# Patient Record
Sex: FEMALE | Race: BLACK OR AFRICAN AMERICAN | Employment: UNEMPLOYED | URBAN - METROPOLITAN AREA
[De-identification: names, ages, dates, MRNs, and addresses within clinical notes are randomized per-mention and may not be internally consistent; named-entity substitution may affect disease eponyms.]

---

## 2017-04-30 ENCOUNTER — APPOINTMENT (OUTPATIENT)
Dept: GENERAL RADIOLOGY | Age: 37
DRG: 637 | End: 2017-04-30
Attending: EMERGENCY MEDICINE
Payer: MEDICARE

## 2017-04-30 ENCOUNTER — HOSPITAL ENCOUNTER (INPATIENT)
Age: 37
LOS: 2 days | Discharge: HOME | DRG: 637 | End: 2017-05-02
Attending: EMERGENCY MEDICINE | Admitting: INTERNAL MEDICINE
Payer: MEDICARE

## 2017-04-30 ENCOUNTER — APPOINTMENT (OUTPATIENT)
Dept: CT IMAGING | Age: 37
DRG: 637 | End: 2017-04-30
Attending: EMERGENCY MEDICINE
Payer: MEDICARE

## 2017-04-30 DIAGNOSIS — E13.10 DIABETIC KETOACIDOSIS ASSOCIATED WITH OTHER SPECIFIED DIABETES MELLITUS: Primary | ICD-10-CM

## 2017-04-30 PROBLEM — E11.65 HYPERGLYCEMIA DUE TO TYPE 2 DIABETES MELLITUS (HCC): Status: ACTIVE | Noted: 2017-04-30

## 2017-04-30 PROBLEM — R73.9 HYPERGLYCEMIA: Status: ACTIVE | Noted: 2017-04-30

## 2017-04-30 LAB
ALBUMIN SERPL BCP-MCNC: 3.5 G/DL (ref 3.5–5)
ALBUMIN/GLOB SERPL: 1 {RATIO} (ref 1.1–2.2)
ALP SERPL-CCNC: 547 U/L (ref 45–117)
ALT SERPL-CCNC: 64 U/L (ref 12–78)
AMPHET UR QL SCN: NEGATIVE
ANION GAP BLD CALC-SCNC: 10 MMOL/L (ref 5–15)
APPEARANCE UR: CLEAR
ARTERIAL PATENCY WRIST A: YES
AST SERPL W P-5'-P-CCNC: 43 U/L (ref 15–37)
BACTERIA URNS QL MICRO: NEGATIVE /HPF
BARBITURATES UR QL SCN: POSITIVE
BASE DEFICIT BLD-SCNC: 9 MMOL/L
BASOPHILS # BLD AUTO: 0 K/UL (ref 0–0.1)
BASOPHILS # BLD: 0 % (ref 0–1)
BDY SITE: ABNORMAL
BENZODIAZ UR QL: NEGATIVE
BILIRUB SERPL-MCNC: 0.5 MG/DL (ref 0.2–1)
BILIRUB UR QL: NEGATIVE
BUN SERPL-MCNC: 48 MG/DL (ref 6–20)
BUN/CREAT SERPL: 18 (ref 12–20)
CALCIUM SERPL-MCNC: 8.2 MG/DL (ref 8.5–10.1)
CANNABINOIDS UR QL SCN: NEGATIVE
CHLORIDE SERPL-SCNC: 104 MMOL/L (ref 97–108)
CO2 SERPL-SCNC: 19 MMOL/L (ref 21–32)
COCAINE UR QL SCN: NEGATIVE
COLOR UR: ABNORMAL
CREAT SERPL-MCNC: 2.71 MG/DL (ref 0.55–1.02)
DIFFERENTIAL METHOD BLD: ABNORMAL
DRUG SCRN COMMENT,DRGCM: ABNORMAL
EOSINOPHIL # BLD: 0 K/UL (ref 0–0.4)
EOSINOPHIL NFR BLD: 0 % (ref 0–7)
EPITH CASTS URNS QL MICRO: ABNORMAL /LPF
ERYTHROCYTE [DISTWIDTH] IN BLOOD BY AUTOMATED COUNT: 14 % (ref 11.5–14.5)
EST. AVERAGE GLUCOSE BLD GHB EST-MCNC: 237 MG/DL
ETHANOL SERPL-MCNC: <10 MG/DL
GAS FLOW.O2 O2 DELIVERY SYS: ABNORMAL L/MIN
GLOBULIN SER CALC-MCNC: 3.4 G/DL (ref 2–4)
GLUCOSE BLD STRIP.AUTO-MCNC: 103 MG/DL (ref 65–100)
GLUCOSE BLD STRIP.AUTO-MCNC: 121 MG/DL (ref 65–100)
GLUCOSE BLD STRIP.AUTO-MCNC: 131 MG/DL (ref 65–100)
GLUCOSE BLD STRIP.AUTO-MCNC: 261 MG/DL (ref 65–100)
GLUCOSE BLD STRIP.AUTO-MCNC: 276 MG/DL (ref 65–100)
GLUCOSE BLD STRIP.AUTO-MCNC: 312 MG/DL (ref 65–100)
GLUCOSE BLD STRIP.AUTO-MCNC: 401 MG/DL (ref 65–100)
GLUCOSE BLD STRIP.AUTO-MCNC: 570 MG/DL (ref 65–100)
GLUCOSE BLD STRIP.AUTO-MCNC: >600 MG/DL (ref 65–100)
GLUCOSE SERPL-MCNC: 673 MG/DL (ref 65–100)
GLUCOSE UR STRIP.AUTO-MCNC: >1000 MG/DL
HBA1C MFR BLD: 9.9 % (ref 4.2–6.3)
HCO3 BLD-SCNC: 17.6 MMOL/L (ref 22–26)
HCT VFR BLD AUTO: 24.1 % (ref 35–47)
HGB BLD-MCNC: 7.7 G/DL (ref 11.5–16)
HGB UR QL STRIP: NEGATIVE
HYALINE CASTS URNS QL MICRO: ABNORMAL /LPF (ref 0–5)
KETONES UR QL STRIP.AUTO: NEGATIVE MG/DL
LEUKOCYTE ESTERASE UR QL STRIP.AUTO: NEGATIVE
LYMPHOCYTES # BLD AUTO: 12 % (ref 12–49)
LYMPHOCYTES # BLD: 0.7 K/UL (ref 0.8–3.5)
MCH RBC QN AUTO: 25.6 PG (ref 26–34)
MCHC RBC AUTO-ENTMCNC: 32 G/DL (ref 30–36.5)
MCV RBC AUTO: 80.1 FL (ref 80–99)
METHADONE UR QL: NEGATIVE
MONOCYTES # BLD: 0.4 K/UL (ref 0–1)
MONOCYTES NFR BLD AUTO: 7 % (ref 5–13)
NEUTS SEG # BLD: 4.8 K/UL (ref 1.8–8)
NEUTS SEG NFR BLD AUTO: 81 % (ref 32–75)
NITRITE UR QL STRIP.AUTO: NEGATIVE
OPIATES UR QL: NEGATIVE
PCO2 BLD: 34.4 MMHG (ref 35–45)
PCP UR QL: NEGATIVE
PH BLD: 7.32 [PH] (ref 7.35–7.45)
PH UR STRIP: 5.5 [PH] (ref 5–8)
PHOSPHATE SERPL-MCNC: 4.2 MG/DL (ref 2.6–4.7)
PLATELET # BLD AUTO: 206 K/UL (ref 150–400)
PO2 BLD: 79 MMHG (ref 80–100)
POTASSIUM SERPL-SCNC: 5.2 MMOL/L (ref 3.5–5.1)
PROT SERPL-MCNC: 6.9 G/DL (ref 6.4–8.2)
PROT UR STRIP-MCNC: 30 MG/DL
RBC # BLD AUTO: 3.01 M/UL (ref 3.8–5.2)
RBC #/AREA URNS HPF: ABNORMAL /HPF (ref 0–5)
RBC MORPH BLD: ABNORMAL
SAO2 % BLD: 95 % (ref 92–97)
SERVICE CMNT-IMP: ABNORMAL
SODIUM SERPL-SCNC: 133 MMOL/L (ref 136–145)
SP GR UR REFRACTOMETRY: 1.02 (ref 1–1.03)
SPECIMEN TYPE: ABNORMAL
UROBILINOGEN UR QL STRIP.AUTO: 0.2 EU/DL (ref 0.2–1)
WBC # BLD AUTO: 5.9 K/UL (ref 3.6–11)
WBC URNS QL MICRO: ABNORMAL /HPF (ref 0–4)

## 2017-04-30 PROCEDURE — 65270000032 HC RM SEMIPRIVATE

## 2017-04-30 PROCEDURE — 80053 COMPREHEN METABOLIC PANEL: CPT | Performed by: EMERGENCY MEDICINE

## 2017-04-30 PROCEDURE — 74011250636 HC RX REV CODE- 250/636: Performed by: EMERGENCY MEDICINE

## 2017-04-30 PROCEDURE — 72072 X-RAY EXAM THORAC SPINE 3VWS: CPT

## 2017-04-30 PROCEDURE — 74011000258 HC RX REV CODE- 258: Performed by: EMERGENCY MEDICINE

## 2017-04-30 PROCEDURE — 84100 ASSAY OF PHOSPHORUS: CPT | Performed by: INTERNAL MEDICINE

## 2017-04-30 PROCEDURE — 74011250637 HC RX REV CODE- 250/637: Performed by: INTERNAL MEDICINE

## 2017-04-30 PROCEDURE — 81001 URINALYSIS AUTO W/SCOPE: CPT | Performed by: EMERGENCY MEDICINE

## 2017-04-30 PROCEDURE — 80307 DRUG TEST PRSMV CHEM ANLYZR: CPT | Performed by: EMERGENCY MEDICINE

## 2017-04-30 PROCEDURE — 36415 COLL VENOUS BLD VENIPUNCTURE: CPT | Performed by: EMERGENCY MEDICINE

## 2017-04-30 PROCEDURE — 96374 THER/PROPH/DIAG INJ IV PUSH: CPT

## 2017-04-30 PROCEDURE — 82803 BLOOD GASES ANY COMBINATION: CPT

## 2017-04-30 PROCEDURE — 85025 COMPLETE CBC W/AUTO DIFF WBC: CPT | Performed by: EMERGENCY MEDICINE

## 2017-04-30 PROCEDURE — 74011636637 HC RX REV CODE- 636/637: Performed by: EMERGENCY MEDICINE

## 2017-04-30 PROCEDURE — 96375 TX/PRO/DX INJ NEW DRUG ADDON: CPT

## 2017-04-30 PROCEDURE — 72100 X-RAY EXAM L-S SPINE 2/3 VWS: CPT

## 2017-04-30 PROCEDURE — 36600 WITHDRAWAL OF ARTERIAL BLOOD: CPT

## 2017-04-30 PROCEDURE — 83036 HEMOGLOBIN GLYCOSYLATED A1C: CPT | Performed by: INTERNAL MEDICINE

## 2017-04-30 PROCEDURE — 71010 XR CHEST PORT: CPT

## 2017-04-30 PROCEDURE — 82962 GLUCOSE BLOOD TEST: CPT

## 2017-04-30 PROCEDURE — 74011250636 HC RX REV CODE- 250/636: Performed by: INTERNAL MEDICINE

## 2017-04-30 PROCEDURE — 99284 EMERGENCY DEPT VISIT MOD MDM: CPT

## 2017-04-30 PROCEDURE — 74011636637 HC RX REV CODE- 636/637: Performed by: INTERNAL MEDICINE

## 2017-04-30 PROCEDURE — 96361 HYDRATE IV INFUSION ADD-ON: CPT

## 2017-04-30 PROCEDURE — 74176 CT ABD & PELVIS W/O CONTRAST: CPT

## 2017-04-30 PROCEDURE — 70450 CT HEAD/BRAIN W/O DYE: CPT

## 2017-04-30 RX ORDER — ONDANSETRON 2 MG/ML
4 INJECTION INTRAMUSCULAR; INTRAVENOUS
Status: DISCONTINUED | OUTPATIENT
Start: 2017-04-30 | End: 2017-05-02 | Stop reason: HOSPADM

## 2017-04-30 RX ORDER — PROMETHAZINE HYDROCHLORIDE 25 MG/1
50 TABLET ORAL
Status: DISCONTINUED | OUTPATIENT
Start: 2017-04-30 | End: 2017-05-02 | Stop reason: HOSPADM

## 2017-04-30 RX ORDER — POLYETHYLENE GLYCOL 3350 17 G/17G
17 POWDER, FOR SOLUTION ORAL
Status: DISCONTINUED | OUTPATIENT
Start: 2017-04-30 | End: 2017-05-02 | Stop reason: HOSPADM

## 2017-04-30 RX ORDER — DULOXETIN HYDROCHLORIDE 60 MG/1
60 CAPSULE, DELAYED RELEASE ORAL 2 TIMES DAILY
Status: DISCONTINUED | OUTPATIENT
Start: 2017-04-30 | End: 2017-05-02 | Stop reason: HOSPADM

## 2017-04-30 RX ORDER — SODIUM CHLORIDE 0.9 % (FLUSH) 0.9 %
5-10 SYRINGE (ML) INJECTION AS NEEDED
Status: DISCONTINUED | OUTPATIENT
Start: 2017-04-30 | End: 2017-05-02 | Stop reason: HOSPADM

## 2017-04-30 RX ORDER — ALBUTEROL SULFATE 90 UG/1
2 AEROSOL, METERED RESPIRATORY (INHALATION)
Status: DISCONTINUED | OUTPATIENT
Start: 2017-04-30 | End: 2017-04-30 | Stop reason: SDUPTHER

## 2017-04-30 RX ORDER — LANOLIN ALCOHOL/MO/W.PET/CERES
1000 CREAM (GRAM) TOPICAL DAILY
Status: DISCONTINUED | OUTPATIENT
Start: 2017-04-30 | End: 2017-05-02 | Stop reason: HOSPADM

## 2017-04-30 RX ORDER — SODIUM CHLORIDE 9 MG/ML
150 INJECTION, SOLUTION INTRAVENOUS CONTINUOUS
Status: DISCONTINUED | OUTPATIENT
Start: 2017-04-30 | End: 2017-04-30

## 2017-04-30 RX ORDER — HYDROMORPHONE HYDROCHLORIDE 1 MG/ML
0.5 INJECTION, SOLUTION INTRAMUSCULAR; INTRAVENOUS; SUBCUTANEOUS
Status: DISCONTINUED | OUTPATIENT
Start: 2017-04-30 | End: 2017-05-01

## 2017-04-30 RX ORDER — HYDRALAZINE HYDROCHLORIDE 20 MG/ML
10 INJECTION INTRAMUSCULAR; INTRAVENOUS
Status: COMPLETED | OUTPATIENT
Start: 2017-04-30 | End: 2017-04-30

## 2017-04-30 RX ORDER — AMOXICILLIN 250 MG
1 CAPSULE ORAL 2 TIMES DAILY
Status: DISCONTINUED | OUTPATIENT
Start: 2017-04-30 | End: 2017-05-02 | Stop reason: HOSPADM

## 2017-04-30 RX ORDER — AMOXICILLIN 250 MG
1 CAPSULE ORAL 2 TIMES DAILY
Status: DISCONTINUED | OUTPATIENT
Start: 2017-04-30 | End: 2017-04-30

## 2017-04-30 RX ORDER — MELATONIN
1000 DAILY
Status: DISCONTINUED | OUTPATIENT
Start: 2017-04-30 | End: 2017-04-30

## 2017-04-30 RX ORDER — QUETIAPINE FUMARATE 100 MG/1
100 TABLET, FILM COATED ORAL 2 TIMES DAILY
Status: DISCONTINUED | OUTPATIENT
Start: 2017-04-30 | End: 2017-05-02 | Stop reason: HOSPADM

## 2017-04-30 RX ORDER — ASPIRIN 325 MG
1 TABLET, DELAYED RELEASE (ENTERIC COATED) ORAL
Status: ON HOLD | COMMUNITY
End: 2018-07-02

## 2017-04-30 RX ORDER — DEXTROSE 50 % IN WATER (D50W) INTRAVENOUS SYRINGE
12.5-25 AS NEEDED
Status: DISCONTINUED | OUTPATIENT
Start: 2017-04-30 | End: 2017-05-02 | Stop reason: SDUPTHER

## 2017-04-30 RX ORDER — BACLOFEN 10 MG/1
20 TABLET ORAL 3 TIMES DAILY
Status: DISCONTINUED | OUTPATIENT
Start: 2017-04-30 | End: 2017-05-02 | Stop reason: HOSPADM

## 2017-04-30 RX ORDER — ERGOCALCIFEROL 1.25 MG/1
50000 CAPSULE ORAL
Status: DISCONTINUED | OUTPATIENT
Start: 2017-04-30 | End: 2017-05-02 | Stop reason: HOSPADM

## 2017-04-30 RX ORDER — ALBUTEROL SULFATE 0.83 MG/ML
2.5 SOLUTION RESPIRATORY (INHALATION)
Status: DISCONTINUED | OUTPATIENT
Start: 2017-04-30 | End: 2017-05-02 | Stop reason: HOSPADM

## 2017-04-30 RX ORDER — INSULIN GLARGINE 100 [IU]/ML
5 INJECTION, SOLUTION SUBCUTANEOUS 2 TIMES DAILY
Status: DISCONTINUED | OUTPATIENT
Start: 2017-04-30 | End: 2017-04-30

## 2017-04-30 RX ORDER — INSULIN LISPRO 100 [IU]/ML
INJECTION, SOLUTION INTRAVENOUS; SUBCUTANEOUS
Status: DISCONTINUED | OUTPATIENT
Start: 2017-04-30 | End: 2017-04-30

## 2017-04-30 RX ORDER — FAMOTIDINE 20 MG/1
20 TABLET, FILM COATED ORAL DAILY
Status: DISCONTINUED | OUTPATIENT
Start: 2017-04-30 | End: 2017-05-02 | Stop reason: HOSPADM

## 2017-04-30 RX ORDER — HEPARIN SODIUM 5000 [USP'U]/ML
5000 INJECTION, SOLUTION INTRAVENOUS; SUBCUTANEOUS EVERY 8 HOURS
Status: DISCONTINUED | OUTPATIENT
Start: 2017-04-30 | End: 2017-05-02 | Stop reason: HOSPADM

## 2017-04-30 RX ORDER — LANOLIN ALCOHOL/MO/W.PET/CERES
325 CREAM (GRAM) TOPICAL 2 TIMES DAILY
Status: DISCONTINUED | OUTPATIENT
Start: 2017-04-30 | End: 2017-05-02 | Stop reason: HOSPADM

## 2017-04-30 RX ORDER — MAGNESIUM SULFATE 100 %
4 CRYSTALS MISCELLANEOUS AS NEEDED
Status: DISCONTINUED | OUTPATIENT
Start: 2017-04-30 | End: 2017-05-02 | Stop reason: SDUPTHER

## 2017-04-30 RX ORDER — POLYETHYLENE GLYCOL 3350 17 G/17G
17 POWDER, FOR SOLUTION ORAL DAILY
Status: DISCONTINUED | OUTPATIENT
Start: 2017-04-30 | End: 2017-05-02 | Stop reason: HOSPADM

## 2017-04-30 RX ORDER — LANOLIN ALCOHOL/MO/W.PET/CERES
400 CREAM (GRAM) TOPICAL DAILY
Status: DISCONTINUED | OUTPATIENT
Start: 2017-04-30 | End: 2017-05-02 | Stop reason: HOSPADM

## 2017-04-30 RX ORDER — HYDRALAZINE HYDROCHLORIDE 20 MG/ML
10 INJECTION INTRAMUSCULAR; INTRAVENOUS
Status: DISCONTINUED | OUTPATIENT
Start: 2017-04-30 | End: 2017-05-01

## 2017-04-30 RX ORDER — INSULIN LISPRO 100 [IU]/ML
INJECTION, SOLUTION INTRAVENOUS; SUBCUTANEOUS
Status: DISCONTINUED | OUTPATIENT
Start: 2017-04-30 | End: 2017-05-02 | Stop reason: HOSPADM

## 2017-04-30 RX ORDER — HYDROCORTISONE ACETATE 1 %
800 CREAM (GRAM) TOPICAL DAILY
Status: DISCONTINUED | OUTPATIENT
Start: 2017-04-30 | End: 2017-04-30 | Stop reason: SDUPTHER

## 2017-04-30 RX ORDER — THERA TABS 400 MCG
1 TAB ORAL DAILY
Status: DISCONTINUED | OUTPATIENT
Start: 2017-04-30 | End: 2017-05-02 | Stop reason: HOSPADM

## 2017-04-30 RX ORDER — SODIUM CHLORIDE 0.9 % (FLUSH) 0.9 %
5-10 SYRINGE (ML) INJECTION EVERY 8 HOURS
Status: DISCONTINUED | OUTPATIENT
Start: 2017-04-30 | End: 2017-05-02 | Stop reason: HOSPADM

## 2017-04-30 RX ORDER — LANOLIN ALCOHOL/MO/W.PET/CERES
800 CREAM (GRAM) TOPICAL DAILY
Status: DISCONTINUED | OUTPATIENT
Start: 2017-04-30 | End: 2017-05-02 | Stop reason: HOSPADM

## 2017-04-30 RX ORDER — CALCIUM CARBONATE 500(1250)
500 TABLET ORAL DAILY
Status: DISCONTINUED | OUTPATIENT
Start: 2017-04-30 | End: 2017-05-02 | Stop reason: HOSPADM

## 2017-04-30 RX ADMIN — HYDRALAZINE HYDROCHLORIDE 10 MG: 20 INJECTION INTRAMUSCULAR; INTRAVENOUS at 22:13

## 2017-04-30 RX ADMIN — FOLIC ACID TAB 400 MCG 0.4 MG: 400 TAB at 09:14

## 2017-04-30 RX ADMIN — DOCUSATE SODIUM AND SENNOSIDES 1 TABLET: 8.6; 5 TABLET, FILM COATED ORAL at 12:27

## 2017-04-30 RX ADMIN — Medication 1000 MCG: at 09:14

## 2017-04-30 RX ADMIN — FERROUS SULFATE TAB 325 MG (65 MG ELEMENTAL FE) 325 MG: 325 (65 FE) TAB at 18:08

## 2017-04-30 RX ADMIN — PROMETHAZINE HYDROCHLORIDE 12.5 MG: 25 INJECTION INTRAMUSCULAR; INTRAVENOUS at 06:24

## 2017-04-30 RX ADMIN — QUETIAPINE FUMARATE 100 MG: 100 TABLET, FILM COATED ORAL at 18:08

## 2017-04-30 RX ADMIN — DULOXETINE HYDROCHLORIDE 60 MG: 60 CAPSULE, DELAYED RELEASE ORAL at 18:08

## 2017-04-30 RX ADMIN — CALCIUM CARBONATE 500 MG: 1250 TABLET ORAL at 09:15

## 2017-04-30 RX ADMIN — PROMETHAZINE HYDROCHLORIDE 50 MG: 25 TABLET ORAL at 18:56

## 2017-04-30 RX ADMIN — FERROUS SULFATE TAB 325 MG (65 MG ELEMENTAL FE) 325 MG: 325 (65 FE) TAB at 09:15

## 2017-04-30 RX ADMIN — HYDRALAZINE HYDROCHLORIDE 10 MG: 20 INJECTION INTRAMUSCULAR; INTRAVENOUS at 08:02

## 2017-04-30 RX ADMIN — QUETIAPINE FUMARATE 100 MG: 100 TABLET, FILM COATED ORAL at 09:14

## 2017-04-30 RX ADMIN — HYDROMORPHONE HYDROCHLORIDE 0.5 MG: 1 INJECTION, SOLUTION INTRAMUSCULAR; INTRAVENOUS; SUBCUTANEOUS at 09:05

## 2017-04-30 RX ADMIN — Medication 10 ML: at 22:14

## 2017-04-30 RX ADMIN — INSULIN LISPRO 3 UNITS: 100 INJECTION, SOLUTION INTRAVENOUS; SUBCUTANEOUS at 22:14

## 2017-04-30 RX ADMIN — HYDROMORPHONE HYDROCHLORIDE 0.5 MG: 1 INJECTION, SOLUTION INTRAMUSCULAR; INTRAVENOUS; SUBCUTANEOUS at 19:41

## 2017-04-30 RX ADMIN — ERGOCALCIFEROL 50000 UNITS: 1.25 CAPSULE ORAL at 18:08

## 2017-04-30 RX ADMIN — CLONIDINE HYDROCHLORIDE 0.3 MG: 0.2 TABLET ORAL at 18:07

## 2017-04-30 RX ADMIN — THERA TABS 1 TABLET: TAB at 09:15

## 2017-04-30 RX ADMIN — BACLOFEN 20 MG: 10 TABLET ORAL at 09:15

## 2017-04-30 RX ADMIN — INSULIN LISPRO 7 UNITS: 100 INJECTION, SOLUTION INTRAVENOUS; SUBCUTANEOUS at 16:43

## 2017-04-30 RX ADMIN — DULOXETINE HYDROCHLORIDE 60 MG: 60 CAPSULE, DELAYED RELEASE ORAL at 09:15

## 2017-04-30 RX ADMIN — SODIUM CHLORIDE 10.2 UNITS/HR: 900 INJECTION, SOLUTION INTRAVENOUS at 06:50

## 2017-04-30 RX ADMIN — VITAMIN D, TAB 1000IU (100/BT) 1000 UNITS: 25 TAB at 09:15

## 2017-04-30 RX ADMIN — VITAMIN E CAP 400 UNIT 800 UNITS: 400 CAP at 10:50

## 2017-04-30 RX ADMIN — FAMOTIDINE 20 MG: 20 TABLET ORAL at 09:14

## 2017-04-30 RX ADMIN — DOCUSATE SODIUM AND SENNOSIDES 1 TABLET: 8.6; 5 TABLET, FILM COATED ORAL at 18:07

## 2017-04-30 RX ADMIN — BACLOFEN 20 MG: 10 TABLET ORAL at 22:14

## 2017-04-30 RX ADMIN — POLYETHYLENE GLYCOL 3350 17 G: 17 POWDER, FOR SOLUTION ORAL at 12:27

## 2017-04-30 RX ADMIN — SODIUM CHLORIDE 150 ML/HR: 900 INJECTION, SOLUTION INTRAVENOUS at 08:03

## 2017-04-30 RX ADMIN — SODIUM CHLORIDE 150 ML/HR: 900 INJECTION, SOLUTION INTRAVENOUS at 08:27

## 2017-04-30 RX ADMIN — HEPARIN SODIUM 5000 UNITS: 5000 INJECTION, SOLUTION INTRAVENOUS; SUBCUTANEOUS at 09:06

## 2017-04-30 RX ADMIN — DOCUSATE SODIUM AND SENNOSIDES 1 TABLET: 8.6; 5 TABLET, FILM COATED ORAL at 09:14

## 2017-04-30 RX ADMIN — CLONIDINE HYDROCHLORIDE 0.3 MG: 0.2 TABLET ORAL at 09:14

## 2017-04-30 RX ADMIN — HEPARIN SODIUM 5000 UNITS: 5000 INJECTION, SOLUTION INTRAVENOUS; SUBCUTANEOUS at 16:44

## 2017-04-30 RX ADMIN — HUMAN INSULIN 5 UNITS: 100 INJECTION, SUSPENSION SUBCUTANEOUS at 10:50

## 2017-04-30 RX ADMIN — HYDROMORPHONE HYDROCHLORIDE 0.5 MG: 1 INJECTION, SOLUTION INTRAMUSCULAR; INTRAVENOUS; SUBCUTANEOUS at 15:25

## 2017-04-30 RX ADMIN — BACLOFEN 20 MG: 10 TABLET ORAL at 16:44

## 2017-04-30 RX ADMIN — SODIUM CHLORIDE 2000 ML: 900 INJECTION, SOLUTION INTRAVENOUS at 05:30

## 2017-04-30 RX ADMIN — HYDRALAZINE HYDROCHLORIDE 10 MG: 20 INJECTION INTRAMUSCULAR; INTRAVENOUS at 14:04

## 2017-04-30 RX ADMIN — Medication 10 ML: at 14:05

## 2017-04-30 RX ADMIN — HYDRALAZINE HYDROCHLORIDE 10 MG: 20 INJECTION INTRAMUSCULAR; INTRAVENOUS at 06:25

## 2017-04-30 NOTE — PROGRESS NOTES
Spoke to Dr. Bebeto Pompa about pt's LE duplex order. Order may be done routine. Also, pt takes vitamin D 07950 units weekly, not 1000 units daily. MD to adjust order.

## 2017-04-30 NOTE — ED PROVIDER NOTES
Patient is a 43 y.o. female presenting with fall and hyperglycemia. Fall   The fall occurred in unknown circumstances. Associated symptoms include abdominal pain, nausea and vomiting. Pertinent negatives include no fever and no headaches. High Blood Sugar    Associated symptoms include nausea, vomiting, frequency and chest pain. Pertinent negatives include no fever, no diarrhea and no headaches. 43year old female with asthma, IDDM, gastroparesis, gerd, htn, h/o narcotic abuse, sickle cell trait, on 2L home oxgyen, presents with vomiting for 2 days and fall down 5 stairs. C/O right chest wall pain and mid back pain. Feels sob some congestion. Out of her pain meds. Extremity pain/injury.  States she did hit her head and lost consciousness \"a little bit\"     Past Medical History:   Diagnosis Date    Asthma     Diabetes ([address]     Gastroparesis 2010    Gastric Pacer- REMOVED 07/2015    GERD (gastroesophageal reflux disease)     Hypertension     Narcotic dependence ([address]     Other ill-defined conditions(799.89)     Polycystic ovarian syndrome     Seizures (HCC)     Sickle cell trait ([address]     Thromboembolus ([address] to her left arm and was told she had one in left leg recently       Past Surgical History:   Procedure Laterality Date    HX CATARACT REMOVAL  3/5/12    right    HX DILATION AND CURETTAGE      ablation    HX GASTRIC BYPASS  2015    HX GI      j tube placement and removal    HX OTHER SURGICAL  2010    Gastric Pacer- REMOVED 07/2015    HX VASCULAR ACCESS      gray cath rt subclavian    HX VASCULAR ACCESS      HD access right thigh         Family History:   Problem Relation Age of Onset    Cancer Mother      lung    Cancer Father      kidney    Cancer Sister      pancreatic    Cancer Maternal Aunt      breast    Cancer Paternal Aunt      breast    Stroke Father      3 strokes: 59-72    Heart Disease Father 72     CABG    [facility]ia Sister      [facility], now ass't living    Hypertension Father     Hypertension Mother     Other Sister       AIDS   [facility] Other Other      nephew of AIDS       Social History     Social History    Marital status:      Spouse name: N/A    Number of children: N/A    Years of education: N/A     Occupational History    Not on file. Social History Main Topics    Smoking status: Never Smoker    Smokeless tobacco: Never Used    Alcohol use No    Drug use: No    Sexual activity: Yes     Partners: Male     Birth control/ protection: None     Other Topics Concern    Not on file     Social History Narrative    Lives with her  and father         ALLERGIES: Erythromycin and Morphine    Review of Systems   Constitutional: Negative for fever. HENT: Positive for congestion. Eyes: Negative for visual disturbance. Respiratory: Positive for shortness of breath. Cardiovascular: Positive for chest pain. Negative for palpitations and leg swelling. Gastrointestinal: Positive for abdominal pain, nausea and vomiting. Negative for diarrhea. Endocrine: Positive for polyuria. Genitourinary: Positive for frequency. Musculoskeletal: Positive for gait problem. Skin: Negative for rash. Neurological: Negative for headaches. Psychiatric/Behavioral: Negative for dysphoric mood. Vitals:    17 0441   BP: (!) 200/102   Pulse: (!) 102   Resp: 16   Temp: 98.6 °F (37 °C)   SpO2: 100%   Weight: 70.3 kg (155 lb)   Height: 5' 2\" (1.575 m)            Physical Exam   Constitutional: She is oriented to person, place, and time. She appears well-developed and well-nourished. No distress. Chronically ill appearing   HENT:   Head: Normocephalic and atraumatic. Mouth/Throat: Oropharynx is clear and moist. No oropharyngeal exudate. Mucous membranes are dry   Eyes: Conjunctivae and EOM are normal. Pupils are equal, round, and reactive to light. Right eye exhibits no discharge. Left eye exhibits no discharge. No scleral icterus. Neck: Normal range of motion. Neck supple. No JVD present. Cardiovascular: Regular rhythm, normal heart sounds and intact distal pulses. Tachycardia present. Exam reveals no gallop and no friction rub. No murmur heard. Pulmonary/Chest: Effort normal and breath sounds normal. No stridor. No respiratory distress. She has no wheezes. She has no rales. She exhibits no tenderness. Port left upper chest wall   Abdominal: Soft. Bowel sounds are normal. She exhibits no distension and no mass. There is tenderness (mild diffuse). There is no rebound and no guarding. Musculoskeletal: Normal range of motion. She exhibits no edema or tenderness. Tender right lateral chest wall  Tender mid thoracic spine and lumbar spine  No cervical tenderness    No ecchymosis or crepitance   Neurological: She is alert and oriented to person, place, and time. She has normal reflexes. No cranial nerve deficit. She exhibits normal muscle tone. Coordination normal.   Skin: Skin is warm and dry. No rash noted. No erythema. Psychiatric: She has a normal mood and affect. Her behavior is normal. Judgment and thought content normal.        MDM  Number of Diagnoses or Management Options  Diabetic ketoacidosis associated with other specified diabetes mellitus [facility]):   Critical Care  Total time providing critical care: 30-74 minutes  45 minutes  ED Course       Procedures    GLucose >600, bicarb 19. AG is 10. Fluids started, glucomander started. Hydralazine for HTN. Spoke with hospitalist re admission.

## 2017-04-30 NOTE — PROGRESS NOTES
Primary Nurse Raymond Syed RN and Ki Garcia RN performed a dual skin assessment on this patient Impairment noted.   Bruise to R hip and R elbow; abrasion to L rib/back  Antonio score is 20

## 2017-04-30 NOTE — IP AVS SNAPSHOT
[address] [facility] [address] [place] [address] 
[phone] Patient: Vanessa Garcia MRN: [id number] QWQ:[birth year] You are allergic to the following Allergen Reactions Erythromycin Itching Morphine Itching Recent Documentation Height Weight Breastfeeding? BMI OB Status Smoking Status 1.575 m 73.9 kg No 29.8 kg/m2 Polycystic Ovarian Syndrome Never Smoker Emergency Contacts  (Rel.) Home Phone Work Phone Mobile Phone Stephen Garcia [phone] About your hospitalization You were admitted on:  April 30, 2017 You last received care in the:  [facility] You were discharged on:  May 2, 2017 Why you were hospitalized Your primary diagnosis was:  Hyperglycemia Due To Type 2 Diabetes Mellitus (Hcc) Your diagnoses also included:  Dehydration, Acute Renal Failure Superimposed On Stage 4 Chronic Kidney Disease (Hcc), Hyperglycemia Providers Seen During Your Hospitalizations Provider Role Specialty Primary office phone Peter Thompson MD Attending Provider Emergency Medicine [phone] Vikas Mast MD Attending Provider Hospitalist [phone] Avery Matamoros MD Attending Provider Internal Medicine [phone] Raf Patel MD Attending Provider Internal Medicine [phone] Your Primary Care Physician (PCP) Primary Care Physician Office Phone Office Fax [address], [address] [facility] [phone] Follow-up Information Follow up With Details Comments Contact Manda Chavez MD On 5/8/2017 Appointment Scheduled for 2:45 pm: Review blood glucose  and renal function and also BP meds [address] [facility] [address] [place] [address] 
[phone] Current Discharge Medication List  
  
START taking these medications Dose & Instructions Dispensing Information Comments Morning Noon Evening Bedtime  
 hydrALAZINE 25 mg tablet Commonly known as:  APRESOLINE Your last dose was: Your next dose is:    
   
   
 Dose:  25 mg Take 1 Tab by mouth three (3) times daily for 30 days. Quantity:  90 Tab Refills:  0  
     
   
   
   
  
 insulin lispro 100 unit/mL injection Commonly known as:  HUMALOG Your last dose was: Your next dose is:    
   
   
 Dose:  5 Units 5 Units by SubCUTAneous route Before breakfast, lunch, and dinner. Quantity:  1 Vial  
Refills:  0 CONTINUE these medications which have CHANGED Dose & Instructions Dispensing Information Comments Morning Noon Evening Bedtime  
 cloNIDine HCl 0.3 mg tablet Commonly known as:  CATAPRES What changed:  when to take this Your last dose was: Your next dose is:    
   
   
 Dose:  0.3 mg Take 1 Tab by mouth three (3) times daily for 30 days. Quantity:  90 Tab Refills:  0  
     
   
   
   
  
 insulin  unit/mL injection Commonly known as:  NovoLIN N What changed:   
- how much to take - when to take this Your last dose was: Your next dose is:    
   
   
 Dose:  15 Units 15 Units by SubCUTAneous route two (2) times a day. Quantity:  1 Vial  
Refills:  1  
     
   
   
   
  
 promethazine 25 mg tablet Commonly known as:  PHENERGAN What changed:  when to take this Your last dose was: Your next dose is:    
   
   
 Dose:  50 mg Take 2 Tabs by mouth every six (6) hours as needed for Nausea. Quantity:  10 Tab Refills:  0 CONTINUE these medications which have NOT CHANGED Dose & Instructions Dispensing Information Comments Morning Noon Evening Bedtime  
 albuterol 2.5 mg /3 mL (0.083 %) nebulizer solution Commonly known as:  PROVENTIL VENTOLIN Your last dose was:     
   
Your next dose is:    
   
   
 Dose:  2.5 mg  
 2.5 mg by Nebulization route every four (4) hours as needed. Refills:  0  
     
   
   
   
  
 baclofen 10 mg tablet Commonly known as:  LIORESAL Your last dose was: Your next dose is:    
   
   
 Dose:  20 mg Take 20 mg by mouth three (3) times daily. (dose = 2 x 10 mg tablet) Refills:  0  
     
   
   
   
  
 calcium carbonate 500 mg calcium (1,250 mg) tablet Commonly known as:  OS-BINA Your last dose was: Your next dose is:    
   
   
 Dose:  1 Tab Take 1 Tab by mouth daily. Refills:  0 Cholecalciferol (Vitamin D3) 50,000 unit Cap Your last dose was: Your next dose is:    
   
   
 Dose:  1 Cap Take 1 Cap by mouth every seven (7) days. Refills:  0  
     
   
   
   
  
 cyanocobalamin 1,000 mcg tablet Your last dose was: Your next dose is:    
   
   
 Dose:  1000 mcg Take 1,000 mcg by mouth daily. Refills:  0  
     
   
   
   
  
 diphenhydrAMINE 25 mg capsule Commonly known as:  BENADRYL Your last dose was: Your next dose is:    
   
   
 Dose:  25-50 mg Take 25-50 mg by mouth every six (6) hours as needed. Refills:  0 DULoxetine 60 mg capsule Commonly known as:  CYMBALTA Your last dose was: Your next dose is:    
   
   
 Dose:  60 mg Take 60 mg by mouth two (2) times a day. Refills:  0  
     
   
   
   
  
 famotidine 20 mg tablet Commonly known as:  PEPCID Your last dose was: Your next dose is:    
   
   
 Dose:  20 mg Take 20 mg by mouth two (2) times a day. Refills:  0  
     
   
   
   
  
 ferrous sulfate 325 mg (65 mg iron) tablet Your last dose was: Your next dose is:    
   
   
 Dose:  325 mg Take 325 mg by mouth two (2) times a day. Refills:  0  
     
   
   
   
  
 folic acid 292 mcg tablet Your last dose was: Your next dose is:    
   
   
 Dose:  400 mcg Take 400 mcg by mouth daily. Refills:  0 HYDROmorphone 2 mg tablet Commonly known as:  DILAUDID Your last dose was: Your next dose is:    
   
   
 Dose:  2 mg Take 2 mg by mouth every four (4) hours as needed for Pain. Refills:  0  
     
   
   
   
  
 oxyCODONE-acetaminophen 5-325 mg per tablet Commonly known as:  PERCOCET Your last dose was: Your next dose is:    
   
   
 Dose:  2 Tab Take 2 Tabs by mouth every four (4) hours as needed for Pain. Refills:  0  
     
   
   
   
  
 polyethylene glycol 17 gram packet Commonly known as:  Aaron Kwok Your last dose was: Your next dose is:    
   
   
 Dose:  17 g Take 17 g by mouth three (3) times daily as needed. Refills:  0 Potassium Gluconate 595 mg (99 mg) tablet Your last dose was: Your next dose is:    
   
   
 Dose:  595 mg Take 595 mg by mouth daily. Refills:  0 QUEtiapine 100 mg tablet Commonly known as:  SEROquel Your last dose was: Your next dose is:    
   
   
 Dose:  100 mg Take 100 mg by mouth two (2) times a day. Refills:  0  
     
   
   
   
  
 senna-docusate 8.6-50 mg per tablet Commonly known as:  Leoncio Shannon Your last dose was: Your next dose is:    
   
   
 Dose:  1 Tab Take 1 Tab by mouth two (2) times a day. Quantity:  60 Tab Refills:  1 therapeutic multivitamin tablet Commonly known as:  [facility] Your last dose was: Your next dose is:    
   
   
 Dose:  1 Tab Take 1 Tab by mouth daily. Refills:  0  
     
   
   
   
  
 vitamin E 400 unit capsule Commonly known as:  Lazarus [address] Your last dose was: Your next dose is:    
   
   
 Dose:  800 Units Take 800 Units by mouth daily. Refills:  0 STOP taking these medications NovoLIN R 100 unit/mL injection Generic drug:  insulin regular Where to Get Your Medications Information on where to get these meds will be given to you by the nurse or doctor. ! Ask your nurse or doctor about these medications  
  cloNIDine HCl 0.3 mg tablet  
 hydrALAZINE 25 mg tablet  
 insulin lispro 100 unit/mL injection  
 insulin  unit/mL injection Discharge Instructions Discharge Instructions PATIENT ID: Stevie Muñiz MRN: [id number] YOB: 1980 DATE OF ADMISSION: 4/30/2017  4:26 AM   
DATE OF DISCHARGE: 5/2/2017 PRIMARY CARE PROVIDER: Mackenzie Weiss MD  
 
ATTENDING PHYSICIAN: Alie Montague MD 
DISCHARGING PROVIDER: Alie Montague MD   
To contact this individual call [phone] and ask the  to page. If unavailable ask to be transferred the Adult Hospitalist Department. DISCHARGE DIAGNOSES HHS 
 
CONSULTATIONS: IP CONSULT TO HOSPITALIST 
 
PROCEDURES/SURGERIES: * No surgery found * PENDING TEST RESULTS:  
At the time of discharge the following test results are still pending: FOLLOW UP APPOINTMENTS:  
Follow-up Information Follow up With Details Comments Contact Info Mackenzie Weiss MD In 1 week review blood glucose  and renal function [address] [facility] [address] [place] [address] 
[phone] ADDITIONAL CARE RECOMMENDATIONS:  Pl follow up with PCP as scheduled DIET: Cardiac Diet and Diabetic Diet ACTIVITY: Activity as tolerated WOUND CARE:  
 
EQUIPMENT needed:  
 
 
DISCHARGE MEDICATIONS: 
 See Medication Reconciliation Form · It is important that you take the medication exactly as they are prescribed. · Keep your medication in the bottles provided by the pharmacist and keep a list of the medication names, dosages, and times to be taken in your wallet. · Do not take other medications without consulting your doctor. NOTIFY YOUR PHYSICIAN FOR ANY OF THE FOLLOWING:  
Fever over 101 degrees for 24 hours. Chest pain, shortness of breath, fever, chills, nausea, vomiting, diarrhea, change in mentation, falling, weakness, bleeding. Severe pain or pain not relieved by medications. Or, any other signs or symptoms that you may have questions about. DISPOSITION: 
 x Home With: 
 OT  PT  [facility]  RN  
  
 SNF/Inpatient Rehab/LTAC Independent/assisted living Hospice Other: CDMP Checked:  
Yes x PROBLEM LIST Updated: 
Yes x Signed:  
Karan Rodriguez MD 
5/2/2017 
10:29 AM 
 
Discharge Orders None LookAcross Announcement We are excited to announce that we are making your provider's discharge notes available to you in LookAcross. You will see these notes when they are completed and signed by the physician that discharged you from your recent hospital stay. If you have any questions or concerns about any information you see in LookAcross, please call the Health Information Department where you were seen or reach out to your Primary Care Provider for more information about your plan of care. Introducing [facility]! Singh Philip introduces LookAcross patient portal. Now you can access parts of your medical record, email your doctor's office, and request medication refills online. 1. In your internet browser, go to [email or web address] 2. Click on the First Time User? Click Here link in the Sign In box. You will see the New Member Sign Up page. 3. Enter your LookAcross Access Code exactly as it appears below. You will not need to use this code after youve completed the sign-up process. If you do not sign up before the expiration date, you must request a new code. · LookAcross Access Code: [email or web address] Expires: 7/31/2017  5:53 AM 
 
4. Enter the last four digits of your Social Security Number (xxxx) and Date of Birth (mm/dd/yyyy) as indicated and click Submit.  You will be taken to the next sign-up page. 5. Create a GoAlbert ID. This will be your GoAlbert login ID and cannot be changed, so think of one that is secure and easy to remember. 6. Create a GoAlbert password. You can change your password at any time. 7. Enter your Password Reset Question and Answer. This can be used at a later time if you forget your password. 8. Enter your e-mail address. You will receive e-mail notification when new information is available in [address] 9. Click Sign Up. You can now view and download portions of your medical record. 10. Click the Download Summary menu link to download a portable copy of your medical information. If you have questions, please visit the Frequently Asked Questions section of the GoAlbert website. Remember, GoAlbert is NOT to be used for urgent needs. For medical emergencies, dial 911. Now available from your iPhone and Android! General Information Please provide this summary of care documentation to your next provider. Patient Signature:  ____________________________________________________________ Date:  ____________________________________________________________  
  
Jaron End Provider Signature:  ____________________________________________________________ Date:  ____________________________________________________________

## 2017-04-30 NOTE — PROGRESS NOTES
Yael Santizo TRANSFER - IN REPORT:    Verbal report received from Janet Samuel RN(name) on Nabor Martel  being received from ICU(unit) for routine progression of care      Report consisted of patients Situation, Background, Assessment and   Recommendations(SBAR). Information from the following report(s) SBAR, Kardex and MAR was reviewed with the receiving nurse. Opportunity for questions and clarification was provided. Assessment completed upon patients arrival to unit and care assumed. Received report and pt remain on ICU at this time.

## 2017-04-30 NOTE — ROUTINE PROCESS
TRANSFER - OUT REPORT:    Verbal report given to Alexandra(name) on Marval Servant  being transferred to ICU(unit) for routine progression of care       Report consisted of patients Situation, Background, Assessment and   Recommendations(SBAR). Information from the following report(s) Kardex, ED Summary, Procedure Summary and MAR was reviewed with the receiving nurse. Lines:       Opportunity for questions and clarification was provided.       Patient transported with:   Registered Nurse

## 2017-04-30 NOTE — PROGRESS NOTES
TRANSFER - OUT REPORT:    Verbal report given to Belen MARTIN(name) on Nabor Martel  being transferred to (unit) for routine progression of care       Report consisted of patients Situation, Background, Assessment and   Recommendations(SBAR). Information from the following report(s) SBAR, Kardex, ED Summary, Intake/Output, MAR and Recent Results was reviewed with the receiving nurse. Lines:       Opportunity for questions and clarification was provided.       Patient transported with:   [employer]

## 2017-04-30 NOTE — PROGRESS NOTES
Lorenso Frankel TRANSFER - IN REPORT:    Verbal report received from Yessy Grace RN(name) on Nabor Martel  being received from ICU(unit) for routine progression of care      Report consisted of patients Situation, Background, Assessment and   Recommendations(SBAR). Information from the following report(s) SBAR, Kardex and Intake/Output was reviewed with the receiving nurse. Opportunity for questions and clarification was provided. Assessment completed upon patients arrival to unit and care assumed.

## 2017-04-30 NOTE — PROGRESS NOTES
0750-TRANSFER - IN REPORT:    Verbal report received from [facility] SERV) on Nabor Martel  being received from ER(unit) for routine progression of care      Report consisted of patients Situation, Background, Assessment and   Recommendations(SBAR). Information from the following report(s) SBAR, Kardex, ED Summary, Intake/Output, MAR and Recent Results was reviewed with the receiving nurse. Opportunity for questions and clarification was provided. Assessment completed upon patients arrival to unit and care assumed.

## 2017-04-30 NOTE — H&P
[address] [facility] [place] [address]   HISTORY AND PHYSICAL       Name:  Mark Seals   MR#:  [id number]   :  1980   Account #:  [de-identified]        Date of Adm:  2017       PRIMARY CARE DOCTOR: Juana Sanchez MD    PRESENTING COMPLAINT   1. Fall x2 days. 2. Vomiting. 3. Pain. HISTORY OF PRESENTING COMPLAINT: The patient is 78-year-old   female with a history of diabetes, insulin-requiring and gastroparesis   who presented to the emergency room with complaints of vomiting and   pain. The patient reported nausea and vomiting that started 2 days. She has vomited multiple times. The patient could not explain how   many times she has vomited. Vomit does consist of recently ingested   food and yellow liquid. There is no blood. The patient stated that she   started having abdominal pain, status post several episodes of   vomiting. There is no report of diarrhea or constipation. The patient   reported generalized weakness but denied focal weakness, numbness,   tingling sensation or abnormal sensation. According to the patient she   fell 2 days ago because of weakness. The patient reports that she fell   down 5 stairs. She went to [facility] and she was told that she   had a chest wall contusion but no fracture. Currently the patient is   complaining of pain in her left mid back. Pain is reported as sharp. There is no radiation of the pain. The pain is rated as 8/10 in severity. The patient stated that she ran out of her pain medications. According   to the patient, the last 2 pain medications that she took, she vomited. The patient is on oxygen at home via nasal cannula. Her oxygen   supplementation is p.r.n. The patient stated that she hit her head when   she fell and \"lost consciousness a little bit. \" She did not say how long   she lost consciousness. She denied any report of seizure.  She also   denied neck pain, neck stiffness, or confusion. There is no diaphoresis   or rhonchi. The patient has no complaints of chest pain, palpitations,   irregular heart beat, anxiety or depression. She has no lower extremity   pain but she reports chronic bilateral lower extremity edema. The patient also stated that she has not been taking her diuretic   (Bumex) because of several episodes of vomiting. The patient stated   that she has not been taking her insulin because she was not eating. She did not want to be hypoglycemic. When the patient presented to   the emergency room, her blood sugar was above 600. The patient has   no report of any skin lesions. She denied dysuria, frequency,   hematuria, urethral discharge or vaginal discharge. She also denied   fever, chills, or rigors. There are no complaints of cough, nasal   congestion, sore throat, epistaxis or rhinorrhea. PAST MEDICAL HISTORY   1. Diabetes mellitus, insulin requiring. 2. Asthma. 3. Hypertension. 4. Sickle cell trait. 5. Polycystic ovarian syndrome. 6. Seizures. 7. Gastroesophageal reflux disease. 8. Gastroparesis . 9. Narcotic dependence. 10. History of thromboembolus. PAST SURGICAL HISTORY   1. History of G-tube placement and removal.    2. Right cataract removed. 3. Port-a-Cath right subclavian. 4. History of dilation and curettage. 5. Gastric bypass. 6. Gastric removed 07/2015.   7. HD access right thigh. MEDICATIONS INCLUDE   1. Albuterol nebulizer 2 puff inhalation every 4 hours as needed for   wheezing. 2. Baclofen 20 mg two times daily. 3. Calcium carbonate 1 tablet daily. 4. Cholecalciferol 1000 units daily. 5. Clonidine 0.3 mg 2x daily. 6. Cyanocobalamin 1000 mcg daily. 7. Cyclobenzaprine 10  mg two times daily. 8. Diphenhydramine 25 to 50 mg every 6 hours as needed. 9. Duloxetine 60 mg 2 times daily. 10. Famotidine 20 mg 2 times daily. 11. Ferrous sulfate 325 mg 2 times daily. 12. Folic acid 799 mcg daily. 13. Insulin glargine 5 units subcu 2 times daily. 14. Omeprazole 20 mg daily. 15. Oxycodone/acetaminophen 5/325 take 2 tablets every 4 hours as   needed for pain. 16. Polyethylene glycol 17 grams 3 times as needed. 17. Potassium gluconate 595 mg daily. 18. Promethazine 25 mg every 6 hours as needed. 19. 100 mg 2 times daily. 20. Brittany/Docusate 1 tablet 2 times daily. 21. Therapeutic multivitamin 1 tablet daily. 22. Vitamin E 800 international units daily. ALLERGIES   1. ERYTHROMYCIN CAUSES ITCHING. 2. MORPHINE CAUSES ITCHING. SOCIAL HISTORY: The patient lives at home with her  and   her father. She denies tobacco or alcohol or recreational drug use. FAMILY HISTORY: Significant for cancer of the lung in the mother. Kidney cancer in the father. Pancreatic cancer in the sister. Breast   cancer in the maternal aunt. Breast cancer paternal aunt. Stroke in the   father. Coronary artery disease status post CABG in the father. Schizophrenia in the sister. Hypertension in both the father and the   mother. REVIEW OF SYSTEMS   More than 12 systems reviewed and the pertinent positives in the   history of present illness. All other negative. PHYSICAL EXAMINATION   GENERAL: The patient is seen lying in bed in no acute respiratory   distress. VITAL SIGNS: Blood pressure on entry into the emergency room was   200/102, pulse 102, respirations 16, temperature 98.6, pulse oximetry   100%. HEAD, EARS, NOSE AND THROAT: Atraumatic, normocephalic,   anicteric, not pale, not jaundiced. Extraocular muscles intact. Pupils   bilaterally reactive, equal.   NECK: Supple. No JVD. No carotid bruit. HEART: Sounds 1 and 2, regular rate and rhythm, no gallop, no friction   rub. The patient was  tachycardic when she came into the emergency   room. When I saw the patient she was not tachycardic. Respirations:   Good air entry bilaterally. No wheezing, no rhonchi, no rales.   Port, left upper chest wall noted. ABDOMEN: Soft. There is mild tenderness noted in the lower   abdomen. There is no guarding or rebound. Bowel sounds   normoactive. NEUROLOGIC: Alert, oriented x3. Cranial nerves 2-12 intact. The   patient was reported to be sleepy when she came into the emergency   room. When I saw the patient she was very alert. SKIN: Warm, dry, normal skin color, normal skin turgor. PSYCHIATRIC: Normal mood, normal affect. BACK: Tenderness noted in the mid thoracic spine and lumbar spine. No cervical sign of tenderness. Tenderness also noted in the right   lateral chest wall. No obvious swelling or erythema noted. EXTREMITIES:  1 to 2+ pedal edema. No cyanosis. Moves bilateral   lower extremities. DIAGNOSTIC TESTS:  CT head is negative. Pregnancy test negative. WBC 5.9. Hemoglobin 7.7, hematocrit 24.1, platelets 249. Urinalysis:    Yellow urine. Specific gravity 1.018. Urine glucose greater than 1000,   ketones negative. Blood negative. Nitrite negative. Leukocyte   esterase negative. WBC 0-4, RBCs 0-5. Sodium 133, potassium 5.2. Chloride 104. Carbon dioxide 19. Glucose 673. Anion gap 10. BUN   48, creatinine 2.71, calcium 8.2. ALT 64. AST 43. Alkaline   phosphatase 547. UDS:  Positive for benzodiazepines. The   patient reported that she is no longer taking Xanax. Alcohol level less   than 10. X-ray of the lumbar spine showed no acute abnormality. Moderate constipation. X-ray of the thoracic spine showed no acute   findings. CHEST X-RAY: Showed no acute findings. ASSESSMENT/PLAN   1. Pain upper back and right upper chest.   2. Hyperglycemia. 3. Recent fall. 4. Asthma. 5. Hypertension, uncontrolled. 6. Polycystic ovarian syndrome. 7. History of seizures. 8. Gastroesophageal reflux disease. 9. Gastroparesis. 10. Narcotic dependence. 11. Diabetes mellitus. 12. Thromboembolism to the left arm.    13. Acute on chronic kidney disease. PLAN: Admit the patient to the Intensive Care Unit on behalf of the   hospitalist service. Insulin drip protocol. IV fluids. Monitor kidney function with hydration. Pain management, antiemetics. CT of the abdomen is ordered but is   still pending. Fall precautions, skin care precaution. Out of bed to   chair with assistance. Accu-Chek as per protocol. Hypoglycemic   protocol. Extensive patient education was done by bedside. Monitor   electrolytes with hydration. Consider nephrology consult if kidney   function does not return back to baseline. Elevate lower extremities. Monitor the patient for pulmonary congestion vs fluid overload. DVT Prophylaxis,   heparin. We will obtain venous Doppler of bilateral lower extremities   though suspicion for DVT of lower extremities is low, considering the   patient reports that she is ambulatory and her lower extremity swelling is chronic   and according to the patient she has not been taking her diuretics. I   discussed advanced directive with the patient. The  is the next   of kin. CODE:  FULL CODE.   Further management will depend on the   patient's clinical progression and further evaluation by attending   physician and results of Migdalia Balbuena MD      HERNANDEZ / LAST   D:  04/30/2017   07:25   T:  04/30/2017   09:32   Job #:  [id number]

## 2017-04-30 NOTE — ED TRIAGE NOTES
Patient reports she \"slipped down three stairs this morning and now I have right side back pain. \"  Per EMS, the patient is a known drug seeker. FSBS for EMS was greater than 500.

## 2017-04-30 NOTE — PROGRESS NOTES
Patient seen/examined and chart reviewed. Patient admitted a few hours prior.     CC    S: c/o nausea    A/P:    HHS with DM2  - resolved with insulin gtt and IVF  - resume home Lantus, start diet  - change FBSs qACHS, SSI  - DTC consulted  - transfer to medical    B/l LE edema - venous duplex ordered    Pseudohyponatremia - corrected for hyperglycemia is 142    HTN, uncontrolled - likely due to acute situation, improved    Anemia   - Hb 7.7 on admission  - check iron studies, ferritin, B12/folate, FOBT  - transfuse for Hb<7    S/p recent fall - XRs unremarkable, pain control, PT/OT, OOB    Severe fecal stasis - bowel regimen    Chronic pain syndrome - limit use of opiates    Acute encephalopathy (POA)  - likely multifactorial including hyperglycemia, opiate use, STONE  - CT head unremarkable    Lopez Castro MD, MHS     STONE on CKD - improving with IVF

## 2017-05-01 ENCOUNTER — APPOINTMENT (OUTPATIENT)
Dept: GENERAL RADIOLOGY | Age: 37
DRG: 637 | End: 2017-05-01
Attending: INTERNAL MEDICINE
Payer: MEDICARE

## 2017-05-01 LAB
ALBUMIN SERPL BCP-MCNC: 3.5 G/DL (ref 3.5–5)
ALBUMIN/GLOB SERPL: 1 {RATIO} (ref 1.1–2.2)
ALP SERPL-CCNC: 501 U/L (ref 45–117)
ALT SERPL-CCNC: 52 U/L (ref 12–78)
ANION GAP BLD CALC-SCNC: 9 MMOL/L (ref 5–15)
AST SERPL W P-5'-P-CCNC: 24 U/L (ref 15–37)
BASOPHILS # BLD AUTO: 0 K/UL (ref 0–0.1)
BASOPHILS # BLD: 0 % (ref 0–1)
BILIRUB SERPL-MCNC: 0.5 MG/DL (ref 0.2–1)
BUN SERPL-MCNC: 45 MG/DL (ref 6–20)
BUN/CREAT SERPL: 18 (ref 12–20)
CALCIUM SERPL-MCNC: 8.6 MG/DL (ref 8.5–10.1)
CHLORIDE SERPL-SCNC: 109 MMOL/L (ref 97–108)
CO2 SERPL-SCNC: 20 MMOL/L (ref 21–32)
CREAT SERPL-MCNC: 2.55 MG/DL (ref 0.55–1.02)
EOSINOPHIL # BLD: 0 K/UL (ref 0–0.4)
EOSINOPHIL NFR BLD: 1 % (ref 0–7)
ERYTHROCYTE [DISTWIDTH] IN BLOOD BY AUTOMATED COUNT: 13.6 % (ref 11.5–14.5)
FERRITIN SERPL-MCNC: 385 NG/ML (ref 8–252)
FOLATE SERPL-MCNC: 58.2 NG/ML (ref 5–21)
GLOBULIN SER CALC-MCNC: 3.4 G/DL (ref 2–4)
GLUCOSE BLD STRIP.AUTO-MCNC: 105 MG/DL (ref 65–100)
GLUCOSE BLD STRIP.AUTO-MCNC: 143 MG/DL (ref 65–100)
GLUCOSE BLD STRIP.AUTO-MCNC: 358 MG/DL (ref 65–100)
GLUCOSE BLD STRIP.AUTO-MCNC: 422 MG/DL (ref 65–100)
GLUCOSE BLD STRIP.AUTO-MCNC: 424 MG/DL (ref 65–100)
GLUCOSE BLD STRIP.AUTO-MCNC: 424 MG/DL (ref 65–100)
GLUCOSE BLD STRIP.AUTO-MCNC: 459 MG/DL (ref 65–100)
GLUCOSE BLD STRIP.AUTO-MCNC: 472 MG/DL (ref 65–100)
GLUCOSE BLD STRIP.AUTO-MCNC: >600 MG/DL (ref 65–100)
GLUCOSE SERPL-MCNC: 294 MG/DL (ref 65–100)
HCT VFR BLD AUTO: 24.5 % (ref 35–47)
HGB BLD-MCNC: 8 G/DL (ref 11.5–16)
IRON SATN MFR SERPL: 13 % (ref 20–50)
IRON SERPL-MCNC: 32 UG/DL (ref 35–150)
LYMPHOCYTES # BLD AUTO: 16 % (ref 12–49)
LYMPHOCYTES # BLD: 0.6 K/UL (ref 0.8–3.5)
MAGNESIUM SERPL-MCNC: 2.1 MG/DL (ref 1.6–2.4)
MCH RBC QN AUTO: 25.2 PG (ref 26–34)
MCHC RBC AUTO-ENTMCNC: 32.7 G/DL (ref 30–36.5)
MCV RBC AUTO: 77.3 FL (ref 80–99)
MONOCYTES # BLD: 0.4 K/UL (ref 0–1)
MONOCYTES NFR BLD AUTO: 11 % (ref 5–13)
NEUTS SEG # BLD: 2.7 K/UL (ref 1.8–8)
NEUTS SEG NFR BLD AUTO: 72 % (ref 32–75)
PHOSPHATE SERPL-MCNC: 4.1 MG/DL (ref 2.6–4.7)
PLATELET # BLD AUTO: 224 K/UL (ref 150–400)
POTASSIUM SERPL-SCNC: 5 MMOL/L (ref 3.5–5.1)
PROT SERPL-MCNC: 6.9 G/DL (ref 6.4–8.2)
RBC # BLD AUTO: 3.17 M/UL (ref 3.8–5.2)
SERVICE CMNT-IMP: ABNORMAL
SODIUM SERPL-SCNC: 138 MMOL/L (ref 136–145)
TIBC SERPL-MCNC: 252 UG/DL (ref 250–450)
VIT B12 SERPL-MCNC: 917 PG/ML (ref 211–911)
WBC # BLD AUTO: 3.7 K/UL (ref 3.6–11)

## 2017-05-01 PROCEDURE — 83540 ASSAY OF IRON: CPT | Performed by: INTERNAL MEDICINE

## 2017-05-01 PROCEDURE — 74011250636 HC RX REV CODE- 250/636: Performed by: INTERNAL MEDICINE

## 2017-05-01 PROCEDURE — 65270000032 HC RM SEMIPRIVATE

## 2017-05-01 PROCEDURE — 36415 COLL VENOUS BLD VENIPUNCTURE: CPT | Performed by: INTERNAL MEDICINE

## 2017-05-01 PROCEDURE — 82728 ASSAY OF FERRITIN: CPT | Performed by: INTERNAL MEDICINE

## 2017-05-01 PROCEDURE — 85025 COMPLETE CBC W/AUTO DIFF WBC: CPT | Performed by: INTERNAL MEDICINE

## 2017-05-01 PROCEDURE — 71110 X-RAY EXAM RIBS BIL 3 VIEWS: CPT

## 2017-05-01 PROCEDURE — 74011636637 HC RX REV CODE- 636/637: Performed by: INTERNAL MEDICINE

## 2017-05-01 PROCEDURE — 83735 ASSAY OF MAGNESIUM: CPT | Performed by: INTERNAL MEDICINE

## 2017-05-01 PROCEDURE — 82746 ASSAY OF FOLIC ACID SERUM: CPT | Performed by: INTERNAL MEDICINE

## 2017-05-01 PROCEDURE — 82607 VITAMIN B-12: CPT | Performed by: INTERNAL MEDICINE

## 2017-05-01 PROCEDURE — 74011250637 HC RX REV CODE- 250/637: Performed by: INTERNAL MEDICINE

## 2017-05-01 PROCEDURE — 97161 PT EVAL LOW COMPLEX 20 MIN: CPT

## 2017-05-01 PROCEDURE — 93970 EXTREMITY STUDY: CPT

## 2017-05-01 PROCEDURE — 84100 ASSAY OF PHOSPHORUS: CPT | Performed by: INTERNAL MEDICINE

## 2017-05-01 PROCEDURE — 80053 COMPREHEN METABOLIC PANEL: CPT | Performed by: INTERNAL MEDICINE

## 2017-05-01 PROCEDURE — 82962 GLUCOSE BLOOD TEST: CPT

## 2017-05-01 PROCEDURE — 97116 GAIT TRAINING THERAPY: CPT

## 2017-05-01 RX ORDER — INSULIN LISPRO 100 [IU]/ML
12 INJECTION, SOLUTION INTRAVENOUS; SUBCUTANEOUS ONCE
Status: COMPLETED | OUTPATIENT
Start: 2017-05-01 | End: 2017-05-01

## 2017-05-01 RX ORDER — HYDROMORPHONE HYDROCHLORIDE 2 MG/1
2 TABLET ORAL
Status: DISCONTINUED | OUTPATIENT
Start: 2017-05-01 | End: 2017-05-02 | Stop reason: HOSPADM

## 2017-05-01 RX ORDER — HYDROMORPHONE HYDROCHLORIDE 1 MG/ML
0.5 INJECTION, SOLUTION INTRAMUSCULAR; INTRAVENOUS; SUBCUTANEOUS
Status: DISCONTINUED | OUTPATIENT
Start: 2017-05-01 | End: 2017-05-02 | Stop reason: HOSPADM

## 2017-05-01 RX ORDER — HYDROMORPHONE HYDROCHLORIDE 1 MG/ML
0.5 INJECTION, SOLUTION INTRAMUSCULAR; INTRAVENOUS; SUBCUTANEOUS
Status: DISCONTINUED | OUTPATIENT
Start: 2017-05-01 | End: 2017-05-01

## 2017-05-01 RX ADMIN — HUMAN INSULIN 10 UNITS: 100 INJECTION, SUSPENSION SUBCUTANEOUS at 17:41

## 2017-05-01 RX ADMIN — BACLOFEN 20 MG: 10 TABLET ORAL at 21:02

## 2017-05-01 RX ADMIN — Medication 10 ML: at 21:02

## 2017-05-01 RX ADMIN — BACLOFEN 20 MG: 10 TABLET ORAL at 15:15

## 2017-05-01 RX ADMIN — Medication 10 ML: at 07:06

## 2017-05-01 RX ADMIN — HEPARIN SODIUM 5000 UNITS: 5000 INJECTION, SOLUTION INTRAVENOUS; SUBCUTANEOUS at 10:49

## 2017-05-01 RX ADMIN — QUETIAPINE FUMARATE 100 MG: 100 TABLET, FILM COATED ORAL at 10:48

## 2017-05-01 RX ADMIN — HYDROMORPHONE HYDROCHLORIDE 0.5 MG: 1 INJECTION, SOLUTION INTRAMUSCULAR; INTRAVENOUS; SUBCUTANEOUS at 11:02

## 2017-05-01 RX ADMIN — Medication 10 ML: at 15:16

## 2017-05-01 RX ADMIN — CALCIUM CARBONATE 500 MG: 1250 TABLET ORAL at 10:34

## 2017-05-01 RX ADMIN — DOCUSATE SODIUM AND SENNOSIDES 1 TABLET: 8.6; 5 TABLET, FILM COATED ORAL at 17:45

## 2017-05-01 RX ADMIN — PROMETHAZINE HYDROCHLORIDE 50 MG: 25 TABLET ORAL at 11:01

## 2017-05-01 RX ADMIN — HUMAN INSULIN 8 UNITS: 100 INJECTION, SUSPENSION SUBCUTANEOUS at 08:26

## 2017-05-01 RX ADMIN — CLONIDINE HYDROCHLORIDE 0.3 MG: 0.2 TABLET ORAL at 15:16

## 2017-05-01 RX ADMIN — VITAMIN E CAP 400 UNIT 800 UNITS: 400 CAP at 11:01

## 2017-05-01 RX ADMIN — DOCUSATE SODIUM AND SENNOSIDES 1 TABLET: 8.6; 5 TABLET, FILM COATED ORAL at 10:48

## 2017-05-01 RX ADMIN — INSULIN LISPRO 8 UNITS: 100 INJECTION, SOLUTION INTRAVENOUS; SUBCUTANEOUS at 07:04

## 2017-05-01 RX ADMIN — HEPARIN SODIUM 5000 UNITS: 5000 INJECTION, SOLUTION INTRAVENOUS; SUBCUTANEOUS at 02:08

## 2017-05-01 RX ADMIN — INSULIN LISPRO 2 UNITS: 100 INJECTION, SOLUTION INTRAVENOUS; SUBCUTANEOUS at 17:42

## 2017-05-01 RX ADMIN — DULOXETINE HYDROCHLORIDE 60 MG: 60 CAPSULE, DELAYED RELEASE ORAL at 10:46

## 2017-05-01 RX ADMIN — FERROUS SULFATE TAB 325 MG (65 MG ELEMENTAL FE) 325 MG: 325 (65 FE) TAB at 10:48

## 2017-05-01 RX ADMIN — INSULIN LISPRO 15 UNITS: 100 INJECTION, SOLUTION INTRAVENOUS; SUBCUTANEOUS at 12:36

## 2017-05-01 RX ADMIN — CLONIDINE HYDROCHLORIDE 0.3 MG: 0.2 TABLET ORAL at 21:02

## 2017-05-01 RX ADMIN — POLYETHYLENE GLYCOL 3350 17 G: 17 POWDER, FOR SOLUTION ORAL at 10:48

## 2017-05-01 RX ADMIN — HEPARIN SODIUM 5000 UNITS: 5000 INJECTION, SOLUTION INTRAVENOUS; SUBCUTANEOUS at 17:45

## 2017-05-01 RX ADMIN — INSULIN LISPRO 12 UNITS: 100 INJECTION, SOLUTION INTRAVENOUS; SUBCUTANEOUS at 11:00

## 2017-05-01 RX ADMIN — THERA TABS 1 TABLET: TAB at 10:48

## 2017-05-01 RX ADMIN — CLONIDINE HYDROCHLORIDE 0.3 MG: 0.2 TABLET ORAL at 09:29

## 2017-05-01 RX ADMIN — HYDROMORPHONE HYDROCHLORIDE 0.5 MG: 1 INJECTION, SOLUTION INTRAMUSCULAR; INTRAVENOUS; SUBCUTANEOUS at 21:02

## 2017-05-01 RX ADMIN — HYDROMORPHONE HYDROCHLORIDE 0.5 MG: 1 INJECTION, SOLUTION INTRAMUSCULAR; INTRAVENOUS; SUBCUTANEOUS at 02:08

## 2017-05-01 RX ADMIN — FOLIC ACID TAB 400 MCG 0.4 MG: 400 TAB at 10:46

## 2017-05-01 RX ADMIN — FAMOTIDINE 20 MG: 20 TABLET ORAL at 10:33

## 2017-05-01 RX ADMIN — HYDROMORPHONE HYDROCHLORIDE 0.5 MG: 1 INJECTION, SOLUTION INTRAMUSCULAR; INTRAVENOUS; SUBCUTANEOUS at 07:05

## 2017-05-01 RX ADMIN — Medication 1000 MCG: at 10:47

## 2017-05-01 RX ADMIN — DULOXETINE HYDROCHLORIDE 60 MG: 60 CAPSULE, DELAYED RELEASE ORAL at 17:44

## 2017-05-01 RX ADMIN — BACLOFEN 20 MG: 10 TABLET ORAL at 10:47

## 2017-05-01 RX ADMIN — FERROUS SULFATE TAB 325 MG (65 MG ELEMENTAL FE) 325 MG: 325 (65 FE) TAB at 17:45

## 2017-05-01 RX ADMIN — PROMETHAZINE HYDROCHLORIDE 50 MG: 25 TABLET ORAL at 02:08

## 2017-05-01 NOTE — PROGRESS NOTES
Bedside shift change report given to [facility] Shyam (oncoming nurse) by Tony Carnes RN (offgoing nurse). Report included the following information SBAR, Kardex, ED Summary and Recent Results. 09:30- Pt /100 and . Scheduled clonidine given before pt sent to radiology. Will recheck VS when pt returns    10:45 Informed MD Sebastian that pt . MD ordered one time dose of 12 units of humalog lispro. Will administer and will continue to monitor. 11:50- Paged MD about pt sugar     12:26 Informed MD that pt . MD ordered 15 units of humalog lispro.

## 2017-05-01 NOTE — PROGRESS NOTES
Hospitalist Progress Note  Office: [phone]      Date of Service:  2017  NAME:  Derrick Sandra  :  1980  MRN:  [id number]      Admission Summary:   44 yo woman with DM on insulin, sickle cell trait, sleep apnea, chronic hypoxic respiratory failure on 2L O2 NC prn, asthma, HTN, PCOS, h/o seizures, GERD, gastroparesis, opiate dependence, chronic pain syndrome, h/o VTE, and CKD Stage 3-4 was BIBEMS from home on 17 with right-sided back pain s/p fall and vomiting.      Interval history / Subjective:   C/o right-sided back pain mildly improved, soft stools, SOB, cough, chills; FBSs in the 400s; transferred out of ICU yesterday     Assessment & Plan:     HHS with DM2 (POA)  - resolved with insulin gtt and IVF  - reports she is on Novolin N 20units BID and Novolog R sliding scale; unclear if this is accurate; per pharmacy Rx query, last filled script in 2017 for NPH 4 units BID  - change FBSs qACHS, SSI  - DTC consulted  - transferred to medical     B/l LE edema   - reported h/o VTE; no longer on [address]  - venous duplex pending     Pseudohyponatremia - corrected for hyperglycemia is 142     HTN, uncontrolled   - likely due to acute situation  - increase clonidine TID     Anemia with h/o sickle cell trait  - Hb 7.7 on admission  - iron studies, ferritin, B12/folate WNL  - FOBT ordered but no sample yet  - transfuse for Hb<7     Right chest wall pain s/p recent fall   - XRs unremarkable  - pain control, PT/OT, OOB  - CT chest  LLL infiltrate, pulmonary nodules up to 9mm; repeat CT chest in 3 months  - check XR b/l ribs    LLL infiltrate on CT chest  - reported as PNA but clinically asymptomatic  - CXR on admission clear  - no fevers, leucocytosis  - would not treat with abx at this time  - OOB, PT, IS     Severe fecal stasis - bowel regimen     Chronic pain syndrome   - limit use of opiates  - wean off IV Dilaudid and resume home meds     Acute encephalopathy (POA)  - likely multifactorial including hyperglycemia, opiate use, STONE  - CT head unremarkable    STONE on CKD Stage 3-4  - likely due to hyperglycemia and dehydration  - improving with IVF    Code status: Full  DVT prophylaxis: heparin SC    Care Plan discussed with: Patient/Family and Nurse  Disposition: TBD. Likely home within next 24-36 hours     Hospital Problems  Date Reviewed: 4/30/2017          Codes Class Noted POA    * (Principal)Hyperglycemia due to type 2 diabetes mellitus ([facility] [place] [facility] ICD-10-CM: E11.65  ICD-9-CM: 250.00  4/30/2017 Unknown        Hyperglycemia ICD-10-CM: R73.9  ICD-9-CM: 790.29  4/30/2017 Unknown        Acute renal failure superimposed on stage 4 chronic kidney disease ([facility] [place] [facility] ICD-10-CM: N17.9, N18.4  ICD-9-CM: 584.9, 585.4  4/4/2016 Yes        Dehydration ICD-10-CM: E86.0  ICD-9-CM: 276.51  3/9/2016 Yes            Review of Systems:   Pertinent items are noted in HPI. Vital Signs:    Last 24hrs VS reviewed since prior progress note.  Most recent are:  Visit Vitals    /87    Pulse 96    Temp 99.7 °F (37.6 °C)    Resp 16    Ht 5' 2\" (1.575 m)    Wt 71.7 kg (158 lb)    SpO2 98%    Breastfeeding No    BMI 28.9 kg/m2       Intake/Output Summary (Last 24 hours) at 05/01/17 0810  Last data filed at 04/30/17 1200   Gross per 24 hour   Intake            664.6 ml   Output             1000 ml   Net           -335.4 ml      Physical Examination:     Constitutional:  awake, no acute distress, cooperative, pleasant, eating breakfast   ENT:  oral mucosa moist, oropharynx benign  Neck supple   Resp:  few left basilar rales, no wheeze   CV:  regular rhythm, normal rate, no m/r/g appreciated, b/l LE edema, +pulses    GI:  +BS, soft, non distended, non tender     Musculoskeletal:  moves all extremities    Neurologic:  AAOx3, NFD     Skin:  warm, dry; left chest wall Portacath  Eyes:  PERRL    Data Review:    Review and/or order of clinical lab test  Review and/or order of tests in the radiology section of CPT  Review and/or order of tests in the medicine section of CPT    Labs:     Recent Labs      05/01/17   0204  04/30/17   0504   WBC  3.7  5.9   HGB  8.0*  7.7*   HCT  24.5*  24.1*   PLT  224  206     Recent Labs      05/01/17   0204  04/30/17   0504   NA  138  133*   K  5.0  5.2*   CL  109*  104   CO2  20*  19*   BUN  45*  48*   CREA  2.55*  2.71*   GLU  294*  673*   CA  8.6  8.2*   MG  2.1   --    PHOS  4.1  4.2     Recent Labs      05/01/17   0204  04/30/17   0504   SGOT  24  43*   ALT  52  64   AP  501*  547*   TBILI  0.5  0.5   TP  6.9  6.9   ALB  3.5  3.5   GLOB  3.4  3.4     No results for input(s): INR, PTP, APTT in the last 72 hours. No lab exists for component: INREXT   Recent Labs      05/01/17   0204   FE  32*   TIBC  252   PSAT  13*   FERR  385*      Lab Results   Component Value Date/Time    Folate 58.2 05/01/2017 02:04 AM      No results for input(s): PH, PCO2, PO2 in the last 72 hours. No results for input(s): CPK, CKNDX, TROIQ in the last 72 hours.     No lab exists for component: CPKMB  Lab Results   Component Value Date/Time    Cholesterol, total 123 04/29/2016 04:06 AM    HDL Cholesterol 39 04/29/2016 04:06 AM    LDL, calculated 64.6 04/29/2016 04:06 AM    Triglyceride 97 04/29/2016 04:06 AM    CHOL/HDL Ratio 3.2 04/29/2016 04:06 AM     Lab Results   Component Value Date/Time    Glucose (POC) 424 05/01/2017 06:43 AM    Glucose (POC) 422 05/01/2017 06:30 AM    Glucose (POC) 276 04/30/2017 09:20 PM    Glucose (POC) 312 04/30/2017 04:22 PM    Glucose (POC) 131 04/30/2017 12:21 PM     Lab Results   Component Value Date/Time    Color YELLOW/STRAW 04/30/2017 05:04 AM    Appearance CLEAR 04/30/2017 05:04 AM    Specific gravity 1.018 04/30/2017 05:04 AM    Specific gravity 1.015 07/26/2010 11:18 AM    pH (UA) 5.5 04/30/2017 05:04 AM    Protein 30 04/30/2017 05:04 AM    Glucose >1000 04/30/2017 05:04 AM    Ketone NEGATIVE  04/30/2017 05:04 AM    Bilirubin NEGATIVE  04/30/2017 05:04 AM    Urobilinogen 0.2 04/30/2017 05:04 AM    Nitrites NEGATIVE  04/30/2017 05:04 AM    Leukocyte Esterase NEGATIVE  04/30/2017 05:04 AM    Epithelial cells MODERATE 04/30/2017 05:04 AM    Bacteria NEGATIVE  04/30/2017 05:04 AM    WBC 0-4 04/30/2017 05:04 AM    RBC 0-5 04/30/2017 05:04 AM         Medications Reviewed:     Current Facility-Administered Medications   Medication Dose Route Frequency    cloNIDine HCl (CATAPRES) tablet 0.3 mg  0.3 mg Oral TID    insulin NPH (NOVOLIN N, HUMULIN N) injection 8 Units  8 Units SubCUTAneous ACB&D    HYDROmorphone (PF) (DILAUDID) injection 0.5 mg  0.5 mg IntraVENous Q6H PRN    glucose chewable tablet 16 g  4 Tab Oral PRN    dextrose (D50W) injection syrg 12.5-25 g  12.5-25 g IntraVENous PRN    glucagon (GLUCAGEN) injection 1 mg  1 mg IntraMUSCular PRN    albuterol (PROVENTIL VENTOLIN) nebulizer solution 2.5 mg  2.5 mg Nebulization Q4H PRN    baclofen (LIORESAL) tablet 20 mg  20 mg Oral TID    calcium carbonate (OS-BINA) tablet 500 mg [elemental]  500 mg Oral DAILY    cyanocobalamin (VITAMIN B12) tablet 1,000 mcg  1,000 mcg Oral DAILY    DULoxetine (CYMBALTA) capsule 60 mg  60 mg Oral BID    famotidine (PEPCID) tablet 20 mg  20 mg Oral DAILY    ferrous sulfate tablet 325 mg  325 mg Oral BID    folic acid tablet 0.4 mg  400 mcg Oral DAILY    polyethylene glycol (MIRALAX) packet 17 g  17 g Oral TID PRN    promethazine (PHENERGAN) tablet 50 mg  50 mg Oral Q6H PRN    QUEtiapine (SEROquel) tablet 100 mg  100 mg Oral BID    therapeutic multivitamin (THERAGRAN) tablet 1 Tab  1 Tab Oral DAILY    sodium chloride (NS) flush 5-10 mL  5-10 mL IntraVENous Q8H    sodium chloride (NS) flush 5-10 mL  5-10 mL IntraVENous PRN    ondansetron (ZOFRAN) injection 4 mg  4 mg IntraVENous Q4H PRN    heparin (porcine) injection 5,000 Units  5,000 Units SubCUTAneous Q8H    vitamin E acetate capsule 800 Units  800 Units Oral DAILY    dextrose (D50W) injection syrg 12.5-25 g  12.5-25 g IntraVENous PRN    insulin lispro (HUMALOG) injection   SubCUTAneous AC&HS    senna-docusate (PERICOLACE) 8.6-50 mg per tablet 1 Tab  1 Tab Oral BID    polyethylene glycol (MIRALAX) packet 17 g  17 g Oral DAILY    ergocalciferol (ERGOCALCIFEROL) capsule 50,000 Units  50,000 Units Oral Q7D     ______________________________________________________________________  EXPECTED LENGTH OF STAY: - - -  ACTUAL LENGTH OF STAY:          1                 Hermelinda Rivera MD

## 2017-05-01 NOTE — DIABETES MGMT
DTC Consult Note    Recommendations/ Comments: If appropriate, please consider increasing NPH dose to 15 units BID and adding Humalog pre-meal insulin 5 units TID ac. Patient was somewhat vague during interaction, but stated she was aware her blood sugars have been high recently and that Dr. Bon Yen made insulin adjustments last week. She stated that her  often gives her the insulin. She said she mostly drinks sugar free drinks. Informed patient about outpatient education, but patient would need individual appointment due to her difficulty processing information. Consult received for:  [x]             Assessment of home management     [x]             Insulin infusion - was on insulin drip yesterday    Chart reviewed and initial evaluation complete on Nabor Martel. Patient is a 43 y.o. female with a history of diabetes on insulin injections: regular: scale, NPH: 8 units BID at home. Assessed and instructed patient on the following:   ·  interpretation of lab results, blood sugar goals, complications of diabetes mellitus, hypoglycemia prevention and treatment, insulin adjustments and nutrition    Provided patient with the following: [x]             Survival skills education materials                 [x]             Outpatient DTC contact number               Discussed with patient and/or family need for follow up appointment for diabetes management after discharge.       A1c:   Lab Results   Component Value Date/Time    Hemoglobin A1c 9.9 04/30/2017 05:04 AM       Recent Glucose Results:   Lab Results   Component Value Date/Time     (H) 05/01/2017 02:04 AM    GLUCPOC 358 (H) 05/01/2017 11:30 AM    GLUCPOC 424 (H) 05/01/2017 10:42 AM    GLUCPOC >600 (HH) 05/01/2017 10:36 AM        Lab Results   Component Value Date/Time    Creatinine 2.55 05/01/2017 02:04 AM       Active Orders   Diet    DIET DIABETIC CONSISTENT CARB Regular; 2 GM NA (House Low NA)        PO intake: No data found.      Current hospital DM medication: Humalog for correction, NPH being started this evening (10 units BID)    Will continue to follow as needed. Thank you.   Stalin Castrejon MS, RN, CDE

## 2017-05-01 NOTE — PROGRESS NOTES
Bedside and Verbal shift change report given to Chel MARTIN (oncoming nurse) by Rukhsana Velazco (offgoing nurse). Report included the following information SBAR, Kardex, Procedure Summary, Intake/Output, MAR, Recent Results and Med Rec Status.

## 2017-05-01 NOTE — CDMP QUERY
There is noted documentation of HHS with DM2 on this record. Pt also has CO2 of 19-20  Could this be further clarified as    =>Diabetic Ketoacidosis POA in the setting of missed medication requiring Insulin drip/lab monitoring   =>Other Explanation of clinical findings  =>Unable to Determine (no explanation of clinical findings)    The medical record reflects the following clinical findings, treatment, and risk factors:    Risk Factors: DM  Clinical Indicators: CO2 19--20, weakness   Treatment:  Insulin drip/lab monitoring         Please clarify and document your clinical opinion in the progress notes and discharge summary including the definitive and/or presumptive diagnosis, (suspected or probable), related to the above clinical findings.  Please include clinical findings supporting your diagnosis    Thank you,         Allan Hoyos [facility]

## 2017-05-01 NOTE — CDMP QUERY
The diagnosis of Acute Kidney Injury has been documented for this patient. Current (2012) RIFLE criteria notes the following: Section 2: STONE Definition: \"STONE is defined as any of the following\":     · Increase in SCr by (>/=) 0.3 mg/dl within 48 hours; or   · Increase in SCr to (>/=)1. 5 times baseline, which is known or presumed to have occurred within the prior 7 days; or   · Urine volume <0.5 ml/kg/h for 6 hours. The record reflects:  Current admission labs:  GFR  24   BUN: 48    Creatinine: 2.71   Baseline                           GFR 36    BUN: 25    Creatinine: 1.92     Patient has history of CKD 3      Based on the noted clinical findings, the diagnosis of Acute Kidney Injury may be challenged by an external reviewer. Please clarify, do you feel the patient was diagnosed with and treated for acute kidney failure or worsening CKD?     Thank you,         Carrie Jay [address] [place] [address]

## 2017-05-01 NOTE — PROGRESS NOTES
Physical Therapy  Attempted to follow up this pm to try cane. Patient sleeping soundly not awakening to loud voice.   Will follow up in the am.  Cedric Vaca PT

## 2017-05-01 NOTE — PROGRESS NOTES
Bedside and Verbal shift change report given to Cirilo Jameson RN (oncoming nurse) by Edilia Preciado RN (offgoing nurse). Report included the following information SBAR and Kardex. 0645: Dr Hutton Pair notified of 424 BS. Received verbal orders for 8 unit humalog insulin now and repeat BS in 1hr. Order read back.

## 2017-05-01 NOTE — PROGRESS NOTES
Problem: Mobility Impaired (Adult and Pediatric)  Goal: *Acute Goals and Plan of Care (Insert Text)  Physical Therapy Goals  Initiated 5/1/2017      4. Patient will ambulate with modified independence for 100 feet with the least restrictive device within 7 day(s). 5. Patient will ascend/descend 5 stairs with 1 handrail(s) with modified independence within 7 day(s). PHYSICAL THERAPY EVALUATION  Patient: Vanessa Garcia (65 y.o. female)  Date: 5/1/2017  Primary Diagnosis: Hyperglycemia        Precautions: fall         ASSESSMENT :  Based on the objective data described below, the patient presents in room up standing with nurse present. She states she needs a cane for home since she has been using her fathers. Assessed patient mobility/gait and definitely presents with balance deficits. She states she feel at home on the steps when it was raining/steps wet. She does have a staggering type gait with frequent stops and ataxic. Will assess with use of cane after she finishes lunch. .     Patient will benefit from skilled intervention to address the above impairments.   Patients rehabilitation potential is considered to be Good  Factors which may influence rehabilitation potential include:   [ ]         None noted  [ ]         Mental ability/status  [X]         Medical condition  [ ]         Home/family situation and support systems  [ ]         Safety awareness  [ ]         Pain tolerance/management  [ ]         Other:        PLAN :  Recommendations and Planned Interventions:  [ ]           Bed Mobility Training             [ ]    Neuromuscular Re-Education  [ ]           Transfer Training                   [ ]    Orthotic/Prosthetic Training  [X]           Gait Training                         [ ]    Modalities  [ ]           Therapeutic Exercises           [ ]    Edema Management/Control  [ ]           Therapeutic Activities            [X]    Patient and Family Training/Education  [ ]           Other (comment):     Frequency/Duration: Patient will be followed by physical therapy  2 times a week to address goals. Discharge Recommendations: Home Health and None  Further Equipment Recommendations for Discharge: straight cane ? SUBJECTIVE:   Patient stated I need a cane because I have been using my dads'.       OBJECTIVE DATA SUMMARY:   HISTORY:    Past Medical History:   Diagnosis Date    Asthma      Diabetes ([facility]      Gastroparesis 2010     Gastric Pacer- REMOVED 07/2015    GERD (gastroesophageal reflux disease)      Hypertension      Narcotic dependence ([facility] [facility]      Other ill-defined conditions      Polycystic ovarian syndrome      Seizures (HCC)      Sickle cell trait ([facility] [facility]      Thromboembolus ([facility] [address] to her left arm and was told she had one in left leg recently     Past Surgical History:   Procedure Laterality Date    HX CATARACT REMOVAL   3/5/12     right    HX DILATION AND CURETTAGE         ablation    HX GASTRIC BYPASS   2015    HX GI         j tube placement and removal    HX OTHER SURGICAL   2010     Gastric Pacer- REMOVED 07/2015    HX VASCULAR ACCESS         gray cath rt subclavian    HX VASCULAR ACCESS         HD access right thigh     Prior Level of Function/Home Situation: modfied independent  Personal factors and/or comorbidities impacting plan of care:      Home Situation  Home Environment: Trailer/mobile home  # Steps to Enter: 5  Wheelchair Ramp: Yes  One/Two Story Residence: One story  Living Alone: No  Support Systems: Family member(s)  Patient Expects to be Discharged to[de-identified] Private residence  Current DME Used/Available at Home: Oxygen, portable, Walker     EXAMINATION/PRESENTATION/DECISION MAKING:   Critical Behavior:  Neurologic State: Alert, Drowsy  Orientation Level: Oriented X4        Hearing:   Auditory  Auditory Impairment: None  Skin:  See nursing notes  Range Of Motion:  AROM: Generally decreased, functional           PROM: Generally decreased, functional Strength:    Strength: Generally decreased, functional                                 Coordination:  Coordination: Generally decreased, functional  Vision:      Functional Mobility:  Bed Mobility:              Transfers:  Sit to Stand: Modified independent  Stand to Sit: Modified independent                       Balance:   Sitting: Intact  Standing: Impaired; Without support  Standing - Static: Fair  Standing - Dynamic : Fair  Ambulation/Gait Training: Wide based, decreased step length  Hand hold assist  ataxic              Functional Measure:  Tinetti test:      Sitting Balance: 1  Arises: 1  Attempts to Rise: 2  Immediate Standing Balance: 0  Standing Balance: 1  Nudged: 0  Eyes Closed: 0  Turn 360 Degrees - Continuous/Discontinuous: 0  Turn 360 Degrees - Steady/Unsteady: 0  Sitting Down: 1  Balance Score: 6  Indication of Gait: 0  R Step Length/Height: 1  L Step Length/Height: 1  R Foot Clearance: 1  L Foot Clearance: 1  Step Symmetry: 1  Step Continuity: 0  Path: 0  Trunk: 0  Walking Time: 0  Gait Score: 5  Total Score: 11         Tinetti Test and G-code impairment scale:  Percentage of Impairment CH     0%    CI     1-19% CJ     20-39% CK     40-59% CL     60-79% CM     80-99% CN      100%   Tinetti  Score 0-28 28 23-27 17-22 12-16 6-11 1-5 0          Tinetti Tool Score Risk of Falls  <19 = High Fall Risk  19-24 = Moderate Fall Risk  25-28 = Low Fall Risk  Tinetti ME. Performance-Oriented Assessment of Mobility Problems in Elderly Patients. Emmanuel 64; [id number].  (Scoring Description: PT Bulletin Feb. 10, 1993)     Older adults: Jacqueline Toth et al, 2009; n = 1000 [facility] elderly evaluated with ABC, LILLY, ADL, and IADL)  · Mean LILLY score for males aged 69-68 years = 26.21(3.40)  · Mean LILLY score for females age 69-68 years = 25.16(4.30)  · Mean LILLY score for males over 80 years = 23.29(6.02)  · Mean LILLY score for females over 80 years = 17.20(8.32) G codes: In compliance with CMSs Claims Based Outcome Reporting, the following G-code set was chosen for this patient based on their primary functional limitation being treated: The outcome measure chosen to determine the severity of the functional limitation was the Tinetti with a score of 11/28 which was correlated with the impairment scale. · Mobility - Walking and Moving Around:               - CURRENT STATUS:    CL - 60%-79% impaired, limited or restricted               - GOAL STATUS:           CK - 40%-59% impaired, limited or restricted               - D/C STATUS:                       ---------------To be determined---------------    Pain:  Pain Scale 1: Numeric (0 - 10)  Pain Intensity 1: 9  Pain Location 1: Back;Rib cage     Pain Description 1: Aching  Pain Intervention(s) 1: Medication (see MAR)  Activity Tolerance:   Elevated BP  Please refer to the flowsheet for vital signs taken during this treatment. After treatment:   [X]         Patient left in no apparent distress sitting up in chair  [ ]         Patient left in no apparent distress in bed  [X]         Call bell left within reach  [X]         Nursing notified  [ ]         Caregiver present  [ ]         Bed alarm activated      COMMUNICATION/EDUCATION:   The patients plan of care was discussed with: Registered Nurse.  [X]         Fall prevention education was provided and the patient/caregiver indicated understanding. [X]         Patient/family have participated as able in goal setting and plan of care. [X]         Patient/family agree to work toward stated goals and plan of care. [ ]         Patient understands intent and goals of therapy, but is neutral about his/her participation. [ ]         Patient is unable to participate in goal setting and plan of care.      Thank you for this referral.  Sid Leonardo, PT

## 2017-05-01 NOTE — PROCEDURES
[facility]  *** FINAL REPORT ***    Name: Naun Vega  MRN: [id number]    Inpatient  : 1980  HIS Order #: [id number]  [id number] [facility] Visit #: [id number]  Date: 01 May 2017    TYPE OF TEST: Peripheral Venous Testing    REASON FOR TEST  Pain in limb, Limb swelling    Right Leg:-  Deep venous thrombosis:           No  Superficial venous thrombosis:    Not examined  Deep venous insufficiency:        Not examined  Superficial venous insufficiency: Not examined    Left Leg:-  Deep venous thrombosis:           No  Superficial venous thrombosis:    No  Deep venous insufficiency:        Not examined  Superficial venous insufficiency: Not examined      INTERPRETATION/FINDINGS  PROCEDURE:  Color duplex ultrasound imaging of lower extremity veins. FINDINGS:       Right: The common femoral, deep femoral, femoral, and popliteal  are patent and without evidence of thrombus;  each is fully  compressible and there is no narrowing of the flow channel on color  Doppler imaging. Phasic flow is observed in the common femoral vein. The great saphenous is not visualized. There appears to be an  occluded upper thigh dialysis access graft. The posterior tibial and  peroneal are patent but are poorly visualized. Left:   The common femoral, deep femoral, femoral, popliteal, and   great saphenous are patent and without evidence of thrombus;  each is   fully compressible and there is no narrowing of the flow channel on  color Doppler imaging. Phasic flow is observed in the common femoral  vein. The posterior tibial and peroneal are patent but are poorly  visualized. IMPRESSION:  1. Limited study showing no evidence of right or left lower extremity   vein thrombosis. Calf veins are poorly visualized. 2.  There appears to be an occluded right upper thigh dialysis access  graft. ADDITIONAL COMMENTS    I have personally reviewed the data relevant to the interpretation of  this  study.     TECHNOLOGIST: Ramírez Metzger RVT  Signed: 05/01/2017 10:13 AM    PHYSICIAN: Britany Persaud MD  Signed: 05/02/2017 09:05 AM

## 2017-05-02 VITALS
HEART RATE: 100 BPM | BODY MASS INDEX: 29.98 KG/M2 | OXYGEN SATURATION: 100 % | RESPIRATION RATE: 18 BRPM | HEIGHT: 62 IN | DIASTOLIC BLOOD PRESSURE: 99 MMHG | TEMPERATURE: 99.4 F | SYSTOLIC BLOOD PRESSURE: 181 MMHG | WEIGHT: 162.92 LBS

## 2017-05-02 PROBLEM — E11.65 HYPERGLYCEMIA DUE TO TYPE 2 DIABETES MELLITUS (HCC): Status: RESOLVED | Noted: 2017-04-30 | Resolved: 2017-05-02

## 2017-05-02 PROBLEM — R73.9 HYPERGLYCEMIA: Status: RESOLVED | Noted: 2017-04-30 | Resolved: 2017-05-02

## 2017-05-02 LAB
ALBUMIN SERPL BCP-MCNC: 3 G/DL (ref 3.5–5)
ALBUMIN/GLOB SERPL: 0.7 {RATIO} (ref 1.1–2.2)
ALP SERPL-CCNC: 446 U/L (ref 45–117)
ALT SERPL-CCNC: 43 U/L (ref 12–78)
ANION GAP BLD CALC-SCNC: 6 MMOL/L (ref 5–15)
AST SERPL W P-5'-P-CCNC: 23 U/L (ref 15–37)
BILIRUB SERPL-MCNC: 0.4 MG/DL (ref 0.2–1)
BUN SERPL-MCNC: 51 MG/DL (ref 6–20)
BUN/CREAT SERPL: 21 (ref 12–20)
CALCIUM SERPL-MCNC: 8.6 MG/DL (ref 8.5–10.1)
CHLORIDE SERPL-SCNC: 110 MMOL/L (ref 97–108)
CO2 SERPL-SCNC: 21 MMOL/L (ref 21–32)
CREAT SERPL-MCNC: 2.46 MG/DL (ref 0.55–1.02)
GLOBULIN SER CALC-MCNC: 4.2 G/DL (ref 2–4)
GLUCOSE BLD STRIP.AUTO-MCNC: 257 MG/DL (ref 65–100)
GLUCOSE BLD STRIP.AUTO-MCNC: 305 MG/DL (ref 65–100)
GLUCOSE SERPL-MCNC: 282 MG/DL (ref 65–100)
POTASSIUM SERPL-SCNC: 5.4 MMOL/L (ref 3.5–5.1)
PROT SERPL-MCNC: 7.2 G/DL (ref 6.4–8.2)
SERVICE CMNT-IMP: ABNORMAL
SERVICE CMNT-IMP: ABNORMAL
SODIUM SERPL-SCNC: 137 MMOL/L (ref 136–145)

## 2017-05-02 PROCEDURE — 97116 GAIT TRAINING THERAPY: CPT

## 2017-05-02 PROCEDURE — 82962 GLUCOSE BLOOD TEST: CPT

## 2017-05-02 PROCEDURE — 36415 COLL VENOUS BLD VENIPUNCTURE: CPT | Performed by: HOSPITALIST

## 2017-05-02 PROCEDURE — 51798 US URINE CAPACITY MEASURE: CPT

## 2017-05-02 PROCEDURE — 74011250636 HC RX REV CODE- 250/636: Performed by: INTERNAL MEDICINE

## 2017-05-02 PROCEDURE — 80053 COMPREHEN METABOLIC PANEL: CPT | Performed by: HOSPITALIST

## 2017-05-02 PROCEDURE — 74011250637 HC RX REV CODE- 250/637: Performed by: HOSPITALIST

## 2017-05-02 PROCEDURE — 74011250637 HC RX REV CODE- 250/637: Performed by: INTERNAL MEDICINE

## 2017-05-02 PROCEDURE — 74011250636 HC RX REV CODE- 250/636: Performed by: HOSPITALIST

## 2017-05-02 PROCEDURE — 74011636637 HC RX REV CODE- 636/637: Performed by: INTERNAL MEDICINE

## 2017-05-02 PROCEDURE — 74011636637 HC RX REV CODE- 636/637: Performed by: HOSPITALIST

## 2017-05-02 RX ORDER — HEPARIN 100 UNIT/ML
500 SYRINGE INTRAVENOUS AS NEEDED
Status: DISCONTINUED | OUTPATIENT
Start: 2017-05-02 | End: 2017-05-02 | Stop reason: HOSPADM

## 2017-05-02 RX ORDER — HYDRALAZINE HYDROCHLORIDE 25 MG/1
25 TABLET, FILM COATED ORAL 3 TIMES DAILY
Qty: 90 TAB | Refills: 0 | Status: SHIPPED | OUTPATIENT
Start: 2017-05-02 | End: 2017-06-01

## 2017-05-02 RX ORDER — CLONIDINE HYDROCHLORIDE 0.3 MG/1
0.3 TABLET ORAL 3 TIMES DAILY
Qty: 90 TAB | Refills: 0 | Status: SHIPPED | OUTPATIENT
Start: 2017-05-02 | End: 2017-06-01

## 2017-05-02 RX ORDER — MAGNESIUM SULFATE 100 %
4 CRYSTALS MISCELLANEOUS AS NEEDED
Status: DISCONTINUED | OUTPATIENT
Start: 2017-05-02 | End: 2017-05-02 | Stop reason: HOSPADM

## 2017-05-02 RX ORDER — INSULIN LISPRO 100 [IU]/ML
5 INJECTION, SOLUTION INTRAVENOUS; SUBCUTANEOUS
Qty: 1 VIAL | Refills: 0 | Status: SHIPPED
Start: 2017-05-02 | End: 2017-05-02

## 2017-05-02 RX ORDER — SODIUM POLYSTYRENE SULFONATE 15 G/60ML
30 SUSPENSION ORAL; RECTAL
Status: COMPLETED | OUTPATIENT
Start: 2017-05-02 | End: 2017-05-02

## 2017-05-02 RX ORDER — INSULIN LISPRO 100 [IU]/ML
5 INJECTION, SOLUTION INTRAVENOUS; SUBCUTANEOUS
Qty: 1 VIAL | Refills: 0 | Status: SHIPPED | OUTPATIENT
Start: 2017-05-02 | End: 2018-03-27

## 2017-05-02 RX ORDER — DEXTROSE 50 % IN WATER (D50W) INTRAVENOUS SYRINGE
12.5-25 AS NEEDED
Status: DISCONTINUED | OUTPATIENT
Start: 2017-05-02 | End: 2017-05-02 | Stop reason: HOSPADM

## 2017-05-02 RX ORDER — HYDRALAZINE HYDROCHLORIDE 25 MG/1
25 TABLET, FILM COATED ORAL 3 TIMES DAILY
Status: DISCONTINUED | OUTPATIENT
Start: 2017-05-02 | End: 2017-05-02 | Stop reason: HOSPADM

## 2017-05-02 RX ORDER — INSULIN LISPRO 100 [IU]/ML
5 INJECTION, SOLUTION INTRAVENOUS; SUBCUTANEOUS
Status: DISCONTINUED | OUTPATIENT
Start: 2017-05-02 | End: 2017-05-02 | Stop reason: HOSPADM

## 2017-05-02 RX ADMIN — DOCUSATE SODIUM AND SENNOSIDES 1 TABLET: 8.6; 5 TABLET, FILM COATED ORAL at 09:44

## 2017-05-02 RX ADMIN — QUETIAPINE FUMARATE 100 MG: 100 TABLET, FILM COATED ORAL at 09:44

## 2017-05-02 RX ADMIN — HYDROMORPHONE HYDROCHLORIDE 2 MG: 2 TABLET ORAL at 07:22

## 2017-05-02 RX ADMIN — HEPARIN SODIUM 5000 UNITS: 5000 INJECTION, SOLUTION INTRAVENOUS; SUBCUTANEOUS at 09:43

## 2017-05-02 RX ADMIN — HUMAN INSULIN 10 UNITS: 100 INJECTION, SUSPENSION SUBCUTANEOUS at 06:49

## 2017-05-02 RX ADMIN — FERROUS SULFATE TAB 325 MG (65 MG ELEMENTAL FE) 325 MG: 325 (65 FE) TAB at 09:44

## 2017-05-02 RX ADMIN — Medication 10 ML: at 06:18

## 2017-05-02 RX ADMIN — Medication 500 UNITS: at 13:26

## 2017-05-02 RX ADMIN — HEPARIN SODIUM 5000 UNITS: 5000 INJECTION, SOLUTION INTRAVENOUS; SUBCUTANEOUS at 00:00

## 2017-05-02 RX ADMIN — CALCIUM CARBONATE 500 MG: 1250 TABLET ORAL at 09:44

## 2017-05-02 RX ADMIN — FOLIC ACID TAB 400 MCG 0.4 MG: 400 TAB at 09:44

## 2017-05-02 RX ADMIN — INSULIN LISPRO 5 UNITS: 100 INJECTION, SOLUTION INTRAVENOUS; SUBCUTANEOUS at 12:14

## 2017-05-02 RX ADMIN — DULOXETINE HYDROCHLORIDE 60 MG: 60 CAPSULE, DELAYED RELEASE ORAL at 09:44

## 2017-05-02 RX ADMIN — POLYETHYLENE GLYCOL 3350 17 G: 17 POWDER, FOR SOLUTION ORAL at 09:43

## 2017-05-02 RX ADMIN — Medication 1000 MCG: at 09:44

## 2017-05-02 RX ADMIN — HYDRALAZINE HYDROCHLORIDE 25 MG: 25 TABLET, FILM COATED ORAL at 10:42

## 2017-05-02 RX ADMIN — FAMOTIDINE 20 MG: 20 TABLET ORAL at 09:44

## 2017-05-02 RX ADMIN — INSULIN LISPRO 7 UNITS: 100 INJECTION, SOLUTION INTRAVENOUS; SUBCUTANEOUS at 12:13

## 2017-05-02 RX ADMIN — SODIUM POLYSTYRENE SULFONATE 30 G: 15 SUSPENSION ORAL; RECTAL at 10:42

## 2017-05-02 RX ADMIN — VITAMIN E CAP 400 UNIT 800 UNITS: 400 CAP at 09:46

## 2017-05-02 RX ADMIN — BACLOFEN 20 MG: 10 TABLET ORAL at 09:44

## 2017-05-02 RX ADMIN — THERA TABS 1 TABLET: TAB at 09:45

## 2017-05-02 RX ADMIN — Medication 10 ML: at 13:26

## 2017-05-02 RX ADMIN — CLONIDINE HYDROCHLORIDE 0.3 MG: 0.2 TABLET ORAL at 09:44

## 2017-05-02 RX ADMIN — INSULIN LISPRO 5 UNITS: 100 INJECTION, SOLUTION INTRAVENOUS; SUBCUTANEOUS at 06:48

## 2017-05-02 NOTE — PROGRESS NOTES
Spiritual Care Partner Volunteer visited patient in [place] [address] on 05/02/17. Documented by:    Satish Beyer M.S. MGiovanniDiv.   [address] [place] [address] ([phone])

## 2017-05-02 NOTE — PROGRESS NOTES
Problem: Discharge Planning  Goal: *Discharge to safe environment  Outcome: Resolved/Met Date Met:  05/02/17  Disposition Needs:  Patient discharging home today with family assistance. Transport has been scheduled with [employer]. Transportation to call to nurse's station once they arrive. Was not able to provide a designated time. Follow-up appointment has been scheduled with PCP.      VIVI Aguilera/NIXON  11:41 AM
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
## 2017-05-02 NOTE — PROGRESS NOTES
Bedside shift change report given to Olya Ng RN (oncoming nurse) by Eli Vidal RN (offgoing nurse). Report included the following information SBAR and MAR.

## 2017-05-02 NOTE — PROGRESS NOTES
1:30PM  M notified that patient transport still not present. CM contacted [facility] at [phone] to verify transport time (reference # [id number]). CM placed on hold to verify transport location. CM was informed that transport would be present by the earliest 2:30 pm but no later than 3:00 pm.  CM notified RN of findings. 3:38 PM  CM notified that patient transport still not present. CM contacted [facility] at [phone] to verify transport time (reference # [id number]). CM placed on hold to verify transport location. CM informed that transport present at hospital and waiting at discharge location. CM notified RN. Patient currently being taken down to transport.     Jose Weinberg MSW/NIXON

## 2017-05-02 NOTE — WOUND CARE
WOCN Note:     New consult placed by RN for Fluid filled sac on Right gluteal fold  Chart Reviewed prior to assessment. Nurse Luis Felipe Yost states that patient is preparing for discharge. Chart shows:  Admitted for hyperglycemia; history of DM on insulin, sickle cell trait, sleep apnea, chronic hypoxic respiratory failure on 2L O2 NC prn, asthma, HTN, PCOS, h/o seizures, GERD, gastroparesis, opiate dependence, chronic pain syndrome, h/o VTE, and CKD Stage 3-4    Assessment:   Patient is A&O x 4, ambulating around room and packing for discharge. Patient reports no pain  All areas are present on admission. 1. RIGHT GLUTEUS, PARTIAL THICKNESS WOUND, RUPTURED BULLAE = 4 x 6 x 0.1 cm  100% pink. Small serous exudate. Periwound without erythema. No odor. Margins are clear. Cleansed with saline, applied xeroform, covered with mepilex border. Recommendations:    1. Right Gluteus:  Cleanse daily, apply Xeroform and cover with dry dressing. Monitor and notify PCO for any increased redness, streaking redness, drainage or pain. Discussed above plan with patient & RNLuis Felipe. Patient states that  can change the dressing daily.     AVE Hoang RN  Office [phone]

## 2017-05-02 NOTE — PROGRESS NOTES
Problem: Falls - Risk of  Goal: *Absence of falls  Outcome: Not Progressing Towards Goal  Pt was told by nurse to call out for help transferring to bedside commode and with ambulation due to her hx of falls and pain medication. Pt was up, out of bed standing when nurse returned. No evidence of learning. Put pt on bed alarm and reinforced teaching.

## 2017-05-02 NOTE — DIABETES MGMT
DTC Progress Note    Recommendations/ Comments: Vhart review for hyperglycemia. BG's > 200 and ranging 200 - 300. However, this is an improvement from the 300 -600's yesterday. Noted NPH increased to 15 units BID  Lispro 5 units AC added    Chart reviewed on Nabor Martel. Patient is a 43 y.o. female with known DM on NPH and Regular insulin at home. DTC consult completed 5-1-2017    A1c:   Lab Results   Component Value Date/Time    Hemoglobin A1c 9.9 04/30/2017 05:04 AM    Hemoglobin A1c 9.2 04/05/2016 03:09 AM       Recent Glucose Results:   Lab Results   Component Value Date/Time     (H) 05/02/2017 08:39 AM    GLUCPOC 305 (H) 05/02/2017 11:37 AM    GLUCPOC 257 (H) 05/02/2017 05:52 AM    GLUCPOC 105 (H) 05/01/2017 08:58 PM        Lab Results   Component Value Date/Time    Creatinine 2.46 05/02/2017 08:39 AM       Active Orders   Diet    DIET DIABETIC CONSISTENT CARB Regular; 2 GM NA (House Low NA)        PO intake: No data found. Current hospital DM medication: NPH 15 units BID, Lispro 5 units AC and lispro normal correction scale    Pt is for discharge today    Will continue to follow as needed.     Thank you  Radha Khan RN, CDE

## 2017-05-02 NOTE — PROGRESS NOTES
Problem: Diabetes Self-Management  Goal: *Monitoring blood glucose, interpreting and using results  Identify recommended blood glucose targets and personal targets. Outcome: Not Progressing Towards Goal  Pt's sugars are finally coming within normal range (see below), but pt is incredibly non-compliant with her diet. She had dipika bar wrappers all over her room and a candy bar in her gown pocket. Nurse discussed with her the importance of cutting down simple sugars in her diet. Needs reinforcement     Results for Calixto Vargas (MRN [id number]) as of 5/1/2017 22:13   Ref.  Range 5/1/2017 10:42 5/1/2017 11:30 5/1/2017 14:29 5/1/2017 16:41 5/1/2017 20:58   GLUCOSE,FAST - POC Latest Ref Range: 65 - 100 mg/dL 424 (H) 358 (H)  143 (H) 105 (H)

## 2017-05-02 NOTE — DISCHARGE INSTRUCTIONS
Discharge Instructions       PATIENT ID: Isamar Egan  MRN: [id number]   YOB: 1980    DATE OF ADMISSION: 4/30/2017  4:26 AM    DATE OF DISCHARGE: 5/2/2017    PRIMARY CARE PROVIDER: Sherita Quinteros MD     ATTENDING PHYSICIAN: Brittany Diaz MD  DISCHARGING PROVIDER: Brittany Diaz MD    To contact this individual call [phone] and ask the  to page. If unavailable ask to be transferred the Adult Hospitalist Department. DISCHARGE DIAGNOSES HHS    CONSULTATIONS: IP CONSULT TO HOSPITALIST    PROCEDURES/SURGERIES: * No surgery found *    PENDING TEST RESULTS:   At the time of discharge the following test results are still pending:     FOLLOW UP APPOINTMENTS:   Follow-up Information     Follow up With Details Comments Contact Info    Sherita Quinteros MD In 1 week review blood glucose  and renal function [address] [place] [address]  [phone]             ADDITIONAL CARE RECOMMENDATIONS:  Pl follow up with PCP as scheduled    DIET: Cardiac Diet and Diabetic Diet    ACTIVITY: Activity as tolerated    WOUND CARE:     EQUIPMENT needed:       DISCHARGE MEDICATIONS:   See Medication Reconciliation Form    · It is important that you take the medication exactly as they are prescribed. · Keep your medication in the bottles provided by the pharmacist and keep a list of the medication names, dosages, and times to be taken in your wallet. · Do not take other medications without consulting your doctor. NOTIFY YOUR PHYSICIAN FOR ANY OF THE FOLLOWING:   Fever over 101 degrees for 24 hours. Chest pain, shortness of breath, fever, chills, nausea, vomiting, diarrhea, change in mentation, falling, weakness, bleeding. Severe pain or pain not relieved by medications. Or, any other signs or symptoms that you may have questions about.       DISPOSITION:   x Home With:   OT  PT  HH  RN       SNF/Inpatient Rehab/LTAC    Independent/assisted living    Hospice    Other: CDMP Checked:   Yes x     PROBLEM LIST Updated:  Yes x       Signed:   Lizbeth Rich MD  5/2/2017  10:29 AM

## 2017-05-02 NOTE — PROGRESS NOTES
Chart Reviewed:  CM met with patient at bedside. CM introduced self and explained transitions of care role. CM verified demographics, insurance coverage, and PCP. Patient is a 43year old female with an admitting diagnosis of diabetic ketoacidosis associated with other specified diabetes mellitus. Patient resides in a trailer with spouse, father, and sister with a ramp to enter. CM notified by PT that patient has walker, cane, rollator, and wheelchair in the home. Patient is currently independent with ADL's and IADL's. Patient receives disability as source of income. Patient reports no financial stressors or concerns,  Patient utilizes [facility] in [place] for prescriptions. CM informed in rounds that patient would need follow-up appointment with PCP office in a week. CM scheduled patient appointment for May 8, 2017 at 2:45 pm with Dr. Dave Granda. Appointment placed on AVS Summary. Patient has orders for discharge. CM arranged for [facility] [phone] to home from hospital (reference # [id number]). CM notified that transport will call to nurse's station when they arrive. CM notified [facility] [place] and RN. CM also scheduled transportation for patient's follow-up appointment with PCP on May 8, 2017 (reference #[id number]). Transport is scheduled to pick patient up at 1:15 pm.  CM informed patient of updates. There are no other CM consults or needs at this time. Care Management Interventions  PCP Verified by CM: Yes  Palliative Care Consult (Criteria: CHF and RRAT>21): No  Reason for No Palliative Care Consult:  Other (see comment) (Does not meet criteria)  Mode of Transport at Discharge: [facility] ([address] Medicaid)  Transition of Care Consult (CM Consult):  (There are no CM consults)  Discharge Durable Medical Equipment: No  Physical Therapy Consult: Yes  Occupational Therapy Consult: No  Speech Therapy Consult: No  Current Support Network: Relative's Home, Lives with Spouse  Confirm Follow Up Transport: Other (see comment) ([facility])  Plan discussed with Pt/Family/Caregiver: Yes  Discharge Location  Discharge Placement: Home with family assistance    VIVI Laguna/NIXON  11:36 AM

## 2017-05-02 NOTE — PROGRESS NOTES
0025 - Pt was assisted to bedside commode. Pt said she hasn't voided fully since yesterday. She urinated 10 mL. A bladder scan was done showing >999 mL. Dr. Fabrice Holly was paged and bladder scan was ordered with PRN bladder checks. Pt, also, is requesting more pian medicine. She got dilauded 05 mg at 2100 and her next dose is at 0500. Will redirect her until then. She has been falling asleep and very drowsy. 4552- Pt was straight cathed, got 1100 mL of fruity smelling, yellow urine. Post straight cath bladder scan showed 151 mL. Will continue to monitor output     0050- Pt has a large, fluid-filled blister on her R gluteal fold. Put in wound care consult. 0444- Pt's blister burst leaving pink, epithelial wound bed. Mepilex boarder applied. Pt also voided 350 mL on her own at the bedside commode. 0014 - Pt has been diligently requesting IV pain medication all morning on the hour. Around 5 am nurse offered PO dilauded 2 mg. She said that she can \"do that at home\" and does not want the oral dilauded. Nurse explained that she does not feel comfortable giving IV dilauded for a few reasons: 1.) She is going home today and we typically ween pt's off IV pain medication. And 2.) Pt has been nodding off, constantly seeking more IV pain medication, saying she's nauseated and would like her phenergan (though she is not nauseated enough to have requested two TV dinners my shift, during one of the dinners she fell asleep with food in her mouth and could have aspirated or choked). Nurse explained that the IV medication seems to oversedate her. She asked to speak to another nurse. Courtney Pelaez, the charge RN, went in the room and pt said, \" My nurse said you were going to give me my IV pain medication because she didn't feel comfortable doing it. \" Pt is very manipulative. 5535- Went back to check on pt and she was asleep.

## 2017-05-02 NOTE — PROGRESS NOTES
Problem: Mobility Impaired (Adult and Pediatric)  Goal: *Acute Goals and Plan of Care (Insert Text)  Physical Therapy Goals  Initiated 5/1/2017      4. Patient will ambulate with modified independence for 100 feet with the least restrictive device within 7 day(s). 5. Patient will ascend/descend 5 stairs with 1 handrail(s) with modified independence within 7 day(s). PHYSICAL THERAPY TREATMENT  Patient: Gopi Mabry (37 y.o. female)  Date: 5/2/2017  Diagnosis: Hyperglycemia Hyperglycemia due to type 2 diabetes mellitus ([facility] [address]       Precautions:        ASSESSMENT:  Patient received sitting EOB and ready to ambulate wanting to use a cane. Patient demonstrating much improved mobility today and balance increasing score on Tinetti from 11 to 22. Gait is smooth and steady today with the cane. Managed 5 steps without difficulty. No loss of balance today. Safe for discharge home with a single point cane and orders have been placed for it. Did find a candy bar in patient's gown pocket. She stated it was her husbands and she meant to put in the bag. Reminded her that she should not be eating candy and she insisted she wasn't. Progression toward goals:  [X]    Improving appropriately and progressing toward goals  [ ]    Improving slowly and progressing toward goals  [ ]    Not making progress toward goals and plan of care will be adjusted       PLAN:  Patient continues to benefit from skilled intervention to address the above impairments. Continue treatment per established plan of care. Discharge Recommendations:  None  Further Equipment Recommendations for Discharge:  straight cane       SUBJECTIVE:   Patient stated I do a lot better with this cane. My walking is so much better.       OBJECTIVE DATA SUMMARY:   Critical Behavior:  Neurologic State: Alert  Orientation Level: Oriented X4        Functional Mobility Training:     Transfers:  Sit to Stand: Modified independent  Stand to Sit: Modified independent            Balance:  Sitting: Intact  Standing: Intact; With support  Standing - Static: Good  Standing - Dynamic : Good  Ambulation/Gait Training:  Distance (ft): 300 Feet (ft)  Assistive Device: Cane, straight;Gait belt  Ambulation - Level of Assistance: Supervision      Speed/[place]: Slow      Stairs:  Number of Stairs Trained: 5  Stairs - Level of Assistance: Supervision              Rail Use: Right    Tinetti test:      Sitting Balance: 1  Arises: 1  Attempts to Rise: 2  Immediate Standing Balance: 2  Standing Balance: 1  Nudged: 1  Eyes Closed: 1  Turn 360 Degrees - Continuous/Discontinuous: 1  Turn 360 Degrees - Steady/Unsteady: 1  Sitting Down: 2  Balance Score: 13  Indication of Gait: 1  R Step Length/Height: 1  L Step Length/Height: 1  R Foot Clearance: 1  L Foot Clearance: 1  Step Symmetry: 1  Step Continuity: 1  Path: 1  Trunk: 0  Walking Time: 1  Gait Score: 9  Total Score: 22         Tinetti Test and G-code impairment scale:  Percentage of Impairment CH     0%    CI     1-19% CJ     20-39% CK     40-59% CL     60-79% CM     80-99% CN      100%   Tinetti  Score 0-28 28 23-27 17-22 12-16 6-11 1-5 0          Tinetti Tool Score Risk of Falls  <19 = High Fall Risk  19-24 = Moderate Fall Risk  25-28 = Low Fall Risk  Tinetti ME. Performance-Oriented Assessment of Mobility Problems in Elderly Patients. Emmanuel 64; [id number].  (Scoring Description: PT Bulletin Feb. 10, 1993)     Older adults: Yue Redd et al, 2009; n = 1000 [facility] elderly evaluated with ABC, LILLY, ADL, and IADL)  · Mean LILLY score for males aged 69-68 years = 26.21(3.40)  · Mean LILLY score for females age 69-68 years = 25.16(4.30)  · Mean LILLY score for males over 80 years = 23.29(6.02)  · Mean LILLY score for females over 80 years = 17.20(8.32)            Pain:  Pain Scale 1: Numeric (0 - 10)  Pain Intensity 1: 7  Pain Location 1: Back;Rib cage  Pain Orientation 1: Right;Posterior  Pain Description 1: Sore  Pain Intervention(s) 1: Medication (see MAR)  After treatment:   [ ]    Patient left in no apparent distress sitting up in chair  [X]    Patient left in no apparent distress in bed  [X]    Call bell left within reach  [X]    Nursing notified  [ ]    Caregiver present  [X]    Bed alarm activated      COMMUNICATION/COLLABORATION:   The patients plan of care was discussed with: Registered Nurse and [address], PT   Time Calculation: 30 mins              322

## 2018-03-02 ENCOUNTER — APPOINTMENT (OUTPATIENT)
Dept: ULTRASOUND IMAGING | Age: 38
DRG: 854 | End: 2018-03-02
Attending: EMERGENCY MEDICINE
Payer: MEDICARE

## 2018-03-02 ENCOUNTER — APPOINTMENT (OUTPATIENT)
Dept: GENERAL RADIOLOGY | Age: 38
DRG: 854 | End: 2018-03-02
Attending: EMERGENCY MEDICINE
Payer: MEDICARE

## 2018-03-02 ENCOUNTER — HOSPITAL ENCOUNTER (INPATIENT)
Age: 38
LOS: 25 days | Discharge: NURSING, REHAB OR LONG-TERM CARE | DRG: 854 | End: 2018-03-27
Attending: EMERGENCY MEDICINE | Admitting: INTERNAL MEDICINE
Payer: MEDICARE

## 2018-03-02 ENCOUNTER — ANESTHESIA EVENT (OUTPATIENT)
Dept: SURGERY | Age: 38
DRG: 854 | End: 2018-03-02
Payer: MEDICARE

## 2018-03-02 ENCOUNTER — APPOINTMENT (OUTPATIENT)
Dept: MRI IMAGING | Age: 38
DRG: 854 | End: 2018-03-02
Attending: INTERNAL MEDICINE
Payer: MEDICARE

## 2018-03-02 DIAGNOSIS — M86.9 OSTEOMYELITIS OF LEFT FOOT, UNSPECIFIED TYPE (HCC): ICD-10-CM

## 2018-03-02 DIAGNOSIS — D64.9 ANEMIA, UNSPECIFIED TYPE: ICD-10-CM

## 2018-03-02 DIAGNOSIS — M86.172 OTHER ACUTE OSTEOMYELITIS OF LEFT FOOT (HCC): ICD-10-CM

## 2018-03-02 DIAGNOSIS — E11.628 DIABETIC FOOT INFECTION (HCC): Primary | ICD-10-CM

## 2018-03-02 DIAGNOSIS — L08.9 DIABETIC FOOT INFECTION (HCC): Primary | ICD-10-CM

## 2018-03-02 DIAGNOSIS — N18.4 CRI (CHRONIC RENAL INSUFFICIENCY), STAGE 4 (SEVERE) (HCC): ICD-10-CM

## 2018-03-02 LAB
ALBUMIN SERPL-MCNC: 1.9 G/DL (ref 3.5–5)
ALBUMIN/GLOB SERPL: 0.3 {RATIO} (ref 1.1–2.2)
ALP SERPL-CCNC: 459 U/L (ref 45–117)
ALT SERPL-CCNC: 31 U/L (ref 12–78)
ANION GAP SERPL CALC-SCNC: 11 MMOL/L (ref 5–15)
AST SERPL-CCNC: 27 U/L (ref 15–37)
BASOPHILS # BLD: 0 K/UL (ref 0–0.1)
BASOPHILS NFR BLD: 0 % (ref 0–1)
BILIRUB SERPL-MCNC: 0.3 MG/DL (ref 0.2–1)
BUN SERPL-MCNC: 44 MG/DL (ref 6–20)
BUN/CREAT SERPL: 13 (ref 12–20)
CALCIUM SERPL-MCNC: 7.9 MG/DL (ref 8.5–10.1)
CHLORIDE SERPL-SCNC: 104 MMOL/L (ref 97–108)
CO2 SERPL-SCNC: 21 MMOL/L (ref 21–32)
CREAT SERPL-MCNC: 3.51 MG/DL (ref 0.55–1.02)
DIFFERENTIAL METHOD BLD: ABNORMAL
EOSINOPHIL # BLD: 0 K/UL (ref 0–0.4)
EOSINOPHIL NFR BLD: 0 % (ref 0–7)
ERYTHROCYTE [DISTWIDTH] IN BLOOD BY AUTOMATED COUNT: 13.5 % (ref 11.5–14.5)
GLOBULIN SER CALC-MCNC: 5.5 G/DL (ref 2–4)
GLUCOSE BLD STRIP.AUTO-MCNC: 140 MG/DL (ref 65–100)
GLUCOSE SERPL-MCNC: 157 MG/DL (ref 65–100)
HCT VFR BLD AUTO: 21.4 % (ref 35–47)
HGB BLD-MCNC: 6.9 G/DL (ref 11.5–16)
IMM GRANULOCYTES # BLD: 0.2 K/UL (ref 0–0.04)
IMM GRANULOCYTES NFR BLD AUTO: 1 % (ref 0–0.5)
LACTATE SERPL-SCNC: 0.8 MMOL/L (ref 0.4–2)
LYMPHOCYTES # BLD: 0.5 K/UL (ref 0.8–3.5)
LYMPHOCYTES NFR BLD: 3 % (ref 12–49)
MCH RBC QN AUTO: 25.1 PG (ref 26–34)
MCHC RBC AUTO-ENTMCNC: 32.2 G/DL (ref 30–36.5)
MCV RBC AUTO: 77.8 FL (ref 80–99)
MONOCYTES # BLD: 0.9 K/UL (ref 0–1)
MONOCYTES NFR BLD: 6 % (ref 5–13)
NEUTS SEG # BLD: 13.7 K/UL (ref 1.8–8)
NEUTS SEG NFR BLD: 90 % (ref 32–75)
NRBC # BLD: 0 K/UL (ref 0–0.01)
NRBC BLD-RTO: 0 PER 100 WBC
PLATELET # BLD AUTO: 392 K/UL (ref 150–400)
PLATELET COMMENTS,PCOM: ABNORMAL
POTASSIUM SERPL-SCNC: 3.9 MMOL/L (ref 3.5–5.1)
PROT SERPL-MCNC: 7.4 G/DL (ref 6.4–8.2)
RBC # BLD AUTO: 2.75 M/UL (ref 3.8–5.2)
RBC MORPH BLD: ABNORMAL
RETICS # AUTO: 0.02 M/UL (ref 0.02–0.08)
RETICS/RBC NFR AUTO: 0.7 % (ref 0.7–2.1)
SERVICE CMNT-IMP: ABNORMAL
SODIUM SERPL-SCNC: 136 MMOL/L (ref 136–145)
WBC # BLD AUTO: 15.3 K/UL (ref 3.6–11)

## 2018-03-02 PROCEDURE — 93971 EXTREMITY STUDY: CPT

## 2018-03-02 PROCEDURE — 87205 SMEAR GRAM STAIN: CPT | Performed by: INTERNAL MEDICINE

## 2018-03-02 PROCEDURE — 65270000029 HC RM PRIVATE

## 2018-03-02 PROCEDURE — 81001 URINALYSIS AUTO W/SCOPE: CPT | Performed by: EMERGENCY MEDICINE

## 2018-03-02 PROCEDURE — 76937 US GUIDE VASCULAR ACCESS: CPT

## 2018-03-02 PROCEDURE — 74011250636 HC RX REV CODE- 250/636: Performed by: EMERGENCY MEDICINE

## 2018-03-02 PROCEDURE — 87086 URINE CULTURE/COLONY COUNT: CPT | Performed by: EMERGENCY MEDICINE

## 2018-03-02 PROCEDURE — 85025 COMPLETE CBC W/AUTO DIFF WBC: CPT | Performed by: EMERGENCY MEDICINE

## 2018-03-02 PROCEDURE — 73630 X-RAY EXAM OF FOOT: CPT

## 2018-03-02 PROCEDURE — 74011250637 HC RX REV CODE- 250/637: Performed by: INTERNAL MEDICINE

## 2018-03-02 PROCEDURE — 71046 X-RAY EXAM CHEST 2 VIEWS: CPT

## 2018-03-02 PROCEDURE — 74011250636 HC RX REV CODE- 250/636: Performed by: INTERNAL MEDICINE

## 2018-03-02 PROCEDURE — 85660 RBC SICKLE CELL TEST: CPT | Performed by: EMERGENCY MEDICINE

## 2018-03-02 PROCEDURE — 80053 COMPREHEN METABOLIC PANEL: CPT | Performed by: EMERGENCY MEDICINE

## 2018-03-02 PROCEDURE — 36415 COLL VENOUS BLD VENIPUNCTURE: CPT | Performed by: EMERGENCY MEDICINE

## 2018-03-02 PROCEDURE — 86920 COMPATIBILITY TEST SPIN: CPT | Performed by: EMERGENCY MEDICINE

## 2018-03-02 PROCEDURE — 85045 AUTOMATED RETICULOCYTE COUNT: CPT | Performed by: EMERGENCY MEDICINE

## 2018-03-02 PROCEDURE — 83605 ASSAY OF LACTIC ACID: CPT | Performed by: EMERGENCY MEDICINE

## 2018-03-02 PROCEDURE — 86921 COMPATIBILITY TEST INCUBATE: CPT | Performed by: EMERGENCY MEDICINE

## 2018-03-02 PROCEDURE — 86900 BLOOD TYPING SEROLOGIC ABO: CPT | Performed by: EMERGENCY MEDICINE

## 2018-03-02 PROCEDURE — 82962 GLUCOSE BLOOD TEST: CPT

## 2018-03-02 PROCEDURE — 99285 EMERGENCY DEPT VISIT HI MDM: CPT

## 2018-03-02 PROCEDURE — 73718 MRI LOWER EXTREMITY W/O DYE: CPT

## 2018-03-02 PROCEDURE — 94761 N-INVAS EAR/PLS OXIMETRY MLT: CPT

## 2018-03-02 PROCEDURE — 71045 X-RAY EXAM CHEST 1 VIEW: CPT

## 2018-03-02 PROCEDURE — 74011636637 HC RX REV CODE- 636/637: Performed by: INTERNAL MEDICINE

## 2018-03-02 PROCEDURE — 86922 COMPATIBILITY TEST ANTIGLOB: CPT | Performed by: EMERGENCY MEDICINE

## 2018-03-02 PROCEDURE — 87040 BLOOD CULTURE FOR BACTERIA: CPT | Performed by: EMERGENCY MEDICINE

## 2018-03-02 PROCEDURE — 93005 ELECTROCARDIOGRAM TRACING: CPT

## 2018-03-02 RX ORDER — ONDANSETRON 2 MG/ML
4 INJECTION INTRAMUSCULAR; INTRAVENOUS
Status: DISCONTINUED | OUTPATIENT
Start: 2018-03-02 | End: 2018-03-19

## 2018-03-02 RX ORDER — ALBUTEROL SULFATE 0.83 MG/ML
2.5 SOLUTION RESPIRATORY (INHALATION)
Status: DISCONTINUED | OUTPATIENT
Start: 2018-03-02 | End: 2018-03-27 | Stop reason: HOSPADM

## 2018-03-02 RX ORDER — VANCOMYCIN/0.9 % SOD CHLORIDE 1 G/100 ML
1000 PLASTIC BAG, INJECTION (ML) INTRAVENOUS EVERY 24 HOURS
Status: DISCONTINUED | OUTPATIENT
Start: 2018-03-03 | End: 2018-03-05

## 2018-03-02 RX ORDER — CALCIUM CARBONATE 500(1250)
500 TABLET ORAL DAILY
Status: DISCONTINUED | OUTPATIENT
Start: 2018-03-03 | End: 2018-03-27 | Stop reason: HOSPADM

## 2018-03-02 RX ORDER — VANCOMYCIN/0.9 % SOD CHLORIDE 1.5G/250ML
1500 PLASTIC BAG, INJECTION (ML) INTRAVENOUS
Status: COMPLETED | OUTPATIENT
Start: 2018-03-02 | End: 2018-03-21

## 2018-03-02 RX ORDER — INSULIN GLARGINE 100 [IU]/ML
12 INJECTION, SOLUTION SUBCUTANEOUS
COMMUNITY
End: 2018-03-27

## 2018-03-02 RX ORDER — DEXTROSE 50 % IN WATER (D50W) INTRAVENOUS SYRINGE
12.5-25 AS NEEDED
Status: DISCONTINUED | OUTPATIENT
Start: 2018-03-02 | End: 2018-03-27 | Stop reason: HOSPADM

## 2018-03-02 RX ORDER — SODIUM CHLORIDE 9 MG/ML
150 INJECTION, SOLUTION INTRAVENOUS CONTINUOUS
Status: DISCONTINUED | OUTPATIENT
Start: 2018-03-02 | End: 2018-03-06

## 2018-03-02 RX ORDER — SODIUM CHLORIDE 9 MG/ML
250 INJECTION, SOLUTION INTRAVENOUS AS NEEDED
Status: DISCONTINUED | OUTPATIENT
Start: 2018-03-02 | End: 2018-03-08

## 2018-03-02 RX ORDER — ACETAMINOPHEN 325 MG/1
650 TABLET ORAL
Status: DISCONTINUED | OUTPATIENT
Start: 2018-03-02 | End: 2018-03-27 | Stop reason: HOSPADM

## 2018-03-02 RX ORDER — DOCUSATE SODIUM 100 MG/1
100 CAPSULE, LIQUID FILLED ORAL 2 TIMES DAILY
Status: DISCONTINUED | OUTPATIENT
Start: 2018-03-02 | End: 2018-03-27 | Stop reason: HOSPADM

## 2018-03-02 RX ORDER — SODIUM CHLORIDE 0.9 % (FLUSH) 0.9 %
5-10 SYRINGE (ML) INJECTION AS NEEDED
Status: DISCONTINUED | OUTPATIENT
Start: 2018-03-02 | End: 2018-03-27 | Stop reason: HOSPADM

## 2018-03-02 RX ORDER — INSULIN LISPRO 100 [IU]/ML
INJECTION, SOLUTION INTRAVENOUS; SUBCUTANEOUS
Status: DISCONTINUED | OUTPATIENT
Start: 2018-03-02 | End: 2018-03-27 | Stop reason: HOSPADM

## 2018-03-02 RX ORDER — INSULIN GLARGINE 100 [IU]/ML
22 INJECTION, SOLUTION SUBCUTANEOUS DAILY
Status: DISCONTINUED | OUTPATIENT
Start: 2018-03-03 | End: 2018-03-03

## 2018-03-02 RX ORDER — THERA TABS 400 MCG
1 TAB ORAL DAILY
Status: DISCONTINUED | OUTPATIENT
Start: 2018-03-03 | End: 2018-03-27 | Stop reason: HOSPADM

## 2018-03-02 RX ORDER — LANOLIN ALCOHOL/MO/W.PET/CERES
1000 CREAM (GRAM) TOPICAL DAILY
Status: DISCONTINUED | OUTPATIENT
Start: 2018-03-03 | End: 2018-03-27 | Stop reason: HOSPADM

## 2018-03-02 RX ORDER — SODIUM CHLORIDE 0.9 % (FLUSH) 0.9 %
5-10 SYRINGE (ML) INJECTION EVERY 8 HOURS
Status: DISCONTINUED | OUTPATIENT
Start: 2018-03-02 | End: 2018-03-27 | Stop reason: HOSPADM

## 2018-03-02 RX ORDER — FAMOTIDINE 20 MG/1
20 TABLET, FILM COATED ORAL 2 TIMES DAILY
Status: DISCONTINUED | OUTPATIENT
Start: 2018-03-02 | End: 2018-03-03

## 2018-03-02 RX ORDER — SODIUM CHLORIDE 0.9 % (FLUSH) 0.9 %
5-10 SYRINGE (ML) INJECTION AS NEEDED
Status: DISCONTINUED | OUTPATIENT
Start: 2018-03-02 | End: 2018-03-20 | Stop reason: SDUPTHER

## 2018-03-02 RX ORDER — OXYCODONE AND ACETAMINOPHEN 5; 325 MG/1; MG/1
1 TABLET ORAL
Status: DISCONTINUED | OUTPATIENT
Start: 2018-03-02 | End: 2018-03-07

## 2018-03-02 RX ORDER — HEPARIN SODIUM 5000 [USP'U]/ML
5000 INJECTION, SOLUTION INTRAVENOUS; SUBCUTANEOUS EVERY 12 HOURS
Status: DISCONTINUED | OUTPATIENT
Start: 2018-03-03 | End: 2018-03-06

## 2018-03-02 RX ORDER — FOLIC ACID 1 MG/1
1 TABLET ORAL DAILY
Status: DISCONTINUED | OUTPATIENT
Start: 2018-03-03 | End: 2018-03-27 | Stop reason: HOSPADM

## 2018-03-02 RX ORDER — SODIUM CHLORIDE 0.9 % (FLUSH) 0.9 %
5-10 SYRINGE (ML) INJECTION EVERY 8 HOURS
Status: DISCONTINUED | OUTPATIENT
Start: 2018-03-02 | End: 2018-03-20 | Stop reason: SDUPTHER

## 2018-03-02 RX ORDER — MAGNESIUM SULFATE 100 %
4 CRYSTALS MISCELLANEOUS AS NEEDED
Status: DISCONTINUED | OUTPATIENT
Start: 2018-03-02 | End: 2018-03-27 | Stop reason: HOSPADM

## 2018-03-02 RX ADMIN — Medication 10 ML: at 22:18

## 2018-03-02 RX ADMIN — Medication 10 ML: at 14:57

## 2018-03-02 RX ADMIN — VANCOMYCIN HYDROCHLORIDE 1500 MG: 10 INJECTION, POWDER, LYOPHILIZED, FOR SOLUTION INTRAVENOUS at 22:19

## 2018-03-02 RX ADMIN — INSULIN LISPRO 2 UNITS: 100 INJECTION, SOLUTION INTRAVENOUS; SUBCUTANEOUS at 22:13

## 2018-03-02 RX ADMIN — DOCUSATE SODIUM 100 MG: 100 CAPSULE, LIQUID FILLED ORAL at 21:31

## 2018-03-02 RX ADMIN — Medication 10 ML: at 22:19

## 2018-03-02 RX ADMIN — SODIUM CHLORIDE 150 ML/HR: 900 INJECTION, SOLUTION INTRAVENOUS at 21:11

## 2018-03-02 RX ADMIN — PIPERACILLIN SODIUM AND TAZOBACTAM SODIUM 3.38 G: .375; 3 INJECTION, POWDER, LYOPHILIZED, FOR SOLUTION INTRAVENOUS at 21:13

## 2018-03-02 RX ADMIN — SODIUM CHLORIDE 1000 ML: 900 INJECTION, SOLUTION INTRAVENOUS at 17:15

## 2018-03-02 RX ADMIN — OXYCODONE HYDROCHLORIDE AND ACETAMINOPHEN 1 TABLET: 5; 325 TABLET ORAL at 21:32

## 2018-03-02 RX ADMIN — FAMOTIDINE 20 MG: 20 TABLET, FILM COATED ORAL at 21:32

## 2018-03-02 NOTE — ED NOTES
Assumed care of patient. Patient placed in position of comfort. Call bell in reach. Skin warm and dry. Respirations even and unlabored. In no apparent distress at this time. Presents to the ED accompanied by [facility] staff d/t Lt foot wound x 3 weeks, Lt leg pain and swelling x 2 weeks. Pmh DVT. BL wrist and ankle cuffs in place.

## 2018-03-02 NOTE — IP AVS SNAPSHOT
[address] [facility] 
[phone] Patient: Ha Kaiser MRN: [id number] BYQ:[id number] About your hospitalization You were admitted on:  March 2, 2018 You last received care in the:  [facility] 2 GENERAL SURGERY You were discharged on:  March 27, 2018 Why you were hospitalized Your primary diagnosis was:  Osteomyelitis (Hcc) Your diagnoses also included:  Sickle Cell Anemia (Hcc) Follow-up Information Follow up With Details Comments Contact Info Tamy Winn DPM In 3 days For wound re-check 2027 [facility] [address] [facility] 
[phone] Dejan Otero DO In 3 weeks hospital follow up 4/12/2018 at [facility] [address] [facility] 
[phone] Santos Hernandez MD In 2 weeks hospital follow-up [facility] [address] [facility] 
[phone] Akhil Kwok MD In 2 days hospital follow-up 7050 [facility] [address] [place] [address] 
[phone] [place] [address] 1200 [facility] [address] [phone] Your Scheduled Appointments Wednesday March 28, 2018  2:15 PM EDT  
WOUND CARE NEW PATIENT with Tamy Winn DPM, [facility] WOUND CARE 2  
[facility] OP WOUND CARE ([address]) [address] [facility] [address] [address]  
[phone] Thursday April 12, 2018  2:00 PM EDT New Patient with Dejan Otero DO [facility] 1500 [facility] 203 5 [facility]  
[phone] Discharge Orders None A check dino indicates which time of day the medication should be taken. My Medications START taking these medications Instructions Each Dose to Equal  
 Morning Noon Evening Bedtime amLODIPine 10 mg tablet Commonly known as:  Teresa Rosado Start taking on:  3/28/2018 Your last dose was: Your next dose is: Take 1 Tab by mouth daily for 30 days. 10 mg  
    
   
   
   
  
 piperacillin-tazobactam in 0.9% NS 3.375 G/100 ML Soln IVPB Commonly known as:  Zachery Avendaño Your last dose was: Your next dose is:    
   
   
 3.375 g by IntraVENous route every eight (8) hours for 33 days. 3.375 g  
    
   
   
   
  
 sodium bicarbonate 650 mg tablet Your last dose was: Your next dose is: Take 1 Tab by mouth two (2) times a day for 30 days. 650 mg  
    
   
   
   
  
 vancomycin 750 mg 750 mg, ADDaptor 1 Device IVPB Your last dose was: Your next dose is:    
   
   
 750 mg by IntraVENous route every twenty-four (24) hours. 750 mg CHANGE how you take these medications Instructions Each Dose to Equal  
 Morning Noon Evening Bedtime  
 famotidine 20 mg tablet Commonly known as:  PEPCID What changed:  when to take this Your last dose was: Your next dose is: Take 1 Tab by mouth daily for 30 days. 20 mg  
    
   
   
   
  
 folic acid 1 mg tablet Commonly known as:  Catarina What changed:   
- medication strength 
- how much to take Your last dose was: Your next dose is: Take 1 Tab by mouth daily for 30 days. 1 mg  
    
   
   
   
  
 venlafaxine 75 mg tablet Commonly known as:  [facility] What changed:   
- how much to take - when to take this Your last dose was: Your next dose is: Take 1 Tab by mouth two (2) times daily (with meals). 75 mg CONTINUE taking these medications Instructions Each Dose to Equal  
 Morning Noon Evening Bedtime  
 albuterol 2.5 mg /3 mL (0.083 %) nebulizer solution Commonly known as:  PROVENTIL VENTOLIN  
   
 Your last dose was: Your next dose is:    
   
   
 2.5 mg by Nebulization route every four (4) hours as needed. 2.5 mg  
    
   
   
   
  
 calcium carbonate 500 mg calcium (1,250 mg) tablet Commonly known as:  OS-BINA Your last dose was: Your next dose is: Take 1 Tab by mouth daily. 1 Tab Cholecalciferol (Vitamin D3) 50,000 unit Cap Your last dose was: Your next dose is: Take 1 Cap by mouth every seven (7) days. 1 Cap  
    
   
   
   
  
 cloNIDine HCl 0.2 mg tablet Commonly known as:  CATAPRES Your last dose was: Your next dose is: Take 1 Tab by mouth three (3) times daily for 30 days. 0.2 mg  
    
   
   
   
  
 cyanocobalamin 1,000 mcg tablet Your last dose was: Your next dose is: Take 1,000 mcg by mouth daily. 1000 mcg  
    
   
   
   
  
 diphenhydrAMINE 25 mg capsule Commonly known as:  BENADRYL Your last dose was: Your next dose is: Take 1-2 Caps by mouth every six (6) hours as needed for up to 10 days. 25-50 mg  
    
   
   
   
  
 oxyCODONE-acetaminophen 5-325 mg per tablet Commonly known as:  PERCOCET Your last dose was: Your next dose is: Take 2 Tabs by mouth every four (4) hours as needed for Pain for up to 10 days. Max Daily Amount: 12 Tabs. 2 Tab QUEtiapine 100 mg tablet Commonly known as:  SEROquel Your last dose was: Your next dose is: Take 1 Tab by mouth two (2) times a day for 30 days. 100 mg  
    
   
   
   
  
 therapeutic multivitamin tablet Commonly known as:  [facility] Your last dose was: Your next dose is: Take 1 Tab by mouth daily for 30 days. 1 Tab STOP taking these medications   
 baclofen 10 mg tablet Commonly known as:  LIORESAL  
   
  
 DULoxetine 60 mg capsule Commonly known as:  CYMBALTA ferrous sulfate 325 mg (65 mg iron) tablet HYDROmorphone 2 mg tablet Commonly known as:  DILAUDID  
   
  
 insulin lispro 100 unit/mL injection Commonly known as:  HUMALOG  
   
  
 insulin  unit/mL injection Commonly known as:  NovoLIN N NPH U-100 Insulin LANTUS U-100 INSULIN 100 unit/mL injection Generic drug:  insulin glargine  
   
  
 polyethylene glycol 17 gram packet Commonly known as:  James Roller Potassium Gluconate 595 mg (99 mg) tablet  
   
  
 promethazine 25 mg tablet Commonly known as:  PHENERGAN  
   
  
 senna-docusate 8.6-50 mg per tablet Commonly known as:  PERICOLACE  
   
  
 vitamin E 400 unit capsule Commonly known as:  [address] Where to Get Your Medications Information on where to get these meds will be given to you by the nurse or doctor. ! Ask your nurse or doctor about these medications  
  amLODIPine 10 mg tablet  
 cloNIDine HCl 0.2 mg tablet  
 diphenhydrAMINE 25 mg capsule  
 famotidine 20 mg tablet  
 folic acid 1 mg tablet  
 oxyCODONE-acetaminophen 5-325 mg per tablet  
 piperacillin-tazobactam in 0.9% NS 3.375 G/100 ML Soln IVPB QUEtiapine 100 mg tablet  
 sodium bicarbonate 650 mg tablet  
 therapeutic multivitamin tablet  
 vancomycin 750 mg 750 mg, ADDaptor 1 Device IVPB  
 venlafaxine 75 mg tablet Opioid Education Prescription Opioids: What You Need to Know: 
 
Prescription opioids can be used to help relieve moderate-to-severe pain and are often prescribed following a surgery or injury, or for certain health conditions. These medications can be an important part of treatment but also come with serious risks. Opioids are strong pain medicines. Examples include hydrocodone, oxycodone, fentanyl, and morphine. Heroin is an example of an illegal opioid.   It is important to work with your health care provider to make sure you are getting the safest, most effective care. WHAT ARE THE RISKS AND SIDE EFFECTS OF OPIOID USE? Prescription opioids carry serious risks of addiction and overdose, especially with prolonged use. An opioid overdose, often marked by slow breathing, can cause sudden death. The use of prescription opioids can have a number of side effects as well, even when taken as directed. · Tolerance-meaning you might need to take more of a medication for the same pain relief · Physical dependence-meaning you have symptoms of withdrawal when the medication is stopped. Withdrawal symptoms can include nausea, sweating, chills, diarrhea, stomach cramps, and muscle aches. Withdrawal can last up to several weeks, depending on which drug you took and how long you took it. · Increased sensitivity to pain · Constipation · Nausea, vomiting, and dry mouth · Sleepiness and dizziness · Confusion · Depression · Low levels of testosterone that can result in lower sex drive, energy, and strength · Itching and sweating RISKS ARE GREATER WITH:      
· History of drug misuse, substance use disorder, or overdose · Mental health conditions (such as depression or anxiety) · Sleep apnea · Older age (72 years or older) · Pregnancy Avoid alcohol while taking prescription opioids. Also, unless specifically advised by your health care provider, medications to avoid include: · Benzodiazepines (such as Xanax or Valium) · Muscle relaxants (such as Soma or Flexeril) · Hypnotics (such as Ambien or Lunesta) · Other prescription opioids KNOW YOUR OPTIONS Talk to your health care provider about ways to manage your pain that don't involve prescription opioids. Some of these options may actually work better and have fewer risks and side effects. Options may include: 
· Pain relievers such as acetaminophen, ibuprofen, and naproxen · Some medications that are also used for depression or seizures · Physical therapy and exercise · Counseling to help patients learn how to cope better with triggers of pain and stress. · Application of heat or cold compress · Massage therapy · Relaxation techniques Be Informed Make sure you know the name of your medication, how much and how often to take it, and its potential risks & side effects. IF YOU ARE PRESCRIBED OPIOIDS FOR PAIN: 
· Never take opioids in greater amounts or more often than prescribed. Remember the goal is not to be pain-free but to manage your pain at a tolerable level. · Follow up with your primary care provider to: · Work together to create a plan on how to manage your pain. · Talk about ways to help manage your pain that don't involve prescription opioids. · Talk about any and all concerns and side effects. · Help prevent misuse and abuse. · Never sell or share prescription opioids · Help prevent misuse and abuse. · Store prescription opioids in a secure place and out of reach of others (this may include visitors, children, friends, and family). · Safely dispose of unused/unwanted prescription opioids: Find your community drug take-back program or your pharmacy mail-back program, or flush them down the toilet, following guidance from the Food and Drug Administration (www.fda.gov/Drugs/ResourcesForYou). · Visit www.cdc.gov/drugoverdose to learn about the risks of opioid abuse and overdose. · If you believe you may be struggling with addiction, tell your health care provider and ask for guidance or call [address] [place] [address] at [phone]. Discharge Instructions Post operative care: surgery- Dr Riley Army Dressing to left foot to be left clean dry and intact. We will plan for weekly changes BY DR RICE OR HIS STAFF ONLY once discharged.  The patient will require DME to be arranged by CM including wheelchair with leg lift, 3 in 1 commode, and shower chair as they are REQUIRED MEDICALLY to be ABSOLUTE NONWEIGHTBEARING left foot. They should not be placed in a dependent position, not from the edge of their bed, not in their wheelchair and not in any other device for more more than 15 minutes at any given time. The patient has had a full thickness adjacent transfer of a flap and fluid overload will greatly increase the chances of flap failure. Airboots should be in place, loosely approximated at all times that the patient is in the bed.  The patient may complete passive ROM exercises to the left thigh and lower leg but NOT THE FOOT and has full range of motion to upper body and full weight-bearing to the right foot.   
   
Elevate and offload B/L LE. Float heels off edge of bed with foam block or air boots. PT to evaluate and treat  
  
Pt should plan to follow up 1 week or the next Wednesday  after discharge with me at the [id number] [place] [address] wound care [place]. Foot and ankle will follow Learning About Certified Diabetes Educators What is a certified diabetes educator? Certified diabetes educators are health professionals who have special training to help you manage your diabetes. They may be: · Registered nurses. · Registered dietitians. · Pharmacists. · Social workers. · Doctors. Your diabetes educator will give you tips and help with daily diabetes care. He or she also may teach classes. These classes give you a chance to learn from and connect with others who have diabetes. Why see a diabetes educator? Your doctor wants you to get the personal support and help that a diabetes educator can give. And most insurance plans will cover part or all of the cost. 
Learning is a key part of living with diabetes. A diabetes educator teaches about the most important parts of your care. You will learn about: 
· Eating healthy meals. · Being active. · Taking medicine. · Checking your blood sugar. · Dealing with your feelings about having diabetes. He or she can help you find ways to live better with diabetes. And your diabetes educator can show you small changes that can make a big difference in your daily routine and your health. If you need to make a big change, he or she will be able to answer your questions and guide you though each step. When should you see one? It can be helpful to see a diabetes educator at certain points in your care. He or she can: · Get you started when you're first diagnosed with diabetes. · Check in once a year for a review of your health and daily routine. · Show you how to handle a new health problem along with your diabetes. · Help you work with a new health care team. 
Follow-up care is a key part of your treatment and safety. Be sure to make and go to all appointments, and call your doctor if you are having problems. It's also a good idea to know your test results and keep a list of the medicines you take. Where can you learn more? Go to http://luciana-kyle.info/. Enter H904 in the search box to learn more about \"Learning About Certified Diabetes Educators. \" Current as of: March 13, 2017 Content Version: 11.4 © 9849-3313 Healthwise, Incorporated. Care instructions adapted under license by [employer] (which disclaims liability or warranty for this information). If you have questions about a medical condition or this instruction, always ask your healthcare professional. [place] [address] any warranty or liability for your use of this information. Learning About Certified Diabetes Educators What is a certified diabetes educator? Certified diabetes educators are health professionals who have special training to help you manage your diabetes. They may be: · Registered nurses. · Registered dietitians. · Pharmacists. · Social workers. · Doctors. Your diabetes educator will give you tips and help with daily diabetes care. He or she also may teach classes. These classes give you a chance to learn from and connect with others who have diabetes. Why see a diabetes educator? Your doctor wants you to get the personal support and help that a diabetes educator can give. And most insurance plans will cover part or all of the cost. 
Learning is a key part of living with diabetes. A diabetes educator teaches about the most important parts of your care. You will learn about: 
· Eating healthy meals. · Being active. · Taking medicine. · Checking your blood sugar. · Dealing with your feelings about having diabetes. He or she can help you find ways to live better with diabetes. And your diabetes educator can show you small changes that can make a big difference in your daily routine and your health. If you need to make a big change, he or she will be able to answer your questions and guide you though each step. When should you see one? It can be helpful to see a diabetes educator at certain points in your care. He or she can: · Get you started when you're first diagnosed with diabetes. · Check in once a year for a review of your health and daily routine. · Show you how to handle a new health problem along with your diabetes. · Help you work with a new health care team. 
Follow-up care is a key part of your treatment and safety. Be sure to make and go to all appointments, and call your doctor if you are having problems. It's also a good idea to know your test results and keep a list of the medicines you take. Where can you learn more? Go to http://luciana-kyle.info/. Enter T717 in the search box to learn more about \"Learning About Certified Diabetes Educators. \" Current as of: March 13, 2017 Content Version: 11.4 © 9518-1627 Healthwise, Incorporated.  Care instructions adapted under license by Duc Steven (which disclaims liability or warranty for this information). If you have questions about a medical condition or this instruction, always ask your healthcare professional. [address]  any warranty or liability for your use of this information. HOSPITALIST DISCHARGE INSTRUCTIONS 
 
NAME: Tyler Orona :  1980 MRN:  [id number] Date/Time:  3/21/2018 9:00 AM 
 
ADMIT DATE: 3/2/2018 DISCHARGE DATE: 3/21/2018 Attending Physician: Will Fitzgerald MD 
 
DISCHARGE DIAGNOSIS: 
Severe sepsis POA due to left foot diabetic foot infection, ? osteomyelitis  
 
  
DM type 1 uncontrolled with nephropathy POA Hypoglycemia 3/6/2018 resolved 
 
  
Acute kidney injury POA peak creatinine 3.80 Stage 4 chronic kidney disease POA baseline creatinine 2.4 to 2.7 
been on dialysis in the past  
Previously has seen [facility]. 
 
  
Hyperkalemia 
  
Acute on chronic anemia due to sickle cell disease POA   
 
THU POA Uses 2 LPM O2 at night 
  
Disposition: Home vs In pt facility for iv Abx due to questionable non compliance 
   
Code Status: Full 
  
DVT Prophylaxis: heparin 
  
Body mass index is 25.97 kg/(m^2). Medications: Per above medication reconciliation. Pain Management: per above medications Recommended diet: Cardiac Diet, Diabetic Diet, Low fat, Low cholesterol, Renal Diet and low potassium diet Recommended activity: See surgical instructions Wound care: See surgical/procedure care instructions Indwelling devices:  central line care Supplemental Oxygen: Chronic Oxygen,  2  LNC at baseline Required Lab work: Weekly BMP and Weekly CBC , ESR, CRP, Vancomycin trough levels ( goal trough level 15-20) 1. Vancomycin + Zosyn IV renally dosed (dosing per pharmacy) 2. Check weekly CBC wt diff, CMP, ESR and CRP , Vancomycin trough levels ( goal trough level 15-20) 3. Plan for 6 weeks till 18 4. For central line removal contact infectious disease doctor Glucose management:  Accucheck ACHS with sliding scale per SNF protocol Code status: Full Outside physician follow up: Follow-up Information Follow up With Details Comments Contact Manda Borja Mt, DPM In 1 week For wound re-check 2027 [facility] [address] [facility] 
[phone] [place] [facility] [address] 
[phone] Homero Amaral DO In 3 weeks hospital follow up 4/12/2018 at [facility] [address] [facility] 
[phone] Catia Braden MD In 2 weeks hospital follow-up [place] [address] [facility] 
[phone] Pallavi Rocha MD In 2 days hospital follow-up 70[address] [facility] [address] [facility] 
[phone] Skilled nursing facility/ SNF MD responsible for above on discharge. Information obtained by : 
I understand that if any problems occur once I am at home I am to contact my physician. I understand and acknowledge receipt of the instructions indicated above. Physician's or R.N.'s Signature                                                                  Date/Time Patient or Repres MyChart Announcement We are excited to announce that we are making your provider's discharge notes available to you in [employer]ENN [employer]. You will see these notes when they are completed and signed by the physician that discharged you from your recent hospital stay.   If you have any questions or concerns about any information you see in [employer]ENN Motor[employer], please call the [employer] Information Department where you were seen or reach out to your Primary Care Provider for more information about your plan of care. Introducing [facility]! [employer] introduces Vocationt patient portal. Now you can access parts of your medical record, email your doctor's office, and request medication refills online. 1. In your internet browser, go to [email or web address] 2. Click on the First Time User? Click Here link in the Sign In box. You will see the New Member Sign Up page. 3. Enter your IEMO Access Code exactly as it appears below. You will not need to use this code after youve completed the sign-up process. If you do not sign up before the expiration date, you must request a new code. · IEMO Access Code: [email or web address] Expires: 6/3/2018  9:34 AM 
 
4. Enter the last four digits of your Social Security Number (xxxx) and Date of Birth (mm/dd/yyyy) as indicated and click Submit. You will be taken to the next sign-up page. 5. Create a IEMO ID. This will be your IEMO login ID and cannot be changed, so think of one that is secure and easy to remember. 6. Create a IEMO password. You can change your password at any time. 7. Enter your Password Reset Question and Answer. This can be used at a later time if you forget your password. 8. Enter your e-mail address. You will receive e-mail notification when new information is available in [address] 9. Click Sign Up. You can now view and download portions of your medical record. 10. Click the Download Summary menu link to download a portable copy of your medical information. If you have questions, please visit the Frequently Asked Questions section of the IEMO website. Remember, IEMO is NOT to be used for urgent needs. For medical emergencies, dial 911. Now available from your iPhone and Android! Introducing Kirk Gomez As a Phyllis Mckeon patient, I wanted to make you aware of our electronic visit tool called Kirk Lewisfin. Phyllis Mckeon 24/7 allows you to connect within minutes with a medical provider 24 hours a day, seven days a week via a mobile device or tablet or logging into a secure website from your computer. You can access Kirk Lewisfin from anywhere in the [place]. A virtual visit might be right for you when you have a simple condition and feel like you just dont want to get out of bed, or cant get away from work for an appointment, when your regular Phyllis Curtising provider is not available (evenings, weekends or holidays), or when youre out of town and need minor care. Electronic visits cost only $49 and if the Phyllis Mckeon 24/7 provider determines a prescription is needed to treat your condition, one can be electronically transmitted to a nearby pharmacy*. Please take a moment to enroll today if you have not already done so. The enrollment process is free and takes just a few minutes. To enroll, please download the [employer] 24/7 jose to your tablet or phone, or visit [email or web address] to enroll on your computer. And, as an [address] [place] [address] patient with a [employer] account, the results of your visits will be scanned into your electronic medical record and your primary care provider will be able to view the scanned results. We urge you to continue to see your regular Phyllis Mckeon provider for your ongoing medical care. And while your primary care provider may not be the one available when you seek a Kirk Gomez virtual visit, the peace of mind you get from getting a real diagnosis real time can be priceless. For more information on Kirk Portilloopalfin, view our Frequently Asked Questions (FAQs) at [email or web address]. Sincerely, 
 
Debra Linda MD 
Chief Medical Officer Aurelia Penaloza *:  certain medications cannot be prescribed via Kirk Gomez Providers Seen During Your Hospitalization Provider Specialty Primary office phone Obed Serrano MD Emergency Medicine [phone] Brynda Fleischer, MD Hospitalist [phone] Jyothi Dumont MD Internal Medicine [phone] Laura Archer MD Internal Medicine [phone] Juwan Abel MD Internal Medicine [phone] Your Primary Care Physician (PCP) Primary Care Physician Office Phone Office Fax [address], [address] [facility] [phone] You are allergic to the following Allergen Reactions Erythromycin Itching Morphine Itching Recent Documentation Height Weight Breastfeeding? BMI OB Status Smoking Status 1.575 m 64.4 kg No 25.97 kg/m2 Polycystic Ovarian Syndrome Never Smoker Emergency Contacts Name Discharge Info Relation Home Work Mobile Stepehn Garcia DISCHARGE CAREGIVER [3] Spouse [3] [phone] Patient Belongings The following personal items are in your possession at time of discharge: 
  Dental Appliances: None  Visual Aid: None      Home Medications: None   Jewelry: None  Clothing: Footwear, Other (comment) (inmate inform)    Other Valuables: Other (comment) (inmate ID)  Personal Items Sent to Safe: none Please provide this summary of care documentation to your next provider. Signatures-by signing, you are acknowledging that this After Visit Summary has been reviewed with you and you have received a copy. Patient Signature:  ____________________________________________________________ Date:  ____________________________________________________________  
  
Jonna Renee Provider Signature:  ____________________________________________________________ Date:  ____________________________________________________________

## 2018-03-02 NOTE — PROGRESS NOTES
Asked to place venous access on this patient. Upon assessment pt has a non-functioning AVF in upper right arm. She states last dialysis was 8 years ago. Dialysis  stopped because her kidneys started functioning again, however lab work from today show creatinine of 3.5 with GFR in the teens. She has had two ports removed due to infection. Pt is not a candidate for PICC or midline should she need long term antibiotics.     Nahun Fraga RN  Vascular Access Team

## 2018-03-02 NOTE — ED NOTES
Bedside and Verbal shift change report given to Ramos Foster RN (oncoming nurse) by this RN (offgoing nurse). Report included the following information SBAR.

## 2018-03-02 NOTE — ED PROVIDER NOTES
EMERGENCY DEPARTMENT HISTORY AND PHYSICAL EXAM      Date: 3/2/2018  Patient Name: Karissa Ortiz    History of Presenting Illness     Chief Complaint   Patient presents with    Leg Swelling     Presents to the ED accompanied by [facility] staff d/t Lt foot wound x 3 weeks, Lt leg pain and swelling x 2 weeks. Pmh DVT. BL wrist and ankle cuffs in place.  Skin Problem       History Provided By: Patient    HPI: Karissa Ortiz, 42 y.o. female with PMHx significant for DVT to LUE and LLE, asthma, DM, HTN, sickle cell anemia, PCOS, narcotic dependence, presents in police custody from halfway to the ED with cc of worsening LLE and L foot swelling with associated pain and dyspnea on exertion x 2 weeks and wound to L lateral foot x 3 weeks. Pt reports that her wound was her first sxs and that it is being treated by the doctor at the halfway by keeping it clean, dry and bandaged. She is not currently on abx. Her LLE and L foot swelling and pain began shortly after the emergence of her wound. She had her diuretics dosage increased and began using compression stockings without relief. Pt is concerned as her sxs are similar to when she last had a DVT one year ago. She is not currently on blood thinners. Of note, pt does report hx of sickle cell anemia and is unsure when her last blood transfusion was. She specifically denies fever or CP. PCP: Collette Alvarez MD    There are no other complaints, changes, or physical findings at this time.     Current Facility-Administered Medications   Medication Dose Route Frequency Provider Last Rate Last Dose    sodium chloride (NS) flush 5-10 mL  5-10 mL IntraVENous Q8H Dalton Chapman MD   10 mL at 03/02/18 1457    sodium chloride (NS) flush 5-10 mL  5-10 mL IntraVENous PRN Martir Mike MD        0.9% sodium chloride infusion 250 mL  250 mL IntraVENous PRN Martir Mike MD        vancomycin (VANCOCIN) 1500 mg in  ml infusion  1,500 mg IntraVENous NOW Oksana Weinberg MD        piperacillin-tazobactam (ZOSYN) 3.375 g in  ml premix/cmpd  3.375 g IntraVENous NOW Oksana Weinberg MD        0.9% sodium chloride infusion  150 mL/hr IntraVENous CONTINUOUS Zenia Milton MD        sodium chloride (NS) flush 5-10 mL  5-10 mL IntraVENous Q8H Zenia Milton MD        sodium chloride (NS) flush 5-10 mL  5-10 mL IntraVENous PRN Zenia Milton MD        acetaminophen (TYLENOL) tablet 650 mg  650 mg Oral Q4H PRN Zenia Milton MD        oxyCODONE-acetaminophen (PERCOCET) 5-325 mg per tablet 1 Tab  1 Tab Oral Q4H PRN Zenia Milton MD        ondansetron [facility]) injection 4 mg  4 mg IntraVENous Q4H PRN Zenia Milton MD        docusate sodium (COLACE) capsule 100 mg  100 mg Oral BID Zenia Milton MD        [START ON 3/3/2018] heparin (porcine) injection 5,000 Units  5,000 Units SubCUTAneous Q12H Zenia Milton MD        insulin lispro (HUMALOG) injection   SubCUTAneous AC&HS Zenia Milton MD        glucose chewable tablet 16 g  4 Tab Oral PRN Zenia Milton MD        dextrose (D50W) injection syrg 12.5-25 g  12.5-25 g IntraVENous PRN Zenia Milton MD        glucagon [facility]) injection 1 mg  1 mg IntraMUSCular PRN Zenia Milton MD        vern Sharp ON 3/3/2018] insulin glargine (LANTUS) injection 22 Units  22 Units SubCUTAneous DAILY Zenia Milton MD         Current Outpatient Prescriptions   Medication Sig Dispense Refill    insulin NPH (NOVOLIN N) 100 unit/mL injection 15 Units by SubCUTAneous route two (2) times a day. 1 Vial 1    insulin lispro (HUMALOG) 100 unit/mL injection 5 Units by SubCUTAneous route Before breakfast, lunch, and dinner. 1 Vial 0    Cholecalciferol, Vitamin D3, 50,000 unit cap Take 1 Cap by mouth every seven (7) days.  promethazine (PHENERGAN) 25 mg tablet Take 2 Tabs by mouth every six (6) hours as needed for Nausea.  (Patient taking differently: Take 50 mg by mouth every eight (8) hours as needed for Nausea.) 10 Tab 0    diphenhydrAMINE (BENADRYL) 25 mg capsule Take 25-50 mg by mouth every six (6) hours as needed.  folic acid 977 mcg tablet Take 400 mcg by mouth daily.  calcium carbonate (OS-BINA) 500 mg calcium (1,250 mg) tablet Take 1 Tab by mouth daily.  vitamin E (AQUA GEMS) 400 unit capsule Take 800 Units by mouth daily.  ferrous sulfate 325 mg (65 mg iron) tablet Take 325 mg by mouth two (2) times a day.  Potassium Gluconate 595 mg (99 mg) tablet Take 595 mg by mouth daily.  cyanocobalamin 1,000 mcg tablet Take 1,000 mcg by mouth daily.  senna-docusate (PERICOLACE) 8.6-50 mg per tablet Take 1 Tab by mouth two (2) times a day. 60 Tab 1    oxyCODONE-acetaminophen (PERCOCET) 5-325 mg per tablet Take 2 Tabs by mouth every four (4) hours as needed for Pain.  polyethylene glycol (MIRALAX) 17 gram packet Take 17 g by mouth three (3) times daily as needed.  therapeutic multivitamin (THERAGRAN) tablet Take 1 Tab by mouth daily.  QUEtiapine (SEROQUEL) 100 mg tablet Take 100 mg by mouth two (2) times a day.  DULoxetine (CYMBALTA) 60 mg capsule Take 60 mg by mouth two (2) times a day.  HYDROmorphone (DILAUDID) 2 mg tablet Take 2 mg by mouth every four (4) hours as needed for Pain.  famotidine (PEPCID) 20 mg tablet Take 20 mg by mouth two (2) times a day.  baclofen (LIORESAL) 10 mg tablet Take 20 mg by mouth three (3) times daily. (dose = 2 x 10 mg tablet)      albuterol (PROVENTIL VENTOLIN) 2.5 mg /3 mL (0.083 %) nebulizer solution 2.5 mg by Nebulization route every four (4) hours as needed.          Past History     Past Medical History:  Past Medical History:   Diagnosis Date    Asthma     Diabetes ([facility]     Gastroparesis 2010    Gastric Pacer- REMOVED 07/2015    GERD (gastroesophageal reflux disease)     Hypertension     Narcotic dependence ([facility]     Other ill-defined conditions(799.89)     Polycystic ovarian syndrome     Seizures (HCC)     S[facility] trait [facility]     Thromboembolus ([facility] to her left arm and was told she had one in left leg recently       Past Surgical History:  Past Surgical History:   Procedure Laterality Date    HX CATARACT REMOVAL  3/5/12    right    HX DILATION AND CURETTAGE      ablation    HX GASTRIC BYPASS      HX GI      j tube placement and removal    HX OTHER SURGICAL      Gastric Pacer- REMOVED 2015    HX VASCULAR ACCESS      gray cath rt subclavian    HX VASCULAR ACCESS      HD access right thigh       Family History:  Family History   Problem Relation Age of Onset    Cancer Mother      lung    Hypertension Mother     Cancer Father      kidney    Stroke Father      3 strokes: 59-72    Heart Disease Father 72     CABG    Hypertension Father     Cancer Sister      pancreatic    Cancer Maternal Aunt      breast    Cancer Paternal Aunt      breast    Schizophrenia Sister      was in [address], now ass't living    Other Sister       AIDS    Other Other      nephew of AIDS       Social History:  Social History   Substance Use Topics    Smoking status: Never Smoker    Smokeless tobacco: Never Used    Alcohol use No       Allergies: Allergies   Allergen Reactions    Erythromycin Itching    Morphine Itching         Review of Systems   Review of Systems   Constitutional: Negative. Negative for chills and fever. HENT: Negative. Negative for congestion and rhinorrhea. Respiratory: Positive for shortness of breath (dyspnea on exertion). Negative for cough, chest tightness and wheezing. Cardiovascular: Positive for leg swelling (LLE and L foot with pain). Negative for chest pain and palpitations. Gastrointestinal: Negative. Negative for abdominal pain, constipation, nausea and vomiting. Endocrine: Negative. Genitourinary: Negative. Negative for decreased urine volume, flank pain, hematuria and pelvic pain. Musculoskeletal: Negative. Negative for back pain and neck pain.    Skin: Positive for wound (L lateral foot). Negative for color change, pallor and rash. Neurological: Negative. Negative for dizziness, seizures, weakness, numbness and headaches. Hematological: Negative. Negative for adenopathy. Psychiatric/Behavioral: Negative. All other systems reviewed and are negative. Physical Exam   Physical Exam   Constitutional: She is oriented to person, place, and time. She appears well-developed and well-nourished. No distress. HENT:   Head: Normocephalic and atraumatic. Mouth/Throat: No oropharyngeal exudate. Eyes: Conjunctivae are normal. Pupils are equal, round, and reactive to light. Right eye exhibits no discharge. Left eye exhibits no discharge. No scleral icterus. Neck: Normal range of motion. Neck supple. No JVD present. Cardiovascular: Regular rhythm, normal heart sounds, intact distal pulses and normal pulses. Tachycardia present. PMI is not displaced. Exam reveals no gallop and no friction rub. No murmur heard. Pulmonary/Chest: Effort normal and breath sounds normal. No stridor. No respiratory distress. She has no wheezes. She has no rales. She exhibits no tenderness. Abdominal: Soft. Bowel sounds are normal. She exhibits no distension and no mass. There is no tenderness. There is no rebound and no guarding. Musculoskeletal: She exhibits edema and tenderness. Left lower leg: She exhibits tenderness and swelling. Legs:       Feet:    Ulcer with surrounding swelling and redness to foot    Pain up medial leg   Neurological: She is alert and oriented to person, place, and time. She displays normal reflexes. No cranial nerve deficit. She exhibits normal muscle tone. Coordination normal.   Skin: Skin is warm. No rash noted. She is not diaphoretic. No pallor. Vitals reviewed.         Diagnostic Study Results     Labs -  Recent Results (from the past 12 hour(s))   CBC WITH AUTOMATED DIFF    Collection Time: 03/02/18  3:48 PM   Result Value Ref Range    WBC 15.3 (H) 3.6 - 11.0 K/uL    RBC 2.75 (L) 3.80 - 5.20 M/uL    HGB 6.9 (L) 11.5 - 16.0 g/dL    HCT 21.4 (L) 35.0 - 47.0 %    MCV 77.8 (L) 80.0 - 99.0 FL    MCH 25.1 (L) 26.0 - 34.0 PG    MCHC 32.2 30.0 - 36.5 g/dL    RDW 13.5 11.5 - 14.5 %    PLATELET 239 300 - 492 K/uL    NRBC 0.0 0  WBC    ABSOLUTE NRBC 0.00 0.00 - 0.01 K/uL    NEUTROPHILS 90 (H) 32 - 75 %    LYMPHOCYTES 3 (L) 12 - 49 %    MONOCYTES 6 5 - 13 %    EOSINOPHILS 0 0 - 7 %    BASOPHILS 0 0 - 1 %    IMMATURE GRANULOCYTES 1 (H) 0.0 - 0.5 %    ABS. NEUTROPHILS 13.7 (H) 1.8 - 8.0 K/UL    ABS. LYMPHOCYTES 0.5 (L) 0.8 - 3.5 K/UL    ABS. MONOCYTES 0.9 0.0 - 1.0 K/UL    ABS. EOSINOPHILS 0.0 0.0 - 0.4 K/UL    ABS. BASOPHILS 0.0 0.0 - 0.1 K/UL    ABS. IMM. GRANS. 0.2 (H) 0.00 - 0.04 K/UL    DF SMEAR SCANNED      PLATELET COMMENTS CLUMPED PLATELETS      RBC COMMENTS ANISOCYTOSIS  1+        RBC COMMENTS MICROCYTOSIS  1+        RBC COMMENTS OVALOCYTES  1+        RBC COMMENTS SCHISTOCYTES  1+       METABOLIC PANEL, COMPREHENSIVE    Collection Time: 03/02/18  3:48 PM   Result Value Ref Range    Sodium 136 136 - 145 mmol/L    Potassium 3.9 3.5 - 5.1 mmol/L    Chloride 104 97 - 108 mmol/L    CO2 21 21 - 32 mmol/L    Anion gap 11 5 - 15 mmol/L    Glucose 157 (H) 65 - 100 mg/dL    BUN 44 (H) 6 - 20 MG/DL    Creatinine 3.51 (H) 0.55 - 1.02 MG/DL    BUN/Creatinine ratio 13 12 - 20      GFR est AA 18 (L) >60 ml/min/1.73m2    GFR est non-AA 14 (L) >60 ml/min/1.73m2    Calcium 7.9 (L) 8.5 - 10.1 MG/DL    Bilirubin, total 0.3 0.2 - 1.0 MG/DL    ALT (SGPT) 31 12 - 78 U/L    AST (SGOT) 27 15 - 37 U/L    Alk.  phosphatase 459 (H) 45 - 117 U/L    Protein, total 7.4 6.4 - 8.2 g/dL    Albumin 1.9 (L) 3.5 - 5.0 g/dL    Globulin 5.5 (H) 2.0 - 4.0 g/dL    A-G Ratio 0.3 (L) 1.1 - 2.2     LACTIC ACID    Collection Time: 03/02/18  3:48 PM   Result Value Ref Range    Lactic acid 0.8 0.4 - 2.0 MMOL/L   RETICULOCYTE COUNT    Collection Time: 03/02/18  3:48 PM   Result Value Ref Range    Reticulocyte count 0.7 0.7 - 2.1 %    Absolute Retic Cnt. 0.0190 0.0164 - 0.0776 M/ul   EKG, 12 LEAD, INITIAL    Collection Time: 03/02/18  3:56 PM   Result Value Ref Range    Ventricular Rate 110 BPM    Atrial Rate 110 BPM    P-R Interval 126 ms    QRS Duration 84 ms    Q-T Interval 354 ms    QTC Calculation (Bezet) 479 ms    Calculated P Axis 42 degrees    Calculated R Axis 33 degrees    Calculated T Axis 59 degrees    Diagnosis       Sinus tachycardia  Possible Left atrial enlargement  Borderline ECG  When compared with ECG of 23-MAY-2016 03:09,  No significant change was found         Radiologic Studies -   EXAM:  DUPLEX LOWER EXT VENOUS LEFT     INDICATION:  Leg swelling, pain, DVT suspected     COMPARISON: None.     FINDINGS: Duplex Doppler sonography of the left lower extremity was performed  from the groin to the calf. The left common femoral, femoral and popliteal veins  are compressible with normal color-flow and wave forms and response to  physiologic maneuvers including Valsalva and augmentation.     IMPRESSION  IMPRESSION: No deep venous thrombosis identified. EXAM:  XR FOOT LT MIN 3 V     INDICATION:   foot ulcer. History of diabetes     COMPARISON:  None.     FINDINGS:  Three views of the left foot demonstrate fragmentation of the distal  medial margin of the navicular with proximal migration of the medial cuneiform. There is dorsal dislocation of the middle cuneiform into the dorsal soft  tissues. There is foreshortening of the second metatarsal. There is lateral  translation of the base of the third fourth and fifth metatarsals with  associated bony destructive change. There is a chronic fracture through the base  of the fifth metatarsal with chronic periostitis and associated cortical bone  loss. This is adjacent to the area of soft tissue swelling and ulceration. There  is gas within the soft tissues dorsally. . .     IMPRESSION  IMPRESSION:    1.  Findings compatible with Charcot changes of the midfoot with marked  fragmentation and disorganization. In addition there is a chronic appearing  fracture base of the fifth metatarsal with adjacent soft tissue ulcer. Loss of  cortical margin is concerning for superimposed osteomyelitis. CXR Results  (Last 48 hours)               03/02/18 1629  XR CHEST PORT Final result    Impression:  IMPRESSION: No evidence of an acute cardiopulmonary process. Narrative:  EXAM:  XR CHEST PORT       INDICATION: SOB       COMPARISON: Chest x-ray 5/1/2017. TECHNIQUE: Single frontal view of the chest.       FINDINGS: Hypoventilatory exam. No lobar consolidation, pleural effusion, or   pneumothorax. Normal cardiomediastinal silhouette. No acute or aggressive   osseous lesion. Medical Decision Making   I am the first provider for this patient. I reviewed the vital signs, available nursing notes, past medical history, past surgical history, family history and social history. Vital Signs-Reviewed the patient's vital signs. Patient Vitals for the past 12 hrs:   Temp Pulse Resp BP SpO2   03/02/18 1504 98.9 °F (37.2 °C) (!) 112 12 116/72 100 %       Pulse Oximetry Analysis - 100% on RA    Cardiac Monitor:   Rate: 112 bpm  Rhythm: Sinus Tachycardia      EKG interpretation: (Preliminary) 1556  Rhythm: sinus tachycardia; and regular . Rate (approx.): 110 bpm; Axis: normal; FL interval: normal; QRS interval: normal; ST/T wave: normal.  Written by SIRIA Paniagua, as dictated by David Zelaya MD.    Records Reviewed: Nursing Notes and Old Medical Records    Provider Notes (Medical Decision Making):   DDx: DVT, PE, DM foot ulcer, osteomyelitis, symptomatic anemia, sickle cell crisis    MDM: Complex hx including sickle cell anemia, DM to the ER from retirement for evaluation of leg swelling. Has had prior DVT but is not currently on anti-coagulation.  Concurrently with swelling in leg, she has developed an ulcer that has been managed with bandaging only at the custodial. Will obtain labs, XR, Doppler to assist with final dispo. ED Course:   Initial assessment performed. The patients presenting problems have been discussed, and they are in agreement with the care plan formulated and outlined with them. I have encouraged them to ask questions as they arise throughout their visit. 3:59 PM   Spoke with PICC team, who will come down and place a PICC line in the pt for access. Written by Lakeshia Goncalves as dictated by Rhonda Avalos MD    CONSULT NOTE:   5:52 PM  Rhonda Avalos MD spoke with Dr. Erasmo Wilcox,   Specialty: Hospitalist  Discussed pt's hx, disposition, and available diagnostic and imaging results. Reviewed care plans. Consultant will evaluate pt for admission. Written by SIRIA Gregory, as dictated by Rhonda Avalos MD.    CONSULT NOTE:   5:55 PM  Rhonda Avalos MD spoke with Dr. Amanda Eagle,   Specialty: Infectious Disease  Discussed pt's hx, disposition, and available diagnostic and imaging results. Reviewed care plans. Consultant would like blood culture, surgical consult and she will see and consult on pt. Written by SIRIA Gregory, as dictated by Rhonda Avalos MD.    CONSULT NOTE:   6:50 PM  Rhonda Avalos MD spoke with Dr. Christy Gilbert,   Specialty: Vascular Surgery  Discussed pt's hx, disposition, and available diagnostic and imaging results. Reviewed care plans. Consultant will see and consult admitted pt. Written by SIRIA Gregory, as dictated by Rhonda Avalos MD.    Disposition:  PLAN: Admit to Hospitalist    5:53 PM  Patient is being admitted to the hospital by Dr. Erasmo Wilcox. The results of their tests and reasons for their admission have been discussed with them and/or available family. They convey agreement and understanding for the need to be admitted and for their admission diagnosis.    Written by SIRIA Rincon, as dictated by Yaneth Davenport MD.    Diagnosis     Clinical Impression:   1. Diabetic foot infection ([address]    2. Osteomyelitis of left foot, unspecified type ([address]    3. Anemia, unspecified type    4. CRI (chronic renal insufficiency), stage 4 (severe) ([facility])        Attestations: This note is prepared by Malick Barclay acting as scribe for MD Yaneth Barahona MD : The scribe's documentation has been prepared under my direction and personally reviewed by me in its entirety. I confirm that the note above accurately reflects all work, treatment, procedures, and medical decision making performed by me.

## 2018-03-02 NOTE — H&P
Hospitalist Admission Note    NAME: Sven Ash   :  1980   MRN:  [id number]     Date/Time:  3/2/2018 6:21 PM    Patient PCP: John Amaral MD  ______________________________________________________________________  Given the patient's current clinical presentation, I have a high level of concern for decompensation if discharged from the emergency department. Complex decision making was performed, which includes reviewing the patient's available past medical records, laboratory results, and x-ray films. My assessment of this patient's clinical condition and my plan of care is as follows. Assessment / Plan:  SIRS due to left foot osteomyelitis in setting of Charcot foot, present on admission:  - L foot xray with findings compatible with Charcot changes of the midfoot with marked fragmentation and disorganization. In addition there is a chronic appearing fracture base of the fifth metatarsal with adjacent soft tissue ulcer. Loss of cortical margin is concerning for superimposed osteomyelitis. - LLE doppler with no deep venous thrombosis identified.  - MRI foot requested  - blood cultures requested, not yet drawn. Wound culture requested. - emperic vancomycin and zosyn for now  - vascular surgery consulted  Acute on chronic anemia due to sickle cell disease:  Pt without sx of crisis at this time  - 1u pRBCs ordered due to likely surgery while here  - serial Hg  - folic acid  Insulin dependent DM1 uncontrolled with nephropathy:  - lantus ordered  - lispro sliding scale  STONE in setting of CKD4:  Pt says that she has been on dialysis in the past but then Cr recovered some.   Previously has seen Duane Hudson.  - IV fluids  - caution with vancomycin, pharmacy assisting with dosing    THU:  Uses 2 LPM O2 at night    Code Status: Full  Surrogate Decision Maker: in [facility], no decision maker allowed at this time  DVT Prophylaxis: heparin              Subjective:   CHIEF COMPLAINT:  Left foot drainage    HISTORY OF PRESENT ILLNESS:     Yassine Huang is a 42 y.o.  female who presents with above. Pt currently a resident at [facility]. She says that she has had worsening swelling and pain in her left foot for the last 3 weeks. She says it started with a wound near her L lateral ankle which has been swelling and worsening over this period of time. She says that her foot feels cold. She denies fever, cough or URI sx. No CP. She has had shortness of breath and dizziness with ambulation. No stool changes or urinary changes. She does have edema in her left leg which is new. We were asked to admit for work up and evaluation of the above problems.      Past Medical History:   Diagnosis Date    Asthma     Diabetes ([address]     Gastroparesis 2010    Gastric Pacer- REMOVED 07/2015    GERD (gastroesophageal reflux disease)     Hypertension     Narcotic dependence ([address]     Other ill-defined conditions(799.89)     Polycystic ovarian syndrome     Seizures (HCC)     Sick[facility]     Thromboembolus (HCC) to her left arm and was told she had one in left leg recently        Past Surgical History:   Procedure Laterality Date    HX CATARACT REMOVAL  3/5/12    right    HX DILATION AND CURETTAGE      ablation    HX GASTRIC BYPASS  2015    HX GI      j tube placement and removal    HX OTHER SURGICAL  2010    Gastric Pacer- REMOVED 07/2015    HX VASCULAR ACCESS      gray cath rt subclavian    HX VASCULAR ACCESS      HD access right thigh       Social History   Substance Use Topics    Smoking status: Never Smoker    Smokeless tobacco: Never Used    Alcohol use No        Family History   Problem Relation Age of Onset    Cancer Mother      lung    Hypertension Mother     Cancer Father      kidney    Stroke Father      3 strokes: 59-72    Heart Disease Father 72     CABG    Hypertension Father     Cancer Sister      pancreatic  Cancer Maternal Aunt      breast    Cancer Paternal Aunt      breast    Schizophrenia Sister      was in [address], now ass't living    Other Sister       AIDS    Other Other      nephew of AIDS     Allergies   Allergen Reactions    Erythromycin Itching    Morphine Itching        Prior to Admission medications    Medication Sig Start Date End Date Taking? Authorizing Provider   insulin NPH (NOVOLIN N) 100 unit/mL injection 15 Units by SubCUTAneous route two (2) times a day. 17   Terrell Lanza MD   insulin lispro (HUMALOG) 100 unit/mL injection 5 Units by SubCUTAneous route Before breakfast, lunch, and dinner. 17   Terrell Lanza MD   Cholecalciferol, Vitamin D3, 50,000 unit cap Take 1 Cap by mouth every seven (7) days. Historical Provider   promethazine (PHENERGAN) 25 mg tablet Take 2 Tabs by mouth every six (6) hours as needed for Nausea. Patient taking differently: Take 50 mg by mouth every eight (8) hours as needed for Nausea. 16   Sree Levin MD   diphenhydrAMINE (BENADRYL) 25 mg capsule Take 25-50 mg by mouth every six (6) hours as needed. Historical Provider   folic acid 740 mcg tablet Take 400 mcg by mouth daily. Historical Provider   calcium carbonate (OS-BINA) 500 mg calcium (1,250 mg) tablet Take 1 Tab by mouth daily. Historical Provider   vitamin E (AQUA GEMS) 400 unit capsule Take 800 Units by mouth daily. Historical Provider   ferrous sulfate 325 mg (65 mg iron) tablet Take 325 mg by mouth two (2) times a day. Historical Provider   Potassium Gluconate 595 mg (99 mg) tablet Take 595 mg by mouth daily. Historical Provider   cyanocobalamin 1,000 mcg tablet Take 1,000 mcg by mouth daily. Historical Provider   senna-docusate (PERICOLACE) 8.6-50 mg per tablet Take 1 Tab by mouth two (2) times a day. 16   Karen Mcconnell NP   oxyCODONE-acetaminophen (PERCOCET) 5-325 mg per tablet Take 2 Tabs by mouth every four (4) hours as needed for Pain. Historical Provider   polyethylene glycol (MIRALAX) 17 gram packet Take 17 g by mouth three (3) times daily as needed. Historical Provider   therapeutic multivitamin (THERAGRAN) tablet Take 1 Tab by mouth daily. Historical Provider   QUEtiapine (SEROQUEL) 100 mg tablet Take 100 mg by mouth two (2) times a day. Historical Provider   DULoxetine (CYMBALTA) 60 mg capsule Take 60 mg by mouth two (2) times a day. Historical Provider   HYDROmorphone (DILAUDID) 2 mg tablet Take 2 mg by mouth every four (4) hours as needed for Pain. Phys Other, MD   famotidine (PEPCID) 20 mg tablet Take 20 mg by mouth two (2) times a day. Historical Provider   baclofen (LIORESAL) 10 mg tablet Take 20 mg by mouth three (3) times daily. (dose = 2 x 10 mg tablet)    Historical Provider   albuterol (PROVENTIL VENTOLIN) 2.5 mg /3 mL (0.083 %) nebulizer solution 2.5 mg by Nebulization route every four (4) hours as needed. Historical Provider       REVIEW OF SYSTEMS:     I am not able to complete the review of systems because:    The patient is intubated and sedated    The patient has altered mental status due to his acute medical problems    The patient has baseline aphasia from prior stroke(s)    The patient has baseline dementia and is not reliable historian    The patient is in acute medical distress and unable to provide information           Total of 12 systems reviewed as follows:       POSITIVE= underlined text  Negative = text not underlined  General:  fever, chills, sweats, generalized weakness, weight loss/gain,      loss of appetite   Eyes:    blurred vision, eye pain, loss of vision, double vision  ENT:    rhinorrhea, pharyngitis   Respiratory:   cough, sputum production, SOB, ADAMS, wheezing, pleuritic pain   Cardiology:   chest pain, palpitations, orthopnea, PND, edema, syncope   Gastrointestinal:  abdominal pain , N/V, diarrhea, dysphagia, constipation, bleeding   Genitourinary:  frequency, urgency, dysuria, hematuria, incontinence   Muskuloskeletal :  arthralgia, myalgia, back pain  Hematology:  easy bruising, nose or gum bleeding, lymphadenopathy   Dermatological: rash, ulceration, pruritis, color change / jaundice  Endocrine:   hot flashes or polydipsia   Neurological:  headache, dizziness, confusion, focal weakness, paresthesia,     Speech difficulties, memory loss, gait difficulty  Psychological: Feelings of anxiety, depression, agitation    Objective:   VITALS:    Visit Vitals    /72 (BP 1 Location: Left arm, BP Patient Position: At rest)    Pulse (!) 112    Temp 98.9 °F (37.2 °C)    Resp 12    Ht 5' 2\" (1.575 m)    Wt 64.4 kg (142 lb)    SpO2 100%    BMI 25.97 kg/m2       PHYSICAL EXAM:    General:    Alert, cooperative, no distress, appears stated age. HEENT: Atraumatic, anicteric sclerae, pink conjunctivae     No oral ulcers, mucosa moist, throat clear, dentition fair  Neck:  Supple, symmetrical,  thyroid: non tender  Lungs:   Clear to auscultation bilaterally. No Wheezing or Rhonchi. No rales. Chest wall:  No tenderness  No Accessory muscle use. Heart:   Regular  rhythm,  No  murmur   +L foot edema with open lateral wound as pictured above draining straw colored fluid   Abdomen:   Soft, non-tender. Not distended. Bowel sounds normal  Extremities: No cyanosis. No clubbing,      Skin turgor normal, Capillary refill normal, Radial dial pulse 2+  Skin:     Not pale. Not Jaundiced  No rashes   Psych:  Some insight. Not depressed. Not anxious or agitated. Neurologic: EOMs intact. No facial asymmetry. No aphasia or slurred speech. Symmetrical strength, Sensation grossly intact.  Alert and oriented X 4.     _______________________________________________________________________  Care Plan discussed with:    Comments   Patient x    Family      RN x    Care Manager                    Consultant:      _______________________________________________________________________  Expected  Disposition: Home with Family    HH/PT/OT/RN    SNF/LTC    OTHER x   ________________________________________________________________________  TOTAL TIME: 48 Minutes    Critical Care Provided     Minutes non procedure based      Comments    x Reviewed previous records   >50% of visit spent in counseling and coordination of care x Discussion with patient and/or family and questions answered       ________________________________________________________________________  Signed: Jonh Berger MD    Procedures: see electronic medical records for all procedures/Xrays and details which were not copied into this note but were reviewed prior to creation of Plan. LAB DATA REVIEWED:    Recent Results (from the past 24 hour(s))   CBC WITH AUTOMATED DIFF    Collection Time: 03/02/18  3:48 PM   Result Value Ref Range    WBC 15.3 (H) 3.6 - 11.0 K/uL    RBC 2.75 (L) 3.80 - 5.20 M/uL    HGB 6.9 (L) 11.5 - 16.0 g/dL    HCT 21.4 (L) 35.0 - 47.0 %    MCV 77.8 (L) 80.0 - 99.0 FL    MCH 25.1 (L) 26.0 - 34.0 PG    MCHC 32.2 30.0 - 36.5 g/dL    RDW 13.5 11.5 - 14.5 %    PLATELET 791 954 - 686 K/uL    NRBC 0.0 0  WBC    ABSOLUTE NRBC 0.00 0.00 - 0.01 K/uL    NEUTROPHILS 90 (H) 32 - 75 %    LYMPHOCYTES 3 (L) 12 - 49 %    MONOCYTES 6 5 - 13 %    EOSINOPHILS 0 0 - 7 %    BASOPHILS 0 0 - 1 %    IMMATURE GRANULOCYTES 1 (H) 0.0 - 0.5 %    ABS. NEUTROPHILS 13.7 (H) 1.8 - 8.0 K/UL    ABS. LYMPHOCYTES 0.5 (L) 0.8 - 3.5 K/UL    ABS. MONOCYTES 0.9 0.0 - 1.0 K/UL    ABS. EOSINOPHILS 0.0 0.0 - 0.4 K/UL    ABS. BASOPHILS 0.0 0.0 - 0.1 K/UL    ABS. IMM.  GRANS. 0.2 (H) 0.00 - 0.04 K/UL    DF SMEAR SCANNED      PLATELET COMMENTS CLUMPED PLATELETS      RBC COMMENTS ANISOCYTOSIS  1+        RBC COMMENTS MICROCYTOSIS  1+        RBC COMMENTS OVALOCYTES  1+        RBC COMMENTS SCHISTOCYTES  1+       METABOLIC PANEL, COMPREHENSIVE    Collection Time: 03/02/18  3:48 PM   Result Value Ref Range    Sodium 136 136 - 145 mmol/L    Potassium 3.9 3.5 - 5.1 mmol/L Chloride 104 97 - 108 mmol/L    CO2 21 21 - 32 mmol/L    Anion gap 11 5 - 15 mmol/L    Glucose 157 (H) 65 - 100 mg/dL    BUN 44 (H) 6 - 20 MG/DL    Creatinine 3.51 (H) 0.55 - 1.02 MG/DL    BUN/Creatinine ratio 13 12 - 20      GFR est AA 18 (L) >60 ml/min/1.73m2    GFR est non-AA 14 (L) >60 ml/min/1.73m2    Calcium 7.9 (L) 8.5 - 10.1 MG/DL    Bilirubin, total 0.3 0.2 - 1.0 MG/DL    ALT (SGPT) 31 12 - 78 U/L    AST (SGOT) 27 15 - 37 U/L    Alk.  phosphatase 459 (H) 45 - 117 U/L    Protein, total 7.4 6.4 - 8.2 g/dL    Albumin 1.9 (L) 3.5 - 5.0 g/dL    Globulin 5.5 (H) 2.0 - 4.0 g/dL    A-G Ratio 0.3 (L) 1.1 - 2.2     LACTIC ACID    Collection Time: 03/02/18  3:48 PM   Result Value Ref Range    Lactic acid 0.8 0.4 - 2.0 MMOL/L   RETICULOCYTE COUNT    Collection Time: 03/02/18  3:48 PM   Result Value Ref Range    Reticulocyte count 0.7 0.7 - 2.1 %    Absolute Retic Cnt. 0.0190 0.0164 - 0.0776 M/ul   EKG, 12 LEAD, INITIAL    Collection Time: 03/02/18  3:56 PM   Result Value Ref Range    Ventricular Rate 110 BPM    Atrial Rate 110 BPM    P-R Interval 126 ms    QRS Duration 84 ms    Q-T Interval 354 ms    QTC Calculation (Bezet) 479 ms    Calculated P Axis 42 degrees    Calculated R Axis 33 degrees    Calculated T Axis 59 degrees    Diagnosis       Sinus tachycardia  Possible Left atrial enlargement  Borderline ECG  When compared with ECG of 23-MAY-2016 03:09,  No significant change was found

## 2018-03-03 ENCOUNTER — ANESTHESIA (OUTPATIENT)
Dept: SURGERY | Age: 38
DRG: 854 | End: 2018-03-03
Payer: MEDICARE

## 2018-03-03 LAB
ANION GAP SERPL CALC-SCNC: 12 MMOL/L (ref 5–15)
APPEARANCE UR: ABNORMAL
ATRIAL RATE: 110 BPM
BACTERIA URNS QL MICRO: ABNORMAL /HPF
BILIRUB UR QL: NEGATIVE
BUN SERPL-MCNC: 45 MG/DL (ref 6–20)
BUN/CREAT SERPL: 14 (ref 12–20)
CALCIUM SERPL-MCNC: 8 MG/DL (ref 8.5–10.1)
CALCULATED P AXIS, ECG09: 42 DEGREES
CALCULATED R AXIS, ECG10: 33 DEGREES
CALCULATED T AXIS, ECG11: 59 DEGREES
CHLORIDE SERPL-SCNC: 104 MMOL/L (ref 97–108)
CO2 SERPL-SCNC: 19 MMOL/L (ref 21–32)
COLOR UR: ABNORMAL
CREAT SERPL-MCNC: 3.28 MG/DL (ref 0.55–1.02)
CRP SERPL-MCNC: 34.7 MG/DL (ref 0–0.6)
DIAGNOSIS, 93000: NORMAL
EPITH CASTS URNS QL MICRO: ABNORMAL /LPF
ERYTHROCYTE [DISTWIDTH] IN BLOOD BY AUTOMATED COUNT: 13.7 % (ref 11.5–14.5)
ERYTHROCYTE [SEDIMENTATION RATE] IN BLOOD: 128 MM/HR (ref 0–20)
EST. AVERAGE GLUCOSE BLD GHB EST-MCNC: 206 MG/DL
GLUCOSE BLD STRIP.AUTO-MCNC: 127 MG/DL (ref 65–100)
GLUCOSE BLD STRIP.AUTO-MCNC: 165 MG/DL (ref 65–100)
GLUCOSE BLD STRIP.AUTO-MCNC: 203 MG/DL (ref 65–100)
GLUCOSE BLD STRIP.AUTO-MCNC: 90 MG/DL (ref 65–100)
GLUCOSE SERPL-MCNC: 100 MG/DL (ref 65–100)
GLUCOSE UR STRIP.AUTO-MCNC: NEGATIVE MG/DL
GRAN CASTS URNS QL MICRO: ABNORMAL /LPF
HBA1C MFR BLD: 8.8 % (ref 4.2–6.3)
HCT VFR BLD AUTO: 26.3 % (ref 35–47)
HGB BLD-MCNC: 8.3 G/DL (ref 11.5–16)
HGB UR QL STRIP: NEGATIVE
KETONES UR QL STRIP.AUTO: NEGATIVE MG/DL
LEUKOCYTE ESTERASE UR QL STRIP.AUTO: NEGATIVE
MCH RBC QN AUTO: 25.1 PG (ref 26–34)
MCHC RBC AUTO-ENTMCNC: 31.6 G/DL (ref 30–36.5)
MCV RBC AUTO: 79.5 FL (ref 80–99)
NITRITE UR QL STRIP.AUTO: NEGATIVE
NRBC # BLD: 0 K/UL (ref 0–0.01)
NRBC BLD-RTO: 0 PER 100 WBC
P-R INTERVAL, ECG05: 126 MS
PH UR STRIP: 5 [PH] (ref 5–8)
PLATELET # BLD AUTO: 383 K/UL (ref 150–400)
PMV BLD AUTO: 11.1 FL (ref 8.9–12.9)
POTASSIUM SERPL-SCNC: 4 MMOL/L (ref 3.5–5.1)
PROT UR STRIP-MCNC: 100 MG/DL
Q-T INTERVAL, ECG07: 354 MS
QRS DURATION, ECG06: 84 MS
QTC CALCULATION (BEZET), ECG08: 479 MS
RBC # BLD AUTO: 3.31 M/UL (ref 3.8–5.2)
RBC #/AREA URNS HPF: ABNORMAL /HPF (ref 0–5)
SERVICE CMNT-IMP: ABNORMAL
SERVICE CMNT-IMP: NORMAL
SODIUM SERPL-SCNC: 135 MMOL/L (ref 136–145)
SP GR UR REFRACTOMETRY: 1.02 (ref 1–1.03)
UA: UC IF INDICATED,UAUC: ABNORMAL
UROBILINOGEN UR QL STRIP.AUTO: 1 EU/DL (ref 0.2–1)
VENTRICULAR RATE, ECG03: 110 BPM
WBC # BLD AUTO: 11 K/UL (ref 3.6–11)
WBC URNS QL MICRO: ABNORMAL /HPF (ref 0–4)

## 2018-03-03 PROCEDURE — 85660 RBC SICKLE CELL TEST: CPT | Performed by: EMERGENCY MEDICINE

## 2018-03-03 PROCEDURE — 0QBM0ZZ EXCISION OF LEFT TARSAL, OPEN APPROACH: ICD-10-PCS | Performed by: SURGERY

## 2018-03-03 PROCEDURE — 74011636637 HC RX REV CODE- 636/637: Performed by: HOSPITALIST

## 2018-03-03 PROCEDURE — 74011250637 HC RX REV CODE- 250/637: Performed by: HOSPITALIST

## 2018-03-03 PROCEDURE — 74011000250 HC RX REV CODE- 250: Performed by: SURGERY

## 2018-03-03 PROCEDURE — P9016 RBC LEUKOCYTES REDUCED: HCPCS | Performed by: EMERGENCY MEDICINE

## 2018-03-03 PROCEDURE — 76060000032 HC ANESTHESIA 0.5 TO 1 HR: Performed by: SURGERY

## 2018-03-03 PROCEDURE — 76010000138 HC OR TIME 0.5 TO 1 HR: Performed by: SURGERY

## 2018-03-03 PROCEDURE — 74011250637 HC RX REV CODE- 250/637: Performed by: INTERNAL MEDICINE

## 2018-03-03 PROCEDURE — 74011250636 HC RX REV CODE- 250/636: Performed by: ANESTHESIOLOGY

## 2018-03-03 PROCEDURE — 85027 COMPLETE CBC AUTOMATED: CPT | Performed by: INTERNAL MEDICINE

## 2018-03-03 PROCEDURE — 74011250636 HC RX REV CODE- 250/636: Performed by: INTERNAL MEDICINE

## 2018-03-03 PROCEDURE — 87205 SMEAR GRAM STAIN: CPT | Performed by: STUDENT IN AN ORGANIZED HEALTH CARE EDUCATION/TRAINING PROGRAM

## 2018-03-03 PROCEDURE — 77030018836 HC SOL IRR NACL ICUM -A: Performed by: SURGERY

## 2018-03-03 PROCEDURE — 36430 TRANSFUSION BLD/BLD COMPNT: CPT

## 2018-03-03 PROCEDURE — 86902 BLOOD TYPE ANTIGEN DONOR EA: CPT | Performed by: EMERGENCY MEDICINE

## 2018-03-03 PROCEDURE — 80048 BASIC METABOLIC PNL TOTAL CA: CPT | Performed by: INTERNAL MEDICINE

## 2018-03-03 PROCEDURE — 74011000272 HC RX REV CODE- 272: Performed by: SURGERY

## 2018-03-03 PROCEDURE — 83036 HEMOGLOBIN GLYCOSYLATED A1C: CPT | Performed by: INTERNAL MEDICINE

## 2018-03-03 PROCEDURE — 77030011640 HC PAD GRND REM COVD -A: Performed by: SURGERY

## 2018-03-03 PROCEDURE — 74011000250 HC RX REV CODE- 250

## 2018-03-03 PROCEDURE — 85652 RBC SED RATE AUTOMATED: CPT | Performed by: SURGERY

## 2018-03-03 PROCEDURE — 74011250637 HC RX REV CODE- 250/637: Performed by: STUDENT IN AN ORGANIZED HEALTH CARE EDUCATION/TRAINING PROGRAM

## 2018-03-03 PROCEDURE — 87075 CULTR BACTERIA EXCEPT BLOOD: CPT | Performed by: STUDENT IN AN ORGANIZED HEALTH CARE EDUCATION/TRAINING PROGRAM

## 2018-03-03 PROCEDURE — 86922 COMPATIBILITY TEST ANTIGLOB: CPT | Performed by: EMERGENCY MEDICINE

## 2018-03-03 PROCEDURE — 76210000016 HC OR PH I REC 1 TO 1.5 HR: Performed by: SURGERY

## 2018-03-03 PROCEDURE — 74011250636 HC RX REV CODE- 250/636

## 2018-03-03 PROCEDURE — 0QBP0ZZ EXCISION OF LEFT METATARSAL, OPEN APPROACH: ICD-10-PCS | Performed by: SURGERY

## 2018-03-03 PROCEDURE — 86140 C-REACTIVE PROTEIN: CPT | Performed by: INTERNAL MEDICINE

## 2018-03-03 PROCEDURE — 65270000029 HC RM PRIVATE

## 2018-03-03 PROCEDURE — 74011250636 HC RX REV CODE- 250/636: Performed by: EMERGENCY MEDICINE

## 2018-03-03 PROCEDURE — 86921 COMPATIBILITY TEST INCUBATE: CPT | Performed by: EMERGENCY MEDICINE

## 2018-03-03 PROCEDURE — 74011636637 HC RX REV CODE- 636/637: Performed by: INTERNAL MEDICINE

## 2018-03-03 PROCEDURE — 87185 SC STD ENZYME DETCJ PER NZM: CPT | Performed by: STUDENT IN AN ORGANIZED HEALTH CARE EDUCATION/TRAINING PROGRAM

## 2018-03-03 PROCEDURE — 82962 GLUCOSE BLOOD TEST: CPT

## 2018-03-03 PROCEDURE — 36415 COLL VENOUS BLD VENIPUNCTURE: CPT | Performed by: INTERNAL MEDICINE

## 2018-03-03 RX ORDER — LIDOCAINE HYDROCHLORIDE 10 MG/ML
0.1 INJECTION, SOLUTION EPIDURAL; INFILTRATION; INTRACAUDAL; PERINEURAL AS NEEDED
Status: DISCONTINUED | OUTPATIENT
Start: 2018-03-03 | End: 2018-03-03 | Stop reason: HOSPADM

## 2018-03-03 RX ORDER — SODIUM CHLORIDE 0.9 % (FLUSH) 0.9 %
5-10 SYRINGE (ML) INJECTION AS NEEDED
Status: DISCONTINUED | OUTPATIENT
Start: 2018-03-03 | End: 2018-03-03 | Stop reason: HOSPADM

## 2018-03-03 RX ORDER — ONDANSETRON 2 MG/ML
INJECTION INTRAMUSCULAR; INTRAVENOUS AS NEEDED
Status: DISCONTINUED | OUTPATIENT
Start: 2018-03-03 | End: 2018-03-03 | Stop reason: HOSPADM

## 2018-03-03 RX ORDER — PHENYLEPHRINE HCL IN 0.9% NACL 0.4MG/10ML
SYRINGE (ML) INTRAVENOUS AS NEEDED
Status: DISCONTINUED | OUTPATIENT
Start: 2018-03-03 | End: 2018-03-03 | Stop reason: HOSPADM

## 2018-03-03 RX ORDER — VENLAFAXINE 75 MG/1
25 TABLET ORAL 2 TIMES DAILY
COMMUNITY
End: 2018-03-27

## 2018-03-03 RX ORDER — FENTANYL CITRATE 50 UG/ML
50 INJECTION, SOLUTION INTRAMUSCULAR; INTRAVENOUS AS NEEDED
Status: DISCONTINUED | OUTPATIENT
Start: 2018-03-03 | End: 2018-03-03 | Stop reason: HOSPADM

## 2018-03-03 RX ORDER — CLONIDINE HYDROCHLORIDE 0.1 MG/1
0.2 TABLET ORAL 3 TIMES DAILY
Status: DISCONTINUED | OUTPATIENT
Start: 2018-03-03 | End: 2018-03-27 | Stop reason: HOSPADM

## 2018-03-03 RX ORDER — VENLAFAXINE 37.5 MG/1
75 TABLET ORAL 2 TIMES DAILY WITH MEALS
Status: DISCONTINUED | OUTPATIENT
Start: 2018-03-03 | End: 2018-03-27 | Stop reason: HOSPADM

## 2018-03-03 RX ORDER — PROPOFOL 10 MG/ML
INJECTION, EMULSION INTRAVENOUS
Status: DISCONTINUED | OUTPATIENT
Start: 2018-03-03 | End: 2018-03-03 | Stop reason: HOSPADM

## 2018-03-03 RX ORDER — FENTANYL CITRATE 50 UG/ML
25 INJECTION, SOLUTION INTRAMUSCULAR; INTRAVENOUS
Status: DISCONTINUED | OUTPATIENT
Start: 2018-03-03 | End: 2018-03-03 | Stop reason: HOSPADM

## 2018-03-03 RX ORDER — LIDOCAINE HYDROCHLORIDE 10 MG/ML
INJECTION INFILTRATION; PERINEURAL AS NEEDED
Status: DISCONTINUED | OUTPATIENT
Start: 2018-03-03 | End: 2018-03-03 | Stop reason: HOSPADM

## 2018-03-03 RX ORDER — LIDOCAINE HYDROCHLORIDE 20 MG/ML
INJECTION, SOLUTION EPIDURAL; INFILTRATION; INTRACAUDAL; PERINEURAL AS NEEDED
Status: DISCONTINUED | OUTPATIENT
Start: 2018-03-03 | End: 2018-03-03 | Stop reason: HOSPADM

## 2018-03-03 RX ORDER — MIDAZOLAM HYDROCHLORIDE 1 MG/ML
INJECTION, SOLUTION INTRAMUSCULAR; INTRAVENOUS AS NEEDED
Status: DISCONTINUED | OUTPATIENT
Start: 2018-03-03 | End: 2018-03-03 | Stop reason: HOSPADM

## 2018-03-03 RX ORDER — FAMOTIDINE 20 MG/1
20 TABLET, FILM COATED ORAL DAILY
Status: DISCONTINUED | OUTPATIENT
Start: 2018-03-04 | End: 2018-03-27 | Stop reason: HOSPADM

## 2018-03-03 RX ORDER — QUETIAPINE FUMARATE 100 MG/1
100 TABLET, FILM COATED ORAL 2 TIMES DAILY
Status: DISCONTINUED | OUTPATIENT
Start: 2018-03-03 | End: 2018-03-27 | Stop reason: HOSPADM

## 2018-03-03 RX ORDER — FENTANYL CITRATE 50 UG/ML
INJECTION, SOLUTION INTRAMUSCULAR; INTRAVENOUS AS NEEDED
Status: DISCONTINUED | OUTPATIENT
Start: 2018-03-03 | End: 2018-03-03 | Stop reason: HOSPADM

## 2018-03-03 RX ORDER — INSULIN GLARGINE 100 [IU]/ML
12 INJECTION, SOLUTION SUBCUTANEOUS
Status: DISCONTINUED | OUTPATIENT
Start: 2018-03-03 | End: 2018-03-06

## 2018-03-03 RX ORDER — DIPHENHYDRAMINE HCL 25 MG
25 CAPSULE ORAL
Status: DISCONTINUED | OUTPATIENT
Start: 2018-03-03 | End: 2018-03-14

## 2018-03-03 RX ORDER — MIDAZOLAM HYDROCHLORIDE 1 MG/ML
0.5 INJECTION, SOLUTION INTRAMUSCULAR; INTRAVENOUS
Status: DISCONTINUED | OUTPATIENT
Start: 2018-03-03 | End: 2018-03-03 | Stop reason: HOSPADM

## 2018-03-03 RX ORDER — CLONIDINE HYDROCHLORIDE 0.2 MG/1
0.2 TABLET ORAL 3 TIMES DAILY
COMMUNITY
End: 2018-03-27

## 2018-03-03 RX ORDER — SODIUM CHLORIDE, SODIUM LACTATE, POTASSIUM CHLORIDE, CALCIUM CHLORIDE 600; 310; 30; 20 MG/100ML; MG/100ML; MG/100ML; MG/100ML
25 INJECTION, SOLUTION INTRAVENOUS CONTINUOUS
Status: DISCONTINUED | OUTPATIENT
Start: 2018-03-03 | End: 2018-03-03 | Stop reason: HOSPADM

## 2018-03-03 RX ORDER — HYDROMORPHONE HYDROCHLORIDE 1 MG/ML
.2-.5 INJECTION, SOLUTION INTRAMUSCULAR; INTRAVENOUS; SUBCUTANEOUS
Status: DISCONTINUED | OUTPATIENT
Start: 2018-03-03 | End: 2018-03-03 | Stop reason: HOSPADM

## 2018-03-03 RX ADMIN — PIPERACILLIN SODIUM AND TAZOBACTAM SODIUM 3.38 G: .375; 3 INJECTION, POWDER, LYOPHILIZED, FOR SOLUTION INTRAVENOUS at 13:21

## 2018-03-03 RX ADMIN — VENLAFAXINE 75 MG: 37.5 TABLET ORAL at 17:07

## 2018-03-03 RX ADMIN — Medication 40 MCG: at 10:38

## 2018-03-03 RX ADMIN — Medication 10 ML: at 13:21

## 2018-03-03 RX ADMIN — PROPOFOL 100 MCG/KG/MIN: 10 INJECTION, EMULSION INTRAVENOUS at 10:08

## 2018-03-03 RX ADMIN — HEPARIN SODIUM 5000 UNITS: 5000 INJECTION, SOLUTION INTRAVENOUS; SUBCUTANEOUS at 08:26

## 2018-03-03 RX ADMIN — VANCOMYCIN HYDROCHLORIDE 1000 MG: 10 INJECTION, POWDER, LYOPHILIZED, FOR SOLUTION INTRAVENOUS at 20:41

## 2018-03-03 RX ADMIN — INSULIN LISPRO 2 UNITS: 100 INJECTION, SOLUTION INTRAVENOUS; SUBCUTANEOUS at 21:39

## 2018-03-03 RX ADMIN — HEPARIN SODIUM 5000 UNITS: 5000 INJECTION, SOLUTION INTRAVENOUS; SUBCUTANEOUS at 20:43

## 2018-03-03 RX ADMIN — CALCIUM 500 MG: 500 TABLET ORAL at 08:26

## 2018-03-03 RX ADMIN — ACETAMINOPHEN 650 MG: 325 TABLET ORAL at 12:30

## 2018-03-03 RX ADMIN — Medication 40 MCG: at 10:42

## 2018-03-03 RX ADMIN — FENTANYL CITRATE 50 MCG: 50 INJECTION, SOLUTION INTRAMUSCULAR; INTRAVENOUS at 10:13

## 2018-03-03 RX ADMIN — FOLIC ACID 1 MG: 1 TABLET ORAL at 08:26

## 2018-03-03 RX ADMIN — INSULIN GLARGINE 12 UNITS: 100 INJECTION, SOLUTION SUBCUTANEOUS at 01:09

## 2018-03-03 RX ADMIN — DIPHENHYDRAMINE HYDROCHLORIDE 25 MG: 25 CAPSULE ORAL at 13:21

## 2018-03-03 RX ADMIN — Medication 10 ML: at 21:42

## 2018-03-03 RX ADMIN — QUETIAPINE FUMARATE 100 MG: 100 TABLET ORAL at 20:47

## 2018-03-03 RX ADMIN — FENTANYL CITRATE 50 MCG: 50 INJECTION, SOLUTION INTRAMUSCULAR; INTRAVENOUS at 10:06

## 2018-03-03 RX ADMIN — CLONIDINE HYDROCHLORIDE 0.2 MG: 0.1 TABLET ORAL at 20:48

## 2018-03-03 RX ADMIN — ONDANSETRON 4 MG: 2 INJECTION INTRAMUSCULAR; INTRAVENOUS at 10:33

## 2018-03-03 RX ADMIN — INSULIN LISPRO 2 UNITS: 100 INJECTION, SOLUTION INTRAVENOUS; SUBCUTANEOUS at 17:04

## 2018-03-03 RX ADMIN — THERA TABS 1 TABLET: TAB at 08:26

## 2018-03-03 RX ADMIN — FAMOTIDINE 20 MG: 20 TABLET, FILM COATED ORAL at 08:26

## 2018-03-03 RX ADMIN — SODIUM CHLORIDE, POTASSIUM CHLORIDE, SODIUM LACTATE AND CALCIUM CHLORIDE: 600; 310; 30; 20 INJECTION, SOLUTION INTRAVENOUS at 09:45

## 2018-03-03 RX ADMIN — DIPHENHYDRAMINE HYDROCHLORIDE 25 MG: 25 CAPSULE ORAL at 02:41

## 2018-03-03 RX ADMIN — CLONIDINE HYDROCHLORIDE 0.2 MG: 0.1 TABLET ORAL at 15:01

## 2018-03-03 RX ADMIN — INSULIN GLARGINE 12 UNITS: 100 INJECTION, SOLUTION SUBCUTANEOUS at 21:41

## 2018-03-03 RX ADMIN — SODIUM CHLORIDE 150 ML/HR: 900 INJECTION, SOLUTION INTRAVENOUS at 12:37

## 2018-03-03 RX ADMIN — DOCUSATE SODIUM 100 MG: 100 CAPSULE, LIQUID FILLED ORAL at 18:30

## 2018-03-03 RX ADMIN — OXYCODONE HYDROCHLORIDE AND ACETAMINOPHEN 1 TABLET: 5; 325 TABLET ORAL at 20:47

## 2018-03-03 RX ADMIN — OXYCODONE HYDROCHLORIDE AND ACETAMINOPHEN 1 TABLET: 5; 325 TABLET ORAL at 08:36

## 2018-03-03 RX ADMIN — Medication 40 MCG: at 10:33

## 2018-03-03 RX ADMIN — PIPERACILLIN SODIUM AND TAZOBACTAM SODIUM 3.38 G: .375; 3 INJECTION, POWDER, LYOPHILIZED, FOR SOLUTION INTRAVENOUS at 01:07

## 2018-03-03 RX ADMIN — DOCUSATE SODIUM 100 MG: 100 CAPSULE, LIQUID FILLED ORAL at 08:25

## 2018-03-03 RX ADMIN — Medication 10 ML: at 20:49

## 2018-03-03 RX ADMIN — Medication 10 ML: at 05:12

## 2018-03-03 RX ADMIN — CYANOCOBALAMIN TAB 500 MCG 1000 MCG: 500 TAB at 08:26

## 2018-03-03 RX ADMIN — LIDOCAINE HYDROCHLORIDE 60 MG: 20 INJECTION, SOLUTION EPIDURAL; INFILTRATION; INTRACAUDAL; PERINEURAL at 10:08

## 2018-03-03 RX ADMIN — DIPHENHYDRAMINE HYDROCHLORIDE 25 MG: 25 CAPSULE ORAL at 20:47

## 2018-03-03 RX ADMIN — OXYCODONE HYDROCHLORIDE AND ACETAMINOPHEN 1 TABLET: 5; 325 TABLET ORAL at 13:21

## 2018-03-03 RX ADMIN — ACETAMINOPHEN 650 MG: 325 TABLET ORAL at 02:41

## 2018-03-03 RX ADMIN — MIDAZOLAM HYDROCHLORIDE 2 MG: 1 INJECTION, SOLUTION INTRAMUSCULAR; INTRAVENOUS at 10:01

## 2018-03-03 NOTE — ED NOTES
Pt currently in MRI. Called MRI to request them to sent pt to 2115 after study is completed. MRI tech confirmed that pt will be transported to 2115.

## 2018-03-03 NOTE — BRIEF OP NOTE
BRIEF OPERATIVE NOTE    Date of Procedure: 3/3/2018   Preoperative Diagnosis: LEFT FOOT WOUND   Postoperative Diagnosis: LEFT FOOT WOUND    Procedure(s):  LEFT FOOT INCISION AND DRAINAGE  Surgeon(s) and Role:     * Niruka Jolley MD - Primary         Assistant Staff: None      Surgical Staff:  Circ-1: Karoline Fernandez RN  Scrub Tech-1: Nneka Serna  Surg Asst-1: Brett Nap  Event Time In   Incision Start 1022   Incision Close 1045     Anesthesia: MAC   Estimated Blood Loss: 25  Specimens:   ID Type Source Tests Collected by Time Destination   1 : LEFT FOOT WOUND Wound Foot, left CULTURE, ANAEROBIC, CULTURE, WOUND W GRAM STAIN Niurka Jolley MD 3/3/2018 1023 Microbiology   2 : DEEP WOUND CULTURE Wound Foot, left CULTURE, ANAEROBIC, CULTURE, WOUND W GRAM STAIN Niurka Jolley MD 3/3/2018 1029 Microbiology      Findings: Advanced infection with purulent drainage going into the midfoot  Complications: Non-apparent  Implants: * No implants in log *

## 2018-03-03 NOTE — ANESTHESIA PREPROCEDURE EVALUATION
Anesthetic History   No history of anesthetic complications            Review of Systems / Medical History  Patient summary reviewed, nursing notes reviewed and pertinent labs reviewed    Pulmonary        Sleep apnea  Shortness of breath  Asthma     Comments: wears 2 LPM oxygen at night   Neuro/Psych     seizures    Psychiatric history     Cardiovascular    Hypertension              Exercise tolerance: <4 METS     GI/Hepatic/Renal     GERD    Renal disease: CRI      Comments: Gastroparesis  Endo/Other    Diabetes    Anemia  Pertinent negatives: No morbid obesity   Other Findings   Comments: Infected left foot  Sickle cell trait  Narcotic dependence          Physical Exam    Airway  Mallampati: III    Neck ROM: normal range of motion   Mouth opening: Normal     Cardiovascular  Regular rate and rhythm,  S1 and S2 normal,  no murmur, click, rub, or gallop             Dental  No notable dental hx       Pulmonary  Breath sounds clear to auscultation               Abdominal  GI exam deferred       Other Findings            Anesthetic Plan    ASA: 3  Anesthesia type: MAC          Induction: Intravenous  Anesthetic plan and risks discussed with: Patient

## 2018-03-03 NOTE — ROUTINE PROCESS
TRANSFER - IN REPORT:    Verbal report received from Erik Dozier RN(name) on Nabor Damonney  being received from Gen Surg 2115(unit) for ordered procedure      Report consisted of patients Situation, Background, Assessment and   Recommendations(SBAR). Information from the following report(s) SBAR, Kardex, ED Summary, OR Summary, Procedure Summary, Intake/Output, MAR, Accordion, Recent Results, Med Rec Status, Cardiac Rhythm St and Alarm Parameters  was reviewed with the receiving nurse. Opportunity for questions and clarification was provided. Assessment completed upon patients arrival to unit and care assumed.

## 2018-03-03 NOTE — ED NOTES
Spoke to Beltran Oliva and informed her that abx have not been started, as pt is in MRI. This RN to tube medications to Gen Surg tube station #25.

## 2018-03-03 NOTE — PROGRESS NOTES
Problem: Falls - Risk of  Goal: *Absence of Falls  Document Blake Fall Risk and appropriate interventions in the flowsheet.    Outcome: Progressing Towards Goal  Fall Risk Interventions:  Mobility Interventions: Patient to call before getting OOB         Medication Interventions: Patient to call before getting OOB, Teach patient to arise slowly

## 2018-03-03 NOTE — PROGRESS NOTES
Pharmacy Automatic Renal Dosing Protocol - Antimicrobials    Indication for Antimicrobials: Osteomyelitis    Current Regimen of Each Antimicrobial:  Vancomycin 1500 mg x 1 then 1000 mg Q24H (Start Date 3; Day # 1)  Piperacillin tazobactam 3.375 Q12H (Start Date 3, Day # 1)    Previous Antimicrobial Therapy:    Vancomycin Goal Level: 15-20 mcg/ml    Measured / Extrapolated Vancomycin Level:     Significant Cultures:       Labs:  Recent Labs      18   1548   CREA  3.51*   BUN  44*   WBC  15.3*     Temp (24hrs), Av.9 °F (37.2 °C), Min:98.9 °F (37.2 °C), Max:98.9 °F (37.2 °C)        Paralysis, amputations, malnutrition:   Creatinine Clearance (mL/min) or Dialysis: 17    Impression/Plan:   Vancomycin 1500 mg X 1 then 1000 mg Q24H with anticipated trough of 18 mcg/ml. Piperacillin tazobactam 3.375 grams Q12H per renal dosing protocol. [facility] daily     Pharmacy will follow daily and adjust medications as appropriate for renal function and/or serum levels. Thank you,  Nolon Sacks, [facility]      Recommended duration of therapy  [email or web address]/Pharmacy/Clinical%20Companion/[email or web address]%[email or web address]. docx    Renal Dosing  [email or web address]/[facility]/Pharmacy/Clinical%20Companion/Renal%[id number]. pdf

## 2018-03-03 NOTE — PROGRESS NOTES
General Surgery End of Shift Nursing Note    Bedside shift change report given to Yuliet Chino Shyam (oncoming nurse) by Sariah Mcallister RN (offgoing nurse). Report included the following information SBAR, Kardex, Intake/Output, MAR, Recent Results, Med Rec Status and Cardiac Rhythm SR. Shift worked:   7p-7a   Summary of shift:    Pt had fever on rounds, medicated with Tylenol and Benedryl when given blood transfusion ,urine sent to lab, +3 bacteria in urine, wound cx sent to lab, wound dressed, 1 unit PRBCs transfused, completed @ 0520. Consents signed, pt NPO for OR today   Issues for physician to address:   none     Number times ambulated in hallway past shift: 0    Number of times OOB to chair past shift: 0    Pain Management:  Current medication: percocet tab  Patient states pain is manageable on current pain medication: YES    GI:    Current diet:  DIET NPO    Tolerating current diet: YES  NPO since MN  Passing flatus: YES  Last Bowel Movement: today   Appearance: soft, formed    Respiratory:    Incentive Spirometer at bedside: NO  Patient instructed on use: NO    Patient Safety:    Falls Score: 3  Bed Alarm On? No  Sitter?  No    Austin Tolbert RN

## 2018-03-03 NOTE — ED NOTES
Attempted to call report after one hour of having a room assignment on patient. Was told that shift change was taking place and will be called back. Requested to speak with charge. Informed the gen surg charge RN that pt has been assigned room for over 1 hour and that report was supposed to be given to day shift RN before shift change. Charge RN passed phone to night shift RN. Night shift RN stated she will call back for report after she receives shift change report. Paco Oleary., ED Charge RN made aware. VERONICA Hernandez contacting nursing supervisor regarding this matter.

## 2018-03-03 NOTE — PROGRESS NOTES
Hospitalist Progress Note    NAME: Hawk Esteban   :  1980   MRN:  [id number]       Assessment / Plan:    SIRS due to left foot osteomyelitis and Abscess, Charcot foot:  -I&D and biopsy done on 3/3  - LLE doppler with no deep venous thrombosis identified.  - MRI foot : osteo  - blood cultures requested, not yet drawn. Wound culture requested. - emperic vancomycin and zosyn for now  - vascular surgery consulted  -, CRP 35    Acute on chronic anemia due to sickle cell disease:  Pt without sx of crisis at this time  - 1u pRBCs ordered due to likely surgery while here  - serial Hg  - folic acid  Insulin dependent DM1 uncontrolled with nephropathy:  - lantus ordered  - lispro sliding scale  STONE in setting of CKD4:  Pt says that she has been on dialysis in the past but then Cr recovered some. Previously has seen Marcelle Youngblood.  - IV fluids  - caution with vancomycin, pharmacy assisting with dosing    THU:  Uses 2 LPM O2 at night     Code Status: Full  Surrogate Decision Maker: in [facility], no decision maker allowed at this time  DVT Prophylaxis: heparin    Body mass index is 25.97 kg/(m^2). Subjective:     Chief Complaint / Reason for Physician Visit  Having lunch, post op    Review of Systems:  Symptom Y/N Comments  Symptom Y/N Comments   Fever/Chills    Chest Pain     Poor Appetite    Edema     Cough    Abdominal Pain     Sputum    Joint Pain     SOB/ADAMS    Pruritis/Rash     Nausea/vomit    Tolerating PT/OT     Diarrhea    Tolerating Diet     Constipation    Other       Could NOT obtain due to:      Objective:     VITALS:   Last 24hrs VS reviewed since prior progress note.  Most recent are:  Patient Vitals for the past 24 hrs:   Temp Pulse Resp BP SpO2   18 1501 100.2 °F (37.9 °C) (!) 113 18 133/65 98 %   18 1324 (!) 101 °F (38.3 °C) (!) 116 18 107/68 98 %   18 1255 (!) 101.3 °F (38.5 °C) (!) 115 18 148/87 98 %   18 1223 100.1 °F (37.8 °C) (!) 114 20 155/87 100 % 03/03/18 1200 - (!) 107 25 140/66 99 %   03/03/18 1150 - (!) 105 24 - 100 %   03/03/18 1145 - (!) 105 24 124/66 100 %   03/03/18 1130 - (!) 102 24 116/60 100 %   03/03/18 1115 - (!) 101 23 (!) 128/93 100 %   03/03/18 1110 - (!) 104 25 115/58 100 %   03/03/18 1105 - (!) 104 23 111/57 100 %   03/03/18 1100 - (!) 104 23 112/61 100 %   03/03/18 1055 - (!) 107 24 120/65 100 %   03/03/18 1054 99.4 °F (37.4 °C) (!) 107 25 120/62 100 %   03/03/18 0933 100 °F (37.8 °C) (!) 118 25 136/70 100 %   03/03/18 0730 (!) 102.5 °F (39.2 °C) (!) 117 18 142/76 99 %   03/03/18 0433 100 °F (37.8 °C) (!) 108 18 140/72 -   03/03/18 0324 (!) 101.1 °F (38.4 °C) (!) 110 18 143/80 97 %   03/03/18 0242 (!) 101 °F (38.3 °C) (!) 119 20 (!) 177/96 -   03/03/18 0234 (!) 101 °F (38.3 °C) (!) 113 20 156/87 -   03/03/18 0219 100.3 °F (37.9 °C) (!) 113 14 147/86 100 %   03/03/18 0102 100.2 °F (37.9 °C) (!) 111 14 125/63 99 %   03/02/18 2126 98.9 °F (37.2 °C) (!) 132 14 152/77 99 %       Intake/Output Summary (Last 24 hours) at 03/03/18 1613  Last data filed at 03/03/18 1501   Gross per 24 hour   Intake           1118.8 ml   Output               50 ml   Net           1068.8 ml        PHYSICAL EXAM:  General: WD, WN. Alert, cooperative, no acute distress    EENT:  EOMI. Anicteric sclerae. MMM  Resp:  CTA bilaterally, no wheezing or rales.   No accessory muscle use  CV:  Regular  rhythm,  No edema  GI:  Soft, Non distended, Non tender.  +Bowel sounds  Neurologic:  Alert and oriented X 3, normal speech,   Psych:   Good insight. Not anxious nor agitated  Skin:  L foot in bandage, multiple tattoos    Reviewed most current lab test results and cultures  YES  Reviewed most current radiology test results   YES  Review and summation of old records today    NO  Reviewed patient's current orders and MAR    YES  PMH/SH reviewed - no change compared to H&P  ________________________________________________________________________  Care Plan discussed with: Comments   Patient     Family      RN     Care Manager     Consultant                        Multidiciplinary team rounds were held today with , nursing, pharmacist and clinical coordinator. Patient's plan of care was discussed; medications were reviewed and discharge planning was addressed. ________________________________________________________________________  Total NON critical care TIME:  30   Minutes    Total CRITICAL CARE TIME Spent:   Minutes non procedure based      Comments   >50% of visit spent in counseling and coordination of care     ________________________________________________________________________  Анна Lim MD     Procedures: see electronic medical records for all procedures/Xrays and details which were not copied into this note but were reviewed prior to creation of Plan. LABS:  I reviewed today's most current labs and imaging studies.   Pertinent labs include:  Recent Labs      03/03/18   0921  03/02/18   1548   WBC  11.0  15.3*   HGB  8.3*  6.9*   HCT  26.3*  21.4*   PLT  383  392     Recent Labs      03/03/18   0921  03/02/18   1548   NA  135*  136   K  4.0  3.9   CL  104  104   CO2  19*  21   GLU  100  157*   BUN  45*  44*   CREA  3.28*  3.51*   CA  8.0*  7.9*   ALB   --   1.9*   TBILI   --   0.3   SGOT   --   27   ALT   --   31       Signed: Анна Lim MD

## 2018-03-03 NOTE — ED NOTES
TRANSFER - OUT REPORT:    Verbal report given to Chelsea Torres RN(name) on Nabor Martel  being transferred to [facility] for routine progression of care       Report consisted of patients Situation, Background, Assessment and   Recommendations(SBAR). Information from the following report(s) SBAR, ED Summary and Recent Results was reviewed with the receiving nurse. Lines:   Venous Access Device Chest Port (Active)       Peripheral IV 03/02/18 Left Antecubital (Active)   Site Assessment Clean;Dry 3/2/2018  4:47 PM   Phlebitis Assessment 0 3/2/2018  4:47 PM   Infiltration Assessment 0 3/2/2018  4:47 PM   Dressing Status Clean, dry, & intact;New;Occlusive 3/2/2018  4:47 PM   Dressing Type Transparent;Tape 3/2/2018  4:47 PM   Hub Color/Line Status Pink;Flushed;Patent 3/2/2018  4:47 PM   Alcohol Cap Used No 3/2/2018  4:47 PM        Opportunity for questions and clarification was provided.       Patient transported with:  Transport

## 2018-03-03 NOTE — CONSULTS
Vascular Surgery Consult    Subjective:      Olivia Bryson is a 42 y.o. female who presents for evaluation of a left Dm foot infection. She has an extensive past medical history for her young age and included sickle cell and DM. She has never had Dm foot infections in the past.  She is currently an inmate and apparently started having swelling in her leg over the past few weeks. Nothing was getting better and she had a blister on the lateral side of her foot. It continued to worsen so she was sent to the ER for further evaluation. Denies fevers currently and her foot is fever painful.     Past Medical History:   Diagnosis Date    Asthma     Diabetes ([address]     Gastroparesis     Gastric Pacer- REMOVED 2015    GERD (gastroesophageal reflux disease)     Hypertension     Narcotic dependence ([address]     THU (obstructive sleep apnea)     wears 2 LPM oxygen at night    Other ill-defined conditions(799.89)     Polycystic ovarian syndrome     Seizures (HCC)     Sickle cell trait ([address]     Thromboembolus ([address] to her left arm and was told she had one in left leg recently     Past Surgical History:   Procedure Laterality Date    HX CATARACT REMOVAL  3/5/12    right    HX DILATION AND CURETTAGE      ablation    HX GASTRIC BYPASS      HX GI      j tube placement and removal    HX OTHER SURGICAL      Gastric Pacer- REMOVED 2015    HX VASCULAR ACCESS      gray cath rt subclavian    HX VASCULAR ACCESS      HD access right thigh      Family History   Problem Relation Age of Onset    Cancer Mother      lung    Hypertension Mother     Cancer Father      kidney    Stroke Father      3 strokes: 59-72    Heart Disease Father 72     CABG    Hypertension Father     Cancer Sister      pancreatic    Cancer Maternal Aunt      breast    Cancer Paternal Aunt      breast    Schizophrenia Sister      was in [place], now ass't living    Other Sister       AIDS    Other Other nephew of AIDS     Social History     Social History    Marital status:      Spouse name: N/A    Number of children: N/A    Years of education: N/A     Social History Main Topics    Smoking status: Never Smoker    Smokeless tobacco: Never Used    Alcohol use No    Drug use: No    Sexual activity: Yes     Partners: Male     Birth control/ protection: None     Other Topics Concern    None     Social History Narrative    Lives with her  and father      Current Facility-Administered Medications   Medication Dose Route Frequency Provider Last Rate Last Dose    sodium chloride (NS) flush 5-10 mL  5-10 mL IntraVENous Q8H Jorge Luis Louise MD   10 mL at 03/02/18 2219    sodium chloride (NS) flush 5-10 mL  5-10 mL IntraVENous PRN Jorge Luis Louise MD        0.9% sodium chloride infusion 250 mL  250 mL IntraVENous PRN Jorge Luis Louise MD        vancomycin (VANCOCIN) 1500 mg in  ml infusion  1,500 mg IntraVENous NOW Jorge Luis Louise  mL/hr at 03/02/18 2219 1,500 mg at 03/02/18 2219    piperacillin-tazobactam (ZOSYN) 3.375 g in  ml premix/cmpd  3.375 g IntraVENous NOW Jorge Luis Louise MD   3.375 g at 03/02/18 2113    0.9% sodium chloride infusion  150 mL/hr IntraVENous CONTINUOUS Viry Rocha  mL/hr at 03/02/18 2111 150 mL/hr at 03/02/18 2111    sodium chloride (NS) flush 5-10 mL  5-10 mL IntraVENous Q8H Viry Rocha MD   10 mL at 03/02/18 2218    sodium chloride (NS) flush 5-10 mL  5-10 mL IntraVENous PRN Viry Rocha MD        acetaminophen (TYLENOL) tablet 650 mg  650 mg Oral Q4H PRN Viry Rocha MD        oxyCODONE-acetaminophen (PERCOCET) 5-325 mg per tablet 1 Tab  1 Tab Oral Q4H PRN Viry Rocha MD   1 Tab at 03/02/18 2132    ondansetron (ZOFRAN) injection 4 mg  4 mg IntraVENous Q4H PRN Viry Rocha MD        docusate sodium (COLACE) capsule 100 mg  100 mg Oral BID Viry Rocha MD   100 mg at 03/02/18 2131    [START ON 3/3/2018] heparin (porcine) injection 5,000 Units  5,000 Units SubCUTAneous Q12H Beth Cook MD        insulin lispro (HUMALOG) injection   SubCUTAneous AC&HS Beth Cook MD   2 Units at 18 2213    glucose chewable tablet 16 g  4 Tab Oral PRN Beth Cook MD        dextrose (D50W) injection syrg 12.5-25 g  12.5-25 g IntraVENous PRN Beth Cook MD        glucagon [facility]) injection 1 mg  1 mg IntraMUSCular PRN Beth Cook MD        [START ON 3/3/2018] insulin glargine (LANTUS) injection 22 Units  22 Units SubCUTAneous DAILY Beth Cook MD        albuterol (PROVENTIL VENTOLIN) nebulizer solution 2.5 mg  2.5 mg Nebulization Q4H PRN Beth Cook MD        famotidine (PEPCID) tablet 20 mg  20 mg Oral BID Beth Cook MD   20 mg at 18    [START ON 3/3/2018] therapeutic multivitamin (THERAGRAN) tablet 1 Tab  1 Tab Oral DAILY Beth Cook MD        [START ON 3/3/2018] cyanocobalamin (VITAMIN B12) tablet 1,000 mcg  1,000 mcg Oral DAILY Beth Cook MD        [START ON 3/3/2018] calcium carbonate (OS-BINA) tablet 500 mg [elemental]  500 mg Oral DAILY Beth Cook MD        [START ON 4567] folic acid (FOLVITE) tablet 1 mg  1 mg Oral DAILY Beth Cook MD        [START ON 3/3/2018] vancomycin (VANCOCIN) 1000 mg in  ml infusion  1,000 mg IntraVENous Q24H MD Mathieu Fonseca [START ON 3/3/2018] piperacillin-tazobactam (ZOSYN) 3.375 g in  ml premix/cmpd  3.375 g IntraVENous Q12H Beth Cook MD            Allergies   Allergen Reactions    Erythromycin Itching    Morphine Itching       Review of Systems:  Constitutional: negative  Eyes: negative  Respiratory: negative  Cardiovascular: negative  Gastrointestinal: negative  Genitourinary:negative  Hematologic/Lymphatic: negative  Musculoskeletal:negative  Neurological: negative  Behavioral/Psychiatric: negative    Objective:      No data found.       Temp (24hrs), Av.9 °F (37.2 °C), Min:98.9 °F (37.2 °C), Max:98.9 °F (37.2 °C)      Physical Exam:  GENERAL: alert, cooperative, no distress, appears stated age, LUNG: clear to auscultation bilaterally, HEART: regular rate and rhythm, S1, S2 normal, no murmur, click, rub or gallop, ABDOMEN: soft, non-tender. Bowel sounds normal. No masses,  no organomegaly, EXTREMITIES: LLE with charcot foot, the lateral aspect has a fluctuant fluid collection with purulent drainage, SKIN: Normal., NEUROLOGIC: negative, PSYCHIATRIC: non focal. Palpable distal pulses. Assessment:     41 y/o AAF with advanced left DM foot infection    Plan:     Ms. Stephanie Barclay has a bad left foot infection with leukocytosis and leg swelling. MRI/xray is suggestive of osteomyelitis of the mid foot which will make limb salvage challenging if that is the case. Will plan on proceeding tomorrow with foot incision and drainage for source control. Continue broad spectrum ABX. Will send operative cultures.      Signed By: Indra Santana MD     March 2, 2018

## 2018-03-03 NOTE — PROGRESS NOTES
Anesthesiology    OK to proceed with scheduled surgical procedure, assuming no acute changes in overnight clinical status or lab derangements  and patient remains NPO.

## 2018-03-03 NOTE — PROGRESS NOTES

## 2018-03-03 NOTE — OP NOTES
[facility]  ACUTE CARE OP NOTE    Santos Estrada  MR#: [id number]  : 1980  ACCOUNT #: [de-identified]   DATE OF SERVICE: 2018    PREOPERATIVE DIAGNOSIS: Left diabetic foot infection. POSTOPERATIVE DIAGNOSIS:  Left diabetic foot infection. PROCEDURES:  1. Left foot excisional debridement of skin, soft tissue, muscle and bone for an area of 8 x 6 x 5 cm. 2.  Resection of distal fourth and fifth metatarsals. 3.  Left ankle block. SURGEON:  Harry Izaguirre MD    ASSISTANT:  None. ANESTHESIA:  Local MAC with left ankle block. ESTIMATED BLOOD LOSS:  5.    SPECIMENS:  Deep wound cultures x2. COMPLICATIONS:  None apparent. IMPLANTS:  None. OPERATIVE INDICATIONS:  The patient is a 33-year-old female with advanced medical disease including sickle cell anemia and diabetes who presented with several weeks of severe left leg swelling and pain. She presented to the [facility] ER with elevated white count and x-ray and MRI findings suggestive of left distal metatarsal osteomyelitis. On exam she had obvious purulent drainage and swelling going up the leg. Therefore, it was recommended to proceed with excisional debridement for source control. Prior to the procedure, the nature of the procedure was explained to the patient in detail and she elected to proceed. OPERATIVE DETAILS:  The patient was identified in the preop holding area. Left leg was marked and consents were signed. The patient was brought back to the operating room where MAC sedation was induced. The left leg was then prepped and draped in usual standard fashion. A timeout was then performed where the above procedure was verified. I first began by performing a left ankle block. I used 1% lidocaine and injected 2-3 mL of lidocaine over the anterior tibial, posterior tibial, and peroneal neurovascular bundles. I then performed a necklace field block around the ankle.   I used approximately 20 mL of lidocaine. I tested my block with a tooth forceps and she did not withdraw after several minutes. I next proceeded with incision on the lateral foot. On the distal fifth metatarsal there was a large wound with purulent drainage. This was excised and I immediately encountered a large amount of purulent drainage. This drainage was sent for culture and swabs. After I excised all the skin, soft tissue, and muscle, there was a necrotic fifth metatarsal and fourth metatarsal bone as well as the cuboid bone. This was all debrided with a rongeur and part of this was sent for a second culture. I then completed my excisional debridement for the area listed above. This was a sharp debridement using a scalpel and Metzenbaum scissors of skin, soft tissue, bone, and muscle. There was good healthy bleeding tissue. There was significant tracking of purulent drainage, all of which I feel I drained adequately. However, going into the mid foot it was challenging to adequately drain the mid foot as it went into the cuneiform bone. I then copiously irrigated the wound with 500 mL of antibiotic-laden solution. I used electrocautery to obtain adequate hemostasis. Once I was satisfied with the hemostasis, the wound was packed and sterile dressings were applied. The patient tolerated the procedure well. At the completion of the procedure all needle, sponge, and instrument counts were correct x2.       MD Jay Carpenter / Cassie Jennings  D: 03/03/2018 10:54     T: 03/03/2018 17:35  JOB #: [id number]

## 2018-03-03 NOTE — ANESTHESIA POSTPROCEDURE EVALUATION
Post-Anesthesia Evaluation and Assessment    Patient: Alec Moran MRN: [id number]  SSN: [id number]    YOB: 1980  Age: 42 y.o. Sex: female       Cardiovascular Function/Vital Signs  Visit Vitals    /66 (BP 1 Location: Right arm, BP Patient Position: At rest)    Pulse (!) 105    Temp 37.4 °C (99.4 °F)    Resp 24    Ht 5' 2\" (1.575 m)    Wt 64.4 kg (142 lb)    SpO2 100%    Breastfeeding No    BMI 25.97 kg/m2       Patient is status post MAC anesthesia for Procedure(s):  LEFT FOOT INCISION AND DRAINAGE. Nausea/Vomiting: None    Postoperative hydration reviewed and adequate. Pain:  Pain Scale 1: Numeric (0 - 10) (03/03/18 1145)  Pain Intensity 1: 2 (03/03/18 1145)   Managed    Neurological Status:   Neuro (WDL): Exceptions to WDL (03/03/18 1054)  Neuro  Neurologic State: Drowsy; Eyes open spontaneously (03/03/18 1054)  Orientation Level: Oriented to person;Oriented to place;Oriented to situation (03/03/18 1054)  Cognition: Follows commands (03/03/18 1054)  Speech: Delayed responses (03/03/18 1054)   At baseline    Mental Status and Level of Consciousness: Arousable    Pulmonary Status:   O2 Device: Nasal cannula (03/03/18 1145)   Adequate oxygenation and airway patent    Complications related to anesthesia: None    Post-anesthesia assessment completed.  No concerns    Signed By: Adilene Pak MD     March 3, 2018

## 2018-03-03 NOTE — ROUTINE PROCESS

## 2018-03-03 NOTE — PERIOP NOTES
36 -  Lab called and notified Supervisor of Hemolyzed blood or insufficient amount for Sed Rate. New order will be entered and Gretchen MARTIN updated. TRANSFER - OUT REPORT:    Verbal report given to Juan Carlos Rizvi RN(name) on Nabor Martel  being transferred to 2115(unit) for ordered procedure       Report consisted of patients Situation, Background, Assessment and   Recommendations(SBAR). Information from the following report(s) SBAR, Kardex, ED Summary, OR Summary, Procedure Summary, Intake/Output, MAR, Accordion, Recent Results, Med Rec Status, Cardiac Rhythm ST and Alarm Parameters  was reviewed with the receiving nurse. Opportunity for questions and clarification was provided.       Patient transported with:   Monitor  Registered Nurse

## 2018-03-04 ENCOUNTER — APPOINTMENT (OUTPATIENT)
Dept: GENERAL RADIOLOGY | Age: 38
DRG: 854 | End: 2018-03-04
Attending: SURGERY
Payer: MEDICARE

## 2018-03-04 LAB
ANION GAP SERPL CALC-SCNC: 12 MMOL/L (ref 5–15)
BACTERIA SPEC CULT: NORMAL
BASOPHILS # BLD: 0 K/UL (ref 0–0.1)
BASOPHILS NFR BLD: 0 % (ref 0–1)
BUN SERPL-MCNC: 51 MG/DL (ref 6–20)
BUN/CREAT SERPL: 13 (ref 12–20)
CALCIUM SERPL-MCNC: 7.7 MG/DL (ref 8.5–10.1)
CC UR VC: NORMAL
CHLORIDE SERPL-SCNC: 107 MMOL/L (ref 97–108)
CO2 SERPL-SCNC: 18 MMOL/L (ref 21–32)
CREAT SERPL-MCNC: 3.8 MG/DL (ref 0.55–1.02)
CRP SERPL-MCNC: 29.6 MG/DL (ref 0–0.6)
DIFFERENTIAL METHOD BLD: ABNORMAL
EOSINOPHIL # BLD: 0 K/UL (ref 0–0.4)
EOSINOPHIL NFR BLD: 0 % (ref 0–7)
ERYTHROCYTE [DISTWIDTH] IN BLOOD BY AUTOMATED COUNT: 13.8 % (ref 11.5–14.5)
GLUCOSE BLD STRIP.AUTO-MCNC: 100 MG/DL (ref 65–100)
GLUCOSE BLD STRIP.AUTO-MCNC: 150 MG/DL (ref 65–100)
GLUCOSE BLD STRIP.AUTO-MCNC: 155 MG/DL (ref 65–100)
GLUCOSE BLD STRIP.AUTO-MCNC: 67 MG/DL (ref 65–100)
GLUCOSE SERPL-MCNC: 57 MG/DL (ref 65–100)
HCT VFR BLD AUTO: 18.5 % (ref 35–47)
HCT VFR BLD AUTO: 20.7 % (ref 35–47)
HGB BLD-MCNC: 6.1 G/DL (ref 11.5–16)
HGB BLD-MCNC: 6.9 G/DL (ref 11.5–16)
IMM GRANULOCYTES # BLD: 0 K/UL (ref 0–0.04)
IMM GRANULOCYTES NFR BLD AUTO: 0 % (ref 0–0.5)
LYMPHOCYTES # BLD: 1.8 K/UL (ref 0.8–3.5)
LYMPHOCYTES NFR BLD: 17 % (ref 12–49)
MCH RBC QN AUTO: 25.2 PG (ref 26–34)
MCHC RBC AUTO-ENTMCNC: 33 G/DL (ref 30–36.5)
MCV RBC AUTO: 76.4 FL (ref 80–99)
MONOCYTES # BLD: 0.4 K/UL (ref 0–1)
MONOCYTES NFR BLD: 4 % (ref 5–13)
NEUTS SEG # BLD: 8.4 K/UL (ref 1.8–8)
NEUTS SEG NFR BLD: 79 % (ref 32–75)
NRBC # BLD: 0 K/UL (ref 0–0.01)
NRBC BLD-RTO: 0 PER 100 WBC
PLATELET # BLD AUTO: 324 K/UL (ref 150–400)
PMV BLD AUTO: 10.8 FL (ref 8.9–12.9)
POTASSIUM SERPL-SCNC: 4 MMOL/L (ref 3.5–5.1)
RBC # BLD AUTO: 2.42 M/UL (ref 3.8–5.2)
RBC MORPH BLD: ABNORMAL
RBC MORPH BLD: ABNORMAL
SERVICE CMNT-IMP: ABNORMAL
SERVICE CMNT-IMP: ABNORMAL
SERVICE CMNT-IMP: NORMAL
SODIUM SERPL-SCNC: 137 MMOL/L (ref 136–145)
WBC # BLD AUTO: 10.6 K/UL (ref 3.6–11)

## 2018-03-04 PROCEDURE — 74011250636 HC RX REV CODE- 250/636: Performed by: EMERGENCY MEDICINE

## 2018-03-04 PROCEDURE — 74011250637 HC RX REV CODE- 250/637: Performed by: STUDENT IN AN ORGANIZED HEALTH CARE EDUCATION/TRAINING PROGRAM

## 2018-03-04 PROCEDURE — 86902 BLOOD TYPE ANTIGEN DONOR EA: CPT | Performed by: EMERGENCY MEDICINE

## 2018-03-04 PROCEDURE — 36430 TRANSFUSION BLD/BLD COMPNT: CPT

## 2018-03-04 PROCEDURE — 77030018836 HC SOL IRR NACL ICUM -A

## 2018-03-04 PROCEDURE — 74011636637 HC RX REV CODE- 636/637: Performed by: INTERNAL MEDICINE

## 2018-03-04 PROCEDURE — 36600 WITHDRAWAL OF ARTERIAL BLOOD: CPT

## 2018-03-04 PROCEDURE — 82962 GLUCOSE BLOOD TEST: CPT

## 2018-03-04 PROCEDURE — 65270000029 HC RM PRIVATE

## 2018-03-04 PROCEDURE — 36415 COLL VENOUS BLD VENIPUNCTURE: CPT | Performed by: STUDENT IN AN ORGANIZED HEALTH CARE EDUCATION/TRAINING PROGRAM

## 2018-03-04 PROCEDURE — 73620 X-RAY EXAM OF FOOT: CPT

## 2018-03-04 PROCEDURE — 80048 BASIC METABOLIC PNL TOTAL CA: CPT | Performed by: STUDENT IN AN ORGANIZED HEALTH CARE EDUCATION/TRAINING PROGRAM

## 2018-03-04 PROCEDURE — 51798 US URINE CAPACITY MEASURE: CPT

## 2018-03-04 PROCEDURE — P9040 RBC LEUKOREDUCED IRRADIATED: HCPCS | Performed by: EMERGENCY MEDICINE

## 2018-03-04 PROCEDURE — 85018 HEMOGLOBIN: CPT | Performed by: INTERNAL MEDICINE

## 2018-03-04 PROCEDURE — 74011250637 HC RX REV CODE- 250/637: Performed by: HOSPITALIST

## 2018-03-04 PROCEDURE — 85025 COMPLETE CBC W/AUTO DIFF WBC: CPT | Performed by: STUDENT IN AN ORGANIZED HEALTH CARE EDUCATION/TRAINING PROGRAM

## 2018-03-04 PROCEDURE — 74011636637 HC RX REV CODE- 636/637: Performed by: HOSPITALIST

## 2018-03-04 PROCEDURE — 86140 C-REACTIVE PROTEIN: CPT | Performed by: STUDENT IN AN ORGANIZED HEALTH CARE EDUCATION/TRAINING PROGRAM

## 2018-03-04 PROCEDURE — 74011250636 HC RX REV CODE- 250/636: Performed by: INTERNAL MEDICINE

## 2018-03-04 PROCEDURE — 74011250637 HC RX REV CODE- 250/637: Performed by: INTERNAL MEDICINE

## 2018-03-04 RX ORDER — SODIUM CHLORIDE 9 MG/ML
250 INJECTION, SOLUTION INTRAVENOUS AS NEEDED
Status: DISCONTINUED | OUTPATIENT
Start: 2018-03-04 | End: 2018-03-08

## 2018-03-04 RX ADMIN — QUETIAPINE FUMARATE 100 MG: 100 TABLET ORAL at 21:47

## 2018-03-04 RX ADMIN — QUETIAPINE FUMARATE 100 MG: 100 TABLET ORAL at 08:17

## 2018-03-04 RX ADMIN — CLONIDINE HYDROCHLORIDE 0.2 MG: 0.1 TABLET ORAL at 08:16

## 2018-03-04 RX ADMIN — Medication 10 ML: at 21:49

## 2018-03-04 RX ADMIN — PIPERACILLIN SODIUM AND TAZOBACTAM SODIUM 3.38 G: .375; 3 INJECTION, POWDER, LYOPHILIZED, FOR SOLUTION INTRAVENOUS at 14:12

## 2018-03-04 RX ADMIN — SODIUM CHLORIDE 150 ML/HR: 900 INJECTION, SOLUTION INTRAVENOUS at 08:29

## 2018-03-04 RX ADMIN — THERA TABS 1 TABLET: TAB at 08:17

## 2018-03-04 RX ADMIN — DIPHENHYDRAMINE HYDROCHLORIDE 25 MG: 25 CAPSULE ORAL at 21:46

## 2018-03-04 RX ADMIN — DOCUSATE SODIUM 100 MG: 100 CAPSULE, LIQUID FILLED ORAL at 17:08

## 2018-03-04 RX ADMIN — OXYCODONE HYDROCHLORIDE AND ACETAMINOPHEN 1 TABLET: 5; 325 TABLET ORAL at 21:46

## 2018-03-04 RX ADMIN — VANCOMYCIN HYDROCHLORIDE 1000 MG: 10 INJECTION, POWDER, LYOPHILIZED, FOR SOLUTION INTRAVENOUS at 21:43

## 2018-03-04 RX ADMIN — OXYCODONE HYDROCHLORIDE AND ACETAMINOPHEN 1 TABLET: 5; 325 TABLET ORAL at 08:29

## 2018-03-04 RX ADMIN — INSULIN GLARGINE 12 UNITS: 100 INJECTION, SOLUTION SUBCUTANEOUS at 22:11

## 2018-03-04 RX ADMIN — Medication 5 ML: at 06:00

## 2018-03-04 RX ADMIN — DOCUSATE SODIUM 100 MG: 100 CAPSULE, LIQUID FILLED ORAL at 08:16

## 2018-03-04 RX ADMIN — VENLAFAXINE 75 MG: 37.5 TABLET ORAL at 17:08

## 2018-03-04 RX ADMIN — ONDANSETRON HYDROCHLORIDE 4 MG: 2 INJECTION, SOLUTION INTRAMUSCULAR; INTRAVENOUS at 12:03

## 2018-03-04 RX ADMIN — HEPARIN SODIUM 5000 UNITS: 5000 INJECTION, SOLUTION INTRAVENOUS; SUBCUTANEOUS at 08:20

## 2018-03-04 RX ADMIN — CALCIUM 500 MG: 500 TABLET ORAL at 08:17

## 2018-03-04 RX ADMIN — INSULIN LISPRO 2 UNITS: 100 INJECTION, SOLUTION INTRAVENOUS; SUBCUTANEOUS at 17:08

## 2018-03-04 RX ADMIN — OXYCODONE HYDROCHLORIDE AND ACETAMINOPHEN 1 TABLET: 5; 325 TABLET ORAL at 12:51

## 2018-03-04 RX ADMIN — CLONIDINE HYDROCHLORIDE 0.2 MG: 0.1 TABLET ORAL at 21:47

## 2018-03-04 RX ADMIN — HEPARIN SODIUM 5000 UNITS: 5000 INJECTION, SOLUTION INTRAVENOUS; SUBCUTANEOUS at 21:45

## 2018-03-04 RX ADMIN — VENLAFAXINE 75 MG: 37.5 TABLET ORAL at 08:16

## 2018-03-04 RX ADMIN — PIPERACILLIN SODIUM AND TAZOBACTAM SODIUM 3.38 G: .375; 3 INJECTION, POWDER, LYOPHILIZED, FOR SOLUTION INTRAVENOUS at 01:20

## 2018-03-04 RX ADMIN — SODIUM CHLORIDE 150 ML/HR: 900 INJECTION, SOLUTION INTRAVENOUS at 00:15

## 2018-03-04 RX ADMIN — FAMOTIDINE 20 MG: 20 TABLET, FILM COATED ORAL at 08:16

## 2018-03-04 RX ADMIN — FOLIC ACID 1 MG: 1 TABLET ORAL at 08:17

## 2018-03-04 RX ADMIN — DIPHENHYDRAMINE HYDROCHLORIDE 25 MG: 25 CAPSULE ORAL at 08:29

## 2018-03-04 RX ADMIN — CYANOCOBALAMIN TAB 500 MCG 1000 MCG: 500 TAB at 08:17

## 2018-03-04 RX ADMIN — CLONIDINE HYDROCHLORIDE 0.2 MG: 0.1 TABLET ORAL at 14:12

## 2018-03-04 NOTE — PROGRESS NOTES
Hospitalist Progress Note    NAME: Malika Estrada   :  1980   MRN:  [id number]       Assessment / Plan:  *Transfusing another unit of blood today, Hb 6.6, and she does not feel as energetic as she normally does after BT    SIRS due to left foot osteomyelitis and Abscess, Charcot foot:  -Excisional debridement and biopsy done on 3/3  - LLE doppler with no deep venous thrombosis identified.  - MRI foot : osteo  - blood cultures requested, not yet drawn. Wound culture requested. - emperic vancomycin and zosyn for now  - vascular surgery consulted  -, CRP 35    Acute on chronic anemia due to sickle cell disease:  Pt without sx of crisis at this time  - 1u pRBCs ordered due to likely surgery while here  - serial Hg  - folic acid  Insulin dependent DM1 uncontrolled with nephropathy:  - lantus ordered  - lispro sliding scale  STONE in setting of CKD4:  Pt says that she has been on dialysis in the past but then Cr recovered some. Previously has seen Idris Evans.  - IV fluids  - caution with vancomycin, pharmacy assisting with dosing    THU:  Uses 2 LPM O2 at night     Code Status: Full  Surrogate Decision Maker: in [facility], no decision maker allowed at this time  DVT Prophylaxis: heparin    Body mass index is 25.97 kg/(m^2). Subjective:     Chief Complaint / Reason for Physician Visit  Nil new complaints    Review of Systems:  Symptom Y/N Comments  Symptom Y/N Comments   Fever/Chills    Chest Pain     Poor Appetite    Edema     Cough    Abdominal Pain     Sputum    Joint Pain     SOB/ADAMS    Pruritis/Rash     Nausea/vomit    Tolerating PT/OT     Diarrhea    Tolerating Diet     Constipation    Other       Could NOT obtain due to:      Objective:     VITALS:   Last 24hrs VS reviewed since prior progress note.  Most recent are:  Patient Vitals for the past 24 hrs:   Temp Pulse Resp BP SpO2   18 0800 99.9 °F (37.7 °C) (!) 102 16 148/84 97 %   18 0333 99 °F (37.2 °C) 93 18 129/74 96 % 03/04/18 0008 97.4 °F (36.3 °C) 88 18 (!) 87/53 97 %   03/03/18 2022 (!) 101.3 °F (38.5 °C) (!) 113 16 123/63 97 %   03/03/18 1501 100.2 °F (37.9 °C) (!) 113 18 133/65 98 %   03/03/18 1324 (!) 101 °F (38.3 °C) (!) 116 18 107/68 98 %   03/03/18 1255 (!) 101.3 °F (38.5 °C) (!) 115 18 148/87 98 %   03/03/18 1223 100.1 °F (37.8 °C) (!) 114 20 155/87 100 %   03/03/18 1200 - (!) 107 25 140/66 99 %   03/03/18 1150 - (!) 105 24 - 100 %   03/03/18 1145 - (!) 105 24 124/66 100 %   03/03/18 1130 - (!) 102 24 116/60 100 %   03/03/18 1115 - (!) 101 23 (!) 128/93 100 %   03/03/18 1110 - (!) 104 25 115/58 100 %   03/03/18 1105 - (!) 104 23 111/57 100 %   03/03/18 1100 - (!) 104 23 112/61 100 %   03/03/18 1055 - (!) 107 24 120/65 100 %   03/03/18 1054 99.4 °F (37.4 °C) (!) 107 25 120/62 100 %   03/03/18 0933 100 °F (37.8 °C) (!) 118 25 136/70 100 %       Intake/Output Summary (Last 24 hours) at 03/04/18 0920  Last data filed at 03/03/18 2248   Gross per 24 hour   Intake           5142.5 ml   Output              250 ml   Net           4892.5 ml        PHYSICAL EXAM:  General: Sitting in chair at side of bed. Alert, cooperative, no acute distress    EENT:  EOMI. Anicteric sclerae. MMM  Resp:  CTA bilaterally, no wheezing or rales.   No accessory muscle use  CV:  Regular  rhythm,  No edema  GI:  Soft, Non distended, Non tender.  +Bowel sounds  Neurologic:  Alert and oriented X 3, normal speech,   Psych:   Good insight. Not anxious nor agitated  Skin:  L foot in bandage, multiple tattoos    Reviewed most current lab test results and cultures  YES  Reviewed most current radiology test results   YES  Review and summation of old records today    NO  Reviewed patient's current orders and MAR    YES  PMH/SH reviewed - no change compared to H&P  ________________________________________________________________________  Care Plan discussed with:    Comments   Patient     Family      RN     Care Manager     Consultant Multidiciplinary team rounds were held today with , nursing, pharmacist and clinical coordinator. Patient's plan of care was discussed; medications were reviewed and discharge planning was addressed. ________________________________________________________________________  Total NON critical care TIME:  20   Minutes    Total CRITICAL CARE TIME Spent:   Minutes non procedure based      Comments   >50% of visit spent in counseling and coordination of care     ________________________________________________________________________  Deborah Arrieta MD     Procedures: see electronic medical records for all procedures/Xrays and details which were not copied into this note but were reviewed prior to creation of Plan. LABS:  I reviewed today's most current labs and imaging studies.   Pertinent labs include:  Recent Labs      03/04/18   0534  03/03/18   0921  03/02/18   1548   WBC  10.6  11.0  15.3*   HGB  6.1*  8.3*  6.9*   HCT  18.5*  26.3*  21.4*   PLT  324  383  392     Recent Labs      03/04/18   0534  03/03/18   0921  03/02/18   1548   NA  137  135*  136   K  4.0  4.0  3.9   CL  107  104  104   CO2  18*  19*  21   GLU  57*  100  157*   BUN  51*  45*  44*   CREA  3.80*  3.28*  3.51*   CA  7.7*  8.0*  7.9*   ALB   --    --   1.9*   TBILI   --    --   0.3   SGOT   --    --   27   ALT   --    --   31       Signed: Deborah Arrieta MD

## 2018-03-04 NOTE — PROGRESS NOTES
Vascular    No fevers since midnight, overall pain somewhat better    Visit Vitals    /84 (BP 1 Location: Right arm, BP Patient Position: At rest)    Pulse (!) 102    Temp 99.9 °F (37.7 °C)    Resp 16    Ht 5' 2\" (1.575 m)    Wt 64.4 kg (142 lb)    SpO2 97%    Breastfeeding No    BMI 25.97 kg/m2     Awake and oriented  Left foot wound clean, no obvious undrained fluid collection  Foot warm    41 y/o AAF with extensive left Dm foot infection  - Overall fever, pain and WBC better. Complex infection involving her midfoot will make limb salvage difficult. Followup cultures and continue broad spectrum ABX. Will plan on performing another foot debridement/washout mid-week. Hope is to clean foot enough to allow a proximal transmetatarsal amputation. Risk of limb loss remains high and this was explained in detail to the patient. BID dressing changes to wound.   - Transfusion, baseline anemia

## 2018-03-04 NOTE — PROGRESS NOTES
Bedside and Verbal shift change report given to Dionicio Ceballos RN (oncoming nurse) by Lucilla Koyanagi, RN (offgoing nurse). Report included the following information SBAR, Kardex, Intake/Output, MAR and Recent Results.

## 2018-03-04 NOTE — PROGRESS NOTES
General Surgery End of Shift Nursing Note    Bedside shift change report given to Yuliet Chino Shyam (oncoming nurse) by Adrianne Woodard RN (offgoing nurse). Report included the following information SBAR, Kardex, ED Summary, OR Summary, Intake/Output, MAR, Recent Results, Med Rec Status and Cardiac Rhythm ST/SR. Shift worked:   7p-7a   Summary of shift:    Pt given HS meds, PO pain med and Benedryl for iching  @ HS, and was observed resting quietly with eyes close on rounds, she is handcuffed to side rail, and has two guards @ her bedside from group home. Issues for physician to address:   none     Number times ambulated in hallway past shift: 0    Number of times OOB to chair past shift: 0    Pain Management:  Current medication:   Patient states pain is manageable on current pain medication: YES    GI:    Current diet:  DIET DIABETIC CONSISTENT CARB Regular    Tolerating current diet: YES  Passing flatus: YES  Last Bowel Movement: yesterday   Appearance: formed    Respiratory:    Incentive Spirometer at bedside: YES  Patient instructed on use: YES    Patient Safety:    Falls Score: 2  Bed Alarm On? No  Sitter?  No    Devang Wray RN

## 2018-03-04 NOTE — PROGRESS NOTES
Bedside and Verbal shift change report given to VERONICA Ta (oncoming nurse) by Minesh Garza RN (offgoing nurse). Report included the following information SBAR, Kardex, Intake/Output, MAR and Recent Results.

## 2018-03-05 PROBLEM — D57.1 SICKLE CELL ANEMIA (HCC): Status: ACTIVE | Noted: 2018-03-05

## 2018-03-05 LAB
ANION GAP SERPL CALC-SCNC: 12 MMOL/L (ref 5–15)
BACTERIA SPEC CULT: ABNORMAL
BACTERIA SPEC CULT: ABNORMAL
BACTERIA SPEC CULT: NORMAL
BACTERIA SPEC CULT: NORMAL
BASOPHILS # BLD: 0 K/UL (ref 0–0.1)
BASOPHILS NFR BLD: 0 % (ref 0–1)
BUN SERPL-MCNC: 55 MG/DL (ref 6–20)
BUN/CREAT SERPL: 15 (ref 12–20)
CALCIUM SERPL-MCNC: 7.4 MG/DL (ref 8.5–10.1)
CHLORIDE SERPL-SCNC: 108 MMOL/L (ref 97–108)
CO2 SERPL-SCNC: 16 MMOL/L (ref 21–32)
CREAT SERPL-MCNC: 3.77 MG/DL (ref 0.55–1.02)
DIFFERENTIAL METHOD BLD: ABNORMAL
EOSINOPHIL # BLD: 0.2 K/UL (ref 0–0.4)
EOSINOPHIL NFR BLD: 2 % (ref 0–7)
ERYTHROCYTE [DISTWIDTH] IN BLOOD BY AUTOMATED COUNT: 14.6 % (ref 11.5–14.5)
GLUCOSE BLD STRIP.AUTO-MCNC: 101 MG/DL (ref 65–100)
GLUCOSE BLD STRIP.AUTO-MCNC: 125 MG/DL (ref 65–100)
GLUCOSE BLD STRIP.AUTO-MCNC: 139 MG/DL (ref 65–100)
GLUCOSE BLD STRIP.AUTO-MCNC: 173 MG/DL (ref 65–100)
GLUCOSE SERPL-MCNC: 108 MG/DL (ref 65–100)
GRAM STN SPEC: ABNORMAL
GRAM STN SPEC: ABNORMAL
GRAM STN SPEC: NORMAL
HCG SERPL QL: NEGATIVE
HCT VFR BLD AUTO: 20.3 % (ref 35–47)
HCT VFR BLD AUTO: 23.3 % (ref 35–47)
HGB BLD-MCNC: 6.6 G/DL (ref 11.5–16)
HGB BLD-MCNC: 7.7 G/DL (ref 11.5–16)
IMM GRANULOCYTES # BLD: 0.1 K/UL (ref 0–0.04)
IMM GRANULOCYTES NFR BLD AUTO: 1 % (ref 0–0.5)
LYMPHOCYTES # BLD: 0.8 K/UL (ref 0.8–3.5)
LYMPHOCYTES NFR BLD: 8 % (ref 12–49)
MCH RBC QN AUTO: 25.4 PG (ref 26–34)
MCHC RBC AUTO-ENTMCNC: 32.5 G/DL (ref 30–36.5)
MCV RBC AUTO: 78.1 FL (ref 80–99)
MONOCYTES # BLD: 0.7 K/UL (ref 0–1)
MONOCYTES NFR BLD: 7 % (ref 5–13)
NEUTS SEG # BLD: 8.5 K/UL (ref 1.8–8)
NEUTS SEG NFR BLD: 82 % (ref 32–75)
NRBC # BLD: 0 K/UL (ref 0–0.01)
NRBC BLD-RTO: 0 PER 100 WBC
PLATELET # BLD AUTO: 316 K/UL (ref 150–400)
PMV BLD AUTO: 10.4 FL (ref 8.9–12.9)
POTASSIUM SERPL-SCNC: 4.1 MMOL/L (ref 3.5–5.1)
RBC # BLD AUTO: 2.6 M/UL (ref 3.8–5.2)
RBC MORPH BLD: ABNORMAL
RBC MORPH BLD: ABNORMAL
SERVICE CMNT-IMP: ABNORMAL
SERVICE CMNT-IMP: NORMAL
SERVICE CMNT-IMP: NORMAL
SODIUM SERPL-SCNC: 136 MMOL/L (ref 136–145)
WBC # BLD AUTO: 10.3 K/UL (ref 3.6–11)

## 2018-03-05 PROCEDURE — 74011636637 HC RX REV CODE- 636/637: Performed by: HOSPITALIST

## 2018-03-05 PROCEDURE — 65270000029 HC RM PRIVATE

## 2018-03-05 PROCEDURE — 74011250637 HC RX REV CODE- 250/637: Performed by: STUDENT IN AN ORGANIZED HEALTH CARE EDUCATION/TRAINING PROGRAM

## 2018-03-05 PROCEDURE — P9040 RBC LEUKOREDUCED IRRADIATED: HCPCS | Performed by: EMERGENCY MEDICINE

## 2018-03-05 PROCEDURE — 74011250636 HC RX REV CODE- 250/636: Performed by: INTERNAL MEDICINE

## 2018-03-05 PROCEDURE — 74011636637 HC RX REV CODE- 636/637: Performed by: INTERNAL MEDICINE

## 2018-03-05 PROCEDURE — 36430 TRANSFUSION BLD/BLD COMPNT: CPT

## 2018-03-05 PROCEDURE — 74011250636 HC RX REV CODE- 250/636: Performed by: STUDENT IN AN ORGANIZED HEALTH CARE EDUCATION/TRAINING PROGRAM

## 2018-03-05 PROCEDURE — 84703 CHORIONIC GONADOTROPIN ASSAY: CPT | Performed by: EMERGENCY MEDICINE

## 2018-03-05 PROCEDURE — 36415 COLL VENOUS BLD VENIPUNCTURE: CPT | Performed by: EMERGENCY MEDICINE

## 2018-03-05 PROCEDURE — 85025 COMPLETE CBC W/AUTO DIFF WBC: CPT | Performed by: INTERNAL MEDICINE

## 2018-03-05 PROCEDURE — 36600 WITHDRAWAL OF ARTERIAL BLOOD: CPT

## 2018-03-05 PROCEDURE — 74011000258 HC RX REV CODE- 258: Performed by: STUDENT IN AN ORGANIZED HEALTH CARE EDUCATION/TRAINING PROGRAM

## 2018-03-05 PROCEDURE — 30233N1 TRANSFUSION OF NONAUTOLOGOUS RED BLOOD CELLS INTO PERIPHERAL VEIN, PERCUTANEOUS APPROACH: ICD-10-PCS | Performed by: SURGERY

## 2018-03-05 PROCEDURE — 74011250637 HC RX REV CODE- 250/637: Performed by: HOSPITALIST

## 2018-03-05 PROCEDURE — 86902 BLOOD TYPE ANTIGEN DONOR EA: CPT | Performed by: EMERGENCY MEDICINE

## 2018-03-05 PROCEDURE — 80048 BASIC METABOLIC PNL TOTAL CA: CPT | Performed by: EMERGENCY MEDICINE

## 2018-03-05 PROCEDURE — 85018 HEMOGLOBIN: CPT | Performed by: STUDENT IN AN ORGANIZED HEALTH CARE EDUCATION/TRAINING PROGRAM

## 2018-03-05 PROCEDURE — 82962 GLUCOSE BLOOD TEST: CPT

## 2018-03-05 PROCEDURE — 74011250637 HC RX REV CODE- 250/637: Performed by: INTERNAL MEDICINE

## 2018-03-05 RX ORDER — SODIUM CHLORIDE 9 MG/ML
250 INJECTION, SOLUTION INTRAVENOUS AS NEEDED
Status: DISCONTINUED | OUTPATIENT
Start: 2018-03-05 | End: 2018-03-08

## 2018-03-05 RX ADMIN — INSULIN LISPRO 2 UNITS: 100 INJECTION, SOLUTION INTRAVENOUS; SUBCUTANEOUS at 12:29

## 2018-03-05 RX ADMIN — CLONIDINE HYDROCHLORIDE 0.2 MG: 0.1 TABLET ORAL at 09:06

## 2018-03-05 RX ADMIN — VENLAFAXINE 75 MG: 37.5 TABLET ORAL at 20:30

## 2018-03-05 RX ADMIN — HEPARIN SODIUM 5000 UNITS: 5000 INJECTION, SOLUTION INTRAVENOUS; SUBCUTANEOUS at 20:23

## 2018-03-05 RX ADMIN — DOCUSATE SODIUM 100 MG: 100 CAPSULE, LIQUID FILLED ORAL at 17:58

## 2018-03-05 RX ADMIN — VENLAFAXINE 75 MG: 37.5 TABLET ORAL at 09:07

## 2018-03-05 RX ADMIN — SODIUM CHLORIDE 150 ML/HR: 900 INJECTION, SOLUTION INTRAVENOUS at 23:37

## 2018-03-05 RX ADMIN — CLONIDINE HYDROCHLORIDE 0.2 MG: 0.1 TABLET ORAL at 15:47

## 2018-03-05 RX ADMIN — THERA TABS 1 TABLET: TAB at 09:06

## 2018-03-05 RX ADMIN — FOLIC ACID 1 MG: 1 TABLET ORAL at 09:07

## 2018-03-05 RX ADMIN — CALCIUM 500 MG: 500 TABLET ORAL at 09:06

## 2018-03-05 RX ADMIN — Medication 10 ML: at 15:46

## 2018-03-05 RX ADMIN — Medication 10 ML: at 20:32

## 2018-03-05 RX ADMIN — Medication 5 ML: at 20:31

## 2018-03-05 RX ADMIN — FAMOTIDINE 20 MG: 20 TABLET, FILM COATED ORAL at 09:06

## 2018-03-05 RX ADMIN — DAPTOMYCIN 400 MG: 500 INJECTION, POWDER, LYOPHILIZED, FOR SOLUTION INTRAVENOUS at 15:47

## 2018-03-05 RX ADMIN — QUETIAPINE FUMARATE 100 MG: 100 TABLET ORAL at 09:07

## 2018-03-05 RX ADMIN — QUETIAPINE FUMARATE 100 MG: 100 TABLET ORAL at 20:30

## 2018-03-05 RX ADMIN — HEPARIN SODIUM 5000 UNITS: 5000 INJECTION, SOLUTION INTRAVENOUS; SUBCUTANEOUS at 09:06

## 2018-03-05 RX ADMIN — PIPERACILLIN SODIUM AND TAZOBACTAM SODIUM 3.38 G: .375; 3 INJECTION, POWDER, LYOPHILIZED, FOR SOLUTION INTRAVENOUS at 02:09

## 2018-03-05 RX ADMIN — DOCUSATE SODIUM 100 MG: 100 CAPSULE, LIQUID FILLED ORAL at 09:06

## 2018-03-05 RX ADMIN — Medication 5 ML: at 06:00

## 2018-03-05 RX ADMIN — PIPERACILLIN SODIUM AND TAZOBACTAM SODIUM 3.38 G: .375; 3 INJECTION, POWDER, LYOPHILIZED, FOR SOLUTION INTRAVENOUS at 15:47

## 2018-03-05 RX ADMIN — OXYCODONE HYDROCHLORIDE AND ACETAMINOPHEN 1 TABLET: 5; 325 TABLET ORAL at 20:29

## 2018-03-05 RX ADMIN — SODIUM CHLORIDE 150 ML/HR: 900 INJECTION, SOLUTION INTRAVENOUS at 04:19

## 2018-03-05 RX ADMIN — CYANOCOBALAMIN TAB 500 MCG 1000 MCG: 500 TAB at 09:06

## 2018-03-05 RX ADMIN — DIPHENHYDRAMINE HYDROCHLORIDE 25 MG: 25 CAPSULE ORAL at 06:10

## 2018-03-05 RX ADMIN — DIPHENHYDRAMINE HYDROCHLORIDE 25 MG: 25 CAPSULE ORAL at 20:29

## 2018-03-05 RX ADMIN — OXYCODONE HYDROCHLORIDE AND ACETAMINOPHEN 1 TABLET: 5; 325 TABLET ORAL at 06:10

## 2018-03-05 RX ADMIN — CLONIDINE HYDROCHLORIDE 0.2 MG: 0.1 TABLET ORAL at 20:30

## 2018-03-05 RX ADMIN — Medication 10 ML: at 15:47

## 2018-03-05 RX ADMIN — INSULIN GLARGINE 12 UNITS: 100 INJECTION, SOLUTION SUBCUTANEOUS at 22:00

## 2018-03-05 NOTE — PERIOP NOTES
Patient arrived to Holding area for PIV access per Dr. Otis Varghese. Patient verbally states why she is here. 8142  #18 PIV inserted in right EJ by Dr. Otis Varghese. Patient tolerated procedure well. 1408  Transferred to room 2115. [place] RN given SBAR report. Also told her that Dr. Otis Varghese stated iv needs fluids connected to it to keep it patient.

## 2018-03-05 NOTE — PROGRESS NOTES
Problem: Falls - Risk of  Goal: *Absence of Falls  Document Blake Fall Risk and appropriate interventions in the flowsheet.    Outcome: Progressing Towards Goal  Fall Risk Interventions:  Mobility Interventions: Patient to call before getting OOB         Medication Interventions: Patient to call before getting OOB, Teach patient to arise slowly    Elimination Interventions: Call light in reach, Toileting schedule/hourly rounds

## 2018-03-05 NOTE — PROGRESS NOTES
Vascular Surgery Progress Note  Jaron Ravi ACNP-BC  3/5/2018       Subjective:     Ms. Vanda Huang is a 42yo AAF with a pmhx significant for recurrent DVT, DM, HTN, and asthma, Charcot foot, CKD IV, sickle cell, seizures, THU/chronic respiratory failure, and opioid dependence. She presented to the hospital with complaint of a left foot wound of 3 weeks duration. She has associated pain and swelling. X-ray on arrival was + for a Charcot foot, chronic 5th metatarsal head fracture and osteomyelitis. On arrival she was tachycardic. She was afebrile w a nl RR. Her WBC was 11.0. She was seen in consultation by Dr. Tyrone Ba and is s/p left foot I&D 03/03/2018. This am she states that her pain is controlled. She is OOB to chair. Nursing Data:     Patient Vitals for the past 24 hrs:   BP Temp Pulse Resp SpO2   03/05/18 0903 147/88 98.1 °F (36.7 °C) 87 18 100 %   03/05/18 0801 139/71 98.2 °F (36.8 °C) 84 18 100 %   03/05/18 0730 (!) 150/91 99 °F (37.2 °C) 93 18 -   03/05/18 0700 (!) 162/100 99 °F (37.2 °C) 87 18 -   03/05/18 0630 133/88 99.1 °F (37.3 °C) 88 18 -   03/05/18 0616 (!) 150/91 99.1 °F (37.3 °C) 93 18 -   03/05/18 0559 131/77 99.1 °F (37.3 °C) 87 19 -   03/05/18 0551 126/76 99.4 °F (37.4 °C) 85 18 -   03/05/18 0008 109/66 98.1 °F (36.7 °C) 79 18 100 %   03/04/18 2124 142/76 100.4 °F (38 °C) 100 18 100 %   03/04/18 1409 117/67 99.1 °F (37.3 °C) 94 18 99 %   03/04/18 1315 142/79 99.1 °F (37.3 °C) 98 18 100 %   03/04/18 1246 142/83 99.1 °F (37.3 °C) 88 18 100 %   03/04/18 1225 142/84 99.1 °F (37.3 °C) 94 18 98 %     ---------------------------------------------------------------------------------------------------------    Intake/Output Summary (Last 24 hours) at 03/05/18 1204  Last data filed at 03/05/18 0903   Gross per 24 hour   Intake           1382.4 ml   Output              601 ml   Net            781.4 ml       Exam:     Physical Exam   Constitutional: She is oriented to person, place, and time. She appears well-developed and well-nourished. HENT:   Head: Normocephalic and atraumatic. Eyes: EOM are normal. Pupils are equal, round, and reactive to light. Neck: Normal range of motion. Neck supple. Cardiovascular: Normal rate and regular rhythm. Pulmonary/Chest: Effort normal and breath sounds normal.   Abdominal: Soft. Bowel sounds are normal.   Musculoskeletal: Normal range of motion. Left foot: There is deformity. Neurological: She is alert and oriented to person, place, and time. Skin:   Bulky dressing to left foot C/D/I. Psychiatric: Judgment and thought content normal.       Lab Review:     .   Recent Results (from the past 24 hour(s))   GLUCOSE, POC    Collection Time: 03/04/18 12:38 PM   Result Value Ref Range    Glucose (POC) 100 65 - 100 mg/dL    Performed by Rubén Arroyo (PCT)    GLUCOSE, POC    Collection Time: 03/04/18  4:51 PM   Result Value Ref Range    Glucose (POC) 150 (H) 65 - 100 mg/dL    Performed by Marilee Oshea    HGB & HCT    Collection Time: 03/04/18  8:14 PM   Result Value Ref Range    HGB 6.9 (L) 11.5 - 16.0 g/dL    HCT 20.7 (L) 35.0 - 47.0 %   GLUCOSE, POC    Collection Time: 03/04/18  9:21 PM   Result Value Ref Range    Glucose (POC) 155 (H) 65 - 100 mg/dL    Performed by Mingo Rolling    METABOLIC PANEL, BASIC    Collection Time: 03/05/18  4:45 AM   Result Value Ref Range    Sodium 136 136 - 145 mmol/L    Potassium 4.1 3.5 - 5.1 mmol/L    Chloride 108 97 - 108 mmol/L    CO2 16 (L) 21 - 32 mmol/L    Anion gap 12 5 - 15 mmol/L    Glucose 108 (H) 65 - 100 mg/dL    BUN 55 (H) 6 - 20 MG/DL    Creatinine 3.77 (H) 0.55 - 1.02 MG/DL    BUN/Creatinine ratio 15 12 - 20      GFR est AA 16 (L) >60 ml/min/1.73m2    GFR est non-AA 13 (L) >60 ml/min/1.73m2    Calcium 7.4 (L) 8.5 - 10.1 MG/DL   CBC WITH AUTOMATED DIFF    Collection Time: 03/05/18  4:45 AM   Result Value Ref Range    WBC 10.3 3.6 - 11.0 K/uL    RBC 2.60 (L) 3.80 - 5.20 M/uL    HGB 6.6 (L) 11.5 - 16.0 g/dL    HCT 20.3 (L) 35.0 - 47.0 %    MCV 78.1 (L) 80.0 - 99.0 FL    MCH 25.4 (L) 26.0 - 34.0 PG    MCHC 32.5 30.0 - 36.5 g/dL    RDW 14.6 (H) 11.5 - 14.5 %    PLATELET 643 088 - 459 K/uL    MPV 10.4 8.9 - 12.9 FL    NRBC 0.0 0  WBC    ABSOLUTE NRBC 0.00 0.00 - 0.01 K/uL    NEUTROPHILS 82 (H) 32 - 75 %    LYMPHOCYTES 8 (L) 12 - 49 %    MONOCYTES 7 5 - 13 %    EOSINOPHILS 2 0 - 7 %    BASOPHILS 0 0 - 1 %    IMMATURE GRANULOCYTES 1 (H) 0.0 - 0.5 %    ABS. NEUTROPHILS 8.5 (H) 1.8 - 8.0 K/UL    ABS. LYMPHOCYTES 0.8 0.8 - 3.5 K/UL    ABS. MONOCYTES 0.7 0.0 - 1.0 K/UL    ABS. EOSINOPHILS 0.2 0.0 - 0.4 K/UL    ABS. BASOPHILS 0.0 0.0 - 0.1 K/UL    ABS. IMM. GRANS. 0.1 (H) 0.00 - 0.04 K/UL    DF AUTOMATED      RBC COMMENTS MICROCYTOSIS  1+        RBC COMMENTS HYPOCHROMIA  1+       HCG QL SERUM    Collection Time: 03/05/18  4:45 AM   Result Value Ref Range    HCG, Ql. NEGATIVE  NEG     GLUCOSE, POC    Collection Time: 03/05/18  8:03 AM   Result Value Ref Range    Glucose (POC) 101 (H) 65 - 100 mg/dL    Performed by Vic Nation (PCT)        XR Results (most recent):    Results from Hospital Encounter encounter on 03/02/18   XR FOOT LT AP/LAT   Narrative EXAM:  XR FOOT LT AP/LAT    INDICATION:   Left midfoot pain and swelling, recent surgical debridement of  infected tissues. COMPARISON:  Left foot views on 3/2/2018    FINDINGS:  AP and lateral portable views of the left foot demonstrate decreased  soft tissue lateral to the base of the fifth metatarsal.  Increased soft tissue  gas appears to be packed with material's. Partial resection of the proximal  fifth metatarsal is new. Widened distance between the second and third  metatarsals is unchanged. Neuropathic midfoot is unchanged. Impression IMPRESSION:      Postsurgical lateral midfoot soft tissues and partial resection of the base of  the fifth metatarsal.  Unchanged neuropathic midfoot and TMT joints.                    Assessment/Plan:      Consult problem:  DM Left Foot Wound   Left Foot Osteomyelitis   Charcot Foot     Active problems  IDDM I  Sickle cell disease   Chronic pain syndrome with opioid dependence  Sickle cell anemia s/p transfusion   Acute on chronic renal failure stage IV  Seizure disorder   HTN   THU/chronic respiratory failure oxygen dependent at night  Asthma   Hx of recurrent DVT     Will plan on performing another foot debridement/washout mid-week. Hope is to clean foot enough to allow a proximal transmetatarsal amputation. Risk of limb loss remains high and this was explained in detail to the patient. BID dressing changes to wound.   Management of comorbid conditions by primary team.    VTE prophylaxis:  [facility]    Disposition:   [facility] [facility]

## 2018-03-05 NOTE — PROGRESS NOTES
General Surgery End of Shift Nursing Note    Bedside shift change report given to [address] [place] [address] (oncoming nurse) by Laury Sung RN (offgoing nurse). Report included the following information SBAR, Kardex, OR Summary, Intake/Output, MAR, Recent Results and Cardiac Rhythm SR. Shift worked:   7p-7a   Summary of shift:    Dressing changed, small amt of blood seen, Hgb 6.9 s/p blood transfusion, another unit ordered, Hcg qualitative sent to lab @ request of [place] blood bank, another sample sent to blood bank because type & cross is d/t  today   Issues for physician to address:   none     Number times ambulated in hallway past shift: 0    Number of times OOB to chair past shift: all night in recliner    Pain Management:  Current medication:   Patient states pain is manageable on current pain medication: YES    GI:    Current diet:  DIET DIABETIC CONSISTENT CARB Regular    Tolerating current diet: YES  Passing flatus: YES  Last Bowel Movement: several days ago   Appearance:     Respiratory:    Incentive Spirometer at bedside: NO  Patient instructed on use: NO    Patient Safety:    Falls Score: 2  Bed Alarm On? No  Sitter?  No  half-way officers @ bedside, pt handcuffed to bed    Juani Kerns, RN

## 2018-03-05 NOTE — PROGRESS NOTES
Pharmacy Automatic Renal Dosing Protocol - Antimicrobials    Indication for Antimicrobials: Osteomyelitis    Current Regimen of Each Antimicrobial:  Piperacillin tazobactam 3.375 Q12H (Start Date 3/2, Day # 4)  Daptomycin 4 mg/kg q24h (3/5 Day 1)    Previous Antimicrobial Therapy:  Vancomycin 1500 mg x 1 then 1000 mg Q24H (Start Date 3/2 - 3/5)    Vancomycin Goal Level: 15-20 mcg/ml    Measured / Extrapolated Vancomycin Level:     Significant Cultures:   3/2 blood: NGTD x 3 d - Preliminary  3/2 wound (foot): Heavy diphtheroids- Preliminary  3/2 urine: NGTD- Final  3/3 wound: scant growth- Preliminary  3/3 anaerobic: Pending  3/3 tissue: Rare GPC  Pending  3//3 anaerobic: Pending      Labs:  Recent Labs      18   0445  18   0534  18   0921   CREA  3.77*  3.80*  3.28*   BUN  55*  51*  45*   WBC  10.3  10.6  11.0     Temp (24hrs), Av.9 °F (37.2 °C), Min:97.6 °F (36.4 °C), Max:100.4 °F (38 °C)        Paralysis, amputations, malnutrition: None documented  Creatinine Clearance (mL/min) or Dialysis: ~16 ml/min    Impression/Plan:    Vanc discontinued today by MD discussed with him to reserve Dapto for Vanc resistant pts but MD does not want to use Vanc due to renal insult.  Recommended Dapto dose to be 6 mg/kg Q48H rounded to 400 mg for Osteo - Accepted.  Piperacillin tazobactam 3.375 grams Q12H per renal dosing protocol.  [facility] daily     Pharmacy will follow daily and adjust medications as appropriate for renal function and/or serum levels. Thank you,  Donna Milton, [facility]      Recommended duration of therapy  [email or web address]%[email or web address]. docx    Renal Dosing  [email or web address]/Pharmacy/[email or web address]/Renal%[email or web address]. pdf

## 2018-03-05 NOTE — PROGRESS NOTES
Patient is a 42 yr old woman admitted with osteomyelitis. Patient currently lives in a correction facility. Patient is independent with ADL/IADL. Law enforcement will transport pt back to the correction facility at discharge. Patient uses a walker and a cane at home. PCP- Dr Cesar Womack Management Interventions  PCP Verified by CM: Yes (Dr Jose Murcia)  Mode of Transport at Discharge:  Other (see comment) (Correction Facility)  Transition of Care Consult (CM Consult): Discharge Planning  Discharge Durable Medical Equipment: No (Patient uses a waker and a cane at home)  Physical Therapy Consult: No  Occupational Therapy Consult: No  Speech Therapy Consult: No  Current Support Network: [facility]  Confirm Follow Up Transport: Other (see comment) ([address])  Plan discussed with Pt/Family/Caregiver: No  Discharge Location  Discharge Placement: Law Enforcement Custody      Kierra So  Ext 2001

## 2018-03-05 NOTE — PROGRESS NOTES
Bedside and Verbal shift change report given to Ashish Calles RN (oncoming nurse) by Samir Devlin RN (offgoing nurse). Report included the following information SBAR, Kardex, Intake/Output, MAR and Recent Results.

## 2018-03-05 NOTE — PROGRESS NOTES
Hospitalist Progress Note    NAME: Sven Ash   :  1980   MRN:  [id number]       Assessment / Plan:  *Hb 6.6 , post BT x 2, she may need more    SIRS due to left foot osteomyelitis and Abscess, Charcot foot:  -Excisional debridement and biopsy done on 3/3, for repeat surgery later this week, likely will be hospitalized for at least 1 week as she may need multiple surgeries  -will ask CM  if she can have Abx at [facility]     - LLE doppler with no deep venous thrombosis identified.  - MRI foot : osteo  - blood cultures requested, not yet drawn. Wound culture requested. - emperic vancomycin and zosyn for now  -, CRP 35  -PIV placed 3/5, pt will need abx for some time    Acute on chronic anemia due to sickle cell disease:  Pt without sx of crisis at this time  - 1u pRBCs ordered due to likely surgery while here  - serial Hg  - folic acid  Insulin dependent DM1 uncontrolled with nephropathy:  - lantus ordered  - lispro sliding scale  STONE in setting of CKD4:  Pt says that she has been on dialysis in the past but then Cr recovered some. Previously has seen Duane Hudson.  - IV fluids  - caution with vancomycin, pharmacy assisting with dosing    THU:  Uses 2 LPM O2 at night     Code Status: Full  Surrogate Decision Maker: in [facility], no decision maker allowed at this time  DVT Prophylaxis: heparin    Body mass index is 25.97 kg/(m^2). Subjective:     Chief Complaint / Reason for Physician Visit  Eating comfortably, nil complaints    Review of Systems:  Symptom Y/N Comments  Symptom Y/N Comments   Fever/Chills    Chest Pain     Poor Appetite    Edema     Cough    Abdominal Pain     Sputum    Joint Pain     SOB/ADAMS    Pruritis/Rash     Nausea/vomit    Tolerating PT/OT     Diarrhea    Tolerating Diet     Constipation    Other       Could NOT obtain due to:      Objective:     VITALS:   Last 24hrs VS reviewed since prior progress note.  Most recent are:  Patient Vitals for the past 24 hrs: Temp Pulse Resp BP SpO2   03/05/18 0903 98.1 °F (36.7 °C) 87 18 147/88 100 %   03/05/18 0801 98.2 °F (36.8 °C) 84 18 139/71 100 %   03/05/18 0730 99 °F (37.2 °C) 93 18 (!) 150/91 -   03/05/18 0700 99 °F (37.2 °C) 87 18 (!) 162/100 -   03/05/18 0630 99.1 °F (37.3 °C) 88 18 133/88 -   03/05/18 0616 99.1 °F (37.3 °C) 93 18 (!) 150/91 -   03/05/18 0559 99.1 °F (37.3 °C) 87 19 131/77 -   03/05/18 0551 99.4 °F (37.4 °C) 85 18 126/76 -   03/05/18 0008 98.1 °F (36.7 °C) 79 18 109/66 100 %   03/04/18 2124 100.4 °F (38 °C) 100 18 142/76 100 %   03/04/18 1409 99.1 °F (37.3 °C) 94 18 117/67 99 %   03/04/18 1315 99.1 °F (37.3 °C) 98 18 142/79 100 %   03/04/18 1246 99.1 °F (37.3 °C) 88 18 142/83 100 %   03/04/18 1225 99.1 °F (37.3 °C) 94 18 142/84 98 %   03/04/18 1151 99.5 °F (37.5 °C) 91 18 129/78 99 %   03/04/18 1141 98.8 °F (37.1 °C) 88 18 134/77 100 %   03/04/18 1132 97.6 °F (36.4 °C) 62 18 155/87 95 %       Intake/Output Summary (Last 24 hours) at 03/05/18 1007  Last data filed at 03/05/18 0903   Gross per 24 hour   Intake           1382.4 ml   Output              601 ml   Net            781.4 ml        PHYSICAL EXAM:  General: Sitting up, eating. Alert, cooperative, no acute distress    EENT:  EOMI. Anicteric sclerae. MMM  Resp:  CTA bilaterally, no wheezing or rales.   No accessory muscle use  CV:  Regular  rhythm,  No edema  GI:  Soft, Non distended, Non tender.  +Bowel sounds  Neurologic:  Alert and oriented X 3, normal speech,   Psych:   Good insight. Not anxious nor agitated  Skin:  L foot in bandage, multiple tattoos    Reviewed most current lab test results and cultures  YES  Reviewed most current radiology test results   YES  Review and summation of old records today    NO  Reviewed patient's current orders and MAR    YES  PMH/SH reviewed - no change compared to H&P  ________________________________________________________________________  Care Plan discussed with:    Comments   Patient     Family      RN     Care Manager     Consultant                        Multidiciplinary team rounds were held today with , nursing, pharmacist and clinical coordinator. Patient's plan of care was discussed; medications were reviewed and discharge planning was addressed. ________________________________________________________________________  Total NON critical care TIME:  20   Minutes    Total CRITICAL CARE TIME Spent:   Minutes non procedure based      Comments   >50% of visit spent in counseling and coordination of care     ________________________________________________________________________  Jennifer Mckeon MD     Procedures: see electronic medical records for all procedures/Xrays and details which were not copied into this note but were reviewed prior to creation of Plan. LABS:  I reviewed today's most current labs and imaging studies.   Pertinent labs include:  Recent Labs      03/05/18 0445 03/04/18 2014 03/04/18 0534 03/03/18   0921   WBC  10.3   --   10.6  11.0   HGB  6.6*  6.9*  6.1*  8.3*   HCT  20.3*  20.7*  18.5*  26.3*   PLT  316   --   324  383     Recent Labs      03/05/18 0445 03/04/18 0534 03/03/18   0921  03/02/18   1548   NA  136  137  135*  136   K  4.1  4.0  4.0  3.9   CL  108  107  104  104   CO2  16*  18*  19*  21   GLU  108*  57*  100  157*   BUN  55*  51*  45*  44*   CREA  3.77*  3.80*  3.28*  3.51*   CA  7.4*  7.7*  8.0*  7.9*   ALB   --    --    --   1.9*   TBILI   --    --    --   0.3   SGOT   --    --    --   27   ALT   --    --    --   31       Signed: Jennifer Mckeon MD

## 2018-03-05 NOTE — PROGRESS NOTES
Interdisciplinary Rounds were completed on this patient. Rounds included nursing, clinical care leader, pharmacy, and case management. Patient was doing well with some problems : requiring blood transfusions, had EJ placed by anesthesia. Patient had the following concerns: pain, weakness, and needs Asher's catheter placed tomorrow    Goals for the day will include: continue RX as is and mobilize. Anticipated discharge date: Or Wednesday ? ?

## 2018-03-06 ENCOUNTER — ANESTHESIA EVENT (OUTPATIENT)
Dept: SURGERY | Age: 38
DRG: 854 | End: 2018-03-06
Payer: MEDICARE

## 2018-03-06 ENCOUNTER — APPOINTMENT (OUTPATIENT)
Dept: INTERVENTIONAL RADIOLOGY/VASCULAR | Age: 38
DRG: 854 | End: 2018-03-06
Attending: STUDENT IN AN ORGANIZED HEALTH CARE EDUCATION/TRAINING PROGRAM
Payer: MEDICARE

## 2018-03-06 LAB
ABO + RH BLD: NORMAL
ALBUMIN SERPL-MCNC: 1.4 G/DL (ref 3.5–5)
ALBUMIN/GLOB SERPL: 0.3 {RATIO} (ref 1.1–2.2)
ALP SERPL-CCNC: 355 U/L (ref 45–117)
ALT SERPL-CCNC: 16 U/L (ref 12–78)
ANION GAP SERPL CALC-SCNC: 10 MMOL/L (ref 5–15)
ANION GAP SERPL CALC-SCNC: 9 MMOL/L (ref 5–15)
ANTIGENS PRESENT RBC DONR: NORMAL
AST SERPL-CCNC: 20 U/L (ref 15–37)
BACTERIA SPEC CULT: ABNORMAL
BACTERIA SPEC CULT: ABNORMAL
BASOPHILS # BLD: 0 K/UL (ref 0–0.1)
BASOPHILS NFR BLD: 0 % (ref 0–1)
BILIRUB SERPL-MCNC: 0.2 MG/DL (ref 0.2–1)
BLD PROD TYP BPU: NORMAL
BLOOD BANK CMNT PATIENT-IMP: NORMAL
BLOOD GROUP ANTIBODIES SERPL: NORMAL
BPU ID: NORMAL
BUN SERPL-MCNC: 34 MG/DL (ref 6–20)
BUN SERPL-MCNC: 48 MG/DL (ref 6–20)
BUN/CREAT SERPL: 15 (ref 12–20)
BUN/CREAT SERPL: 17 (ref 12–20)
CALCIUM SERPL-MCNC: 5.2 MG/DL (ref 8.5–10.1)
CALCIUM SERPL-MCNC: 7.7 MG/DL (ref 8.5–10.1)
CHLORIDE SERPL-SCNC: 111 MMOL/L (ref 97–108)
CHLORIDE SERPL-SCNC: 122 MMOL/L (ref 97–108)
CO2 SERPL-SCNC: 13 MMOL/L (ref 21–32)
CO2 SERPL-SCNC: 18 MMOL/L (ref 21–32)
CREAT SERPL-MCNC: 2.02 MG/DL (ref 0.55–1.02)
CREAT SERPL-MCNC: 3.17 MG/DL (ref 0.55–1.02)
CROSSMATCH RESULT,%XM: NORMAL
DIFFERENTIAL METHOD BLD: ABNORMAL
EOSINOPHIL # BLD: 0.1 K/UL (ref 0–0.4)
EOSINOPHIL NFR BLD: 1 % (ref 0–7)
ERYTHROCYTE [DISTWIDTH] IN BLOOD BY AUTOMATED COUNT: 15.1 % (ref 11.5–14.5)
ERYTHROCYTE [SEDIMENTATION RATE] IN BLOOD: 129 MM/HR (ref 0–20)
GLOBULIN SER CALC-MCNC: 4.8 G/DL (ref 2–4)
GLUCOSE BLD STRIP.AUTO-MCNC: 112 MG/DL (ref 65–100)
GLUCOSE BLD STRIP.AUTO-MCNC: 159 MG/DL (ref 65–100)
GLUCOSE BLD STRIP.AUTO-MCNC: 170 MG/DL (ref 65–100)
GLUCOSE BLD STRIP.AUTO-MCNC: 53 MG/DL (ref 65–100)
GLUCOSE BLD STRIP.AUTO-MCNC: 60 MG/DL (ref 65–100)
GLUCOSE BLD STRIP.AUTO-MCNC: 65 MG/DL (ref 65–100)
GLUCOSE BLD STRIP.AUTO-MCNC: 67 MG/DL (ref 65–100)
GLUCOSE BLD STRIP.AUTO-MCNC: 68 MG/DL (ref 65–100)
GLUCOSE SERPL-MCNC: 44 MG/DL (ref 65–100)
GLUCOSE SERPL-MCNC: 49 MG/DL (ref 65–100)
HCT VFR BLD AUTO: 20.9 % (ref 35–47)
HGB BLD-MCNC: 7 G/DL (ref 11.5–16)
IMM GRANULOCYTES # BLD: 0.1 K/UL (ref 0–0.04)
IMM GRANULOCYTES NFR BLD AUTO: 1 % (ref 0–0.5)
LYMPHOCYTES # BLD: 0.7 K/UL (ref 0.8–3.5)
LYMPHOCYTES NFR BLD: 7 % (ref 12–49)
MCH RBC QN AUTO: 26.2 PG (ref 26–34)
MCHC RBC AUTO-ENTMCNC: 33.5 G/DL (ref 30–36.5)
MCV RBC AUTO: 78.3 FL (ref 80–99)
MONOCYTES # BLD: 0.7 K/UL (ref 0–1)
MONOCYTES NFR BLD: 7 % (ref 5–13)
NEUTS SEG # BLD: 8.7 K/UL (ref 1.8–8)
NEUTS SEG NFR BLD: 84 % (ref 32–75)
NRBC # BLD: 0 K/UL (ref 0–0.01)
NRBC BLD-RTO: 0 PER 100 WBC
PHYSICIAN INSTRUCTIO,%PI: NORMAL
PLATELET # BLD AUTO: 335 K/UL (ref 150–400)
PMV BLD AUTO: 11.4 FL (ref 8.9–12.9)
POTASSIUM SERPL-SCNC: 3.9 MMOL/L (ref 3.5–5.1)
POTASSIUM SERPL-SCNC: 4.1 MMOL/L (ref 3.5–5.1)
PROT SERPL-MCNC: 6.2 G/DL (ref 6.4–8.2)
RBC # BLD AUTO: 2.67 M/UL (ref 3.8–5.2)
RBC MORPH BLD: ABNORMAL
SERVICE CMNT-IMP: ABNORMAL
SERVICE CMNT-IMP: NORMAL
SODIUM SERPL-SCNC: 139 MMOL/L (ref 136–145)
SODIUM SERPL-SCNC: 144 MMOL/L (ref 136–145)
SPECIMEN EXP DATE BLD: NORMAL
STATUS OF UNIT,%ST: NORMAL
UNIT DIVISION, %UDIV: 0
UNIT DIVISION, %UDIV: 0
UNIT DIVISION, %UDIV: NORMAL
UNIT DIVISION, %UDIV: NORMAL
WBC # BLD AUTO: 10.3 K/UL (ref 3.6–11)

## 2018-03-06 PROCEDURE — 74011250637 HC RX REV CODE- 250/637: Performed by: STUDENT IN AN ORGANIZED HEALTH CARE EDUCATION/TRAINING PROGRAM

## 2018-03-06 PROCEDURE — 36415 COLL VENOUS BLD VENIPUNCTURE: CPT | Performed by: STUDENT IN AN ORGANIZED HEALTH CARE EDUCATION/TRAINING PROGRAM

## 2018-03-06 PROCEDURE — 74011000250 HC RX REV CODE- 250: Performed by: RADIOLOGY

## 2018-03-06 PROCEDURE — 82962 GLUCOSE BLOOD TEST: CPT

## 2018-03-06 PROCEDURE — 77030018719 HC DRSG PTCH ANTIMIC J&J -A

## 2018-03-06 PROCEDURE — 77030011229 HC DIL VESL COON COOK -A

## 2018-03-06 PROCEDURE — 74011000250 HC RX REV CODE- 250: Performed by: INTERNAL MEDICINE

## 2018-03-06 PROCEDURE — 74011250637 HC RX REV CODE- 250/637: Performed by: HOSPITALIST

## 2018-03-06 PROCEDURE — 74011250637 HC RX REV CODE- 250/637: Performed by: INTERNAL MEDICINE

## 2018-03-06 PROCEDURE — 85025 COMPLETE CBC W/AUTO DIFF WBC: CPT | Performed by: STUDENT IN AN ORGANIZED HEALTH CARE EDUCATION/TRAINING PROGRAM

## 2018-03-06 PROCEDURE — C1751 CATH, INF, PER/CENT/MIDLINE: HCPCS

## 2018-03-06 PROCEDURE — 74011250636 HC RX REV CODE- 250/636: Performed by: INTERNAL MEDICINE

## 2018-03-06 PROCEDURE — 77030031139 HC SUT VCRL2 J&J -A

## 2018-03-06 PROCEDURE — 85652 RBC SED RATE AUTOMATED: CPT | Performed by: STUDENT IN AN ORGANIZED HEALTH CARE EDUCATION/TRAINING PROGRAM

## 2018-03-06 PROCEDURE — 80048 BASIC METABOLIC PNL TOTAL CA: CPT | Performed by: STUDENT IN AN ORGANIZED HEALTH CARE EDUCATION/TRAINING PROGRAM

## 2018-03-06 PROCEDURE — 02HV33Z INSERTION OF INFUSION DEVICE INTO SUPERIOR VENA CAVA, PERCUTANEOUS APPROACH: ICD-10-PCS | Performed by: RADIOLOGY

## 2018-03-06 PROCEDURE — 74011250636 HC RX REV CODE- 250/636: Performed by: RADIOLOGY

## 2018-03-06 PROCEDURE — 76937 US GUIDE VASCULAR ACCESS: CPT

## 2018-03-06 PROCEDURE — 80053 COMPREHEN METABOLIC PANEL: CPT | Performed by: INTERNAL MEDICINE

## 2018-03-06 PROCEDURE — 65270000029 HC RM PRIVATE

## 2018-03-06 RX ORDER — FENTANYL CITRATE 50 UG/ML
100 INJECTION, SOLUTION INTRAMUSCULAR; INTRAVENOUS
Status: DISCONTINUED | OUTPATIENT
Start: 2018-03-06 | End: 2018-03-06

## 2018-03-06 RX ORDER — INSULIN GLARGINE 100 [IU]/ML
12 INJECTION, SOLUTION SUBCUTANEOUS
Status: DISCONTINUED | OUTPATIENT
Start: 2018-03-07 | End: 2018-03-07

## 2018-03-06 RX ORDER — HEPARIN SODIUM 200 [USP'U]/100ML
400 INJECTION, SOLUTION INTRAVENOUS ONCE
Status: DISPENSED | OUTPATIENT
Start: 2018-03-06 | End: 2018-03-07

## 2018-03-06 RX ORDER — LIDOCAINE HYDROCHLORIDE 20 MG/ML
20 INJECTION, SOLUTION INFILTRATION; PERINEURAL ONCE
Status: COMPLETED | OUTPATIENT
Start: 2018-03-06 | End: 2018-03-06

## 2018-03-06 RX ORDER — SODIUM CHLORIDE 0.9 % (FLUSH) 0.9 %
10-40 SYRINGE (ML) INJECTION EVERY 8 HOURS
Status: DISCONTINUED | OUTPATIENT
Start: 2018-03-06 | End: 2018-03-23

## 2018-03-06 RX ORDER — DEXTROSE MONOHYDRATE 50 MG/ML
50 INJECTION, SOLUTION INTRAVENOUS CONTINUOUS
Status: DISCONTINUED | OUTPATIENT
Start: 2018-03-06 | End: 2018-03-07

## 2018-03-06 RX ORDER — HEPARIN 100 UNIT/ML
300 SYRINGE INTRAVENOUS ONCE
Status: COMPLETED | OUTPATIENT
Start: 2018-03-06 | End: 2018-03-06

## 2018-03-06 RX ADMIN — FOLIC ACID 1 MG: 1 TABLET ORAL at 08:32

## 2018-03-06 RX ADMIN — OXYCODONE HYDROCHLORIDE AND ACETAMINOPHEN 1 TABLET: 5; 325 TABLET ORAL at 21:24

## 2018-03-06 RX ADMIN — THERA TABS 1 TABLET: TAB at 08:32

## 2018-03-06 RX ADMIN — QUETIAPINE FUMARATE 100 MG: 100 TABLET ORAL at 21:36

## 2018-03-06 RX ADMIN — VENLAFAXINE 75 MG: 37.5 TABLET ORAL at 08:32

## 2018-03-06 RX ADMIN — QUETIAPINE FUMARATE 100 MG: 100 TABLET ORAL at 08:32

## 2018-03-06 RX ADMIN — DEXTROSE MONOHYDRATE 25 G: 25 INJECTION, SOLUTION INTRAVENOUS at 12:00

## 2018-03-06 RX ADMIN — DOCUSATE SODIUM 100 MG: 100 CAPSULE, LIQUID FILLED ORAL at 08:32

## 2018-03-06 RX ADMIN — VENLAFAXINE 75 MG: 37.5 TABLET ORAL at 21:36

## 2018-03-06 RX ADMIN — DIPHENHYDRAMINE HYDROCHLORIDE 25 MG: 25 CAPSULE ORAL at 21:23

## 2018-03-06 RX ADMIN — DOCUSATE SODIUM 100 MG: 100 CAPSULE, LIQUID FILLED ORAL at 17:54

## 2018-03-06 RX ADMIN — Medication 10 ML: at 15:38

## 2018-03-06 RX ADMIN — CLONIDINE HYDROCHLORIDE 0.2 MG: 0.1 TABLET ORAL at 15:38

## 2018-03-06 RX ADMIN — FAMOTIDINE 20 MG: 20 TABLET, FILM COATED ORAL at 08:32

## 2018-03-06 RX ADMIN — DEXTROSE MONOHYDRATE 75 ML/HR: 5 INJECTION, SOLUTION INTRAVENOUS at 15:38

## 2018-03-06 RX ADMIN — Medication 20 ML: at 15:39

## 2018-03-06 RX ADMIN — Medication 10 ML: at 15:39

## 2018-03-06 RX ADMIN — Medication 10 ML: at 22:00

## 2018-03-06 RX ADMIN — CALCIUM 500 MG: 500 TABLET ORAL at 08:32

## 2018-03-06 RX ADMIN — OXYCODONE HYDROCHLORIDE AND ACETAMINOPHEN 1 TABLET: 5; 325 TABLET ORAL at 08:55

## 2018-03-06 RX ADMIN — Medication 10 ML: at 06:00

## 2018-03-06 RX ADMIN — CYANOCOBALAMIN TAB 500 MCG 1000 MCG: 500 TAB at 08:32

## 2018-03-06 RX ADMIN — DEXTROSE MONOHYDRATE 25 G: 25 INJECTION, SOLUTION INTRAVENOUS at 08:31

## 2018-03-06 RX ADMIN — SODIUM CHLORIDE, PRESERVATIVE FREE 300 UNITS: 5 INJECTION INTRAVENOUS at 14:05

## 2018-03-06 RX ADMIN — ACETAMINOPHEN 650 MG: 325 TABLET ORAL at 21:23

## 2018-03-06 RX ADMIN — SODIUM CHLORIDE 150 ML/HR: 900 INJECTION, SOLUTION INTRAVENOUS at 09:24

## 2018-03-06 RX ADMIN — FENTANYL CITRATE 50 MCG: 50 INJECTION, SOLUTION INTRAMUSCULAR; INTRAVENOUS at 14:10

## 2018-03-06 RX ADMIN — LIDOCAINE HYDROCHLORIDE 400 MG: 20 INJECTION, SOLUTION INFILTRATION; PERINEURAL at 14:05

## 2018-03-06 RX ADMIN — PIPERACILLIN SODIUM AND TAZOBACTAM SODIUM 3.38 G: .375; 3 INJECTION, POWDER, LYOPHILIZED, FOR SOLUTION INTRAVENOUS at 15:37

## 2018-03-06 RX ADMIN — PIPERACILLIN SODIUM AND TAZOBACTAM SODIUM 3.38 G: .375; 3 INJECTION, POWDER, LYOPHILIZED, FOR SOLUTION INTRAVENOUS at 02:51

## 2018-03-06 RX ADMIN — CLONIDINE HYDROCHLORIDE 0.2 MG: 0.1 TABLET ORAL at 21:35

## 2018-03-06 RX ADMIN — HEPARIN SODIUM 5000 UNITS: 5000 INJECTION, SOLUTION INTRAVENOUS; SUBCUTANEOUS at 08:31

## 2018-03-06 RX ADMIN — CLONIDINE HYDROCHLORIDE 0.2 MG: 0.1 TABLET ORAL at 08:32

## 2018-03-06 RX ADMIN — DIPHENHYDRAMINE HYDROCHLORIDE 25 MG: 25 CAPSULE ORAL at 08:55

## 2018-03-06 NOTE — PROGRESS NOTES
Vascular Surgery Progress Note  Ladonna Diaz ACNP-BC  3/6/2018       Subjective:     Ms. Shine Briceño is a 40yo AAF with a pmhx significant for recurrent DVT, DM, HTN, and asthma, Charcot foot, CKD IV, sickle cell, seizures, THU/chronic respiratory failure, and opioid dependence. She presented to the hospital with complaint of a left foot wound of 3 weeks duration. She had associated pain and swelling. X-ray on arrival was + for a Charcot foot, chronic 5th metatarsal head fracture and osteomyelitis. She was seen in consultation by Dr. Julio Cesar Quintanilla and is s/p left foot I&D 03/03/2018. This am she states that her pain is controlled. She is OOB to chair. H&H fell again overnight despite transfusion. Febrile overnight to 101.2. Leukocytosis improved. Nursing Data:     Patient Vitals for the past 24 hrs:   BP Temp Pulse Resp SpO2   03/06/18 1146 127/63 98.7 °F (37.1 °C) 83 17 100 %   03/06/18 0803 147/69 98.9 °F (37.2 °C) 93 17 100 %   03/06/18 0355 153/83 98.8 °F (37.1 °C) 81 16 98 %   03/05/18 2311 125/65 98.6 °F (37 °C) 86 16 100 %   03/05/18 1932 152/73 (!) 101.2 °F (38.4 °C) 99 16 98 %   03/05/18 1547 157/83 (!) 101 °F (38.3 °C) 99 16 93 %     ---------------------------------------------------------------------------------------------------------    Intake/Output Summary (Last 24 hours) at 03/06/18 1326  Last data filed at 03/05/18 1327   Gross per 24 hour   Intake              300 ml   Output              250 ml   Net               50 ml       Exam:     Physical Exam   Constitutional: She is oriented to person, place, and time. She appears well-developed and well-nourished. HENT:   Head: Normocephalic and atraumatic. Eyes: EOM are normal. Pupils are equal, round, and reactive to light. Neck: Normal range of motion. Neck supple. Cardiovascular: Normal rate and regular rhythm. Pulmonary/Chest: Effort normal and breath sounds normal.   Abdominal: Soft.  Bowel sounds are normal. Musculoskeletal: Normal range of motion. Left foot: There is deformity. Neurological: She is alert and oriented to person, place, and time. Skin:   Bulky dressing to left foot C/D/I. Psychiatric: Judgment and thought content normal.       Lab Review:     . Recent Results (from the past 24 hour(s))   GLUCOSE, POC    Collection Time: 03/05/18  4:22 PM   Result Value Ref Range    Glucose (POC) 125 (H) 65 - 100 mg/dL    Performed by [address] [place] [address], POC    Collection Time: 03/05/18  8:59 PM   Result Value Ref Range    Glucose (POC) 139 (H) 65 - 100 mg/dL    Performed by DALIA MICHELLE    HGB & HCT    Collection Time: 03/05/18  9:04 PM   Result Value Ref Range    HGB 7.7 (L) 11.5 - 16.0 g/dL    HCT 23.3 (L) 35.0 - 47.0 %   GLUCOSE, POC    Collection Time: 03/06/18  8:05 AM   Result Value Ref Range    Glucose (POC) 67 65 - 100 mg/dL    Performed by Cyndee Mirandaape (PCT)    GLUCOSE, POC    Collection Time: 03/06/18  8:12 AM   Result Value Ref Range    Glucose (POC) 65 65 - 100 mg/dL    Performed by Cyndee Drape (PCT)    CBC WITH AUTOMATED DIFF    Collection Time: 03/06/18  9:00 AM   Result Value Ref Range    WBC 10.3 3.6 - 11.0 K/uL    RBC 2.67 (L) 3.80 - 5.20 M/uL    HGB 7.0 (L) 11.5 - 16.0 g/dL    HCT 20.9 (L) 35.0 - 47.0 %    MCV 78.3 (L) 80.0 - 99.0 FL    MCH 26.2 26.0 - 34.0 PG    MCHC 33.5 30.0 - 36.5 g/dL    RDW 15.1 (H) 11.5 - 14.5 %    PLATELET 332 883 - 834 K/uL    MPV 11.4 8.9 - 12.9 FL    NRBC 0.0 0  WBC    ABSOLUTE NRBC 0.00 0.00 - 0.01 K/uL    NEUTROPHILS 84 (H) 32 - 75 %    LYMPHOCYTES 7 (L) 12 - 49 %    MONOCYTES 7 5 - 13 %    EOSINOPHILS 1 0 - 7 %    BASOPHILS 0 0 - 1 %    IMMATURE GRANULOCYTES 1 (H) 0.0 - 0.5 %    ABS. NEUTROPHILS 8.7 (H) 1.8 - 8.0 K/UL    ABS. LYMPHOCYTES 0.7 (L) 0.8 - 3.5 K/UL    ABS. MONOCYTES 0.7 0.0 - 1.0 K/UL    ABS. EOSINOPHILS 0.1 0.0 - 0.4 K/UL    ABS. BASOPHILS 0.0 0.0 - 0.1 K/UL    ABS. IMM.  GRANS. 0.1 (H) 0.00 - 0.04 K/UL    DF SMEAR SCANNED      RBC COMMENTS NORMOCYTIC, NORMOCHROMIC     SED RATE (ESR)    Collection Time: 03/06/18  9:00 AM   Result Value Ref Range    Sed rate, automated 129 (H) 0 - 20 mm/hr   METABOLIC PANEL, BASIC    Collection Time: 03/06/18 10:57 AM   Result Value Ref Range    Sodium 144 136 - 145 mmol/L    Potassium 4.1 3.5 - 5.1 mmol/L    Chloride 122 (H) 97 - 108 mmol/L    CO2 13 (LL) 21 - 32 mmol/L    Anion gap 9 5 - 15 mmol/L    Glucose 44 (LL) 65 - 100 mg/dL    BUN 34 (H) 6 - 20 MG/DL    Creatinine 2.02 (H) 0.55 - 1.02 MG/DL    BUN/Creatinine ratio 17 12 - 20      GFR est AA 33 (L) >60 ml/min/1.73m2    GFR est non-AA 27 (L) >60 ml/min/1.73m2    Calcium 5.2 (LL) 8.5 - 10.1 MG/DL   GLUCOSE, POC    Collection Time: 03/06/18 11:48 AM   Result Value Ref Range    Glucose (POC) 60 (L) 65 - 100 mg/dL    Performed by Gerardo Bermeo (PCT)    GLUCOSE, POC    Collection Time: 03/06/18 12:36 PM   Result Value Ref Range    Glucose (POC) 112 (H) 65 - 100 mg/dL    Performed by Elsi Muniz        XR Results (most recent):    Results from Hospital Encounter encounter on 03/02/18   XR FOOT LT AP/LAT   Narrative EXAM:  XR FOOT LT AP/LAT    INDICATION:   Left midfoot pain and swelling, recent surgical debridement of  infected tissues. COMPARISON:  Left foot views on 3/2/2018    FINDINGS:  AP and lateral portable views of the left foot demonstrate decreased  soft tissue lateral to the base of the fifth metatarsal.  Increased soft tissue  gas appears to be packed with material's. Partial resection of the proximal  fifth metatarsal is new. Widened distance between the second and third  metatarsals is unchanged. Neuropathic midfoot is unchanged. Impression IMPRESSION:      Postsurgical lateral midfoot soft tissues and partial resection of the base of  the fifth metatarsal.  Unchanged neuropathic midfoot and TMT joints.                    Assessment/Plan:      Consult problem:  DM Left Foot Wound   Left Foot Osteomyelitis Charcot Foot     Active problems  IDDM W5G 8.8  Metabolic acidosis  Hypocalcemia   Sickle cell disease   Chronic pain syndrome with opioid dependence  Sickle cell anemia s/p transfusion   Acute on chronic renal failure stage IV  Seizure disorder   HTN   THU/chronic respiratory failure oxygen dependent at night  Asthma   Hx of recurrent DVT     Will plan on performing another foot debridement/washout in the am.  Arlester Emeli is to clean foot enough to allow a proximal transmetatarsal amputation. Risk of limb loss remains high and this was explained in detail to the patient. BID dressing changes to wound.   Management of comorbid conditions by primary team.    VTE prophylaxis:  Held for procedure in the am.     Disposition:   group home

## 2018-03-06 NOTE — PERIOP NOTES
Phoned Emma Crook RN & updated that the patient is scheduled for surgery at 1300 tomorrow. Requested that CHG & consents be completed. NPO after MN.

## 2018-03-06 NOTE — PROGRESS NOTES
Bedside and Verbal shift change report given to [address] Richard Howe RN (oncoming nurse) by Elsi Muniz RN (offgoing nurse). Report included the following information SBAR, Kardex, Intake/Output, MAR and Recent Results.

## 2018-03-06 NOTE — PROGRESS NOTES
Hospitalist Progress Note    NAME: Rosa Teresa   :  1980   MRN:  [id number]       Assessment / Plan:    Severe sepsis POA due to left foot diabetic foot infection, ? osteomyelitis   Still with fevers, but trending down  Excisional debridement and biopsy done on 3/3, for repeat surgery in AM  Wound cultures with anaerobic GNRs(done on antibiotics)  Continue pip-tazo and dapto for now       Consider weaning dapto in 1-2 days  LLE doppler with no deep venous thrombosis identified. MRI left foot multiple fluid collections, significant bone destruction  , CRP 35  Midline placed 3/6/18, 2 weeks IV antibiotics post-op if debridement adequate, 6 weeks if partial debridement    Acute on chronic anemia due to sickle cell disease POA    Pt without sx of crisis at this time  S/p 1u pRBCs  folic acid    Insulin dependent DM1 uncontrolled with nephropathy POA  Hypoglycemia overnight and today  Hold lantus tonight  D5W  IV till BS improved  lispro sliding scale  Last HgBa1c 8.8    STONE in setting of CKD4:   been on dialysis in the past but then Cr recovered some. Previously has seen Farrel Pallas. IV fluids  Creatinine improved to 2.2     THU:  Uses 2 LPM O2 at night     Code Status: Full    Surrogate Decision Maker: in [facility], no decision maker allowed at this time    DVT Prophylaxis: heparin    Body mass index is 25.97 kg/(m^2).      Subjective:     Chief Complaint / Reason for Physician Visit  \"a bit of diarrhea this morning\"  Had midline placed  Abnormal chemistries, suspect due to hemolysis, repeat from midline was back at baseline  OR in AM further foot debridement    Review of Systems:  Symptom Y/N Comments  Symptom Y/N Comments   Fever/Chills n   Chest Pain n    Poor Appetite    Edema     Cough n   Abdominal Pain n    Sputum    Joint Pain     SOB/ADAMS n   Pruritis/Rash     Nausea/vomit n   Tolerating PT/OT     Diarrhea y   Tolerating Diet y    Constipation    Other       Could NOT obtain due to:      Objective:     VITALS:   Last 24hrs VS reviewed since prior progress note. Most recent are:  Patient Vitals for the past 24 hrs:   Temp Pulse Resp BP SpO2   03/06/18 1426 - 86 12 154/77 98 %   03/06/18 1421 - 86 15 152/76 97 %   03/06/18 1417 - 87 14 154/74 98 %   03/06/18 1411 - 88 19 154/77 100 %   03/06/18 1407 - 91 14 168/87 100 %   03/06/18 1332 98.7 °F (37.1 °C) 83 16 150/79 100 %   03/06/18 1146 98.7 °F (37.1 °C) 83 17 127/63 100 %   03/06/18 0803 98.9 °F (37.2 °C) 93 17 147/69 100 %   03/06/18 0355 98.8 °F (37.1 °C) 81 16 153/83 98 %   03/05/18 2311 98.6 °F (37 °C) 86 16 125/65 100 %   03/05/18 1932 (!) 101.2 °F (38.4 °C) 99 16 152/73 98 %       Intake/Output Summary (Last 24 hours) at 03/06/18 1759  Last data filed at 03/06/18 1641   Gross per 24 hour   Intake                0 ml   Output              450 ml   Net             -450 ml        PHYSICAL EXAM:  General: Sitting up in chair,  Alert, cooperative, no acute distress    Resp:  CTA bilaterally, no wheezing or rales. No accessory muscle use  CV:  Regular  rhythm,  No edema  GI:  Soft, Non distended, Non tender.  +Bowel sounds  Neurologic:  Alert and oriented X 3, normal speech,   Psych:   Good insight. Not anxious nor agitated  Skin:  L foot in bandage, multiple tattoos    Reviewed most current lab test results and cultures  YES  Reviewed most current radiology test results   YES  Review and summation of old records today    NO  Reviewed patient's current orders and MAR    YES  PMH/SH reviewed - no change compared to H&P  ________________________________________________________________________  Care Plan discussed with:    Comments   Patient x    Family      RN x    Care Manager     Consultant                        Multidiciplinary team rounds were held today with , nursing, pharmacist and clinical coordinator. Patient's plan of care was discussed; medications were reviewed and discharge planning was addressed. ________________________________________________________________________  Total NON critical care TIME:  25   Minutes    Total CRITICAL CARE TIME Spent:   Minutes non procedure based      Comments   >50% of visit spent in counseling and coordination of care     ________________________________________________________________________  Jose Cheema MD     Procedures: see electronic medical records for all procedures/Xrays and details which were not copied into this note but were reviewed prior to creation of Plan. LABS:  I reviewed today's most current labs and imaging studies. Pertinent labs include:  Recent Labs      03/06/18   0900  03/05/18   2104  03/05/18   0445   03/04/18   0534   WBC  10.3   --   10.3   --   10.6   HGB  7.0*  7.7*  6.6*   < >  6.1*   HCT  20.9*  23.3*  20.3*   < >  18.5*   PLT  335   --   316   --   324    < > = values in this interval not displayed.      Recent Labs      03/06/18   1540  03/06/18   1057  03/05/18   0445   NA  139  144  136   K  3.9  4.1  4.1   CL  111*  122*  108   CO2  18*  13*  16*   GLU  49*  44*  108*   BUN  48*  34*  55*   CREA  3.17*  2.02*  3.77*   CA  7.7*  5.2*  7.4*   ALB  1.4*   --    --    TBILI  0.2   --    --    SGOT  20   --    --    ALT  16   --    --        Signed: Jose Cheema MD

## 2018-03-06 NOTE — DIABETES MGMT
DTC Progress Note    Recommendations/ Comments: If appropriate, please consider decreasing Lantus to 10 units daily 2' hypoglycemia this morning. Chart reviewed on [facility]. Patient is a 42 y.o. female with known diabetes on  Lantus 12 units daily at home. A1c:   Lab Results   Component Value Date/Time    Hemoglobin A1c 8.8 (H) 03/03/2018 09:21 AM    Hemoglobin A1c 9.9 (H) 04/30/2017 05:04 AM       Recent Glucose Results: Lab Results   Component Value Date/Time    GLUCPOC 65 03/06/2018 08:12 AM    GLUCPOC 67 03/06/2018 08:05 AM    GLUCPOC 139 (H) 03/05/2018 08:59 PM        Lab Results   Component Value Date/Time    Creatinine 3.77 (H) 03/05/2018 04:45 AM     Estimated Creatinine Clearance: 17.6 mL/min (based on Cr of 3.77). Active Orders   Diet    DIET NPO    DIET NPO        PO intake: Patient Vitals for the past 72 hrs:   % Diet Eaten   03/05/18 1327 75 %   03/05/18 0903 80 %   03/04/18 1409 85 %   03/03/18 1810 80 %   03/03/18 1501 75 %       Current hospital DM medication: Lispro Correctional insulin with normal sensitivity, Lantus 12 units daily      Will continue to follow as needed. Thank you.   Calixto Zendejas, [address], [address]  Office:  [phone]

## 2018-03-06 NOTE — H&P
Radiology History and Physical    Patient: Aga James 42 y.o. female       Chief Complaint: Leg Swelling (Presents to the ED accompanied by [facility] staff d/t Lt foot wound x 3 weeks, Lt leg pain and swelling x 2 weeks. Pmh DVT. BL wrist and ankle cuffs in place. ) and Skin Problem      History of Present Illness: antibiotics     History:    Past Medical History:   Diagnosis Date    Asthma     Diabetes ([address]     Gastroparesis     Gastric Pacer- REMOVED 2015    GERD (gastroesophageal reflux disease)     Hypertension     Narcotic dependence ([address]     THU (obstructive sleep apnea)     wears 2 LPM oxygen at night    Other ill-defined conditions(799.89)     Polycystic ovarian syndrome     Seizures (HCC)     Sickle cell trait ([address]     Thromboembolus ([address] to her left arm and was told she had one in left leg recently     Family History   Problem Relation Age of Onset    Cancer Mother      lung    Hypertension Mother     Cancer Father      kidney    Stroke Father      3 strokes: 59-72    Heart Disease Father 72     CABG    Hypertension Father     Cancer Sister      pancreatic    Cancer Maternal Aunt      breast    Cancer Paternal Aunt      breast    [facility]ia [facility]      [facility], now ass't living    Other Sister       AIDS    Other Other      nephew of AIDS     Social History     Social History    Marital status:      Spouse name: N/A    Number of children: N/A    Years of education: N/A     Occupational History    Not on file. Social History Main Topics    Smoking status: Never Smoker    Smokeless tobacco: Never Used    Alcohol use No    Drug use: No    Sexual activity: Yes     Partners: Male     Birth control/ protection: None     Other Topics Concern    Not on file     Social History Narrative    Lives with her  and father       Allergies:    Allergies   Allergen Reactions    Erythromycin Itching    Morphine Itching Current Medications:  Current Facility-Administered Medications   Medication Dose Route Frequency    dextrose 5% infusion  75 mL/hr IntraVENous CONTINUOUS    heparinized saline 2 units/mL infusion 800 Units  400 mL Irrigation ONCE    sodium chloride (NS) flush 10-40 mL  10-40 mL IntraVENous Q8H    0.9% sodium chloride infusion 250 mL  250 mL IntraVENous PRN    DAPTOmycin (CUBICIN) 400 mg in 0.9% sodium chloride 50 mL IVPB RF formulation  400 mg IntraVENous Q48H    0.9% sodium chloride infusion 250 mL  250 mL IntraVENous PRN    diphenhydrAMINE (BENADRYL) capsule 25 mg  25 mg Oral Q6H PRN    insulin glargine (LANTUS) injection 12 Units  12 Units SubCUTAneous QHS    cloNIDine HCl (CATAPRES) tablet 0.2 mg  0.2 mg Oral TID    venlafaxine (EFFEXOR) tablet 75 mg  75 mg Oral BID WITH MEALS    QUEtiapine (SEROquel) tablet 100 mg  100 mg Oral BID    famotidine (PEPCID) tablet 20 mg  20 mg Oral DAILY    sodium chloride (NS) flush 5-10 mL  5-10 mL IntraVENous Q8H    sodium chloride (NS) flush 5-10 mL  5-10 mL IntraVENous PRN    0.9% sodium chloride infusion 250 mL  250 mL IntraVENous PRN    0.9% sodium chloride infusion  150 mL/hr IntraVENous CONTINUOUS    sodium chloride (NS) flush 5-10 mL  5-10 mL IntraVENous Q8H    sodium chloride (NS) flush 5-10 mL  5-10 mL IntraVENous PRN    acetaminophen (TYLENOL) tablet 650 mg  650 mg Oral Q4H PRN    oxyCODONE-acetaminophen (PERCOCET) 5-325 mg per tablet 1 Tab  1 Tab Oral Q4H PRN    ondansetron (ZOFRAN) injection 4 mg  4 mg IntraVENous Q4H PRN    docusate sodium (COLACE) capsule 100 mg  100 mg Oral BID    insulin lispro (HUMALOG) injection   SubCUTAneous AC&HS    glucose chewable tablet 16 g  4 Tab Oral PRN    dextrose (D50W) injection syrg 12.5-25 g  12.5-25 g IntraVENous PRN    glucagon (GLUCAGEN) injection 1 mg  1 mg IntraMUSCular PRN    albuterol (PROVENTIL VENTOLIN) nebulizer solution 2.5 mg  2.5 mg Nebulization Q4H PRN    therapeutic multivitamin [facility]) tablet 1 Tab  1 Tab Oral DAILY    cyanocobalamin (VITAMIN B12) tablet 1,000 mcg  1,000 mcg Oral DAILY    calcium carbonate (OS-BINA) tablet 500 mg [elemental]  500 mg Oral DAILY    folic acid (FOLVITE) tablet 1 mg  1 mg Oral DAILY    piperacillin-tazobactam (ZOSYN) 3.375 g in  ml premix/cmpd  3.375 g IntraVENous Q12H        Physical Exam:  Blood pressure 154/77, pulse 86, temperature 98.7 °F (37.1 °C), resp. rate 12, height 5' 2\" (1.575 m), weight 64.4 kg (142 lb), SpO2 98 %, not currently breastfeeding. LUNG: clear to auscultation bilaterally, HEART: regular rate and rhythm, S1, S2 normal, no murmur, click, rub or gallop      Alerts:    Hospital Problems  Date Reviewed: 3/3/2018          Codes Class Noted POA    Sickle cell anemia ([facility] ICD-10-CM: D57.1  ICD-9-CM: 282.60  3/5/2018 Unknown        * (Principal)Osteomyelitis ([facility] ICD-10-CM: M86.9  ICD-9-CM: 730.20  3/2/2018 Unknown              Laboratory:      Recent Labs      03/06/18   1057  03/06/18   0900   HGB   --   7.0*   HCT   --   20.9*   WBC   --   10.3   PLT   --   335   BUN  34*   --    CREA  2.02*   --    K  4.1   --          Plan of Care/Planned Procedure:  Risks, benefits, and alternatives reviewed with patient and she agrees to proceed with the procedure.      Plan is for tunneled central venous catheter placement       Chantelle Izaguirre MD

## 2018-03-06 NOTE — PROGRESS NOTES
Interdisciplinary Rounds were completed on this patient. Rounds included nursing, clinical care leader, pharmacy, and case management. Patient was doing well without problems. Patient had the following concerns: blood sugar concern (has been low). Goals for the day will include: continue RX as is and mobilize. Nursing to follow up with physician regarding blood sugars.

## 2018-03-06 NOTE — PROGRESS NOTES
Chart reviewed for IR procedure - Abnormal lab values reviewed - primary RN consulted concerning H/H and glucose. RN to update radiology when resolved.

## 2018-03-07 ENCOUNTER — ANESTHESIA (OUTPATIENT)
Dept: SURGERY | Age: 38
DRG: 854 | End: 2018-03-07
Payer: MEDICARE

## 2018-03-07 LAB
ANION GAP SERPL CALC-SCNC: 8 MMOL/L (ref 5–15)
BACTERIA SPEC CULT: NORMAL
BASOPHILS # BLD: 0 K/UL (ref 0–0.1)
BASOPHILS NFR BLD: 0 % (ref 0–1)
BUN SERPL-MCNC: 46 MG/DL (ref 6–20)
BUN/CREAT SERPL: 15 (ref 12–20)
CALCIUM SERPL-MCNC: 8 MG/DL (ref 8.5–10.1)
CHLORIDE SERPL-SCNC: 109 MMOL/L (ref 97–108)
CO2 SERPL-SCNC: 19 MMOL/L (ref 21–32)
CREAT SERPL-MCNC: 3.05 MG/DL (ref 0.55–1.02)
DIFFERENTIAL METHOD BLD: ABNORMAL
EOSINOPHIL # BLD: 0.2 K/UL (ref 0–0.4)
EOSINOPHIL NFR BLD: 2 % (ref 0–7)
ERYTHROCYTE [DISTWIDTH] IN BLOOD BY AUTOMATED COUNT: 15.6 % (ref 11.5–14.5)
GLUCOSE BLD STRIP.AUTO-MCNC: 115 MG/DL (ref 65–100)
GLUCOSE BLD STRIP.AUTO-MCNC: 118 MG/DL (ref 65–100)
GLUCOSE BLD STRIP.AUTO-MCNC: 122 MG/DL (ref 65–100)
GLUCOSE BLD STRIP.AUTO-MCNC: 238 MG/DL (ref 65–100)
GLUCOSE BLD STRIP.AUTO-MCNC: 86 MG/DL (ref 65–100)
GLUCOSE SERPL-MCNC: 181 MG/DL (ref 65–100)
HCT VFR BLD AUTO: 25.6 % (ref 35–47)
HGB BLD-MCNC: 8.3 G/DL (ref 11.5–16)
IMM GRANULOCYTES # BLD: 0.1 K/UL (ref 0–0.04)
IMM GRANULOCYTES NFR BLD AUTO: 1 % (ref 0–0.5)
LYMPHOCYTES # BLD: 1.2 K/UL (ref 0.8–3.5)
LYMPHOCYTES NFR BLD: 13 % (ref 12–49)
MAGNESIUM SERPL-MCNC: 1.9 MG/DL (ref 1.6–2.4)
MCH RBC QN AUTO: 25.7 PG (ref 26–34)
MCHC RBC AUTO-ENTMCNC: 32.4 G/DL (ref 30–36.5)
MCV RBC AUTO: 79.3 FL (ref 80–99)
MONOCYTES # BLD: 0.7 K/UL (ref 0–1)
MONOCYTES NFR BLD: 8 % (ref 5–13)
NEUTS SEG # BLD: 6.9 K/UL (ref 1.8–8)
NEUTS SEG NFR BLD: 76 % (ref 32–75)
NRBC # BLD: 0 K/UL (ref 0–0.01)
NRBC BLD-RTO: 0 PER 100 WBC
PLATELET # BLD AUTO: 404 K/UL (ref 150–400)
PMV BLD AUTO: 10.7 FL (ref 8.9–12.9)
POTASSIUM SERPL-SCNC: 4.2 MMOL/L (ref 3.5–5.1)
RBC # BLD AUTO: 3.23 M/UL (ref 3.8–5.2)
SERVICE CMNT-IMP: ABNORMAL
SERVICE CMNT-IMP: NORMAL
SERVICE CMNT-IMP: NORMAL
SODIUM SERPL-SCNC: 136 MMOL/L (ref 136–145)
WBC # BLD AUTO: 9 K/UL (ref 3.6–11)

## 2018-03-07 PROCEDURE — 74011000258 HC RX REV CODE- 258: Performed by: STUDENT IN AN ORGANIZED HEALTH CARE EDUCATION/TRAINING PROGRAM

## 2018-03-07 PROCEDURE — 74011250636 HC RX REV CODE- 250/636: Performed by: INTERNAL MEDICINE

## 2018-03-07 PROCEDURE — 76010000138 HC OR TIME 0.5 TO 1 HR: Performed by: SURGERY

## 2018-03-07 PROCEDURE — 77030018836 HC SOL IRR NACL ICUM -A: Performed by: SURGERY

## 2018-03-07 PROCEDURE — 74011250637 HC RX REV CODE- 250/637: Performed by: INTERNAL MEDICINE

## 2018-03-07 PROCEDURE — 76060000032 HC ANESTHESIA 0.5 TO 1 HR: Performed by: SURGERY

## 2018-03-07 PROCEDURE — 65270000029 HC RM PRIVATE

## 2018-03-07 PROCEDURE — 74011250637 HC RX REV CODE- 250/637: Performed by: STUDENT IN AN ORGANIZED HEALTH CARE EDUCATION/TRAINING PROGRAM

## 2018-03-07 PROCEDURE — 82962 GLUCOSE BLOOD TEST: CPT

## 2018-03-07 PROCEDURE — 74011250637 HC RX REV CODE- 250/637: Performed by: HOSPITALIST

## 2018-03-07 PROCEDURE — 74011250636 HC RX REV CODE- 250/636

## 2018-03-07 PROCEDURE — 74011250636 HC RX REV CODE- 250/636: Performed by: SURGERY

## 2018-03-07 PROCEDURE — 74011636637 HC RX REV CODE- 636/637: Performed by: INTERNAL MEDICINE

## 2018-03-07 PROCEDURE — 74011250636 HC RX REV CODE- 250/636: Performed by: STUDENT IN AN ORGANIZED HEALTH CARE EDUCATION/TRAINING PROGRAM

## 2018-03-07 PROCEDURE — 80048 BASIC METABOLIC PNL TOTAL CA: CPT | Performed by: INTERNAL MEDICINE

## 2018-03-07 PROCEDURE — 77030020782 HC GWN BAIR PAWS FLX 3M -B

## 2018-03-07 PROCEDURE — 36415 COLL VENOUS BLD VENIPUNCTURE: CPT | Performed by: INTERNAL MEDICINE

## 2018-03-07 PROCEDURE — 74011000272 HC RX REV CODE- 272: Performed by: SURGERY

## 2018-03-07 PROCEDURE — 77030011640 HC PAD GRND REM COVD -A: Performed by: SURGERY

## 2018-03-07 PROCEDURE — 85025 COMPLETE CBC W/AUTO DIFF WBC: CPT | Performed by: INTERNAL MEDICINE

## 2018-03-07 PROCEDURE — 76210000063 HC OR PH I REC FIRST 0.5 HR: Performed by: SURGERY

## 2018-03-07 PROCEDURE — 0QBP0ZZ EXCISION OF LEFT METATARSAL, OPEN APPROACH: ICD-10-PCS | Performed by: SURGERY

## 2018-03-07 PROCEDURE — 74011000250 HC RX REV CODE- 250: Performed by: SURGERY

## 2018-03-07 PROCEDURE — 0QBM0ZZ EXCISION OF LEFT TARSAL, OPEN APPROACH: ICD-10-PCS | Performed by: SURGERY

## 2018-03-07 PROCEDURE — 83735 ASSAY OF MAGNESIUM: CPT | Performed by: INTERNAL MEDICINE

## 2018-03-07 RX ORDER — ONDANSETRON 2 MG/ML
4 INJECTION INTRAMUSCULAR; INTRAVENOUS AS NEEDED
Status: DISCONTINUED | OUTPATIENT
Start: 2018-03-07 | End: 2018-03-07 | Stop reason: HOSPADM

## 2018-03-07 RX ORDER — SODIUM CHLORIDE 9 MG/ML
25 INJECTION, SOLUTION INTRAVENOUS CONTINUOUS
Status: DISCONTINUED | OUTPATIENT
Start: 2018-03-07 | End: 2018-03-07 | Stop reason: HOSPADM

## 2018-03-07 RX ORDER — MIDAZOLAM HYDROCHLORIDE 1 MG/ML
1 INJECTION, SOLUTION INTRAMUSCULAR; INTRAVENOUS AS NEEDED
Status: DISCONTINUED | OUTPATIENT
Start: 2018-03-07 | End: 2018-03-07 | Stop reason: HOSPADM

## 2018-03-07 RX ORDER — MORPHINE SULFATE 10 MG/ML
2 INJECTION, SOLUTION INTRAMUSCULAR; INTRAVENOUS
Status: DISCONTINUED | OUTPATIENT
Start: 2018-03-07 | End: 2018-03-07 | Stop reason: HOSPADM

## 2018-03-07 RX ORDER — SODIUM CHLORIDE 0.9 % (FLUSH) 0.9 %
5-10 SYRINGE (ML) INJECTION AS NEEDED
Status: DISCONTINUED | OUTPATIENT
Start: 2018-03-07 | End: 2018-03-07 | Stop reason: HOSPADM

## 2018-03-07 RX ORDER — PROPOFOL 10 MG/ML
INJECTION, EMULSION INTRAVENOUS AS NEEDED
Status: DISCONTINUED | OUTPATIENT
Start: 2018-03-07 | End: 2018-03-07 | Stop reason: HOSPADM

## 2018-03-07 RX ORDER — FENTANYL CITRATE 50 UG/ML
25 INJECTION, SOLUTION INTRAMUSCULAR; INTRAVENOUS
Status: DISCONTINUED | OUTPATIENT
Start: 2018-03-07 | End: 2018-03-07 | Stop reason: HOSPADM

## 2018-03-07 RX ORDER — MIDAZOLAM HYDROCHLORIDE 1 MG/ML
INJECTION, SOLUTION INTRAMUSCULAR; INTRAVENOUS AS NEEDED
Status: DISCONTINUED | OUTPATIENT
Start: 2018-03-07 | End: 2018-03-07 | Stop reason: HOSPADM

## 2018-03-07 RX ORDER — SODIUM CHLORIDE, SODIUM LACTATE, POTASSIUM CHLORIDE, CALCIUM CHLORIDE 600; 310; 30; 20 MG/100ML; MG/100ML; MG/100ML; MG/100ML
125 INJECTION, SOLUTION INTRAVENOUS CONTINUOUS
Status: DISCONTINUED | OUTPATIENT
Start: 2018-03-07 | End: 2018-03-07 | Stop reason: HOSPADM

## 2018-03-07 RX ORDER — LIDOCAINE HYDROCHLORIDE 10 MG/ML
0.1 INJECTION, SOLUTION EPIDURAL; INFILTRATION; INTRACAUDAL; PERINEURAL AS NEEDED
Status: DISCONTINUED | OUTPATIENT
Start: 2018-03-07 | End: 2018-03-07 | Stop reason: HOSPADM

## 2018-03-07 RX ORDER — OXYCODONE AND ACETAMINOPHEN 5; 325 MG/1; MG/1
1 TABLET ORAL AS NEEDED
Status: DISCONTINUED | OUTPATIENT
Start: 2018-03-07 | End: 2018-03-07 | Stop reason: HOSPADM

## 2018-03-07 RX ORDER — FENTANYL CITRATE 50 UG/ML
50 INJECTION, SOLUTION INTRAMUSCULAR; INTRAVENOUS AS NEEDED
Status: DISCONTINUED | OUTPATIENT
Start: 2018-03-07 | End: 2018-03-07 | Stop reason: HOSPADM

## 2018-03-07 RX ORDER — FENTANYL CITRATE 50 UG/ML
INJECTION, SOLUTION INTRAMUSCULAR; INTRAVENOUS AS NEEDED
Status: DISCONTINUED | OUTPATIENT
Start: 2018-03-07 | End: 2018-03-07 | Stop reason: HOSPADM

## 2018-03-07 RX ORDER — SODIUM CHLORIDE 0.9 % (FLUSH) 0.9 %
5-10 SYRINGE (ML) INJECTION EVERY 8 HOURS
Status: DISCONTINUED | OUTPATIENT
Start: 2018-03-07 | End: 2018-03-07 | Stop reason: HOSPADM

## 2018-03-07 RX ORDER — INSULIN GLARGINE 100 [IU]/ML
8 INJECTION, SOLUTION SUBCUTANEOUS
Status: DISCONTINUED | OUTPATIENT
Start: 2018-03-07 | End: 2018-03-21

## 2018-03-07 RX ORDER — HYDROMORPHONE HYDROCHLORIDE 1 MG/ML
0.2 INJECTION, SOLUTION INTRAMUSCULAR; INTRAVENOUS; SUBCUTANEOUS
Status: DISCONTINUED | OUTPATIENT
Start: 2018-03-07 | End: 2018-03-07 | Stop reason: HOSPADM

## 2018-03-07 RX ORDER — LIDOCAINE HYDROCHLORIDE 10 MG/ML
INJECTION, SOLUTION EPIDURAL; INFILTRATION; INTRACAUDAL; PERINEURAL AS NEEDED
Status: DISCONTINUED | OUTPATIENT
Start: 2018-03-07 | End: 2018-03-07 | Stop reason: HOSPADM

## 2018-03-07 RX ORDER — SODIUM CHLORIDE 9 MG/ML
INJECTION, SOLUTION INTRAVENOUS
Status: DISCONTINUED | OUTPATIENT
Start: 2018-03-07 | End: 2018-03-07 | Stop reason: HOSPADM

## 2018-03-07 RX ORDER — PROPOFOL 10 MG/ML
INJECTION, EMULSION INTRAVENOUS
Status: DISCONTINUED | OUTPATIENT
Start: 2018-03-07 | End: 2018-03-07 | Stop reason: HOSPADM

## 2018-03-07 RX ORDER — ONDANSETRON 2 MG/ML
INJECTION INTRAMUSCULAR; INTRAVENOUS AS NEEDED
Status: DISCONTINUED | OUTPATIENT
Start: 2018-03-07 | End: 2018-03-07 | Stop reason: HOSPADM

## 2018-03-07 RX ORDER — OXYCODONE AND ACETAMINOPHEN 5; 325 MG/1; MG/1
1-2 TABLET ORAL
Status: DISCONTINUED | OUTPATIENT
Start: 2018-03-07 | End: 2018-03-27 | Stop reason: HOSPADM

## 2018-03-07 RX ORDER — MIDAZOLAM HYDROCHLORIDE 1 MG/ML
0.5 INJECTION, SOLUTION INTRAMUSCULAR; INTRAVENOUS
Status: DISCONTINUED | OUTPATIENT
Start: 2018-03-07 | End: 2018-03-07 | Stop reason: HOSPADM

## 2018-03-07 RX ORDER — DIPHENHYDRAMINE HYDROCHLORIDE 50 MG/ML
12.5 INJECTION, SOLUTION INTRAMUSCULAR; INTRAVENOUS AS NEEDED
Status: DISCONTINUED | OUTPATIENT
Start: 2018-03-07 | End: 2018-03-07 | Stop reason: HOSPADM

## 2018-03-07 RX ADMIN — CLONIDINE HYDROCHLORIDE 0.2 MG: 0.1 TABLET ORAL at 21:34

## 2018-03-07 RX ADMIN — DIPHENHYDRAMINE HYDROCHLORIDE 25 MG: 25 CAPSULE ORAL at 04:10

## 2018-03-07 RX ADMIN — OXYCODONE HYDROCHLORIDE AND ACETAMINOPHEN 1 TABLET: 5; 325 TABLET ORAL at 10:50

## 2018-03-07 RX ADMIN — CLONIDINE HYDROCHLORIDE 0.2 MG: 0.1 TABLET ORAL at 15:18

## 2018-03-07 RX ADMIN — MIDAZOLAM HYDROCHLORIDE 1 MG: 1 INJECTION, SOLUTION INTRAMUSCULAR; INTRAVENOUS at 13:14

## 2018-03-07 RX ADMIN — INSULIN GLARGINE 8 UNITS: 100 INJECTION, SOLUTION SUBCUTANEOUS at 22:45

## 2018-03-07 RX ADMIN — MIDAZOLAM HYDROCHLORIDE 2 MG: 1 INJECTION, SOLUTION INTRAMUSCULAR; INTRAVENOUS at 13:10

## 2018-03-07 RX ADMIN — PROPOFOL 75 MCG/KG/MIN: 10 INJECTION, EMULSION INTRAVENOUS at 13:17

## 2018-03-07 RX ADMIN — OXYCODONE HYDROCHLORIDE AND ACETAMINOPHEN 1 TABLET: 5; 325 TABLET ORAL at 04:10

## 2018-03-07 RX ADMIN — PIPERACILLIN SODIUM AND TAZOBACTAM SODIUM 3.38 G: .375; 3 INJECTION, POWDER, LYOPHILIZED, FOR SOLUTION INTRAVENOUS at 03:43

## 2018-03-07 RX ADMIN — Medication 10 ML: at 21:34

## 2018-03-07 RX ADMIN — INSULIN LISPRO 2 UNITS: 100 INJECTION, SOLUTION INTRAVENOUS; SUBCUTANEOUS at 22:45

## 2018-03-07 RX ADMIN — DIPHENHYDRAMINE HYDROCHLORIDE 25 MG: 25 CAPSULE ORAL at 22:45

## 2018-03-07 RX ADMIN — QUETIAPINE FUMARATE 100 MG: 100 TABLET ORAL at 21:34

## 2018-03-07 RX ADMIN — PIPERACILLIN SODIUM AND TAZOBACTAM SODIUM 3.38 G: .375; 3 INJECTION, POWDER, LYOPHILIZED, FOR SOLUTION INTRAVENOUS at 15:19

## 2018-03-07 RX ADMIN — FOLIC ACID 1 MG: 1 TABLET ORAL at 10:49

## 2018-03-07 RX ADMIN — FAMOTIDINE 20 MG: 20 TABLET, FILM COATED ORAL at 10:50

## 2018-03-07 RX ADMIN — ONDANSETRON 4 MG: 2 INJECTION INTRAMUSCULAR; INTRAVENOUS at 13:18

## 2018-03-07 RX ADMIN — Medication 10 ML: at 15:18

## 2018-03-07 RX ADMIN — FENTANYL CITRATE 25 MCG: 50 INJECTION, SOLUTION INTRAMUSCULAR; INTRAVENOUS at 13:18

## 2018-03-07 RX ADMIN — DOCUSATE SODIUM 100 MG: 100 CAPSULE, LIQUID FILLED ORAL at 17:25

## 2018-03-07 RX ADMIN — DOCUSATE SODIUM 100 MG: 100 CAPSULE, LIQUID FILLED ORAL at 10:50

## 2018-03-07 RX ADMIN — VENLAFAXINE 75 MG: 37.5 TABLET ORAL at 21:34

## 2018-03-07 RX ADMIN — DAPTOMYCIN 400 MG: 500 INJECTION, POWDER, LYOPHILIZED, FOR SOLUTION INTRAVENOUS at 12:23

## 2018-03-07 RX ADMIN — CYANOCOBALAMIN TAB 500 MCG 1000 MCG: 500 TAB at 10:49

## 2018-03-07 RX ADMIN — QUETIAPINE FUMARATE 100 MG: 100 TABLET ORAL at 10:50

## 2018-03-07 RX ADMIN — CLONIDINE HYDROCHLORIDE 0.2 MG: 0.1 TABLET ORAL at 10:50

## 2018-03-07 RX ADMIN — DIPHENHYDRAMINE HYDROCHLORIDE 25 MG: 25 CAPSULE ORAL at 17:25

## 2018-03-07 RX ADMIN — THERA TABS 1 TABLET: TAB at 10:50

## 2018-03-07 RX ADMIN — PROPOFOL 30 MG: 10 INJECTION, EMULSION INTRAVENOUS at 13:16

## 2018-03-07 RX ADMIN — PROPOFOL 20 MG: 10 INJECTION, EMULSION INTRAVENOUS at 13:18

## 2018-03-07 RX ADMIN — Medication 10 ML: at 03:44

## 2018-03-07 RX ADMIN — CALCIUM 500 MG: 500 TABLET ORAL at 10:50

## 2018-03-07 RX ADMIN — OXYCODONE HYDROCHLORIDE AND ACETAMINOPHEN 1 TABLET: 5; 325 TABLET ORAL at 17:25

## 2018-03-07 RX ADMIN — SODIUM CHLORIDE: 9 INJECTION, SOLUTION INTRAVENOUS at 13:05

## 2018-03-07 RX ADMIN — OXYCODONE HYDROCHLORIDE AND ACETAMINOPHEN 2 TABLET: 5; 325 TABLET ORAL at 21:42

## 2018-03-07 RX ADMIN — VENLAFAXINE 75 MG: 37.5 TABLET ORAL at 10:50

## 2018-03-07 RX ADMIN — DEXTROSE MONOHYDRATE 50 ML/HR: 5 INJECTION, SOLUTION INTRAVENOUS at 15:04

## 2018-03-07 NOTE — PERIOP NOTES
TRANSFER - OUT REPORT:    Verbal report given to Kori Banerjee RN (name) on Destiny Maldonado  being transferred to 2115(unit) for routine post - op       Report consisted of patients Situation, Background, Assessment and   Recommendations(SBAR). Information from the following report(s) SBAR, Kardex, OR Summary, Intake/Output, MAR and Recent Results was reviewed with the receiving nurse. Opportunity for questions and clarification was provided.       Patient transported with:   Monitor  Registered Nurse  Tech

## 2018-03-07 NOTE — PROGRESS NOTES
Hospitalist Progress Note    NAME: Rosa Teresa   :  1980   MRN:  [id number]       Assessment / Plan:  DM type 1 uncontrolled with nephropathy POA  Hypoglycemia 3/6/2018  BS 53, 68, 170, 159, 122  Resume lantus at lower dose tonight 8 units  D5W  IV while NPO  lispro sliding scale  Last HgBa1c 8.8    Severe sepsis POA due to left foot diabetic foot infection, ? osteomyelitis   Fevers resolved  Excisional debridement and biopsy done on 3/3, for repeat surgery today  Wound cultures with anaerobic GNRs(done on antibiotics)  Continue pip-tazo and dapto for now       Consider weaning dapto in 1-2 days pending the OR findings  LLE doppler with no deep venous thrombosis identified. MRI left foot multiple fluid collections, significant bone destruction  , CRP 35  Midline placed 3/6/18, 2 weeks IV antibiotics post-op if debridement adequate, 6 weeks if partial debridement    Acute kidney injury POA peak creatinine 3.80  Stage 4 chronic kidney disease POA baseline creatinine 2.4 to 2.7  been on dialysis in the past   Previously has seen Farrel Pallas. Creatinine trending down, now 3.05  IV fluids    Acute on chronic anemia due to sickle cell disease POA    Pt without sx of crisis at this time  S/p 1u pRBCs  folic acid  Serial HGbs     THU POA  Uses 2 LPM O2 at night     Code Status: Full    Surrogate Decision Maker: in [facility], no decision maker allowed at this time    DVT Prophylaxis: heparin    Body mass index is 25.97 kg/(m^2).      Subjective:     Chief Complaint / Reason for Physician Visit  \"left foot feels okay'  No more diarrhea overnight  NPO for OR today, vascular to reassess if further debridement needed  Fevers resolved  BS low yesterday, was NPO for midline catheter    Review of Systems:  Symptom Y/N Comments  Symptom Y/N Comments   Fever/Chills n   Chest Pain n    Poor Appetite    Edema     Cough n   Abdominal Pain n    Sputum    Joint Pain     SOB/ADAMS n   Pruritis/Rash Nausea/vomit n   Tolerating PT/OT     Diarrhea n   Tolerating Diet y    Constipation    Other       Could NOT obtain due to:      Objective:     VITALS:   Last 24hrs VS reviewed since prior progress note. Most recent are:  Patient Vitals for the past 24 hrs:   Temp Pulse Resp BP SpO2   03/07/18 0747 98.5 °F (36.9 °C) 83 16 156/77 100 %   03/07/18 0021 98 °F (36.7 °C) 69 18 136/65 100 %   03/06/18 2043 98.8 °F (37.1 °C) 89 14 154/73 100 %   03/06/18 1426 - 86 12 154/77 98 %   03/06/18 1421 - 86 15 152/76 97 %   03/06/18 1417 - 87 14 154/74 98 %   03/06/18 1411 - 88 19 154/77 100 %   03/06/18 1407 - 91 14 168/87 100 %   03/06/18 1332 98.7 °F (37.1 °C) 83 16 150/79 100 %   03/06/18 1146 98.7 °F (37.1 °C) 83 17 127/63 100 %       Intake/Output Summary (Last 24 hours) at 03/07/18 1025  Last data filed at 03/06/18 1641   Gross per 24 hour   Intake                0 ml   Output              450 ml   Net             -450 ml        PHYSICAL EXAM:  General: Sitting up in chair,  Alert, cooperative, no acute distress    Resp:  CTA bilaterally, no wheezing or rales. No accessory muscle use  CV:  Regular  rhythm,  No edema  GI:  Soft, Non distended, Non tender.  +Bowel sounds  Neurologic:  Alert and oriented X 3, normal speech,   Psych:   Good insight. Not anxious nor agitated  Skin:  L foot in bandage, multiple tattoos    Reviewed most current lab test results and cultures  YES  Reviewed most current radiology test results   YES  Review and summation of old records today    NO  Reviewed patient's current orders and MAR    YES  PMH/SH reviewed - no change compared to H&P  ________________________________________________________________________  Care Plan discussed with:    Comments   Patient x    Family      RN x    Care Manager     Consultant                        Multidiciplinary team rounds were held today with , nursing, pharmacist and clinical coordinator.   Patient's plan of care was discussed; medications were reviewed and discharge planning was addressed. ________________________________________________________________________  Total NON critical care TIME:  25   Minutes    Total CRITICAL CARE TIME Spent:   Minutes non procedure based      Comments   >50% of visit spent in counseling and coordination of care     ________________________________________________________________________  Merrill Zavala MD     Procedures: see electronic medical records for all procedures/Xrays and details which were not copied into this note but were reviewed prior to creation of Plan. LABS:  I reviewed today's most current labs and imaging studies.   Pertinent labs include:  Recent Labs      03/07/18   0341  03/06/18   0900  03/05/18   2104  03/05/18   0445   WBC  9.0  10.3   --   10.3   HGB  8.3*  7.0*  7.7*  6.6*   HCT  25.6*  20.9*  23.3*  20.3*   PLT  404*  335   --   316     Recent Labs      03/07/18   0341  03/06/18   1540  03/06/18   1057   NA  136  139  144   K  4.2  3.9  4.1   CL  109*  111*  122*   CO2  19*  18*  13*   GLU  181*  49*  44*   BUN  46*  48*  34*   CREA  3.05*  3.17*  2.02*   CA  8.0*  7.7*  5.2*   MG  1.9   --    --    ALB   --   1.4*   --    TBILI   --   0.2   --    SGOT   --   20   --    ALT   --   16   --        Signed: Merrill Zavala MD

## 2018-03-07 NOTE — PROGRESS NOTES
Problem: Falls - Risk of  Goal: *Absence of Falls  Document Blake Fall Risk and appropriate interventions in the flowsheet.    Outcome: Not Progressing Towards Goal  Fall Risk Interventions:  Mobility Interventions: Patient to call before getting OOB         Medication Interventions: Teach patient to arise slowly, Patient to call before getting OOB    Elimination Interventions: Call light in reach

## 2018-03-07 NOTE — BRIEF OP NOTE
BRIEF OPERATIVE NOTE    Date of Procedure: 3/7/2018   Preoperative Diagnosis: OSTEOMYELITIS  Postoperative Diagnosis: * No post-op diagnosis entered *    Procedure(s):  LEFT FOOT DEBRIDEMENT (MAC/LOCAL)  Surgeon(s) and Role:     * Dougie Dumas MD - Primary         Assistant Staff: None  Surgical Staff:  Circ-1: Chris Mckinley  Scrub Tech-1: Artis Smith  Scrub RN - Intern: Destiny Maldonado RN  Surg Asst-1: Khalida Solares RN  Event Time In   Incision Start 1324   Incision Close 1336     Anesthesia: MAC   Estimated Blood Loss: 5cc  Specimens: * No specimens in log *   Findings: Non-viable bone removed, no evidence of undrained infection   Complications: None apparent  Implants: * No implants in log *

## 2018-03-07 NOTE — OP NOTES
[facility] OP NOTE    Sal Burrows  MR#: [id number]  : 1980  ACCOUNT #: [de-identified]   DATE OF SERVICE: 2018    SURGEON:  Frida Schreiber M.D.    ASSISTANT:  None. PREOPERATIVE DIAGNOSIS: Left diabetic foot infection with osteomyelitis. POSTOPERATIVE DIAGNOSIS: Left diabetic foot infection with osteomyelitis. PROCEDURE:  Left foot excisional debridement of skin, soft tissue and bone for an area of 8 cm x 4 cm x 6 cm. ANESTHESIA:  MAC.    ESTIMATED BLOOD LOSS:  5 mL. SPECIMENS:  None. COMPLICATIONS:  None apparent. IMPLANTS: None. OPERATIVE INDICATIONS: The patient is a 66-year-old female with multiple medical conditions who presented with an extensive left diabetic foot infection that involved her mid foot. She previously underwent a drainage and resection of the bases of her fourth and fifth metatarsal.  Given the extensive foot infection, it was recommended to proceed with another debridement and washout in the hopes of limb salvage and completion amputation in the next few days. Prior to the procedure,  the nature of the procedure as well as risks and benefits were explained to the patient in detail. She elects to proceed. OPERATIVE DETAILS:  The patient was identified in the preop holding area. The left leg was marked. Consents were signed. The patient was brought back to the operating room where MAC sedation was induced. The left leg was then prepped and draped in usual standard fashion. Timeout was then performed where the above procedure was verified. I began the procedure by inspecting the wound we could see. With manipulation, I could not express any further purulent drainage. There was what appeared to be nonviable 4th and 5th metatarsals as well as possible mid foot bones. Therefore, I used a rongeur to debride back these until there was healthy bone, which it did so on the 4th and 5th metatarsal rays.   I also believe I resected the base of the third metatarsal as well through my incision. Part of the cuboid bone appeared to be so necrotic, so this was partly debrided, but was overall healthy. The skin, soft tissue and bone were debrided. This was an excisional debridement with a scalpel in the area listed above. This was a deep wound going all the way into the mid foot. After I was satisfied with my debridement, I then used a pulse lavage and irrigation of the wound with 1 liter of bacitracin solution. The wound was then packed dry and sterile dressings were applied. The patient tolerated the procedure well. All needle, sponge and instrument counts were correct x2.       Vincenzo Rubinstein, MD AEL / JOSEFA  D: 03/07/2018 13:44     T: 03/07/2018 14:16  JOB #: [id number]

## 2018-03-07 NOTE — ANESTHESIA POSTPROCEDURE EVALUATION
Post-Anesthesia Evaluation and Assessment    Patient: Elvia Nunes MRN: [id number]  SSN: [id number]    YOB: 1980  Age: 42 y.o. Sex: female       Cardiovascular Function/Vital Signs  Visit Vitals    /73    Pulse 84    Temp 37 °C (98.6 °F)    Resp 19    Ht 5' 2\" (1.575 m)    Wt 64.4 kg (142 lb)    SpO2 100%    Breastfeeding No    BMI 25.97 kg/m2       Patient is status post MAC anesthesia for Procedure(s):  LEFT FOOT DEBRIDEMENT (MAC/LOCAL). Nausea/Vomiting: None    Postoperative hydration reviewed and adequate. Pain:  Pain Scale 1: Numeric (0 - 10) (03/07/18 1400)  Pain Intensity 1: 0 (03/07/18 1400)   Managed    Neurological Status:   Neuro (WDL): Within Defined Limits (03/07/18 1346)  Neuro  Neurologic State: Alert (03/07/18 1346)  Orientation Level: Oriented X4 (03/07/18 1346)  Cognition: Follows commands (03/07/18 1346)  Speech: Clear (03/07/18 1346)   At baseline    Mental Status and Level of Consciousness: Arousable    Pulmonary Status:   O2 Device: Room air (03/07/18 1400)   Adequate oxygenation and airway patent    Complications related to anesthesia: None    Post-anesthesia assessment completed.  No concerns    Signed By: Debora Larson MD     March 7, 2018

## 2018-03-07 NOTE — PROGRESS NOTES
Paged MD to ask if patient could have two percocet tablets since pain levels have increased. MD stated to order 1-2 percocet q4h prn. Also asked MD if he still wanted the Dextrose 5% to still run since pt is now eating.  MD stated to check pt's blood sugar at bedtime and if it is greater than 150, than stop the dextrose 5%

## 2018-03-07 NOTE — DIABETES MGMT
DTC Progress Note    Recommendations/ Comments: If appropriate, please consider decreasing Lantus 9 units. Current hospital DM medication: Lantus 12 units, D5% @ 50 ml/hour    Chart reviewed on Elvia Friend due to hypoglycemia persistently q am - <80 mg/dl suggesting basal insulin is too high. Patient is a 42 y.o. female with known Type 1 DM complicated by gastroparesis and nephropathy currently on Lantus 12 units at home with no mealtime coverage. A1c:   Lab Results   Component Value Date/Time    Hemoglobin A1c 8.8 (H) 03/03/2018 09:21 AM    Hemoglobin A1c 9.9 (H) 04/30/2017 05:04 AM       Recent Glucose Results: Lab Results   Component Value Date/Time     (H) 03/07/2018 03:41 AM    GLU 49 (LL) 03/06/2018 03:40 PM    GLU 44 (LL) 03/06/2018 10:57 AM    GLUCPOC 122 (H) 03/07/2018 07:52 AM    GLUCPOC 159 (H) 03/06/2018 09:23 PM    GLUCPOC 170 (H) 03/06/2018 05:03 PM        Lab Results   Component Value Date/Time    Creatinine 3.05 (H) 03/07/2018 03:41 AM     Estimated Creatinine Clearance: 21.8 mL/min (based on Cr of 3.05). Active Orders   Diet    DIET NPO        PO intake: Patient Vitals for the past 72 hrs:   % Diet Eaten   03/05/18 1327 75 %   03/05/18 0903 80 %   03/04/18 1409 85 %       Will continue to follow as needed.     Thank you  Marti Serna RD, CDE

## 2018-03-07 NOTE — PROGRESS NOTES
General Surgery End of Shift Nursing Note    Bedside shift change report given to Daniel Leyva (oncoming nurse) by Ash Damian (offgoing nurse). Report included the following information SBAR, Kardex, OR Summary, Intake/Output, MAR, Recent Results and Cardiac Rhythm NSR. Shift worked:   7 am to 7 pm   Summary of shift:    Surgery today. Pt voiding appropriately. Tolerating diet. Pain meds increased to 1-2 tabs per MD.    Issues for physician to address:   none     Number times ambulated in hallway past shift: 0; ambulate in room to bathroom    Number of times OOB to chair past shift: 0    Pain Management:  Current medication: percocet  Patient states pain is manageable on current pain medication: YES    GI:    Current diet:  DIET DIABETIC CONSISTENT CARB Regular    Tolerating current diet: YES  Passing flatus: YES  Last Bowel Movement: yesterday   Appearance:     Respiratory:    Incentive Spirometer at bedside: YES  Patient instructed on use: YES    Patient Safety:    Falls Score: 2  Bed Alarm On? No  Sitter?  Cadence Antoine

## 2018-03-07 NOTE — PROGRESS NOTES
Documented on March 7, 2018  Spiritual Care Partner Volunteer visited patient in [facility] on March 6, 2018.   Documented by:  VANIA Lundberg, [facility], aimee CEDILLO AZUL [facility] Paging Service  [phone])

## 2018-03-07 NOTE — PERIOP NOTES
Handoff Report from Operating Room to PACU    Report received from [address] [place] [address]  and Richar Villanueva CRNA  regarding Rosa Teresa. Surgeon(s):  Antonieta Glez MD  And Procedure(s) (LRB):  LEFT FOOT DEBRIDEMENT (MAC/LOCAL) (Left)  confirmed   with allergies and dressings discussed. Anesthesia type, drugs, patient history, complications, estimated blood loss, vital signs, intake and output, and last pain medication were reviewed.

## 2018-03-08 ENCOUNTER — APPOINTMENT (OUTPATIENT)
Dept: NUCLEAR MEDICINE | Age: 38
DRG: 854 | End: 2018-03-08
Attending: SURGERY
Payer: MEDICARE

## 2018-03-08 LAB
ANION GAP SERPL CALC-SCNC: 10 MMOL/L (ref 5–15)
BUN SERPL-MCNC: 41 MG/DL (ref 6–20)
BUN/CREAT SERPL: 16 (ref 12–20)
CALCIUM SERPL-MCNC: 7.1 MG/DL (ref 8.5–10.1)
CHLORIDE SERPL-SCNC: 113 MMOL/L (ref 97–108)
CO2 SERPL-SCNC: 18 MMOL/L (ref 21–32)
CREAT SERPL-MCNC: 2.56 MG/DL (ref 0.55–1.02)
GLUCOSE BLD STRIP.AUTO-MCNC: 114 MG/DL (ref 65–100)
GLUCOSE BLD STRIP.AUTO-MCNC: 179 MG/DL (ref 65–100)
GLUCOSE BLD STRIP.AUTO-MCNC: 202 MG/DL (ref 65–100)
GLUCOSE BLD STRIP.AUTO-MCNC: 91 MG/DL (ref 65–100)
GLUCOSE SERPL-MCNC: 132 MG/DL (ref 65–100)
POTASSIUM SERPL-SCNC: 3.9 MMOL/L (ref 3.5–5.1)
SERVICE CMNT-IMP: ABNORMAL
SERVICE CMNT-IMP: NORMAL
SODIUM SERPL-SCNC: 141 MMOL/L (ref 136–145)

## 2018-03-08 PROCEDURE — 36415 COLL VENOUS BLD VENIPUNCTURE: CPT | Performed by: EMERGENCY MEDICINE

## 2018-03-08 PROCEDURE — 74011250637 HC RX REV CODE- 250/637: Performed by: HOSPITALIST

## 2018-03-08 PROCEDURE — 82962 GLUCOSE BLOOD TEST: CPT

## 2018-03-08 PROCEDURE — 74011250637 HC RX REV CODE- 250/637: Performed by: INTERNAL MEDICINE

## 2018-03-08 PROCEDURE — 74011250636 HC RX REV CODE- 250/636: Performed by: INTERNAL MEDICINE

## 2018-03-08 PROCEDURE — 78805 NM INFLAM PROC LTD: CPT

## 2018-03-08 PROCEDURE — 74011636637 HC RX REV CODE- 636/637: Performed by: INTERNAL MEDICINE

## 2018-03-08 PROCEDURE — 80048 BASIC METABOLIC PNL TOTAL CA: CPT | Performed by: EMERGENCY MEDICINE

## 2018-03-08 PROCEDURE — 65270000029 HC RM PRIVATE

## 2018-03-08 PROCEDURE — 74011250636 HC RX REV CODE- 250/636

## 2018-03-08 PROCEDURE — 74011250637 HC RX REV CODE- 250/637: Performed by: STUDENT IN AN ORGANIZED HEALTH CARE EDUCATION/TRAINING PROGRAM

## 2018-03-08 RX ORDER — HEPARIN SODIUM 1000 [USP'U]/ML
INJECTION, SOLUTION INTRAVENOUS; SUBCUTANEOUS
Status: COMPLETED
Start: 2018-03-08 | End: 2018-03-08

## 2018-03-08 RX ORDER — HEPARIN SODIUM 1000 [USP'U]/ML
2000 INJECTION, SOLUTION INTRAVENOUS; SUBCUTANEOUS ONCE
Status: COMPLETED | OUTPATIENT
Start: 2018-03-08 | End: 2018-03-08

## 2018-03-08 RX ADMIN — INSULIN GLARGINE 8 UNITS: 100 INJECTION, SOLUTION SUBCUTANEOUS at 22:18

## 2018-03-08 RX ADMIN — Medication 10 ML: at 15:39

## 2018-03-08 RX ADMIN — CLONIDINE HYDROCHLORIDE 0.2 MG: 0.1 TABLET ORAL at 22:17

## 2018-03-08 RX ADMIN — OXYCODONE HYDROCHLORIDE AND ACETAMINOPHEN 2 TABLET: 5; 325 TABLET ORAL at 22:17

## 2018-03-08 RX ADMIN — QUETIAPINE FUMARATE 100 MG: 100 TABLET ORAL at 22:17

## 2018-03-08 RX ADMIN — OXYCODONE HYDROCHLORIDE AND ACETAMINOPHEN 2 TABLET: 5; 325 TABLET ORAL at 09:57

## 2018-03-08 RX ADMIN — OXYCODONE HYDROCHLORIDE AND ACETAMINOPHEN 2 TABLET: 5; 325 TABLET ORAL at 02:26

## 2018-03-08 RX ADMIN — PIPERACILLIN SODIUM AND TAZOBACTAM SODIUM 3.38 G: .375; 3 INJECTION, POWDER, LYOPHILIZED, FOR SOLUTION INTRAVENOUS at 02:27

## 2018-03-08 RX ADMIN — THERA TABS 1 TABLET: TAB at 09:54

## 2018-03-08 RX ADMIN — VENLAFAXINE 75 MG: 37.5 TABLET ORAL at 22:17

## 2018-03-08 RX ADMIN — CLONIDINE HYDROCHLORIDE 0.2 MG: 0.1 TABLET ORAL at 15:37

## 2018-03-08 RX ADMIN — FAMOTIDINE 20 MG: 20 TABLET, FILM COATED ORAL at 09:57

## 2018-03-08 RX ADMIN — VENLAFAXINE 75 MG: 37.5 TABLET ORAL at 09:57

## 2018-03-08 RX ADMIN — DIPHENHYDRAMINE HYDROCHLORIDE 25 MG: 25 CAPSULE ORAL at 10:08

## 2018-03-08 RX ADMIN — Medication 10 ML: at 15:38

## 2018-03-08 RX ADMIN — CYANOCOBALAMIN TAB 500 MCG 1000 MCG: 500 TAB at 09:57

## 2018-03-08 RX ADMIN — Medication 10 ML: at 22:21

## 2018-03-08 RX ADMIN — FOLIC ACID 1 MG: 1 TABLET ORAL at 09:57

## 2018-03-08 RX ADMIN — QUETIAPINE FUMARATE 100 MG: 100 TABLET ORAL at 09:57

## 2018-03-08 RX ADMIN — CALCIUM 500 MG: 500 TABLET ORAL at 09:57

## 2018-03-08 RX ADMIN — CLONIDINE HYDROCHLORIDE 0.2 MG: 0.1 TABLET ORAL at 09:54

## 2018-03-08 RX ADMIN — HEPARIN SODIUM 2000 UNITS: 1000 INJECTION, SOLUTION INTRAVENOUS; SUBCUTANEOUS at 08:27

## 2018-03-08 RX ADMIN — Medication 40 ML: at 02:26

## 2018-03-08 RX ADMIN — PIPERACILLIN SODIUM AND TAZOBACTAM SODIUM 3.38 G: .375; 3 INJECTION, POWDER, LYOPHILIZED, FOR SOLUTION INTRAVENOUS at 15:33

## 2018-03-08 RX ADMIN — INSULIN LISPRO 3 UNITS: 100 INJECTION, SOLUTION INTRAVENOUS; SUBCUTANEOUS at 17:33

## 2018-03-08 RX ADMIN — DOCUSATE SODIUM 100 MG: 100 CAPSULE, LIQUID FILLED ORAL at 09:54

## 2018-03-08 NOTE — PROGRESS NOTES
This CM spoke with Joshua Chan with [facility] regarding patients discharge needs involving 2 weeks of IV antibiotics. CM was told that the patient could not go back to correction with IV antibiotics.     Shima Richards  Ext [phone]

## 2018-03-08 NOTE — PROGRESS NOTES
Vascular Surgery Progress Note  Juan Fkerri No ACNP-BC  3/8/2018       Subjective:     Ms. Louise Hughes is a 42yo AAF with a pmhx significant for recurrent DVT, DM, HTN, and asthma, Charcot foot, CKD IV, sickle cell, seizures, THU/chronic respiratory failure, and opioid dependence. She presented to the hospital with complaint of a left foot wound of 3 weeks duration. She had associated pain and swelling. X-ray on arrival was + for a Charcot foot, chronic 5th metatarsal head fracture and osteomyelitis. She was seen in consultation by Dr. Tyrell Campoverde and is s/p left foot I&D 03/03/2018 and repeat I&D 03/08/2018. This am she states that her pain is controlled. H&H now improved. Fever curve trending down. Leukocytosis resolved. Bleeding to dressing overnight. Changed this am.  Now C/D/I.       Nursing Data:     Patient Vitals for the past 24 hrs:   BP Temp Pulse Resp SpO2   03/08/18 0951 146/77 - 84 - -   03/08/18 0812 177/86 97.4 °F (36.3 °C) 86 16 100 %   03/08/18 0335 142/73 97.6 °F (36.4 °C) 80 16 97 %   03/08/18 0041 142/75 97.3 °F (36.3 °C) 82 14 99 %   03/07/18 2030 167/83 99.8 °F (37.7 °C) 94 16 100 %   03/07/18 1632 135/79 99.1 °F (37.3 °C) 77 16 99 %   03/07/18 1601 155/89 - 78 - 98 %   03/07/18 1531 (!) 183/100 98.8 °F (37.1 °C) 86 20 100 %   03/07/18 1505 (!) 177/99 97.9 °F (36.6 °C) 83 20 99 %   03/07/18 1415 141/79 98.6 °F (37 °C) 75 21 100 %   03/07/18 1410 134/70 - 75 17 100 %   03/07/18 1405 136/73 - 84 19 100 %   03/07/18 1400 137/75 98.6 °F (37 °C) 78 15 99 %   03/07/18 1355 141/76 - 82 21 100 %   03/07/18 1350 148/80 98.7 °F (37.1 °C) 83 20 100 %   03/07/18 1346 117/63 - - - -   03/07/18 1245 135/74 97.9 °F (36.6 °C) 85 18 97 %   03/07/18 1210 133/76 99.1 °F (37.3 °C) 79 18 98 %   03/07/18 1048 (!) 188/100 - 87 - -     ---------------------------------------------------------------------------------------------------------    Intake/Output Summary (Last 24 hours) at 03/08/18 1025  Last data filed at 03/07/18 1859   Gross per 24 hour   Intake             1150 ml   Output                0 ml   Net             1150 ml       Exam:     Physical Exam   Constitutional: She is oriented to person, place, and time. She appears well-developed and well-nourished. HENT:   Head: Normocephalic and atraumatic. Eyes: EOM are normal. Pupils are equal, round, and reactive to light. Neck: Normal range of motion. Neck supple. Cardiovascular: Normal rate and regular rhythm. Pulmonary/Chest: Effort normal and breath sounds normal.   Abdominal: Soft. Bowel sounds are normal.   Musculoskeletal: Normal range of motion. Left foot: There is deformity. Neurological: She is alert and oriented to person, place, and time. Skin:   Bulky dressing to left foot C/D/I. Psychiatric: Judgment and thought content normal.       Lab Review:     .   Recent Results (from the past 24 hour(s))   GLUCOSE, POC    Collection Time: 03/07/18 11:18 AM   Result Value Ref Range    Glucose (POC) 118 (H) 65 - 100 mg/dL    Performed by Monse Zhang (PCT)    GLUCOSE, POC    Collection Time: 03/07/18  1:50 PM   Result Value Ref Range    Glucose (POC) 86 65 - 100 mg/dL    Performed by Courtney Bolton    GLUCOSE, POC    Collection Time: 03/07/18  4:16 PM   Result Value Ref Range    Glucose (POC) 115 (H) 65 - 100 mg/dL    Performed by Latesha Khoury    GLUCOSE, POC    Collection Time: 03/07/18  8:29 PM   Result Value Ref Range    Glucose (POC) 238 (H) 65 - 100 mg/dL    Performed by Latesha [facility]    METABOLIC PANEL, BASIC    Collection Time: 03/08/18  2:36 AM   Result Value Ref Range    Sodium 141 136 - 145 mmol/L    Potassium 3.9 3.5 - 5.1 mmol/L    Chloride 113 (H) 97 - 108 mmol/L    CO2 18 (L) 21 - 32 mmol/L    Anion gap 10 5 - 15 mmol/L    Glucose 132 (H) 65 - 100 mg/dL    BUN 41 (H) 6 - 20 MG/DL    Creatinine 2.56 (H) 0.55 - 1.02 MG/DL    BUN/Creatinine ratio 16 12 - 20      GFR est AA 25 (L) >60 ml/min/1.73m2    GFR est non-AA 21 (L) >60 ml/min/1.73m2    Calcium 7.1 (L) 8.5 - 10.1 MG/DL   GLUCOSE, POC    Collection Time: 03/08/18  8:18 AM   Result Value Ref Range    Glucose (POC) 91 65 - 100 mg/dL    Performed by DIXON KHALIL(CON)        XR Results (most recent):    Results from Hospital Encounter encounter on 03/02/18   XR FOOT LT AP/LAT   Narrative EXAM:  XR FOOT LT AP/LAT    INDICATION:   Left midfoot pain and swelling, recent surgical debridement of  infected tissues. COMPARISON:  Left foot views on 3/2/2018    FINDINGS:  AP and lateral portable views of the left foot demonstrate decreased  soft tissue lateral to the base of the fifth metatarsal.  Increased soft tissue  gas appears to be packed with material's. Partial resection of the proximal  fifth metatarsal is new. Widened distance between the second and third  metatarsals is unchanged. Neuropathic midfoot is unchanged. Impression IMPRESSION:      Postsurgical lateral midfoot soft tissues and partial resection of the base of  the fifth metatarsal.  Unchanged neuropathic midfoot and TMT joints. Assessment/Plan:      Consult problem:  DM Left Foot Wound   Left Foot Osteomyelitis   Charcot Foot   Wound cx 3/3/18 light mixed anaerobic gram neg rods (beta lactamase +) and anaerobic gram + cocci in pairs  Blood cx NGTD     Will continue to follow cx results and treat with IV antibx. Podiatrist Dr. Barbara Henley to evaluate on Friday to determine plan of care for closure vs wound closure and TMA. Continue wound care. Active problems  IDDM S6Y 8.8  Metabolic acidosis improved   Hypocalcemia improved   Sickle cell disease pain controlled   Chronic pain syndrome with opioid dependence  Sickle cell anemia s/p transfusion   Acute on chronic renal failure stage IV baseline creatinine 2.46 STONE resolving. Near baseline.    Seizure disorder   HTN stable   THU/chronic respiratory failure oxygen dependent at night  Asthma   Hx of recurrent DVT   Management of comorbid conditions by primary team.    VTE prophylaxis:  Per primary team.  Next procedure likely Saturday.       Disposition:   prison

## 2018-03-08 NOTE — PROGRESS NOTES
MICHAEL received call from Sebastien Hurd with [facility] called in regards pt and getting Medicaid application completed for pt. Michael informed her [facility] will be notified of the request and [facility] will screen if appropriate for Medicaid application. MICHAEL emailed [facility] worker with request for screening. Carl Ortega also provided pt assigned CM contact info for discharge planning.     Marguerite Gongora, MSW  Ext [phone]

## 2018-03-08 NOTE — PROGRESS NOTES
Hospitalist Progress Note    NAME: Jeanna Batista   :  1980   MRN:  [id number]       Assessment / Plan:  DM type 1 uncontrolled with nephropathy POA  Hypoglycemia 3/6/2018  , 118, 86, 115, 238, 91, 114, 202  Resumed lantus at lower dose 8 units  Stop D5W  lispro sliding scale  Last HgBa1c 8.8    Severe sepsis POA due to left foot diabetic foot infection, ? osteomyelitis   Fevers resolved  Excisional debridement and biopsy done on 3/3, for repeat surgery 3/7/2018  Wound cultures with anaerobic GNRs(done on antibiotics)  Continue pip-tazo and dapto for now       Consider weaning dapto in 1-2 days pending the OR findings  LLE doppler with no deep venous thrombosis identified. MRI left foot multiple fluid collections, significant bone destruction  , CRP 35  Midline placed 3/6/18  Await final surgery decision re TMA before final duration of antibiotics    Acute kidney injury POA peak creatinine 3.80  Stage 4 chronic kidney disease POA baseline creatinine 2.4 to 2.7  been on dialysis in the past   Previously has seen Caridad Prieto. Creatinine trending down, now 2.5  IV fluids    Acute on chronic anemia due to sickle cell disease POA    Pt without sx of crisis at this time  S/p 1u pRBCs  folic acid  Serial HGbs     THU POA  Uses 2 LPM O2 at night     Code Status: Full    Surrogate Decision Maker: in [facility], no decision maker allowed at this time    DVT Prophylaxis: heparin    Body mass index is 25.97 kg/(m^2). Subjective:     Chief Complaint / Reason for Physician Visit  \"I had some on and off diarrhea today\"'  Further debridement of foot yesterday  WBC scan with Left midfoot WBC accumulation, ?  Significance with Charcot foot and recent OR  Fevers resolved  Pain is tolerable    Review of Systems:  Symptom Y/N Comments  Symptom Y/N Comments   Fever/Chills n   Chest Pain n    Poor Appetite    Edema     Cough n   Abdominal Pain n    Sputum    Joint Pain     SOB/ADAMS n   Pruritis/Rash Nausea/vomit n   Tolerating PT/OT     Diarrhea n   Tolerating Diet y    Constipation    Other       Could NOT obtain due to:      Objective:     VITALS:   Last 24hrs VS reviewed since prior progress note. Most recent are:  Patient Vitals for the past 24 hrs:   Temp Pulse Resp BP SpO2   03/08/18 1533 98.3 °F (36.8 °C) 89 17 (!) 164/93 100 %   03/08/18 1137 98.2 °F (36.8 °C) 75 16 129/66 100 %   03/08/18 0951 - 84 - 146/77 -   03/08/18 0812 97.4 °F (36.3 °C) 86 16 177/86 100 %   03/08/18 0335 97.6 °F (36.4 °C) 80 16 142/73 97 %   03/08/18 0041 97.3 °F (36.3 °C) 82 14 142/75 99 %   03/07/18 2030 99.8 °F (37.7 °C) 94 16 167/83 100 %       Intake/Output Summary (Last 24 hours) at 03/08/18 1651  Last data filed at 03/07/18 1859   Gross per 24 hour   Intake              750 ml   Output                0 ml   Net              750 ml        PHYSICAL EXAM:  General: Sitting up in chair,  Alert, cooperative, no acute distress    Resp:  CTA bilaterally, no wheezing or rales. No accessory muscle use  CV:  Regular  rhythm,  No edema  GI:  Soft, Non distended, Non tender.  +Bowel sounds  Neurologic:  Alert and oriented X 3, normal speech,   Psych:   Good insight. Not anxious nor agitated  Skin:  L foot in bandage, multiple tattoos    Reviewed most current lab test results and cultures  YES  Reviewed most current radiology test results   YES  Review and summation of old records today    NO  Reviewed patient's current orders and MAR    YES  PMH/ reviewed - no change compared to H&P  ________________________________________________________________________  Care Plan discussed with:    Comments   Patient x    Family      RN x    Care Manager     Consultant                        Multidiciplinary team rounds were held today with , nursing, pharmacist and clinical coordinator. Patient's plan of care was discussed; medications were reviewed and discharge planning was addressed. ________________________________________________________________________  Total NON critical care TIME:  25   Minutes    Total CRITICAL CARE TIME Spent:   Minutes non procedure based      Comments   >50% of visit spent in counseling and coordination of care     ________________________________________________________________________  Tamela Pruitt MD     Procedures: see electronic medical records for all procedures/Xrays and details which were not copied into this note but were reviewed prior to creation of Plan. LABS:  I reviewed today's most current labs and imaging studies.   Pertinent labs include:  Recent Labs      03/07/18   0341 03/06/18   0900  03/05/18   2104   WBC  9.0  10.3   --    HGB  8.3*  7.0*  7.7*   HCT  25.6*  20.9*  23.3*   PLT  404*  335   --      Recent Labs      03/08/18   0236  03/07/18   0341  03/06/18   1540   NA  141  136  139   K  3.9  4.2  3.9   CL  113*  109*  111*   CO2  18*  19*  18*   GLU  132*  181*  49*   BUN  41*  46*  48*   CREA  2.56*  3.05*  3.17*   CA  7.1*  8.0*  7.7*   MG   --   1.9   --    ALB   --    --   1.4*   TBILI   --    --   0.2   SGOT   --    --   20   ALT   --    --   16       Signed: Tamela Pruitt MD

## 2018-03-09 LAB
ANION GAP SERPL CALC-SCNC: 8 MMOL/L (ref 5–15)
BASOPHILS # BLD: 0 K/UL (ref 0–0.1)
BASOPHILS NFR BLD: 0 % (ref 0–1)
BUN SERPL-MCNC: 45 MG/DL (ref 6–20)
BUN/CREAT SERPL: 15 (ref 12–20)
CALCIUM SERPL-MCNC: 7.6 MG/DL (ref 8.5–10.1)
CHLORIDE SERPL-SCNC: 110 MMOL/L (ref 97–108)
CK MB CFR SERPL CALC: ABNORMAL % (ref 0–2.5)
CK MB SERPL-MCNC: <1 NG/ML (ref 5–25)
CK SERPL-CCNC: 11 U/L (ref 26–192)
CO2 SERPL-SCNC: 20 MMOL/L (ref 21–32)
CREAT SERPL-MCNC: 3.05 MG/DL (ref 0.55–1.02)
DIFFERENTIAL METHOD BLD: ABNORMAL
EOSINOPHIL # BLD: 0.2 K/UL (ref 0–0.4)
EOSINOPHIL NFR BLD: 3 % (ref 0–7)
ERYTHROCYTE [DISTWIDTH] IN BLOOD BY AUTOMATED COUNT: 16.1 % (ref 11.5–14.5)
GLUCOSE BLD STRIP.AUTO-MCNC: 152 MG/DL (ref 65–100)
GLUCOSE BLD STRIP.AUTO-MCNC: 180 MG/DL (ref 65–100)
GLUCOSE BLD STRIP.AUTO-MCNC: 203 MG/DL (ref 65–100)
GLUCOSE BLD STRIP.AUTO-MCNC: 87 MG/DL (ref 65–100)
GLUCOSE SERPL-MCNC: 283 MG/DL (ref 65–100)
HCT VFR BLD AUTO: 22.4 % (ref 35–47)
HGB BLD-MCNC: 7.1 G/DL (ref 11.5–16)
IMM GRANULOCYTES # BLD: 0.1 K/UL (ref 0–0.04)
IMM GRANULOCYTES NFR BLD AUTO: 1 % (ref 0–0.5)
LYMPHOCYTES # BLD: 1 K/UL (ref 0.8–3.5)
LYMPHOCYTES NFR BLD: 14 % (ref 12–49)
MCH RBC QN AUTO: 25.5 PG (ref 26–34)
MCHC RBC AUTO-ENTMCNC: 31.7 G/DL (ref 30–36.5)
MCV RBC AUTO: 80.6 FL (ref 80–99)
MONOCYTES # BLD: 0.7 K/UL (ref 0–1)
MONOCYTES NFR BLD: 9 % (ref 5–13)
NEUTS SEG # BLD: 5.3 K/UL (ref 1.8–8)
NEUTS SEG NFR BLD: 72 % (ref 32–75)
NRBC # BLD: 0 K/UL (ref 0–0.01)
NRBC BLD-RTO: 0 PER 100 WBC
PLATELET # BLD AUTO: 406 K/UL (ref 150–400)
PMV BLD AUTO: 11.1 FL (ref 8.9–12.9)
POTASSIUM SERPL-SCNC: 4.8 MMOL/L (ref 3.5–5.1)
RBC # BLD AUTO: 2.78 M/UL (ref 3.8–5.2)
SERVICE CMNT-IMP: ABNORMAL
SERVICE CMNT-IMP: NORMAL
SODIUM SERPL-SCNC: 138 MMOL/L (ref 136–145)
WBC # BLD AUTO: 7.3 K/UL (ref 3.6–11)

## 2018-03-09 PROCEDURE — 74011636637 HC RX REV CODE- 636/637: Performed by: INTERNAL MEDICINE

## 2018-03-09 PROCEDURE — 74011000258 HC RX REV CODE- 258: Performed by: STUDENT IN AN ORGANIZED HEALTH CARE EDUCATION/TRAINING PROGRAM

## 2018-03-09 PROCEDURE — 74011250637 HC RX REV CODE- 250/637: Performed by: STUDENT IN AN ORGANIZED HEALTH CARE EDUCATION/TRAINING PROGRAM

## 2018-03-09 PROCEDURE — 65270000029 HC RM PRIVATE

## 2018-03-09 PROCEDURE — 74011250636 HC RX REV CODE- 250/636: Performed by: INTERNAL MEDICINE

## 2018-03-09 PROCEDURE — 85025 COMPLETE CBC W/AUTO DIFF WBC: CPT | Performed by: INTERNAL MEDICINE

## 2018-03-09 PROCEDURE — 77030018836 HC SOL IRR NACL ICUM -A

## 2018-03-09 PROCEDURE — 74011250636 HC RX REV CODE- 250/636: Performed by: STUDENT IN AN ORGANIZED HEALTH CARE EDUCATION/TRAINING PROGRAM

## 2018-03-09 PROCEDURE — 80048 BASIC METABOLIC PNL TOTAL CA: CPT | Performed by: INTERNAL MEDICINE

## 2018-03-09 PROCEDURE — 82550 ASSAY OF CK (CPK): CPT | Performed by: INTERNAL MEDICINE

## 2018-03-09 PROCEDURE — 74011250637 HC RX REV CODE- 250/637: Performed by: INTERNAL MEDICINE

## 2018-03-09 PROCEDURE — 82962 GLUCOSE BLOOD TEST: CPT

## 2018-03-09 PROCEDURE — 74011250637 HC RX REV CODE- 250/637: Performed by: HOSPITALIST

## 2018-03-09 PROCEDURE — 36415 COLL VENOUS BLD VENIPUNCTURE: CPT | Performed by: INTERNAL MEDICINE

## 2018-03-09 RX ADMIN — PIPERACILLIN SODIUM AND TAZOBACTAM SODIUM 3.38 G: .375; 3 INJECTION, POWDER, LYOPHILIZED, FOR SOLUTION INTRAVENOUS at 03:03

## 2018-03-09 RX ADMIN — OXYCODONE HYDROCHLORIDE AND ACETAMINOPHEN 2 TABLET: 5; 325 TABLET ORAL at 21:27

## 2018-03-09 RX ADMIN — FOLIC ACID 1 MG: 1 TABLET ORAL at 08:53

## 2018-03-09 RX ADMIN — CLONIDINE HYDROCHLORIDE 0.2 MG: 0.1 TABLET ORAL at 21:27

## 2018-03-09 RX ADMIN — Medication 10 ML: at 21:28

## 2018-03-09 RX ADMIN — CYANOCOBALAMIN TAB 500 MCG 1000 MCG: 500 TAB at 08:45

## 2018-03-09 RX ADMIN — INSULIN LISPRO 2 UNITS: 100 INJECTION, SOLUTION INTRAVENOUS; SUBCUTANEOUS at 08:45

## 2018-03-09 RX ADMIN — CALCIUM 500 MG: 500 TABLET ORAL at 08:45

## 2018-03-09 RX ADMIN — VENLAFAXINE 75 MG: 37.5 TABLET ORAL at 21:27

## 2018-03-09 RX ADMIN — CLONIDINE HYDROCHLORIDE 0.2 MG: 0.1 TABLET ORAL at 08:45

## 2018-03-09 RX ADMIN — CLONIDINE HYDROCHLORIDE 0.2 MG: 0.1 TABLET ORAL at 13:30

## 2018-03-09 RX ADMIN — INSULIN LISPRO 2 UNITS: 100 INJECTION, SOLUTION INTRAVENOUS; SUBCUTANEOUS at 17:38

## 2018-03-09 RX ADMIN — QUETIAPINE FUMARATE 100 MG: 100 TABLET ORAL at 08:45

## 2018-03-09 RX ADMIN — THERA TABS 1 TABLET: TAB at 08:45

## 2018-03-09 RX ADMIN — OXYCODONE HYDROCHLORIDE AND ACETAMINOPHEN 2 TABLET: 5; 325 TABLET ORAL at 08:53

## 2018-03-09 RX ADMIN — Medication 5 ML: at 14:00

## 2018-03-09 RX ADMIN — DAPTOMYCIN 400 MG: 500 INJECTION, POWDER, LYOPHILIZED, FOR SOLUTION INTRAVENOUS at 12:32

## 2018-03-09 RX ADMIN — INSULIN GLARGINE 8 UNITS: 100 INJECTION, SOLUTION SUBCUTANEOUS at 22:13

## 2018-03-09 RX ADMIN — VENLAFAXINE 75 MG: 37.5 TABLET ORAL at 08:45

## 2018-03-09 RX ADMIN — DIPHENHYDRAMINE HYDROCHLORIDE 25 MG: 25 CAPSULE ORAL at 08:53

## 2018-03-09 RX ADMIN — INSULIN LISPRO 3 UNITS: 100 INJECTION, SOLUTION INTRAVENOUS; SUBCUTANEOUS at 12:32

## 2018-03-09 RX ADMIN — FAMOTIDINE 20 MG: 20 TABLET, FILM COATED ORAL at 08:45

## 2018-03-09 RX ADMIN — QUETIAPINE FUMARATE 100 MG: 100 TABLET ORAL at 21:27

## 2018-03-09 RX ADMIN — PIPERACILLIN SODIUM AND TAZOBACTAM SODIUM 3.38 G: .375; 3 INJECTION, POWDER, LYOPHILIZED, FOR SOLUTION INTRAVENOUS at 13:30

## 2018-03-09 NOTE — PROGRESS NOTES
Nutrition Assessment:    RECOMMENDATIONS:   Continue Diabetic diet    DIETITIANS INTERVENTIONS/PLAN:   Continue diet as tolerated  Monitor appetite/PO intake    ASSESSMENT:   Pt admitted with osteomyelitis of diabetic food. PMH: DM, HTN, CKD stage 4, gastric bypass. Chart reviewed for LOS. Pt's appetite has been good per RN flowsheet's. K+ 4.8, will monitor, it has been WNL up until this point. BM noted yesterday. Current intake is likely adequate to meet est needs. SUBJECTIVE/OBJECTIVE:     Diet Order: Consistent carb  % Eaten:  Patient Vitals for the past 72 hrs:   % Diet Eaten   03/09/18 0754 100 %   03/08/18 1740 80 %     Pertinent Medications:oscal, Vitamin B12, cubicin, colace, pepcid, folvite, lantus, humalog, zosyn, MVI. Chemistries:  Lab Results   Component Value Date/Time    Sodium 138 03/09/2018 02:51 AM    Potassium 4.8 03/09/2018 02:51 AM    Chloride 110 (H) 03/09/2018 02:51 AM    CO2 20 (L) 03/09/2018 02:51 AM    Anion gap 8 03/09/2018 02:51 AM    Glucose 283 (H) 03/09/2018 02:51 AM    BUN 45 (H) 03/09/2018 02:51 AM    Creatinine 3.05 (H) 03/09/2018 02:51 AM    BUN/Creatinine ratio 15 03/09/2018 02:51 AM    GFR est AA 21 (L) 03/09/2018 02:51 AM    GFR est non-AA 17 (L) 03/09/2018 02:51 AM    Calcium 7.6 (L) 03/09/2018 02:51 AM    AST (SGOT) 20 03/06/2018 03:40 PM    Alk. phosphatase 355 (H) 03/06/2018 03:40 PM    Protein, total 6.2 (L) 03/06/2018 03:40 PM    Albumin 1.4 (L) 03/06/2018 03:40 PM    Globulin 4.8 (H) 03/06/2018 03:40 PM    A-G Ratio 0.3 (L) 03/06/2018 03:40 PM    ALT (SGPT) 16 03/06/2018 03:40 PM      Anthropometrics: Height: 5' 2\" (157.5 cm) Weight: 64.4 kg (142 lb)  []bed scale    []stated   []unknown    IBW (%IBW):   ( ) UBW (%UBW):   (  %)    BMI: Body mass index is 25.97 kg/(m^2).     This BMI is indicative of:  []Underweight   [x]Normal   []Overweight   [] Obesity   [] Extreme Obesity (BMI>40)  Estimated Nutrition Needs (Based on): 1654 Kcals/day (MSJ 1272 x 1.3) , 77 g (1.2gPro/kg) Protein  Carbohydrate: At Least 130 g/day  Fluids: 1700 mL/day    Last BM: 3/8   []Active     []Hyperactive  []Hypoactive       [] Absent   BS  Skin:    [] Intact   [x] Incision  [] Breakdown   [] DTI   [] Tears/Excoriation/Abrasion  [x]Edema(+2 nonpitting-LLE) [] Other: Wt Readings from Last 30 Encounters:   03/02/18 64.4 kg (142 lb)   05/02/17 73.9 kg (162 lb 14.7 oz)   08/30/16 74.8 kg (165 lb)   05/30/16 74.8 kg (165 lb)   05/23/16 80.3 kg (177 lb 2 oz)   05/21/16 80.3 kg (177 lb)   05/18/16 79.4 kg (175 lb)   05/13/16 74.8 kg (165 lb)   05/02/16 82.9 kg (182 lb 12.8 oz)   04/29/16 76.7 kg (169 lb 1.5 oz)   04/22/16 78.2 kg (172 lb 6.4 oz)   04/05/16 78.3 kg (172 lb 9.9 oz)   03/11/16 74.8 kg (165 lb)   03/09/16 83.6 kg (184 lb 4.9 oz)   02/16/16 83.5 kg (184 lb)   02/08/16 83.5 kg (184 lb)   12/02/15 94.3 kg (208 lb)   10/17/15 99.3 kg (219 lb)   09/30/15 106.5 kg (234 lb 12.6 oz)   09/29/15 104.3 kg (230 lb)   09/25/15 104.3 kg (230 lb)   09/19/15 104.3 kg (230 lb)   08/19/14 105 kg (231 lb 6.4 oz)   06/24/13 99.8 kg (220 lb)   06/17/13 102.1 kg (225 lb)   06/10/13 102.1 kg (225 lb)   06/04/13 98.8 kg (217 lb 13 oz)   05/29/13 105.7 kg (233 lb)   05/29/13 105.8 kg (233 lb 4 oz)   05/13/13 100.7 kg (222 lb)      NUTRITION DIAGNOSES:   Problem:  No nutritional diagnosis at this time        NUTRITION INTERVENTIONS:  Meals/Snacks: General/healthful diet                  GOAL:   Pt will consume >70% of meals in 4-6 days.      Cultural, Cheondoism, or Ethnic Dietary Needs: None     LEARNING NEEDS (Diet, Food/Nutrient-Drug Interaction):    [x] None Identified   [] Identified and Education Provided/Documented   [] Identified and Pt declined/was not appropriate      [x] Interdisciplinary Care Plan Reviewed/Documented    [x] Participated in Discharge Planning: Diabetic diet    [] Interdisciplinary Rounds     NUTRITION RISK:    [] High              [] Moderate           [x]  Low  [x] Minimal/Uncompromised    PT SEEN FOR:    []  MD Consult: []Calorie Count      []Diabetic Diet Education        []Diet Education     []Electrolyte Management     []General Nutrition Management and Supplements     []Management of Tube Feeding     []TPN Recommendations    []  RN Referral:  []MST score >=2     []Enteral/Parenteral Nutrition PTA     []Pregnant: Gestational DM or Multigestation   []  Low BMI  []  Re-Screen   [x]  LOS   []  NPO/clears x 5 days   []  New TF/TPN    Stephen Pardo RD, [address]   Pager [phone]  Weekend Pager [phone]

## 2018-03-09 NOTE — PROGRESS NOTES
Spiritual Care Assessment/Progress Note  [facility]      NAME: Ha Kaiser      MRN: [id number]  AGE: 42 y.o. SEX: female  Buddhist Affiliation: Taoist   Language: English     3/9/2018     Total Time (in minutes): 9     Spiritual Assessment begun in [facility] through conversation with:         []Patient        [] Family    [] Friend(s)        Reason for Consult: Initial/Spiritual assessment, patient floor     Spiritual beliefs: (Please include comment if needed)     [x] Involved in a maureen tradition/spiritual practice:     [] Supported by a maureen community:      [] Claims no spiritual orientation:      [] Seeking spiritual identity:           [] Adheres to an individual form of spirituality:      [] Not able to assess:                     Identified resources for coping:      [] Prayer                  [x] Devotional reading               [] Music                  [] Guided Imagery     [] Family/friends                 [] Pet visits     [] Other:        Interventions offered during this visit: (See comments for more details)    Patient Interventions: Coping skills reviewed/reinforced, Affirmation of emotions/emotional suffering, Buddhist beliefs/image of God discussed, Initial/Spiritual assessment, patient floor           Plan of Care:     [] Discuss Spiritual/Cultural needs    [] Support AMD and/or advance care planning process      [] Support grieving process   [] Coordinate Rites/Rituals    [] Coordination with community clergy   [x] No spiritual needs identified at this time   [] Detailed Plan of Care below (See Comments)  [] Make referral to Music Therapy  [] Make referral to Pet Therapy     [] Make referral to Addiction services  [] Make referral to [facility]  [] Make referral to Spiritual Care Partner  [] No future visits requested           Initial Spiritual Assessment in 2115 with LOS pt.  Pt appeared to be resting in chair but engaged with  upon calling out to pt. Pt shared details leading up to hospitalization and associated feelings. Pt self-reported to be coping well despite probability of having toes amputated tomorrow. Pt shared family history impacted by diabetes. Pt identified as Orthodox and had asked for and received a copy of [employer] from [facility]. Provided empathic listening and advised of availability of chaplains should pt desire it. Pronounced blessing. Will follow up as needed/able. PHI Soriano. Div

## 2018-03-09 NOTE — PROGRESS NOTES
Vascular Surgery Progress Note  Ashley Verdugo [facility]  3/9/2018       Subjective:     Ms. Xin Maloney is a 42yo AAF with a pmhx significant for recurrent DVT, DM, HTN, and asthma, Charcot foot, CKD IV, sickle cell, seizures, THU/chronic respiratory failure, and opioid dependence. She presented to the hospital with complaint of a left foot wound of 3 weeks duration. She had associated pain and swelling. X-ray on arrival was + for a Charcot foot, chronic 5th metatarsal head fracture and osteomyelitis. She was seen in consultation by Dr. Alen Cardona and is s/p left foot I&D 03/03/2018 and repeat I&D 03/08/2018. Wound cx + for gram + cocci in pair and anaerobic gram negative rods (beta lactase +). Her pain remains controlled. Large amount of drainage to bulky dressing. Patient was walking on wound this am.    H&H remains labile. Fever resolved. Leukocytosis resolved. Nursing Data:     Patient Vitals for the past 24 hrs:   BP Temp Pulse Resp SpO2   03/09/18 0754 133/73 97.9 °F (36.6 °C) 91 18 100 %   03/09/18 0253 142/82 98.1 °F (36.7 °C) 78 16 98 %   03/09/18 0000 130/67 98.2 °F (36.8 °C) 79 18 99 %   03/08/18 2023 148/80 98.1 °F (36.7 °C) 81 17 98 %   03/08/18 1533 (!) 164/93 98.3 °F (36.8 °C) 89 17 100 %   03/08/18 1137 129/66 98.2 °F (36.8 °C) 75 16 100 %   03/08/18 0951 146/77 - 84 - -     ---------------------------------------------------------------------------------------------------------    Intake/Output Summary (Last 24 hours) at 03/09/18 0933  Last data filed at 03/08/18 1859   Gross per 24 hour   Intake              400 ml   Output                0 ml   Net              400 ml       Exam:     Physical Exam   Constitutional: She is oriented to person, place, and time. She appears well-developed and well-nourished. HENT:   Head: Normocephalic and atraumatic. Eyes: EOM are normal. Pupils are equal, round, and reactive to light. Neck: Normal range of motion. Neck supple.        Cardiovascular: Normal rate and regular rhythm. Pulmonary/Chest: Effort normal and breath sounds normal.   Abdominal: Soft. Bowel sounds are normal.   Musculoskeletal: Normal range of motion. Left foot: There is deformity. Neurological: She is alert and oriented to person, place, and time. Skin:   Bulky dressing to left foot with large amount of drainage. Psychiatric: Judgment and thought content normal.       Lab Review:     . Recent Results (from the past 24 hour(s))   GLUCOSE, POC    Collection Time: 03/08/18 11:35 AM   Result Value Ref Range    Glucose (POC) 114 (H) 65 - 100 mg/dL    Performed by DIXON KHALIL(CON)    GLUCOSE, POC    Collection Time: 03/08/18  4:14 PM   Result Value Ref Range    Glucose (POC) 202 (H) 65 - 100 mg/dL    Performed by Cesar Bergeron (traveler)    GLUCOSE, POC    Collection Time: 03/08/18  8:39 PM   Result Value Ref Range    Glucose (POC) 179 (H) 65 - 100 mg/dL    Performed by [email or web address]    CBC WITH AUTOMATED DIFF    Collection Time: 03/09/18  2:51 AM   Result Value Ref Range    WBC 7.3 3.6 - 11.0 K/uL    RBC 2.78 (L) 3.80 - 5.20 M/uL    HGB 7.1 (L) 11.5 - 16.0 g/dL    HCT 22.4 (L) 35.0 - 47.0 %    MCV 80.6 80.0 - 99.0 FL    MCH 25.5 (L) 26.0 - 34.0 PG    MCHC 31.7 30.0 - 36.5 g/dL    RDW 16.1 (H) 11.5 - 14.5 %    PLATELET 704 (H) 022 - 400 K/uL    MPV 11.1 8.9 - 12.9 FL    NRBC 0.0 0  WBC    ABSOLUTE NRBC 0.00 0.00 - 0.01 K/uL    NEUTROPHILS 72 32 - 75 %    LYMPHOCYTES 14 12 - 49 %    MONOCYTES 9 5 - 13 %    EOSINOPHILS 3 0 - 7 %    BASOPHILS 0 0 - 1 %    IMMATURE GRANULOCYTES 1 (H) 0.0 - 0.5 %    ABS. NEUTROPHILS 5.3 1.8 - 8.0 K/UL    ABS. LYMPHOCYTES 1.0 0.8 - 3.5 K/UL    ABS. MONOCYTES 0.7 0.0 - 1.0 K/UL    ABS. EOSINOPHILS 0.2 0.0 - 0.4 K/UL    ABS. BASOPHILS 0.0 0.0 - 0.1 K/UL    ABS. IMM.  GRANS. 0.1 (H) 0.00 - 0.04 K/UL    DF AUTOMATED     METABOLIC PANEL, BASIC    Collection Time: 03/09/18  2:51 AM   Result Value Ref Range    Sodium 138 136 - 145 mmol/L Potassium 4.8 3.5 - 5.1 mmol/L    Chloride 110 (H) 97 - 108 mmol/L    CO2 20 (L) 21 - 32 mmol/L    Anion gap 8 5 - 15 mmol/L    Glucose 283 (H) 65 - 100 mg/dL    BUN 45 (H) 6 - 20 MG/DL    Creatinine 3.05 (H) 0.55 - 1.02 MG/DL    BUN/Creatinine ratio 15 12 - 20      GFR est AA 21 (L) >60 ml/min/1.73m2    GFR est non-AA 17 (L) >60 ml/min/1.73m2    Calcium 7.6 (L) 8.5 - 10.1 MG/DL   CK W/ CKMB & INDEX    Collection Time: 03/09/18  2:51 AM   Result Value Ref Range    CK 11 (L) 26 - 192 U/L    CK - MB <1.0 <3.6 NG/ML    CK-MB Index Cannot be calculated 0 - 2.5     GLUCOSE, POC    Collection Time: 03/09/18  7:57 AM   Result Value Ref Range    Glucose (POC) 180 (H) 65 - 100 mg/dL    Performed by Kg Gates        XR Results (most recent):    Results from Hospital Encounter encounter on 03/02/18   XR FOOT LT AP/LAT   Narrative EXAM:  XR FOOT LT AP/LAT    INDICATION:   Left midfoot pain and swelling, recent surgical debridement of  infected tissues. COMPARISON:  Left foot views on 3/2/2018    FINDINGS:  AP and lateral portable views of the left foot demonstrate decreased  soft tissue lateral to the base of the fifth metatarsal.  Increased soft tissue  gas appears to be packed with material's. Partial resection of the proximal  fifth metatarsal is new. Widened distance between the second and third  metatarsals is unchanged. Neuropathic midfoot is unchanged. Impression IMPRESSION:      Postsurgical lateral midfoot soft tissues and partial resection of the base of  the fifth metatarsal.  Unchanged neuropathic midfoot and TMT joints. Assessment/Plan:      Consult problem:  DM Left Foot Wound   Septic arthritis   Charcot Foot    R/O left foot osteomyelitis   Wound cx 3/3/18 light mixed anaerobic gram neg rods (beta lactamase +) and anaerobic gram + cocci in pairs  Blood cx NGTD   LLE duplex negative for DVT. NM WBC scan difficult to interpret due to Charcot. Possible osteomyelitis.      Will continue to follow cx results and treat with IV antibx. Podiatrist Dr. Francesco Lopez to evaluate today to determine plan of care for closure vs wound closure and TMA. Continue wound care. NPO after midnight for possible procedure in the am.  In the interim continue wet to moist dressing changes BID. Active problems  IDDM type I J8R 8.8  Metabolic acidosis improved   Hypocalcemia improved   Sickle cell disease pain controlled   Chronic pain syndrome with opioid dependence  Sickle cell anemia labile s/p transfusion   Acute on chronic renal failure stage IV baseline creatinine 2.46. Creatinine has worsened overnight. Seizure disorder   HTN stable   THU/chronic respiratory failure oxygen dependent at night  Asthma   Hx of recurrent DVT   Management of comorbid conditions by primary team.    VTE prophylaxis:  Per primary team.  Next procedure likely Saturday. Disposition:   [facility].     Vascular surgery signing off as Dr. Francesco Lopez will be taking over care later today. We appreciate the opportunity to participate in the care of Ms Isac Waddell. Patient should f/u as instructed by Dr. Francesco Lopez. Please reconsult as needed.

## 2018-03-09 NOTE — PROGRESS NOTES
General Surgery End of Shift Nursing Note    Bedside shift change report given to Josafat Goldsmith (oncoming nurse) by Chapin Norwood (offgoing nurse). Report included the following information SBAR, Kardex, OR Summary, Intake/Output, MAR, Recent Results and Cardiac Rhythm NSR. Shift worked:   7 am to 7 pm   Summary of shift:   Pt voiding appropriately. Tolerating diet. Pt sat in recliner entire shift. Nuclear med studies completed. Issues for physician to address:   none     Number times ambulated in hallway past shift: 0; ambulate in room to bathroom    Number of times OOB to chair past shift: entire shift    Pain Management:  Current medication: percocet  Patient states pain is manageable on current pain medication: YES    GI:    Current diet:  DIET DIABETIC CONSISTENT CARB Regular    Tolerating current diet: YES  Passing flatus: YES  Last Bowel Movement: today   Appearance:     Respiratory:    Incentive Spirometer at bedside: YES  Patient instructed on use: YES    Patient Safety:    Falls Score: 2  Bed Alarm On? No  Sitter?  No    Ramya Feliciano

## 2018-03-10 LAB
ANION GAP SERPL CALC-SCNC: 4 MMOL/L (ref 5–15)
BASOPHILS # BLD: 0 K/UL (ref 0–0.1)
BASOPHILS NFR BLD: 0 % (ref 0–1)
BUN SERPL-MCNC: 43 MG/DL (ref 6–20)
BUN/CREAT SERPL: 15 (ref 12–20)
CALCIUM SERPL-MCNC: 7.8 MG/DL (ref 8.5–10.1)
CHLORIDE SERPL-SCNC: 111 MMOL/L (ref 97–108)
CO2 SERPL-SCNC: 24 MMOL/L (ref 21–32)
CREAT SERPL-MCNC: 2.83 MG/DL (ref 0.55–1.02)
DIFFERENTIAL METHOD BLD: ABNORMAL
EOSINOPHIL # BLD: 0.2 K/UL (ref 0–0.4)
EOSINOPHIL NFR BLD: 3 % (ref 0–7)
ERYTHROCYTE [DISTWIDTH] IN BLOOD BY AUTOMATED COUNT: 16.2 % (ref 11.5–14.5)
GLUCOSE BLD STRIP.AUTO-MCNC: 111 MG/DL (ref 65–100)
GLUCOSE BLD STRIP.AUTO-MCNC: 154 MG/DL (ref 65–100)
GLUCOSE BLD STRIP.AUTO-MCNC: 226 MG/DL (ref 65–100)
GLUCOSE BLD STRIP.AUTO-MCNC: 72 MG/DL (ref 65–100)
GLUCOSE BLD STRIP.AUTO-MCNC: 89 MG/DL (ref 65–100)
GLUCOSE SERPL-MCNC: 89 MG/DL (ref 65–100)
HCT VFR BLD AUTO: 24.8 % (ref 35–47)
HGB BLD-MCNC: 7.9 G/DL (ref 11.5–16)
IMM GRANULOCYTES # BLD: 0.1 K/UL (ref 0–0.04)
IMM GRANULOCYTES NFR BLD AUTO: 1 % (ref 0–0.5)
LYMPHOCYTES # BLD: 1.2 K/UL (ref 0.8–3.5)
LYMPHOCYTES NFR BLD: 20 % (ref 12–49)
MCH RBC QN AUTO: 25.7 PG (ref 26–34)
MCHC RBC AUTO-ENTMCNC: 31.9 G/DL (ref 30–36.5)
MCV RBC AUTO: 80.8 FL (ref 80–99)
MONOCYTES # BLD: 0.5 K/UL (ref 0–1)
MONOCYTES NFR BLD: 8 % (ref 5–13)
NEUTS SEG # BLD: 4.3 K/UL (ref 1.8–8)
NEUTS SEG NFR BLD: 68 % (ref 32–75)
NRBC # BLD: 0 K/UL (ref 0–0.01)
NRBC BLD-RTO: 0 PER 100 WBC
PLATELET # BLD AUTO: 466 K/UL (ref 150–400)
PMV BLD AUTO: 10.8 FL (ref 8.9–12.9)
POTASSIUM SERPL-SCNC: 5 MMOL/L (ref 3.5–5.1)
RBC # BLD AUTO: 3.07 M/UL (ref 3.8–5.2)
SERVICE CMNT-IMP: ABNORMAL
SERVICE CMNT-IMP: NORMAL
SERVICE CMNT-IMP: NORMAL
SODIUM SERPL-SCNC: 139 MMOL/L (ref 136–145)
WBC # BLD AUTO: 6.3 K/UL (ref 3.6–11)

## 2018-03-10 PROCEDURE — 65270000029 HC RM PRIVATE

## 2018-03-10 PROCEDURE — 74011250636 HC RX REV CODE- 250/636: Performed by: INTERNAL MEDICINE

## 2018-03-10 PROCEDURE — 85025 COMPLETE CBC W/AUTO DIFF WBC: CPT | Performed by: INTERNAL MEDICINE

## 2018-03-10 PROCEDURE — 74011250637 HC RX REV CODE- 250/637: Performed by: INTERNAL MEDICINE

## 2018-03-10 PROCEDURE — 86850 RBC ANTIBODY SCREEN: CPT | Performed by: INTERNAL MEDICINE

## 2018-03-10 PROCEDURE — 86921 COMPATIBILITY TEST INCUBATE: CPT | Performed by: INTERNAL MEDICINE

## 2018-03-10 PROCEDURE — 74011250637 HC RX REV CODE- 250/637: Performed by: HOSPITALIST

## 2018-03-10 PROCEDURE — 86920 COMPATIBILITY TEST SPIN: CPT | Performed by: INTERNAL MEDICINE

## 2018-03-10 PROCEDURE — 86902 BLOOD TYPE ANTIGEN DONOR EA: CPT | Performed by: INTERNAL MEDICINE

## 2018-03-10 PROCEDURE — 80048 BASIC METABOLIC PNL TOTAL CA: CPT | Performed by: EMERGENCY MEDICINE

## 2018-03-10 PROCEDURE — 36415 COLL VENOUS BLD VENIPUNCTURE: CPT | Performed by: EMERGENCY MEDICINE

## 2018-03-10 PROCEDURE — 85660 RBC SICKLE CELL TEST: CPT | Performed by: INTERNAL MEDICINE

## 2018-03-10 PROCEDURE — 86922 COMPATIBILITY TEST ANTIGLOB: CPT | Performed by: INTERNAL MEDICINE

## 2018-03-10 PROCEDURE — 82962 GLUCOSE BLOOD TEST: CPT

## 2018-03-10 PROCEDURE — 74011250637 HC RX REV CODE- 250/637: Performed by: STUDENT IN AN ORGANIZED HEALTH CARE EDUCATION/TRAINING PROGRAM

## 2018-03-10 RX ORDER — SODIUM CHLORIDE 9 MG/ML
250 INJECTION, SOLUTION INTRAVENOUS AS NEEDED
Status: DISCONTINUED | OUTPATIENT
Start: 2018-03-10 | End: 2018-03-18 | Stop reason: ALTCHOICE

## 2018-03-10 RX ORDER — DEXTROSE MONOHYDRATE 50 MG/ML
25 INJECTION, SOLUTION INTRAVENOUS CONTINUOUS
Status: DISCONTINUED | OUTPATIENT
Start: 2018-03-10 | End: 2018-03-16

## 2018-03-10 RX ADMIN — PIPERACILLIN SODIUM AND TAZOBACTAM SODIUM 3.38 G: .375; 3 INJECTION, POWDER, LYOPHILIZED, FOR SOLUTION INTRAVENOUS at 13:39

## 2018-03-10 RX ADMIN — DIPHENHYDRAMINE HYDROCHLORIDE 25 MG: 25 CAPSULE ORAL at 09:07

## 2018-03-10 RX ADMIN — Medication 10 ML: at 13:39

## 2018-03-10 RX ADMIN — OXYCODONE HYDROCHLORIDE AND ACETAMINOPHEN 2 TABLET: 5; 325 TABLET ORAL at 09:07

## 2018-03-10 RX ADMIN — PIPERACILLIN SODIUM AND TAZOBACTAM SODIUM 3.38 G: .375; 3 INJECTION, POWDER, LYOPHILIZED, FOR SOLUTION INTRAVENOUS at 02:20

## 2018-03-10 RX ADMIN — CLONIDINE HYDROCHLORIDE 0.2 MG: 0.1 TABLET ORAL at 20:33

## 2018-03-10 RX ADMIN — QUETIAPINE FUMARATE 100 MG: 100 TABLET ORAL at 09:08

## 2018-03-10 RX ADMIN — FOLIC ACID 1 MG: 1 TABLET ORAL at 09:08

## 2018-03-10 RX ADMIN — DEXTROSE MONOHYDRATE 50 ML/HR: 5 INJECTION, SOLUTION INTRAVENOUS at 09:14

## 2018-03-10 RX ADMIN — DIPHENHYDRAMINE HYDROCHLORIDE 25 MG: 25 CAPSULE ORAL at 20:37

## 2018-03-10 RX ADMIN — CALCIUM 500 MG: 500 TABLET ORAL at 09:08

## 2018-03-10 RX ADMIN — FAMOTIDINE 20 MG: 20 TABLET, FILM COATED ORAL at 09:08

## 2018-03-10 RX ADMIN — VENLAFAXINE 75 MG: 37.5 TABLET ORAL at 20:33

## 2018-03-10 RX ADMIN — OXYCODONE HYDROCHLORIDE AND ACETAMINOPHEN 2 TABLET: 5; 325 TABLET ORAL at 20:33

## 2018-03-10 RX ADMIN — CLONIDINE HYDROCHLORIDE 0.2 MG: 0.1 TABLET ORAL at 13:39

## 2018-03-10 RX ADMIN — VENLAFAXINE 75 MG: 37.5 TABLET ORAL at 09:07

## 2018-03-10 RX ADMIN — QUETIAPINE FUMARATE 100 MG: 100 TABLET ORAL at 20:34

## 2018-03-10 RX ADMIN — CYANOCOBALAMIN TAB 500 MCG 1000 MCG: 500 TAB at 09:08

## 2018-03-10 RX ADMIN — THERA TABS 1 TABLET: TAB at 09:08

## 2018-03-10 RX ADMIN — Medication 40 ML: at 20:35

## 2018-03-10 RX ADMIN — CLONIDINE HYDROCHLORIDE 0.2 MG: 0.1 TABLET ORAL at 09:07

## 2018-03-10 NOTE — PROGRESS NOTES
Hospitalist Progress Note    NAME: Uche Hartman   :  1980   MRN:  [id number]       Assessment / Plan:  DM type 1 uncontrolled with nephropathy POA  Hypoglycemia 3/6/2018  BS 91, 114, 202, 179, 180, 203, 152  Resumed lantus at lower dose 8 units  Stop D5W  lispro sliding scale  Last HgBa1c 8.8    Severe sepsis POA due to left foot diabetic foot infection, ? osteomyelitis   Fevers resolved  Excisional debridement and biopsy done on 3/3, for repeat surgery 3/7/2018  Wound cultures with anaerobic GNRs(done on antibiotics)  Continue pip-tazo and dapto for now       Consider weaning dapto in 1-2 days pending the OR findings  LLE doppler with no deep venous thrombosis identified. MRI left foot multiple fluid collections, significant bone destruction  , CRP 35  Midline placed 3/6/18  Await final surgery decision re TMA vs wound closure before final duration of antibiotics  Spoke with vascular surgery today, await podiatry input,\    Acute kidney injury POA peak creatinine 3.80  Stage 4 chronic kidney disease POA baseline creatinine 2.4 to 2.7  been on dialysis in the past   Previously has seen [facility]. Creatinine trending down  IV fluids    Acute on chronic anemia due to sickle cell disease POA    Pt without sx of crisis at this time  S/p 1u pRBCs  folic acid  Serial HGbs     THU POA  Uses 2 LPM O2 at night     Code Status: Full    Surrogate Decision Maker: in [facility], no decision maker allowed at this time    DVT Prophylaxis: heparin    Body mass index is 25.97 kg/(m^2).      Subjective:     Chief Complaint / Reason for Physician Visit  \"still with intermittent diarrhea\"'  No new complaints, awaiting decision re further surgery  Pain is tolerable    Review of Systems:  Symptom Y/N Comments  Symptom Y/N Comments   Fever/Chills n   Chest Pain n    Poor Appetite    Edema     Cough n   Abdominal Pain n    Sputum    Joint Pain     SOB/ADAMS n   Pruritis/Rash     Nausea/vomit n   Tolerating PT/OT Diarrhea n   Tolerating Diet y    Constipation    Other       Could NOT obtain due to:      Objective:     VITALS:   Last 24hrs VS reviewed since prior progress note. Most recent are:  Patient Vitals for the past 24 hrs:   Temp Pulse Resp BP SpO2   03/09/18 1639 98.7 °F (37.1 °C) 74 16 155/85 99 %   03/09/18 1127 97.5 °F (36.4 °C) 73 16 145/76 99 %   03/09/18 0754 97.9 °F (36.6 °C) 91 18 133/73 100 %   03/09/18 0253 98.1 °F (36.7 °C) 78 16 142/82 98 %   03/09/18 0000 98.2 °F (36.8 °C) 79 18 130/67 99 %       Intake/Output Summary (Last 24 hours) at 03/09/18 2049  Last data filed at 03/09/18 0754   Gross per 24 hour   Intake              360 ml   Output                0 ml   Net              360 ml        PHYSICAL EXAM:  General: Sitting up in chair,  Alert, cooperative, no acute distress    Resp:  CTA bilaterally, no wheezing or rales. No accessory muscle use  CV:  Regular  rhythm,  No edema  GI:  Soft, Non distended, Non tender.  +Bowel sounds  Neurologic:  Alert and oriented X 3, normal speech,   Psych:   Good insight. Not anxious nor agitated  Skin:  L foot in bandage, multiple tattoos    Reviewed most current lab test results and cultures  YES  Reviewed most current radiology test results   YES  Review and summation of old records today    NO  Reviewed patient's current orders and MAR    YES  PMH/ reviewed - no change compared to H&P  ________________________________________________________________________  Care Plan discussed with:    Comments   Patient x    Family      RN x    Care Manager     Consultant  x Vascular surgery                     Multidiciplinary team rounds were held today with , nursing, pharmacist and clinical coordinator. Patient's plan of care was discussed; medications were reviewed and discharge planning was addressed.      ________________________________________________________________________  Total NON critical care TIME:  25   Minutes    Total CRITICAL CARE TIME Spent: Minutes non procedure based      Comments   >50% of visit spent in counseling and coordination of care     ________________________________________________________________________  Leo Valderrama MD     Procedures: see electronic medical records for all procedures/Xrays and details which were not copied into this note but were reviewed prior to creation of Plan. LABS:  I reviewed today's most current labs and imaging studies.   Pertinent labs include:  Recent Labs      03/09/18   0251 03/07/18   0341   WBC  7.3  9.0   HGB  7.1*  8.3*   HCT  22.4*  25.6*   PLT  406*  404*     Recent Labs      03/09/18   0251 03/08/18   0236  03/07/18   0341   NA  138  141  136   K  4.8  3.9  4.2   CL  110*  113*  109*   CO2  20*  18*  19*   GLU  283*  132*  181*   BUN  45*  41*  46*   CREA  3.05*  2.56*  3.05*   CA  7.6*  7.1*  8.0*   MG   --    --   1.9       Signed: Leo Valderrama MD

## 2018-03-10 NOTE — PROGRESS NOTES
Hospitalist Progress Note    NAME: Jason Holloway   :  1980   MRN:  [id number]       Assessment / Plan:  DM type 1 uncontrolled with nephropathy POA  Hypoglycemia 3/6/2018  , 180, 203, 152, 87  Lantus 8 units with recent hypoglycemia  D5W while NPO  Increase lantus to 10 units in Am  lispro sliding scale  Last HgBa1c 8.8    Severe sepsis POA due to left foot diabetic foot infection, ? osteomyelitis   Fevers resolved  Excisional debridement and biopsy done on 3/3, for repeat surgery 3/7/2018  Wound cultures with anaerobic GNRs(done on antibiotics)  Continue pip-tazo and dapto day 9      Stop the daptomycin after tomorrows dose, no MRSA on cultures  LLE doppler with no deep venous thrombosis identified. MRI left foot multiple fluid collections, significant bone destruction  , CRP 35  Midline placed 3/6/18  Await final surgery decision re TMA vs wound closure before final duration of antibiotics  WBC scan increased uptake at left foot, significance is unclear with recent infection and OR    Acute kidney injury POA peak creatinine 3.80  Stage 4 chronic kidney disease POA baseline creatinine 2.4 to 2.7  been on dialysis in the past   Previously has seen [facility]. Creatinine trending down to baseline    Acute on chronic anemia due to sickle cell disease POA    Pt without sx of crisis at this time  S/p 1u pRBCs  folic acid  Serial HGbs     THU POA  Uses 2 LPM O2 at night     Code Status: Full    Surrogate Decision Maker: in [facility], no decision maker allowed at this time    DVT Prophylaxis: heparin    Body mass index is 25.97 kg/(m^2).      Subjective:     Chief Complaint / Reason for Physician Visit f/u diabetic foot infection  \"my left foot is aching today\"  No further diarrhea overnight  No CP or SOB  Pain is tolerable with current pain meds  NPO till seen by Podiatry    Review of Systems:  Symptom Y/N Comments  Symptom Y/N Comments   Fever/Chills n   Chest Pain n    Poor Appetite Edema     Cough n   Abdominal Pain n    Sputum    Joint Pain     SOB/ADAMS n   Pruritis/Rash     Nausea/vomit n   Tolerating PT/OT     Diarrhea n   Tolerating Diet y    Constipation    Other       Could NOT obtain due to:      Objective:     VITALS:   Last 24hrs VS reviewed since prior progress note. Most recent are:  Patient Vitals for the past 24 hrs:   Temp Pulse Resp BP SpO2   03/10/18 0532 98.2 °F (36.8 °C) 76 17 159/83 98 %   03/10/18 0108 97.9 °F (36.6 °C) 70 16 (!) 184/101 100 %   03/09/18 1950 99.1 °F (37.3 °C) 79 17 130/71 94 %   03/09/18 1639 98.7 °F (37.1 °C) 74 16 155/85 99 %   03/09/18 1127 97.5 °F (36.4 °C) 73 16 145/76 99 %     No intake or output data in the 24 hours ending 03/10/18 0809     PHYSICAL EXAM:  General: Sitting up in chair,  Alert, cooperative, no acute distress    Resp:  CTA bilaterally, no wheezing or rales. No accessory muscle use  CV:  Regular  rhythm,  No edema  GI:  Soft, Non distended, Non tender.  +Bowel sounds  Neurologic:  Alert and oriented X 3, normal speech,   Psych:   Good insight. Not anxious nor agitated  Skin:  L foot bandaged, some bloody drainage, multiple tattoos    Reviewed most current lab test results and cultures  YES  Reviewed most current radiology test results   YES  Review and summation of old records today    NO  Reviewed patient's current orders and MAR    YES  PMH/ reviewed - no change compared to H&P  ________________________________________________________________________  Care Plan discussed with:    Comments   Patient x    Family      RN x    Care Manager     Consultant                        Multidiciplinary team rounds were held today with , nursing, pharmacist and clinical coordinator. Patient's plan of care was discussed; medications were reviewed and discharge planning was addressed.      ________________________________________________________________________  Total NON critical care TIME:  25   Minutes    Total CRITICAL CARE TIME Spent: Minutes non procedure based      Comments   >50% of visit spent in counseling and coordination of care     ________________________________________________________________________  Fernando Weeks MD     Procedures: see electronic medical records for all procedures/Xrays and details which were not copied into this note but were reviewed prior to creation of Plan. LABS:  I reviewed today's most current labs and imaging studies.   Pertinent labs include:  Recent Labs      03/09/18   0251   WBC  7.3   HGB  7.1*   HCT  22.4*   PLT  406*     Recent Labs      03/10/18   0211  03/09/18   0251  03/08/18   0236   NA  139  138  141   K  5.0  4.8  3.9   CL  111*  110*  113*   CO2  24  20*  18*   GLU  89  283*  132*   BUN  43*  45*  41*   CREA  2.83*  3.05*  2.56*   CA  7.8*  7.6*  7.1*       Signed: Fernando Weeks MD

## 2018-03-10 NOTE — CONSULTS
Foot [facility] Ankle specialists of [facility], [address] [place] [address],[address]  [address]  P: [phone]  F: [phone]    Assessment/Plan:  Charcot deformity left foot  Nonhealing wound left foot  Osteomyelitis left foot  Septic arthritis left foot  DM with neuropathy     Foot and ankle surgery team planning Surgical intervention to the left foot due to significant soft tissue void laterally from severe infection with osteomyelitis and possible chronic septic arthritis to the left foot planned date of surgery 03-. The patient has had mutiple debridements with resection of bone in the past by the vascular team and has had multiple discussions of possible below knee amputation. The peroneus brevis tendon has been resected and is no longer functioning properly. The planned surgical procedure will include incision and drainage with removal of nonviable soft tissue and bone, proximal foot amputation, deep tendon transfers tibialis anterior and possibly the peroneus brevis tendon, open bone biopsies and adjacent transfer of an arteriomyofaciocutaneus plantar medial pedicle flap for closure of the nonhealing wound laterally. NPO status complete midnight tonight  Medical clearance complete-thank you  Consent signed and in chart      We discussed the surgery including all risks/benefits possible complications and alternative treatment options. We discussed the postoperative requirements, rehabilitation including the need for AFO brace for life and types of anesthesia.  I demonstrated the surgery to the patient with the use of: X-RAYS;  PICTURE DIAGRAMS        The potential risks and complications of the proposed surgery were also discussed, including but not limited to: nonunion, infection, return of the problem, need for further surgery, chronic pain, chronic swelling, loss of range of motion / stiffness, weakness, numbness or burning nerve pain, possibility of injury to surrounding structures (arteries, veins, nerves, tendons) during the procedure. failure of: skin, soft tissues and / or bone to heal, and the chance of amputation of toes or parts of the foot, amputation of the whole foot, amputation of the leg or loss of their life. We reviewed the post-operative course and recovery with the patient. It was made clear that I cannot guarantee outcomes, or relief of their current symptoms. Had an in depth conversation with the patient discussing the pathophysiology of charcot deformity in the setting of infection, osteomyelitis with significant soft tissue defect and uncontrolled DM with neuropathy. I discussed the different stages of charcot, the theories behind the pathophysiology and times when to attempt surgical intervention. I explained that the current xrays do not appear to show acute stage charcot and appears to be more chronic at this time. We further discussed the multiple treatment options available including living with the current condition, long term conservative therapy and IV VS oral abx, as well as, multiple surgical options including the possibility of below knee amputation which has been recommended by other doctors in the past. I discussed a choparts amputation VS a more distal amputation which would require tendon transfers for soft tissue balancing of the foot. I explained the decreased functionality of both proposed options and the risks associated. Labs/imaging were reviewed. I discussed the results of the MRI and WBC labeled bone scan with the patient and the radiologist/Nuc med doctor and we have determined that the midfoot is significantly involved however the rearfoot including the cubid and navicular are inconclusive and will require clinical evaluation in the OR. We explained to the patient that intraoperative findings may change the course of surgery including the possible open amputation, more proximal amputation and possible need for staged procedure.  After the discussion,   all questions were answered to patient satisfaction and patient has elected for the noted procedures above and wants to proceed with surgery at this time. Labs/imaging reviewed  abx per hospital team- thank you, will recommend infectious disease consultation if open bone biopsies are completed to determine if long term IV abx treatment is needed to augment the surgery    Dressing to left foot taken down and photo-documented below, New packed betadine WTD dressing applied to left foot to be left clean dry and intact. Elevate and offload B/L LE. Float heels off edge of bed with foam block or air boots. Pt to be nonweightbearing left foot   PT to evaluate and treat     Foot and ankle will follow    Thank you for this consultation. Do not hesitate to contact us with any questions. HISTORY OF THE PRESENT ILLNESS (HPI)  Ms Joanna Obrien is a 42year old incarcerated patient who was admitted to [address] [place] [address] with severe infection to their left foot. They have been an uncontrolled DM with neuropathy and have chronic charcot deformity to the foot. The patient has undergone several surgical debridements by the vascular team to stabilize the infection and has had a discussion of possible below knee amputation. Foot and ankle surgery team was cnsulted for evaluation of the nonhealing wound and significant deformity of the foot with tissue loss for possible limb preservation to the left foot. Hey are neuropathic and currently have no pain. They do not know how long they had the wound or how long they have had charcot deformity. They currently deny any F/N/C/V/SOB/CP. REVIEW OF SYSTEMS  14 point review of systems completed all those being negative except the ones entered in the HPI    History:   Allergies   Allergen Reactions    Erythromycin Itching    Morphine Itching     Family History   Problem Relation Age of Onset    Cancer Mother      lung    Hypertension Mother     Cancer Father      kidney    Stroke Father      3 strokes: 59-72    Heart Disease Father 72     CABG    Hypertension Father     Cancer Sister      pancreatic    Cancer Maternal Aunt      breast    Cancer Paternal Aunt      breast    Schizophrenia Sister      was in [address], now ass't living    Other Sister       AIDS    Other Other      nephew of AIDS      Past Surgical History:   Procedure Laterality Date    HX CATARACT REMOVAL  3/5/12    right    HX DILATION AND CURETTAGE      ablation    HX GASTRIC BYPASS      HX GI      j tube placement and removal    HX OTHER SURGICAL      Gastric Pacer- REMOVED 2015    HX VASCULAR ACCESS      gray cath rt subclavian    HX VASCULAR ACCESS      HD access right thigh     Social History   Substance Use Topics    Smoking status: Never Smoker    Smokeless tobacco: Never Used    Alcohol use No       History   Alcohol Use No     History   Drug Use No     History   Smoking Status    Never Smoker   Smokeless Tobacco    Never Used     Current Facility-Administered Medications   Medication Dose Route Frequency    dextrose 5% infusion  50 mL/hr IntraVENous CONTINUOUS    [START ON 3/11/2018] DAPTOmycin (CUBICIN) 400 mg in 0.9% sodium chloride 50 mL IVPB RF formulation  400 mg IntraVENous Q48H    insulin glargine (LANTUS) injection 8 Units  8 Units SubCUTAneous QHS    oxyCODONE-acetaminophen (PERCOCET) 5-325 mg per tablet 1-2 Tab  1-2 Tab Oral Q4H PRN    sodium chloride (NS) flush 10-40 mL  10-40 mL IntraVENous Q8H    diphenhydrAMINE (BENADRYL) capsule 25 mg  25 mg Oral Q6H PRN    cloNIDine HCl (CATAPRES) tablet 0.2 mg  0.2 mg Oral TID    venlafaxine (EFFEXOR) tablet 75 mg  75 mg Oral BID WITH MEALS    QUEtiapine (SEROquel) tablet 100 mg  100 mg Oral BID    famotidine (PEPCID) tablet 20 mg  20 mg Oral DAILY    sodium chloride (NS) flush 5-10 mL  5-10 mL IntraVENous Q8H    sodium chloride (NS) flush 5-10 mL  5-10 mL IntraVENous PRN    sodium chloride (NS) flush 5-10 mL 5-10 mL IntraVENous Q8H    sodium chloride (NS) flush 5-10 mL  5-10 mL IntraVENous PRN    acetaminophen (TYLENOL) tablet 650 mg  650 mg Oral Q4H PRN    ondansetron (ZOFRAN) injection 4 mg  4 mg IntraVENous Q4H PRN    docusate sodium (COLACE) capsule 100 mg  100 mg Oral BID    insulin lispro (HUMALOG) injection   SubCUTAneous AC&HS    glucose chewable tablet 16 g  4 Tab Oral PRN    dextrose (D50W) injection syrg 12.5-25 g  12.5-25 g IntraVENous PRN    glucagon (GLUCAGEN) injection 1 mg  1 mg IntraMUSCular PRN    albuterol (PROVENTIL VENTOLIN) nebulizer solution 2.5 mg  2.5 mg Nebulization Q4H PRN    therapeutic multivitamin (THERAGRAN) tablet 1 Tab  1 Tab Oral DAILY    cyanocobalamin (VITAMIN B12) tablet 1,000 mcg  1,000 mcg Oral DAILY    calcium carbonate (OS-BINA) tablet 500 mg [elemental]  500 mg Oral DAILY    folic acid (FOLVITE) tablet 1 mg  1 mg Oral DAILY    piperacillin-tazobactam (ZOSYN) 3.375 g in  ml premix/cmpd  3.375 g IntraVENous Q12H        Objective:  Vitals: Patient Vitals for the past 12 hrs:   BP Temp Pulse Resp SpO2   03/10/18 1139 (!) 165/94 98.4 °F (36.9 °C) 72 16 98 %   03/10/18 0817 176/89 98.6 °F (37 °C) 81 16 98 %   03/10/18 0532 159/83 98.2 °F (36.8 °C) 76 17 98 %   03/10/18 0108 (!) 184/101 97.9 °F (36.6 °C) 70 16 100 %       Dermatological:  Regarding patient left foot, there is noted edema with a nonhealing wound to the lateral aspect which probes to bone with significant soft tissue loss measuring approx. 6cmx3.5cm full thickness. There is minimal blanchable erythema and minimal current serrosanguinous drainage. No current foul odor at this time. Vascular:   DP/PT +2/4 B/l lower extremity. Cap fill time less than 5 seconds B/L LE    Neurological:   Epicritic and protective threshold diminished.  Patient is unable to detect  5.07 [place] Jenny monofilament on 5/5 random tested spots B/L LE.  patient is AAOx3, mood is normal    MSK  AROM to B/L ankles and toes noted, Muscle strength 5/5 to all muscle groups tested. Gastrocnemius equinus noted to B/L LE. Charcot deformity noted to left foot    Constitutional: Pt is a well developed, average, middle aged, female    Imaging:   XRAY 3 VIEWS LEFT FOOT  IMPRESSION:       Postsurgical lateral midfoot soft tissues and partial resection of the base of  the fifth metatarsal.  Unchanged neuropathic midfoot and TMT joints. MRI LEFT FOOT/ANKLE  IMPRESSION:   1. Large fluid collection in the subcutaneous of the lateral midfoot extending  to the joint spaces of the midfoot. 2. Extensive EFEFEin the midfoot with abnormal signal. Given the presence  of the fluid collection tracking to this, septic arthritis is likely present. There may be an element of Charcot arthropathy also present. 3. Small fluid collection underneath the proximal second metatarsal.  4. Small fluid collection in between the first and second metatarsal necks. 5. Extensive edema surrounding the foot and ankle. WBC NUC MED BONE SCAN  FINDINGS: There is accumulation of labeled WBCs throughout the left midfoot,  medial greater than lateral. This may represent osteomyelitis but is nonspecific  in given the patient's advanced Charcot arthropathy. Feet and ankles are  otherwise unremarkable.     Labs:  Recent Labs      03/10/18   0211  03/09/18   0251   HGB   --   7.1*   HCT   --   22.4*   NA  139  138   K  5.0  4.8   CL  111*  110*   CO2  24  20*   BUN  43*  45*   CREA  2.83*  3.05*   GLU  89  [address]  3/10/2018  [facility] of [address] [place] [address]. Karen [address]  [address] [place] [address], [address] [place] [address]  P: [phone]  F: [phone]

## 2018-03-11 ENCOUNTER — ANESTHESIA EVENT (OUTPATIENT)
Dept: SURGERY | Age: 38
DRG: 854 | End: 2018-03-11
Payer: MEDICARE

## 2018-03-11 ENCOUNTER — ANESTHESIA (OUTPATIENT)
Dept: SURGERY | Age: 38
DRG: 854 | End: 2018-03-11
Payer: MEDICARE

## 2018-03-11 ENCOUNTER — APPOINTMENT (OUTPATIENT)
Dept: GENERAL RADIOLOGY | Age: 38
DRG: 854 | End: 2018-03-11
Attending: PODIATRIST
Payer: MEDICARE

## 2018-03-11 LAB
ANION GAP SERPL CALC-SCNC: 7 MMOL/L (ref 5–15)
BASOPHILS # BLD: 0 K/UL (ref 0–0.1)
BASOPHILS NFR BLD: 0 % (ref 0–1)
BUN SERPL-MCNC: 45 MG/DL (ref 6–20)
BUN/CREAT SERPL: 16 (ref 12–20)
CALCIUM SERPL-MCNC: 8.1 MG/DL (ref 8.5–10.1)
CHLORIDE SERPL-SCNC: 111 MMOL/L (ref 97–108)
CO2 SERPL-SCNC: 22 MMOL/L (ref 21–32)
CREAT SERPL-MCNC: 2.88 MG/DL (ref 0.55–1.02)
DIFFERENTIAL METHOD BLD: ABNORMAL
EOSINOPHIL # BLD: 0.2 K/UL (ref 0–0.4)
EOSINOPHIL NFR BLD: 3 % (ref 0–7)
ERYTHROCYTE [DISTWIDTH] IN BLOOD BY AUTOMATED COUNT: 16.4 % (ref 11.5–14.5)
GLUCOSE BLD STRIP.AUTO-MCNC: 151 MG/DL (ref 65–100)
GLUCOSE BLD STRIP.AUTO-MCNC: 153 MG/DL (ref 65–100)
GLUCOSE BLD STRIP.AUTO-MCNC: 185 MG/DL (ref 65–100)
GLUCOSE BLD STRIP.AUTO-MCNC: 195 MG/DL (ref 65–100)
GLUCOSE BLD STRIP.AUTO-MCNC: 296 MG/DL (ref 65–100)
GLUCOSE SERPL-MCNC: 215 MG/DL (ref 65–100)
HCT VFR BLD AUTO: 16.7 % (ref 35–47)
HCT VFR BLD AUTO: 24.2 % (ref 35–47)
HGB BLD-MCNC: 5.2 G/DL (ref 11.5–16)
HGB BLD-MCNC: 7.7 G/DL (ref 11.5–16)
IMM GRANULOCYTES # BLD: 0.1 K/UL (ref 0–0.04)
IMM GRANULOCYTES NFR BLD AUTO: 1 % (ref 0–0.5)
LYMPHOCYTES # BLD: 1.2 K/UL (ref 0.8–3.5)
LYMPHOCYTES NFR BLD: 20 % (ref 12–49)
MCH RBC QN AUTO: 25.8 PG (ref 26–34)
MCHC RBC AUTO-ENTMCNC: 31.8 G/DL (ref 30–36.5)
MCV RBC AUTO: 80.9 FL (ref 80–99)
MONOCYTES # BLD: 0.5 K/UL (ref 0–1)
MONOCYTES NFR BLD: 8 % (ref 5–13)
NEUTS SEG # BLD: 4.2 K/UL (ref 1.8–8)
NEUTS SEG NFR BLD: 68 % (ref 32–75)
NRBC # BLD: 0 K/UL (ref 0–0.01)
NRBC BLD-RTO: 0 PER 100 WBC
PLATELET # BLD AUTO: 476 K/UL (ref 150–400)
PMV BLD AUTO: 10.8 FL (ref 8.9–12.9)
POTASSIUM SERPL-SCNC: 5.3 MMOL/L (ref 3.5–5.1)
RBC # BLD AUTO: 2.99 M/UL (ref 3.8–5.2)
SERVICE CMNT-IMP: ABNORMAL
SODIUM SERPL-SCNC: 140 MMOL/L (ref 136–145)
WBC # BLD AUTO: 6.1 K/UL (ref 3.6–11)

## 2018-03-11 PROCEDURE — 88305 TISSUE EXAM BY PATHOLOGIST: CPT | Performed by: PODIATRIST

## 2018-03-11 PROCEDURE — 74011000250 HC RX REV CODE- 250: Performed by: PODIATRIST

## 2018-03-11 PROCEDURE — C1713 ANCHOR/SCREW BN/BN,TIS/BN: HCPCS | Performed by: PODIATRIST

## 2018-03-11 PROCEDURE — 77030032490 HC SLV COMPR SCD KNE COVD -B

## 2018-03-11 PROCEDURE — 74011250637 HC RX REV CODE- 250/637: Performed by: HOSPITALIST

## 2018-03-11 PROCEDURE — 87075 CULTR BACTERIA EXCEPT BLOOD: CPT | Performed by: INTERNAL MEDICINE

## 2018-03-11 PROCEDURE — 77030008684 HC TU ET CUF COVD -B: Performed by: ANESTHESIOLOGY

## 2018-03-11 PROCEDURE — 77030002916 HC SUT ETHLN J&J -A: Performed by: PODIATRIST

## 2018-03-11 PROCEDURE — 77030013079 HC BLNKT BAIR HGGR 3M -A: Performed by: ANESTHESIOLOGY

## 2018-03-11 PROCEDURE — 36430 TRANSFUSION BLD/BLD COMPNT: CPT

## 2018-03-11 PROCEDURE — 77030020186 HC BOOT HL PROTCT SAGE -B

## 2018-03-11 PROCEDURE — 87186 SC STD MICRODIL/AGAR DIL: CPT | Performed by: INTERNAL MEDICINE

## 2018-03-11 PROCEDURE — 74011636637 HC RX REV CODE- 636/637: Performed by: INTERNAL MEDICINE

## 2018-03-11 PROCEDURE — 74011000258 HC RX REV CODE- 258: Performed by: INTERNAL MEDICINE

## 2018-03-11 PROCEDURE — 76060000039 HC ANESTHESIA 4 TO 4.5 HR: Performed by: PODIATRIST

## 2018-03-11 PROCEDURE — 0LXP0ZZ TRANSFER LEFT LOWER LEG TENDON, OPEN APPROACH: ICD-10-PCS | Performed by: PODIATRIST

## 2018-03-11 PROCEDURE — 74011250637 HC RX REV CODE- 250/637: Performed by: INTERNAL MEDICINE

## 2018-03-11 PROCEDURE — 77030031139 HC SUT VCRL2 J&J -A: Performed by: PODIATRIST

## 2018-03-11 PROCEDURE — 74011250636 HC RX REV CODE- 250/636: Performed by: ANESTHESIOLOGY

## 2018-03-11 PROCEDURE — 87205 SMEAR GRAM STAIN: CPT | Performed by: INTERNAL MEDICINE

## 2018-03-11 PROCEDURE — 77030021678 HC GLIDESCP STAT DISP VERT -B: Performed by: ANESTHESIOLOGY

## 2018-03-11 PROCEDURE — 77030037878 HC DRSG MEPILEX >48IN BORD MOLN -B

## 2018-03-11 PROCEDURE — 85025 COMPLETE CBC W/AUTO DIFF WBC: CPT | Performed by: INTERNAL MEDICINE

## 2018-03-11 PROCEDURE — 74011250636 HC RX REV CODE- 250/636: Performed by: INTERNAL MEDICINE

## 2018-03-11 PROCEDURE — 74011250637 HC RX REV CODE- 250/637: Performed by: STUDENT IN AN ORGANIZED HEALTH CARE EDUCATION/TRAINING PROGRAM

## 2018-03-11 PROCEDURE — P9016 RBC LEUKOCYTES REDUCED: HCPCS | Performed by: INTERNAL MEDICINE

## 2018-03-11 PROCEDURE — 88311 DECALCIFY TISSUE: CPT | Performed by: PODIATRIST

## 2018-03-11 PROCEDURE — 82962 GLUCOSE BLOOD TEST: CPT

## 2018-03-11 PROCEDURE — 87077 CULTURE AEROBIC IDENTIFY: CPT | Performed by: INTERNAL MEDICINE

## 2018-03-11 PROCEDURE — 76010000135 HC OR TIME 4 TO 4.5 HR: Performed by: PODIATRIST

## 2018-03-11 PROCEDURE — 74011250636 HC RX REV CODE- 250/636

## 2018-03-11 PROCEDURE — 77030018836 HC SOL IRR NACL ICUM -A: Performed by: PODIATRIST

## 2018-03-11 PROCEDURE — 77030020268 HC MISC GENERAL SUPPLY: Performed by: PODIATRIST

## 2018-03-11 PROCEDURE — 77030026438 HC STYL ET INTUB CARD -A: Performed by: ANESTHESIOLOGY

## 2018-03-11 PROCEDURE — 0KXW0Z1 TRANSFER LEFT FOOT MUSCLE WITH SUBCUTANEOUS TISSUE, OPEN APPROACH: ICD-10-PCS | Performed by: PODIATRIST

## 2018-03-11 PROCEDURE — 77030006788 HC BLD SAW OSC STRY -B: Performed by: PODIATRIST

## 2018-03-11 PROCEDURE — 77030011640 HC PAD GRND REM COVD -A: Performed by: PODIATRIST

## 2018-03-11 PROCEDURE — 0QBM0ZX EXCISION OF LEFT TARSAL, OPEN APPROACH, DIAGNOSTIC: ICD-10-PCS | Performed by: PODIATRIST

## 2018-03-11 PROCEDURE — 80048 BASIC METABOLIC PNL TOTAL CA: CPT | Performed by: INTERNAL MEDICINE

## 2018-03-11 PROCEDURE — 88307 TISSUE EXAM BY PATHOLOGIST: CPT | Performed by: PODIATRIST

## 2018-03-11 PROCEDURE — 0Y6N0Z0 DETACHMENT AT LEFT FOOT, COMPLETE, OPEN APPROACH: ICD-10-PCS | Performed by: PODIATRIST

## 2018-03-11 PROCEDURE — 85018 HEMOGLOBIN: CPT | Performed by: INTERNAL MEDICINE

## 2018-03-11 PROCEDURE — 76210000006 HC OR PH I REC 0.5 TO 1 HR: Performed by: PODIATRIST

## 2018-03-11 PROCEDURE — 73630 X-RAY EXAM OF FOOT: CPT

## 2018-03-11 PROCEDURE — 74011000250 HC RX REV CODE- 250

## 2018-03-11 PROCEDURE — 77030019908 HC STETH ESOPH SIMS -A: Performed by: ANESTHESIOLOGY

## 2018-03-11 PROCEDURE — 65270000029 HC RM PRIVATE

## 2018-03-11 PROCEDURE — 36415 COLL VENOUS BLD VENIPUNCTURE: CPT | Performed by: INTERNAL MEDICINE

## 2018-03-11 DEVICE — ANCHOR SUT W2.4XL8.5MM W/ 2-0 FIBERWIRE 2 TAPERPOINT NDL: Type: IMPLANTABLE DEVICE | Site: FOOT | Status: FUNCTIONAL

## 2018-03-11 RX ORDER — ONDANSETRON 2 MG/ML
INJECTION INTRAMUSCULAR; INTRAVENOUS AS NEEDED
Status: DISCONTINUED | OUTPATIENT
Start: 2018-03-11 | End: 2018-03-11 | Stop reason: HOSPADM

## 2018-03-11 RX ORDER — DIPHENHYDRAMINE HYDROCHLORIDE 50 MG/ML
12.5 INJECTION, SOLUTION INTRAMUSCULAR; INTRAVENOUS AS NEEDED
Status: DISCONTINUED | OUTPATIENT
Start: 2018-03-11 | End: 2018-03-11 | Stop reason: HOSPADM

## 2018-03-11 RX ORDER — SODIUM CHLORIDE 0.9 % (FLUSH) 0.9 %
5-10 SYRINGE (ML) INJECTION EVERY 8 HOURS
Status: DISCONTINUED | OUTPATIENT
Start: 2018-03-11 | End: 2018-03-11 | Stop reason: HOSPADM

## 2018-03-11 RX ORDER — SUCCINYLCHOLINE CHLORIDE 20 MG/ML
INJECTION INTRAMUSCULAR; INTRAVENOUS AS NEEDED
Status: DISCONTINUED | OUTPATIENT
Start: 2018-03-11 | End: 2018-03-11 | Stop reason: HOSPADM

## 2018-03-11 RX ORDER — SODIUM CHLORIDE 9 MG/ML
INJECTION, SOLUTION INTRAVENOUS
Status: DISCONTINUED | OUTPATIENT
Start: 2018-03-11 | End: 2018-03-11 | Stop reason: HOSPADM

## 2018-03-11 RX ORDER — HYDROMORPHONE HYDROCHLORIDE 2 MG/1
0.5 TABLET ORAL
Status: DISCONTINUED | OUTPATIENT
Start: 2018-03-11 | End: 2018-03-11

## 2018-03-11 RX ORDER — MORPHINE SULFATE 10 MG/ML
2 INJECTION, SOLUTION INTRAMUSCULAR; INTRAVENOUS
Status: DISCONTINUED | OUTPATIENT
Start: 2018-03-11 | End: 2018-03-11 | Stop reason: HOSPADM

## 2018-03-11 RX ORDER — HYDROMORPHONE HYDROCHLORIDE 2 MG/ML
.5-1 INJECTION, SOLUTION INTRAMUSCULAR; INTRAVENOUS; SUBCUTANEOUS
Status: DISCONTINUED | OUTPATIENT
Start: 2018-03-11 | End: 2018-03-16

## 2018-03-11 RX ORDER — PROPOFOL 10 MG/ML
INJECTION, EMULSION INTRAVENOUS AS NEEDED
Status: DISCONTINUED | OUTPATIENT
Start: 2018-03-11 | End: 2018-03-11 | Stop reason: HOSPADM

## 2018-03-11 RX ORDER — MIDAZOLAM HYDROCHLORIDE 1 MG/ML
INJECTION, SOLUTION INTRAMUSCULAR; INTRAVENOUS AS NEEDED
Status: DISCONTINUED | OUTPATIENT
Start: 2018-03-11 | End: 2018-03-11 | Stop reason: HOSPADM

## 2018-03-11 RX ORDER — SODIUM CHLORIDE 0.9 % (FLUSH) 0.9 %
5-10 SYRINGE (ML) INJECTION AS NEEDED
Status: DISCONTINUED | OUTPATIENT
Start: 2018-03-11 | End: 2018-03-11 | Stop reason: HOSPADM

## 2018-03-11 RX ORDER — ONDANSETRON 2 MG/ML
4 INJECTION INTRAMUSCULAR; INTRAVENOUS AS NEEDED
Status: DISCONTINUED | OUTPATIENT
Start: 2018-03-11 | End: 2018-03-11 | Stop reason: HOSPADM

## 2018-03-11 RX ORDER — SODIUM CHLORIDE, SODIUM LACTATE, POTASSIUM CHLORIDE, CALCIUM CHLORIDE 600; 310; 30; 20 MG/100ML; MG/100ML; MG/100ML; MG/100ML
INJECTION, SOLUTION INTRAVENOUS
Status: DISCONTINUED | OUTPATIENT
Start: 2018-03-11 | End: 2018-03-11 | Stop reason: HOSPADM

## 2018-03-11 RX ORDER — SODIUM CHLORIDE 9 MG/ML
250 INJECTION, SOLUTION INTRAVENOUS AS NEEDED
Status: DISCONTINUED | OUTPATIENT
Start: 2018-03-11 | End: 2018-03-18 | Stop reason: ALTCHOICE

## 2018-03-11 RX ORDER — HYDROMORPHONE HYDROCHLORIDE 1 MG/ML
.5-1 INJECTION, SOLUTION INTRAMUSCULAR; INTRAVENOUS; SUBCUTANEOUS
Status: DISCONTINUED | OUTPATIENT
Start: 2018-03-11 | End: 2018-03-11 | Stop reason: SDUPTHER

## 2018-03-11 RX ORDER — ROCURONIUM BROMIDE 10 MG/ML
INJECTION, SOLUTION INTRAVENOUS AS NEEDED
Status: DISCONTINUED | OUTPATIENT
Start: 2018-03-11 | End: 2018-03-11 | Stop reason: HOSPADM

## 2018-03-11 RX ORDER — LIDOCAINE HYDROCHLORIDE 20 MG/ML
INJECTION, SOLUTION EPIDURAL; INFILTRATION; INTRACAUDAL; PERINEURAL AS NEEDED
Status: DISCONTINUED | OUTPATIENT
Start: 2018-03-11 | End: 2018-03-11 | Stop reason: HOSPADM

## 2018-03-11 RX ORDER — SODIUM CHLORIDE, SODIUM LACTATE, POTASSIUM CHLORIDE, CALCIUM CHLORIDE 600; 310; 30; 20 MG/100ML; MG/100ML; MG/100ML; MG/100ML
25 INJECTION, SOLUTION INTRAVENOUS CONTINUOUS
Status: DISCONTINUED | OUTPATIENT
Start: 2018-03-11 | End: 2018-03-11 | Stop reason: HOSPADM

## 2018-03-11 RX ORDER — BUPIVACAINE HYDROCHLORIDE 5 MG/ML
INJECTION, SOLUTION EPIDURAL; INTRACAUDAL AS NEEDED
Status: DISCONTINUED | OUTPATIENT
Start: 2018-03-11 | End: 2018-03-11 | Stop reason: HOSPADM

## 2018-03-11 RX ORDER — HYDROMORPHONE HYDROCHLORIDE 1 MG/ML
.2-.5 INJECTION, SOLUTION INTRAMUSCULAR; INTRAVENOUS; SUBCUTANEOUS
Status: DISCONTINUED | OUTPATIENT
Start: 2018-03-11 | End: 2018-03-11 | Stop reason: HOSPADM

## 2018-03-11 RX ORDER — FENTANYL CITRATE 50 UG/ML
INJECTION, SOLUTION INTRAMUSCULAR; INTRAVENOUS AS NEEDED
Status: DISCONTINUED | OUTPATIENT
Start: 2018-03-11 | End: 2018-03-11 | Stop reason: HOSPADM

## 2018-03-11 RX ORDER — FENTANYL CITRATE 50 UG/ML
25 INJECTION, SOLUTION INTRAMUSCULAR; INTRAVENOUS
Status: DISCONTINUED | OUTPATIENT
Start: 2018-03-11 | End: 2018-03-11 | Stop reason: HOSPADM

## 2018-03-11 RX ADMIN — DIPHENHYDRAMINE HYDROCHLORIDE 25 MG: 25 CAPSULE ORAL at 20:12

## 2018-03-11 RX ADMIN — FENTANYL CITRATE 100 MCG: 50 INJECTION, SOLUTION INTRAMUSCULAR; INTRAVENOUS at 11:10

## 2018-03-11 RX ADMIN — DEXTROSE MONOHYDRATE 50 ML/HR: 5 INJECTION, SOLUTION INTRAVENOUS at 03:28

## 2018-03-11 RX ADMIN — QUETIAPINE FUMARATE 100 MG: 100 TABLET ORAL at 20:12

## 2018-03-11 RX ADMIN — INSULIN GLARGINE 8 UNITS: 100 INJECTION, SOLUTION SUBCUTANEOUS at 21:32

## 2018-03-11 RX ADMIN — Medication 40 ML: at 03:29

## 2018-03-11 RX ADMIN — HYDROMORPHONE HYDROCHLORIDE 1 MG: 2 INJECTION, SOLUTION INTRAMUSCULAR; INTRAVENOUS; SUBCUTANEOUS at 18:13

## 2018-03-11 RX ADMIN — PIPERACILLIN SODIUM AND TAZOBACTAM SODIUM 3.38 G: .375; 3 INJECTION, POWDER, LYOPHILIZED, FOR SOLUTION INTRAVENOUS at 17:19

## 2018-03-11 RX ADMIN — VENLAFAXINE 75 MG: 37.5 TABLET ORAL at 20:12

## 2018-03-11 RX ADMIN — FAMOTIDINE 20 MG: 20 TABLET, FILM COATED ORAL at 09:48

## 2018-03-11 RX ADMIN — LIDOCAINE HYDROCHLORIDE 60 MG: 20 INJECTION, SOLUTION EPIDURAL; INFILTRATION; INTRACAUDAL; PERINEURAL at 11:18

## 2018-03-11 RX ADMIN — MIDAZOLAM HYDROCHLORIDE 1 MG: 1 INJECTION, SOLUTION INTRAMUSCULAR; INTRAVENOUS at 11:13

## 2018-03-11 RX ADMIN — ONDANSETRON HYDROCHLORIDE 4 MG: 2 INJECTION, SOLUTION INTRAMUSCULAR; INTRAVENOUS at 20:12

## 2018-03-11 RX ADMIN — HYDROMORPHONE HYDROCHLORIDE 1 MG: 2 INJECTION, SOLUTION INTRAMUSCULAR; INTRAVENOUS; SUBCUTANEOUS at 21:15

## 2018-03-11 RX ADMIN — CLONIDINE HYDROCHLORIDE 0.2 MG: 0.1 TABLET ORAL at 09:48

## 2018-03-11 RX ADMIN — OXYCODONE HYDROCHLORIDE AND ACETAMINOPHEN 2 TABLET: 5; 325 TABLET ORAL at 03:28

## 2018-03-11 RX ADMIN — SUCCINYLCHOLINE CHLORIDE 100 MG: 20 INJECTION INTRAMUSCULAR; INTRAVENOUS at 11:11

## 2018-03-11 RX ADMIN — CLONIDINE HYDROCHLORIDE 0.2 MG: 0.1 TABLET ORAL at 20:12

## 2018-03-11 RX ADMIN — PROPOFOL 150 MG: 10 INJECTION, EMULSION INTRAVENOUS at 11:11

## 2018-03-11 RX ADMIN — FENTANYL CITRATE 50 MCG: 50 INJECTION, SOLUTION INTRAMUSCULAR; INTRAVENOUS at 15:14

## 2018-03-11 RX ADMIN — FENTANYL CITRATE 50 MCG: 50 INJECTION, SOLUTION INTRAMUSCULAR; INTRAVENOUS at 15:06

## 2018-03-11 RX ADMIN — Medication 10 ML: at 22:00

## 2018-03-11 RX ADMIN — LIDOCAINE HYDROCHLORIDE 20 MG: 20 INJECTION, SOLUTION EPIDURAL; INFILTRATION; INTRACAUDAL; PERINEURAL at 11:10

## 2018-03-11 RX ADMIN — SODIUM CHLORIDE: 9 INJECTION, SOLUTION INTRAVENOUS at 14:14

## 2018-03-11 RX ADMIN — DAPTOMYCIN 400 MG: 500 INJECTION, POWDER, LYOPHILIZED, FOR SOLUTION INTRAVENOUS at 11:50

## 2018-03-11 RX ADMIN — ONDANSETRON 4 MG: 2 INJECTION INTRAMUSCULAR; INTRAVENOUS at 11:24

## 2018-03-11 RX ADMIN — OXYCODONE HYDROCHLORIDE AND ACETAMINOPHEN 1 TABLET: 5; 325 TABLET ORAL at 20:12

## 2018-03-11 RX ADMIN — ROCURONIUM BROMIDE 5 MG: 10 INJECTION, SOLUTION INTRAVENOUS at 11:11

## 2018-03-11 RX ADMIN — FENTANYL CITRATE 50 MCG: 50 INJECTION, SOLUTION INTRAMUSCULAR; INTRAVENOUS at 14:14

## 2018-03-11 RX ADMIN — SODIUM CHLORIDE, SODIUM LACTATE, POTASSIUM CHLORIDE, CALCIUM CHLORIDE: 600; 310; 30; 20 INJECTION, SOLUTION INTRAVENOUS at 11:07

## 2018-03-11 RX ADMIN — MIDAZOLAM HYDROCHLORIDE 1 MG: 1 INJECTION, SOLUTION INTRAMUSCULAR; INTRAVENOUS at 11:03

## 2018-03-11 RX ADMIN — FENTANYL CITRATE 25 MCG: 50 INJECTION, SOLUTION INTRAMUSCULAR; INTRAVENOUS at 15:40

## 2018-03-11 RX ADMIN — FENTANYL CITRATE 25 MCG: 50 INJECTION, SOLUTION INTRAMUSCULAR; INTRAVENOUS at 15:55

## 2018-03-11 RX ADMIN — INSULIN LISPRO 3 UNITS: 100 INJECTION, SOLUTION INTRAVENOUS; SUBCUTANEOUS at 21:32

## 2018-03-11 RX ADMIN — SODIUM CHLORIDE: 9 INJECTION, SOLUTION INTRAVENOUS at 11:31

## 2018-03-11 RX ADMIN — PIPERACILLIN SODIUM AND TAZOBACTAM SODIUM 3.38 G: .375; 3 INJECTION, POWDER, LYOPHILIZED, FOR SOLUTION INTRAVENOUS at 03:28

## 2018-03-11 RX ADMIN — MIDAZOLAM HYDROCHLORIDE 1 MG: 1 INJECTION, SOLUTION INTRAMUSCULAR; INTRAVENOUS at 11:10

## 2018-03-11 NOTE — PERIOP NOTES
TRANSFER - IN REPORT:    Verbal report received from [address] [place] RN(name) on Nabor Martel  being received from 2115(unit) for ordered procedure      Report consisted of patients Situation, Background, Assessment and   Recommendations(SBAR). Information from the following report(s) SBAR, Kardex, ED Summary, OR Summary, Procedure Summary, Intake/Output, MAR, Accordion, Recent Results, Med Rec Status, Cardiac Rhythm NSR/ST and Alarm Parameters  was reviewed with the receiving nurse. Opportunity for questions and clarification was provided. Assessment completed upon patients arrival to unit and care assumed. Patient will remain in [address] [place] [address] room 2115 until call to OR. Pre-op questions and assessment completed at this time. 0900 Discussed and reviewed scheduled medications with Serina Lawrence RN. I will call and clarify with Dr. Xena Mars which medications should be provided prior to [facility] Dr. Xena Mars called, brief SBAR provided, reviewed scheduled medications, discussed 's. TORB to give only the Pepcid and Clonidine at this time. 3259 Discussed conversation and TORB for Clonidine and Pepcid at this time. \"Hold all medications prior to OR until later time TBD by Attending. \"     21

## 2018-03-11 NOTE — BRIEF OP NOTE
BRIEF OPERATIVE NOTE    Date of Procedure: 3/11/2018   Preoperative Diagnosis: INFECTED LEFT FOOT  Postoperative Diagnosis: Ostiomyelitis    Procedure(s):  INCISION AND DRAINAGE LEFT FOOT, REMOVAL NONVIABLE TISSUE AND BONE LEFT FOOT  PROXIMAL FOOT AMPUTATION LEFT FOOT  DEEP TENDON TRANSFER TIBIALIS ANTERIOR LEFT FOOT  OPEN BONE BIOPSIES LEFT FOOT  ADJACENT TRANSFER ARTERIOMYOFASCIOCUTANEOUS PLANTAR MEDIAL PEDICLE FLAP FOR DELAYED PRIMARY CLOSURE. Surgeon(s) and Role:     * Shayla Blackwell DPM - Primary         Assistant Staff: None      Surgical Staff:  Circ-1: Amita Kerr RN  Circ-Relief: Kemi Jeter RN  Scrub Tech-1: Sofia Shine  Scrub Tech-Relief: Gibson Mena  Float Staff: Gibson Mena  Event Time In   Incision Start [id number]   Incision Close 1510     Anesthesia: General   Estimated Blood Loss: 200CC  Specimens:   ID Type Source Tests Collected by Time Destination   1 : LEFT FOREFOOT Preservative Foot, left  Shayla Blackwell [facility] 3/11/2018 1212 Pathology   2 : PROXIMAL MARGIN CUBOID LEFT FOOT Preservative Foot, left  Shayla Blackwell [facility] 3/11/2018 1244 Pathology   1 : LEFT FOOT DEEP WOUND Wound Foot, left CULTURE, ANAEROBIC, CULTURE, WOUND W Christy Kumar [place], [place] 3/11/2018 1143 Microbiology   2 : PROXIMAL MARGIN CUBOID LEFT FOOT Bone Foot, left ANAEROBIC/AEROBIC/GRAM STAIN Shayla Blackwell [facility] 3/11/2018 1247 Microbiology      Findings: AS PER PRE OP  Complications: NONE  Implants:   Implant Name Type Inv. Item Serial No.  Lot No. LRB No. Used Action   SUT BIOCOMP MINI 2. 4MMX8. 5MM --  - SN/A   SUT BIOCOMP MINI 2. 4MMX8. 5MM --  N/A James Mandel [id number] Left 1 Implanted

## 2018-03-11 NOTE — PROGRESS NOTES
Dr Dari Shanks notified of K and hgb results this AM, no additional orders received, patient awaiting preop nurse

## 2018-03-11 NOTE — PERIOP NOTES
Spoke with Dr. Liz Graves in reference to blood pressure trends currently compared to pre-op. Patient is asymptomatic, no new orders received. TRANSFER - OUT REPORT:    Verbal report given to VERONICA Duggan (name) on Destiny Maldonado  being transferred to [place] [id number] (unit) for routine post - op       Report consisted of patients Situation, Background, Assessment and   Recommendations(SBAR). Information from the following report(s) SBAR, Kardex, OR Summary, Procedure Summary, Intake/Output and MAR was reviewed with the receiving nurse. Lines:   Venous Access Device Chest Port (Active)       Venous Access Device Powerline  03/06/18 Upper chest (subclavicular area, right (Active)   Central Line Being Utilized Yes 3/11/2018  3:17 PM   Criteria for Appropriate Use Long term IV/antibiotic administration 3/11/2018  3:17 PM   Site Assessment Clean, dry, & intact 3/11/2018  3:17 PM   Date of Last Dressing Change 03/06/18 3/11/2018  3:17 PM   Dressing Status Clean, dry, & intact 3/11/2018  3:17 PM   Dressing Type Disk with Chlorhexadine gluconate (CHG); Transparent 3/11/2018  3:17 PM   Action Taken Blood drawn 3/10/2018  7:30 PM   Date Accessed (Medial Site) 03/06/18 3/6/2018  3:52 PM   Access Time (Medial Site) 1545 3/6/2018  3:52 PM   Positive Blood Return (Medial Site) Yes 3/10/2018  7:30 PM   Action Taken (Medial Site) Other (comment) 3/11/2018  8:37 AM   Date Accessed (Lateral Site) 03/06/18 3/6/2018  3:52 PM   Access Time (Lateral Site) 1545 3/6/2018  3:52 PM   Positive Blood Return (Lateral Site) Yes 3/10/2018  1:08 AM   Action Taken (Lateral Site) Infusing 3/11/2018  8:37 AM   Alcohol Cap Used Yes 3/10/2018  1:08 AM        Opportunity for questions and clarification was provided.       Patient transported with:   O2 @ 2 liters  Registered Nurse

## 2018-03-11 NOTE — ANESTHESIA POSTPROCEDURE EVALUATION
Post-Anesthesia Evaluation and Assessment    Patient: Gudelia Hickey MRN: [id number]  SSN: [id number]    YOB: 1980  Age: 42 y.o. Sex: female       Cardiovascular Function/Vital Signs  Visit Vitals    /62 (BP 1 Location: Right arm, BP Patient Position: At rest)    Pulse 86    Temp 36.6 °C (97.9 °F)    Resp 11    Ht 5' 2\" (1.575 m)    Wt 64.4 kg (142 lb)    SpO2 100%    Breastfeeding No    BMI 25.97 kg/m2       Patient is status post general anesthesia for Procedure(s):  INCISION AND DRAINAGE LEFT FOOT, REMOVAL NONVIABLE TISSUE AND BONE  TRANSMETATARSAL  AMPUTATION WITH FLAP. Boo Kumar Nausea/Vomiting: None    Postoperative hydration reviewed and adequate. Pain:  Pain Scale 1: FLACC (03/11/18 1600)  Pain Intensity 1: 6 (03/11/18 1555)   Managed    Neurological Status:   Neuro (WDL): Exceptions to WDL (03/11/18 1517)  Neuro  Neurologic State: Alert;Drowsy; Eyes open to voice (03/11/18 1517)  Orientation Level: Oriented to person;Oriented to place;Oriented to situation (03/11/18 1517)  Cognition: Follows commands (03/11/18 1517)  Speech: Clear (03/11/18 1517)  LUE Motor Response: Spontaneous  (03/11/18 1517)  LLE Motor Response: Spontaneous ;Numbness (03/11/18 1517)  RUE Motor Response: Spontaneous  (03/11/18 1517)  RLE Motor Response: Spontaneous  (03/11/18 1517)   At baseline    Mental Status and Level of Consciousness: Arousable    Pulmonary Status:   O2 Device: Nasal cannula (03/11/18 1600)   Adequate oxygenation and airway patent    Complications related to anesthesia: None    Post-anesthesia assessment completed.  No concerns    Signed By: Anabel Albarran MD     March 11, 2018

## 2018-03-11 NOTE — ANESTHESIA PREPROCEDURE EVALUATION
Anesthetic History   No history of anesthetic complications            Review of Systems / Medical History  Patient summary reviewed, nursing notes reviewed and pertinent labs reviewed    Pulmonary        Sleep apnea    Asthma   Pertinent negatives: No shortness of breath  Comments: wears 2 LPM oxygen at night   Neuro/Psych     seizures    Psychiatric history     Cardiovascular    Hypertension              Exercise tolerance: <4 METS     GI/Hepatic/Renal     GERD    Renal disease: CRI      Comments: Gastroparesis  Endo/Other    Diabetes    Anemia  Pertinent negatives: No morbid obesity   Other Findings   Comments: Infected left foot  Sickle cell trait  Narcotic dependence          Physical Exam    Airway  Mallampati: II    Neck ROM: normal range of motion   Mouth opening: Normal     Cardiovascular  Regular rate and rhythm,  S1 and S2 normal,  no murmur, click, rub, or gallop             Dental  No notable dental hx       Pulmonary  Breath sounds clear to auscultation               Abdominal  GI exam deferred       Other Findings            Anesthetic Plan    ASA: 3  Anesthesia type: general          Induction: Intravenous  Anesthetic plan and risks discussed with: Patient

## 2018-03-11 NOTE — PROGRESS NOTES
Hospitalist Progress Note    NAME: Sven Ash   :  1980   MRN:  [id number]       Assessment / Plan:  DM type 1 uncontrolled with nephropathy POA  Hypoglycemia 3/6/2018  , 195, 185, 153, 151  Lantus 8 units with recent hypoglycemia  Stop D5W once stable on diet  Increase lantus to 10 units in Am if eating well  lispro sliding scale  Last HgBa1c 8.8    Severe sepsis POA due to left foot diabetic foot infection, ? osteomyelitis   Fevers resolved  Excisional debridement and biopsy done on 3/3, for repeat surgery 3/7/2018  Wound cultures with anaerobic GNRs(done on antibiotics)  Continue pip-tazo and dapto day 9      Stop the daptomycin after tomorrows dose, no MRSA on cultures  LLE doppler with no deep venous thrombosis identified. MRI left foot multiple fluid collections, significant bone destruction  , CRP 35  Midline placed 3/6/18  Partial foot amputation 3/11/2018  Will D/w Dr Lucero Natarajan re residual concern for infection, may widfe up needing prolonged antibiotics if all involved bone not removed  Severe pain post op, added IV dilaudid    Acute kidney injury POA peak creatinine 3.80  Stage 4 chronic kidney disease POA baseline creatinine 2.4 to 2.7  been on dialysis in the past   Previously has seen [facility]. Creatinine trending down to baseline    Acute on chronic anemia due to sickle cell disease POA    Pt without sx of crisis at this time  S/p 1u pRBCs  folic acid  Serial HGbs, check HgB post op     THU POA  Uses 2 LPM O2 at night     Code Status: Full    Surrogate Decision Maker: in [facility] IV, [facility], no decision maker allowed at this time    DVT Prophylaxis: heparin    Body mass index is 25.97 kg/(m^2).      Subjective:     Chief Complaint / Reason for Physician Visit f/u diabetic foot infection  \"my left foot really hurts since the surgery\"  Crying in pain from the left foot, about to get IV pain meds  No further diarrhea overnight  No CP or SOB    Review of Systems:  Symptom Y/N Comments  Symptom Y/N Comments   Fever/Chills n   Chest Pain n    Poor Appetite    Edema     Cough n   Abdominal Pain n    Sputum    Joint Pain     SOB/ADAMS n   Pruritis/Rash     Nausea/vomit n   Tolerating PT/OT     Diarrhea n   Tolerating Diet y    Constipation    Other       Could NOT obtain due to:      Objective:     VITALS:   Last 24hrs VS reviewed since prior progress note. Most recent are:  Patient Vitals for the past 24 hrs:   Temp Pulse Resp BP SpO2   03/11/18 1653 - - - 120/79 -   03/11/18 1628 97.8 °F (36.6 °C) 91 12 132/77 100 %   03/11/18 1615 98.4 °F (36.9 °C) 87 10 114/70 100 %   03/11/18 1600 - 86 11 99/54 100 %   03/11/18 1545 - 90 21 102/62 100 %   03/11/18 1535 - 85 15 107/57 100 %   03/11/18 1530 - 87 18 101/46 100 %   03/11/18 1525 - 88 11 (!) 89/54 100 %   03/11/18 1520 - 84 (!) 6 94/44 100 %   03/11/18 1519 97.9 °F (36.6 °C) 85 14 107/55 99 %   03/11/18 1015 98.7 °F (37.1 °C) 76 12 180/88 99 %   03/11/18 0837 98.1 °F (36.7 °C) 76 18 175/88 99 %   03/11/18 0344 98.2 °F (36.8 °C) 77 16 159/80 97 %   03/11/18 0112 98.3 °F (36.8 °C) 74 16 174/84 98 %   03/10/18 1930 97.9 °F (36.6 °C) 69 16 159/59 98 %   03/10/18 1718 97.7 °F (36.5 °C) 70 16 160/76 98 %       Intake/Output Summary (Last 24 hours) at 03/11/18 1654  Last data filed at 03/11/18 1425   Gross per 24 hour   Intake          1314.17 ml   Output              375 ml   Net           939.17 ml        PHYSICAL EXAM:  General: Sitting up in chair,  Alert, cooperative, no acute distress    Resp:  CTA bilaterally, no wheezing or rales.   No accessory muscle use  CV:  Regular  rhythm,  No edema  GI:  Soft, Non distended, Non tender.  +Bowel sounds  Neurologic:  Alert and oriented X 3, normal speech,   Psych:   Good insight. Not anxious nor agitated  Skin:  L foot bandaged, some bloody drainage, multiple tattoos    Reviewed most current lab test results and cultures  YES  Reviewed most current radiology test results   YES  Review and summation of old records today    NO  Reviewed patient's current orders and MAR    YES  PMH/SH reviewed - no change compared to H&P  ________________________________________________________________________  Care Plan discussed with:    Comments   Patient x    Family      RN x    Care Manager     Consultant                        Multidiciplinary team rounds were held today with , nursing, pharmacist and clinical coordinator. Patient's plan of care was discussed; medications were reviewed and discharge planning was addressed. ________________________________________________________________________  Total NON critical care TIME:  25   Minutes    Total CRITICAL CARE TIME Spent:   Minutes non procedure based      Comments   >50% of visit spent in counseling and coordination of care     ________________________________________________________________________  Fernando Weeks MD     Procedures: see electronic medical records for all procedures/Xrays and details which were not copied into this note but were reviewed prior to creation of Plan. LABS:  I reviewed today's most current labs and imaging studies.   Pertinent labs include:  Recent Labs      03/11/18   0340  03/10/18   1346  03/09/18   0251   WBC  6.1  6.3  7.3   HGB  7.7*  7.9*  7.1*   HCT  24.2*  24.8*  22.4*   PLT  476*  466*  406*     Recent Labs      03/11/18   0340  03/10/18   0211  03/09/18   0251   NA  140  139  138   K  5.3*  5.0  4.8   CL  111*  111*  110*   CO2  22  24  20*   GLU  215*  89  283*   BUN  45*  43*  45*   CREA  2.88*  2.83*  3.05*   CA  8.1*  7.8*  7.6*       Signed: Fernando Weeks MD

## 2018-03-11 NOTE — PERIOP NOTES
Handoff Report from Operating Room to PACU    Report received from Norma Chen CRNA and Vidal Kimball RN regarding Edilberto Jauregui. Surgeon(s):  Tamela Shearer DPM  And Procedure(s) (LRB):  INCISION AND DRAINAGE LEFT FOOT, REMOVAL NONVIABLE TISSUE AND BONE (Left)  TRANSMETATARSAL  AMPUTATION WITH FLAP. (Left)  confirmed   with allergies and dressings discussed. Anesthesia type, drugs, patient history, complications, estimated blood loss, vital signs, intake and output, and last pain medication, lines and temperature were reviewed.

## 2018-03-12 LAB
ANION GAP SERPL CALC-SCNC: 9 MMOL/L (ref 5–15)
BASOPHILS # BLD: 0 K/UL (ref 0–0.1)
BASOPHILS NFR BLD: 0 % (ref 0–1)
BUN SERPL-MCNC: 44 MG/DL (ref 6–20)
BUN/CREAT SERPL: 15 (ref 12–20)
CALCIUM SERPL-MCNC: 7 MG/DL (ref 8.5–10.1)
CHLORIDE SERPL-SCNC: 112 MMOL/L (ref 97–108)
CO2 SERPL-SCNC: 17 MMOL/L (ref 21–32)
CREAT SERPL-MCNC: 2.98 MG/DL (ref 0.55–1.02)
DIFFERENTIAL METHOD BLD: ABNORMAL
EOSINOPHIL # BLD: 0.2 K/UL (ref 0–0.4)
EOSINOPHIL NFR BLD: 2 % (ref 0–7)
ERYTHROCYTE [DISTWIDTH] IN BLOOD BY AUTOMATED COUNT: 24.1 % (ref 11.5–14.5)
GLUCOSE BLD STRIP.AUTO-MCNC: 133 MG/DL (ref 65–100)
GLUCOSE BLD STRIP.AUTO-MCNC: 133 MG/DL (ref 65–100)
GLUCOSE BLD STRIP.AUTO-MCNC: 210 MG/DL (ref 65–100)
GLUCOSE BLD STRIP.AUTO-MCNC: 270 MG/DL (ref 65–100)
GLUCOSE SERPL-MCNC: 176 MG/DL (ref 65–100)
HCT VFR BLD AUTO: 19.6 % (ref 35–47)
HGB BLD-MCNC: 6.3 G/DL (ref 11.5–16)
IMM GRANULOCYTES # BLD: 0.1 K/UL (ref 0–0.04)
IMM GRANULOCYTES NFR BLD AUTO: 1 % (ref 0–0.5)
LYMPHOCYTES # BLD: 0.9 K/UL (ref 0.8–3.5)
LYMPHOCYTES NFR BLD: 9 % (ref 12–49)
MCH RBC QN AUTO: 24.2 PG (ref 26–34)
MCHC RBC AUTO-ENTMCNC: 32.1 G/DL (ref 30–36.5)
MCV RBC AUTO: 75.4 FL (ref 80–99)
MONOCYTES # BLD: 0.8 K/UL (ref 0–1)
MONOCYTES NFR BLD: 8 % (ref 5–13)
NEUTS SEG # BLD: 8.1 K/UL (ref 1.8–8)
NEUTS SEG NFR BLD: 80 % (ref 32–75)
NRBC # BLD: 0 K/UL (ref 0–0.01)
NRBC BLD-RTO: 0 PER 100 WBC
PLATELET # BLD AUTO: 299 K/UL (ref 150–400)
PMV BLD AUTO: 10.1 FL (ref 8.9–12.9)
POTASSIUM SERPL-SCNC: 5.5 MMOL/L (ref 3.5–5.1)
RBC # BLD AUTO: 2.6 M/UL (ref 3.8–5.2)
RBC MORPH BLD: ABNORMAL
SERVICE CMNT-IMP: ABNORMAL
SODIUM SERPL-SCNC: 138 MMOL/L (ref 136–145)
WBC # BLD AUTO: 10.1 K/UL (ref 3.6–11)

## 2018-03-12 PROCEDURE — 74011250637 HC RX REV CODE- 250/637: Performed by: INTERNAL MEDICINE

## 2018-03-12 PROCEDURE — 82962 GLUCOSE BLOOD TEST: CPT

## 2018-03-12 PROCEDURE — 74011636637 HC RX REV CODE- 636/637: Performed by: INTERNAL MEDICINE

## 2018-03-12 PROCEDURE — 65270000029 HC RM PRIVATE

## 2018-03-12 PROCEDURE — 74011250637 HC RX REV CODE- 250/637: Performed by: STUDENT IN AN ORGANIZED HEALTH CARE EDUCATION/TRAINING PROGRAM

## 2018-03-12 PROCEDURE — 77010033678 HC OXYGEN DAILY

## 2018-03-12 PROCEDURE — P9016 RBC LEUKOCYTES REDUCED: HCPCS | Performed by: INTERNAL MEDICINE

## 2018-03-12 PROCEDURE — 74011250637 HC RX REV CODE- 250/637: Performed by: HOSPITALIST

## 2018-03-12 PROCEDURE — 85025 COMPLETE CBC W/AUTO DIFF WBC: CPT | Performed by: INTERNAL MEDICINE

## 2018-03-12 PROCEDURE — 74011250636 HC RX REV CODE- 250/636: Performed by: INTERNAL MEDICINE

## 2018-03-12 PROCEDURE — 36415 COLL VENOUS BLD VENIPUNCTURE: CPT | Performed by: EMERGENCY MEDICINE

## 2018-03-12 PROCEDURE — 80048 BASIC METABOLIC PNL TOTAL CA: CPT | Performed by: EMERGENCY MEDICINE

## 2018-03-12 PROCEDURE — 36430 TRANSFUSION BLD/BLD COMPNT: CPT

## 2018-03-12 RX ORDER — SODIUM CHLORIDE 9 MG/ML
250 INJECTION, SOLUTION INTRAVENOUS AS NEEDED
Status: DISCONTINUED | OUTPATIENT
Start: 2018-03-12 | End: 2018-03-18 | Stop reason: ALTCHOICE

## 2018-03-12 RX ADMIN — QUETIAPINE FUMARATE 100 MG: 100 TABLET ORAL at 21:05

## 2018-03-12 RX ADMIN — INSULIN GLARGINE 8 UNITS: 100 INJECTION, SOLUTION SUBCUTANEOUS at 21:55

## 2018-03-12 RX ADMIN — CALCIUM 500 MG: 500 TABLET ORAL at 08:21

## 2018-03-12 RX ADMIN — VENLAFAXINE 75 MG: 37.5 TABLET ORAL at 08:21

## 2018-03-12 RX ADMIN — DOCUSATE SODIUM 100 MG: 100 CAPSULE, LIQUID FILLED ORAL at 17:43

## 2018-03-12 RX ADMIN — PIPERACILLIN SODIUM AND TAZOBACTAM SODIUM 3.38 G: .375; 3 INJECTION, POWDER, LYOPHILIZED, FOR SOLUTION INTRAVENOUS at 15:07

## 2018-03-12 RX ADMIN — CLONIDINE HYDROCHLORIDE 0.2 MG: 0.1 TABLET ORAL at 15:07

## 2018-03-12 RX ADMIN — DIPHENHYDRAMINE HYDROCHLORIDE 25 MG: 25 CAPSULE ORAL at 18:25

## 2018-03-12 RX ADMIN — DOCUSATE SODIUM 100 MG: 100 CAPSULE, LIQUID FILLED ORAL at 08:21

## 2018-03-12 RX ADMIN — FOLIC ACID 1 MG: 1 TABLET ORAL at 08:21

## 2018-03-12 RX ADMIN — THERA TABS 1 TABLET: TAB at 08:21

## 2018-03-12 RX ADMIN — VENLAFAXINE 75 MG: 37.5 TABLET ORAL at 21:05

## 2018-03-12 RX ADMIN — HYDROMORPHONE HYDROCHLORIDE 1 MG: 2 INJECTION, SOLUTION INTRAMUSCULAR; INTRAVENOUS; SUBCUTANEOUS at 12:28

## 2018-03-12 RX ADMIN — FAMOTIDINE 20 MG: 20 TABLET, FILM COATED ORAL at 08:21

## 2018-03-12 RX ADMIN — PIPERACILLIN SODIUM AND TAZOBACTAM SODIUM 3.38 G: .375; 3 INJECTION, POWDER, LYOPHILIZED, FOR SOLUTION INTRAVENOUS at 01:59

## 2018-03-12 RX ADMIN — Medication 10 ML: at 15:07

## 2018-03-12 RX ADMIN — Medication 10 ML: at 06:48

## 2018-03-12 RX ADMIN — QUETIAPINE FUMARATE 100 MG: 100 TABLET ORAL at 08:21

## 2018-03-12 RX ADMIN — Medication 10 ML: at 03:24

## 2018-03-12 RX ADMIN — Medication 10 ML: at 21:46

## 2018-03-12 RX ADMIN — HYDROMORPHONE HYDROCHLORIDE 1 MG: 2 INJECTION, SOLUTION INTRAMUSCULAR; INTRAVENOUS; SUBCUTANEOUS at 06:48

## 2018-03-12 RX ADMIN — CLONIDINE HYDROCHLORIDE 0.2 MG: 0.1 TABLET ORAL at 08:21

## 2018-03-12 RX ADMIN — HYDROMORPHONE HYDROCHLORIDE 1 MG: 2 INJECTION, SOLUTION INTRAMUSCULAR; INTRAVENOUS; SUBCUTANEOUS at 21:45

## 2018-03-12 RX ADMIN — ONDANSETRON HYDROCHLORIDE 4 MG: 2 INJECTION, SOLUTION INTRAMUSCULAR; INTRAVENOUS at 12:28

## 2018-03-12 RX ADMIN — CLONIDINE HYDROCHLORIDE 0.2 MG: 0.1 TABLET ORAL at 21:05

## 2018-03-12 RX ADMIN — INSULIN LISPRO 5 UNITS: 100 INJECTION, SOLUTION INTRAVENOUS; SUBCUTANEOUS at 07:30

## 2018-03-12 RX ADMIN — CYANOCOBALAMIN TAB 500 MCG 1000 MCG: 500 TAB at 08:21

## 2018-03-12 RX ADMIN — HYDROMORPHONE HYDROCHLORIDE 1 MG: 2 INJECTION, SOLUTION INTRAMUSCULAR; INTRAVENOUS; SUBCUTANEOUS at 03:24

## 2018-03-12 RX ADMIN — HYDROMORPHONE HYDROCHLORIDE 1 MG: 2 INJECTION, SOLUTION INTRAMUSCULAR; INTRAVENOUS; SUBCUTANEOUS at 17:44

## 2018-03-12 NOTE — PROGRESS NOTES
Physical therapy note:   Orders received, chart reviewed. Noted pt with hgb 6.3 with orders for blood transfusion. Pt also with active bedrest orders, spoke with RN who reports pt still remains on bedrest at this time. Will continue to follow for PT evaluation when cleared for OOB activity. Ella Nayak, PT, DPT

## 2018-03-12 NOTE — PROGRESS NOTES
Problem: Falls - Risk of  Goal: *Absence of Falls  Document Blake Fall Risk and appropriate interventions in the flowsheet.    Outcome: Progressing Towards Goal  Fall Risk Interventions:  Mobility Interventions: Strengthening exercises (ROM-active/passive), Communicate number of staff needed for ambulation/transfer         Medication Interventions: Assess postural VS orthostatic hypotension, Evaluate medications/consider consulting pharmacy, Patient to call before getting OOB, Teach patient to arise slowly    Elimination Interventions: Call light in reach, Toileting schedule/hourly rounds, Toilet paper/wipes in reach    History of Falls Interventions: Door open when patient unattended, Evaluate medications/consider consulting pharmacy, Room close to nurse's station

## 2018-03-12 NOTE — PROGRESS NOTES
Doctor Chastity Mancini called to check on the pt. He gave very important instruction. The right leg must be elevated at all times!!!! No more than 15 minutes for leg to be dependant. NO nurse is to change dressing. If the leg continues to swell and pain uncontrolled. We would consider 2d echo.

## 2018-03-12 NOTE — PROGRESS NOTES
[facility] [employer], [address] [place] [address] [address]  [address]  P: [phone]  F: [phone]    Foot and Ankle Surgery - Progress Note                                                   Assessment/Plan:  Charcot deformity left foot  Nonhealing wound left foot - resolved  Osteomyelitis left foot - pending  Septic arthritis left foot- resolved  DM with neuropathy      Pt is status post Incision and drainage with removal of all nonviable soft tissue left foot  Proximal foot amputation left foot   Open bone biopsies left foot  Deep tendon transfer tibialis anterior tendon left foot  Adjacent transfer arteriomyofasciocutaneous plantar medial pedicle flap for delayed primary closure left foot DOS: 03-    No further plan for surgery at this time per foot ad ankle as pt is primarily closed. We will await the results of the surgical bone pathology and determine if Infectious disease team is needed for IV abx therapy. The patient remains at very high risk for limb loss but we will attempt long term IV abx if needed prior to completing a choparts amputation or BKA    We discussed surgical findings with the patient explaining that they had poor bone quality and it was difficult to differentiate charcot bone from infected bone without purulent drainage noted. The patient remains at high risk for limb loss and they understand that this is a limb preservation procedure. We discussed continued possible complications and alternative treatment options. We discussed the postoperative requirements, rehabilitation including the need for AFO brace for life and need for absolute nonweightbearing to the left lower extremity. All questions regarding surgical findings were answered to patient satisfaction      Labs/imaging reviewed, H and H noted, transfusing another unit.  Should be noted that no tourniquet was used in surgery and hemostasis to the surgical site was completely controlled prior to closure with the flap. Post op xrays reviewed    Surgical pathology pending  abx per hospital team- thank you, will recommend infectious disease consultation if open bone biopsies are determined to be positive.      Post operative care  Dressing to left foot kept clean dry and intact with no strike-through noted. We will plan for a dressing change to the left foot based on the length of time the patient will be in the hospital with weekly changes BY DR RICE OR HIS STAFF ONLY once discharged. The patient will require DME to be arranged by CM including wheelchair with leg lift, 3 in 1 commode, and shower chair as they are REQUIRED MEDICALLY to be ABSOLUTE NONWEIGHTBEARING left foot. They should not be placed in a dependent position, not from the edge of their bed, not in their wheelchair and not in any other device for more more than 15 minutes at any given time. The patient has had a full thickness adjacent transfer of a flap and fluid overload will greatly increase the chances of flap failure. Airboots should be in place, loosely approximated at all times that the patient is in the bed. The patient may complete passive ROM exercises to the left thigh and lower leg but NOT THE FOOT and has full range of motion to upper body and full weight-bearing to the right foot. Elevate and offload B/L LE. Float heels off edge of bed with foam block or air boots. PT to evaluate and treat      Foot and ankle will follow    HPI:  Pt seen at bed side, in NAD. She is complaining of oralia pain to the anterior ankle and foot over area of tendon transfer and this is to be expected post operatively. Patient will likely  Require an extended stay in the hospital for IV abx therapy as they are incarcerated and can not do outpt therapy.  Rounded with RN, discussed with hospitalist and vascular team      Objective:  Vitals: Patient Vitals for the past 12 hrs:   BP Temp Pulse Resp SpO2   03/12/18 1505 128/65 99.5 °F (37.5 °C) 89 16 100 %   03/12/18 1208 140/69 99.3 °F (37.4 °C) 93 16 100 %   03/12/18 0752 130/64 98.9 °F (37.2 °C) 93 16 100 %   03/12/18 0445 126/56 98 °F (36.7 °C) 85 16 100 %       Dermatological:  Dressing to left foot clean dry and intact     Vascular:  Deferred due to dressing in place    Neurological:   epicritic and protective threshold absent 2ndary to Charcot deformity    MSK:  Deferred due to post operative period  . Imaging:  Xray 3 views left foot Post operative  Addendum:      Additional history of recent surgery to resect osteomyelitis in a Charcot  neuropathic midfoot. Given the additional history, the bony findings and the  navicular and cuboid most likely represent residual neuropathic joint. Residual  osteomyelitis is less likely but cannot be differentiated with imaging. See  recent operative report. Follow up with physical exam to determine need for  repeat imaging.     Labs:  Recent Labs      03/12/18   0646   WBC  10.1   CREA  2.98*   BUN  44*   HGB  6.3*   HCT  19.6*   NA  138   K  5.5*   CL  112*   CO2  17*   GLU  176*         Mayra Marr DPM  3/12/2018  [facility] [address] [place] [address]. Karen [address] [place] [address]  P: [phone]  F: [phone]

## 2018-03-12 NOTE — ROUTINE PROCESS
Bedside and Verbal shift change report given to Andreina Wiseman RN (oncoming nurse) by Franck Alfaro RN (offgoing nurse). Report included the following information SBAR, Kardex, Intake/Output, MAR, Accordion and Recent Results.

## 2018-03-12 NOTE — PROGRESS NOTES
Spoke to Doctor Mckenna Hansen about pt critical hemoglobin. He ordered 2 units to be transfused. I called the blood bank for rbc multiple times. The blood was never allocated for hours. I went in person and I was able to  the blood. With multiple attempts, unable to get the pt to urinate. Pain goes all the way up the left leg. She is so tense. Bladder scanned all night 170, 250, 300. Called for order for escobedo. Pt urinated 450. Escobedo was difficult to place pt very tense. 0245. Doctor Jimmy Gray order escobedo to placed, nurse driven protocol.

## 2018-03-12 NOTE — OP NOTES
[facility]  ACUTE CARE OP NOTE    Donta Mayer  MR#: [id number]  : 1980  ACCOUNT #: [de-identified]   DATE OF SERVICE: 2018    SURGEON:  Gaviota Seaman DPM    ASSISTANT: NONE. PREOPERATIVE DIAGNOSES:  1.  Osteomyelitis, left foot. 2.  Nonhealing wound, left foot [id number] x to bone  3. Charcot arthropathy, left foot. 4.  Cellulitis with abscess, left foot. 5.  Diabetes, uncontrolled with neuropathy. POSTOPERATIVE DIAGNOSES:  1.  Osteomyelitis, left foot. 2.  Nonhealing wound, left foot [id number] x to bone  3. Charcot arthropathy, left foot. 4.  Cellulitis with abscess, left foot. 5.  Diabetes, uncontrolled with neuropathy. PATHOLOGY/SPECIMEN:  1. Deep wound culture, left foot, aerobe and anaerobe. 2.  Left forefoot permanent specimen. 3.  Proximal margin cuboid bone, left foot permanent specimen. 4.  Proximal margin cuboid bone culture aerobe and anaerobe. PROCEDURES:  1. Incision and drainage with removal of nonviable soft tissue, left foot. 2.  Proximal left foot amputation distal to the Chopart's joint. 3.  Open bone biopsy, multiple, left foot. 4.  Deep tendon transfer anterior tibialis tendon, left foot. 5.  Adjacent transfer arterial myofascial cutaneous plantar medial pedicle flap for delayed primary closure > 15cm left lower extremity. ANESTHESIA:  General with local.    ESTIMATED BLOOD LOSS:  200 mL      HEMOSTASIS:  Correct anatomic dissection. INJECTABLES:  20 mL of 0.5% Marcaine plain. MATERIALS USED:  Included the Arthrex Mini BioComposite suture anchor SutureTak size 2.4 x 8.5 mm with 2.0 FiberWire x1. COMPLICATIONS:  Poor bone quality. INDICATIONS FOR THE PROCEDURE:  The patient was admitted to [facility] for a severe infection and nonhealing wound to her left lateral foot.   She subsequently underwent multiple debridements by the vascular specialist removing a significant portion of the base of the fifth metatarsal causing a loss of function of the peroneus brevis tendon in the process. She also had advanced imaging including x-rays, MRI, and a WBC labeled bone scan all suggesting potential osteomyelitis through the mid foot. Furthermore, the MRI showed potential fluid collection along with bony erosion through the mid foot concerning for potential septic arthritis in conjunction with osteomyelitis versus chronic Charcot arthropathy with bone changes. I discussed the imaging with the radiology specialist, and it was determined that likely bone infection was present; however, Charcot arthropathy could be masking some of the bone pathology and also could be presenting in a similar fashion; therefore, it was determined by the radiology specialist, myself and the vascular team that a clinical evaluation intraoperatively would determine the procedure choices moving forward. The patient had significant discussions regarding possible below knee amputation due to the severe loss of tissue due to multiple debridements with infection as well as the concern for osteomyelitis and a nonfunctional foot. I discussed with the patient possible options regarding limb preservation versus the below knee amputation, and after significant discussion the patient has elected for limb preservation procedures. I explained to the patient that they will potentially need a staged procedure if further infection proximally is identified and that we would make this determination intraoperatively. Further I explained that we will attempt to balance the lower extremity with tendon transfer procedures in conjunction with a proximal foot amputation. We will attempt to leave the cuboid and navicular along with the talus and calcaneus which is a more distal amputation than a Chopart's; however, I explained that showed bone infection to be noted proximally we would abort the tendon transfer procedure.   We would pack the amputation open and determine if limb preservation was salvageable in a staged type procedure or if below knee amputation was a more functional outcome. The patient was made aware of all risks and complications to these procedures, and they have acknowledged this by signing the consent form, which was placed in the patient's chart. N.p.o. status was confirmed, and the patient was medically cleared for the procedure. It should also be noted that we were aware of the patient's sickle cell anemia, and we discussed this with both the vascular team and the hospitalist, and although we did note that the hemoglobin and hematocrit were low, the hospitalist was comfortable with allowing the patient to undergo surgery with the caveat that a type and cross was completed prior to going to the operating room. PROCEDURE IN DETAIL:  The patient was brought back to the operating room and placed in the supine position. They were placed under general anesthesia and then scrubbed, prepped and draped using normal aseptic technique. Following this, attention was directed to the patient's left lateral foot where a noted significant soft tissue void was identified due to previous debridements and nonhealing wound. Utilizing a marking pen, a fish mouth type incision over the dorsal and plantar aspect of the foot attempting to keep as much of the soft tissue that was viable intact was completed. Once this was marked accounting for soft tissue void to the left lateral foot, a 10 blade was utilized to complete the incision down to the level of bone. At this time all bleeders were bovied and cauterized as necessary. Following this, the 10 blade was then utilized to elevate the dorsal pedicle; and during this process going from distal to proximal, a pocket of purulent drainage was encountered between the first and the second metatarsals within the first interspace. This was noted within the soft tissues but was not identified within the bone.   The nonviable soft tissue was debulked, debrided and removed. The purulent drainage was carefully milked from the soft tissues compressing the foot from proximal to distal.  Once the most significant of the purulent drainage was released, a deep wound culture to the area was then taken and placed on the back table and labeled accordingly aerobe and anaerobic deep wound culture, left foot. Further dissection was carried more proximal after revision of the dorsal flap was completed removing the nonviable tissue, which encompassed the purulent pocket. A hemostat was placed through the surrounding soft tissues in multiple directions and multiple planes looking for any further purulent pockets and none were noted. Moving proximally, the base of the remaining metatarsals as well as the cuneiforms showed extremely eroded bone quality. The bone was soft, and there was significant destruction of the articular cartilage throughout. Careful dissection was completed to the level of the bases of the remaining metatarsals being sure to protect the first metatarsal medial cuneiform space where we would be able to identify our anterior tibialis tendon, which we wanted to keep intact should we decide to transfer the tendon to the dorsum of the foot. Careful dissection was completed disarticulating the remaining metatarsals at their bases, debulking and debriding and removing the forefoot. This was placed on the back table for further pathologic examination. At this time, the intermediate and lateral cuneiforms were also identified and resected and removed from the surgical field as again this bone showed significant bony erosion with broken cortices and nonviable bleeding. It should, however, be noted that at the more proximal portion of the bone, there was no purulent drainage identified.   The only area of purulence that was noted was within the first interspace distally from the bases of the metatarsals, and this was only noted within the soft tissues. At this time, attention was then directed to the medial cuneiform. Careful dissection around the medial aspect of the medial cuneiform allowed identification and isolation of the tibialis anterior tendon. This was carefully retracted and secured with a suture so that it would be easily identifiable during the tendon transfer procedure. The medial cuneiform was then disarticulated from the navicular and removed from the surgical field. At this time, the area was copiously irrigated with normal saline utilizing a pulse lavage with 3 liters of normal saline in standard technique. Attention was then redirected to the remaining navicular and cuboid bone. The plantar and dorsal flaps were carefully rearranged and reapproximated to determine if they would be fashioned and fit accordingly for closure. It was noted that resection of the distal aspect of the cuboid was necessary to allow for rotation of the plantar medial pedicle. Utilizing a sagittal saw, the distal aspect of the cuboid, which did show articular cartilage and had more normal appearing anatomy with no noted articular destruction, was carefully resected. This bone did appear to have Charcot type changes; however, again no further purulent drainage was noted proximally, and the surrounding soft tissue was clean and had normal appearing anatomy. The resected distal aspect of the cuboid was placed on the back table and utilizing a clean rongeur the proximal margin of the resected bone was utilized to complete open bone biopsies including a permanent specimen as well as bone cultures, aerobe and anaerobe which were labeled accordingly and placed on the back table for further pathologic examination. Once the open bone biopsies were complete, evaluation of the remaining soft tissue and bone was completed, and although again it did show likely Charcot type changes to the bone, again the soft tissue did appear clean. There was no purulent drainage noted, and the bone appeared stronger and cortical margins intact on the remaining cuboid and lateral aspect of the navicular. For this reason, it was determined that we would attempt the tendon transfer procedure in an effort to help balance the foot with the completion of the proximal foot amputation. Attention was directed to the tibialis anterior tendon, which had been previously identified. Utilizing forceps and a 15 blade, the tendon sheath was penetrated allowing complete visualization of the distal aspect of the tendon. Utilizing uterine packing forceps, the forceps were used to enter the tendon sheath and create a tunnel proximally beneath the extensor retinaculum. Following this, a marking pen was utilized to dino the central portion of the tibialis anterior tendon proximal to the extensor retinaculum and just lateral to the tibial crest.  This was noted at about 5-6 cm proximal to the ankle joint. Once this was marked, a 15 blade was utilized to make the incision through skin down to the level of subcutaneous tissue. All bleeders were bovied and cauterized as necessary. Following this, blunt dissection was utilized being sure to protect all neurovascular structures in the surrounding area down to the level of the deep fascia and the tendon sheath over the tibialis anterior tendon. Again, the 15 blade was utilized to penetrate the tendon sheath and allow identification and visualization of the tendon. This was then isolated by placing a hemostat between the sheath and the tendon making the tibialis anterior tendon prominent superficially. Once completed, the uterine packing forceps was again passed through the tendon sheath distally and was identified proximally within the stab incision confirming the correct tendon as well as placement of the incision.   Once confirmed, the hemostat was then utilized to pull the tendon proximally through the tendon sheaths out the incision. Its distal free end was then whipstitched in standard fashion utilizing a 2-0 Vicryl suture, and the tendon was then wrapped in a wet gauze. Attention was then directed laterally over the remaining cuboid bone. A tunnel subcutaneously below the skin was made over the remaining aspect of the cuboid and was followed in a proximal fashion until it met the proximal incision where the tendon had been harvested. This was completed utilizing the uterine packing forceps in a standard fashion. A small incision through the deep tissues again being sure to protect all neurovascular structures and the surrounding area was completed over the remaining cuboid to allow access for the tendon to be secured to the cuboid once the tendon had been passed. At this time, the whipstitch was then placed within the uterine packing forceps, which had been passed from distal to proximal, and the free end of the Coumadin was then passed subcutaneously; however, superior to the extensor retinaculum to the lateral aspect of the foot over the remaining cuboid bone. Once this was completed, the foot was then placed in a dorsiflexed position to confirm that we had enough length to complete securing the tendon to the cuboid. Once this was confirmed, intraoperative fluoroscopy was then utilized to confirm anatomic location of the remaining cuboid bone and to determine placement of the suture anchors that could be utilized to secure the tendon to the remaining cuboid. Once this was confirmed under live fluoroscopy, then utilizing the Arthrex BioComposite SutureTak size 2.4 x 8.5 mm, the drill guide was utilized in standard fashion and drilled through the remaining cuboid bone. After placement of the drill guide was complete, the drill was removed, and the BioComposite SutureTak was introduced through the drill hole.   The suture tag had a 2.0 FiberWire attached and once this was secured and in position, the FiberWire was pulled to confirm secure handling of the SutureTak. Following this, the remaining 2.0 FiberWire was then passed from inferior to superior through the distal aspect of the tendon with the foot in a dorsiflexed position, creating good tension, and this was secured in the surgeon's hand knot in standard fashion. The remaining 2.0 FiberWire was then utilized to secure soft tissue attachments surrounding the cuboid to help augment the transfer. The soft tissue was further sutured using 2-0 Vicryl surrounding the tendon transfer for further augmentation and stability. Once the tendon was secured in position, evaluation of equinus deformity was completed, good dorsiflexory range of motion at the ankle joint was noted greater than 10 degrees with the left knee flexed and extended. For this reason, we decided not to complete an achilles tendon lengthening. Attention was then directed to the plantar medial foot where we were able to identify, isolate and raise a plantar medial arterial myofascial cutaneous pedicle flap, which will be utilized and rotated laterally to close the soft tissue defect, which was noted preoperatively. Careful dissection, leaving as much soft tissue intact as possible, was completed raising the plantar medial pedicle. Once raised, the pedicle was then rearranged and reapproximated laterally and fashioned to fit the soft tissue void. The plantar medial and dorsal flaps were carefully reapproximated and secured in position utilizing a combination of 2-0 and 3-0 nylon. Once the tissue void was closed, the remaining distal aspect of the flap was fashioned to fit in accordance with the dorsal flap. These were sutured in position utilizing 2-0 and 3-0 nylon in a simple interrupted suture technique. Approximately 20 mL of 0.5% Marcaine plain were then injected into the affected area giving the patient an ankle block.   Evaluation of the flap showed good viability, warm to touch, and good capillary fill time. The patient tolerated the procedure well and was taken to the postanesthesia care unit where they will remain until they are stable enough to return to their hospital floor. Again, it should be noted that we will follow the results of the pathology closely and determine if infectious disease will be required for long-term IV antibiotic therapy. We also explained to the patient that this is a limb preservation procedure and that there may be a requirement for a return to the OR if further infection is noted including the possibility of a Chopart amputation versus a below knee amputation.    We will follow the patient here in the hospital, and they will follow with me on an outpatient basis once discharged for further evaluation and management of their postsurgical condition pending results of pathology or need for further staged procedures during this hospital visit      NATASHA Collins  D: 03/11/2018 19:05     T: 03/12/2018 05:32  JOB #: [id number]

## 2018-03-12 NOTE — PROGRESS NOTES
Hospitalist Progress Note    NAME: Alec Moran   :  1980   MRN:  [id number]       Assessment / Plan:  DM type 1 uncontrolled with nephropathy POA  Hypoglycemia 3/6/2018  , 296, 270, 133, 133  Lantus 8 units with recent hypoglycemia  Wean d5w  lispro sliding scale  Last HgBa1c 8.8    Acute blood loss anemia post op 3/11/18 HgB dropped from 7.7 to 5.2  Chronic anemia POA due to sickle cell disease POA    S/p 2 u pRBCs overnight, still 6.3, additional unit given  folic acid  Serial HGbs    Severe sepsis POA due to left foot diabetic foot infection, ? osteomyelitis   Fevers resolved  MRI left foot multiple fluid collections, significant bone destruction  Excisional debridement and biopsy done on 3/3, for repeat surgery 3/7/2018  OR for partial foot amputation and wound closure 3/11/18        Hopefully the infected bone/tissue has been resected        Residual abnormal bone left in foot ? Charcot foot vs residual osteomyelitis        Await pathology, if osteo will need 6 weeks post op, otherwise 2 weeks IV  Wound cultures with anaerobic GNRs(done on antibiotics)       Cultures form 3/11/18 OR are pending  Continue pip-tazo and dapto day 10       Now off dapto, resume only if resistant GPC on OR cultures  LLE doppler with no deep venous thrombosis identified. , CRP 35  Midline placed 3/6/18  Pain control  D/W Dr Suleman Brown     1. weekly wound dressing changes, he prefers to do in person if can be transported     2. Absolutely non weight bearling on left foot     3.  Left leg should be elevated ASAP, keeping dependency to a minimum  Spoke with CM today, nursing home cannot do IV antibiotics  Medically ready for D/C if we can find a situation where she can get IV antbiotics and proper wound care/non weight-bearing, going to be hard    Acute kidney injury POA peak creatinine 3.80  Stage 4 chronic kidney disease POA baseline creatinine 2.4 to 2.7  been on dialysis in the past   Previously has seen Savannah Unger. Creatinine trending down close to baseline    THU POA  Uses 2 LPM O2 at night     Code Status: Full    Surrogate Decision Maker: in [facility], no decision maker allowed at this time    DVT Prophylaxis: heparin    Body mass index is 25.97 kg/(m^2). Subjective:     Chief Complaint / Reason for Physician Visit f/u diabetic foot infection  \"my left foot is still sore\"  Left foot pain improved on the IV dilaudid, still moderate before the pain meds  No further diarrhea     Review of Systems:  Symptom Y/N Comments  Symptom Y/N Comments   Fever/Chills n   Chest Pain n    Poor Appetite    Edema     Cough n   Abdominal Pain n    Sputum    Joint Pain     SOB/ADAMS n   Pruritis/Rash     Nausea/vomit n   Tolerating PT/OT     Diarrhea n   Tolerating Diet y    Constipation    Other       Could NOT obtain due to:      Objective:     VITALS:   Last 24hrs VS reviewed since prior progress note.  Most recent are:  Patient Vitals for the past 24 hrs:   Temp Pulse Resp BP SpO2   03/12/18 1826 98.3 °F (36.8 °C) 95 16 126/56 100 %   03/12/18 1752 99 °F (37.2 °C) 93 16 144/64 100 %   03/12/18 1737 99 °F (37.2 °C) 94 16 155/73 100 %   03/12/18 1721 99.7 °F (37.6 °C) 93 16 150/70 100 %   03/12/18 1505 99.5 °F (37.5 °C) 89 16 128/65 100 %   03/12/18 1208 99.3 °F (37.4 °C) 93 16 140/69 100 %   03/12/18 0752 98.9 °F (37.2 °C) 93 16 130/64 100 %   03/12/18 0445 98 °F (36.7 °C) 85 16 126/56 100 %   03/12/18 0403 97.9 °F (36.6 °C) 89 16 120/59 100 %   03/12/18 0303 98 °F (36.7 °C) 94 18 132/72 99 %   03/12/18 0235 97.9 °F (36.6 °C) 87 16 122/59 100 %   03/12/18 0220 97.6 °F (36.4 °C) 86 16 124/60 99 %   03/12/18 0158 97.9 °F (36.6 °C) 87 16 122/59 99 %   03/12/18 0100 98 °F (36.7 °C) 86 16 116/59 99 %   03/12/18 0007 97.9 °F (36.6 °C) 81 16 116/59 100 %   03/11/18 2337 98 °F (36.7 °C) 89 16 104/66 100 %   03/11/18 2320 97.6 °F (36.4 °C) 90 15 94/50 100 %   03/11/18 2300 - 96 16 92/44 99 %   03/11/18 2221 - 91 16 90/44 100 % 03/11/18 2209 97.9 °F (36.6 °C) 98 14 (!) 83/46 100 %       Intake/Output Summary (Last 24 hours) at 03/12/18 1846  Last data filed at 03/12/18 1048   Gross per 24 hour   Intake           1055.4 ml   Output              300 ml   Net            755.4 ml        PHYSICAL EXAM:  General: Sitting up in chair,  Alert, cooperative, no acute distress    Resp:  CTA bilaterally, no wheezing or rales. No accessory muscle use  CV:  Regular  rhythm,  No edema  GI:  Soft, Non distended, Non tender.  +Bowel sounds  Neurologic:  Alert and oriented X 3, normal speech,   Psych:   Good insight. Not anxious nor agitated  Skin:  L foot bandaged, some bloody drainage, multiple tattoos    Reviewed most current lab test results and cultures  YES  Reviewed most current radiology test results   YES  Review and summation of old records today    NO  Reviewed patient's current orders and MAR    YES  PMH/SH reviewed - no change compared to H&P  ________________________________________________________________________  Care Plan discussed with:    Comments   Patient x    Family      RN x    Care Manager     Consultant  X  Dr Jamshid Bekcer team rounds were held today with , nursing, pharmacist and clinical coordinator. Patient's plan of care was discussed; medications were reviewed and discharge planning was addressed. ________________________________________________________________________  Total NON critical care TIME:  25   Minutes    Total CRITICAL CARE TIME Spent:   Minutes non procedure based      Comments   >50% of visit spent in counseling and coordination of care     ________________________________________________________________________  Barbara Arroyo MD     Procedures: see electronic medical records for all procedures/Xrays and details which were not copied into this note but were reviewed prior to creation of Plan.       LABS:  I reviewed today's most current labs and imaging studies.   Pertinent labs include:  Recent Labs      03/12/18   0646  03/11/18   1821  03/11/18   0340  03/10/18   1346   WBC  10.1   --   6.1  6.3   HGB  6.3*  5.2*  7.7*  7.9*   HCT  19.6*  16.7*  24.2*  24.8*   PLT  299   --   476*  466*     Recent Labs      03/12/18   0646  03/11/18   0340  03/10/18   0211   NA  138  140  139   K  5.5*  5.3*  5.0   CL  112*  111*  111*   CO2  17*  22  24   GLU  176*  215*  89   BUN  44*  45*  43*   CREA  2.98*  2.88*  2.83*   CA  7.0*  8.1*  7.8*       Signed: Iva Heller MD

## 2018-03-13 LAB
ANION GAP SERPL CALC-SCNC: 7 MMOL/L (ref 5–15)
BACTERIA SPEC CULT: NORMAL
BUN SERPL-MCNC: 41 MG/DL (ref 6–20)
BUN/CREAT SERPL: 14 (ref 12–20)
CALCIUM SERPL-MCNC: 7.3 MG/DL (ref 8.5–10.1)
CHLORIDE SERPL-SCNC: 111 MMOL/L (ref 97–108)
CO2 SERPL-SCNC: 20 MMOL/L (ref 21–32)
CREAT SERPL-MCNC: 2.95 MG/DL (ref 0.55–1.02)
GLUCOSE BLD STRIP.AUTO-MCNC: 101 MG/DL (ref 65–100)
GLUCOSE BLD STRIP.AUTO-MCNC: 124 MG/DL (ref 65–100)
GLUCOSE BLD STRIP.AUTO-MCNC: 140 MG/DL (ref 65–100)
GLUCOSE BLD STRIP.AUTO-MCNC: 174 MG/DL (ref 65–100)
GLUCOSE BLD STRIP.AUTO-MCNC: 70 MG/DL (ref 65–100)
GLUCOSE BLD STRIP.AUTO-MCNC: 75 MG/DL (ref 65–100)
GLUCOSE SERPL-MCNC: 204 MG/DL (ref 65–100)
POTASSIUM SERPL-SCNC: 5.2 MMOL/L (ref 3.5–5.1)
SERVICE CMNT-IMP: ABNORMAL
SERVICE CMNT-IMP: NORMAL
SODIUM SERPL-SCNC: 138 MMOL/L (ref 136–145)

## 2018-03-13 PROCEDURE — 97161 PT EVAL LOW COMPLEX 20 MIN: CPT

## 2018-03-13 PROCEDURE — 80048 BASIC METABOLIC PNL TOTAL CA: CPT | Performed by: EMERGENCY MEDICINE

## 2018-03-13 PROCEDURE — 74011636637 HC RX REV CODE- 636/637: Performed by: INTERNAL MEDICINE

## 2018-03-13 PROCEDURE — 74011250637 HC RX REV CODE- 250/637: Performed by: INTERNAL MEDICINE

## 2018-03-13 PROCEDURE — 74011250637 HC RX REV CODE- 250/637: Performed by: STUDENT IN AN ORGANIZED HEALTH CARE EDUCATION/TRAINING PROGRAM

## 2018-03-13 PROCEDURE — 74011250636 HC RX REV CODE- 250/636: Performed by: INTERNAL MEDICINE

## 2018-03-13 PROCEDURE — 82962 GLUCOSE BLOOD TEST: CPT

## 2018-03-13 PROCEDURE — 74011250637 HC RX REV CODE- 250/637: Performed by: HOSPITALIST

## 2018-03-13 PROCEDURE — 97116 GAIT TRAINING THERAPY: CPT

## 2018-03-13 PROCEDURE — 65270000029 HC RM PRIVATE

## 2018-03-13 PROCEDURE — 36415 COLL VENOUS BLD VENIPUNCTURE: CPT | Performed by: EMERGENCY MEDICINE

## 2018-03-13 RX ADMIN — PIPERACILLIN SODIUM AND TAZOBACTAM SODIUM 3.38 G: .375; 3 INJECTION, POWDER, LYOPHILIZED, FOR SOLUTION INTRAVENOUS at 02:31

## 2018-03-13 RX ADMIN — VENLAFAXINE 75 MG: 37.5 TABLET ORAL at 10:12

## 2018-03-13 RX ADMIN — PIPERACILLIN SODIUM AND TAZOBACTAM SODIUM 3.38 G: .375; 3 INJECTION, POWDER, LYOPHILIZED, FOR SOLUTION INTRAVENOUS at 13:43

## 2018-03-13 RX ADMIN — CLONIDINE HYDROCHLORIDE 0.2 MG: 0.1 TABLET ORAL at 10:13

## 2018-03-13 RX ADMIN — OXYCODONE HYDROCHLORIDE AND ACETAMINOPHEN 1 TABLET: 5; 325 TABLET ORAL at 21:49

## 2018-03-13 RX ADMIN — DIPHENHYDRAMINE HYDROCHLORIDE 25 MG: 25 CAPSULE ORAL at 03:43

## 2018-03-13 RX ADMIN — CLONIDINE HYDROCHLORIDE 0.2 MG: 0.1 TABLET ORAL at 13:42

## 2018-03-13 RX ADMIN — Medication 10 ML: at 21:53

## 2018-03-13 RX ADMIN — DIPHENHYDRAMINE HYDROCHLORIDE 25 MG: 25 CAPSULE ORAL at 19:06

## 2018-03-13 RX ADMIN — CALCIUM 500 MG: 500 TABLET ORAL at 10:13

## 2018-03-13 RX ADMIN — QUETIAPINE FUMARATE 100 MG: 100 TABLET ORAL at 21:50

## 2018-03-13 RX ADMIN — HYDROMORPHONE HYDROCHLORIDE 1 MG: 2 INJECTION, SOLUTION INTRAMUSCULAR; INTRAVENOUS; SUBCUTANEOUS at 12:27

## 2018-03-13 RX ADMIN — Medication 10 ML: at 10:15

## 2018-03-13 RX ADMIN — INSULIN GLARGINE 8 UNITS: 100 INJECTION, SOLUTION SUBCUTANEOUS at 21:51

## 2018-03-13 RX ADMIN — INSULIN LISPRO 2 UNITS: 100 INJECTION, SOLUTION INTRAVENOUS; SUBCUTANEOUS at 10:11

## 2018-03-13 RX ADMIN — VENLAFAXINE 75 MG: 37.5 TABLET ORAL at 21:50

## 2018-03-13 RX ADMIN — HYDROMORPHONE HYDROCHLORIDE 1 MG: 2 INJECTION, SOLUTION INTRAMUSCULAR; INTRAVENOUS; SUBCUTANEOUS at 03:43

## 2018-03-13 RX ADMIN — HYDROMORPHONE HYDROCHLORIDE 1 MG: 2 INJECTION, SOLUTION INTRAMUSCULAR; INTRAVENOUS; SUBCUTANEOUS at 15:58

## 2018-03-13 RX ADMIN — Medication 10 ML: at 10:16

## 2018-03-13 RX ADMIN — FOLIC ACID 1 MG: 1 TABLET ORAL at 10:13

## 2018-03-13 RX ADMIN — CYANOCOBALAMIN TAB 500 MCG 1000 MCG: 500 TAB at 10:13

## 2018-03-13 RX ADMIN — QUETIAPINE FUMARATE 100 MG: 100 TABLET ORAL at 10:13

## 2018-03-13 RX ADMIN — HYDROMORPHONE HYDROCHLORIDE 1 MG: 2 INJECTION, SOLUTION INTRAMUSCULAR; INTRAVENOUS; SUBCUTANEOUS at 08:58

## 2018-03-13 RX ADMIN — HYDROMORPHONE HYDROCHLORIDE 1 MG: 2 INJECTION, SOLUTION INTRAMUSCULAR; INTRAVENOUS; SUBCUTANEOUS at 21:49

## 2018-03-13 RX ADMIN — CLONIDINE HYDROCHLORIDE 0.2 MG: 0.1 TABLET ORAL at 21:50

## 2018-03-13 RX ADMIN — DIPHENHYDRAMINE HYDROCHLORIDE 25 MG: 25 CAPSULE ORAL at 12:27

## 2018-03-13 RX ADMIN — HYDROMORPHONE HYDROCHLORIDE 1 MG: 2 INJECTION, SOLUTION INTRAMUSCULAR; INTRAVENOUS; SUBCUTANEOUS at 19:06

## 2018-03-13 RX ADMIN — THERA TABS 1 TABLET: TAB at 10:14

## 2018-03-13 RX ADMIN — FAMOTIDINE 20 MG: 20 TABLET, FILM COATED ORAL at 10:14

## 2018-03-13 NOTE — PROGRESS NOTES
Hospitalist Progress Note    NAME: Pastor Cabrera   :  1980   MRN:  [id number]       Assessment / Plan:  DM type 1 uncontrolled with nephropathy POA  Hypoglycemia 3/6/2018  , 195, 185, 153, 151  Lantus 8 units with recent hypoglycemia  Stop D5W once stable on diet  Increase lantus to 10 units in Am if eating well  lispro sliding scale  Last HgBa1c 8.8    Severe sepsis POA due to left foot diabetic foot infection, ? osteomyelitis   *3/11 few GPCC seen in culture, unsure of significance  Excisional debridement and biopsy done on 3/3, for repeat surgery 3/7/2018  Wound cultures with anaerobic GNRs(done on antibiotics)  Continue pip-tazo and dapto day 9      Stop the daptomycin after tomorrows dose, no MRSA on cultures  LLE doppler with no deep venous thrombosis identified. MRI left foot multiple fluid collections, significant bone destruction  , CRP 35  Midline placed 3/6/18  Partial foot amputation 3/11/2018  Will D/w Dr Rivas Bone re residual concern for infection, may widfe up needing prolonged antibiotics if all involved bone not removed  Severe pain post op, added IV dilaudid    Acute kidney injury POA peak creatinine 3.80  Stage 4 chronic kidney disease POA baseline creatinine 2.4 to 2.7  been on dialysis in the past   Previously has [place] [facility] [place]. Creatinine trending down to baseline    Acute on chronic anemia due to sickle cell disease POA    Pt without sx of crisis at this time  S/p 1u pRBCs  folic acid  Serial HGbs, check HgB post op     THU POA  Uses 2 LPM O2 at night     Code Status: Full    Surrogate Decision Maker: in [facility], no decision maker allowed at this time    DVT Prophylaxis: heparin    Body mass index is 25.97 kg/(m^2).      Subjective:     Chief Complaint / Reason for Physician Visit f/u diabetic foot infection  C/o pain in the foot  Review of Systems:  Symptom Y/N Comments  Symptom Y/N Comments   Fever/Chills n   Chest Pain n    Poor Appetite    Edema Cough n   Abdominal Pain n    Sputum    Joint Pain     SOB/ADAMS n   Pruritis/Rash     Nausea/vomit n   Tolerating PT/OT     Diarrhea n   Tolerating Diet y    Constipation    Other       Could NOT obtain due to:      Objective:     VITALS:   Last 24hrs VS reviewed since prior progress note. Most recent are:  Patient Vitals for the past 24 hrs:   Temp Pulse Resp BP SpO2   03/13/18 1013 - 94 - 153/71 -   03/13/18 1009 - 94 - 153/71 100 %   03/13/18 0802 99.6 °F (37.6 °C) 93 18 178/87 98 %   03/13/18 0410 99.7 °F (37.6 °C) 88 17 163/69 99 %   03/12/18 2345 98.3 °F (36.8 °C) 77 18 150/85 98 %   03/12/18 2255 98.6 °F (37 °C) 77 16 125/62 100 %   03/12/18 2136 97.2 °F (36.2 °C) 88 16 - 100 %   03/12/18 2025 100.2 °F (37.9 °C) 84 16 135/65 100 %   03/12/18 2012 98.5 °F (36.9 °C) 93 16 153/72 100 %   03/12/18 1859 98.9 °F (37.2 °C) 94 16 132/65 100 %   03/12/18 1826 98.3 °F (36.8 °C) 95 16 126/56 100 %   03/12/18 1752 99 °F (37.2 °C) 93 16 144/64 100 %   03/12/18 1737 99 °F (37.2 °C) 94 16 155/73 100 %   03/12/18 1721 99.7 °F (37.6 °C) 93 16 150/70 100 %   03/12/18 1505 99.5 °F (37.5 °C) 89 16 128/65 100 %   03/12/18 1208 99.3 °F (37.4 °C) 93 16 140/69 100 %       Intake/Output Summary (Last 24 hours) at 03/13/18 1113  Last data filed at 03/13/18 0410   Gross per 24 hour   Intake              300 ml   Output                0 ml   Net              300 ml        PHYSICAL EXAM:  General: Seen up on crutches, being mobilized by PT  Resp:  CTA bilaterally, no wheezing or rales.   No accessory muscle use  CV:  Regular  rhythm,  No edema  GI:  Soft, Non distended, Non tender.  +Bowel sounds  Neurologic:  Alert and oriented X 3, normal speech,   Psych:   Good insight. Not anxious nor agitated  Skin:  L foot bandaged, some bloody drainage, multiple tattoos    Reviewed most current lab test results and cultures  YES  Reviewed most current radiology test results   YES  Review and summation of old records today    NO  Reviewed patient's current orders and MAR    YES  PMH/SH reviewed - no change compared to H&P  ________________________________________________________________________  Care Plan discussed with:    Comments   Patient x    Family      RN x    Care Manager     Consultant                        Multidiciplinary team rounds were held today with , nursing, pharmacist and clinical coordinator. Patient's plan of care was discussed; medications were reviewed and discharge planning was addressed. ________________________________________________________________________  Total NON critical care TIME:  25   Minutes    Total CRITICAL CARE TIME Spent:   Minutes non procedure based      Comments   >50% of visit spent in counseling and coordination of care     ________________________________________________________________________  Gala Heart MD     Procedures: see electronic medical records for all procedures/Xrays and details which were not copied into this note but were reviewed prior to creation of Plan. LABS:  I reviewed today's most current labs and imaging studies.   Pertinent labs include:  Recent Labs      03/12/18   0646  03/11/18   1821  03/11/18   0340  03/10/18   1346   WBC  10.1   --   6.1  6.3   HGB  6.3*  5.2*  7.7*  7.9*   HCT  19.6*  16.7*  24.2*  24.8*   PLT  299   --   476*  466*     Recent Labs      03/13/18   0400  03/12/18   0646  03/11/18   0340   NA  138  138  140   K  5.2*  5.5*  5.3*   CL  111*  112*  111*   CO2  20*  17*  22   GLU  204*  176*  215*   BUN  41*  44*  45*   CREA  2.95*  2.98*  2.88*   CA  7.3*  7.0*  8.1*       Signed: Gala Heart MD

## 2018-03-13 NOTE — PROGRESS NOTES
Problem: Pain  Goal: *Control of Pain  Outcome: Progressing Towards Goal  Assess pain Q4h & prn. Medicating with 1mg IV Dilaudid Q4h. Rating pain \"8\" out of \"10\" on scale of \"0-10\".

## 2018-03-13 NOTE — PROGRESS NOTES
Bedside verbal report given to [address] [place] [address] with SBAR, Kardex, MAR, I&O, and recent results reviewed.

## 2018-03-13 NOTE — PROGRESS NOTES
General Surgery End of Shift Nursing Note    Bedside shift change report given to Alvin Collado (oncoming nurse) by Cindy Apgar (offgoing nurse). Report included the following information SBAR, Kardex, Intake/Output, MAR and Recent Results. Shift worked:   D   Summary of shift:    0   Issues for physician to address:   0     Number times ambulated in hallway past shift: 0    Number of times OOB to chair past shift: 0    Pain Management:  Current medication: 0  Patient states pain is manageable on current pain medication: YES    GI:    Current diet:  DIET DIABETIC CONSISTENT CARB Regular    Tolerating current diet: YES  Passing flatus: YES  Last Bowel Movement: yesterday   Appearance: 0    Respiratory:    Incentive Spirometer at bedside: YES  Patient instructed on use: YES    Patient Safety:    Falls Score: 1  Bed Alarm On? No  Sitter?  No    Carli Lopez RN

## 2018-03-13 NOTE — PROGRESS NOTES
Problem: Falls - Risk of  Goal: *Absence of Falls  Document Blake Fall Risk and appropriate interventions in the flowsheet.    Outcome: Progressing Towards Goal  Fall Risk Interventions:  Mobility Interventions: OT consult for ADLs, Patient to call before getting OOB, PT Consult for mobility concerns, PT Consult for assist device competence         Medication Interventions: Patient to call before getting OOB    Elimination Interventions: Patient to call for help with toileting needs, Toilet paper/wipes in reach, Toileting schedule/hourly rounds    History of Falls Interventions: Door open when patient unattended, Room close to nurse's station

## 2018-03-13 NOTE — PROGRESS NOTES
ELY spoke with the NP at the correction center regarding discharge plan. NP informed ELY that she would need to know what IV antibiotic patient will be d/c on, 24 hours prior to discharge, b/c it will need to be ordered. Still waiting on cultures to determine what IV antibiotic pt will be d/c on.     Raul Dawkins  Ext [phone]

## 2018-03-13 NOTE — PROGRESS NOTES
Problem: Mobility Impaired (Adult and Pediatric)  Goal: *Acute Goals and Plan of Care (Insert Text)  Physical Therapy Goals  Initiated 3/13/2018  1. Patient will move from supine to sit and sit to supine  in bed with independence within 7 day(s). 2.  Patient will transfer from bed to chair and chair to bed with modified independence using the least restrictive device within 7 day(s). 3.  Patient will perform sit to stand with modified independence within 7 day(s). 4.  Patient will ambulate with modified independence for 100 feet while maintaining left NWB with the least restrictive device within 7 day(s). physical Therapy EVALUATION  Patient: Lokesh Henning (38 y.o. female)  Date: 3/13/2018  Primary Diagnosis: Osteomyelitis ([facility] [address]  diabetic foot debridement  OSTEOMYELITIS  infected left foot  Procedure(s) (LRB):  INCISION AND DRAINAGE LEFT FOOT, REMOVAL NONVIABLE TISSUE AND BONE LEFT FOOT, DEEP TENDON TRANSFER TIBIALIS ANTERIOR LEFT FOOT, OPEN BIOPSIES LEFT FOOT, ADJACENT TRANSFER ARTERIOMYOFASCIOCUTANEOUS PLANTAR MEDIAL PEDICLE FLAP FOR DELAYED PRIMARY CLOSURE (Left)  PROXIMAL FOOT AMPUTATION LEFT FOOT (Left) 2 Days Post-Op   Precautions:   NWB (left foot)    ASSESSMENT :  Based on the objective data described below, the patient presents with impaired balance, endurance, and safety with mobility following admission for left foot osteomyelitis. Completed a trial with crutches initially but patient had difficulty maintaining left NWB and advancing right foot to \"hop. \" Completed a 2nd trial with RW and improved results, though the patient continued to have difficulty maintaining left NWB and required frequent cues to keep the foot off the floor. Moderate fatigue reported after 25' and returned to supine in NAD. BP stable with activity today and systolic readings 919'G supine and sitting.  Will follow to progress mobility in preparation for discharge and anticipate discharge with a RW unless her performance with crutches improves. Patient will benefit from skilled intervention to address the above impairments. Patients rehabilitation potential is considered to be Good  Factors which may influence rehabilitation potential include:   [x]         None noted  []         Mental ability/status  []         Medical condition  []         Home/family situation and support systems  []         Safety awareness  []         Pain tolerance/management  []         Other:      PLAN :  Recommendations and Planned Interventions:  [x]           Bed Mobility Training             []    Neuromuscular Re-Education  [x]           Transfer Training                   []    Orthotic/Prosthetic Training  [x]           Gait Training                         []    Modalities  [x]           Therapeutic Exercises           []    Edema Management/Control  []           Therapeutic Activities            []    Patient and Family Training/Education  []           Other (comment):    Frequency/Duration: Patient will be followed by physical therapy  5 times a week to address goals. Discharge Recommendations: To Be Determined  Further Equipment Recommendations for Discharge: rolling walker     SUBJECTIVE:   Patient stated It is aching again but I am due for medicine soon.     OBJECTIVE DATA SUMMARY:   HISTORY:    Past Medical History:   Diagnosis Date    Asthma     Diabetes ([address]     Gastroparesis 2010    Gastric Pacer- REMOVED 07/2015    GERD (gastroesophageal reflux disease)     Hypertension     Narcotic dependence ([address]     THU (obstructive sleep apnea)     wears 2 LPM oxygen at night    Other ill-defined conditions(799.89)     Polycystic ovarian syndrome     Seizures (HCC)     Sickle cell trait ([address]     Thromboembolus ([address] to her left arm and was told she had one in left leg recently     Past Surgical History:   Procedure Laterality Date    HX CATARACT REMOVAL  3/5/12    right    HX DILATION AND CURETTAGE      ablation  HX GASTRIC BYPASS  2015    HX GI      j tube placement and removal    HX OTHER SURGICAL  2010    Gastric Pacer- REMOVED 07/2015    HX VASCULAR ACCESS      gray cath rt subclavian    HX VASCULAR ACCESS      HD access right thigh     Prior Level of Function/Home Situation: patient in correctional facility. Independent for mobility prior  Personal factors and/or comorbidities impacting plan of care:     Home Situation  Home Environment: Law enforcement (inmate)  One/Two Story Residence: One story  Living Alone: No  Support Systems: Spouse/Significant Other/Partner  Patient Expects to be Discharged to[de-identified] Patient room  Current DME Used/Available at Home: None    EXAMINATION/PRESENTATION/DECISION MAKING:   Critical Behavior:  Neurologic State: Alert, Appropriate for age  Orientation Level: Appropriate for age, Oriented X4  Cognition: Appropriate decision making, Appropriate for age attention/concentration, Follows commands     Hearing: Auditory  Auditory Impairment: None  Skin:  Left foot dressing and ace wrap intact  Edema:   Range Of Motion:  AROM: Generally decreased, functional                       Strength:    Strength: Generally decreased, functional                    Tone & Sensation:   Tone: Normal                              Coordination:     Vision:      Functional Mobility:  Bed Mobility:  Rolling: Contact guard assistance  Supine to Sit: Contact guard assistance        Transfers:  Sit to Stand: Contact guard assistance  Stand to Sit: Contact guard assistance                       Balance:   Sitting: Intact  Standing: Impaired  Standing - Static: Fair  Standing - Dynamic : Poor  Ambulation/Gait Training:  Distance (ft): 25 Feet (ft)  Assistive Device: Gait belt;Crutches;Walker, rolling  Ambulation - Level of Assistance: Moderate assistance;Minimal assistance     Gait Description (WDL): Exceptions to WDL  Gait Abnormalities: Decreased step clearance; Antalgic; Path deviations     Left Side Weight Bearing: Non-weight bearing  Base of Support: Widened     Speed/[place]: Slow;Shuffled      Physical Therapy Evaluation Charge Determination   History Examination Presentation Decision-Making   MEDIUM  Complexity : 1-2 comorbidities / personal factors will impact the outcome/ POC  LOW Complexity : 1-2 Standardized tests and measures addressing body structure, function, activity limitation and / or participation in recreation  LOW Complexity : Stable, uncomplicated  LOW Complexity : FOTO score of       Based on the above components, the patient evaluation is determined to be of the following complexity level: LOW     Pain:  Pain Scale 1: Numeric (0 - 10)  Pain Intensity 1: 7  Pain Location 1: Foot  Pain Orientation 1: Left  Pain Description 1: Aching  Pain Intervention(s) 1:  (watching TV)    After treatment:   []         Patient left in no apparent distress sitting up in chair  [x]         Patient left in no apparent distress in bed  [x]         Call bell left within reach  [x]         Nursing notified  []         Caregiver present  []         Bed alarm activated    COMMUNICATION/EDUCATION:   The patients plan of care was discussed with: Registered Nurse. [x]         Fall prevention education was provided and the patient/caregiver indicated understanding. [x]         Patient/family have participated as able in goal setting and plan of care. [x]         Patient/family agree to work toward stated goals and plan of care. []         Patient understands intent and goals of therapy, but is neutral about his/her participation. []         Patient is unable to participate in goal setting and plan of care.     Thank you for this referral.  Stacy Bennett, PT, DPT   Time Calculation: 21 mins

## 2018-03-14 LAB
ABO + RH BLD: NORMAL
ANION GAP SERPL CALC-SCNC: 6 MMOL/L (ref 5–15)
ANTIGENS PRESENT RBC DONR: NORMAL
BASOPHILS # BLD: 0 K/UL (ref 0–0.1)
BASOPHILS NFR BLD: 0 % (ref 0–1)
BLD PROD TYP BPU: NORMAL
BLOOD BANK CMNT PATIENT-IMP: NORMAL
BLOOD GROUP ANTIBODIES SERPL: NORMAL
BPU ID: NORMAL
BUN SERPL-MCNC: 37 MG/DL (ref 6–20)
BUN/CREAT SERPL: 13 (ref 12–20)
CALCIUM SERPL-MCNC: 7.6 MG/DL (ref 8.5–10.1)
CHLORIDE SERPL-SCNC: 108 MMOL/L (ref 97–108)
CO2 SERPL-SCNC: 21 MMOL/L (ref 21–32)
CREAT SERPL-MCNC: 2.84 MG/DL (ref 0.55–1.02)
CROSSMATCH RESULT,%XM: NORMAL
CRP SERPL-MCNC: 14.8 MG/DL (ref 0–0.6)
DIFFERENTIAL METHOD BLD: ABNORMAL
EOSINOPHIL # BLD: 0.3 K/UL (ref 0–0.4)
EOSINOPHIL NFR BLD: 3 % (ref 0–7)
ERYTHROCYTE [DISTWIDTH] IN BLOOD BY AUTOMATED COUNT: 22.7 % (ref 11.5–14.5)
ERYTHROCYTE [SEDIMENTATION RATE] IN BLOOD: 101 MM/HR (ref 0–20)
GLUCOSE BLD STRIP.AUTO-MCNC: 109 MG/DL (ref 65–100)
GLUCOSE BLD STRIP.AUTO-MCNC: 131 MG/DL (ref 65–100)
GLUCOSE BLD STRIP.AUTO-MCNC: 216 MG/DL (ref 65–100)
GLUCOSE BLD STRIP.AUTO-MCNC: 253 MG/DL (ref 65–100)
GLUCOSE SERPL-MCNC: 132 MG/DL (ref 65–100)
HCT VFR BLD AUTO: 21.3 % (ref 35–47)
HGB BLD-MCNC: 6.8 G/DL (ref 11.5–16)
IMM GRANULOCYTES # BLD: 0.1 K/UL (ref 0–0.04)
IMM GRANULOCYTES NFR BLD AUTO: 1 % (ref 0–0.5)
LYMPHOCYTES # BLD: 1 K/UL (ref 0.8–3.5)
LYMPHOCYTES NFR BLD: 11 % (ref 12–49)
MCH RBC QN AUTO: 24.6 PG (ref 26–34)
MCHC RBC AUTO-ENTMCNC: 31.9 G/DL (ref 30–36.5)
MCV RBC AUTO: 77.2 FL (ref 80–99)
MONOCYTES # BLD: 0.6 K/UL (ref 0–1)
MONOCYTES NFR BLD: 7 % (ref 5–13)
NEUTS SEG # BLD: 7.2 K/UL (ref 1.8–8)
NEUTS SEG NFR BLD: 78 % (ref 32–75)
NRBC # BLD: 0 K/UL (ref 0–0.01)
NRBC BLD-RTO: 0 PER 100 WBC
PLATELET # BLD AUTO: 279 K/UL (ref 150–400)
PMV BLD AUTO: 10.3 FL (ref 8.9–12.9)
POTASSIUM SERPL-SCNC: 4.9 MMOL/L (ref 3.5–5.1)
RBC # BLD AUTO: 2.76 M/UL (ref 3.8–5.2)
RBC MORPH BLD: ABNORMAL
RBC MORPH BLD: ABNORMAL
SERVICE CMNT-IMP: ABNORMAL
SODIUM SERPL-SCNC: 135 MMOL/L (ref 136–145)
SPECIMEN EXP DATE BLD: NORMAL
STATUS OF UNIT,%ST: NORMAL
UNIT DIVISION, %UDIV: 0
WBC # BLD AUTO: 9.2 K/UL (ref 3.6–11)

## 2018-03-14 PROCEDURE — 74011250637 HC RX REV CODE- 250/637: Performed by: INTERNAL MEDICINE

## 2018-03-14 PROCEDURE — 74011636637 HC RX REV CODE- 636/637: Performed by: INTERNAL MEDICINE

## 2018-03-14 PROCEDURE — 85652 RBC SED RATE AUTOMATED: CPT | Performed by: STUDENT IN AN ORGANIZED HEALTH CARE EDUCATION/TRAINING PROGRAM

## 2018-03-14 PROCEDURE — 82962 GLUCOSE BLOOD TEST: CPT

## 2018-03-14 PROCEDURE — 80048 BASIC METABOLIC PNL TOTAL CA: CPT | Performed by: STUDENT IN AN ORGANIZED HEALTH CARE EDUCATION/TRAINING PROGRAM

## 2018-03-14 PROCEDURE — 65270000029 HC RM PRIVATE

## 2018-03-14 PROCEDURE — 74011250636 HC RX REV CODE- 250/636: Performed by: INTERNAL MEDICINE

## 2018-03-14 PROCEDURE — 36415 COLL VENOUS BLD VENIPUNCTURE: CPT | Performed by: STUDENT IN AN ORGANIZED HEALTH CARE EDUCATION/TRAINING PROGRAM

## 2018-03-14 PROCEDURE — 85025 COMPLETE CBC W/AUTO DIFF WBC: CPT | Performed by: STUDENT IN AN ORGANIZED HEALTH CARE EDUCATION/TRAINING PROGRAM

## 2018-03-14 PROCEDURE — 86140 C-REACTIVE PROTEIN: CPT | Performed by: STUDENT IN AN ORGANIZED HEALTH CARE EDUCATION/TRAINING PROGRAM

## 2018-03-14 PROCEDURE — 74011250637 HC RX REV CODE- 250/637: Performed by: HOSPITALIST

## 2018-03-14 PROCEDURE — 74011250637 HC RX REV CODE- 250/637: Performed by: STUDENT IN AN ORGANIZED HEALTH CARE EDUCATION/TRAINING PROGRAM

## 2018-03-14 RX ORDER — DIPHENHYDRAMINE HCL 25 MG
50 CAPSULE ORAL
Status: DISCONTINUED | OUTPATIENT
Start: 2018-03-14 | End: 2018-03-27 | Stop reason: HOSPADM

## 2018-03-14 RX ADMIN — QUETIAPINE FUMARATE 100 MG: 100 TABLET ORAL at 22:57

## 2018-03-14 RX ADMIN — INSULIN GLARGINE 8 UNITS: 100 INJECTION, SOLUTION SUBCUTANEOUS at 22:56

## 2018-03-14 RX ADMIN — DEXTROSE MONOHYDRATE 25 ML/HR: 5 INJECTION, SOLUTION INTRAVENOUS at 10:25

## 2018-03-14 RX ADMIN — CLONIDINE HYDROCHLORIDE 0.2 MG: 0.1 TABLET ORAL at 13:41

## 2018-03-14 RX ADMIN — OXYCODONE HYDROCHLORIDE AND ACETAMINOPHEN 1 TABLET: 5; 325 TABLET ORAL at 03:09

## 2018-03-14 RX ADMIN — HYDROMORPHONE HYDROCHLORIDE 1 MG: 2 INJECTION, SOLUTION INTRAMUSCULAR; INTRAVENOUS; SUBCUTANEOUS at 17:12

## 2018-03-14 RX ADMIN — PIPERACILLIN SODIUM AND TAZOBACTAM SODIUM 3.38 G: .375; 3 INJECTION, POWDER, LYOPHILIZED, FOR SOLUTION INTRAVENOUS at 03:09

## 2018-03-14 RX ADMIN — Medication 10 ML: at 22:57

## 2018-03-14 RX ADMIN — VENLAFAXINE 75 MG: 37.5 TABLET ORAL at 22:58

## 2018-03-14 RX ADMIN — DIPHENHYDRAMINE HYDROCHLORIDE 25 MG: 25 CAPSULE ORAL at 03:09

## 2018-03-14 RX ADMIN — INSULIN LISPRO 2 UNITS: 100 INJECTION, SOLUTION INTRAVENOUS; SUBCUTANEOUS at 22:57

## 2018-03-14 RX ADMIN — DIPHENHYDRAMINE HYDROCHLORIDE 25 MG: 25 CAPSULE ORAL at 08:24

## 2018-03-14 RX ADMIN — HYDROMORPHONE HYDROCHLORIDE 1 MG: 2 INJECTION, SOLUTION INTRAMUSCULAR; INTRAVENOUS; SUBCUTANEOUS at 23:32

## 2018-03-14 RX ADMIN — Medication 10 ML: at 22:58

## 2018-03-14 RX ADMIN — Medication 10 ML: at 07:11

## 2018-03-14 RX ADMIN — INSULIN LISPRO 3 UNITS: 100 INJECTION, SOLUTION INTRAVENOUS; SUBCUTANEOUS at 17:12

## 2018-03-14 RX ADMIN — CLONIDINE HYDROCHLORIDE 0.2 MG: 0.1 TABLET ORAL at 08:24

## 2018-03-14 RX ADMIN — FOLIC ACID 1 MG: 1 TABLET ORAL at 08:25

## 2018-03-14 RX ADMIN — VENLAFAXINE 75 MG: 37.5 TABLET ORAL at 08:24

## 2018-03-14 RX ADMIN — OXYCODONE HYDROCHLORIDE AND ACETAMINOPHEN 2 TABLET: 5; 325 TABLET ORAL at 16:31

## 2018-03-14 RX ADMIN — THERA TABS 1 TABLET: TAB at 08:24

## 2018-03-14 RX ADMIN — CLONIDINE HYDROCHLORIDE 0.2 MG: 0.1 TABLET ORAL at 22:56

## 2018-03-14 RX ADMIN — CYANOCOBALAMIN TAB 500 MCG 1000 MCG: 500 TAB at 08:24

## 2018-03-14 RX ADMIN — PIPERACILLIN SODIUM AND TAZOBACTAM SODIUM 3.38 G: .375; 3 INJECTION, POWDER, LYOPHILIZED, FOR SOLUTION INTRAVENOUS at 13:41

## 2018-03-14 RX ADMIN — OXYCODONE HYDROCHLORIDE AND ACETAMINOPHEN 2 TABLET: 5; 325 TABLET ORAL at 22:58

## 2018-03-14 RX ADMIN — QUETIAPINE FUMARATE 100 MG: 100 TABLET ORAL at 08:25

## 2018-03-14 RX ADMIN — HYDROMORPHONE HYDROCHLORIDE 1 MG: 2 INJECTION, SOLUTION INTRAMUSCULAR; INTRAVENOUS; SUBCUTANEOUS at 13:42

## 2018-03-14 RX ADMIN — HYDROMORPHONE HYDROCHLORIDE 1 MG: 2 INJECTION, SOLUTION INTRAMUSCULAR; INTRAVENOUS; SUBCUTANEOUS at 08:19

## 2018-03-14 RX ADMIN — CALCIUM 500 MG: 500 TABLET ORAL at 08:25

## 2018-03-14 RX ADMIN — DIPHENHYDRAMINE HYDROCHLORIDE 25 MG: 25 CAPSULE ORAL at 13:42

## 2018-03-14 RX ADMIN — FAMOTIDINE 20 MG: 20 TABLET, FILM COATED ORAL at 08:25

## 2018-03-14 NOTE — PROGRESS NOTES
Bedside shift change report given to [address] [place] [address] (oncoming nurse) by [address] [place] [address] (offgoing nurse). Report included the following information SBAR, Kardex, ED Summary, OR Summary, Procedure Summary, Intake/Output, MAR and Recent Results.

## 2018-03-14 NOTE — DISCHARGE INSTRUCTIONS
Post operative care: surgery- Dr Sonya Perkins  Dressing to left foot to be left clean dry and intact. We will plan for weekly changes BY DR RICE OR HIS STAFF ONLY once discharged. The patient will require DME to be arranged by CM including wheelchair with leg lift, 3 in 1 commode, and shower chair as they are REQUIRED MEDICALLY to be ABSOLUTE NONWEIGHTBEARING left foot. They should not be placed in a dependent position, not from the edge of their bed, not in their wheelchair and not in any other device for more more than 15 minutes at any given time. The patient has had a full thickness adjacent transfer of a flap and fluid overload will greatly increase the chances of flap failure. Airboots should be in place, loosely approximated at all times that the patient is in the bed.  The patient may complete passive ROM exercises to the left thigh and lower leg but NOT THE FOOT and has full range of motion to upper body and full weight-bearing to the right foot.        Elevate and offload B/L LE. Float heels off edge of bed with foam block or air boots. PT to evaluate and treat      Pt should plan to follow up 1 week or the next Wednesday  after discharge with me at the [address] [facility] wound care center. Foot and ankle will follow     Learning About Certified Diabetes Educators  What is a certified diabetes educator? Certified diabetes educators are health professionals who have special training to help you manage your diabetes. They may be:  · Registered nurses. · Registered dietitians. · Pharmacists. · Social workers. · Doctors. Your diabetes educator will give you tips and help with daily diabetes care. He or she also may teach classes. These classes give you a chance to learn from and connect with others who have diabetes. Why see a diabetes educator? Your doctor wants you to get the personal support and help that a diabetes educator can give.  And most insurance plans will cover part or all of the cost.  Learning is a key part of living with diabetes. A diabetes educator teaches about the most important parts of your care. You will learn about:  · Eating healthy meals. · Being active. · Taking medicine. · Checking your blood sugar. · Dealing with your feelings about having diabetes. He or she can help you find ways to live better with diabetes. And your diabetes educator can show you small changes that can make a big difference in your daily routine and your health. If you need to make a big change, he or she will be able to answer your questions and guide you though each step. When should you see one? It can be helpful to see a diabetes educator at certain points in your care. He or she can:  · Get you started when you're first diagnosed with diabetes. · Check in once a year for a review of your health and daily routine. · Show you how to handle a new health problem along with your diabetes. · Help you work with a new health care team.  Follow-up care is a key part of your treatment and safety. Be sure to make and go to all appointments, and call your doctor if you are having problems. It's also a good idea to know your test results and keep a list of the medicines you take. Where can you learn more? Go to http://luciana-kyle.info/. Enter T826 in the search box to learn more about \"Learning About Certified Diabetes Educators. \"  Current as of: March 13, 2017  Content Version: 11.4  © 2238-1189 Healthwise, Incorporated. Care instructions adapted under license by [employer] (which disclaims liability or warranty for this information). If you have questions about a medical condition or this instruction, always ask your healthcare professional. [address] any warranty or liability for your use of this information. Learning About Certified Diabetes Educators  What is a certified diabetes educator?   Certified diabetes educators are health professionals who have special training to help you manage your diabetes. They may be:  · Registered nurses. · Registered dietitians. · Pharmacists. · Social workers. · Doctors. Your diabetes educator will give you tips and help with daily diabetes care. He or she also may teach classes. These classes give you a chance to learn from and connect with others who have diabetes. Why see a diabetes educator? Your doctor wants you to get the personal support and help that a diabetes educator can give. And most insurance plans will cover part or all of the cost.  Learning is a key part of living with diabetes. A diabetes educator teaches about the most important parts of your care. You will learn about:  · Eating healthy meals. · Being active. · Taking medicine. · Checking your blood sugar. · Dealing with your feelings about having diabetes. He or she can help you find ways to live better with diabetes. And your diabetes educator can show you small changes that can make a big difference in your daily routine and your health. If you need to make a big change, he or she will be able to answer your questions and guide you though each step. When should you see one? It can be helpful to see a diabetes educator at certain points in your care. He or she can:  · Get you started when you're first diagnosed with diabetes. · Check in once a year for a review of your health and daily routine. · Show you how to handle a new health problem along with your diabetes. · Help you work with a new health care team.  Follow-up care is a key part of your treatment and safety. Be sure to make and go to all appointments, and call your doctor if you are having problems. It's also a good idea to know your test results and keep a list of the medicines you take. Where can you learn more? Go to http://luciana-kyle.info/. Enter F256 in the search box to learn more about \"Learning About Certified Diabetes Educators. \"  Current as of: 2017  Content Version: 11.4   [employer]. Care instructions adapted under license by [employer] (which disclaims liability or warranty for this information). If you have questions about a medical condition or this instruction, always ask your healthcare professional. [address] any warranty or liability for your use of this information. HOSPITALIST DISCHARGE INSTRUCTIONS    NAME: Rosa Teresa   :  1980   MRN:  [id number]     Date/Time:  3/21/2018 9:00 AM    ADMIT DATE: 3/2/2018   DISCHARGE DATE: 3/21/2018     Attending Physician: Sherry Pollack MD    DISCHARGE DIAGNOSIS:  Severe sepsis POA due to left foot diabetic foot infection, ? osteomyelitis        DM type 1 uncontrolled with nephropathy POA  Hypoglycemia 3/6/2018 resolved       Acute kidney injury POA peak creatinine 3.80  Stage 4 chronic kidney disease POA baseline creatinine 2.4 to 2.7  been on dialysis in the past   Previously has seen [facility].       Hyperkalemia     Acute on chronic anemia due to sickle cell disease POA      THU POA  Uses 2 LPM O2 at night     Disposition: Home vs In pt facility for iv Abx due to questionable non compliance      Code Status: Full     DVT Prophylaxis: heparin     Body mass index is 25.97 kg/(m^2). Medications: Per above medication reconciliation. Pain Management: per above medications    Recommended diet: Cardiac Diet, Diabetic Diet, Low fat, Low cholesterol, Renal Diet and low potassium diet    Recommended activity: See surgical instructions    Wound care: See surgical/procedure care instructions    Indwelling devices:  central line care    Supplemental Oxygen: Chronic Oxygen,  2  LNC at baseline    Required Lab work: Weekly BMP and Weekly CBC , ESR, CRP, Vancomycin trough levels ( goal trough level 15-20)    1. Vancomycin + Zosyn IV renally dosed (dosing per pharmacy)   2.  Check weekly CBC wt diff, CMP, ESR and CRP , Vancomycin trough levels ( goal trough level 15-20)  3. Plan for 6 weeks till 4/22/18   4. For central line removal contact infectious disease doctor      Glucose management:  Accucheck ACHS with sliding scale per SNF protocol    Code status: Full        Outside physician follow up: Follow-up Information     Follow up With Details Comments Contact Info    Mark Carney DPM In 1 week For wound re-check [phone]  [address] [phone]  [address] [facility] 57 [address] 107 [facility]    Kade Dykes DO In 3 weeks hospital follow up 4/12/2018 at [facility]  [address] [facility]  [address] Carmona MD In 2 weeks hospital follow-up [address] [facility] [address] Dillard MD In 2 days hospital follow-up [address] [facility]  [phone]                 Skilled nursing facility/ SNF MD responsible for above on discharge. Information obtained by :  I understand that if any problems occur once I am at home I am to contact my physician. I understand and acknowledge receipt of the instructions indicated above.                                                                                                                                            Physician's or R.N.'s Signature                                                                  Date/Time                                                                                                                                              Patient or Repres

## 2018-03-14 NOTE — PROGRESS NOTES
Bedside shift change report given to [address] [place] [address] 365 (oncoming nurse) by Tanya Diaz (offgoing nurse). Report included the following information SBAR, Kardex, ED Summary, Procedure Summary, Intake/Output, MAR and Med Rec Status.

## 2018-03-14 NOTE — PROGRESS NOTES
Please ask Dr Velma Hartman what \"bed rest left side\" means. Please modify or d/c or call [phone] and leave a message with any therapist with how to proceed, they will get it to me. Thank you.

## 2018-03-14 NOTE — PROGRESS NOTES
[facility] of [facility], [address] [place] [address],[address]  [address]  P: [phone]  F: [phone]    Foot and Ankle Surgery - Progress Note                                                   Assessment/Plan:  Charcot deformity left foot  Nonhealing wound left foot - resolved  Osteomyelitis left foot - pending/resolved  Septic arthritis left foot- resolved  DM with neuropathy   Sickle cell      Pt is status post Incision and drainage with removal of all nonviable soft tissue left foot  Proximal foot amputation left foot   Open bone biopsies left foot  Deep tendon transfer tibialis anterior tendon left foot  Adjacent transfer arteriomyofasciocutaneous plantar medial pedicle flap for delayed primary closure left foot DOS: 03-     No further plan for surgery at this time per foot ad ankle as pt is primarily closed. We reviewed the results of the surgical bone pathology which is inconclusive at this time, clinically the cuboid bone which remained was showing more normal anatomy. We will discuss the findings with pathology tomorrow but given her chronic charcot deformity I am cautiously confident that the surgery combined with a course of IV abx therapy will be successful in treating the osteomyelitis to the left foot. After I discuss with pathology and the hospitalist we will determine if Infectious disease team is needed for long term IV abx therapy. The patient remains at very high risk for limb loss but we will attempt long term IV abx if needed prior to completing a choparts amputation or BKA.      The patient remains at high risk for limb loss and they understand that this is a limb preservation procedure however clinically today the flap looks very good, viable with warm to touch and good capillary fill time. This was photo-documented below in chart. We discussed continued possible complications and alternative treatment options.  We discussed the postoperative requirements, rehabilitation including the need for AFO brace for life and need for absolute nonweightbearing to the left lower extremity. All questions regarding surgical findings were answered to patient satisfaction       Labs/imaging reviewed, H and H noted. Should be noted that no tourniquet was used in surgery and hemostasis to the surgical site was completely controlled prior to closure with the flap. Post op xrays reviewed    Surgical pathology noted  abx per hospital team- thank you, will recommend infectious disease consultation if recommended after discussion with pathologist, but I feel that ID will likely be needed to manage long term IV treatment as an outpt.      Post operative care  Dressing to left foot to be left clean dry and intact. We will plan for weekly changes BY DR RICE OR HIS STAFF ONLY once discharged. The patient will require DME to be arranged by CM including wheelchair with leg lift, 3 in 1 commode, and shower chair as they are REQUIRED MEDICALLY to be ABSOLUTE NONWEIGHTBEARING left foot. They should not be placed in a dependent position, not from the edge of their bed, not in their wheelchair and not in any other device for more more than 15 minutes at any given time. The patient has had a full thickness adjacent transfer of a flap and fluid overload will greatly increase the chances of flap failure. Airboots should be in place, loosely approximated at all times that the patient is in the bed. The patient may complete passive ROM exercises to the left thigh and lower leg but NOT THE FOOT and has full range of motion to upper body and full weight-bearing to the right foot.       Elevate and offload B/L LE. Float heels off edge of bed with foam block or air boots. PT to evaluate and treat     Pt should plan to follow up 1 week or the next Wednesday  after discharge with me at the [facility] wound care center.       Foot and ankle will follow    HPI:  Pt seen at bedside in NAD. No new pedal complaints at this time. Doing well. Rounded with RN, Planning discharge     Objective:  Vitals: Patient Vitals for the past 12 hrs:   BP Temp Pulse Resp SpO2   03/14/18 1342 185/87 - - - -   03/14/18 1115 (!) 175/94 98.6 °F (37 °C) 78 16 96 %       Dermatological:  Flap to the left foot looks good, as well as incision proximally for tendon transfer. Skin edges well coapted with sutures intact. Good cap fill time noted. No drainage, no foul odor no sign of dehisense        Vascular:  DP/PT +2/4 B/l lower extremity. Cap fill time less than 5 seconds Right lower extremity    Neurological:   Epicritic and protective threshold decreased B/L lower extremity    MSK:  Deferred due to post op period  .     Imaging:  None new    Labs:  Recent Labs      03/14/18   0300   WBC  9.2   CRP  14.80*   CREA  2.84*   BUN  37*   HGB  6.8*   HCT  21.3*   NA  135*   K  4.9   CL  108   CO2  21   GLU  132*         [place], [place]  3/14/2018  [facility] of [address] [place] [address], . Karen [address] [place] [address]  P: [phone]  F: [phone]

## 2018-03-14 NOTE — PROGRESS NOTES
Pharmacy Note:       Request made to suggest dose of Unasyn for patient. Based on patient's renal function, appropriate dose would be 3 grams IV q 12 hr.  Request also made to check with micro lab regarding further info on positive tissue cx 3/11. I spoke with [facility] lab and person stated that the next read on this cx was at 11 am today. Will check chart and will also call [facility] if necessary.                Dorothea Santillan, KAITYD

## 2018-03-14 NOTE — PROGRESS NOTES
Bedside and Verbal shift change report given to Jakob Massey rn (oncoming nurse) by Bonnye Goldmann (offgoing nurse). Report included the following information SBAR, Procedure Summary, Intake/Output, MAR and Recent Results.

## 2018-03-14 NOTE — PROGRESS NOTES
Hospitalist Progress Note    NAME: Elvia Friend   :  1980   MRN:  [id number]       Assessment / Plan:  * wound cultures from 3/11 growing GPCC, awaiting sens, this may   [facility] can do iv abx via PICC, unsure about narcs po ([phone])    Severe sepsis POA due to left foot diabetic foot infection, ? osteomyelitis   Excisional debridement and biopsy done on 3/3, for repeat surgery 3/7/2018  Wound cultures with anaerobic GNRs(done on antibiotics)  Continue pip-tazo and dapto day 9      Stop the daptomycin after tomorrows dose, no MRSA on cultures  LLE doppler with no deep venous thrombosis identified. MRI left foot multiple fluid collections, significant bone destruction  ESR -> 130->100  CRP 34-> 14  Midline placed 3/6/18  Partial foot amputation 3/11/2018  Will D/w Dr Citlali Almendarez re residual concern for infection, may widfe up needing prolonged antibiotics if all involved bone not removed  Severe pain post op, added IV dilaudid    DM type 1 uncontrolled with nephropathy POA  Hypoglycemia 3/6/2018  , 195, 185, 153, 151  Lantus 8 units with recent hypoglycemia  Stop D5W once stable on diet  Increase lantus to 10 units in Am if eating well  lispro sliding scale  Last HgBa1c 8.8    Acute kidney injury POA peak creatinine 3.80  Stage 4 chronic kidney disease POA baseline creatinine 2.4 to 2.7  been on dialysis in the past   Previously has seen [facility]. Creatinine trending down to baseline    Acute on chronic anemia due to sickle cell disease POA    Pt without sx of crisis at this time  S/p 1u pRBCs  folic acid  Serial HGbs, check HgB post op     THU POA  Uses 2 LPM O2 at night     Code Status: Full    Surrogate Decision Maker: in [facility], no decision maker allowed at this time    DVT Prophylaxis: heparin    Body mass index is 25.97 kg/(m^2).      Subjective:     Chief Complaint / Reason for Physician Visit f/u diabetic foot infection  C/o pain in the foot, concerned about ability to get care at [address] of [facility]:  Symptom Y/N Comments  Symptom Y/N Comments   Fever/Chills n   Chest Pain n    Poor Appetite    Edema     Cough n   Abdominal Pain n    Sputum    Joint Pain     SOB/ADAMS n   Pruritis/Rash     Nausea/vomit n   Tolerating PT/OT     Diarrhea n   Tolerating Diet y    Constipation    Other       Could NOT obtain due to:      Objective:     VITALS:   Last 24hrs VS reviewed since prior progress note. Most recent are:  Patient Vitals for the past 24 hrs:   Temp Pulse Resp BP SpO2   03/14/18 1342 - - - 185/87 -   03/14/18 1115 98.6 °F (37 °C) 78 16 (!) 175/94 96 %   03/14/18 0313 98.3 °F (36.8 °C) 76 14 (!) 165/97 99 %   03/13/18 2150 98.8 °F (37.1 °C) 80 16 169/89 100 %       Intake/Output Summary (Last 24 hours) at 03/14/18 1737  Last data filed at 03/14/18 0159   Gross per 24 hour   Intake              150 ml   Output             1750 ml   Net            -1600 ml        PHYSICAL EXAM:  General: Lying in bed, concerned about her future care  Resp:  CTA bilaterally, no wheezing or rales. No accessory muscle use  CV:  Regular  rhythm,  No edema  GI:  Soft, Non distended, Non tender.  +Bowel sounds  Neurologic:  Alert and oriented X 3, normal speech,   Psych:   Good insight. Not anxious nor agitated  Skin:  L foot bandaged, some bloody drainage, multiple tattoos    Reviewed most current lab test results and cultures  YES  Reviewed most current radiology test results   YES  Review and summation of old records today    NO  Reviewed patient's current orders and MAR    YES  PMH/ reviewed - no change compared to H&P  ________________________________________________________________________  Care Plan discussed with:    Comments   Patient x    Family      RN x    Care Manager     Consultant                        Multidiciplinary team rounds were held today with , nursing, pharmacist and clinical coordinator.   Patient's plan of care was discussed; medications were reviewed and discharge planning was addressed. ________________________________________________________________________  Total NON critical care TIME:  20   Minutes    Total CRITICAL CARE TIME Spent:   Minutes non procedure based      Comments   >50% of visit spent in counseling and coordination of care     ________________________________________________________________________  Mary Anne Guidry MD     Procedures: see electronic medical records for all procedures/Xrays and details which were not copied into this note but were reviewed prior to creation of Plan. LABS:  I reviewed today's most current labs and imaging studies.   Pertinent labs include:  Recent Labs      03/14/18   0300  03/12/18   0646  03/11/18   1821   WBC  9.2  10.1   --    HGB  6.8*  6.3*  5.2*   HCT  21.3*  19.6*  16.7*   PLT  279  299   --      Recent Labs      03/14/18   0300  03/13/18   0400  03/12/18   0646   NA  135*  138  138   K  4.9  5.2*  5.5*   CL  108  111*  112*   CO2  21  20*  17*   GLU  132*  204*  176*   BUN  37*  41*  44*   CREA  2.84*  2.95*  2.98*   CA  7.6*  7.3*  7.0*       Signed: Mary Anne Guidry MD

## 2018-03-14 NOTE — DIABETES MGMT
DTC Progress Note    Recommendations/ Comments: If appropriate, please consider changing correctional insulin to Lispro Correctional insulin with high sensitivity      Current hospital DM medication: Lantus 8 units     Chart reviewed on Toya Lyle due to hypoglycemia persistently q am - <80 mg/dl suggesting basal insulin is too high. Patient is a 42 y.o. female with known Type 1 DM complicated by gastroparesis and nephropathy currently on Lantus 12 units at home with no mealtime coverage. A1c:   Lab Results   Component Value Date/Time    Hemoglobin A1c 8.8 (H) 03/03/2018 09:21 AM    Hemoglobin A1c 9.9 (H) 04/30/2017 05:04 AM       Recent Glucose Results:   Lab Results   Component Value Date/Time     (H) 03/14/2018 03:00 AM    GLUCPOC 131 (H) 03/14/2018 10:55 AM    GLUCPOC 109 (H) 03/14/2018 08:07 AM    GLUCPOC 101 (H) 03/13/2018 09:34 PM        Lab Results   Component Value Date/Time    Creatinine 2.84 (H) 03/14/2018 03:00 AM     Estimated Creatinine Clearance: 23.4 mL/min (based on Cr of 2.84). Active Orders   Diet    DIET DIABETIC CONSISTENT CARB Regular        PO intake:   Patient Vitals for the past 72 hrs:   % Diet Eaten   03/12/18 0900 100 %       Will continue to follow as needed.     Thank you  Raejean Schirmer, [address], [address]  Office:  [phone]

## 2018-03-15 LAB
ANION GAP SERPL CALC-SCNC: 7 MMOL/L (ref 5–15)
BUN SERPL-MCNC: 34 MG/DL (ref 6–20)
BUN/CREAT SERPL: 13 (ref 12–20)
CALCIUM SERPL-MCNC: 7.9 MG/DL (ref 8.5–10.1)
CHLORIDE SERPL-SCNC: 109 MMOL/L (ref 97–108)
CO2 SERPL-SCNC: 21 MMOL/L (ref 21–32)
CREAT SERPL-MCNC: 2.72 MG/DL (ref 0.55–1.02)
GLUCOSE BLD STRIP.AUTO-MCNC: 126 MG/DL (ref 65–100)
GLUCOSE BLD STRIP.AUTO-MCNC: 141 MG/DL (ref 65–100)
GLUCOSE BLD STRIP.AUTO-MCNC: 277 MG/DL (ref 65–100)
GLUCOSE BLD STRIP.AUTO-MCNC: 90 MG/DL (ref 65–100)
GLUCOSE SERPL-MCNC: 100 MG/DL (ref 65–100)
POTASSIUM SERPL-SCNC: 5.1 MMOL/L (ref 3.5–5.1)
SERVICE CMNT-IMP: ABNORMAL
SERVICE CMNT-IMP: NORMAL
SODIUM SERPL-SCNC: 137 MMOL/L (ref 136–145)

## 2018-03-15 PROCEDURE — 74011250637 HC RX REV CODE- 250/637: Performed by: INTERNAL MEDICINE

## 2018-03-15 PROCEDURE — 74011250636 HC RX REV CODE- 250/636: Performed by: STUDENT IN AN ORGANIZED HEALTH CARE EDUCATION/TRAINING PROGRAM

## 2018-03-15 PROCEDURE — 36592 COLLECT BLOOD FROM PICC: CPT

## 2018-03-15 PROCEDURE — 97116 GAIT TRAINING THERAPY: CPT

## 2018-03-15 PROCEDURE — 74011636637 HC RX REV CODE- 636/637: Performed by: INTERNAL MEDICINE

## 2018-03-15 PROCEDURE — 74011250637 HC RX REV CODE- 250/637: Performed by: STUDENT IN AN ORGANIZED HEALTH CARE EDUCATION/TRAINING PROGRAM

## 2018-03-15 PROCEDURE — 65270000029 HC RM PRIVATE

## 2018-03-15 PROCEDURE — 36415 COLL VENOUS BLD VENIPUNCTURE: CPT | Performed by: EMERGENCY MEDICINE

## 2018-03-15 PROCEDURE — 74011250637 HC RX REV CODE- 250/637: Performed by: PODIATRIST

## 2018-03-15 PROCEDURE — 74011250636 HC RX REV CODE- 250/636: Performed by: INTERNAL MEDICINE

## 2018-03-15 PROCEDURE — 82962 GLUCOSE BLOOD TEST: CPT

## 2018-03-15 PROCEDURE — 80048 BASIC METABOLIC PNL TOTAL CA: CPT | Performed by: EMERGENCY MEDICINE

## 2018-03-15 RX ADMIN — DIPHENHYDRAMINE HYDROCHLORIDE 50 MG: 25 CAPSULE ORAL at 14:17

## 2018-03-15 RX ADMIN — FOLIC ACID 1 MG: 1 TABLET ORAL at 09:04

## 2018-03-15 RX ADMIN — PIPERACILLIN SODIUM AND TAZOBACTAM SODIUM 3.38 G: .375; 3 INJECTION, POWDER, LYOPHILIZED, FOR SOLUTION INTRAVENOUS at 14:17

## 2018-03-15 RX ADMIN — HYDROMORPHONE HYDROCHLORIDE 1 MG: 2 INJECTION, SOLUTION INTRAMUSCULAR; INTRAVENOUS; SUBCUTANEOUS at 07:02

## 2018-03-15 RX ADMIN — FAMOTIDINE 20 MG: 20 TABLET, FILM COATED ORAL at 09:04

## 2018-03-15 RX ADMIN — CLONIDINE HYDROCHLORIDE 0.2 MG: 0.1 TABLET ORAL at 14:17

## 2018-03-15 RX ADMIN — Medication 10 ML: at 11:35

## 2018-03-15 RX ADMIN — OXYCODONE HYDROCHLORIDE AND ACETAMINOPHEN 2 TABLET: 5; 325 TABLET ORAL at 21:45

## 2018-03-15 RX ADMIN — DOCUSATE SODIUM 100 MG: 100 CAPSULE, LIQUID FILLED ORAL at 09:04

## 2018-03-15 RX ADMIN — DIPHENHYDRAMINE HYDROCHLORIDE 50 MG: 25 CAPSULE ORAL at 07:02

## 2018-03-15 RX ADMIN — VENLAFAXINE 75 MG: 37.5 TABLET ORAL at 09:04

## 2018-03-15 RX ADMIN — PIPERACILLIN SODIUM AND TAZOBACTAM SODIUM 3.38 G: .375; 3 INJECTION, POWDER, LYOPHILIZED, FOR SOLUTION INTRAVENOUS at 21:46

## 2018-03-15 RX ADMIN — DOCUSATE SODIUM 100 MG: 100 CAPSULE, LIQUID FILLED ORAL at 18:00

## 2018-03-15 RX ADMIN — PIPERACILLIN SODIUM AND TAZOBACTAM SODIUM 3.38 G: .375; 3 INJECTION, POWDER, LYOPHILIZED, FOR SOLUTION INTRAVENOUS at 02:52

## 2018-03-15 RX ADMIN — QUETIAPINE FUMARATE 100 MG: 100 TABLET ORAL at 09:04

## 2018-03-15 RX ADMIN — HYDROMORPHONE HYDROCHLORIDE 0.5 MG: 2 INJECTION, SOLUTION INTRAMUSCULAR; INTRAVENOUS; SUBCUTANEOUS at 21:51

## 2018-03-15 RX ADMIN — HYDROMORPHONE HYDROCHLORIDE 1 MG: 2 INJECTION, SOLUTION INTRAMUSCULAR; INTRAVENOUS; SUBCUTANEOUS at 11:32

## 2018-03-15 RX ADMIN — INSULIN LISPRO 3 UNITS: 100 INJECTION, SOLUTION INTRAVENOUS; SUBCUTANEOUS at 22:08

## 2018-03-15 RX ADMIN — QUETIAPINE FUMARATE 100 MG: 100 TABLET ORAL at 21:44

## 2018-03-15 RX ADMIN — CLONIDINE HYDROCHLORIDE 0.2 MG: 0.1 TABLET ORAL at 21:45

## 2018-03-15 RX ADMIN — CYANOCOBALAMIN TAB 500 MCG 1000 MCG: 500 TAB at 09:04

## 2018-03-15 RX ADMIN — OXYCODONE HYDROCHLORIDE AND ACETAMINOPHEN 2 TABLET: 5; 325 TABLET ORAL at 17:54

## 2018-03-15 RX ADMIN — Medication 10 ML: at 06:23

## 2018-03-15 RX ADMIN — INSULIN LISPRO 2 UNITS: 100 INJECTION, SOLUTION INTRAVENOUS; SUBCUTANEOUS at 11:31

## 2018-03-15 RX ADMIN — CALCIUM 500 MG: 500 TABLET ORAL at 09:04

## 2018-03-15 RX ADMIN — VENLAFAXINE 75 MG: 37.5 TABLET ORAL at 21:41

## 2018-03-15 RX ADMIN — HYDROMORPHONE HYDROCHLORIDE 1 MG: 2 INJECTION, SOLUTION INTRAMUSCULAR; INTRAVENOUS; SUBCUTANEOUS at 17:54

## 2018-03-15 RX ADMIN — Medication 10 ML: at 22:09

## 2018-03-15 RX ADMIN — CLONIDINE HYDROCHLORIDE 0.2 MG: 0.1 TABLET ORAL at 09:04

## 2018-03-15 RX ADMIN — INSULIN GLARGINE 8 UNITS: 100 INJECTION, SOLUTION SUBCUTANEOUS at 22:07

## 2018-03-15 RX ADMIN — THERA TABS 1 TABLET: TAB at 09:04

## 2018-03-15 RX ADMIN — DIPHENHYDRAMINE HYDROCHLORIDE 50 MG: 25 CAPSULE ORAL at 21:45

## 2018-03-15 NOTE — PROGRESS NOTES
Hospitalist Progress Note    NAME: Mary Hernandez   :  1980   MRN:  [id number]       Assessment / Plan:  * wound cultures from 3/11 growing GPCC, awaiting sens, this may   [facility] can do iv abx via PICC, but not narcs po ([phone])  We will need to wean off po narcs pior to returning to [facility]    Severe sepsis POA due to left foot diabetic foot infection, ? osteomyelitis   Excisional debridement and biopsy done on 3/3, for repeat surgery 3/7/2018  Wound cultures with anaerobic GNRs(done on antibiotics)  Continue pip-tazo and dapto day 9      Stop the daptomycin after tomorrows dose, no MRSA on cultures  LLE doppler with no deep venous thrombosis identified. MRI left foot multiple fluid collections, significant bone destruction  ESR -> 130->100  CRP 34-> 14  Midline placed 3/6/18  Partial foot amputation 3/11/2018  Will D/w Dr Maddy Alcocer re residual concern for infection, may widfe up needing prolonged antibiotics if all involved bone not removed  Severe pain post op, added IV dilaudid    DM type 1 uncontrolled with nephropathy POA  Hypoglycemia 3/6/2018  , 195, 185, 153, 151  Lantus 8 units with recent hypoglycemia  Stop D5W once stable on diet  Increase lantus to 10 units in Am if eating well  lispro sliding scale  Last HgBa1c 8.8    Acute kidney injury POA peak creatinine 3.80  Stage 4 chronic kidney disease POA baseline creatinine 2.4 to 2.7  been on dialysis in the past   Previously has seen [facility]. Creatinine trending down to baseline    Acute on chronic anemia due to sickle cell disease POA    Pt without sx of crisis at this time  S/p 1u pRBCs  folic acid  Serial HGbs, check HgB post op     THU POA  Uses 2 LPM O2 at night     Code Status: Full    Surrogate Decision Maker: in [facility], no decision maker allowed at this time    DVT Prophylaxis: heparin    Body mass index is 25.97 kg/(m^2).      Subjective:     Pt again expressing her concern about ability to receive care at [facility] of Systems:  Symptom Y/N Comments  Symptom Y/N Comments   Fever/Chills n   Chest Pain n    Poor Appetite    Edema     Cough n   Abdominal Pain n    Sputum    Joint Pain     SOB/ADAMS n   Pruritis/Rash     Nausea/vomit n   Tolerating PT/OT     Diarrhea n   Tolerating Diet y    Constipation    Other       Could NOT obtain due to:      Objective:     VITALS:   Last 24hrs VS reviewed since prior progress note. Most recent are:  Patient Vitals for the past 24 hrs:   Temp Pulse Resp BP SpO2   03/15/18 0829 98.4 °F (36.9 °C) 80 14 164/90 99 %   03/15/18 0301 98.4 °F (36.9 °C) 80 14 154/84 99 %   03/15/18 0000 98.6 °F (37 °C) 78 16 125/69 99 %   03/14/18 2016 98.2 °F (36.8 °C) 87 17 146/71 100 %   03/14/18 1342 - - - 185/87 -   03/14/18 1115 98.6 °F (37 °C) 78 16 (!) 175/94 96 %       Intake/Output Summary (Last 24 hours) at 03/15/18 1013  Last data filed at 03/15/18 0252   Gross per 24 hour   Intake                0 ml   Output             1000 ml   Net            -1000 ml        PHYSICAL EXAM:  General: Lying in bed, sober disposition, in no distress  Resp:  CTA bilaterally, no wheezing or rales.   No accessory muscle use  CV:  Regular  rhythm,  No edema  GI:  Soft, Non distended, Non tender.  +Bowel sounds  Neurologic:  Alert and oriented X 3, normal speech,   Psych:   Good insight. Not anxious nor agitated  Skin:  L foot bandaged, multiple tattoos    Reviewed most current lab test results and cultures  YES  Reviewed most current radiology test results   YES  Review and summation of old records today    NO  Reviewed patient's current orders and MAR    YES  PMH/SH reviewed - no change compared to H&P  ________________________________________________________________________  Care Plan discussed with:    Comments   Patient x    Family      RN x    Care Manager     Consultant                        Multidiciplinary team rounds were held today with , nursing, pharmacist and clinical coordinator. Patient's plan of care was discussed; medications were reviewed and discharge planning was addressed. ________________________________________________________________________  Total NON critical care TIME:  20   Minutes    Total CRITICAL CARE TIME Spent:   Minutes non procedure based      Comments   >50% of visit spent in counseling and coordination of care     ________________________________________________________________________  Hilton Rollins MD     Procedures: see electronic medical records for all procedures/Xrays and details which were not copied into this note but were reviewed prior to creation of Plan. LABS:  I reviewed today's most current labs and imaging studies.   Pertinent labs include:  Recent Labs      03/14/18   0300   WBC  9.2   HGB  6.8*   HCT  21.3*   PLT  279     Recent Labs      03/15/18   0255  03/14/18   0300  03/13/18   0400   NA  137  135*  138   K  5.1  4.9  5.2*   CL  109*  108  111*   CO2  21  21  20*   GLU  100  132*  204*   BUN  34*  37*  41*   CREA  2.72*  2.84*  2.95*   CA  7.9*  7.6*  7.3*       Signed: Hilton Rollins MD

## 2018-03-15 NOTE — PROGRESS NOTES
Pharmacy Automatic Renal Dosing Protocol - Antimicrobials    Indication for Antimicrobials: per MD note \"X-ray on arrival was + for a Charcot foot, chronic 5th metatarsal head fracture and osteomyelitis\"; s/p I/D Left foot 3/11    PMH: DM; ESRD HD in the past; Sickle Cell; Incarcerated    Current Regimen of Each Antimicrobial:  Piperacillin tazobactam 3.375 Q12H (Start Date 3/2, Day #14)      Previous Antimicrobial Therapy:  Vancomycin 1500 mg x 1 then 1000 mg Q24H (Start Date 3/2 - 3/5)   Daptomycin 4 mg/kg q24h (3/5 Day #5)    Significant Cultures:   3/11: Surgical Wound: (pending)-gram positive cocci  coag negative seen on thio broth  3/11: Surgical Wound-Anaerobic: gram negative cocci coag negative seen on thio broth  3/11: Surgical Wound Tissue: (pending)-few staph coag neg-staph hominus sensitive to cephazolin, unasyn, 2nd coag negative staph isolated from thio broth only-preliminary  3/11: Surgical Wound Tissue-Anaerobic: NG final    3/2 blood: NGTD x 5d - FINAL  3/2 wound (foot): Heavy diphtheroids- FINAL  3/2 urine: NGTD- Final  3/3 wound: mixed skin silvia= FINAL  3/3 anaerobic GNR and GPC= final  3/3 tissue: mixed skin silvia= FINAL  3//3 anaerobic GNR and GPC= final    Labs:  Recent Labs      03/15/18   0255  18   0300  18   0400   CREA  2.72*  2.84*  2.95*   BUN  34*  37*  41*   WBC   --   9.2   --      Temp (24hrs), Av.4 °F (36.9 °C), Min:98.2 °F (36.8 °C), Max:98.6 °F (37 °C)    Paralysis, amputations, malnutrition: None documented  Creatinine Clearance (mL/min) or Dialysis: 22 ml/min (has been on HD in past but not currently)    Impression/Plan:    De-escalated to Zosyn only on 3/10   Change to Zosyn 3.375 grams IV q 8 hr. Creatinine clearance only slightly over 20 ml/min (22 ml/min) but has increased WBC (even thought within normal limits) and has increased neutrophils   BMP daily   See above diagnosis regarding duration, 4-6 weeks per guidelines,    Spoke with [employer]. They will give po narcotics in certain circumstances. They are to call me back on choice of IV antibiotics   1st coag negative staph sensitive to Unasyn and cefazolin; 2nd coag neg staph isolate without results yet   Dr. Francisco Hough wants to stay on Zosyn for now and get final sensitivity on coag neg staph. He asked me to leave note in chart regarding Unasyn dosing. Suggested Ancef per Poonam's suggestion since she believes coag neg staph may be contaminant but Dr. Francisco Hough doesn't want to do this   Have let Dr. Francisco Hough know of narcotics situation       Pharmacy will follow daily and adjust medications as appropriate for renal function and/or serum levels.

## 2018-03-15 NOTE — PROGRESS NOTES
Nutrition Assessment:    RECOMMENDATIONS:   Continue diet  Consider adding K+ restriction if hyperkalemia continues     ASSESSMENT:   Chart reviewed. Pt continues with a good appetite, she is consuming the majority of her meals. K+ 5.1 today, it has been high at time since last F/U, likely 2' hx of CKD. BM noted on 3/12. BG . Current intake is likely adequate to meet est needs. Dietitians Intervention(s)/Plan(s): Continue diet, monitor K+   SUBJECTIVE/OBJECTIVE:     Diet Order: Consistent carb  % Eaten:  No data found. Pertinent Medications:oscal, Vitamin B12, colace, pepcid, folvite, lantus, humalog, zosyn, MVI, vancomycin; Rayray@[employer]). Chemistries:  Lab Results   Component Value Date/Time    Sodium 137 03/15/2018 02:55 AM    Potassium 5.1 03/15/2018 02:55 AM    Chloride 109 (H) 03/15/2018 02:55 AM    CO2 21 03/15/2018 02:55 AM    Anion gap 7 03/15/2018 02:55 AM    Glucose 100 03/15/2018 02:55 AM    BUN 34 (H) 03/15/2018 02:55 AM    Creatinine 2.72 (H) 03/15/2018 02:55 AM    BUN/Creatinine ratio 13 03/15/2018 02:55 AM    GFR est AA 24 (L) 03/15/2018 02:55 AM    GFR est non-AA 19 (L) 03/15/2018 02:55 AM    Calcium 7.9 (L) 03/15/2018 02:55 AM    Albumin 1.4 (L) 03/06/2018 03:40 PM      Anthropometrics: Height: 5' 2\" (157.5 cm) Weight: 64.4 kg (142 lb)   []bed scale    []stated   []unknown     IBW (%IBW):   ( ) UBW (%UBW):   (  %)    BMI: Body mass index is 25.97 kg/(m^2). This BMI is indicative of:  []Underweight   [x]Normal   []Overweight   [] Obesity   [] Extreme Obesity (BMI>40)  Estimated Nutrition Needs (Based on): 1654 Kcals/day (MSJ 1272 x 1.3) , 77 g (1.2gPro/kg) Protein  Carbohydrate: At Least 130 g/day  Fluids: 1700 mL/day    Last BM: 3/12   []Active     []Hyperactive  []Hypoactive       [] Absent   BS  Skin:    [] Intact   [] Incision  [x] Breakdown (full thickness-L foot)  [] DTI   [] Tears/Excoriation/Abrasion  [x]Edema(nonpitting-LLE) [] Other:    Wt Readings from Last 30 Encounters:   03/02/18 64.4 kg (142 lb)   05/02/17 73.9 kg (162 lb 14.7 oz)   08/30/16 74.8 kg (165 lb)   05/30/16 74.8 kg (165 lb)   05/23/16 80.3 kg (177 lb 2 oz)   05/21/16 80.3 kg (177 lb)   05/18/16 79.4 kg (175 lb)   05/13/16 74.8 kg (165 lb)   05/02/16 82.9 kg (182 lb 12.8 oz)   04/29/16 76.7 kg (169 lb 1.5 oz)   04/22/16 78.2 kg (172 lb 6.4 oz)   04/05/16 78.3 kg (172 lb 9.9 oz)   03/11/16 74.8 kg (165 lb)   03/09/16 83.6 kg (184 lb 4.9 oz)   02/16/16 83.5 kg (184 lb)   02/08/16 83.5 kg (184 lb)   12/02/15 94.3 kg (208 lb)   10/17/15 99.3 kg (219 lb)   09/30/15 106.5 kg (234 lb 12.6 oz)   09/29/15 104.3 kg (230 lb)   09/25/15 104.3 kg (230 lb)   09/19/15 104.3 kg (230 lb)   08/19/14 105 kg (231 lb 6.4 oz)   06/24/13 99.8 kg (220 lb)   06/17/13 102.1 kg (225 lb)   06/10/13 102.1 kg (225 lb)   06/04/13 98.8 kg (217 lb 13 oz)   05/29/13 105.7 kg (233 lb)   05/29/13 105.8 kg (233 lb 4 oz)   05/13/13 100.7 kg (222 lb)      NUTRITION DIAGNOSES:   Problem:  No nutritional diagnosis at this time        NUTRITION INTERVENTIONS:  Meals/Snacks: General/healthful diet                  GOAL:   Pt will consume >70% of meals with K+ WNL in 4-6 days.      NUTRITION MONITORING AND EVALUATION   Previous Goal: Pt will consume >70% of meals in 4-6 days   Previous Goal Met: Yes   Previous Recommendations Implemented: Yes   Cultural, Confucianist, or Ethnic Dietary Needs: None   LEARNING NEEDS (Diet, Food/Nutrient-Drug Interaction):    [x] None Identified   [] Identified and Education Provided/Documented   [] Identified and Pt declined/was not appropriate      [x] Interdisciplinary Care Plan Reviewed/Documented    [x] Participated in Discharge Planning: Diabetic, possibly Low K+    [] Interdisciplinary Rounds     NUTRITION RISK:    [] High              [] Moderate           []  Low  [x]  Minimal/Uncompromised      Alie Conway, [address], [address] [place] [address] Dr  Pager [phone]  Weekend Pager [phone]

## 2018-03-15 NOTE — PROGRESS NOTES
Problem: Mobility Impaired (Adult and Pediatric)  Goal: *Acute Goals and Plan of Care (Insert Text)  Physical Therapy Goals  Initiated 3/13/2018  1. Patient will move from supine to sit and sit to supine  in bed with independence within 7 day(s). 2.  Patient will transfer from bed to chair and chair to bed with modified independence using the least restrictive device within 7 day(s). 3.  Patient will perform sit to stand with modified independence within 7 day(s). 4.  Patient will ambulate with modified independence for 100 feet while maintaining left NWB with the least restrictive device within 7 day(s). physical Therapy TREATMENT  Patient: Nabor Martel (1 y.o. female)  Date: 3/15/2018  Diagnosis: Osteomyelitis ([address]  diabetic foot debridement  OSTEOMYELITIS  infected left foot Osteomyelitis (HCC)  Procedure(s) (LRB):  INCISION AND DRAINAGE LEFT FOOT, REMOVAL NONVIABLE TISSUE AND BONE LEFT FOOT, DEEP TENDON TRANSFER TIBIALIS ANTERIOR LEFT FOOT, OPEN BIOPSIES LEFT FOOT, ADJACENT TRANSFER ARTERIOMYOFASCIOCUTANEOUS PLANTAR MEDIAL PEDICLE FLAP FOR DELAYED PRIMARY CLOSURE (Left)  PROXIMAL FOOT AMPUTATION LEFT FOOT (Left) 4 Days Post-Op  Precautions: NWB (left foot)  Chart, physical therapy assessment, plan of care and goals were reviewed. ASSESSMENT:  Patient is improving in regards to functional mobility needs, now S-SBA at most, but still struggles to follow NWB of LLE despite multi-modal cueing and voiced understanding. She was able to maintain WB status over approximately 50% of gait. Patient with expected BUE fatigue with RW use requiring multiple standing rest periods in attempt to relieve fatigue and thus ensure further following of NWB. Following gait with patient in chair and LLE elevated, patient educated on performance of chair push-up exercise to perform every commercial break (15') x10 reps in attempt to improve elbow extensor endurance.  Pain absent throughout session, with patient noted to have continued use of IV dilaudid; encouraged to begin to titrate IV medication ahead as tolerance improves. She also declined any s/s of low hgb (which may be chronic given sickle cell? appears so per lab result history) without dizziness throughout. Progression toward goals:  [x]    Improving appropriately and progressing toward goals  []    Improving slowly and progressing toward goals  []    Not making progress toward goals and plan of care will be adjusted     PLAN:  Patient continues to benefit from skilled intervention to address the above impairments. Continue treatment per established plan of care. Discharge Recommendations:  Patient to return to law enforcement facility   Further Equipment Recommendations for Discharge:  Chart notes she has a RW      SUBJECTIVE:   Patient stated yeah I like the walker better.  referring to use of RW vs crutches    OBJECTIVE DATA SUMMARY:   Critical Behavior:  Neurologic State: Alert  Orientation Level: Appropriate for age  Cognition: Appropriate decision making  Safety/Judgement: Awareness of environment  Functional Mobility Training:  Bed Mobility:  Rolling: Supervision  Supine to Sit: Supervision     Scooting: Supervision        Transfers:  Sit to Stand: Stand-by assistance  Stand to Sit: Stand-by assistance                             Balance:  Sitting: Intact; Without support  Standing: Intact; With support (RW)  Standing - Static: Good  Standing - Dynamic : Good  Ambulation/Gait Training:  Distance (ft): 35 Feet (ft)  Assistive Device: Gait belt;Walker, rolling  Ambulation - Level of Assistance: Stand-by assistance        Gait Abnormalities: Decreased step clearance; Other (swing-to )              Speed/Ana Luisa: Slow           Interventions: Verbal cues; Safety awareness training visual instruction on NWB also provided           Began to increasingly WB with antalgic step-to pattern on LLE with fatigue upon return to room     Therapeutic Exercises: Educated verbally/visually on performance of chair push-up exercise to perform every commercial break (15') x10 reps in attempt to improve B elbow extensor endurance. Activity Tolerance: Tachycardia up to 144 at end of gait indicating high effort and optimal training intensity     Please refer to the flowsheet for vital signs taken during this treatment.   After treatment:   [x]    Patient left in no apparent distress sitting up in chair  []    Patient left in no apparent distress in bed  [x]    Call bell left within reach  [x]    Nursing notified  [x]     x2 present  []    Bed alarm activated    COMMUNICATION/COLLABORATION:   The patients plan of care was discussed with: Registered Nurse    Mariela Lozada, PT, DPT, CEEAA      Time Calculation: 21 mins

## 2018-03-15 NOTE — PROGRESS NOTES
Bedside shift change report given to [address] [place] [address] [id number] (oncoming nurse) by [place] (offgoing nurse). Report included the following information SBAR, Kardex, MAR and Recent Results.

## 2018-03-16 LAB
ANION GAP SERPL CALC-SCNC: 8 MMOL/L (ref 5–15)
BACTERIA SPEC CULT: ABNORMAL
BUN SERPL-MCNC: 34 MG/DL (ref 6–20)
BUN/CREAT SERPL: 12 (ref 12–20)
CALCIUM SERPL-MCNC: 8 MG/DL (ref 8.5–10.1)
CHLORIDE SERPL-SCNC: 109 MMOL/L (ref 97–108)
CK SERPL-CCNC: 15 U/L (ref 26–192)
CO2 SERPL-SCNC: 21 MMOL/L (ref 21–32)
CREAT SERPL-MCNC: 2.87 MG/DL (ref 0.55–1.02)
GLUCOSE BLD STRIP.AUTO-MCNC: 139 MG/DL (ref 65–100)
GLUCOSE BLD STRIP.AUTO-MCNC: 159 MG/DL (ref 65–100)
GLUCOSE BLD STRIP.AUTO-MCNC: 177 MG/DL (ref 65–100)
GLUCOSE BLD STRIP.AUTO-MCNC: 199 MG/DL (ref 65–100)
GLUCOSE SERPL-MCNC: 191 MG/DL (ref 65–100)
GRAM STN SPEC: ABNORMAL
GRAM STN SPEC: ABNORMAL
POTASSIUM SERPL-SCNC: 5.1 MMOL/L (ref 3.5–5.1)
SERVICE CMNT-IMP: ABNORMAL
SODIUM SERPL-SCNC: 138 MMOL/L (ref 136–145)

## 2018-03-16 PROCEDURE — 82962 GLUCOSE BLOOD TEST: CPT

## 2018-03-16 PROCEDURE — 74011250636 HC RX REV CODE- 250/636: Performed by: STUDENT IN AN ORGANIZED HEALTH CARE EDUCATION/TRAINING PROGRAM

## 2018-03-16 PROCEDURE — 82550 ASSAY OF CK (CPK): CPT | Performed by: EMERGENCY MEDICINE

## 2018-03-16 PROCEDURE — 74011000258 HC RX REV CODE- 258: Performed by: STUDENT IN AN ORGANIZED HEALTH CARE EDUCATION/TRAINING PROGRAM

## 2018-03-16 PROCEDURE — 80048 BASIC METABOLIC PNL TOTAL CA: CPT | Performed by: EMERGENCY MEDICINE

## 2018-03-16 PROCEDURE — 74011250637 HC RX REV CODE- 250/637: Performed by: INTERNAL MEDICINE

## 2018-03-16 PROCEDURE — 36415 COLL VENOUS BLD VENIPUNCTURE: CPT | Performed by: EMERGENCY MEDICINE

## 2018-03-16 PROCEDURE — 97116 GAIT TRAINING THERAPY: CPT

## 2018-03-16 PROCEDURE — 65270000029 HC RM PRIVATE

## 2018-03-16 PROCEDURE — 74011250636 HC RX REV CODE- 250/636: Performed by: INTERNAL MEDICINE

## 2018-03-16 PROCEDURE — 74011250637 HC RX REV CODE- 250/637: Performed by: STUDENT IN AN ORGANIZED HEALTH CARE EDUCATION/TRAINING PROGRAM

## 2018-03-16 PROCEDURE — 74011636637 HC RX REV CODE- 636/637: Performed by: INTERNAL MEDICINE

## 2018-03-16 PROCEDURE — 74011250637 HC RX REV CODE- 250/637: Performed by: PODIATRIST

## 2018-03-16 RX ORDER — LEVOFLOXACIN 5 MG/ML
500 INJECTION, SOLUTION INTRAVENOUS EVERY 24 HOURS
Status: DISCONTINUED | OUTPATIENT
Start: 2018-03-16 | End: 2018-03-16

## 2018-03-16 RX ADMIN — Medication 10 ML: at 06:25

## 2018-03-16 RX ADMIN — DIPHENHYDRAMINE HYDROCHLORIDE 50 MG: 25 CAPSULE ORAL at 03:12

## 2018-03-16 RX ADMIN — Medication 10 ML: at 23:56

## 2018-03-16 RX ADMIN — VENLAFAXINE 75 MG: 37.5 TABLET ORAL at 20:01

## 2018-03-16 RX ADMIN — Medication 10 ML: at 15:32

## 2018-03-16 RX ADMIN — QUETIAPINE FUMARATE 100 MG: 100 TABLET ORAL at 20:01

## 2018-03-16 RX ADMIN — FAMOTIDINE 20 MG: 20 TABLET, FILM COATED ORAL at 09:16

## 2018-03-16 RX ADMIN — THERA TABS 1 TABLET: TAB at 09:16

## 2018-03-16 RX ADMIN — PIPERACILLIN SODIUM AND TAZOBACTAM SODIUM 3.38 G: .375; 3 INJECTION, POWDER, LYOPHILIZED, FOR SOLUTION INTRAVENOUS at 06:25

## 2018-03-16 RX ADMIN — INSULIN LISPRO 2 UNITS: 100 INJECTION, SOLUTION INTRAVENOUS; SUBCUTANEOUS at 11:30

## 2018-03-16 RX ADMIN — HYDROMORPHONE HYDROCHLORIDE 1 MG: 2 INJECTION, SOLUTION INTRAMUSCULAR; INTRAVENOUS; SUBCUTANEOUS at 03:13

## 2018-03-16 RX ADMIN — QUETIAPINE FUMARATE 100 MG: 100 TABLET ORAL at 09:17

## 2018-03-16 RX ADMIN — DIPHENHYDRAMINE HYDROCHLORIDE 50 MG: 25 CAPSULE ORAL at 15:32

## 2018-03-16 RX ADMIN — VENLAFAXINE 75 MG: 37.5 TABLET ORAL at 09:16

## 2018-03-16 RX ADMIN — HYDROMORPHONE HYDROCHLORIDE 1 MG: 2 INJECTION, SOLUTION INTRAMUSCULAR; INTRAVENOUS; SUBCUTANEOUS at 09:17

## 2018-03-16 RX ADMIN — PIPERACILLIN SODIUM AND TAZOBACTAM SODIUM 3.38 G: .375; 3 INJECTION, POWDER, LYOPHILIZED, FOR SOLUTION INTRAVENOUS at 15:31

## 2018-03-16 RX ADMIN — CLONIDINE HYDROCHLORIDE 0.2 MG: 0.1 TABLET ORAL at 20:01

## 2018-03-16 RX ADMIN — FOLIC ACID 1 MG: 1 TABLET ORAL at 09:16

## 2018-03-16 RX ADMIN — INSULIN GLARGINE 8 UNITS: 100 INJECTION, SOLUTION SUBCUTANEOUS at 23:54

## 2018-03-16 RX ADMIN — DOCUSATE SODIUM 100 MG: 100 CAPSULE, LIQUID FILLED ORAL at 09:16

## 2018-03-16 RX ADMIN — DIPHENHYDRAMINE HYDROCHLORIDE 50 MG: 25 CAPSULE ORAL at 09:16

## 2018-03-16 RX ADMIN — CALCIUM 500 MG: 500 TABLET ORAL at 09:17

## 2018-03-16 RX ADMIN — DAPTOMYCIN 400 MG: 500 INJECTION, POWDER, LYOPHILIZED, FOR SOLUTION INTRAVENOUS at 21:52

## 2018-03-16 RX ADMIN — CLONIDINE HYDROCHLORIDE 0.2 MG: 0.1 TABLET ORAL at 09:16

## 2018-03-16 RX ADMIN — CYANOCOBALAMIN TAB 500 MCG 1000 MCG: 500 TAB at 09:16

## 2018-03-16 RX ADMIN — CLONIDINE HYDROCHLORIDE 0.2 MG: 0.1 TABLET ORAL at 15:32

## 2018-03-16 RX ADMIN — OXYCODONE HYDROCHLORIDE AND ACETAMINOPHEN 2 TABLET: 5; 325 TABLET ORAL at 15:32

## 2018-03-16 RX ADMIN — HYDROMORPHONE HYDROCHLORIDE 1 MG: 2 INJECTION, SOLUTION INTRAMUSCULAR; INTRAVENOUS; SUBCUTANEOUS at 12:47

## 2018-03-16 RX ADMIN — INSULIN LISPRO 2 UNITS: 100 INJECTION, SOLUTION INTRAVENOUS; SUBCUTANEOUS at 17:55

## 2018-03-16 NOTE — PROGRESS NOTES
Bedside verbal report given to [address] [place] [address] Pkwy ( oncoming nurse) with SBAR, Kardex, MAR, and recent results reviewed.

## 2018-03-16 NOTE — PROGRESS NOTES
Hospitalist Progress Note    NAME: Christian Leyva   :  1980   MRN:  [id number]       Assessment / Plan:  *wound cultures from 3/11 growing MRSE x 2 species and and S Hominis  Co Sens only to Dapto or Linezolid  Starting Dapto b/c she is on psychotropics (bad for linezolid)  Will need 6 weeks Dapto 3/16-      [facility] can do iv abx via PICC, but not narcs po ([phone])  We will need to wean off po narcs pior to returning to [facility]    Severe sepsis POA due to left foot diabetic foot infection, ? osteomyelitis   Excisional debridement and biopsy done on 3/3, for repeat surgery 3/7/2018  Wound cultures with anaerobic GNRs(done on antibiotics)  Continue pip-tazo and dapto day 9      Stop the daptomycin after tomorrows dose, no MRSA on cultures  LLE doppler with no deep venous thrombosis identified. MRI left foot multiple fluid collections, significant bone destruction  ESR -> 130->100  CRP 34-> 14  Midline placed 3/6/18  Partial foot amputation 3/11/2018  Will D/w Dr Collette Jesus re residual concern for infection, may widfe up needing prolonged antibiotics if all involved bone not removed  Severe pain post op, added IV dilaudid    Current Regimen of Each Antimicrobial:  Piperacillin tazobactam 3.375 Q12H (Start Date 3/2, Day #15)  Stopped Zosyn and started Dapto 3/16          Previous Antimicrobial Therapy:  Vancomycin 1500 mg x 1 then 1000 mg Q24H (Start Date 3/2 - 3/5)   Daptomycin 4 mg/kg q24h (3/5 Day #6)    DM type 1 uncontrolled with nephropathy POA  Hypoglycemia 3/6/2018  , 195, 185, 153, 151  Lantus 8 units with recent hypoglycemia  Stop D5W once stable on diet  Increase lantus to 10 units in Am if eating well  lispro sliding scale  Last HgBa1c 8.8    Acute kidney injury POA peak creatinine 3.80  Stage 4 chronic kidney disease POA baseline creatinine 2.4 to 2.7  been on dialysis in the past   Previously has seen [facility].   Creatinine trending down to baseline    Acute on chronic anemia due to sickle cell disease POA    Pt without sx of crisis at this time  S/p 1u pRBCs  folic acid  Serial HGbs, check HgB post op     THU POA  Uses 2 LPM O2 at night     Code Status: Full    Surrogate Decision Maker: in [facility], no decision maker allowed at this time    DVT Prophylaxis: heparin    Body mass index is 25.97 kg/(m^2). Subjective:     Pt quiet in bed  Nursing did express concern that she may be exposing her foot to some weight    Review of Systems:  Symptom Y/N Comments  Symptom Y/N Comments   Fever/Chills n   Chest Pain n    Poor Appetite    Edema     Cough n   Abdominal Pain n    Sputum    Joint Pain     SOB/ADAMS n   Pruritis/Rash     Nausea/vomit n   Tolerating PT/OT     Diarrhea n   Tolerating Diet y    Constipation    Other       Could NOT obtain due to:      Objective:     VITALS:   Last 24hrs VS reviewed since prior progress note. Most recent are:  Patient Vitals for the past 24 hrs:   Temp Pulse Resp BP SpO2   03/16/18 1146 98 °F (36.7 °C) 78 18 135/80 100 %   03/16/18 0752 98.7 °F (37.1 °C) 77 18 137/72 100 %   03/16/18 0235 97.7 °F (36.5 °C) 73 17 (!) 166/100 99 %   03/15/18 1856 97.9 °F (36.6 °C) 86 18 125/61 99 %   03/15/18 1420 97.5 °F (36.4 °C) 70 18 142/88 99 %   03/15/18 1228 98.3 °F (36.8 °C) 79 16 129/85 99 %       Intake/Output Summary (Last 24 hours) at 03/16/18 1152  Last data filed at 03/16/18 0235   Gross per 24 hour   Intake              240 ml   Output             2000 ml   Net            -1760 ml        PHYSICAL EXAM:  General: Sitting in chair, quiet  Resp:  CTA bilaterally, no wheezing or rales.   No accessory muscle use  CV:  Regular  rhythm,  No edema  GI:  Soft, Non distended, Non tender.  +Bowel sounds  Neurologic:  Alert and oriented X 3, normal speech,   Psych:   Good insight. Not anxious nor agitated  Skin:  L foot bandaged, multiple tattoos    Reviewed most current lab test results and cultures  YES  Reviewed most current radiology test results YES  Review and summation of old records today    NO  Reviewed patient's current orders and MAR    YES  PMH/SH reviewed - no change compared to H&P  ________________________________________________________________________  Care Plan discussed with:    Comments   Patient x    Family      RN x    Care Manager     Consultant                        Multidiciplinary team rounds were held today with , nursing, pharmacist and clinical coordinator. Patient's plan of care was discussed; medications were reviewed and discharge planning was addressed. ________________________________________________________________________  Total NON critical care TIME:  20   Minutes    Total CRITICAL CARE TIME Spent:   Minutes non procedure based      Comments   >50% of visit spent in counseling and coordination of care     ________________________________________________________________________  Hilton Rollins MD     Procedures: see electronic medical records for all procedures/Xrays and details which were not copied into this note but were reviewed prior to creation of Plan. LABS:  I reviewed today's most current labs and imaging studies.   Pertinent labs include:  Recent Labs      03/14/18   0300   WBC  9.2   HGB  6.8*   HCT  21.3*   PLT  279     Recent Labs      03/16/18   0300  03/15/18   0255  03/14/18   0300   NA  138  137  135*   K  5.1  5.1  4.9   CL  109*  109*  108   CO2  21  21  21   GLU  191*  100  132*   BUN  34*  34*  37*   CREA  2.87*  2.72*  2.84*   CA  8.0*  7.9*  7.6*       Signed: Hilton Rollins MD

## 2018-03-16 NOTE — PROGRESS NOTES
Problem: Mobility Impaired (Adult and Pediatric)  Goal: *Acute Goals and Plan of Care (Insert Text)  Physical Therapy Goals  Initiated 3/13/2018  1. Patient will move from supine to sit and sit to supine  in bed with independence within 7 day(s). 2.  Patient will transfer from bed to chair and chair to bed with modified independence using the least restrictive device within 7 day(s). 3.  Patient will perform sit to stand with modified independence within 7 day(s). 4.  Patient will ambulate with modified independence for 100 feet while maintaining left NWB with the least restrictive device within 7 day(s). physical Therapy TREATMENT  Patient: Juana Heard (61 y.o. female)  Date: 3/16/2018  Diagnosis: Osteomyelitis ([address]  diabetic foot debridement  OSTEOMYELITIS  infected left foot Osteomyelitis (HCC)  Procedure(s) (LRB):  INCISION AND DRAINAGE LEFT FOOT, REMOVAL NONVIABLE TISSUE AND BONE LEFT FOOT, DEEP TENDON TRANSFER TIBIALIS ANTERIOR LEFT FOOT, OPEN BIOPSIES LEFT FOOT, ADJACENT TRANSFER ARTERIOMYOFASCIOCUTANEOUS PLANTAR MEDIAL PEDICLE FLAP FOR DELAYED PRIMARY CLOSURE (Left)  PROXIMAL FOOT AMPUTATION LEFT FOOT (Left) 5 Days Post-Op  Precautions: NWB (left foot)  Chart, physical therapy assessment, plan of care and goals were reviewed. ASSESSMENT:  Pt able to remain NWB % of gait today with RW adjusted slightly lower to allow for full effective BUE compensation. Pt continues with LLE swelling/edema, small amount of drainage through bandage noted, nurse aware. LLE elevated on cushion and multiple pillows at end of tx session. Continue to follow. Progression toward goals:  [x]    Improving appropriately and progressing toward goals  []    Improving slowly and progressing toward goals  []    Not making progress toward goals and plan of care will be adjusted     PLAN:  Patient continues to benefit from skilled intervention to address the above impairments.   Continue treatment per established plan of care. Discharge Recommendations:  Pt will return to custodial  Further Equipment Recommendations for Discharge:  rolling walker     SUBJECTIVE:   Patient stated I just got the pain meds. So I can do it.     OBJECTIVE DATA SUMMARY:   Critical Behavior:  Neurologic State: Alert, Eyes open spontaneously  Orientation Level: Oriented X4  Cognition: Appropriate for age attention/concentration, Appropriate safety awareness, Follows commands  Safety/Judgement: Awareness of environment  Functional Mobility Training:  Bed Mobility:          Pt sitting up in chair upon arrival and at end of tx session           Transfers:  Sit to Stand: Stand-by assistance  Stand to Sit: Stand-by assistance     Stand Pivot Transfers: Stand-by assistance                       Balance:  Sitting: Intact  Standing: Intact; With support  Standing - Static: Good (RW)  Standing - Dynamic : Good (RW)     Ambulation/Gait Training:  Distance (ft): 30 Feet (ft)  Assistive Device: Gait belt;Walker, rolling  Ambulation - Level of Assistance: Stand-by assistance        Gait Abnormalities: Decreased step clearance     Left Side Weight Bearing: Non-weight bearing (LLE)        Speed/[place]: Pace decreased (<100 feet/min); Slow  Step Length: Right shortened        Interventions: Verbal cues; Safety awareness training           Pain:  Pain Scale 1: Numeric (0 - 10)  Pain Intensity 1: 0     Activity Tolerance:   Fair:  Please refer to the flowsheet for vital signs taken during this treatment.   After treatment:   [x]    Patient left in no apparent distress sitting up in chair  []    Patient left in no apparent distress in bed  [x]    Call bell left within reach  [x]    Nursing notified  [x]    Guards present  []    Bed alarm activated    COMMUNICATION/COLLABORATION:   The patients plan of care was discussed with: Registered Nurse and Natasha Weller PT, DPT   Time Calculation: 14 mins

## 2018-03-17 LAB
ANION GAP SERPL CALC-SCNC: 7 MMOL/L (ref 5–15)
BACTERIA SPEC CULT: ABNORMAL
BACTERIA SPEC CULT: ABNORMAL
BASOPHILS # BLD: 0 K/UL (ref 0–0.1)
BASOPHILS NFR BLD: 0 % (ref 0–1)
BUN SERPL-MCNC: 39 MG/DL (ref 6–20)
BUN/CREAT SERPL: 13 (ref 12–20)
CALCIUM SERPL-MCNC: 8 MG/DL (ref 8.5–10.1)
CHLORIDE SERPL-SCNC: 110 MMOL/L (ref 97–108)
CK SERPL-CCNC: 14 U/L (ref 26–192)
CO2 SERPL-SCNC: 21 MMOL/L (ref 21–32)
CREAT SERPL-MCNC: 2.95 MG/DL (ref 0.55–1.02)
DIFFERENTIAL METHOD BLD: ABNORMAL
EOSINOPHIL # BLD: 0.6 K/UL (ref 0–0.4)
EOSINOPHIL NFR BLD: 8 % (ref 0–7)
ERYTHROCYTE [DISTWIDTH] IN BLOOD BY AUTOMATED COUNT: 21.9 % (ref 11.5–14.5)
GLUCOSE BLD STRIP.AUTO-MCNC: 133 MG/DL (ref 65–100)
GLUCOSE BLD STRIP.AUTO-MCNC: 143 MG/DL (ref 65–100)
GLUCOSE BLD STRIP.AUTO-MCNC: 202 MG/DL (ref 65–100)
GLUCOSE BLD STRIP.AUTO-MCNC: 279 MG/DL (ref 65–100)
GLUCOSE SERPL-MCNC: 134 MG/DL (ref 65–100)
GRAM STN SPEC: ABNORMAL
GRAM STN SPEC: ABNORMAL
HCT VFR BLD AUTO: 23.2 % (ref 35–47)
HGB BLD-MCNC: 7.3 G/DL (ref 11.5–16)
IMM GRANULOCYTES # BLD: 0 K/UL (ref 0–0.04)
IMM GRANULOCYTES NFR BLD AUTO: 0 % (ref 0–0.5)
LYMPHOCYTES # BLD: 1.1 K/UL (ref 0.8–3.5)
LYMPHOCYTES NFR BLD: 15 % (ref 12–49)
MCH RBC QN AUTO: 24.7 PG (ref 26–34)
MCHC RBC AUTO-ENTMCNC: 31.5 G/DL (ref 30–36.5)
MCV RBC AUTO: 78.6 FL (ref 80–99)
MONOCYTES # BLD: 0.5 K/UL (ref 0–1)
MONOCYTES NFR BLD: 7 % (ref 5–13)
NEUTS SEG # BLD: 4.8 K/UL (ref 1.8–8)
NEUTS SEG NFR BLD: 70 % (ref 32–75)
NRBC # BLD: 0 K/UL (ref 0–0.01)
NRBC BLD-RTO: 0 PER 100 WBC
PLATELET # BLD AUTO: 346 K/UL (ref 150–400)
PMV BLD AUTO: 10.5 FL (ref 8.9–12.9)
POTASSIUM SERPL-SCNC: 5.5 MMOL/L (ref 3.5–5.1)
RBC # BLD AUTO: 2.95 M/UL (ref 3.8–5.2)
RBC MORPH BLD: ABNORMAL
SERVICE CMNT-IMP: ABNORMAL
SODIUM SERPL-SCNC: 138 MMOL/L (ref 136–145)
WBC # BLD AUTO: 7 K/UL (ref 3.6–11)

## 2018-03-17 PROCEDURE — 80048 BASIC METABOLIC PNL TOTAL CA: CPT | Performed by: STUDENT IN AN ORGANIZED HEALTH CARE EDUCATION/TRAINING PROGRAM

## 2018-03-17 PROCEDURE — 74011250637 HC RX REV CODE- 250/637: Performed by: INTERNAL MEDICINE

## 2018-03-17 PROCEDURE — 74011250637 HC RX REV CODE- 250/637: Performed by: STUDENT IN AN ORGANIZED HEALTH CARE EDUCATION/TRAINING PROGRAM

## 2018-03-17 PROCEDURE — 74011250637 HC RX REV CODE- 250/637: Performed by: PODIATRIST

## 2018-03-17 PROCEDURE — 85025 COMPLETE CBC W/AUTO DIFF WBC: CPT | Performed by: STUDENT IN AN ORGANIZED HEALTH CARE EDUCATION/TRAINING PROGRAM

## 2018-03-17 PROCEDURE — 74011636637 HC RX REV CODE- 636/637: Performed by: INTERNAL MEDICINE

## 2018-03-17 PROCEDURE — 82550 ASSAY OF CK (CPK): CPT | Performed by: STUDENT IN AN ORGANIZED HEALTH CARE EDUCATION/TRAINING PROGRAM

## 2018-03-17 PROCEDURE — 82962 GLUCOSE BLOOD TEST: CPT

## 2018-03-17 PROCEDURE — 36415 COLL VENOUS BLD VENIPUNCTURE: CPT | Performed by: STUDENT IN AN ORGANIZED HEALTH CARE EDUCATION/TRAINING PROGRAM

## 2018-03-17 PROCEDURE — 65270000029 HC RM PRIVATE

## 2018-03-17 RX ORDER — SODIUM POLYSTYRENE SULFONATE 15 G/60ML
15 SUSPENSION ORAL; RECTAL
Status: COMPLETED | OUTPATIENT
Start: 2018-03-17 | End: 2018-03-17

## 2018-03-17 RX ADMIN — Medication 10 ML: at 21:00

## 2018-03-17 RX ADMIN — Medication 10 ML: at 15:33

## 2018-03-17 RX ADMIN — INSULIN GLARGINE 8 UNITS: 100 INJECTION, SOLUTION SUBCUTANEOUS at 22:41

## 2018-03-17 RX ADMIN — VENLAFAXINE 75 MG: 37.5 TABLET ORAL at 20:49

## 2018-03-17 RX ADMIN — CLONIDINE HYDROCHLORIDE 0.2 MG: 0.1 TABLET ORAL at 08:45

## 2018-03-17 RX ADMIN — INSULIN LISPRO 2 UNITS: 100 INJECTION, SOLUTION INTRAVENOUS; SUBCUTANEOUS at 12:45

## 2018-03-17 RX ADMIN — OXYCODONE HYDROCHLORIDE AND ACETAMINOPHEN 2 TABLET: 5; 325 TABLET ORAL at 18:11

## 2018-03-17 RX ADMIN — SODIUM POLYSTYRENE SULFONATE 15 G: 15 SUSPENSION ORAL; RECTAL at 12:45

## 2018-03-17 RX ADMIN — THERA TABS 1 TABLET: TAB at 08:45

## 2018-03-17 RX ADMIN — OXYCODONE HYDROCHLORIDE AND ACETAMINOPHEN 2 TABLET: 5; 325 TABLET ORAL at 12:45

## 2018-03-17 RX ADMIN — FOLIC ACID 1 MG: 1 TABLET ORAL at 08:45

## 2018-03-17 RX ADMIN — OXYCODONE HYDROCHLORIDE AND ACETAMINOPHEN 2 TABLET: 5; 325 TABLET ORAL at 03:53

## 2018-03-17 RX ADMIN — QUETIAPINE FUMARATE 100 MG: 100 TABLET ORAL at 20:55

## 2018-03-17 RX ADMIN — VENLAFAXINE 75 MG: 37.5 TABLET ORAL at 08:45

## 2018-03-17 RX ADMIN — CALCIUM 500 MG: 500 TABLET ORAL at 08:45

## 2018-03-17 RX ADMIN — CLONIDINE HYDROCHLORIDE 0.2 MG: 0.1 TABLET ORAL at 15:32

## 2018-03-17 RX ADMIN — INSULIN LISPRO 3 UNITS: 100 INJECTION, SOLUTION INTRAVENOUS; SUBCUTANEOUS at 22:41

## 2018-03-17 RX ADMIN — DIPHENHYDRAMINE HYDROCHLORIDE 50 MG: 25 CAPSULE ORAL at 18:11

## 2018-03-17 RX ADMIN — CYANOCOBALAMIN TAB 500 MCG 1000 MCG: 500 TAB at 08:45

## 2018-03-17 RX ADMIN — QUETIAPINE FUMARATE 100 MG: 100 TABLET ORAL at 08:45

## 2018-03-17 RX ADMIN — OXYCODONE HYDROCHLORIDE AND ACETAMINOPHEN 2 TABLET: 5; 325 TABLET ORAL at 08:45

## 2018-03-17 RX ADMIN — DIPHENHYDRAMINE HYDROCHLORIDE 50 MG: 25 CAPSULE ORAL at 03:54

## 2018-03-17 RX ADMIN — INSULIN LISPRO 3 UNITS: 100 INJECTION, SOLUTION INTRAVENOUS; SUBCUTANEOUS at 08:45

## 2018-03-17 RX ADMIN — DIPHENHYDRAMINE HYDROCHLORIDE 50 MG: 25 CAPSULE ORAL at 12:45

## 2018-03-17 RX ADMIN — FAMOTIDINE 20 MG: 20 TABLET, FILM COATED ORAL at 08:45

## 2018-03-17 RX ADMIN — CLONIDINE HYDROCHLORIDE 0.2 MG: 0.1 TABLET ORAL at 20:49

## 2018-03-17 NOTE — PROGRESS NOTES
Hospitalist Progress Note    NAME: Christian Leyva   :  1980   MRN:  [id number]       Assessment / Plan:  Severe sepsis POA due to left foot diabetic foot infection, ? osteomyelitis   Excisional debridement and biopsy done on 3/3, for repeat surgery 3/7/2018  LLE doppler with no deep venous thrombosis identified. MRI left foot multiple fluid collections, significant bone destruction  ESR -> 130->100  CRP 34-> 14  Midline placed 3/6/18  Partial foot amputation 3/11/2018  Will D/w Dr Collette Jesus re residual concern for infection, may widfe up needing prolonged antibiotics if all involved bone not removed  wound cultures from 3/11 growing MRSE x 2 species and and [place]  Co Sens only to Dapto or Linezolid  Starting Dapto b/c she is on psychotropics (bad for linezolid)  Will need 6 weeks Dapto 3/16-  Per Podiatry will need ID consult so will consult ID on Monday for recommendations. Cont Daptomycin for now  Pt does not need to go to longterm after discharge and can go home according to her so will need iv abx at home  Will consult case management    DM type 1 uncontrolled with nephropathy POA  Hypoglycemia 3/6/2018  , 202, 199, 159  Lantus 8 units with recent hypoglycemia  Off D 5 now  lispro sliding scale  Last HgBa1c 8.8    Acute kidney injury POA peak creatinine 3.80  Stage 4 chronic kidney disease POA baseline creatinine 2.4 to 2.7  been on dialysis in the past   Previously has seen [facility]. Creatinine is around 2.7    Mild hyperkalemia  Will give one dose of Kayexalate and check bmp in am    Acute on chronic anemia due to sickle cell disease POA    Pt without sx of crisis at this time  S/p 1u pRBCs  folic acid  Serial HGbs     THU POA  Uses 2 LPM O2 at night     Code Status: Full    Surrogate Decision Maker: in [facility], no decision maker allowed at this time    DVT Prophylaxis: heparin    Body mass index is 25.97 kg/(m^2). Subjective:   Does not report any pain.   No fever or diarrhea. Review of Systems:  Symptom Y/N Comments  Symptom Y/N Comments   Fever/Chills    Chest Pain n    Poor Appetite    Edema     Cough    Abdominal Pain n    Sputum    Joint Pain     SOB/ADAMS n   Pruritis/Rash     Nausea/vomit    Tolerating PT/OT     Diarrhea n   Tolerating Diet y    Constipation    Other       Could NOT obtain due to:      Objective:     VITALS:   Last 24hrs VS reviewed since prior progress note. Most recent are:  Patient Vitals for the past 24 hrs:   Temp Pulse Resp BP SpO2   03/17/18 1111 97.6 °F (36.4 °C) 86 16 114/65 100 %   03/17/18 0936 98 °F (36.7 °C) 86 16 128/70 100 %   03/17/18 0827 98 °F (36.7 °C) 87 16 (!) 205/105 -   03/17/18 0330 98.1 °F (36.7 °C) 85 17 184/88 100 %   03/16/18 2234 97.7 °F (36.5 °C) 90 16 (!) 188/93 100 %   03/16/18 2036 98.2 °F (36.8 °C) 82 16 140/75 100 %   03/16/18 1531 98.5 °F (36.9 °C) 84 16 129/73 100 %       Intake/Output Summary (Last 24 hours) at 03/17/18 1506  Last data filed at 03/17/18 1003   Gross per 24 hour   Intake                0 ml   Output             4000 ml   Net            -4000 ml        PHYSICAL EXAM:  General: Alert and awake  Resp:  CTA bilaterally, no wheezing or rales.   No accessory muscle use  CV:  Regular  rhythm,  No edema  GI:  Soft, Non distended, Non tender.  +Bowel sounds  Neurologic:  Alert and oriented X 3, normal speech,   Psych:   Good insight. Not anxious nor agitated  Skin:  L foot bandaged    Reviewed most current lab test results and cultures  YES  Reviewed most current radiology test results   YES  Review and summation of old records today    NO  Reviewed patient's current orders and MAR    YES  PMH/SH reviewed - no change compared to H&P  ________________________________________________________________________  Care Plan discussed with:    Comments   Patient x    Family      RN x    Care Manager     Consultant                        Multidiciplinary team rounds were held today with , nursing, pharmacist and clinical coordinator. Patient's plan of care was discussed; medications were reviewed and discharge planning was addressed. ________________________________________________________________________  Total NON critical care TIME:  25   Minutes    Total CRITICAL CARE TIME Spent:   Minutes non procedure based      Comments   >50% of visit spent in counseling and coordination of care     ________________________________________________________________________  Awilda Macias MD     Procedures: see electronic medical records for all procedures/Xrays and details which were not copied into this note but were reviewed prior to creation of Plan. LABS:  I reviewed today's most current labs and imaging studies.   Pertinent labs include:  Recent Labs      03/17/18   0340   WBC  7.0   HGB  7.3*   HCT  23.2*   PLT  346     Recent Labs      03/17/18   0340  03/16/18   0300  03/15/18   0255   NA  138  138  137   K  5.5*  5.1  5.1   CL  110*  109*  109*   CO2  21  21  21   GLU  134*  191*  100   BUN  39*  34*  34*   CREA  2.95*  2.87*  2.72*   CA  8.0*  8.0*  7.9*       Signed: Awilda Macias MD

## 2018-03-17 NOTE — PROGRESS NOTES
03/17/18 0827   Vitals   Temp 98 °F (36.7 °C)   Temp Source Oral   Pulse (Heart Rate) 87   Resp Rate 16   Level of Consciousness Alert   BP (!) 205/105   MAP (Calculated) 138   BP 1 Location Left arm   BP 1 Method Automatic   BP Patient Position At rest   MEWS Score 3     Pt states her BP is elevated when she's in pain. Will administer Percocet and scheduled BP meds and reasses.      0935   BP now 128/70, MEWS 1

## 2018-03-17 NOTE — PROGRESS NOTES
Pharmacy Automatic Renal Dosing Protocol - Antimicrobials    Indication for Antimicrobials: Severe sepsis POA due to left foot diabetic foot infection, ? Osteomyelitis;  Excisional debridement and biopsy done on 3/3, for repeat surgery 3/7/2018    Current Regimen of Each Antimicrobial:  Daptomycin 4mg/kg IV q24h - started 3/16 day 1    Previous Antimicrobial Therapy:  Piperacillin tazobactam 3.375 Q12H (Start Date 3/2, Day #14)  Vancomycin 1500 mg x 1 then 1000 mg Q24H (Start Date 3/2 - 3/5)  Daptomycin 4 mg/kg q24h (3/5 Day #5)     Significant Cultures:   CULTURE, ANAEROBIC [[id number]] (Abnormal)  Collected: 03/11/18 1143      Order Status: Completed Specimen: Foot Updated: 03/16/18 1048      Special Requests: NO SPECIAL REQUESTS         Culture result:          NO GROWTH ON SOLID MEDIA AT 4 DAYS                STAPHYLOCOCCUS EPIDERMIDIS (OXACILLIN RESISTANT) ISOLATED FROM THIO BROTH ONLY (A)     CULTURE, Kylie Ohm STAIN [[id number]] (Abnormal) Collected: 03/11/18 1143     Order Status: Completed Specimen: Foot Updated: 03/15/18 0948      Special Requests: NO SPECIAL REQUESTS         GRAM STAIN NO WBC'S SEEN          NO ORGANISMS SEEN         Culture result:          NO GROWTH ON SOLID MEDIA AT 4 DAYS                STAPHYLOCOCCUS SPECIES, COAGULASE NEGATIVE ISOLATED FROM THIO BROTH ONLY (A)     CULTURE, ANAEROBIC [[id number]] Collected: 03/11/18 1247     Order Status: Completed Specimen: Bone Updated: 03/13/18 1138      Special Requests: NO SPECIAL REQUESTS         Culture result: NO ANAEROBES ISOLATED        CULTURE, BLOOD, PAIRED [[id number]] Collected: 03/02/18 1815     Order Status: Completed Specimen: Blood Updated: 03/07/18 0816      Special Requests: NO SPECIAL REQUESTS         Culture result: NO GROWTH 5 DAYS        CULTURE, ANAEROBIC [[id number]] (Abnormal) Collected: 03/03/18 1023     Order Status: Completed Specimen: Foot Updated: 03/06/18 1103      Special Requests: NO SPECIAL REQUESTS         Culture result:        LIGHT   MIXED   ANAEROBIC GRAM NEGATIVE RODS   (   BETA LACTAMASE POSITIVE   ) AND   ANAEROBIC GRAM POSITIVE COCCI    (A)     CULTURE, ANAEROBIC [[id number]] (Abnormal) Collected: 03/03/18 1029     Order Status: Completed Specimen: Foot Updated: 03/06/18 1101      Special Requests: NO SPECIAL REQUESTS         Culture result:        LIGHT   MIXED   ANAEROBIC GRAM NEGATIVE RODS   (   BETA LACTAMASE POSITIVE   )   AND   ANAEROBIC GRAM POSITIVE COCCI    (A)     CULTURE, TISSUE Sonya Jungling STAIN [[id number]] Collected: 03/03/18 1029     Order Status: Completed Specimen: Foot Updated: 03/05/18 1412      Special Requests: NO SPECIAL REQUESTS         GRAM STAIN 1+ WBCS SEEN                   RARE GRAM POSITIVE COCCI IN PAIRS      Culture result:          FEW MIXED SKIN CHRIS ISOLATED     CULTURE, Hedwig Teo STAIN [[id number]] Collected: 03/03/18 1023     Order Status: Completed Specimen: Foot Updated: 03/05/18 1324      Special Requests: NO SPECIAL REQUESTS         GRAM STAIN NO WBC'S SEEN                   1+ GRAM POSITIVE COCCI IN PAIRS      Culture result:          SCANT MIXED SKIN CHRIS ISOLATED     CULTURE, Hedwig Teo STAIN [[id number]] (Abnormal) Collected: 03/02/18 2127     Order Status: Completed Specimen: Foot Updated: 03/05/18 1130      Special Requests: NO SPECIAL REQUESTS         GRAM STAIN OCCASIONAL WBCS SEEN                   1+ GRAM POSITIVE RODS (CORYNEFORM)      Culture result: HEAVY DIPHTHEROIDS (A)                   LIGHT STAPHYLOCOCCUS SPECIES, COAGULASE NEGATIVE (A)     CULTURE, URINE [[id number]] Collected: 03/02/18 2250     Order Status: Completed Specimen: Urine Updated: 03/04/18 1017      Special Requests: --         NO SPECIAL REQUESTS   Reflexed from [id number]         [place] Count <1,000 CFU/ML         Culture result: NO GROWTH 1 DAY        CULTURE, Hedwig Teo STAIN [[id number]] Collected: 03/03/18 1029     Order Status: Canceled Updated: 03/03/18 1059       Microbiology Results (Current encounter)       Estimated Creatinine Clearance: 23.2 mL/min (based on Cr of 2.87). Estimated Creatinine Clearance (using IBW):20.8 mL/min    Recent Labs      18   0300  03/15/18   0255  18   0300   CREA  2.87*  2.72*  2.84*   BUN  34*  34*  37*   WBC   --    --   9.2   NA  138  137  135*   K  5.1  5.1  4.9   CA  8.0*  7.9*  7.6*   HGB   --    --   6.8*   HCT   --    --   21.3*   PLT   --    --   279     Temp (24hrs), Av.2 °F (36.8 °C), Min:97.7 °F (36.5 °C), Max:98.7 °F (37.1 °C)    Impression/Plan:   · Adjusted Daptomycin to 6mg/kg (rounded to 400mg) IV q48h for indication as noted above (more severe than SSTI), and renal function (q48h interval)  ·   · Antimicrobial stop date:  6 weeks from 3/16 -   ·      Pharmacy will follow daily and adjust medications as appropriate for renal function and/or serum levels.     Thank you,  Stacia Mullins, [facility]

## 2018-03-17 NOTE — PROGRESS NOTES
General Surgery End of Shift Nursing Note    Bedside shift change report given to Fredi Schneider (oncoming nurse) by Sachi Jacobo (offgoing nurse). Report included the following information SBAR, Kardex, Intake/Output, MAR and Recent Results. Shift worked:   7a-7p   Summary of shift:    Uneventful.  Pt able to ambulated to bathroom with walker and NWB to LLE   Issues for physician to address:   none       Donnie Alvarado

## 2018-03-18 LAB
ANION GAP SERPL CALC-SCNC: 7 MMOL/L (ref 5–15)
BUN SERPL-MCNC: 41 MG/DL (ref 6–20)
BUN/CREAT SERPL: 13 (ref 12–20)
CALCIUM SERPL-MCNC: 7.8 MG/DL (ref 8.5–10.1)
CHLORIDE SERPL-SCNC: 111 MMOL/L (ref 97–108)
CO2 SERPL-SCNC: 23 MMOL/L (ref 21–32)
CREAT SERPL-MCNC: 3.05 MG/DL (ref 0.55–1.02)
GLUCOSE BLD STRIP.AUTO-MCNC: 107 MG/DL (ref 65–100)
GLUCOSE BLD STRIP.AUTO-MCNC: 143 MG/DL (ref 65–100)
GLUCOSE BLD STRIP.AUTO-MCNC: 160 MG/DL (ref 65–100)
GLUCOSE BLD STRIP.AUTO-MCNC: 235 MG/DL (ref 65–100)
GLUCOSE BLD STRIP.AUTO-MCNC: 71 MG/DL (ref 65–100)
GLUCOSE SERPL-MCNC: 130 MG/DL (ref 65–100)
POTASSIUM SERPL-SCNC: 5.4 MMOL/L (ref 3.5–5.1)
SERVICE CMNT-IMP: ABNORMAL
SERVICE CMNT-IMP: NORMAL
SODIUM SERPL-SCNC: 141 MMOL/L (ref 136–145)

## 2018-03-18 PROCEDURE — 74011250637 HC RX REV CODE- 250/637: Performed by: INTERNAL MEDICINE

## 2018-03-18 PROCEDURE — 82962 GLUCOSE BLOOD TEST: CPT

## 2018-03-18 PROCEDURE — 65270000029 HC RM PRIVATE

## 2018-03-18 PROCEDURE — 80048 BASIC METABOLIC PNL TOTAL CA: CPT | Performed by: EMERGENCY MEDICINE

## 2018-03-18 PROCEDURE — 36415 COLL VENOUS BLD VENIPUNCTURE: CPT | Performed by: EMERGENCY MEDICINE

## 2018-03-18 PROCEDURE — 74011636637 HC RX REV CODE- 636/637: Performed by: INTERNAL MEDICINE

## 2018-03-18 PROCEDURE — 74011250637 HC RX REV CODE- 250/637: Performed by: STUDENT IN AN ORGANIZED HEALTH CARE EDUCATION/TRAINING PROGRAM

## 2018-03-18 PROCEDURE — 74011000258 HC RX REV CODE- 258: Performed by: STUDENT IN AN ORGANIZED HEALTH CARE EDUCATION/TRAINING PROGRAM

## 2018-03-18 PROCEDURE — 74011250637 HC RX REV CODE- 250/637: Performed by: PODIATRIST

## 2018-03-18 PROCEDURE — 74011250636 HC RX REV CODE- 250/636: Performed by: STUDENT IN AN ORGANIZED HEALTH CARE EDUCATION/TRAINING PROGRAM

## 2018-03-18 PROCEDURE — 74011250636 HC RX REV CODE- 250/636: Performed by: INTERNAL MEDICINE

## 2018-03-18 RX ORDER — SODIUM CHLORIDE 9 MG/ML
50 INJECTION, SOLUTION INTRAVENOUS CONTINUOUS
Status: DISCONTINUED | OUTPATIENT
Start: 2018-03-18 | End: 2018-03-19

## 2018-03-18 RX ADMIN — QUETIAPINE FUMARATE 100 MG: 100 TABLET ORAL at 07:46

## 2018-03-18 RX ADMIN — DAPTOMYCIN 400 MG: 500 INJECTION, POWDER, LYOPHILIZED, FOR SOLUTION INTRAVENOUS at 20:16

## 2018-03-18 RX ADMIN — FOLIC ACID 1 MG: 1 TABLET ORAL at 07:46

## 2018-03-18 RX ADMIN — VENLAFAXINE 75 MG: 37.5 TABLET ORAL at 20:16

## 2018-03-18 RX ADMIN — INSULIN LISPRO 2 UNITS: 100 INJECTION, SOLUTION INTRAVENOUS; SUBCUTANEOUS at 13:13

## 2018-03-18 RX ADMIN — QUETIAPINE FUMARATE 100 MG: 100 TABLET ORAL at 20:16

## 2018-03-18 RX ADMIN — Medication 10 ML: at 05:08

## 2018-03-18 RX ADMIN — DIPHENHYDRAMINE HYDROCHLORIDE 50 MG: 25 CAPSULE ORAL at 00:24

## 2018-03-18 RX ADMIN — FAMOTIDINE 20 MG: 20 TABLET, FILM COATED ORAL at 07:46

## 2018-03-18 RX ADMIN — CLONIDINE HYDROCHLORIDE 0.2 MG: 0.1 TABLET ORAL at 13:13

## 2018-03-18 RX ADMIN — Medication 10 ML: at 22:19

## 2018-03-18 RX ADMIN — Medication 10 ML: at 22:20

## 2018-03-18 RX ADMIN — CLONIDINE HYDROCHLORIDE 0.2 MG: 0.1 TABLET ORAL at 07:46

## 2018-03-18 RX ADMIN — INSULIN GLARGINE 8 UNITS: 100 INJECTION, SOLUTION SUBCUTANEOUS at 22:19

## 2018-03-18 RX ADMIN — DIPHENHYDRAMINE HYDROCHLORIDE 50 MG: 25 CAPSULE ORAL at 07:36

## 2018-03-18 RX ADMIN — THERA TABS 1 TABLET: TAB at 07:46

## 2018-03-18 RX ADMIN — DIPHENHYDRAMINE HYDROCHLORIDE 50 MG: 25 CAPSULE ORAL at 19:41

## 2018-03-18 RX ADMIN — VENLAFAXINE 75 MG: 37.5 TABLET ORAL at 07:46

## 2018-03-18 RX ADMIN — OXYCODONE HYDROCHLORIDE AND ACETAMINOPHEN 2 TABLET: 5; 325 TABLET ORAL at 00:24

## 2018-03-18 RX ADMIN — OXYCODONE HYDROCHLORIDE AND ACETAMINOPHEN 2 TABLET: 5; 325 TABLET ORAL at 19:41

## 2018-03-18 RX ADMIN — CLONIDINE HYDROCHLORIDE 0.2 MG: 0.1 TABLET ORAL at 20:16

## 2018-03-18 RX ADMIN — CALCIUM 500 MG: 500 TABLET ORAL at 07:45

## 2018-03-18 RX ADMIN — DIPHENHYDRAMINE HYDROCHLORIDE 50 MG: 25 CAPSULE ORAL at 13:13

## 2018-03-18 RX ADMIN — OXYCODONE HYDROCHLORIDE AND ACETAMINOPHEN 2 TABLET: 5; 325 TABLET ORAL at 07:36

## 2018-03-18 RX ADMIN — Medication 10 ML: at 10:57

## 2018-03-18 RX ADMIN — OXYCODONE HYDROCHLORIDE AND ACETAMINOPHEN 2 TABLET: 5; 325 TABLET ORAL at 13:13

## 2018-03-18 RX ADMIN — SODIUM CHLORIDE 50 ML/HR: 900 INJECTION, SOLUTION INTRAVENOUS at 10:54

## 2018-03-18 RX ADMIN — INSULIN LISPRO 3 UNITS: 100 INJECTION, SOLUTION INTRAVENOUS; SUBCUTANEOUS at 17:31

## 2018-03-18 RX ADMIN — CYANOCOBALAMIN TAB 500 MCG 1000 MCG: 500 TAB at 07:45

## 2018-03-18 NOTE — PROGRESS NOTES
Pharmacy Automatic Renal Dosing Protocol - Antimicrobials     Indication for Antimicrobials: Severe sepsis POA due to left foot diabetic foot infection, ? Osteomyelitis; Excisional debridement and biopsy done on 3/3, for repeat surgery 3/7/2018     Current Regimen of Each Antimicrobial:  Daptomycin 4mg/kg IV q24h - started 3/16 day 2     Previous Antimicrobial Therapy:  Piperacillin tazobactam 3.375 Q12H (Start Date 3/2, Day #14)  Vancomycin 1500 mg x 1 then 1000 mg Q24H (Start Date 3/2 - 3/5)  Daptomycin 4 mg/kg q24h (3/5 Day #5)     Significant Cultures:    All Micro Results        Procedure Component Value Units Date/Time     CULTURE, Belton Fees STAIN [[id number]] (Abnormal)  Collected: 03/11/18 1143     Order Status: Completed Specimen: Foot Updated: 03/17/18 1035      Special Requests: NO SPECIAL REQUESTS         GRAM STAIN NO WBC'S SEEN          NO ORGANISMS SEEN         Culture result:          NO GROWTH ON SOLID MEDIA AT 4 DAYS                STAPHYLOCOCCUS EPIDERMIDIS ISOLATED FROM THIO BROTH ONLY (A)     CULTURE, TISSUE Oziel November STAIN [[id number]] (Abnormal)  Collected: 03/11/18 1247     Order Status: Completed Specimen: Bone Updated: 03/16/18 1052      Special Requests: NO SPECIAL REQUESTS         GRAM STAIN RARE WBCS SEEN          NO ORGANISMS SEEN         Culture result:          FEW STAPHYLOCOCCUS HOMINIS SUBSPECIES HOMINIS (A)                STAPHYLOCOCCUS EPIDERMIDIS (OXACILLIN RESISTANT) ISOLATED FROM THIO BROTH ONLY (A)     CULTURE, ANAEROBIC [[id number]] (Abnormal)  Collected: 03/11/18 1143     Order Status: Completed Specimen: Foot Updated: 03/16/18 1048      Special Requests: NO SPECIAL REQUESTS         Culture result:          NO GROWTH ON SOLID MEDIA AT 4 DAYS                STAPHYLOCOCCUS EPIDERMIDIS (OXACILLIN RESISTANT) ISOLATED FROM THIO BROTH ONLY (A)     CULTURE, ANAEROBIC [[id number]] Collected: 03/11/18 1247     Order Status: Completed Specimen: Bone Updated: 03/13/18 1138      Special Requests: NO SPECIAL REQUESTS         Culture result: NO ANAEROBES ISOLATED        CULTURE, BLOOD, PAIRED [[id number]] Collected: 03/02/18 1815     Order Status: Completed Specimen: Blood Updated: 03/07/18 0816      Special Requests: NO SPECIAL REQUESTS         Culture result: NO GROWTH 5 DAYS        CULTURE, ANAEROBIC [[id number]] (Abnormal) Collected: 03/03/18 1023     Order Status: Completed Specimen: Foot Updated: 03/06/18 1103      Special Requests: NO SPECIAL REQUESTS         Culture result:        LIGHT   MIXED   ANAEROBIC GRAM NEGATIVE RODS   (   BETA LACTAMASE POSITIVE   ) AND   ANAEROBIC GRAM POSITIVE COCCI    (A)     CULTURE, ANAEROBIC [[id number]] (Abnormal) Collected: 03/03/18 1029     Order Status: Completed Specimen: Foot Updated: 03/06/18 1101      Special Requests: NO SPECIAL REQUESTS         Culture result:        LIGHT   MIXED   ANAEROBIC GRAM NEGATIVE RODS   (   BETA LACTAMASE POSITIVE   )   AND   ANAEROBIC GRAM POSITIVE COCCI    (A)     CULTURE, TISSUE [address] [[id number]] Collected: 03/03/18 1029     Order Status: Completed Specimen: Foot Updated: 03/05/18 1412      Special Requests: NO SPECIAL REQUESTS         GRAM STAIN 1+ WBCS SEEN                   RARE GRAM POSITIVE COCCI IN PAIRS      Culture result:          FEW MIXED SKIN CHRIS ISOLATED     CULTURE, [place] STAIN [[id number]] Collected: 03/03/18 1023     Order Status: Completed Specimen: Foot Updated: 03/05/18 1324      Special Requests: NO SPECIAL REQUESTS         GRAM STAIN NO WBC'S SEEN                   1+ GRAM POSITIVE COCCI IN PAIRS      Culture result:          SCANT MIXED SKIN CHRIS ISOLATED     CULTURE, [place] STAIN [[id number]] (Abnormal) Collected: 03/02/18 2127     Order Status: Completed Specimen: Foot Updated: 03/05/18 1130      Special Requests: NO SPECIAL REQUESTS         GRAM STAIN OCCASIONAL WBCS SEEN                   1+ GRAM POSITIVE RODS (CORYNEFORM)      Culture result: HEAVY DIPHTHEROIDS (A) LIGHT STAPHYLOCOCCUS SPECIES, COAGULASE NEGATIVE (A)     CULTURE, URINE [[id number]] Collected: 18 2250     Order Status: Completed Specimen: Urine Updated: 18 1017      Special Requests: --         NO SPECIAL REQUESTS   Reflexed from [id number]         [place] Count <1,000 CFU/ML         Culture result: NO GROWTH 1 DAY        CULTURE, Jean Pierre Gonzalez STAIN [[id number]] Collected: 18 1029     Order Status: Canceled Updated: 18 1059       Microbiology Results (Current encounter)   Date/Time Order Name Specimen Source Lab Status   3/11/18 1247 CULTURE, ANAEROBIC Bone Final result   3/11/18 1247 CULTURE, TISSUE W GRAM STAIN Bone Final result   3/11/18 1143 CULTURE, WOUND W GRAM STAIN Foot Final result   3/11/18 1143 CULTURE, ANAEROBIC Foot Final result   3/3/18 1029 CULTURE, TISSUE W GRAM STAIN Foot Final result   3/3/18 1029 CULTURE, ANAEROBIC Foot Final result   3/3/18 1023 CULTURE, WOUND W GRAM STAIN Foot Final result   3/3/18 1023 CULTURE, ANAEROBIC Foot Final result   3/2/18 2250 CULTURE, URINE Urine Final result   3/2/18 2127 CULTURE, WOUND W GRAM STAIN Foot Final result   3/2/18 1815 CULTURE, BLOOD, PAIRED Blood Final result         Estimated Creatinine Clearance: 21.8 mL/min (based on Cr of 3.05).   Estimated Creatinine Clearance (using IBW):19.6 mL/min    Recent Labs      18   0246  18   0340  18   0300   CREA  3.05*  2.95*  2.87*   BUN  41*  39*  34*   WBC   --   7.0   --    NA  141  138  138   K  5.4*  5.5*  5.1   CA  7.8*  8.0*  8.0*   HGB   --   7.3*   --    HCT   --   23.2*   --    PLT   --   346   --      Temp (24hrs), Av °F (36.7 °C), Min:97.6 °F (36.4 °C), Max:98.7 °F (37.1 °C)    Impression/Plan:   -SCr stable  +++  Note:  using renal website dosing table for 64.4kg dose would round down to 350, but I am rounding UP to 400mg since interval is q48h and pt on 400mg previously  -Antimicrobial duration:  6 weeks from 3/16 -   -Continue Daptomycin to 6mg/kg (rounded to 400mg) IV q48h for indication as noted above (more severe than SSTI), and renal function (q48h interval)  -Baseline CK 15 3/16 and 14 on 3/17; Recommend WEEKLY CK monitor; order entered; will need monitoring if pt discharged on Daptomycin      Pharmacy will follow daily and adjust medications as appropriate for renal function and/or serum levels.     Thank you,  Sudha hCild, [facility]

## 2018-03-18 NOTE — PROGRESS NOTES
[facility] of [facility], [address] [place] [address] [address]  [address]  P: [phone]  F: [phone]    Foot and Ankle Surgery - Progress Note                                                   Assessment/Plan:  Charcot deformity left foot  Nonhealing wound left foot - resolved  Osteomyelitis left foot - pending/resolved  Septic arthritis left foot- resolved  DM with neuropathy   Sickle cell      Pt is status post Incision and drainage with removal of all nonviable soft tissue left foot  Proximal foot amputation left foot   Open bone biopsies left foot  Deep tendon transfer tibialis anterior tendon left foot  Adjacent transfer arteriomyofasciocutaneous plantar medial pedicle flap for delayed primary closure left foot DOS: 03-      No further plan for surgery at this time per foot ad ankle as pt is primarily closed. We reviewed the results of the surgical bone pathology which is inconclusive at this time, bone cultures are growing bacteria. Infectious disease has been consulted ans is planning to see in the AM.  clinically the cuboid bone which remained showed more normal anatomy. We have been unable to reach pathology but will discuss the findings and any further concerns, However,  given her chronic charcot deformity I am cautiously confident that the surgery combined with a course of IV abx therapy will be successful in treating the osteomyelitis to the left foot. There was an area of strikethrough on the bandage likely 2ndary to pressure from the patient stepping on it by accident during PT.   The patient remains at very high risk for limb loss but we will attempt long term IV abx if needed prior to completing a choparts amputation or BKA.       The patient remains at high risk for limb loss and they understand that this is a limb preservation procedure however clinically today the flap continues to look very good, viable with warm to touch and good capillary fill time. Stitches remain intact. The lateral edge has a small area of maceration so we will switch to a betadine adaptic dressing today. This was photo-documented below in chart. We discussed continued possible complications and alternative treatment options. We discussed the postoperative requirements, rehabilitation including the need for AFO brace for life and need for absolute nonweightbearing to the left lower extremity. All questions regarding surgical findings were answered to patient satisfaction       Labs/imaging reviewed, H and H noted. Should be noted that no tourniquet was used in surgery and hemostasis to the surgical site was completely controlled prior to closure with the flap. Post op xrays reviewed    Surgical pathology noted  abx per hospital team- thank you, continue to recommend infectious disease consultation if recommended after discussion with pathologist, but I feel that ID will likely be needed to manage long term IV treatment as an outpt.       Post operative care  Dressing to left foot to be left clean dry and intact. We will plan for weekly changes BY DR RICE OR HIS STAFF ONLY once discharged. The patient will require DME to be arranged by CM including wheelchair with leg lift, 3 in 1 commode, and shower chair as they are REQUIRED MEDICALLY to be ABSOLUTE NONWEIGHTBEARING left foot. They should not be placed in a dependent position, not from the edge of their bed, not in their wheelchair and not in any other device for more more than 15 minutes at any given time. The patient has had a full thickness adjacent transfer of a flap and fluid overload will greatly increase the chances of flap failure.  Airboots should be in place, loosely approximated at all times that the patient is in the bed.  The patient may complete passive ROM exercises to the left thigh and lower leg but NOT THE FOOT and has full range of motion to upper body and full weight-bearing to the right foot.        Elevate and offload B/L LE. Float heels off edge of bed with foam block or air boots. PT to evaluate and treat      Pt should plan to follow up 1 week or the next Wednesday  after discharge with me at the [facility] wound care center. Foot and ankle will follow     HPI:  Pt seen at bedside in NAD. No new pedal complaints at this time. Doing well. Pt will not need to return to the half-way after discharge, they will be able to go home. Rounded with RN, Planning discharge after II makes recommendations and CM arranges DME     Objective:  Vitals: Patient Vitals for the past 12 hrs:   BP Temp Pulse Resp SpO2   03/18/18 0749 (!) 162/95 98.4 °F (36.9 °C) 86 16 -   03/18/18 0252 (!) 159/93 97.7 °F (36.5 °C) 77 16 100 %   03/18/18 0008 181/90 97.6 °F (36.4 °C) 82 16 100 %       Dermatological:  Flap to the left foot looks good, as well as incision proximally for tendon transfer. Skin edges well coapted with sutures intact. Good cap fill time noted. No drainage, no foul odor no sign of dehisense, small area of lateral maceration. Will switch to betadine adaptic wet to dry dressing           Vascular:  DP/PT +2/4 B/l lower extremity. Cap fill time less than 5 seconds Right lower extremity     Neurological:   Epicritic and protective threshold decreased B/L lower extremity     MSK:  Deferred due to post op period  .     Imaging:  None new.     Labs:  Recent Labs      03/18/18   0246  03/17/18   0340   WBC   --   7.0   CREA  3.05*  2.95*   BUN  41*  39*   HGB   --   7.3*   HCT   --   23.2*   NA  141  138   K  5.4*  5.5*   CL  111*  110*   CO2  23  21   GLU  130*  134*         Yarely Antonio DPM  3/18/2018  [facility] of [address] [place] [address]. Karen [address] [place] [address]  P: [phone]  F: [phone]

## 2018-03-18 NOTE — PROGRESS NOTES
Hospitalist Progress Note    NAME: Airam Castellanos   :  1980   MRN:  [id number]       Assessment / Plan:  Severe sepsis POA due to left foot diabetic foot infection, ? osteomyelitis   Excisional debridement and biopsy done on 3/3, for repeat surgery 3/7/2018  LLE doppler with no deep venous thrombosis identified. MRI left foot multiple fluid collections, significant bone destruction  ESR -> 130->100  CRP 34-> 14  Midline placed 3/6/18  Partial foot amputation 3/11/2018  Will D/w Dr Ruthann Rodriges re residual concern for infection, may widfe up needing prolonged antibiotics if all involved bone not removed  wound cultures from 3/11 growing MRSE x 2 species and and S Hominis  Co Sens only to Dapto or Linezolid  Starting Dapto b/c she is on psychotropics (bad for linezolid)  Will need 6 weeks Dapto 3/16-  Per Podiatry will need ID consult so will consult ID on Monday for recommendations. Cont Daptomycin day 2  for now  Pt does not need to go to assisted after discharge and can go home according to her so will need iv abx at home  Will consult case management    DM type 1 uncontrolled with nephropathy POA  Hypoglycemia 3/6/2018  , 101, 73   Lantus 8 units with recent hypoglycemia  Off D 5 now  lispro sliding scale  Last HgBa1c 8.8    Acute kidney injury POA peak creatinine 3.80  Stage 4 chronic kidney disease POA baseline creatinine 2.4 to 2.7  been on dialysis in the past   Previously has seen [facility]. Creatinine is slowly rising to 3.05    Mild hyperkalemia  K is slightly down to 5.4 and will recheck in am    Acute on chronic anemia due to sickle cell disease POA    Pt without sx of crisis at this time  S/p 1u pRBCs  folic acid  Serial HGbs     THU POA  Uses 2 LPM O2 at night     Code Status: Full    Surrogate Decision Maker: in [facility], no decision maker allowed at this time    DVT Prophylaxis: heparin    Body mass index is 25.97 kg/(m^2). Subjective:   No issues over nite.   Does not report any pain. Review of Systems:  Symptom Y/N Comments  Symptom Y/N Comments   Fever/Chills n   Chest Pain n    Poor Appetite    Edema     Cough    Abdominal Pain n    Sputum    Joint Pain     SOB/ADAMS n   Pruritis/Rash     Nausea/vomit    Tolerating PT/OT     Diarrhea n   Tolerating Diet y    Constipation    Other       Could NOT obtain due to:      Objective:     VITALS:   Last 24hrs VS reviewed since prior progress note. Most recent are:  Patient Vitals for the past 24 hrs:   Temp Pulse Resp BP SpO2   03/18/18 1108 98.2 °F (36.8 °C) 76 16 139/87 100 %   03/18/18 0749 98.4 °F (36.9 °C) 86 16 (!) 162/95 -   03/18/18 0252 97.7 °F (36.5 °C) 77 16 (!) 159/93 100 %   03/18/18 0008 97.6 °F (36.4 °C) 82 16 181/90 100 %   03/17/18 2044 98 °F (36.7 °C) 84 16 (!) 178/99 100 %   03/17/18 1530 98.7 °F (37.1 °C) 81 16 120/75 -       Intake/Output Summary (Last 24 hours) at 03/18/18 1449  Last data filed at 03/18/18 7408   Gross per 24 hour   Intake                0 ml   Output             2175 ml   Net            -2175 ml        PHYSICAL EXAM:  General: Alert and awake  Resp:  CTA bilaterally, no wheezing or rales. No accessory muscle use  CV:  Regular  rhythm,  No edema  GI:  Soft, Non distended, Non tender.  +Bowel sounds  Neurologic:  Alert and oriented X 3, normal speech,   Psych:   Not anxious nor agitated  Skin:  L foot bandaged    Reviewed most current lab test results and cultures  YES  Reviewed most current radiology test results   YES  Review and summation of old records today    NO  Reviewed patient's current orders and MAR    YES  PMH/SH reviewed - no change compared to H&P  ________________________________________________________________________  Care Plan discussed with:    Comments   Patient x    Family      RN x    Care Manager     Consultant                        Multidiciplinary team rounds were held today with , nursing, pharmacist and clinical coordinator.   Patient's plan of care was discussed; medications were reviewed and discharge planning was addressed. ________________________________________________________________________  Total NON critical care TIME:  25   Minutes    Total CRITICAL CARE TIME Spent:   Minutes non procedure based      Comments   >50% of visit spent in counseling and coordination of care     ________________________________________________________________________  Darryle Jubilee, MD     Procedures: see electronic medical records for all procedures/Xrays and details which were not copied into this note but were reviewed prior to creation of Plan. LABS:  I reviewed today's most current labs and imaging studies.   Pertinent labs include:  Recent Labs      03/17/18   0340   WBC  7.0   HGB  7.3*   HCT  23.2*   PLT  346     Recent Labs      03/18/18   0246  03/17/18   0340  03/16/18   0300   NA  141  138  138   K  5.4*  5.5*  5.1   CL  111*  110*  109*   CO2  23  21  21   GLU  130*  134*  191*   BUN  41*  39*  34*   CREA  3.05*  2.95*  2.87*   CA  7.8*  8.0*  8.0*       Signed: Darryle Jubilee, MD

## 2018-03-19 LAB
ANION GAP SERPL CALC-SCNC: 6 MMOL/L (ref 5–15)
BUN SERPL-MCNC: 38 MG/DL (ref 6–20)
BUN/CREAT SERPL: 14 (ref 12–20)
CALCIUM SERPL-MCNC: 8 MG/DL (ref 8.5–10.1)
CHLORIDE SERPL-SCNC: 111 MMOL/L (ref 97–108)
CO2 SERPL-SCNC: 23 MMOL/L (ref 21–32)
CREAT SERPL-MCNC: 2.72 MG/DL (ref 0.55–1.02)
ERYTHROCYTE [DISTWIDTH] IN BLOOD BY AUTOMATED COUNT: 21.8 % (ref 11.5–14.5)
GLUCOSE BLD STRIP.AUTO-MCNC: 133 MG/DL (ref 65–100)
GLUCOSE BLD STRIP.AUTO-MCNC: 149 MG/DL (ref 65–100)
GLUCOSE BLD STRIP.AUTO-MCNC: 154 MG/DL (ref 65–100)
GLUCOSE BLD STRIP.AUTO-MCNC: 91 MG/DL (ref 65–100)
GLUCOSE SERPL-MCNC: 116 MG/DL (ref 65–100)
HCT VFR BLD AUTO: 22.5 % (ref 35–47)
HGB BLD-MCNC: 7.1 G/DL (ref 11.5–16)
MCH RBC QN AUTO: 24.5 PG (ref 26–34)
MCHC RBC AUTO-ENTMCNC: 31.6 G/DL (ref 30–36.5)
MCV RBC AUTO: 77.6 FL (ref 80–99)
NRBC # BLD: 0 K/UL (ref 0–0.01)
NRBC BLD-RTO: 0 PER 100 WBC
PLATELET # BLD AUTO: 321 K/UL (ref 150–400)
PMV BLD AUTO: 9.9 FL (ref 8.9–12.9)
POTASSIUM SERPL-SCNC: 5.2 MMOL/L (ref 3.5–5.1)
RBC # BLD AUTO: 2.9 M/UL (ref 3.8–5.2)
SERVICE CMNT-IMP: ABNORMAL
SERVICE CMNT-IMP: NORMAL
SODIUM SERPL-SCNC: 140 MMOL/L (ref 136–145)
WBC # BLD AUTO: 5.8 K/UL (ref 3.6–11)

## 2018-03-19 PROCEDURE — 74011250637 HC RX REV CODE- 250/637: Performed by: STUDENT IN AN ORGANIZED HEALTH CARE EDUCATION/TRAINING PROGRAM

## 2018-03-19 PROCEDURE — 36415 COLL VENOUS BLD VENIPUNCTURE: CPT | Performed by: EMERGENCY MEDICINE

## 2018-03-19 PROCEDURE — 74011250637 HC RX REV CODE- 250/637: Performed by: PODIATRIST

## 2018-03-19 PROCEDURE — 80048 BASIC METABOLIC PNL TOTAL CA: CPT | Performed by: EMERGENCY MEDICINE

## 2018-03-19 PROCEDURE — 74011636637 HC RX REV CODE- 636/637: Performed by: INTERNAL MEDICINE

## 2018-03-19 PROCEDURE — 74011250637 HC RX REV CODE- 250/637: Performed by: INTERNAL MEDICINE

## 2018-03-19 PROCEDURE — 82962 GLUCOSE BLOOD TEST: CPT

## 2018-03-19 PROCEDURE — 86803 HEPATITIS C AB TEST: CPT | Performed by: INTERNAL MEDICINE

## 2018-03-19 PROCEDURE — 97116 GAIT TRAINING THERAPY: CPT

## 2018-03-19 PROCEDURE — 65270000029 HC RM PRIVATE

## 2018-03-19 PROCEDURE — 85027 COMPLETE CBC AUTOMATED: CPT | Performed by: INTERNAL MEDICINE

## 2018-03-19 PROCEDURE — 74011250636 HC RX REV CODE- 250/636: Performed by: INTERNAL MEDICINE

## 2018-03-19 RX ADMIN — DIPHENHYDRAMINE HYDROCHLORIDE 50 MG: 25 CAPSULE ORAL at 13:23

## 2018-03-19 RX ADMIN — OXYCODONE HYDROCHLORIDE AND ACETAMINOPHEN 2 TABLET: 5; 325 TABLET ORAL at 06:21

## 2018-03-19 RX ADMIN — CLONIDINE HYDROCHLORIDE 0.2 MG: 0.1 TABLET ORAL at 09:05

## 2018-03-19 RX ADMIN — PIPERACILLIN SODIUM AND TAZOBACTAM SODIUM 3.38 G: .375; 3 INJECTION, POWDER, LYOPHILIZED, FOR SOLUTION INTRAVENOUS at 21:09

## 2018-03-19 RX ADMIN — PIPERACILLIN SODIUM AND TAZOBACTAM SODIUM 3.38 G: .375; 3 INJECTION, POWDER, LYOPHILIZED, FOR SOLUTION INTRAVENOUS at 15:10

## 2018-03-19 RX ADMIN — Medication 10 ML: at 13:23

## 2018-03-19 RX ADMIN — OXYCODONE HYDROCHLORIDE AND ACETAMINOPHEN 2 TABLET: 5; 325 TABLET ORAL at 13:23

## 2018-03-19 RX ADMIN — VENLAFAXINE 75 MG: 37.5 TABLET ORAL at 09:05

## 2018-03-19 RX ADMIN — Medication 10 ML: at 21:09

## 2018-03-19 RX ADMIN — CALCIUM 500 MG: 500 TABLET ORAL at 09:05

## 2018-03-19 RX ADMIN — Medication 10 ML: at 05:21

## 2018-03-19 RX ADMIN — QUETIAPINE FUMARATE 100 MG: 100 TABLET ORAL at 09:05

## 2018-03-19 RX ADMIN — DIPHENHYDRAMINE HYDROCHLORIDE 50 MG: 25 CAPSULE ORAL at 06:21

## 2018-03-19 RX ADMIN — FAMOTIDINE 20 MG: 20 TABLET, FILM COATED ORAL at 09:06

## 2018-03-19 RX ADMIN — INSULIN LISPRO 2 UNITS: 100 INJECTION, SOLUTION INTRAVENOUS; SUBCUTANEOUS at 17:36

## 2018-03-19 RX ADMIN — FOLIC ACID 1 MG: 1 TABLET ORAL at 09:06

## 2018-03-19 RX ADMIN — DOCUSATE SODIUM 100 MG: 100 CAPSULE, LIQUID FILLED ORAL at 09:05

## 2018-03-19 RX ADMIN — CLONIDINE HYDROCHLORIDE 0.2 MG: 0.1 TABLET ORAL at 13:23

## 2018-03-19 RX ADMIN — OXYCODONE HYDROCHLORIDE AND ACETAMINOPHEN 2 TABLET: 5; 325 TABLET ORAL at 21:08

## 2018-03-19 RX ADMIN — CLONIDINE HYDROCHLORIDE 0.2 MG: 0.1 TABLET ORAL at 21:08

## 2018-03-19 RX ADMIN — CYANOCOBALAMIN TAB 500 MCG 1000 MCG: 500 TAB at 09:05

## 2018-03-19 RX ADMIN — DIPHENHYDRAMINE HYDROCHLORIDE 50 MG: 25 CAPSULE ORAL at 00:23

## 2018-03-19 RX ADMIN — THERA TABS 1 TABLET: TAB at 09:05

## 2018-03-19 RX ADMIN — VENLAFAXINE 75 MG: 37.5 TABLET ORAL at 21:08

## 2018-03-19 RX ADMIN — OXYCODONE HYDROCHLORIDE AND ACETAMINOPHEN 2 TABLET: 5; 325 TABLET ORAL at 00:23

## 2018-03-19 RX ADMIN — INSULIN LISPRO 2 UNITS: 100 INJECTION, SOLUTION INTRAVENOUS; SUBCUTANEOUS at 11:50

## 2018-03-19 RX ADMIN — INSULIN GLARGINE 8 UNITS: 100 INJECTION, SOLUTION SUBCUTANEOUS at 23:27

## 2018-03-19 RX ADMIN — QUETIAPINE FUMARATE 100 MG: 100 TABLET ORAL at 21:08

## 2018-03-19 RX ADMIN — DIPHENHYDRAMINE HYDROCHLORIDE 50 MG: 25 CAPSULE ORAL at 21:08

## 2018-03-19 NOTE — PROGRESS NOTES
Hospitalist Progress Note    NAME: Olivia Bryson   :  1980   MRN:  [id number]       Assessment / Plan:  Severe sepsis POA due to left foot diabetic foot infection, ? osteomyelitis   Excisional debridement and biopsy done on 3/3, for repeat surgery 3/7/2018  LLE doppler with no deep venous thrombosis identified. MRI left foot multiple fluid collections, significant bone destruction  ESR -> 130->100  CRP 34-> 14  Midline placed 3/6/18  Partial foot amputation 3/11/201  Dr Richardo Riedel to follow as well  wound cultures from 3/11 growing MRSE x 2 species and and S Hominis  Appreciate help from ID  Cont Daptomycin for now and zosyn was added by ID today  Based on ID recommendations will consider vancomycin and zosyn at the time of discharge for 6 weeks until 18 since that might be the best option for her considering everything  Will consult case management for options  Her compliance is also questionable since she cam from retirement and sending her home with a PICC line for  home iv abx may not be safe    DM type 1 uncontrolled with nephropathy POA  Hypoglycemia 3/6/2018 resolved  , 91, 143    Lantus 8 units with recent hypoglycemia  lispro sliding scale  Last HgBa1c 8.8    Acute kidney injury POA peak creatinine 3.80  Stage 4 chronic kidney disease POA baseline creatinine 2.4 to 2.7  been on dialysis in the past   Previously has seen Beckie Jacob. Creatinine is now back to base line of 2.7  Will stop iv fluids    Mild hyperkalemia  K is down to 5.2 today and will recheck in am    Acute on chronic anemia due to sickle cell disease POA    Pt without sx of crisis at this time  S/p 1u pRBCs  folic acid  Follow Hb     THU POA  Uses 2 LPM O2 at night    Disposition: Home vs In pt facility for iv Abx due to questionable non compliance     Code Status: Full    DVT Prophylaxis: heparin    Body mass index is 25.97 kg/(m^2). Subjective:   She does not report any pain. No fever or diarrhea.     Review of Systems:  Symptom Y/N Comments  Symptom Y/N Comments   Fever/Chills    Chest Pain     Poor Appetite    Edema     Cough    Abdominal Pain n    Sputum    Joint Pain     SOB/ADAMS n   Pruritis/Rash     Nausea/vomit    Tolerating PT/OT     Diarrhea n   Tolerating Diet y    Constipation    Other       Could NOT obtain due to:      Objective:     VITALS:   Last 24hrs VS reviewed since prior progress note. Most recent are:  Patient Vitals for the past 24 hrs:   Temp Pulse Resp BP SpO2   03/19/18 1126 98.2 °F (36.8 °C) 76 16 127/62 100 %   03/19/18 0828 - - - (!) 213/109 -   03/19/18 0824 98 °F (36.7 °C) 88 16 (!) 207/96 100 %   03/19/18 0509 98.8 °F (37.1 °C) 87 16 175/88 100 %   03/19/18 0033 98 °F (36.7 °C) 84 16 (!) 192/93 99 %   03/18/18 2014 98 °F (36.7 °C) 82 16 (!) 183/106 100 %       Intake/Output Summary (Last 24 hours) at 03/19/18 1625  Last data filed at 03/19/18 1013   Gross per 24 hour   Intake           1512.5 ml   Output             4250 ml   Net          -2737.5 ml        PHYSICAL EXAM:  General: Alert and awake  Resp:  CTA bilaterally, no wheezing or rales. No accessory muscle use  CV:  Regular  rhythm,  No edema  GI:  Soft, Non distended, Non tender.  +Bowel sounds  Neurologic:  Alert and oriented X 3, normal speech,   Psych:   Not anxious nor agitated  Skin:  L foot bandaged    Reviewed most current lab test results and cultures  YES  Reviewed most current radiology test results   YES  Review and summation of old records today    NO  Reviewed patient's current orders and MAR    YES  PMH/SH reviewed - no change compared to H&P  ________________________________________________________________________  Care Plan discussed with:    Comments   Patient x    Family      RN x    Care Manager     Consultant                        Multidiciplinary team rounds were held today with , nursing, pharmacist and clinical coordinator.   Patient's plan of care was discussed; medications were reviewed and discharge planning was addressed. ________________________________________________________________________  Total NON critical care TIME:  25   Minutes    Total CRITICAL CARE TIME Spent:   Minutes non procedure based      Comments   >50% of visit spent in counseling and coordination of care     ________________________________________________________________________  Hood Merchant MD     Procedures: see electronic medical records for all procedures/Xrays and details which were not copied into this note but were reviewed prior to creation of Plan. LABS:  I reviewed today's most current labs and imaging studies.   Pertinent labs include:  Recent Labs      03/19/18 0520 03/17/18   0340   WBC  5.8  7.0   HGB  7.1*  7.3*   HCT  22.5*  23.2*   PLT  321  346     Recent Labs      03/19/18 0520 03/18/18   0246  03/17/18   0340   NA  140  141  138   K  5.2*  5.4*  5.5*   CL  111*  111*  110*   CO2  23  23  21   GLU  116*  130*  134*   BUN  38*  41*  39*   CREA  2.72*  3.05*  2.95*   CA  8.0*  7.8*  8.0*       Signed: Hood Merchant MD

## 2018-03-19 NOTE — PROGRESS NOTES
Problem: Mobility Impaired (Adult and Pediatric)  Goal: *Acute Goals and Plan of Care (Insert Text)  Physical Therapy Goals  Initiated 3/13/2018  1. Patient will move from supine to sit and sit to supine  in bed with independence within 7 day(s). 2.  Patient will transfer from bed to chair and chair to bed with modified independence using the least restrictive device within 7 day(s). 3.  Patient will perform sit to stand with modified independence within 7 day(s). 4.  Patient will ambulate with modified independence for 100 feet while maintaining left NWB with the least restrictive device within 7 day(s). physical Therapy TREATMENT  Patient: Jonh Reynolds (1 y.o. female)  Date: 3/19/2018  Diagnosis: Osteomyelitis ([address]  diabetic foot debridement  OSTEOMYELITIS  infected left foot Osteomyelitis (HCC)  Procedure(s) (LRB):  INCISION AND DRAINAGE LEFT FOOT, REMOVAL NONVIABLE TISSUE AND BONE LEFT FOOT, DEEP TENDON TRANSFER TIBIALIS ANTERIOR LEFT FOOT, OPEN BIOPSIES LEFT FOOT, ADJACENT TRANSFER ARTERIOMYOFASCIOCUTANEOUS PLANTAR MEDIAL PEDICLE FLAP FOR DELAYED PRIMARY CLOSURE (Left)  PROXIMAL FOOT AMPUTATION LEFT FOOT (Left) 8 Days Post-Op  Precautions: NWB (left foot)  Chart, physical therapy assessment, plan of care and goals were reviewed. ASSESSMENT:  Patient making significant progress, lengthening gait tolerance with 100% following of LLE NWB status. She asked appropriate questions throughout session, including use of shoe on R foot to elevate off LLE (does not have available) and option of post-op shoe for L (will not fit current dressing) as well as use of rest periods to relieve UE fatigue. She also noted knowledge of need to elevate LLE at or above horizontal and self-managed this using BS to elevate within room. Patient questioning use of crutches; suggest additional trial of axillary crutches next session given improvements.         Per patient, she is to DC to 'rehabilitation' rather than previously expected half-way facility (officers no longer present). If this is so, continue to recommend trial of crutches. She may also benefit from [facility] level training to return to community level distances given likelihood of NWB status ahead. Progression toward goals:  [x]    Improving appropriately and progressing toward goals  []    Improving slowly and progressing toward goals  []    Not making progress toward goals and plan of care will be adjusted     PLAN:  Patient continues to benefit from skilled intervention to address the above impairments. Continue treatment per established plan of care. Discharge Recommendations:  Rehab? Patient reports this   Further Equipment Recommendations for Discharge:  defer     SUBJECTIVE:   Patient stated [employer] are gone don't you see? So I'm not going back there.  referring to law enforcement officers. OBJECTIVE DATA SUMMARY:   Critical Behavior:  Neurologic State: Alert  Orientation Level: Oriented X4  Cognition: Appropriate decision making, Appropriate for age attention/concentration, Appropriate safety awareness, Follows commands  Safety/Judgement: Awareness of environment  Functional Mobility Training:  Transfers:  Sit to Stand: Supervision  Stand to Sit: Supervision                             Balance:  Sitting: Intact; Without support  Standing: Intact; With support (RW)  Ambulation/Gait Training:  Distance (ft): 85 Feet (ft)  Assistive Device: Gait belt;Walker, rolling  Ambulation - Level of Assistance: Supervision        Gait Abnormalities: Decreased step clearance     Left Side Weight Bearing: Non-weight bearing (100% following)        Speed/Ana Luisa: Pace decreased (<100 feet/min); Fluctuations (multiple standing rest breaks - self selected )  Step Length: Right shortened        Interventions: Safety awareness training;Verbal cues         Pain:  Pain Scale 1: Numeric (0 - 10)  Pain Intensity 1: 0              Activity Tolerance:   Improving    Please refer to the flowsheet for vital signs taken during this treatment.   After treatment:   [x]    Patient left in no apparent distress sitting up in chair  []    Patient left in no apparent distress in bed  [x]    Call bell left within reach  [x]    Nursing notified  []    Caregiver present  []    Bed alarm activated    COMMUNICATION/COLLABORATION:   The patients plan of care was discussed with: Registered Nurse    Carina Weldon, PT, DPT, CEEAA      Time Calculation: 21 mins

## 2018-03-19 NOTE — ADT AUTH CERT NOTES
Utilization Review           LOC:Acute Adult-Extended Stay (3/18/2018) by Brody Slater        Review Status Review Entered       In Primary 3/19/2018       Details         REVIEW SUMMARY     Patient: Carey Parks  Review Number: [id number]  Review Status: In Primary     Condition Specific: Yes     Condition Level Of Care Code: ACUTE  Condition Level Of Care Description: Acute        OUTCOMES  Outcome Type: Primary           REVIEW DETAILS     Service Date: 03/18/2018  Admit Date: 03/02/2018  Product: Maricarmen Mensah Adult  Subset: Extended Stay      (Symptom or finding within 24h)         (Excludes PO medications unless noted)          Select Level of Care, One:              [ ] ACUTE, >= One:                  [ ] Skin, One:                      [ ] Partial responder, not clinically stable for discharge and requires continued stay, >= One:                          [ ] Impaired skin integrity and treatment <= 3d, >= Two:                              [X] Anti-infective                              ~--Admin, IQ Admin Admin on 03- 12:39 PM--~                              Cubicin 400mg q48hrs IV                 Version: Locish 2017.2  Tray Danielle  © 2017 [employer] and/or one of its [place]. All Rights Reserved. CPT only © 2016 American Medical Association. All Rights Reserved.              Additional Notes       Clinical Date Reviewed: 3/18/2018                 LOS; Status; Review Type: Surgical/Inpatient/CS               Vital Signs: 162/95, 98.4, 86, 16, 100% RA               Abn. Findings LABS/RADIOLOGY:       Rbc 2.90, hgb 7.1, hct 22.5, mcv 77.6, mch 24.5, rdw 21.8, potassium 5.2, chloride 111, gluc 116, bun 38, creat 2.72, calcium 8.0, gfr joellen 19, gfr aa 24              Meds:       NS 50ml/hr IV       Os-pamela 500mg qd po       Catapres 0.2mg tid po       Vitamin b12 1000mcg qd po       Dextrose 5% 25ml/hr IV       Benadryl 25mg q6hrs prn po x2        Pepcid 92AQ qd po       Folic acid 1mg qd po       Lantus 8units qhs SC       Humalog qid&hs ss SC x2       Percocet 5-325mg 1-2 q4hrs prn po x4       Seroquel 100mg bid po       Theragan 1 qd po       Effexor 75mg bid po       Cubicin 400mg q48hrs IV              Podiatry Progress Notes Date of Service: 03/18/18 0930              Assessment/Plan:       Charcot deformity left foot       Nonhealing wound left foot - resolved       Osteomyelitis left foot - pending/resolved       Septic arthritis left foot- resolved       DM with neuropathy        Sickle cell                Pt is status post Incision and drainage with removal of all nonviable soft tissue left foot       Proximal foot amputation left foot        Open bone biopsies left foot       Deep tendon transfer tibialis anterior tendon left foot       Adjacent transfer arteriomyofasciocutaneous plantar medial pedicle flap for delayed primary closure left foot DOS: 03-                No further plan for surgery at this time per foot ad ankle as pt is primarily closed. We reviewed the results of the surgical bone pathology which is inconclusive at this time, bone cultures are growing bacteria. Infectious disease has been consulted and is planning to see in the AM.  clinically the cuboid bone which remained showed more normal anatomy. We have been unable to reach pathology but will discuss the findings and any further concerns, However,  given her chronic charcot deformity I am cautiously confident that the surgery combined with a course of IV abx therapy will be successful in treating the osteomyelitis to the left foot.  There was an area of strikethrough on the bandage likely 2ndary to pressure from the patient stepping on it by accident during PT.  The patient remains at very high risk for limb loss but we will attempt long term IV abx if needed prior to completing a choparts amputation or BKA.                 The patient remains at high risk for limb loss and they understand that this is a limb preservation procedure however clinically today the flap continues to look very good, viable with warm to touch and good capillary fill time. Stitches remain intact. The lateral edge has a small area of maceration so we will switch to a betadine adaptic dressing today. This was photo-documented below in chart.  We discussed continued possible complications and alternative treatment options. We discussed the postoperative requirements, rehabilitation including the need for AFO brace for life and need for absolute nonweightbearing to the left lower extremity. All questions regarding surgical findings were answered to patient satisfaction               Post operative care       Dressing to left foot to be left clean dry and intact. We will plan for weekly changes BY DR RICE OR HIS STAFF ONLY once discharged. The patient will require DME to be arranged by CM including wheelchair with leg lift, 3 in 1 commode, and shower chair as they are REQUIRED MEDICALLY to be ABSOLUTE NONWEIGHTBEARING left foot. They should not be placed in a dependent position, not from the edge of their bed, not in their wheelchair and not in any other device for more more than 15 minutes at any given time. The patient has had a full thickness adjacent transfer of a flap and fluid overload will greatly increase the chances of flap failure. Airboots should be in place, loosely approximated at all times that the patient is in the bed.  The patient may complete passive ROM exercises to the left thigh and lower leg but NOT THE FOOT and has full range of motion to upper body and full weight-bearing to the right foot.                  Elevate and offload B/L LE.  Float heels off edge of bed with foam block or air boots.       PT to evaluate and treat           LOC:Acute Adult-Extended Stay (3/17/2018) by Taras Blum RN        Review Status Review Entered       In Primary 3/18/2018       Details         REVIEW SUMMARY     Patient: Kailash Lewis  Review Number: [id number]  Review Status: In Primary     Condition Specific: Yes     Condition Level Of Care Code: ACUTE  Condition Level Of Care Description: Acute        OUTCOMES  Outcome Type: Primary           REVIEW DETAILS     Service Date: 03/17/2018  Admit Date: 03/02/2018  Product: Danay Thompson Adult  Subset: Extended Stay      (Symptom or finding within 24h)         (Excludes PO medications unless noted)          Select Level of Care, One:              [ ] ACUTE, >= One:                  [ ] Skin, One:                      [ ] Partial responder, not clinically stable for discharge and requires continued stay, >= One:                      ~--Admin, IQ Admin Admin on 03- 10:13 AM--~                      Severe sepsis POA due to left foot diabetic foot infection, ? osteomyelitis                       Excisional debridement and biopsy done on 3/3, for repeat surgery 3/7/2018                      LLE doppler with no deep venous thrombosis identified. MRI left foot multiple fluid collections, significant bone destruction                      ESR -> 130->100                      CRP 34-> 14                      Midline placed 3/6/18                      Partial foot amputation 3/11/2018                      Will D/w Dr Rhina Hu re residual concern for infection, may widfe up needing prolonged antibiotics if all involved bone not removed                      wound cultures from 3/11 growing MRSE x 2 species and and S Hominis                      Co Sens only to Dapto or Linezolid                      Starting Dapto b/c she is on psychotropics (bad for linezolid)                      Will need 6 weeks Dapto 3/16-4/27                      Per Podiatry will need ID consult so will consult ID on Monday for recommendations.  Cont Daptomycin for now                      Pt does not need to go to residential after discharge and can go home according to her so will need iv abx at home                      Will consult case management                          DM type 1 uncontrolled with nephropathy POA                      Hypoglycemia 3/6/2018                      , 202, 199, 159                      Lantus 8 units with recent hypoglycemia                      Off D 5 now                      lispro sliding scale                      Last HgBa1c 8.8                          Acute kidney injury POA peak creatinine 3.80                      Stage 4 chronic kidney disease POA baseline creatinine 2.4 to 2.7                      been on dialysis in the past                       Previously has seen [facility]. Creatinine is around 2.7                          Mild hyperkalemia                      Will give one dose of Kayexalate and check bmp in am                          Acute on chronic anemia due to sickle cell disease POA                        Pt without sx of crisis at this time                      S/p 1u pRBCs                      folic acid                      Serial HGbs                          THU POA                      Uses 2 LPM O2 at night                          Code Status: Full                          Surrogate Decision Maker: in [place], no decision maker allowed at this time                          DVT Prophylaxis: heparin                          /99, TEMP 98, HR 84, RR 16, O2 100 ON RA                          RBC 2.95, HGB 7.3, HCT 23.2, K 5.5, BUN 39, CREAT 2.95, CK 14                          PT, WOUND CARE DSG CHG, TAVARES, DIAB DIET                          CUBICIN 400 MG Q48H IV                                          [ ] Impaired skin integrity and treatment <= 3d, >= Two:                              [X] Anti-infective     Version: InterQual® 2017.2  [employer]®  © 2017 [employer] and/or one of its subsidiaries. All Rights Reserved.   CPT only © 2016 American Medical Association. All Rights Reserved.                  LOC:Acute Adult-Extended Stay (3/16/2018) by Leah Newberry RN        Review Status Review Entered       In Primary 3/17/2018       Details         REVIEW SUMMARY     Patient: Armand Dunn  Review Number: [id number]  Review Status:  In Primary     Condition Specific: Yes     Condition Level Of Care Code: ACUTE  Condition Level Of Care Description: Acute        OUTCOMES  Outcome Type: Primary           REVIEW DETAILS     Service Date: 03/16/2018  Admit Date: 03/02/2018  Product: Terrilyn May Adult  Subset: Extended Stay      (Symptom or finding within 24h)         (Excludes PO medications unless noted)          Select Level of Care, One:              [ ] ACUTE, >= One:                  [ ] Infection, One:                      [ ] Partial responder, not clinically stable for discharge and requires continued stay, >= One:                          [ ] Infection, actual or suspected, Both:                          ~--Admin, IQ Admin Admin on 03- 09:01 AM--~                          Pt quiet in bed                          Nursing did express concern that she may be exposing her foot to some weight                          wound cultures from 3/11 growing MRSE x 2 species and and S Hominis                          Co Sens only to Dapto or Linezolid                          Starting Dapto b/c she is on psychotropics (bad for linezolid)                          Will need 6 weeks Dapto 3/16-4/27                                  [facility] can do iv abx via PICC, but not narcs po ([phone])                          We will need to wean off po narcs pior to [facility] [address]                          Severe sepsis POA due to left foot diabetic foot infection, ? osteomyelitis                           Excisional debridement and biopsy done on 3/3, for repeat surgery 3/7/2018                          Wound cultures with anaerobic GNRs(done on antibiotics)                          Continue pip-tazo and dapto day 9                              Stop the daptomycin after tomorrows dose, no MRSA on cultures                          LLE doppler with no deep venous thrombosis identified. MRI left foot multiple fluid collections, significant bone destruction                          ESR -> 130->100                          CRP 34-> 14                          Midline placed 3/6/18                          Partial foot amputation 3/11/2018                          Will D/w Dr Jade Gutiérrez re residual concern for infection, may widfe up needing prolonged antibiotics if all involved bone not removed                          Severe pain post op, added IV dilaudid                              Current Regimen of Each Antimicrobial:                          Piperacillin tazobactam 3.375 Q12H (Start Date 3/2, Day #15)                          Stopped Zosyn and started Dapto 3/16                                      Previous Antimicrobial Therapy:                          Vancomycin 1500 mg x 1 then 1000 mg Q24H (Start Date 3/2 - 3/5)                           Daptomycin 4 mg/kg q24h (3/5 Day #6)                              DM type 1 uncontrolled with nephropathy POA                          Hypoglycemia 3/6/2018                          , 195, 185, 153, 151                          Lantus 8 units with recent hypoglycemia                          Stop D5W once stable on diet                          Increase lantus to 10 units in Am if eating well                          lispro sliding scale                          Last HgBa1c 8.8                              Acute kidney injury POA peak creatinine 3.80                          Stage 4 chronic kidney disease POA baseline creatinine 2.4 to 2.7                          been on dialysis in the past                           Previously has seen [facility].                           Creatinine trending down to baseline                              Acute on chronic anemia due to sickle cell disease POA                            Pt without sx of crisis at this time                          S/p 1u pRBCs                          folic acid                          Serial HGbs, check HgB post op                              THU POA                          Uses 2 LPM O2 at night                              /93, TEMP 97.7, HR 90, RR 16, O2 100 ON RA                              , GLUC 191, BUN 34, CREAT 2.87                              PT, WOUND CARE                              CUBICIN 400 MG Q48H IV, DILAUDID 1 MG IV  X 3, ZOSYN 3.375G Q8H IV                     Version: InterQual® 2017.2  [facility] and [employer]®  © 2017 [employer] and/or one of its subsidiaries. All Rights Reserved. CPT only © 2016 American Medical Association.   All Rights Reserved.

## 2018-03-19 NOTE — PROGRESS NOTES
Per patients request, this CM [facility] [place] for [facility] review.     Sharmin Dougherty  Ext [phone]

## 2018-03-19 NOTE — PROGRESS NOTES
General Surgery End of Shift Nursing Note    Bedside shift change report given to Gerardo Bceerra RN (oncoming nurse) by Олег Park RN (offgoing nurse). Report included the following information SBAR, Kardex, Intake/Output, MAR and Recent Results. Shift worked:   7a-7p   Summary of shift:       Issues for physician to address:        Number times ambulated in hallway past shift: 1    Number of times OOB to chair past shift: 2    Pain Management:  Current medication: Percocet  Patient states pain is manageable on current pain medication: YES    GI:    Current diet:  DIET DIABETIC CONSISTENT CARB Regular    Tolerating current diet: YES  Passing flatus: YES  Last Bowel Movement: several days ago   Appearance:     Respiratory:    Incentive Spirometer at bedside: YES  Patient instructed on use: YES    Patient Safety:    Falls Score: 3  Bed Alarm On? No  Sitter?  No    Gerardo Carson

## 2018-03-19 NOTE — PROGRESS NOTES
Pharmacy Automatic Renal Dosing Protocol - Antimicrobials     Indication for Antimicrobials: Severe sepsis POA due to left foot diabetic foot infection, ? Osteomyelitis; Excisional debridement and biopsy done on 3/3, for repeat surgery 3/7/2018     Current Regimen of Each Antimicrobial:  Piperacillin tazobactam 3.375 Q6H (started 3/19, day 1)  Daptomycin 6 mg/kg IV q24h - started 3/16 day 4     Previous Antimicrobial Therapy:  Piperacillin tazobactam 3.375 Q12H (Start Date 3/2, Day #14)  Vancomycin 1500 mg x 1 then 1000 mg Q24H (Start Date 3/2 - 3/5)  Daptomycin 4 mg/kg q24h (3/5 Day #5)     Significant Cultures:    All Micro Results        Procedure Component Value Units Date/Time     CULTURE, Carlos Sinks STAIN [[id number]] (Abnormal)  Collected: 03/11/18 1143     Order Status: Completed Specimen: Foot Updated: 03/17/18 1035      Special Requests: NO SPECIAL REQUESTS         GRAM STAIN NO WBC'S SEEN          NO ORGANISMS SEEN         Culture result:          NO GROWTH ON SOLID MEDIA AT 4 DAYS                STAPHYLOCOCCUS EPIDERMIDIS ISOLATED FROM THIO BROTH ONLY (A)     CULTURE, TISSUE Laverta Meuse STAIN [[id number]] (Abnormal)  Collected: 03/11/18 1247     Order Status: Completed Specimen: Bone Updated: 03/16/18 1052      Special Requests: NO SPECIAL REQUESTS         GRAM STAIN RARE WBCS SEEN          NO ORGANISMS SEEN         Culture result:          FEW STAPHYLOCOCCUS HOMINIS SUBSPECIES HOMINIS (A)                STAPHYLOCOCCUS EPIDERMIDIS (OXACILLIN RESISTANT) ISOLATED FROM THIO BROTH ONLY (A)     CULTURE, ANAEROBIC [[id number]] (Abnormal)  Collected: 03/11/18 1143     Order Status: Completed Specimen: Foot Updated: 03/16/18 1048      Special Requests: NO SPECIAL REQUESTS         Culture result:          NO GROWTH ON SOLID MEDIA AT 4 DAYS                STAPHYLOCOCCUS EPIDERMIDIS (OXACILLIN RESISTANT) ISOLATED FROM THIO BROTH ONLY (A)     CULTURE, ANAEROBIC [[id number]] Collected: 03/11/18 1247     Order Status: Completed Specimen: Bone Updated: 03/13/18 1138      Special Requests: NO SPECIAL REQUESTS         Culture result: NO ANAEROBES ISOLATED        CULTURE, BLOOD, PAIRED [[id number]] Collected: 03/02/18 1815     Order Status: Completed Specimen: Blood Updated: 03/07/18 0816      Special Requests: NO SPECIAL REQUESTS         Culture result: NO GROWTH 5 DAYS        CULTURE, ANAEROBIC [[id number]] (Abnormal) Collected: 03/03/18 1023     Order Status: Completed Specimen: Foot Updated: 03/06/18 1103      Special Requests: NO SPECIAL REQUESTS         Culture result:        LIGHT   MIXED   ANAEROBIC GRAM NEGATIVE RODS   (   BETA LACTAMASE POSITIVE   ) AND   ANAEROBIC GRAM POSITIVE COCCI    (A)     CULTURE, ANAEROBIC [[id number]] (Abnormal) Collected: 03/03/18 1029     Order Status: Completed Specimen: Foot Updated: 03/06/18 1101      Special Requests: NO SPECIAL REQUESTS         Culture result:        LIGHT   MIXED   ANAEROBIC GRAM NEGATIVE RODS   (   BETA LACTAMASE POSITIVE   )   AND   ANAEROBIC GRAM POSITIVE COCCI    (A)     CULTURE, TISSUE W Liaaiden Colon [[id number]] Collected: 03/03/18 1029     Order Status: Completed Specimen: Foot Updated: 03/05/18 1412      Special Requests: NO SPECIAL REQUESTS         GRAM STAIN 1+ WBCS SEEN                   RARE GRAM POSITIVE COCCI IN PAIRS      Culture result:          FEW MIXED SKIN CHRIS ISOLATED     CULTURE, Roxy Dash STAIN [[id number]] Collected: 03/03/18 1023     Order Status: Completed Specimen: Foot Updated: 03/05/18 1324      Special Requests: NO SPECIAL REQUESTS         GRAM STAIN NO WBC'S SEEN                   1+ GRAM POSITIVE COCCI IN PAIRS      Culture result:          SCANT MIXED SKIN CHRIS ISOLATED     CULTURE, Roxy Dash STAIN [[id number]] (Abnormal) Collected: 03/02/18 2127     Order Status: Completed Specimen: Foot Updated: 03/05/18 1130      Special Requests: NO SPECIAL REQUESTS         GRAM STAIN OCCASIONAL WBCS SEEN                   1+ GRAM POSITIVE RODS (CORYNEFORM)      Culture result: HEAVY DIPHTHEROIDS (A)                   LIGHT STAPHYLOCOCCUS SPECIES, COAGULASE NEGATIVE (A)     CULTURE, URINE [[id number]] Collected: 18 2250     Order Status: Completed Specimen: Urine Updated: 18 1017      Special Requests: --         NO SPECIAL REQUESTS   Reflexed from [id number]         Doyline Count <1,000 CFU/ML         Culture result: NO GROWTH 1 DAY        CULTURE, Jerline Hotter STAIN [[id number]] Collected: 18 1029     Order Status: Canceled Updated: 18 1059       Microbiology Results (Current encounter)   Date/Time Order Name Specimen Source Lab Status   3/11/18 1247 CULTURE, ANAEROBIC Bone Final result   3/11/18 1247 CULTURE, TISSUE W GRAM STAIN Bone Final result   3/11/18 1143 CULTURE, WOUND W GRAM STAIN Foot Final result   3/11/18 1143 CULTURE, ANAEROBIC Foot Final result   3/3/18 1029 CULTURE, TISSUE W GRAM STAIN Foot Final result   3/3/18 1029 CULTURE, ANAEROBIC Foot Final result   3/3/18 1023 CULTURE, WOUND W GRAM STAIN Foot Final result   3/3/18 1023 CULTURE, ANAEROBIC Foot Final result   3/2/18 2250 CULTURE, URINE Urine Final result   3/2/18 2127 CULTURE, WOUND W GRAM STAIN Foot Final result   3/2/18 1815 CULTURE, BLOOD, PAIRED Blood Final result         Estimated Creatinine Clearance: 24.5 mL/min (based on Cr of 2.72). Estimated Creatinine Clearance (using IBW):22.0 mL/min    Recent Labs      18   0520  18   0246  18   0340   CREA  2.72*  3.05*  2.95*   BUN  38*  41*  39*   WBC  5.8   --   7.0   NA  140  141  138   K  5.2*  5.4*  5.5*   CA  8.0*  7.8*  8.0*   HGB  7.1*   --   7.3*   HCT  22.5*   --   23.2*   PLT  321   --   346     Temp (24hrs), Av.3 °F (36.8 °C), Min:98 °F (36.7 °C), Max:98.8 °F (37.1 °C)    Impression/Plan:   Piperacillin tazobactam adjusted to 3.375 Q8H per renal dosing protocol.   -SCr stable  +++  Note:  using renal website dosing table for 64.4kg dose would round down to 350, but I am rounding UP to 400mg since interval is q48h and pt on 400mg previously  -Antimicrobial duration:  6 weeks from 3/16 - 4/27  -Continue Daptomycin to 6mg/kg (rounded to 400mg) IV q48h for indication as noted above (more severe than SSTI), and renal function (q48h interval)  -Baseline CK 15 3/16 and 14 on 3/17; Recommend WEEKLY CK monitor; order entered; will need monitoring if pt discharged on Daptomycin      Pharmacy will follow daily and adjust medications as appropriate for renal function and/or serum levels.

## 2018-03-19 NOTE — PROGRESS NOTES
General Surgery End of Shift Nursing Note    Bedside shift change report given to Austin Rios (oncoming nurse) by Krystina Watson (offgoing nurse). Report included the following information SBAR, Kardex, Intake/Output, MAR and Recent Results. Shift worked:   7a-7p   Summary of shift:    Uneventful.  Pt able to ambulated to bathroom with walker and NWB to LLE   Issues for physician to address:   none       Delia Infante

## 2018-03-19 NOTE — CONSULTS
Infectious Disease Consult Note    Reason for Consult: L foot osteomyelitis possibly, charcot disease   Date of Consultation: March 19, 2018  Date of Admission: 3/2/2018  Referring Physician: Bea Simon       HPI:  Ms Dony Campa is a 42 yr old lady with hx of Sickle Cell ( she says disease documented as trait), DM, gastroparesis S/P hx of pacer insertion and removal, Gastric bypass, seizures , HTN who noticed an ulcer in lateral foot about a month or two ago. She reports also having had a DVT in that LE about 1-2 years go and was on Lovenox + coumadin for treatment at that time. She has been in a [facility] for about a month per her. Soon after noticing ulcer, she reports that she wound had discharge, denied foul odor. Says her leg started swelling as well and was warm. Denied erythema. Denied any trauma to legs. Denied sensory issues to LE and says she can feel trauma to her leg if it happened. Denied any animal or insect bites. Denied antibiotic use PO prior to admission. She was admitted to [facility] 3/2/18 with L foot ulcer/drainage. X ray done showed \"Findings compatible with Charcot changes of the midfoot with marked  fragmentation and disorganization. In addition there is a chronic appearing fracture base of the fifth metatarsal with adjacent soft tissue ulcer. Loss of cortical margin is concerning for superimposed osteomyelitis\". She was started on Vancomycin and Zosyn per H and P. Vascular surgery consulted on 3/2 and their initial impression indicates \" MRI/xray is suggestive of osteomyelitis of the mid foot which will make limb salvage challenging if that is the case\". Taken to the OR 3/3 for I and D and findings indicate \" Advanced infection with purulent drainage going into the midfoot\" on brief operative report. Detailed operative report indicates, \" On the distal fifth metatarsal there was a large wound with purulent drainage.   This was excised and I immediately encountered a large amount of purulent drainage. This drainage was sent for culture and swabs. After I excised all the skin, soft tissue, and muscle, there was a necrotic fifth metatarsal and fourth metatarsal bone as well as the cuboid bone\". On 3/7/18, noted another debridement done and operative notes indicate \"With manipulation, I could not express any further purulent drainage. There was what appeared to be nonviable 4th and 5th metatarsals as well as possible mid foot bones. Therefore, I used a rongeur to debride back these until there was healthy bone, which it did so on the 4th and 5th metatarsal rays. I also believe I resected the base of the third metatarsal as well through my incision. Part of the cuboid bone appeared to be so necrotic, so this was partly debrided, but was overall healthy\". On 3/11/18, had I and D to L foot , removal of non viable tissue and bone L foot, proximal foot amputation, deep tendon transfer tibialis anterior left foot, adjacent transfer arteriotomyofasciocutaneous plantar medial pedicle flap for delayed primary closure. Cultures taken from deep tissue noted to be + for S hominus and also Coag Negative Staph (MRSE) in thio broth from 3/11/18. Operative report indicates \"going from distal to proximal, a pocket of purulent drainage was encountered between the first and the second metatarsals within the first interspace. This was noted within the soft tissues but was not identified within the bone. The nonviable soft tissue was debulked, debrided and removed. The purulent drainage was carefully milked from the soft tissues compressing the foot from proximal to distal.  Once the most significant of the purulent drainage was released, a deep wound culture to the area was then taken\". \" Moving proximally, the base of the remaining metatarsals as well as the cuneiforms showed extremely eroded bone quality. The bone was soft, and there was significant destruction of the articular cartilage throughout\".   \" At this time, the intermediate and lateral cuneiforms were also identified and resected and removed from the surgical field as again this bone showed significant bony erosion with broken cortices and nonviable bleeding. It should, however, be noted that at the more proximal portion of the bone, there was no purulent drainage identified. The only area of purulence that was noted was within the first interspace distally from the bases of the metatarsals, and this was only noted within the soft tissues. \"It was noted that resection of the distal aspect of the cuboid was necessary to allow for rotation of the plantar medial pedicle. Utilizing a sagittal saw, the distal aspect of the cuboid, which did show articular cartilage and had more normal appearing anatomy with no noted articular destruction, was carefully resected. This bone did appear to have Charcot type changes; however, again no further purulent drainage was noted proximally, and the surrounding soft tissue was clean and had normal appearing anatomy. \"      In regards to other history, Ms Alejandra Mesa reports that she has \"Sickel Cell Disease\" and last \"crisis\" was 2 months ago. She denied admission at that time but was taken to the ER And had supportive care. Admits to multiple transfusions in past.  She say that she has been released from [facility]. Admits to have been screened both for HIV and TB at correctional facility and both negative per her. Has a tattoo that was done with clean needles per her but unclear what her Hep C status is per discussion. I am consulted today regarding further antibiotic recommendations.      .        Past Medical History:  Past Medical History:   Diagnosis Date    Asthma     Diabetes ([address]     Gastroparesis 2010    Gastric Pacer- REMOVED 07/2015    GERD (gastroesophageal reflux disease)     Hypertension     Narcotic dependence ([address]     THU (obstructive sleep apnea)     wears 2 LPM oxygen at night    Other ill-defined conditions(799.89)     Polycystic ovarian syndrome     Seizures (HCC)     Sick[facility] cell trait [facility]     Thromboembolus (HCC) to her left arm and was told she had one in left leg recently         Surgical History:  Past Surgical History:   Procedure Laterality Date    HX CATARACT REMOVAL  3/5/12    right    HX DILATION AND CURETTAGE      ablation    HX GASTRIC BYPASS      HX GI      j tube placement and removal    HX OTHER SURGICAL      Gastric Pacer- REMOVED 2015    HX VASCULAR ACCESS      gray cath rt subclavian    HX VASCULAR ACCESS      HD access right thigh         Family History:   Family History   Problem Relation Age of Onset    Cancer Mother      lung    Hypertension Mother     Cancer Father      kidney    Stroke Father      3 strokes: 59-72    Heart Disease Father 72     CABG    Hypertension Father     Cancer Sister      pancreatic    Cancer Maternal Aunt      breast    Cancer Paternal Aunt      breast    Schizophrenia Sister      [facility] [place], now ass't living    Other Sister       AIDS    Other Other      nephew of AIDS         Social History:     · Living Situation: recently has been in nursing home  · Tobacco:denied   · Alcohol:denied   · Illicit Drugs:denied including snorting, inhalational, pills, IVDU  · Sexual History: ,    · Travel History: denied recently   · Exposures:  Denied below exposures to her  Knowledge   · Outdoor/Hiking: denied  · Animal/Pet: Dogs/ Cats   · Construction: denied  · Hot Tub: denied   · Brackish/stagnant exposure: denied  · TB exposure: denied  · Sick Contacts: denied     Allergies:   Allergies   Allergen Reactions    Erythromycin Itching    Morphine Itching         Review of Systems:     Gen: + weight loss, +  loss of appetite    HEENT: Negative for headache, vision changes, ear ache or discharge, tingling,  nasal discharge, swelling, lumps in neck, sores on tongue   CV:  Negative for chest pain, dyspnea on exertion, leg edema   Lungs: Negative for shortness of breath, cough, wheezing   Abdomen: Negative for abdominal pain, nausea, vomiting, diarrhea, constipation   Genitourinary: Negative for genital pain or genital discharge     Neuro: Negative for headache, numbness, tingling, extremity weakness,  syncope, seizures    Skin: Negative for rash, sores/open wounds   Musculoskeletal: + ulcer L foot, + edema, + increased warmth, + pain, + discharge    Endocrine: Negative for high or low blood sugars, heat or cold intolerance    Psych: Negative for manic behavior     Medications:  No current facility-administered medications on file prior to encounter. Current Outpatient Prescriptions on File Prior to Encounter   Medication Sig Dispense Refill    Cholecalciferol, Vitamin D3, 50,000 unit cap Take 1 Cap by mouth every seven (7) days.  promethazine (PHENERGAN) 25 mg tablet Take 2 Tabs by mouth every six (6) hours as needed for Nausea. (Patient taking differently: Take 50 mg by mouth every eight (8) hours as needed for Nausea.) 10 Tab 0    diphenhydrAMINE (BENADRYL) 25 mg capsule Take 25-50 mg by mouth every six (6) hours as needed.  folic acid 405 mcg tablet Take 400 mcg by mouth daily.  calcium carbonate (OS-BINA) 500 mg calcium (1,250 mg) tablet Take 1 Tab by mouth daily.  vitamin E (AQUA GEMS) 400 unit capsule Take 800 Units by mouth daily.  ferrous sulfate 325 mg (65 mg iron) tablet Take 325 mg by mouth two (2) times a day.  Potassium Gluconate 595 mg (99 mg) tablet Take 595 mg by mouth daily.  senna-docusate (PERICOLACE) 8.6-50 mg per tablet Take 1 Tab by mouth two (2) times a day. 60 Tab 1    oxyCODONE-acetaminophen (PERCOCET) 5-325 mg per tablet Take 2 Tabs by mouth every four (4) hours as needed for Pain.  polyethylene glycol (MIRALAX) 17 gram packet Take 17 g by mouth three (3) times daily as needed.       therapeutic multivitamin (THERAGRAN) tablet Take 1 Tab by mouth daily.      QUEtiapine (SEROQUEL) 100 mg tablet Take 100 mg by mouth two (2) times a day.  famotidine (PEPCID) 20 mg tablet Take 20 mg by mouth two (2) times a day.  baclofen (LIORESAL) 10 mg tablet Take 20 mg by mouth three (3) times daily. (dose = 2 x 10 mg tablet)      albuterol (PROVENTIL VENTOLIN) 2.5 mg /3 mL (0.083 %) nebulizer solution 2.5 mg by Nebulization route every four (4) hours as needed.  insulin NPH (NOVOLIN N) 100 unit/mL injection 15 Units by SubCUTAneous route two (2) times a day. 1 Vial 1    insulin lispro (HUMALOG) 100 unit/mL injection 5 Units by SubCUTAneous route Before breakfast, lunch, and dinner. 1 Vial 0    cyanocobalamin 1,000 mcg tablet Take 1,000 mcg by mouth daily.  DULoxetine (CYMBALTA) 60 mg capsule Take 60 mg by mouth two (2) times a day.  HYDROmorphone (DILAUDID) 2 mg tablet Take 2 mg by mouth every four (4) hours as needed for Pain.              Physical Exam:    Vitals: Patient Vitals for the past 24 hrs:   Temp Pulse Resp BP SpO2   03/19/18 1126 98.2 °F (36.8 °C) 76 16 127/62 100 %   03/19/18 0828 - - - (!) 213/109 -   03/19/18 0824 98 °F (36.7 °C) 88 16 (!) 207/96 100 %   03/19/18 0509 98.8 °F (37.1 °C) 87 16 175/88 100 %   03/19/18 0033 98 °F (36.7 °C) 84 16 (!) 192/93 99 %   03/18/18 2014 98 °F (36.7 °C) 82 16 (!) 183/106 100 %   03/18/18 1610 98.5 °F (36.9 °C) 80 16 (!) 165/95 98 %   ·   · GEN: NAD, sitting in chair   · HEENT:  PERRL, no scleral icterus,   no thrush, missing one upper tooth, dentition fair   · CV: S1, S2 heard regularly  · Lungs: Clear to auscultation bilaterally  · Abdomen: soft, non distended, non tender, No CVA tenderness   · Genitourinary: + escobedo  · Extremities: + edema L foot, warm, + dressing over foot, RLE no edema   · Neuro: Alert, oriented to self, place,  time,  and situation, moves all extremities to commands, verbal   · Skin: no rash  · Psych: good affect, good eye contact, non tearful   · Lines: tunneled SC line Labs:   Recent Results (from the past 24 hour(s))   GLUCOSE, POC    Collection Time: 03/18/18  4:08 PM   Result Value Ref Range    Glucose (POC) 235 (H) 65 - 100 mg/dL    Performed by Luc Almonte    GLUCOSE, POC    Collection Time: 03/18/18  9:06 PM   Result Value Ref Range    Glucose (POC) 143 (H) 65 - 100 mg/dL    Performed by Yasir Amin (PCT)    METABOLIC PANEL, BASIC    Collection Time: 03/19/18  5:20 AM   Result Value Ref Range    Sodium 140 136 - 145 mmol/L    Potassium 5.2 (H) 3.5 - 5.1 mmol/L    Chloride 111 (H) 97 - 108 mmol/L    CO2 23 21 - 32 mmol/L    Anion gap 6 5 - 15 mmol/L    Glucose 116 (H) 65 - 100 mg/dL    BUN 38 (H) 6 - 20 MG/DL    Creatinine 2.72 (H) 0.55 - 1.02 MG/DL    BUN/Creatinine ratio 14 12 - 20      GFR est AA 24 (L) >60 ml/min/1.73m2    GFR est non-AA 19 (L) >60 ml/min/1.73m2    Calcium 8.0 (L) 8.5 - 10.1 MG/DL   CBC W/O DIFF    Collection Time: 03/19/18  5:20 AM   Result Value Ref Range    WBC 5.8 3.6 - 11.0 K/uL    RBC 2.90 (L) 3.80 - 5.20 M/uL    HGB 7.1 (L) 11.5 - 16.0 g/dL    HCT 22.5 (L) 35.0 - 47.0 %    MCV 77.6 (L) 80.0 - 99.0 FL    MCH 24.5 (L) 26.0 - 34.0 PG    MCHC 31.6 30.0 - 36.5 g/dL    RDW 21.8 (H) 11.5 - 14.5 %    PLATELET 991 340 - 538 K/uL    MPV 9.9 8.9 - 12.9 FL    NRBC 0.0 0  WBC    ABSOLUTE NRBC 0.00 0.00 - 0.01 K/uL   GLUCOSE, POC    Collection Time: 03/19/18  8:29 AM   Result Value Ref Range    Glucose (POC) 91 65 - 100 mg/dL    Performed by [address], POC    Collection Time: 03/19/18 11:30 AM   Result Value Ref Range    Glucose (POC) 154 (H) 65 - 100 mg/dL    Performed by Kaitlyn Dick        Microbiology Data:      Wound   3/2/18  Component Value Ref Range & Units Status      Special Requests: NO SPECIAL REQUESTS   Final     GRAM STAIN OCCASIONAL WBCS SEEN   Final     GRAM STAIN    Final     1+ GRAM POSITIVE RODS (CORYNEFORM)     Culture result: HEAVY DIPHTHEROIDS (A)   Final     Culture result:    Final     LIGHT STAPHYLOCOCCUS SPECIES, COAGULASE NEGATIVE (A)       Result History          3/3/18  Component Value Ref Range & Units Status      Special Requests: NO SPECIAL REQUESTS   Final     GRAM STAIN NO WBC'S SEEN   Final     GRAM STAIN    Final     1+ GRAM POSITIVE COCCI IN PAIRS     Culture result:    Final     SCANT MIXED SKIN CHRIS ISOLATED       Result History        Component Results      Component Value Ref Range & Units Status     Special Requests: NO SPECIAL REQUESTS   Final     Culture result: LIGHT   MIXED   ANAEROBIC GRAM NEGATIVE RODS   (   BETA LACTAMASE POSITIVE   ) AND   ANAEROBIC GRAM POSITIVE COCCI    (A)   Final       Result History      Component Results      Component Value Ref Range & Units Status     Special Requests: NO SPECIAL REQUESTS   Final     GRAM STAIN 1+ WBCS SEEN   Final     GRAM STAIN    Final     RARE GRAM POSITIVE COCCI IN PAIRS     Culture result: FEW MIXED SKIN CHRIS ISOLATED   Final       Result History      3/11/18   Labeled : Left foot deep wound   Special Requests: NO SPECIAL REQUESTS   Final      GRAM STAIN NO WBC'S SEEN   Final     GRAM STAIN NO ORGANISMS SEEN   Final     Culture result:    Final     NO GROWTH ON SOLID MEDIA AT 4 DAYS     Culture result:    Final     STAPHYLOCOCCUS EPIDERMIDIS ISOLATED FROM THIO BROTH ONLY (A)       Culture & Susceptibility        Antibiotic   Organism Organism Organism       Staphylococcus epidermidis       AMPICILLIN ($)   <=2   ug/mL   R Final         AMPICILLIN/SULBACTAM ($)   <=8/4   ug/mL   S Final         CEFAZOLIN ($)   <=4   ug/mL   S Final         CIPROFLOXACIN ($)   2   ug/mL   I Final         CLINDAMYCIN ($)   <=0.5   ug/mL   S Final         ERYTHROMYCIN ($$$$)   >4   ug/mL   R Final         GENTAMICIN ($)   <=4   ug/mL   S Final         LEVOFLOXACIN ($)   2   ug/mL   S Final         LINEZOLID ($$$$$)   <=1   ug/mL   S Final         OXACILLIN   <=0.25   ug/mL   S Final         PENICILLIN G ($$)   8   ug/mL   R Final         RIFAMPIN ($$$$)   <=1   ug/mL   S Final         TETRACYCLINE   <=4   ug/mL   S Final         VANCOMYCIN ($)   2   ug/mL   S Final                                Special Requests: NO SPECIAL REQUESTS   Final      Culture result:    Final     NO GROWTH ON SOLID MEDIA AT 4 DAYS     Culture result:    Final     STAPHYLOCOCCUS EPIDERMIDIS (OXACILLIN RESISTANT) ISOLATED FROM THIO BROTH ONLY (A)       Culture & Susceptibility        Antibiotic   Organism Organism Organism       Staphylococcus epidermidis       AMPICILLIN ($)   8   ug/mL   R Final         AMPICILLIN/SULBACTAM ($)   <=8/4   ug/mL   R Final         CEFAZOLIN ($)   <=4   ug/mL   R Final         CIPROFLOXACIN ($)   >2   ug/mL   R Final         CLINDAMYCIN ($)   >4   ug/mL   R Final         DAPTOMYCIN ($$$$$)   <=0.5   ug/mL   S Final         ERYTHROMYCIN ($$$$)   >4   ug/mL   R Final         GENTAMICIN ($)   <=4   ug/mL   S Final         LEVOFLOXACIN ($)   >4   ug/mL   R Final         LINEZOLID ($$$$$)   <=1   ug/mL   S Final         OXACILLIN   >2   ug/mL   R Final         PENICILLIN G ($$)   >8   ug/mL   R Final         RIFAMPIN ($$$$)   <=1   ug/mL   S Final         TETRACYCLINE   <=4   ug/mL   S Final         TRIMETH/SULFA   >2/38   ug/mL   R Final         VANCOMYCIN ($)   2   ug/mL   S Final                                  Blood  3/2/18         Component Results      Component Value Ref Range & Units Status     Special Requests: NO SPECIAL REQUESTS   Final     Culture result: NO GROWTH 5 DAYS   Final       Result History           Urine  3/2/18  Component Value Ref Range & Units Status      Special Requests: NO SPECIAL REQUESTS   Reflexed from [id number]      Final     Rule Count <1,000 CFU/ML   Final     Culture result: NO GROWTH 1 DAY   Final       Result History              Imaging:   MRI L foot 3/2/18  FINDINGS: The evaluation for infection is partially limited secondary to lack of  IV contrast.     Bone marrow:  There is extensive destruction in the midfoot with an osseous  fragment displaced superiorly measuring 1.3 cm. There is diffuse edema and  decreased T1 signal throughout the metatarsal bases, cuneiforms, cuboid, and  navicular.     Joint fluid:  None.     Tendons: Intact.     Muscles: Within normal limits.     Neurovascular bundles: Within normal limits.     Articular cartilage: There is destruction in the midfoot.     Soft tissues: There is a fluid collection in the lateral aspect of the midfoot  measuring 2.8 x 2.0 x 4.4 cm. This extends to the skin surface. A marker was  placed at this site. The fluid collection extends into the midfoot. An  additional fluid collection measuring 1.1 cm is seen below the base of the  second metatarsal. A 2.3 cm fluid collection is seen in between the first and  second metatarsals.     There is extensive subcutaneous edema surrounding the foot and ankle.     IMPRESSION  IMPRESSION:   1. Large fluid collection in the subcutaneous of the lateral midfoot extending  to the joint spaces of the midfoot. 2. Extensive destruction in the midfoot with abnormal signal. Given the presence  of the fluid collection tracking to this, septic arthritis is likely present. There may be an element of Charcot arthropathy also present. 3. Small fluid collection underneath the proximal second metatarsal.  4. Small fluid collection in between the first and second metatarsal necks. 5. Extensive edema surrounding the foot and ankle. L foot x ray 3/11/18  FINDINGS:  Three views of the left foot demonstrate interval amputation at the  level of the posterior midfoot. No other postoperative studies are available for  comparison. On current images, the margins of the navicular and cuboid are  poorly defined, suspicious for osteomyelitis.     IMPRESSION  IMPRESSION:  Prior amputations. Concern for osteomyelitis of the navicular and  Cuboid. .    L foot X ray 3/4/18  FINDINGS:  AP and lateral portable views of the left foot demonstrate decreased  soft tissue lateral to the base of the fifth metatarsal.  Increased soft tissue  gas appears to be packed with material's. Partial resection of the proximal  fifth metatarsal is new. Widened distance between the second and third  metatarsals is unchanged. Neuropathic midfoot is unchanged.     IMPRESSION  IMPRESSION:       Postsurgical lateral midfoot soft tissues and partial resection of the base of  the fifth metatarsal.  Unchanged neuropathic midfoot and TMT joints. L foot X ray 3/2/18  FINDINGS:  Three views of the left foot demonstrate fragmentation of the distal  medial margin of the navicular with proximal migration of the medial cuneiform. There is dorsal dislocation of the middle cuneiform into the dorsal soft  tissues. There is foreshortening of the second metatarsal. There is lateral  translation of the base of the third fourth and fifth metatarsals with  associated bony destructive change. There is a chronic fracture through the base  of the fifth metatarsal with chronic periostitis and associated cortical bone  loss. This is adjacent to the area of soft tissue swelling and ulceration. There  is gas within the soft tissues dorsally. . .     IMPRESSION  IMPRESSION:    1. Findings compatible with Charcot changes of the midfoot with marked  fragmentation and disorganization. In addition there is a chronic appearing  fracture base of the fifth metatarsal with adjacent soft tissue ulcer. Loss of  cortical margin is concerning for superimposed osteomyelitis. CXR 3/2/18  FINDINGS: Hypoventilatory exam. No lobar consolidation, pleural effusion, or  pneumothorax. Normal cardiomediastinal silhouette. No acute or aggressive  osseous lesion.     IMPRESSION  IMPRESSION: No evidence of an acute cardiopulmonary process. Pathology Results:  3/11/18     FINAL PATHOLOGIC DIAGNOSIS   1. Left forefoot, forefoot amputation:   Acute osteomyelitis of metatarsal bone.    Deformed foot, clinically Charcot deformity. 2. Proximal margin cuboid bone left foot, biopsy:   Exudate suggestive of wound base with fibrosis and bony remodeling. Few acute inflammatory cells present at presumed margin, however, insufficient for a diagnosis of acute osteomyelitis margin. Procedures:   3/3/18 LEFT FOOT INCISION AND DRAINAGE    3/7/18 LEFT FOOT DEBRIDEMENT    3/11/18   INCISION AND DRAINAGE LEFT FOOT, REMOVAL NONVIABLE TISSUE AND BONE LEFT FOOT  PROXIMAL FOOT AMPUTATION LEFT FOOT  DEEP TENDON TRANSFER TIBIALIS ANTERIOR LEFT FOOT  OPEN BONE BIOPSIES LEFT FOOT  ADJACENT TRANSFER ARTERIOMYOFASCIOCUTANEOUS PLANTAR MEDIAL PEDICLE FLAP FOR DELAYED PRIMARY CLOSURE.     3/6/18 Successful placement of right subclavian tunneled long-term central venous  Catheter. Assessment / Plan:      Ms Carole Mistry is a 42 yr old lady with hx of Sickle Cell ( she says disease documented as trait), DM, gastroparesis S/P hx of pacer insertion and removal, Gastric bypass, seizures , HTN who noticed an ulcer in lateral foot about a month or two ago. She reports also having had a DVT in that LE about 1-2 years go and was on Lovenox + coumadin for treatment at that time. She has been in a retirement for about a month per her. Soon after noticing ulcer, she reports that she wound had discharge, denied foul odor. Says her leg started swelling as well and was warm. Denied erythema. Denied any trauma to legs. Denied sensory issues to LE and says she can feel trauma to her leg if it happened. Denied any animal or insect bites. Denied antibiotic use PO prior to admission. She was admitted to [address] [place] [address] 3/2/18 with L foot ulcer/drainage. X ray done showed \"Findings compatible with Charcot changes of the midfoot with marked  fragmentation and disorganization. In addition there is a chronic appearing fracture base of the fifth metatarsal with adjacent soft tissue ulcer. Loss of cortical margin is concerning for superimposed osteomyelitis\".   She was started on Vancomycin and Zosyn per H and P. Vascular surgery consulted on 3/2 and their initial impression indicates \" MRI/xray is suggestive of osteomyelitis of the mid foot which will make limb salvage challenging if that is the case\". Taken to the OR 3/3 for I and D and findings indicate \" Advanced infection with purulent drainage going into the midfoot\" on brief operative report. Detailed operative report indicates, \" On the distal fifth metatarsal there was a large wound with purulent drainage. This was excised and I immediately encountered a large amount of purulent drainage. This drainage was sent for culture and swabs. After I excised all the skin, soft tissue, and muscle, there was a necrotic fifth metatarsal and fourth metatarsal bone as well as the cuboid bone\". On 3/7/18, noted another debridement done and operative notes indicate \"With manipulation, I could not express any further purulent drainage. There was what appeared to be nonviable 4th and 5th metatarsals as well as possible mid foot bones. Therefore, I used a rongeur to debride back these until there was healthy bone, which it did so on the 4th and 5th metatarsal rays. I also believe I resected the base of the third metatarsal as well through my incision. Part of the cuboid bone appeared to be so necrotic, so this was partly debrided, but was overall healthy\". On 3/11/18, had I and D to L foot , removal of non viable tissue and bone L foot, proximal foot amputation, deep tendon transfer tibialis anterior left foot, adjacent transfer arteriotomyofasciocutaneous plantar medial pedicle flap for delayed primary closure. Cultures taken from deep tissue noted to be + for S hominus and also Coag Negative Staph (MRSE) in thio broth from 3/11/18. Operative report indicates \"going from distal to proximal, a pocket of purulent drainage was encountered between the first and the second metatarsals within the first interspace.   This was noted within the soft tissues but was not identified within the bone. The nonviable soft tissue was debulked, debrided and removed. The purulent drainage was carefully milked from the soft tissues compressing the foot from proximal to distal.  Once the most significant of the purulent drainage was released, a deep wound culture to the area was then taken\". \" Moving proximally, the base of the remaining metatarsals as well as the cuneiforms showed extremely eroded bone quality. The bone was soft, and there was significant destruction of the articular cartilage throughout\". \" At this time, the intermediate and lateral cuneiforms were also identified and resected and removed from the surgical field as again this bone showed significant bony erosion with broken cortices and nonviable bleeding. It should, however, be noted that at the more proximal portion of the bone, there was no purulent drainage identified. The only area of purulence that was noted was within the first interspace distally from the bases of the metatarsals, and this was only noted within the soft tissues. \"It was noted that resection of the distal aspect of the cuboid was necessary to allow for rotation of the plantar medial pedicle. Utilizing a sagittal saw, the distal aspect of the cuboid, which did show articular cartilage and had more normal appearing anatomy with no noted articular destruction, was carefully resected. This bone did appear to have Charcot type changes; however, again no further purulent drainage was noted proximally, and the surrounding soft tissue was clean and had normal appearing anatomy. \"    It is noted that Daptomycin IV started on 3/16/18. I am consulted today regarding further antibiotic recommendations. 1) L foot diabetic foot wound with charcot changes  Concern for osteomyelitis     S/P I and D  3/3 and 3/7/18 , operative reports reviewed as above ( highlights noted ) .  Cultures from 3/3 with mixed anaerobic gram negative rods, Beta lactamse + and anaerobic G+C  S/P and D to L foot , removal of non viable tissue and bone L foot, proximal foot amputation, deep tendon transfer tibialis anterior left foot, adjacent transfer arteriotomyofasciocutaneous plantar medial pedicle flap for delayed primary closure on 3/11/189. Cultures wt MRSE in thio, and S hominus. Pathology margins \"Few acute inflammatory cells present at presumed margin, however, insufficient for a diagnosis of acute osteomyelitis margin\"    Review of antibiotics so far:  Daptomycin since 3/5/18  Levofloxacin ?  3/16  Zosyn 3/2-3/15/18  Vancomycin 3/2-3/5     Recommendations:  Complicated scenario given multiple I and D's and surgeries as mentioned above with findings   Additional complication wt Charcot disease as it mimics many findings that could be present in  Infection as well   Cultures may not be all that reliable from 3/11 given prolonged antibiotics prior including vanc 3/2-3/5 and Zosyn 3/2-3/15  Would recommend treatment with IV antibiotics ( 6 total weeks from last surgery on 3/11/18 ) as it not entirely sure that there is no residual infection   At present on Daptomycin IV ( would dose at 8 mg/KG) if able to tolerate   Vancomycin IV is a reasonable choice as well with close renal monitoring instead of Daptomycin if cost is an issues on discharge planning  Given concerns of polymicrobial infection wt mixed GNR and anaerobes in 3/3/18 cultures would also recommend covering for this given complicated scenario    Option 1: Vancomycin + Zosyn IV renally dosed (dosing per pharmacy)   With this option check weekly CBC wt diff, CMP, ESR and CRP , Vancomycin trough levels ( goal trough level 15-20)  Plan for 6 weeks till 4/22/18   Side effects include GI, renal and risk for CDI, seizures given history, worsening anemia wt hx of Sickle cell as well     Option 2: Daptomycin IV 8mg/KG, + Zosyn renally dosed   With this option, check weekly CBC wt diff, CMP, CK, ESR and CRP  Plan for 6 weeks till 4/22/18  Side effects include GI, renal and risk for CDI, seizures given history, worsening anemia wt hx of Sickle cell as well   Additionally side effect includes rhabdomyolysis and myalgias   Avoid statin with Daptomycin   Would be difficult to distinguish if she has myalgias from Daptomycin or Sickle cell crisis if on Daptomycin IV   Cost is also more on Daptomycin IV which may be a limiting factor for discharge planning     Weight bearing status, would care per surgical teams    I am concerned about her ability to safely administer IV antibiotics after discussion with her. Will need case management and all teams collaborative efforts to assess safety and reliability of IV access on discharge. 2) Sickle cell disease :   Per primary team     3) Hx of gasteroparesis: S/P pacer insertion and removal   S/P bypass per patient     4) Hx of DVT LLE per patient     5) DM   Optimization helps in wound healing and infection control     6) Screening:   Patient reports HIV and TB screen negative ( done in long-term per her recently)  Offered Hep C screening and she agrees     7) DVT px per primary team     Discussed with Dr Suleman Brown and Dr Joselyn Peterson today          Thank for the opportunity to participate in the care of this patient. Please contact with questions or concerns.      Kavya Villafuerte, DO  1:44 PM

## 2018-03-19 NOTE — PROGRESS NOTES
Problem: Pain  Goal: *Control of Pain  Outcome: Progressing Towards Goal  Patient reports pain is under good control with the use of pharmaceutical methods of pain relief.

## 2018-03-20 LAB
ANION GAP SERPL CALC-SCNC: 3 MMOL/L (ref 5–15)
ANION GAP SERPL CALC-SCNC: 5 MMOL/L (ref 5–15)
ANION GAP SERPL CALC-SCNC: 7 MMOL/L (ref 5–15)
BUN SERPL-MCNC: 38 MG/DL (ref 6–20)
BUN SERPL-MCNC: 40 MG/DL (ref 6–20)
BUN SERPL-MCNC: 43 MG/DL (ref 6–20)
BUN/CREAT SERPL: 13 (ref 12–20)
BUN/CREAT SERPL: 13 (ref 12–20)
BUN/CREAT SERPL: 14 (ref 12–20)
CALCIUM SERPL-MCNC: 7.7 MG/DL (ref 8.5–10.1)
CALCIUM SERPL-MCNC: 8.2 MG/DL (ref 8.5–10.1)
CALCIUM SERPL-MCNC: 8.3 MG/DL (ref 8.5–10.1)
CHLORIDE SERPL-SCNC: 110 MMOL/L (ref 97–108)
CHLORIDE SERPL-SCNC: 110 MMOL/L (ref 97–108)
CHLORIDE SERPL-SCNC: 111 MMOL/L (ref 97–108)
CO2 SERPL-SCNC: 22 MMOL/L (ref 21–32)
CO2 SERPL-SCNC: 23 MMOL/L (ref 21–32)
CO2 SERPL-SCNC: 25 MMOL/L (ref 21–32)
CREAT SERPL-MCNC: 2.84 MG/DL (ref 0.55–1.02)
CREAT SERPL-MCNC: 2.97 MG/DL (ref 0.55–1.02)
CREAT SERPL-MCNC: 3.03 MG/DL (ref 0.55–1.02)
GLUCOSE BLD STRIP.AUTO-MCNC: 115 MG/DL (ref 65–100)
GLUCOSE BLD STRIP.AUTO-MCNC: 127 MG/DL (ref 65–100)
GLUCOSE BLD STRIP.AUTO-MCNC: 156 MG/DL (ref 65–100)
GLUCOSE BLD STRIP.AUTO-MCNC: 274 MG/DL (ref 65–100)
GLUCOSE SERPL-MCNC: 143 MG/DL (ref 65–100)
GLUCOSE SERPL-MCNC: 150 MG/DL (ref 65–100)
GLUCOSE SERPL-MCNC: 274 MG/DL (ref 65–100)
POTASSIUM SERPL-SCNC: 5.4 MMOL/L (ref 3.5–5.1)
POTASSIUM SERPL-SCNC: 5.7 MMOL/L (ref 3.5–5.1)
POTASSIUM SERPL-SCNC: 5.9 MMOL/L (ref 3.5–5.1)
SERVICE CMNT-IMP: ABNORMAL
SODIUM SERPL-SCNC: 138 MMOL/L (ref 136–145)
SODIUM SERPL-SCNC: 139 MMOL/L (ref 136–145)
SODIUM SERPL-SCNC: 139 MMOL/L (ref 136–145)

## 2018-03-20 PROCEDURE — 74011250636 HC RX REV CODE- 250/636: Performed by: STUDENT IN AN ORGANIZED HEALTH CARE EDUCATION/TRAINING PROGRAM

## 2018-03-20 PROCEDURE — 74011636637 HC RX REV CODE- 636/637: Performed by: INTERNAL MEDICINE

## 2018-03-20 PROCEDURE — 36415 COLL VENOUS BLD VENIPUNCTURE: CPT | Performed by: EMERGENCY MEDICINE

## 2018-03-20 PROCEDURE — 80048 BASIC METABOLIC PNL TOTAL CA: CPT | Performed by: EMERGENCY MEDICINE

## 2018-03-20 PROCEDURE — 74011250637 HC RX REV CODE- 250/637: Performed by: INTERNAL MEDICINE

## 2018-03-20 PROCEDURE — 74011250636 HC RX REV CODE- 250/636: Performed by: INTERNAL MEDICINE

## 2018-03-20 PROCEDURE — 74011250637 HC RX REV CODE- 250/637: Performed by: PODIATRIST

## 2018-03-20 PROCEDURE — 65270000029 HC RM PRIVATE

## 2018-03-20 PROCEDURE — 80048 BASIC METABOLIC PNL TOTAL CA: CPT | Performed by: INTERNAL MEDICINE

## 2018-03-20 PROCEDURE — 74011000258 HC RX REV CODE- 258: Performed by: STUDENT IN AN ORGANIZED HEALTH CARE EDUCATION/TRAINING PROGRAM

## 2018-03-20 PROCEDURE — 74011250637 HC RX REV CODE- 250/637: Performed by: STUDENT IN AN ORGANIZED HEALTH CARE EDUCATION/TRAINING PROGRAM

## 2018-03-20 PROCEDURE — 97116 GAIT TRAINING THERAPY: CPT | Performed by: PHYSICAL THERAPIST

## 2018-03-20 PROCEDURE — 82962 GLUCOSE BLOOD TEST: CPT

## 2018-03-20 RX ORDER — FUROSEMIDE 10 MG/ML
40 INJECTION INTRAMUSCULAR; INTRAVENOUS ONCE
Status: COMPLETED | OUTPATIENT
Start: 2018-03-20 | End: 2018-03-20

## 2018-03-20 RX ORDER — SODIUM POLYSTYRENE SULFONATE 15 G/60ML
15 SUSPENSION ORAL; RECTAL
Status: COMPLETED | OUTPATIENT
Start: 2018-03-20 | End: 2018-03-20

## 2018-03-20 RX ORDER — AMLODIPINE BESYLATE 5 MG/1
5 TABLET ORAL DAILY
Status: DISCONTINUED | OUTPATIENT
Start: 2018-03-20 | End: 2018-03-25

## 2018-03-20 RX ORDER — SODIUM POLYSTYRENE SULFONATE 15 G/60ML
30 SUSPENSION ORAL; RECTAL
Status: COMPLETED | OUTPATIENT
Start: 2018-03-20 | End: 2018-03-20

## 2018-03-20 RX ADMIN — INSULIN GLARGINE 8 UNITS: 100 INJECTION, SOLUTION SUBCUTANEOUS at 22:47

## 2018-03-20 RX ADMIN — Medication 10 ML: at 13:26

## 2018-03-20 RX ADMIN — PROMETHAZINE HYDROCHLORIDE 12.5 MG: 25 INJECTION INTRAMUSCULAR; INTRAVENOUS at 21:30

## 2018-03-20 RX ADMIN — QUETIAPINE FUMARATE 100 MG: 100 TABLET ORAL at 20:43

## 2018-03-20 RX ADMIN — VENLAFAXINE 75 MG: 37.5 TABLET ORAL at 08:33

## 2018-03-20 RX ADMIN — Medication 10 ML: at 05:11

## 2018-03-20 RX ADMIN — Medication 10 ML: at 05:10

## 2018-03-20 RX ADMIN — OXYCODONE HYDROCHLORIDE AND ACETAMINOPHEN 2 TABLET: 5; 325 TABLET ORAL at 20:42

## 2018-03-20 RX ADMIN — SODIUM POLYSTYRENE SULFONATE 15 G: 15 SUSPENSION ORAL; RECTAL at 12:06

## 2018-03-20 RX ADMIN — OXYCODONE HYDROCHLORIDE AND ACETAMINOPHEN 2 TABLET: 5; 325 TABLET ORAL at 14:55

## 2018-03-20 RX ADMIN — INSULIN LISPRO 2 UNITS: 100 INJECTION, SOLUTION INTRAVENOUS; SUBCUTANEOUS at 13:25

## 2018-03-20 RX ADMIN — Medication 20 ML: at 13:26

## 2018-03-20 RX ADMIN — FAMOTIDINE 20 MG: 20 TABLET, FILM COATED ORAL at 08:33

## 2018-03-20 RX ADMIN — FOLIC ACID 1 MG: 1 TABLET ORAL at 08:33

## 2018-03-20 RX ADMIN — QUETIAPINE FUMARATE 100 MG: 100 TABLET ORAL at 08:33

## 2018-03-20 RX ADMIN — DIPHENHYDRAMINE HYDROCHLORIDE 50 MG: 25 CAPSULE ORAL at 04:27

## 2018-03-20 RX ADMIN — CLONIDINE HYDROCHLORIDE 0.2 MG: 0.1 TABLET ORAL at 13:25

## 2018-03-20 RX ADMIN — OXYCODONE HYDROCHLORIDE AND ACETAMINOPHEN 2 TABLET: 5; 325 TABLET ORAL at 10:46

## 2018-03-20 RX ADMIN — PIPERACILLIN SODIUM AND TAZOBACTAM SODIUM 3.38 G: .375; 3 INJECTION, POWDER, LYOPHILIZED, FOR SOLUTION INTRAVENOUS at 13:25

## 2018-03-20 RX ADMIN — DIPHENHYDRAMINE HYDROCHLORIDE 50 MG: 25 CAPSULE ORAL at 10:46

## 2018-03-20 RX ADMIN — CYANOCOBALAMIN TAB 500 MCG 1000 MCG: 500 TAB at 08:33

## 2018-03-20 RX ADMIN — FUROSEMIDE 40 MG: 10 INJECTION, SOLUTION INTRAMUSCULAR; INTRAVENOUS at 17:16

## 2018-03-20 RX ADMIN — AMLODIPINE BESYLATE 5 MG: 5 TABLET ORAL at 17:16

## 2018-03-20 RX ADMIN — CLONIDINE HYDROCHLORIDE 0.2 MG: 0.1 TABLET ORAL at 20:43

## 2018-03-20 RX ADMIN — DIPHENHYDRAMINE HYDROCHLORIDE 50 MG: 25 CAPSULE ORAL at 14:55

## 2018-03-20 RX ADMIN — DAPTOMYCIN 400 MG: 500 INJECTION, POWDER, LYOPHILIZED, FOR SOLUTION INTRAVENOUS at 21:40

## 2018-03-20 RX ADMIN — VENLAFAXINE 75 MG: 37.5 TABLET ORAL at 20:42

## 2018-03-20 RX ADMIN — PROMETHAZINE HYDROCHLORIDE 12.5 MG: 25 INJECTION INTRAMUSCULAR; INTRAVENOUS at 14:55

## 2018-03-20 RX ADMIN — Medication 10 ML: at 20:44

## 2018-03-20 RX ADMIN — DIPHENHYDRAMINE HYDROCHLORIDE 50 MG: 25 CAPSULE ORAL at 20:43

## 2018-03-20 RX ADMIN — DOCUSATE SODIUM 100 MG: 100 CAPSULE, LIQUID FILLED ORAL at 08:33

## 2018-03-20 RX ADMIN — SODIUM POLYSTYRENE SULFONATE 30 G: 15 SUSPENSION ORAL; RECTAL at 10:45

## 2018-03-20 RX ADMIN — CLONIDINE HYDROCHLORIDE 0.2 MG: 0.1 TABLET ORAL at 08:33

## 2018-03-20 RX ADMIN — PROMETHAZINE HYDROCHLORIDE 12.5 MG: 25 INJECTION INTRAMUSCULAR; INTRAVENOUS at 04:38

## 2018-03-20 RX ADMIN — THERA TABS 1 TABLET: TAB at 08:33

## 2018-03-20 RX ADMIN — CALCIUM 500 MG: 500 TABLET ORAL at 08:33

## 2018-03-20 RX ADMIN — OXYCODONE HYDROCHLORIDE AND ACETAMINOPHEN 2 TABLET: 5; 325 TABLET ORAL at 04:24

## 2018-03-20 RX ADMIN — INSULIN LISPRO 3 UNITS: 100 INJECTION, SOLUTION INTRAVENOUS; SUBCUTANEOUS at 22:47

## 2018-03-20 RX ADMIN — PIPERACILLIN SODIUM AND TAZOBACTAM SODIUM 3.38 G: .375; 3 INJECTION, POWDER, LYOPHILIZED, FOR SOLUTION INTRAVENOUS at 05:10

## 2018-03-20 RX ADMIN — PIPERACILLIN SODIUM AND TAZOBACTAM SODIUM 3.38 G: .375; 3 INJECTION, POWDER, LYOPHILIZED, FOR SOLUTION INTRAVENOUS at 22:48

## 2018-03-20 NOTE — PROGRESS NOTES
Problem: Mobility Impaired (Adult and Pediatric)  Goal: *Acute Goals and Plan of Care (Insert Text)  Physical Therapy Goals  Revised 3/20/2018  1. Patient will move from supine to sit and sit to supine  in bed with independence within 7 day(s). 2.  Patient will transfer from bed to chair and chair to bed with independence using the least restrictive device within 7 day(s). 3.  Patient will perform sit to stand with independence within 7 day(s). 4.  Patient will ambulate with supervision/set-up for 125 feet with the least restrictive device within 7 day(s). Physical Therapy Goals  Initiated 3/13/2018  1. Patient will move from supine to sit and sit to supine  in bed with independence within 7 day(s). 2.  Patient will transfer from bed to chair and chair to bed with modified independence using the least restrictive device within 7 day(s). 3.  Patient will perform sit to stand with modified independence within 7 day(s). 4.  Patient will ambulate with modified independence for 100 feet while maintaining left NWB with the least restrictive device within 7 day(s). physical Therapy TREATMENT: WEEKLY REASSESSMENT  Patient: Pastor Cabrera (7 y.o. female)  Date: 3/20/2018  Diagnosis: Osteomyelitis ([address]  diabetic foot debridement  OSTEOMYELITIS  infected left foot Osteomyelitis (HCC)  Procedure(s) (LRB):  INCISION AND DRAINAGE LEFT FOOT, REMOVAL NONVIABLE TISSUE AND BONE LEFT FOOT, DEEP TENDON TRANSFER TIBIALIS ANTERIOR LEFT FOOT, OPEN BIOPSIES LEFT FOOT, ADJACENT TRANSFER ARTERIOMYOFASCIOCUTANEOUS PLANTAR MEDIAL PEDICLE FLAP FOR DELAYED PRIMARY CLOSURE (Left)  PROXIMAL FOOT AMPUTATION LEFT FOOT (Left) 9 Days Post-Op  Precautions: NWB (left foot)  Chart, physical therapy assessment, plan of care and goals were reviewed. ASSESSMENT:  Patient continues to do well with mobility. Up in chair upon arrival.  Sit to stand with crutches and CGA.   Patient attempted to ambulate with crutches but feels very unsteady so walker brought for patient. Patient ambulated 61' with rolling walker and CGA while maintaining NWB left LE. Patient much more steady with walker but fatigues quickly. Three standing rest breaks taken while ambulating. Patient up in chair with LE's elevated at end of session. Recommend short term SNF at discharge. Patient's progression toward goals since last assessment: Working towards goals; new goals set. PLAN:  Goals have been updated based on progression since last assessment. Patient continues to benefit from skilled intervention to address the above impairments. Continue to follow the patient 3 times a week to address goals. Planned Interventions:  []              Bed Mobility Training             []       Neuromuscular Re-Education  []              Transfer Training                   []       Orthotic/Prosthetic Training  []              Gait Training                         []       Modalities  []              Therapeutic Exercises           []       Edema Management/Control  []              Therapeutic Activities            []       Patient and Family Training/Education  []              Other (comment):  Discharge Recommendations: [facility] Collier  Further Equipment Recommendations for Discharge: rolling walker (for home use)     SUBJECTIVE:   Patient stated I think I need the walker.     OBJECTIVE DATA SUMMARY:   Critical Behavior:  Neurologic State: Alert  Orientation Level: Oriented X4  Cognition: Appropriate decision making, Appropriate for age attention/concentration, Appropriate safety awareness, Follows commands  Safety/Judgement: Awareness of environment    Strength:                          Functional Mobility Training:  Bed Mobility:                    Transfers:  Sit to Stand: Contact guard assistance  Stand to Sit: Supervision                             Balance:  Sitting: Intact  Standing: Intact; With support  Standing - Static: Good;Constant support  Standing - Dynamic : Good (with rolling walker)  Ambulation/Gait Training:  Distance (ft): 60 Feet (ft)  Assistive Device: Gait belt;Walker, rolling  Ambulation - Level of Assistance: Stand-by assistance        Gait Abnormalities: Decreased step clearance     Left Side Weight Bearing: Non-weight bearing  Base of Support: Shift to right     Speed/[place]: Pace decreased (<100 feet/min)  Step Length: Right shortened                    Stairs:             Pain:  Pain Scale 1: Numeric (0 - 10)  Pain Intensity 1: 8  Pain Location 1: Foot  Pain Orientation 1: Left     Pain Intervention(s) 1: Medication (see MAR)  Activity Tolerance:   fair  Please refer to the flowsheet for vital signs taken during this treatment.   After treatment:   [x]  Patient left in no apparent distress sitting up in chair  []  Patient left in no apparent distress in bed  [x]  Call bell left within reach  [x]  Nursing notified  []  Caregiver present  []  Bed alarm activated    COMMUNICATION/COLLABORATION:   The patients plan of care was discussed with: Registered Nurse    Maryuri Vazquez PT   Time Calculation: 24 mins

## 2018-03-20 NOTE — PROGRESS NOTES
Infectious Disease Progress Note       Subjective:   Ms Melo Race seen today. Says she has had one episode diarrhea. Otherwise tolerating IV antibiotics. ROS: 10 point ROS obtained and pertinent positives include above. All others negative.          Objective:    Vitals:   Patient Vitals for the past 24 hrs:   Temp Pulse Resp BP SpO2   03/20/18 1236 98.4 °F (36.9 °C) 81 16 (!) 158/95 100 %   03/20/18 0800 98.8 °F (37.1 °C) 97 16 (!) 144/100 100 %   03/20/18 0512 98.1 °F (36.7 °C) 86 16 182/90 100 %   03/20/18 0412 98.2 °F (36.8 °C) 86 18 - 100 %   03/19/18 2332 98.2 °F (36.8 °C) 85 18 152/89 100 %   03/19/18 2013 97.7 °F (36.5 °C) 84 20 156/86 100 %   03/19/18 1717 98.5 °F (36.9 °C) 80 19 165/85 100 %       Physical Exam:    ¨ GEN: NAD, sitting in chair   ¨ HEENT:  PERRL, no scleral icterus,   no thrush, missing one upper tooth, dentition fair   ¨ CV: S1, S2 heard regularly  ¨ Lungs: Clear to auscultation bilaterally  ¨ Abdomen: soft, non distended, non tender, No CVA tenderness   ¨ Genitourinary: + escobedo  ¨ Extremities: + edema L foot, warm, + dressing over L foot, RLE no edema   ¨         above pictures obtained from Dr Evelyn Knott note dated 3/18/18      ¨ Neuro: Alert, oriented to self, place,  time,  and situation, moves all extremities to commands, verbal   ¨ Skin: no rash  ¨ Psych: good affect, good eye contact, non tearful   ¨ Lines: tunneled SC line   Medications:    Current Facility-Administered Medications:     piperacillin-tazobactam (ZOSYN) 3.375 g in  ml premix/cmpd, 3.375 g, IntraVENous, Q8H, Urmila Prieto DO, 3.375 g at 03/20/18 0510    promethazine (PHENERGAN) 12.5 mg in NS 50 mL IVPB, 12.5 mg, IntraVENous, Q4H PRN, Renetta Monge MD, Last Rate: 200 mL/hr at 03/20/18 0438, 12.5 mg at 03/20/18 0438    DAPTOmycin (CUBICIN) 400 mg in 0.9% sodium chloride 50 mL IVPB RF formulation, 400 mg, IntraVENous, Q48H, May Dahl MD, 400 mg at 03/18/18 2016    diphenhydrAMINE (BENADRYL) capsule 50 mg, 50 mg, Oral, Q6H PRN, Kameron [place], DPM, 50 mg at 03/20/18 1046    insulin glargine (LANTUS) injection 8 Units, 8 Units, SubCUTAneous, QHS, Devan Harrington MD, 8 Units at 03/19/18 2327    oxyCODONE-acetaminophen (PERCOCET) 5-325 mg per tablet 1-2 Tab, 1-2 Tab, Oral, Q4H PRN, Devan Harrington MD, 2 Tab at 03/20/18 1046    sodium chloride (NS) flush 10-40 mL, 10-40 mL, IntraVENous, Q8H, Elva Ponce MD, 10 mL at 03/20/18 0510    cloNIDine HCl (CATAPRES) tablet 0.2 mg, 0.2 mg, Oral, TID, Leilani Grande MD, 0.2 mg at 03/20/18 0833    venlafaxine (EFFEXOR) tablet 75 mg, 75 mg, Oral, BID WITH MEALS, Leilani Grande MD, 75 mg at 03/20/18 8195    QUEtiapine (SEROquel) tablet 100 mg, 100 mg, Oral, BID, Leilani Grande MD, 100 mg at 03/20/18 0833    famotidine (PEPCID) tablet 20 mg, 20 mg, Oral, DAILY, Leilani Grande MD, 20 mg at 03/20/18 5143    sodium chloride (NS) flush 5-10 mL, 5-10 mL, IntraVENous, Q8H, Fortunato Corral MD, 10 mL at 03/20/18 0511    sodium chloride (NS) flush 5-10 mL, 5-10 mL, IntraVENous, PRN, Fortunato Corral MD, 10 mL at 03/15/18 1135    acetaminophen (TYLENOL) tablet 650 mg, 650 mg, Oral, Q4H PRN, Rocio Méndez MD, 650 mg at 03/06/18 2123    docusate sodium (COLACE) capsule 100 mg, 100 mg, Oral, BID, Rocio Méndez MD, 100 mg at 03/20/18 8447    insulin lispro (HUMALOG) injection, , SubCUTAneous, AC&HS, Rocio Méndez MD, Stopped at 03/19/18 2200    glucose chewable tablet 16 g, 4 Tab, Oral, PRN, Rocio Méndez MD    dextrose (D50W) injection syrg 12.5-25 g, 12.5-25 g, IntraVENous, PRN, Rocio Méndez MD, 25 g at 03/06/18 1200    glucagon (GLUCAGEN) injection 1 mg, 1 mg, IntraMUSCular, PRN, Rocio Méndez MD    albuterol (PROVENTIL VENTOLIN) nebulizer solution 2.5 mg, 2.5 mg, Nebulization, Q4H PRN, Rocio Méndez MD    therapeutic multivitamin [facility]) tablet 1 Tab, 1 Tab, Oral, DAILY, Rocio Méndez MD, 1 Tab at 03/20/18 0509    cyanocobalamin (VITAMIN B12) tablet 1,000 mcg, 1,000 mcg, Oral, DAILY, Irvin Lara MD, 1,000 mcg at 03/20/18 7671    calcium carbonate (OS-BINA) tablet 500 mg [elemental], 500 mg, Oral, DAILY, Irvin Lara MD, 500 mg at 53/30/35 4690    folic acid (FOLVITE) tablet 1 mg, 1 mg, Oral, DAILY, Irvin Lara MD, 1 mg at 03/20/18 4151      Labs:  Recent Results (from the past 24 hour(s))   GLUCOSE, POC    Collection Time: 03/19/18  5:14 PM   Result Value Ref Range    Glucose (POC) 149 (H) 65 - 100 mg/dL    Performed by Vladimir HERNANDEZ(CON)    GLUCOSE, POC    Collection Time: 03/19/18 10:15 PM   Result Value Ref Range    Glucose (POC) 133 (H) 65 - 100 mg/dL    Performed by Miguel Simon (PCT)    METABOLIC PANEL, BASIC    Collection Time: 03/20/18  4:39 AM   Result Value Ref Range    Sodium 139 136 - 145 mmol/L    Potassium 5.7 (H) 3.5 - 5.1 mmol/L    Chloride 111 (H) 97 - 108 mmol/L    CO2 23 21 - 32 mmol/L    Anion gap 5 5 - 15 mmol/L    Glucose 143 (H) 65 - 100 mg/dL    BUN 38 (H) 6 - 20 MG/DL    Creatinine 2.84 (H) 0.55 - 1.02 MG/DL    BUN/Creatinine ratio 13 12 - 20      GFR est AA 22 (L) >60 ml/min/1.73m2    GFR est non-AA 19 (L) >60 ml/min/1.73m2    Calcium 8.3 (L) 8.5 - 10.1 MG/DL   GLUCOSE, POC    Collection Time: 03/20/18  8:20 AM   Result Value Ref Range    Glucose (POC) 115 (H) 65 - 100 mg/dL    Performed by [facility] SANDRA(CON)    METABOLIC PANEL, BASIC    Collection Time: 03/20/18 12:13 PM   Result Value Ref Range    Sodium 138 136 - 145 mmol/L    Potassium 5.9 (H) 3.5 - 5.1 mmol/L    Chloride 110 (H) 97 - 108 mmol/L    CO2 25 21 - 32 mmol/L    Anion gap 3 (L) 5 - 15 mmol/L    Glucose 150 (H) 65 - 100 mg/dL    BUN 40 (H) 6 - 20 MG/DL    Creatinine 2.97 (H) 0.55 - 1.02 MG/DL    BUN/Creatinine ratio 13 12 - 20      GFR est AA 21 (L) >60 ml/min/1.73m2    GFR est non-AA 18 (L) >60 ml/min/1.73m2    Calcium 8.2 (L) 8.5 - 10.1 MG/DL   GLUCOSE, POC    Collection Time: 03/20/18 12:20 PM   Result Value Ref Range    Glucose (POC) 156 (H) 65 - 100 mg/dL    Performed by Rosaline FLORES(CON)            Micro:         Wound   3/2/18  Component Value Ref Range & Units Status        Special Requests: NO SPECIAL REQUESTS    Final      GRAM STAIN OCCASIONAL WBCS SEEN    Final      GRAM STAIN      Final      1+ GRAM POSITIVE RODS (CORYNEFORM)      Culture result: HEAVY DIPHTHEROIDS (A)    Final      Culture result:      Final      LIGHT STAPHYLOCOCCUS SPECIES, COAGULASE NEGATIVE (A)        Result History             3/3/18  Component Value Ref Range & Units Status        Special Requests: NO SPECIAL REQUESTS    Final      GRAM STAIN NO WBC'S SEEN    Final      GRAM STAIN      Final      1+ GRAM POSITIVE COCCI IN PAIRS      Culture result:      Final      SCANT MIXED SKIN CHRIS ISOLATED        Result History                 Component Results       Component Value Ref Range & Units Status      Special Requests: NO SPECIAL REQUESTS    Final      Culture result: LIGHT   MIXED   ANAEROBIC GRAM NEGATIVE RODS   (   BETA LACTAMASE POSITIVE   ) AND   ANAEROBIC GRAM POSITIVE COCCI    (A)    Final        Result History              Component Results       Component Value Ref Range & Units Status      Special Requests: NO SPECIAL REQUESTS    Final      GRAM STAIN 1+ WBCS SEEN    Final      GRAM STAIN      Final      RARE GRAM POSITIVE COCCI IN PAIRS      Culture result: FEW MIXED SKIN CHRIS ISOLATED    Final        Result History       3/11/18   Labeled : Left foot deep wound           Special Requests: NO SPECIAL REQUESTS    Final        GRAM STAIN NO WBC'S SEEN    Final      GRAM STAIN NO ORGANISMS SEEN    Final      Culture result:      Final      NO GROWTH ON SOLID MEDIA AT 4 DAYS      Culture result:      Final      STAPHYLOCOCCUS EPIDERMIDIS ISOLATED FROM THIO BROTH ONLY (A)        Culture & Susceptibility                     Antibiotic    Organism Organism Organism          Staphylococcus epidermidis          AMPICILLIN ($)    <=2   ug/mL   R Final              AMPICILLIN/SULBACTAM ($)    <=8/4   ug/mL   S Final              CEFAZOLIN ($)    <=4   ug/mL   S Final              CIPROFLOXACIN ($)    2   ug/mL   I Final              CLINDAMYCIN ($)    <=0.5   ug/mL   S Final              ERYTHROMYCIN ($$$$)    >4   ug/mL   R Final              GENTAMICIN ($)    <=4   ug/mL   S Final              LEVOFLOXACIN ($)    2   ug/mL   S Final              LINEZOLID ($$$$$)    <=1   ug/mL   S Final              OXACILLIN    <=0.25   ug/mL   S Final              PENICILLIN G ($$)    8   ug/mL   R Final              RIFAMPIN ($$$$)    <=1   ug/mL   S Final              TETRACYCLINE    <=4   ug/mL   S Final              VANCOMYCIN ($)    2   ug/mL   S Final                                                         Special Requests: NO SPECIAL REQUESTS    Final        Culture result:      Final      NO GROWTH ON SOLID MEDIA AT 4 DAYS      Culture result:      Final      STAPHYLOCOCCUS EPIDERMIDIS (OXACILLIN RESISTANT) ISOLATED FROM THIO BROTH ONLY (A)        Culture & Susceptibility                     Antibiotic    Organism Organism Organism          Staphylococcus epidermidis          AMPICILLIN ($)    8   ug/mL   R Final              AMPICILLIN/SULBACTAM ($)    <=8/4   ug/mL   R Final              CEFAZOLIN ($)    <=4   ug/mL   R Final              CIPROFLOXACIN ($)    >2   ug/mL   R Final              CLINDAMYCIN ($)    >4   ug/mL   R Final              DAPTOMYCIN ($$$$$)    <=0.5   ug/mL   S Final              ERYTHROMYCIN ($$$$)    >4   ug/mL   R Final              GENTAMICIN ($)    <=4   ug/mL   S Final              LEVOFLOXACIN ($)    >4   ug/mL   R Final              LINEZOLID ($$$$$)    <=1   ug/mL   S Final              OXACILLIN    >2   ug/mL   R Final              PENICILLIN G ($$)    >8   ug/mL   R Final              RIFAMPIN ($$$$)    <=1   ug/mL   S Final              TETRACYCLINE    <=4   ug/mL   S Final              TRIMETH/SULFA    >2/38   ug/mL   R Final              VANCOMYCIN ($)    2   ug/mL   S Final                                                     Blood  3/2/18                 Component Results       Component Value Ref Range & Units Status      Special Requests: NO SPECIAL REQUESTS    Final      Culture result: NO GROWTH 5 DAYS    Final        Result History              Urine  3/2/18  Component Value Ref Range & Units Status        Special Requests: NO SPECIAL REQUESTS   Reflexed from [id number]      Final      [place] Count <1,000 CFU/ML    Final      Culture result: NO GROWTH 1 DAY    Final        Result History                   Imaging:   MRI L foot 3/2/18  FINDINGS: The evaluation for infection is partially limited secondary to lack of  IV contrast.      Bone marrow: There is extensive destruction in the midfoot with an osseous  fragment displaced superiorly measuring 1.3 cm. There is diffuse edema and  decreased T1 signal throughout the metatarsal bases, cuneiforms, cuboid, and  navicular.      Joint fluid:  None.      Tendons: Intact.      Muscles: Within normal limits.      Neurovascular bundles: Within normal limits.      Articular cartilage: There is destruction in the midfoot.      Soft tissues: There is a fluid collection in the lateral aspect of the midfoot  measuring 2.8 x 2.0 x 4.4 cm. This extends to the skin surface. A marker was  placed at this site. The fluid collection extends into the midfoot. An  additional fluid collection measuring 1.1 cm is seen below the base of the  second metatarsal. A 2.3 cm fluid collection is seen in between the first and  second metatarsals.      There is extensive subcutaneous edema surrounding the foot and ankle.      IMPRESSION  IMPRESSION:   1. Large fluid collection in the subcutaneous of the lateral midfoot extending  to the joint spaces of the midfoot. 2. Extensive destruction in the midfoot with abnormal signal. Given the presence  of the fluid collection tracking to this, septic arthritis is likely present.   There may be an element of Charcot arthropathy also present. 3. Small fluid collection underneath the proximal second metatarsal.  4. Small fluid collection in between the first and second metatarsal necks. 5. Extensive edema surrounding the foot and ankle.         L foot x ray 3/11/18  FINDINGS:  Three views of the left foot demonstrate interval amputation at the  level of the posterior midfoot. No other postoperative studies are available for  comparison. On current images, the margins of the navicular and cuboid are  poorly defined, suspicious for osteomyelitis.      IMPRESSION  IMPRESSION:  Prior amputations. Concern for osteomyelitis of the navicular and  Cuboid. .     L foot X ray 3/4/18  FINDINGS:  AP and lateral portable views of the left foot demonstrate decreased  soft tissue lateral to the base of the fifth metatarsal.  Increased soft tissue  gas appears to be packed with material's. Partial resection of the proximal  fifth metatarsal is new.  Widened distance between the second and third  metatarsals is unchanged. Neuropathic midfoot is unchanged.      IMPRESSION  IMPRESSION:        Postsurgical lateral midfoot soft tissues and partial resection of the base of  the fifth metatarsal.  Unchanged neuropathic midfoot and TMT joints.     L foot X ray 3/2/18  FINDINGS:  Three views of the left foot demonstrate fragmentation of the distal  medial margin of the navicular with proximal migration of the medial cuneiform. There is dorsal dislocation of the middle cuneiform into the dorsal soft  tissues. There is foreshortening of the second metatarsal. There is lateral  translation of the base of the third fourth and fifth metatarsals with  associated bony destructive change. There is a chronic fracture through the base  of the fifth metatarsal with chronic periostitis and associated cortical bone  loss. This is adjacent to the area of soft tissue swelling and ulceration. There  is gas within the soft tissues dorsally. Melvi Lewis Gibraltarian Pillar     IMPRESSION  IMPRESSION:    1. Findings compatible with Charcot changes of the midfoot with marked  fragmentation and disorganization. In addition there is a chronic appearing  fracture base of the fifth metatarsal with adjacent soft tissue ulcer. Loss of  cortical margin is concerning for superimposed osteomyelitis.     CXR 3/2/18  FINDINGS: Hypoventilatory exam. No lobar consolidation, pleural effusion, or  pneumothorax.  Normal cardiomediastinal silhouette.  No acute or aggressive  osseous lesion.      IMPRESSION  IMPRESSION: No evidence of an acute cardiopulmonary process.        Pathology Results:  3/11/18      FINAL PATHOLOGIC DIAGNOSIS   1. Left forefoot, forefoot amputation:   Acute osteomyelitis of metatarsal bone. Deformed foot, clinically Charcot deformity. 2. Proximal margin cuboid bone left foot, biopsy:   Exudate suggestive of wound base with fibrosis and bony remodeling. Few acute inflammatory cells present at presumed margin, however, insufficient for a diagnosis of acute osteomyelitis margin.         Procedures:   3/3/18 LEFT FOOT INCISION AND DRAINAGE     3/7/18 LEFT FOOT DEBRIDEMENT     3/11/18   INCISION AND DRAINAGE LEFT FOOT, REMOVAL NONVIABLE TISSUE AND BONE LEFT FOOT  PROXIMAL FOOT AMPUTATION LEFT FOOT  DEEP TENDON TRANSFER TIBIALIS ANTERIOR LEFT FOOT  OPEN BONE BIOPSIES LEFT FOOT  ADJACENT TRANSFER ARTERIOMYOFASCIOCUTANEOUS PLANTAR MEDIAL PEDICLE FLAP FOR DELAYED PRIMARY CLOSURE.      3/6/18 Successful placement of right subclavian tunneled long-term central venous  Catheter.      Assessment / Plan:       Ms Gennaro Serra is a 42 yr old lady with hx of Sickle Cell ( she says disease documented as trait), DM, gastroparesis S/P hx of pacer insertion and removal, Gastric bypass, seizures , HTN who noticed an ulcer in lateral foot about a month or two ago. She reports also having had a DVT in that LE about 1-2 years go and was on Lovenox + coumadin for treatment at that time.   She has been in a [facility] for about a month per her. Soon after noticing ulcer, she reports that she wound had discharge, denied foul odor. Says her leg started swelling as well and was warm. Denied erythema. Denied any trauma to legs. Denied sensory issues to LE and says she can feel trauma to her leg if it happened. Denied any animal or insect bites. Denied antibiotic use PO prior to admission.       She was [facility] to [facility] 3[facility]/18 with L foot ulcer/drainage. X ray done showed \"Findings compatible with Charcot changes of the midfoot with marked  fragmentation and disorganization. In addition there is a chronic appearing fracture base of the fifth metatarsal with adjacent soft tissue ulcer. Loss of cortical margin is concerning for superimposed osteomyelitis\". She was started on Vancomycin and Zosyn per H and P. Vascular surgery consulted on 3/2 and their initial impression indicates \" MRI/xray is suggestive of osteomyelitis of the mid foot which will make limb salvage challenging if that is the case\". Taken to the OR 3/3 for I and D and findings indicate \" Advanced infection with purulent drainage going into the midfoot\" on brief operative report.   Detailed operative report indicates, \" On the distal fifth metatarsal there was a large wound with purulent drainage.  This was excised and I immediately encountered a large amount of purulent drainage.  This drainage was sent for culture and swabs.  After I excised all the skin, soft tissue, and muscle, there was a necrotic fifth metatarsal and fourth metatarsal bone as well as the cuboid bone\".     On 3/7/18, noted another debridement done and operative notes indicate \"With manipulation, I could not express any further purulent drainage.  There was what appeared to be nonviable 4th and 5th metatarsals as well as possible mid foot bones.  Therefore, I used a rongeur to debride back these until there was healthy bone, which it did so on the 4th and 5th metatarsal rays.  I also believe I resected the base of the third metatarsal as well through my incision.  Part of the cuboid bone appeared to be so necrotic, so this was partly debrided, but was overall healthy\".       On 3/11/18, had I and D to L foot , removal of non viable tissue and bone L foot, proximal foot amputation, deep tendon transfer tibialis anterior left foot, adjacent transfer arteriotomyofasciocutaneous plantar medial pedicle flap for delayed primary closure. Cultures taken from deep tissue noted to be + for S hominus and also Coag Negative Staph (MRSE) in thio broth from 3/11/18. Operative report indicates \"going from distal to proximal, a pocket of purulent drainage was encountered between the first and the second metatarsals within the first interspace.  This was noted within the soft tissues but was not identified within the bone.  The nonviable soft tissue was debulked, debrided and removed.  The purulent drainage was carefully milked from the soft tissues compressing the foot from proximal to distal.  Once the most significant of the purulent drainage was released, a deep wound culture to the area was then taken\". \" Moving proximally, the base of the remaining metatarsals as well as the cuneiforms showed extremely eroded bone quality.  The bone was soft, and there was significant destruction of the articular cartilage throughout\". \" At this time, the intermediate and lateral cuneiforms were also identified and resected and removed from the surgical field as again this bone showed significant bony erosion with broken cortices and nonviable bleeding.  It should, however, be noted that at the more proximal portion of the bone, there was no purulent drainage identified.  The only area of purulence that was noted was within the first interspace distally from the bases of the metatarsals, and this was only noted within the soft tissues.  \"It was noted that resection of the distal aspect of the cuboid was necessary to allow for rotation of the plantar medial pedicle.  Utilizing a sagittal saw, the distal aspect of the cuboid, which did show articular cartilage and had more normal appearing anatomy with no noted articular destruction, was carefully resected.  This bone did appear to have Charcot type changes; however, again no further purulent drainage was noted proximally, and the surrounding soft tissue was clean and had normal appearing anatomy. \"     It is noted that Daptomycin IV started on 3/16/18. I am consulted today regarding further antibiotic recommendations.          1) L foot diabetic foot wound with charcot changes  Concern for osteomyelitis     S/P I and D  3/3 and 3/7/18 , operative reports reviewed as above ( highlights noted ) . Cultures from 3/3 with mixed anaerobic gram negative rods, Beta lactamse + and anaerobic G+C  S/P and D to L foot , removal of non viable tissue and bone L foot, proximal foot amputation, deep tendon transfer tibialis anterior left foot, adjacent transfer arteriotomyofasciocutaneous plantar medial pedicle flap for delayed primary closure on 3/11/189. Cultures wt MRSE in thio, and S hominus. Pathology margins \"Few acute inflammatory cells present at presumed margin, however, insufficient for a diagnosis of acute osteomyelitis margin\"     Review of antibiotics so far:  Daptomycin since 3/5/18  Levofloxacin ?  3/16  Zosyn 3/2-3/15/18  Vancomycin 3/2-3/5      Recommendations:    Complicated scenario given multiple I and D's and surgeries as mentioned above with findings   Additional complication wt Charcot disease as it mimics many findings that could be present in  Infection as well   Cultures may not be all that reliable from 3/11 given prolonged antibiotics prior including vanc 3/2-3/5 and Zosyn 3/2-3/15  Would recommend treatment with IV antibiotics ( 6 total weeks from last surgery on 3/11/18 ) as it not entirely sure that there is no residual infection   At present on Daptomycin IV ( would dose at 8 mg/KG) if able to tolerate   Vancomycin IV is a reasonable choice as well with close renal monitoring instead of Daptomycin if cost is an issues on discharge planning  Given concerns of polymicrobial infection wt mixed GNR and anaerobes in 3/3/18 cultures would also recommend covering for this given complicated scenario     Option 1: Vancomycin + Zosyn IV renally dosed (dosing per pharmacy)   With this option check weekly CBC wt diff, CMP, ESR and CRP , Vancomycin trough levels ( goal trough level 15-20)  Plan for 6 weeks till 4/22/18   Side effects include GI, renal and risk for CDI, seizures given history, worsening anemia wt hx of Sickle cell as well      Option 2: Daptomycin IV 8mg/KG, + Zosyn renally dosed   With this option, check weekly CBC wt diff, CMP, CK, ESR and CRP  Plan for 6 weeks till 4/22/18  Side effects include GI, renal and risk for CDI, seizures given history, worsening anemia wt hx of Sickle cell as well   Additionally side effect includes rhabdomyolysis and myalgias   Avoid statin with Daptomycin   Would be difficult to distinguish if she has myalgias from Daptomycin or Sickle cell crisis if on Daptomycin IV   Cost is also more on Daptomycin IV which may be a limiting factor for discharge planning      Weight bearing status, would care per surgical teams     I am concerned about her ability to safely administer IV antibiotics after discussion with her. Will need case management and all teams collaborative efforts to assess safety and reliability of IV access on discharge. If greater than 3 loose stools in 24 hours, will need to consider CDI evaluation. Please avoid stool softners/laxatives.   Add daily yogurt to diet and or probiotic daily      2) Sickle cell disease :   Per primary team      3) Hx of gasteroparesis: S/P pacer insertion and removal   S/P bypass per patient      4) Hx of DVT LLE per patient      5) DM   Optimization helps in wound healing and infection control      6) Screening:   Patient reports HIV and TB screen negative ( done in custodial per her recently)  Hep C Ab screening with antibody pending results      7) DVT px per primary team       Thank you for the opportunity to participate in the care of this patient. Please contact with questions or concerns.       Urmila Prieto DO   1:05 PM

## 2018-03-20 NOTE — PROGRESS NOTES
Bill Gloria     NAME:Simona Andradevidal  VDN:[id number]   :1980   -                  PT SEEN AND EXAMINED    HYPERKALEMIA  CKD    PLAN  PO KAYEXALATE 39 GRAM TOTAL  BMP THIS EVENING      Michele Lacy, [address] [facility]  [facility]  [place] [address], [place]  [place], [address] [facility]  Phone - [phone]         Fax - [phone] [facility] Office  [address] [place] [address]  Phone - [phone]        Fax - [phone]     www. [facility]

## 2018-03-20 NOTE — PROGRESS NOTES
Hospitalist Progress Note    NAME: Jose Caraballo   :  1980   MRN:  [id number]       Assessment / Plan:  Severe sepsis POA due to left foot diabetic foot infection, ? osteomyelitis   Excisional debridement and biopsy done on 3/3, for repeat surgery 3/7/2018  LLE doppler with no deep venous thrombosis identified. MRI left foot multiple fluid collections, significant bone destruction  ESR -> 130->100  CRP 34-> 14  Midline placed 3/6/18  Partial foot amputation 3/11/201  Dr Memo Hester to follow as well  wound cultures from 3/11 growing MRSE x 2 species and and [place]  Appreciate ID consult  Cont Daptomycin for now and zosyn was added by ID 3/19  Based on ID recommendations will consider vancomycin and zosyn at the time of discharge for 6 weeks until 18 since that might be the best option for her considering everything  Consult case management for options  Her compliance is also questionable since she cam from halfway and sending her home with a PICC line for home iv abx may not be safe    DM type 1 uncontrolled with nephropathy POA  Hypoglycemia 3/6/2018 resolved  , 133, 149  Lantus 8 units dt hypoglycemia 3/18  lispro sliding scale  Last HgBa1c 8.8    Acute kidney injury POA peak creatinine 3.80  Stage 4 chronic kidney disease POA baseline creatinine 2.4 to 2.7  been on dialysis in the past   Previously has seen Ruben. Creatinine 2.8 today      Hyperkalemia  K upto 5.7 today, give kayexalate, renal consult    Acute on chronic anemia due to sickle cell disease POA    Pt without sx of crisis at this time  S/p 1u pRBCs  folic acid  Follow Hb     THU POA  Uses 2 LPM O2 at night    Disposition: Home vs In pt facility for iv Abx due to questionable non compliance     Code Status: Full    DVT Prophylaxis: heparin    Body mass index is 25.97 kg/(m^2). Subjective:   She does not report pain. No fever or diarrhea.     Review of Systems:  Symptom Y/N Comments  Symptom Y/N Comments Fever/Chills n   Chest Pain n    Poor Appetite    Edema n    Cough n   Abdominal Pain n    Sputum    Joint Pain     SOB/ADAMS n   Pruritis/Rash     Nausea/vomit n   Tolerating PT/OT     Diarrhea n   Tolerating Diet y    Constipation    Other       Could NOT obtain due to:      Objective:     VITALS:   Last 24hrs VS reviewed since prior progress note. Most recent are:  Patient Vitals for the past 24 hrs:   Temp Pulse Resp BP SpO2   03/20/18 0512 98.1 °F (36.7 °C) 86 16 182/90 100 %   03/20/18 0412 98.2 °F (36.8 °C) 86 18 - 100 %   03/19/18 2332 98.2 °F (36.8 °C) 85 18 152/89 100 %   03/19/18 2013 97.7 °F (36.5 °C) 84 20 156/86 100 %   03/19/18 1717 98.5 °F (36.9 °C) 80 19 165/85 100 %   03/19/18 1126 98.2 °F (36.8 °C) 76 16 127/62 100 %       Intake/Output Summary (Last 24 hours) at 03/20/18 0928  Last data filed at 03/20/18 3378   Gross per 24 hour   Intake              350 ml   Output             4050 ml   Net            -3700 ml        PHYSICAL EXAM:  General: Alert and awake  Resp:  CTA bilaterally, no wheezing or rales. No accessory muscle use  CV:  Regular  rhythm,  No edema  GI:  Soft, Non distended, Non tender.  +Bowel sounds  Neurologic:  Alert and oriented X 3, normal speech,   Psych:   Not anxious nor agitated  Skin:  L foot bandaged    Reviewed most current lab test results and cultures  YES  Reviewed most current radiology test results   YES  Review and summation of old records today    NO  Reviewed patient's current orders and MAR    YES  PMH/SH reviewed - no change compared to H&P  ________________________________________________________________________  Care Plan discussed with:    Comments   Patient x    Family      RN x    Care Manager     Consultant                        Multidiciplinary team rounds were held today with , nursing, pharmacist and clinical coordinator. Patient's plan of care was discussed; medications were reviewed and discharge planning was addressed. ________________________________________________________________________  Total NON critical care TIME:  25   Minutes    Total CRITICAL CARE TIME Spent:   Minutes non procedure based      Comments   >50% of visit spent in counseling and coordination of care     ________________________________________________________________________  Elodia Quintanilla MD     Procedures: see electronic medical records for all procedures/Xrays and details which were not copied into this note but were reviewed prior to creation of Plan. LABS:  I reviewed today's most current labs and imaging studies.   Pertinent labs include:  Recent Labs      03/19/18   0520   WBC  5.8   HGB  7.1*   HCT  22.5*   PLT  321     Recent Labs      03/20/18   0439  03/19/18   0520  03/18/18   0246   NA  139  140  141   K  5.7*  5.2*  5.4*   CL  111*  111*  111*   CO2  23  23  23   GLU  143*  116*  130*   BUN  38*  38*  41*   CREA  2.84*  2.72*  3.05*   CA  8.3*  8.0*  7.8*       Signed: Elodia Quintanilla MD

## 2018-03-20 NOTE — PROGRESS NOTES
General Surgery End of Shift Nursing Note    Bedside shift change report given to [facility], RN (oncoming nurse) by Sloan RN (offgoing nurse). Report included the following information SBAR, Kardex, Intake/Output, MAR and Recent Results. Shift worked:   7a-7p   Summary of shift:       Issues for physician to address:        Number times ambulated in hallway past shift: 1    Number of times OOB to chair past shift: all day    Pain Management:  Current medication: Percocet  Patient states pain is manageable on current pain medication: YES    GI:    Current diet:  DIET DIABETIC CONSISTENT CARB Regular    Tolerating current diet: YES  Passing flatus: YES  Last Bowel Movement: today   Appearance:     Respiratory:    Incentive Spirometer at bedside: YES  Patient instructed on use: YES    Patient Safety:    Falls Score: 1  Bed Alarm On? No  Sitter?  No    Mary Carson

## 2018-03-20 NOTE — PROGRESS NOTES
Problem: Falls - Risk of  Goal: *Absence of Falls  Document Blake Fall Risk and appropriate interventions in the flowsheet.    Outcome: Progressing Towards Goal  Fall Risk Interventions:  Mobility Interventions: Patient to call before getting OOB         Medication Interventions: Patient to call before getting OOB, Teach patient to arise slowly    Elimination Interventions: Patient to call for help with toileting needs    History of Falls Interventions: Bed/chair exit alarm

## 2018-03-20 NOTE — CONSULTS
Nephrology Consult Note     Bill Gloria     www. [facility]              Phone - [phone]   Patient: Keyanna Doll   YOB: 1980    Date- 3/20/2018  MRN: [id number]   CONSULTING PHYSICIAN:          REASON FOR CONSULTATION: hyperkalemia  ADMIT DATE:3/2/2018 PATIENT Kendell Syed MD     ASSESSMENT:   · Hyperkalemia due to non compliance to diet and ckd  · ARF- CR. OF 3.0 IS NEW BASELINE. Due to progression of ckd  · ckd 4 due to dm,sickle cell - CR 1.7-2.5 IN 2017  · H/o urinary retention  · H/o sickle cell disease  · H/o unconrolled hypertension  · H/o cocaine abuse in past  · Dm 2  · Anemia  · Left diabetic foot   Principal Problem:    Osteomyelitis ([facility] (3/2/2018)    Active Problems:    Sickle cell anemia (HCC) (3/5/2018)      PLAN:    kayexalate 45 gram po now  Avoid high k food  Repeat labs this afternoon  Give lasix 40 mg iv now. Avoid NSAIDS   continue clonidine  If bp high add norvasc. [x] High complexity decision making was performed  [x] Patient is at high-risk of decompensation with multiple organ involvement    Subjective:   HPI: Keyanna Doll is a 42 y.o.  female. She is admitted with left foot infection. She is in the hospital since 3-2-18  She is found to have hyperkalemia with k 5.7  She has been drinking orange juice  She is not on low k diet  She is not on acei or arb  Her cr. Is close to 3.0 this admission  She has cr. 1.7-2.5 in past   She required dialysis in 2011 for arf. Review of Systems:  A 11 point review of system was performed today. Pertinent positives and negatives are mentioned in the HPI. The reminder of the ROS is negative and noncontributory.   Past Medical Hx:   Past Medical History:   Diagnosis Date    ARF (acute renal failure) ([address] requiring dialysis 2011    Asthma     CKD (chronic kidney disease)     Diabetes ([address]     Gastroparesis 2010    Gastric Pacer- REMOVED 2015    GERD (gastroesophageal reflux disease)     Hypertension     Narcotic dependence ([facility]     THU (obstructive sleep apnea)     wears 2 LPM oxygen at night    Other ill-defined conditions(799.89)     Polycystic ovarian syndrome     Seizures (HCC)     Sickle cell trait ([facility]     Thromboembolus ([facility] to her left arm and was told she had one in left leg recently        Past Surgical Hx:     Past Surgical History:   Procedure Laterality Date    HX CATARACT REMOVAL  3/5/12    right    HX DILATION AND CURETTAGE      ablation    HX GASTRIC BYPASS      HX GI      j tube placement and removal    HX OTHER SURGICAL  2010    Gastric Pacer- REMOVED 2015    HX VASCULAR ACCESS      gray cath rt subclavian    HX VASCULAR ACCESS      HD access right thigh      Social Hx:  reports that she has never smoked. She has never used smokeless tobacco. She reports that she does not drink alcohol or use illicit drugs. Family History   Problem Relation Age of Onset    Cancer Mother      lung    Hypertension Mother     Cancer Father      kidney    Stroke Father      3 strokes: 59-72    Heart Disease Father 72     CABG    Hypertension Father     Cancer Sister      pancreatic    Cancer Maternal Aunt      breast    Cancer Paternal Aunt      breast    Schizophrenia Sister      was in [address], now ass't living    Other Sister       AIDS    Other Other      nephew of AIDS     Medications:  Prior to Admission medications    Medication Sig Start Date End Date Taking? Authorizing Provider   venlafaxine [facility]) 75 mg tablet Take 25 mg by mouth two (2) times a day. Indications: major depressive disorder   Yes Historical Provider   cloNIDine HCl (CATAPRES) 0.2 mg tablet Take 0.2 mg by mouth three (3) times daily. Indications: hypertension   Yes Historical Provider   insulin glargine (LANTUS U-100 INSULIN) 100 unit/mL injection 12 Units by SubCUTAneous route nightly.    Yes Historical Provider Cholecalciferol, Vitamin D3, 50,000 unit cap Take 1 Cap by mouth every seven (7) days. Yes Historical Provider   promethazine (PHENERGAN) 25 mg tablet Take 2 Tabs by mouth every six (6) hours as needed for Nausea. Patient taking differently: Take 50 mg by mouth every eight (8) hours as needed for Nausea. 5/23/16  Yes Long Paz MD   diphenhydrAMINE (BENADRYL) 25 mg capsule Take 25-50 mg by mouth every six (6) hours as needed. Yes Historical Provider   folic acid 631 mcg tablet Take 400 mcg by mouth daily. Yes Historical Provider   calcium carbonate (OS-BINA) 500 mg calcium (1,250 mg) tablet Take 1 Tab by mouth daily. Yes Historical Provider   vitamin E (AQUA GEMS) 400 unit capsule Take 800 Units by mouth daily. Yes Historical Provider   ferrous sulfate 325 mg (65 mg iron) tablet Take 325 mg by mouth two (2) times a day. Yes Historical Provider   Potassium Gluconate 595 mg (99 mg) tablet Take 595 mg by mouth daily. Yes Historical Provider   senna-docusate (PERICOLACE) 8.6-50 mg per tablet Take 1 Tab by mouth two (2) times a day. 5/14/16  Yes Mark Hutchison NP   oxyCODONE-acetaminophen (PERCOCET) 5-325 mg per tablet Take 2 Tabs by mouth every four (4) hours as needed for Pain. Yes Historical Provider   polyethylene glycol (MIRALAX) 17 gram packet Take 17 g by mouth three (3) times daily as needed. Yes Historical Provider   therapeutic multivitamin (THERAGRAN) tablet Take 1 Tab by mouth daily. Yes Historical Provider   QUEtiapine (SEROQUEL) 100 mg tablet Take 100 mg by mouth two (2) times a day. Yes Historical Provider   famotidine (PEPCID) 20 mg tablet Take 20 mg by mouth two (2) times a day. Yes Historical Provider   baclofen (LIORESAL) 10 mg tablet Take 20 mg by mouth three (3) times daily. (dose = 2 x 10 mg tablet)   Yes Historical Provider   albuterol (PROVENTIL VENTOLIN) 2.5 mg /3 mL (0.083 %) nebulizer solution 2.5 mg by Nebulization route every four (4) hours as needed.    Yes Historical Provider   insulin NPH (NOVOLIN N) 100 unit/mL injection 15 Units by SubCUTAneous route two (2) times a day. 5/2/17   Kavya Parikh MD   insulin lispro (HUMALOG) 100 unit/mL injection 5 Units by SubCUTAneous route Before breakfast, lunch, and dinner. 5/2/17   Kavya Parikh MD   cyanocobalamin 1,000 mcg tablet Take 1,000 mcg by mouth daily. Historical Provider   DULoxetine (CYMBALTA) 60 mg capsule Take 60 mg by mouth two (2) times a day. Historical Provider   HYDROmorphone (DILAUDID) 2 mg tablet Take 2 mg by mouth every four (4) hours as needed for Pain.     Ryan Lozano MD     Allergies   Allergen Reactions    Erythromycin Itching    Morphine Itching      Objective:    Vitals:    Vitals:    03/20/18 0512 03/20/18 0800 03/20/18 1236 03/20/18 1609   BP: 182/90 (!) 144/100 (!) 158/95 167/84   Pulse: 86 97 81 78   Resp: 16 16 16 16   Temp: 98.1 °F (36.7 °C) 98.8 °F (37.1 °C) 98.4 °F (36.9 °C) 97.9 °F (36.6 °C)   SpO2: 100% 100% 100% 100%   Weight:       Height:         I&O's:  03/19 0701 - 03/20 0700  In: 350 [I.V.:350]  Out: 4050 [Urine:4050]  Physical Exam:  General:Alert, No distress,   Eyes:No scleral icterus, No conjunctival pallor  Neck:Supple,no mass palpable,no thyromegaly  Lungs:Clears to auscultation Bilaterally, normal respiratory effort  CVS:RRR, S1 S2 normal,  No rub,  Abdomen:Soft, Non tender, No hepatosplenomegaly  Extremities: + LE edema, left foot dressing +  Skin:No rash or lesions, Warm and DRY   Psych: appropriate affect    :  [place]   Musculoskeletal : no redness, no joint tenderness  NEURO: Normal Speech, Non focal    CODE STATUS:  full  Care Plan discussed with:  patient     Chart reviewed.      TOTAL TIME: 79 Minutes   y Reviewed previous records   y Discussion with patient and/or family and questions answered   y >50% of visit spent in counseling and coordination of care        ECG[de-identified] Rev:yes  Xray/CT/US/MRI REV:yes  Lab Data Personally Reviewed: (see below)  Recent Labs 03/20/18   1213  03/20/18   0439  03/19/18   0520   NA  138  139  140   K  5.9*  5.7*  5.2*   CL  110*  111*  111*   CO2  25  23  23   GLU  150*  143*  116*   BUN  40*  38*  38*   CREA  2.97*  2.84*  2.72*   CA  8.2*  8.3*  8.0*     Recent Labs      03/19/18   0520   WBC  5.8   HGB  7.1*   HCT  22.5*   PLT  321     Lab Results   Component Value Date/Time    Color YELLOW/STRAW 03/02/2018 10:50 PM    Appearance CLOUDY (A) 03/02/2018 10:50 PM    Specific gravity 1.017 03/02/2018 10:50 PM    Specific gravity 1.015 07/26/2010 11:18 AM    pH (UA) 5.0 03/02/2018 10:50 PM    Protein 100 (A) 03/02/2018 10:50 PM    Glucose NEGATIVE  03/02/2018 10:50 PM    Ketone NEGATIVE  03/02/2018 10:50 PM    Bilirubin NEGATIVE  03/02/2018 10:50 PM    Urobilinogen 1.0 03/02/2018 10:50 PM    Nitrites NEGATIVE  03/02/2018 10:50 PM    Leukocyte Esterase NEGATIVE  03/02/2018 10:50 PM    Epithelial cells MANY (A) 03/02/2018 10:50 PM    Bacteria 3+ (A) 03/02/2018 10:50 PM    WBC 5-10 03/02/2018 10:50 PM    RBC 0-5 03/02/2018 10:50 PM     Lab Results   Component Value Date/Time    Culture result: FEW STAPHYLOCOCCUS HOMINIS SUBSPECIES HOMINIS (A) 03/11/2018 12:47 PM    Culture result: (A) 03/11/2018 12:47 PM     STAPHYLOCOCCUS EPIDERMIDIS (OXACILLIN RESISTANT) ISOLATED FROM THIO BROTH ONLY    Culture result: NO ANAEROBES ISOLATED 03/11/2018 12:47 PM     Prior to Admission Medications   Prescriptions Last Dose Informant Patient Reported? Taking? Cholecalciferol, Vitamin D3, 50,000 unit cap 2/2/2018 at Unknown time  Yes Yes   Sig: Take 1 Cap by mouth every seven (7) days. DULoxetine (CYMBALTA) 60 mg capsule Not Taking at Unknown time  Yes No   Sig: Take 60 mg by mouth two (2) times a day. HYDROmorphone (DILAUDID) 2 mg tablet 11/2/2017  Yes No   Sig: Take 2 mg by mouth every four (4) hours as needed for Pain. Potassium Gluconate 595 mg (99 mg) tablet 2/2/2018 at Unknown time  Yes Yes   Sig: Take 595 mg by mouth daily.    QUEtiapine (SEROQUEL) 100 mg tablet 3/1/2018 at Unknown time  Yes Yes   Sig: Take 100 mg by mouth two (2) times a day. albuterol (PROVENTIL VENTOLIN) 2.5 mg /3 mL (0.083 %) nebulizer solution 2018 at Unknown time  Yes Yes   Si.5 mg by Nebulization route every four (4) hours as needed. baclofen (LIORESAL) 10 mg tablet 2018 at Unknown time  Yes Yes   Sig: Take 20 mg by mouth three (3) times daily. (dose = 2 x 10 mg tablet)   calcium carbonate (OS-BINA) 500 mg calcium (1,250 mg) tablet 2018 at Unknown time  Yes Yes   Sig: Take 1 Tab by mouth daily. cloNIDine HCl (CATAPRES) 0.2 mg tablet 2018 at Unknown time  Yes Yes   Sig: Take 0.2 mg by mouth three (3) times daily. Indications: hypertension   cyanocobalamin 1,000 mcg tablet 2017 at Unknown time  Yes No   Sig: Take 1,000 mcg by mouth daily. diphenhydrAMINE (BENADRYL) 25 mg capsule 2018 at Unknown time  Yes Yes   Sig: Take 25-50 mg by mouth every six (6) hours as needed. famotidine (PEPCID) 20 mg tablet 3/2/2018 at Unknown time  Yes Yes   Sig: Take 20 mg by mouth two (2) times a day. ferrous sulfate 325 mg (65 mg iron) tablet 2018 at Unknown time  Yes Yes   Sig: Take 325 mg by mouth two (2) times a day. folic acid 348 mcg tablet 2018 at Unknown time  Yes Yes   Sig: Take 400 mcg by mouth daily. insulin NPH (NOVOLIN N) 100 unit/mL injection Not Taking at Unknown time  No No   Sig: 15 Units by SubCUTAneous route two (2) times a day. insulin glargine (LANTUS U-100 INSULIN) 100 unit/mL injection 3/1/2018 at Unknown time  Yes Yes   Si Units by SubCUTAneous route nightly. insulin lispro (HUMALOG) 100 unit/mL injection Not Taking at Unknown time  No No   Si Units by SubCUTAneous route Before breakfast, lunch, and dinner.    oxyCODONE-acetaminophen (PERCOCET) 5-325 mg per tablet 3/2/2018 at Unknown time  Yes Yes   Sig: Take 2 Tabs by mouth every four (4) hours as needed for Pain.   polyethylene glycol (MIRALAX) 17 gram packet 2018 at Unknown time  Yes Yes   Sig: Take 17 g by mouth three (3) times daily as needed. promethazine (PHENERGAN) 25 mg tablet 3/2/2018 at 0800  No Yes   Sig: Take 2 Tabs by mouth every six (6) hours as needed for Nausea. Patient taking differently: Take 50 mg by mouth every eight (8) hours as needed for Nausea. senna-docusate (PERICOLACE) 8.6-50 mg per tablet 3/2/2018 at Unknown time  No Yes   Sig: Take 1 Tab by mouth two (2) times a day. therapeutic multivitamin (THERAGRAN) tablet 3/1/2018 at Unknown time  Yes Yes   Sig: Take 1 Tab by mouth daily. venlafaxine (EFFEXOR) 75 mg tablet 2/24/2018 at Unknown time  Yes Yes   Sig: Take 25 mg by mouth two (2) times a day. Indications: major depressive disorder   vitamin E (AQUA GEMS) 400 unit capsule 2/2/2018 at Unknown time  Yes Yes   Sig: Take 800 Units by mouth daily. Facility-Administered Medications: None       Medications list Personally Reviewed   [x]      Yes     []               No    Thank you for allowing us to participate in the care this patient. We will follow patient with you. Signed By: Clare Arroyo MD  [facility]  [facility] [address]  [place], [address]  Phone - [phone]         Fax - [phone] [facility] Office  [address] [place] [address]  Phone - [phone]        Fax - [phone]     [email or web address]. [email or web address]

## 2018-03-21 LAB
ANION GAP SERPL CALC-SCNC: 6 MMOL/L (ref 5–15)
BUN SERPL-MCNC: 40 MG/DL (ref 6–20)
BUN/CREAT SERPL: 14 (ref 12–20)
CALCIUM SERPL-MCNC: 8 MG/DL (ref 8.5–10.1)
CHLORIDE SERPL-SCNC: 111 MMOL/L (ref 97–108)
CO2 SERPL-SCNC: 24 MMOL/L (ref 21–32)
CREAT SERPL-MCNC: 2.89 MG/DL (ref 0.55–1.02)
ERYTHROCYTE [DISTWIDTH] IN BLOOD BY AUTOMATED COUNT: 21.5 % (ref 11.5–14.5)
GLUCOSE BLD STRIP.AUTO-MCNC: 122 MG/DL (ref 65–100)
GLUCOSE BLD STRIP.AUTO-MCNC: 296 MG/DL (ref 65–100)
GLUCOSE BLD STRIP.AUTO-MCNC: 78 MG/DL (ref 65–100)
GLUCOSE BLD STRIP.AUTO-MCNC: 83 MG/DL (ref 65–100)
GLUCOSE SERPL-MCNC: 145 MG/DL (ref 65–100)
HCT VFR BLD AUTO: 26.2 % (ref 35–47)
HCV AB SERPL QL IA: NONREACTIVE
HCV COMMENT,HCGAC: NORMAL
HGB BLD-MCNC: 8.2 G/DL (ref 11.5–16)
MCH RBC QN AUTO: 24.5 PG (ref 26–34)
MCHC RBC AUTO-ENTMCNC: 31.3 G/DL (ref 30–36.5)
MCV RBC AUTO: 78.2 FL (ref 80–99)
NRBC # BLD: 0 K/UL (ref 0–0.01)
NRBC BLD-RTO: 0 PER 100 WBC
PLATELET # BLD AUTO: 286 K/UL (ref 150–400)
PMV BLD AUTO: 10.2 FL (ref 8.9–12.9)
POTASSIUM SERPL-SCNC: 5.2 MMOL/L (ref 3.5–5.1)
RBC # BLD AUTO: 3.35 M/UL (ref 3.8–5.2)
SERVICE CMNT-IMP: ABNORMAL
SERVICE CMNT-IMP: ABNORMAL
SERVICE CMNT-IMP: NORMAL
SERVICE CMNT-IMP: NORMAL
SODIUM SERPL-SCNC: 141 MMOL/L (ref 136–145)
WBC # BLD AUTO: 5.1 K/UL (ref 3.6–11)

## 2018-03-21 PROCEDURE — 36415 COLL VENOUS BLD VENIPUNCTURE: CPT | Performed by: INTERNAL MEDICINE

## 2018-03-21 PROCEDURE — 65270000029 HC RM PRIVATE

## 2018-03-21 PROCEDURE — 74011250636 HC RX REV CODE- 250/636: Performed by: INTERNAL MEDICINE

## 2018-03-21 PROCEDURE — 74011000258 HC RX REV CODE- 258: Performed by: INTERNAL MEDICINE

## 2018-03-21 PROCEDURE — 74011250637 HC RX REV CODE- 250/637: Performed by: STUDENT IN AN ORGANIZED HEALTH CARE EDUCATION/TRAINING PROGRAM

## 2018-03-21 PROCEDURE — 74011250637 HC RX REV CODE- 250/637: Performed by: INTERNAL MEDICINE

## 2018-03-21 PROCEDURE — 82962 GLUCOSE BLOOD TEST: CPT

## 2018-03-21 PROCEDURE — 74011636637 HC RX REV CODE- 636/637: Performed by: INTERNAL MEDICINE

## 2018-03-21 PROCEDURE — 80048 BASIC METABOLIC PNL TOTAL CA: CPT | Performed by: EMERGENCY MEDICINE

## 2018-03-21 PROCEDURE — 85027 COMPLETE CBC AUTOMATED: CPT | Performed by: INTERNAL MEDICINE

## 2018-03-21 PROCEDURE — 74011250637 HC RX REV CODE- 250/637: Performed by: PODIATRIST

## 2018-03-21 RX ORDER — AMLODIPINE BESYLATE 5 MG/1
5 TABLET ORAL DAILY
Qty: 30 TAB | Refills: 0 | Status: SHIPPED | OUTPATIENT
Start: 2018-03-22 | End: 2018-03-27

## 2018-03-21 RX ORDER — FOLIC ACID 1 MG/1
1 TABLET ORAL DAILY
Qty: 30 TAB | Refills: 0 | Status: SHIPPED | OUTPATIENT
Start: 2018-03-22 | End: 2018-04-21

## 2018-03-21 RX ORDER — OXYCODONE AND ACETAMINOPHEN 5; 325 MG/1; MG/1
2 TABLET ORAL
Qty: 10 TAB | Refills: 0 | Status: SHIPPED | OUTPATIENT
Start: 2018-03-21 | End: 2018-03-31

## 2018-03-21 RX ORDER — QUETIAPINE FUMARATE 100 MG/1
100 TABLET, FILM COATED ORAL 2 TIMES DAILY
Qty: 60 TAB | Refills: 0 | Status: SHIPPED | OUTPATIENT
Start: 2018-03-21 | End: 2018-04-20

## 2018-03-21 RX ORDER — VANCOMYCIN/0.9 % SOD CHLORIDE 1.5G/250ML
1500 PLASTIC BAG, INJECTION (ML) INTRAVENOUS ONCE
Status: COMPLETED | OUTPATIENT
Start: 2018-03-21 | End: 2018-03-22

## 2018-03-21 RX ORDER — FAMOTIDINE 20 MG/1
20 TABLET, FILM COATED ORAL DAILY
Qty: 30 TAB | Refills: 0 | Status: SHIPPED | OUTPATIENT
Start: 2018-03-22 | End: 2018-04-21

## 2018-03-21 RX ORDER — THERA TABS 400 MCG
1 TAB ORAL DAILY
Qty: 30 TAB | Refills: 0 | Status: SHIPPED | OUTPATIENT
Start: 2018-03-22 | End: 2018-04-21

## 2018-03-21 RX ORDER — CLONIDINE HYDROCHLORIDE 0.2 MG/1
0.2 TABLET ORAL 3 TIMES DAILY
Qty: 90 TAB | Refills: 0 | Status: SHIPPED | OUTPATIENT
Start: 2018-03-21 | End: 2018-04-20

## 2018-03-21 RX ORDER — SODIUM BICARBONATE 650 MG/1
650 TABLET ORAL 2 TIMES DAILY
Status: DISCONTINUED | OUTPATIENT
Start: 2018-03-21 | End: 2018-03-27 | Stop reason: HOSPADM

## 2018-03-21 RX ORDER — SODIUM BICARBONATE 650 MG/1
650 TABLET ORAL 2 TIMES DAILY
Qty: 60 TAB | Refills: 0 | Status: SHIPPED | OUTPATIENT
Start: 2018-03-21 | End: 2018-04-20

## 2018-03-21 RX ORDER — VENLAFAXINE 75 MG/1
75 TABLET ORAL 2 TIMES DAILY WITH MEALS
Qty: 60 TAB | Refills: 0 | Status: ON HOLD | OUTPATIENT
Start: 2018-03-21 | End: 2018-07-02

## 2018-03-21 RX ADMIN — VENLAFAXINE 75 MG: 37.5 TABLET ORAL at 08:34

## 2018-03-21 RX ADMIN — OXYCODONE HYDROCHLORIDE AND ACETAMINOPHEN 2 TABLET: 5; 325 TABLET ORAL at 03:48

## 2018-03-21 RX ADMIN — QUETIAPINE FUMARATE 100 MG: 100 TABLET ORAL at 08:34

## 2018-03-21 RX ADMIN — PROMETHAZINE HYDROCHLORIDE 12.5 MG: 25 INJECTION INTRAMUSCULAR; INTRAVENOUS at 10:31

## 2018-03-21 RX ADMIN — PROMETHAZINE HYDROCHLORIDE 12.5 MG: 25 INJECTION INTRAMUSCULAR; INTRAVENOUS at 16:57

## 2018-03-21 RX ADMIN — INSULIN LISPRO 5 UNITS: 100 INJECTION, SOLUTION INTRAVENOUS; SUBCUTANEOUS at 16:57

## 2018-03-21 RX ADMIN — CALCIUM 500 MG: 500 TABLET ORAL at 08:34

## 2018-03-21 RX ADMIN — SODIUM BICARBONATE 650 MG TABLET 650 MG: at 10:29

## 2018-03-21 RX ADMIN — OXYCODONE HYDROCHLORIDE AND ACETAMINOPHEN 2 TABLET: 5; 325 TABLET ORAL at 16:57

## 2018-03-21 RX ADMIN — OXYCODONE HYDROCHLORIDE AND ACETAMINOPHEN 2 TABLET: 5; 325 TABLET ORAL at 10:39

## 2018-03-21 RX ADMIN — VANCOMYCIN HYDROCHLORIDE 1500 MG: 10 INJECTION, POWDER, LYOPHILIZED, FOR SOLUTION INTRAVENOUS at 15:44

## 2018-03-21 RX ADMIN — PROMETHAZINE HYDROCHLORIDE 12.5 MG: 25 INJECTION INTRAMUSCULAR; INTRAVENOUS at 03:48

## 2018-03-21 RX ADMIN — DOCUSATE SODIUM 100 MG: 100 CAPSULE, LIQUID FILLED ORAL at 08:34

## 2018-03-21 RX ADMIN — DIPHENHYDRAMINE HYDROCHLORIDE 50 MG: 25 CAPSULE ORAL at 23:08

## 2018-03-21 RX ADMIN — THERA TABS 1 TABLET: TAB at 08:34

## 2018-03-21 RX ADMIN — PROMETHAZINE HYDROCHLORIDE 12.5 MG: 25 INJECTION INTRAMUSCULAR; INTRAVENOUS at 22:28

## 2018-03-21 RX ADMIN — CLONIDINE HYDROCHLORIDE 0.2 MG: 0.1 TABLET ORAL at 20:53

## 2018-03-21 RX ADMIN — DIPHENHYDRAMINE HYDROCHLORIDE 50 MG: 25 CAPSULE ORAL at 16:57

## 2018-03-21 RX ADMIN — Medication 10 ML: at 05:55

## 2018-03-21 RX ADMIN — CLONIDINE HYDROCHLORIDE 0.2 MG: 0.1 TABLET ORAL at 08:34

## 2018-03-21 RX ADMIN — PIPERACILLIN SODIUM AND TAZOBACTAM SODIUM 3.38 G: .375; 3 INJECTION, POWDER, LYOPHILIZED, FOR SOLUTION INTRAVENOUS at 06:35

## 2018-03-21 RX ADMIN — PIPERACILLIN SODIUM,TAZOBACTAM SODIUM 3.38 G: 3; .375 INJECTION, POWDER, FOR SOLUTION INTRAVENOUS at 23:06

## 2018-03-21 RX ADMIN — OXYCODONE HYDROCHLORIDE AND ACETAMINOPHEN 2 TABLET: 5; 325 TABLET ORAL at 23:08

## 2018-03-21 RX ADMIN — PIPERACILLIN SODIUM,TAZOBACTAM SODIUM 3.38 G: 3; .375 INJECTION, POWDER, FOR SOLUTION INTRAVENOUS at 15:41

## 2018-03-21 RX ADMIN — FOLIC ACID 1 MG: 1 TABLET ORAL at 08:34

## 2018-03-21 RX ADMIN — AMLODIPINE BESYLATE 5 MG: 5 TABLET ORAL at 08:34

## 2018-03-21 RX ADMIN — Medication 10 ML: at 23:08

## 2018-03-21 RX ADMIN — CYANOCOBALAMIN TAB 500 MCG 1000 MCG: 500 TAB at 08:34

## 2018-03-21 RX ADMIN — DIPHENHYDRAMINE HYDROCHLORIDE 50 MG: 25 CAPSULE ORAL at 10:39

## 2018-03-21 RX ADMIN — Medication 10 ML: at 05:54

## 2018-03-21 RX ADMIN — QUETIAPINE FUMARATE 100 MG: 100 TABLET ORAL at 20:53

## 2018-03-21 RX ADMIN — SODIUM BICARBONATE 650 MG TABLET 650 MG: at 16:57

## 2018-03-21 RX ADMIN — CLONIDINE HYDROCHLORIDE 0.2 MG: 0.1 TABLET ORAL at 15:41

## 2018-03-21 RX ADMIN — Medication 10 ML: at 23:07

## 2018-03-21 RX ADMIN — DIPHENHYDRAMINE HYDROCHLORIDE 50 MG: 25 CAPSULE ORAL at 03:48

## 2018-03-21 RX ADMIN — FAMOTIDINE 20 MG: 20 TABLET, FILM COATED ORAL at 08:34

## 2018-03-21 RX ADMIN — VENLAFAXINE 75 MG: 37.5 TABLET ORAL at 20:53

## 2018-03-21 RX ADMIN — ERYTHROPOIETIN 40000 UNITS: 40000 INJECTION, SOLUTION INTRAVENOUS; SUBCUTANEOUS at 23:35

## 2018-03-21 NOTE — PROGRESS NOTES
Pharmacy Automatic Renal Dosing Protocol - Antimicrobials     Indication for Antimicrobials: Severe sepsis POA due to left foot diabetic foot infection, ? Osteomyelitis; Excisional debridement and biopsy done on 3/3, for repeat surgery 3/7/2018     Current Regimen of Each Antimicrobial:  Piperacillin tazobactam 3.375g IV Q8H (started 3/19, day 3)  Vancomycin 1500mg x 1 then 750mg IV q24h (start 3/21 Day 1)     Previous Antimicrobial Therapy:  Piperacillin tazobactam 3.375 Q12H (Start Date 3/2, Day #14)  Vancomycin 1500 mg x 1 then 1000 mg Q24H (Start Date 3/2 - 3/5)  Daptomycin 4 mg/kg q24h (3/5 Day #5)  Daptomycin 6 mg/kg (400mg) IV q24h - started 3/16 day 6     Significant Cultures:    All Micro Results        Procedure Component Value Units Date/Time     CULTURE, Rafael Pipe STAIN [[id number]] (Abnormal)  Collected: 03/11/18 1143     Order Status: Completed Specimen: Foot Updated: 03/17/18 1035      Special Requests: NO SPECIAL REQUESTS         GRAM STAIN NO WBC'S SEEN          NO ORGANISMS SEEN         Culture result:          NO GROWTH ON SOLID MEDIA AT 4 DAYS                STAPHYLOCOCCUS EPIDERMIDIS ISOLATED FROM THIO BROTH ONLY (A)     CULTURE, TISSUE Lesleigh Ramses STAIN [[id number]] (Abnormal)  Collected: 03/11/18 1247     Order Status: Completed Specimen: Bone Updated: 03/16/18 1052      Special Requests: NO SPECIAL REQUESTS         GRAM STAIN RARE WBCS SEEN          NO ORGANISMS SEEN         Culture result:          FEW STAPHYLOCOCCUS HOMINIS SUBSPECIES HOMINIS (A)                STAPHYLOCOCCUS EPIDERMIDIS (OXACILLIN RESISTANT) ISOLATED FROM THIO BROTH ONLY (A)     CULTURE, ANAEROBIC [[id number]] (Abnormal)  Collected: 03/11/18 1143     Order Status: Completed Specimen: Foot Updated: 03/16/18 1048      Special Requests: NO SPECIAL REQUESTS         Culture result:          NO GROWTH ON SOLID MEDIA AT 4 DAYS                STAPHYLOCOCCUS EPIDERMIDIS (OXACILLIN RESISTANT) ISOLATED FROM THIO BROTH ONLY (A)     CULTURE, ANAEROBIC [[id number]] Collected: 03/11/18 1247     Order Status: Completed Specimen: Bone Updated: 03/13/18 1138      Special Requests: NO SPECIAL REQUESTS         Culture result: NO ANAEROBES ISOLATED        CULTURE, BLOOD, PAIRED [[id number]] Collected: 03/02/18 1815     Order Status: Completed Specimen: Blood Updated: 03/07/18 0816      Special Requests: NO SPECIAL REQUESTS         Culture result: NO GROWTH 5 DAYS        CULTURE, ANAEROBIC [[id number]] (Abnormal) Collected: 03/03/18 1023     Order Status: Completed Specimen: Foot Updated: 03/06/18 1103      Special Requests: NO SPECIAL REQUESTS         Culture result:        LIGHT   MIXED   ANAEROBIC GRAM NEGATIVE RODS   (   BETA LACTAMASE POSITIVE   ) AND   ANAEROBIC GRAM POSITIVE COCCI    (A)     CULTURE, ANAEROBIC [[id number]] (Abnormal) Collected: 03/03/18 1029     Order Status: Completed Specimen: Foot Updated: 03/06/18 1101      Special Requests: NO SPECIAL REQUESTS         Culture result:        LIGHT   MIXED   ANAEROBIC GRAM NEGATIVE RODS   (   BETA LACTAMASE POSITIVE   )   AND   ANAEROBIC GRAM POSITIVE COCCI    (A)     CULTURE, TISSUE W Seth Garcia [[id number]] Collected: 03/03/18 1029     Order Status: Completed Specimen: Foot Updated: 03/05/18 1412      Special Requests: NO SPECIAL REQUESTS         GRAM STAIN 1+ WBCS SEEN                   RARE GRAM POSITIVE COCCI IN PAIRS      Culture result:          FEW MIXED SKIN CHRIS ISOLATED     CULTURE, Anabel Yu STAIN [[id number]] Collected: 03/03/18 1023     Order Status: Completed Specimen: Foot Updated: 03/05/18 1324      Special Requests: NO SPECIAL REQUESTS         GRAM STAIN NO WBC'S SEEN                   1+ GRAM POSITIVE COCCI IN PAIRS      Culture result:          SCANT MIXED SKIN CHRIS ISOLATED     CULTURE, Anabel Yu STAIN [[id number]] (Abnormal) Collected: 03/02/18 2127     Order Status: Completed Specimen: Foot Updated: 03/05/18 1130      Special Requests: NO SPECIAL REQUESTS         GRAM STAIN OCCASIONAL WBCS SEEN                   1+ GRAM POSITIVE RODS (CORYNEFORM)      Culture result: HEAVY DIPHTHEROIDS (A)                   LIGHT STAPHYLOCOCCUS SPECIES, COAGULASE NEGATIVE (A)     CULTURE, URINE [[id number]] Collected: 18 2250     Order Status: Completed Specimen: Urine Updated: 18 1017      Special Requests: --         NO SPECIAL REQUESTS   Reflexed from [id number]         Trevett Count <1,000 CFU/ML         Culture result: NO GROWTH 1 DAY        CULTURE, Rodney Hdz STAIN [[id number]] Collected: 18 1029     Order Status: Canceled Updated: 18 1059       Microbiology Results (Current encounter)   Date/Time Order Name Specimen Source Lab Status   3/11/18 1247 CULTURE, ANAEROBIC Bone Final result   3/11/18 1247 CULTURE, TISSUE W GRAM STAIN Bone Final result   3/11/18 1143 CULTURE, WOUND W GRAM STAIN Foot Final result   3/11/18 1143 CULTURE, ANAEROBIC Foot Final result   3/3/18 1029 CULTURE, TISSUE W GRAM STAIN Foot Final result   3/3/18 1029 CULTURE, ANAEROBIC Foot Final result   3/3/18 1023 CULTURE, WOUND W GRAM STAIN Foot Final result   3/3/18 1023 CULTURE, ANAEROBIC Foot Final result   3/2/18 2250 CULTURE, URINE Urine Final result   3/2/18 2127 CULTURE, WOUND W GRAM STAIN Foot Final result   3/2/18 1815 CULTURE, BLOOD, PAIRED Blood Final result         Estimated Creatinine Clearance: 23 mL/min (based on Cr of 2.89).   Estimated Creatinine Clearance (using IBW):20.7 mL/min    Recent Labs      18   0401  18   2039  18   1213  18   0439  18   0520   CREA  2.89*  3.03*  2.97*  2.84*  2.72*   BUN  40*  43*  40*  38*  38*   WBC  5.1   --    --    --   5.8   NA  141  139  138  139  140   K  5.2*  5.4*  5.9*  5.7*  5.2*   CA  8.0*  7.7*  8.2*  8.3*  8.0*   HGB  8.2*   --    --    --   7.1*   HCT  26.2*   --    --    --   22.5*   PLT  286   --    --    --   321     Temp (24hrs), Av.8 °F (36.6 °C), Min:97 °F (36.1 °C), Max:98.3 °F (36.8 °C)    Impression/Plan: - Per ID recs, Daptomycin changed to Vancomycin. Vancomycin dosed at 1500mg x 1 then 750mg IV q24h. Watch renal fxn as patient on Vanc and Zosyn. - Piperacillin tazobactam is dosed appropriately for renal fxn. - BORDERLINE, watch, but cont current dose for now. -Plan is for 6 weeks of Vanc + Zosyn until 4/22/18        Pharmacy will follow daily and adjust medications as appropriate for renal function and/or serum levels.      Thank you,  Anthony Tsang, PHARMD

## 2018-03-21 NOTE — PROGRESS NOTES
Infectious Disease Progress Note       Subjective:   Ms Kelly Lin seen today. Reports some soft-loose stools     ROS: 10 point ROS obtained and pertinent positives include above. All others negative.          Objective:    Vitals:   Patient Vitals for the past 24 hrs:   Temp Pulse Resp BP SpO2   03/21/18 1130 97 °F (36.1 °C) 84 16 116/72 100 %   03/21/18 0817 98 °F (36.7 °C) 97 16 150/86 99 %   03/21/18 0019 97.8 °F (36.6 °C) 71 14 150/81 100 %   03/20/18 1939 98.3 °F (36.8 °C) 84 16 144/84 99 %   03/20/18 1609 97.9 °F (36.6 °C) 78 16 167/84 100 %       Physical Exam:    ¨ GEN: NAD, sitting in chair   ¨ HEENT:  PERRL, no scleral icterus,   no thrush  ¨ CV: S1, S2 heard regularly  ¨ Lungs: Clear to auscultation bilaterally  ¨ Abdomen: soft, non distended, non tender, No CVA tenderness   ¨ Extremities: + edema L foot, warm, + dressing over L foot, RLE no edema           above pictures obtained from Dr Chinmay Alejandro note dated 3/18/18      ¨ Neuro: Alert, oriented to self, place,  time,  and situation, moves all extremities to commands, verbal   ¨ Skin: no rash  ¨ Psych: good affect, good eye contact, non tearful   ¨ Lines: tunneled SC line, no erythema around site    Medications:    Current Facility-Administered Medications:     epoetin bernard (EPOGEN;PROCRIT) injection 40,000 Units, 40,000 Units, SubCUTAneous, ONCE, Luis Eduardo Robertson MD    sodium bicarbonate tablet 650 mg, 650 mg, Oral, BID, Luis Eduardo Robertson MD, 650 mg at 03/21/18 1029    piperacillin-tazobactam (ZOSYN) 3.375 g in 0.9% sodium chloride (MBP/ADV) 100 mL, 3.375 g, IntraVENous, Q8H, Urmila VERENA Gomadam, DO    [START ON 3/22/2018] DAPTOmycin (CUBICIN) 500 mg in 0.9% sodium chloride 50 mL IVPB RF formulation, 500 mg, IntraVENous, Q48H, Urmila R Gomadam, DO    amLODIPine (NORVASC) tablet 5 mg, 5 mg, Oral, DAILY, Luis Eduardo Robertson MD, 5 mg at 03/21/18 0834    promethazine (PHENERGAN) 12.5 mg in NS 50 mL IVPB, 12.5 mg, IntraVENous, Q4H PRN, Awilda Macias, MD, Last Rate: 200 mL/hr at 03/21/18 1031, 12.5 mg at 03/21/18 1031    diphenhydrAMINE (BENADRYL) capsule 50 mg, 50 mg, Oral, Q6H PRN, Lorraine Kumar, DPM, 50 mg at 03/21/18 1039    oxyCODONE-acetaminophen (PERCOCET) 5-325 mg per tablet 1-2 Tab, 1-2 Tab, Oral, Q4H PRN, Lesly Contreras MD, 2 Tab at 03/21/18 1039    sodium chloride (NS) flush 10-40 mL, 10-40 mL, IntraVENous, Q8H, Yandy Henderson MD, 10 mL at 03/21/18 0554    cloNIDine HCl (CATAPRES) tablet 0.2 mg, 0.2 mg, Oral, TID, Romain Stephens MD, 0.2 mg at 03/21/18 0834    venlafaxine (EFFEXOR) tablet 75 mg, 75 mg, Oral, BID WITH MEALS, Romain Stephens MD, 75 mg at 03/21/18 0834    QUEtiapine (SEROquel) tablet 100 mg, 100 mg, Oral, BID, Romain Stephens MD, 100 mg at 03/21/18 0834    famotidine (PEPCID) tablet 20 mg, 20 mg, Oral, DAILY, Romain Stephens MD, 20 mg at 03/21/18 0834    sodium chloride (NS) flush 5-10 mL, 5-10 mL, IntraVENous, Q8H, Adilene Botello MD, 10 mL at 03/21/18 0555    sodium chloride (NS) flush 5-10 mL, 5-10 mL, IntraVENous, PRN, Adilene Botello MD, 10 mL at 03/20/18 2044    acetaminophen (TYLENOL) tablet 650 mg, 650 mg, Oral, Q4H PRN, Paulina Gomez MD, 650 mg at 03/06/18 2123    docusate sodium (COLACE) capsule 100 mg, 100 mg, Oral, BID, Paulina Gomez MD, 100 mg at 03/21/18 5013    insulin lispro (HUMALOG) injection, , SubCUTAneous, AC&HS, Paulina Gomez MD, Stopped at 03/21/18 0730    glucose chewable tablet 16 g, 4 Tab, Oral, PRN, Paulina Gomez MD    dextrose (D50W) injection syrg 12.5-25 g, 12.5-25 g, IntraVENous, PRN, Paulina Gomez MD, 25 g at 03/06/18 1200    glucagon (GLUCAGEN) injection 1 mg, 1 mg, IntraMUSCular, PRN, Paulina Gomez MD    albuterol (PROVENTIL VENTOLIN) nebulizer solution 2.5 mg, 2.5 mg, Nebulization, Q4H PRN, Paulina Gomez MD    therapeutic multivitamin [facility]) tablet 1 Tab, 1 Tab, Oral, DAILY, Paulina Gomez MD, 1 Tab at 03/21/18 0914    cyanocobalamin (VITAMIN B12) tablet 1,000 mcg, 1,000 mcg, Oral, DAILY, John Adams MD, 1,000 mcg at 03/21/18 9590    calcium carbonate (OS-BINA) tablet 500 mg [elemental], 500 mg, Oral, DAILY, John Adams MD, 500 mg at 39/92/12 7490    folic acid (FOLVITE) tablet 1 mg, 1 mg, Oral, DAILY, John Adams MD, 1 mg at 03/21/18 0626      Labs:  Recent Results (from the past 24 hour(s))   GLUCOSE, POC    Collection Time: 03/20/18  4:12 PM   Result Value Ref Range    Glucose (POC) 127 (H) 65 - 100 mg/dL    Performed by Gerardo Bermeo ([facility])    METABOLIC PANEL, BASIC    Collection Time: 03/20/18  8:39 PM   Result Value Ref Range    Sodium 139 136 - 145 mmol/L    Potassium 5.4 (H) 3.5 - 5.1 mmol/L    Chloride 110 (H) 97 - 108 mmol/L    CO2 22 21 - 32 mmol/L    Anion gap 7 5 - 15 mmol/L    Glucose 274 (H) 65 - 100 mg/dL    BUN 43 (H) 6 - 20 MG/DL    Creatinine 3.03 (H) 0.55 - 1.02 MG/DL    BUN/Creatinine ratio 14 12 - 20      GFR est AA 21 (L) >60 ml/min/1.73m2    GFR est non-AA 17 (L) >60 ml/min/1.73m2    Calcium 7.7 (L) 8.5 - 10.1 MG/DL   GLUCOSE, POC    Collection Time: 03/20/18  8:59 PM   Result Value Ref Range    Glucose (POC) 274 (H) 65 - 100 mg/dL    Performed by Cheryl Cruz ([facility])    METABOLIC PANEL, BASIC    Collection Time: 03/21/18  4:01 AM   Result Value Ref Range    Sodium 141 136 - 145 mmol/L    Potassium 5.2 (H) 3.5 - 5.1 mmol/L    Chloride 111 (H) 97 - 108 mmol/L    CO2 24 21 - 32 mmol/L    Anion gap 6 5 - 15 mmol/L    Glucose 145 (H) 65 - 100 mg/dL    BUN 40 (H) 6 - 20 MG/DL    Creatinine 2.89 (H) 0.55 - 1.02 MG/DL    BUN/Creatinine ratio 14 12 - 20      GFR est AA 22 (L) >60 ml/min/1.73m2    GFR est non-AA 18 (L) >60 ml/min/1.73m2    Calcium 8.0 (L) 8.5 - 10.1 MG/DL   CBC W/O DIFF    Collection Time: 03/21/18  4:01 AM   Result Value Ref Range    WBC 5.1 3.6 - 11.0 K/uL    RBC 3.35 (L) 3.80 - 5.20 M/uL    HGB 8.2 (L) 11.5 - 16.0 g/dL    HCT 26.2 (L) 35.0 - 47.0 %    MCV 78.2 (L) 80.0 - 99.0 FL    MCH 24.5 (L) 26.0 - 34.0 PG    MCHC 31.3 30.0 - 36.5 g/dL    RDW 21.5 (H) 11.5 - 14.5 %    PLATELET 551 947 - 063 K/uL    MPV 10.2 8.9 - 12.9 FL    NRBC 0.0 0  WBC    ABSOLUTE NRBC 0.00 0.00 - 0.01 K/uL   GLUCOSE, POC    Collection Time: 03/21/18  8:14 AM   Result Value Ref Range    Glucose (POC) 78 65 - 100 mg/dL    Performed by Ana Lilia Weller    GLUCOSE, POC    Collection Time: 03/21/18 11:28 AM   Result Value Ref Range    Glucose (POC) 122 (H) 65 - 100 mg/dL    Performed by Ana Lilia Weller            Micro:         Wound   3/2/18  Component Value Ref Range & Units Status        Special Requests: NO SPECIAL REQUESTS    Final      GRAM STAIN OCCASIONAL WBCS SEEN    Final      GRAM STAIN      Final      1+ GRAM POSITIVE RODS (CORYNEFORM)      Culture result: HEAVY DIPHTHEROIDS (A)    Final      Culture result:      Final      LIGHT STAPHYLOCOCCUS SPECIES, COAGULASE NEGATIVE (A)        Result History             3/3/18  Component Value Ref Range & Units Status        Special Requests: NO SPECIAL REQUESTS    Final      GRAM STAIN NO WBC'S SEEN    Final      GRAM STAIN      Final      1+ GRAM POSITIVE COCCI IN PAIRS      Culture result:      Final      SCANT MIXED SKIN CHRIS ISOLATED        Result History                 Component Results       Component Value Ref Range & Units Status      Special Requests: NO SPECIAL REQUESTS    Final      Culture result: LIGHT   MIXED   ANAEROBIC GRAM NEGATIVE RODS   (   BETA LACTAMASE POSITIVE   ) AND   ANAEROBIC GRAM POSITIVE COCCI    (A)    Final        Result History              Component Results       Component Value Ref Range & Units Status      Special Requests: NO SPECIAL REQUESTS    Final      GRAM STAIN 1+ WBCS SEEN    Final      GRAM STAIN      Final      RARE GRAM POSITIVE COCCI IN PAIRS      Culture result: FEW MIXED SKIN CHRIS ISOLATED    Final        Result History       3/11/18   Labeled : Left foot deep wound           Special Requests: NO SPECIAL REQUESTS    Final        GRAM STAIN NO WBC'S SEEN    Final      GRAM STAIN NO ORGANISMS SEEN    Final      Culture result:      Final      NO GROWTH ON SOLID MEDIA AT 4 DAYS      Culture result:      Final      STAPHYLOCOCCUS EPIDERMIDIS ISOLATED FROM THIO BROTH ONLY (A)        Culture & Susceptibility                     Antibiotic    Organism Organism Organism          Staphylococcus epidermidis          AMPICILLIN ($)    <=2   ug/mL   R Final              AMPICILLIN/SULBACTAM ($)    <=8/4   ug/mL   S Final              CEFAZOLIN ($)    <=4   ug/mL   S Final              CIPROFLOXACIN ($)    2   ug/mL   I Final              CLINDAMYCIN ($)    <=0.5   ug/mL   S Final              ERYTHROMYCIN ($$$$)    >4   ug/mL   R Final              GENTAMICIN ($)    <=4   ug/mL   S Final              LEVOFLOXACIN ($)    2   ug/mL   S Final              LINEZOLID ($$$$$)    <=1   ug/mL   S Final              OXACILLIN    <=0.25   ug/mL   S Final              PENICILLIN G ($$)    8   ug/mL   R Final              RIFAMPIN ($$$$)    <=1   ug/mL   S Final              TETRACYCLINE    <=4   ug/mL   S Final              VANCOMYCIN ($)    2   ug/mL   S Final                                                         Special Requests: NO SPECIAL REQUESTS    Final        Culture result:      Final      NO GROWTH ON SOLID MEDIA AT 4 DAYS      Culture result:      Final      STAPHYLOCOCCUS EPIDERMIDIS (OXACILLIN RESISTANT) ISOLATED FROM THIO BROTH ONLY (A)        Culture & Susceptibility                     Antibiotic    Organism Organism Organism          Staphylococcus epidermidis          AMPICILLIN ($)    8   ug/mL   R Final              AMPICILLIN/SULBACTAM ($)    <=8/4   ug/mL   R Final              CEFAZOLIN ($)    <=4   ug/mL   R Final              CIPROFLOXACIN ($)    >2   ug/mL   R Final              CLINDAMYCIN ($)    >4   ug/mL   R Final              DAPTOMYCIN ($$$$$)    <=0.5   ug/mL   S Final              ERYTHROMYCIN ($$$$)    >4   ug/mL   R Final              GENTAMICIN ($)    <=4   ug/mL   S Final              LEVOFLOXACIN ($)    >4   ug/mL   R Final              LINEZOLID ($$$$$)    <=1   ug/mL   S Final              OXACILLIN    >2   ug/mL   R Final              PENICILLIN G ($$)    >8   ug/mL   R Final              RIFAMPIN ($$$$)    <=1   ug/mL   S Final              TETRACYCLINE    <=4   ug/mL   S Final              TRIMETH/SULFA    >2/38   ug/mL   R Final              VANCOMYCIN ($)    2   ug/mL   S Final                                                     Blood  3/2/18                 Component Results       Component Value Ref Range & Units Status      Special Requests: NO SPECIAL REQUESTS    Final      Culture result: NO GROWTH 5 DAYS    Final        Result History              Urine  3/2/18  Component Value Ref Range & Units Status        Special Requests: NO SPECIAL REQUESTS   Reflexed from [id number]      Final      [place] Count <1,000 CFU/ML    Final      Culture result: NO GROWTH 1 DAY    Final        Result History                   Imaging:   MRI L foot 3/2/18  FINDINGS: The evaluation for infection is partially limited secondary to lack of  IV contrast.      Bone marrow: There is extensive destruction in the midfoot with an osseous  fragment displaced superiorly measuring 1.3 cm. There is diffuse edema and  decreased T1 signal throughout the metatarsal bases, cuneiforms, cuboid, and  navicular.      Joint fluid:  None.      Tendons: Intact.      Muscles: Within normal limits.      Neurovascular bundles: Within normal limits.      Articular cartilage: There is destruction in the midfoot.      Soft tissues: There is a fluid collection in the lateral aspect of the midfoot  measuring 2.8 x 2.0 x 4.4 cm. This extends to the skin surface. A marker was  placed at this site. The fluid collection extends into the midfoot.  An  additional fluid collection measuring 1.1 cm is seen below the base of the  second metatarsal. A 2.3 cm fluid collection is seen in between the first and  second metatarsals.      There is extensive subcutaneous edema surrounding the foot and ankle.      IMPRESSION  IMPRESSION:   1. Large fluid collection in the subcutaneous of the lateral midfoot extending  to the joint spaces of the midfoot. 2. Extensive destruction in the midfoot with abnormal signal. Given the presence  of the fluid collection tracking to this, septic arthritis is likely present. There may be an element of Charcot arthropathy also present. 3. Small fluid collection underneath the proximal second metatarsal.  4. Small fluid collection in between the first and second metatarsal necks. 5. Extensive edema surrounding the foot and ankle.         L foot x ray 3/11/18  FINDINGS:  Three views of the left foot demonstrate interval amputation at the  level of the posterior midfoot. No other postoperative studies are available for  comparison. On current images, the margins of the navicular and cuboid are  poorly defined, suspicious for osteomyelitis.      IMPRESSION  IMPRESSION:  Prior amputations. Concern for osteomyelitis of the navicular and  Cuboid. .     L foot X ray 3/4/18  FINDINGS:  AP and lateral portable views of the left foot demonstrate decreased  soft tissue lateral to the base of the fifth metatarsal.  Increased soft tissue  gas appears to be packed with material's. Partial resection of the proximal  fifth metatarsal is new.  Widened distance between the second and third  metatarsals is unchanged. Neuropathic midfoot is unchanged.      IMPRESSION  IMPRESSION:        Postsurgical lateral midfoot soft tissues and partial resection of the base of  the fifth metatarsal.  Unchanged neuropathic midfoot and TMT joints.     L foot X ray 3/2/18  FINDINGS:  Three views of the left foot demonstrate fragmentation of the distal  medial margin of the navicular with proximal migration of the medial cuneiform. There is dorsal dislocation of the middle cuneiform into the dorsal soft  tissues.  There is foreshortening of the second metatarsal. There is lateral  translation of the base of the third fourth and fifth metatarsals with  associated bony destructive change. There is a chronic fracture through the base  of the fifth metatarsal with chronic periostitis and associated cortical bone  loss. This is adjacent to the area of soft tissue swelling and ulceration. There  is gas within the soft tissues dorsally. . Karin Marquez  IMPRESSION  IMPRESSION:    1. Findings compatible with Charcot changes of the midfoot with marked  fragmentation and disorganization. In addition there is a chronic appearing  fracture base of the fifth metatarsal with adjacent soft tissue ulcer. Loss of  cortical margin is concerning for superimposed osteomyelitis.     CXR 3/2/18  FINDINGS: Hypoventilatory exam. No lobar consolidation, pleural effusion, or  pneumothorax.  Normal cardiomediastinal silhouette.  No acute or aggressive  osseous lesion.      IMPRESSION  IMPRESSION: No evidence of an acute cardiopulmonary process.        Pathology Results:  3/11/18      FINAL PATHOLOGIC DIAGNOSIS   1. Left forefoot, forefoot amputation:   Acute osteomyelitis of metatarsal bone. Deformed foot, clinically Charcot deformity. 2. Proximal margin cuboid bone left foot, biopsy:   Exudate suggestive of wound base with fibrosis and bony remodeling.    Few acute inflammatory cells present at presumed margin, however, insufficient for a diagnosis of acute osteomyelitis margin.         Procedures:   3/3/18 LEFT FOOT INCISION AND DRAINAGE     3/7/18 LEFT FOOT DEBRIDEMENT     3/11/18   INCISION AND DRAINAGE LEFT FOOT, REMOVAL NONVIABLE TISSUE AND BONE LEFT FOOT  PROXIMAL FOOT AMPUTATION LEFT FOOT  DEEP TENDON TRANSFER TIBIALIS ANTERIOR LEFT FOOT  OPEN BONE BIOPSIES LEFT FOOT  ADJACENT TRANSFER ARTERIOMYOFASCIOCUTANEOUS PLANTAR MEDIAL PEDICLE FLAP FOR DELAYED PRIMARY CLOSURE.      3/6/18 Successful placement of right subclavian tunneled long-term central venous  Catheter.      Assessment / Plan:       Ms Anahi Rosa is a 42 yr old lady with hx of Sickle Cell ( she says disease documented as trait), DM, gastroparesis S/P hx of pacer insertion and removal, Gastric bypass, seizures , HTN who noticed an ulcer in lateral foot about a month or two ago. She reports also having had a DVT in that LE about 1-2 years go and was on Lovenox + coumadin for treatment at that time. She has been in a care home for about a month per her. Soon after noticing ulcer, she reports that she wound had discharge, denied foul odor. Says her leg started swelling as well and was warm. Denied erythema. Denied any trauma to legs. Denied sensory issues to LE and says she can feel trauma to her leg if it happened. Denied any animal or insect bites. Denied antibiotic use PO prior to admission.       She was [facility] to [facility] 3/2/18 with L foot ulcer/drainage. X ray done showed \"Findings compatible with Charcot changes of the midfoot with marked  fragmentation and disorganization. In addition there is a chronic appearing fracture base of the fifth metatarsal with adjacent soft tissue ulcer. Loss of cortical margin is concerning for superimposed osteomyelitis\". She was started on Vancomycin and Zosyn per H and P. Vascular surgery consulted on 3/2 and their initial impression indicates \" MRI/xray is suggestive of osteomyelitis of the mid foot which will make limb salvage challenging if that is the case\". Taken to the OR 3/3 for I and D and findings indicate \" Advanced infection with purulent drainage going into the midfoot\" on brief operative report.   Detailed operative report indicates, \" On the distal fifth metatarsal there was a large wound with purulent drainage.  This was excised and I immediately encountered a large amount of purulent drainage.  This drainage was sent for culture and swabs.  After I excised all the skin, soft tissue, and muscle, there was a necrotic fifth metatarsal and fourth metatarsal bone as well as the cuboid bone\".     On 3/7/18, noted another debridement done and operative notes indicate \"With manipulation, I could not express any further purulent drainage.  There was what appeared to be nonviable 4th and 5th metatarsals as well as possible mid foot bones.  Therefore, I used a rongeur to debride back these until there was healthy bone, which it did so on the 4th and 5th metatarsal rays.  I also believe I resected the base of the third metatarsal as well through my incision.  Part of the cuboid bone appeared to be so necrotic, so this was partly debrided, but was overall healthy\".       On 3/11/18, had I and D to L foot , removal of non viable tissue and bone L foot, proximal foot amputation, deep tendon transfer tibialis anterior left foot, adjacent transfer arteriotomyofasciocutaneous plantar medial pedicle flap for delayed primary closure. Cultures taken from deep tissue noted to be + for S hominus and also Coag Negative Staph (MRSE) in thio broth from 3/11/18. Operative report indicates \"going from distal to proximal, a pocket of purulent drainage was encountered between the first and the second metatarsals within the first interspace.  This was noted within the soft tissues but was not identified within the bone.  The nonviable soft tissue was debulked, debrided and removed.  The purulent drainage was carefully milked from the soft tissues compressing the foot from proximal to distal.  Once the most significant of the purulent drainage was released, a deep wound culture to the area was then taken\". \" Moving proximally, the base of the remaining metatarsals as well as the cuneiforms showed extremely eroded bone quality.  The bone was soft, and there was significant destruction of the articular cartilage throughout\".   \" At this time, the intermediate and lateral cuneiforms were also identified and resected and removed from the surgical field as again this bone showed significant bony erosion with broken cortices and nonviable bleeding.  It should, however, be noted that at the more proximal portion of the bone, there was no purulent drainage identified.  The only area of purulence that was noted was within the first interspace distally from the bases of the metatarsals, and this was only noted within the soft tissues. \"It was noted that resection of the distal aspect of the cuboid was necessary to allow for rotation of the plantar medial pedicle.  Utilizing a sagittal saw, the distal aspect of the cuboid, which did show articular cartilage and had more normal appearing anatomy with no noted articular destruction, was carefully resected.  This bone did appear to have Charcot type changes; however, again no further purulent drainage was noted proximally, and the surrounding soft tissue was clean and had normal appearing anatomy. \"     It is noted that Daptomycin IV started on 3/16/18. I am consulted today regarding further antibiotic recommendations.          1) L foot diabetic foot wound with charcot changes  Concern for osteomyelitis     S/P I and D  3/3 and 3/7/18 , operative reports reviewed as above ( highlights noted ) . Cultures from 3/3 with mixed anaerobic gram negative rods, Beta lactamse + and anaerobic G+C  S/P and D to L foot , removal of non viable tissue and bone L foot, proximal foot amputation, deep tendon transfer tibialis anterior left foot, adjacent transfer arteriotomyofasciocutaneous plantar medial pedicle flap for delayed primary closure on 3/11/189. Cultures wt MRSE in thio, and S hominus. Pathology margins \"Few acute inflammatory cells present at presumed margin, however, insufficient for a diagnosis of acute osteomyelitis margin\"     Review of antibiotics so far:  Daptomycin since 3/5/18  Levofloxacin ?  3/16  Zosyn 3/2-3/15/18  Vancomycin 3/2-3/5      Recommendations:    Complicated scenario given multiple I and D's and surgeries as mentioned above with findings   Additional complication wt Charcot disease as it mimics many findings that could be present in  Infection as well   Cultures may not be all that reliable from 3/11 given prolonged antibiotics prior including vanc 3/2-3/5 and Zosyn 3/2-3/15  Would recommend treatment with IV antibiotics ( 6 total weeks from last surgery on 3/11/18 ) as it not entirely sure that there is no residual infection   At present on Daptomycin IV ( would dose at 8 mg/KG) if able to tolerate   Vancomycin IV is a reasonable choice as well with close renal monitoring instead of Daptomycin if cost is an issues on discharge planning  Given concerns of polymicrobial infection wt mixed GNR and anaerobes in 3/3/18 cultures would also recommend covering for this given complicated scenario       D/W Case management and primary teams today   Plan for Vancomycin IV and Zosyn IV renally dosed per pharmacy for 6 weeks till 4/22/18   Vancomycin + Zosyn IV renally dosed (dosing per pharmacy)   With this option check weekly CBC wt diff, CMP, ESR and CRP , Vancomycin trough levels ( goal trough level 15-20)  Please send the above requested weekly labs to me ( fax [phone])  F/U with me on 4/12/18 at 2.00 pm  Needs central line removed once antibiotics completed   Side effects include GI, renal and risk for CDI, seizures given history, worsening anemia wt hx of Sickle cell as well   Weight bearing status, would care per surgical teams   If continues to have greater than 3 loose stools in 24 hours, check for C diff. Please avoid stool softners/laxatives.   Add daily yogurt to diet and or probiotic daily      2) Sickle cell disease :   Per primary team      3) Hx of gasteroparesis: S/P pacer insertion and removal   S/P bypass per patient      4) Hx of DVT LLE per patient      5) DM   Optimization helps in wound healing and infection control      6) Screening:   Patient reports HIV and TB screen negative ( [facility] her [facility])  Hep C Ab screening with antibody pending results      7) DVT px per primary team     ID will sign off. D/W Dr Jennifer Melendez today     Thank you for the opportunity to participate in the care of this patient. Please contact with questions or concerns.       Alejandra Mccormack,    1:57 PM

## 2018-03-21 NOTE — DIABETES MGMT
DTC Progress Note    Recommendations/ Comments: Please restart Lantus insulin - pt has Type 1 DM and requires basal coverage to prevent onset DKA. Consider restarting Lantus at 6 units. Change pt's correctional insulin to high sensitivity scale with her CKD 4 due to her prolonged active insulin which can contribute to her hypoglycemia. Current hospital DM medication:  Lispro correctional insulin - normal sensitivity. Lantus insulin dose was discontinued - received last dose 2200hour 3/20/18. Chart reviewed on [facility] due to BG < 80 mg/dl this am.     Patient is a 42 y.o. female with known Type 1 DM complicated by CKD and gastroparesis on Lantus 12 units at home with no mealtime coverage. A1c:   Lab Results   Component Value Date/Time    Hemoglobin A1c 8.8 (H) 03/03/2018 09:21 AM    Hemoglobin A1c 9.9 (H) 04/30/2017 05:04 AM       Recent Glucose Results:   Lab Results   Component Value Date/Time     (H) 03/21/2018 04:01 AM     (H) 03/20/2018 08:39 PM     (H) 03/20/2018 12:13 PM    GLUCPOC 78 03/21/2018 08:14 AM    GLUCPOC 274 (H) 03/20/2018 08:59 PM    GLUCPOC 127 (H) 03/20/2018 04:12 PM      Lab Results   Component Value Date/Time    Creatinine 2.89 (H) 03/21/2018 04:01 AM     Estimated Creatinine Clearance: 23 mL/min (based on Cr of 2.89). Active Orders   Diet    DIET DIABETIC CONSISTENT CARB Regular; 60-60-60      PO intake: No data found. Will continue to follow as needed.     Thank you  Mian Guzmán RD CDE

## 2018-03-21 NOTE — PROGRESS NOTES
Hospitalist Progress Note    NAME: Keyanna Doll   :  1980   MRN:  [id number]       Assessment / Plan:  Severe sepsis POA due to left foot diabetic foot infection, ? osteomyelitis   Excisional debridement and biopsy done on 3/3, for repeat surgery 3/7/2018  LLE doppler with no deep venous thrombosis identified. MRI left foot multiple fluid collections, significant bone destruction  ESR -> 130->100  CRP 34-> 14  Midline placed 3/6/18  Partial foot amputation 3/11/201  Dr Kevon Mejía to follow as well  wound cultures from 3/11 growing MRSE x 2 species and and [place]  Appreciate ID consult  Cont Daptomycin for now and zosyn was added by ID 3/19  Based on ID recommendations will consider vancomycin and zosyn at the time of discharge for 6 weeks until 18 since that might be the best option for her considering everything, CM to contact facility for abx availability before discharge  Consult case management for options  Her compliance is also questionable since she cam from assisted and sending her home with a PICC line for home iv abx may not be safe, hence SNF is safe option for discharge    DM type 1 uncontrolled with nephropathy POA  Hypoglycemia 3/6/2018 resolved  BSL 78  Stop lantus  lispro sliding scale  Last HgBa1c 8.8    Acute kidney injury POA peak creatinine 3.80  Stage 4 chronic kidney disease POA baseline creatinine 2.4 to 2.7  Hyperkalemia secondary to diet, CKD   been on dialysis in the past   Previously has seen Samia Lacey.   Creatinine 2.8 today  K+ today 5.2, s/p lasix and kayexalate  Appreciated nephrology consult, ok to discharge on low potassium diet  OP follow-up renal    Acute on chronic anemia due to sickle cell disease POA    Pt without sx of crisis at this time  S/p 1u pRBCs  folic acid  Follow Hb     THU POA  Uses 2 LPM O2 at night    Disposition: Home vs In pt facility for iv Abx due to questionable non compliance     Code Status: Full    DVT Prophylaxis: heparin    Body mass index is 25.97 kg/(m^2). Subjective:   No c/o    Review of Systems:  Symptom Y/N Comments  Symptom Y/N Comments   Fever/Chills n   Chest Pain n    Poor Appetite    Edema n    Cough n   Abdominal Pain n    Sputum    Joint Pain     SOB/ADAMS n   Pruritis/Rash     Nausea/vomit n   Tolerating PT/OT     Diarrhea n   Tolerating Diet y    Constipation    Other       Could NOT obtain due to:      Objective:     VITALS:   Last 24hrs VS reviewed since prior progress note. Most recent are:  Patient Vitals for the past 24 hrs:   Temp Pulse Resp BP SpO2   03/21/18 0817 98 °F (36.7 °C) 97 16 150/86 99 %   03/21/18 0019 97.8 °F (36.6 °C) 71 14 150/81 100 %   03/20/18 1939 98.3 °F (36.8 °C) 84 16 144/84 99 %   03/20/18 1609 97.9 °F (36.6 °C) 78 16 167/84 100 %   03/20/18 1236 98.4 °F (36.9 °C) 81 16 (!) 158/95 100 %       Intake/Output Summary (Last 24 hours) at 03/21/18 0906  Last data filed at 03/21/18 2460   Gross per 24 hour   Intake              980 ml   Output             3225 ml   Net            -2245 ml        PHYSICAL EXAM:  General: Alert and awake  Resp:  CTA bilaterally, no wheezing or rales. No accessory muscle use  CV:  Regular  rhythm,  No edema  GI:  Soft, Non distended, Non tender.  +Bowel sounds  Neurologic:  Alert and oriented X 3, normal speech,   Psych:   Not anxious nor agitated  Skin:  L foot bandaged    Reviewed most current lab test results and cultures  YES  Reviewed most current radiology test results   YES  Review and summation of old records today    NO  Reviewed patient's current orders and MAR    YES  PMH/SH reviewed - no change compared to H&P  ________________________________________________________________________  Care Plan discussed with:    Comments   Patient x    Family      RN x    Care Manager x    Consultant  x Dr Sari Jimenez team rounds were held today with , nursing, pharmacist and clinical coordinator.   Patient's plan of care was discussed; medications were reviewed and discharge planning was addressed. ________________________________________________________________________  Total NON critical care TIME:  25   Minutes    Total CRITICAL CARE TIME Spent:   Minutes non procedure based      Comments   >50% of visit spent in counseling and coordination of care     ________________________________________________________________________  Mumtaz Sales MD     Procedures: see electronic medical records for all procedures/Xrays and details which were not copied into this note but were reviewed prior to creation of Plan. LABS:  I reviewed today's most current labs and imaging studies.   Pertinent labs include:  Recent Labs      03/21/18   0401  03/19/18   0520   WBC  5.1  5.8   HGB  8.2*  7.1*   HCT  26.2*  22.5*   PLT  286  321     Recent Labs      03/21/18   0401  03/20/18   2039  03/20/18   1213   NA  141  139  138   K  5.2*  5.4*  5.9*   CL  111*  110*  110*   CO2  24  22  25   GLU  145*  274*  150*   BUN  40*  43*  40*   CREA  2.89*  3.03*  2.97*   CA  8.0*  7.7*  8.2*       Signed: Mumtaz Sales MD

## 2018-03-21 NOTE — PROGRESS NOTES
Nutrition Assessment:    RECOMMENDATIONS:   Continue Diabetic, Renal diet    ASSESSMENT:   Chart reviewed, pt sitting up in the chair awake and alert. She reports a good appetite. K+ spiked a couple days ago, pt was changed to a Renal diet yesterday and started on kayexelate. K+ down to 5.2 today. Provided written and verbal education on Low K+ foods and what high K+ foods to avoid. Pt verbalized understanding. She reports a good appetite. BG fluctuating (). Provided my contact info for questions should the pt discharge before next F/U. Dietitians Intervention(s)/Plan(s): Continue diet, provided education, monitor K+ level  SUBJECTIVE/OBJECTIVE:   \"The diet is new to me\"   Diet Order: Consistent carb, Renal  % Eaten:  No data found. Pertinent Medications:oscal, Vitamin B12, colace, pepcid, folvite, humalog, zosyn, MVI. Chemistries:  Lab Results   Component Value Date/Time    Sodium 141 03/21/2018 04:01 AM    Potassium 5.2 (H) 03/21/2018 04:01 AM    Chloride 111 (H) 03/21/2018 04:01 AM    CO2 24 03/21/2018 04:01 AM    Anion gap 6 03/21/2018 04:01 AM    Glucose 145 (H) 03/21/2018 04:01 AM    BUN 40 (H) 03/21/2018 04:01 AM    Creatinine 2.89 (H) 03/21/2018 04:01 AM    BUN/Creatinine ratio 14 03/21/2018 04:01 AM    GFR est AA 22 (L) 03/21/2018 04:01 AM    GFR est non-AA 18 (L) 03/21/2018 04:01 AM    Calcium 8.0 (L) 03/21/2018 04:01 AM    Albumin 1.4 (L) 03/06/2018 03:40 PM      Anthropometrics: Height: 5' 2\" (157.5 cm) Weight: 64.4 kg (142 lb)   []bed scale    []stated   []unknown     IBW (%IBW):   ( ) UBW (%UBW):   (  %)    BMI: Body mass index is 25.97 kg/(m^2). This BMI is indicative of:  []Underweight   [x]Normal   []Overweight   [] Obesity   [] Extreme Obesity (BMI>40)  Estimated Nutrition Needs (Based on): 1654 Kcals/day (MSJ 1272 x 1.3) , 77 g (1.2gPro/kg) Protein  Carbohydrate:  At Least 130 g/day  Fluids: 1700 mL/day    Last BM: 3/20   []Active     []Hyperactive  []Hypoactive       [] Absent   BS  Skin:    [] Intact   [] Incision  [x] Breakdown(full thickness wound-L foot)   [] DTI   [] Tears/Excoriation/Abrasion  [x]Edema(+2-3-LLE) [] Other: Wt Readings from Last 30 Encounters:   03/02/18 64.4 kg (142 lb)   05/02/17 73.9 kg (162 lb 14.7 oz)   08/30/16 74.8 kg (165 lb)   05/30/16 74.8 kg (165 lb)   05/23/16 80.3 kg (177 lb 2 oz)   05/21/16 80.3 kg (177 lb)   05/18/16 79.4 kg (175 lb)   05/13/16 74.8 kg (165 lb)   05/02/16 82.9 kg (182 lb 12.8 oz)   04/29/16 76.7 kg (169 lb 1.5 oz)   04/22/16 78.2 kg (172 lb 6.4 oz)   04/05/16 78.3 kg (172 lb 9.9 oz)   03/11/16 74.8 kg (165 lb)   03/09/16 83.6 kg (184 lb 4.9 oz)   02/16/16 83.5 kg (184 lb)   02/08/16 83.5 kg (184 lb)   12/02/15 94.3 kg (208 lb)   10/17/15 99.3 kg (219 lb)   09/30/15 106.5 kg (234 lb 12.6 oz)   09/29/15 104.3 kg (230 lb)   09/25/15 104.3 kg (230 lb)   09/19/15 104.3 kg (230 lb)   08/19/14 105 kg (231 lb 6.4 oz)   06/24/13 99.8 kg (220 lb)   06/17/13 102.1 kg (225 lb)   06/10/13 102.1 kg (225 lb)   06/04/13 98.8 kg (217 lb 13 oz)   05/29/13 105.7 kg (233 lb)   05/29/13 105.8 kg (233 lb 4 oz)   05/13/13 100.7 kg (222 lb)      NUTRITION DIAGNOSES:   Problem:   Altered nutrition-related lab values    Etiology: related to   hx of CKD    Signs/Symptoms: as evidenced by   K+ 5.2. NUTRITION INTERVENTIONS:  Meals/Snacks: General/healthful diet         Initial/Brief Nutrition Education: Survival information        GOAL:   Pt will consume >70% of meals with K+ WNL in 4-6 days.      NUTRITION MONITORING AND EVALUATION   Previous Goal: Pt will consume >70% of meals with K+ WNL in 4-6 days   Previous Goal Met: Progressing (appetite good, K+ still borderline high)   Previous Recommendations Implemented: Yes   Cultural, Restoration, or Ethnic Dietary Needs: None   LEARNING NEEDS (Diet, Food/Nutrient-Drug Interaction):    [] None Identified   [x] Identified and Education Provided/Documented   [] Identified and Pt declined/was not appropriate [x] Interdisciplinary Care Plan Reviewed/Documented    [x] Participated in Discharge Planning: Diabetic, Low K+ diet    [] Interdisciplinary Rounds     NUTRITION RISK:    [] High              [] Moderate           [x]  Low  [x]  Minimal/Uncompromised      Mary Giron RD, [address]  Pager [phone]  Weekend Pager [phone]

## 2018-03-21 NOTE — PROGRESS NOTES
ELY called the [facility] but had to leave a message for a call back. 1;35pm  CM received a message through [employer] from the [address] [place] [address] Road, stating that they were unable to accept, due to cost of IV med's. ELY spoke with Dr Vineet Taylor and she is going go change IV med's in an effort to get pt accepted to the [address] [place] [address]. ELY informed  Deidre at the [address] [place] [address] of the pending med change and is waiting for the order to re-evaluate.      Arron Mullen  Ext [phone] 01-Dec-2017 10:20

## 2018-03-21 NOTE — PROGRESS NOTES
[facility] [employer], [address] [place] [address] [address]  [address]  P: [phone]  F: [phone]    Foot and Ankle Surgery - Progress Note                                                   Assessment/Plan:  Charcot deformity left foot  Nonhealing wound left foot - resolved  Osteomyelitis left foot - pending/resolved  Septic arthritis left foot- resolved  DM with neuropathy   Sickle cell      Pt is status post Incision and drainage with removal of all nonviable soft tissue left foot  Proximal foot amputation left foot   Open bone biopsies left foot  Deep tendon transfer tibialis anterior tendon left foot  Adjacent transfer arteriomyofasciocutaneous plantar medial pedicle flap for delayed primary closure left foot DOS: 03-      No further plan for surgery at this time per foot ad ankle as pt is primarily closed. OK to DC per foot and ankle once cleared by all other specialties. The patient remains at high risk for limb loss and they understand that this is a limb preservation procedure.  We discussed continued possible complications and alternative treatment options. We discussed the postoperative requirements, rehabilitation including the need for AFO brace for life and need for absolute nonweightbearing to the left lower extremity. All questions regarding surgical findings were answered to patient satisfaction       Labs/imaging reviewed, H and H noted. Should be noted that no tourniquet was used in surgery and hemostasis to the surgical site was completely controlled prior to closure with the flap. Post op xrays reviewed    Surgical pathology noted  abx per ID team- thank you,       Post operative care  Dressing to left foot to be left clean dry and intact. We will plan for weekly changes BY DR RICE OR HIS STAFF ONLY once discharged.  The patient will require DME to be arranged by CM including wheelchair with leg lift, 3 in 1 commode, and shower chair as they are REQUIRED MEDICALLY to be ABSOLUTE NONWEIGHTBEARING left foot. They should not be placed in a dependent position, not from the edge of their bed, not in their wheelchair and not in any other device for more more than 15 minutes at any given time. The patient has had a full thickness adjacent transfer of a flap and fluid overload will greatly increase the chances of flap failure. Airboots should be in place, loosely approximated at all times that the patient is in the bed.  The patient may complete passive ROM exercises to the left thigh and lower leg but NOT THE FOOT and has full range of motion to upper body and full weight-bearing to the right foot.        Elevate and offload B/L LE. Float heels off edge of bed with foam block or air boots. PT to evaluate and treat       Pt should plan to follow up 1 week or the next Wednesday  after discharge with me at the [facility] wound care center.      Foot and ankle will follow      HPI:  Pt seen at bedside in NAD. No new pedal complaints at this time. Doing well. Rounded with RN, Planning discharge to skilled nursing facility.        Objective:  Vitals: Patient Vitals for the past 12 hrs:   BP Temp Pulse Resp SpO2   03/21/18 1130 116/72 97 °F (36.1 °C) 84 16 100 %   03/21/18 0817 150/86 98 °F (36.7 °C) 97 16 99 %       Dermatological:  Dressing to left foot clean dry and intact    Vascular:  deferred    Neurological:   deferred    MSK:  deferred    Imaging:  None new    Labs:  Recent Labs      03/21/18   0401   WBC  5.1   CREA  2.89*   BUN  40*   HGB  8.2*   HCT  26.2*   NA  141   K  5.2*   CL  111*   CO2  24   GLU  145*         Teddy Zabala DPM  3/21/2018  [facility] of [address] [place] [address], . [address]  [place] [address] [facility]  P: [phone]  F: [phone]

## 2018-03-21 NOTE — PROGRESS NOTES
7:30 PM Bedside report received from [address].    9:45 PM Dr. Isi Rivera notified of BMP results. No new orders received. Will continue to monitor patient closely.

## 2018-03-21 NOTE — PROGRESS NOTES
Nephrology Progress Note     Bill Gloria     [email or web address]                  Phone - [phone]   Patient: Jeanna Batista   YOB: 1980    Date- 3/21/2018     CC: Follow up for hyperkalemia         Subjective: Interval History:   -  k improved  bp better  Hb low but stable    ROS-No c/o sob, fever. No c/o nausea or vomiting  No c/o chest pain     Assessment:   · hyperkalemia due to non compliance to diet and ckd  · ARF- CR. OF 3.0 IS NEW BASELINE. Due to progression of ckd  · Metabolic acidosis  · ckd 4 due to dm,sickle cell - CR 1.7-2.5 IN 2017  · H/o urinary retention  · H/o sickle cell disease  · H/o unconrolled hypertension  · H/o cocaine abuse in past  · Dm 2  · Anemia  · Left diabetic foot      Plan:   Give epogen 79592 units sc x 1  Start po bicarb  Avoid acei or arb  Continue norvasc and clonidine  Follow low k diet      Care Plan discussed with: patient and DR. MATUTE  Review of Systems: Pertinent items are noted in HPI. Objective:   Vitals:    03/20/18 1609 03/20/18 1939 03/21/18 0019 03/21/18 0817   BP: 167/84 144/84 150/81 150/86   Pulse: 78 84 71 97   Resp: 16 16 14 16   Temp: 97.9 °F (36.6 °C) 98.3 °F (36.8 °C) 97.8 °F (36.6 °C) 98 °F (36.7 °C)   SpO2: 100% 99% 100% 99%   Weight:       Height:         Last 3 Recorded Weights in this Encounter    03/02/18 1504   Weight: 64.4 kg (142 lb)     Patient Vitals for the past 8 hrs:   BP Temp Pulse Resp SpO2   03/21/18 0817 150/86 98 °F (36.7 °C) 97 16 99 %        03/19 1901 - 03/21 0700  In: 1330 [P.O.:580; I.V.:750]  Out: 5825 [Urine:5825]  Physical Exam:   GEN: NAD  NECK- Supple, no thyromegaly  RESP: Clear b/l, no wheezing,   CVS: RRR,S1,S2    EXT: trace Edema ,left foot dressing +  NEURO: non focal, normal speech  SKIN: No Rash, No Jaundice  ABDO: soft , non tender, No hepatosplenomegaly    Chart reviewed. Pertinent Notes reviewed.      Medications list  reviewed       Data Review :  Recent Labs      03/21/18   0401  03/20/18 2039  03/20/18   1213   NA  141  139  138   K  5.2*  5.4*  5.9*   CL  111*  110*  110*   CO2  24  22  25   GLU  145*  274*  150*   BUN  40*  43*  40*   CREA  2.89*  3.03*  2.97*   CA  8.0*  7.7*  8.2*     Recent Labs      03/21/18   0401  03/19/18   0520   WBC  5.1  5.8   HGB  8.2*  7.1*   HCT  26.2*  22.5*   PLT  286  321     Lab Results   Component Value Date/Time    Specimen Description: URINE 06/24/2013 08:00 PM    Specimen Description: URINE 06/17/2013 07:55 PM    Specimen Description: URINE 05/26/2013 10:10 AM     Current Facility-Administered Medications   Medication    epoetin bernard (EPOGEN;PROCRIT) injection 40,000 Units    amLODIPine (NORVASC) tablet 5 mg    piperacillin-tazobactam (ZOSYN) 3.375 g in  ml premix/cmpd    promethazine (PHENERGAN) 12.5 mg in NS 50 mL IVPB    DAPTOmycin (CUBICIN) 400 mg in 0.9% sodium chloride 50 mL IVPB RF formulation    diphenhydrAMINE (BENADRYL) capsule 50 mg    oxyCODONE-acetaminophen (PERCOCET) 5-325 mg per tablet 1-2 Tab    sodium chloride (NS) flush 10-40 mL    cloNIDine HCl (CATAPRES) tablet 0.2 mg    venlafaxine (EFFEXOR) tablet 75 mg    QUEtiapine (SEROquel) tablet 100 mg    famotidine (PEPCID) tablet 20 mg    sodium chloride (NS) flush 5-10 mL    sodium chloride (NS) flush 5-10 mL    acetaminophen (TYLENOL) tablet 650 mg    docusate sodium (COLACE) capsule 100 mg    insulin lispro (HUMALOG) injection    glucose chewable tablet 16 g    dextrose (D50W) injection syrg 12.5-25 g    glucagon (GLUCAGEN) injection 1 mg    albuterol (PROVENTIL VENTOLIN) nebulizer solution 2.5 mg    therapeutic multivitamin (THERAGRAN) tablet 1 Tab    cyanocobalamin (VITAMIN B12) tablet 1,000 mcg    calcium carbonate (OS-BINA) tablet 500 mg [elemental]    folic acid (FOLVITE) tablet 1 mg       Severino Ramirez MD  [facility]  [email or web address]  [address] [address]  Phone - [phone]         Fax - [phone]  [facility] Office  [address] [facility] [address]  Phone - [phone]        Fax - [phone]

## 2018-03-22 LAB
ANION GAP SERPL CALC-SCNC: 5 MMOL/L (ref 5–15)
BUN SERPL-MCNC: 39 MG/DL (ref 6–20)
BUN/CREAT SERPL: 13 (ref 12–20)
CALCIUM SERPL-MCNC: 7.9 MG/DL (ref 8.5–10.1)
CHLORIDE SERPL-SCNC: 111 MMOL/L (ref 97–108)
CO2 SERPL-SCNC: 24 MMOL/L (ref 21–32)
CREAT SERPL-MCNC: 3.1 MG/DL (ref 0.55–1.02)
GLUCOSE BLD STRIP.AUTO-MCNC: 171 MG/DL (ref 65–100)
GLUCOSE BLD STRIP.AUTO-MCNC: 192 MG/DL (ref 65–100)
GLUCOSE BLD STRIP.AUTO-MCNC: 236 MG/DL (ref 65–100)
GLUCOSE BLD STRIP.AUTO-MCNC: 83 MG/DL (ref 65–100)
GLUCOSE SERPL-MCNC: 223 MG/DL (ref 65–100)
POTASSIUM SERPL-SCNC: 5.5 MMOL/L (ref 3.5–5.1)
SERVICE CMNT-IMP: ABNORMAL
SERVICE CMNT-IMP: NORMAL
SODIUM SERPL-SCNC: 140 MMOL/L (ref 136–145)

## 2018-03-22 PROCEDURE — 74011636637 HC RX REV CODE- 636/637: Performed by: INTERNAL MEDICINE

## 2018-03-22 PROCEDURE — 82962 GLUCOSE BLOOD TEST: CPT

## 2018-03-22 PROCEDURE — 65270000029 HC RM PRIVATE

## 2018-03-22 PROCEDURE — 74011250637 HC RX REV CODE- 250/637: Performed by: PODIATRIST

## 2018-03-22 PROCEDURE — 74011250637 HC RX REV CODE- 250/637: Performed by: STUDENT IN AN ORGANIZED HEALTH CARE EDUCATION/TRAINING PROGRAM

## 2018-03-22 PROCEDURE — 74011250636 HC RX REV CODE- 250/636: Performed by: INTERNAL MEDICINE

## 2018-03-22 PROCEDURE — 74011250637 HC RX REV CODE- 250/637: Performed by: INTERNAL MEDICINE

## 2018-03-22 PROCEDURE — 74011000258 HC RX REV CODE- 258: Performed by: INTERNAL MEDICINE

## 2018-03-22 PROCEDURE — 80048 BASIC METABOLIC PNL TOTAL CA: CPT | Performed by: EMERGENCY MEDICINE

## 2018-03-22 PROCEDURE — 36415 COLL VENOUS BLD VENIPUNCTURE: CPT | Performed by: EMERGENCY MEDICINE

## 2018-03-22 RX ORDER — PROMETHAZINE HYDROCHLORIDE 25 MG/1
25 TABLET ORAL
Status: DISCONTINUED | OUTPATIENT
Start: 2018-03-22 | End: 2018-03-27 | Stop reason: HOSPADM

## 2018-03-22 RX ORDER — SODIUM POLYSTYRENE SULFONATE 15 G/60ML
45 SUSPENSION ORAL; RECTAL
Status: COMPLETED | OUTPATIENT
Start: 2018-03-22 | End: 2018-03-22

## 2018-03-22 RX ORDER — SODIUM POLYSTYRENE SULFONATE 15 G/60ML
30 SUSPENSION ORAL; RECTAL
Status: DISCONTINUED | OUTPATIENT
Start: 2018-03-22 | End: 2018-03-22 | Stop reason: ALTCHOICE

## 2018-03-22 RX ORDER — HEPARIN SODIUM 5000 [USP'U]/ML
5000 INJECTION, SOLUTION INTRAVENOUS; SUBCUTANEOUS EVERY 8 HOURS
Status: DISCONTINUED | OUTPATIENT
Start: 2018-03-22 | End: 2018-03-27 | Stop reason: HOSPADM

## 2018-03-22 RX ADMIN — CYANOCOBALAMIN TAB 500 MCG 1000 MCG: 500 TAB at 08:54

## 2018-03-22 RX ADMIN — CLONIDINE HYDROCHLORIDE 0.2 MG: 0.1 TABLET ORAL at 08:54

## 2018-03-22 RX ADMIN — OXYCODONE HYDROCHLORIDE AND ACETAMINOPHEN 2 TABLET: 5; 325 TABLET ORAL at 09:02

## 2018-03-22 RX ADMIN — INSULIN LISPRO 2 UNITS: 100 INJECTION, SOLUTION INTRAVENOUS; SUBCUTANEOUS at 17:05

## 2018-03-22 RX ADMIN — PROMETHAZINE HYDROCHLORIDE 12.5 MG: 25 INJECTION INTRAMUSCULAR; INTRAVENOUS at 03:54

## 2018-03-22 RX ADMIN — THERA TABS 1 TABLET: TAB at 08:54

## 2018-03-22 RX ADMIN — HEPARIN SODIUM 5000 UNITS: 5000 INJECTION, SOLUTION INTRAVENOUS; SUBCUTANEOUS at 20:42

## 2018-03-22 RX ADMIN — OXYCODONE HYDROCHLORIDE AND ACETAMINOPHEN 2 TABLET: 5; 325 TABLET ORAL at 04:01

## 2018-03-22 RX ADMIN — SODIUM BICARBONATE 650 MG TABLET 650 MG: at 17:07

## 2018-03-22 RX ADMIN — CALCIUM 500 MG: 500 TABLET ORAL at 08:54

## 2018-03-22 RX ADMIN — Medication 10 ML: at 13:13

## 2018-03-22 RX ADMIN — Medication 10 ML: at 05:41

## 2018-03-22 RX ADMIN — SODIUM POLYSTYRENE SULFONATE 45 G: 15 SUSPENSION ORAL; RECTAL at 09:07

## 2018-03-22 RX ADMIN — Medication 10 ML: at 05:40

## 2018-03-22 RX ADMIN — CLONIDINE HYDROCHLORIDE 0.2 MG: 0.1 TABLET ORAL at 13:13

## 2018-03-22 RX ADMIN — QUETIAPINE FUMARATE 100 MG: 100 TABLET ORAL at 08:54

## 2018-03-22 RX ADMIN — Medication 10 ML: at 21:57

## 2018-03-22 RX ADMIN — PIPERACILLIN SODIUM,TAZOBACTAM SODIUM 3.38 G: 3; .375 INJECTION, POWDER, FOR SOLUTION INTRAVENOUS at 13:13

## 2018-03-22 RX ADMIN — DIPHENHYDRAMINE HYDROCHLORIDE 50 MG: 25 CAPSULE ORAL at 15:33

## 2018-03-22 RX ADMIN — QUETIAPINE FUMARATE 100 MG: 100 TABLET ORAL at 20:43

## 2018-03-22 RX ADMIN — FAMOTIDINE 20 MG: 20 TABLET, FILM COATED ORAL at 08:54

## 2018-03-22 RX ADMIN — DIPHENHYDRAMINE HYDROCHLORIDE 50 MG: 25 CAPSULE ORAL at 21:47

## 2018-03-22 RX ADMIN — VENLAFAXINE 75 MG: 37.5 TABLET ORAL at 20:42

## 2018-03-22 RX ADMIN — FOLIC ACID 1 MG: 1 TABLET ORAL at 08:54

## 2018-03-22 RX ADMIN — INSULIN LISPRO 3 UNITS: 100 INJECTION, SOLUTION INTRAVENOUS; SUBCUTANEOUS at 09:07

## 2018-03-22 RX ADMIN — DOCUSATE SODIUM 100 MG: 100 CAPSULE, LIQUID FILLED ORAL at 17:07

## 2018-03-22 RX ADMIN — SODIUM BICARBONATE 650 MG TABLET 650 MG: at 08:54

## 2018-03-22 RX ADMIN — PROMETHAZINE HYDROCHLORIDE 25 MG: 25 TABLET ORAL at 11:45

## 2018-03-22 RX ADMIN — DIPHENHYDRAMINE HYDROCHLORIDE 50 MG: 25 CAPSULE ORAL at 05:10

## 2018-03-22 RX ADMIN — OXYCODONE HYDROCHLORIDE AND ACETAMINOPHEN 2 TABLET: 5; 325 TABLET ORAL at 15:33

## 2018-03-22 RX ADMIN — PIPERACILLIN SODIUM,TAZOBACTAM SODIUM 3.38 G: 3; .375 INJECTION, POWDER, FOR SOLUTION INTRAVENOUS at 21:48

## 2018-03-22 RX ADMIN — CLONIDINE HYDROCHLORIDE 0.2 MG: 0.1 TABLET ORAL at 20:42

## 2018-03-22 RX ADMIN — VANCOMYCIN HYDROCHLORIDE 750 MG: 750 INJECTION, POWDER, LYOPHILIZED, FOR SOLUTION INTRAVENOUS at 16:11

## 2018-03-22 RX ADMIN — VENLAFAXINE 75 MG: 37.5 TABLET ORAL at 08:54

## 2018-03-22 RX ADMIN — AMLODIPINE BESYLATE 5 MG: 5 TABLET ORAL at 08:54

## 2018-03-22 RX ADMIN — PIPERACILLIN SODIUM,TAZOBACTAM SODIUM 3.38 G: 3; .375 INJECTION, POWDER, FOR SOLUTION INTRAVENOUS at 05:41

## 2018-03-22 RX ADMIN — OXYCODONE HYDROCHLORIDE AND ACETAMINOPHEN 2 TABLET: 5; 325 TABLET ORAL at 20:48

## 2018-03-22 NOTE — PROGRESS NOTES
General Surgery End of Shift Nursing Note    Bedside shift change report given to Sadie Black RN (oncoming nurse) by Alexsander Vale. Nicolás Anton RN (offgoing nurse). Report included the following information SBAR, Kardex, Intake/Output, MAR and Recent Results. Shift worked:   7a to 3p   Summary of shift:    Up to chair, tolerating the day   Issues for physician to address:   none     Number times ambulated in hallway past shift: in room    Number of times OOB to chair past shift: 2    Pain Management:  Current medication: Percocet  Patient states pain is manageable on current pain medication: YES    GI:    Current diet:  DIET DIABETIC CONSISTENT CARB Regular; 60-60-60    Tolerating current diet: YES  Passing flatus: YES  Last Bowel Movement: yesterday   Appearance: loose    Respiratory:    Incentive Spirometer at bedside: YES  Patient instructed on use: YES    Patient Safety:    Falls Score: 2  Bed Alarm On? No  Sitter?  No    Ashly Alaniz RN

## 2018-03-22 NOTE — PROGRESS NOTES
Nephrology Progress Note     Bill Gloria     www. [facility]"[employer]                  Phone - [phone]   Patient: Alec Moran   YOB: 1980    Date- 3/22/2018     CC: Follow up for [facility]       Subjective: Interval History:   -  K HIGH  SHE HAS PEACHES ON THE TABLE  CR. Slightly high  bp high  No c/o sob, fever. No c/o nausea or vomiting  No c/o chest pain       Assessment:   · hyperkalemia due to non compliance to diet and ckd  · arf- CR. OF 3.0 IS NEW BASELINE. Due to progression of ckd  · Metabolic acidosis  · ckd 4 due to dm,sickle cell - CR 1.7-2.5 IN 2017  · H/o urinary retention  · H/o sickle cell disease  · H/o unconrolled hypertension  · H/o cocaine abuse in past  · Dm 2  · Anemia  · Left diabetic foot      Plan:   Kayexalate 45 gram po   Consult diaetian. She needs to avoid all high k food  Avoid acei or arb  Continue norvasc and clonidine  Follow strict low k diet  Continue po bicarb  Get labs next Monday if she is discharged      Care Plan discussed with: patient and DR. MATUTE  Review of Systems: Pertinent items are noted in HPI. Objective:   Vitals:    03/21/18 1931 03/21/18 2249 03/22/18 0223 03/22/18 0902   BP: 125/72 124/66 156/81 (!) 163/104   Pulse: 83 84 78 86   Resp: 16 16 16 18   Temp: 97.6 °F (36.4 °C) 98.2 °F (36.8 °C) 97.9 °F (36.6 °C) 98 °F (36.7 °C)   SpO2: 100% 100% 100% 100%   Weight:       Height:         Last 3 Recorded Weights in this Encounter    03/02/18 1504   Weight: 64.4 kg (142 lb)     Patient Vitals for the past 8 hrs:   BP Temp Pulse Resp SpO2   03/22/18 0902 (!) 163/104 98 °F (36.7 °C) 86 18 100 %     03/22 0701 - 03/22 1900  In: 120 [P.O.:120]  Out: -   03/20 1901 - 03/22 0700  In: 2100 [P.O.:1300; I.V.:800]  Out: 3025 [Urine:3025]  Physical Exam:   General: NAD  Resp:  Clear lungs, no wheezing or rales. CV:  Regular  rhythm,  no murmur or rub  GI:  Soft, Non distended, Non tender. Skin:  no rashes or ulcers. No jaundice  Neurologic:  Alert and oriented X 3. Non Focal  Left foot dressing +    Chart reviewed. Pertinent Notes reviewed.      Medications list  reviewed       Data Review :  Recent Labs      03/22/18 0229 03/21/18   0401 03/20/18 2039   NA  140  141  139   K  5.5*  5.2*  5.4*   CL  111*  111*  110*   CO2  24  24  22   GLU  223*  145*  274*   BUN  39*  40*  43*   CREA  3.10*  2.89*  3.03*   CA  7.9*  8.0*  7.7*     Recent Labs      03/21/18   0401   WBC  5.1   HGB  8.2*   HCT  26.2*   PLT  286     Lab Results   Component Value Date/Time    Specimen Description: URINE 06/24/2013 08:00 PM    Specimen Description: URINE 06/17/2013 07:55 PM    Specimen Description: URINE 05/26/2013 10:10 AM     Current Facility-Administered Medications   Medication    sodium bicarbonate tablet 650 mg    piperacillin-tazobactam (ZOSYN) 3.375 g in 0.9% sodium chloride (MBP/ADV) 100 mL    vancomycin (VANCOCIN) 750 mg in 0.9% sodium chloride (MBP/ADV) 250 mL    amLODIPine (NORVASC) tablet 5 mg    promethazine (PHENERGAN) 12.5 mg in NS 50 mL IVPB    diphenhydrAMINE (BENADRYL) capsule 50 mg    oxyCODONE-acetaminophen (PERCOCET) 5-325 mg per tablet 1-2 Tab    sodium chloride (NS) flush 10-40 mL    cloNIDine HCl (CATAPRES) tablet 0.2 mg    venlafaxine (EFFEXOR) tablet 75 mg    QUEtiapine (SEROquel) tablet 100 mg    famotidine (PEPCID) tablet 20 mg    sodium chloride (NS) flush 5-10 mL    sodium chloride (NS) flush 5-10 mL    acetaminophen (TYLENOL) tablet 650 mg    docusate sodium (COLACE) capsule 100 mg    insulin lispro (HUMALOG) injection    glucose chewable tablet 16 g    dextrose (D50W) injection syrg 12.5-25 g    glucagon (GLUCAGEN) injection 1 mg    albuterol (PROVENTIL VENTOLIN) nebulizer solution 2.5 mg    therapeutic multivitamin (THERAGRAN) tablet 1 Tab    cyanocobalamin (VITAMIN B12) tablet 1,000 mcg    calcium carbonate (OS-BINA) tablet 500 mg [elemental]    folic acid (FOLVITE) tablet 1 mg Luis Eduardo Robertson MD  [facility]  [email or web address]  [address] [facility] [place], [address]  Phone - [phone]         Fax - [phone]  [facility] Office  [address] [address]  Phone - [phone]        Fax - [phone]

## 2018-03-22 NOTE — PROGRESS NOTES
Problem: Falls - Risk of  Goal: *Absence of Falls  Document Blake Fall Risk and appropriate interventions in the flowsheet.    Outcome: Progressing Towards Goal  Fall Risk Interventions:  Mobility Interventions: OT consult for ADLs, Patient to call before getting OOB, Utilize walker, cane, or other assitive device         Medication Interventions: Evaluate medications/consider consulting pharmacy, Patient to call before getting OOB    Elimination Interventions: Call light in reach, Patient to call for help with toileting needs    History of Falls Interventions: Bed/chair exit alarm

## 2018-03-22 NOTE — PROGRESS NOTES
Hospitalist Progress Note    NAME: Christian Leyva   :  1980   MRN:  [id number]       Assessment / Plan:  Severe sepsis POA due to left foot diabetic foot infection, ? osteomyelitis   Excisional debridement and biopsy done on 3/3, for repeat surgery 3/7/2018  LLE doppler with no deep venous thrombosis identified. MRI left foot multiple fluid collections, significant bone destruction  ESR -> 130->100  CRP 34-> 14  Midline placed 3/6/18  Partial foot amputation 3/11/201  Dr Collette Jesus to follow as well  wound cultures from 3/11 growing MRSE x 2 species and and S Hominis  Appreciate ID consult   Daptomycin and zosyn until  3/21  Switched to vancomycin 3/21, cont zosyn,  daptomycin was stopped. Cont Vancomycin and zosyn at the time of discharge for 6 weeks until 18 since that might be the best option for her considering everything, CM to contact facility for abx availability before discharge    Her compliance is also questionable since she cam from [facility] and sending her home with a PICC line for home iv abx may not be safe, hence SNF is safe option for discharge    DM type 1 uncontrolled with nephropathy POA  Hypoglycemia 3/6/2018 resolved  BSL 83  Stop lantus  lispro sliding scale  Last HgBa1c 8.8  Nutrition consult    Acute kidney injury POA peak creatinine 3.80  Stage 4 chronic kidney disease POA baseline creatinine 2.4 to 2.7  Hyperkalemia secondary to diet, CKD   been on dialysis in the past   Previously has seen Ruben.   Creatinine 3.1 today  K+ today 5.5, d/w renal give kaexalate, ok for discharge from renal standpoint, will need OP follow-up  Appreciated nephrology consult, ok to discharge on low potassium diet  OP follow-up renal    Acute on chronic anemia due to sickle cell disease POA    Pt without sx of crisis at this time  S/p 1u pRBCs  folic acid  Follow Hb     THU POA  Uses 2 LPM O2 at night    Disposition: Home vs In pt facility for iv Abx due to questionable non compliance     Code Status: Full    DVT Prophylaxis: heparin    Body mass index is 25.97 kg/(m^2). Subjective:   No c/o    Review of Systems:  Symptom Y/N Comments  Symptom Y/N Comments   Fever/Chills n   Chest Pain n    Poor Appetite    Edema n    Cough n   Abdominal Pain n    Sputum    Joint Pain     SOB/ADAMS n   Pruritis/Rash     Nausea/vomit n   Tolerating PT/OT     Diarrhea n   Tolerating Diet y    Constipation    Other       Could NOT obtain due to:      Objective:     VITALS:   Last 24hrs VS reviewed since prior progress note. Most recent are:  Patient Vitals for the past 24 hrs:   Temp Pulse Resp BP SpO2   03/22/18 0902 98 °F (36.7 °C) 86 18 (!) 163/104 100 %   03/22/18 0223 97.9 °F (36.6 °C) 78 16 156/81 100 %   03/21/18 2249 98.2 °F (36.8 °C) 84 16 124/66 100 %   03/21/18 1931 97.6 °F (36.4 °C) 83 16 125/72 100 %   03/21/18 1130 97 °F (36.1 °C) 84 16 116/72 100 %       Intake/Output Summary (Last 24 hours) at 03/22/18 0928  Last data filed at 03/22/18 0814   Gross per 24 hour   Intake             1240 ml   Output             1400 ml   Net             -160 ml        PHYSICAL EXAM:  General: Alert and awake  Resp:  CTA bilaterally, no wheezing or rales.   No accessory muscle use  CV:  Regular  rhythm,  No edema  GI:  Soft, Non distended, Non tender.  +Bowel sounds  Neurologic:  Alert and oriented X 3, normal speech, grossly intact  Psych:   Not anxious nor agitated  Skin:  L foot bandaged    Reviewed most current lab test results and cultures  YES  Reviewed most current radiology test results   YES  Review and summation of old records today    NO  Reviewed patient's current orders and MAR    YES  PMH/SH reviewed - no change compared to H&P  ________________________________________________________________________  Care Plan discussed with:    Comments   Patient x    Family      RN x    Care Manager x    Consultant  x Dr Parminder Alvarez team rounds were held today with case manager, nursing, pharmacist and clinical coordinator. Patient's plan of care was discussed; medications were reviewed and discharge planning was addressed. ________________________________________________________________________  Total NON critical care TIME:  25   Minutes    Total CRITICAL CARE TIME Spent:   Minutes non procedure based      Comments   >50% of visit spent in counseling and coordination of care     ________________________________________________________________________  Reese Wolfe MD     Procedures: see electronic medical records for all procedures/Xrays and details which were not copied into this note but were reviewed prior to creation of Plan. LABS:  I reviewed today's most current labs and imaging studies.   Pertinent labs include:  Recent Labs      03/21/18   0401   WBC  5.1   HGB  8.2*   HCT  26.2*   PLT  286     Recent Labs      03/22/18   0229  03/21/18   0401  03/20/18   2039   NA  140  141  139   K  5.5*  5.2*  5.4*   CL  111*  111*  110*   CO2  24  24  22   GLU  223*  145*  274*   BUN  39*  40*  43*   CREA  3.10*  2.89*  3.03*   CA  7.9*  8.0*  7.7*       Signed: Reese Wolfe MD

## 2018-03-23 LAB
ANION GAP SERPL CALC-SCNC: 5 MMOL/L (ref 5–15)
BUN SERPL-MCNC: 37 MG/DL (ref 6–20)
BUN/CREAT SERPL: 12 (ref 12–20)
CALCIUM SERPL-MCNC: 7.9 MG/DL (ref 8.5–10.1)
CHLORIDE SERPL-SCNC: 109 MMOL/L (ref 97–108)
CO2 SERPL-SCNC: 24 MMOL/L (ref 21–32)
CREAT SERPL-MCNC: 3.01 MG/DL (ref 0.55–1.02)
DATE LAST DOSE: NO GROWTH
ERYTHROCYTE [DISTWIDTH] IN BLOOD BY AUTOMATED COUNT: 21.4 % (ref 11.5–14.5)
GLUCOSE BLD STRIP.AUTO-MCNC: 106 MG/DL (ref 65–100)
GLUCOSE BLD STRIP.AUTO-MCNC: 265 MG/DL (ref 65–100)
GLUCOSE BLD STRIP.AUTO-MCNC: 290 MG/DL (ref 65–100)
GLUCOSE BLD STRIP.AUTO-MCNC: 303 MG/DL (ref 65–100)
GLUCOSE BLD STRIP.AUTO-MCNC: 65 MG/DL (ref 65–100)
GLUCOSE BLD STRIP.AUTO-MCNC: 66 MG/DL (ref 65–100)
GLUCOSE SERPL-MCNC: 265 MG/DL (ref 65–100)
HCT VFR BLD AUTO: 24.3 % (ref 35–47)
HGB BLD-MCNC: 7.6 G/DL (ref 11.5–16)
MCH RBC QN AUTO: 24.4 PG (ref 26–34)
MCHC RBC AUTO-ENTMCNC: 31.3 G/DL (ref 30–36.5)
MCV RBC AUTO: 78.1 FL (ref 80–99)
NRBC # BLD: 0 K/UL (ref 0–0.01)
NRBC BLD-RTO: 0 PER 100 WBC
PLATELET # BLD AUTO: 280 K/UL (ref 150–400)
PMV BLD AUTO: 10.2 FL (ref 8.9–12.9)
POTASSIUM SERPL-SCNC: 5.5 MMOL/L (ref 3.5–5.1)
RBC # BLD AUTO: 3.11 M/UL (ref 3.8–5.2)
REPORTED DOSE,DOSE: NO GROWTH UNITS
REPORTED DOSE/TIME,TMG: NO GROWTH
SERVICE CMNT-IMP: ABNORMAL
SERVICE CMNT-IMP: NORMAL
SERVICE CMNT-IMP: NORMAL
SODIUM SERPL-SCNC: 138 MMOL/L (ref 136–145)
VANCOMYCIN TROUGH SERPL-MCNC: 22.4 UG/ML (ref 5–10)
WBC # BLD AUTO: 4.3 K/UL (ref 3.6–11)

## 2018-03-23 PROCEDURE — 74011250637 HC RX REV CODE- 250/637: Performed by: STUDENT IN AN ORGANIZED HEALTH CARE EDUCATION/TRAINING PROGRAM

## 2018-03-23 PROCEDURE — 74011250637 HC RX REV CODE- 250/637: Performed by: INTERNAL MEDICINE

## 2018-03-23 PROCEDURE — 65270000029 HC RM PRIVATE

## 2018-03-23 PROCEDURE — 74011250636 HC RX REV CODE- 250/636: Performed by: EMERGENCY MEDICINE

## 2018-03-23 PROCEDURE — 74011250636 HC RX REV CODE- 250/636: Performed by: INTERNAL MEDICINE

## 2018-03-23 PROCEDURE — 74011250637 HC RX REV CODE- 250/637: Performed by: PODIATRIST

## 2018-03-23 PROCEDURE — 80202 ASSAY OF VANCOMYCIN: CPT | Performed by: INTERNAL MEDICINE

## 2018-03-23 PROCEDURE — 85027 COMPLETE CBC AUTOMATED: CPT | Performed by: INTERNAL MEDICINE

## 2018-03-23 PROCEDURE — 36415 COLL VENOUS BLD VENIPUNCTURE: CPT | Performed by: INTERNAL MEDICINE

## 2018-03-23 PROCEDURE — 80048 BASIC METABOLIC PNL TOTAL CA: CPT | Performed by: INTERNAL MEDICINE

## 2018-03-23 PROCEDURE — 74011000258 HC RX REV CODE- 258: Performed by: INTERNAL MEDICINE

## 2018-03-23 PROCEDURE — 82962 GLUCOSE BLOOD TEST: CPT

## 2018-03-23 PROCEDURE — 74011636637 HC RX REV CODE- 636/637: Performed by: INTERNAL MEDICINE

## 2018-03-23 RX ORDER — HYDRALAZINE HYDROCHLORIDE 20 MG/ML
20 INJECTION INTRAMUSCULAR; INTRAVENOUS
Status: DISCONTINUED | OUTPATIENT
Start: 2018-03-23 | End: 2018-03-27 | Stop reason: HOSPADM

## 2018-03-23 RX ORDER — SODIUM POLYSTYRENE SULFONATE 15 G/60ML
45 SUSPENSION ORAL; RECTAL
Status: ACTIVE | OUTPATIENT
Start: 2018-03-23 | End: 2018-03-23

## 2018-03-23 RX ORDER — FUROSEMIDE 10 MG/ML
40 INJECTION INTRAMUSCULAR; INTRAVENOUS ONCE
Status: COMPLETED | OUTPATIENT
Start: 2018-03-23 | End: 2018-03-23

## 2018-03-23 RX ORDER — SODIUM CHLORIDE 9 MG/ML
100 INJECTION, SOLUTION INTRAVENOUS CONTINUOUS
Status: DISPENSED | OUTPATIENT
Start: 2018-03-23 | End: 2018-03-23

## 2018-03-23 RX ADMIN — AMLODIPINE BESYLATE 5 MG: 5 TABLET ORAL at 09:05

## 2018-03-23 RX ADMIN — PIPERACILLIN SODIUM,TAZOBACTAM SODIUM 3.38 G: 3; .375 INJECTION, POWDER, FOR SOLUTION INTRAVENOUS at 07:04

## 2018-03-23 RX ADMIN — FOLIC ACID 1 MG: 1 TABLET ORAL at 09:05

## 2018-03-23 RX ADMIN — HEPARIN SODIUM 5000 UNITS: 5000 INJECTION, SOLUTION INTRAVENOUS; SUBCUTANEOUS at 13:37

## 2018-03-23 RX ADMIN — QUETIAPINE FUMARATE 100 MG: 100 TABLET ORAL at 19:45

## 2018-03-23 RX ADMIN — CYANOCOBALAMIN TAB 500 MCG 1000 MCG: 500 TAB at 09:05

## 2018-03-23 RX ADMIN — DIPHENHYDRAMINE HYDROCHLORIDE 50 MG: 25 CAPSULE ORAL at 19:45

## 2018-03-23 RX ADMIN — PIPERACILLIN SODIUM,TAZOBACTAM SODIUM 3.38 G: 3; .375 INJECTION, POWDER, FOR SOLUTION INTRAVENOUS at 13:36

## 2018-03-23 RX ADMIN — OXYCODONE HYDROCHLORIDE AND ACETAMINOPHEN 2 TABLET: 5; 325 TABLET ORAL at 19:44

## 2018-03-23 RX ADMIN — CLONIDINE HYDROCHLORIDE 0.2 MG: 0.1 TABLET ORAL at 19:45

## 2018-03-23 RX ADMIN — HYDRALAZINE HYDROCHLORIDE 20 MG: 20 INJECTION INTRAMUSCULAR; INTRAVENOUS at 04:55

## 2018-03-23 RX ADMIN — HEPARIN SODIUM 5000 UNITS: 5000 INJECTION, SOLUTION INTRAVENOUS; SUBCUTANEOUS at 22:15

## 2018-03-23 RX ADMIN — Medication 10 ML: at 14:00

## 2018-03-23 RX ADMIN — QUETIAPINE FUMARATE 100 MG: 100 TABLET ORAL at 09:05

## 2018-03-23 RX ADMIN — OXYCODONE HYDROCHLORIDE AND ACETAMINOPHEN 2 TABLET: 5; 325 TABLET ORAL at 07:05

## 2018-03-23 RX ADMIN — THERA TABS 1 TABLET: TAB at 09:05

## 2018-03-23 RX ADMIN — DIPHENHYDRAMINE HYDROCHLORIDE 50 MG: 25 CAPSULE ORAL at 13:36

## 2018-03-23 RX ADMIN — VANCOMYCIN HYDROCHLORIDE 750 MG: 750 INJECTION, POWDER, LYOPHILIZED, FOR SOLUTION INTRAVENOUS at 15:31

## 2018-03-23 RX ADMIN — FUROSEMIDE 40 MG: 10 INJECTION, SOLUTION INTRAMUSCULAR; INTRAVENOUS at 09:04

## 2018-03-23 RX ADMIN — CLONIDINE HYDROCHLORIDE 0.2 MG: 0.1 TABLET ORAL at 09:05

## 2018-03-23 RX ADMIN — OXYCODONE HYDROCHLORIDE AND ACETAMINOPHEN 2 TABLET: 5; 325 TABLET ORAL at 13:36

## 2018-03-23 RX ADMIN — SODIUM BICARBONATE 650 MG TABLET 650 MG: at 17:07

## 2018-03-23 RX ADMIN — OXYCODONE HYDROCHLORIDE AND ACETAMINOPHEN 2 TABLET: 5; 325 TABLET ORAL at 01:59

## 2018-03-23 RX ADMIN — SODIUM BICARBONATE 650 MG TABLET 650 MG: at 09:05

## 2018-03-23 RX ADMIN — CALCIUM 500 MG: 500 TABLET ORAL at 09:05

## 2018-03-23 RX ADMIN — DIPHENHYDRAMINE HYDROCHLORIDE 50 MG: 25 CAPSULE ORAL at 07:05

## 2018-03-23 RX ADMIN — SODIUM CHLORIDE 100 ML/HR: 900 INJECTION, SOLUTION INTRAVENOUS at 09:00

## 2018-03-23 RX ADMIN — VENLAFAXINE 75 MG: 37.5 TABLET ORAL at 19:45

## 2018-03-23 RX ADMIN — INSULIN LISPRO 5 UNITS: 100 INJECTION, SOLUTION INTRAVENOUS; SUBCUTANEOUS at 11:54

## 2018-03-23 RX ADMIN — FAMOTIDINE 20 MG: 20 TABLET, FILM COATED ORAL at 09:05

## 2018-03-23 RX ADMIN — HEPARIN SODIUM 5000 UNITS: 5000 INJECTION, SOLUTION INTRAVENOUS; SUBCUTANEOUS at 07:04

## 2018-03-23 RX ADMIN — VENLAFAXINE 75 MG: 37.5 TABLET ORAL at 09:05

## 2018-03-23 RX ADMIN — CLONIDINE HYDROCHLORIDE 0.2 MG: 0.1 TABLET ORAL at 13:36

## 2018-03-23 RX ADMIN — Medication 10 ML: at 05:03

## 2018-03-23 RX ADMIN — PROMETHAZINE HYDROCHLORIDE 25 MG: 25 TABLET ORAL at 04:55

## 2018-03-23 RX ADMIN — PIPERACILLIN SODIUM,TAZOBACTAM SODIUM 3.38 G: 3; .375 INJECTION, POWDER, FOR SOLUTION INTRAVENOUS at 22:15

## 2018-03-23 RX ADMIN — INSULIN LISPRO 5 UNITS: 100 INJECTION, SOLUTION INTRAVENOUS; SUBCUTANEOUS at 09:05

## 2018-03-23 NOTE — PROGRESS NOTES
Per patients request, CM sent a referrals to [facility], [address] and rehab, [address] [facility] for [facility]. 11:26am  CM left a  for [facility] to return a call. CM spoke to someone with [facility] and they are going to check [employer] for the referral and call CM back. ELY spoke with Chelsea with [facility] and she stated that the pt needed an isolation room and they were unable to provide that. I explained that the pt was not on any precautions in the hospital.  Samina Araya requested a number to contact infection control. CM provided the number and is waiting for a call back.     Neville Medrano  Ext [phone]

## 2018-03-23 NOTE — PROGRESS NOTES
Hospitalist Progress Note    NAME: Mary Hernandez   :  1980   MRN:  [id number]       Assessment / Plan:  Severe sepsis POA due to left foot diabetic foot infection, ? osteomyelitis   Excisional debridement and biopsy done on 3/3, for repeat surgery 3/7/2018  LLE doppler with no deep venous thrombosis identified. MRI left foot multiple fluid collections, significant bone destruction  ESR -> 130->100  CRP 34-> 14  Midline placed 3/6/18  Partial foot amputation 3/11/201  Dr Maddy Alcocer to follow as well  wound cultures from 3/11 growing MRSE x 2 species and and S Hominis  Appreciate ID consult   Daptomycin and zosyn until  3/21  Switched to vancomycin 3/21, cont zosyn,  daptomycin was stopped. Cont Vancomycin and zosyn at the time of discharge for 6 weeks until 18 since that might be the best option for her considering everything, CM to contact facility for abx availability before discharge    Her compliance is also questionable since she cam from [facility] and sending her home with a PICC line for home iv abx may not be safe, hence SNF is safe option for discharge    DM type 1 uncontrolled with nephropathy POA  Hypoglycemia 3/6/2018 resolved    Stopped lantus 3/22 dt hypoglycemia, will resume if persistent hyperglycemia  lispro sliding scale  Last HgBa1c 8.8  Nutrition consult    Acute kidney injury POA peak creatinine 3.80  Stage 4 chronic kidney disease POA baseline creatinine 2.4 to 2.7  Hyperkalemia secondary to diet, CKD   been on dialysis in the past   Previously has seen Ruben. Creatinine 3.0 today  K+ still at  5.5, pt did not take kayexalate. Pt encouraged to take kayexalate, educated on importance of correcting K+ level.   Per renal ok for discharge from renal standpoint, will need OP follow-up  Appreciated nephrology consult, ok to discharge on low potassium diet  OP follow-up renal    Tachycardia   After exertion, monitor    Acute on chronic anemia due to sickle cell disease POA Pt without sx of crisis at this time  S/p 1u pRBCs  folic acid  Follow Hb     THU POA  Uses 2 LPM O2 at night    Disposition: Home vs In pt facility for iv Abx due to questionable non compliance     Code Status: Full    DVT Prophylaxis: heparin    Body mass index is 25.97 kg/(m^2). Subjective:   No c/o    Review of Systems:  Symptom Y/N Comments  Symptom Y/N Comments   Fever/Chills n   Chest Pain n    Poor Appetite    Edema n    Cough n   Abdominal Pain n    Sputum    Joint Pain     SOB/ADAMS n   Pruritis/Rash     Nausea/vomit n   Tolerating PT/OT     Diarrhea n   Tolerating Diet y    Constipation    Other       Could NOT obtain due to:      Objective:     VITALS:   Last 24hrs VS reviewed since prior progress note. Most recent are:  Patient Vitals for the past 24 hrs:   Temp Pulse Resp BP SpO2   03/23/18 0807 98.8 °F (37.1 °C) (!) 112 18 128/80 100 %   03/23/18 0315 97.7 °F (36.5 °C) 87 18 (!) 170/91 100 %   03/22/18 1932 98.2 °F (36.8 °C) 86 17 (!) 182/91 100 %   03/22/18 1507 97.9 °F (36.6 °C) 78 16 (!) 164/95 100 %   03/22/18 1112 97.5 °F (36.4 °C) 83 16 119/71 99 %       Intake/Output Summary (Last 24 hours) at 03/23/18 0933  Last data filed at 03/23/18 7364   Gross per 24 hour   Intake              220 ml   Output             2600 ml   Net            -2380 ml        PHYSICAL EXAM:  General: Alert and awake  Resp:  CTA bilaterally, no wheezing or rales.   No accessory muscle use  CV:  Regular  rhythm,  No edema  GI:  Soft, Non distended, Non tender.  +Bowel sounds  Neurologic:  Alert and oriented X 3, normal speech, grossly intact  Psych:   Not anxious nor agitated  Skin:  L foot bandaged    Reviewed most current lab test results and cultures  YES  Reviewed most current radiology test results   YES  Review and summation of old records today    NO  Reviewed patient's current orders and MAR    YES  PMH/SH reviewed - no change compared to H&P  ________________________________________________________________________  Care Plan discussed with:    Comments   Patient x    Family      RN x    Care Manager x    Consultant  x Dr Jf Arguelles team rounds were held today with , nursing, pharmacist and clinical coordinator. Patient's plan of care was discussed; medications were reviewed and discharge planning was addressed. ________________________________________________________________________  Total NON critical care TIME:  25   Minutes    Total CRITICAL CARE TIME Spent:   Minutes non procedure based      Comments   >50% of visit spent in counseling and coordination of care     ________________________________________________________________________  Gerardo Lombardi MD     Procedures: see electronic medical records for all procedures/Xrays and details which were not copied into this note but were reviewed prior to creation of Plan. LABS:  I reviewed today's most current labs and imaging studies.   Pertinent labs include:  Recent Labs      03/23/18   0414  03/21/18   0401   WBC  4.3  5.1   HGB  7.6*  8.2*   HCT  24.3*  26.2*   PLT  280  286     Recent Labs      03/23/18   0414  03/22/18   0229  03/21/18   0401   NA  138  140  141   K  5.5*  5.5*  5.2*   CL  109*  111*  111*   CO2  24  24  24   GLU  265*  223*  145*   BUN  37*  39*  40*   CREA  3.01*  3.10*  2.89*   CA  7.9*  7.9*  8.0*       Signed: Gerardo Lombardi MD Calm/Appropriate

## 2018-03-23 NOTE — PROGRESS NOTES
Bedside shift change report given to Jose E Perez RN (oncoming nurse) by Francheska Bruner RN (offgoing nurse). Report included the following information SBAR.

## 2018-03-23 NOTE — PROGRESS NOTES
Nephrology Progress Note     Bill Gloria     [facility]                  Phone - [phone]   Patient: Alec Moran   YOB: 1980    Date- 3/23/2018     CC: Follow up for [facility]       Subjective: Interval History:   - K IS HIGH  She didn't drink her kayexalate as ordered  bp improved    No c/o sob, fever. No c/o nausea or vomiting  No c/o chest pain       Assessment:   · hyperkalemia due to non compliance to diet and ckd  · arf CR. OF 3.0 IS NEW BASELINE. Due to progression of ckd  · Metabolic acidosis  · ckd 4 due to dm,sickle cell - CR 1.7-2.5 IN 2017  · H/o urinary retention  · H/o sickle cell disease  · H/o unconrolled hypertension  · H/o cocaine abuse in past  · Dm 2  · Anemia  · Left diabetic foot      Plan:   Kayexalate 45 gram po   Give ivf and iv lasix. - which will help us to remove some k via kidney  Avoid acei or arb  Continue norvasc and clonidine  Follow strict low k diet  Continue po bicarb  Call us on weekend if needed. Care Plan discussed with: patient and DR. MATUTE  Review of Systems: Pertinent items are noted in HPI. Objective:   Vitals:    03/22/18 1932 03/23/18 0315 03/23/18 0807 03/23/18 1114   BP: (!) 182/91 (!) 170/91 128/80 105/67   Pulse: 86 87 (!) 112 93   Resp: 17 18 18 18   Temp: 98.2 °F (36.8 °C) 97.7 °F (36.5 °C) 98.8 °F (37.1 °C) 98.3 °F (36.8 °C)   SpO2: 100% 100% 100% 100%   Weight:       Height:         Last 3 Recorded Weights in this Encounter    03/02/18 1504   Weight: 64.4 kg (142 lb)     Patient Vitals for the past 8 hrs:   BP Temp Pulse Resp SpO2   03/23/18 1114 105/67 98.3 °F (36.8 °C) 93 18 100 %   03/23/18 0807 128/80 98.8 °F (37.1 °C) (!) 112 18 100 %     03/23 0701 - 03/23 1900  In: 453.3 [P.O.:360; I.V.:93.3]  Out: 1800 [Urine:1800]  03/21 1901 - 03/23 0700  In: 5836 [P.O.:960; I.V.:500]  Out: 3100 [Urine:3100]  Physical Exam:   General: NAD  Resp:  Clear lungs, no wheezing or rales. CV:  Regular  rhythm,  no murmur or rub  GI:  Soft, Non distended, Non tender. Skin:  no rashes or ulcers. No jaundice  Neurologic:  Alert and oriented X 3. Non Focal  Left foot dressing +    Chart reviewed. Pertinent Notes reviewed.      Medications list  reviewed       Data Review :  Recent Labs      03/23/18   0414  03/22/18   0229  03/21/18   0401   NA  138  140  141   K  5.5*  5.5*  5.2*   CL  109*  111*  111*   CO2  24  24  24   GLU  265*  223*  145*   BUN  37*  39*  40*   CREA  3.01*  3.10*  2.89*   CA  7.9*  7.9*  8.0*     Recent Labs      03/23/18   0414  03/21/18   0401   WBC  4.3  5.1   HGB  7.6*  8.2*   HCT  24.3*  26.2*   PLT  280  286     Lab Results   Component Value Date/Time    Specimen Description: URINE 06/24/2013 08:00 PM    Specimen Description: URINE 06/17/2013 07:55 PM    Specimen Description: URINE 05/26/2013 10:10 AM     Current Facility-Administered Medications   Medication    hydrALAZINE (APRESOLINE) 20 mg/mL injection 20 mg    0.9% sodium chloride infusion    sodium polystyrene (KAYEXALATE) 15 gram/60 mL oral suspension 45 g    promethazine (PHENERGAN) tablet 25 mg    Vancomycin Trough Reminder 3/23 prior to 1500 dose    heparin (porcine) injection 5,000 Units    sodium bicarbonate tablet 650 mg    piperacillin-tazobactam (ZOSYN) 3.375 g in 0.9% sodium chloride (MBP/ADV) 100 mL    vancomycin (VANCOCIN) 750 mg in 0.9% sodium chloride (MBP/ADV) 250 mL    amLODIPine (NORVASC) tablet 5 mg    diphenhydrAMINE (BENADRYL) capsule 50 mg    oxyCODONE-acetaminophen (PERCOCET) 5-325 mg per tablet 1-2 Tab    cloNIDine HCl (CATAPRES) tablet 0.2 mg    venlafaxine (EFFEXOR) tablet 75 mg    QUEtiapine (SEROquel) tablet 100 mg    famotidine (PEPCID) tablet 20 mg    sodium chloride (NS) flush 5-10 mL    sodium chloride (NS) flush 5-10 mL    acetaminophen (TYLENOL) tablet 650 mg    docusate sodium (COLACE) capsule 100 mg    insulin lispro (HUMALOG) injection    glucose chewable tablet 16 g    dextrose (D50W) injection syrg 12.5-25 g    glucagon (GLUCAGEN) injection 1 mg    albuterol (PROVENTIL VENTOLIN) nebulizer solution 2.5 mg    therapeutic multivitamin (THERAGRAN) tablet 1 Tab    cyanocobalamin (VITAMIN B12) tablet 1,000 mcg    calcium carbonate (OS-BINA) tablet 500 mg [elemental]    folic acid (FOLVITE) tablet 1 mg       Karri Stevens MD  [facility]  [email or web address]  [address] [place] [address], [address] [facility]  Phone - [phone]         Fax - [phone]  [facility] Office  [address] [place] [address]  Phone - [phone]        Fax - [phone]

## 2018-03-23 NOTE — PROGRESS NOTES
Pharmacy Automatic Renal Dosing Protocol - Antimicrobials     Indication for Antimicrobials: Severe sepsis POA due to left foot diabetic foot infection, ? Osteomyelitis; Excisional debridement and biopsy done on 3/3, for repeat surgery 3/7/2018  S/p Partial foot amputation 3/11     Current Regimen of Each Antimicrobial:  Piperacillin tazobactam 3.375g IV Q8H (started 3/19, day 5)  Vancomycin 750mg IV q24h (start 3/21 Day 3)    Previous Antimicrobial Therapy:  Piperacillin tazobactam 3.375 Q12H (Start Date 3/2, Day #14)  Vancomycin 1500 mg x 1 then 1000 mg Q24H (Start Date 3/2 - 3/5)  Daptomycin 4 mg/kg q24h (3/5 Day #5)  Daptomycin 6 mg/kg (400mg) IV q24h - started 3/16 day 6    Goal Level: 15-20    Measured / Extrapolated Vancomycin Level:      3/23 750 mg Q24H 22.4/22.9     Significant Cultures:    All Micro Results        Procedure Component Value Units Date/Time     CULTURE, Randye Cee STAIN [[id number]] (Abnormal)  Collected: 03/11/18 1143     Order Status: Completed Specimen: Foot Updated: 03/17/18 1035      Special Requests: NO SPECIAL REQUESTS         GRAM STAIN NO WBC'S SEEN          NO ORGANISMS SEEN         Culture result:          NO GROWTH ON SOLID MEDIA AT 4 DAYS                STAPHYLOCOCCUS EPIDERMIDIS ISOLATED FROM THIO BROTH ONLY (A)     CULTURE, TISSUE Pita Parisn STAIN [[id number]] (Abnormal)  Collected: 03/11/18 1247     Order Status: Completed Specimen: Bone Updated: 03/16/18 1052      Special Requests: NO SPECIAL REQUESTS         GRAM STAIN RARE WBCS SEEN          NO ORGANISMS SEEN         Culture result:          FEW STAPHYLOCOCCUS HOMINIS SUBSPECIES HOMINIS (A)                STAPHYLOCOCCUS EPIDERMIDIS (OXACILLIN RESISTANT) ISOLATED FROM THIO BROTH ONLY (A)     CULTURE, ANAEROBIC [[id number]] (Abnormal)  Collected: 03/11/18 1143     Order Status: Completed Specimen: Foot Updated: 03/16/18 1048      Special Requests: NO SPECIAL REQUESTS         Culture result:          NO GROWTH ON SOLID MEDIA AT 4 DAYS                STAPHYLOCOCCUS EPIDERMIDIS (OXACILLIN RESISTANT) ISOLATED FROM THIO BROTH ONLY (A)     CULTURE, ANAEROBIC [[id number]] Collected: 03/11/18 1247     Order Status: Completed Specimen: Bone Updated: 03/13/18 1138      Special Requests: NO SPECIAL REQUESTS         Culture result: NO ANAEROBES ISOLATED        CULTURE, BLOOD, PAIRED [[id number]] Collected: 03/02/18 1815     Order Status: Completed Specimen: Blood Updated: 03/07/18 0816      Special Requests: NO SPECIAL REQUESTS         Culture result: NO GROWTH 5 DAYS        CULTURE, ANAEROBIC [[id number]] (Abnormal) Collected: 03/03/18 1023     Order Status: Completed Specimen: Foot Updated: 03/06/18 1103      Special Requests: NO SPECIAL REQUESTS         Culture result:        LIGHT   MIXED   ANAEROBIC GRAM NEGATIVE RODS   (   BETA LACTAMASE POSITIVE   ) AND   ANAEROBIC GRAM POSITIVE COCCI    (A)     CULTURE, ANAEROBIC [[id number]] (Abnormal) Collected: 03/03/18 1029     Order Status: Completed Specimen: Foot Updated: 03/06/18 1101      Special Requests: NO SPECIAL REQUESTS         Culture result:        LIGHT   MIXED   ANAEROBIC GRAM NEGATIVE RODS   (   BETA LACTAMASE POSITIVE   )   AND   ANAEROBIC GRAM POSITIVE COCCI    (A)     CULTURE, TISSUE W Bethanyniya NaranjoFallston [[id number]] Collected: 03/03/18 1029     Order Status: Completed Specimen: Foot Updated: 03/05/18 1412      Special Requests: NO SPECIAL REQUESTS         GRAM STAIN 1+ WBCS SEEN                   RARE GRAM POSITIVE COCCI IN PAIRS      Culture result:          FEW MIXED SKIN CHRIS ISOLATED     CULTURE, Fonnie Phalen STAIN [[id number]] Collected: 03/03/18 1023     Order Status: Completed Specimen: Foot Updated: 03/05/18 1324      Special Requests: NO SPECIAL REQUESTS         GRAM STAIN NO WBC'S SEEN                   1+ GRAM POSITIVE COCCI IN PAIRS      Culture result:          SCANT MIXED SKIN CHRIS ISOLATED     CULTURE, Fonnie Phalen STAIN [[id number]] (Abnormal) Collected: 03/02/18 2127     Order Status: Completed Specimen: Foot Updated: 18 1130      Special Requests: NO SPECIAL REQUESTS         GRAM STAIN OCCASIONAL WBCS SEEN                   1+ GRAM POSITIVE RODS (CORYNEFORM)      Culture result: HEAVY DIPHTHEROIDS (A)                   LIGHT STAPHYLOCOCCUS SPECIES, COAGULASE NEGATIVE (A)     CULTURE, URINE [[id number]] Collected: 18 2250     Order Status: Completed Specimen: Urine Updated: 18 1017      Special Requests: --         NO SPECIAL REQUESTS   Reflexed from [id number]         [place] Count <1,000 CFU/ML         Culture result: NO GROWTH 1 DAY        CULTURE, Yovanygurpreet Angelesine STAIN [[id number]] Collected: 18 1029     Order Status: Canceled Updated: 18 1059       Microbiology Results (Current encounter)   Date/Time Order Name Specimen Source Lab Status   3/11/18 1247 CULTURE, ANAEROBIC Bone Final result   3/11/18 1247 CULTURE, TISSUE W GRAM STAIN Bone Final result   3/11/18 1143 CULTURE, WOUND W GRAM STAIN Foot Final result   3/11/18 1143 CULTURE, ANAEROBIC Foot Final result   3/3/18 1029 CULTURE, TISSUE W GRAM STAIN Foot Final result   3/3/18 1029 CULTURE, ANAEROBIC Foot Final result   3/3/18 1023 CULTURE, WOUND W GRAM STAIN Foot Final result   3/3/18 1023 CULTURE, ANAEROBIC Foot Final result   3/2/18 2250 CULTURE, URINE Urine Final result   3/2/18 2127 CULTURE, WOUND W GRAM STAIN Foot Final result   3/2/18 1815 CULTURE, BLOOD, PAIRED Blood Final result         Estimated Creatinine Clearance: 22.1 mL/min (based on Cr of 3.01).   Estimated Creatinine Clearance (using IBW):19.8 mL/min    Recent Labs      18   0414  18   0229  18   0401  18   CREA  3.01*  3.10*  2.89*  3.03*   BUN  37*  39*  40*  43*   WBC  4.3   --   5.1   --    NA  138  140  141  139   K  5.5*  5.5*  5.2*  5.4*   CA  7.9*  7.9*  8.0*  7.7*   HGB  7.6*   --   8.2*   --    HCT  24.3*   --   26.2*   --    PLT  280   --   286   --      Temp (24hrs), Av.3 °F (36.8 °C), Min:97.7 °F (36.5 °C), Max:98.8 °F (37.1 °C)    Impression/Plan:   - Vancomycin trough on 750 mg Q24H extrapolated to 22.9. Dose adjusted to 1000 mg Q36H with anticipated trough of 17 mcg/ml. - Zosyn is dosed appropriately for renal fxn. - BORDERLINE, est crcl ~19.8 watch, but cont current dose for now. If continues to decline, can adjust to q12h. -Per ID: Plan is for 6 weeks of Vanc + Zosyn until 4/22/18        Pharmacy will follow daily and adjust medications as appropriate for renal function and/or serum levels.

## 2018-03-24 ENCOUNTER — APPOINTMENT (OUTPATIENT)
Dept: GENERAL RADIOLOGY | Age: 38
DRG: 854 | End: 2018-03-24
Attending: INTERNAL MEDICINE
Payer: MEDICARE

## 2018-03-24 LAB
ANION GAP SERPL CALC-SCNC: 7 MMOL/L (ref 5–15)
BUN SERPL-MCNC: 37 MG/DL (ref 6–20)
BUN/CREAT SERPL: 13 (ref 12–20)
CALCIUM SERPL-MCNC: 7.8 MG/DL (ref 8.5–10.1)
CHLORIDE SERPL-SCNC: 108 MMOL/L (ref 97–108)
CO2 SERPL-SCNC: 24 MMOL/L (ref 21–32)
CREAT SERPL-MCNC: 2.86 MG/DL (ref 0.55–1.02)
GLUCOSE BLD STRIP.AUTO-MCNC: 103 MG/DL (ref 65–100)
GLUCOSE BLD STRIP.AUTO-MCNC: 158 MG/DL (ref 65–100)
GLUCOSE BLD STRIP.AUTO-MCNC: 286 MG/DL (ref 65–100)
GLUCOSE BLD STRIP.AUTO-MCNC: 341 MG/DL (ref 65–100)
GLUCOSE BLD STRIP.AUTO-MCNC: 73 MG/DL (ref 65–100)
GLUCOSE SERPL-MCNC: 99 MG/DL (ref 65–100)
POTASSIUM SERPL-SCNC: 4.9 MMOL/L (ref 3.5–5.1)
SERVICE CMNT-IMP: ABNORMAL
SERVICE CMNT-IMP: NORMAL
SODIUM SERPL-SCNC: 139 MMOL/L (ref 136–145)

## 2018-03-24 PROCEDURE — 74011250637 HC RX REV CODE- 250/637: Performed by: INTERNAL MEDICINE

## 2018-03-24 PROCEDURE — 74011250636 HC RX REV CODE- 250/636: Performed by: INTERNAL MEDICINE

## 2018-03-24 PROCEDURE — 71045 X-RAY EXAM CHEST 1 VIEW: CPT

## 2018-03-24 PROCEDURE — 74011250637 HC RX REV CODE- 250/637: Performed by: PODIATRIST

## 2018-03-24 PROCEDURE — 74011250637 HC RX REV CODE- 250/637: Performed by: STUDENT IN AN ORGANIZED HEALTH CARE EDUCATION/TRAINING PROGRAM

## 2018-03-24 PROCEDURE — 80048 BASIC METABOLIC PNL TOTAL CA: CPT | Performed by: EMERGENCY MEDICINE

## 2018-03-24 PROCEDURE — 74011000258 HC RX REV CODE- 258: Performed by: INTERNAL MEDICINE

## 2018-03-24 PROCEDURE — 36415 COLL VENOUS BLD VENIPUNCTURE: CPT | Performed by: EMERGENCY MEDICINE

## 2018-03-24 PROCEDURE — 74011636637 HC RX REV CODE- 636/637: Performed by: INTERNAL MEDICINE

## 2018-03-24 PROCEDURE — 65270000029 HC RM PRIVATE

## 2018-03-24 PROCEDURE — 82962 GLUCOSE BLOOD TEST: CPT

## 2018-03-24 RX ORDER — TRIAMCINOLONE ACETONIDE 1 MG/G
CREAM TOPICAL 3 TIMES DAILY
Status: DISCONTINUED | OUTPATIENT
Start: 2018-03-24 | End: 2018-03-27 | Stop reason: HOSPADM

## 2018-03-24 RX ADMIN — DIPHENHYDRAMINE HYDROCHLORIDE 50 MG: 25 CAPSULE ORAL at 14:26

## 2018-03-24 RX ADMIN — PROMETHAZINE HYDROCHLORIDE 25 MG: 25 TABLET ORAL at 21:13

## 2018-03-24 RX ADMIN — OXYCODONE HYDROCHLORIDE AND ACETAMINOPHEN 2 TABLET: 5; 325 TABLET ORAL at 02:21

## 2018-03-24 RX ADMIN — TRIAMCINOLONE ACETONIDE: 1 CREAM TOPICAL at 18:00

## 2018-03-24 RX ADMIN — PROMETHAZINE HYDROCHLORIDE 25 MG: 25 TABLET ORAL at 14:26

## 2018-03-24 RX ADMIN — DIPHENHYDRAMINE HYDROCHLORIDE 50 MG: 25 CAPSULE ORAL at 08:18

## 2018-03-24 RX ADMIN — Medication 10 ML: at 14:42

## 2018-03-24 RX ADMIN — PIPERACILLIN SODIUM,TAZOBACTAM SODIUM 3.38 G: 3; .375 INJECTION, POWDER, FOR SOLUTION INTRAVENOUS at 21:20

## 2018-03-24 RX ADMIN — FAMOTIDINE 20 MG: 20 TABLET, FILM COATED ORAL at 08:18

## 2018-03-24 RX ADMIN — INSULIN LISPRO 7 UNITS: 100 INJECTION, SOLUTION INTRAVENOUS; SUBCUTANEOUS at 12:54

## 2018-03-24 RX ADMIN — Medication 10 ML: at 00:06

## 2018-03-24 RX ADMIN — HEPARIN SODIUM 5000 UNITS: 5000 INJECTION, SOLUTION INTRAVENOUS; SUBCUTANEOUS at 06:11

## 2018-03-24 RX ADMIN — DIPHENHYDRAMINE HYDROCHLORIDE 50 MG: 25 CAPSULE ORAL at 02:21

## 2018-03-24 RX ADMIN — OXYCODONE HYDROCHLORIDE AND ACETAMINOPHEN 2 TABLET: 5; 325 TABLET ORAL at 20:55

## 2018-03-24 RX ADMIN — TRIAMCINOLONE ACETONIDE: 1 CREAM TOPICAL at 21:16

## 2018-03-24 RX ADMIN — SODIUM BICARBONATE 650 MG TABLET 650 MG: at 17:23

## 2018-03-24 RX ADMIN — OXYCODONE HYDROCHLORIDE AND ACETAMINOPHEN 2 TABLET: 5; 325 TABLET ORAL at 08:18

## 2018-03-24 RX ADMIN — SODIUM BICARBONATE 650 MG TABLET 650 MG: at 08:18

## 2018-03-24 RX ADMIN — VENLAFAXINE 75 MG: 37.5 TABLET ORAL at 08:18

## 2018-03-24 RX ADMIN — PROMETHAZINE HYDROCHLORIDE 25 MG: 25 TABLET ORAL at 08:46

## 2018-03-24 RX ADMIN — QUETIAPINE FUMARATE 100 MG: 100 TABLET ORAL at 20:01

## 2018-03-24 RX ADMIN — PIPERACILLIN SODIUM,TAZOBACTAM SODIUM 3.38 G: 3; .375 INJECTION, POWDER, FOR SOLUTION INTRAVENOUS at 06:11

## 2018-03-24 RX ADMIN — HEPARIN SODIUM 5000 UNITS: 5000 INJECTION, SOLUTION INTRAVENOUS; SUBCUTANEOUS at 14:26

## 2018-03-24 RX ADMIN — THERA TABS 1 TABLET: TAB at 08:18

## 2018-03-24 RX ADMIN — HEPARIN SODIUM 5000 UNITS: 5000 INJECTION, SOLUTION INTRAVENOUS; SUBCUTANEOUS at 21:17

## 2018-03-24 RX ADMIN — AMLODIPINE BESYLATE 5 MG: 5 TABLET ORAL at 08:18

## 2018-03-24 RX ADMIN — Medication 10 ML: at 06:16

## 2018-03-24 RX ADMIN — VENLAFAXINE 75 MG: 37.5 TABLET ORAL at 20:01

## 2018-03-24 RX ADMIN — CLONIDINE HYDROCHLORIDE 0.2 MG: 0.1 TABLET ORAL at 14:26

## 2018-03-24 RX ADMIN — DIPHENHYDRAMINE HYDROCHLORIDE 50 MG: 25 CAPSULE ORAL at 20:55

## 2018-03-24 RX ADMIN — CLONIDINE HYDROCHLORIDE 0.2 MG: 0.1 TABLET ORAL at 08:18

## 2018-03-24 RX ADMIN — INSULIN LISPRO 7 UNITS: 100 INJECTION, SOLUTION INTRAVENOUS; SUBCUTANEOUS at 00:05

## 2018-03-24 RX ADMIN — CALCIUM 500 MG: 500 TABLET ORAL at 08:18

## 2018-03-24 RX ADMIN — INSULIN LISPRO 5 UNITS: 100 INJECTION, SOLUTION INTRAVENOUS; SUBCUTANEOUS at 17:20

## 2018-03-24 RX ADMIN — Medication 10 ML: at 21:21

## 2018-03-24 RX ADMIN — PIPERACILLIN SODIUM,TAZOBACTAM SODIUM 3.38 G: 3; .375 INJECTION, POWDER, FOR SOLUTION INTRAVENOUS at 14:27

## 2018-03-24 RX ADMIN — PROMETHAZINE HYDROCHLORIDE 25 MG: 25 TABLET ORAL at 02:21

## 2018-03-24 RX ADMIN — OXYCODONE HYDROCHLORIDE AND ACETAMINOPHEN 2 TABLET: 5; 325 TABLET ORAL at 14:36

## 2018-03-24 RX ADMIN — QUETIAPINE FUMARATE 100 MG: 100 TABLET ORAL at 08:18

## 2018-03-24 RX ADMIN — CYANOCOBALAMIN TAB 500 MCG 1000 MCG: 500 TAB at 08:18

## 2018-03-24 RX ADMIN — FOLIC ACID 1 MG: 1 TABLET ORAL at 08:18

## 2018-03-24 RX ADMIN — CLONIDINE HYDROCHLORIDE 0.2 MG: 0.1 TABLET ORAL at 20:01

## 2018-03-24 NOTE — PROGRESS NOTES
Resent the referrals in [facility] to [place] [address], [facility], and [facility]. Also resent in [place] to [facility] requesting them to reassess patient with the new antibiotics of Vanco and Zosyn until 4/22. [address] of Care Management

## 2018-03-24 NOTE — PROGRESS NOTES
Hospitalist Progress Note    NAME: Destiny Maldonado   :  1980   MRN:  [id number]       Assessment / Plan:  Severe sepsis POA due to left foot diabetic foot infection, ? osteomyelitis   Excisional debridement and biopsy done on 3/3, for repeat surgery 3/7/2018  LLE doppler with no deep venous thrombosis identified. MRI left foot multiple fluid collections, significant bone destruction  ESR -> 130->100  CRP 34-> 14  Midline placed 3/6/18  Partial foot amputation 3/11/201  Dr Chinmay cheung, dressing change by Dr. Chinmay Alejandro  wound cultures from 3/11 growing MRSE x 2 species and and S Hominis  Appreciate ID consult   Daptomycin and zosyn until  3/21  Switched to vancomycin 3/21, cont zosyn,  daptomycin was stopped. Cont Vancomycin and zosyn at the time of discharge for 6 weeks until 18 since that might be the best option for her considering everything, CM to contact facility for abx availability before discharge    Her compliance is also questionable since she cam from detention and sending her home with a PICC line for home iv abx may not be safe, hence SNF is safe option for discharge    DM type 1 uncontrolled with nephropathy POA  Hypoglycemia 3/6/2018 resolved    Stopped lantus 3/22 dt hypoglycemia, will resume if persistent hyperglycemia  lispro sliding scale  Last HgBa1c 8.8  Nutrition consult    Acute kidney injury POA peak creatinine 3.80  Stage 4 chronic kidney disease POA baseline creatinine 2.4 to 2.7  Hyperkalemia secondary to diet, CKD improved  been on dialysis in the past   Previously has seen Ruben.   Creatinine 2.86 today  S/P kayexalate  Per renal ok for discharge from renal standpoint, will need OP follow-up  Appreciated nephrology consult, ok to discharge on low potassium diet  OP follow-up renal    Tachycardia   HTN  After exertion, monitor  Resting HR 80-90  Clonidine, amlodipine, BP is high, repeat and monitor closely    Acute on chronic anemia due to sickle cell disease POA    Pt without sx of crisis at this time  S/p 1u pRBCs  folic acid  Follow Hb     THU POA  Uses 2 LPM O2 at night    Disposition: pt facility for iv Abx due to questionable non compliance     Code Status: Full    DVT Prophylaxis: heparin    Body mass index is 25.97 kg/(m^2). Subjective:   No c/o    Review of Systems:  Symptom Y/N Comments  Symptom Y/N Comments   Fever/Chills n   Chest Pain n    Poor Appetite    Edema n    Cough n   Abdominal Pain n    Sputum    Joint Pain     SOB/ADAMS n   Pruritis/Rash     Nausea/vomit n   Tolerating PT/OT     Diarrhea n   Tolerating Diet y    Constipation    Other       Could NOT obtain due to:      Objective:     VITALS:   Last 24hrs VS reviewed since prior progress note. Most recent are:  Patient Vitals for the past 24 hrs:   Temp Pulse Resp BP SpO2   03/24/18 0831 98.3 °F (36.8 °C) 94 18 (!) 194/93 100 %   03/24/18 0302 98 °F (36.7 °C) 84 16 (!) 159/99 100 %   03/23/18 2232 98.3 °F (36.8 °C) 93 18 (!) 180/99 99 %   03/23/18 1930 98.1 °F (36.7 °C) 81 16 136/85 100 %   03/23/18 1114 98.3 °F (36.8 °C) 93 18 105/67 100 %       Intake/Output Summary (Last 24 hours) at 03/24/18 0935  Last data filed at 03/24/18 3516   Gross per 24 hour   Intake           393.33 ml   Output             3250 ml   Net         -2856.67 ml        PHYSICAL EXAM:  General: Alert and awake  Resp:  CTA bilaterally, no wheezing or rales.   No accessory muscle use  CV:  Regular  rhythm,  No edema  GI:  Soft, Non distended, Non tender.  +Bowel sounds  Neurologic:  Alert and oriented X 3, normal speech, grossly intact  Psych:   Not anxious nor agitated  Skin:  L foot bandaged    Reviewed most current lab test results and cultures  YES  Reviewed most current radiology test results   YES  Review and summation of old records today    NO  Reviewed patient's current orders and MAR    YES  PMH/SH reviewed - no change compared to H&P  ________________________________________________________________________  Care Plan discussed with:    Comments   Patient x    Family      RN x    Care Manager x    Consultant  x Dr Kay Person team rounds were held today with , nursing, pharmacist and clinical coordinator. Patient's plan of care was discussed; medications were reviewed and discharge planning was addressed. ________________________________________________________________________  Total NON critical care TIME:  25   Minutes    Total CRITICAL CARE TIME Spent:   Minutes non procedure based      Comments   >50% of visit spent in counseling and coordination of care     ________________________________________________________________________  Sean Wood MD     Procedures: see electronic medical records for all procedures/Xrays and details which were not copied into this note but were reviewed prior to creation of Plan. LABS:  I reviewed today's most current labs and imaging studies.   Pertinent labs include:  Recent Labs      03/23/18   0414   WBC  4.3   HGB  7.6*   HCT  24.3*   PLT  280     Recent Labs      03/24/18   0436  03/23/18   0414  03/22/18   0229   NA  139  138  140   K  4.9  5.5*  5.5*   CL  108  109*  111*   CO2  24  24  24   GLU  99  265*  223*   BUN  37*  37*  39*   CREA  2.86*  3.01*  3.10*   CA  7.8*  7.9*  7.9*       Signed: Sean Wood MD

## 2018-03-24 NOTE — PROGRESS NOTES
Didi cath pulled by patient and tugged out a bit, PICC team came to assess and cath cleaned and covered, chest xray ordered per Dr. Fady Toro.

## 2018-03-25 LAB
ANION GAP SERPL CALC-SCNC: 7 MMOL/L (ref 5–15)
BUN SERPL-MCNC: 41 MG/DL (ref 6–20)
BUN/CREAT SERPL: 13 (ref 12–20)
CALCIUM SERPL-MCNC: 7.9 MG/DL (ref 8.5–10.1)
CHLORIDE SERPL-SCNC: 106 MMOL/L (ref 97–108)
CO2 SERPL-SCNC: 23 MMOL/L (ref 21–32)
CREAT SERPL-MCNC: 3.04 MG/DL (ref 0.55–1.02)
GLUCOSE BLD STRIP.AUTO-MCNC: 139 MG/DL (ref 65–100)
GLUCOSE BLD STRIP.AUTO-MCNC: 222 MG/DL (ref 65–100)
GLUCOSE BLD STRIP.AUTO-MCNC: 239 MG/DL (ref 65–100)
GLUCOSE BLD STRIP.AUTO-MCNC: 272 MG/DL (ref 65–100)
GLUCOSE BLD STRIP.AUTO-MCNC: 68 MG/DL (ref 65–100)
GLUCOSE SERPL-MCNC: 303 MG/DL (ref 65–100)
POTASSIUM SERPL-SCNC: 5.5 MMOL/L (ref 3.5–5.1)
SERVICE CMNT-IMP: ABNORMAL
SERVICE CMNT-IMP: NORMAL
SODIUM SERPL-SCNC: 136 MMOL/L (ref 136–145)

## 2018-03-25 PROCEDURE — 74011000258 HC RX REV CODE- 258: Performed by: INTERNAL MEDICINE

## 2018-03-25 PROCEDURE — 74011250637 HC RX REV CODE- 250/637: Performed by: INTERNAL MEDICINE

## 2018-03-25 PROCEDURE — 74011250636 HC RX REV CODE- 250/636: Performed by: INTERNAL MEDICINE

## 2018-03-25 PROCEDURE — 82962 GLUCOSE BLOOD TEST: CPT

## 2018-03-25 PROCEDURE — 36415 COLL VENOUS BLD VENIPUNCTURE: CPT | Performed by: EMERGENCY MEDICINE

## 2018-03-25 PROCEDURE — 74011636637 HC RX REV CODE- 636/637: Performed by: INTERNAL MEDICINE

## 2018-03-25 PROCEDURE — 65270000029 HC RM PRIVATE

## 2018-03-25 PROCEDURE — 80048 BASIC METABOLIC PNL TOTAL CA: CPT | Performed by: EMERGENCY MEDICINE

## 2018-03-25 PROCEDURE — 74011250637 HC RX REV CODE- 250/637: Performed by: PODIATRIST

## 2018-03-25 PROCEDURE — 74011250637 HC RX REV CODE- 250/637: Performed by: STUDENT IN AN ORGANIZED HEALTH CARE EDUCATION/TRAINING PROGRAM

## 2018-03-25 RX ORDER — SODIUM POLYSTYRENE SULFONATE 15 G/60ML
30 SUSPENSION ORAL; RECTAL
Status: COMPLETED | OUTPATIENT
Start: 2018-03-25 | End: 2018-03-25

## 2018-03-25 RX ORDER — FUROSEMIDE 10 MG/ML
40 INJECTION INTRAMUSCULAR; INTRAVENOUS ONCE
Status: COMPLETED | OUTPATIENT
Start: 2018-03-25 | End: 2018-03-25

## 2018-03-25 RX ORDER — AMLODIPINE BESYLATE 5 MG/1
10 TABLET ORAL DAILY
Status: DISCONTINUED | OUTPATIENT
Start: 2018-03-26 | End: 2018-03-27 | Stop reason: HOSPADM

## 2018-03-25 RX ADMIN — DIPHENHYDRAMINE HYDROCHLORIDE 50 MG: 25 CAPSULE ORAL at 21:50

## 2018-03-25 RX ADMIN — SODIUM POLYSTYRENE SULFONATE 30 G: 15 SUSPENSION ORAL; RECTAL at 10:21

## 2018-03-25 RX ADMIN — FAMOTIDINE 20 MG: 20 TABLET, FILM COATED ORAL at 09:29

## 2018-03-25 RX ADMIN — SODIUM BICARBONATE 650 MG TABLET 650 MG: at 18:00

## 2018-03-25 RX ADMIN — INSULIN LISPRO 5 UNITS: 100 INJECTION, SOLUTION INTRAVENOUS; SUBCUTANEOUS at 09:30

## 2018-03-25 RX ADMIN — PIPERACILLIN SODIUM,TAZOBACTAM SODIUM 3.38 G: 3; .375 INJECTION, POWDER, FOR SOLUTION INTRAVENOUS at 21:51

## 2018-03-25 RX ADMIN — CLONIDINE HYDROCHLORIDE 0.2 MG: 0.1 TABLET ORAL at 14:57

## 2018-03-25 RX ADMIN — PIPERACILLIN SODIUM,TAZOBACTAM SODIUM 3.38 G: 3; .375 INJECTION, POWDER, FOR SOLUTION INTRAVENOUS at 14:57

## 2018-03-25 RX ADMIN — FOLIC ACID 1 MG: 1 TABLET ORAL at 09:30

## 2018-03-25 RX ADMIN — TRIAMCINOLONE ACETONIDE: 1 CREAM TOPICAL at 09:00

## 2018-03-25 RX ADMIN — VENLAFAXINE 75 MG: 37.5 TABLET ORAL at 21:50

## 2018-03-25 RX ADMIN — QUETIAPINE FUMARATE 100 MG: 100 TABLET ORAL at 09:29

## 2018-03-25 RX ADMIN — PROMETHAZINE HYDROCHLORIDE 25 MG: 25 TABLET ORAL at 03:40

## 2018-03-25 RX ADMIN — INSULIN LISPRO 2 UNITS: 100 INJECTION, SOLUTION INTRAVENOUS; SUBCUTANEOUS at 21:51

## 2018-03-25 RX ADMIN — QUETIAPINE FUMARATE 100 MG: 100 TABLET ORAL at 21:50

## 2018-03-25 RX ADMIN — OXYCODONE HYDROCHLORIDE AND ACETAMINOPHEN 2 TABLET: 5; 325 TABLET ORAL at 01:41

## 2018-03-25 RX ADMIN — PIPERACILLIN SODIUM,TAZOBACTAM SODIUM 3.38 G: 3; .375 INJECTION, POWDER, FOR SOLUTION INTRAVENOUS at 06:36

## 2018-03-25 RX ADMIN — PROMETHAZINE HYDROCHLORIDE 25 MG: 25 TABLET ORAL at 09:29

## 2018-03-25 RX ADMIN — CLONIDINE HYDROCHLORIDE 0.2 MG: 0.1 TABLET ORAL at 09:30

## 2018-03-25 RX ADMIN — HEPARIN SODIUM 5000 UNITS: 5000 INJECTION, SOLUTION INTRAVENOUS; SUBCUTANEOUS at 06:36

## 2018-03-25 RX ADMIN — VENLAFAXINE 75 MG: 37.5 TABLET ORAL at 09:29

## 2018-03-25 RX ADMIN — OXYCODONE HYDROCHLORIDE AND ACETAMINOPHEN 2 TABLET: 5; 325 TABLET ORAL at 21:50

## 2018-03-25 RX ADMIN — PROMETHAZINE HYDROCHLORIDE 25 MG: 25 TABLET ORAL at 15:39

## 2018-03-25 RX ADMIN — DIPHENHYDRAMINE HYDROCHLORIDE 50 MG: 25 CAPSULE ORAL at 03:15

## 2018-03-25 RX ADMIN — DIPHENHYDRAMINE HYDROCHLORIDE 50 MG: 25 CAPSULE ORAL at 09:29

## 2018-03-25 RX ADMIN — Medication 10 ML: at 22:00

## 2018-03-25 RX ADMIN — CLONIDINE HYDROCHLORIDE 0.2 MG: 0.1 TABLET ORAL at 21:51

## 2018-03-25 RX ADMIN — HEPARIN SODIUM 5000 UNITS: 5000 INJECTION, SOLUTION INTRAVENOUS; SUBCUTANEOUS at 14:57

## 2018-03-25 RX ADMIN — THERA TABS 1 TABLET: TAB at 09:30

## 2018-03-25 RX ADMIN — INSULIN LISPRO 3 UNITS: 100 INJECTION, SOLUTION INTRAVENOUS; SUBCUTANEOUS at 12:20

## 2018-03-25 RX ADMIN — SODIUM BICARBONATE 650 MG TABLET 650 MG: at 09:29

## 2018-03-25 RX ADMIN — Medication 10 ML: at 06:37

## 2018-03-25 RX ADMIN — DIPHENHYDRAMINE HYDROCHLORIDE 50 MG: 25 CAPSULE ORAL at 15:39

## 2018-03-25 RX ADMIN — OXYCODONE HYDROCHLORIDE AND ACETAMINOPHEN 2 TABLET: 5; 325 TABLET ORAL at 04:01

## 2018-03-25 RX ADMIN — CALCIUM 500 MG: 500 TABLET ORAL at 09:29

## 2018-03-25 RX ADMIN — OXYCODONE HYDROCHLORIDE AND ACETAMINOPHEN 2 TABLET: 5; 325 TABLET ORAL at 15:39

## 2018-03-25 RX ADMIN — HEPARIN SODIUM 5000 UNITS: 5000 INJECTION, SOLUTION INTRAVENOUS; SUBCUTANEOUS at 22:00

## 2018-03-25 RX ADMIN — CYANOCOBALAMIN TAB 500 MCG 1000 MCG: 500 TAB at 09:30

## 2018-03-25 RX ADMIN — FUROSEMIDE 40 MG: 10 INJECTION, SOLUTION INTRAMUSCULAR; INTRAVENOUS at 10:21

## 2018-03-25 RX ADMIN — VANCOMYCIN HYDROCHLORIDE 1000 MG: 1 INJECTION, POWDER, LYOPHILIZED, FOR SOLUTION INTRAVENOUS at 01:45

## 2018-03-25 RX ADMIN — AMLODIPINE BESYLATE 5 MG: 5 TABLET ORAL at 09:30

## 2018-03-25 RX ADMIN — TRIAMCINOLONE ACETONIDE: 1 CREAM TOPICAL at 22:00

## 2018-03-25 RX ADMIN — OXYCODONE HYDROCHLORIDE AND ACETAMINOPHEN 2 TABLET: 5; 325 TABLET ORAL at 09:29

## 2018-03-25 NOTE — PROGRESS NOTES
Hospitalist Progress Note    NAME: Malika Estrada   :  1980   MRN:  [id number]       Assessment / Plan:  Severe sepsis POA due to left foot diabetic foot infection, ? osteomyelitis   Excisional debridement and biopsy done on 3/3, for repeat surgery 3/7/2018  LLE doppler with no deep venous thrombosis identified. MRI left foot multiple fluid collections, significant bone destruction  ESR -> 130->100  CRP 34-> 14  Midline placed 3/6/18  Partial foot amputation 3/11/201  Dr Zulema cheung, dressing change by Dr. Zulema Johnson  wound cultures from 3/11 growing MRSE x 2 species and and S Hominis  Appreciate ID consult   Daptomycin and zosyn until  3/21  Switched to vancomycin 3/21, cont zosyn,  daptomycin was stopped. Cont Vancomycin and zosyn at the time of discharge for 6 weeks until 18 since that might be the best option for her considering everything, CM to contact facility for abx availability before discharge    Her compliance is also questionable since she cam from jail and sending her home with a PICC line for home iv abx may not be safe, hence SNF is safe option for discharge    DM type 1 uncontrolled with nephropathy POA  Hypoglycemia 3/6/2018 resolved  , BSL fluctuating likely dietary non-compliance  Stopped lantus 3/22 dt hypoglycemia, will resume if persistent hyperglycemia  lispro sliding scale  Last HgBa1c 8.8  Nutrition consult    Acute kidney injury POA peak creatinine 3.80  Stage 4 chronic kidney disease POA baseline creatinine 2.4 to 2.7  Hyperkalemia secondary to diet, CKD improved  been on dialysis in the past   Previously has seen Ruben.   Creatinine 3.0 today  K + 5.5, give kayexalate, lasix 40 mg iv  Per renal ok for discharge from renal standpoint, will need OP follow-up  Appreciated nephrology consult, ok to discharge on low potassium diet  OP follow-up renal    Tachycardia   HTN  After exertion, monitor  Resting HR 80-90  Clonidine, amlodipine, BP is high, lasix iv, increase amlodipine 10 mg daily    Acute on chronic anemia due to sickle cell disease POA    Pt without sx of crisis at this time  S/p 1u pRBCs  folic acid  Follow Hb     THU POA  Uses 2 LPM O2 at night    Disposition: pt facility for iv Abx due to questionable non compliance     Code Status: Full    DVT Prophylaxis: heparin    Body mass index is 25.97 kg/(m^2). Subjective:   No c/o, leg swelling is better    Review of Systems:  Symptom Y/N Comments  Symptom Y/N Comments   Fever/Chills n   Chest Pain n    Poor Appetite    Edema n    Cough n   Abdominal Pain n    Sputum    Joint Pain     SOB/ADAMS n   Pruritis/Rash     Nausea/vomit n   Tolerating PT/OT     Diarrhea n   Tolerating Diet y    Constipation    Other       Could NOT obtain due to:      Objective:     VITALS:   Last 24hrs VS reviewed since prior progress note. Most recent are:  Patient Vitals for the past 24 hrs:   Temp Pulse Resp BP SpO2   03/25/18 0804 98.5 °F (36.9 °C) 85 22 (!) 185/102 -   03/25/18 0342 98.3 °F (36.8 °C) 87 20 177/85 100 %   03/25/18 0004 97.7 °F (36.5 °C) 88 19 151/86 100 %   03/24/18 2000 98.5 °F (36.9 °C) 86 18 157/82 100 %   03/24/18 1610 97.3 °F (36.3 °C) 87 18 114/65 100 %   03/24/18 1428 - - - 146/78 -   03/24/18 1214 - 94 - (!) 199/103 -   03/24/18 1212 98.2 °F (36.8 °C) 95 18 (!) 200/102 96 %   03/24/18 0952 - 98 - 151/76 -       Intake/Output Summary (Last 24 hours) at 03/25/18 0929  Last data filed at 03/25/18 0804   Gross per 24 hour   Intake                0 ml   Output             2300 ml   Net            -2300 ml        PHYSICAL EXAM:  General: Alert and awake  Resp:  CTA bilaterally, no wheezing or rales.   No accessory muscle use  CV:  Regular  rhythm,  No edema  GI:  Soft, Non distended, Non tender.  +Bowel sounds  Neurologic:  Alert and oriented X 3, normal speech, grossly intact  Psych:   Not anxious nor agitated  Skin:  L foot bandaged    Reviewed most current lab test results and cultures  YES  Reviewed most current radiology test results   YES  Review and summation of old records today    NO  Reviewed patient's current orders and MAR    YES  PMH/SH reviewed - no change compared to H&P  ________________________________________________________________________  Care Plan discussed with:    Comments   Patient x    Family      RN x    Care Manager x    Consultant                        Multidiciplinary team rounds were held today with , nursing, pharmacist and clinical coordinator. Patient's plan of care was discussed; medications were reviewed and discharge planning was addressed. ________________________________________________________________________  Total NON critical care TIME:  25   Minutes    Total CRITICAL CARE TIME Spent:   Minutes non procedure based      Comments   >50% of visit spent in counseling and coordination of care     ________________________________________________________________________  Sean Wood MD     Procedures: see electronic medical records for all procedures/Xrays and details which were not copied into this note but were reviewed prior to creation of Plan. LABS:  I reviewed today's most current labs and imaging studies.   Pertinent labs include:  Recent Labs      03/23/18   0414   WBC  4.3   HGB  7.6*   HCT  24.3*   PLT  280     Recent Labs      03/25/18   0404  03/24/18   0436  03/23/18   0414   NA  136  139  138   K  5.5*  4.9  5.5*   CL  106  108  109*   CO2  23  24  24   GLU  303*  99  265*   BUN  41*  37*  37*   CREA  3.04*  2.86*  3.01*   CA  7.9*  7.8*  7.9*       Signed: Sean Wood MD

## 2018-03-25 NOTE — PROGRESS NOTES
Bedside and Verbal shift change report given to Shanell Dunn RN(oncoming nurse) by Dora Villafana RN (offgoing nurse). Report included the following information SBAR, Kardex, MAR and Recent Results.

## 2018-03-25 NOTE — PROGRESS NOTES
Bedside and Verbal shift change report given to Radha (oncoming nurse) by Nickie Alaniz (offgoing nurse). Report included the following information SBAR, Kardex, MAR and Recent Results.

## 2018-03-26 LAB
ANION GAP SERPL CALC-SCNC: 7 MMOL/L (ref 5–15)
BUN SERPL-MCNC: 41 MG/DL (ref 6–20)
BUN/CREAT SERPL: 14 (ref 12–20)
CALCIUM SERPL-MCNC: 7.8 MG/DL (ref 8.5–10.1)
CHLORIDE SERPL-SCNC: 108 MMOL/L (ref 97–108)
CO2 SERPL-SCNC: 23 MMOL/L (ref 21–32)
CREAT SERPL-MCNC: 2.99 MG/DL (ref 0.55–1.02)
DATE LAST DOSE: ABNORMAL
ERYTHROCYTE [DISTWIDTH] IN BLOOD BY AUTOMATED COUNT: 21.7 % (ref 11.5–14.5)
GLUCOSE BLD STRIP.AUTO-MCNC: 182 MG/DL (ref 65–100)
GLUCOSE BLD STRIP.AUTO-MCNC: 249 MG/DL (ref 65–100)
GLUCOSE BLD STRIP.AUTO-MCNC: 269 MG/DL (ref 65–100)
GLUCOSE BLD STRIP.AUTO-MCNC: 63 MG/DL (ref 65–100)
GLUCOSE BLD STRIP.AUTO-MCNC: 82 MG/DL (ref 65–100)
GLUCOSE SERPL-MCNC: 184 MG/DL (ref 65–100)
HCT VFR BLD AUTO: 23.2 % (ref 35–47)
HGB BLD-MCNC: 7.2 G/DL (ref 11.5–16)
MCH RBC QN AUTO: 24.7 PG (ref 26–34)
MCHC RBC AUTO-ENTMCNC: 31 G/DL (ref 30–36.5)
MCV RBC AUTO: 79.7 FL (ref 80–99)
NRBC # BLD: 0 K/UL (ref 0–0.01)
NRBC BLD-RTO: 0 PER 100 WBC
PLATELET # BLD AUTO: 286 K/UL (ref 150–400)
PMV BLD AUTO: 9.9 FL (ref 8.9–12.9)
POTASSIUM SERPL-SCNC: 4.8 MMOL/L (ref 3.5–5.1)
RBC # BLD AUTO: 2.91 M/UL (ref 3.8–5.2)
REPORTED DOSE,DOSE: ABNORMAL UNITS
REPORTED DOSE/TIME,TMG: 145
SERVICE CMNT-IMP: ABNORMAL
SERVICE CMNT-IMP: NORMAL
SODIUM SERPL-SCNC: 138 MMOL/L (ref 136–145)
VANCOMYCIN TROUGH SERPL-MCNC: 21.3 UG/ML (ref 5–10)
WBC # BLD AUTO: 5.7 K/UL (ref 3.6–11)

## 2018-03-26 PROCEDURE — 74011250636 HC RX REV CODE- 250/636: Performed by: INTERNAL MEDICINE

## 2018-03-26 PROCEDURE — 36415 COLL VENOUS BLD VENIPUNCTURE: CPT | Performed by: INTERNAL MEDICINE

## 2018-03-26 PROCEDURE — 74011000258 HC RX REV CODE- 258: Performed by: INTERNAL MEDICINE

## 2018-03-26 PROCEDURE — 74011250637 HC RX REV CODE- 250/637: Performed by: INTERNAL MEDICINE

## 2018-03-26 PROCEDURE — 80202 ASSAY OF VANCOMYCIN: CPT | Performed by: INTERNAL MEDICINE

## 2018-03-26 PROCEDURE — 85027 COMPLETE CBC AUTOMATED: CPT | Performed by: INTERNAL MEDICINE

## 2018-03-26 PROCEDURE — 74011636637 HC RX REV CODE- 636/637: Performed by: INTERNAL MEDICINE

## 2018-03-26 PROCEDURE — 74011250637 HC RX REV CODE- 250/637: Performed by: PODIATRIST

## 2018-03-26 PROCEDURE — 80048 BASIC METABOLIC PNL TOTAL CA: CPT | Performed by: INTERNAL MEDICINE

## 2018-03-26 PROCEDURE — 65270000029 HC RM PRIVATE

## 2018-03-26 PROCEDURE — 74011250637 HC RX REV CODE- 250/637: Performed by: STUDENT IN AN ORGANIZED HEALTH CARE EDUCATION/TRAINING PROGRAM

## 2018-03-26 PROCEDURE — 82962 GLUCOSE BLOOD TEST: CPT

## 2018-03-26 RX ADMIN — SODIUM BICARBONATE 650 MG TABLET 650 MG: at 08:44

## 2018-03-26 RX ADMIN — DIPHENHYDRAMINE HYDROCHLORIDE 50 MG: 25 CAPSULE ORAL at 20:42

## 2018-03-26 RX ADMIN — HEPARIN SODIUM 5000 UNITS: 5000 INJECTION, SOLUTION INTRAVENOUS; SUBCUTANEOUS at 20:44

## 2018-03-26 RX ADMIN — PROMETHAZINE HYDROCHLORIDE 25 MG: 25 TABLET ORAL at 16:07

## 2018-03-26 RX ADMIN — DIPHENHYDRAMINE HYDROCHLORIDE 50 MG: 25 CAPSULE ORAL at 04:28

## 2018-03-26 RX ADMIN — INSULIN LISPRO 2 UNITS: 100 INJECTION, SOLUTION INTRAVENOUS; SUBCUTANEOUS at 14:22

## 2018-03-26 RX ADMIN — Medication 10 ML: at 06:13

## 2018-03-26 RX ADMIN — OXYCODONE HYDROCHLORIDE AND ACETAMINOPHEN 2 TABLET: 5; 325 TABLET ORAL at 04:27

## 2018-03-26 RX ADMIN — INSULIN LISPRO 3 UNITS: 100 INJECTION, SOLUTION INTRAVENOUS; SUBCUTANEOUS at 17:44

## 2018-03-26 RX ADMIN — INSULIN LISPRO 5 UNITS: 100 INJECTION, SOLUTION INTRAVENOUS; SUBCUTANEOUS at 08:50

## 2018-03-26 RX ADMIN — OXYCODONE HYDROCHLORIDE AND ACETAMINOPHEN 2 TABLET: 5; 325 TABLET ORAL at 14:41

## 2018-03-26 RX ADMIN — FOLIC ACID 1 MG: 1 TABLET ORAL at 08:44

## 2018-03-26 RX ADMIN — SODIUM BICARBONATE 650 MG TABLET 650 MG: at 17:44

## 2018-03-26 RX ADMIN — OXYCODONE HYDROCHLORIDE AND ACETAMINOPHEN 2 TABLET: 5; 325 TABLET ORAL at 20:42

## 2018-03-26 RX ADMIN — CYANOCOBALAMIN TAB 500 MCG 1000 MCG: 500 TAB at 08:44

## 2018-03-26 RX ADMIN — HEPARIN SODIUM 5000 UNITS: 5000 INJECTION, SOLUTION INTRAVENOUS; SUBCUTANEOUS at 06:14

## 2018-03-26 RX ADMIN — QUETIAPINE FUMARATE 100 MG: 100 TABLET ORAL at 20:42

## 2018-03-26 RX ADMIN — DOCUSATE SODIUM 100 MG: 100 CAPSULE, LIQUID FILLED ORAL at 08:44

## 2018-03-26 RX ADMIN — TRIAMCINOLONE ACETONIDE: 1 CREAM TOPICAL at 08:49

## 2018-03-26 RX ADMIN — OXYCODONE HYDROCHLORIDE AND ACETAMINOPHEN 2 TABLET: 5; 325 TABLET ORAL at 08:44

## 2018-03-26 RX ADMIN — CALCIUM 500 MG: 500 TABLET ORAL at 08:44

## 2018-03-26 RX ADMIN — VANCOMYCIN HYDROCHLORIDE 1000 MG: 1 INJECTION, POWDER, LYOPHILIZED, FOR SOLUTION INTRAVENOUS at 14:42

## 2018-03-26 RX ADMIN — PROMETHAZINE HYDROCHLORIDE 25 MG: 25 TABLET ORAL at 09:07

## 2018-03-26 RX ADMIN — Medication 10 ML: at 20:42

## 2018-03-26 RX ADMIN — PIPERACILLIN SODIUM,TAZOBACTAM SODIUM 3.38 G: 3; .375 INJECTION, POWDER, FOR SOLUTION INTRAVENOUS at 14:21

## 2018-03-26 RX ADMIN — THERA TABS 1 TABLET: TAB at 08:44

## 2018-03-26 RX ADMIN — CLONIDINE HYDROCHLORIDE 0.2 MG: 0.1 TABLET ORAL at 20:42

## 2018-03-26 RX ADMIN — VENLAFAXINE 75 MG: 37.5 TABLET ORAL at 08:44

## 2018-03-26 RX ADMIN — CLONIDINE HYDROCHLORIDE 0.2 MG: 0.1 TABLET ORAL at 14:21

## 2018-03-26 RX ADMIN — QUETIAPINE FUMARATE 100 MG: 100 TABLET ORAL at 08:44

## 2018-03-26 RX ADMIN — Medication 10 ML: at 14:24

## 2018-03-26 RX ADMIN — FAMOTIDINE 20 MG: 20 TABLET, FILM COATED ORAL at 08:44

## 2018-03-26 RX ADMIN — CLONIDINE HYDROCHLORIDE 0.2 MG: 0.1 TABLET ORAL at 08:44

## 2018-03-26 RX ADMIN — AMLODIPINE BESYLATE 10 MG: 5 TABLET ORAL at 08:44

## 2018-03-26 RX ADMIN — PIPERACILLIN SODIUM,TAZOBACTAM SODIUM 3.38 G: 3; .375 INJECTION, POWDER, FOR SOLUTION INTRAVENOUS at 06:13

## 2018-03-26 RX ADMIN — PIPERACILLIN SODIUM,TAZOBACTAM SODIUM 3.38 G: 3; .375 INJECTION, POWDER, FOR SOLUTION INTRAVENOUS at 20:49

## 2018-03-26 RX ADMIN — DOCUSATE SODIUM 100 MG: 100 CAPSULE, LIQUID FILLED ORAL at 17:44

## 2018-03-26 RX ADMIN — DIPHENHYDRAMINE HYDROCHLORIDE 50 MG: 25 CAPSULE ORAL at 14:22

## 2018-03-26 RX ADMIN — TRIAMCINOLONE ACETONIDE: 1 CREAM TOPICAL at 20:44

## 2018-03-26 RX ADMIN — VENLAFAXINE 75 MG: 37.5 TABLET ORAL at 20:42

## 2018-03-26 NOTE — PROGRESS NOTES
Hospitalist Progress Note    NAME: Lokesh Henning   :  1980   MRN:  [id number]       Assessment / Plan:  Severe sepsis POA due to left foot diabetic foot infection, ? osteomyelitis   Excisional debridement and biopsy done on 3/3, for repeat surgery 3/7/2018  LLE doppler with no deep venous thrombosis identified. MRI left foot multiple fluid collections, significant bone destruction  ESR -> 130->100  CRP 34-> 14  Midline placed 3/6/18  Partial foot amputation 3/11/201  Dr Jacklyn cheung, dressing change by Dr. Jacklyn Milton  wound cultures from 3/11 growing MRSE x 2 species and and S Hominis  Appreciate ID consult   Daptomycin and zosyn until  3/21 switched to vancomycin 3/21, cont zosyn   Cont Vancomycin and zosyn at the time of discharge for total  6 weeks until 18 since that might be the best option for her considering everything, CM to contact facility for abx availability before discharge    Her compliance is also questionable since she cam from long term and sending her home with a PICC line for home iv abx may not be safe, hence SNF is safe option for discharge    DM type 1 uncontrolled with nephropathy POA  Hypoglycemia 3/6/2018 resolved  , BSL fluctuating likely dietary non-compliance  Stopped lantus 3/22 dt hypoglycemia, will resume if persistent hyperglycemia  lispro sliding scale  Last HgBa1c 8.8  Nutrition consult    Acute kidney injury POA peak creatinine 3.80  Stage 4 chronic kidney disease POA baseline creatinine 2.4 to 2.7  Hyperkalemia secondary to diet, CKD improved  been on dialysis in the past   Previously has seen Ruben.   Creatinine 2.9 today  K+ 4.8    Per renal ok for discharge from renal standpoint, will need OP follow-up  Appreciated nephrology consult, ok to discharge on low potassium diet  OP follow-up renal    Tachycardia   HTN  After exertion, monitor  Resting HR 80-90  Clonidine, amlodipine, BP is high, lasix iv, increased amlodipine 10 mg daily 3/25    Acute on chronic anemia due to sickle cell disease POA    Pt without sx of crisis at this time  S/p 1u pRBCs  folic acid  Follow Hb     THU POA  Uses 2 LPM O2 at night    Disposition:  facility for iv Abx due to questionable non compliance     Code Status: Full    DVT Prophylaxis: heparin    Body mass index is 25.97 kg/(m^2). Subjective:   No c/o, feeling better    Review of Systems:  Symptom Y/N Comments  Symptom Y/N Comments   Fever/Chills n   Chest Pain n    Poor Appetite    Edema n    Cough n   Abdominal Pain n    Sputum    Joint Pain     SOB/ADAMS n   Pruritis/Rash     Nausea/vomit n   Tolerating PT/OT     Diarrhea n   Tolerating Diet y    Constipation    Other       Could NOT obtain due to:      Objective:     VITALS:   Last 24hrs VS reviewed since prior progress note. Most recent are:  Patient Vitals for the past 24 hrs:   Temp Pulse Resp BP SpO2   03/26/18 0824 97.5 °F (36.4 °C) 89 18 169/84 100 %   03/26/18 0410 97.9 °F (36.6 °C) 85 18 152/78 99 %   03/25/18 2250 97.5 °F (36.4 °C) 75 18 120/75 99 %   03/25/18 1930 97.9 °F (36.6 °C) 79 18 135/80 99 %   03/25/18 1607 97.7 °F (36.5 °C) 80 19 128/81 -   03/25/18 1134 98.2 °F (36.8 °C) 85 21 108/71 -       Intake/Output Summary (Last 24 hours) at 03/26/18 0838  Last data filed at 03/25/18 1600   Gross per 24 hour   Intake              820 ml   Output                0 ml   Net              820 ml        PHYSICAL EXAM:  General: Alert and awake  Resp:  CTA bilaterally, no wheezing or rales.   No accessory muscle use  CV:  Regular  rhythm,  No edema  GI:  Soft, Non distended, Non tender.  +Bowel sounds  Neurologic:  Alert and oriented X 3, normal speech, grossly intact  Psych:   Not anxious nor agitated  Skin:  L foot bandaged, leg swelling is better    Reviewed most current lab test results and cultures  YES  Reviewed most current radiology test results   YES  Review and summation of old records today    NO  Reviewed patient's current orders and MAR    YES  PMH/SH reviewed - no change compared to H&P  ________________________________________________________________________  Care Plan discussed with:    Comments   Patient x    Family      RN x    Care Manager x    Consultant                        Multidiciplinary team rounds were held today with , nursing, pharmacist and clinical coordinator. Patient's plan of care was discussed; medications were reviewed and discharge planning was addressed. ________________________________________________________________________  Total NON critical care TIME:  25   Minutes    Total CRITICAL CARE TIME Spent:   Minutes non procedure based      Comments   >50% of visit spent in counseling and coordination of care     ________________________________________________________________________  Sherry Pollack MD     Procedures: see electronic medical records for all procedures/Xrays and details which were not copied into this note but were reviewed prior to creation of Plan. LABS:  I reviewed today's most current labs and imaging studies.   Pertinent labs include:  Recent Labs      03/26/18   0415   WBC  5.7   HGB  7.2*   HCT  23.2*   PLT  286     Recent Labs      03/26/18   0415  03/25/18   0404  03/24/18   0436   NA  138  136  139   K  4.8  5.5*  4.9   CL  108  106  108   CO2  23  23  24   GLU  184*  303*  99   BUN  41*  41*  37*   CREA  2.99*  3.04*  2.86*   CA  7.8*  7.9*  7.8*       Signed: Sherry Pollack MD

## 2018-03-26 NOTE — DIABETES MGMT
DTC Progress Note    Recommendations/ Comments: Chart reviewed for hyperglycemia. Please consider,    - restarting Lantus as pt has Type 1 DM and requires basal coverage to prevent onset DKA. - Consider restarting Lantus at 6 units.    - Change pt's correctional insulin to high sensitivity scale with her CKD 4 due to her prolonged active insulin which can contribute to her hypoglycemia. Current hospital DM medication:  Lispro correctional insulin - normal sensitivity. Chart reviewed on [facility] due to BG >180 mg/dL. Patient is a 42 y.o. female with known Type 1 DM complicated by CKD and gastroparesis on Lantus 12 units at home with no mealtime coverage. A1c:   Lab Results   Component Value Date/Time    Hemoglobin A1c 8.8 (H) 03/03/2018 09:21 AM    Hemoglobin A1c 9.9 (H) 04/30/2017 05:04 AM       Recent Glucose Results:   Lab Results   Component Value Date/Time     (H) 03/26/2018 04:15 AM    GLUCPOC 249 (H) 03/26/2018 04:16 PM    GLUCPOC 182 (H) 03/26/2018 11:43 AM    GLUCPOC 269 (H) 03/26/2018 08:38 AM      Lab Results   Component Value Date/Time    Creatinine 2.99 (H) 03/26/2018 04:15 AM     Estimated Creatinine Clearance: 22.3 mL/min (based on Cr of 2.99). Active Orders   Diet    DIET DIABETIC CONSISTENT CARB Regular; 1.5 GM NA      PO intake:   Patient Vitals for the past 72 hrs:   % Diet Eaten   03/25/18 0804 100 %       Will continue to follow as needed.     Thank you  Joel Martins, [address]

## 2018-03-26 NOTE — ADT AUTH CERT NOTES
[address] Adult-Extended Stay (3/25/2018) by Kwaku Avelar        Review Status Review Entered       In Primary 3/26/2018       Details         REVIEW SUMMARY     Patient: Poli Soto  Review Number: [id number]  Review Status: In Primary     Condition Specific: Yes     Condition Level Of Care Code: ACUTE  Condition Level Of Care Description: Acute        OUTCOMES  Outcome Type: Primary           REVIEW DETAILS     Service Date: 03/25/2018  Admit Date: 03/02/2018  Product: [address] [facility] Adult  Subset: Extended Stay      (Symptom or finding within 24h)         (Excludes PO medications unless noted)          Select Level of Care, One:              [ ] ACUTE, >= One:                  [ ] General, One:                      [ ] Partial responder, not clinically stable for discharge and requires continued stay, >= One:                      ~--Admin, IQ Admin Admin on 03- 11:12 AM--~                      Daptomycin and zosyn until  3/21                      Switched to vancomycin 3/21, cont zosyn,  daptomycin was stopped. Cont Vancomycin and zosyn at the time of discharge for 6 weeks until 4/22/18 since that might be the best option for her considering everything, CM to contact facility for abx availability before discharge                          Her compliance is also questionable since she cam from intermediate and sending her home with a PICC line for home iv abx may not be safe, hence SNF is safe option for discharge                      Disposition: pt facility for iv Abx due to questionable non compliance                             Version: Eloisa Dickens 2017.2  Theresa Hernandez  © 2017 [employer] and/or one of its [place]. All Rights Reserved. CPT only © 2016 American Medical Association. All Rights Reserved.              Additional Notes       Clinical Date Reviewed: 3/25/2018                 LOS; Status;  Review Type: Surgical/Inpatient/CS              Vital Signs: 120/75, 97.5, 75, 18, 99% RA               Abn. Findings LABS/RADIOLOGY:       Potassium 5.5, gluc 303, bun 41, creat 3.04, calcium 7.9, gfr joellen 17, gfr aa 21              Meds:       Norvasc 5mg qd po       Os-pamela 500mg qd po       Catapres 0.2mg tid po       Vitamin b12 1000mcg qd po       Dextrose 5% 25ml/hr IV       Benadryl 25mg q6hrs prn po x4       Pepcid 16ZH qd po       Folic acid 1mg qd po       Lantus 8units qhs SC       Humalog qid&hs ss SC x3       Percocet 5-325mg 1-2 q4hrs prn po x5 (x9 in 24hrs)       Zosyn 3.375g q8hrs IV       Phenergan 25mg q6hrs prn IV x3       Seroquel 100mg bid po       Sodium bicarbonate 650mg bid po       Theragan 1 qd po       Effexor 75mg bid po       Vancomycin 1g q36hrs IV       Lasix 40mg once IV x1       Kayexalate 30g now po x1       Cubicin 400mg q48hrs IV                     Internal Medicine Progress Notes Date of Service: 03/25/18 0975              Assessment / Plan:       Severe sepsis POA due to left foot diabetic foot infection, ? osteomyelitis        Excisional debridement and biopsy done on 3/3, for repeat surgery 3/7/2018       LLE doppler with no deep venous thrombosis identified.       MRI left foot multiple fluid collections, significant bone destruction       ESR -> 130->100       CRP 34-> 14       Midline placed 3/6/18       Partial foot amputation 3/11/201       Dr Rivas Bone dollowing, dressing change by Dr. Rivas Bone       wound cultures from 3/11 growing MRSE x 2 species and and S Hominis       Appreciate ID consult        Daptomycin and zosyn until  3/21       Switched to vancomycin 3/21, cont zosyn,  daptomycin was stopped.        Cont Vancomycin and zosyn at the time of discharge for 6 weeks until 4/22/18 since that might be the best option for her considering everything, CM to contact facility for abx availability before discharge               Her compliance is also questionable since [facility] and sending her home with a PICC line for home iv abx may not be safe, hence SNF is safe option for discharge               DM type 1 uncontrolled with nephropathy POA       Hypoglycemia 3/6/2018 resolved       , BSL fluctuating likely dietary non-compliance       Stopped lantus 3/22 dt hypoglycemia, will resume if persistent hyperglycemia       lispro sliding scale       Last HgBa1c 8.8       Nutrition consult               Acute kidney injury POA peak creatinine 3.80       Stage 4 chronic kidney disease POA baseline creatinine 2.4 to 2.7       Hyperkalemia secondary to diet, CKD improved       been on dialysis in the past        Previously has seen Ruben.       Creatinine 3.0 today       K + 5.5, give kayexalate, lasix 40 mg iv       Per renal ok for discharge from renal standpoint, will need OP follow-up       Appreciated nephrology consult, ok to discharge on low potassium diet       OP follow-up renal               Tachycardia        HTN       After exertion, monitor       Resting HR 80-90       Clonidine, amlodipine, BP is high, lasix iv, increase amlodipine 10 mg daily               Acute on chronic anemia due to sickle cell disease POA         Pt without sx of crisis at this time       S/p 1u pRBCs       folic acid       Follow Hb               THU POA       Uses 2 LPM O2 at night               Disposition: pt facility for iv Abx due to questionable non compliance                Code Status: Full               DVT Prophylaxis: heparin           LOC:Acute Adult-Extended Stay by Estefany Barillas RN        Review Status Review Entered       In Primary 3/25/2018       Details         REVIEW SUMMARY     Patient: Jude Blanchard  Review Number: [id number]  Review Status:  In Primary     Condition Specific: Yes        OUTCOMES  Outcome Type: Primary           REVIEW DETAILS     Product: [facility] Adult  Subset: Extended Stay      (Symptom or finding within 24h)         (Excludes PO medications unless noted)          Select Level of Care, One:              [ ] ACUTE, >= One:                  [ ] General, One:                      [X] Non-responder, not clinically stable for discharge and does not meet partial responder criteria (refer for secondary review)     Version: InterQual® 2017.2  Eloisa Dickens and [id number]  © 2017 [employer] and/or one of its [place]. All Rights Reserved. CPT only © 2016 American Medical Association.   All Rights Reserved.              Additional Notes       3/25/18       Left leg swelling is better       Left ft bandaged               Vitals - 98.5, 185/102, 85, 22, 100%        Labs- k 5.5 , glucose 303, bun 41, Cr 3.04, Ca 7.9, gfr 17              Meds-       Oscacl po qd       Catapres 0.2mg po tid       Vit b12 1mcg po qd       Pepcid po qd       Heparin 5000 sc q8hr       Insulin ss sc qid       Percocet po q 4 hrs prn x3       Zosyn 3.375g q8hrs       Lasix 40mg iv x1       Kayexalate 15g po x1                     Plans-       Severe sepsis POA due to left foot diabetic foot infection, ? osteomyelitis        Excisional debridement and biopsy done on 3/3, for repeat surgery 3/7/2018       LLE doppler with no deep venous thrombosis identified.       MRI left foot multiple fluid collections, significant bone destruction       ESR -> 130->100       CRP 34-> 14       Midline placed 3/6/18       Partial foot amputation 3/11/201       Dr Suleman cheung, dressing change by Dr. Suleman Brown       wound cultures from 3/11 growing MRSE x 2 species and and S Hominis       Appreciate ID consult        Daptomycin and zosyn until  3/21       Switched to vancomycin 3/21, cont zosyn,  daptomycin was stopped.        Cont Vancomycin and zosyn at the time of discharge for 6 weeks until 4/22/18 since that might be the best option for her considering everything, CM to contact facility for abx availability before discharge       Her compliance is also questionable since she cam from long-term and sending her home with a PICC line for home iv abx may not be safe, hence SNF is safe option for discharge       DM type 1 uncontrolled with nephropathy POA       Hypoglycemia 3/6/2018 resolved       , BSL fluctuating likely dietary non-compliance       Stopped lantus 3/22 dt hypoglycemia, will resume if persistent hyperglycemia       lispro sliding scale       Last HgBa1c 8.8       Nutrition consult       Acute kidney injury POA peak creatinine 3.80       Stage 4 chronic kidney disease POA baseline creatinine 2.4 to 2.7       Hyperkalemia secondary to diet, CKD improved       been on dialysis in the past        Previously has seen Ruben.       Creatinine 3.0 today       K + 5.5, give kayexalate, lasix 40 mg iv           LOC:Acute Adult-Extended Stay by Adenike Lord RN        Review Status Review Entered       In Primary 3/25/2018       Details         REVIEW SUMMARY     Patient: Aggie Mccracken  Review Number: [id number]  Review Status: In Primary     Condition Specific: Yes        OUTCOMES  Outcome Type: Primary           REVIEW DETAILS     Product: [facility] Adult  Subset: Extended Stay      (Symptom or finding within 24h)         (Excludes PO medications unless noted)          Select Level of Care, One:              [ ] ACUTE, >= One:                  [ ] Infection, One:                      [X] Non-responder, not clinically stable for discharge and does not meet partial responder criteria (refer for secondary review)     Version: Jianshual® 2017.2  Agustin Chavez and [address]  © 2017 [employer] and/or one of its [place]. All Rights Reserved. CPT only © 2016 American Medical Association. All Rights Reserved.                  [facility] Adult-Extended Stay by Adenike Lord RN        Review Status Review Entered       In Primary 3/24/2018       Details         REVIEW SUMMARY     Patient: Aggie Mccracken  Review Number: [id number]  Review Status:  In Primary     Condition Specific: Yes        OUTCOMES  Outcome Type: Primary           REVIEW DETAILS     Product: [facility] Adult  Subset: Extended Stay      (Symptom or finding within 24h)         (Excludes PO medications unless noted)          Select Level of Care, One:              [ ] ACUTE, >= One:                  [ ] Infection, One:                      [X] Non-responder, not clinically stable for discharge and does not meet partial responder criteria (refer for secondary review)     Version: joblocal® 2017.2  Erin San and [employer] [employer]  © 2017 [employer] and/or one of its [place]. All Rights Reserved. CPT only © 2016 American Medical Association. All Rights Reserved.              Additional Notes       3/24/18       Stopped lantus 3/22 dt hypoglycemia, will resume if persistent hyperglycemia       Creatinine 2.86 today       Pt w/o c/o        L ft bandaged              Vitals- 98.3, 194/93, 94, 18, 100%        Labs- bun 37, Cr 2.86, Ca 7.8, GFR 18              Meds-       Po meds       Hydralazine 20mg iv prn        Insulin ss qid       Percocet po prn x2       Zosyn 3.375g iv q8hrs       Phenergan po prn x2       Heparin sc q8hrs       Vanc 1g iv q36hr              Plans- cont iv abx, Cont Vancomycin and zosyn at the time of discharge for 6 weeks until 4/22/18, Kayexalate 45g po, give ivf and iv Lasix, avoid ace or arb, continue Norvasc & clonidine, cont po bicarb, strict        Disposition: pt facility for iv Abx due to questionable non compliance           LOC:Acute Adult-Extended Stay by Letitia Lee RN        Review Status Review Entered       In Primary 3/24/2018       Details         REVIEW SUMMARY     Patient: Augusta Vega  Review Number: [id number]  Review Status:  In Primary     Condition Specific: Yes        OUTCOMES  Outcome Type: Primary           REVIEW DETAILS     Product: [facility] Adult  Subset: Extended Stay      (Symptom or finding within 24h)         (Excludes PO medications unless noted)          Select Level of Care, One:              [ ] ACUTE, >= One:                  [ ] Electrolyte or mineral imbalance, One:                      [X] Non-responder, not clinically stable for discharge and does not meet partial responder criteria (refer for secondary review)                      ~--Admin, IQ Admin Admin on 03- 09:41 AM--~                      k is high - 5.5- pt didn't drink her Kayexalate                  Version: IXcellerate 2017.2  Mike Kaiser  © 2017 [employer] and/or one of its [place]. All Rights Reserved. CPT only © 2016 American Medical Association.   All Rights Reserved.              Additional Notes       3/23/18       Pt w/o c/o       L ft bandaged        b/p improved       K is high -pt didn't drink her kayexalate,               Labs- RBC: 3.11 HGB: 7.6 HCT: 24.3 Potassium: 5.5 Chloride: 109 Glucose: 265 BUN: 37 Creatinine: 3.01 Calcium: 7.9               Vitals- 98.3, 180/99, 93, 18, 99% on RA              Meds-       Po meds       Hydralazine 20mg iv prn x1       Insulin ss qid       Percocet po        Zosyn 3.375g iv q8hrs       Heparin sc q8hrs       Vanc 1g iv q36hr       Lasix 40mg iv x1       Ivf @ 100cc/hr              Plans- cont iv abx, Cont Vancomycin and zosyn at the time of discharge for 6 weeks until 4/22/18, Kayexalate 45g po, give ivf and iv Lasix, avoid ace or arb, continue Norvasc & clonidine, cont po bicarb, strict

## 2018-03-26 NOTE — PROGRESS NOTES
Nephrology Progress Note     Bill Gloria     [facility]. [email or web address]                  Phone - [phone]   Patient: Arcadio Guerra   YOB: 1980    Date- 3/26/2018     CC: Follow up for: hyperkalemia and renal failure    Subjective: Interval History:     The patient is feeling well and denies any complaints except some pain in her foot. ROS: no fever/chills. No HEENT complaints. No SOB. No chest pain. No abd pain. No rashes/lesions. No joint pain except for the foot. Assessment:     Hyperkalemia due to non compliance to diet and CRI. K down to 4.8 on 3/262       ARF/CRI. The creat of 3 is likely the patient's new baseline. She has now stage IV CRI. H/o ARF requiring dialysis X 6 mo in  [id number]  · Metabolic acidosis  · H/o severe non-compliance with diet, meds, and F/U.  · H/o urinary retention  · Sickle cell disease  · Severe hypertension  · H/o cocaine abuse in past  · Dm 2  · Anemia  · THU  · Left diabetic foot infection. Plan:       Avoid acei or arb  Continue norvasc and clonidine  Follow strict low k diet  Continue po bicarb  Recheck labs in AM        Care Plan discussed with: patient and her RN  Review of Systems: Pertinent items are noted in HPI. Objective:   Vitals:    03/26/18 0410 03/26/18 0824 03/26/18 1139 03/26/18 1825   BP: 152/78 169/84 116/63 154/78   Pulse: 85 89 78 76   Resp: 18 18 18 18   Temp: 97.9 °F (36.6 °C) 97.5 °F (36.4 °C) 98.4 °F (36.9 °C) 98 °F (36.7 °C)   SpO2: 99% 100% 100% 100%   Weight:       Height:         Last 3 Recorded Weights in this Encounter    03/02/18 1504   Weight: 64.4 kg (142 lb)     Patient Vitals for the past 8 hrs:   BP Temp Pulse Resp SpO2   03/26/18 1825 154/78 98 °F (36.7 °C) 76 18 100 %   03/26/18 1139 116/63 98.4 °F (36.9 °C) 78 18 100 %        03/25 0701 - 03/26 1900  In: 1180 [P.O.:1080; I.V.:100]  Out: 1500 [Urine:1500]  Physical Exam:   General: Middle-aged woman in no distress.  Up in a chair  Eyes:               EOMI; sclera anicteric  Resp:  Lungs clear; normal resp effort  CV:  RRR; no gallop or rub  Extrem:            No edema; large bandage on left foot  GI:  Soft and non-tender; no masses   Skin:  no rashes or ulcers. No jaundice  Neurologic:  Alert and oriented X 3. Non Focal; normal speech  Left foot dressing +    Chart reviewed. Pertinent Notes reviewed.      Medications list  reviewed       Data Review :  Recent Labs      03/26/18   0415  03/25/18   0404  03/24/18   0436   NA  138  136  139   K  4.8  5.5*  4.9   CL  108  106  108   CO2  23  23  24   GLU  184*  303*  99   BUN  41*  41*  37*   CREA  2.99*  3.04*  2.86*   CA  7.8*  7.9*  7.8*     Recent Labs      03/26/18 0415   WBC  5.7   HGB  7.2*   HCT  23.2*   PLT  286     Lab Results   Component Value Date/Time    Specimen Description: URINE 06/24/2013 08:00 PM    Specimen Description: URINE 06/17/2013 07:55 PM    Specimen Description: URINE 05/26/2013 10:10 AM     Current Facility-Administered Medications   Medication    [START ON 3/28/2018] vancomycin (VANCOCIN) 750 mg in 0.9% sodium chloride (MBP/ADV) 250 mL    amLODIPine (NORVASC) tablet 10 mg    white petrolatum-mineral oil (EUCERIN) cream    triamcinolone acetonide (KENALOG) 0.1 % cream    hydrALAZINE (APRESOLINE) 20 mg/mL injection 20 mg    promethazine (PHENERGAN) tablet 25 mg    heparin (porcine) injection 5,000 Units    sodium bicarbonate tablet 650 mg    piperacillin-tazobactam (ZOSYN) 3.375 g in 0.9% sodium chloride (MBP/ADV) 100 mL    diphenhydrAMINE (BENADRYL) capsule 50 mg    oxyCODONE-acetaminophen (PERCOCET) 5-325 mg per tablet 1-2 Tab    cloNIDine HCl (CATAPRES) tablet 0.2 mg    venlafaxine (EFFEXOR) tablet 75 mg    QUEtiapine (SEROquel) tablet 100 mg    famotidine (PEPCID) tablet 20 mg    sodium chloride (NS) flush 5-10 mL    sodium chloride (NS) flush 5-10 mL    acetaminophen (TYLENOL) tablet 650 mg    docusate sodium (COLACE) capsule 100 mg    insulin lispro (HUMALOG) injection    glucose chewable tablet 16 g    dextrose (D50W) injection syrg 12.5-25 g    glucagon (GLUCAGEN) injection 1 mg    albuterol (PROVENTIL VENTOLIN) nebulizer solution 2.5 mg    therapeutic multivitamin (THERAGRAN) tablet 1 Tab    cyanocobalamin (VITAMIN B12) tablet 1,000 mcg    calcium carbonate (OS-BINA) tablet 500 mg [elemental]    folic acid (FOLVITE) tablet 1 mg       Carlos Gonzalez MD  [facility]  [email or web address]  [address], [address], [address]  Phone - [phone]         Fax - [phone]  [facility] Office  [address] [place] [address]  Phone - [phone]        Fax - [phone]

## 2018-03-26 NOTE — PROGRESS NOTES
Pharmacy Automatic Renal Dosing Protocol - Antimicrobials     Indication for Antimicrobials: Severe sepsis POA due to left foot diabetic foot infection, ? Osteomyelitis; Excisional debridement and biopsy done on 3/3, repeat surgery 3/7/2018  S/p Partial foot amputation 3/11     Current Regimen of Each Antimicrobial:  Piperacillin tazobactam 3.375g IV Q8H (started 3/19, day 6)  Vancomycin 750mg IV q24h (start 3/21 Day 4)    Previous Antimicrobial Therapy:  Piperacillin tazobactam 3.375 Q12H (Start Date 3/2, Day #14)  Vancomycin 1500 mg x 1 then 1000 mg Q24H (Start Date 3/2 - 3/5)  Daptomycin 4 mg/kg q24h (3/5 Day #5)  Daptomycin 6 mg/kg (400mg) IV q24h - started 3/16 day 6    Vancomycin Trough Goal Level: 15-20    Measured / Extrapolated Vancomycin Level:      3/23  14:19 = 22.4/22.9     Significant Cultures:    All Micro Results        Procedure Component Value Units Date/Time     CULTURE, Adelene Gutting STAIN [[id number]] (Abnormal)  Collected: 03/11/18 1143     Order Status: Completed Specimen: Foot Updated: 03/17/18 1035      Special Requests: NO SPECIAL REQUESTS         GRAM STAIN NO WBC'S SEEN          NO ORGANISMS SEEN         Culture result:          NO GROWTH ON SOLID MEDIA AT 4 DAYS                STAPHYLOCOCCUS EPIDERMIDIS ISOLATED FROM THIO BROTH ONLY (A)     CULTURE, TISSUE Dylan Duos STAIN [[id number]] (Abnormal)  Collected: 03/11/18 1247     Order Status: Completed Specimen: Bone Updated: 03/16/18 1052      Special Requests: NO SPECIAL REQUESTS         GRAM STAIN RARE WBCS SEEN          NO ORGANISMS SEEN         Culture result:          FEW STAPHYLOCOCCUS HOMINIS SUBSPECIES HOMINIS (A)                STAPHYLOCOCCUS EPIDERMIDIS (OXACILLIN RESISTANT) ISOLATED FROM THIO BROTH ONLY (A)     CULTURE, ANAEROBIC [[id number]] (Abnormal)  Collected: 03/11/18 1143     Order Status: Completed Specimen: Foot Updated: 03/16/18 1048      Special Requests: NO SPECIAL REQUESTS         Culture result:          NO GROWTH ON SOLID MEDIA AT 4 DAYS                STAPHYLOCOCCUS EPIDERMIDIS (OXACILLIN RESISTANT) ISOLATED FROM THIO BROTH ONLY (A)     CULTURE, ANAEROBIC [[id number]] Collected: 03/11/18 1247     Order Status: Completed Specimen: Bone Updated: 03/13/18 1138      Special Requests: NO SPECIAL REQUESTS         Culture result: NO ANAEROBES ISOLATED        CULTURE, BLOOD, PAIRED [[id number]] Collected: 03/02/18 1815     Order Status: Completed Specimen: Blood Updated: 03/07/18 0816      Special Requests: NO SPECIAL REQUESTS         Culture result: NO GROWTH 5 DAYS        CULTURE, ANAEROBIC [[id number]] (Abnormal) Collected: 03/03/18 1023     Order Status: Completed Specimen: Foot Updated: 03/06/18 1103      Special Requests: NO SPECIAL REQUESTS         Culture result:        LIGHT   MIXED   ANAEROBIC GRAM NEGATIVE RODS   (   BETA LACTAMASE POSITIVE   ) AND   ANAEROBIC GRAM POSITIVE COCCI    (A)     CULTURE, ANAEROBIC [[id number]] (Abnormal) Collected: 03/03/18 1029     Order Status: Completed Specimen: Foot Updated: 03/06/18 1101      Special Requests: NO SPECIAL REQUESTS         Culture result:        LIGHT   MIXED   ANAEROBIC GRAM NEGATIVE RODS   (   BETA LACTAMASE POSITIVE   )   AND   ANAEROBIC GRAM POSITIVE COCCI    (A)     CULTURE, TISSUE W Som Hidalgo [[id number]] Collected: 03/03/18 1029     Order Status: Completed Specimen: Foot Updated: 03/05/18 1412      Special Requests: NO SPECIAL REQUESTS         GRAM STAIN 1+ WBCS SEEN                   RARE GRAM POSITIVE COCCI IN PAIRS      Culture result:          FEW MIXED SKIN CHRIS ISOLATED     CULTURE, Monroe Ducking STAIN [[id number]] Collected: 03/03/18 1023     Order Status: Completed Specimen: Foot Updated: 03/05/18 1324      Special Requests: NO SPECIAL REQUESTS         GRAM STAIN NO WBC'S SEEN                   1+ GRAM POSITIVE COCCI IN PAIRS      Culture result:          SCANT MIXED SKIN CHRIS ISOLATED     CULTURE, Monroe Ducking STAIN [[id number]] (Abnormal) Collected: 03/02/18 2127 Order Status: Completed Specimen: Foot Updated: 18 1130      Special Requests: NO SPECIAL REQUESTS         GRAM STAIN OCCASIONAL WBCS SEEN                   1+ GRAM POSITIVE RODS (CORYNEFORM)      Culture result: HEAVY DIPHTHEROIDS (A)                   LIGHT STAPHYLOCOCCUS SPECIES, COAGULASE NEGATIVE (A)     CULTURE, URINE [[id number]] Collected: 18 2250     Order Status: Completed Specimen: Urine Updated: 18 1017      Special Requests: --         NO SPECIAL REQUESTS   Reflexed from [id number]         [place] Count <1,000 CFU/ML         Culture result: NO GROWTH 1 DAY        CULTURE, Dorla Alameda STAIN [[id number]] Collected: 18 1029     Order Status: Canceled Updated: 18 1059       Microbiology Results (Current encounter)   Date/Time Order Name Specimen Source Lab Status   3/11/18 1247 CULTURE, ANAEROBIC Bone Final result   3/11/18 1247 CULTURE, TISSUE W GRAM STAIN Bone Final result   3/11/18 1143 CULTURE, WOUND W GRAM STAIN Foot Final result   3/11/18 1143 CULTURE, ANAEROBIC Foot Final result   3/3/18 1029 CULTURE, TISSUE W GRAM STAIN Foot Final result   3/3/18 1029 CULTURE, ANAEROBIC Foot Final result   3/3/18 1023 CULTURE, WOUND W GRAM STAIN Foot Final result   3/3/18 1023 CULTURE, ANAEROBIC Foot Final result   3/2/18 2250 CULTURE, URINE Urine Final result   3/2/18 2127 CULTURE, WOUND W GRAM STAIN Foot Final result   3/2/18 1815 CULTURE, BLOOD, PAIRED Blood Final result       Estimated Creatinine Clearance: 22.3 mL/min (based on Cr of 2.99).   Estimated Creatinine Clearance (using IBW):20.0 mL/min    Recent Labs      18   0415  18   0404  18   0436   CREA  2.99*  3.04*  2.86*   BUN  41*  41*  37*   WBC  5.7   --    --    NA  138  136  139   K  4.8  5.5*  4.9   CA  7.8*  7.9*  7.8*   HGB  7.2*   --    --    HCT  23.2*   --    --    PLT  286   --    --      Temp (24hrs), Av.9 °F (36.6 °C), Min:97.5 °F (36.4 °C), Max:98.4 °F (36.9 °C)    Impression/Plan:   - Vancomycin trough 21.3 on 1000 mg q36H extrapolated to 22.9 mg/ml. Dose adjusted to 750 mg Q36H with anticipated trough of 17 mcg/ml. Afebrile, SCr continues to improve  - Cx final     - Zosyn BORDERLINE for reduced dosing - continue same  - Continue Vancomycin dosing regimen    -Per ID: Plan is for 6 weeks of Vanc + Zosyn until 4/22/18; Would recommend weekly Vancomycin troughs since pt appears to be at SCr baseline unles SCr begins to fluctuate (March 2017 SCr 2.46)  -Rechecking Vanco level before 3/26 noon dose      Pharmacy will follow daily and adjust medications as appropriate for renal function and/or serum levels.

## 2018-03-26 NOTE — PROGRESS NOTES
CM spoke with Chelsea with [facility] and they were unable to accept pt. CM left a vm with [address] requesting a return call.    1:58pm  CM left a vm for Kisha with [facility] requesting a call back. CM sent referrals to [facility] and [facility] for their review.    4:00pm  CM spoke to the  [place] [facility] who stated that they ran patients insurance and that was good. They needed to look at clinicals and she will get back to me. Transfer tomorrow, pending acceptance.     Vernard Dandy  Ext [phone]

## 2018-03-27 VITALS
DIASTOLIC BLOOD PRESSURE: 85 MMHG | BODY MASS INDEX: 26.13 KG/M2 | RESPIRATION RATE: 16 BRPM | HEART RATE: 80 BPM | OXYGEN SATURATION: 100 % | SYSTOLIC BLOOD PRESSURE: 136 MMHG | WEIGHT: 142 LBS | HEIGHT: 62 IN | TEMPERATURE: 98.6 F

## 2018-03-27 LAB
ANION GAP SERPL CALC-SCNC: 7 MMOL/L (ref 5–15)
BUN SERPL-MCNC: 40 MG/DL (ref 6–20)
BUN/CREAT SERPL: 13 (ref 12–20)
CALCIUM SERPL-MCNC: 7.8 MG/DL (ref 8.5–10.1)
CHLORIDE SERPL-SCNC: 107 MMOL/L (ref 97–108)
CO2 SERPL-SCNC: 23 MMOL/L (ref 21–32)
CREAT SERPL-MCNC: 3.06 MG/DL (ref 0.55–1.02)
GLUCOSE BLD STRIP.AUTO-MCNC: 204 MG/DL (ref 65–100)
GLUCOSE BLD STRIP.AUTO-MCNC: 225 MG/DL (ref 65–100)
GLUCOSE SERPL-MCNC: 203 MG/DL (ref 65–100)
POTASSIUM SERPL-SCNC: 4.9 MMOL/L (ref 3.5–5.1)
SERVICE CMNT-IMP: ABNORMAL
SERVICE CMNT-IMP: ABNORMAL
SODIUM SERPL-SCNC: 137 MMOL/L (ref 136–145)

## 2018-03-27 PROCEDURE — 74011250637 HC RX REV CODE- 250/637: Performed by: INTERNAL MEDICINE

## 2018-03-27 PROCEDURE — 74011636637 HC RX REV CODE- 636/637: Performed by: INTERNAL MEDICINE

## 2018-03-27 PROCEDURE — 82962 GLUCOSE BLOOD TEST: CPT

## 2018-03-27 PROCEDURE — 74011250637 HC RX REV CODE- 250/637: Performed by: STUDENT IN AN ORGANIZED HEALTH CARE EDUCATION/TRAINING PROGRAM

## 2018-03-27 PROCEDURE — 74011250636 HC RX REV CODE- 250/636: Performed by: INTERNAL MEDICINE

## 2018-03-27 PROCEDURE — 36415 COLL VENOUS BLD VENIPUNCTURE: CPT | Performed by: EMERGENCY MEDICINE

## 2018-03-27 PROCEDURE — 74011250637 HC RX REV CODE- 250/637: Performed by: PODIATRIST

## 2018-03-27 PROCEDURE — 80048 BASIC METABOLIC PNL TOTAL CA: CPT | Performed by: EMERGENCY MEDICINE

## 2018-03-27 PROCEDURE — 74011000258 HC RX REV CODE- 258: Performed by: INTERNAL MEDICINE

## 2018-03-27 RX ORDER — DIPHENHYDRAMINE HCL 25 MG
25-50 CAPSULE ORAL
Qty: 30 CAP | Refills: 0 | Status: SHIPPED | OUTPATIENT
Start: 2018-03-27 | End: 2018-04-06

## 2018-03-27 RX ORDER — AMLODIPINE BESYLATE 10 MG/1
10 TABLET ORAL DAILY
Qty: 30 TAB | Refills: 0 | Status: SHIPPED | OUTPATIENT
Start: 2018-03-28 | End: 2018-05-14

## 2018-03-27 RX ADMIN — INSULIN LISPRO 3 UNITS: 100 INJECTION, SOLUTION INTRAVENOUS; SUBCUTANEOUS at 11:42

## 2018-03-27 RX ADMIN — CYANOCOBALAMIN TAB 500 MCG 1000 MCG: 500 TAB at 08:52

## 2018-03-27 RX ADMIN — CLONIDINE HYDROCHLORIDE 0.2 MG: 0.1 TABLET ORAL at 08:52

## 2018-03-27 RX ADMIN — OXYCODONE HYDROCHLORIDE AND ACETAMINOPHEN 2 TABLET: 5; 325 TABLET ORAL at 03:19

## 2018-03-27 RX ADMIN — SODIUM BICARBONATE 650 MG TABLET 650 MG: at 08:52

## 2018-03-27 RX ADMIN — FAMOTIDINE 20 MG: 20 TABLET, FILM COATED ORAL at 08:52

## 2018-03-27 RX ADMIN — PIPERACILLIN SODIUM,TAZOBACTAM SODIUM 3.38 G: 3; .375 INJECTION, POWDER, FOR SOLUTION INTRAVENOUS at 05:26

## 2018-03-27 RX ADMIN — Medication 10 ML: at 05:27

## 2018-03-27 RX ADMIN — PROMETHAZINE HYDROCHLORIDE 25 MG: 25 TABLET ORAL at 03:20

## 2018-03-27 RX ADMIN — QUETIAPINE FUMARATE 100 MG: 100 TABLET ORAL at 08:52

## 2018-03-27 RX ADMIN — VENLAFAXINE 75 MG: 37.5 TABLET ORAL at 08:52

## 2018-03-27 RX ADMIN — INSULIN LISPRO 3 UNITS: 100 INJECTION, SOLUTION INTRAVENOUS; SUBCUTANEOUS at 08:57

## 2018-03-27 RX ADMIN — THERA TABS 1 TABLET: TAB at 08:52

## 2018-03-27 RX ADMIN — AMLODIPINE BESYLATE 10 MG: 5 TABLET ORAL at 08:52

## 2018-03-27 RX ADMIN — DIPHENHYDRAMINE HYDROCHLORIDE 50 MG: 25 CAPSULE ORAL at 03:19

## 2018-03-27 RX ADMIN — CALCIUM 500 MG: 500 TABLET ORAL at 08:52

## 2018-03-27 RX ADMIN — FOLIC ACID 1 MG: 1 TABLET ORAL at 08:52

## 2018-03-27 RX ADMIN — OXYCODONE HYDROCHLORIDE AND ACETAMINOPHEN 2 TABLET: 5; 325 TABLET ORAL at 09:49

## 2018-03-27 RX ADMIN — PROMETHAZINE HYDROCHLORIDE 25 MG: 25 TABLET ORAL at 11:17

## 2018-03-27 RX ADMIN — TRIAMCINOLONE ACETONIDE: 1 CREAM TOPICAL at 08:53

## 2018-03-27 RX ADMIN — HEPARIN SODIUM 5000 UNITS: 5000 INJECTION, SOLUTION INTRAVENOUS; SUBCUTANEOUS at 05:26

## 2018-03-27 RX ADMIN — DIPHENHYDRAMINE HYDROCHLORIDE 50 MG: 25 CAPSULE ORAL at 09:49

## 2018-03-27 NOTE — PROGRESS NOTES
Pt going to Rehab at 1400  by ambulance. Right [facility] and access is kept in place. Report called to facility. 2 scripts given. Signed copy on bedside chart and paperwork reviewed. Pt has follow up appt made.

## 2018-03-27 NOTE — DISCHARGE SUMMARY
Hospitalist Discharge Summary     Patient ID:  Arcadio Guerra  [id number]  69 y.o.  1980    PCP on record: Delvin Zheng MD    Admit date: 3/2/2018  Discharge date and time: 3/27/2018      DISCHARGE DIAGNOSIS:  Severe sepsis POA due to left foot diabetic foot infection, ? osteomyelitis   Excisional debridement and biopsy done on 3/3, for repeat surgery 3/7/2018  LLE doppler with no deep venous thrombosis identified. MRI left foot multiple fluid collections, significant bone destruction  ESR -> 130->100  CRP 34-> 14  Midline placed 3/6/18  Partial foot amputation 3/11/201  Dr Rosemary Gudino dollowing, dressing change by Dr. Rosemary Gudino  wound cultures from 3/11 growing MRSE x 2 species and and S [place]  Appreciate ID consult   Daptomycin and zosyn until  3/21 switched to vancomycin 3/21, cont zosyn   Cont Vancomycin and zosyn at the time of discharge for total  6 weeks until 4/22/18 since that might be the best option for her considering everything, CM to contact facility for abx availability before discharge     Her compliance is also questionable since she cam from senior care and sending her home with a PICC line for home iv abx may not be safe, hence SNF is safe option for discharge     DM type 1 uncontrolled with nephropathy POA  Hypoglycemia 3/6/2018 resolved  , BSL fluctuating likely dietary non-compliance  Stopped lantus 3/22 dt hypoglycemia, resume if persistent hyperglycemia  lispro sliding scale  Last HgBa1c 8.8  Nutrition consult     Acute kidney injury POA peak creatinine 3.80  Stage 4 chronic kidney disease POA baseline creatinine 2.4 to 2.7  Hyperkalemia secondary to diet, CKD improved  been on dialysis in the past   Previously has seen [facility].   Cr stable, K+ wnl     Per renal ok for discharge from renal standpoint, will need OP follow-up  Appreciated nephrology consult, ok to discharge on low potassium diet  OP follow-up renal     Tachycardia   HTN  After exertion, monitor  Resting HR 80-90  Clonidine, amlodipine, BP is high,  increased amlodipine 10 mg daily 3/25  Follow-up OP with pcp/renal     Acute on chronic anemia due to sickle cell disease POA    Pt without sx of crisis at this time  S/p 1u pRBCs  folic acid  Weekly labs     THU POA  Uses 2 LPM O2 at night     Disposition:  facility for iv Abx due to questionable non compliance      Code Status: Full     DVT Prophylaxis: heparin     Body mass index is 25.97 kg/(m^2). CONSULTATIONS:  IP CONSULT TO VASCULAR SURGERY  IP CONSULT TO INTERVENTIONAL RADIOLOGY  IP CONSULT TO ANESTHESIOLOGY  IP CONSULT TO INFECTIOUS DISEASES  IP CONSULT TO NEPHROLOGY    Excerpted HPI from H&P of Cally Benjamin MD:  CHIEF COMPLAINT:  Left foot drainage     HISTORY OF PRESENT ILLNESS:     Christian Leyva is a 42 y.o.  female who presents with above. Pt currently a resident at [facility]. She says that she has had worsening swelling and pain in her left foot for the last 3 weeks. She says it started with a wound near her L lateral ankle which has been swelling and worsening over this period of time. She says that her foot feels cold. She denies fever, cough or URI sx. No CP. She has had shortness of breath and dizziness with ambulation. No stool changes or urinary changes. She does have edema in her left leg which is new.     We were asked to admit for work up and evaluation of the above problems.        ______________________________________________________________________  DISCHARGE SUMMARY/HOSPITAL COURSE:  for full details see H&P, daily progress notes, labs, consult notes. _______________________________________________________________________  Patient seen and examined by me on discharge day. Pertinent Findings:  Gen:    Not in distress  Chest: Clear lungs  CVS:   Regular rhythm.   No edema  Abd:  Soft, not distended, not tender  Neuro:  Alert, awake, oriented x 3, grossly intact  Lt foot dressing intact, edema lt leg improving  _______________________________________________________________________  DISCHARGE MEDICATIONS:   Current Discharge Medication List      START taking these medications    Details   amLODIPine (NORVASC) 10 mg tablet Take 1 Tab by mouth daily for 30 days. Qty: 30 Tab, Refills: 0      vancomycin 750 mg 750 mg, ADDaptor 1 Device IVPB 750 mg by IntraVENous route every twenty-four (24) hours. Qty: 33 Dose, Refills: 0      piperacillin-tazobactam in 0.9% NS (ZOSYN) 3.375 G/100 ML soln IVPB 3.375 g by IntraVENous route every eight (8) hours for 33 days. Qty: 9000 mL, Refills: 1      sodium bicarbonate 650 mg tablet Take 1 Tab by mouth two (2) times a day for 30 days. Qty: 60 Tab, Refills: 0         CONTINUE these medications which have CHANGED    Details   diphenhydrAMINE (BENADRYL) 25 mg capsule Take 1-2 Caps by mouth every six (6) hours as needed for up to 10 days. Qty: 30 Cap, Refills: 0      venlafaxine (EFFEXOR) 75 mg tablet Take 1 Tab by mouth two (2) times daily (with meals). Qty: 60 Tab, Refills: 0      QUEtiapine (SEROQUEL) 100 mg tablet Take 1 Tab by mouth two (2) times a day for 30 days. Qty: 60 Tab, Refills: 0      famotidine (PEPCID) 20 mg tablet Take 1 Tab by mouth daily for 30 days. Qty: 30 Tab, Refills: 0      folic acid (FOLVITE) 1 mg tablet Take 1 Tab by mouth daily for 30 days. Qty: 30 Tab, Refills: 0      cloNIDine HCl (CATAPRES) 0.2 mg tablet Take 1 Tab by mouth three (3) times daily for 30 days. Qty: 90 Tab, Refills: 0      therapeutic multivitamin (THERAGRAN) tablet Take 1 Tab by mouth daily for 30 days. Qty: 30 Tab, Refills: 0      oxyCODONE-acetaminophen (PERCOCET) 5-325 mg per tablet Take 2 Tabs by mouth every four (4) hours as needed for Pain for up to 10 days. Max Daily Amount: 12 Tabs.   Qty: 10 Tab, Refills: 0    Associated Diagnoses: Other acute osteomyelitis of left foot ([facility] [address]         CONTINUE these medications which have NOT CHANGED Details   Cholecalciferol, Vitamin D3, 50,000 unit cap Take 1 Cap by mouth every seven (7) days. calcium carbonate (OS-BINA) 500 mg calcium (1,250 mg) tablet Take 1 Tab by mouth daily. albuterol (PROVENTIL VENTOLIN) 2.5 mg /3 mL (0.083 %) nebulizer solution 2.5 mg by Nebulization route every four (4) hours as needed. cyanocobalamin 1,000 mcg tablet Take 1,000 mcg by mouth daily. STOP taking these medications       insulin glargine (LANTUS U-100 INSULIN) 100 unit/mL injection Comments:   Reason for Stopping:         promethazine (PHENERGAN) 25 mg tablet Comments:   Reason for Stopping:         vitamin E (AQUA GEMS) 400 unit capsule Comments:   Reason for Stopping:         ferrous sulfate 325 mg (65 mg iron) tablet Comments:   Reason for Stopping:         Potassium Gluconate 595 mg (99 mg) tablet Comments:   Reason for Stopping:         senna-docusate (PERICOLACE) 8.6-50 mg per tablet Comments:   Reason for Stopping:         polyethylene glycol (MIRALAX) 17 gram packet Comments:   Reason for Stopping:         baclofen (LIORESAL) 10 mg tablet Comments:   Reason for Stopping:         insulin NPH (NOVOLIN N) 100 unit/mL injection Comments:   Reason for Stopping:         insulin lispro (HUMALOG) 100 unit/mL injection Comments:   Reason for Stopping:         DULoxetine (CYMBALTA) 60 mg capsule Comments:   Reason for Stopping:         HYDROmorphone (DILAUDID) 2 mg tablet Comments:   Reason for Stopping:               My Recommended Diet, Activity, Wound Care, and follow-up labs are listed in the patient's Discharge Insturctions which I have personally completed and reviewed.     ______________________________________________________________________    Risk of deterioration: Low    Condition at Discharge:  Stable  ______________________________________________________________________    Disposition  SNF/LTC  ______________________________________________________________________    Care Plan discussed with:   Patient, Family, RN, Care Manager, Consultant    ______________________________________________________________________    Code Status: Full Code  ______________________________________________________________________      Follow up with:   PCP : David Wallace MD  Follow-up Information     Follow up With Details Comments Contact Info    Teddy Zabala DPM In 3 days For wound re-check [address]  [phone] [id number] [phone]      Chavo Alvares DO In 3 weeks hospital follow up [facility]/12/2018 [facility]  [phone]      Naz Grigsg MD In 2 weeks hospital follow-up [address] [facility]      David Wallace MD In 2 days hospital follow-up [phone] [phone]      [facility]   [address]  [address] [place] [address]  [phone]              Total time in minutes spent coordinating this discharge (includes going over instructions, follow-up, prescriptions, and preparing report for sign off to her PCP) :  60 minutes    Signed:  Honey Hines MD

## 2018-03-27 NOTE — PROGRESS NOTES
Bill Gloria     NAME:Simona Melo [facility]  IIS:[id number]   :1980   -                    Labs reviewed  Cr. And k stable  Nothing much to add   we will sign off. Jeevan Patel [facility]  [facility]  [place] [facility], [address] [place], [address] [place]  Phone - [phone]         Fax - [phone] [facility] Office  [address] [place] [address]  Phone - [phone]        Fax - [phone]     [email or web address]

## 2018-03-27 NOTE — PROGRESS NOTES
Hospital to [facility] Handoff - Mary Hernandez                                                                        42 y.o.   female    [address] [facility]   Room: 2115/01    [facility] 2 GENERAL SURGERY  Unit Phone# :  [phone]      [address]  [facility] [address]  [address] [address]  Dept: [phone]  Loc: [phone]                    SITUATION     Admitted:  3/2/2018         Attending Provider:  Diana Delgado MD       Consultations:  IP CONSULT TO VASCULAR SURGERY  IP CONSULT TO INTERVENTIONAL RADIOLOGY  IP CONSULT TO ANESTHESIOLOGY  IP CONSULT TO INFECTIOUS DISEASES  IP CONSULT TO NEPHROLOGY    PCP:  Kathline Goltz, MD   [phone]    Treatment Team: Attending Provider: Diana Delgado MD; Consulting Provider: Luanne Blancas MD; Consulting Provider: Dagmar Beverly DO; Utilization Review: Duane Martes; Care Manager: Myla Crain; Consulting Provider: Jesse Milton MD; Consulting Provider: Lorrie Mcgill DPM; Consulting Provider: Deedee Crawford MD    Admitting Dx:  Osteomyelitis [facility])  diabetic foot debridement  OSTEOMYELITIS  infected left foot       Principal Problem: Osteomyelitis ([facility]    16 Days Post-Op of   Procedure(s):  INCISION AND DRAINAGE LEFT FOOT, REMOVAL NONVIABLE TISSUE AND BONE LEFT FOOT, DEEP TENDON TRANSFER TIBIALIS ANTERIOR LEFT FOOT, OPEN BIOPSIES LEFT FOOT, ADJACENT TRANSFER ARTERIOMYOFASCIOCUTANEOUS PLANTAR MEDIAL PEDICLE FLAP FOR DELAYED PRIMARY CLOSURE  PROXIMAL FOOT AMPUTATION LEFT FOOT   BY: Lorrie Mcgill DPM             ON: 3/11/2018                  Code Status: Full Code                Advance Directives:   Advance Care Planning 3/11/2018   Patient's Healthcare Decision Maker is: Legal Next of Susy 67   Primary Decision Maker Name David Xavier   Primary Decision Maker Phone Number [phone]   Primary Decision Maker Relationship to Patient Spouse   Confirm Advance Directive None   Patient Would Like to Complete Advance Directive -    (Send w/patient)   No Doesnt Have       Isolation:  There are currently no Active Isolations       MDRO: No current active infections    Pain Medications given:  YES    [facility]  [address] [facility]  Type of equipment: none         BACKGROUND     Allergies: Allergies   Allergen Reactions    Erythromycin Itching    Morphine Itching       Past Medical History:   Diagnosis Date    ARF (acute renal failure) ([facility] [address] requiring dialysis 2011    Asthma     CKD (chronic kidney disease)     Diabetes ([facility] [address]     Gastroparesis 2010    Gastric Pacer- REMOVED 07/2015    GERD (gastroesophageal reflux disease)     Hypertension     Narcotic dependence ([facility] [address]     THU (obstructive sleep apnea)     wears 2 LPM oxygen at night    Other ill-defined conditions(799.89)     Polycystic ovarian syndrome     Seizures (HCC)     Sickle cell trait ([facility] [address]     [facility] ([facility] to her left arm and was told she had one in left leg recently       Past Surgical History:   Procedure Laterality Date    HX CATARACT REMOVAL  3/5/12    right    HX DILATION AND CURETTAGE      ablation    HX GASTRIC BYPASS  2015    HX GI      j tube placement and removal    HX OTHER SURGICAL  2010    Gastric Pacer- REMOVED 07/2015    HX VASCULAR ACCESS      gray cath rt subclavian    HX VASCULAR ACCESS      HD access right thigh       Prescriptions Prior to Admission   Medication Sig    venlafaxine (EFFEXOR) 75 mg tablet Take 25 mg by mouth two (2) times a day. Indications: major depressive disorder    cloNIDine HCl (CATAPRES) 0.2 mg tablet Take 0.2 mg by mouth three (3) times daily. Indications: hypertension    insulin glargine (LANTUS U-100 INSULIN) 100 unit/mL injection 12 Units by SubCUTAneous route nightly.  Cholecalciferol, Vitamin D3, 50,000 unit cap Take 1 Cap by mouth every seven (7) days.  promethazine (PHENERGAN) 25 mg tablet Take 2 Tabs by mouth every six (6) hours as needed for Nausea. (Patient taking differently: Take 50 mg by mouth every eight (8) hours as needed for Nausea.)    diphenhydrAMINE (BENADRYL) 25 mg capsule Take 25-50 mg by mouth every six (6) hours as needed.  folic acid 549 mcg tablet Take 400 mcg by mouth daily.  calcium carbonate (OS-BINA) 500 mg calcium (1,250 mg) tablet Take 1 Tab by mouth daily.  vitamin E (AQUA GEMS) 400 unit capsule Take 800 Units by mouth daily.  ferrous sulfate 325 mg (65 mg iron) tablet Take 325 mg by mouth two (2) times a day.  Potassium Gluconate 595 mg (99 mg) tablet Take 595 mg by mouth daily.  senna-docusate (PERICOLACE) 8.6-50 mg per tablet Take 1 Tab by mouth two (2) times a day.  polyethylene glycol (MIRALAX) 17 gram packet Take 17 g by mouth three (3) times daily as needed.  therapeutic multivitamin (THERAGRAN) tablet Take 1 Tab by mouth daily.  QUEtiapine (SEROQUEL) 100 mg tablet Take 100 mg by mouth two (2) times a day.  famotidine (PEPCID) 20 mg tablet Take 20 mg by mouth two (2) times a day.  baclofen (LIORESAL) 10 mg tablet Take 20 mg by mouth three (3) times daily. (dose = 2 x 10 mg tablet)    albuterol (PROVENTIL VENTOLIN) 2.5 mg /3 mL (0.083 %) nebulizer solution 2.5 mg by Nebulization route every four (4) hours as needed.  insulin NPH (NOVOLIN N) 100 unit/mL injection 15 Units by SubCUTAneous route two (2) times a day.  insulin lispro (HUMALOG) 100 unit/mL injection 5 Units by SubCUTAneous route Before breakfast, lunch, and dinner.  cyanocobalamin 1,000 mcg tablet Take 1,000 mcg by mouth daily.  DULoxetine (CYMBALTA) 60 mg capsule Take 60 mg by mouth two (2) times a day.  HYDROmorphone (DILAUDID) 2 mg tablet Take 2 mg by mouth every four (4) hours as needed for Pain.        Hard scripts included in transfer packet yes    Vaccinations:    Immunization History   Administered Date(s) Administered    Influenza Vaccine 08/30/2015    Influenza Vaccine Split 10/17/2011    Influenza Vaccine Whole 09/01/2011, 09/01/2012    Pneumococcal Vaccine (Unspecified Type) 02/28/2016    ZZZ-RETIRED (DO NOT USE) Pneumococcal Vaccine (Unspecified Type) 12/09/2008       Readmission Risks:    Known Risks: fall        The Charlson CoMorbitiy Index tool is an evidenced based tool that has more automatic generated information. The tool looks at many different items such as the age of the patient, how many times they were admitted in the last calendar year, current length of stay in the hospital and their diagnosis. All of these items are pulled automatically from information documented in the chart from various places and will generate a score that predicts whether a patient is at low (less than 13), medium (13-20) or high (21 or greater) risk of being readmitted.         ASSESSMENT                Temp: 98.3 °F (36.8 °C) (03/27/18 0750) Pulse (Heart Rate): 84 (03/27/18 0750)     Resp Rate: 18 (03/27/18 0750)           BP: (!) 197/92 (03/27/18 0750)     O2 Sat (%): 100 % (03/27/18 0750)     Weight: 64.4 kg (142 lb)    Height: 5' 2\" (157.5 cm) (03/02/18 1504)       If above not within 1 hour of discharge:    BP:_____  P:____  R:____ T:_____ O2 Sat: ___%  O2: ______    Active Orders   Diet    DIET DIABETIC CONSISTENT CARB Regular; 1.5 GM NA         Orientation: oriented to time, place, person and situation     Active Behaviors: None                                   Active Lines/Drains:  (Peg Tube / [place] / CL or S/L?): no    Urinary Status: Voiding, Bathroom privileges     Last BM: Last Bowel Movement Date: 03/26/18     Skin Integrity: Incision (comment), Intact (dressing to left foot)   Wound Foot Left-DRESSING STATUS: Clean, dry, and intact  Wound Foot Left-DRESSING STATUS: Clean, dry, and intact  [REMOVED] Wound Foot Left-DRESSING STATUS: Clean, dry, and intact  [REMOVED] Wound Foot Left-DRESSING STATUS: Clean, dry, and intact    Wound Foot Left-DRESSING TYPE: Elastic bandage  Wound Foot Left-DRESSING TYPE: 4 x 4, ABD pad  [REMOVED] Wound Foot Left-DRESSING TYPE: Gauze wrap (howie), Other (Comment) (kerlix, abd, ace wrap)  [REMOVED] Wound Foot Left-DRESSING TYPE: 4 x 4, ABD pad (kerlix and ace wrap)    Mobility: Slightly limited   Weight Bearing Status: PWB (Partial Weight Bearing %)      Gait Training  Assistive Device: Gait belt, Walker, rolling  Ambulation - Level of Assistance: Stand-by assistance  Distance (ft): 60 Feet (ft)  Interventions: Safety awareness training, Verbal cues         Lab Results   Component Value Date/Time    Glucose 203 (H) 03/27/2018 03:17 AM    Hemoglobin A1c 8.8 (H) 03/03/2018 09:21 AM    INR 1.0 05/13/2016 04:12 PM    INR 1.1 09/19/2015 08:16 AM    HGB 7.2 (L) 03/26/2018 04:15 AM    HGB 7.6 (L) 03/23/2018 04:14 AM        RECOMMENDATION     See After Visit Summary (AVS) for:  · Discharge instructions  · After [address]   · Special equipment needed (entered pre-discharge by Care Management)  · Medication Reconciliation    · Follow up Appointment(s)         Report given/sent by:  Tobi Smith RN                    Verbal report given to: [address]      FAXED to:  YES         Estimated discharge time:  3/27/2018 at 1400

## 2018-04-12 ENCOUNTER — TELEPHONE (OUTPATIENT)
Dept: FAMILY MEDICINE CLINIC | Age: 38
End: 2018-04-12

## 2018-04-12 NOTE — TELEPHONE ENCOUNTER
Patient no show to clinic today    I had not received any weekly labs for review either    Called and spoke to nursing at [facility] ([address])     We requested labs and Cr is 3.30    I asked them to stop Vancomycin and Zosyn IV given her renal function. Originally plan to stop IV abx 4/22 but with cr > 3 will stop them now    Avoiding any abx that could worsen renal function now even though inflammatory markers till high    Zyvox would be good choice but she is on Effexor 75 mg bid and also has concerns for bone marrow suppression ( WBC low, HCT lower side)    Therefore will finish up with a week of Doxycycline 100 mg PO BID    DC PICC line    F/U rescheduled 4/20    I have discussed above wt Ms Fortunatoamiekerri Salinas at the facility and asked to speak with  their physician Dr Mercedes Bernard who was not available at this to talk to. She has taken my contact information and will have their physician give me a call.     Chuckie Griffin, DO  3:56 PM

## 2018-04-13 ENCOUNTER — HOSPITAL ENCOUNTER (EMERGENCY)
Age: 38
Discharge: HOME | End: 2018-04-14
Attending: EMERGENCY MEDICINE | Admitting: EMERGENCY MEDICINE
Payer: MEDICARE

## 2018-04-13 DIAGNOSIS — Z45.2 PIC LINE (PERIPHERALLY INSERTED CENTRAL CATHETER) REMOVAL: Primary | ICD-10-CM

## 2018-04-13 PROCEDURE — 99284 EMERGENCY DEPT VISIT MOD MDM: CPT

## 2018-04-13 RX ORDER — LIDOCAINE HYDROCHLORIDE AND EPINEPHRINE 10; 10 MG/ML; UG/ML
1.5 INJECTION, SOLUTION INFILTRATION; PERINEURAL
Status: DISCONTINUED | OUTPATIENT
Start: 2018-04-13 | End: 2018-04-14 | Stop reason: HOSPADM

## 2018-04-13 NOTE — ED NOTES
Bedside and Verbal shift change received from Kristina Santana RN. Report included the following information: SBAR, ED Summary, MAR and Recent Results.

## 2018-04-13 NOTE — ED PROVIDER NOTES
EMERGENCY DEPARTMENT HISTORY AND PHYSICAL EXAM      Date: 4/13/2018  Patient Name: Spencer Ormond    History of Presenting Illness     Chief Complaint   Patient presents with    Other     patient arrived from [address] [place] [address] for removal of PICC line to right chest wall. History Provided By: Patient    HPI: Spencer Ormond, 42 y.o. female with PMHx significant for DM and CKD, presents herself to the ED for removal of PICC line. Pt arrived from [facility] for removal of PICC line to her right chest wall that was placed 2-3 weeks ago. She notes that there at the center, they were unable to remove the PICC line thus her referral to the nearest ED. In regards to pt's left foot, pt states her foot has been healing well. Upon her last visit to an unspecified podiatrist on Monday (4/9/18), there were no significant results or concerns. Because there are no symptoms or complaints of pain, there is no reported quality, severity, modifying factors, or associated signs and symptoms reported. PCP: Kash Simon MD    There are no other complaints, changes, or physical findings at this time. Current Facility-Administered Medications   Medication Dose Route Frequency Provider Last Rate Last Dose    lidocaine-EPINEPHrine (XYLOCAINE) 1 %-1:100,000 injection 15 mg  1.5 mL IntraDERMal NOW Daniel Nye DO         Current Outpatient Prescriptions   Medication Sig Dispense Refill    amLODIPine (NORVASC) 10 mg tablet Take 1 Tab by mouth daily for 30 days. 30 Tab 0    vancomycin 750 mg 750 mg, ADDaptor 1 Device IVPB 750 mg by IntraVENous route every twenty-four (24) hours. 33 Dose 0    venlafaxine (EFFEXOR) 75 mg tablet Take 1 Tab by mouth two (2) times daily (with meals). 60 Tab 0    QUEtiapine (SEROQUEL) 100 mg tablet Take 1 Tab by mouth two (2) times a day for 30 days. 60 Tab 0    famotidine (PEPCID) 20 mg tablet Take 1 Tab by mouth daily for 30 days.  30 Tab 0    folic acid (FOLVITE) 1 mg tablet Take 1 Tab by mouth daily for 30 days. 30 Tab 0    cloNIDine HCl (CATAPRES) 0.2 mg tablet Take 1 Tab by mouth three (3) times daily for 30 days. 90 Tab 0    therapeutic multivitamin (THERAGRAN) tablet Take 1 Tab by mouth daily for 30 days. 30 Tab 0    piperacillin-tazobactam in 0.9% NS (ZOSYN) 3.375 G/100 ML soln IVPB 3.375 g by IntraVENous route every eight (8) hours for 33 days. 9000 mL 1    sodium bicarbonate 650 mg tablet Take 1 Tab by mouth two (2) times a day for 30 days. 60 Tab 0    Cholecalciferol, Vitamin D3, 50,000 unit cap Take 1 Cap by mouth every seven (7) days.  calcium carbonate (OS-BINA) 500 mg calcium (1,250 mg) tablet Take 1 Tab by mouth daily.  cyanocobalamin 1,000 mcg tablet Take 1,000 mcg by mouth daily.  albuterol (PROVENTIL VENTOLIN) 2.5 mg /3 mL (0.083 %) nebulizer solution 2.5 mg by Nebulization route every four (4) hours as needed.          Past History     Past Medical History:  Past Medical History:   Diagnosis Date    ARF (acute renal failure) ([address] requiring dialysis 2011    Asthma     CKD (chronic kidney disease)     Diabetes ([address]     Gastroparesis 2010    Gastric Pacer- REMOVED 07/2015    GERD (gastroesophageal reflux disease)     Hypertension     Narcotic dependence ([address]     THU (obstructive sleep apnea)     wears 2 LPM oxygen at night    Other ill-defined conditions(799.89)     Polycystic ovarian syndrome     Seizures (HCC)     Sickle cell trait ([address]     Thromboembolus ([address] to her left arm and was told she had one in left leg recently       Past Surgical History:  Past Surgical History:   Procedure Laterality Date    HX CATARACT REMOVAL  3/5/12    right    HX DILATION AND CURETTAGE      ablation    HX GASTRIC BYPASS  2015    HX GI      j tube placement and removal    HX OTHER SURGICAL  2010    Gastric Pacer- REMOVED 07/2015    HX VASCULAR ACCESS      gray cath rt subclavian    HX VASCULAR ACCESS      HD access right thigh       Family History:  Family History   Problem Relation Age of Onset    Cancer Mother      lung    Hypertension Mother     Cancer Father      kidney    Stroke Father      3 strokes: 59-72    Heart Disease Father 72     CABG    Hypertension Father     Cancer Sister      pancreatic    Cancer Maternal Aunt      breast    Cancer Paternal Aunt      breast    Schizophrenia Sister      was in [address], now ass't living    Other Sister       AIDS    Other Other      nephew of AIDS       Social History:  Social History   Substance Use Topics    Smoking status: Never Smoker    Smokeless tobacco: Never Used    Alcohol use No       Allergies: Allergies   Allergen Reactions    Erythromycin Itching    Morphine Itching       Review of Systems   Review of Systems   Constitutional: Negative for chills and fever. HENT: Negative for congestion and sore throat. Eyes: Negative for visual disturbance. Respiratory: Negative for cough and shortness of breath. Cardiovascular: Negative for chest pain and leg swelling. Gastrointestinal: Negative for abdominal pain, blood in stool, diarrhea and nausea. Endocrine: Negative for polyuria. Genitourinary: Negative for dysuria, flank pain, vaginal bleeding and vaginal discharge. Musculoskeletal: Negative for myalgias. Skin: Negative for rash. Allergic/Immunologic: Negative for immunocompromised state. Neurological: Negative for weakness and headaches. Psychiatric/Behavioral: Negative for confusion. Physical Exam   Physical Exam   Constitutional: She is oriented to person, place, and time. She appears well-developed and well-nourished. HENT:   Head: Normocephalic and atraumatic. Moist mucous membranes   Eyes: Conjunctivae are normal. Pupils are equal, round, and reactive to light. Right eye exhibits no discharge. Left eye exhibits no discharge. Neck: Normal range of motion. Neck supple. No tracheal deviation present. Cardiovascular: Normal rate, regular rhythm and normal heart sounds. No murmur heard. Pulmonary/Chest: Effort normal and breath sounds normal. No respiratory distress. She has no wheezes. She has no rales. picc line right chest wall. Abdominal: Soft. Bowel sounds are normal. There is no tenderness. There is no rebound and no guarding. Musculoskeletal:   Partial amputation of left food. Surgical incisions noted. No purulent discharge or overlying skin change noted. Good capillary refill. No evidence of acute infection seen. Neurological: She is alert and oriented to person, place, and time. Skin: Skin is warm and dry. No rash noted. No erythema. Psychiatric: Her behavior is normal.   Nursing note and vitals reviewed. Medical Decision Making   I am the first provider for this patient. I reviewed the vital signs, available nursing notes, past medical history, past surgical history, family history and social history. Vital Signs-Reviewed the patient's vital signs. Patient Vitals for the past 12 hrs:   Temp Pulse Resp BP SpO2   04/13/18 1815 98.2 °F (36.8 °C) 83 16 (!) 165/104 99 %     Records Reviewed: Nursing Notes and Old Medical Records    Provider Notes (Medical Decision Making):   Pt here for PICC line removal. No other acute process    ED Course:   Initial assessment performed. The patients presenting problems have been discussed, and they are in agreement with the care plan formulated and outlined with them. I have encouraged them to ask questions as they arise throughout their visit. PROGRESS NOTE:   7:00 PM   Upon calling [facility], I was informed that patient was sent to ED for PICC line removal as there was some skin attachment to the line. Procedure Note - PICC Line Removal:   7:36 PM  Performed by: Lilia Infante,   Skin prepped with EtOH. Sterile field established.  Anesthesia achieved with 4 mLs of Lidocaine 1% with epinephrine using a local infiltration. Skin near PICC to right chest wall was incised with # 11 blade, and 0mLs of drainage was expressed. PICC line was not successfully removed. Sterile dressing applied. Estimated blood loss: <5 mL  The procedure took 1-15 minutes, and pt tolerated well. Consult Note:  8:05 PM  Hernandez Stover DO spoke with Interventional Radiologist,  Discussed pt's hx, disposition, and available diagnostic and imaging results. Reviewed care plans. Consultant agrees with plans as outlined. Recommends to do a blunt dissection to pull the PICC line out. Procedure Note - PICC Line Removal:   8:48 PM  Performed by: Hernandez Stover DO  Skin prepped with EtOH. Sterile field established. Anesthesia achieved with 4 mLs of Lidocaine 1% with epinephrine using a local infiltration. Skin near PICC to right chest wall was incised with # 11 blade, and 0mLs of drainage was expressed. PICC line was successfully removed. Sterile dressing applied. Estimated blood loss: <5 mL  The procedure took 1-15 minutes, and pt tolerated well. Discharge Note:  8:55 PM  The pt is ready for discharge. The pt's signs, symptoms, diagnosis, and discharge instructions have been discussed and pt has conveyed their understanding. The pt is to follow up as recommended or return to ER should their symptoms worsen. Plan has been discussed and pt is in agreement. PLAN:  1. Current Discharge Medication List        2. Follow-up Information     Follow up With Details Comments MD Alia  As needed [address] [facility]  [phone]          Return to ED if worse     Diagnosis     Clinical Impression:   1. PIC line (peripherally inserted central catheter) removal        Attestations: This note is prepared by [employer], acting as Scribe for Hernandez Stover DO. Hernandez Stover DO: The scribe's documentation has been prepared under my direction and personally reviewed by me in its entirety.  I confirm that the note above accurately reflects all work, treatment, procedures, and medical decision making performed by me.

## 2018-04-13 NOTE — ED NOTES
Patient arrived from [address] [place] [address] for removal of PICC line to right upper chest. Patient states that she was there to receive IV antibiotics for amputation of toes to left foot. Cast shoe and dressing noted to right foot. Dressing noted to be dry and clean.  Patient denies any other symptoms at this time

## 2018-04-14 VITALS
HEART RATE: 90 BPM | SYSTOLIC BLOOD PRESSURE: 193 MMHG | BODY MASS INDEX: 26.13 KG/M2 | HEIGHT: 62 IN | TEMPERATURE: 98.5 F | OXYGEN SATURATION: 100 % | DIASTOLIC BLOOD PRESSURE: 110 MMHG | WEIGHT: 141.98 LBS | RESPIRATION RATE: 16 BRPM

## 2018-04-14 NOTE — ED NOTES
Bedside and Verbal shift change report given to Rosy Santo RN (oncoming nurse) by Naz Theodore RN (offgoing nurse). Report included the following information SBAR, Kardex, ED Summary, [facility] and Recent Results.

## 2018-04-14 NOTE — ED NOTES
Discharge instructions reviewed with patient. Discharge instructions given to patient per Dr. Julio Cesar Nevarez. Patient able to return/verbalize discharge instructions. Copy of discharge instructions provided. Patient condition stable, respiratory status within normal limits, neuro status intact. Escorted by stretcher out of ER, accompanied by [facility]. Patient states BP is elevated because she has not had blood pressure medications since yesterday morning.

## 2018-04-14 NOTE — ED NOTES
Bedside and Verbal shift change received from [facility]. Report included the following information: SBAR, ED Summary, MAR and Recent Results.

## 2018-04-14 NOTE — ED NOTES
Pt awaiting transport per Dr. Bessie Feliciano. RN spoke with. Pt understands RN to coordinate transport at this time. Vitals updated and pt resting and agrees with plan of care.

## 2018-04-14 NOTE — ED NOTES
[facility] contacted and confirmed for pt transport at this time. Pt aware of ETA of 90 mins -2 hours. Lights off for comfort. Call bell within reach.

## 2018-04-20 ENCOUNTER — OFFICE VISIT (OUTPATIENT)
Dept: FAMILY MEDICINE CLINIC | Age: 38
End: 2018-04-20

## 2018-04-20 VITALS
TEMPERATURE: 97.5 F | HEIGHT: 62 IN | RESPIRATION RATE: 16 BRPM | OXYGEN SATURATION: 92 % | DIASTOLIC BLOOD PRESSURE: 88 MMHG | HEART RATE: 86 BPM | SYSTOLIC BLOOD PRESSURE: 140 MMHG

## 2018-04-20 DIAGNOSIS — M86.8X7 OTHER OSTEOMYELITIS OF LEFT FOOT (HCC): Primary | ICD-10-CM

## 2018-04-22 NOTE — PROGRESS NOTES
Infectious Disease Clinic  Note     Ms Nivia Cardoso is still at rehab facility. Says she is doing well. Foot healed well. Denied any symptoms today except for GI upset with antibiotics, nausea . Had diarrhea before that resolved once off IV antibiotics per her.        Vitals:   Reviewed BP on higher side     Physical Exam:    ¨ GEN: NAD  ¨ HEENT:  PERRL, no scleral icterus,   no thrush  ¨ CV: S1, S2 heard regularly  ¨ Lungs: Clear to auscultation bilaterally  ¨ Abdomen: soft, non distended, non tender, No CVA tenderness   ¨ Extremities: no edema, + L foot previous site of incision healed well , no open wounds or discharge now   ¨ Neuro: Alert, oriented to self, place,  time,  and situation, moves all extremities to commands, verbal   ¨ Skin: no rash  ¨ Psych: good affect, good eye contact, non tearful   ¨ Lines: tunneled line been removed     Current antibiotics   Doxycycline PO bid 100 mg    Labs:none recently noted       Micro:         Wound   3/2/18  Component Value Ref Range & Units Status        Special Requests: NO SPECIAL REQUESTS    Final      GRAM STAIN OCCASIONAL WBCS SEEN    Final      GRAM STAIN      Final      1+ GRAM POSITIVE RODS (CORYNEFORM)      Culture result: HEAVY DIPHTHEROIDS (A)    Final      Culture result:      Final      LIGHT STAPHYLOCOCCUS SPECIES, COAGULASE NEGATIVE (A)        Result History             3/3/18  Component Value Ref Range & Units Status        Special Requests: NO SPECIAL REQUESTS    Final      GRAM STAIN NO WBC'S SEEN    Final      GRAM STAIN      Final      1+ GRAM POSITIVE COCCI IN PAIRS      Culture result:      Final      SCANT MIXED SKIN CHRIS ISOLATED        Result History                 Component Results       Component Value Ref Range & Units Status      Special Requests: NO SPECIAL REQUESTS    Final      Culture result: LIGHT   MIXED   ANAEROBIC GRAM NEGATIVE RODS   (   BETA LACTAMASE POSITIVE   ) AND   ANAEROBIC GRAM POSITIVE COCCI    (A)    Final      Result History              Component Results       Component Value Ref Range & Units Status      Special Requests: NO SPECIAL REQUESTS    Final      GRAM STAIN 1+ WBCS SEEN    Final      GRAM STAIN      Final      RARE GRAM POSITIVE COCCI IN PAIRS      Culture result: FEW MIXED SKIN CHRIS ISOLATED    Final        Result History       3/11/18   Labeled : Left foot deep wound           Special Requests: NO SPECIAL REQUESTS    Final        GRAM STAIN NO WBC'S SEEN    Final      GRAM STAIN NO ORGANISMS SEEN    Final      Culture result:      Final      NO GROWTH ON SOLID MEDIA AT 4 DAYS      Culture result:      Final      STAPHYLOCOCCUS EPIDERMIDIS ISOLATED FROM THIO BROTH ONLY (A)        Culture & Susceptibility                     Antibiotic    Organism Organism Organism          Staphylococcus epidermidis          AMPICILLIN ($)    <=2   ug/mL   R Final              AMPICILLIN/SULBACTAM ($)    <=8/4   ug/mL   S Final              CEFAZOLIN ($)    <=4   ug/mL   S Final              CIPROFLOXACIN ($)    2   ug/mL   I Final              CLINDAMYCIN ($)    <=0.5   ug/mL   S Final              ERYTHROMYCIN ($$$$)    >4   ug/mL   R Final              GENTAMICIN ($)    <=4   ug/mL   S Final              LEVOFLOXACIN ($)    2   ug/mL   S Final              LINEZOLID ($$$$$)    <=1   ug/mL   S Final              OXACILLIN    <=0.25   ug/mL   S Final              PENICILLIN G ($$)    8   ug/mL   R Final              RIFAMPIN ($$$$)    <=1   ug/mL   S Final              TETRACYCLINE    <=4   ug/mL   S Final              VANCOMYCIN ($)    2   ug/mL   S Final                                                         Special Requests: NO SPECIAL REQUESTS    Final        Culture result:      Final      NO GROWTH ON SOLID MEDIA AT 4 DAYS      Culture result:      Final      STAPHYLOCOCCUS EPIDERMIDIS (OXACILLIN RESISTANT) ISOLATED FROM THIO BROTH ONLY (A)        Culture & Susceptibility                     Antibiotic    Organism Organism Organism          Staphylococcus epidermidis          AMPICILLIN ($)    8   ug/mL   R Final              AMPICILLIN/SULBACTAM ($)    <=8/4   ug/mL   R Final              CEFAZOLIN ($)    <=4   ug/mL   R Final              CIPROFLOXACIN ($)    >2   ug/mL   R Final              CLINDAMYCIN ($)    >4   ug/mL   R Final              DAPTOMYCIN ($$$$$)    <=0.5   ug/mL   S Final              ERYTHROMYCIN ($$$$)    >4   ug/mL   R Final              GENTAMICIN ($)    <=4   ug/mL   S Final              LEVOFLOXACIN ($)    >4   ug/mL   R Final              LINEZOLID ($$$$$)    <=1   ug/mL   S Final              OXACILLIN    >2   ug/mL   R Final              PENICILLIN G ($$)    >8   ug/mL   R Final              RIFAMPIN ($$$$)    <=1   ug/mL   S Final              TETRACYCLINE    <=4   ug/mL   S Final              TRIMETH/SULFA    >2/38   ug/mL   R Final              VANCOMYCIN ($)    2   ug/mL   S Final                                                     Blood  3/2/18                 Component Results       Component Value Ref Range & Units Status      Special Requests: NO SPECIAL REQUESTS    Final      Culture result: NO GROWTH 5 DAYS    Final        Result History              Urine  3/2/18  Component Value Ref Range & Units Status        Special Requests: NO SPECIAL REQUESTS   Reflexed from [id number]      Final      [place] Count <1,000 CFU/ML    Final      Culture result: NO GROWTH 1 DAY    Final        Result History                   Imaging:   MRI L foot 3/2/18  FINDINGS: The evaluation for infection is partially limited secondary to lack of  IV contrast.      Bone marrow: There is extensive destruction in the midfoot with an osseous  fragment displaced superiorly measuring 1.3 cm. There is diffuse edema and  decreased T1 signal throughout the metatarsal bases, cuneiforms, cuboid, and  navicular.      Joint fluid:  None.      Tendons: Intact.      Muscles:  Within normal limits.      Neurovascular bundles: Within normal limits.      Articular cartilage: There is destruction in the midfoot.      Soft tissues: There is a fluid collection in the lateral aspect of the midfoot  measuring 2.8 x 2.0 x 4.4 cm. This extends to the skin surface. A marker was  placed at this site. The fluid collection extends into the midfoot. An  additional fluid collection measuring 1.1 cm is seen below the base of the  second metatarsal. A 2.3 cm fluid collection is seen in between the first and  second metatarsals.      There is extensive subcutaneous edema surrounding the foot and ankle.      IMPRESSION  IMPRESSION:   1. Large fluid collection in the subcutaneous of the lateral midfoot extending  to the joint spaces of the midfoot. 2. Extensive destruction in the midfoot with abnormal signal. Given the presence  of the fluid collection tracking to this, septic arthritis is likely present. There may be an element of Charcot arthropathy also present. 3. Small fluid collection underneath the proximal second metatarsal.  4. Small fluid collection in between the first and second metatarsal necks. 5. Extensive edema surrounding the foot and ankle.         L foot x ray 3/11/18  FINDINGS:  Three views of the left foot demonstrate interval amputation at the  level of the posterior midfoot. No other postoperative studies are available for  comparison. On current images, the margins of the navicular and cuboid are  poorly defined, suspicious for osteomyelitis.      IMPRESSION  IMPRESSION:  Prior amputations. Concern for osteomyelitis of the navicular and  Cuboid. .     L foot X ray 3/4/18  FINDINGS:  AP and lateral portable views of the left foot demonstrate decreased  soft tissue lateral to the base of the fifth metatarsal.  Increased soft tissue  gas appears to be packed with material's. Partial resection of the proximal  fifth metatarsal is new.  Widened distance between the second and third  metatarsals is unchanged.  Neuropathic midfoot is unchanged.      IMPRESSION  IMPRESSION:        Postsurgical lateral midfoot soft tissues and partial resection of the base of  the fifth metatarsal.  Unchanged neuropathic midfoot and TMT joints.     L foot X ray 3/2/18  FINDINGS:  Three views of the left foot demonstrate fragmentation of the distal  medial margin of the navicular with proximal migration of the medial cuneiform. There is dorsal dislocation of the middle cuneiform into the dorsal soft  tissues. There is foreshortening of the second metatarsal. There is lateral  translation of the base of the third fourth and fifth metatarsals with  associated bony destructive change. There is a chronic fracture through the base  of the fifth metatarsal with chronic periostitis and associated cortical bone  loss. This is adjacent to the area of soft tissue swelling and ulceration. There  is gas within the soft tissues dorsally. . Jess Mclean  IMPRESSION  IMPRESSION:    1. Findings compatible with Charcot changes of the midfoot with marked  fragmentation and disorganization. In addition there is a chronic appearing  fracture base of the fifth metatarsal with adjacent soft tissue ulcer. Loss of  cortical margin is concerning for superimposed osteomyelitis.     CXR 3/2/18  FINDINGS: Hypoventilatory exam. No lobar consolidation, pleural effusion, or  pneumothorax.  Normal cardiomediastinal silhouette.  No acute or aggressive  osseous lesion.      IMPRESSION  IMPRESSION: No evidence of an acute cardiopulmonary process.        Pathology Results:  3/11/18      FINAL PATHOLOGIC DIAGNOSIS   1. Left forefoot, forefoot amputation:   Acute osteomyelitis of metatarsal bone. Deformed foot, clinically Charcot deformity. 2. Proximal margin cuboid bone left foot, biopsy:   Exudate suggestive of wound base with fibrosis and bony remodeling.    Few acute inflammatory cells present at presumed margin, however, insufficient for a diagnosis of acute osteomyelitis margin.         Procedures: 3/3/18 LEFT FOOT INCISION AND DRAINAGE     3/7/18 LEFT FOOT DEBRIDEMENT     3/11/18   INCISION AND DRAINAGE LEFT FOOT, REMOVAL NONVIABLE TISSUE AND BONE LEFT FOOT  PROXIMAL FOOT AMPUTATION LEFT FOOT  DEEP TENDON TRANSFER TIBIALIS ANTERIOR LEFT FOOT  OPEN BONE BIOPSIES LEFT FOOT  ADJACENT TRANSFER ARTERIOMYOFASCIOCUTANEOUS PLANTAR MEDIAL PEDICLE FLAP FOR DELAYED PRIMARY CLOSURE.      3/6/18 Successful placement of right subclavian tunneled long-term central venous  Catheter.      Assessment / Plan:       Ms Domi Plaza is a 42 yr old lady with hx of Sickle Cell ( she says disease documented as trait), DM, gastroparesis S/P hx of pacer insertion and removal, Gastric bypass, seizures , HTN who noticed an ulcer in lateral foot about a month or two ago. She reports also having had a DVT in that LE about 1-2 years go and was on Lovenox + coumadin for treatment at that time. She has been in a intermediate for about a month per her. Soon after noticing ulcer, she reports that she wound had discharge, denied foul odor. Says her leg started swelling as well and was warm. Denied erythema. Denied any trauma to legs. Denied sensory issues to LE and says she can feel trauma to her leg if it happened. Denied any animal or insect bites. Denied antibiotic use PO prior to admission.       She was [facility] to [facility] 3/2/18 with L foot ulcer/drainage. X ray done showed \"Findings compatible with Charcot changes of the midfoot with marked  fragmentation and disorganization. In addition there is a chronic appearing fracture base of the fifth metatarsal with adjacent soft tissue ulcer. Loss of cortical margin is concerning for superimposed osteomyelitis\". She was started on Vancomycin and Zosyn per [facility] and P. Vascular surgery consulted on 3/2 and their initial impression indicates \" MRI/xray is suggestive of osteomyelitis of the mid foot which will make limb salvage challenging if that is the case\".   Taken to the OR 3/3 for I and D and findings indicate \" Advanced infection with purulent drainage going into the midfoot\" on brief operative report. Detailed operative report indicates, \" On the distal fifth metatarsal there was a large wound with purulent drainage.  This was excised and I immediately encountered a large amount of purulent drainage.  This drainage was sent for culture and swabs.  After I excised all the skin, soft tissue, and muscle, there was a necrotic fifth metatarsal and fourth metatarsal bone as well as the cuboid bone\".     On 3/7/18, noted another debridement done and operative notes indicate \"With manipulation, I could not express any further purulent drainage.  There was what appeared to be nonviable 4th and 5th metatarsals as well as possible mid foot bones.  Therefore, I used a rongeur to debride back these until there was healthy bone, which it did so on the 4th and 5th metatarsal rays.  I also believe I resected the base of the third metatarsal as well through my incision.  Part of the cuboid bone appeared to be so necrotic, so this was partly debrided, but was overall healthy\".       On 3/11/18, had I and D to L foot , removal of non viable tissue and bone L foot, proximal foot amputation, deep tendon transfer tibialis anterior left foot, adjacent transfer arteriotomyofasciocutaneous plantar medial pedicle flap for delayed primary closure. Cultures taken from deep tissue noted to be + for S hominus and also Coag Negative Staph (MRSE) in thio broth from 3/11/18.   Operative report indicates \"going from distal to proximal, a pocket of purulent drainage was encountered between the first and the second metatarsals within the first interspace.  This was noted within the soft tissues but was not identified within the bone.  The nonviable soft tissue was debulked, debrided and removed.  The purulent drainage was carefully milked from the soft tissues compressing the foot from proximal to distal.  Once the most significant of the purulent drainage was released, a deep wound culture to the area was then taken\". \" Moving proximally, the base of the remaining metatarsals as well as the cuneiforms showed extremely eroded bone quality.  The bone was soft, and there was significant destruction of the articular cartilage throughout\". \" At this time, the intermediate and lateral cuneiforms were also identified and resected and removed from the surgical field as again this bone showed significant bony erosion with broken cortices and nonviable bleeding.  It should, however, be noted that at the more proximal portion of the bone, there was no purulent drainage identified.  The only area of purulence that was noted was within the first interspace distally from the bases of the metatarsals, and this was only noted within the soft tissues. \"It was noted that resection of the distal aspect of the cuboid was necessary to allow for rotation of the plantar medial pedicle.  Utilizing a sagittal saw, the distal aspect of the cuboid, which did show articular cartilage and had more normal appearing anatomy with no noted articular destruction, was carefully resected.  This bone did appear to have Charcot type changes; however, again no further purulent drainage was noted proximally, and the surrounding soft tissue was clean and had normal appearing anatomy. \"          1) L foot diabetic foot wound with charcot changes  Concern for osteomyelitis     S/P I and D  3/3 and 3/7/18 , operative reports reviewed as above ( highlights noted ) . Cultures from 3/3 with mixed anaerobic gram negative rods, Beta lactamse + and anaerobic G+C  S/P and D to L foot , removal of non viable tissue and bone L foot, proximal foot amputation, deep tendon transfer tibialis anterior left foot, adjacent transfer arteriotomyofasciocutaneous plantar medial pedicle flap for delayed primary closure on 3/11/189. Cultures wt MRSE in thio, and S hominus.  Pathology margins \"Few acute inflammatory cells present at presumed margin, however, insufficient for a diagnosis of acute osteomyelitis margin\"     Previous antibiotics while hospitalized     Daptomycin since 3/5/18  Levofloxacin ?  3/16  Zosyn 3/2-3/15/18  Vancomycin 3/2-3/5     Discharged on Vancomycin and Zosyn that were stopped 4/12/18 and changed to Doxycycline due to renal function     Plan was till 4/22/18 for antibiotics but she has had some GI upset and can stop all antibiotics 4/20/18     F/U Podiatry and prn with me if needed in the future           2) Sickle cell disease :        3) Hx of gasteroparesis: S/P pacer insertion and removal   S/P bypass per patient      4) Hx of DVT LLE per patient      5) DM   Optimization helps in wound healing and infection control     6) Elevated BP follow wt primary care     7) Renal insuffiency : she says she has follow up with nephrologist coming up soon   Stressed the importance of renal follow up      6) Screening: Hep C ab NR          Her forms with recommendations for antibiotics to be stopped was sent with her today to her rehab facility

## 2018-04-27 ENCOUNTER — HOSPITAL ENCOUNTER (INPATIENT)
Age: 38
LOS: 17 days | Discharge: HOME HEALTH | DRG: 637 | End: 2018-05-14
Attending: EMERGENCY MEDICINE | Admitting: INTERNAL MEDICINE
Payer: MEDICARE

## 2018-04-27 ENCOUNTER — TELEPHONE (OUTPATIENT)
Dept: FAMILY MEDICINE CLINIC | Age: 38
End: 2018-04-27

## 2018-04-27 ENCOUNTER — APPOINTMENT (OUTPATIENT)
Dept: GENERAL RADIOLOGY | Age: 38
DRG: 637 | End: 2018-04-27
Attending: EMERGENCY MEDICINE
Payer: MEDICARE

## 2018-04-27 DIAGNOSIS — F41.8 ANXIETY ABOUT HEALTH: ICD-10-CM

## 2018-04-27 DIAGNOSIS — G89.4 CHRONIC PAIN SYNDROME: ICD-10-CM

## 2018-04-27 DIAGNOSIS — E87.5 ACUTE HYPERKALEMIA: ICD-10-CM

## 2018-04-27 DIAGNOSIS — R10.84 GENERALIZED ABDOMINAL PAIN: ICD-10-CM

## 2018-04-27 DIAGNOSIS — N28.9 ABNORMAL KIDNEY FUNCTION: ICD-10-CM

## 2018-04-27 DIAGNOSIS — M86.9 OSTEOMYELITIS OF LEFT FOOT, UNSPECIFIED TYPE (HCC): Primary | ICD-10-CM

## 2018-04-27 LAB
ALBUMIN SERPL-MCNC: 3.6 G/DL (ref 3.5–5)
ALBUMIN/GLOB SERPL: 0.6 {RATIO} (ref 1.1–2.2)
ALP SERPL-CCNC: 394 U/L (ref 45–117)
ALT SERPL-CCNC: 30 U/L (ref 12–78)
ANION GAP SERPL CALC-SCNC: 7 MMOL/L (ref 5–15)
AST SERPL-CCNC: 34 U/L (ref 15–37)
BASOPHILS # BLD: 0 K/UL (ref 0–0.1)
BASOPHILS NFR BLD: 1 % (ref 0–1)
BILIRUB SERPL-MCNC: 0.2 MG/DL (ref 0.2–1)
BUN SERPL-MCNC: 84 MG/DL (ref 6–20)
BUN/CREAT SERPL: 18 (ref 12–20)
CALCIUM SERPL-MCNC: 8.3 MG/DL (ref 8.5–10.1)
CHLORIDE SERPL-SCNC: 111 MMOL/L (ref 97–108)
CO2 SERPL-SCNC: 20 MMOL/L (ref 21–32)
CREAT SERPL-MCNC: 4.63 MG/DL (ref 0.55–1.02)
DIFFERENTIAL METHOD BLD: ABNORMAL
EOSINOPHIL # BLD: 0.5 K/UL (ref 0–0.4)
EOSINOPHIL NFR BLD: 11 % (ref 0–7)
ERYTHROCYTE [DISTWIDTH] IN BLOOD BY AUTOMATED COUNT: 18 % (ref 11.5–14.5)
GLOBULIN SER CALC-MCNC: 5.6 G/DL (ref 2–4)
GLUCOSE BLD STRIP.AUTO-MCNC: 103 MG/DL (ref 65–100)
GLUCOSE SERPL-MCNC: 153 MG/DL (ref 65–100)
HCT VFR BLD AUTO: 31.5 % (ref 35–47)
HGB BLD-MCNC: 9.9 G/DL (ref 11.5–16)
IMM GRANULOCYTES # BLD: 0 K/UL (ref 0–0.04)
IMM GRANULOCYTES NFR BLD AUTO: 0 % (ref 0–0.5)
LACTATE SERPL-SCNC: 1.1 MMOL/L (ref 0.4–2)
LYMPHOCYTES # BLD: 1.2 K/UL (ref 0.8–3.5)
LYMPHOCYTES NFR BLD: 23 % (ref 12–49)
MCH RBC QN AUTO: 25.1 PG (ref 26–34)
MCHC RBC AUTO-ENTMCNC: 31.4 G/DL (ref 30–36.5)
MCV RBC AUTO: 79.7 FL (ref 80–99)
MONOCYTES # BLD: 0.3 K/UL (ref 0–1)
MONOCYTES NFR BLD: 6 % (ref 5–13)
NEUTS SEG # BLD: 3.1 K/UL (ref 1.8–8)
NEUTS SEG NFR BLD: 60 % (ref 32–75)
NRBC # BLD: 0 K/UL (ref 0–0.01)
NRBC BLD-RTO: 0 PER 100 WBC
PLATELET # BLD AUTO: 192 K/UL (ref 150–400)
POTASSIUM SERPL-SCNC: 6.2 MMOL/L (ref 3.5–5.1)
PROT SERPL-MCNC: 9.2 G/DL (ref 6.4–8.2)
RBC # BLD AUTO: 3.95 M/UL (ref 3.8–5.2)
SERVICE CMNT-IMP: ABNORMAL
SODIUM SERPL-SCNC: 138 MMOL/L (ref 136–145)
WBC # BLD AUTO: 5.1 K/UL (ref 3.6–11)

## 2018-04-27 PROCEDURE — 36415 COLL VENOUS BLD VENIPUNCTURE: CPT | Performed by: EMERGENCY MEDICINE

## 2018-04-27 PROCEDURE — 80047 BASIC METABLC PNL IONIZED CA: CPT

## 2018-04-27 PROCEDURE — 99285 EMERGENCY DEPT VISIT HI MDM: CPT

## 2018-04-27 PROCEDURE — 82962 GLUCOSE BLOOD TEST: CPT

## 2018-04-27 PROCEDURE — 73620 X-RAY EXAM OF FOOT: CPT

## 2018-04-27 PROCEDURE — 96360 HYDRATION IV INFUSION INIT: CPT

## 2018-04-27 PROCEDURE — 80053 COMPREHEN METABOLIC PANEL: CPT | Performed by: EMERGENCY MEDICINE

## 2018-04-27 PROCEDURE — 87040 BLOOD CULTURE FOR BACTERIA: CPT | Performed by: EMERGENCY MEDICINE

## 2018-04-27 PROCEDURE — 74011250637 HC RX REV CODE- 250/637: Performed by: EMERGENCY MEDICINE

## 2018-04-27 PROCEDURE — 74011250636 HC RX REV CODE- 250/636: Performed by: EMERGENCY MEDICINE

## 2018-04-27 PROCEDURE — 83605 ASSAY OF LACTIC ACID: CPT | Performed by: EMERGENCY MEDICINE

## 2018-04-27 PROCEDURE — 85025 COMPLETE CBC W/AUTO DIFF WBC: CPT | Performed by: EMERGENCY MEDICINE

## 2018-04-27 PROCEDURE — 93005 ELECTROCARDIOGRAM TRACING: CPT

## 2018-04-27 PROCEDURE — 65270000029 HC RM PRIVATE

## 2018-04-27 PROCEDURE — 80069 RENAL FUNCTION PANEL: CPT | Performed by: INTERNAL MEDICINE

## 2018-04-27 RX ORDER — OXYCODONE AND ACETAMINOPHEN 5; 325 MG/1; MG/1
1 TABLET ORAL 2 TIMES DAILY
Status: ON HOLD | COMMUNITY
End: 2018-05-14

## 2018-04-27 RX ORDER — OXYCODONE AND ACETAMINOPHEN 5; 325 MG/1; MG/1
1 TABLET ORAL
Status: COMPLETED | OUTPATIENT
Start: 2018-04-27 | End: 2018-04-27

## 2018-04-27 RX ORDER — ONDANSETRON 2 MG/ML
4 INJECTION INTRAMUSCULAR; INTRAVENOUS
Status: DISCONTINUED | OUTPATIENT
Start: 2018-04-27 | End: 2018-04-29

## 2018-04-27 RX ORDER — BACLOFEN 10 MG/1
10 TABLET ORAL 3 TIMES DAILY
COMMUNITY
End: 2018-05-14

## 2018-04-27 RX ORDER — HYDROMORPHONE HYDROCHLORIDE 2 MG/ML
1 INJECTION, SOLUTION INTRAMUSCULAR; INTRAVENOUS; SUBCUTANEOUS
Status: DISCONTINUED | OUTPATIENT
Start: 2018-04-27 | End: 2018-04-29

## 2018-04-27 RX ORDER — BUMETANIDE 1 MG/1
1 TABLET ORAL
COMMUNITY
End: 2018-05-14

## 2018-04-27 RX ORDER — ACETAMINOPHEN 325 MG/1
650 TABLET ORAL
Status: DISCONTINUED | OUTPATIENT
Start: 2018-04-27 | End: 2018-05-15 | Stop reason: HOSPADM

## 2018-04-27 RX ORDER — PROMETHAZINE HYDROCHLORIDE 25 MG/1
25 TABLET ORAL
Status: ON HOLD | COMMUNITY
End: 2018-05-14

## 2018-04-27 RX ORDER — QUETIAPINE FUMARATE 100 MG/1
100 TABLET, FILM COATED ORAL 2 TIMES DAILY
Status: ON HOLD | COMMUNITY
End: 2018-07-02

## 2018-04-27 RX ORDER — HYDRALAZINE HYDROCHLORIDE 25 MG/1
25 TABLET, FILM COATED ORAL 3 TIMES DAILY
COMMUNITY
End: 2018-05-14

## 2018-04-27 RX ORDER — DIPHENHYDRAMINE HYDROCHLORIDE 50 MG/ML
12.5 INJECTION, SOLUTION INTRAMUSCULAR; INTRAVENOUS
Status: DISCONTINUED | OUTPATIENT
Start: 2018-04-27 | End: 2018-04-30

## 2018-04-27 RX ORDER — SODIUM CHLORIDE 9 MG/ML
100 INJECTION, SOLUTION INTRAVENOUS CONTINUOUS
Status: DISCONTINUED | OUTPATIENT
Start: 2018-04-27 | End: 2018-04-28

## 2018-04-27 RX ORDER — OXYCODONE AND ACETAMINOPHEN 5; 325 MG/1; MG/1
1 TABLET ORAL
Status: DISCONTINUED | OUTPATIENT
Start: 2018-04-27 | End: 2018-05-15 | Stop reason: HOSPADM

## 2018-04-27 RX ADMIN — SODIUM CHLORIDE 1000 ML: 900 INJECTION, SOLUTION INTRAVENOUS at 21:18

## 2018-04-27 RX ADMIN — OXYCODONE HYDROCHLORIDE AND ACETAMINOPHEN 1 TABLET: 5; 325 TABLET ORAL at 21:34

## 2018-04-27 NOTE — IP AVS SNAPSHOT
[address] [facility] [address] 
[phone] Patient: Zo Antonio MRN: [id number] HSI:[id number] About your hospitalization You were admitted on:  April 27, 2018 You last received care in the:  [id number] [place] [address] You were discharged on:  May 14, 2018 Why you were hospitalized Your primary diagnosis was:  Osteomyelitis Of Left Foot (Hcc) Follow-up Information Follow up With Details Comments Contact Info Franky Cole DPM Schedule an appointment as soon as possible for a visit in 1 week follow up at [facility] wound care center [address] [facility] [address] [facility] 
[phone] AT [place] [address] 
[phone] [facility] [place]   [address] [facility] [address] [facility] [address] [phone] Adolfo Salgado MD   [address] [facility] [address] 
[phone] Discharge Orders None A check dino indicates which time of day the medication should be taken. My Medications START taking these medications Instructions Each Dose to Equal  
 Morning Noon Evening Bedtime  
 cloNIDine HCl 0.2 mg tablet Commonly known as:  CATAPRES Your last dose was: Your next dose is: Take 1 Tab by mouth three (3) times daily for 30 days. 0.2 mg  
    
   
   
   
  
 L. acidoph & paracasei- S therm- Bifido 8 billion cell Cap cap Commonly known as:  CHRIS-Q/RISAQUAD Start taking on:  5/15/2018 Your last dose was: Your next dose is: Take 1 Cap by mouth daily for 30 days. 1 Cap  
    
   
   
   
  
 labetalol 100 mg tablet Commonly known as:  Pankaj Rocks Your last dose was: Your next dose is: Take 2 Tabs by mouth two (2) times a day for 30 days. 200 mg NIFEdipine ER 90 mg ER tablet Commonly known as:  PROCARDIA XL  
 Start taking on:  5/15/2018 Your last dose was: Your next dose is: Take 1 Tab by mouth daily for 30 days. 90 mg  
    
   
   
   
  
 pantoprazole 40 mg tablet Commonly known as:  PROTONIX Your last dose was: Your next dose is: Take 1 Tab by mouth Before breakfast and dinner for 30 days. 40 mg  
    
   
   
   
  
 senna-docusate 8.6-50 mg per tablet Commonly known as:  Apollo Zbigniew Start taking on:  5/15/2018 Your last dose was: Your next dose is: Take 2 Tabs by mouth daily. 2 Tab CHANGE how you take these medications Instructions Each Dose to Equal  
 Morning Noon Evening Bedtime  
 hydrALAZINE 100 mg tablet Commonly known as:  APRESOLINE What changed:   
- medication strength 
- how much to take Your last dose was: Your next dose is: Take 1 Tab by mouth three (3) times daily for 30 days. 100 mg  
    
   
   
   
  
 oxyCODONE-acetaminophen 5-325 mg per tablet Commonly known as:  PERCOCET What changed:   
- when to take this 
- reasons to take this Your last dose was: Your next dose is: Take 1 Tab by mouth every six (6) hours as needed for Pain. Max Daily Amount: 4 Tabs. Scheduled 1 Tab CONTINUE taking these medications Instructions Each Dose to Equal  
 Morning Noon Evening Bedtime  
 albuterol 2.5 mg /3 mL (0.083 %) nebulizer solution Commonly known as:  PROVENTIL VENTOLIN Your last dose was: Your next dose is:    
   
   
 2.5 mg by Nebulization route every four (4) hours as needed. 2.5 mg  
    
   
   
   
  
 calcium carbonate 500 mg calcium (1,250 mg) tablet Commonly known as:  OS-BINA Your last dose was: Your next dose is: Take 1 Tab by mouth daily. 1 Tab  
    
   
   
   
  
 cholecalciferol 50,000 unit capsule Commonly known as:  VITAMIN D3 Your last dose was: Your next dose is: Take 1 Cap by mouth every seven (7) days. 1 Cap  
    
   
   
   
  
 cyanocobalamin 1,000 mcg tablet Your last dose was: Your next dose is: Take 1,000 mcg by mouth daily. 1000 mcg  
    
   
   
   
  
 promethazine 25 mg tablet Commonly known as:  PHENERGAN Your last dose was: Your next dose is: Take 1 Tab by mouth every eight (8) hours as needed for Nausea. 25 mg SEROquel 100 mg tablet Generic drug:  QUEtiapine Your last dose was: Your next dose is: Take 100 mg by mouth two (2) times a day. 100 mg  
    
   
   
   
  
 venlafaxine 75 mg tablet Commonly known as:  [facility] Your last dose was: Your next dose is: Take 1 Tab by mouth two (2) times daily (with meals). 75 mg  
    
   
   
   
  
  
STOP taking these medications   
 amLODIPine 10 mg tablet Commonly known as:  NORVASC  
   
  
 baclofen 10 mg tablet Commonly known as:  LIORESAL  
   
  
 bumetanide 1 mg tablet Commonly known as:  Addie Perez Where to Get Your Medications Information on where to get these meds will be given to you by the nurse or doctor. ! Ask your nurse or doctor about these medications  
  cloNIDine HCl 0.2 mg tablet  
 hydrALAZINE 100 mg tablet L. acidoph & paracasei- S therm- Bifido 8 billion cell Cap cap  
 labetalol 100 mg tablet NIFEdipine ER 90 mg ER tablet  
 oxyCODONE-acetaminophen 5-325 mg per tablet  
 pantoprazole 40 mg tablet  
 promethazine 25 mg tablet  
 senna-docusate 8.6-50 mg per tablet Opioid Education  Prescription Opioids: What You Need to Know: 
 
Prescription opioids can be used to help relieve moderate-to-severe pain and are often prescribed following a surgery or injury, or for certain health conditions. These medications can be an important part of treatment but also come with serious risks. Opioids are strong pain medicines. Examples include hydrocodone, oxycodone, fentanyl, and morphine. Heroin is an example of an illegal opioid. It is important to work with your health care provider to make sure you are getting the safest, most effective care. WHAT ARE THE RISKS AND SIDE EFFECTS OF OPIOID USE? Prescription opioids carry serious risks of addiction and overdose, especially with prolonged use. An opioid overdose, often marked by slow breathing, can cause sudden death. The use of prescription opioids can have a number of side effects as well, even when taken as directed. · Tolerance-meaning you might need to take more of a medication for the same pain relief · Physical dependence-meaning you have symptoms of withdrawal when the medication is stopped. Withdrawal symptoms can include nausea, sweating, chills, diarrhea, stomach cramps, and muscle aches. Withdrawal can last up to several weeks, depending on which drug you took and how long you took it. · Increased sensitivity to pain · Constipation · Nausea, vomiting, and dry mouth · Sleepiness and dizziness · Confusion · Depression · Low levels of testosterone that can result in lower sex drive, energy, and strength · Itching and sweating RISKS ARE GREATER WITH:      
· History of drug misuse, substance use disorder, or overdose · Mental health conditions (such as depression or anxiety) · Sleep apnea · Older age (72 years or older) · Pregnancy Avoid alcohol while taking prescription opioids. Also, unless specifically advised by your health care provider, medications to avoid include: · Benzodiazepines (such as Xanax or Valium) · Muscle relaxants (such as Soma or Flexeril) · Hypnotics (such as Ambien or Lunesta) · Other prescription opioids KNOW YOUR OPTIONS Talk to your health care provider about ways to manage your pain that don't involve prescription opioids. Some of these options may actually work better and have fewer risks and side effects. Options may include: 
· Pain relievers such as acetaminophen, ibuprofen, and naproxen · Some medications that are also used for depression or seizures · Physical therapy and exercise · Counseling to help patients learn how to cope better with triggers of pain and stress. · Application of heat or cold compress · Massage therapy · Relaxation techniques Be Informed Make sure you know the name of your medication, how much and how often to take it, and its potential risks & side effects. IF YOU ARE PRESCRIBED OPIOIDS FOR PAIN: 
· Never take opioids in greater amounts or more often than prescribed. Remember the goal is not to be pain-free but to manage your pain at a tolerable level. · Follow up with your primary care provider to: · Work together to create a plan on how to manage your pain. · Talk about ways to help manage your pain that don't involve prescription opioids. · Talk about any and all concerns and side effects. · Help prevent misuse and abuse. · Never sell or share prescription opioids · Help prevent misuse and abuse. · Store prescription opioids in a secure place and out of reach of others (this may include visitors, children, friends, and family). · Safely dispose of unused/unwanted prescription opioids: Find your community drug take-back program or your pharmacy mail-back program, or flush them down the toilet, following guidance from the Food and Drug Administration (www.fda.gov/Drugs/ResourcesForYou). · Visit www.cdc.gov/drugoverdose to learn about the risks of opioid abuse and overdose. · If you believe you may be struggling with addiction, tell your health care provider and ask for guidance or call [facility] [employer] [place] at 5-465-764-QCGE. Discharge Instructions Please keep dressing to left foot clean dry and intact, elevate and offload, partial weightbearing heel only left foot Take medications as directed Tunneled or Non Tunneled Catheter Discharge Instructions Home Care Instructions: You can resume your regular diet. Do not drink alcohol, drive, or make any important legal decisions in the next 24 hours. Watch the site carefully for signs of infection, like fever, drainage, redness, and/or swelling. Your catheter should be \"packed\" with saline/heparin after each use or at least once a week if it is not being used. This \"packing\" should only be done by nurses experienced with caring for this type of catheter. You may shower in 48 hours, but you need to cover the catheter with plastic wrap and tape to keep it dry while you are showering. Keep the site clean, dry, and protected. Do not immerse yourself in water like in the case of tub baths or swimming as long as you have the catheter. Call If: 
 You should call your Physician and/or the Radiology Nurse if you have any signs of infection, shortness of breath, or if the dressing should come off or become moist.  You will be instructed on where to go for a new dressing. You should not have to change the dressings yourself, as that will be done by the nurses who access the catheter. Follow-Up Instructions:  Please see your ordering doctor as he/she has requested. Discharge Instructions PATIENT ID: Romain Simeon MRN: [id number] YOB: 1980 DATE OF ADMISSION: 4/27/2018  4:44 PM   
DATE OF DISCHARGE: 5/14/2018 PRIMARY CARE PROVIDER: Demetrice Lantigua MD  
 
ATTENDING PHYSICIAN: Nic Mckee MD 
DISCHARGING PROVIDER: Nic Mckee MD   
To contact this individual call [phone] and ask the  to page. If unavailable ask to be transferred the Adult Hospitalist Department. DISCHARGE DIAGNOSES Osteomyelitis of the left foot:home intravenous antibiotics as recommended by infectious disease doctor. Acute on chronic pancreatitis: Resolved Sickle cell crisis. Resolved Microcytic Anemia with acute drop,received blood in the hospital.Follow with primary doctor to monitor your hemoglobin. Type 2 diabetes type 2 : you said you take NPH 15 units twice daily. You have been noted to have low blood sugar in the hospital.You may start use once daily and check your blood sugar at least four times daily,before each meal and at bedtime. If your blood sugar is persistently above 140,you may go back to your usual dose. Please check with your primacy MD 
 Acute on chronic kidney disease stage V: you need close follow up with a kidney doctor. Do not take NSAIDS. Essential Hypertension:your blood pressure medications are adjusted while in the hospital and further adjustment may be needed by your doctors based to achieve target blood pressure ,less than 130/80 if possible CONSULTATIONS: IP CONSULT TO NEPHROLOGY 
IP CONSULT TO PODIATRY IP CONSULT TO PALLIATIVE CARE - PROVIDER 
IP CONSULT TO PALLIATIVE CARE - PROVIDER 
IP CONSULT TO INFECTIOUS DISEASES PROCEDURES/SURGERIES: Procedure(s) with comments: 
INFUSION CATHETER INSERTION - central line insertion PENDING TEST RESULTS:  
At the time of discharge the following test results are still pending: none FOLLOW UP APPOINTMENTS:  
Follow-up Information Follow up With Details Comments Contact Info Kalina Marcelo DPM Schedule an appointment as soon as possible for a visit in 1 week follow up at [facility] wound care center [address] [facility] [address] [facility] 
[phone] AT [facility] [address] 
[phone] [facility] [place]   [address] [facility] [address] [facility] [address] [phone] Vamsi Murcia MD   [address] [facility] [address] 
[phone] ADDITIONAL CARE RECOMMENDATIONS:  
 
DIET: Cardiac Diet and Diabetic Diet ACTIVITY: Activity as tolerated DISCHARGE MEDICATIONS: 
 See Medication Reconciliation Form · It is important that you take the medication exactly as they are prescribed. · Keep your medication in the bottles provided by the pharmacist and keep a list of the medication names, dosages, and times to be taken in your wallet. · Do not take other medications without consulting your doctor. NOTIFY YOUR PHYSICIAN FOR ANY OF THE FOLLOWING:  
Fever over 101 degrees for 24 hours. Chest pain, shortness of breath, fever, chills, nausea, vomiting, diarrhea, change in mentation, falling, weakness, bleeding. Severe pain or pain not relieved by medications. Or, any other signs or symptoms that you may have questions about. DISPOSITION: 
 x Home With home intravenous antibiotics. OT  PT  HH x RN  
  
 SNF/Inpatient Rehab/LTAC Independent/assisted living Hospice Other:  
 
 
 
Signed: Sujey Pearson MD 
5/14/2018 
6:13 PM 
 
 
 
 
 
  
  
  
Signal Patterns Announcement We are excited to announce that we are making your provider's discharge notes available to you in Signal Patterns. You will see these notes when they are completed and signed by the physician that discharged you from your recent hospital stay. If you have any questions or concerns about any information you see in Signal Patterns, please call the Health Information Department where you were seen or reach out to your Primary Care Provider for more information about your plan of care. Introducing [facility]! Nancy Lopez introduces Signal Patterns patient portal. Now you can access parts of your medical record, email your doctor's office, and request medication refills online. 1. In your internet browser, go to [email or web address] 2. Click on the First Time User? Click Here link in the Sign In box. You will see the New Member Sign Up page. 3. Enter your 58.com Access Code exactly as it appears below. You will not need to use this code after youve completed the sign-up process. If you do not sign up before the expiration date, you must request a new code. · 58.com Access Code: [email or web address] Expires: 6/3/2018  9:34 AM 
 
4. Enter the last four digits of your Social Security Number (xxxx) and Date of Birth (mm/dd/yyyy) as indicated and click Submit. You will be taken to the next sign-up page. 5. Create a 58.com ID. This will be your 58.com login ID and cannot be changed, so think of one that is secure and easy to remember. 6. Create a 58.com password. You can change your password at any time. 7. Enter your Password Reset Question and Answer. This can be used at a later time if you forget your password. 8. Enter your e-mail address. You will receive e-mail notification when new information is available in [address] 9. Click Sign Up. You can now view and download portions of your medical record. 10. Click the Download Summary menu link to download a portable copy of your medical information. If you have questions, please visit the Frequently Asked Questions section of the 58.com website. Remember, 58.com is NOT to be used for urgent needs. For medical emergencies, dial 911. Now available from your iPhone and Android! Introducing Kirk Gomez As a [facility] patient, I wanted to make you aware of our electronic visit tool called Kirk Gomez. [facility] 24/7 allows you to connect within minutes with a medical provider 24 hours a day, seven days a week via a mobile device or tablet or logging into a secure website from your computer. You can access Kirk Gomez from anywhere in the [place].  
 
A virtual visit might be right for you when you have a simple condition and feel like you just dont want to get out of bed, or cant get away from work for an appointment, when your regular [employer] provider is not available (evenings, weekends or holidays), or when youre out of town and need minor care. Electronic visits cost only $49 and if the [employer] 24/7 provider determines a prescription is needed to treat your condition, one can be electronically transmitted to a nearby pharmacy*. Please take a moment to enroll today if you have not already done so. The enrollment process is free and takes just a few minutes. To enroll, please download the [employer] 24/7 jose to your tablet or phone, or visit [email or web address] to enroll on your computer. And, as an [address] [place] [address] patient with a bead Button account, the results of your visits will be scanned into your electronic medical record and your primary care provider will be able to view the scanned results. We urge you to continue to see your regular [employer] provider for your ongoing medical care. And while your primary care provider may not be the one available when you seek a [employer] virtual visit, the peace of mind you get from getting a real diagnosis real time can be priceless. For more information on [employer], view our Frequently Asked Questions (FAQs) at [email or web address]. Sincerely, 
 
Mary Jane Garcia MD 
Chief Medical Officer Aurelia Penaloza *:  certain medications cannot be prescribed via PhaseRx Providers Seen During Your Hospitalization Provider Specialty Primary office phone Dejuan Cortes MD Emergency Medicine [phone] Shanell Jimenez MD Internal Medicine [phone] Chepe Kimball MD Internal Medicine [phone] Abe Pineda MD Internal Medicine [phone] Harry Steinberg MD Hospitalist [phone] Norma Worrell MD Internal Medicine [phone] Your Primary Care Physician (PCP) Primary Care Physician Office Phone Office Fax [address], [address] [facility] [phone] You are allergic to the following Allergen Reactions Erythromycin Itching Morphine Itching Toradol (Ketorolac) Rash Recent Documentation Height Weight BMI OB Status Smoking Status 1.575 m 54.7 kg 22.06 kg/m2 Polycystic Ovarian Syndrome Never Smoker Emergency Contacts Name Discharge Info Relation Home Work Mobile Stephen Garcia DISCHARGE CAREGIVER [3] Spouse [3] [phone] Patient Belongings The following personal items are in your possession at time of discharge: 
  Dental Appliances: None  Visual Aid: None      Home Medications: None   Jewelry: None  Clothing: Footwear, Socks, Pajamas, Pants, Jacket/Coat    Other Valuables: [employer], Eliza Martinez Please provide this summary of care documentation to your next provider. Signatures-by signing, you are acknowledging that this After Visit Summary has been reviewed with you and you have received a copy. Patient Signature:  ____________________________________________________________ Date:  ____________________________________________________________  
  
Aislinn Webster Provider Signature:  ____________________________________________________________ Date:  ____________________________________________________________

## 2018-04-27 NOTE — TELEPHONE ENCOUNTER
I called and spoke with Kinza Pastrana. She states that she went to the patients home on Wednesday for an initial visit. Incision was clean with some openings on Wednesday    Foot is swollen. Callous on side of foot is now wound and warm to touch. No fever. She states that the patient told her that she \"felt sick\" and was wrapped in a blanket. After speaking with Dr Jina Fournier, she was advised to have patient call podiatry and if they are not answering patient needs to go to hospital for blood cultures and imaging. She verbalized understanding. Podiatrist is in surgery and will give another hour and if there is no response, will sent to ED.

## 2018-04-27 NOTE — IP AVS SNAPSHOT
[address] 
[phone] Patient: Judith Adames MRN: [id number] A check dino indicates which time of day the medication should be taken. My Medications START taking these medications Instructions Each Dose to Equal  
 Morning Noon Evening Bedtime  
 cloNIDine HCl 0.2 mg tablet Commonly known as:  CATAPRES Your last dose was: Your next dose is: Take 1 Tab by mouth three (3) times daily for 30 days. 0.2 mg  
    
   
   
   
  
 L. acidoph & paracasei- S therm- Bifido 8 billion cell Cap cap Commonly known as:  CHRIS-Q/RISAQUAD Start taking on:  5/15/2018 Your last dose was: Your next dose is: Take 1 Cap by mouth daily for 30 days. 1 Cap  
    
   
   
   
  
 labetalol 100 mg tablet Commonly known as:  Hernesto Dubonnet Your last dose was: Your next dose is: Take 2 Tabs by mouth two (2) times a day for 30 days. 200 mg NIFEdipine ER 90 mg ER tablet Commonly known as:  PROCARDIA XL Start taking on:  5/15/2018 Your last dose was: Your next dose is: Take 1 Tab by mouth daily for 30 days. 90 mg  
    
   
   
   
  
 pantoprazole 40 mg tablet Commonly known as:  PROTONIX Your last dose was: Your next dose is: Take 1 Tab by mouth Before breakfast and dinner for 30 days. 40 mg  
    
   
   
   
  
 senna-docusate 8.6-50 mg per tablet Commonly known as:  Jyl Orf Start taking on:  5/15/2018 Your last dose was: Your next dose is: Take 2 Tabs by mouth daily. 2 Tab CHANGE how you take these medications Instructions Each Dose to Equal  
 Morning Noon Evening Bedtime  
 hydrALAZINE 100 mg tablet Commonly known as:  APRESOLINE What changed: - medication strength 
- how much to take Your last dose was: Your next dose is: Take 1 Tab by mouth three (3) times daily for 30 days. 100 mg  
    
   
   
   
  
 oxyCODONE-acetaminophen 5-325 mg per tablet Commonly known as:  PERCOCET What changed:   
- when to take this 
- reasons to take this Your last dose was: Your next dose is: Take 1 Tab by mouth every six (6) hours as needed for Pain. Max Daily Amount: 4 Tabs. Scheduled 1 Tab CONTINUE taking these medications Instructions Each Dose to Equal  
 Morning Noon Evening Bedtime  
 albuterol 2.5 mg /3 mL (0.083 %) nebulizer solution Commonly known as:  PROVENTIL VENTOLIN Your last dose was: Your next dose is:    
   
   
 2.5 mg by Nebulization route every four (4) hours as needed. 2.5 mg  
    
   
   
   
  
 calcium carbonate 500 mg calcium (1,250 mg) tablet Commonly known as:  OS-BINA Your last dose was: Your next dose is: Take 1 Tab by mouth daily. 1 Tab  
    
   
   
   
  
 cholecalciferol 50,000 unit capsule Commonly known as:  VITAMIN D3 Your last dose was: Your next dose is: Take 1 Cap by mouth every seven (7) days. 1 Cap  
    
   
   
   
  
 cyanocobalamin 1,000 mcg tablet Your last dose was: Your next dose is: Take 1,000 mcg by mouth daily. 1000 mcg  
    
   
   
   
  
 promethazine 25 mg tablet Commonly known as:  PHENERGAN Your last dose was: Your next dose is: Take 1 Tab by mouth every eight (8) hours as needed for Nausea. 25 mg SEROquel 100 mg tablet Generic drug:  QUEtiapine Your last dose was: Your next dose is: Take 100 mg by mouth two (2) times a day. 100 mg  
    
   
   
   
  
 venlafaxine 75 mg tablet Commonly known as:  [facility]  
   
 Your last dose was: Your next dose is: Take 1 Tab by mouth two (2) times daily (with meals). 75 mg  
    
   
   
   
  
  
STOP taking these medications   
 amLODIPine 10 mg tablet Commonly known as:  NORVASC  
   
  
 baclofen 10 mg tablet Commonly known as:  LIORESAL  
   
  
 bumetanide 1 mg tablet Commonly known as:  Winnie Peres Where to Get Your Medications Information on where to get these meds will be given to you by the nurse or doctor. ! Ask your nurse or doctor about these medications  
  cloNIDine HCl 0.2 mg tablet  
 hydrALAZINE 100 mg tablet L. acidoph & paracasei- S therm- Bifido 8 billion cell Cap cap  
 labetalol 100 mg tablet NIFEdipine ER 90 mg ER tablet  
 oxyCODONE-acetaminophen 5-325 mg per tablet  
 pantoprazole 40 mg tablet  
 promethazine 25 mg tablet  
 senna-docusate 8.6-50 mg per tablet

## 2018-04-27 NOTE — ED TRIAGE NOTES
Triage note : Pt brought in by EMS . Pt had amputation of all toes on left foot in March and now has increased pain and swelling in left lower leg . 7/10 pain .  Pt has upper  abdominal pain ; nausea /loose stools  x3 days

## 2018-04-27 NOTE — ED PROVIDER NOTES
HPI Comments: 42 y.o. female with past medical history significant for thromboembolus, asthma, DM, HTN, PCOS, sickle cell trait, seizures, GERD, gastroparesis with gastric pacer, narcotic dependence, THU, ARF, and CKD who presents via EMS with chief complaint of leg pain. Patient reports left leg pain and swelling with onset ~2-3 days ago. Patient reports abdominal pain, nausea, vomiting, fever, and chills with onset ~2-3 days ago; reports a history of similar abdominal pain with an \"inflamed pancreas\" in the past. Patient reports diarrhea with onset \"early this morning. \" Patient reports a history of DM and a diabetic foot infection. Patient reports a recent history of left toe amputations; reports the procedure was performed by Dr. Dulce Rios DPM. Patient reports having an abscess on her left foot; reports it is cared for by a home health nurse. Patient reports being told by the home health nurse that her left leg felt \"hot\" earlier today. Patient reports the home health nurse spoke with Dr. Dasie Paget, who referred the patient to the ED for further evaluation. Patient reports her last antibiotic use was ~1 week ago. Patient denies diaphoresis. There are no other acute medical concerns at this time. PCP: Brittany Loomis MD    Note written by Ivanna Tovar, as dictated by Abdulaziz Roe MD 5:02 PM     The history is provided by the patient.         Past Medical History:   Diagnosis Date    ARF (acute renal failure) ([address] requiring dialysis 2011    Asthma     CKD (chronic kidney disease)     Diabetes ([address]     Gastroparesis 2010    Gastric Pacer- REMOVED 07/2015    GERD (gastroesophageal reflux disease)     Hypertension     Narcotic dependence ([address]     THU (obstructive sleep apnea)     wears 2 LPM oxygen at night    Other ill-defined conditions(799.89)     Polycystic ovarian syndrome     Seizures (HCC)     Sickle cell trait ([address]     Thromboembolus ([address] to her left arm and was told she had one in left leg recently       Past Surgical History:   Procedure Laterality Date    HX CATARACT REMOVAL  3/5/12    right    HX DILATION AND CURETTAGE      ablation    HX GASTRIC BYPASS      HX GI      j tube placement and removal    HX OTHER SURGICAL      Gastric Pacer- REMOVED 2015    HX VASCULAR ACCESS      gray cath rt subclavian    HX VASCULAR ACCESS      HD access right thigh         Family History:   Problem Relation Age of Onset    Cancer Mother      lung    Hypertension Mother     Cancer Father      kidney    Stroke Father      3 strokes: 59-72    Heart Disease Father 72     CABG    Hypertension Father     Cancer Sister      pancreatic    Cancer Maternal Aunt      breast    Cancer Paternal Aunt      breast    Sch[facility]ia [facility]      [facility], now ass't living    Other Sister       AIDS    Other Other      nephew of AIDS       Social History     Social History    Marital status:      Spouse name: N/A    Number of children: N/A    Years of education: N/A     Occupational History    Not on file. Social History Main Topics    Smoking status: Never Smoker    Smokeless tobacco: Never Used    Alcohol use No    Drug use: No    Sexual activity: Yes     Partners: Male     Birth control/ protection: None     Other Topics Concern    Not on file     Social History Narrative    Lives with her  and father         ALLERGIES: Erythromycin; Morphine; and Toradol [ketorolac]    Review of Systems   Constitutional: Positive for chills and fever. Negative for activity change, appetite change and fatigue. HENT: Negative for ear pain, facial swelling, sore throat and trouble swallowing. Eyes: Negative for pain, discharge and visual disturbance. Respiratory: Negative for chest tightness, shortness of breath and wheezing. Cardiovascular: Positive for leg swelling. Negative for chest pain and palpitations.    Gastrointestinal: Positive for abdominal pain, diarrhea, nausea and vomiting. Negative for blood in stool. Genitourinary: Negative for difficulty urinating, flank pain and hematuria. Musculoskeletal: Positive for myalgias. Negative for arthralgias, joint swelling and neck pain. Skin: Positive for wound. Negative for color change and rash. Neurological: Negative for dizziness, weakness, numbness and headaches. Hematological: Negative for adenopathy. Does not bruise/bleed easily. Psychiatric/Behavioral: Negative for behavioral problems, confusion and sleep disturbance. All other systems reviewed and are negative. Vitals:    04/27/18 1658 04/27/18 1700 04/27/18 2115   BP: 134/88 134/88 132/80   Pulse: 74  75   Resp: 18  16   Temp: 98 °F (36.7 °C)     SpO2: 100% 100% 100%            Physical Exam   Constitutional: She is oriented to person, place, and time. She appears well-developed and well-nourished. No distress. HENT:   Head: Normocephalic and atraumatic. Nose: Nose normal.   Mouth/Throat: Oropharynx is clear and moist.   Eyes: Conjunctivae and EOM are normal. Pupils are equal, round, and reactive to light. No scleral icterus. Neck: Normal range of motion. Neck supple. No JVD present. No tracheal deviation present. No thyromegaly present. No carotid bruits noted. Cardiovascular: Normal rate, regular rhythm, normal heart sounds and intact distal pulses. Exam reveals no gallop and no friction rub. No murmur heard. Pulmonary/Chest: Effort normal and breath sounds normal. No respiratory distress. She has no wheezes. She has no rales. She exhibits no tenderness. Abdominal: Soft. Bowel sounds are normal. She exhibits no distension and no mass. There is no tenderness. There is no rebound and no guarding. Musculoskeletal: Normal range of motion. She exhibits edema. She exhibits no tenderness. Swelling and tenderness of the left leg from the knee down; no redness.    Lymphadenopathy:     She has no cervical adenopathy. Neurological: She is alert and oriented to person, place, and time. She has normal reflexes. No cranial nerve deficit. Coordination normal.   Neurologically intact. Skin: Skin is warm and dry. No rash noted. No erythema. Dressing over the left foot. Psychiatric: She has a normal mood and affect. Her behavior is normal. Judgment and thought content normal.   Nursing note and vitals reviewed. Note written by Ivanna Vinson, as dictated by Mickey Hastings MD 5:20 PM      MDM  Number of Diagnoses or Management Options  Abnormal kidney function: new and requires workup  Acute hyperkalemia: new and requires workup  Osteomyelitis of left foot, unspecified type [facility]): new and requires workup     Amount and/or Complexity of Data Reviewed  Clinical lab tests: ordered and reviewed  Tests in the radiology section of CPT®: ordered and reviewed  Decide to obtain previous medical records or to obtain history from someone other than the patient: yes  Review and summarize past medical records: yes  Discuss the patient with other providers: yes  Independent visualization of images, tracings, or specimens: yes    Risk of Complications, Morbidity, and/or Mortality  Presenting problems: high  Diagnostic procedures: high  Management options: high    Patient Progress  Patient progress: stable        ED Course       Procedures     PROGRESS NOTE:  5:47 PM  Called and left a message for the patient's podiatrist on-call. He is not in the [facility] as the patient stated. CONSULT NOTE:  6:32 PM Mickey Hastings MD spoke with Dr. Jennifer Bolivar DPM, Consult for Podiatry. Discussed available diagnostic tests and clinical findings. Provider is in agreement with care plans as outlined. Dr. Jennifer Bolivar DPM states Dr. Paola Zambrano DPM will see the patient. PROGRESS NOTE:  7:00 PM  Dr. Paola Zambrano DPM has evaluated the patient. He states he will re-dress the patient's foot.  He recommends discharging her home if her lab work and X-Rays are negative. PROGRESS NOTE:  7:46 PM  Ordered a POC CHEM8. PROGRESS NOTE:  8:43 PM  Consulted Nephrology. PROGRESS NOTE:  8:59 PM  Still waiting for POC CHEM8, which was ordered for K level. Also waiting on EKG. Will likely admit the patient but need K and EKG results. CONSULT NOTE:  9:04 PM Amita Wood MD spoke with Dr. Phillip rPice MD, Consult for Nephrology. Discussed available diagnostic tests and clinical findings. Provider is in agreement with care plans as outlined. Dr. Phillip Price MD recommends giving the patient a liter of fluids, rechecking her K, and discharging her home if her K, BUN, and Cr are decreasing. PROGRESS NOTE:  9:13 PM  POC CHEM8: K is 5.9. Will give a liter of fluids [facility]. Nephrologist is expecting these labs and will be on the look out for their results. ED EKG interpretation:  Rhythm: normal sinus rhythm; and regular . Rate (approx.): 68 bpm; Axis: left axis deviation; ST/T wave: peaking of the T-waves in the anterior precordial leads; no ectopy or acute ischemic changes noted. Note written by Ivanna Shin, as dictated by Amita Wood MD 9:47 PM    PROGRESS NOTE:  9:51 PM  Dr. Anna Hartman DPM has reviewed the patient's imaging and states she has new bone involvement; recommends admitting the patient to the hospital.     Consulting hospitalist.     PROGRESS NOTE:  10:51 PM  Re-paging the hospitalist.     PROGRESS NOTE:  11:03 PM  Re-paging the hospitalist.     CONSULT NOTE:  11:09 PM Amita Wood MD spoke with Dr. Florentin Joyce, Consult for Hospitalist. Discussed available diagnostic tests and clinical findings. Provider is in agreement with care plans as outlined. Dr. Florentin Joyce will see and admit the patient.

## 2018-04-27 NOTE — TELEPHONE ENCOUNTER
Pls call [facility] w/[facility]     States pt wound is not doing well   She has been calling pt doctor's all week and no one will return the call   The hospital told her they dont know the surgeon she is referring to - Dr. Coon Clock number to reach [facility] is [phone]

## 2018-04-27 NOTE — CONSULTS
[facility] of [address] [place] [address]. Karen [address]  P: [phone]  F: [phone]    Foot and Ankle surgery consult note     Assessment/Plan:  Charcot deformity left foot  Nonhealing surgical wound left foot  DM with neuropathy      Pt is status post Incision and drainage with removal of all nonviable soft tissue left foot  Proximal foot amputation left foot, Open bone biopsies left foot  Deep tendon transfer tibialis anterior tendon left foot  Adjacent transfer arteriomyofasciocutaneous plantar medial pedicle flap for delayed primary closure left foot DOS: 03-    She has been doing well but has not been able to get her custom AFO brace which was prescribed from our office when she was discharged after complete healing of the wound. She presented to the ER today due to some nausea and vomiting as well as noted drainage from the lateral surgical site left foot. The wound laterally from the surgical dehiscence appears superficial to the level of SubQ, no probe to bone, no real drainage, a small area of maceration is noted. The patients vitals are all stable currently, Pending results of the labs and xray we will likely send the patient home with oral Bactrim DS x 10 days, phenergan and some pain medication as she is having some discomfort to the foot at this time. We feel that here may be a small localized infection to the area. The patient remains at very high risk for limb loss.       The lateral edge of the wound which measures [id number]. 25cm has a small area of maceration so we will apply a betadine adaptic dressing today.  We discussed continued possible complications and alternative treatment options. We discussed the postoperative requirements, rehabilitation including the need for AFO brace for life and need for absolute nonweightbearing to the left lower extremity at this time.  All questions regarding treatment were answered to patient satisfaction     Labs/imaging will be reviewed and are both pending, If labs and xrays are normal, we will allow the patient to DC with meds discussed above, however if labs are abnormal we will recommend admit to medicine service for IV abx therapy and possible surgical debridement in the OR      Post operative care  Dressing to left foot to be left clean dry and intact. We will plan for weekly changes and if discharged the patient will folow up in the wound center.  The patient may complete passive ROM exercises to the left thigh and lower leg but should be nonweightbearing left foot. Pt has full range of motion to upper body and full weight-bearing to the right foot.        Elevate and offload B/L LE. Float heels off edge of bed with foam block or air boots.      Pt should plan to follow up 1 week or the next Wednesday  after discharge with me at the [id number] [place] [address] wound care center.      Foot and ankle will follow     Thank you for this consultation. Do not hesitate to contact us with any questions.     HISTORY OF THE PRESENT ILLNESS (HPI)  Ms Asia Shaw is a 42year old uncontrolled DM female who presents to the ER S/P flap surgery to the left foot with CC of not feeling well, with some nausea or vomiting previously and some noted drainage from her left foot. They have  DM with neuropathy and have chronic charcot deformity to the foot. Foot and ankle surgery team was cnsulted for evaluation of the nonhealing wound of the foot. They have not been able to get the AFO brace prescribed for them and so have been walking on the left foot without it. She states she does have some pain associated with walking. They do not know how long they had the wound. They currently deny any F/C/SOB/CP.     REVIEW OF SYSTEMS  14 point review of systems completed all those being negative except the ones entered in the HPI    History:   Allergies   Allergen Reactions    Erythromycin Itching    Morphine Itching    Toradol [Ketorolac] Rash     Family History   Problem Relation Age of Onset    Cancer Mother      lung    Hypertension Mother     Cancer Father      kidney    Stroke Father      3 strokes: 59-72    Heart Disease Father 72     CABG    Hypertension Father     Cancer Sister      pancreatic    Cancer Maternal Aunt      breast    Cancer Paternal Aunt      breast    Schizophrenia Sister      was in [address], now ass't living    Other Sister       AIDS    Other Other      nephew of AIDS      Past Surgical History:   Procedure Laterality Date    HX CATARACT REMOVAL  3/5/12    right    HX DILATION AND CURETTAGE      ablation    HX GASTRIC BYPASS      HX GI      j tube placement and removal    HX OTHER SURGICAL  2010    Gastric Pacer- REMOVED 2015    HX VASCULAR ACCESS      gray cath rt subclavian    HX VASCULAR ACCESS      HD access right thigh     Social History   Substance Use Topics    Smoking status: Never Smoker    Smokeless tobacco: Never Used    Alcohol use No       History   Alcohol Use No     History   Drug Use No     History   Smoking Status    Never Smoker   Smokeless Tobacco    Never Used     No current facility-administered medications for this encounter. Current Outpatient Prescriptions   Medication Sig    amLODIPine (NORVASC) 10 mg tablet Take 1 Tab by mouth daily for 30 days.  vancomycin 750 mg 750 mg, ADDaptor 1 Device IVPB 750 mg by IntraVENous route every twenty-four (24) hours.  venlafaxine (EFFEXOR) 75 mg tablet Take 1 Tab by mouth two (2) times daily (with meals).  Cholecalciferol, Vitamin D3, 50,000 unit cap Take 1 Cap by mouth every seven (7) days.  calcium carbonate (OS-BINA) 500 mg calcium (1,250 mg) tablet Take 1 Tab by mouth daily.  cyanocobalamin 1,000 mcg tablet Take 1,000 mcg by mouth daily.  albuterol (PROVENTIL VENTOLIN) 2.5 mg /3 mL (0.083 %) nebulizer solution 2.5 mg by Nebulization route every four (4) hours as needed.         Objective:  Vitals: Patient Vitals for the past 12 hrs:   BP Temp Pulse Resp SpO2   04/27/18 1700 134/88 - - - 100 %   04/27/18 1658 134/88 98 °F (36.7 °C) 74 18 100 %       Dermatological:  Flap to the left foot looks relatively good, as well as incision proximally for tendon transfer. Skin edges remain well coapted to the majority of the flap site. Good cap fill time noted. Minimal serrous drainage, no foul odor, dehisense, small area of lateral maceration measures 2x2x0.25cm to level of SubQ. Minimal periwound blanchable erythema and edema and normal foot temperature appreciated. suspicion for possible localized infection     Vascular:  DP/PT +2/4 B/l lower extremity. Cap fill time less than 5 seconds Right lower extremity      Neurological:   Epicritic and protective threshold decreased B/L lower extremity      MSK:  Noted proximal foot amputation left. AROM to B/L ankles, and all remaining digits right foot.  Muscle strength 5/5 to all muscle groups tested       Constitutional: Pt is a, average,  middle aged, female    Imaging:   X rays 3 views left foot pending    Labs:  Recent Labs      04/27/18   1711   HGB  9.9*   HCT  31.5*       Hans Linder  4/27/2018  [facility] of [address] [place] [address]. Karen [address] [place] [address]  P: [phone]  F: [phone]

## 2018-04-28 ENCOUNTER — APPOINTMENT (OUTPATIENT)
Dept: GENERAL RADIOLOGY | Age: 38
DRG: 637 | End: 2018-04-28
Attending: INTERNAL MEDICINE
Payer: MEDICARE

## 2018-04-28 LAB
ALBUMIN SERPL-MCNC: 3.6 G/DL (ref 3.5–5)
ALBUMIN SERPL-MCNC: 3.7 G/DL (ref 3.5–5)
ANION GAP SERPL CALC-SCNC: 8 MMOL/L (ref 5–15)
ANION GAP SERPL CALC-SCNC: 9 MMOL/L (ref 5–15)
ATRIAL RATE: 68 BPM
BUN SERPL-MCNC: 74 MG/DL (ref 6–20)
BUN SERPL-MCNC: 82 MG/DL (ref 6–20)
BUN/CREAT SERPL: 17 (ref 12–20)
BUN/CREAT SERPL: 18 (ref 12–20)
CALCIUM SERPL-MCNC: 8.3 MG/DL (ref 8.5–10.1)
CALCIUM SERPL-MCNC: 8.5 MG/DL (ref 8.5–10.1)
CALCULATED P AXIS, ECG09: 38 DEGREES
CALCULATED R AXIS, ECG10: 13 DEGREES
CALCULATED T AXIS, ECG11: 44 DEGREES
CHLORIDE SERPL-SCNC: 111 MMOL/L (ref 97–108)
CHLORIDE SERPL-SCNC: 113 MMOL/L (ref 97–108)
CO2 SERPL-SCNC: 15 MMOL/L (ref 21–32)
CO2 SERPL-SCNC: 18 MMOL/L (ref 21–32)
CREAT SERPL-MCNC: 4.06 MG/DL (ref 0.55–1.02)
CREAT SERPL-MCNC: 4.72 MG/DL (ref 0.55–1.02)
DIAGNOSIS, 93000: NORMAL
GLUCOSE BLD STRIP.AUTO-MCNC: 222 MG/DL (ref 65–100)
GLUCOSE BLD STRIP.AUTO-MCNC: 240 MG/DL (ref 65–100)
GLUCOSE BLD STRIP.AUTO-MCNC: 36 MG/DL (ref 65–100)
GLUCOSE BLD STRIP.AUTO-MCNC: 377 MG/DL (ref 65–100)
GLUCOSE BLD STRIP.AUTO-MCNC: 77 MG/DL (ref 65–100)
GLUCOSE SERPL-MCNC: 105 MG/DL (ref 65–100)
GLUCOSE SERPL-MCNC: 147 MG/DL (ref 65–100)
P-R INTERVAL, ECG05: 142 MS
PHOSPHATE SERPL-MCNC: 4.6 MG/DL (ref 2.6–4.7)
PHOSPHATE SERPL-MCNC: 5.4 MG/DL (ref 2.6–4.7)
POTASSIUM SERPL-SCNC: 6.2 MMOL/L (ref 3.5–5.1)
POTASSIUM SERPL-SCNC: 6.3 MMOL/L (ref 3.5–5.1)
Q-T INTERVAL, ECG07: 398 MS
QRS DURATION, ECG06: 80 MS
QTC CALCULATION (BEZET), ECG08: 423 MS
SERVICE CMNT-IMP: ABNORMAL
SERVICE CMNT-IMP: NORMAL
SODIUM SERPL-SCNC: 136 MMOL/L (ref 136–145)
SODIUM SERPL-SCNC: 138 MMOL/L (ref 136–145)
VENTRICULAR RATE, ECG03: 68 BPM

## 2018-04-28 PROCEDURE — 74011250637 HC RX REV CODE- 250/637: Performed by: INTERNAL MEDICINE

## 2018-04-28 PROCEDURE — 74011000250 HC RX REV CODE- 250: Performed by: INTERNAL MEDICINE

## 2018-04-28 PROCEDURE — 74011636637 HC RX REV CODE- 636/637: Performed by: INTERNAL MEDICINE

## 2018-04-28 PROCEDURE — 82962 GLUCOSE BLOOD TEST: CPT

## 2018-04-28 PROCEDURE — 36415 COLL VENOUS BLD VENIPUNCTURE: CPT | Performed by: INTERNAL MEDICINE

## 2018-04-28 PROCEDURE — 74018 RADEX ABDOMEN 1 VIEW: CPT

## 2018-04-28 PROCEDURE — 74011250636 HC RX REV CODE- 250/636: Performed by: INTERNAL MEDICINE

## 2018-04-28 PROCEDURE — 74011000258 HC RX REV CODE- 258: Performed by: INTERNAL MEDICINE

## 2018-04-28 PROCEDURE — 65270000029 HC RM PRIVATE

## 2018-04-28 PROCEDURE — 80069 RENAL FUNCTION PANEL: CPT | Performed by: INTERNAL MEDICINE

## 2018-04-28 RX ORDER — BACLOFEN 10 MG/1
10 TABLET ORAL 3 TIMES DAILY
Status: DISCONTINUED | OUTPATIENT
Start: 2018-04-28 | End: 2018-04-28

## 2018-04-28 RX ORDER — INSULIN LISPRO 100 [IU]/ML
INJECTION, SOLUTION INTRAVENOUS; SUBCUTANEOUS
Status: DISCONTINUED | OUTPATIENT
Start: 2018-04-28 | End: 2018-05-15 | Stop reason: HOSPADM

## 2018-04-28 RX ORDER — LIDOCAINE HYDROCHLORIDE 10 MG/ML
INJECTION, SOLUTION EPIDURAL; INFILTRATION; INTRACAUDAL; PERINEURAL
Status: DISPENSED
Start: 2018-04-28 | End: 2018-04-29

## 2018-04-28 RX ORDER — QUETIAPINE FUMARATE 100 MG/1
100 TABLET, FILM COATED ORAL 2 TIMES DAILY
Status: DISCONTINUED | OUTPATIENT
Start: 2018-04-28 | End: 2018-05-15 | Stop reason: HOSPADM

## 2018-04-28 RX ORDER — MAGNESIUM SULFATE 100 %
4 CRYSTALS MISCELLANEOUS AS NEEDED
Status: DISCONTINUED | OUTPATIENT
Start: 2018-04-28 | End: 2018-05-15 | Stop reason: HOSPADM

## 2018-04-28 RX ORDER — HEPARIN SODIUM 5000 [USP'U]/ML
5000 INJECTION, SOLUTION INTRAVENOUS; SUBCUTANEOUS EVERY 8 HOURS
Status: DISCONTINUED | OUTPATIENT
Start: 2018-04-28 | End: 2018-05-15 | Stop reason: HOSPADM

## 2018-04-28 RX ORDER — SORBITOL SOLUTION 70 %
15 SOLUTION, ORAL MISCELLANEOUS
Status: COMPLETED | OUTPATIENT
Start: 2018-04-28 | End: 2018-04-28

## 2018-04-28 RX ORDER — INSULIN GLARGINE 100 [IU]/ML
5 INJECTION, SOLUTION SUBCUTANEOUS
Status: DISCONTINUED | OUTPATIENT
Start: 2018-04-28 | End: 2018-04-30

## 2018-04-28 RX ORDER — SODIUM CHLORIDE 0.9 % (FLUSH) 0.9 %
5-10 SYRINGE (ML) INJECTION AS NEEDED
Status: DISCONTINUED | OUTPATIENT
Start: 2018-04-28 | End: 2018-05-15 | Stop reason: HOSPADM

## 2018-04-28 RX ORDER — HYDRALAZINE HYDROCHLORIDE 25 MG/1
25 TABLET, FILM COATED ORAL 3 TIMES DAILY
Status: DISCONTINUED | OUTPATIENT
Start: 2018-04-28 | End: 2018-04-30

## 2018-04-28 RX ORDER — IPRATROPIUM BROMIDE AND ALBUTEROL SULFATE 2.5; .5 MG/3ML; MG/3ML
3 SOLUTION RESPIRATORY (INHALATION)
Status: DISCONTINUED | OUTPATIENT
Start: 2018-04-28 | End: 2018-05-15 | Stop reason: HOSPADM

## 2018-04-28 RX ORDER — VENLAFAXINE 37.5 MG/1
75 TABLET ORAL 2 TIMES DAILY WITH MEALS
Status: DISCONTINUED | OUTPATIENT
Start: 2018-04-28 | End: 2018-05-15 | Stop reason: HOSPADM

## 2018-04-28 RX ORDER — SODIUM POLYSTYRENE SULFONATE 15 G/60ML
30 SUSPENSION ORAL; RECTAL
Status: DISPENSED | OUTPATIENT
Start: 2018-04-28 | End: 2018-04-28

## 2018-04-28 RX ORDER — AMLODIPINE BESYLATE 5 MG/1
10 TABLET ORAL DAILY
Status: DISCONTINUED | OUTPATIENT
Start: 2018-04-28 | End: 2018-04-29

## 2018-04-28 RX ORDER — CALCIUM CARBONATE 500(1250)
500 TABLET ORAL DAILY
Status: DISCONTINUED | OUTPATIENT
Start: 2018-04-28 | End: 2018-05-15 | Stop reason: HOSPADM

## 2018-04-28 RX ORDER — DEXTROSE 50 % IN WATER (D50W) INTRAVENOUS SYRINGE
12.5-25 AS NEEDED
Status: DISCONTINUED | OUTPATIENT
Start: 2018-04-28 | End: 2018-05-15 | Stop reason: HOSPADM

## 2018-04-28 RX ORDER — SODIUM CHLORIDE 0.9 % (FLUSH) 0.9 %
5-10 SYRINGE (ML) INJECTION EVERY 8 HOURS
Status: DISCONTINUED | OUTPATIENT
Start: 2018-04-28 | End: 2018-05-15 | Stop reason: HOSPADM

## 2018-04-28 RX ORDER — INSULIN LISPRO 100 [IU]/ML
6 INJECTION, SOLUTION INTRAVENOUS; SUBCUTANEOUS ONCE
Status: COMPLETED | OUTPATIENT
Start: 2018-04-28 | End: 2018-04-28

## 2018-04-28 RX ADMIN — Medication 10 ML: at 15:00

## 2018-04-28 RX ADMIN — ONDANSETRON 4 MG: 2 INJECTION INTRAMUSCULAR; INTRAVENOUS at 00:56

## 2018-04-28 RX ADMIN — Medication 10 ML: at 22:24

## 2018-04-28 RX ADMIN — DIPHENHYDRAMINE HYDROCHLORIDE 12.5 MG: 50 INJECTION, SOLUTION INTRAMUSCULAR; INTRAVENOUS at 05:29

## 2018-04-28 RX ADMIN — DIPHENHYDRAMINE HYDROCHLORIDE 12.5 MG: 50 INJECTION, SOLUTION INTRAMUSCULAR; INTRAVENOUS at 00:57

## 2018-04-28 RX ADMIN — INSULIN LISPRO 2 UNITS: 100 INJECTION, SOLUTION INTRAVENOUS; SUBCUTANEOUS at 22:23

## 2018-04-28 RX ADMIN — INSULIN LISPRO 6 UNITS: 100 INJECTION, SOLUTION INTRAVENOUS; SUBCUTANEOUS at 04:37

## 2018-04-28 RX ADMIN — DIPHENHYDRAMINE HYDROCHLORIDE 12.5 MG: 50 INJECTION, SOLUTION INTRAMUSCULAR; INTRAVENOUS at 16:15

## 2018-04-28 RX ADMIN — OXYCODONE AND ACETAMINOPHEN 1 TABLET: 5; 325 TABLET ORAL at 10:51

## 2018-04-28 RX ADMIN — DOXYCYCLINE 100 MG: 100 INJECTION, POWDER, LYOPHILIZED, FOR SOLUTION INTRAVENOUS at 22:22

## 2018-04-28 RX ADMIN — ONDANSETRON 4 MG: 2 INJECTION INTRAMUSCULAR; INTRAVENOUS at 05:29

## 2018-04-28 RX ADMIN — PIPERACILLIN SODIUM,TAZOBACTAM SODIUM 3.38 G: 3; .375 INJECTION, POWDER, FOR SOLUTION INTRAVENOUS at 06:25

## 2018-04-28 RX ADMIN — DEXTROSE MONOHYDRATE 25 G: 25 INJECTION, SOLUTION INTRAVENOUS at 07:20

## 2018-04-28 RX ADMIN — HYDRALAZINE HYDROCHLORIDE 25 MG: 25 TABLET, FILM COATED ORAL at 16:15

## 2018-04-28 RX ADMIN — HEPARIN SODIUM 5000 UNITS: 5000 INJECTION, SOLUTION INTRAVENOUS; SUBCUTANEOUS at 06:23

## 2018-04-28 RX ADMIN — HYDRALAZINE HYDROCHLORIDE 25 MG: 25 TABLET, FILM COATED ORAL at 22:22

## 2018-04-28 RX ADMIN — VENLAFAXINE 75 MG: 37.5 TABLET ORAL at 17:48

## 2018-04-28 RX ADMIN — CALCIUM GLUCONATE 1 G: 98 INJECTION, SOLUTION INTRAVENOUS at 04:41

## 2018-04-28 RX ADMIN — INSULIN LISPRO 3 UNITS: 100 INJECTION, SOLUTION INTRAVENOUS; SUBCUTANEOUS at 12:33

## 2018-04-28 RX ADMIN — WATER: 1000 INJECTION, SOLUTION INTRAVENOUS at 22:23

## 2018-04-28 RX ADMIN — INSULIN GLARGINE 5 UNITS: 100 INJECTION, SOLUTION SUBCUTANEOUS at 22:23

## 2018-04-28 RX ADMIN — CALCIUM 500 MG: 500 TABLET ORAL at 10:50

## 2018-04-28 RX ADMIN — HYDROMORPHONE HYDROCHLORIDE 1 MG: 2 INJECTION INTRAMUSCULAR; INTRAVENOUS; SUBCUTANEOUS at 05:29

## 2018-04-28 RX ADMIN — HYDROMORPHONE HYDROCHLORIDE 1 MG: 2 INJECTION INTRAMUSCULAR; INTRAVENOUS; SUBCUTANEOUS at 16:22

## 2018-04-28 RX ADMIN — ACETAMINOPHEN 650 MG: 325 TABLET, FILM COATED ORAL at 10:50

## 2018-04-28 RX ADMIN — QUETIAPINE FUMARATE 100 MG: 100 TABLET ORAL at 17:48

## 2018-04-28 RX ADMIN — HYDROMORPHONE HYDROCHLORIDE 1 MG: 2 INJECTION INTRAMUSCULAR; INTRAVENOUS; SUBCUTANEOUS at 12:34

## 2018-04-28 RX ADMIN — QUETIAPINE FUMARATE 100 MG: 100 TABLET ORAL at 10:50

## 2018-04-28 RX ADMIN — WATER: 1000 INJECTION, SOLUTION INTRAVENOUS at 04:39

## 2018-04-28 RX ADMIN — HYDRALAZINE HYDROCHLORIDE 25 MG: 25 TABLET, FILM COATED ORAL at 10:50

## 2018-04-28 RX ADMIN — VENLAFAXINE 75 MG: 37.5 TABLET ORAL at 11:06

## 2018-04-28 RX ADMIN — OXYCODONE AND ACETAMINOPHEN 1 TABLET: 5; 325 TABLET ORAL at 15:01

## 2018-04-28 RX ADMIN — SORBITOL SOLUTION (BULK) 15 ML: 70 SOLUTION at 11:07

## 2018-04-28 RX ADMIN — INSULIN LISPRO 7 UNITS: 100 INJECTION, SOLUTION INTRAVENOUS; SUBCUTANEOUS at 17:47

## 2018-04-28 RX ADMIN — SODIUM CHLORIDE 100 ML/HR: 900 INJECTION, SOLUTION INTRAVENOUS at 01:05

## 2018-04-28 RX ADMIN — Medication 1 CAPSULE: at 10:50

## 2018-04-28 RX ADMIN — DIPHENHYDRAMINE HYDROCHLORIDE 12.5 MG: 50 INJECTION, SOLUTION INTRAMUSCULAR; INTRAVENOUS at 11:11

## 2018-04-28 RX ADMIN — DIPHENHYDRAMINE HYDROCHLORIDE 12.5 MG: 50 INJECTION, SOLUTION INTRAMUSCULAR; INTRAVENOUS at 20:51

## 2018-04-28 RX ADMIN — WATER: 1000 INJECTION, SOLUTION INTRAVENOUS at 10:00

## 2018-04-28 RX ADMIN — HYDROMORPHONE HYDROCHLORIDE 1 MG: 2 INJECTION INTRAMUSCULAR; INTRAVENOUS; SUBCUTANEOUS at 00:57

## 2018-04-28 RX ADMIN — AMLODIPINE BESYLATE 10 MG: 5 TABLET ORAL at 10:50

## 2018-04-28 RX ADMIN — HYDROMORPHONE HYDROCHLORIDE 1 MG: 2 INJECTION INTRAMUSCULAR; INTRAVENOUS; SUBCUTANEOUS at 20:51

## 2018-04-28 RX ADMIN — PIPERACILLIN SODIUM,TAZOBACTAM SODIUM 3.38 G: 3; .375 INJECTION, POWDER, FOR SOLUTION INTRAVENOUS at 15:00

## 2018-04-28 NOTE — ROUTINE PROCESS
TRANSFER - OUT REPORT:    Verbal report given to Roosevelt(name) on Simona Salas  being transferred to [facility](unit) for routine progression of care       Report consisted of patients Situation, Background, Assessment and   Recommendations(SBAR). Information from the following report(s) SBAR, ED Summary, Intake/Output, MAR and Recent Results was reviewed with the receiving nurse. Lines:   Venous Access Device Chest Port (Active)       Venous Access Device Powerline  03/06/18 Upper chest (subclavicular area, right (Active)       Peripheral IV 04/27/18 Right Antecubital (Active)   Site Assessment Clean, dry, & intact 4/27/2018  7:18 PM   Phlebitis Assessment 0 4/27/2018  7:18 PM   Infiltration Assessment 0 4/27/2018  7:18 PM   Dressing Status Clean, dry, & intact 4/27/2018  7:18 PM   Dressing Type Tape;Transparent 4/27/2018  7:18 PM   Hub Color/Line Status Pink; Infusing;Flushed;Patent 4/27/2018  7:18 PM        Opportunity for questions and clarification was provided.       Patient transported with:   [employer]

## 2018-04-28 NOTE — PROGRESS NOTES
Hospitalist Progress Note  Abdon Dolan MD  Answering service: [phone] from in house phone  Cell: [phone]      Date of Service:  2018  NAME:  Toy Ellis  :  1980  MRN:  [id number]      Admission Summary:      Per H&P, \"This is a 68-year-old woman with a past medical history significant for hypertension, type 2 diabetes, obstructive sleep apnea, depression, chronic kidney disease, narcotic dependency was in her usual state of health until about 3 days ago when the patient developed left leg pain and swelling. The pain is a constant 8/10 in severity. Patient described the pain as a sharp shooting pain in her left leg. No relief with pain medication. No known aggravating factors. Last month, the patient underwent a transmetatarsal resection of the left foot. She is under the care of a podiatrist.  The procedure was performed because of a diabetic foot infection. The patient's homecare nurse noticed increased swelling of the left leg. The patient's podiatrist was contacted. The podiatrist advised that the patient go to the emergency room for further evaluation. She completed antibiotics about a week ago. The patient denies fever, rigors and chills. Patient also complained of abdominal pain, nausea, and vomiting. The abdominal pain is diffuse in location. When the patient arrived at the emergency room, patient was seen by her podiatrist while she was still in the emergency room. The initial plan was to discharge the patient back home on oral antibiotics, but the x-ray of the left foot shows evidence of osteomyelitis. Her podiatrist then advised admission to the hospital.  The hospitalist service was asked to admit the patient to the hospital.  Patient also has worsening of her renal function. For that her nephrologist was consulted by the emergency room physician. \"     Interval history / Subjective: This AM patient endorsing N, generalized abdominal pain. KUB done this AM with large amount of colonic stool and kayexylate given by Renal for her elevated K. Repeat bmp pending. Assessment & Plan:     Acute hyperkalemia due to kidney disease:  -patient got shifting agents overnight and is now s/p kayexylate, as above  -follow-up repeat bmp  -appreciate Nephro recs    Osteomyelitis of the left foot:  The patient is on vancomycin and Zosyn  Podiatry was consulted  Await decision on whether she will need debridement    Acute on chronic kidney disease stage V  -holding diuretic and patient on fluids, per Renal   -appreciate further recs    Hypertension: Continue home meds. Anemia. This is most likely due to chronic kidney disease. Type 2 diabetes: Continue sliding scale. Obstructive sleep apnea    Depression: Continue home meds. Code status: Full  DVT prophylaxis: jeparin    Care Plan discussed with: Patient/Family and Nurse  Disposition: D     Hospital Problems  Date Reviewed: 4/28/2018          Codes Class Noted POA    * (Principal)Osteomyelitis of left foot ([facility] [address] ICD-10-CM: M86.9  ICD-9-CM: 730.27  4/27/2018 Yes              Review of Systems:   Pertinent items are noted in HPI. Vital Signs:    Last 24hrs VS reviewed since prior progress note. Most recent are:  Visit Vitals    BP (!) 164/108 (BP 1 Location: Left arm, BP Patient Position: Sitting)    Pulse 92    Temp 98.4 °F (36.9 °C)    Resp 15    Ht 5' 2\" (1.575 m)    Wt 62.5 kg (137 lb 12.6 oz)    SpO2 100%    BMI 25.2 kg/m2         Intake/Output Summary (Last 24 hours) at 04/28/18 1323  Last data filed at 04/28/18 0440   Gross per 24 hour   Intake              345 ml   Output              750 ml   Net             -405 ml        Physical Examination:             Constitutional:  No acute distress, cooperative, pleasant    ENT:  Neck supple   Resp:  CTA bilaterally.     CV:  Regular rhythm, increased rate    GI:  Soft, non distended, diffusely tender    Musculoskeletal:  LLE larger than R (pateint states this is baseline). Partial amputation of L foot with wrapping    Neurologic:  Moves all extremities.   AAOx3        Data Review:    Review and/or order of clinical lab test  Review and/or order of tests in the medicine section of [facility]      Labs:     Recent Labs      04/27/18 1711   WBC  5.1   HGB  9.9*   HCT  31.5*   PLT  192     Recent Labs      04/28/18 0217 04/27/18 1711   NA  136  138   K  6.3*  6.2*   CL  113*  111*   CO2  15*  20*   BUN  74*  84*   CREA  4.06*  4.63*   GLU  105*  153*   CA  8.3*  8.3*   PHOS  4.6   --      Recent Labs      04/28/18 0217 04/27/18 1711   SGOT   --   34   ALT   --   30   AP   --   394*   TBILI   --   0.2   TP   --   9.2*   ALB  3.6  3.6   GLOB   --   5.6*     Lab Results   Component Value Date/Time    Folate 58.2 (H) 05/01/2017 02:04 AM      Lab Results   Component Value Date/Time    Cholesterol, total 123 04/29/2016 04:06 AM    HDL Cholesterol 39 04/29/2016 04:06 AM    LDL, calculated 64.6 04/29/2016 04:06 AM    Triglyceride 97 04/29/2016 04:06 AM    CHOL/HDL Ratio 3.2 04/29/2016 04:06 AM     Lab Results   Component Value Date/Time    Glucose (POC) 240 (H) 04/28/2018 12:08 PM    Glucose (POC) 77 04/28/2018 07:39 AM    Glucose (POC) 36 (LL) 04/28/2018 07:13 AM    Glucose (POC) 222 (H) 04/28/2018 03:03 AM    Glucose (POC) 103 (H) 04/27/2018 10:33 PM     Lab Results   Component Value Date/Time    Color YELLOW/STRAW 03/02/2018 10:50 PM    Appearance CLOUDY (A) 03/02/2018 10:50 PM    Specific gravity 1.017 03/02/2018 10:50 PM    Specific gravity 1.015 07/26/2010 11:18 AM    pH (UA) 5.0 03/02/2018 10:50 PM    Protein 100 (A) 03/02/2018 10:50 PM    Glucose NEGATIVE  03/02/2018 10:50 PM    Ketone NEGATIVE  03/02/2018 10:50 PM    Bilirubin NEGATIVE  03/02/2018 10:50 PM    Urobilinogen 1.0 03/02/2018 10:50 PM    Nitrites NEGATIVE  03/02/2018 10:50 PM    Leukocyte Esterase NEGATIVE  03/02/2018 10:50 PM    Epithelial cells MANY (A) 03/02/2018 10:50 PM    Bacteria 3+ (A) 03/02/2018 10:50 PM    WBC 5-10 03/02/2018 10:50 PM    RBC 0-5 03/02/2018 10:50 PM       Medications Reviewed:     Current Facility-Administered Medications   Medication Dose Route Frequency    amLODIPine (NORVASC) tablet 10 mg  10 mg Oral DAILY    calcium carbonate (OS-BINA) tablet 500 mg [elemental]  500 mg Oral DAILY    hydrALAZINE (APRESOLINE) tablet 25 mg  25 mg Oral TID    QUEtiapine (SEROquel) tablet 100 mg  100 mg Oral BID    venlafaxine (EFFEXOR) tablet 75 mg  75 mg Oral BID WITH MEALS    sodium chloride (NS) flush 5-10 mL  5-10 mL IntraVENous Q8H    sodium chloride (NS) flush 5-10 mL  5-10 mL IntraVENous PRN    heparin (porcine) injection 5,000 Units  5,000 Units SubCUTAneous Q8H    albuterol-ipratropium (DUO-NEB) 2.5 MG-0.5 MG/3 ML  3 mL Nebulization Q4H PRN    lactobac ac& pc-s.therm-b.anim (CHRIS Q/RISAQUAD)  1 Cap Oral DAILY    vancomycin (VANCOCIN) 1,000 mg in 0.9% sodium chloride (MBP/ADV) 250 mL  1 g IntraVENous Q12H    piperacillin-tazobactam (ZOSYN) 3.375 g in 0.9% sodium chloride (MBP/ADV) 100 mL  3.375 g IntraVENous Q12H    insulin lispro (HUMALOG) injection   SubCUTAneous AC&HS    glucose chewable tablet 16 g  4 Tab Oral PRN    dextrose (D50W) injection syrg 12.5-25 g  12.5-25 g IntraVENous PRN    glucagon (GLUCAGEN) injection 1 mg  1 mg IntraMUSCular PRN    Vancomycin Pharmacy to Dose   Other Rx Dosing/Monitoring    sodium polystyrene (KAYEXALATE) 15 gram/60 mL oral suspension 30 g  30 g Oral NOW    sodium bicarbonate (8.4%) 150 mEq in sterile water 1,000 mL infusion   IntraVENous CONTINUOUS    acetaminophen (TYLENOL) tablet 650 mg  650 mg Oral Q4H PRN    HYDROmorphone (DILAUDID) injection 1 mg  1 mg IntraVENous Q3H PRN    diphenhydrAMINE (BENADRYL) injection 12.5 mg  12.5 mg IntraVENous Q6H PRN    oxyCODONE-acetaminophen (PERCOCET) 5-325 mg per tablet 1 Tab  1 Tab Oral Q4H PRN    ondansetron (ZOFRAN) injection 4 mg  4 mg IntraVENous Q4H PRN     ______________________________________________________________________  EXPECTED LENGTH OF STAY: - - -  ACTUAL LENGTH OF STAY:          [address] MD

## 2018-04-28 NOTE — PROGRESS NOTES
Admission Medication Reconciliation:    Information obtained from: Patient, chart, RX Query    Significant PMH/Disease States:   Past Medical History:   Diagnosis Date    ARF (acute renal failure) ([facility] [address] requiring dialysis     Asthma     CKD (chronic kidney disease)     Diabetes ([facility] [address]     Gastroparesis     Gastric Pacer- REMOVED 2015    GERD (gastroesophageal reflux disease)     Hypertension     Narcotic dependence ([facility] [address]     THU (obstructive sleep apnea)     wears 2 LPM oxygen at night    Other ill-defined conditions(799.89)     Polycystic ovarian syndrome     Seizures ([facility] [address]     Sickle cell trait ([facility] [address]     Thromboembolus ([facility] [address] to her left arm and was told she had one in left leg recently       Chief Complaint for this Admission:  Leg pain    Allergies:  Erythromycin; Morphine; and Toradol [ketorolac]    Prior to Admission Medications:   Prior to Admission Medications   Prescriptions Last Dose Informant Patient Reported? Taking? Cholecalciferol, Vitamin D3, 50,000 unit cap 2018  Yes No   Sig: Take 1 Cap by mouth every seven (7) days. QUEtiapine (SEROQUEL) 100 mg tablet 2018 at Unknown time  Yes Yes   Sig: Take 100 mg by mouth two (2) times a day. albuterol (PROVENTIL VENTOLIN) 2.5 mg /3 mL (0.083 %) nebulizer solution 2018 at Unknown time  Yes Yes   Si.5 mg by Nebulization route every four (4) hours as needed. amLODIPine (NORVASC) 10 mg tablet 2018 at Unknown time  No Yes   Sig: Take 1 Tab by mouth daily for 30 days. baclofen (LIORESAL) 10 mg tablet 2018 at Unknown time  Yes Yes   Sig: Take 10 mg by mouth three (3) times daily. bumetanide (BUMEX) 1 mg tablet 3/27/2018 at Unknown time  Yes Yes   Sig: Take 1 mg by mouth two (2) times daily as needed. calcium carbonate (OS-BINA) 500 mg calcium (1,250 mg) tablet 2018 at Unknown time  Yes Yes   Sig: Take 1 Tab by mouth daily.    cyanocobalamin 1,000 mcg tablet 2018 at Unknown time  Yes Yes   Sig: Take 1,000 mcg by mouth daily. hydrALAZINE (APRESOLINE) 25 mg tablet 4/27/2018 at Unknown time  Yes Yes   Sig: Take 25 mg by mouth three (3) times daily. oxyCODONE-acetaminophen (PERCOCET) 5-325 mg per tablet 4/27/2018 at Unknown time  Yes Yes   Sig: Take 1 Tab by mouth two (2) times a day. Scheduled   promethazine (PHENERGAN) 25 mg tablet 4/20/2018 at Unknown time  Yes Yes   Sig: Take 25 mg by mouth every eight (8) hours as needed for Nausea. venlafaxine (EFFEXOR) 75 mg tablet 4/27/2018 at Unknown time  No Yes   Sig: Take 1 Tab by mouth two (2) times daily (with meals). Facility-Administered Medications: None         Comments/Recommendations: Patient was well versed with medications and doses. Allergies were confirmed. Added:  1. Baclofen  2. Seroquel  3. Bumetanide  4. Oxycodone (exactly as per RX Query, which cites 60 tabs / 30 day supply)  5. Promethazine  5. Hydralazine    Deleted:  1. Vancomycin    Thank you for allowing me to participate in the care of your patient.     Jero Sebastian PharmD, RN #[id number]

## 2018-04-28 NOTE — PROGRESS NOTES
Bedside and Verbal shift change report given to Sav Barahona (oncoming nurse) by Tom (offgoing nurse). Report included the following information SBAR, Intake/Output, MAR and Recent Results.

## 2018-04-28 NOTE — PROGRESS NOTES
Admission Medication Reconciliation:    Information obtained from: Patient, chart, RX Query    Significant PMH/Disease States:   Past Medical History:   Diagnosis Date    ARF (acute renal failure) ([facility] [address] requiring dialysis     Asthma     CKD (chronic kidney disease)     Diabetes ([facility] [address]     Gastroparesis     Gastric Pacer- REMOVED 2015    GERD (gastroesophageal reflux disease)     Hypertension     Narcotic dependence ([facility] [address]     THU (obstructive sleep apnea)     wears 2 LPM oxygen at night    Other ill-defined conditions(799.89)     Polycystic ovarian syndrome     Seizures ([facility] [address]     Sickle cell trait ([facility] [address]     Thromboembolus ([facility] [address] to her left arm and was told she had one in left leg recently       Chief Complaint for this Admission:  Leg pain    Allergies:  Erythromycin; Morphine; and Toradol [ketorolac]    Prior to Admission Medications:   Prior to Admission Medications   Prescriptions Last Dose Informant Patient Reported? Taking? Cholecalciferol, Vitamin D3, 50,000 unit cap 2018  Yes No   Sig: Take 1 Cap by mouth every seven (7) days. QUEtiapine (SEROQUEL) 100 mg tablet 2018 at Unknown time  Yes Yes   Sig: Take 100 mg by mouth two (2) times a day. albuterol (PROVENTIL VENTOLIN) 2.5 mg /3 mL (0.083 %) nebulizer solution 2018 at Unknown time  Yes Yes   Si.5 mg by Nebulization route every four (4) hours as needed. amLODIPine (NORVASC) 10 mg tablet 2018 at Unknown time  No Yes   Sig: Take 1 Tab by mouth daily for 30 days. baclofen (LIORESAL) 10 mg tablet 2018 at Unknown time  Yes Yes   Sig: Take 10 mg by mouth three (3) times daily. bumetanide (BUMEX) 1 mg tablet 3/27/2018 at Unknown time  Yes Yes   Sig: Take 1 mg by mouth two (2) times daily as needed. calcium carbonate (OS-BINA) 500 mg calcium (1,250 mg) tablet 2018 at Unknown time  Yes Yes   Sig: Take 1 Tab by mouth daily.    cyanocobalamin 1,000 mcg tablet 2018 at Unknown time  Yes Yes   Sig: Take 1,000 mcg by mouth daily. hydrALAZINE (APRESOLINE) 25 mg tablet 4/27/2018 at Unknown time  Yes Yes   Sig: Take 25 mg by mouth three (3) times daily. oxyCODONE-acetaminophen (PERCOCET) 5-325 mg per tablet 4/27/2018 at Unknown time  Yes Yes   Sig: Take 1 Tab by mouth two (2) times a day. Scheduled   promethazine (PHENERGAN) 25 mg tablet 4/20/2018 at Unknown time  Yes Yes   Sig: Take 25 mg by mouth every eight (8) hours as needed for Nausea. venlafaxine (EFFEXOR) 75 mg tablet 4/27/2018 at Unknown time  No Yes   Sig: Take 1 Tab by mouth two (2) times daily (with meals). Facility-Administered Medications: None         Comments/Recommendations: Patient was well versed with medications and doses. Allergies were confirmed. Added:  1. Baclofen  2. Seroquel  3. Bumetanide  4. Oxycodone (exactly as per RX Query, which cites 60 tabs / 30 day supply)  5. Promethazine  5. Hydralazine    Deleted:  1. Vancomycin    Thank you for allowing me to participate in the care of your patient.     Jessica Boogie PharmD, RN #[id number]

## 2018-04-28 NOTE — H&P
[address]   HISTORY AND PHYSICAL      Jose Peterson  MR#: [id number]  : 1980  ACCOUNT #: [de-identified]   ADMIT DATE: 2018    Admission order was placed at 2320 hours, and the patient was seen at the same time before midnight. PRIMARY CARE PHYSICIAN:  Dr. Mary Caraballo. SOURCE OF INFORMATION:  The patient. CHIEF COMPLAINT:  Left leg pain and swelling. HISTORY OF PRESENT ILLNESS:  This is a 38-year-old woman with a past medical history significant for hypertension, type 2 diabetes, obstructive sleep apnea, depression, chronic kidney disease, narcotic dependency was in her usual state of health until about 3 days ago when the patient developed left leg pain and swelling. The pain is a constant 8/10 in severity. Patient described the pain as a sharp shooting pain in her left leg. No relief with pain medication. No known aggravating factors. Last month, the patient underwent a transmetatarsal resection of the left foot. She is under the care of a podiatrist.  The procedure was performed because of a diabetic foot infection. The patient's homecare nurse noticed increased swelling of the left leg. The patient's podiatrist was contacted. The podiatrist advised that the patient go to the emergency room for further evaluation. She completed antibiotics about a week ago. The patient denies fever, rigors and chills. Patient also complained of abdominal pain, nausea, and vomiting. The abdominal pain is diffuse in location. When the patient arrived at the emergency room, patient was seen by her podiatrist while she was still in the emergency room. The initial plan was to discharge the patient back home on oral antibiotics, but the x-ray of the left foot shows evidence of osteomyelitis.   Her podiatrist then advised admission to the hospital.  The hospitalist service was asked to admit the patient to the hospital.  Patient also has worsening of her renal function. For that her nephrologist was consulted by the emergency room physician. PAST MEDICAL HISTORY:  Depression, obstructive sleep apnea, type 2 diabetes, chronic kidney disease, hypertension. ALLERGIES:  PATIENT IS ALLERGIC TO ERYTHROMYCIN, MORPHINE, TORADOL. MEDICATIONS:  Albuterol nebulizer every 4 hours as needed, Norvasc 10 mg daily, baclofen 10 mg 3 times daily, Bumex 1 mg twice daily as needed, calcium carbonate 1 tablet daily, hydralazine 25 mg 3 times daily, Percocet 5/325 one tablet twice a day, Phenergan 25 mg every 8 hours as needed for nausea, Seroquel 100 mg twice daily, Effexor 75 mg twice daily with meals. FAMILY HISTORY:  This was reviewed. Her mother had hypertension and lung cancer. Father had kidney cancer and stroke and heart disease and hypertension. PAST SURGICAL HISTORY:  This is significant for gastric bypass, transmetatarsal resection of the left foot. SOCIAL HISTORY:  No history of alcohol or tobacco abuse. REVIEW OF SYSTEMS:  HEAD, EYES, EARS, NOSE, AND THROAT:  No headache, no dizziness, no blurring of vision, no photophobia. RESPIRATORY SYSTEM:  No cough, no shortness of breath, no hemoptysis. CARDIOVASCULAR SYSTEM:  No chest pain, orthopnea, no palpitation. GASTROINTESTINAL SYSTEM:  This is positive for abdominal pain, nausea, vomiting and diarrhea. No constipation. GENITOURINARY SYSTEM:  No dysuria, no urgency and no frequency. All other systems are reviewed, and they are negative. PHYSICAL EXAMINATION:  GENERAL APPEARANCE:  The patient appeared ill, in moderate distress. VITAL SIGNS:  On arrival at the emergency room, temperature 98, pulse 74, respiratory rate 18, blood pressure 134/88, oxygen saturation 100%. HEAD:  Normocephalic, atraumatic. EYES:  Normal eye movement. No redness, no drainage, no discharge. EARS:  Normal external ears with no obvious drainage. NOSE:  No deformity, no drainage.     MOUTH AND THROAT:  No visible oral lesion. Dry oral mucosa. NECK:  Supple, no JVD, no thyromegaly. CHEST:  Clear breath sounds. No wheezing, no crackles. HEART:  Normal S1 and S2, regular. No clinically appreciable murmur. ABDOMEN:  Soft, nontender. Normal bowel sounds. CENTRAL NERVOUS SYSTEM:  Alert, oriented x3. No gross focal neurological deficit. EXTREMITIES:  Swelling and redness of the left foot noted. Pulses 2+ right foot. MUSCULOSKELETAL SYSTEM:  Partial amputation of the left foot with intact dressing noted,  present on admission. SKIN:  Redness and swelling of the left foot noted, present on admission. PSYCHIATRIC:  Normal mood and affect. LYMPHATIC SYSTEM:  No cervical lymphadenopathy. DIAGNOSTIC DATA:  EKG shows normal sinus rhythm. X-ray of the left foot shows evidence of osteomyelitis. LABORATORY DATA:  Hematology:  WBC 5.1, hemoglobin 9.9, hematocrit 31.5, platelet . Lactic acid level 1.1. Chemistry:  Sodium 138, potassium 6.2, chloride 111, CO2 of 20, glucose 153, BUN 84, creatinine 4.63, calcium 8.3. Bilirubin total 0.2, ALT 30, AST 34, alkaline phosphatase 394, total protein 9.2, albumin level 3.6, globulin 5.6. ASSESSMENT:  1.  Osteomyelitis of the left foot. 2.  Acute on chronic kidney disease stage V.  3.  Hypertension. 4.  Acute hyperkalemia. 5.  Anemia. 6.  Type 2 diabetes. 7.  Obstructive sleep apnea. 8.  Depression. PLAN:  1. Osteomyelitis of the left foot. We will admit the patient for further evaluation and treatment. We will start the patient on vancomycin and Zosyn. We will await further recommendation from the patient's podiatrist consulted by the emergency room physician. Wound care consult will also be requested as well. 2.  Acute on chronic kidney disease stage V. We will hold diuretic. We will carry out hydration with normal saline. We will monitor the patient's renal function. The patient's nephrologist has been consulted.   We will await further recommendation from the nephrologist.  3.  Hypertension. We will resume her home medications and monitor the patient's blood pressure closely. 4.  Acute hyperkalemia. This is due to kidney disease. The sodium level improved to 5.4 with hydration in the emergency room. We will repeat the potassium level. If this remains elevated, we will treat the patient for hyperkalemia. We will also await further recommendation from her nephrologist.  5.  Anemia. This is most likely due to chronic kidney disease, but we will carry out anemia workup including a stool guaiac to rule out occult gastrointestinal bleed. 6.  Type 2 diabetes. We will place the patient on sliding scale with insulin coverage. We will check hemoglobin A1c level. 7.  Obstructive sleep apnea. The patient is not on CPAP at home. We will continue with oxygen supplement,  8. Depression. We will resume her home medications. 9.  Other issues. CODE STATUS:  THE PATIENT IS A FULL CODE. We will place the patient on heparin for DVT prophylaxis.       MD NICOLE Mayo/[place]  D: 04/28/2018 04:16     T: 04/28/2018 05:32  JOB #: [id number]  CC: Stefan Ortez MD

## 2018-04-28 NOTE — PROGRESS NOTES
Patient is refusing to have blood lab drawn at this time; will attempt to convince her for the 4th time after she eats lunch. Patient allowing nursing to attempt blood draws; VERONICA Chris attempted once and was unsuccessful. VERONICA Lopez attempted twice to assist VERONICA Chris; all three attempts unsuccessful. VERONICA Chris will page ICU.

## 2018-04-28 NOTE — PROGRESS NOTES
D/C'd C-Diff testing order per recommendations of Irasema Vizcarra of infection prevention.  Telephone order readback from Dr. Marycarmen Bronson to discontinue the need for a specimen to test.

## 2018-04-28 NOTE — ED NOTES
Repeat chem 8 done and showed to MD, K improved to 5.4 but glucose 45. Pt given orange juice and pb crackers and will recheck. 2233  Recheck Glucose 103.

## 2018-04-28 NOTE — PROGRESS NOTES
Dual skin check with Michelet Cummins and Tom Cabrera. No pressure ulcers or rashes POA. Pt with left foot wound wrapped in elastic bandage by podiatry.

## 2018-04-28 NOTE — PROGRESS NOTES
Spiritual Care Partner Volunteer visited patient in [address] [place] [address] [address] on 4.28.18.     Documented by Laci Mejia, Staff , on 4.28.18  Please call JOSE ([phone]) to page  if needed

## 2018-04-28 NOTE — PROGRESS NOTES
[facility]   [address] [facility], [facility] [facility] [address], [facility]  Phone: [phone]   [phone]       Nephrology Progress Note  Esdras Pressley     1980     [id number]  Date of Admission : 4/27/2018 04/28/18    CC: Follow up for STONE       Assessment and Plan   STONE on CKD   - improving w/ IVF   - stopped Baclofen for now     Acute on Chronic Hyper K :  - No obstruction on KUB  - ordered laxatives and Kayexalate   - recheck K in pm     Metabolic acidosis :  - Increased Bicarb dose in IVF     CKD Stage IV :  - 2./2 DM, SCD  - previously dialysis dependent for ~ 6m. Failed RUE and RLE grafts     Sickle cell dz  Anemia   Left foot infection      Interval History:  She is aching every where   Asking for pain meds and bendryl   Cr better  KUB results d/w pt     Review of Systems: Pertinent items are noted in HPI.     Current Medications:   Current Facility-Administered Medications   Medication Dose Route Frequency    amLODIPine (NORVASC) tablet 10 mg  10 mg Oral DAILY    calcium carbonate (OS-BINA) tablet 500 mg [elemental]  500 mg Oral DAILY    hydrALAZINE (APRESOLINE) tablet 25 mg  25 mg Oral TID    QUEtiapine (SEROquel) tablet 100 mg  100 mg Oral BID    venlafaxine (EFFEXOR) tablet 75 mg  75 mg Oral BID WITH MEALS    sodium chloride (NS) flush 5-10 mL  5-10 mL IntraVENous Q8H    sodium chloride (NS) flush 5-10 mL  5-10 mL IntraVENous PRN    heparin (porcine) injection 5,000 Units  5,000 Units SubCUTAneous Q8H    albuterol-ipratropium (DUO-NEB) 2.5 MG-0.5 MG/3 ML  3 mL Nebulization Q4H PRN    lactobac ac& pc-s.therm-b.anim (CHRIS Q/RISAQUAD)  1 Cap Oral DAILY    vancomycin (VANCOCIN) 1,000 mg in 0.9% sodium chloride (MBP/ADV) 250 mL  1 g IntraVENous Q12H    piperacillin-tazobactam (ZOSYN) 3.375 g in 0.9% sodium chloride (MBP/ADV) 100 mL  3.375 g IntraVENous Q12H    insulin lispro (HUMALOG) injection   SubCUTAneous AC&HS    glucose chewable tablet 16 g  4 Tab Oral PRN    dextrose (D50W) injection syrg 12.5-25 g  12.5-25 g IntraVENous PRN    glucagon (GLUCAGEN) injection 1 mg  1 mg IntraMUSCular PRN    piperacillin-tazobactam (ZOSYN) 3.375 g in 0.9% sodium chloride (MBP/ADV) 100 mL  3.375 g IntraVENous ONCE    Vancomycin Pharmacy to Dose   Other Rx Dosing/Monitoring    sodium polystyrene (KAYEXALATE) 15 gram/60 mL oral suspension 30 g  30 g Oral NOW    sorbitol 70 % solution 15 mL  15 mL Oral NOW    sodium bicarbonate (8.4%) 150 mEq in sterile water 1,000 mL infusion   IntraVENous CONTINUOUS    acetaminophen (TYLENOL) tablet 650 mg  650 mg Oral Q4H PRN    HYDROmorphone (DILAUDID) injection 1 mg  1 mg IntraVENous Q3H PRN    diphenhydrAMINE (BENADRYL) injection 12.5 mg  12.5 mg IntraVENous Q6H PRN    oxyCODONE-acetaminophen (PERCOCET) 5-325 mg per tablet 1 Tab  1 Tab Oral Q4H PRN    ondansetron (ZOFRAN) injection 4 mg  4 mg IntraVENous Q4H PRN      Allergies   Allergen Reactions    Erythromycin Itching    Morphine Itching    Toradol [Ketorolac] Rash       Objective:  Vitals:    Vitals:    04/28/18 0044 04/28/18 0045 04/28/18 0127 04/28/18 0506   BP: (!) 178/112 (!) 178/108 (!) 152/96    Pulse: 80      Resp: 16      Temp: 98.4 °F (36.9 °C)      SpO2: 100%      Weight:    62.5 kg (137 lb 12.6 oz)   Height:    5' 2\" (1.575 m)     Intake and Output:     04/26 1901 - 04/28 0700  In: 345 [I.V.:345]  Out: 750 [Urine:750]    Physical Examination:    General: NAD,Conversant   Neck:  Supple, no mass  Resp:  Lungs CTA B/L, no wheezing , normal respiratory effort  CV:  RRR,  no murmur or rub,trace LE edema  GI:  Soft, NT, + Bowel sounds, no hepatosplenomegaly  Neurologic:  Non focal  Skin:  No Rash  :  No escobedo     []    High complexity decision making was performed  []    Patient is at high-risk of decompensation with multiple organ involvement    Lab Data Personally Reviewed: I have reviewed all the pertinent labs, microbiology data and radiology studies during assessment.     Recent Labs      04/28/18   0217  04/27/18   1711   NA  136  138   K  6.3*  6.2*   CL  113*  111*   CO2  15*  20*   GLU  105*  153*   BUN  74*  84*   CREA  4.06*  4.63*   CA  8.3*  8.3*   PHOS  4.6   --    ALB  3.6  3.6   SGOT   --   34   ALT   --   30     Recent Labs      04/27/18   1711   WBC  5.1   HGB  9.9*   HCT  31.5*   PLT  192     Lab Results   Component Value Date/Time    Specimen Description: URINE 06/24/2013 08:00 PM    Specimen Description: URINE 06/17/2013 07:55 PM    Specimen Description: URINE 05/26/2013 10:10 AM    Specimen Description: URINE 04/22/2013 02:30 PM    Specimen Description: NARES 03/21/2013 12:30 PM     Lab Results   Component Value Date/Time    Culture result: NO GROWTH AFTER 9 HOURS 04/27/2018 05:11 PM    Culture result: FEW STAPHYLOCOCCUS HOMINIS SUBSPECIES HOMINIS (A) 03/11/2018 12:47 PM    Culture result: (A) 03/11/2018 12:47 PM     STAPHYLOCOCCUS EPIDERMIDIS (OXACILLIN RESISTANT) ISOLATED FROM THIO BROTH ONLY    Culture result: NO ANAEROBES ISOLATED 03/11/2018 12:47 PM    Culture result: NO GROWTH ON SOLID MEDIA AT 4 DAYS 03/11/2018 11:43 AM    Culture result: (A) 03/11/2018 11:43 AM     STAPHYLOCOCCUS EPIDERMIDIS (OXACILLIN RESISTANT) ISOLATED FROM THIO BROTH ONLY    Culture result: NO GROWTH ON SOLID MEDIA AT 4 DAYS 03/11/2018 11:43 AM    Culture result: (A) 03/11/2018 11:43 AM     STAPHYLOCOCCUS EPIDERMIDIS ISOLATED FROM THIO BROTH ONLY     Recent Results (from the past 24 hour(s))   METABOLIC PANEL, COMPREHENSIVE    Collection Time: 04/27/18  5:11 PM   Result Value Ref Range    Sodium 138 136 - 145 mmol/L    Potassium 6.2 (H) 3.5 - 5.1 mmol/L    Chloride 111 (H) 97 - 108 mmol/L    CO2 20 (L) 21 - 32 mmol/L    Anion gap 7 5 - 15 mmol/L    Glucose 153 (H) 65 - 100 mg/dL    BUN 84 (H) 6 - 20 MG/DL    Creatinine 4.63 (H) 0.55 - 1.02 MG/DL    BUN/Creatinine ratio 18 12 - 20      GFR est AA 13 (L) >60 ml/min/1.73m2    GFR est non-AA 11 (L) >60 ml/min/1.73m2    Calcium 8.3 (L) 8.5 - 10.1 MG/DL    Bilirubin, total 0.2 0.2 - 1.0 MG/DL    ALT (SGPT) 30 12 - 78 U/L    AST (SGOT) 34 15 - 37 U/L    Alk. phosphatase 394 (H) 45 - 117 U/L    Protein, total 9.2 (H) 6.4 - 8.2 g/dL    Albumin 3.6 3.5 - 5.0 g/dL    Globulin 5.6 (H) 2.0 - 4.0 g/dL    A-G Ratio 0.6 (L) 1.1 - 2.2     CBC WITH AUTOMATED DIFF    Collection Time: 04/27/18  5:11 PM   Result Value Ref Range    WBC 5.1 3.6 - 11.0 K/uL    RBC 3.95 3.80 - 5.20 M/uL    HGB 9.9 (L) 11.5 - 16.0 g/dL    HCT 31.5 (L) 35.0 - 47.0 %    MCV 79.7 (L) 80.0 - 99.0 FL    MCH 25.1 (L) 26.0 - 34.0 PG    MCHC 31.4 30.0 - 36.5 g/dL    RDW 18.0 (H) 11.5 - 14.5 %    PLATELET 113 425 - 922 K/uL    NRBC 0.0 0  WBC    ABSOLUTE NRBC 0.00 0.00 - 0.01 K/uL    NEUTROPHILS 60 32 - 75 %    LYMPHOCYTES 23 12 - 49 %    MONOCYTES 6 5 - 13 %    EOSINOPHILS 11 (H) 0 - 7 %    BASOPHILS 1 0 - 1 %    IMMATURE GRANULOCYTES 0 0.0 - 0.5 %    ABS. NEUTROPHILS 3.1 1.8 - 8.0 K/UL    ABS. LYMPHOCYTES 1.2 0.8 - 3.5 K/UL    ABS. MONOCYTES 0.3 0.0 - 1.0 K/UL    ABS. EOSINOPHILS 0.5 (H) 0.0 - 0.4 K/UL    ABS. BASOPHILS 0.0 0.0 - 0.1 K/UL    ABS. IMM.  GRANS. 0.0 0.00 - 0.04 K/UL    DF AUTOMATED     LACTIC ACID    Collection Time: 04/27/18  5:11 PM   Result Value Ref Range    Lactic acid 1.1 0.4 - 2.0 MMOL/L   CULTURE, BLOOD, PAIRED    Collection Time: 04/27/18  5:11 PM   Result Value Ref Range    Special Requests: NO SPECIAL REQUESTS      Culture result: NO GROWTH AFTER 9 HOURS     EKG, 12 LEAD, INITIAL    Collection Time: 04/27/18  9:10 PM   Result Value Ref Range    Ventricular Rate 68 BPM    Atrial Rate 68 BPM    P-R Interval 142 ms    QRS Duration 80 ms    Q-T Interval 398 ms    QTC Calculation (Bezet) 423 ms    Calculated P Axis 38 degrees    Calculated R Axis 13 degrees    Calculated T Axis 44 degrees    Diagnosis       ** Poor data quality, interpretation may be adversely affected  Normal sinus rhythm     GLUCOSE, POC    Collection Time: 04/27/18 10:33 PM   Result Value Ref Range    Glucose (POC) 103 (H) 65 - 100 mg/dL    Performed by Manisha Dry    RENAL FUNCTION PANEL    Collection Time: 04/28/18  2:17 AM   Result Value Ref Range    Sodium 136 136 - 145 mmol/L    Potassium 6.3 (H) 3.5 - 5.1 mmol/L    Chloride 113 (H) 97 - 108 mmol/L    CO2 15 (LL) 21 - 32 mmol/L    Anion gap 8 5 - 15 mmol/L    Glucose 105 (H) 65 - 100 mg/dL    BUN 74 (H) 6 - 20 MG/DL    Creatinine 4.06 (H) 0.55 - 1.02 MG/DL    BUN/Creatinine ratio 18 12 - 20      GFR est AA 15 (L) >60 ml/min/1.73m2    GFR est non-AA 12 (L) >60 ml/min/1.73m2    Calcium 8.3 (L) 8.5 - 10.1 MG/DL    Phosphorus 4.6 2.6 - 4.7 MG/DL    Albumin 3.6 3.5 - 5.0 g/dL   GLUCOSE, POC    Collection Time: 04/28/18  3:03 AM   Result Value Ref Range    Glucose (POC) 222 (H) 65 - 100 mg/dL    Performed by [address], POC    Collection Time: 04/28/18  7:13 AM   Result Value Ref Range    Glucose (POC) 36 (LL) 65 - 100 mg/dL    Performed by [address], POC    Collection Time: 04/28/18  7:39 AM   Result Value Ref Range    Glucose (POC) 77 65 - 100 mg/dL    Performed by Tye Oneal            Total time spent with patient:  xxx   min. Care Plan discussed with:  Patient     Family      RN      Consulting Physician [address] [place] [address],         I have reviewed the flowsheets. Chart and Pertinent Notes have been reviewed. No change in PMH ,family and social history from Consult note.       Addie Ordaz MD

## 2018-04-28 NOTE — PROGRESS NOTES
Paged Dr. Franck Mcdaniel regarding hypoglycemic event this AM. Pt BG 36, 50 mL dextrose given, 15 min recheck BG 77. Pt given 3 cups of applejuice and breakfast. Event appears to be due to 6 units of novalog given this morning under the direction of Dr. Camara Pulse in response to the BG of 222.

## 2018-04-28 NOTE — PROGRESS NOTES
Pharmacist Note - Vancomycin Dosing    Consult provided for this 42 y.o. female for indication of osteomyelitis. Antibiotic regimen(s): vancomycin and zosyn    Recent Labs      18   0217  18   1711   WBC   --   5.1   CREA  4.06*  4.63*   BUN  74*  84*     Frequency of BMP: daily  Height: 157.5 cm  Weight: 62.5 kg  Est CrCl: ~16 ml/min; Temp (24hrs), Av °F (36.7 °C), Min:97.6 °F (36.4 °C), Max:98.4 °F (36.9 °C)    Cultures:   blood     Goal trough = 15 - 20 mcg/mL    Pt was [facility] from [facility] on IV vancomycin and zosyn. From notes it appears last dose of IV antibiotics was . Nephrology ordered random vancomycin level.   Will dose based on result

## 2018-04-28 NOTE — PROGRESS NOTES
Paged Provider due to critical lab values K 6.3, Creatinine 4.06, CO2 15, and anion gap 8. Provider to come in and evaluate patient.

## 2018-04-28 NOTE — PROGRESS NOTES
Still waiting for X ray KUB from 4 am   Awaiting X ray results to order SPS for Hyperkalemia       Susana Gary MD

## 2018-04-28 NOTE — CONSULTS
..                     [address] [place] [address]  YOB: 1980     Assessment & Plan:   1. HYPERKALEMIA  - HX OF HYPERKALEMIA  - INSULIN, CA GLUCONATE IV (p WNL)  - PT HAS HAD SPS IN PAST FOR HYPERKALEMIA BUT HAS ABDOMINAL PAIN NOW AND RECENT ON/OFF CONSTIPATION. THUS LETS GET A KUB FIRST. IF OKAY WILL GIVE SPS  - NO RRT NEEDED AT THIS TIME  2. METABOLIC ACIDOSIS  - START BICARB GTT  3. STONE/CKD4  - STONE IN SETTING OF RECENT ABX (VANC/AIN ETC) VS. IVVD VS. PROGRESSION  - SHE HAS BEEN ON DIALYSIS IN THE PAST   - SHE HAS A FAILED RIGHT THIGH ACCESS  - SHE SAYS SHE UE ACCESS CANNOT BE DONE 2ND TO HX OF PRIOR \"CLOTS\"  - HER BASELINE CKD IS LIKELY MOSTLY 2ND TO DM2 BUT CERTAINLY IS MULTIFACTORIAL   - CHECK VANC LEVEL  4. LEFT FOOT INFECTION   - NOT CURRENTLY ON ABX   - NOTES READ. CANNOT USE BACTRIM WITH ADVANCED KIDNEY DISEASE  - ALSO ALK PHOSP IS ELEVATED. IS THIS FROM BONE AND HER FOOT VS. OTHER LOCATION. DOESN'T APPEAR TO BE LIVER. 5. ANEMIA  6. DM2  7. HTN  8. H/O OF GASTRIC BYPASS SURGERY                     Subjective:   CHIEF COMPLAINT: N/V  HPI:  MS. RAMSAY IS A VERY PLEASANT 43 YO AAF WITH A PMH SIGNIFICANT FOR DM2, HTN, S/P GASTRIC BYPASS SURGERY, HX OF DIALYSIS WITH FAILED RIGHT THIGH ACCESS, WHO PRESENTED WITH N/V/CONSTIPATION/ABDOMINAL PAIN. SHE RECENTLY HAS SURGERY TO HER LEFT FOOT. PODIATRY NOTE NOTED. SHE WAS ON VANCOMYCIN PER PT BUT WAS CHANGED B/C OF \"ALLERGY\" TO DOXYCYCLINE? SHE HAS A HX OF HYPERKALEMIA. SHE WAS SUPPOSED TO SEE MY COLLEAGUE, DR. Maribel Ambrosio BUT NEVER MADE THE APPT. SHE CURRENTLY DENIES F/C/NS, RASH, HA, VISUAL CHANGES, SOB/CP, DYSPHAGIA, N/V, LE EDEMA, N/T/W. SHE DOES HAVE SOME ABDOMINAL PAIN EPIGASTRIC TO LUQ.        Review of Systems  SEE HPI    Past Medical History:   Diagnosis Date    ARF (acute renal failure) ([facility] requiring dialysis 2011    Asthma     CKD (chronic kidney disease)     Diabetes ([facility]     Gastroparesis 2010 Gastric Pacer- REMOVED 2015    GERD (gastroesophageal reflux disease)     Hypertension     Narcotic dependence ([facility]     THU (obstructive sleep apnea)     wears 2 LPM oxygen at night    Other ill-defined conditions(799.89)     Polycystic ovarian syndrome     Seizures (HCC)     Sickle cell trait ([facility]     Thromboembolus ([facility] to her left arm and was told she had one in left leg recently      Past Surgical History:   Procedure Laterality Date    HX CATARACT REMOVAL  3/5/12    right    HX DILATION AND CURETTAGE      ablation    HX GASTRIC BYPASS      HX GI      j tube placement and removal    HX OTHER SURGICAL  2010    Gastric Pacer- REMOVED 2015    HX VASCULAR ACCESS      gray cath rt subclavian    HX VASCULAR ACCESS      HD access right thigh       Social History     Social History    Marital status:      Spouse name: N/A    Number of children: N/A    Years of education: N/A     Occupational History    Not on file. Social History Main Topics    Smoking status: Never Smoker    Smokeless tobacco: Never Used    Alcohol use No    Drug use: No    Sexual activity: Yes     Partners: Male     Birth control/ protection: None     Other Topics Concern    Not on file     Social History Narrative    Lives with her  and father      Family History   Problem Relation Age of Onset    Cancer Mother      lung    Hypertension Mother     Cancer Father      kidney    Stroke Father      3 strokes: 59-72    Heart Disease Father 72     CABG    Hypertension Father     Cancer Sister      pancreatic    Cancer Maternal Aunt      breast    Cancer Paternal Aunt      breast    Schizophrenia Sister      was in [facility], now ass't living    Other Sister       AIDS    Other Other      nephew of AIDS      Prior to Admission medications    Medication Sig Start Date End Date Taking? Authorizing Provider   baclofen (LIORESAL) 10 mg tablet Take 10 mg by mouth three (3) times daily. Yes Historical Provider   QUEtiapine (SEROQUEL) 100 mg tablet Take 100 mg by mouth two (2) times a day. Yes Historical Provider   bumetanide (BUMEX) 1 mg tablet Take 1 mg by mouth two (2) times daily as needed. Yes Historical Provider   oxyCODONE-acetaminophen (PERCOCET) 5-325 mg per tablet Take 1 Tab by mouth two (2) times a day. Scheduled   Yes Historical Provider   hydrALAZINE (APRESOLINE) 25 mg tablet Take 25 mg by mouth three (3) times daily. Yes Historical Provider   promethazine (PHENERGAN) 25 mg tablet Take 25 mg by mouth every eight (8) hours as needed for Nausea. Yes Historical Provider   amLODIPine (NORVASC) 10 mg tablet Take 1 Tab by mouth daily for 30 days. 3/28/18 4/27/18 Yes Jimenez Verdugo MD   [facility]) 75 mg tablet Take 1 Tab by mouth two (2) times daily (with meals). 3/21/18  Yes Jimenez Verdugo MD   calcium carbonate (OS-BINA) 500 mg calcium (1,250 mg) tablet Take 1 Tab by mouth daily. Yes Historical Provider   cyanocobalamin 1,000 mcg tablet Take 1,000 mcg by mouth daily. Yes Historical Provider   albuterol (PROVENTIL VENTOLIN) 2.5 mg /3 mL (0.083 %) nebulizer solution 2.5 mg by Nebulization route every four (4) hours as needed. Yes Historical Provider   Cholecalciferol, Vitamin D3, 50,000 unit cap Take 1 Cap by mouth every seven (7) days. Historical Provider     Allergies   Allergen Reactions    Erythromycin Itching    Morphine Itching    Toradol [Ketorolac] Rash       Objective:     Vitals:  Blood pressure (!) 152/96, pulse 80, temperature 98.4 °F (36.9 °C), resp. rate 16, SpO2 100 %.   Temp (24hrs), Av °F (36.7 °C), Min:97.6 °F (36.4 °C), Max:98.4 °F (36.9 °C)      Intake and Output:   1901 -  0700  In: -   Out: 750 [Urine:750]       Physical Exam:                Patient is intubated:  NO    Physical Examination:   GENERAL ASSESSMENT: NAD  SKIN: NO RASH  HEAD: NC/AT  EYES: ANICTERIC  NECK: SUPPLE  CHEST: CTAB  HEART: RRR  ABDOMEN: TTP LUQ/EPIGASTRIC WITHOUT R/G. ACTIVE BS  :[place]: NO  EXTREMITY: NO LE EDEMA LEFT FOOT BANDAGED   NEURO: AXOX4    ECG/rhythm[de-identified] Rev:YES  Xray/CT/US/MRI REV:YES  Data Review   Recent Results (from the past 67 hour(s))   METABOLIC PANEL, COMPREHENSIVE    Collection Time: 04/27/18  5:11 PM   Result Value Ref Range    Sodium 138 136 - 145 mmol/L    Potassium 6.2 (H) 3.5 - 5.1 mmol/L    Chloride 111 (H) 97 - 108 mmol/L    CO2 20 (L) 21 - 32 mmol/L    Anion gap 7 5 - 15 mmol/L    Glucose 153 (H) 65 - 100 mg/dL    BUN 84 (H) 6 - 20 MG/DL    Creatinine 4.63 (H) 0.55 - 1.02 MG/DL    BUN/Creatinine ratio 18 12 - 20      GFR est AA 13 (L) >60 ml/min/1.73m2    GFR est non-AA 11 (L) >60 ml/min/1.73m2    Calcium 8.3 (L) 8.5 - 10.1 MG/DL    Bilirubin, total 0.2 0.2 - 1.0 MG/DL    ALT (SGPT) 30 12 - 78 U/L    AST (SGOT) 34 15 - 37 U/L    Alk. phosphatase 394 (H) 45 - 117 U/L    Protein, total 9.2 (H) 6.4 - 8.2 g/dL    Albumin 3.6 3.5 - 5.0 g/dL    Globulin 5.6 (H) 2.0 - 4.0 g/dL    A-G Ratio 0.6 (L) 1.1 - 2.2     CBC WITH AUTOMATED DIFF    Collection Time: 04/27/18  5:11 PM   Result Value Ref Range    WBC 5.1 3.6 - 11.0 K/uL    RBC 3.95 3.80 - 5.20 M/uL    HGB 9.9 (L) 11.5 - 16.0 g/dL    HCT 31.5 (L) 35.0 - 47.0 %    MCV 79.7 (L) 80.0 - 99.0 FL    MCH 25.1 (L) 26.0 - 34.0 PG    MCHC 31.4 30.0 - 36.5 g/dL    RDW 18.0 (H) 11.5 - 14.5 %    PLATELET 020 967 - 678 K/uL    NRBC 0.0 0  WBC    ABSOLUTE NRBC 0.00 0.00 - 0.01 K/uL    NEUTROPHILS 60 32 - 75 %    LYMPHOCYTES 23 12 - 49 %    MONOCYTES 6 5 - 13 %    EOSINOPHILS 11 (H) 0 - 7 %    BASOPHILS 1 0 - 1 %    IMMATURE GRANULOCYTES 0 0.0 - 0.5 %    ABS. NEUTROPHILS 3.1 1.8 - 8.0 K/UL    ABS. LYMPHOCYTES 1.2 0.8 - 3.5 K/UL    ABS. MONOCYTES 0.3 0.0 - 1.0 K/UL    ABS. EOSINOPHILS 0.5 (H) 0.0 - 0.4 K/UL    ABS. BASOPHILS 0.0 0.0 - 0.1 K/UL    ABS. IMM.  GRANS. 0.0 0.00 - 0.04 K/UL    DF AUTOMATED     LACTIC ACID    Collection Time: 04/27/18  5:11 PM   Result Value Ref Range    Lactic acid 1.1 0.4 - 2.0 MMOL/L   EKG, 12 LEAD, INITIAL    Collection Time: 04/27/18  9:10 PM   Result Value Ref Range    Ventricular Rate 68 BPM    Atrial Rate 68 BPM    P-R Interval 142 ms    QRS Duration 80 ms    Q-T Interval 398 ms    QTC Calculation (Bezet) 423 ms    Calculated P Axis 38 degrees    Calculated R Axis 13 degrees    Calculated T Axis 44 degrees    Diagnosis       ** Poor data quality, interpretation may be adversely affected  Normal sinus rhythm     GLUCOSE, POC    Collection Time: 04/27/18 10:33 PM   Result Value Ref Range    Glucose (POC) 103 (H) 65 - 100 mg/dL    Performed by Brianna Esquivel    RENAL FUNCTION PANEL    Collection Time: 04/28/18  2:17 AM   Result Value Ref Range    Sodium 136 136 - 145 mmol/L    Potassium 6.3 (H) 3.5 - 5.1 mmol/L    Chloride 113 (H) 97 - 108 mmol/L    CO2 15 (LL) 21 - 32 mmol/L    Anion gap 8 5 - 15 mmol/L    Glucose 105 (H) 65 - 100 mg/dL    BUN 74 (H) 6 - 20 MG/DL    Creatinine 4.06 (H) 0.55 - 1.02 MG/DL    BUN/Creatinine ratio 18 12 - 20      GFR est AA 15 (L) >60 ml/min/1.73m2    GFR est non-AA 12 (L) >60 ml/min/1.73m2    Calcium 8.3 (L) 8.5 - 10.1 MG/DL    Phosphorus 4.6 2.6 - 4.7 MG/DL    Albumin 3.6 3.5 - 5.0 g/dL   GLUCOSE, POC    Collection Time: 04/28/18  3:03 AM   Result Value Ref Range    Glucose (POC) 222 (H) 65 - 100 mg/dL    Performed by [address] [place] [address]        Discussed with:    PT AND NURSING   Thank you so much to allow us to participate in this patient's care. We will follow.  : Marlen Pro MD  4/28/2018      [facility]:  [email or web address]  Luz [place] office:  [address] [place] [address], [address] [place] [address],[address]  [place], [address] [address]  Phone: [phone]  Fax :     [phone]    [place] office:  [address] [facility], [address]  Phone - [phone]  Fax - [phone]

## 2018-04-29 ENCOUNTER — APPOINTMENT (OUTPATIENT)
Dept: GENERAL RADIOLOGY | Age: 38
DRG: 637 | End: 2018-04-29
Attending: ANESTHESIOLOGY
Payer: MEDICARE

## 2018-04-29 LAB
ALBUMIN SERPL-MCNC: 2.9 G/DL (ref 3.5–5)
ANION GAP BLD CALC-SCNC: 14 MMOL/L (ref 10–20)
ANION GAP BLD CALC-SCNC: 16 MMOL/L (ref 10–20)
ANION GAP SERPL CALC-SCNC: 7 MMOL/L (ref 5–15)
BUN BLD-MCNC: 78 MG/DL (ref 9–20)
BUN BLD-MCNC: 80 MG/DL (ref 9–20)
BUN SERPL-MCNC: 56 MG/DL (ref 6–20)
BUN/CREAT SERPL: 18 (ref 12–20)
CA-I BLD-MCNC: 1.17 MMOL/L (ref 1.12–1.32)
CA-I BLD-MCNC: 1.18 MMOL/L (ref 1.12–1.32)
CALCIUM SERPL-MCNC: 7.9 MG/DL (ref 8.5–10.1)
CHLORIDE BLD-SCNC: 113 MMOL/L (ref 98–107)
CHLORIDE BLD-SCNC: 114 MMOL/L (ref 98–107)
CHLORIDE SERPL-SCNC: 109 MMOL/L (ref 97–108)
CO2 BLD-SCNC: 19 MMOL/L (ref 21–32)
CO2 BLD-SCNC: 20 MMOL/L (ref 21–32)
CO2 SERPL-SCNC: 23 MMOL/L (ref 21–32)
CREAT BLD-MCNC: 4.1 MG/DL (ref 0.6–1.3)
CREAT BLD-MCNC: 5 MG/DL (ref 0.6–1.3)
CREAT SERPL-MCNC: 3.16 MG/DL (ref 0.55–1.02)
GLUCOSE BLD STRIP.AUTO-MCNC: 179 MG/DL (ref 65–100)
GLUCOSE BLD STRIP.AUTO-MCNC: 189 MG/DL (ref 65–100)
GLUCOSE BLD STRIP.AUTO-MCNC: 200 MG/DL (ref 65–100)
GLUCOSE BLD STRIP.AUTO-MCNC: 225 MG/DL (ref 65–100)
GLUCOSE BLD STRIP.AUTO-MCNC: 297 MG/DL (ref 65–100)
GLUCOSE BLD-MCNC: 45 MG/DL (ref 65–100)
GLUCOSE BLD-MCNC: 77 MG/DL (ref 65–100)
GLUCOSE SERPL-MCNC: 216 MG/DL (ref 65–100)
HCT VFR BLD CALC: 24 % (ref 35–47)
HCT VFR BLD CALC: 27 % (ref 35–47)
PHOSPHATE SERPL-MCNC: 3.4 MG/DL (ref 2.6–4.7)
POTASSIUM BLD-SCNC: 5.4 MMOL/L (ref 3.5–5.1)
POTASSIUM BLD-SCNC: 5.9 MMOL/L (ref 3.5–5.1)
POTASSIUM SERPL-SCNC: 5.1 MMOL/L (ref 3.5–5.1)
SERVICE CMNT-IMP: ABNORMAL
SODIUM BLD-SCNC: 141 MMOL/L (ref 136–145)
SODIUM BLD-SCNC: 142 MMOL/L (ref 136–145)
SODIUM SERPL-SCNC: 139 MMOL/L (ref 136–145)

## 2018-04-29 PROCEDURE — 74011000250 HC RX REV CODE- 250: Performed by: INTERNAL MEDICINE

## 2018-04-29 PROCEDURE — 65660000000 HC RM CCU STEPDOWN

## 2018-04-29 PROCEDURE — C1894 INTRO/SHEATH, NON-LASER: HCPCS

## 2018-04-29 PROCEDURE — 76010000093 HC SPECIAL PROCEDURE

## 2018-04-29 PROCEDURE — 74011250637 HC RX REV CODE- 250/637: Performed by: INTERNAL MEDICINE

## 2018-04-29 PROCEDURE — 74011250636 HC RX REV CODE- 250/636: Performed by: INTERNAL MEDICINE

## 2018-04-29 PROCEDURE — 82962 GLUCOSE BLOOD TEST: CPT

## 2018-04-29 PROCEDURE — 36415 COLL VENOUS BLD VENIPUNCTURE: CPT | Performed by: INTERNAL MEDICINE

## 2018-04-29 PROCEDURE — 71045 X-RAY EXAM CHEST 1 VIEW: CPT

## 2018-04-29 PROCEDURE — 02HV33Z INSERTION OF INFUSION DEVICE INTO SUPERIOR VENA CAVA, PERCUTANEOUS APPROACH: ICD-10-PCS | Performed by: ANESTHESIOLOGY

## 2018-04-29 PROCEDURE — 80069 RENAL FUNCTION PANEL: CPT | Performed by: INTERNAL MEDICINE

## 2018-04-29 PROCEDURE — 74011000258 HC RX REV CODE- 258: Performed by: INTERNAL MEDICINE

## 2018-04-29 PROCEDURE — 74011636637 HC RX REV CODE- 636/637: Performed by: INTERNAL MEDICINE

## 2018-04-29 RX ORDER — NIFEDIPINE 30 MG/1
90 TABLET, EXTENDED RELEASE ORAL DAILY
Status: DISCONTINUED | OUTPATIENT
Start: 2018-04-30 | End: 2018-05-15 | Stop reason: HOSPADM

## 2018-04-29 RX ORDER — HYDROMORPHONE HYDROCHLORIDE 2 MG/ML
0.5 INJECTION, SOLUTION INTRAMUSCULAR; INTRAVENOUS; SUBCUTANEOUS
Status: DISCONTINUED | OUTPATIENT
Start: 2018-04-29 | End: 2018-04-30

## 2018-04-29 RX ORDER — SODIUM CHLORIDE 0.9 % (FLUSH) 0.9 %
10 SYRINGE (ML) INJECTION EVERY 8 HOURS
Status: DISCONTINUED | OUTPATIENT
Start: 2018-04-29 | End: 2018-05-15 | Stop reason: HOSPADM

## 2018-04-29 RX ORDER — SODIUM CHLORIDE 0.9 % (FLUSH) 0.9 %
10 SYRINGE (ML) INJECTION EVERY 24 HOURS
Status: DISCONTINUED | OUTPATIENT
Start: 2018-04-29 | End: 2018-05-15 | Stop reason: HOSPADM

## 2018-04-29 RX ORDER — SODIUM CHLORIDE 0.9 % (FLUSH) 0.9 %
10 SYRINGE (ML) INJECTION AS NEEDED
Status: DISCONTINUED | OUTPATIENT
Start: 2018-04-29 | End: 2018-05-15 | Stop reason: HOSPADM

## 2018-04-29 RX ORDER — HYDROMORPHONE HYDROCHLORIDE 2 MG/ML
1 INJECTION, SOLUTION INTRAMUSCULAR; INTRAVENOUS; SUBCUTANEOUS ONCE
Status: COMPLETED | OUTPATIENT
Start: 2018-04-29 | End: 2018-04-29

## 2018-04-29 RX ORDER — SODIUM CHLORIDE 0.9 % (FLUSH) 0.9 %
20 SYRINGE (ML) INJECTION AS NEEDED
Status: DISCONTINUED | OUTPATIENT
Start: 2018-04-29 | End: 2018-05-15 | Stop reason: HOSPADM

## 2018-04-29 RX ORDER — PROMETHAZINE HYDROCHLORIDE 25 MG/ML
6.25 INJECTION, SOLUTION INTRAMUSCULAR; INTRAVENOUS
Status: DISCONTINUED | OUTPATIENT
Start: 2018-04-29 | End: 2018-04-29

## 2018-04-29 RX ORDER — SODIUM CHLORIDE 0.9 % (FLUSH) 0.9 %
20 SYRINGE (ML) INJECTION EVERY 24 HOURS
Status: DISCONTINUED | OUTPATIENT
Start: 2018-04-29 | End: 2018-05-15 | Stop reason: HOSPADM

## 2018-04-29 RX ORDER — PROMETHAZINE HYDROCHLORIDE 6.25 MG/5ML
6.25 SYRUP ORAL
Status: DISCONTINUED | OUTPATIENT
Start: 2018-04-29 | End: 2018-05-15 | Stop reason: HOSPADM

## 2018-04-29 RX ADMIN — HYDRALAZINE HYDROCHLORIDE 25 MG: 25 TABLET, FILM COATED ORAL at 09:39

## 2018-04-29 RX ADMIN — DIPHENHYDRAMINE HYDROCHLORIDE 12.5 MG: 50 INJECTION, SOLUTION INTRAMUSCULAR; INTRAVENOUS at 22:35

## 2018-04-29 RX ADMIN — DIPHENHYDRAMINE HYDROCHLORIDE 12.5 MG: 50 INJECTION, SOLUTION INTRAMUSCULAR; INTRAVENOUS at 12:49

## 2018-04-29 RX ADMIN — DOXYCYCLINE 100 MG: 100 INJECTION, POWDER, LYOPHILIZED, FOR SOLUTION INTRAVENOUS at 09:30

## 2018-04-29 RX ADMIN — Medication 10 ML: at 17:45

## 2018-04-29 RX ADMIN — PROMETHAZINE HYDROCHLORIDE 6.25 MG: 6.25 SYRUP ORAL at 12:05

## 2018-04-29 RX ADMIN — INSULIN LISPRO 3 UNITS: 100 INJECTION, SOLUTION INTRAVENOUS; SUBCUTANEOUS at 07:07

## 2018-04-29 RX ADMIN — CALCIUM 500 MG: 500 TABLET ORAL at 09:39

## 2018-04-29 RX ADMIN — HYDRALAZINE HYDROCHLORIDE 25 MG: 25 TABLET, FILM COATED ORAL at 22:35

## 2018-04-29 RX ADMIN — HYDRALAZINE HYDROCHLORIDE 25 MG: 25 TABLET, FILM COATED ORAL at 17:43

## 2018-04-29 RX ADMIN — DIPHENHYDRAMINE HYDROCHLORIDE 12.5 MG: 50 INJECTION, SOLUTION INTRAMUSCULAR; INTRAVENOUS at 02:12

## 2018-04-29 RX ADMIN — HYDROMORPHONE HYDROCHLORIDE 1 MG: 2 INJECTION INTRAMUSCULAR; INTRAVENOUS; SUBCUTANEOUS at 02:03

## 2018-04-29 RX ADMIN — INSULIN LISPRO 2 UNITS: 100 INJECTION, SOLUTION INTRAVENOUS; SUBCUTANEOUS at 11:30

## 2018-04-29 RX ADMIN — ONDANSETRON 4 MG: 2 INJECTION INTRAMUSCULAR; INTRAVENOUS at 05:51

## 2018-04-29 RX ADMIN — INSULIN GLARGINE 5 UNITS: 100 INJECTION, SOLUTION SUBCUTANEOUS at 22:35

## 2018-04-29 RX ADMIN — Medication 1 CAPSULE: at 09:39

## 2018-04-29 RX ADMIN — OXYCODONE AND ACETAMINOPHEN 1 TABLET: 5; 325 TABLET ORAL at 09:39

## 2018-04-29 RX ADMIN — VENLAFAXINE 75 MG: 37.5 TABLET ORAL at 09:39

## 2018-04-29 RX ADMIN — QUETIAPINE FUMARATE 100 MG: 100 TABLET ORAL at 17:43

## 2018-04-29 RX ADMIN — HYDROMORPHONE HYDROCHLORIDE 0.5 MG: 2 INJECTION INTRAMUSCULAR; INTRAVENOUS; SUBCUTANEOUS at 22:35

## 2018-04-29 RX ADMIN — PROMETHAZINE HYDROCHLORIDE 6.25 MG: 6.25 SYRUP ORAL at 22:52

## 2018-04-29 RX ADMIN — Medication 20 ML: at 17:46

## 2018-04-29 RX ADMIN — HYDROMORPHONE HYDROCHLORIDE 1 MG: 2 INJECTION INTRAMUSCULAR; INTRAVENOUS; SUBCUTANEOUS at 05:51

## 2018-04-29 RX ADMIN — DOXYCYCLINE 100 MG: 100 INJECTION, POWDER, LYOPHILIZED, FOR SOLUTION INTRAVENOUS at 21:00

## 2018-04-29 RX ADMIN — Medication 10 ML: at 17:46

## 2018-04-29 RX ADMIN — VENLAFAXINE 75 MG: 37.5 TABLET ORAL at 17:43

## 2018-04-29 RX ADMIN — QUETIAPINE FUMARATE 100 MG: 100 TABLET ORAL at 09:39

## 2018-04-29 RX ADMIN — HYDROMORPHONE HYDROCHLORIDE 1 MG: 2 INJECTION INTRAMUSCULAR; INTRAVENOUS; SUBCUTANEOUS at 14:42

## 2018-04-29 RX ADMIN — INSULIN LISPRO 5 UNITS: 100 INJECTION, SOLUTION INTRAVENOUS; SUBCUTANEOUS at 16:30

## 2018-04-29 RX ADMIN — Medication 10 ML: at 21:01

## 2018-04-29 RX ADMIN — ONDANSETRON 4 MG: 2 INJECTION INTRAMUSCULAR; INTRAVENOUS at 02:03

## 2018-04-29 RX ADMIN — AMLODIPINE BESYLATE 10 MG: 5 TABLET ORAL at 09:39

## 2018-04-29 RX ADMIN — OXYCODONE AND ACETAMINOPHEN 1 TABLET: 5; 325 TABLET ORAL at 17:52

## 2018-04-29 NOTE — PROGRESS NOTES
Central Line Procedure Note    Indication: Inadequate venous access    Risks, benefits, alternatives explained and patient agrees to proceed. Patient positioned in Trendelenburg. 7-Step Sterility Protocol followed. (cap, mask sterile gown, sterile gloves, large sterile sheet, hand hygiene, 2% chlorhexidine for cutaneous antisepsis)  5 mL 1% Lidocaine placed at insertion site. Right subclavian cannulated x multiple attempt(s) utilizing the Seldinger technique. Venous cannulation confirmed with column drop test.    Catheter secured & Biopatch applied. Sterile Tegaderm placed. CXR pending.

## 2018-04-29 NOTE — PROGRESS NOTES
Central Line Procedure Note    Indication: Inadequate venous access    Risks, benefits, alternatives explained and patient agrees to proceed. Patient positioned in Trendelenburg. 7-Step Sterility Protocol followed. (cap, mask sterile gown, sterile gloves, large sterile sheet, hand hygiene, 2% chlorhexidine for cutaneous antisepsis)  5 mL 1% Lidocaine placed at insertion site. Left subclavian cannulated x 1 attempt(s) utilizing the Seldinger technique. Venous cannulation confirmed with column drop test.    Catheter secured & Biopatch applied. Sterile Tegaderm placed. CXR pending.

## 2018-04-29 NOTE — PERIOP NOTES
Central line insertion complete. Chest Xray notified. Tolerated procedure without incident. All ports with blood return.

## 2018-04-29 NOTE — PROGRESS NOTES
Hospitalist Progress Note  Rossy Munoz MD  Answering service: [phone] from in house phone  Cell: [phone]      Date of Service:  2018  NAME:  Funmilayo Skaggs  :  1980  MRN:  [id number]      Admission Summary:      Per H&P, \"This is a 57-year-old woman with a past medical history significant for hypertension, type 2 diabetes, obstructive sleep apnea, depression, chronic kidney disease, narcotic dependency was in her usual state of health until about 3 days ago when the patient developed left leg pain and swelling. The pain is a constant 8/10 in severity. Patient described the pain as a sharp shooting pain in her left leg. No relief with pain medication. No known aggravating factors. Last month, the patient underwent a transmetatarsal resection of the left foot. She is under the care of a podiatrist.  The procedure was performed because of a diabetic foot infection. The patient's homecare nurse noticed increased swelling of the left leg. The patient's podiatrist was contacted. The podiatrist advised that the patient go to the emergency room for further evaluation. She completed antibiotics about a week ago. The patient denies fever, rigors and chills. Patient also complained of abdominal pain, nausea, and vomiting. The abdominal pain is diffuse in location. When the patient arrived at the emergency room, patient was seen by her podiatrist while she was still in the emergency room. The initial plan was to discharge the patient back home on oral antibiotics, but the x-ray of the left foot shows evidence of osteomyelitis. Her podiatrist then advised admission to the hospital.  The hospitalist service was asked to admit the patient to the hospital.  Patient also has worsening of her renal function. For that her nephrologist was consulted by the emergency room physician. \"     Interval history / Subjective: Yesterday multiple nurses tried to draw blood from patient and she intermittently refused attempts. Anesthesia was consulted to place a line for access and blood draw and patient refused. Discussed with patient that we need to get access/blood ASAP as she had a very high potassium and this has not been re-checked. Discussed with nursing. Assessment & Plan:     Acute hyperkalemia due to kidney disease:  -patient got shifting agents initially and is now s/p kayexylate, per Renal  -follow-up repeat bmp. THIS NEEDS TO BE DONE ASAP  -appreciate Nephro recs    Osteomyelitis of the left foot:  -the patient was on vancomycin and Zosyn initially by the admitting team  -Podiatry was consulted  -upon chart review it seems she was recently switched to doxy by ID  -will change antibiotics    Acute on chronic kidney disease stage V  -holding diuretic and patient on fluids, per Renal   -appreciate further recs    Hypertension: Continue home meds. Anemia. This is most likely due to chronic kidney disease. Type 2 diabetes: Continue sliding scale. Obstructive sleep apnea    Depression: Continue home meds. Code status: Full  DVT prophylaxis: heparin    Care Plan discussed with: Patient/Family and Nurse  Disposition: TBD     Hospital Problems  Date Reviewed: 4/28/2018          Codes Class Noted POA    * (Principal)Osteomyelitis of left foot ([facility] [address] ICD-10-CM: M86.9  ICD-9-CM: 730.27  4/27/2018 Yes              Review of Systems:   Pertinent items are noted in HPI. Vital Signs:    Last 24hrs VS reviewed since prior progress note.  Most recent are:  Visit Vitals    /70 (BP 1 Location: Left arm, BP Patient Position: At rest)    Pulse (!) 103    Temp 98.1 °F (36.7 °C)    Resp 14    Ht 5' 2\" (1.575 m)    Wt 62.5 kg (137 lb 12.6 oz)    SpO2 100%    BMI 25.2 kg/m2       No intake or output data in the 24 hours ending 04/29/18 0730     Physical Examination:             Constitutional:  No acute distress, cooperative, pleasant    ENT:  Neck supple   Resp:  CTA bilaterally. CV:  Regular rhythm, increased rate    GI:  Soft, non distended, diffusely tender    Musculoskeletal:  LLE larger than R (pateint states this is baseline). Partial amputation of L foot with wrapping    Neurologic:  Moves all extremities.   AAOx3        Data Review:    Review and/or order of clinical lab test  Review and/or order of tests in the medicine section of [facility]      Labs:     Recent Labs      04/27/18   1711   WBC  5.1   HGB  9.9*   HCT  31.5*   PLT  192     Recent Labs      04/28/18 0217 04/27/18   1711   NA  136  138  138   K  6.3*  6.2*  6.2*   CL  113*  111*  111*   CO2  15*  18*  20*   BUN  74*  82*  84*   CREA  4.06*  4.72*  4.63*   GLU  105*  147*  153*   CA  8.3*  8.5  8.3*   PHOS  4.6  5.4*     Recent Labs      04/28/18 0217 04/27/18   1711   SGOT   --   34   ALT   --   30   AP   --   394*   TBILI   --   0.2   TP   --   9.2*   ALB  3.6  3.7  3.6   GLOB   --   5.6*     Lab Results   Component Value Date/Time    Folate 58.2 (H) 05/01/2017 02:04 AM      Lab Results   Component Value Date/Time    Cholesterol, total 123 04/29/2016 04:06 AM    HDL Cholesterol 39 04/29/2016 04:06 AM    LDL, calculated 64.6 04/29/2016 04:06 AM    Triglyceride 97 04/29/2016 04:06 AM    CHOL/HDL Ratio 3.2 04/29/2016 04:06 AM     Lab Results   Component Value Date/Time    Glucose (POC) 225 (H) 04/29/2018 06:59 AM    Glucose (POC) 200 (H) 04/28/2018 09:11 PM    Glucose (POC) 377 (H) 04/28/2018 04:48 PM    Glucose (POC) 240 (H) 04/28/2018 12:08 PM    Glucose (POC) 77 04/28/2018 07:39 AM     Lab Results   Component Value Date/Time    Color YELLOW/STRAW 03/02/2018 10:50 PM    Appearance CLOUDY (A) 03/02/2018 10:50 PM    Specific gravity 1.017 03/02/2018 10:50 PM    Specific gravity 1.015 07/26/2010 11:18 AM    pH (UA) 5.0 03/02/2018 10:50 PM    Protein 100 (A) 03/02/2018 10:50 PM    Glucose NEGATIVE  03/02/2018 10:50 PM    Ketone NEGATIVE  03/02/2018 10:50 PM    Bilirubin NEGATIVE  03/02/2018 10:50 PM    Urobilinogen 1.0 03/02/2018 10:50 PM    Nitrites NEGATIVE  03/02/2018 10:50 PM    Leukocyte Esterase NEGATIVE  03/02/2018 10:50 PM    Epithelial cells MANY (A) 03/02/2018 10:50 PM    Bacteria 3+ (A) 03/02/2018 10:50 PM    WBC 5-10 03/02/2018 10:50 PM    RBC 0-5 03/02/2018 10:50 PM       Medications Reviewed:     Current Facility-Administered Medications   Medication Dose Route Frequency    amLODIPine (NORVASC) tablet 10 mg  10 mg Oral DAILY    calcium carbonate (OS-BINA) tablet 500 mg [elemental]  500 mg Oral DAILY    hydrALAZINE (APRESOLINE) tablet 25 mg  25 mg Oral TID    QUEtiapine (SEROquel) tablet 100 mg  100 mg Oral BID    venlafaxine (EFFEXOR) tablet 75 mg  75 mg Oral BID WITH MEALS    sodium chloride (NS) flush 5-10 mL  5-10 mL IntraVENous Q8H    sodium chloride (NS) flush 5-10 mL  5-10 mL IntraVENous PRN    heparin (porcine) injection 5,000 Units  5,000 Units SubCUTAneous Q8H    albuterol-ipratropium (DUO-NEB) 2.5 MG-0.5 MG/3 ML  3 mL Nebulization Q4H PRN    lactobac ac& pc-s.therm-b.anim (CHRIS Q/RISAQUAD)  1 Cap Oral DAILY    insulin lispro (HUMALOG) injection   SubCUTAneous AC&HS    glucose chewable tablet 16 g  4 Tab Oral PRN    dextrose (D50W) injection syrg 12.5-25 g  12.5-25 g IntraVENous PRN    glucagon (GLUCAGEN) injection 1 mg  1 mg IntraMUSCular PRN    sodium bicarbonate (8.4%) 150 mEq in sterile water 1,000 mL infusion   IntraVENous CONTINUOUS    doxycycline (VIBRAMYCIN) 100 mg in 0.9% sodium chloride (MBP/ADV) 100 mL  100 mg IntraVENous Q12H    insulin glargine (LANTUS) injection 5 Units  5 Units SubCUTAneous QHS    acetaminophen (TYLENOL) tablet 650 mg  650 mg Oral Q4H PRN    HYDROmorphone (DILAUDID) injection 1 mg  1 mg IntraVENous Q3H PRN    diphenhydrAMINE (BENADRYL) injection 12.5 mg  12.5 mg IntraVENous Q6H PRN    oxyCODONE-acetaminophen (PERCOCET) 5-325 mg per tablet 1 Tab  1 Tab Oral Q4H PRN  ondansetron (ZOFRAN) injection 4 mg  4 mg IntraVENous Q4H PRN     ______________________________________________________________________  EXPECTED LENGTH OF STAY: - - -  ACTUAL LENGTH OF STAY:          2                 Lenton Dickens, MD

## 2018-04-29 NOTE — PROGRESS NOTES
Patient's MEWs score was at a 3; attending MD, Dr. Dragan Lees, was given the situation. Dr. Dragan Lees acknowledged and no further orders were received. Patient resting in bed and has received hydralazine.

## 2018-04-29 NOTE — PROGRESS NOTES
Bedside shift change report given to Chaka Dillard (oncoming nurse) by Zeina Zayas (offgoing nurse). Report included the following information SBAR, Kardex, Intake/Output, MAR and Recent Results.

## 2018-04-29 NOTE — PERIOP NOTES
Chest xray complete. patient tearful. Wanted to speak with  and her father. Family called. Patient spoke with them on phone in pacu prior to transfer to floor.

## 2018-04-29 NOTE — PROGRESS NOTES
[facility]   [address] [address], [facility] [address], [facility]  Phone: [phone]   [phone]       Nephrology Progress Note  Judith Adames     1980     [id number]  Date of Admission : 4/27/2018 04/29/18    CC: Follow up for STONE       Assessment and Plan   STONE on CKD   - labs pending  - resume IVF now that she has central access     Acute on Chronic Hyper K :  - had BMs with kayexalate   - repeat K not done as blood draws unsuccesful and did not have central access  - check K now stat   - If K > 6 : another dose of kayexalate   - suggested she takes valtessa on d/c     Metabolic acidosis :  - Increased Bicarb dose in IVF     CKD Stage IV :  - 2./2 DM, SCD  - previously dialysis dependent for ~ 6m. Failed RUE and RLE grafts     Sickle cell dz  Anemia   Left foot infection      Interval History:  She just got RIJ CVC  Repeat labs being drawn   She has generalized aches and pains from SCD    Review of Systems: Pertinent items are noted in HPI.     Current Medications:   Current Facility-Administered Medications   Medication Dose Route Frequency    promethazine (PHENERGAN) 6.25 mg/5 mL syrup 6.25 mg  6.25 mg Oral Q6H PRN    sodium chloride (NS) flush 20 mL  20 mL InterCATHeter PRN    sodium chloride (NS) flush 10 mL  10 mL InterCATHeter Q24H    sodium chloride (NS) flush 10 mL  10 mL InterCATHeter PRN    sodium chloride (NS) flush 10 mL  10 mL InterCATHeter Q8H    sodium chloride (NS) flush 20 mL  20 mL InterCATHeter Q24H    HYDROmorphone (DILAUDID) injection 1 mg  1 mg IntraVENous ONCE    [START ON 4/30/2018] NIFEdipine ER (PROCARDIA XL) tablet 90 mg  90 mg Oral DAILY    calcium carbonate (OS-BINA) tablet 500 mg [elemental]  500 mg Oral DAILY    hydrALAZINE (APRESOLINE) tablet 25 mg  25 mg Oral TID    QUEtiapine (SEROquel) tablet 100 mg  100 mg Oral BID    venlafaxine (EFFEXOR) tablet 75 mg  75 mg Oral BID WITH MEALS    sodium chloride (NS) flush 5-10 mL  5-10 mL IntraVENous Q8H    sodium chloride (NS) flush 5-10 mL  5-10 mL IntraVENous PRN    heparin (porcine) injection 5,000 Units  5,000 Units SubCUTAneous Q8H    albuterol-ipratropium (DUO-NEB) 2.5 MG-0.5 MG/3 ML  3 mL Nebulization Q4H PRN    lactobac ac& pc-s.therm-b.anim (CHRIS Q/RISAQUAD)  1 Cap Oral DAILY    insulin lispro (HUMALOG) injection   SubCUTAneous AC&HS    glucose chewable tablet 16 g  4 Tab Oral PRN    dextrose (D50W) injection syrg 12.5-25 g  12.5-25 g IntraVENous PRN    glucagon (GLUCAGEN) injection 1 mg  1 mg IntraMUSCular PRN    sodium bicarbonate (8.4%) 150 mEq in sterile water 1,000 mL infusion   IntraVENous CONTINUOUS    doxycycline (VIBRAMYCIN) 100 mg in 0.9% sodium chloride (MBP/ADV) 100 mL  100 mg IntraVENous Q12H    insulin glargine (LANTUS) injection 5 Units  5 Units SubCUTAneous QHS    acetaminophen (TYLENOL) tablet 650 mg  650 mg Oral Q4H PRN    diphenhydrAMINE (BENADRYL) injection 12.5 mg  12.5 mg IntraVENous Q6H PRN    oxyCODONE-acetaminophen (PERCOCET) 5-325 mg per tablet 1 Tab  1 Tab Oral Q4H PRN      Allergies   Allergen Reactions    Erythromycin Itching    Morphine Itching    Toradol [Ketorolac] Rash       Objective:  Vitals:    Vitals:    04/29/18 1300 04/29/18 1303 04/29/18 1315 04/29/18 1338   BP: (!) 157/101 (!) 163/92 (!) 183/111    Pulse: (!) 116 (!) 113 (!) 124 (!) 122   Resp: 19 25 16 15   Temp:       SpO2: 100% 100% 100% 100%   Weight:       Height:         Intake and Output:     04/27 1901 - 04/29 0700  In: 345 [I.V.:345]  Out: 750 [Urine:750]    Physical Examination:    General: NAD,Conversant   Neck:  Supple, no mass  Resp:  Lungs CTA B/L, no wheezing , normal respiratory effort  CV:  RRR,  no murmur or rub,trace LE edema  GI:  Soft, NT, + Bowel sounds, no hepatosplenomegaly  Neurologic:  Non focal  Skin:  No Rash  :  No escobedo     []    High complexity decision making was performed  []    Patient is at high-risk of decompensation with multiple organ involvement    Lab Data Personally Reviewed: I have reviewed all the pertinent labs, microbiology data and radiology studies during assessment.     Recent Labs      04/28/18   0217  04/27/18   1711   NA  136  138  138   K  6.3*  6.2*  6.2*   CL  113*  111*  111*   CO2  15*  18*  20*   GLU  105*  147*  153*   BUN  74*  82*  84*   CREA  4.06*  4.72*  4.63*   CA  8.3*  8.5  8.3*   PHOS  4.6  5.4*   ALB  3.6  3.7  3.6   SGOT   --   34   ALT   --   30     Recent Labs      04/27/18   1711   WBC  5.1   HGB  9.9*   HCT  31.5*   PLT  192     Lab Results   Component Value Date/Time    Specimen Description: URINE 06/24/2013 08:00 PM    Specimen Description: URINE 06/17/2013 07:55 PM    Specimen Description: URINE 05/26/2013 10:10 AM    Specimen Description: URINE 04/22/2013 02:30 PM    Specimen Description: NARES 03/21/2013 12:30 PM     Lab Results   Component Value Date/Time    Culture result: NO GROWTH 2 DAYS 04/27/2018 05:11 PM    Culture result: FEW STAPHYLOCOCCUS HOMINIS SUBSPECIES HOMINIS (A) 03/11/2018 12:47 PM    Culture result: (A) 03/11/2018 12:47 PM     STAPHYLOCOCCUS EPIDERMIDIS (OXACILLIN RESISTANT) ISOLATED FROM THIO BROTH ONLY    Culture result: NO ANAEROBES ISOLATED 03/11/2018 12:47 PM    Culture result: NO GROWTH ON SOLID MEDIA AT 4 DAYS 03/11/2018 11:43 AM    Culture result: (A) 03/11/2018 11:43 AM     STAPHYLOCOCCUS EPIDERMIDIS (OXACILLIN RESISTANT) ISOLATED FROM THIO BROTH ONLY    Culture result: NO GROWTH ON SOLID MEDIA AT 4 DAYS 03/11/2018 11:43 AM    Culture result: (A) 03/11/2018 11:43 AM     STAPHYLOCOCCUS EPIDERMIDIS ISOLATED FROM THIO BROTH ONLY     Recent Results (from the past 24 hour(s))   GLUCOSE, POC    Collection Time: 04/28/18  4:48 PM   Result Value Ref Range    Glucose (POC) 377 (H) 65 - 100 mg/dL    Performed by Michelle Perez    GLUCOSE, POC    Collection Time: 04/28/18  9:11 PM   Result Value Ref Range    Glucose (POC) 200 (H) 65 - 100 mg/dL    Performed by Teressa Lee (PCT) GLUCOSE, POC    Collection Time: 04/29/18  6:59 AM   Result Value Ref Range    Glucose (POC) 225 (H) 65 - 100 mg/dL    Performed by DONTAE BREEN    GLUCOSE, POC    Collection Time: 04/29/18 11:44 AM   Result Value Ref Range    Glucose (POC) 179 (H) 65 - 100 mg/dL    Performed by Mag CORREA            Total time spent with patient:  xxx   min. Care Plan discussed with:  Patient     Family      RN      Consulting Physician [facility],         I have reviewed the flowsheets. Chart and Pertinent Notes have been reviewed. No change in PMH ,family and social history from Consult note.       Forest Chau MD

## 2018-04-29 NOTE — PROGRESS NOTES
Called by floor to place central line  As pt had hyperkalemia and they were not able to draw blood . When I came up and discussed  R/B A with the patient she states \" I was not aware of any discussion of a line in my neck. I dont want a line in my neck. I was told they are going to place a line in my arm tomorrow. \"   will not place a central line at this time. Patient is hemodynamically stable and has  good a functioning iv .  Will defer central line for a mid line placement tomorrow am.

## 2018-04-30 ENCOUNTER — APPOINTMENT (OUTPATIENT)
Dept: MRI IMAGING | Age: 38
DRG: 637 | End: 2018-04-30
Attending: INTERNAL MEDICINE
Payer: MEDICARE

## 2018-04-30 ENCOUNTER — APPOINTMENT (OUTPATIENT)
Dept: CT IMAGING | Age: 38
DRG: 637 | End: 2018-04-30
Attending: INTERNAL MEDICINE
Payer: MEDICARE

## 2018-04-30 LAB
ALBUMIN SERPL-MCNC: 3.3 G/DL (ref 3.5–5)
ALBUMIN/GLOB SERPL: 0.6 {RATIO} (ref 1.1–2.2)
ALP SERPL-CCNC: 329 U/L (ref 45–117)
ALT SERPL-CCNC: 27 U/L (ref 12–78)
AMYLASE SERPL-CCNC: 229 U/L (ref 25–115)
ANION GAP SERPL CALC-SCNC: 10 MMOL/L (ref 5–15)
ANION GAP SERPL CALC-SCNC: 9 MMOL/L (ref 5–15)
ANION GAP SERPL CALC-SCNC: 9 MMOL/L (ref 5–15)
AST SERPL-CCNC: 25 U/L (ref 15–37)
ATRIAL RATE: 105 BPM
BASOPHILS # BLD: 0 K/UL (ref 0–0.1)
BASOPHILS NFR BLD: 0 % (ref 0–1)
BILIRUB SERPL-MCNC: 0.3 MG/DL (ref 0.2–1)
BUN SERPL-MCNC: 41 MG/DL (ref 6–20)
BUN SERPL-MCNC: 42 MG/DL (ref 6–20)
BUN SERPL-MCNC: 45 MG/DL (ref 6–20)
BUN/CREAT SERPL: 15 (ref 12–20)
CALCIUM SERPL-MCNC: 8.9 MG/DL (ref 8.5–10.1)
CALCIUM SERPL-MCNC: 9.2 MG/DL (ref 8.5–10.1)
CALCIUM SERPL-MCNC: 9.2 MG/DL (ref 8.5–10.1)
CALCULATED P AXIS, ECG09: 66 DEGREES
CALCULATED R AXIS, ECG10: 56 DEGREES
CALCULATED T AXIS, ECG11: 66 DEGREES
CHLORIDE SERPL-SCNC: 109 MMOL/L (ref 97–108)
CHLORIDE SERPL-SCNC: 109 MMOL/L (ref 97–108)
CHLORIDE SERPL-SCNC: 112 MMOL/L (ref 97–108)
CHOLEST SERPL-MCNC: 137 MG/DL
CK SERPL-CCNC: 57 U/L (ref 26–192)
CO2 SERPL-SCNC: 21 MMOL/L (ref 21–32)
CO2 SERPL-SCNC: 23 MMOL/L (ref 21–32)
CO2 SERPL-SCNC: 23 MMOL/L (ref 21–32)
CREAT SERPL-MCNC: 2.77 MG/DL (ref 0.55–1.02)
CREAT SERPL-MCNC: 2.79 MG/DL (ref 0.55–1.02)
CREAT SERPL-MCNC: 2.96 MG/DL (ref 0.55–1.02)
DIAGNOSIS, 93000: NORMAL
DIFFERENTIAL METHOD BLD: ABNORMAL
EOSINOPHIL # BLD: 0 K/UL (ref 0–0.4)
EOSINOPHIL NFR BLD: 1 % (ref 0–7)
ERYTHROCYTE [DISTWIDTH] IN BLOOD BY AUTOMATED COUNT: 16.8 % (ref 11.5–14.5)
EST. AVERAGE GLUCOSE BLD GHB EST-MCNC: 151 MG/DL
FERRITIN SERPL-MCNC: 426 NG/ML (ref 8–252)
FOLATE SERPL-MCNC: 61 NG/ML (ref 5–21)
GLOBULIN SER CALC-MCNC: 5.4 G/DL (ref 2–4)
GLUCOSE BLD STRIP.AUTO-MCNC: 189 MG/DL (ref 65–100)
GLUCOSE BLD STRIP.AUTO-MCNC: 233 MG/DL (ref 65–100)
GLUCOSE BLD STRIP.AUTO-MCNC: 247 MG/DL (ref 65–100)
GLUCOSE BLD STRIP.AUTO-MCNC: 295 MG/DL (ref 65–100)
GLUCOSE SERPL-MCNC: 185 MG/DL (ref 65–100)
GLUCOSE SERPL-MCNC: 244 MG/DL (ref 65–100)
GLUCOSE SERPL-MCNC: 299 MG/DL (ref 65–100)
HBA1C MFR BLD: 6.9 % (ref 4.2–6.3)
HCT VFR BLD AUTO: 28.1 % (ref 35–47)
HDLC SERPL-MCNC: 35 MG/DL
HDLC SERPL: 3.9 {RATIO} (ref 0–5)
HGB BLD-MCNC: 9 G/DL (ref 11.5–16)
IMM GRANULOCYTES # BLD: 0 K/UL (ref 0–0.04)
IMM GRANULOCYTES NFR BLD AUTO: 0 % (ref 0–0.5)
IRON SATN MFR SERPL: 57 % (ref 20–50)
IRON SERPL-MCNC: 108 UG/DL (ref 35–150)
IRON SERPL-MCNC: 123 UG/DL (ref 35–150)
LDLC SERPL CALC-MCNC: 89 MG/DL (ref 0–100)
LIPASE SERPL-CCNC: 1848 U/L (ref 73–393)
LIPID PROFILE,FLP: NORMAL
LYMPHOCYTES # BLD: 0.6 K/UL (ref 0.8–3.5)
LYMPHOCYTES NFR BLD: 12 % (ref 12–49)
MAGNESIUM SERPL-MCNC: 1.6 MG/DL (ref 1.6–2.4)
MCH RBC QN AUTO: 24.4 PG (ref 26–34)
MCHC RBC AUTO-ENTMCNC: 32 G/DL (ref 30–36.5)
MCV RBC AUTO: 76.2 FL (ref 80–99)
MONOCYTES # BLD: 0.1 K/UL (ref 0–1)
MONOCYTES NFR BLD: 2 % (ref 5–13)
NEUTS SEG # BLD: 4.1 K/UL (ref 1.8–8)
NEUTS SEG NFR BLD: 85 % (ref 32–75)
NRBC # BLD: 0 K/UL (ref 0–0.01)
NRBC BLD-RTO: 0 PER 100 WBC
P-R INTERVAL, ECG05: 148 MS
PHOSPHATE SERPL-MCNC: 3.3 MG/DL (ref 2.6–4.7)
PLATELET # BLD AUTO: 153 K/UL (ref 150–400)
PLATELET COMMENTS,PCOM: ABNORMAL
PMV BLD AUTO: ABNORMAL FL (ref 8.9–12.9)
POTASSIUM SERPL-SCNC: 4.3 MMOL/L (ref 3.5–5.1)
POTASSIUM SERPL-SCNC: 4.6 MMOL/L (ref 3.5–5.1)
POTASSIUM SERPL-SCNC: 4.7 MMOL/L (ref 3.5–5.1)
PROT SERPL-MCNC: 8.7 G/DL (ref 6.4–8.2)
Q-T INTERVAL, ECG07: 364 MS
QRS DURATION, ECG06: 82 MS
QTC CALCULATION (BEZET), ECG08: 481 MS
RBC # BLD AUTO: 3.69 M/UL (ref 3.8–5.2)
RBC MORPH BLD: ABNORMAL
SERVICE CMNT-IMP: ABNORMAL
SODIUM SERPL-SCNC: 140 MMOL/L (ref 136–145)
SODIUM SERPL-SCNC: 141 MMOL/L (ref 136–145)
SODIUM SERPL-SCNC: 144 MMOL/L (ref 136–145)
TIBC SERPL-MCNC: 190 UG/DL (ref 250–450)
TRIGL SERPL-MCNC: 65 MG/DL (ref ?–150)
TROPONIN I SERPL-MCNC: <0.04 NG/ML
TSH SERPL DL<=0.05 MIU/L-ACNC: 1.62 UIU/ML (ref 0.36–3.74)
VENTRICULAR RATE, ECG03: 105 BPM
VIT B12 SERPL-MCNC: 738 PG/ML (ref 193–986)
VLDLC SERPL CALC-MCNC: 13 MG/DL
WBC # BLD AUTO: 4.8 K/UL (ref 3.6–11)

## 2018-04-30 PROCEDURE — 74011000250 HC RX REV CODE- 250: Performed by: INTERNAL MEDICINE

## 2018-04-30 PROCEDURE — 83540 ASSAY OF IRON: CPT | Performed by: INTERNAL MEDICINE

## 2018-04-30 PROCEDURE — 84443 ASSAY THYROID STIM HORMONE: CPT | Performed by: INTERNAL MEDICINE

## 2018-04-30 PROCEDURE — 94640 AIRWAY INHALATION TREATMENT: CPT

## 2018-04-30 PROCEDURE — 93005 ELECTROCARDIOGRAM TRACING: CPT

## 2018-04-30 PROCEDURE — 82607 VITAMIN B-12: CPT | Performed by: INTERNAL MEDICINE

## 2018-04-30 PROCEDURE — 80053 COMPREHEN METABOLIC PANEL: CPT | Performed by: INTERNAL MEDICINE

## 2018-04-30 PROCEDURE — 74011000258 HC RX REV CODE- 258: Performed by: INTERNAL MEDICINE

## 2018-04-30 PROCEDURE — 84484 ASSAY OF TROPONIN QUANT: CPT | Performed by: INTERNAL MEDICINE

## 2018-04-30 PROCEDURE — 74011250637 HC RX REV CODE- 250/637: Performed by: INTERNAL MEDICINE

## 2018-04-30 PROCEDURE — 80048 BASIC METABOLIC PNL TOTAL CA: CPT | Performed by: INTERNAL MEDICINE

## 2018-04-30 PROCEDURE — 83690 ASSAY OF LIPASE: CPT | Performed by: INTERNAL MEDICINE

## 2018-04-30 PROCEDURE — 82728 ASSAY OF FERRITIN: CPT | Performed by: INTERNAL MEDICINE

## 2018-04-30 PROCEDURE — 65660000000 HC RM CCU STEPDOWN

## 2018-04-30 PROCEDURE — 74011250636 HC RX REV CODE- 250/636: Performed by: INTERNAL MEDICINE

## 2018-04-30 PROCEDURE — 83036 HEMOGLOBIN GLYCOSYLATED A1C: CPT | Performed by: INTERNAL MEDICINE

## 2018-04-30 PROCEDURE — 83735 ASSAY OF MAGNESIUM: CPT | Performed by: INTERNAL MEDICINE

## 2018-04-30 PROCEDURE — 82746 ASSAY OF FOLIC ACID SERUM: CPT | Performed by: INTERNAL MEDICINE

## 2018-04-30 PROCEDURE — 80061 LIPID PANEL: CPT | Performed by: INTERNAL MEDICINE

## 2018-04-30 PROCEDURE — 74176 CT ABD & PELVIS W/O CONTRAST: CPT

## 2018-04-30 PROCEDURE — 82550 ASSAY OF CK (CPK): CPT | Performed by: INTERNAL MEDICINE

## 2018-04-30 PROCEDURE — 93041 RHYTHM ECG TRACING: CPT

## 2018-04-30 PROCEDURE — 82150 ASSAY OF AMYLASE: CPT | Performed by: INTERNAL MEDICINE

## 2018-04-30 PROCEDURE — 36415 COLL VENOUS BLD VENIPUNCTURE: CPT | Performed by: INTERNAL MEDICINE

## 2018-04-30 PROCEDURE — 82962 GLUCOSE BLOOD TEST: CPT

## 2018-04-30 PROCEDURE — 74011636637 HC RX REV CODE- 636/637: Performed by: INTERNAL MEDICINE

## 2018-04-30 PROCEDURE — 85025 COMPLETE CBC W/AUTO DIFF WBC: CPT | Performed by: INTERNAL MEDICINE

## 2018-04-30 PROCEDURE — 84100 ASSAY OF PHOSPHORUS: CPT | Performed by: INTERNAL MEDICINE

## 2018-04-30 RX ORDER — HYDROMORPHONE HYDROCHLORIDE 2 MG/ML
1 INJECTION, SOLUTION INTRAMUSCULAR; INTRAVENOUS; SUBCUTANEOUS
Status: DISCONTINUED | OUTPATIENT
Start: 2018-04-30 | End: 2018-05-02

## 2018-04-30 RX ORDER — CLONIDINE HYDROCHLORIDE 0.1 MG/1
0.1 TABLET ORAL
Status: COMPLETED | OUTPATIENT
Start: 2018-04-30 | End: 2018-04-30

## 2018-04-30 RX ORDER — LORAZEPAM 2 MG/ML
1 INJECTION INTRAMUSCULAR ONCE
Status: COMPLETED | OUTPATIENT
Start: 2018-04-30 | End: 2018-04-30

## 2018-04-30 RX ORDER — DIPHENHYDRAMINE HCL 25 MG
25 CAPSULE ORAL
Status: DISCONTINUED | OUTPATIENT
Start: 2018-04-30 | End: 2018-05-15 | Stop reason: HOSPADM

## 2018-04-30 RX ORDER — HYDRALAZINE HYDROCHLORIDE 50 MG/1
50 TABLET, FILM COATED ORAL 3 TIMES DAILY
Status: DISCONTINUED | OUTPATIENT
Start: 2018-04-30 | End: 2018-05-02

## 2018-04-30 RX ORDER — HYDRALAZINE HYDROCHLORIDE 20 MG/ML
20 INJECTION INTRAMUSCULAR; INTRAVENOUS
Status: DISCONTINUED | OUTPATIENT
Start: 2018-04-30 | End: 2018-05-01

## 2018-04-30 RX ORDER — HYDROMORPHONE HYDROCHLORIDE 2 MG/ML
1 INJECTION, SOLUTION INTRAMUSCULAR; INTRAVENOUS; SUBCUTANEOUS ONCE
Status: COMPLETED | OUTPATIENT
Start: 2018-04-30 | End: 2018-04-30

## 2018-04-30 RX ORDER — ONDANSETRON 2 MG/ML
4 INJECTION INTRAMUSCULAR; INTRAVENOUS
Status: DISCONTINUED | OUTPATIENT
Start: 2018-04-30 | End: 2018-05-15 | Stop reason: HOSPADM

## 2018-04-30 RX ORDER — INSULIN GLARGINE 100 [IU]/ML
10 INJECTION, SOLUTION SUBCUTANEOUS
Status: DISCONTINUED | OUTPATIENT
Start: 2018-04-30 | End: 2018-05-09

## 2018-04-30 RX ORDER — HYDRALAZINE HYDROCHLORIDE 20 MG/ML
10 INJECTION INTRAMUSCULAR; INTRAVENOUS
Status: DISCONTINUED | OUTPATIENT
Start: 2018-04-30 | End: 2018-04-30

## 2018-04-30 RX ORDER — HYDROMORPHONE HYDROCHLORIDE 5 MG/5ML
1 SOLUTION ORAL ONCE
Status: DISCONTINUED | OUTPATIENT
Start: 2018-04-30 | End: 2018-04-30 | Stop reason: CLARIF

## 2018-04-30 RX ORDER — HYDROMORPHONE HYDROCHLORIDE 2 MG/ML
1 INJECTION, SOLUTION INTRAMUSCULAR; INTRAVENOUS; SUBCUTANEOUS
Status: DISCONTINUED | OUTPATIENT
Start: 2018-04-30 | End: 2018-04-30 | Stop reason: SDUPTHER

## 2018-04-30 RX ADMIN — DOXYCYCLINE 100 MG: 100 INJECTION, POWDER, LYOPHILIZED, FOR SOLUTION INTRAVENOUS at 09:24

## 2018-04-30 RX ADMIN — IPRATROPIUM BROMIDE AND ALBUTEROL SULFATE 3 ML: .5; 3 SOLUTION RESPIRATORY (INHALATION) at 20:17

## 2018-04-30 RX ADMIN — INSULIN LISPRO 3 UNITS: 100 INJECTION, SOLUTION INTRAVENOUS; SUBCUTANEOUS at 17:15

## 2018-04-30 RX ADMIN — LORAZEPAM 1 MG: 2 INJECTION INTRAMUSCULAR; INTRAVENOUS at 20:34

## 2018-04-30 RX ADMIN — OXYCODONE AND ACETAMINOPHEN 1 TABLET: 5; 325 TABLET ORAL at 03:23

## 2018-04-30 RX ADMIN — Medication 10 ML: at 13:25

## 2018-04-30 RX ADMIN — Medication 10 ML: at 22:00

## 2018-04-30 RX ADMIN — HYDROMORPHONE HYDROCHLORIDE 0.5 MG: 2 INJECTION INTRAMUSCULAR; INTRAVENOUS; SUBCUTANEOUS at 06:25

## 2018-04-30 RX ADMIN — HYDROMORPHONE HYDROCHLORIDE 1 MG: 2 INJECTION INTRAMUSCULAR; INTRAVENOUS; SUBCUTANEOUS at 09:48

## 2018-04-30 RX ADMIN — WATER: 1000 INJECTION, SOLUTION INTRAVENOUS at 12:00

## 2018-04-30 RX ADMIN — PROMETHAZINE HYDROCHLORIDE 6.25 MG: 6.25 SYRUP ORAL at 03:23

## 2018-04-30 RX ADMIN — HYDROMORPHONE HYDROCHLORIDE 1 MG: 2 INJECTION INTRAMUSCULAR; INTRAVENOUS; SUBCUTANEOUS at 13:26

## 2018-04-30 RX ADMIN — HEPARIN SODIUM 5000 UNITS: 5000 INJECTION, SOLUTION INTRAVENOUS; SUBCUTANEOUS at 13:25

## 2018-04-30 RX ADMIN — Medication 10 ML: at 14:00

## 2018-04-30 RX ADMIN — INSULIN LISPRO 5 UNITS: 100 INJECTION, SOLUTION INTRAVENOUS; SUBCUTANEOUS at 06:47

## 2018-04-30 RX ADMIN — NITROGLYCERIN 2 INCH: 20 OINTMENT TOPICAL at 22:20

## 2018-04-30 RX ADMIN — LORAZEPAM 1 MG: 2 INJECTION INTRAMUSCULAR; INTRAVENOUS at 11:57

## 2018-04-30 RX ADMIN — HYDRALAZINE HYDROCHLORIDE 20 MG: 20 INJECTION INTRAMUSCULAR; INTRAVENOUS at 17:14

## 2018-04-30 RX ADMIN — INSULIN LISPRO 3 UNITS: 100 INJECTION, SOLUTION INTRAVENOUS; SUBCUTANEOUS at 13:24

## 2018-04-30 RX ADMIN — HEPARIN SODIUM 5000 UNITS: 5000 INJECTION, SOLUTION INTRAVENOUS; SUBCUTANEOUS at 21:53

## 2018-04-30 RX ADMIN — HYDRALAZINE HYDROCHLORIDE 50 MG: 50 TABLET, FILM COATED ORAL at 20:10

## 2018-04-30 RX ADMIN — DOXYCYCLINE 100 MG: 100 INJECTION, POWDER, LYOPHILIZED, FOR SOLUTION INTRAVENOUS at 21:44

## 2018-04-30 RX ADMIN — Medication 20 ML: at 14:00

## 2018-04-30 RX ADMIN — HYDRALAZINE HYDROCHLORIDE 10 MG: 20 INJECTION INTRAMUSCULAR; INTRAVENOUS at 09:53

## 2018-04-30 RX ADMIN — INSULIN GLARGINE 10 UNITS: 100 INJECTION, SOLUTION SUBCUTANEOUS at 21:53

## 2018-04-30 RX ADMIN — Medication 10 ML: at 06:26

## 2018-04-30 RX ADMIN — DIPHENHYDRAMINE HYDROCHLORIDE 12.5 MG: 50 INJECTION, SOLUTION INTRAMUSCULAR; INTRAVENOUS at 06:47

## 2018-04-30 RX ADMIN — CLONIDINE HYDROCHLORIDE 0.1 MG: 0.1 TABLET ORAL at 20:38

## 2018-04-30 RX ADMIN — ONDANSETRON HYDROCHLORIDE 4 MG: 2 INJECTION INTRAMUSCULAR; INTRAVENOUS at 09:52

## 2018-04-30 NOTE — PROGRESS NOTES
04/30/18 1714   Vital Signs   Temp 96.9 °F (36.1 °C)   Temp Source Axillary   Pulse (Heart Rate) (!) 119   Heart Rate Source Monitor   Resp Rate 20   O2 Sat (%) 100 %   Level of Consciousness Alert   BP (!) 119/118   MAP (Calculated) 118   MEWS Score 3     1730-Informed Dr. Herminio Lee of pt's MEWS of 3, and that pt is crying in pain. Dr. Herminio Lee is coming to the floor to see pt. Will continue to monitor. 1745-Pt is refusing oral medications. She is refusing to have her second BP taking after IV hydralazine. Pt is stating that she is dying and in so much pain. She said she only wants 2 mg dilaudid IV and benadryl IV together. I explained to her that her IV Dilaudid isn't due until 7:30, but that she can have PO percocet and zofran to prevent nausea. Pt stated that she only wants IV pain medication. 1806-Pt is screaming in pain. She is still refusing to take the oral pain medication and only wants IV.

## 2018-04-30 NOTE — WOUND CARE
Wound Care Note:     New consult placed by nurse request for left foot wound. Chart shows:  Admitted for osteomyelitis of left foot  Past Medical History:   Diagnosis Date    ARF (acute renal failure) ([address] requiring dialysis 2011    Asthma     CKD (chronic kidney disease)     Diabetes ([address]     Gastroparesis 2010    Gastric Pacer- REMOVED 07/2015    GERD (gastroesophageal reflux disease)     Hypertension     Narcotic dependence ([address]     THU (obstructive sleep apnea)     wears 2 LPM oxygen at night    Other ill-defined conditions(799.89)     Polycystic ovarian syndrome     Seizures (HCC)     Sickle cell trait ([address]     Thromboembolus ([facility] [address] to her left arm and was told she had one in left leg recently         - Pt is status post Incision and drainage with removal of all nonviable soft tissue left foot  Proximal foot amputation left foot, Open bone biopsies left foot  Deep tendon transfer tibialis anterior tendon left foot  Adjacent transfer arteriomyofasciocutaneous plantar medial pedicle flap for delayed primary closure left foot DOS: 03-    WBC = 4.8 on 4/30/18  Admitted from home    Assessment:   Patient is A&O x 4, very little communication due to pain, continent with some assistance needed in repositioning. Bed: [facility]  Diet: Diabetic consistent carb regular  Patient pre-medicated for pain by RN. Bilateral heels, buttocks, and sacral skin intact and without erythema. Right buttock has a large area of hyperpigmentation (healed wound from recent fall). 1. POA left foot wound, dressing intact, Dr. Dasie Paget wants dressing to be left clean, dry and intact; he plans for weekly changes and follows patient in the wound center. Wound was not assessed. 2.  POA right buttock has a large hyperpigmented area, healed wound from previous fall. Left open to air    3.   POA posterior right upper thigh has hyperpigmentation with pink epitheliazed tissue in the middle, healed wound from previous fall. Xray of left foot reveals, \"Two views of the left foot demonstrate surgical amputation at the level of the mid foot. There is new destructive changes of the remaining tarsal bones. Diffuse soft tissue swelling is noted. \"  MRI has been ordered and if osteomyelitis is in the talus and calcaneous Dr. Ifrah Alaniz is looking at [facility] [place]. Patient laying on prone. Heels offloaded on pillow. Recommendations:    Offload heels- Make sure left foot is offloaded; dressing is changed weekly and skin changes are not able to be assessed. Skin Care & Pressure Prevention:  Minimize layers of linen/pads under patient to optimize support surface. Turn/reposition approximately every 2 hours and offload heels. Promote continence     Discussed above plan with patient & Kinsey Barnard RN    Transition of Care: Patient is under the care of Dr. Ifrah Alaniz, wound care will sign off. Please re-consult if our services are needed.       Nohemy Thurman" AVE May, RN, [facility]  office [phone]  pager [phone] or call  to page

## 2018-04-30 NOTE — PROGRESS NOTES
[facility] of [address] [facility], [address] [place] [address] [address]  [address]  P: [phone]  F: [phone]    Foot and Ankle Surgery - Progress Note                                                   Assessment/Plan:  Charcot deformity left foot  Nonhealing surgical wound left foot  Osteomyelitis left foot  DM with neuropathy       Pt is status post Incision and drainage with removal of all nonviable soft tissue left foot  Proximal foot amputation left foot, Open bone biopsies left foot  Deep tendon transfer tibialis anterior tendon left foot  Adjacent transfer arteriomyofasciocutaneous plantar medial pedicle flap for delayed primary closure left foot DOS: 03-     The wound laterally from the surgical dehiscence appears superficial to the level of SubQ, no probe to bone, no real drainage, a small area of maceration is noted. We reviewed X ray which shows noted new teresa changes consistent with osteomyelitis. We ordered an MRI without contrast left foot and are awaiting results to determine treatment plan moving forward, if the calcaneus and talus are involved we may be looking at a Below knee amputation, I have contacted vascular and they are aware. The patient remains at very high risk for limb loss.       Labs/imaging reviewed, new central line placed, K+ better,   abx per medicine team-thank you      Dressing to left foot to be left clean dry and intact. We will plan for weekly changes and if discharged the patient will folow up in the wound center.  The patient may complete passive ROM exercises to the left thigh and lower leg but should be nonweightbearing left foot. Pt has full range of motion to upper body and full weight-bearing to the right foot.        Elevate and offload B/L LE.  Float heels off edge of bed with foam block or air boots.       Foot and ankle will follow    HPI:   Pt resting comfortably, no new pedal complaints, pain controlled, discussed case with nursing, awaiting results of MRI    Objective:  Vitals: Patient Vitals for the past 12 hrs:   BP Temp Pulse Resp SpO2   04/29/18 2058 166/90 98.9 °F (37.2 °C) (!) 101 16 99 %   04/29/18 1550 156/89 99 °F (37.2 °C) (!) 112 15 98 %   04/29/18 1448 (!) 185/104 99.3 °F (37.4 °C) (!) 118 14 99 %   04/29/18 1338 - - (!) 122 15 100 %   04/29/18 1315 (!) 183/111 - (!) 124 16 100 %   04/29/18 1303 (!) 163/92 - (!) 113 25 100 %   04/29/18 1300 (!) 157/101 - (!) 116 19 100 %   04/29/18 1256 - - (!) 113 13 100 %   04/29/18 1255 (!) 188/99 - (!) 115 13 100 %   04/29/18 1254 (!) 194/105 - (!) 114 14 100 %   04/29/18 1250 (!) 186/101 - (!) 110 11 100 %   04/29/18 1245 163/83 - (!) 109 14 100 %   04/29/18 1242 158/85 - (!) 109 - -   04/29/18 1240 - 98.5 °F (36.9 °C) - - -       Dermatological:  Dressing to left foot left clean dry and intact     Vascular:  Deferred     Neurological:   Epicritic and protective threshold is deminished to B/L LE, Pt is unable to detect a 5.07 monofilament wire on 5/5 random tested spots B/L LE.     MSK:  deferred . Imaging:    Xray left davi 3 views  IMPRESSION:  No destructive changes of the remaining tarsal bones compatible  with osteomyelitis. Diffuse soft tissue swelling. Labs:  Recent Labs      04/29/18   1400   04/27/18   1711   WBC   --    --   5.1   CREA  3.16*   < >  4.72*  4.63*   BUN  56*   < >  82*  84*   HGB   --    --   9.9*   HCT   --    --   31.5*   NA  139   < >  138  138   K  5.1   < >  6.2*  6.2*   CL  109*   < >  111*  111*   CO2  23   < >  18*  20*   GLU  216*   < >  147*  153*    < > = values in this interval not displayed.          Cayla Mccullough DPM  4/29/2018  [facility] of [address] [place] [address]. Karen [address] [place] [address]  P: [phone]  F: [phone]

## 2018-04-30 NOTE — PROGRESS NOTES
Consult received and chart reviewed. At this time, patient is nauseated and states she has been vomiting all night and is not able to attempt any mobility training. We will follow up later today if able.     Dixie Webster, PT

## 2018-04-30 NOTE — PROGRESS NOTES
Bedside shift change report given to [address] [facility] [address] (oncoming nurse) by Santy Villagran (offgoing nurse). Report included the following information SBAR, Kardex, Intake/Output, MAR and Recent Results.

## 2018-04-30 NOTE — PROGRESS NOTES
Chart reviewed. CM attempted to meet with patient to complete assessment this morning, however the patient was asleep and did not respond. CM revisited later in the day, patient was leaving the floor for testing. CM will follow-up to complete assessment.     CLARI Freire/CRM

## 2018-04-30 NOTE — PROGRESS NOTES
04/30/18 0936   Vital Signs   Temp 96.8 °F (36 °C)   Temp Source Axillary   Pulse (Heart Rate) 98   Heart Rate Source Monitor   Resp Rate 20   Level of Consciousness Alert   BP (!) 217/122   MAP (Calculated) 154   MEWS Score 3   Pain 1   Pain Scale 1 Numeric (0 - 10)   Pain Intensity 1 9   Patient Stated Pain Goal 0   Pain Onset 1 chronic   Pain Location 1 Generalized   Pain Description 1 Aching   Pain Intervention(s) 1 Medication (see MAR)     946-Paged Dr. Brittni Chirinos  950-Informed Dr. Brittni Chirinos of pt's MEWS of 4. Per Dr. Brittni Chirinos, give IV hydralazine. Will continue to monitor.

## 2018-04-30 NOTE — CDMP QUERY
Please clarify if this patient is (was) being treated/managed for:     => Possible Acute Pancreatitis in the setting of elevated lipase requiring CT  => Other explanation of clinical findings  => Clinically Undetermined (no explanation for clinical findings)    The medical record reflects the following clinical findings, treatment, and risk factors. Risk Factors:  abd pain  Clinical Indicators:  abdominal pain,  lipase 1848, ED note- 7/10 pain . Pt has upper  abdominal pain ; nausea /loose stools  x3 days    Treatment: CT    Please clarify and document your clinical opinion in the progress notes and discharge summary including the definitive and/or presumptive diagnosis, (suspected or probable), related to the above clinical findings. Please include clinical findings supporting your diagnosis.     Thank you,         Lucas Lee [address] [place] [address]

## 2018-04-30 NOTE — PROGRESS NOTES
Spiritual Care Assessment/Progress Note  [facility]      NAME: Renetta Fernandez      MRN: [id number]  AGE: 42 y.o. SEX: female  Muslim Affiliation: Nondenominational   Language: English     4/30/2018     Total Time (in minutes): 10     Spiritual Assessment begun in [address] [address] [place] through conversation with:         []Patient        [] Family    [x] Friend(s)        Reason for Consult: Rapid response team     Spiritual beliefs: (Please include comment if needed)     [x] Identifies with a maureen tradition:     [] Supported by a maureen community:      [] Claims no spiritual orientation:      [] Seeking spiritual identity:           [] Adheres to an individual form of spirituality:      [] Not able to assess:                     Identified resources for coping:      [] Prayer                               [] Music                  [] Guided Imagery     [x] Family/friends                 [] Pet visits     [] Devotional reading                         [] Unknown     [] Other:                                               Interventions offered during this visit: (See comments for more details)    Patient Interventions: Spiritual care volunteer support     Family/Friend(s): Affirmation of emotions/emotional suffering, Affirmation of maureen     Plan of Care:     [] Support spiritual and/or cultural needs    [] Support AMD and/or advance care planning process      [] Support grieving process   [] Coordinate Rites and/or Rituals    [] Coordination with community clergy   [x] No spiritual needs identified at this time   [] Detailed Plan of Care below (See Comments)  [] Make referral to Music Therapy  [] Make referral to Pet Therapy     [] Make referral to Addiction services  [] Make referral to [facility]  [] Make referral to Spiritual Care Partner  [] No future visits requested        [x] Follow up visits as needed     Responded to RRT on 5S. Pt was being attended to by staff.  Met pt's long time friend, Lucymagali Tangcher outside of room. Enrique Childers was visiting pt and was accompanied by her aunt. Enrique Childers attested that the pt is a person of [address] [facility], although not active in a Religion. Visit was cut short as Enrique Childers needed to make several phone calls to family of the pt. Chaplains will follow as needed.    Chaplain Oliva MDiv, MS, [facility]  [phone])

## 2018-04-30 NOTE — DIABETES MGMT
DTC Progress Note    Recommendations/ Comments: Chart reviewed for hyperglycemia. Pt received 13 units of correction insulin in the past 24 hours. Please consider,      -Increasing Lantus to 10 units daily    Current hospital DM medication:  Lantus 5 units daily,  Lispro correctional insulin - normal sensitivity. Chart reviewed on Simona Salas due to BG >180 mg/dL. Patient is a 42 y.o. female with known Type 2 DM complicated by CKD and gastroparesis on no DM medications at home noted on PTA medications list.  Admitted for osteomyelitis of left foot  A1c:   Lab Results   Component Value Date/Time    Hemoglobin A1c 6.9 (H) 04/30/2018 06:24 AM    Hemoglobin A1c 8.8 (H) 03/03/2018 09:21 AM       Recent Glucose Results:   Lab Results   Component Value Date/Time     (H) 04/30/2018 11:48 AM     (H) 04/30/2018 06:24 AM    GLUCPOC 233 (H) 04/30/2018 04:48 PM    GLUCPOC 247 (H) 04/30/2018 12:09 PM    GLUCPOC 295 (H) 04/30/2018 06:32 AM      Lab Results   Component Value Date/Time    Creatinine 2.77 (H) 04/30/2018 11:48 AM     Estimated Creatinine Clearance: 23.7 mL/min (based on Cr of 2.77). Active Orders   Diet    DIET DIABETIC CONSISTENT CARB Regular      PO intake:   No data found. Will continue to follow as needed.     Thank you  Abrahan Ortega, [address]

## 2018-04-30 NOTE — PROGRESS NOTES
04/30/18 1501   Vital Signs   Temp 98.3 °F (36.8 °C)   Temp Source Oral   Pulse (Heart Rate) (!) 119   Heart Rate Source Monitor   Resp Rate 20   O2 Sat (%) 100 %   Level of Consciousness Responds to Voice   BP (!) 193/110  (took 1st bp in left arm at 190/111 then right arm at 193/110)   MAP (Calculated) 138   BP 1 Method Automatic   MEWS Score 4     1509-Paged Dr. Dragan Lees  1512- Informed Dr. Dragan Lees of pt's MEWS of a 4. Per Dr. Dragan Lees let pt go to CT and will give IV hydralazine when pt arrives to the floor. 1705-Paged Dr. Dragan Lees, pt is crying in pain. Pt's MEWS is a 3.   1715-Gave pt IV hydralazine. Pt's MEWS is a 3.  1725-Paged Dr. Dragan Lees.

## 2018-04-30 NOTE — PROGRESS NOTES
Hospitalist Progress Note  Michael Gonzalez MD  Answering service: [phone] from in house phone  Cell: [phone]      Date of Service:  2018  NAME:  Josee Brar  :  1980  MRN:  [id number]      Admission Summary:      Per H&P, \"This is a 26-year-old woman with a past medical history significant for hypertension, type 2 diabetes, obstructive sleep apnea, depression, chronic kidney disease, narcotic dependency was in her usual state of health until about 3 days ago when the patient developed left leg pain and swelling. The pain is a constant 8/10 in severity. Patient described the pain as a sharp shooting pain in her left leg. No relief with pain medication. No known aggravating factors. Last month, the patient underwent a transmetatarsal resection of the left foot. She is under the care of a podiatrist.  The procedure was performed because of a diabetic foot infection. The patient's homecare nurse noticed increased swelling of the left leg. The patient's podiatrist was contacted. The podiatrist advised that the patient go to the emergency room for further evaluation. She completed antibiotics about a week ago. The patient denies fever, rigors and chills. Patient also complained of abdominal pain, nausea, and vomiting. The abdominal pain is diffuse in location. When the patient arrived at the emergency room, patient was seen by her podiatrist while she was still in the emergency room. The initial plan was to discharge the patient back home on oral antibiotics, but the x-ray of the left foot shows evidence of osteomyelitis. Her podiatrist then advised admission to the hospital.  The hospitalist service was asked to admit the patient to the hospital.  Patient also has worsening of her renal function. For that her nephrologist was consulted by the emergency room physician. \"     Interval history / Subjective: Patient with N/V, worsening abdominal pain this AM. RR called for hypertension and tachycardia. Patient stating she is in a lot of pain. K improved on morning labs, but lipase and amylase elevated. CT A/P with calcified pancreas and chronic pancreatitis. Assessment & Plan:     Acute on chronic pancreatitis: Patient with N/V, abdominal pain that increased overnight in severity and elevated lipase/amylase. -CT A/P with calcified pancreas  -pain control  -fluids    Acute hyperkalemia due to kidney disease:  -patient got shifting agents initially and is now s/p kayexylate, per Renal  -repeat bmp with resolution    Osteomyelitis of the left foot:  -the patient was on vancomycin and Zosyn initially by the admitting team  -Podiatry was consulted  -upon chart review it seems she was recently switched to doxy by ID  -now on doxy BID  -MRI foot, per Podiatry pending    Acute on chronic kidney disease stage V  -holding diuretic and patient on fluids, per Renal   -appreciate further recs    Hypertension: Continue home meds. -patient refused this AM. Discussed that she needs to take them ASAP with nursing    Anemia. This is most likely due to chronic kidney disease. Type 2 diabetes: Continue sliding scale. Obstructive sleep apnea    Depression: Continue home meds. Code status: Full  DVT prophylaxis: heparin    Care Plan discussed with: Patient/Family and Nurse  Disposition: TBD     Hospital Problems  Date Reviewed: 4/28/2018          Codes Class Noted POA    * (Principal)Osteomyelitis of left foot ([address] ICD-10-CM: M86.9  ICD-9-CM: 730.27  4/27/2018 Yes              Review of Systems:   Pertinent items are noted in HPI. Vital Signs:    Last 24hrs VS reviewed since prior progress note.  Most recent are:  Visit Vitals    /88 (BP 1 Location: Left arm, BP Patient Position: At rest)    Pulse (!) 103    Temp 98.4 °F (36.9 °C)    Resp 16    Ht 5' 2\" (1.575 m)    Wt 62.5 kg (137 lb 12.6 oz)    SpO2 99%    BMI 25.2 kg/m2       No intake or output data in the 24 hours ending 04/30/18 0730     Physical Examination:             Constitutional:  Mild to moderate distress   ENT:  Neck supple   Resp:  CTA bilaterally. CV:  Regular rhythm, increased rate    GI:  Soft, non distended, diffusely tender to light palpation. No rigidity    Musculoskeletal:  LLE larger than R (pateint states this is baseline). Partial amputation of L foot with wrapping    Neurologic:  Moves all extremities.   AAOx3        Data Review:    Review and/or order of clinical lab test  Review and/or order of tests in the medicine section of [facility]      Labs:     Recent Labs      04/30/18   0624  04/27/18   1711   WBC  4.8  5.1   HGB  9.0*  9.9*   HCT  28.1*  31.5*   PLT  153  192     Recent Labs      04/29/18   1400  04/28/18   0217  04/27/18   1711   NA  139  136  138  138   K  5.1  6.3*  6.2*  6.2*   CL  109*  113*  111*  111*   CO2  23  15*  18*  20*   BUN  56*  74*  82*  84*   CREA  3.16*  4.06*  4.72*  4.63*   GLU  216*  105*  147*  153*   CA  7.9*  8.3*  8.5  8.3*   PHOS  3.4  4.6  5.4*     Recent Labs      04/29/18   1400  04/28/18   0217  04/27/18   1711   SGOT   --    --   34   ALT   --    --   30   AP   --    --   394*   TBILI   --    --   0.2   TP   --    --   9.2*   ALB  2.9*  3.6  3.7  3.6   GLOB   --    --   5.6*     Lab Results   Component Value Date/Time    Folate 58.2 (H) 05/01/2017 02:04 AM      Lab Results   Component Value Date/Time    Cholesterol, total 123 04/29/2016 04:06 AM    HDL Cholesterol 39 04/29/2016 04:06 AM    LDL, calculated 64.6 04/29/2016 04:06 AM    Triglyceride 97 04/29/2016 04:06 AM    CHOL/HDL Ratio 3.2 04/29/2016 04:06 AM     Lab Results   Component Value Date/Time    Glucose (POC) 295 (H) 04/30/2018 06:32 AM    Glucose (POC) 189 (H) 04/29/2018 09:23 PM    Glucose (POC) 297 (H) 04/29/2018 04:35 PM    Glucose (POC) 179 (H) 04/29/2018 11:44 AM    Glucose (POC) 225 (H) 04/29/2018 06:59 AM     Lab Results   Component Value Date/Time    Color YELLOW/STRAW 03/02/2018 10:50 PM    Appearance CLOUDY (A) 03/02/2018 10:50 PM    Specific gravity 1.017 03/02/2018 10:50 PM    Specific gravity 1.015 07/26/2010 11:18 AM    pH (UA) 5.0 03/02/2018 10:50 PM    Protein 100 (A) 03/02/2018 10:50 PM    Glucose NEGATIVE  03/02/2018 10:50 PM    Ketone NEGATIVE  03/02/2018 10:50 PM    Bilirubin NEGATIVE  03/02/2018 10:50 PM    Urobilinogen 1.0 03/02/2018 10:50 PM    Nitrites NEGATIVE  03/02/2018 10:50 PM    Leukocyte Esterase NEGATIVE  03/02/2018 10:50 PM    Epithelial cells MANY (A) 03/02/2018 10:50 PM    Bacteria 3+ (A) 03/02/2018 10:50 PM    WBC 5-10 03/02/2018 10:50 PM    RBC 0-5 03/02/2018 10:50 PM       Medications Reviewed:     Current Facility-Administered Medications   Medication Dose Route Frequency    promethazine (PHENERGAN) 6.25 mg/5 mL syrup 6.25 mg  6.25 mg Oral Q6H PRN    sodium chloride (NS) flush 20 mL  20 mL InterCATHeter PRN    sodium chloride (NS) flush 10 mL  10 mL InterCATHeter Q24H    sodium chloride (NS) flush 10 mL  10 mL InterCATHeter PRN    sodium chloride (NS) flush 10 mL  10 mL InterCATHeter Q8H    sodium chloride (NS) flush 20 mL  20 mL InterCATHeter Q24H    NIFEdipine ER (PROCARDIA XL) tablet 90 mg  90 mg Oral DAILY    HYDROmorphone (DILAUDID) injection 0.5 mg  0.5 mg IntraVENous Q8H PRN    calcium carbonate (OS-BINA) tablet 500 mg [elemental]  500 mg Oral DAILY    hydrALAZINE (APRESOLINE) tablet 25 mg  25 mg Oral TID    QUEtiapine (SEROquel) tablet 100 mg  100 mg Oral BID    venlafaxine (EFFEXOR) tablet 75 mg  75 mg Oral BID WITH MEALS    sodium chloride (NS) flush 5-10 mL  5-10 mL IntraVENous Q8H    sodium chloride (NS) flush 5-10 mL  5-10 mL IntraVENous PRN    heparin (porcine) injection 5,000 Units  5,000 Units SubCUTAneous Q8H    albuterol-ipratropium (DUO-NEB) 2.5 MG-0.5 MG/3 ML  3 mL Nebulization Q4H PRN    lactobac ac& pc-s.therm-b.anim (CHRIS Q/RISAQUAD)  1 Cap Oral DAILY  insulin lispro (HUMALOG) injection   SubCUTAneous AC&HS    glucose chewable tablet 16 g  4 Tab Oral PRN    dextrose (D50W) injection syrg 12.5-25 g  12.5-25 g IntraVENous PRN    glucagon (GLUCAGEN) injection 1 mg  1 mg IntraMUSCular PRN    doxycycline (VIBRAMYCIN) 100 mg in 0.9% sodium chloride (MBP/ADV) 100 mL  100 mg IntraVENous Q12H    insulin glargine (LANTUS) injection 5 Units  5 Units SubCUTAneous QHS    acetaminophen (TYLENOL) tablet 650 mg  650 mg Oral Q4H PRN    diphenhydrAMINE (BENADRYL) injection 12.5 mg  12.5 mg IntraVENous Q6H PRN    oxyCODONE-acetaminophen (PERCOCET) 5-325 mg per tablet 1 Tab  1 Tab Oral Q4H PRN     ______________________________________________________________________  EXPECTED LENGTH OF STAY: - - -  ACTUAL LENGTH OF STAY:          3                 Radha Nelson MD

## 2018-04-30 NOTE — PROGRESS NOTES
[facility] of [facility], [address] [place] [address],[address]  [address] [facility] [address], [facility]  P: [phone]  F: [phone]    Foot and Ankle Surgery - Progress Note                                                   Assessment/Plan:  Charcot deformity left foot  Nonhealing surgical wound left foot  Osteomyelitis left foot  DM with neuropathy       Pt is status post Incision and drainage with removal of all nonviable soft tissue left foot  Proximal foot amputation left foot, Open bone biopsies left foot  Deep tendon transfer tibialis anterior tendon left foot  Adjacent transfer arteriomyofasciocutaneous plantar medial pedicle flap for delayed primary closure left foot DOS: 03-      Pt continues to have significant pain and nausea from chronic pancreatitis and kidney failure. The hospital team is managing but the patient does not want to have bandage change or discuss treatment plan at this time. We have ordered an MRI without contrast left foot and are awaiting results to determine treatment plan moving forward, if the calcaneus and talus are noted to have osteomyelitis, the patient would likely require a Below knee amputation, I have discussed with vascular and they are aware. Currently the foot wound is superficial and it is difficult to differentiate chronic charcot vs osteomyelitis infection. The patient remains at very high risk for limb loss.       Labs/imaging reviewed, MRI left foot pending   abx per medicine team-thank you  Pain medication per medicine team      The patient refuses dressing change at this time. We will plan for weekly changes and if discharged the patient will folow up in the wound center.  The patient may complete passive ROM exercises to the left thigh and lower leg but should be nonweightbearing left foot. Pt has full range of motion to upper body and full weight-bearing to the right foot.        Elevate and offload B/L LE.  Float heels off edge of bed with foam block or air boots.       Foot and ankle will follow    HPI:  Pt is seen at bedside having significant pain to the abdomen, discussed with hospitalist who is bedside, the patient has both IV and Oral pain meds available PRN. No new pedal complaints at this time. Discussed case with nursing.     Objective:  Vitals: Patient Vitals for the past 12 hrs:   BP Temp Pulse Resp SpO2   04/30/18 1741 (!) 159/95 - - - -   04/30/18 1714 (!) 119/118 96.9 °F (36.1 °C) (!) 119 20 100 %   04/30/18 1713 (!) 119/118 - - - -   04/30/18 1501 (!) 193/110 98.3 °F (36.8 °C) (!) 119 20 100 %   04/30/18 1152 - 96.8 °F (36 °C) (!) 110 20 100 %   04/30/18 1131 (!) 177/98 96.9 °F (36.1 °C) (!) 103 20 100 %   04/30/18 1050 175/89 98.6 °F (37 °C) (!) 107 20 100 %   04/30/18 1026 (!) 185/101 96.8 °F (36 °C) (!) 106 20 100 %   04/30/18 1011 (!) 198/110 96.8 °F (36 °C) (!) 105 20 100 %   04/30/18 0955 (!) 217/198 96.8 °F (36 °C) (!) 103 20 100 %   04/30/18 0936 (!) 217/122 96.8 °F (36 °C) 98 20 -       Dermatological:  Dressing to left foot left clean dry and intact      Vascular:  Deferred      Neurological:   Epicritic and protective threshold is deminished to B/L LE, Pt is unable to detect a 5.07 monofilament wire on 5/5 random tested spots B/L LE.      MSK:  deferred .     Imaging:  Awaiting results of MRI    Labs:  Recent Labs      04/30/18   1148  04/30/18   0624   WBC   --   4.8   CREA  2.77*  2.96*   BUN  42*  45*   HGB   --   9.0*   HCT   --   28.1*   NA  141  140   K  4.7  4.6   CL  109*  109*   CO2  23  21   GLU  244*  [address] [place]  4/30/2018  [facility] of [address] [place] [address]. Karen [address] [place] [address]  P: [phone]  F: [phone]

## 2018-04-30 NOTE — PROGRESS NOTES
[facility]   [address] [facility], [address] [place] [address], [facility]  Phone: [phone]   [phone]       Nephrology Progress Note  Wyatt Hinton     1980     [id number]  Date of Admission : 4/27/2018 04/30/18    CC: Follow up for STONE       Assessment and Plan   STONE on CKD   - Cr improving  - cont IVF x 1 more liter   - daily labs    Acute on Chronic Hyper K :  - K stable  - low K diet  - may need Velt[place] as an outpt    Metabolic acidosis :  - resolving  - cont bicarb drip for today    CKD Stage IV :  - 2./2 DM, SCD  - previously dialysis dependent for ~ 6m. Failed RUE and RLE grafts     Sickle cell dz  Anemia   Left foot infection      Interval History:  Seen and examined. C/o ongoing pain. K and Cr better today. No cp or sob reported. Generalized body aches. Review of Systems: Pertinent items are noted in HPI.     Current Medications:   Current Facility-Administered Medications   Medication Dose Route Frequency    ondansetron (ZOFRAN) injection 4 mg  4 mg IntraVENous Q4H PRN    hydrALAZINE (APRESOLINE) tablet 50 mg  50 mg Oral TID    hydrALAZINE (APRESOLINE) 20 mg/mL injection 20 mg  20 mg IntraVENous Q6H PRN    HYDROmorphone (DILAUDID) injection 1 mg  1 mg IntraVENous Q6H PRN    promethazine (PHENERGAN) 6.25 mg/5 mL syrup 6.25 mg  6.25 mg Oral Q6H PRN    sodium chloride (NS) flush 20 mL  20 mL InterCATHeter PRN    sodium chloride (NS) flush 10 mL  10 mL InterCATHeter Q24H    sodium chloride (NS) flush 10 mL  10 mL InterCATHeter PRN    sodium chloride (NS) flush 10 mL  10 mL InterCATHeter Q8H    sodium chloride (NS) flush 20 mL  20 mL InterCATHeter Q24H    NIFEdipine ER (PROCARDIA XL) tablet 90 mg  90 mg Oral DAILY    calcium carbonate (OS-BINA) tablet 500 mg [elemental]  500 mg Oral DAILY    QUEtiapine (SEROquel) tablet 100 mg  100 mg Oral BID    venlafaxine (EFFEXOR) tablet 75 mg  75 mg Oral BID WITH MEALS    sodium chloride (NS) flush 5-10 mL  5-10 mL IntraVENous Q8H    sodium chloride (NS) flush 5-10 mL  5-10 mL IntraVENous PRN    heparin (porcine) injection 5,000 Units  5,000 Units SubCUTAneous Q8H    albuterol-ipratropium (DUO-NEB) 2.5 MG-0.5 MG/3 ML  3 mL Nebulization Q4H PRN    lactobac ac& pc-s.therm-b.anim (CHRIS Q/RISAQUAD)  1 Cap Oral DAILY    insulin lispro (HUMALOG) injection   SubCUTAneous AC&HS    glucose chewable tablet 16 g  4 Tab Oral PRN    dextrose (D50W) injection syrg 12.5-25 g  12.5-25 g IntraVENous PRN    glucagon (GLUCAGEN) injection 1 mg  1 mg IntraMUSCular PRN    doxycycline (VIBRAMYCIN) 100 mg in 0.9% sodium chloride (MBP/ADV) 100 mL  100 mg IntraVENous Q12H    insulin glargine (LANTUS) injection 5 Units  5 Units SubCUTAneous QHS    acetaminophen (TYLENOL) tablet 650 mg  650 mg Oral Q4H PRN    diphenhydrAMINE (BENADRYL) injection 12.5 mg  12.5 mg IntraVENous Q6H PRN    oxyCODONE-acetaminophen (PERCOCET) 5-325 mg per tablet 1 Tab  1 Tab Oral Q4H PRN      Allergies   Allergen Reactions    Erythromycin Itching    Morphine Itching    Toradol [Ketorolac] Rash       Objective:  Vitals:    Vitals:    04/30/18 0955 04/30/18 1011 04/30/18 1026 04/30/18 1050   BP: (!) 217/198 (!) 198/110 (!) 185/101 175/89   Pulse: (!) 103 (!) 105 (!) 106 (!) 107   Resp: 20 20 20 20   Temp: 96.8 °F (36 °C) 96.8 °F (36 °C) 96.8 °F (36 °C) 98.6 °F (37 °C)   SpO2: 100% 100% 100% 100%   Weight:       Height:         Intake and Output:          Physical Examination:    General: NAD,Conversant   Neck:  Supple, no mass  Resp:  Lungs CTA B/L, no wheezing , normal respiratory effort  CV:  RRR,  no murmur or rub,trace LE edema  GI:  Soft, NT, + Bowel sounds, no hepatosplenomegaly  Neurologic:  Non focal  Skin:  No Rash  :  No escobedo     []    High complexity decision making was performed  []    Patient is at high-risk of decompensation with multiple organ involvement    Lab Data Personally Reviewed: I have reviewed all the pertinent labs, microbiology data and radiology studies during assessment.     Recent Labs      04/30/18   0624  04/29/18   1400  04/28/18   0217  04/27/18   1711   NA  140  139  136  138  138   K  4.6  5.1  6.3*  6.2*  6.2*   CL  109*  109*  113*  111*  111*   CO2  21  23  15*  18*  20*   GLU  299*  216*  105*  147*  153*   BUN  45*  56*  74*  82*  84*   CREA  2.96*  3.16*  4.06*  4.72*  4.63*   CA  8.9  7.9*  8.3*  8.5  8.3*   MG  1.6   --    --    --    PHOS   --   3.4  4.6  5.4*   ALB   --   2.9*  3.6  3.7  3.6   SGOT   --    --    --   34   ALT   --    --    --   30     Recent Labs      04/30/18   0624  04/27/18   1711   WBC  4.8  5.1   HGB  9.0*  9.9*   HCT  28.1*  31.5*   PLT  153  192     Lab Results   Component Value Date/Time    Specimen Description: URINE 06/24/2013 08:00 PM    Specimen Description: URINE 06/17/2013 07:55 PM    Specimen Description: URINE 05/26/2013 10:10 AM    Specimen Description: URINE 04/22/2013 02:30 PM    Specimen Description: NARES 03/21/2013 12:30 PM     Lab Results   Component Value Date/Time    Culture result: NO GROWTH 3 DAYS 04/27/2018 05:11 PM    Culture result: FEW STAPHYLOCOCCUS HOMINIS SUBSPECIES HOMINIS (A) 03/11/2018 12:47 PM    Culture result: (A) 03/11/2018 12:47 PM     STAPHYLOCOCCUS EPIDERMIDIS (OXACILLIN RESISTANT) ISOLATED FROM THIO BROTH ONLY    Culture result: NO ANAEROBES ISOLATED 03/11/2018 12:47 PM    Culture result: NO GROWTH ON SOLID MEDIA AT 4 DAYS 03/11/2018 11:43 AM    Culture result: (A) 03/11/2018 11:43 AM     STAPHYLOCOCCUS EPIDERMIDIS (OXACILLIN RESISTANT) ISOLATED FROM THIO BROTH ONLY    Culture result: NO GROWTH ON SOLID MEDIA AT 4 DAYS 03/11/2018 11:43 AM    Culture result: (A) 03/11/2018 11:43 AM     STAPHYLOCOCCUS EPIDERMIDIS ISOLATED FROM THIO BROTH ONLY     Recent Results (from the past 24 hour(s))   GLUCOSE, POC    Collection Time: 04/29/18 11:44 AM   Result Value Ref Range    Glucose (POC) 179 (H) 65 - 100 mg/dL    Performed by Yola Soto    RENAL FUNCTION PANEL Collection Time: 04/29/18  2:00 PM   Result Value Ref Range    Sodium 139 136 - 145 mmol/L    Potassium 5.1 3.5 - 5.1 mmol/L    Chloride 109 (H) 97 - 108 mmol/L    CO2 23 21 - 32 mmol/L    Anion gap 7 5 - 15 mmol/L    Glucose 216 (H) 65 - 100 mg/dL    BUN 56 (H) 6 - 20 MG/DL    Creatinine 3.16 (H) 0.55 - 1.02 MG/DL    BUN/Creatinine ratio 18 12 - 20      GFR est AA 20 (L) >60 ml/min/1.73m2    GFR est non-AA 16 (L) >60 ml/min/1.73m2    Calcium 7.9 (L) 8.5 - 10.1 MG/DL    Phosphorus 3.4 2.6 - 4.7 MG/DL    Albumin 2.9 (L) 3.5 - 5.0 g/dL   GLUCOSE, POC    Collection Time: 04/29/18  4:35 PM   Result Value Ref Range    Glucose (POC) 297 (H) 65 - 100 mg/dL    Performed by [address] [place] [address], POC    Collection Time: 04/29/18  9:23 PM   Result Value Ref Range    Glucose (POC) 189 (H) 65 - 100 mg/dL    Performed by KARENA WHITLEY    AMYLASE    Collection Time: 04/30/18  6:24 AM   Result Value Ref Range    Amylase 229 (H) 25 - 115 U/L   FERRITIN    Collection Time: 04/30/18  6:24 AM   Result Value Ref Range    Ferritin 426 (H) 8 - 252 NG/ML   FOLATE    Collection Time: 04/30/18  6:24 AM   Result Value Ref Range    Folate 61.0 (H) 5.0 - 21.0 ng/mL   HEMOGLOBIN A1C WITH EAG    Collection Time: 04/30/18  6:24 AM   Result Value Ref Range    Hemoglobin A1c 6.9 (H) 4.2 - 6.3 %    Est. average glucose 151 mg/dL   IRON    Collection Time: 04/30/18  6:24 AM   Result Value Ref Range    Iron 123 35 - 150 ug/dL   LIPASE    Collection Time: 04/30/18  6:24 AM   Result Value Ref Range    Lipase 1848 (H) 73 - 393 U/L   LIPID PANEL    Collection Time: 04/30/18  6:24 AM   Result Value Ref Range    LIPID PROFILE          Cholesterol, total 137 <200 MG/DL    Triglyceride 65 <150 MG/DL    HDL Cholesterol 35 MG/DL    LDL, calculated 89 0 - 100 MG/DL    VLDL, calculated 13 MG/DL    CHOL/HDL Ratio 3.9 0 - 5.0     CBC WITH AUTOMATED DIFF    Collection Time: 04/30/18  6:24 AM   Result Value Ref Range    WBC 4.8 3.6 - 11.0 K/uL    RBC 3.69 (L) 3.80 - 5.20 M/uL    HGB 9.0 (L) 11.5 - 16.0 g/dL    HCT 28.1 (L) 35.0 - 47.0 %    MCV 76.2 (L) 80.0 - 99.0 FL    MCH 24.4 (L) 26.0 - 34.0 PG    MCHC 32.0 30.0 - 36.5 g/dL    RDW 16.8 (H) 11.5 - 14.5 %    PLATELET 136 742 - 007 K/uL    MPV ABNORMAL 8.9 - 12.9 FL    NRBC 0.0 0  WBC    ABSOLUTE NRBC 0.00 0.00 - 0.01 K/uL    NEUTROPHILS 85 (H) 32 - 75 %    LYMPHOCYTES 12 12 - 49 %    MONOCYTES 2 (L) 5 - 13 %    EOSINOPHILS 1 0 - 7 %    BASOPHILS 0 0 - 1 %    IMMATURE GRANULOCYTES 0 0.0 - 0.5 %    ABS. NEUTROPHILS 4.1 1.8 - 8.0 K/UL    ABS. LYMPHOCYTES 0.6 (L) 0.8 - 3.5 K/UL    ABS. MONOCYTES 0.1 0.0 - 1.0 K/UL    ABS. EOSINOPHILS 0.0 0.0 - 0.4 K/UL    ABS. BASOPHILS 0.0 0.0 - 0.1 K/UL    ABS. IMM. GRANS. 0.0 0.00 - 0.04 K/UL    DF AUTOMATED      PLATELET COMMENTS Large Platelets      RBC COMMENTS ANISOCYTOSIS  1+        RBC COMMENTS MICROCYTOSIS  1+        RBC COMMENTS STOMATOCYTES  PRESENT        RBC COMMENTS TARGET CELLS  PRESENT        RBC COMMENTS OVALOCYTES  PRESENT       METABOLIC PANEL, BASIC    Collection Time: 04/30/18  6:24 AM   Result Value Ref Range    Sodium 140 136 - 145 mmol/L    Potassium 4.6 3.5 - 5.1 mmol/L    Chloride 109 (H) 97 - 108 mmol/L    CO2 21 21 - 32 mmol/L    Anion gap 10 5 - 15 mmol/L    Glucose 299 (H) 65 - 100 mg/dL    BUN 45 (H) 6 - 20 MG/DL    Creatinine 2.96 (H) 0.55 - 1.02 MG/DL    BUN/Creatinine ratio 15 12 - 20      GFR est AA 21 (L) >60 ml/min/1.73m2    GFR est non-AA 18 (L) >60 ml/min/1.73m2    Calcium 8.9 8.5 - 10.1 MG/DL   MAGNESIUM    Collection Time: 04/30/18  6:24 AM   Result Value Ref Range    Magnesium 1.6 1.6 - 2.4 mg/dL   GLUCOSE, POC    Collection Time: 04/30/18  6:32 AM   Result Value Ref Range    Glucose (POC) 295 (H) 65 - 100 mg/dL    Performed by Adam Garner            Total time spent with patient:  xxx   min.                                Care Plan discussed with:  Patient     Family      RN      Consulting Physician [facility],         I have reviewed the flowsheets. Chart and Pertinent Notes have been reviewed. No change in PMH ,family and social history from Consult note.       Marija Jones MD

## 2018-05-01 LAB
ALBUMIN SERPL-MCNC: 2.9 G/DL (ref 3.5–5)
ANION GAP SERPL CALC-SCNC: 10 MMOL/L (ref 5–15)
ATRIAL RATE: 119 BPM
BASOPHILS # BLD: 0 K/UL (ref 0–0.1)
BASOPHILS NFR BLD: 0 % (ref 0–1)
BUN SERPL-MCNC: 42 MG/DL (ref 6–20)
BUN/CREAT SERPL: 15 (ref 12–20)
CALCIUM SERPL-MCNC: 8.9 MG/DL (ref 8.5–10.1)
CALCULATED P AXIS, ECG09: 60 DEGREES
CALCULATED R AXIS, ECG10: 31 DEGREES
CALCULATED T AXIS, ECG11: 68 DEGREES
CHLORIDE SERPL-SCNC: 111 MMOL/L (ref 97–108)
CO2 SERPL-SCNC: 24 MMOL/L (ref 21–32)
CREAT SERPL-MCNC: 2.84 MG/DL (ref 0.55–1.02)
DIAGNOSIS, 93000: NORMAL
DIFFERENTIAL METHOD BLD: ABNORMAL
EOSINOPHIL # BLD: 0 K/UL (ref 0–0.4)
EOSINOPHIL NFR BLD: 1 % (ref 0–7)
ERYTHROCYTE [DISTWIDTH] IN BLOOD BY AUTOMATED COUNT: 17 % (ref 11.5–14.5)
GLUCOSE BLD STRIP.AUTO-MCNC: 133 MG/DL (ref 65–100)
GLUCOSE BLD STRIP.AUTO-MCNC: 143 MG/DL (ref 65–100)
GLUCOSE BLD STRIP.AUTO-MCNC: 172 MG/DL (ref 65–100)
GLUCOSE BLD STRIP.AUTO-MCNC: 185 MG/DL (ref 65–100)
GLUCOSE SERPL-MCNC: 183 MG/DL (ref 65–100)
HCT VFR BLD AUTO: 26.3 % (ref 35–47)
HGB BLD-MCNC: 8.5 G/DL (ref 11.5–16)
IMM GRANULOCYTES # BLD: 0 K/UL (ref 0–0.04)
IMM GRANULOCYTES NFR BLD AUTO: 0 % (ref 0–0.5)
LYMPHOCYTES # BLD: 0.8 K/UL (ref 0.8–3.5)
LYMPHOCYTES NFR BLD: 13 % (ref 12–49)
MAGNESIUM SERPL-MCNC: 1.6 MG/DL (ref 1.6–2.4)
MCH RBC QN AUTO: 24.6 PG (ref 26–34)
MCHC RBC AUTO-ENTMCNC: 32.3 G/DL (ref 30–36.5)
MCV RBC AUTO: 76.2 FL (ref 80–99)
MONOCYTES # BLD: 0.4 K/UL (ref 0–1)
MONOCYTES NFR BLD: 7 % (ref 5–13)
NEUTS SEG # BLD: 4.8 K/UL (ref 1.8–8)
NEUTS SEG NFR BLD: 79 % (ref 32–75)
NRBC # BLD: 0 K/UL (ref 0–0.01)
NRBC BLD-RTO: 0 PER 100 WBC
P-R INTERVAL, ECG05: 124 MS
PHOSPHATE SERPL-MCNC: 4 MG/DL (ref 2.6–4.7)
PLATELET # BLD AUTO: 165 K/UL (ref 150–400)
POTASSIUM SERPL-SCNC: 4.3 MMOL/L (ref 3.5–5.1)
Q-T INTERVAL, ECG07: 330 MS
QRS DURATION, ECG06: 82 MS
QTC CALCULATION (BEZET), ECG08: 464 MS
RBC # BLD AUTO: 3.45 M/UL (ref 3.8–5.2)
SERVICE CMNT-IMP: ABNORMAL
SODIUM SERPL-SCNC: 145 MMOL/L (ref 136–145)
VENTRICULAR RATE, ECG03: 119 BPM
WBC # BLD AUTO: 6.1 K/UL (ref 3.6–11)

## 2018-05-01 PROCEDURE — 65660000000 HC RM CCU STEPDOWN

## 2018-05-01 PROCEDURE — 94664 DEMO&/EVAL PT USE INHALER: CPT

## 2018-05-01 PROCEDURE — 77030018717 HC DRSG GRNUFM KCON -B

## 2018-05-01 PROCEDURE — 74011250636 HC RX REV CODE- 250/636: Performed by: INTERNAL MEDICINE

## 2018-05-01 PROCEDURE — 74011250636 HC RX REV CODE- 250/636: Performed by: HOSPITALIST

## 2018-05-01 PROCEDURE — 74011636637 HC RX REV CODE- 636/637: Performed by: INTERNAL MEDICINE

## 2018-05-01 PROCEDURE — 74011000250 HC RX REV CODE- 250: Performed by: INTERNAL MEDICINE

## 2018-05-01 PROCEDURE — 77010033678 HC OXYGEN DAILY

## 2018-05-01 PROCEDURE — 80069 RENAL FUNCTION PANEL: CPT | Performed by: INTERNAL MEDICINE

## 2018-05-01 PROCEDURE — 36415 COLL VENOUS BLD VENIPUNCTURE: CPT | Performed by: INTERNAL MEDICINE

## 2018-05-01 PROCEDURE — 74011250637 HC RX REV CODE- 250/637: Performed by: INTERNAL MEDICINE

## 2018-05-01 PROCEDURE — 94640 AIRWAY INHALATION TREATMENT: CPT

## 2018-05-01 PROCEDURE — 77030018846 HC SOL IRR STRL H20 ICUM -A

## 2018-05-01 PROCEDURE — 74011000250 HC RX REV CODE- 250: Performed by: FAMILY MEDICINE

## 2018-05-01 PROCEDURE — 82962 GLUCOSE BLOOD TEST: CPT

## 2018-05-01 PROCEDURE — 83735 ASSAY OF MAGNESIUM: CPT | Performed by: INTERNAL MEDICINE

## 2018-05-01 PROCEDURE — 77030029684 HC NEB SM VOL KT MONA -A

## 2018-05-01 PROCEDURE — 85025 COMPLETE CBC W/AUTO DIFF WBC: CPT | Performed by: INTERNAL MEDICINE

## 2018-05-01 PROCEDURE — 74011000258 HC RX REV CODE- 258: Performed by: INTERNAL MEDICINE

## 2018-05-01 RX ORDER — LABETALOL HYDROCHLORIDE 5 MG/ML
20 INJECTION, SOLUTION INTRAVENOUS ONCE
Status: COMPLETED | OUTPATIENT
Start: 2018-05-01 | End: 2018-05-01

## 2018-05-01 RX ORDER — SODIUM CHLORIDE, SODIUM LACTATE, POTASSIUM CHLORIDE, CALCIUM CHLORIDE 600; 310; 30; 20 MG/100ML; MG/100ML; MG/100ML; MG/100ML
100 INJECTION, SOLUTION INTRAVENOUS CONTINUOUS
Status: DISCONTINUED | OUTPATIENT
Start: 2018-05-01 | End: 2018-05-06

## 2018-05-01 RX ORDER — HYDRALAZINE HYDROCHLORIDE 20 MG/ML
20 INJECTION INTRAMUSCULAR; INTRAVENOUS
Status: DISCONTINUED | OUTPATIENT
Start: 2018-05-01 | End: 2018-05-15 | Stop reason: HOSPADM

## 2018-05-01 RX ADMIN — Medication 1 CAPSULE: at 10:22

## 2018-05-01 RX ADMIN — Medication 10 ML: at 06:00

## 2018-05-01 RX ADMIN — HEPARIN SODIUM 5000 UNITS: 5000 INJECTION, SOLUTION INTRAVENOUS; SUBCUTANEOUS at 04:55

## 2018-05-01 RX ADMIN — CALCIUM 500 MG: 500 TABLET ORAL at 10:21

## 2018-05-01 RX ADMIN — ONDANSETRON HYDROCHLORIDE 4 MG: 2 INJECTION INTRAMUSCULAR; INTRAVENOUS at 14:19

## 2018-05-01 RX ADMIN — HYDROMORPHONE HYDROCHLORIDE 1 MG: 2 INJECTION, SOLUTION INTRAMUSCULAR; INTRAVENOUS; SUBCUTANEOUS at 06:00

## 2018-05-01 RX ADMIN — IPRATROPIUM BROMIDE AND ALBUTEROL SULFATE 3 ML: .5; 3 SOLUTION RESPIRATORY (INHALATION) at 10:44

## 2018-05-01 RX ADMIN — Medication 10 ML: at 13:50

## 2018-05-01 RX ADMIN — ACETAMINOPHEN 650 MG: 325 TABLET, FILM COATED ORAL at 01:17

## 2018-05-01 RX ADMIN — VENLAFAXINE 75 MG: 37.5 TABLET ORAL at 10:29

## 2018-05-01 RX ADMIN — OXYCODONE AND ACETAMINOPHEN 1 TABLET: 5; 325 TABLET ORAL at 10:22

## 2018-05-01 RX ADMIN — NITROGLYCERIN 2 INCH: 20 OINTMENT TOPICAL at 10:22

## 2018-05-01 RX ADMIN — INSULIN LISPRO 2 UNITS: 100 INJECTION, SOLUTION INTRAVENOUS; SUBCUTANEOUS at 17:33

## 2018-05-01 RX ADMIN — VENLAFAXINE 75 MG: 37.5 TABLET ORAL at 16:57

## 2018-05-01 RX ADMIN — INSULIN GLARGINE 10 UNITS: 100 INJECTION, SOLUTION SUBCUTANEOUS at 23:00

## 2018-05-01 RX ADMIN — DOXYCYCLINE 100 MG: 100 INJECTION, POWDER, LYOPHILIZED, FOR SOLUTION INTRAVENOUS at 10:29

## 2018-05-01 RX ADMIN — WATER: 1000 INJECTION, SOLUTION INTRAVENOUS at 00:56

## 2018-05-01 RX ADMIN — IPRATROPIUM BROMIDE AND ALBUTEROL SULFATE 3 ML: .5; 3 SOLUTION RESPIRATORY (INHALATION) at 17:10

## 2018-05-01 RX ADMIN — Medication 10 ML: at 22:00

## 2018-05-01 RX ADMIN — NITROGLYCERIN 2 INCH: 20 OINTMENT TOPICAL at 17:33

## 2018-05-01 RX ADMIN — HYDRALAZINE HYDROCHLORIDE 50 MG: 50 TABLET, FILM COATED ORAL at 22:19

## 2018-05-01 RX ADMIN — HEPARIN SODIUM 5000 UNITS: 5000 INJECTION, SOLUTION INTRAVENOUS; SUBCUTANEOUS at 13:49

## 2018-05-01 RX ADMIN — DIPHENHYDRAMINE HYDROCHLORIDE 25 MG: 25 CAPSULE ORAL at 04:55

## 2018-05-01 RX ADMIN — SODIUM CHLORIDE, SODIUM LACTATE, POTASSIUM CHLORIDE, AND CALCIUM CHLORIDE 75 ML/HR: 600; 310; 30; 20 INJECTION, SOLUTION INTRAVENOUS at 10:19

## 2018-05-01 RX ADMIN — NIFEDIPINE 90 MG: 60 TABLET, FILM COATED, EXTENDED RELEASE ORAL at 10:21

## 2018-05-01 RX ADMIN — Medication 10 ML: at 14:20

## 2018-05-01 RX ADMIN — HYDROMORPHONE HYDROCHLORIDE 1 MG: 2 INJECTION, SOLUTION INTRAMUSCULAR; INTRAVENOUS; SUBCUTANEOUS at 23:00

## 2018-05-01 RX ADMIN — Medication 10 ML: at 17:00

## 2018-05-01 RX ADMIN — HYDROMORPHONE HYDROCHLORIDE 1 MG: 2 INJECTION, SOLUTION INTRAMUSCULAR; INTRAVENOUS; SUBCUTANEOUS at 14:19

## 2018-05-01 RX ADMIN — INSULIN LISPRO 2 UNITS: 100 INJECTION, SOLUTION INTRAVENOUS; SUBCUTANEOUS at 07:11

## 2018-05-01 RX ADMIN — QUETIAPINE FUMARATE 100 MG: 100 TABLET ORAL at 10:24

## 2018-05-01 RX ADMIN — ONDANSETRON HYDROCHLORIDE 4 MG: 2 INJECTION INTRAMUSCULAR; INTRAVENOUS at 22:18

## 2018-05-01 RX ADMIN — HYDROMORPHONE HYDROCHLORIDE 1 MG: 2 INJECTION, SOLUTION INTRAMUSCULAR; INTRAVENOUS; SUBCUTANEOUS at 00:17

## 2018-05-01 RX ADMIN — DOXYCYCLINE 100 MG: 100 INJECTION, POWDER, LYOPHILIZED, FOR SOLUTION INTRAVENOUS at 22:20

## 2018-05-01 RX ADMIN — HYDRALAZINE HYDROCHLORIDE 50 MG: 50 TABLET, FILM COATED ORAL at 10:21

## 2018-05-01 RX ADMIN — INSULIN LISPRO 2 UNITS: 100 INJECTION, SOLUTION INTRAVENOUS; SUBCUTANEOUS at 11:30

## 2018-05-01 RX ADMIN — LABETALOL HYDROCHLORIDE 20 MG: 5 INJECTION, SOLUTION INTRAVENOUS at 07:13

## 2018-05-01 RX ADMIN — QUETIAPINE FUMARATE 100 MG: 100 TABLET ORAL at 17:32

## 2018-05-01 RX ADMIN — OXYCODONE AND ACETAMINOPHEN 1 TABLET: 5; 325 TABLET ORAL at 17:41

## 2018-05-01 RX ADMIN — Medication 10 ML: at 16:58

## 2018-05-01 RX ADMIN — HEPARIN SODIUM 5000 UNITS: 5000 INJECTION, SOLUTION INTRAVENOUS; SUBCUTANEOUS at 22:19

## 2018-05-01 RX ADMIN — HYDRALAZINE HYDROCHLORIDE 50 MG: 50 TABLET, FILM COATED ORAL at 16:57

## 2018-05-01 NOTE — PROGRESS NOTES
1145: called patients , Pastora Espinoza to inform him that patient had been moved to room 445; he verbalized understanding

## 2018-05-01 NOTE — PROGRESS NOTES
CM noted patient was transferred from [address] [place] [address] to [facility] due to elevated /109 and . Patient on telemetry. CM attempted to meet with patient but she was sleeping. CM did not wake patient as she needed to rest with medications given for elevated BP and HR. CM will follow up with patient in am when medically stable. Marleni Wood MSA, RN, CRM.

## 2018-05-01 NOTE — PROGRESS NOTES
Bedside and Verbal shift change report given to [facility] (oncoming nurse) by Miriam Leyva (offgoing nurse). Report included the following information SBAR.

## 2018-05-01 NOTE — PROGRESS NOTES
Patient continues to have elevated BP and plan for transfer to higher level of care. We will hold PT assessment/gait training until BP is under tighter control.     Yudy Nolan, PT

## 2018-05-01 NOTE — PROGRESS NOTES
Hospitalist Progress Note  Ventura Solano MD  Answering service: [phone] OR [phone] from in house phone  Cell: [phone]      Date of Service:  2018  NAME:  Adia Marte  :  1980  MRN:  [id number]      Admission Summary:      Per H&P, \"This is a 13-year-old woman with a past medical history significant for hypertension, type 2 diabetes, obstructive sleep apnea, depression, chronic kidney disease, narcotic dependency was in her usual state of health until about 3 days ago when the patient developed left leg pain and swelling. The pain is a constant 8/10 in severity. Patient described the pain as a sharp shooting pain in her left leg. No relief with pain medication. No known aggravating factors. Last month, the patient underwent a transmetatarsal resection of the left foot. She is under the care of a podiatrist.  The procedure was performed because of a diabetic foot infection. The patient's homecare nurse noticed increased swelling of the left leg. The patient's podiatrist was contacted. The podiatrist advised that the patient go to the emergency room for further evaluation. She completed antibiotics about a week ago. The patient denies fever, rigors and chills. Patient also complained of abdominal pain, nausea, and vomiting. The abdominal pain is diffuse in location. When the patient arrived at the emergency room, patient was seen by her podiatrist while she was still in the emergency room. The initial plan was to discharge the patient back home on oral antibiotics, but the x-ray of the left foot shows evidence of osteomyelitis. Her podiatrist then advised admission to the hospital.  The hospitalist service was asked to admit the patient to the hospital.  Patient also has worsening of her renal function. For that her nephrologist was consulted by the emergency room physician. \"     Interval history / Subjective:     F/u for pancreatitis    Patient had elevated blood pressure overnight requiring I.V anti-hypertensive's. She is requesting for pain medication. She states that her abdomen hurts. MRI of the left foot pending. Assessment & Plan:     Acute on chronic pancreatitis: Patient with N/V, abdominal pain that increased overnight in severity and elevated lipase/amylase. -CT A/P with calcified pancreas  -pain control  -switch IVF to ringer's lactate    Acute hyperkalemia due to kidney disease:  -patient got shifting agents initially and is now s/p kayexylate, per Renal  - now resolved. Osteomyelitis of the left foot:  -the patient was on vancomycin and Zosyn initially by the admitting team  -Podiatry was consulted  - MRI of the left foot pending  - On Doxycycline    Acute on chronic kidney disease stage V  -holding diuretic and patient on fluids, per Renal   -appreciate further recs    Essential Hypertension: Uncontrolled  Continue current anti-hypertensive's. Add hydralazine PRN SBP > 160 mm hg     Microcytic Anemia. This is most likely due to chronic kidney disease. Type 2 diabetes type 2: Continue sliding scale. accu checks    Obstructive sleep apnea    Depression: Continue home meds. Code status: Full  DVT prophylaxis: heparin    Care Plan discussed with: Patient/Family and Nurse  Disposition: TBD     Hospital Problems  Date Reviewed: 4/28/2018          Codes Class Noted POA    * (Principal)Osteomyelitis of left foot ([facility] [address] ICD-10-CM: M86.9  ICD-9-CM: 730.27  4/27/2018 Yes              Review of Systems:   Pertinent items are noted in HPI. Vital Signs:    Last 24hrs VS reviewed since prior progress note.  Most recent are:  Visit Vitals    /43 (BP 1 Location: Left arm, BP Patient Position: At rest)    Pulse 99    Temp 99 °F (37.2 °C)    Resp 14    Ht 5' 2\" (1.575 m)    Wt 62.5 kg (137 lb 12.6 oz)    SpO2 100%    BMI 25.2 kg/m2         Intake/Output Summary (Last 24 hours) at 05/01/18 1329  Last data filed at 05/01/18 1126   Gross per 24 hour   Intake              150 ml   Output                0 ml   Net              150 ml        Physical Examination:             Constitutional:  Mild to moderate distress   ENT:  Neck supple   Resp:  CTA bilaterally. CV:  Regular rhythm, increased rate    GI:  Soft, non distended, diffusely tender to light palpation. No rigidity    Musculoskeletal:  LLE larger than R (pateint states this is baseline). Partial amputation of L foot with wrapping    Neurologic:  Moves all extremities.   AAOx3        Data Review:    Review and/or order of clinical lab test  Review and/or order of tests in the medicine section of [facility]      Labs:     Recent Labs      05/01/18   0505 04/30/18 0624   WBC  6.1  4.8   HGB  8.5*  9.0*   HCT  26.3*  28.1*   PLT  165  153     Recent Labs      05/01/18   0505 04/30/18 2011 04/30/18 1148 04/30/18 0624 04/29/18   1400   NA  145  144  141  140  139   K  4.3  4.3  4.7  4.6  5.1   CL  111*  112*  109*  109*  109*   CO2  24  23  23  21  23   BUN  42*  41*  42*  45*  56*   CREA  2.84*  2.79*  2.77*  2.96*  3.16*   GLU  183*  185*  244*  299*  216*   CA  8.9  9.2  9.2  8.9  7.9*   MG  1.6   --    --   1.6   --    PHOS  4.0   --   3.3   --   3.4     Recent Labs      05/01/18 0505 04/30/18 1148 04/30/18 0624 04/29/18   1400   SGOT   --   25   --    --    ALT   --   27   --    --    AP   --   329*   --    --    TBILI   --   0.3   --    --    TP   --   8.7*   --    --    ALB  2.9*  3.3*   --   2.9*   GLOB   --   5.4*   --    --    AML   --    --   229*   --    LPSE   --    --   1848*   --      Lab Results   Component Value Date/Time    Folate 61.0 (H) 04/30/2018 06:24 AM      Lab Results   Component Value Date/Time    Cholesterol, total 137 04/30/2018 06:24 AM    HDL Cholesterol 35 04/30/2018 06:24 AM    LDL, calculated 89 04/30/2018 06:24 AM    Triglyceride 65 04/30/2018 06:24 AM    CHOL/HDL Ratio 3.9 04/30/2018 06:24 AM     Lab Results   Component Value Date/Time    Glucose (POC) 143 (H) 05/01/2018 11:28 AM    Glucose (POC) 172 (H) 05/01/2018 06:34 AM    Glucose (POC) 189 (H) 04/30/2018 09:11 PM    Glucose (POC) 233 (H) 04/30/2018 04:48 PM    Glucose (POC) 247 (H) 04/30/2018 12:09 PM     Lab Results   Component Value Date/Time    Color YELLOW/STRAW 03/02/2018 10:50 PM    Appearance CLOUDY (A) 03/02/2018 10:50 PM    Specific gravity 1.017 03/02/2018 10:50 PM    Specific gravity 1.015 07/26/2010 11:18 AM    pH (UA) 5.0 03/02/2018 10:50 PM    Protein 100 (A) 03/02/2018 10:50 PM    Glucose NEGATIVE  03/02/2018 10:50 PM    Ketone NEGATIVE  03/02/2018 10:50 PM    Bilirubin NEGATIVE  03/02/2018 10:50 PM    Urobilinogen 1.0 03/02/2018 10:50 PM    Nitrites NEGATIVE  03/02/2018 10:50 PM    Leukocyte Esterase NEGATIVE  03/02/2018 10:50 PM    Epithelial cells MANY (A) 03/02/2018 10:50 PM    Bacteria 3+ (A) 03/02/2018 10:50 PM    WBC 5-10 03/02/2018 10:50 PM    RBC 0-5 03/02/2018 10:50 PM       Medications Reviewed:     Current Facility-Administered Medications   Medication Dose Route Frequency    hydrALAZINE (APRESOLINE) 20 mg/mL injection 20 mg  20 mg IntraVENous Q4H PRN    lactated Ringers infusion  75 mL/hr IntraVENous CONTINUOUS    ondansetron (ZOFRAN) injection 4 mg  4 mg IntraVENous Q4H PRN    hydrALAZINE (APRESOLINE) tablet 50 mg  50 mg Oral TID    diphenhydrAMINE (BENADRYL) capsule 25 mg  25 mg Oral Q6H PRN    insulin glargine (LANTUS) injection 10 Units  10 Units SubCUTAneous QHS    HYDROmorphone (PF) (DILAUDID) injection 1 mg  1 mg IntraVENous Q6H PRN    nitroglycerin (NITROBID) 2 % ointment 2 Inch  2 Inch Topical BID    promethazine (PHENERGAN) 6.25 mg/5 mL syrup 6.25 mg  6.25 mg Oral Q6H PRN    sodium chloride (NS) flush 20 mL  20 mL InterCATHeter PRN    sodium chloride (NS) flush 10 mL  10 mL InterCATHeter Q24H    sodium chloride (NS) flush 10 mL  10 mL InterCATHeter PRN    sodium chloride (NS) flush 10 mL  10 mL InterCATHeter Q8H    sodium chloride (NS) flush 20 mL  20 mL InterCATHeter Q24H    NIFEdipine ER (PROCARDIA XL) tablet 90 mg  90 mg Oral DAILY    calcium carbonate (OS-BINA) tablet 500 mg [elemental]  500 mg Oral DAILY    QUEtiapine (SEROquel) tablet 100 mg  100 mg Oral BID    venlafaxine (EFFEXOR) tablet 75 mg  75 mg Oral BID WITH MEALS    sodium chloride (NS) flush 5-10 mL  5-10 mL IntraVENous Q8H    sodium chloride (NS) flush 5-10 mL  5-10 mL IntraVENous PRN    heparin (porcine) injection 5,000 Units  5,000 Units SubCUTAneous Q8H    albuterol-ipratropium (DUO-NEB) 2.5 MG-0.5 MG/3 ML  3 mL Nebulization Q4H PRN    lactobac ac& pc-s.therm-b.anim (CHRIS Q/RISAQUAD)  1 Cap Oral DAILY    insulin lispro (HUMALOG) injection   SubCUTAneous AC&HS    glucose chewable tablet 16 g  4 Tab Oral PRN    dextrose (D50W) injection syrg 12.5-25 g  12.5-25 g IntraVENous PRN    glucagon (GLUCAGEN) injection 1 mg  1 mg IntraMUSCular PRN    doxycycline (VIBRAMYCIN) 100 mg in 0.9% sodium chloride (MBP/ADV) 100 mL  100 mg IntraVENous Q12H    acetaminophen (TYLENOL) tablet 650 mg  650 mg Oral Q4H PRN    oxyCODONE-acetaminophen (PERCOCET) 5-325 mg per tablet 1 Tab  1 Tab Oral Q4H PRN     ______________________________________________________________________  EXPECTED LENGTH OF STAY: 3d 19h  ACTUAL LENGTH OF STAY:          4                 Tegan Perez MD

## 2018-05-01 NOTE — PROGRESS NOTES
Bedside shift change report given to VERONICA Dewitt (oncoming nurse) by Vitaly Wilson RN(offgoing nurse). Report given with SBAR.

## 2018-05-01 NOTE — PROGRESS NOTES
[facility]   [address] [facility], [address] [place] [address], [facility]  Phone: [phone]   [phone]       Nephrology Progress Note  Brissa Salguero     1980     [id number]  Date of Admission : 4/27/2018 05/01/18    CC: Follow up for STONE       Assessment and Plan   STONE on CKD   - Cr stable  - cont IV LR until pt is eating  - labs in AM    Acute on Chronic Hyper K :  - K stable  - low K diet    Metabolic acidosis :  - resolved  - on LR as of this AM    CKD Stage IV :  - 2/2 DM, SCD  - previously dialysis dependent for ~ 6m. Failed RUE and RLE grafts     Sickle cell dz  - hgb stable    Anemia     Left foot infection:  - per podiatry     Interval History:  Seen and examined. Cr stable. Ongoing pain. Rapid response called yesterday. Not eating anything. On IV LR. No cp, n/v/d reported at th is time. Review of Systems: Pertinent items are noted in HPI.     Current Medications:   Current Facility-Administered Medications   Medication Dose Route Frequency    hydrALAZINE (APRESOLINE) 20 mg/mL injection 20 mg  20 mg IntraVENous Q4H PRN    lactated Ringers infusion  75 mL/hr IntraVENous CONTINUOUS    ondansetron (ZOFRAN) injection 4 mg  4 mg IntraVENous Q4H PRN    hydrALAZINE (APRESOLINE) tablet 50 mg  50 mg Oral TID    diphenhydrAMINE (BENADRYL) capsule 25 mg  25 mg Oral Q6H PRN    insulin glargine (LANTUS) injection 10 Units  10 Units SubCUTAneous QHS    HYDROmorphone (PF) (DILAUDID) injection 1 mg  1 mg IntraVENous Q6H PRN    nitroglycerin (NITROBID) 2 % ointment 2 Inch  2 Inch Topical BID    promethazine (PHENERGAN) 6.25 mg/5 mL syrup 6.25 mg  6.25 mg Oral Q6H PRN    sodium chloride (NS) flush 20 mL  20 mL InterCATHeter PRN    sodium chloride (NS) flush 10 mL  10 mL InterCATHeter Q24H    sodium chloride (NS) flush 10 mL  10 mL InterCATHeter PRN    sodium chloride (NS) flush 10 mL  10 mL InterCATHeter Q8H    sodium chloride (NS) flush 20 mL  20 mL InterCATHeter Q24H    NIFEdipine ER (PROCARDIA XL) tablet 90 mg  90 mg Oral DAILY    calcium carbonate (OS-BINA) tablet 500 mg [elemental]  500 mg Oral DAILY    QUEtiapine (SEROquel) tablet 100 mg  100 mg Oral BID    venlafaxine (EFFEXOR) tablet 75 mg  75 mg Oral BID WITH MEALS    sodium chloride (NS) flush 5-10 mL  5-10 mL IntraVENous Q8H    sodium chloride (NS) flush 5-10 mL  5-10 mL IntraVENous PRN    heparin (porcine) injection 5,000 Units  5,000 Units SubCUTAneous Q8H    albuterol-ipratropium (DUO-NEB) 2.5 MG-0.5 MG/3 ML  3 mL Nebulization Q4H PRN    lactobac ac& pc-s.therm-b.anim (CHRIS Q/RISAQUAD)  1 Cap Oral DAILY    insulin lispro (HUMALOG) injection   SubCUTAneous AC&HS    glucose chewable tablet 16 g  4 Tab Oral PRN    dextrose (D50W) injection syrg 12.5-25 g  12.5-25 g IntraVENous PRN    glucagon (GLUCAGEN) injection 1 mg  1 mg IntraMUSCular PRN    doxycycline (VIBRAMYCIN) 100 mg in 0.9% sodium chloride (MBP/ADV) 100 mL  100 mg IntraVENous Q12H    acetaminophen (TYLENOL) tablet 650 mg  650 mg Oral Q4H PRN    oxyCODONE-acetaminophen (PERCOCET) 5-325 mg per tablet 1 Tab  1 Tab Oral Q4H PRN      Allergies   Allergen Reactions    Erythromycin Itching    Morphine Itching    Toradol [Ketorolac] Rash       Objective:  Vitals:    Vitals:    05/01/18 0729 05/01/18 0740 05/01/18 0830 05/01/18 1045   BP: (!) 172/99 (!) 174/96 (!) 185/92    Pulse: 92 92 89    Resp: 16 16 14    Temp:  98.3 °F (36.8 °C) 98 °F (36.7 °C)    SpO2: 99% 99% 100% 100%   Weight:       Height:         Intake and Output:          Physical Examination:    General: NAD,Conversant   Neck:  Supple, no mass  Resp:  Lungs CTA B/L, no wheezing , normal respiratory effort  CV:  RRR,  no murmur or rub,trace LE edema  GI:  Soft, NT, + Bowel sounds, no hepatosplenomegaly  Neurologic:  Non focal  Skin:  No Rash  :  No escobedo     []    High complexity decision making was performed  []    Patient is at high-risk of decompensation with multiple organ involvement    Lab Data Personally Reviewed: I have reviewed all the pertinent labs, microbiology data and radiology studies during assessment.     Recent Labs      05/01/18   0505  04/30/18 2011 04/30/18   1148  04/30/18   0624  04/29/18   1400   NA  145  144  141  140  139   K  4.3  4.3  4.7  4.6  5.1   CL  111*  112*  109*  109*  109*   CO2  24  23  23  21  23   GLU  183*  185*  244*  299*  216*   BUN  42*  41*  42*  45*  56*   CREA  2.84*  2.79*  2.77*  2.96*  3.16*   CA  8.9  9.2  9.2  8.9  7.9*   MG  1.6   --    --   1.6   --    PHOS  4.0   --   3.3   --   3.4   ALB  2.9*   --   3.3*   --   2.9*   SGOT   --    --   25   --    --    ALT   --    --   27   --    --      Recent Labs      05/01/18   0505  04/30/18   0624   WBC  6.1  4.8   HGB  8.5*  9.0*   HCT  26.3*  28.1*   PLT  165  153     Lab Results   Component Value Date/Time    Specimen Description: URINE 06/24/2013 08:00 PM    Specimen Description: URINE 06/17/2013 07:55 PM    Specimen Description: URINE 05/26/2013 10:10 AM    Specimen Description: URINE 04/22/2013 02:30 PM    Specimen Description: NARES 03/21/2013 12:30 PM     Lab Results   Component Value Date/Time    Culture result: NO GROWTH 4 DAYS 04/27/2018 05:11 PM    Culture result: FEW STAPHYLOCOCCUS HOMINIS SUBSPECIES HOMINIS (A) 03/11/2018 12:47 PM    Culture result: (A) 03/11/2018 12:47 PM     STAPHYLOCOCCUS EPIDERMIDIS (OXACILLIN RESISTANT) ISOLATED FROM THIO BROTH ONLY    Culture result: NO ANAEROBES ISOLATED 03/11/2018 12:47 PM    Culture result: NO GROWTH ON SOLID MEDIA AT 4 DAYS 03/11/2018 11:43 AM    Culture result: (A) 03/11/2018 11:43 AM     STAPHYLOCOCCUS EPIDERMIDIS (OXACILLIN RESISTANT) ISOLATED FROM THIO BROTH ONLY    Culture result: NO GROWTH ON SOLID MEDIA AT 4 DAYS 03/11/2018 11:43 AM    Culture result: (A) 03/11/2018 11:43 AM     STAPHYLOCOCCUS EPIDERMIDIS ISOLATED FROM THIO BROTH ONLY     Recent Results (from the past 24 hour(s))   ECG RHYTHM ANALYSIS ADULT    Collection Time: 04/30/18 11:35 AM   Result Value Ref Range    Ventricular Rate 105 BPM    Atrial Rate 105 BPM    P-R Interval 148 ms    QRS Duration 82 ms    Q-T Interval 364 ms    QTC Calculation (Bezet) 481 ms    Calculated P Axis 66 degrees    Calculated R Axis 56 degrees    Calculated T Axis 66 degrees    Diagnosis       Sinus tachycardia  Otherwise normal ECG  When compared with ECG of 27-APR-2018 21:10,  Vent. rate has increased BY  37 BPM  QT has lengthened  Confirmed by Erick Ledezma MD. ([id number]) on 4/30/2018 11:54:15 PM     IRON PROFILE    Collection Time: 04/30/18 11:48 AM   Result Value Ref Range    Iron 108 35 - 150 ug/dL    TIBC 190 (L) 250 - 450 ug/dL    Iron % saturation 57 (H) 20 - 50 %   PHOSPHORUS    Collection Time: 04/30/18 11:48 AM   Result Value Ref Range    Phosphorus 3.3 2.6 - 4.7 MG/DL   TSH 3RD GENERATION    Collection Time: 04/30/18 11:48 AM   Result Value Ref Range    TSH 1.62 0.36 - 3.74 uIU/mL   VITAMIN B12    Collection Time: 04/30/18 11:48 AM   Result Value Ref Range    Vitamin B12 738 193 - 468 pg/mL   METABOLIC PANEL, COMPREHENSIVE    Collection Time: 04/30/18 11:48 AM   Result Value Ref Range    Sodium 141 136 - 145 mmol/L    Potassium 4.7 3.5 - 5.1 mmol/L    Chloride 109 (H) 97 - 108 mmol/L    CO2 23 21 - 32 mmol/L    Anion gap 9 5 - 15 mmol/L    Glucose 244 (H) 65 - 100 mg/dL    BUN 42 (H) 6 - 20 MG/DL    Creatinine 2.77 (H) 0.55 - 1.02 MG/DL    BUN/Creatinine ratio 15 12 - 20      GFR est AA 23 (L) >60 ml/min/1.73m2    GFR est non-AA 19 (L) >60 ml/min/1.73m2    Calcium 9.2 8.5 - 10.1 MG/DL    Bilirubin, total 0.3 0.2 - 1.0 MG/DL    ALT (SGPT) 27 12 - 78 U/L    AST (SGOT) 25 15 - 37 U/L    Alk.  phosphatase 329 (H) 45 - 117 U/L    Protein, total 8.7 (H) 6.4 - 8.2 g/dL    Albumin 3.3 (L) 3.5 - 5.0 g/dL    Globulin 5.4 (H) 2.0 - 4.0 g/dL    A-G Ratio 0.6 (L) 1.1 - 2.2     GLUCOSE, POC    Collection Time: 04/30/18 12:09 PM   Result Value Ref Range    Glucose (POC) 247 (H) 65 - 100 mg/dL Performed by Deepti Licona, POC    Collection Time: 04/30/18  4:48 PM   Result Value Ref Range    Glucose (POC) 233 (H) 65 - 100 mg/dL    Performed by Claudene Carpenter    EKG, 12 LEAD, INITIAL    Collection Time: 04/30/18  8:06 PM   Result Value Ref Range    Ventricular Rate 119 BPM    Atrial Rate 119 BPM    P-R Interval 124 ms    QRS Duration 82 ms    Q-T Interval 330 ms    QTC Calculation (Bezet) 464 ms    Calculated P Axis 60 degrees    Calculated R Axis 31 degrees    Calculated T Axis 68 degrees    Diagnosis       Sinus tachycardia  When compared with ECG of 30-APR-2018 11:35,  No significant change was found  Confirmed by Thom Keith M.D., Jose Enrique Barnett ([id number]) on 5/1/2018 8:85:42 AM     METABOLIC PANEL, BASIC    Collection Time: 04/30/18  8:11 PM   Result Value Ref Range    Sodium 144 136 - 145 mmol/L    Potassium 4.3 3.5 - 5.1 mmol/L    Chloride 112 (H) 97 - 108 mmol/L    CO2 23 21 - 32 mmol/L    Anion gap 9 5 - 15 mmol/L    Glucose 185 (H) 65 - 100 mg/dL    BUN 41 (H) 6 - 20 MG/DL    Creatinine 2.79 (H) 0.55 - 1.02 MG/DL    BUN/Creatinine ratio 15 12 - 20      GFR est AA 23 (L) >60 ml/min/1.73m2    GFR est non-AA 19 (L) >60 ml/min/1.73m2    Calcium 9.2 8.5 - 10.1 MG/DL   TROPONIN I    Collection Time: 04/30/18  8:15 PM   Result Value Ref Range    Troponin-I, Qt. <0.04 <0.05 ng/mL   CK    Collection Time: 04/30/18  8:15 PM   Result Value Ref Range    CK 57 26 - 192 U/L   GLUCOSE, POC    Collection Time: 04/30/18  9:11 PM   Result Value Ref Range    Glucose (POC) 189 (H) 65 - 100 mg/dL    Performed by Petty Bueno    RENAL FUNCTION PANEL    Collection Time: 05/01/18  5:05 AM   Result Value Ref Range    Sodium 145 136 - 145 mmol/L    Potassium 4.3 3.5 - 5.1 mmol/L    Chloride 111 (H) 97 - 108 mmol/L    CO2 24 21 - 32 mmol/L    Anion gap 10 5 - 15 mmol/L    Glucose 183 (H) 65 - 100 mg/dL    BUN 42 (H) 6 - 20 MG/DL    Creatinine 2.84 (H) 0.55 - 1.02 MG/DL    BUN/Creatinine ratio 15 12 - 20      GFR est AA 22 (L) >60 ml/min/1.73m2    GFR est non-AA 19 (L) >60 ml/min/1.73m2    Calcium 8.9 8.5 - 10.1 MG/DL    Phosphorus 4.0 2.6 - 4.7 MG/DL    Albumin 2.9 (L) 3.5 - 5.0 g/dL   CBC WITH AUTOMATED DIFF    Collection Time: 05/01/18  5:05 AM   Result Value Ref Range    WBC 6.1 3.6 - 11.0 K/uL    RBC 3.45 (L) 3.80 - 5.20 M/uL    HGB 8.5 (L) 11.5 - 16.0 g/dL    HCT 26.3 (L) 35.0 - 47.0 %    MCV 76.2 (L) 80.0 - 99.0 FL    MCH 24.6 (L) 26.0 - 34.0 PG    MCHC 32.3 30.0 - 36.5 g/dL    RDW 17.0 (H) 11.5 - 14.5 %    PLATELET 479 381 - 718 K/uL    NRBC 0.0 0  WBC    ABSOLUTE NRBC 0.00 0.00 - 0.01 K/uL    NEUTROPHILS 79 (H) 32 - 75 %    LYMPHOCYTES 13 12 - 49 %    MONOCYTES 7 5 - 13 %    EOSINOPHILS 1 0 - 7 %    BASOPHILS 0 0 - 1 %    IMMATURE GRANULOCYTES 0 0.0 - 0.5 %    ABS. NEUTROPHILS 4.8 1.8 - 8.0 K/UL    ABS. LYMPHOCYTES 0.8 0.8 - 3.5 K/UL    ABS. MONOCYTES 0.4 0.0 - 1.0 K/UL    ABS. EOSINOPHILS 0.0 0.0 - 0.4 K/UL    ABS. BASOPHILS 0.0 0.0 - 0.1 K/UL    ABS. IMM. GRANS. 0.0 0.00 - 0.04 K/UL    DF AUTOMATED     MAGNESIUM    Collection Time: 05/01/18  5:05 AM   Result Value Ref Range    Magnesium 1.6 1.6 - 2.4 mg/dL   GLUCOSE, POC    Collection Time: 05/01/18  6:34 AM   Result Value Ref Range    Glucose (POC) 172 (H) 65 - 100 mg/dL    Performed by Lady Tapia            Total time spent with patient:  xxx   min. Care Plan discussed with:  Patient     Family      RN      Consulting Physician [address] [place] [address],         I have reviewed the flowsheets. Chart and Pertinent Notes have been reviewed. No change in PMH ,family and social history from Consult note.       Jamila Johnston MD

## 2018-05-01 NOTE — PROGRESS NOTES
1128-Pt was complaining of chest pain. Pt is crying in pain and extremely anxious. Called a rapid response.

## 2018-05-01 NOTE — PROGRESS NOTES
TRANSFER - OUT REPORT:    Verbal report given to VERONICA Manley on Cyndy Calero  being transferred to [facility] room 545 for urgent transfer/hypertensive       Report consisted of patients Situation, Background, Assessment and   Recommendations(SBAR). Information from the following report(s) SBAR, Kardex, Intake/Output and MAR was reviewed with the receiving nurse. Lines:   Triple Lumen 04/29/18 Right Subclavian (Active)   Central Line Being Utilized Yes 4/30/2018  9:33 PM   Criteria for Appropriate Use Long term IV/antibiotic administration 4/30/2018  9:33 PM   Site Assessment Clean, dry, & intact 4/30/2018  9:33 PM   Infiltration Assessment 0 4/30/2018  9:33 PM   Affected Extremity/Extremities Color distal to insertion site pink (or appropriate for race) 4/30/2018  9:33 PM   Date of Last Dressing Change 04/29/18 4/29/2018  8:04 PM   Dressing Status Clean, dry, & intact 4/30/2018  9:33 PM   Dressing Type Disk with Chlorhexadine gluconate (CHG); Transparent 4/29/2018  8:04 PM   Action Taken Open ports on tubing capped 4/29/2018  8:04 PM   Proximal Hub Color/Line Status Capped 4/30/2018  9:33 PM   Positive Blood Return (Medial Site) Yes 4/30/2018  9:33 PM   Medial Hub Color/Line Status Infusing 4/30/2018  9:33 PM   Positive Blood Return (Lateral Site) Yes 4/30/2018  9:33 PM   Distal Hub Color/Line Status Capped 4/30/2018  9:33 PM   Positive Blood Return (Site #3) Yes 4/30/2018  9:33 PM   Alcohol Cap Used Yes 4/29/2018  8:04 PM       Venous Access Device Chest Port (Active)       Venous Access Device Powerline  03/06/18 Upper chest (subclavicular area, right (Active)       Peripheral IV 04/27/18 Right Antecubital (Active)   Site Assessment Clean, dry, & intact 5/1/2018 11:26 AM   Phlebitis Assessment 0 5/1/2018 11:26 AM   Infiltration Assessment 0 5/1/2018 11:26 AM   Dressing Status Clean, dry, & intact 5/1/2018 11:26 AM   Dressing Type Transparent 5/1/2018 11:26 AM   Hub Color/Line Status Infusing 5/1/2018 11:26 AM   Action Taken Open ports on tubing capped 4/29/2018  8:04 PM   Alcohol Cap Used Yes 5/1/2018 11:26 AM        Opportunity for questions and clarification was provided.       Patient transported with:   O2 @ 3 liters  Registered Nursex2  Personal belongings

## 2018-05-01 NOTE — PROGRESS NOTES
TRANSFER - IN REPORT:    Verbal report received from Carla Bowers RN(name) on [address]  being received from 5S(unit) for change in patient condition(hypertensive)      Report consisted of patients Situation, Background, Assessment and   Recommendations(SBAR). Information from the following report(s) SBAR, Kardex, Intake/Output, MAR, Accordion, Recent Results, Med Rec Status and Cardiac Rhythm NSR was reviewed with the receiving nurse. Opportunity for questions and clarification was provided. Assessment completed upon patients arrival to unit and care assumed.

## 2018-05-01 NOTE — PROGRESS NOTES
04/30/18 2341   Vital Signs   Temp 99.1 °F (37.3 °C)   Temp Source Oral   Pulse (Heart Rate) (!) 119   Heart Rate Source Monitor   Resp Rate 20   O2 Sat (%) 100 %   Level of Consciousness Alert   BP (!) 177/103   MAP (Calculated) 128   MEWS Score 3   Pt stated she is experiencing pain. Will give pain meds.

## 2018-05-01 NOTE — PROGRESS NOTES
04/30/18 2133   Vital Signs   Temp 98.4 °F (36.9 °C)   Temp Source Oral   Pulse (Heart Rate) (!) 116   Heart Rate Source Monitor   Resp Rate 16   O2 Sat (%) 100 %   Level of Consciousness Alert   BP (!) 192/111   MAP (Calculated) 138   MEWS Score 3   Pt has a MEW score of 3 d/t elevated HR and BP. Will give scheduled BP meds and reassess. 2200: Paged Dr. Raf Johnson. 2208: Dr. Raf Johnson notified pt /111 &  after giving clonidine 0.1 mg and hydralizine 50 mg. Pt also received 2 doses of IV hydralazine today as well. Verbal order to give Nitro paste 2 inch now one time dose.

## 2018-05-01 NOTE — PROGRESS NOTES
Problem: Falls - Risk of  Goal: *Absence of Falls  Document Blake Fall Risk and appropriate interventions in the flowsheet.    Outcome: Progressing Towards Goal  Fall Risk Interventions:  Mobility Interventions: Patient to call before getting OOB         Medication Interventions: Patient to call before getting OOB    Elimination Interventions: Call light in reach, Patient to call for help with toileting needs

## 2018-05-01 NOTE — PROGRESS NOTES
05/01/18 0450 05/01/18 0602   Vital Signs   Temp 98.5 °F (36.9 °C) 99 °F (37.2 °C)   Temp Source Oral Oral   Pulse (Heart Rate) (!) 110 (!) 110   Heart Rate Source Monitor Brachial   Resp Rate 18 16   O2 Sat (%) 98 % 100 %   Level of Consciousness Alert Alert   BP (!) 186/106 (!) 191/109   MAP (Calculated) 133 136   MEWS Score 2 2   Dr. Charisse Gonzales notified pt continuing to experience elevated BP and HR despite giving IV and oral BP meds. Verbal order for Labetolol 20 mg IV one time dose.

## 2018-05-01 NOTE — PROGRESS NOTES
Post Fall Documentation      Nancy Mail witnessed/unwitnessed fall occurred on 4/30/18 (Date) at 19:30 (Time). The answers to the following questions summarize the fall:     · In the patient's own words,:  · What was he/she doing when he/she fell? Pt was laying at the foot of the bed. · What are his/her complaints? Pt has been complaining of pain all day. Pt doesn't have any new complaints. · Nurse:  · Document observation, treatment, conversation, follow-up, and patient response. Pt was laying on the floor when I walked into the room. Pt would not allow me to assess or take vital signs until the patient was back in the bed. I explained to the patient that this is why I kept getting her to the head of the bed because it was dangerous, and I was worried she was going to fall. · What was the patient's condition when found (i.e., pain, symptoms, cuts, bruises)? Pt stated that she didn't get hurt or hit her head. · What specific complaints did the patient have? Pt didn't have any new complaints. · What did the staff do when patient was found (i.e., vital signs, returned to bed with fall alarm, side rails up)? Two RNs got pt back to the bed and took her vital signs. Pt's MEWS was a 3, and the pt was complaining of chest pain. A rapid response was called. · Which physician was notified? Yes, Ana Miranda was notified.     Tc Castro

## 2018-05-02 ENCOUNTER — APPOINTMENT (OUTPATIENT)
Dept: MRI IMAGING | Age: 38
DRG: 637 | End: 2018-05-02
Attending: INTERNAL MEDICINE
Payer: MEDICARE

## 2018-05-02 LAB
ANION GAP SERPL CALC-SCNC: 7 MMOL/L (ref 5–15)
BACTERIA SPEC CULT: NORMAL
BUN SERPL-MCNC: 43 MG/DL (ref 6–20)
BUN/CREAT SERPL: 15 (ref 12–20)
CALCIUM SERPL-MCNC: 9 MG/DL (ref 8.5–10.1)
CHLORIDE SERPL-SCNC: 109 MMOL/L (ref 97–108)
CO2 SERPL-SCNC: 27 MMOL/L (ref 21–32)
CREAT SERPL-MCNC: 2.89 MG/DL (ref 0.55–1.02)
GLUCOSE BLD STRIP.AUTO-MCNC: 116 MG/DL (ref 65–100)
GLUCOSE BLD STRIP.AUTO-MCNC: 145 MG/DL (ref 65–100)
GLUCOSE BLD STRIP.AUTO-MCNC: 162 MG/DL (ref 65–100)
GLUCOSE BLD STRIP.AUTO-MCNC: 193 MG/DL (ref 65–100)
GLUCOSE SERPL-MCNC: 223 MG/DL (ref 65–100)
POTASSIUM SERPL-SCNC: 4.2 MMOL/L (ref 3.5–5.1)
SERVICE CMNT-IMP: ABNORMAL
SERVICE CMNT-IMP: NORMAL
SODIUM SERPL-SCNC: 143 MMOL/L (ref 136–145)

## 2018-05-02 PROCEDURE — 76450000000

## 2018-05-02 PROCEDURE — 74011250636 HC RX REV CODE- 250/636: Performed by: INTERNAL MEDICINE

## 2018-05-02 PROCEDURE — 74011250637 HC RX REV CODE- 250/637: Performed by: FAMILY MEDICINE

## 2018-05-02 PROCEDURE — 36415 COLL VENOUS BLD VENIPUNCTURE: CPT | Performed by: HOSPITALIST

## 2018-05-02 PROCEDURE — 74011636637 HC RX REV CODE- 636/637: Performed by: INTERNAL MEDICINE

## 2018-05-02 PROCEDURE — 74011250637 HC RX REV CODE- 250/637: Performed by: INTERNAL MEDICINE

## 2018-05-02 PROCEDURE — 74011250636 HC RX REV CODE- 250/636: Performed by: PHYSICAL MEDICINE & REHABILITATION

## 2018-05-02 PROCEDURE — 77010033678 HC OXYGEN DAILY

## 2018-05-02 PROCEDURE — 74011000250 HC RX REV CODE- 250: Performed by: FAMILY MEDICINE

## 2018-05-02 PROCEDURE — 74011250636 HC RX REV CODE- 250/636: Performed by: HOSPITALIST

## 2018-05-02 PROCEDURE — 74011000258 HC RX REV CODE- 258: Performed by: INTERNAL MEDICINE

## 2018-05-02 PROCEDURE — 73718 MRI LOWER EXTREMITY W/O DYE: CPT

## 2018-05-02 PROCEDURE — 65660000000 HC RM CCU STEPDOWN

## 2018-05-02 PROCEDURE — 82962 GLUCOSE BLOOD TEST: CPT

## 2018-05-02 PROCEDURE — 74011000250 HC RX REV CODE- 250: Performed by: INTERNAL MEDICINE

## 2018-05-02 PROCEDURE — 80048 BASIC METABOLIC PNL TOTAL CA: CPT | Performed by: HOSPITALIST

## 2018-05-02 PROCEDURE — 93005 ELECTROCARDIOGRAM TRACING: CPT

## 2018-05-02 RX ORDER — HYDROMORPHONE HYDROCHLORIDE 2 MG/ML
1 INJECTION, SOLUTION INTRAMUSCULAR; INTRAVENOUS; SUBCUTANEOUS
Status: DISCONTINUED | OUTPATIENT
Start: 2018-05-02 | End: 2018-05-02

## 2018-05-02 RX ORDER — LABETALOL 100 MG/1
200 TABLET, FILM COATED ORAL EVERY 12 HOURS
Status: DISCONTINUED | OUTPATIENT
Start: 2018-05-02 | End: 2018-05-06

## 2018-05-02 RX ORDER — HYDROMORPHONE HYDROCHLORIDE 2 MG/ML
1 INJECTION, SOLUTION INTRAMUSCULAR; INTRAVENOUS; SUBCUTANEOUS
Status: DISCONTINUED | OUTPATIENT
Start: 2018-05-02 | End: 2018-05-04 | Stop reason: CLARIF

## 2018-05-02 RX ORDER — LABETALOL HYDROCHLORIDE 5 MG/ML
20 INJECTION, SOLUTION INTRAVENOUS ONCE
Status: COMPLETED | OUTPATIENT
Start: 2018-05-02 | End: 2018-05-02

## 2018-05-02 RX ORDER — LORAZEPAM 0.5 MG/1
0.5 TABLET ORAL
Status: DISCONTINUED | OUTPATIENT
Start: 2018-05-02 | End: 2018-05-15 | Stop reason: HOSPADM

## 2018-05-02 RX ORDER — AMOXICILLIN 250 MG
2 CAPSULE ORAL DAILY
Status: DISCONTINUED | OUTPATIENT
Start: 2018-05-03 | End: 2018-05-15 | Stop reason: HOSPADM

## 2018-05-02 RX ORDER — HYDRALAZINE HYDROCHLORIDE 50 MG/1
100 TABLET, FILM COATED ORAL 3 TIMES DAILY
Status: DISCONTINUED | OUTPATIENT
Start: 2018-05-02 | End: 2018-05-15 | Stop reason: HOSPADM

## 2018-05-02 RX ADMIN — Medication 10 ML: at 22:40

## 2018-05-02 RX ADMIN — HYDROMORPHONE HYDROCHLORIDE 1 MG: 2 INJECTION, SOLUTION INTRAMUSCULAR; INTRAVENOUS; SUBCUTANEOUS at 03:45

## 2018-05-02 RX ADMIN — HYDROMORPHONE HYDROCHLORIDE 1 MG: 2 INJECTION, SOLUTION INTRAMUSCULAR; INTRAVENOUS; SUBCUTANEOUS at 16:59

## 2018-05-02 RX ADMIN — LABETALOL HYDROCHLORIDE 200 MG: 100 TABLET, FILM COATED ORAL at 22:28

## 2018-05-02 RX ADMIN — ONDANSETRON HYDROCHLORIDE 4 MG: 2 INJECTION INTRAMUSCULAR; INTRAVENOUS at 12:47

## 2018-05-02 RX ADMIN — Medication 20 ML: at 13:00

## 2018-05-02 RX ADMIN — HYDRALAZINE HYDROCHLORIDE 20 MG: 20 INJECTION INTRAMUSCULAR; INTRAVENOUS at 01:17

## 2018-05-02 RX ADMIN — QUETIAPINE FUMARATE 100 MG: 100 TABLET ORAL at 17:59

## 2018-05-02 RX ADMIN — Medication 10 ML: at 13:00

## 2018-05-02 RX ADMIN — INSULIN LISPRO 2 UNITS: 100 INJECTION, SOLUTION INTRAVENOUS; SUBCUTANEOUS at 17:59

## 2018-05-02 RX ADMIN — VENLAFAXINE 75 MG: 37.5 TABLET ORAL at 08:38

## 2018-05-02 RX ADMIN — NIFEDIPINE 90 MG: 60 TABLET, FILM COATED, EXTENDED RELEASE ORAL at 08:40

## 2018-05-02 RX ADMIN — NITROGLYCERIN 2 INCH: 20 OINTMENT TOPICAL at 17:59

## 2018-05-02 RX ADMIN — OXYCODONE AND ACETAMINOPHEN 1 TABLET: 5; 325 TABLET ORAL at 08:38

## 2018-05-02 RX ADMIN — Medication 1 CAPSULE: at 08:55

## 2018-05-02 RX ADMIN — CALCIUM 500 MG: 500 TABLET ORAL at 08:55

## 2018-05-02 RX ADMIN — QUETIAPINE FUMARATE 100 MG: 100 TABLET ORAL at 08:38

## 2018-05-02 RX ADMIN — HYDRALAZINE HYDROCHLORIDE 100 MG: 50 TABLET ORAL at 16:59

## 2018-05-02 RX ADMIN — HYDRALAZINE HYDROCHLORIDE 100 MG: 50 TABLET ORAL at 08:41

## 2018-05-02 RX ADMIN — Medication 10 ML: at 06:00

## 2018-05-02 RX ADMIN — INSULIN LISPRO 2 UNITS: 100 INJECTION, SOLUTION INTRAVENOUS; SUBCUTANEOUS at 06:57

## 2018-05-02 RX ADMIN — NITROGLYCERIN 2 INCH: 20 OINTMENT TOPICAL at 08:45

## 2018-05-02 RX ADMIN — LABETALOL HYDROCHLORIDE 20 MG: 5 INJECTION, SOLUTION INTRAVENOUS at 04:43

## 2018-05-02 RX ADMIN — Medication 10 ML: at 22:29

## 2018-05-02 RX ADMIN — HYDROMORPHONE HYDROCHLORIDE 1 MG: 2 INJECTION, SOLUTION INTRAMUSCULAR; INTRAVENOUS; SUBCUTANEOUS at 12:47

## 2018-05-02 RX ADMIN — INSULIN GLARGINE 10 UNITS: 100 INJECTION, SOLUTION SUBCUTANEOUS at 22:28

## 2018-05-02 RX ADMIN — HYDRALAZINE HYDROCHLORIDE 100 MG: 50 TABLET ORAL at 22:29

## 2018-05-02 RX ADMIN — DOXYCYCLINE 100 MG: 100 INJECTION, POWDER, LYOPHILIZED, FOR SOLUTION INTRAVENOUS at 10:12

## 2018-05-02 RX ADMIN — HYDROMORPHONE HYDROCHLORIDE 1 MG: 2 INJECTION, SOLUTION INTRAMUSCULAR; INTRAVENOUS; SUBCUTANEOUS at 20:31

## 2018-05-02 RX ADMIN — HEPARIN SODIUM 5000 UNITS: 5000 INJECTION, SOLUTION INTRAVENOUS; SUBCUTANEOUS at 12:08

## 2018-05-02 RX ADMIN — LABETALOL HYDROCHLORIDE 200 MG: 100 TABLET, FILM COATED ORAL at 12:08

## 2018-05-02 RX ADMIN — ONDANSETRON HYDROCHLORIDE 4 MG: 2 INJECTION INTRAMUSCULAR; INTRAVENOUS at 18:04

## 2018-05-02 RX ADMIN — HEPARIN SODIUM 5000 UNITS: 5000 INJECTION, SOLUTION INTRAVENOUS; SUBCUTANEOUS at 06:57

## 2018-05-02 RX ADMIN — VENLAFAXINE 75 MG: 37.5 TABLET ORAL at 16:59

## 2018-05-02 RX ADMIN — DOXYCYCLINE 100 MG: 100 INJECTION, POWDER, LYOPHILIZED, FOR SOLUTION INTRAVENOUS at 22:37

## 2018-05-02 RX ADMIN — HEPARIN SODIUM 5000 UNITS: 5000 INJECTION, SOLUTION INTRAVENOUS; SUBCUTANEOUS at 22:28

## 2018-05-02 RX ADMIN — Medication 10 ML: at 08:44

## 2018-05-02 NOTE — CONSULTS
Palliative Medicine Consult  Alex: [phone] ([phone])    Patient Name: Karley Landry  YOB: 1980    Date of Initial Consult: 5-2-18  Reason for Consult: Pain management - \"patient has chronic pain issues and high tolerance to pain medication. Dealing with anxiety vs pain\"  Requesting Provider: Flaco   Primary Care Physician: Corrina Lima MD     SUMMARY:   Karley Landry is a 42 y.o. with a past history of anxiety, depression, HTN, THU, CKD with hx of dialysis in past for about 6 months, chronic opioid, charcot deformity of L foot, sickle cell disease,  Gastric bypass, diabetic neuropathy use who was admitted on 4/27/2018 from home with LLE pain and swelling and abdominal pain. Being treated for acute on chronic pancreatitis (elevated lipase and amylase 5/2); STONE on CKD; pain from osteomyelitis of L foot    Current medical issues leading to Palliative Medicine involvement include: pain control. At home on Percocet 5/325mg bid-  reviewed and in past year no aberrancies noted w/ most recent Rx for 60 tabs of Percoet filled 4/16/18. Pt has had mult hospitalizations, most recently was hospitalized 3/2018 for her L foot osteomyelitis. PALLIATIVE DIAGNOSES:   1. Acute abdominal pain, pancreatitis  2. Nausea  3. Poor po intake  4. L foot pain  5. Chronic pain related to sickle cell disease  6. Anxiety  7. Depression        PLAN:   1. Meet w/ pt along w/  Jose Reza. 2. Pt receptive to visit, fatigued. Is lethargic but appropriate, oriented fully. States she just feels terrible. Worsened overnight. Previous notes reviewed, pt has questionable adherence to medical recommendations and compliance.  Also, while pt states that her abdominal pain very severe today- also notes indicate on 4/30 for example that she was crying out in pain, refusing oral medications, did not want to allow staff to take BP, asking for specific combinations of opioids (2mg IV Dilaudid and IV Benadryl together). 3. Per [facility] staff,  has indicated he was concerned about her taking too many pain medications. He is not in the room today. Also has a lot of anxiety- has been seen by psych in the past.   4. Pt w/ chronic pain issues generalized from sickle cell disease. 5. While pt is nauseated, she is able to take some po meds so as soon as we can, do think that we need to convert to po meds. 6. Pt w/ real pain generators but also concern about safe opioid usage and superimposed anxiety. 7. Consider psych consult for anxiety, has seen in past. Ativan 0.5mg po every 6h prn.   8. Pastoral care to support. 9. I think opioids are indicated now, but certainly not long term and anxiety and psychosocial distress significant. I do not think best served by incr to 2mg dose as requested as lethargic. 10. Given pancreatitis, changing back Dilaudid from 1mg IV every 6h prn to every 3h prn. 11. Can consider PCA for 1-2 days to give pt more control over situation, but also concerned about tolerance and safety. If start, would do no basal, 0.3mg IV every 10 min demand. 12. I would not recommend increasing these medications/doses. 13. Once can eat, will need to convert to po meds. 14. Whenever discharged, will need to go back on home regimen or quick wean to Percocet 5/325mg bid. 13. Explained to her that I don't want to build up her opioid tolerance unnecessarily and that safety is number one. Cannot give these meds if sedated. Her kidney function is impaired and worry about over sedation. Also, if we want to take the best of care of her possible and control her sx- she needs to allow medical staff to do their job. 16. Will try and talk to her about advanced medical planning given her mult comorbidities. 17. Initial consult note routed to primary continuity provider  18. Communicated plan of care with: Palliative IDT;  Bianca MARTIN; Dr Javy Oshea / TREATMENT PREFERENCES:     GOALS OF CARE:  Patient/Health Care Proxy Stated Goals: Other (comment) (Sx management)      TREATMENT PREFERENCES:   Code Status: Full Code    Advance Care Planning:  Advance Care Planning 5/2/2018   Patient's Healthcare Decision Maker is: Legal Next of Kin   Primary Decision Maker Name Romayne Dull    Primary Decision Maker Phone Number [phone]   Primary Decision Maker Relationship to Patient Spouse   Confirm Advance Directive None   Patient Would Like to Complete Advance Directive -       Medical Interventions: Full interventions   Other Instructions: Other:    As far as possible, the palliative care team has discussed with patient / health care proxy about goals of care / treatment preferences for patient. HISTORY:     History obtained from: Pt, chart, staff    CHIEF COMPLAINT: pain all over     HPI/SUBJECTIVE:    The patient is:   [x] Verbal and participatory  [] Non-participatory due to:     Pt w/ pain 'everywhere' but ryan in her abdomen. Worsened overnight. Having incr BP. While here pt has had pain requiring IV opioids. MAR reviewed and was on Dilaudid 1mg IV every 3h prn upon admission until 4/29 when this was aspaced out to every 4 and then every 6h.      Clinical Pain Assessment (nonverbal scale for severity on nonverbal patients):   Clinical Pain Assessment  Severity: 6  Location: abdomen   Character: aching, terrible  Duration: days  Effect: causes incr anxiety   Factors: better w/ medications   Frequency: constant           Duration: for how long has pt been experiencing pain (e.g., 2 days, 1 month, years)  Frequency: how often pain is an issue (e.g., several times per day, once every few days, constant)     FUNCTIONAL ASSESSMENT:     Palliative Performance Scale (PPS):  PPS: 60       PSYCHOSOCIAL/SPIRITUAL SCREENING:     Palliative IDT has assessed this patient for cultural preferences / practices and a referral made as appropriate to needs (Cultural Services, Patient Advocacy, Ethics, etc.)    Advance Care Planning:  Advance Care Planning 5/2/2018   Patient's Healthcare Decision Maker is: Legal Next of Susy Lynn   Primary Decision Maker Name Juli Montana    Primary Decision Maker Phone Number [phone]   Primary Decision Maker Relationship to Patient Spouse   Confirm Advance Directive None   Patient Would Like to Complete Advance Directive -       Any spiritual / Rastafarian concerns:  [] Yes /  [x] No    Caregiver Burnout:  [] Yes /  [] No /  [x] No Caregiver Present      Anticipatory grief assessment:   [x] Normal  / [] Maladaptive       ESAS Anxiety: Anxiety: 4    ESAS Depression: Depression: 0        REVIEW OF SYSTEMS:     Positive and pertinent negative findings in ROS are noted above in HPI. The following systems were [x] reviewed / [] unable to be reviewed as noted in HPI  Other findings are noted below. Systems: constitutional, ears/nose/mouth/throat, respiratory, gastrointestinal, genitourinary, musculoskeletal, integumentary, neurologic, psychiatric, endocrine. Positive findings noted below. Modified ESAS Completed by: provider   Fatigue: 6 Drowsiness: 3   Depression: 0 Pain: 6   Anxiety: 4 Nausea: 3   Anorexia: 3 Dyspnea: 0     Constipation: No     Stool Occurrence(s): 1        PHYSICAL EXAM:     From RN flowsheet:  Wt Readings from Last 3 Encounters:   05/02/18 121 lb 0.5 oz (54.9 kg)   04/29/18 137 lb 12.6 oz (62.5 kg)   04/13/18 141 lb 15.6 oz (64.4 kg)     Blood pressure 163/85, pulse 89, temperature 98.3 °F (36.8 °C), resp. rate 20, height 5' 2\" (1.575 m), weight 121 lb 0.5 oz (54.9 kg), SpO2 95 %.     Pain Scale 1: Visual  Pain Intensity 1: 0  Pain Onset 1: chronic  Pain Location 1: Abdomen, Back  Pain Orientation 1: Medial  Pain Description 1: Sharp  Pain Intervention(s) 1: Medication (see MAR)      Constitutional: thin, tearful , oriented, fatigued and slouched over  Eyes: pupils equal, anicteric  ENMT: no nasal discharge, moist mucous membranes  Cardiovascular: regular rhythm  Respiratory: breathing not labored, symmetric  Gastrointestinal: soft, tender, + bowel sounds  Musculoskeletal: no deformity, no tenderness to palpation  Skin: warm, dry  Neurologic: following commands, moving all extremities  Psychiatric:tearful, flat        HISTORY:     Principal Problem:    Osteomyelitis of left foot ([address] (2018)      Past Medical History:   Diagnosis Date    ARF (acute renal failure) ([address] requiring dialysis     Asthma     CKD (chronic kidney disease)     Diabetes ([address]     Gastroparesis     Gastric Pacer- REMOVED 2015    GERD (gastroesophageal reflux disease)     Hypertension     Narcotic dependence ([address]     THU (obstructive sleep apnea)     wears 2 LPM oxygen at night    Other ill-defined conditions(799.89)     Polycystic ovarian syndrome     Seizures (HCC)     Sick[facility] cell trait [facility]     Thromboembolus ([facility] to her left arm and was told she had one in left leg recently      Past Surgical History:   Procedure Laterality Date    HX CATARACT REMOVAL  3/5/12    right    HX DILATION AND CURETTAGE      ablation    HX GASTRIC BYPASS      HX GI      j tube placement and removal    HX OTHER SURGICAL      Gastric Pacer- REMOVED 2015    HX VASCULAR ACCESS      gray cath rt subclavian    HX VASCULAR ACCESS      HD access right thigh      Family History   Problem Relation Age of Onset    Cancer Mother      lung    Hypertension Mother     Cancer Father      kidney    Stroke Father      3 strokes: 59-72    Heart Disease Father 72     CABG    Hypertension Father     Cancer Sister      pancreatic    Cancer Maternal Aunt      breast    Cancer Paternal Aunt      breast    Schizophrenia Sister      was in [facility], now ass't living    Other Sister       AIDS    Other Other      nephew of AIDS      History reviewed, no pertinent family history.   Social History   Substance Use Topics    Smoking status: Never Smoker    Smokeless tobacco: Never Used    Alcohol use No     Allergies   Allergen Reactions    Erythromycin Itching    Morphine Itching    Toradol [Ketorolac] Rash      Current Facility-Administered Medications   Medication Dose Route Frequency    hydrALAZINE (APRESOLINE) tablet 100 mg  100 mg Oral TID    labetalol (NORMODYNE) tablet 200 mg  200 mg Oral Q12H    HYDROmorphone (PF) (DILAUDID) injection 1 mg  1 mg IntraVENous Q3H PRN    LORazepam (ATIVAN) tablet 0.5 mg  0.5 mg Oral Q6H PRN    hydrALAZINE (APRESOLINE) 20 mg/mL injection 20 mg  20 mg IntraVENous Q4H PRN    lactated Ringers infusion  75 mL/hr IntraVENous CONTINUOUS    ondansetron (ZOFRAN) injection 4 mg  4 mg IntraVENous Q4H PRN    diphenhydrAMINE (BENADRYL) capsule 25 mg  25 mg Oral Q6H PRN    insulin glargine (LANTUS) injection 10 Units  10 Units SubCUTAneous QHS    nitroglycerin (NITROBID) 2 % ointment 2 Inch  2 Inch Topical BID    promethazine (PHENERGAN) 6.25 mg/5 mL syrup 6.25 mg  6.25 mg Oral Q6H PRN    sodium chloride (NS) flush 20 mL  20 mL InterCATHeter PRN    sodium chloride (NS) flush 10 mL  10 mL InterCATHeter Q24H    sodium chloride (NS) flush 10 mL  10 mL InterCATHeter PRN    sodium chloride (NS) flush 10 mL  10 mL InterCATHeter Q8H    sodium chloride (NS) flush 20 mL  20 mL InterCATHeter Q24H    NIFEdipine ER (PROCARDIA XL) tablet 90 mg  90 mg Oral DAILY    calcium carbonate (OS-BINA) tablet 500 mg [elemental]  500 mg Oral DAILY    QUEtiapine (SEROquel) tablet 100 mg  100 mg Oral BID    venlafaxine (EFFEXOR) tablet 75 mg  75 mg Oral BID WITH MEALS    sodium chloride (NS) flush 5-10 mL  5-10 mL IntraVENous Q8H    sodium chloride (NS) flush 5-10 mL  5-10 mL IntraVENous PRN    heparin (porcine) injection 5,000 Units  5,000 Units SubCUTAneous Q8H    albuterol-ipratropium (DUO-NEB) 2.5 MG-0.5 MG/3 ML  3 mL Nebulization Q4H PRN    lactobac ac& pc-s.therm-b.anim (CHRIS Q/RISAQUAD)  1 Cap Oral DAILY    insulin lispro (HUMALOG) injection SubCUTAneous AC&HS    glucose chewable tablet 16 g  4 Tab Oral PRN    dextrose (D50W) injection syrg 12.5-25 g  12.5-25 g IntraVENous PRN    glucagon (GLUCAGEN) injection 1 mg  1 mg IntraMUSCular PRN    doxycycline (VIBRAMYCIN) 100 mg in 0.9% sodium chloride (MBP/ADV) 100 mL  100 mg IntraVENous Q12H    acetaminophen (TYLENOL) tablet 650 mg  650 mg Oral Q4H PRN    oxyCODONE-acetaminophen (PERCOCET) 5-325 mg per tablet 1 Tab  1 Tab Oral Q4H PRN          LAB AND IMAGING FINDINGS:     Lab Results   Component Value Date/Time    WBC 6.1 05/01/2018 05:05 AM    HGB 8.5 (L) 05/01/2018 05:05 AM    PLATELET 143 26/31/1599 05:05 AM     Lab Results   Component Value Date/Time    Sodium 143 05/02/2018 03:44 AM    Potassium 4.2 05/02/2018 03:44 AM    Chloride 109 (H) 05/02/2018 03:44 AM    CO2 27 05/02/2018 03:44 AM    BUN 43 (H) 05/02/2018 03:44 AM    Creatinine 2.89 (H) 05/02/2018 03:44 AM    Calcium 9.0 05/02/2018 03:44 AM    Magnesium 1.6 05/01/2018 05:05 AM    Phosphorus 4.0 05/01/2018 05:05 AM      Lab Results   Component Value Date/Time    AST (SGOT) 25 04/30/2018 11:48 AM    Alk.  phosphatase 329 (H) 04/30/2018 11:48 AM    Protein, total 8.7 (H) 04/30/2018 11:48 AM    Albumin 2.9 (L) 05/01/2018 05:05 AM    Globulin 5.4 (H) 04/30/2018 11:48 AM     Lab Results   Component Value Date/Time    INR 1.0 05/13/2016 04:12 PM    Prothrombin time 10.5 05/13/2016 04:12 PM    aPTT 37.2 (H) 05/13/2016 04:12 PM      Lab Results   Component Value Date/Time    Iron 108 04/30/2018 11:48 AM    TIBC 190 (L) 04/30/2018 11:48 AM    Iron % saturation 57 (H) 04/30/2018 11:48 AM    Ferritin 426 (H) 04/30/2018 06:24 AM      No results found for: PH, PCO2, PO2  No components found for: [place]   Lab Results   Component Value Date/Time    CK 57 04/30/2018 08:15 PM    CK - MB <1.0 03/09/2018 02:51 AM                Total time:   Counseling / coordination time, spent as noted above:   > 50% counseling / coordination?:     Prolonged service was provided for  []30 min   []75 min in face to face time in the presence of the patient, spent as noted above. Time Start:   Time End:   Note: this can only be billed with 88351 (initial) or 25589 (follow up). If multiple start / stop times, list each separately.

## 2018-05-02 NOTE — PROGRESS NOTES
[facility]   [address] [facility], [address] [place] [address], [facility]  Phone: [phone]   [phone]       Nephrology Progress Note  Gwen Lozada     1980     [id number]  Date of Admission : 4/27/2018 05/02/18    CC: Follow up for STONE       Assessment and Plan   STONE on CKD   - Cr stable  - cont IVF for now  - daily labs for now    Acute on Chronic Hyper K :  - K stable  - low K diet    Uncontrolled HTN:  - start labetalol 200mg BID today    Metabolic acidosis :  - resolved  - on LR as of this AM    CKD Stage IV :  - 2/2 DM, SCD  - previously dialysis dependent for ~ 6m. Failed RUE and RLE grafts     Sickle cell dz  - hgb stable    Anemia     Left foot infection:  - per podiatry     Interval History:  Seen and examined. Cr stable. BP still elevated. C/o nausea. Lethargic. Not eating any food. Review of Systems: Pertinent items are noted in HPI.     Current Medications:   Current Facility-Administered Medications   Medication Dose Route Frequency    hydrALAZINE (APRESOLINE) tablet 100 mg  100 mg Oral TID    labetalol (NORMODYNE) tablet 200 mg  200 mg Oral Q12H    hydrALAZINE (APRESOLINE) 20 mg/mL injection 20 mg  20 mg IntraVENous Q4H PRN    lactated Ringers infusion  75 mL/hr IntraVENous CONTINUOUS    ondansetron (ZOFRAN) injection 4 mg  4 mg IntraVENous Q4H PRN    diphenhydrAMINE (BENADRYL) capsule 25 mg  25 mg Oral Q6H PRN    insulin glargine (LANTUS) injection 10 Units  10 Units SubCUTAneous QHS    HYDROmorphone (PF) (DILAUDID) injection 1 mg  1 mg IntraVENous Q6H PRN    nitroglycerin (NITROBID) 2 % ointment 2 Inch  2 Inch Topical BID    promethazine (PHENERGAN) 6.25 mg/5 mL syrup 6.25 mg  6.25 mg Oral Q6H PRN    sodium chloride (NS) flush 20 mL  20 mL InterCATHeter PRN    sodium chloride (NS) flush 10 mL  10 mL InterCATHeter Q24H    sodium chloride (NS) flush 10 mL  10 mL InterCATHeter PRN    sodium chloride (NS) flush 10 mL  10 mL InterCATHeter Q8H    sodium chloride (NS) flush 20 mL  20 mL InterCATHeter Q24H    NIFEdipine ER (PROCARDIA XL) tablet 90 mg  90 mg Oral DAILY    calcium carbonate (OS-BINA) tablet 500 mg [elemental]  500 mg Oral DAILY    QUEtiapine (SEROquel) tablet 100 mg  100 mg Oral BID    venlafaxine (EFFEXOR) tablet 75 mg  75 mg Oral BID WITH MEALS    sodium chloride (NS) flush 5-10 mL  5-10 mL IntraVENous Q8H    sodium chloride (NS) flush 5-10 mL  5-10 mL IntraVENous PRN    heparin (porcine) injection 5,000 Units  5,000 Units SubCUTAneous Q8H    albuterol-ipratropium (DUO-NEB) 2.5 MG-0.5 MG/3 ML  3 mL Nebulization Q4H PRN    lactobac ac& pc-s.therm-b.anim (CHRIS Q/RISAQUAD)  1 Cap Oral DAILY    insulin lispro (HUMALOG) injection   SubCUTAneous AC&HS    glucose chewable tablet 16 g  4 Tab Oral PRN    dextrose (D50W) injection syrg 12.5-25 g  12.5-25 g IntraVENous PRN    glucagon (GLUCAGEN) injection 1 mg  1 mg IntraMUSCular PRN    doxycycline (VIBRAMYCIN) 100 mg in 0.9% sodium chloride (MBP/ADV) 100 mL  100 mg IntraVENous Q12H    acetaminophen (TYLENOL) tablet 650 mg  650 mg Oral Q4H PRN    oxyCODONE-acetaminophen (PERCOCET) 5-325 mg per tablet 1 Tab  1 Tab Oral Q4H PRN      Allergies   Allergen Reactions    Erythromycin Itching    Morphine Itching    Toradol [Ketorolac] Rash       Objective:  Vitals:    Vitals:    05/02/18 0443 05/02/18 0454 05/02/18 0608 05/02/18 0739   BP: (!) 201/101 180/89  (!) 203/101   Pulse: (!) 109 85  82   Resp:    19   Temp:    98.4 °F (36.9 °C)   SpO2:    100%   Weight:   54.9 kg (121 lb 0.5 oz)    Height:         Intake and Output:     04/30 1901 - 05/02 0700  In: 1397.5 [P.O.:270;  I.V.:1127.5]  Out: 700 [Urine:700]    Physical Examination:    General: Lethargic, chronically ill  Neck:  Supple, no mass  Resp:  Lungs CTA B/L, no wheezing , normal respiratory effort  CV:  RRR,  no murmur or rub,trace LE edema  GI:  Soft, NT, + Bowel sounds, no hepatosplenomegaly  Neurologic:  Non focal  Skin:  No Rash  :  No escobedo     []    High complexity decision making was performed  []    Patient is at high-risk of decompensation with multiple organ involvement    Lab Data Personally Reviewed: I have reviewed all the pertinent labs, microbiology data and radiology studies during assessment.     Recent Labs      05/02/18   0344  05/01/18   0505  04/30/18 2011 04/30/18   1148  04/30/18   0624  04/29/18   1400   NA  143  145  144  141  140  139   K  4.2  4.3  4.3  4.7  4.6  5.1   CL  109*  111*  112*  109*  109*  109*   CO2  27  24  23  23  21  23   GLU  223*  183*  185*  244*  299*  216*   BUN  43*  42*  41*  42*  45*  56*   CREA  2.89*  2.84*  2.79*  2.77*  2.96*  3.16*   CA  9.0  8.9  9.2  9.2  8.9  7.9*   MG   --   1.6   --    --   1.6   --    PHOS   --   4.0   --   3.3   --   3.4   ALB   --   2.9*   --   3.3*   --   2.9*   SGOT   --    --    --   25   --    --    ALT   --    --    --   27   --    --      Recent Labs      05/01/18   0505  04/30/18   0624   WBC  6.1  4.8   HGB  8.5*  9.0*   HCT  26.3*  28.1*   PLT  165  153     Lab Results   Component Value Date/Time    Specimen Description: URINE 06/24/2013 08:00 PM    Specimen Description: URINE 06/17/2013 07:55 PM    Specimen Description: URINE 05/26/2013 10:10 AM    Specimen Description: URINE 04/22/2013 02:30 PM    Specimen Description: NARES 03/21/2013 12:30 PM     Lab Results   Component Value Date/Time    Culture result: NO GROWTH 5 DAYS 04/27/2018 05:11 PM    Culture result: FEW STAPHYLOCOCCUS HOMINIS SUBSPECIES HOMINIS (A) 03/11/2018 12:47 PM    Culture result: (A) 03/11/2018 12:47 PM     STAPHYLOCOCCUS EPIDERMIDIS (OXACILLIN RESISTANT) ISOLATED FROM THIO BROTH ONLY    Culture result: NO ANAEROBES ISOLATED 03/11/2018 12:47 PM    Culture result: NO GROWTH ON SOLID MEDIA AT 4 DAYS 03/11/2018 11:43 AM    Culture result: (A) 03/11/2018 11:43 AM     STAPHYLOCOCCUS EPIDERMIDIS (OXACILLIN RESISTANT) ISOLATED FROM THIO BROTH ONLY    Culture result: NO GROWTH ON SOLID MEDIA AT 4 DAYS 03/11/2018 11:43 AM    Culture result: (A) 03/11/2018 11:43 AM     STAPHYLOCOCCUS EPIDERMIDIS ISOLATED FROM THIO BROTH ONLY     Recent Results (from the past 24 hour(s))   GLUCOSE, POC    Collection Time: 05/01/18 11:28 AM   Result Value Ref Range    Glucose (POC) 143 (H) 65 - 100 mg/dL    Performed by Dwaine 90, POC    Collection Time: 05/01/18  4:57 PM   Result Value Ref Range    Glucose (POC) 185 (H) 65 - 100 mg/dL    Performed by Cruz Og    GLUCOSE, POC    Collection Time: 05/01/18 10:03 PM   Result Value Ref Range    Glucose (POC) 133 (H) 65 - 100 mg/dL    Performed by Isabel Villalpando    METABOLIC PANEL, BASIC    Collection Time: 05/02/18  3:44 AM   Result Value Ref Range    Sodium 143 136 - 145 mmol/L    Potassium 4.2 3.5 - 5.1 mmol/L    Chloride 109 (H) 97 - 108 mmol/L    CO2 27 21 - 32 mmol/L    Anion gap 7 5 - 15 mmol/L    Glucose 223 (H) 65 - 100 mg/dL    BUN 43 (H) 6 - 20 MG/DL    Creatinine 2.89 (H) 0.55 - 1.02 MG/DL    BUN/Creatinine ratio 15 12 - 20      GFR est AA 22 (L) >60 ml/min/1.73m2    GFR est non-AA 18 (L) >60 ml/min/1.73m2    Calcium 9.0 8.5 - 10.1 MG/DL   GLUCOSE, POC    Collection Time: 05/02/18  6:36 AM   Result Value Ref Range    Glucose (POC) 193 (H) 65 - 100 mg/dL    Performed by Isabel Villalpando            Total time spent with patient:  xxx   min. Care Plan discussed with:  Patient     Family      RN      Consulting Physician [facility],         I have reviewed the flowsheets. Chart and Pertinent Notes have been reviewed. No change in PMH ,family and social history from Consult note.       Anahi Pruett MD

## 2018-05-02 NOTE — PROGRESS NOTES
NUTRITION COMPLETE ASSESSMENT    RECOMMENDATIONS:   1. Continue consistent carb diet as ordered (add renal restrictions prn)    2. Consider possible replacement of J-tube secondary to malnutrition and inability to tolerate adequate po for >1 month (if pursued, see recs below)    Interventions/Plan:   Food/Nutrient Delivery: ONS (Boost Glucose Control TID)    Assessment:   Reason for Assessment:   [x] BPA/MST Referral     Diet: Diabetic Consistent carb    Nutritionally Significant Medications: [x] Reviewed & Includes: os-pamela 500 mg daily; doxycycline q12 hours; lantus 10 units q bedtime; humalog correction scale; LR @ 75 mL/hr; floraQ daily; zofran q4 hours prn; phenergan q6 hours prn     Meal Intake:   Patient Vitals for the past 100 hrs:   % Diet Eaten   05/01/18 1550 0 %   05/01/18 1126 0 %     Pre-Hospitalization:  Usual Appetite: Poor  Diet at Home: Regular  Vitamins/Supplements: No    Current Hospitalization:   Fluid Restriction:    Appetite: Poor  PO Ability: Independent      Subjective:  Spoke with  and father at the bedside. The pt was asleep most of the visit, but did wake up to discuss possible need for resumption of nutrition support via J-tube, which she agreed to during visit.  is very concerned with her pain level and her ability to tolerate po. He states she hasn't been able to eat for ~1 month and he's afraid she's going to waste away. Pt is familiar to this nutrition team from previous admissions (this team has not seen in a few years). She does appear to have more muscle wasting, but again it has been ~3 years since this [facility] has visited with the pt; however, they do remember this team quite well. Objective:  Chart reviewed, discussed with RN and team during interdisciplinary rounds. Pt admitted with osteomyelitis. PMHx: severe gastroparesis, gastric pacer, gastric bypass (to treat gastroparesis in 2015), CKD5, HTN, narcotic dependency, others noted.   Pt with nausea/vomiting after all meals and pills. Palliative Care has been consulted for pain control. Per , the pt does complain of difficulty swallowing at times and has been vomiting on eating off and on for ~1 month (worse prior to admission). She was able to eat well at the NH for a short period, but has unable to tolerate po so far this admission. He admits that the pt has not been taking MVI, calcium or Vitamin D prior to admission, but he believes she was taking B12 supplements. Discussed the need for life long vitamin/mineral supplementations secondary to GBP. Pt with history of J-tube (unclear when this was removed). Agree this may be necessary in the future if she is unable to improve her po intake. If pursued, would recommend Suplena @ 40 mL/hr + 100 mL flush q 4 hours. This will provide 1728 kcal, 43 g protein and 1339 mL total free water (tf + flush)-- meeting 100% of estimated needs. Pt with weight loss of 20# x 3-4 weeks, which is severe (15%) and 26% of body weight over the past year. Patient meets criteria for Severe Protein Calorie Malnutrition as evidenced by:   ASPEN Malnutrition Criteria  Acute Illness, Chronic Illness, or Social/Enviornmental: Acute illness  Energy Intake: Less than/equal to 50% est energy req for greater than/equal to 5 days  Weight Loss: Greater than 5% x 1 mo  Muscle Mass: Moderate  ASPEN Malnutrition Score - Acute Illness: 18  Acute Illness - Malnutrition Diagnosis: Severe malnutrition. Estimated Nutrition Needs:   Kcals/day: 1530 Kcals/day (0843-4240 kcal/day (MSJ x 1.3-1.5))  Protein: 44 g (0.8 g/kg-- secondary to renal func; will need to increase for wound healing if HD pursued)  Fluid: 1600 ml (~1 mL/kcal)  Based On: [address] [facility]  Weight Used: Actual wt (54.9 kg)    Pt expected to meet estimated nutrient needs:  []   Yes     [x]  No  Nutrition Diagnosis:   1.  Inadequate protein-energy intake related to decreased appetite, nausea/vomiting, complaints of trouble swallowing as evidenced by 20# weight loss x 3-4 weeks; 42# x 1 year    Goals:     Pt to tolerate at least 3 supplements per day over the next 24-48 hours or consider J-tube feeding     Monitoring & Evaluation:    - Total energy intake, Liquid meal replacement   - Weight/weight change     Previous Nutrition Goals Met:   N/A  Previous Recommendations:    N/A    Education & Discharge Needs:   [x] None Identified   [] Identified and addressed    [x] Participated in care plan, discharge planning, and/or interdisciplinary rounds        Cultural, Mandaen and ethnic food preferences identified: NONE    Skin Integrity: []Intact  [x]Other: recent toe amputations  Edema: [x]None []Other  Last BM: 4/30/18  Food Allergies: [x]None []Other  Diet Restrictions: Pt does not follow restrictions    Anthropometrics:    Weight Loss Metrics 5/2/2018 4/27/2018 4/13/2018 3/2/2018 5/2/2017 8/30/2016 5/30/2016   Today's Wt 121 lb 0.5 oz - 141 lb 15.6 oz 142 lb 162 lb 14.7 oz 165 lb 165 lb   BMI - 22.14 kg/m2 25.97 kg/m2 25.97 kg/m2 29.8 kg/m2 30.18 kg/m2 30.17 kg/m2      Weight Source: Standing scale (comment)  Height: 5' 2\" (157.5 cm),    Body mass index is 22.14 kg/(m^2).   IBW : 49.9 kg (110 lb),    Usual Body Weight: 64.4 kg (142 lb) (4/13; 163# (5/2/17)),      Labs:    Lab Results   Component Value Date/Time    Sodium 143 05/02/2018 03:44 AM    Potassium 4.2 05/02/2018 03:44 AM    Chloride 109 (H) 05/02/2018 03:44 AM    CO2 27 05/02/2018 03:44 AM    Glucose 223 (H) 05/02/2018 03:44 AM    BUN 43 (H) 05/02/2018 03:44 AM    Creatinine 2.89 (H) 05/02/2018 03:44 AM    Calcium 9.0 05/02/2018 03:44 AM    Magnesium 1.6 05/01/2018 05:05 AM    Phosphorus 4.0 05/01/2018 05:05 AM    Albumin 2.9 (L) 05/01/2018 05:05 AM     Lab Results   Component Value Date/Time    Hemoglobin A1c 6.9 (H) 04/30/2018 06:24 AM       [address]

## 2018-05-02 NOTE — PROGRESS NOTES
Physical Therapy  5/2/2018    1400: Second attempt to work with pt today. No family present and pt prone in bed. Declining any and all therapy for today. Remains with high pain levels and only wishing to be washed up by PCT at this time. Will return tomorrow. 1000: Orders received and acknowledged. Cleared by RN for evaluation. Pt's family members present and pt sleeping. Family requests to return this afternoon as pt has not been eating and needs time to rest. Will follow up this afternoon as able.     Thank Angelic Yuan, PT, DPT

## 2018-05-02 NOTE — PROGRESS NOTES
Problem: Falls - Risk of  Goal: *Absence of Falls  Document Blake Fall Risk and appropriate interventions in the flowsheet.    Outcome: Progressing Towards Goal  Fall Risk Interventions:  Mobility Interventions: Patient to call before getting OOB         Medication Interventions: Patient to call before getting OOB    Elimination Interventions: Call light in reach             Problem: General Medical Care Plan  Goal: *Vital signs within specified parameters  Outcome: Progressing Towards Goal  Pt bp and, hr was elevated most of the night, under control with labetalol

## 2018-05-02 NOTE — ROUTINE PROCESS
Bedside shift change report given to Bianca (oncoming nurse) by [employer] (offgoing nurse). Report included the following information ED Summary, Procedure Summary, Intake/Output, Recent Results and Cardiac Rhythm NSR 89.

## 2018-05-02 NOTE — PROGRESS NOTES
Hospitalist Progress Note  Bernie Cabrera MD  Answering service: [phone] from in house phone  Cell: [phone]      Date of Service:  2018  NAME:  Esdras Pressley  :  1980  MRN:  [id number]      Admission Summary:      Per H&P: 40-year-old woman with a past medical history significant for hypertension, type 2 diabetes, obstructive sleep apnea, depression, chronic kidney disease, narcotic dependency was in her usual state of health until about 3 days ago when the patient developed left leg pain and swelling. The pain is a constant 8/10 in severity. Patient described the pain as a sharp shooting pain in her left leg. No relief with pain medication. No known aggravating factors. Last month, the patient underwent a transmetatarsal resection of the left foot 2/2 diabetic foot. Patient also complained of abdominal pain, nausea, and vomiting. The abdominal pain is diffuse in location. Interval history / Subjective:     Patient was asleep through encounter. She was noted to have been agitated earlier on and was given pain meds. MRI of the left foot still pending. Assessment & Plan:     Acute on chronic pancreatitis: Patient with N/V, abdominal pain and elevated lipase /amylase. Repeat Lipase  -CT A/P with calcified pancreas  -pain control  -  IVF with ringer's lactate  - Per Nutritionist, patient has not had any nutrition fr about 4 weeks? ? I will consult GI to possibly consider feeding tube. Acute hyperkalemia due to kidney disease:  - now resolved.     Osteomyelitis of the left foot:  -the patient was on vancomycin and Zosyn initially by the admitting team  -Podiatry was consulted  - MRI of the left foot pending  - On Doxycycline  - I will consult ID    Acute on chronic kidney disease stage V  -holding diuretic and patient on fluids, per Renal   - Nephrology following    Essential Hypertension: Uncontrolled  - Continue current anti-hypertensive's. - On hydralazine PRN SBP > 160 mm hg     Microcytic Anemia. This is most likely due to chronic kidney disease. Type 2 diabetes type 2: Continue sliding scale. accu checks    Obstructive sleep apnea    Depression: Continue home meds. Code status: Full  DVT prophylaxis: heparin    Care Plan discussed with: Patient/Family and Nurse  Disposition: TBD     Hospital Problems  Date Reviewed: 4/28/2018          Codes Class Noted POA    * (Principal)Osteomyelitis of left foot ([facility] [address] ICD-10-CM: M86.9  ICD-9-CM: 730.27  4/27/2018 Yes              Review of Systems:   Pertinent items are noted in HPI. Vital Signs:    Last 24hrs VS reviewed since prior progress note. Most recent are:  Visit Vitals    /85 (BP 1 Location: Right arm, BP Patient Position: At rest)    Pulse 89    Temp 98.3 °F (36.8 °C)    Resp 20    Ht 5' 2\" (1.575 m)    Wt 54.9 kg (121 lb 0.5 oz)    SpO2 95%    BMI 22.14 kg/m2         Intake/Output Summary (Last 24 hours) at 05/02/18 1608  Last data filed at 05/02/18 1208   Gross per 24 hour   Intake           803.75 ml   Output              700 ml   Net           103.75 ml        Physical Examination:             Constitutional:  Mild to moderate distress   ENT:  Neck supple   Resp:  CTA bilaterally. CV:  Regular rhythm, increased rate    GI:  Soft, non distended, diffusely tender to light palpation. No rigidity    Musculoskeletal:  LLE larger than R (pateint states this is baseline). Partial amputation of L foot with wrapping    Neurologic:  Moves all extremities.   AAOx3        Data Review:    Review and/or order of clinical lab test  Review and/or order of tests in the medicine section of CPT      Labs:     Recent Labs      05/01/18   0505  04/30/18   0624   WBC  6.1  4.8   HGB  8.5*  9.0*   HCT  26.3*  28.1*   PLT  165  153     Recent Labs      05/02/18   0344  05/01/18   0505  04/30/18 2011 04/30/18   1148  04/30/18   7223 NA  143  145  144  141  140   K  4.2  4.3  4.3  4.7  4.6   CL  109*  111*  112*  109*  109*   CO2  27  24  23  23  21   BUN  43*  42*  41*  42*  45*   CREA  2.89*  2.84*  2.79*  2.77*  2.96*   GLU  223*  183*  185*  244*  299*   CA  9.0  8.9  9.2  9.2  8.9   MG   --   1.6   --    --   1.6   PHOS   --   4.0   --   3.3   --      Recent Labs      05/01/18   0505  04/30/18   1148  04/30/18   0624   SGOT   --   25   --    ALT   --   27   --    AP   --   329*   --    TBILI   --   0.3   --    TP   --   8.7*   --    ALB  2.9*  3.3*   --    GLOB   --   5.4*   --    AML   --    --   229*   LPSE   --    --   1848*     Lab Results   Component Value Date/Time    Folate 61.0 (H) 04/30/2018 06:24 AM      Lab Results   Component Value Date/Time    Cholesterol, total 137 04/30/2018 06:24 AM    HDL Cholesterol 35 04/30/2018 06:24 AM    LDL, calculated 89 04/30/2018 06:24 AM    Triglyceride 65 04/30/2018 06:24 AM    CHOL/HDL Ratio 3.9 04/30/2018 06:24 AM     Lab Results   Component Value Date/Time    Glucose (POC) 116 (H) 05/02/2018 11:28 AM    Glucose (POC) 193 (H) 05/02/2018 06:36 AM    Glucose (POC) 133 (H) 05/01/2018 10:03 PM    Glucose (POC) 185 (H) 05/01/2018 04:57 PM    Glucose (POC) 143 (H) 05/01/2018 11:28 AM     Lab Results   Component Value Date/Time    Color YELLOW/STRAW 03/02/2018 10:50 PM    Appearance CLOUDY (A) 03/02/2018 10:50 PM    Specific gravity 1.017 03/02/2018 10:50 PM    Specific gravity 1.015 07/26/2010 11:18 AM    pH (UA) 5.0 03/02/2018 10:50 PM    Protein 100 (A) 03/02/2018 10:50 PM    Glucose NEGATIVE  03/02/2018 10:50 PM    Ketone NEGATIVE  03/02/2018 10:50 PM    Bilirubin NEGATIVE  03/02/2018 10:50 PM    Urobilinogen 1.0 03/02/2018 10:50 PM    Nitrites NEGATIVE  03/02/2018 10:50 PM    Leukocyte Esterase NEGATIVE  03/02/2018 10:50 PM    Epithelial cells MANY (A) 03/02/2018 10:50 PM    Bacteria 3+ (A) 03/02/2018 10:50 PM    WBC 5-10 03/02/2018 10:50 PM    RBC 0-5 03/02/2018 10:50 PM       Medications Reviewed:     Current Facility-Administered Medications   Medication Dose Route Frequency    hydrALAZINE (APRESOLINE) tablet 100 mg  100 mg Oral TID    labetalol (NORMODYNE) tablet 200 mg  200 mg Oral Q12H    HYDROmorphone (PF) (DILAUDID) injection 1 mg  1 mg IntraVENous Q3H PRN    hydrALAZINE (APRESOLINE) 20 mg/mL injection 20 mg  20 mg IntraVENous Q4H PRN    lactated Ringers infusion  75 mL/hr IntraVENous CONTINUOUS    ondansetron (ZOFRAN) injection 4 mg  4 mg IntraVENous Q4H PRN    diphenhydrAMINE (BENADRYL) capsule 25 mg  25 mg Oral Q6H PRN    insulin glargine (LANTUS) injection 10 Units  10 Units SubCUTAneous QHS    nitroglycerin (NITROBID) 2 % ointment 2 Inch  2 Inch Topical BID    promethazine (PHENERGAN) 6.25 mg/5 mL syrup 6.25 mg  6.25 mg Oral Q6H PRN    sodium chloride (NS) flush 20 mL  20 mL InterCATHeter PRN    sodium chloride (NS) flush 10 mL  10 mL InterCATHeter Q24H    sodium chloride (NS) flush 10 mL  10 mL InterCATHeter PRN    sodium chloride (NS) flush 10 mL  10 mL InterCATHeter Q8H    sodium chloride (NS) flush 20 mL  20 mL InterCATHeter Q24H    NIFEdipine ER (PROCARDIA XL) tablet 90 mg  90 mg Oral DAILY    calcium carbonate (OS-BINA) tablet 500 mg [elemental]  500 mg Oral DAILY    QUEtiapine (SEROquel) tablet 100 mg  100 mg Oral BID    venlafaxine (EFFEXOR) tablet 75 mg  75 mg Oral BID WITH MEALS    sodium chloride (NS) flush 5-10 mL  5-10 mL IntraVENous Q8H    sodium chloride (NS) flush 5-10 mL  5-10 mL IntraVENous PRN    heparin (porcine) injection 5,000 Units  5,000 Units SubCUTAneous Q8H    albuterol-ipratropium (DUO-NEB) 2.5 MG-0.5 MG/3 ML  3 mL Nebulization Q4H PRN    lactobac ac& pc-s.therm-b.anim (CHRIS Q/RISAQUAD)  1 Cap Oral DAILY    insulin lispro (HUMALOG) injection   SubCUTAneous AC&HS    glucose chewable tablet 16 g  4 Tab Oral PRN    dextrose (D50W) injection syrg 12.5-25 g  12.5-25 g IntraVENous PRN    glucagon (GLUCAGEN) injection 1 mg  1 mg IntraMUSCular PRN    doxycycline (VIBRAMYCIN) 100 mg in 0.9% sodium chloride (MBP/ADV) 100 mL  100 mg IntraVENous Q12H    acetaminophen (TYLENOL) tablet 650 mg  650 mg Oral Q4H PRN    oxyCODONE-acetaminophen (PERCOCET) 5-325 mg per tablet 1 Tab  1 Tab Oral Q4H PRN     ______________________________________________________________________  EXPECTED LENGTH OF STAY: 3d 19h  ACTUAL LENGTH OF STAY:          5                 Luz Carney MD

## 2018-05-02 NOTE — PROGRESS NOTES
Accompanied Palliative Dr Jose Tate to visit pt. Emotional support offered as pt spoke of her symptoms. Following visit from Dr. Jose Tate, pt requested prayer, which was shared at bedside. She also requested a Bible. This  returned with a Bible and [employer] devotional.      Mehrdad Pritchett will continue to follow for emotional and spiritual support as able/needed.     Margaret Prater, Palliative

## 2018-05-03 LAB
ANION GAP SERPL CALC-SCNC: 8 MMOL/L (ref 5–15)
ATRIAL RATE: 114 BPM
BUN SERPL-MCNC: 50 MG/DL (ref 6–20)
BUN/CREAT SERPL: 17 (ref 12–20)
CALCIUM SERPL-MCNC: 8.1 MG/DL (ref 8.5–10.1)
CALCULATED P AXIS, ECG09: 61 DEGREES
CALCULATED R AXIS, ECG10: 37 DEGREES
CALCULATED T AXIS, ECG11: 63 DEGREES
CHLORIDE SERPL-SCNC: 107 MMOL/L (ref 97–108)
CO2 SERPL-SCNC: 26 MMOL/L (ref 21–32)
CREAT SERPL-MCNC: 2.99 MG/DL (ref 0.55–1.02)
DIAGNOSIS, 93000: NORMAL
GLUCOSE BLD STRIP.AUTO-MCNC: 108 MG/DL (ref 65–100)
GLUCOSE BLD STRIP.AUTO-MCNC: 110 MG/DL (ref 65–100)
GLUCOSE BLD STRIP.AUTO-MCNC: 157 MG/DL (ref 65–100)
GLUCOSE BLD STRIP.AUTO-MCNC: 291 MG/DL (ref 65–100)
GLUCOSE BLD STRIP.AUTO-MCNC: 87 MG/DL (ref 65–100)
GLUCOSE SERPL-MCNC: 121 MG/DL (ref 65–100)
LIPASE SERPL-CCNC: 491 U/L (ref 73–393)
P-R INTERVAL, ECG05: 118 MS
POTASSIUM SERPL-SCNC: 4.5 MMOL/L (ref 3.5–5.1)
Q-T INTERVAL, ECG07: 352 MS
QRS DURATION, ECG06: 84 MS
QTC CALCULATION (BEZET), ECG08: 485 MS
SERVICE CMNT-IMP: ABNORMAL
SERVICE CMNT-IMP: NORMAL
SODIUM SERPL-SCNC: 141 MMOL/L (ref 136–145)
VENTRICULAR RATE, ECG03: 114 BPM

## 2018-05-03 PROCEDURE — 65660000000 HC RM CCU STEPDOWN

## 2018-05-03 PROCEDURE — 36415 COLL VENOUS BLD VENIPUNCTURE: CPT | Performed by: HOSPITALIST

## 2018-05-03 PROCEDURE — 65270000029 HC RM PRIVATE

## 2018-05-03 PROCEDURE — G8979 MOBILITY GOAL STATUS: HCPCS

## 2018-05-03 PROCEDURE — 83690 ASSAY OF LIPASE: CPT | Performed by: INTERNAL MEDICINE

## 2018-05-03 PROCEDURE — 97161 PT EVAL LOW COMPLEX 20 MIN: CPT

## 2018-05-03 PROCEDURE — 74011250636 HC RX REV CODE- 250/636: Performed by: PHYSICAL MEDICINE & REHABILITATION

## 2018-05-03 PROCEDURE — 74011250637 HC RX REV CODE- 250/637: Performed by: INTERNAL MEDICINE

## 2018-05-03 PROCEDURE — G8978 MOBILITY CURRENT STATUS: HCPCS

## 2018-05-03 PROCEDURE — 74011250636 HC RX REV CODE- 250/636: Performed by: INTERNAL MEDICINE

## 2018-05-03 PROCEDURE — 74011250637 HC RX REV CODE- 250/637: Performed by: PHYSICAL MEDICINE & REHABILITATION

## 2018-05-03 PROCEDURE — 80048 BASIC METABOLIC PNL TOTAL CA: CPT | Performed by: HOSPITALIST

## 2018-05-03 PROCEDURE — 82962 GLUCOSE BLOOD TEST: CPT

## 2018-05-03 PROCEDURE — 97116 GAIT TRAINING THERAPY: CPT

## 2018-05-03 PROCEDURE — 74011000258 HC RX REV CODE- 258: Performed by: INTERNAL MEDICINE

## 2018-05-03 PROCEDURE — 74011250637 HC RX REV CODE- 250/637: Performed by: FAMILY MEDICINE

## 2018-05-03 PROCEDURE — 74011000250 HC RX REV CODE- 250: Performed by: INTERNAL MEDICINE

## 2018-05-03 PROCEDURE — 74011636637 HC RX REV CODE- 636/637: Performed by: INTERNAL MEDICINE

## 2018-05-03 RX ORDER — POLYETHYLENE GLYCOL 3350 17 G/17G
17 POWDER, FOR SOLUTION ORAL DAILY PRN
Status: DISCONTINUED | OUTPATIENT
Start: 2018-05-03 | End: 2018-05-15 | Stop reason: HOSPADM

## 2018-05-03 RX ADMIN — ONDANSETRON HYDROCHLORIDE 4 MG: 2 INJECTION INTRAMUSCULAR; INTRAVENOUS at 20:55

## 2018-05-03 RX ADMIN — LABETALOL HYDROCHLORIDE 200 MG: 100 TABLET, FILM COATED ORAL at 08:57

## 2018-05-03 RX ADMIN — NITROGLYCERIN 2 INCH: 20 OINTMENT TOPICAL at 17:36

## 2018-05-03 RX ADMIN — HYDROMORPHONE HYDROCHLORIDE 1 MG: 2 INJECTION, SOLUTION INTRAMUSCULAR; INTRAVENOUS; SUBCUTANEOUS at 03:29

## 2018-05-03 RX ADMIN — QUETIAPINE FUMARATE 100 MG: 100 TABLET ORAL at 08:58

## 2018-05-03 RX ADMIN — STANDARDIZED SENNA CONCENTRATE AND DOCUSATE SODIUM 2 TABLET: 8.6; 5 TABLET, FILM COATED ORAL at 08:57

## 2018-05-03 RX ADMIN — QUETIAPINE FUMARATE 100 MG: 100 TABLET ORAL at 17:36

## 2018-05-03 RX ADMIN — HYDRALAZINE HYDROCHLORIDE 100 MG: 50 TABLET ORAL at 22:19

## 2018-05-03 RX ADMIN — Medication 1 CAPSULE: at 08:57

## 2018-05-03 RX ADMIN — Medication 10 ML: at 14:14

## 2018-05-03 RX ADMIN — NIFEDIPINE 90 MG: 60 TABLET, FILM COATED, EXTENDED RELEASE ORAL at 08:57

## 2018-05-03 RX ADMIN — Medication 10 ML: at 08:59

## 2018-05-03 RX ADMIN — Medication 10 ML: at 06:44

## 2018-05-03 RX ADMIN — HYDROMORPHONE HYDROCHLORIDE 1 MG: 2 INJECTION, SOLUTION INTRAMUSCULAR; INTRAVENOUS; SUBCUTANEOUS at 09:20

## 2018-05-03 RX ADMIN — HEPARIN SODIUM 5000 UNITS: 5000 INJECTION, SOLUTION INTRAVENOUS; SUBCUTANEOUS at 06:43

## 2018-05-03 RX ADMIN — HEPARIN SODIUM 5000 UNITS: 5000 INJECTION, SOLUTION INTRAVENOUS; SUBCUTANEOUS at 12:42

## 2018-05-03 RX ADMIN — CALCIUM 500 MG: 500 TABLET ORAL at 08:57

## 2018-05-03 RX ADMIN — DOXYCYCLINE 100 MG: 100 INJECTION, POWDER, LYOPHILIZED, FOR SOLUTION INTRAVENOUS at 09:10

## 2018-05-03 RX ADMIN — Medication 20 ML: at 14:15

## 2018-05-03 RX ADMIN — NITROGLYCERIN 2 INCH: 20 OINTMENT TOPICAL at 08:58

## 2018-05-03 RX ADMIN — HYDROMORPHONE HYDROCHLORIDE 1 MG: 2 INJECTION, SOLUTION INTRAMUSCULAR; INTRAVENOUS; SUBCUTANEOUS at 14:14

## 2018-05-03 RX ADMIN — DOXYCYCLINE 100 MG: 100 INJECTION, POWDER, LYOPHILIZED, FOR SOLUTION INTRAVENOUS at 20:54

## 2018-05-03 RX ADMIN — HEPARIN SODIUM 5000 UNITS: 5000 INJECTION, SOLUTION INTRAVENOUS; SUBCUTANEOUS at 20:55

## 2018-05-03 RX ADMIN — HYDROMORPHONE HYDROCHLORIDE 1 MG: 2 INJECTION, SOLUTION INTRAMUSCULAR; INTRAVENOUS; SUBCUTANEOUS at 00:17

## 2018-05-03 RX ADMIN — HYDRALAZINE HYDROCHLORIDE 100 MG: 50 TABLET ORAL at 08:58

## 2018-05-03 RX ADMIN — INSULIN LISPRO 5 UNITS: 100 INJECTION, SOLUTION INTRAVENOUS; SUBCUTANEOUS at 12:42

## 2018-05-03 RX ADMIN — HYDRALAZINE HYDROCHLORIDE 100 MG: 50 TABLET ORAL at 17:36

## 2018-05-03 RX ADMIN — HYDROMORPHONE HYDROCHLORIDE 1 MG: 2 INJECTION, SOLUTION INTRAMUSCULAR; INTRAVENOUS; SUBCUTANEOUS at 20:55

## 2018-05-03 RX ADMIN — ONDANSETRON HYDROCHLORIDE 4 MG: 2 INJECTION INTRAMUSCULAR; INTRAVENOUS at 17:45

## 2018-05-03 RX ADMIN — HYDROMORPHONE HYDROCHLORIDE 1 MG: 2 INJECTION, SOLUTION INTRAMUSCULAR; INTRAVENOUS; SUBCUTANEOUS at 17:45

## 2018-05-03 RX ADMIN — VENLAFAXINE 75 MG: 37.5 TABLET ORAL at 08:57

## 2018-05-03 RX ADMIN — VENLAFAXINE 75 MG: 37.5 TABLET ORAL at 17:36

## 2018-05-03 RX ADMIN — LABETALOL HYDROCHLORIDE 200 MG: 100 TABLET, FILM COATED ORAL at 20:54

## 2018-05-03 NOTE — PROGRESS NOTES
[facility], [address] [place] [address],[address]  [address]  P: [phone]  F: [phone]    Foot and Ankle Surgery - Progress Note                                                   Assessment/Plan:    Charcot deformity left foot  Nonhealing surgical wound left foot  Osteomyelitis left foot  DM with neuropathy       Pt is status post Incision and drainage with removal of all nonviable soft tissue left foot  Proximal foot amputation left foot, Open bone biopsies left foot  Deep tendon transfer tibialis anterior tendon left foot  Adjacent transfer arteriomyofasciocutaneous plantar medial pedicle flap for delayed primary closure left foot DOS: 03-      Pt doing better today regarding pain control from chronic pancreatitis and kidney failure. They were transferred to cardiac unit due to uncontrolled elevated blood pressure yesterday. The hospital team is managing. We have ordered an MRI without contrast left foot and are awaiting results to determine treatment plan moving forward, if the calcaneus and talus are noted to have osteomyelitis, the patient would likely require a Below knee amputation completed by vascular team.  I have discussed with vascular and they are aware. Currently the foot wound remains superficial and it is difficult to differentiate chronic charcot vs osteomyelitis infection. The patient remains at very high risk for limb loss.       Labs/imaging reviewed, MRI left foot pending   abx per medicine team-thank you  Pain medication per medicine team      Dressing change completed today, wound eirrigated and dry sterile dressing reapplied. Wound photodocumented below. We will plan for weekly changes and if discharged the patient will folow up in the wound center.  The patient may complete passive ROM exercises to the left thigh and lower leg but should be nonweightbearing left foot.  Pt has full range of motion to upper body and full weight-bearing to the right foot.        Elevate and offload B/L LE. Float heels off edge of bed with foam block or air boots.       Foot and ankle will follow     HPI:  Pt is seen at bedside resting in NAD, No new pedal complaints at this time. Discussed case with nursing.     Objective:  Vitals: Patient Vitals for the past 12 hrs:   BP Temp Pulse Resp SpO2   05/02/18 1945 127/76 98.6 °F (37 °C) 96 16 99 %   05/02/18 1556 163/85 98.3 °F (36.8 °C) 89 - 95 %   05/02/18 1124 (!) 153/94 98.7 °F (37.1 °C) 98 20 100 %   05/02/18 1015 127/62 - 100 - -     DERM  Flap to the left foot looks relatively good, as well as incision proximally for tendon transfer. Skin edges remain well coapted to the majority of the flap site. Good cap fill time noted. NO drainage, no foul odor, dehisense, small area of lateral dehiscence measures 2x2x0.25cm to level of SubQ. Minimal periwound blanchable erythema and edema and normal foot temperature appreciated.  suspicion for possible localized infection low       Neurological:   Epicritic and protective threshold is deminished to B/L LE, Pt is unable to detect a 5.07 monofilament wire on 5/5 random tested spots B/L LE.       MSK:  deferred .      Imaging:  Awaiting results of MRI    Labs:  Recent Labs      05/02/18   0344  05/01/18   0505   WBC   --   6.1   CREA  2.89*  2.84*   BUN  43*  42*   HGB   --   8.5*   HCT   --   26.3*   NA  143  145   K  4.2  4.3   CL  109*  111*   CO2  27  24   GLU  223*  183*         Paola Zambrano DPM  5/2/2018  [facility] of [address] [place] [address]. Karen [address] [place] [address]  P: [phone]  F: [phone]

## 2018-05-03 NOTE — PROGRESS NOTES
Problem: Mobility Impaired (Adult and Pediatric)  Goal: *Acute Goals and Plan of Care (Insert Text)  Physical Therapy Goals  Initiated 5/3/2018  1. Patient will move from supine to sit and sit to supine, scoot up and down and roll side to side in bed with independence within 7 day(s). 2.  Patient will transfer from bed to chair and chair to bed with modified independence using the least restrictive device and while maintaining NWB on L LE within 7 day(s). 3.  Patient will perform sit to stand with modified independence while maintaining NWB on L LE within 7 day(s). 4.  Patient will ambulate with modified independence for 50 feet with the least restrictive device while maintaining NWB on L LE within 7 day(s). 5.  Patient will ascend/descend 4 stairs with handrail(s) with modified independence while maintaining NWB on L LE within 7 day(s). physical Therapy EVALUATION  Patient: Brittaney Park (8 y.o. female)  Date: 5/3/2018  Primary Diagnosis: Osteomyelitis of left foot ([address]  poor venous access  Procedure(s) (LRB):  INFUSION CATHETER INSERTION (N/A) 4 Days Post-Op   Precautions:  NWB (L LE)    ASSESSMENT :  Based on the objective data described below, the patient presents with slightly impaired functional independence compared to baseline following admission for left foot osteomyelitis, pain, and swelling (L TMA in March 2018). Per podiatry note, patient is to be NWBing on her L LE (new news to patient). This date, patient reports improved pain management (6/10) and less abdominal pain and nausea, therefore, agreeable to PT evaluation. Overall, patient completed bed mobility modified independently (HOB elevated plus additional use of bed rail), performed sit <> stand transfers to/from rolling walker (for chair height and elevated bed height) while maintaining NWbing on her L LE, and participated in gait training x 35 ft with rolling walker and hop-to pattern (contact guard to close standby assist). Instructed patient on arm rest push-ups for strengthening her UEs - completed x 5 prior to UE fatigue. Patient reports owning a rollator and single-point cane (utilizing [address] with [facility] wedge-shoe prior to admission). PT to follow 3x per week in order to address assistive device needs, stair training closer to discharge, and ensure continued functional independence. Currently recommend HHPT pending further functional progress with skilled PT interventions. Recommendation for Nursing: Patient to complete as able in order to progress functional endurance, strength, and independence -   1. OOB to chair for all meals (RW, nursing supervision)  2. Short-distance ambulation (non-weightbearing L LE, RW, CGA) ~ 30-40 ft. Patient will benefit from skilled intervention to address the above impairments. Patients rehabilitation potential is considered to be Good  Factors which may influence rehabilitation potential include:   []         None noted  []         Mental ability/status  []         Medical condition  []         Home/family situation and support systems  []         Safety awareness  [x]         Pain tolerance/management  []         Other:      PLAN :  Recommendations and Planned Interventions:  [x]           Bed Mobility Training             []    Neuromuscular Re-Education  [x]           Transfer Training                   []    Orthotic/Prosthetic Training  [x]           Gait Training                         []    Modalities  [x]           Therapeutic Exercises           [x]    Edema Management/Control  [x]           Therapeutic Activities            [x]    Patient and Family Training/Education  []           Other (comment):    Frequency/Duration: Patient will be followed by physical therapy 3 times a week to address goals.   Discharge Recommendations: Home Health  Further Equipment Recommendations for Discharge: TBD - May need RW for household distances and wheelchair for community distances SUBJECTIVE:   Patient stated I've been screaming and crying the past 4-5 days.     OBJECTIVE DATA SUMMARY:   HISTORY:    Past Medical History:   Diagnosis Date    ARF (acute renal failure) ([facility] requiring dialysis 2011    Asthma     CKD (chronic kidney disease)     Diabetes ([facility]     Gastroparesis 2010    Gastric Pacer- REMOVED 07/2015    GERD (gastroesophageal reflux disease)     Hypertension     Narcotic dependence ([address]     THU (obstructive sleep apnea)     wears 2 LPM oxygen at night    Other ill-defined conditions(799.89)     Polycystic ovarian syndrome     Seizures (HCC)     Sickle cell trait ([facility]     Thromboembolus ([facility] to her left arm and was told she had one in left leg recently     Past Surgical History:   Procedure Laterality Date    HX CATARACT REMOVAL  3/5/12    right    HX DILATION AND CURETTAGE      ablation    HX GASTRIC BYPASS  2015    HX GI      j tube placement and removal    HX OTHER SURGICAL  2010    Gastric Pacer- REMOVED 07/2015    HX VASCULAR ACCESS      gray cath rt subclavian    HX VASCULAR ACCESS      HD access right thigh     Prior Level of Function/Home Situation: SPC, left [employer] wedge-shoe, rollator, lives with   Personal factors and/or comorbidities impacting plan of care: [facility] Environment: Private residence  One/Two Story Residence: One story  Living Alone: No  Support Systems: Spouse/Significant Other/Partner  Patient Expects to be Discharged to[de-identified] Private residence  Current DME Used/Available at Home: None    EXAMINATION/PRESENTATION/DECISION MAKING:   Critical Behavior:  Neurologic State: Alert, Eyes open spontaneously  Orientation Level: Oriented X4  Cognition: Follows commands     Hearing: Auditory  Auditory Impairment: None  Skin:  Ace bandage donned over left TMA  Edema: Left LE edema  Range Of Motion:  AROM: Within functional limits  Strength:    Strength:  Within functional limits  Tone & Sensation: Tone: Normal  Sensation: Intact  Coordination:  Coordination: Within functional limits  Functional Mobility:  Bed Mobility:  Supine to Sit: Modified independent (HOB elevated; Use of bed rail)  Sit to Supine: Modified independent  Transfers:  Sit to Stand: Stand-by assistance; Adaptive equipment  Stand to Sit: Stand-by assistance; Adaptive equipment  Balance:   Sitting: Intact; Without support  Standing: Intact; With support  Ambulation/Gait Training:  Distance (ft): 35 Feet (ft)  Assistive Device: Gait belt;Walker, rolling  Ambulation - Level of Assistance: Contact guard assistance;Stand-by assistance     Gait Description (WDL): Exceptions to WDL  Gait Abnormalities: Decreased step clearance (Hop-to pattern)     Left Side Weight Bearing: Non-weight bearing  Base of Support: Shift to right;Center of gravity altered (Single limb stance on right foot)     Speed/Ana Luisa: Slow    Functional Measure:  Timed up and go:    Timed Get Up And Go Test: 15     Timed Up and Go and G-code impairment scale:  Percentage of Impairment CH    0%   CI    1-19% CJ    20-39% CK    40-59% CL    60-79% CM    80-99% CN     100%   Timed   Score 0-56 10 11-12 13-14 15-16 17-18 19 20       < than 10 seconds=Normal  Greater then 13.5 seconds (in elderly)=Increased fall risk   Amina BARAJAS, Priyank Israel. Predicting the probability for falls in community dwelling older adults using the Timed Up and Go Test. Phys Ther. 2000;80:896-903. G codes: In compliance with CMSs Claims Based Outcome Reporting, the following G-code set was chosen for this patient based on their primary functional limitation being treated: The outcome measure chosen to determine the severity of the functional limitation was the TUG with a score of 15 sec which was correlated with the impairment scale.     ? Mobility - Walking and Moving Around:     - CURRENT STATUS: CK - 40%-59% impaired, limited or restricted    - GOAL STATUS: CJ - 20%-39% impaired, limited or restricted    - D/C STATUS:  ---------------To be determined---------------      Physical Therapy Evaluation Charge Determination   History Examination Presentation Decision-Making   HIGH Complexity :3+ comorbidities / personal factors will impact the outcome/ POC  MEDIUM Complexity : 3 Standardized tests and measures addressing body structure, function, activity limitation and / or participation in recreation  LOW Complexity : Stable, uncomplicated  Other outcome measures TUG  MEDIUM      Based on the above components, the patient evaluation is determined to be of the following complexity level: LOW     Pain:  Pain Scale 1: Numeric (0 - 10)  Pain Intensity 1: 6  Pain Location 1: Abdomen;Back   Pain Intervention(s) 1: Medication (see MAR)     Activity Tolerance:   Please refer to the flowsheet for vital signs taken during this treatment. After treatment:   []         Patient left in no apparent distress sitting up in chair  [x]         Patient left in no apparent distress in bed  [x]         Call bell left within reach  [x]         Nursing notified  []         Caregiver present  []         Bed alarm activated    COMMUNICATION/EDUCATION:   The patients plan of care was discussed with: Registered Nurse. [x]         Fall prevention education was provided and the patient/caregiver indicated understanding. [x]         Patient/family have participated as able in goal setting and plan of care. [x]         Patient/family agree to work toward stated goals and plan of care. []         Patient understands intent and goals of therapy, but is neutral about his/her participation. []         Patient is unable to participate in goal setting and plan of care.     Thank you for this referral.  Sundar Brumfield PT, DPT   Time Calculation: 30 mins

## 2018-05-03 NOTE — PROGRESS NOTES
Bedside shift change report given to Nawaf Rosario RN (oncoming nurse) by Ash Chappell RN (offgoing nurse). Report included the following information SBAR, Kardex, Intake/Output, MAR and Cardiac Rhythm NSR/ST.

## 2018-05-03 NOTE — CONSULTS
Infectious Disease Consult    Today's Date: 5/3/2018   Admit Date: 4/27/2018    Impression:   · Diabetic foot infection  · Recent debridement for abscess/osteo--seems to have recently completed a course of IV antibiotic therapy  · Will be hard to sort out if there is new infection or if there is healing residual of the old    Plan:   · Continue current therapy  · Will discuss    Anti-infectives:     Inpat Anti-Infectives     Start     Ordered Stop    04/28/18 2100  doxycycline (VIBRAMYCIN) 100 mg in 0.9% sodium chloride (MBP/ADV) 100 mL  100 mg,   IntraVENous,   EVERY 12 HOURS      04/28/18 1539 05/05/18 2059          Subjective:   Date of Consultation:  May 3, 2018  Referring Physician: Dr Tang Mtz    Patient is a 42 y.o. female admitted with some foot wound drainage. She was having some issues with pain and swelling of the leg, as well. She states that she had several procedures at an outside hospital related to the left foot. She was on IV antibiotics for six weeks, completing that therapy fairly recently. She has not had fevers, chills, etc. She has not been to the OR this hospital stay.     Patient Active Problem List   Diagnosis Code    Sickle cell trait ([facility]) D57.3    HTN (hypertension) I10    Depression F32.9    Diabetes mellitus type I ([facility] E10.9    CKD (chronic kidney disease) N18.9    Asthma J45.909    Diabetic gastroparesis w/gastric stimulator E11.43, K31.84    PCOS (polycystic ovarian syndrome) E28.2    Seizure ([facility] R56.9   [address] maintenance Z00.00    Back pain M54.9    Elevated lipase R74.8    Obesity BMI > 40 E66.9    Pulmonary edema A14.1    Illicit drug use A13.09    Gastroparesis K31.84    Altered mental state R41.82    Acute on chronic kidney failure (HCC) N17.9, Z36.5    Metabolic encephalopathy U07.05    Acute renal failure superimposed on stage 4 chronic kidney disease (HCC) N17.9, N18.4    Opiate dependence ([facility]) F11.20    Osteomyelitis ([facility]) M86.9    Sickle cell anemia ([facility]) D57.1    Osteomyelitis of left foot ([facility]) M86.9     Past Medical History:   Diagnosis Date    ARF (acute renal failure) ([facility] requiring dialysis     Asthma     CKD (chronic kidney disease)     Diabetes ([facility]     Gastroparesis 2010    Gastric Pacer- REMOVED 2015    GERD (gastroesophageal reflux disease)     Hypertension     Narcotic dependence ([facility]     THU (obstructive sleep apnea)     wears 2 LPM oxygen at night    Other ill-defined conditions(799.89)     Polycystic ovarian syndrome     Seizures (HCC)     Sickle cell trait ([facility]     Thromboembolus ([facility] to her left arm and was told she had one in left leg recently      Family History   Problem Relation Age of Onset    Cancer Mother      lung    Hypertension Mother     Cancer Father      kidney    Stroke Father      3 strokes: 59-72    Heart Disease Father 72     CABG    Hypertension Father     Cancer Sister      pancreatic    Cancer Maternal Aunt      breast    Cancer Paternal Aunt      breast    Schizophrenia Sister      [place] [facility] [place] [address], now ass't living    Other Sister       AIDS    Other Other      nephew of AIDS      Social History   Substance Use Topics    Smoking status: Never Smoker    Smokeless tobacco: Never Used    Alcohol use No     Past Surgical History:   Procedure Laterality Date    HX CATARACT REMOVAL  3/5/12    right    HX DILATION AND CURETTAGE      ablation    HX GASTRIC BYPASS      HX GI      j tube placement and removal    HX OTHER SURGICAL      Gastric Pacer- REMOVED 2015    HX VASCULAR ACCESS      gray cath rt subclavian    HX VASCULAR ACCESS      HD access right thigh      Prior to Admission medications    Medication Sig Start Date End Date Taking? Authorizing Provider   baclofen (LIORESAL) 10 mg tablet Take 10 mg by mouth three (3) times daily. Yes Historical Provider   QUEtiapine (SEROQUEL) 100 mg tablet Take 100 mg by mouth two (2) times a day.    Yes Historical Provider   bumetanide (BUMEX) 1 mg tablet Take 1 mg by mouth two (2) times daily as needed. Yes Historical Provider   oxyCODONE-acetaminophen (PERCOCET) 5-325 mg per tablet Take 1 Tab by mouth two (2) times a day. Scheduled   Yes Historical Provider   hydrALAZINE (APRESOLINE) 25 mg tablet Take 25 mg by mouth three (3) times daily. Yes Historical Provider   promethazine (PHENERGAN) 25 mg tablet Take 25 mg by mouth every eight (8) hours as needed for Nausea. Yes Historical Provider   venlafaxine (EFFEXOR) 75 mg tablet Take 1 Tab by mouth two (2) times daily (with meals). 3/21/18  Yes Srinivasa Reilly MD   calcium carbonate (OS-BINA) 500 mg calcium (1,250 mg) tablet Take 1 Tab by mouth daily. Yes Historical Provider   cyanocobalamin 1,000 mcg tablet Take 1,000 mcg by mouth daily. Yes Historical Provider   albuterol (PROVENTIL VENTOLIN) 2.5 mg /3 mL (0.083 %) nebulizer solution 2.5 mg by Nebulization route every four (4) hours as needed. Yes Historical Provider   Cholecalciferol, Vitamin D3, 50,000 unit cap Take 1 Cap by mouth every seven (7) days. Historical Provider       Allergies   Allergen Reactions    Erythromycin Itching    Morphine Itching    Toradol [Ketorolac] Rash        Review of Systems:  Pertinent items are noted in the History of Present Illness. Objective:     Visit Vitals    /79 (BP 1 Location: Right arm, BP Patient Position: At rest)    Pulse 94    Temp 98.8 °F (37.1 °C)    Resp 16    Ht 5' 2\" (1.575 m)    Wt 54.7 kg (120 lb 9.5 oz)    SpO2 100%    BMI 22.06 kg/m2     Temp (24hrs), Av.6 °F (37 °C), Min:98.1 °F (36.7 °C), Max:98.8 °F (37.1 °C)       Lines:  Central Venous Catheter:            Physical Exam:  Lungs:  clear to auscultation bilaterally  Heart:  regular rate and rhythm  Abdomen:  soft, non-tender.  Bowel sounds normal. No masses,  no organomegaly  Skin:  no rash or abnormalities  Wound dressed, no proximal cellultis    Data Review:     CBC:  Recent Labs 05/01/18   0505   WBC  6.1   GRANS  79*   MONOS  7   EOS  1   ANEU  4.8   ABL  0.8   HGB  8.5*   HCT  26.3*   PLT  165       BMP:  Recent Labs      05/03/18   0326  05/02/18   0344  05/01/18   0505   CREA  2.99*  2.89*  2.84*   BUN  50*  43*  42*   NA  141  143  145   K  4.5  4.2  4.3   CL  107  109*  111*   CO2  26  27  24   AGAP  8  7  10   GLU  121*  223*  183*       LFTS:  Recent Labs      05/01/18   0505   ALB  2.9*       Microbiology:     All Micro Results     Procedure Component Value Units Date/Time    CULTURE, BLOOD, PAIRED [[id number]] Collected:  04/27/18 1711    Order Status:  Completed Specimen:  Blood Updated:  05/02/18 0644     Special Requests: NO SPECIAL REQUESTS        Culture result: NO GROWTH 5 DAYS       CULTURE, STOOL [[id number]] Collected:  04/28/18 0430    Order Status:  Canceled Specimen:  Stool     C. DIFFICILE (DNA) [[id number]]     Order Status:  Canceled           Imaging:   Reviewed     Signed By: Braeden Styles MD     May 3, 2018

## 2018-05-03 NOTE — PROGRESS NOTES
Reason for Admission:  LLE pain and swelling, Abdominal pain. Acute on chronic pancreatitis STONE on CKD; Osteomyelitis of L foot. RRAT Score:  14                Do you (patient/family) have any concerns for transition/discharge? Patient has chronic pain from osteomyelitis, anxiety, chronic leg wound and a diabetic with stage IV CKD. Patient has a very supportive family but remains a high risk for exacerbations. Plan for utilizing home health:   Patient is opened to a [facility] agency. Patient could not remember name of agency     Likelihood of readmission? High risk due to medical conditions including sickle cell. Patient was discharged to a SNF- [facility] on 3/27/18 but admitted from home            Transition of Care Plan: Patient will be discharged home in care of her . She may need home IV Abx for her osteomyelitis and Home care will be resumed for wound care. CM met with patient, explained role and discussed discharge planning. Patient lives with her , she is independent with her ADL's. She uses a cane, walker and a wheelchair as needed. Patient is on disability and uses Medicaid Transportation through [facility]. Patient did not voice any financial or discharge barriers. CM will continue to follow for any discharge needs. Benji Presley MSA, RN, CRM. Care Management Interventions  PCP Verified by CM: Yes  Palliative Care Criteria Met (RRAT>21 & CHF Dx)?: Yes  Palliative Consult Recommended?: Yes  Mode of Transport at Discharge:  Other (see comment) (Private insurance)  Transition of Care Consult (CM Consult): Discharge Planning  MyChart Signup: No  Discharge Durable Medical Equipment: No  Health Maintenance Reviewed: Yes  Physical Therapy Consult: Yes  Occupational Therapy Consult: No  Speech Therapy Consult: No  Current Support Network: Lives with Spouse  Confirm Follow Up Transport: Family  Plan discussed with Pt/Family/Caregiver:  Yes

## 2018-05-03 NOTE — PROGRESS NOTES
NUTRITION       Recommendations:  1. Continue diet and oral nutritional supplements as ordered    2. Would add daily MVI, Vit D and B12 supplementation to pt's medication list secondary to history of gastric bypass  -- Post-op gastric bypass vitamin/mineral regimen:   - Chewable Flintstones MVI BID   - 500mcg Vit B12   - 600mg Calcium Carbonate BID (switch to calcium citrate after discharge)   - 3000IU Vit D     Brief note. Chart reviewed, discussed with RN and team during interdisciplinary rounds. Also spoke with MD.  Pt sitting up in bed this morning eating her breakfast.  She ate slowly and was able to consume 95% (she was still working on her fruit). She states she did drink a Boost Glucose Control yesterday and it went down well without issues (no nausea, vomiting, pain). Encouraged her to continue 3x/day in between meals to optimize her po intake. These would provide 750 kcal, 42 g protein-- meeting 49% and 95% of estimated kcal and protein needs, respectively. Noted MD to hold on GI consult for J-tube placement. Would continue to monitor po intake and supplement acceptance secondary to malnutrition and lack of adequate intake prior to admission. She may benefit from J-tube replacement in the future, especially in the setting of increased energy expenditure with wounds and chronic pancreatitis. Estimated Nutrition Needs:   Kcals/day: 1530 Kcals/day (5150-6155 kcal/day (MSJ x 1.3-1.5))  Protein: 44 g (0.8 g/kg-- secondary to renal func; will need to increase for wound healing if HD pursued)  Fluid: 1600 ml (~1 mL/kcal)     Based On: Mikel Bernard  Weight Used: Actual wt (54.9 kg)    RD to follow.     [address]

## 2018-05-03 NOTE — PROGRESS NOTES
TRANSFER - IN REPORT:    Verbal report received from Yana(name) on Simona Salas  being received from [facility](unit) for routine progression of care      Report consisted of patients Situation, Background, Assessment and   Recommendations(SBAR). Information from the following report(s) SBAR, Procedure Summary, Intake/Output and MAR was reviewed with the receiving nurse. Opportunity for questions and clarification was provided. Assessment completed upon patients arrival to unit and care assumed. Primary Nurse Fabián Lee and VANI RN performed a dual skin assessment on this patient Impairment noted- see wound doc flow sheet  Antonio score is 20    Bedside shift change report given to Jeanette Pond (oncoming nurse) by Darron Yee (offgoing nurse). Report included the following information SBAR, Intake/Output, MAR and Recent Results.

## 2018-05-03 NOTE — PROGRESS NOTES
Follow up visit with Simona. Pt shared that she was feeling better today compared to yesterday. She shared her belief that prayer was the reason she was feeling better - that she had been praying and many others were praying for her. Pt reflected on her maureen, which is a source of comfort, strength, and coping. She shared that her  visited yesterday and called to check in on her today. Offered listening presence, emotional and spiritual support to pt. Assurance of prayers also offered.     Margaret Millard, Palliative

## 2018-05-03 NOTE — CONSULTS
Palliative Medicine Consult  Alex: [phone])    Patient Name: Estela Khan  YOB: 1980    Date of Initial Consult: 5-2-18  Reason for Consult: Pain management - \"patient has chronic pain issues and high tolerance to pain medication. Dealing with anxiety vs pain\"  Requesting Provider: Flaco   Primary Care Physician: Hany Jiang MD     SUMMARY:   Estela Khan is a 42 y.o. with a past history of anxiety, depression, HTN, THU, CKD with hx of dialysis in past for about 6 months, chronic opioid, charcot deformity of L foot, sickle cell disease,  Gastric bypass, diabetic neuropathy use who was admitted on 4/27/2018 from home with LLE pain and swelling and abdominal pain. Being treated for acute on chronic pancreatitis (elevated lipase and amylase 5/2); STONE on CKD; pain from osteomyelitis of L foot. Current medical issues leading to Palliative Medicine involvement include: pain control. At home on Percocet 5/325mg bid-  reviewed and in past year no aberrancies noted w/ most recent Rx for 60 tabs of Percoet filled 4/16/18. Pt has had mult hospitalizations, most recently was hospitalized 3/2018 for her L foot osteomyelitis. PALLIATIVE DIAGNOSES:   1. Acute abdominal pain, pancreatitis  2. Nausea  3. Poor po intake  4. L foot pain  5. Chronic pain related to sickle cell disease  6. Anxiety  7. Depression        PLAN:   1. Pt appears more comfortable today and considerably less anxious. 2. Pt is followed by her PCP for chronic pain issues,  checked yest w/out issues- on Percocet 5/325mg bid.    3. Explained that as her acute pancreatitis pain improves, my recommendation is to space out her IV medications and then as soon as reliably tolerating diet to start back her Percocet 5/325mg prn- already has it ordered every 4h prn.   4. She voices understanding although may be a bit resistant when stopping the IV meds- as previously discussed, tell her that these medications last longer when po and we don't want her to have any withdrawal sx when she leaves. I recommend cont her home Percocet dosing when leaves. 5. Cont PeriColace and has prn Miralax. 6. As sx improved, and plan is to taper- will follow peripherally for now but certainly call for specific concerns/questions. Pastoral care to cont seeing for psychosocial distress. If anxiety persists, consider psych. 7. Communicated plan of care with: Palliative IDT; Bianca MARTIN       GOALS OF CARE / TREATMENT PREFERENCES:     GOALS OF CARE:  Patient/Health Care Proxy Stated Goals: Other (comment) (Sx management)      TREATMENT PREFERENCES:   Code Status: Full Code    Advance Care Planning:  Advance Care Planning 5/2/2018   Patient's Healthcare Decision Maker is: Legal Next of Kin   Primary Decision Maker Name Kaycee Monday    Primary Decision Maker Phone Number [phone]   Primary Decision Maker Relationship to Patient Spouse   Confirm Advance Directive None   Patient Would Like to Complete Advance Directive -       Medical Interventions: Full interventions   Other Instructions: Other:    As far as possible, the palliative care team has discussed with patient / health care proxy about goals of care / treatment preferences for patient. HISTORY:       HPI/SUBJECTIVE:    The patient is:   [x] Verbal and participatory  [] Non-participatory due to:     Pt w/ improved pain, no sedation, up talking on the phone at first. Smiles. No BM in several days. Meds for pain and sx incl:  Dilaudid 1mg IV every 3h prn - getting about every 3h.      Clinical Pain Assessment (nonverbal scale for severity on nonverbal patients):   Clinical Pain Assessment  Severity: 6  Location: abdomen   Character: aching, terrible  Duration: days  Effect: causes incr anxiety   Factors: better w/ medications   Frequency: constant           Duration: for how long has pt been experiencing pain (e.g., 2 days, 1 month, years)  Frequency: how often pain is an issue (e.g., several times per day, once every few days, constant)     FUNCTIONAL ASSESSMENT:     Palliative Performance Scale (PPS):  PPS: 60       PSYCHOSOCIAL/SPIRITUAL SCREENING:     Palliative IDT has assessed this patient for cultural preferences / practices and a referral made as appropriate to needs (Cultural Services, Patient Advocacy, Ethics, etc.)    Advance Care Planning:  Advance Care Planning 5/2/2018   Patient's Healthcare Decision Maker is: Legal Next of Susy Lynn   Primary Decision Maker Name Kaycee Monday    Primary Decision Maker Phone Number [phone]   Primary Decision Maker Relationship to Patient Spouse   Confirm Advance Directive None   Patient Would Like to Complete Advance Directive -       Any spiritual / Nondenominational concerns:  [] Yes /  [x] No    Caregiver Burnout:  [] Yes /  [] No /  [x] No Caregiver Present      Anticipatory grief assessment:   [x] Normal  / [] Maladaptive       ESAS Anxiety: Anxiety: 4    ESAS Depression: Depression: 0        REVIEW OF SYSTEMS:     Positive and pertinent negative findings in ROS are noted above in HPI. The following systems were [x] reviewed / [] unable to be reviewed as noted in HPI  Other findings are noted below. Systems: constitutional, ears/nose/mouth/throat, respiratory, gastrointestinal, genitourinary, musculoskeletal, integumentary, neurologic, psychiatric, endocrine. Positive findings noted below. Modified ESAS Completed by: provider   Fatigue: 6 Drowsiness: 3   Depression: 0 Pain: 6   Anxiety: 4 Nausea: 3   Anorexia: 3 Dyspnea: 0     Constipation: No     Stool Occurrence(s): 1        PHYSICAL EXAM:     From RN flowsheet:  Wt Readings from Last 3 Encounters:   05/03/18 120 lb 9.5 oz (54.7 kg)   04/29/18 137 lb 12.6 oz (62.5 kg)   04/13/18 141 lb 15.6 oz (64.4 kg)     Blood pressure 130/74, pulse 83, temperature 98.8 °F (37.1 °C), resp. rate 16, height 5' 2\" (1.575 m), weight 120 lb 9.5 oz (54.7 kg), SpO2 100 %.     Pain Scale 1: Numeric (0 - 10)  Pain Intensity 1: 6  Pain Onset 1: chronic  Pain Location 1: Abdomen  Pain Orientation 1: Medial  Pain Description 1: Aching  Pain Intervention(s) 1: Medication (see MAR)  Last BM several days ago    Constitutional: thin, less anxious than yesterday, smiles, can engage in full conversation   Eyes: pupils equal, anicteric  ENMT: no nasal discharge, moist mucous membranes  Cardiovascular: regular rhythm  Respiratory: breathing not labored, symmetric  Gastrointestinal: soft, min tender, + bowel sounds  Musculoskeletal: no deformity, no tenderness to palpation  Skin: warm, dry  Neurologic: following commands, moving all extremities  Psychiatric: flat       HISTORY:     Principal Problem:    Osteomyelitis of left foot ([address] (4/27/2018)      Past Medical History:   Diagnosis Date    ARF [facility]acute renal failure) ([facility] requiring dialysis 2011    Asthma     CKD (chronic kidney disease)     Diabetes ([address]     Gastroparesis 2010    Gastric Pacer- REMOVED 07/2015    GERD (gastroesophageal reflux disease)     Hypertension     Narcotic dependence ([address]     THU (obstructive sleep apnea)     wears 2 LPM oxygen at night    Other ill-defined conditions(799.89)     Polycystic ovarian syndrome     Seizures (HCC)     Sickle cell trait ([address]     Thromboembolus ([address] to her left arm and was told she had one in left leg recently      Past Surgical History:   Procedure Laterality Date    HX CATARACT REMOVAL  3/5/12    right    HX DILATION AND CURETTAGE      ablation    HX GASTRIC BYPASS  2015    HX GI      j tube placement and removal    HX OTHER SURGICAL  2010    Gastric Pacer- REMOVED 07/2015    HX VASCULAR ACCESS      gray cath rt subclavian    HX VASCULAR ACCESS      HD access right thigh      Family History   Problem Relation Age of Onset    Cancer Mother      lung    Hypertension Mother     Cancer Father      kidney    Stroke Father      3 strokes: 59-72    Heart Disease Father 72     CABG    Hypertension Father     Cancer Sister      pancreatic    Cancer Maternal Aunt      breast    Cancer Paternal Aunt      breast    Schizophrenia Sister      was in [address], now ass't living    Other Sister       AIDS    Other Other      nephew of AIDS      History reviewed, no pertinent family history.   Social History   Substance Use Topics    Smoking status: Never Smoker    Smokeless tobacco: Never Used    Alcohol use No     Allergies   Allergen Reactions    Erythromycin Itching    Morphine Itching    Toradol [Ketorolac] Rash      Current Facility-Administered Medications   Medication Dose Route Frequency    hydrALAZINE (APRESOLINE) tablet 100 mg  100 mg Oral TID    labetalol (NORMODYNE) tablet 200 mg  200 mg Oral Q12H    HYDROmorphone (PF) (DILAUDID) injection 1 mg  1 mg IntraVENous Q3H PRN    LORazepam (ATIVAN) tablet 0.5 mg  0.5 mg Oral Q6H PRN    senna-docusate (PERICOLACE) 8.6-50 mg per tablet 2 Tab  2 Tab Oral DAILY    hydrALAZINE (APRESOLINE) 20 mg/mL injection 20 mg  20 mg IntraVENous Q4H PRN    lactated Ringers infusion  75 mL/hr IntraVENous CONTINUOUS    ondansetron (ZOFRAN) injection 4 mg  4 mg IntraVENous Q4H PRN    diphenhydrAMINE (BENADRYL) capsule 25 mg  25 mg Oral Q6H PRN    insulin glargine (LANTUS) injection 10 Units  10 Units SubCUTAneous QHS    nitroglycerin (NITROBID) 2 % ointment 2 Inch  2 Inch Topical BID    promethazine (PHENERGAN) 6.25 mg/5 mL syrup 6.25 mg  6.25 mg Oral Q6H PRN    sodium chloride (NS) flush 20 mL  20 mL InterCATHeter PRN    sodium chloride (NS) flush 10 mL  10 mL InterCATHeter Q24H    sodium chloride (NS) flush 10 mL  10 mL InterCATHeter PRN    sodium chloride (NS) flush 10 mL  10 mL InterCATHeter Q8H    sodium chloride (NS) flush 20 mL  20 mL InterCATHeter Q24H    NIFEdipine ER (PROCARDIA XL) tablet 90 mg  90 mg Oral DAILY    calcium carbonate (OS-BINA) tablet 500 mg [elemental]  500 mg Oral DAILY    QUEtiapine (SEROquel) tablet 100 mg  100 mg Oral BID    venlafaxine (EFFEXOR) tablet 75 mg  75 mg Oral BID WITH MEALS    sodium chloride (NS) flush 5-10 mL  5-10 mL IntraVENous Q8H    sodium chloride (NS) flush 5-10 mL  5-10 mL IntraVENous PRN    heparin (porcine) injection 5,000 Units  5,000 Units SubCUTAneous Q8H    albuterol-ipratropium (DUO-NEB) 2.5 MG-0.5 MG/3 ML  3 mL Nebulization Q4H PRN    lactobac ac& pc-s.therm-b.anim (CHRIS Q/RISAQUAD)  1 Cap Oral DAILY    insulin lispro (HUMALOG) injection   SubCUTAneous AC&HS    glucose chewable tablet 16 g  4 Tab Oral PRN    dextrose (D50W) injection syrg 12.5-25 g  12.5-25 g IntraVENous PRN    glucagon (GLUCAGEN) injection 1 mg  1 mg IntraMUSCular PRN    doxycycline (VIBRAMYCIN) 100 mg in 0.9% sodium chloride (MBP/ADV) 100 mL  100 mg IntraVENous Q12H    acetaminophen (TYLENOL) tablet 650 mg  650 mg Oral Q4H PRN    oxyCODONE-acetaminophen (PERCOCET) 5-325 mg per tablet 1 Tab  1 Tab Oral Q4H PRN          LAB AND IMAGING FINDINGS:     Lab Results   Component Value Date/Time    WBC 6.1 05/01/2018 05:05 AM    HGB 8.5 (L) 05/01/2018 05:05 AM    PLATELET 117 26/09/3343 05:05 AM     Lab Results   Component Value Date/Time    Sodium 141 05/03/2018 03:26 AM    Potassium 4.5 05/03/2018 03:26 AM    Chloride 107 05/03/2018 03:26 AM    CO2 26 05/03/2018 03:26 AM    BUN 50 (H) 05/03/2018 03:26 AM    Creatinine 2.99 (H) 05/03/2018 03:26 AM    Calcium 8.1 (L) 05/03/2018 03:26 AM    Magnesium 1.6 05/01/2018 05:05 AM    Phosphorus 4.0 05/01/2018 05:05 AM      Lab Results   Component Value Date/Time    AST (SGOT) 25 04/30/2018 11:48 AM    Alk.  phosphatase 329 (H) 04/30/2018 11:48 AM    Protein, total 8.7 (H) 04/30/2018 11:48 AM    Albumin 2.9 (L) 05/01/2018 05:05 AM    Globulin 5.4 (H) 04/30/2018 11:48 AM     Lab Results   Component Value Date/Time    INR 1.0 05/13/2016 04:12 PM    Prothrombin time 10.5 05/13/2016 04:12 PM    aPTT 37.2 (H) 05/13/2016 04:12 PM      Lab Results   Component Value Date/Time    Iron 108 04/30/2018 11:48 AM    TIBC 190 (L) 04/30/2018 11:48 AM    Iron % saturation 57 (H) 04/30/2018 11:48 AM    Ferritin 426 (H) 04/30/2018 06:24 AM      No results found for: PH, PCO2, PO2  No components found for: [place]   Lab Results   Component Value Date/Time    CK 57 04/30/2018 08:15 PM    CK - MB <1.0 03/09/2018 02:51 AM                Total time:   Counseling / coordination time, spent as noted above:   > 50% counseling / coordination?:     Prolonged service was provided for  []30 min   []75 min in face to face time in the presence of the patient, spent as noted above. Time Start:   Time End:   Note: this can only be billed with 97560 (initial) or 73277 (follow up). If multiple start / stop times, list each separately.

## 2018-05-03 NOTE — PROGRESS NOTES
Hospitalist Progress Note  Arlette Burkett MD  Answering service: [phone] from in house phone  Cell: [phone]      Date of Service:  5/3/2018  NAME:  Leoncio Toussaint  :  1980  MRN:  [id number]      Admission Summary:      Per H&P: 68-year-old woman with a past medical history significant for hypertension, type 2 diabetes, obstructive sleep apnea, depression, chronic kidney disease, narcotic dependency was in her usual state of health until about 3 days ago when the patient developed left leg pain and swelling. The pain is a constant 8/10 in severity. Patient described the pain as a sharp shooting pain in her left leg. No relief with pain medication. No known aggravating factors. Last month, the patient underwent a transmetatarsal resection of the left foot 2/2 diabetic foot. Patient also complained of abdominal pain, nausea, and vomiting. The abdominal pain is diffuse in location. Interval history / Subjective:     Patient was awake and sitting up in bed. She looked markedly improved compared to the previous day. Almost dramatically. [facility] MRI of the left foot still pending. Assessment & Plan:     Acute on chronic pancreatitis: Patient with N/V, abdominal pain and elevated lipase /amylase. Repeat Lipase trended close to normal values  -CT A/P with calcified pancreas  -pain control  -  IVF with ringer's lactate  - Patient eating and tolerating Jello today, advance as tolerated. Per Nutritionist, patient has yet to reach goal. Hold off on GI consult. Acute hyperkalemia due to kidney disease:  - now resolved.     Osteomyelitis of the left foot:  -the patient was on vancomycin and Zosyn initially by the admitting team  -Podiatry was consulted  - MRI of the left foot pending  - On Doxycycline  - follow consult ID    Acute on chronic kidney disease stage V  -holding diuretic and patient on fluids, per Renal   - Nephrology following    Essential Hypertension: Uncontrolled  - Continue current anti-hypertensive's. - On hydralazine PRN SBP > 160 mm hg     Microcytic Anemia. This is most likely due to chronic kidney disease. Type 2 diabetes type 2: Continue sliding scale. accu checks    Obstructive sleep apnea    Depression: Continue home meds. Code status: Full  DVT prophylaxis: heparin    Care Plan discussed with: Patient/Family and Nurse  Disposition: TBD     Hospital Problems  Date Reviewed: 4/28/2018          Codes Class Noted POA    * (Principal)Osteomyelitis of left foot ([facility] ICD-10-CM: M86.9  ICD-9-CM: 730.27  4/27/2018 Yes              Review of Systems:   Pertinent items are noted in HPI. Vital Signs:    Last 24hrs VS reviewed since prior progress note. Most recent are:  Visit Vitals    BP (!) 142/106 (BP 1 Location: Left arm, BP Patient Position: Sitting)    Pulse 91    Temp 98.5 °F (36.9 °C)    Resp 16    Ht 5' 2\" (1.575 m)    Wt 54.7 kg (120 lb 9.5 oz)    SpO2 98%    BMI 22.06 kg/m2         Intake/Output Summary (Last 24 hours) at 05/03/18 0749  Last data filed at 05/02/18 1208   Gross per 24 hour   Intake              180 ml   Output                0 ml   Net              180 ml        Physical Examination:             Constitutional:  Mild to moderate distress   ENT:  Neck supple   Resp:  CTA bilaterally. CV:  Regular rhythm, increased rate    GI:  Soft, non distended, diffusely tender to light palpation. No rigidity    Musculoskeletal:  LLE larger than R (pateint states this is baseline). Partial amputation of L foot with wrapping    Neurologic:  Moves all extremities.   AAOx3        Data Review:    Review and/or order of clinical lab test  Review and/or order of tests in the medicine section of CPT      Labs:     Recent Labs      05/01/18   0505   WBC  6.1   HGB  8.5*   HCT  26.3*   PLT  165     Recent Labs      05/03/18   0326  05/02/18   0344  05/01/18   0505   04/30/18   1148   NA 141  143  145   < >  141   K  4.5  4.2  4.3   < >  4.7   CL  107  109*  111*   < >  109*   CO2  26  27  24   < >  23   BUN  50*  43*  42*   < >  42*   CREA  2.99*  2.89*  2.84*   < >  2.77*   GLU  121*  223*  183*   < >  244*   CA  8.1*  9.0  8.9   < >  9.2   MG   --    --   1.6   --    --    PHOS   --    --   4.0   --   3.3    < > = values in this interval not displayed.      Recent Labs      05/01/18   0505  04/30/18   1148   SGOT   --   25   ALT   --   27   AP   --   329*   TBILI   --   0.3   TP   --   8.7*   ALB  2.9*  3.3*   GLOB   --   5.4*     Lab Results   Component Value Date/Time    Folate 61.0 (H) 04/30/2018 06:24 AM      Lab Results   Component Value Date/Time    Cholesterol, total 137 04/30/2018 06:24 AM    HDL Cholesterol 35 04/30/2018 06:24 AM    LDL, calculated 89 04/30/2018 06:24 AM    Triglyceride 65 04/30/2018 06:24 AM    CHOL/HDL Ratio 3.9 04/30/2018 06:24 AM     Lab Results   Component Value Date/Time    Glucose (POC) 108 (H) 05/03/2018 06:42 AM    Glucose (POC) 162 (H) 05/02/2018 10:12 PM    Glucose (POC) 145 (H) 05/02/2018 05:01 PM    Glucose (POC) 116 (H) 05/02/2018 11:28 AM    Glucose (POC) 193 (H) 05/02/2018 06:36 AM     Lab Results   Component Value Date/Time    Color YELLOW/STRAW 03/02/2018 10:50 PM    Appearance CLOUDY (A) 03/02/2018 10:50 PM    Specific gravity 1.017 03/02/2018 10:50 PM    Specific gravity 1.015 07/26/2010 11:18 AM    pH (UA) 5.0 03/02/2018 10:50 PM    Protein 100 (A) 03/02/2018 10:50 PM    Glucose NEGATIVE  03/02/2018 10:50 PM    Ketone NEGATIVE  03/02/2018 10:50 PM    Bilirubin NEGATIVE  03/02/2018 10:50 PM    Urobilinogen 1.0 03/02/2018 10:50 PM    Nitrites NEGATIVE  03/02/2018 10:50 PM    Leukocyte Esterase NEGATIVE  03/02/2018 10:50 PM    Epithelial cells MANY (A) 03/02/2018 10:50 PM    Bacteria 3+ (A) 03/02/2018 10:50 PM    WBC 5-10 03/02/2018 10:50 PM    RBC 0-5 03/02/2018 10:50 PM       Medications Reviewed:     Current Facility-Administered Medications Medication Dose Route Frequency    hydrALAZINE (APRESOLINE) tablet 100 mg  100 mg Oral TID    labetalol (NORMODYNE) tablet 200 mg  200 mg Oral Q12H    HYDROmorphone (PF) (DILAUDID) injection 1 mg  1 mg IntraVENous Q3H PRN    LORazepam (ATIVAN) tablet 0.5 mg  0.5 mg Oral Q6H PRN    senna-docusate (PERICOLACE) 8.6-50 mg per tablet 2 Tab  2 Tab Oral DAILY    hydrALAZINE (APRESOLINE) 20 mg/mL injection 20 mg  20 mg IntraVENous Q4H PRN    lactated Ringers infusion  75 mL/hr IntraVENous CONTINUOUS    ondansetron (ZOFRAN) injection 4 mg  4 mg IntraVENous Q4H PRN    diphenhydrAMINE (BENADRYL) capsule 25 mg  25 mg Oral Q6H PRN    insulin glargine (LANTUS) injection 10 Units  10 Units SubCUTAneous QHS    nitroglycerin (NITROBID) 2 % ointment 2 Inch  2 Inch Topical BID    promethazine (PHENERGAN) 6.25 mg/5 mL syrup 6.25 mg  6.25 mg Oral Q6H PRN    sodium chloride (NS) flush 20 mL  20 mL InterCATHeter PRN    sodium chloride (NS) flush 10 mL  10 mL InterCATHeter Q24H    sodium chloride (NS) flush 10 mL  10 mL InterCATHeter PRN    sodium chloride (NS) flush 10 mL  10 mL InterCATHeter Q8H    sodium chloride (NS) flush 20 mL  20 mL InterCATHeter Q24H    NIFEdipine ER (PROCARDIA XL) tablet 90 mg  90 mg Oral DAILY    calcium carbonate (OS-BINA) tablet 500 mg [elemental]  500 mg Oral DAILY    QUEtiapine (SEROquel) tablet 100 mg  100 mg Oral BID    venlafaxine (EFFEXOR) tablet 75 mg  75 mg Oral BID WITH MEALS    sodium chloride (NS) flush 5-10 mL  5-10 mL IntraVENous Q8H    sodium chloride (NS) flush 5-10 mL  5-10 mL IntraVENous PRN    heparin (porcine) injection 5,000 Units  5,000 Units SubCUTAneous Q8H    albuterol-ipratropium (DUO-NEB) 2.5 MG-0.5 MG/3 ML  3 mL Nebulization Q4H PRN    lactobac ac& pc-s.therm-b.anim (CHRIS Q/RISAQUAD)  1 Cap Oral DAILY    insulin lispro (HUMALOG) injection   SubCUTAneous AC&HS    glucose chewable tablet 16 g  4 Tab Oral PRN    dextrose (D50W) injection syrg 12.5-25 g 12.5-25 g IntraVENous PRN    glucagon (GLUCAGEN) injection 1 mg  1 mg IntraMUSCular PRN    doxycycline (VIBRAMYCIN) 100 mg in 0.9% sodium chloride (MBP/ADV) 100 mL  100 mg IntraVENous Q12H    acetaminophen (TYLENOL) tablet 650 mg  650 mg Oral Q4H PRN    oxyCODONE-acetaminophen (PERCOCET) 5-325 mg per tablet 1 Tab  1 Tab Oral Q4H PRN     ______________________________________________________________________  EXPECTED LENGTH OF STAY: 3d 19h  ACTUAL LENGTH OF STAY:          6                 Simone Hayes MD

## 2018-05-03 NOTE — PROGRESS NOTES
[facility]   [address] [facility], [address] [place] [address], [facility]  Phone: [phone]   [phone]       Nephrology Progress Note  Gwen Lozada     1980     [id number]  Date of Admission : 4/27/2018 05/03/18    CC: Follow up for STONE       Assessment and Plan   STONE on CKD   - Cr stable  - cont IVF for today - can stop in AM if tolerating po intake  - daily labs while here    Acute on Chronic Hyper K :  - K stable  - low K diet    Uncontrolled HTN:  - start labetalol 200mg BID today    Metabolic acidosis :  - resolved    Chronic Pancreatitis:  - improving    CKD Stage IV :  - 2/2 DM, SCD  - previously dialysis dependent for ~ 6m. Failed RUE and RLE grafts     Sickle cell dz  - hgb stable    Anemia     Left foot osteomyelitis  - per podiatry     Interval History:  Seen and examined. Cr stable. Appears much better this AM.  Up and attempting to eat. Nausea improving. No cp or sob reported. Edema stable. Review of Systems: Pertinent items are noted in HPI.     Current Medications:   Current Facility-Administered Medications   Medication Dose Route Frequency    hydrALAZINE (APRESOLINE) tablet 100 mg  100 mg Oral TID    labetalol (NORMODYNE) tablet 200 mg  200 mg Oral Q12H    HYDROmorphone (PF) (DILAUDID) injection 1 mg  1 mg IntraVENous Q3H PRN    LORazepam (ATIVAN) tablet 0.5 mg  0.5 mg Oral Q6H PRN    senna-docusate (PERICOLACE) 8.6-50 mg per tablet 2 Tab  2 Tab Oral DAILY    hydrALAZINE (APRESOLINE) 20 mg/mL injection 20 mg  20 mg IntraVENous Q4H PRN    lactated Ringers infusion  75 mL/hr IntraVENous CONTINUOUS    ondansetron (ZOFRAN) injection 4 mg  4 mg IntraVENous Q4H PRN    diphenhydrAMINE (BENADRYL) capsule 25 mg  25 mg Oral Q6H PRN    insulin glargine (LANTUS) injection 10 Units  10 Units SubCUTAneous QHS    nitroglycerin (NITROBID) 2 % ointment 2 Inch  2 Inch Topical BID    promethazine (PHENERGAN) 6.25 mg/5 mL syrup 6.25 mg  6.25 mg Oral Q6H PRN    sodium chloride (NS) flush 20 mL  20 mL InterCATHeter PRN    sodium chloride (NS) flush 10 mL  10 mL InterCATHeter Q24H    sodium chloride (NS) flush 10 mL  10 mL InterCATHeter PRN    sodium chloride (NS) flush 10 mL  10 mL InterCATHeter Q8H    sodium chloride (NS) flush 20 mL  20 mL InterCATHeter Q24H    NIFEdipine ER (PROCARDIA XL) tablet 90 mg  90 mg Oral DAILY    calcium carbonate (OS-BINA) tablet 500 mg [elemental]  500 mg Oral DAILY    QUEtiapine (SEROquel) tablet 100 mg  100 mg Oral BID    venlafaxine (EFFEXOR) tablet 75 mg  75 mg Oral BID WITH MEALS    sodium chloride (NS) flush 5-10 mL  5-10 mL IntraVENous Q8H    sodium chloride (NS) flush 5-10 mL  5-10 mL IntraVENous PRN    heparin (porcine) injection 5,000 Units  5,000 Units SubCUTAneous Q8H    albuterol-ipratropium (DUO-NEB) 2.5 MG-0.5 MG/3 ML  3 mL Nebulization Q4H PRN    lactobac ac& pc-s.therm-b.anim (CHRIS Q/RISAQUAD)  1 Cap Oral DAILY    insulin lispro (HUMALOG) injection   SubCUTAneous AC&HS    glucose chewable tablet 16 g  4 Tab Oral PRN    dextrose (D50W) injection syrg 12.5-25 g  12.5-25 g IntraVENous PRN    glucagon (GLUCAGEN) injection 1 mg  1 mg IntraMUSCular PRN    doxycycline (VIBRAMYCIN) 100 mg in 0.9% sodium chloride (MBP/ADV) 100 mL  100 mg IntraVENous Q12H    acetaminophen (TYLENOL) tablet 650 mg  650 mg Oral Q4H PRN    oxyCODONE-acetaminophen (PERCOCET) 5-325 mg per tablet 1 Tab  1 Tab Oral Q4H PRN      Allergies   Allergen Reactions    Erythromycin Itching    Morphine Itching    Toradol [Ketorolac] Rash       Objective:  Vitals:    Vitals:    05/02/18 2228 05/02/18 2324 05/03/18 0318 05/03/18 0744   BP: 116/63 112/63 150/77 (!) 142/106   Pulse: 96 84 87 91   Resp:  14 16 16   Temp:  98.6 °F (37 °C) 98.1 °F (36.7 °C) 98.5 °F (36.9 °C)   SpO2:  97% 96% 98%   Weight:   54.7 kg (120 lb 9.5 oz)    Height:         Intake and Output:     05/01 1901 - 05/03 0700  In: 863.8 [P.O.:300;  I.V.:563.8]  Out: 700 [Urine:700]    Physical Examination:    General: Lethargic, chronically ill  Neck:  Supple, no mass  Resp:  Lungs CTA B/L, no wheezing , normal respiratory effort  CV:  RRR,  no murmur or rub,trace LE edema  GI:  Soft, NT, + Bowel sounds, no hepatosplenomegaly  Neurologic:  Non focal  Skin:  No Rash  :  No escobedo     []    High complexity decision making was performed  []    Patient is at high-risk of decompensation with multiple organ involvement    Lab Data Personally Reviewed: I have reviewed all the pertinent labs, microbiology data and radiology studies during assessment.     Recent Labs      05/03/18   0326  05/02/18   0344  05/01/18   0505  04/30/18 2011 04/30/18   1148   NA  141  143  145  144  141   K  4.5  4.2  4.3  4.3  4.7   CL  107  109*  111*  112*  109*   CO2  26  27  24  23  23   GLU  121*  223*  183*  185*  244*   BUN  50*  43*  42*  41*  42*   CREA  2.99*  2.89*  2.84*  2.79*  2.77*   CA  8.1*  9.0  8.9  9.2  9.2   MG   --    --   1.6   --    --    PHOS   --    --   4.0   --   3.3   ALB   --    --   2.9*   --   3.3*   SGOT   --    --    --    --   25   ALT   --    --    --    --   27     Recent Labs      05/01/18   0505   WBC  6.1   HGB  8.5*   HCT  26.3*   PLT  165     Lab Results   Component Value Date/Time    Specimen Description: URINE 06/24/2013 08:00 PM    Specimen Description: URINE 06/17/2013 07:55 PM    Specimen Description: URINE 05/26/2013 10:10 AM    Specimen Description: URINE 04/22/2013 02:30 PM    Specimen Description: NARES 03/21/2013 12:30 PM     Lab Results   Component Value Date/Time    Culture result: NO GROWTH 5 DAYS 04/27/2018 05:11 PM    Culture result: FEW STAPHYLOCOCCUS HOMINIS SUBSPECIES HOMINIS (A) 03/11/2018 12:47 PM    Culture result: (A) 03/11/2018 12:47 PM     STAPHYLOCOCCUS EPIDERMIDIS (OXACILLIN RESISTANT) ISOLATED FROM THIO BROTH ONLY    Culture result: NO ANAEROBES ISOLATED 03/11/2018 12:47 PM    Culture result: NO GROWTH ON SOLID MEDIA AT 4 DAYS 03/11/2018 11:43 AM    Culture result: (A) 03/11/2018 11:43 AM     STAPHYLOCOCCUS EPIDERMIDIS (OXACILLIN RESISTANT) ISOLATED FROM THIO BROTH ONLY    Culture result: NO GROWTH ON SOLID MEDIA AT 4 DAYS 03/11/2018 11:43 AM    Culture result: (A) 03/11/2018 11:43 AM     STAPHYLOCOCCUS EPIDERMIDIS ISOLATED FROM THIO BROTH ONLY     Recent Results (from the past 24 hour(s))   GLUCOSE, POC    Collection Time: 05/02/18 11:28 AM   Result Value Ref Range    Glucose (POC) 116 (H) 65 - 100 mg/dL    Performed by Marian Wilkins    GLUCOSE, POC    Collection Time: 05/02/18  5:01 PM   Result Value Ref Range    Glucose (POC) 145 (H) 65 - 100 mg/dL    Performed by Barrington Carvajal    GLUCOSE, POC    Collection Time: 05/02/18 10:12 PM   Result Value Ref Range    Glucose (POC) 162 (H) 65 - 100 mg/dL    Performed by Louis Mcmanus, BASIC    Collection Time: 05/03/18  3:26 AM   Result Value Ref Range    Sodium 141 136 - 145 mmol/L    Potassium 4.5 3.5 - 5.1 mmol/L    Chloride 107 97 - 108 mmol/L    CO2 26 21 - 32 mmol/L    Anion gap 8 5 - 15 mmol/L    Glucose 121 (H) 65 - 100 mg/dL    BUN 50 (H) 6 - 20 MG/DL    Creatinine 2.99 (H) 0.55 - 1.02 MG/DL    BUN/Creatinine ratio 17 12 - 20      GFR est AA 21 (L) >60 ml/min/1.73m2    GFR est non-AA 17 (L) >60 ml/min/1.73m2    Calcium 8.1 (L) 8.5 - 10.1 MG/DL   GLUCOSE, POC    Collection Time: 05/03/18  6:42 AM   Result Value Ref Range    Glucose (POC) 108 (H) 65 - 100 mg/dL    Performed by Allyson Givens            Total time spent with patient:  xxx   min. Care Plan discussed with:  Patient     Family      RN      Consulting Physician [facility] [place],         I have reviewed the flowsheets. Chart and Pertinent Notes have been reviewed. No change in PMH ,family and social history from Consult note.       Betty Cheema MD

## 2018-05-03 NOTE — PROGRESS NOTES
TRANSFER - OUT REPORT:    Verbal report given to Nicole Valdez RN (name) on Rudie Harada  being transferred to  (unit) for ordered procedure       Report consisted of patients Situation, Background, Assessment and   Recommendations(SBAR). Information from the following report(s) SBAR, Kardex, Intake/Output, MAR, Recent Results and Cardiac Rhythm NSR/ST was reviewed with the receiving nurse.     Lines:   Triple Lumen 04/29/18 Right Subclavian (Active)   Central Line Being Utilized Yes 5/3/2018 12:28 PM   Criteria for Appropriate Use Limited/no vessel suitable for conventional peripheral access 5/3/2018 12:28 PM   Site Assessment Clean, dry, & intact 5/3/2018 12:28 PM   Infiltration Assessment 0 5/3/2018 12:28 PM   Affected Extremity/Extremities Color distal to insertion site pink (or appropriate for race) 5/3/2018 12:28 PM   Date of Last Dressing Change 04/29/18 5/2/2018  3:56 AM   Dressing Status Clean, dry, & intact 5/3/2018 12:28 PM   Dressing Type Transparent 5/3/2018 12:28 PM   Action Taken Open ports on tubing capped 5/2/2018 12:08 PM   Proximal Hub Color/Line Status Capped 5/3/2018 12:28 PM   Positive Blood Return (Medial Site) Yes 5/2/2018  3:56 AM   Medial Hub Color/Line Status Capped 5/3/2018 12:28 PM   Positive Blood Return (Lateral Site) Yes 5/2/2018  3:56 AM   Distal Hub Color/Line Status Infusing 5/3/2018 12:28 PM   Positive Blood Return (Site #3) Yes 5/2/2018  3:56 AM   Alcohol Cap Used Yes 5/3/2018 12:28 PM       Venous Access Device Chest Port (Active)       Venous Access Device Powerline  03/06/18 Upper chest (subclavicular area, right (Active)       Peripheral IV 04/27/18 Right Antecubital (Active)   Site Assessment Clean, dry, & intact 5/3/2018 12:28 PM   Phlebitis Assessment 0 5/3/2018 12:28 PM   Infiltration Assessment 0 5/3/2018 12:28 PM   Dressing Status Clean, dry, & intact 5/3/2018 12:28 PM   Dressing Type Transparent;Tape 5/3/2018 12:28 PM   Hub Color/Line Status Capped 5/3/2018 12:28 PM   Action Taken Open ports on tubing capped 5/2/2018  3:56 AM   Alcohol Cap Used Yes 5/3/2018 12:28 PM        Opportunity for questions and clarification was provided.       Patient transported with:   Monitor  Patient-specific medications from Pharmacy  Tech

## 2018-05-04 LAB
ANION GAP SERPL CALC-SCNC: 9 MMOL/L (ref 5–15)
BUN SERPL-MCNC: 55 MG/DL (ref 6–20)
BUN/CREAT SERPL: 18 (ref 12–20)
CALCIUM SERPL-MCNC: 7.7 MG/DL (ref 8.5–10.1)
CHLORIDE SERPL-SCNC: 108 MMOL/L (ref 97–108)
CO2 SERPL-SCNC: 23 MMOL/L (ref 21–32)
CREAT SERPL-MCNC: 3.02 MG/DL (ref 0.55–1.02)
GLUCOSE BLD STRIP.AUTO-MCNC: 187 MG/DL (ref 65–100)
GLUCOSE BLD STRIP.AUTO-MCNC: 190 MG/DL (ref 65–100)
GLUCOSE BLD STRIP.AUTO-MCNC: 297 MG/DL (ref 65–100)
GLUCOSE BLD STRIP.AUTO-MCNC: 99 MG/DL (ref 65–100)
GLUCOSE SERPL-MCNC: 146 MG/DL (ref 65–100)
POTASSIUM SERPL-SCNC: 4.1 MMOL/L (ref 3.5–5.1)
SERVICE CMNT-IMP: ABNORMAL
SERVICE CMNT-IMP: NORMAL
SODIUM SERPL-SCNC: 140 MMOL/L (ref 136–145)

## 2018-05-04 PROCEDURE — 74011250637 HC RX REV CODE- 250/637: Performed by: PHYSICAL MEDICINE & REHABILITATION

## 2018-05-04 PROCEDURE — 74011250637 HC RX REV CODE- 250/637: Performed by: INTERNAL MEDICINE

## 2018-05-04 PROCEDURE — 74011636637 HC RX REV CODE- 636/637: Performed by: INTERNAL MEDICINE

## 2018-05-04 PROCEDURE — 74011250636 HC RX REV CODE- 250/636

## 2018-05-04 PROCEDURE — 74011250636 HC RX REV CODE- 250/636: Performed by: INTERNAL MEDICINE

## 2018-05-04 PROCEDURE — 74011250636 HC RX REV CODE- 250/636: Performed by: PHYSICAL MEDICINE & REHABILITATION

## 2018-05-04 PROCEDURE — 74011000250 HC RX REV CODE- 250: Performed by: INTERNAL MEDICINE

## 2018-05-04 PROCEDURE — 82962 GLUCOSE BLOOD TEST: CPT

## 2018-05-04 PROCEDURE — 80048 BASIC METABOLIC PNL TOTAL CA: CPT | Performed by: HOSPITALIST

## 2018-05-04 PROCEDURE — 65660000000 HC RM CCU STEPDOWN

## 2018-05-04 PROCEDURE — 36415 COLL VENOUS BLD VENIPUNCTURE: CPT | Performed by: HOSPITALIST

## 2018-05-04 PROCEDURE — 77030018846 HC SOL IRR STRL H20 ICUM -A

## 2018-05-04 PROCEDURE — 74011000258 HC RX REV CODE- 258: Performed by: INTERNAL MEDICINE

## 2018-05-04 PROCEDURE — 74011250637 HC RX REV CODE- 250/637: Performed by: FAMILY MEDICINE

## 2018-05-04 RX ORDER — HYDROMORPHONE HYDROCHLORIDE 1 MG/ML
1 INJECTION, SOLUTION INTRAMUSCULAR; INTRAVENOUS; SUBCUTANEOUS
Status: DISCONTINUED | OUTPATIENT
Start: 2018-05-04 | End: 2018-05-04 | Stop reason: CLARIF

## 2018-05-04 RX ORDER — HYDROMORPHONE HYDROCHLORIDE 2 MG/ML
1 INJECTION, SOLUTION INTRAMUSCULAR; INTRAVENOUS; SUBCUTANEOUS
Status: DISCONTINUED | OUTPATIENT
Start: 2018-05-04 | End: 2018-05-06

## 2018-05-04 RX ORDER — HYDROMORPHONE HYDROCHLORIDE 2 MG/ML
INJECTION, SOLUTION INTRAMUSCULAR; INTRAVENOUS; SUBCUTANEOUS
Status: DISPENSED
Start: 2018-05-04 | End: 2018-05-04

## 2018-05-04 RX ADMIN — STANDARDIZED SENNA CONCENTRATE AND DOCUSATE SODIUM 2 TABLET: 8.6; 5 TABLET, FILM COATED ORAL at 10:04

## 2018-05-04 RX ADMIN — DOXYCYCLINE 100 MG: 100 INJECTION, POWDER, LYOPHILIZED, FOR SOLUTION INTRAVENOUS at 09:10

## 2018-05-04 RX ADMIN — HYDROMORPHONE HYDROCHLORIDE 1 MG: 2 INJECTION INTRAMUSCULAR; INTRAVENOUS; SUBCUTANEOUS at 08:23

## 2018-05-04 RX ADMIN — Medication 10 ML: at 21:31

## 2018-05-04 RX ADMIN — DIPHENHYDRAMINE HYDROCHLORIDE 25 MG: 25 CAPSULE ORAL at 21:54

## 2018-05-04 RX ADMIN — NITROGLYCERIN 2 INCH: 20 OINTMENT TOPICAL at 10:05

## 2018-05-04 RX ADMIN — HYDROMORPHONE HYDROCHLORIDE 1 MG: 2 INJECTION INTRAMUSCULAR; INTRAVENOUS; SUBCUTANEOUS at 18:34

## 2018-05-04 RX ADMIN — ONDANSETRON HYDROCHLORIDE 4 MG: 2 INJECTION INTRAMUSCULAR; INTRAVENOUS at 21:30

## 2018-05-04 RX ADMIN — HYDROMORPHONE HYDROCHLORIDE 1 MG: 2 INJECTION INTRAMUSCULAR; INTRAVENOUS; SUBCUTANEOUS at 11:42

## 2018-05-04 RX ADMIN — CALCIUM 500 MG: 500 TABLET ORAL at 10:05

## 2018-05-04 RX ADMIN — LABETALOL HYDROCHLORIDE 200 MG: 100 TABLET, FILM COATED ORAL at 21:35

## 2018-05-04 RX ADMIN — ONDANSETRON HYDROCHLORIDE 4 MG: 2 INJECTION INTRAMUSCULAR; INTRAVENOUS at 01:31

## 2018-05-04 RX ADMIN — HYDRALAZINE HYDROCHLORIDE 100 MG: 50 TABLET ORAL at 22:00

## 2018-05-04 RX ADMIN — Medication 10 ML: at 14:00

## 2018-05-04 RX ADMIN — DOXYCYCLINE 100 MG: 100 INJECTION, POWDER, LYOPHILIZED, FOR SOLUTION INTRAVENOUS at 21:35

## 2018-05-04 RX ADMIN — NITROGLYCERIN 2 INCH: 20 OINTMENT TOPICAL at 17:10

## 2018-05-04 RX ADMIN — QUETIAPINE FUMARATE 100 MG: 100 TABLET ORAL at 10:05

## 2018-05-04 RX ADMIN — HYDRALAZINE HYDROCHLORIDE 100 MG: 50 TABLET ORAL at 10:05

## 2018-05-04 RX ADMIN — NIFEDIPINE 90 MG: 60 TABLET, FILM COATED, EXTENDED RELEASE ORAL at 10:04

## 2018-05-04 RX ADMIN — HYDROMORPHONE HYDROCHLORIDE 1 MG: 2 INJECTION INTRAMUSCULAR; INTRAVENOUS; SUBCUTANEOUS at 15:18

## 2018-05-04 RX ADMIN — ONDANSETRON HYDROCHLORIDE 4 MG: 2 INJECTION INTRAMUSCULAR; INTRAVENOUS at 08:23

## 2018-05-04 RX ADMIN — HYDROMORPHONE HYDROCHLORIDE 1 MG: 2 INJECTION INTRAMUSCULAR; INTRAVENOUS; SUBCUTANEOUS at 01:37

## 2018-05-04 RX ADMIN — ACETAMINOPHEN 650 MG: 325 TABLET, FILM COATED ORAL at 10:05

## 2018-05-04 RX ADMIN — WATER 1 MG: 1 INJECTION INTRAMUSCULAR; INTRAVENOUS; SUBCUTANEOUS at 05:58

## 2018-05-04 RX ADMIN — INSULIN GLARGINE 10 UNITS: 100 INJECTION, SOLUTION SUBCUTANEOUS at 21:50

## 2018-05-04 RX ADMIN — Medication 10 ML: at 21:51

## 2018-05-04 RX ADMIN — HYDRALAZINE HYDROCHLORIDE 100 MG: 50 TABLET ORAL at 17:09

## 2018-05-04 RX ADMIN — PROMETHAZINE HYDROCHLORIDE 6.25 MG: 6.25 SYRUP ORAL at 18:34

## 2018-05-04 RX ADMIN — VENLAFAXINE 75 MG: 37.5 TABLET ORAL at 10:04

## 2018-05-04 RX ADMIN — INSULIN LISPRO 2 UNITS: 100 INJECTION, SOLUTION INTRAVENOUS; SUBCUTANEOUS at 12:42

## 2018-05-04 RX ADMIN — QUETIAPINE FUMARATE 100 MG: 100 TABLET ORAL at 17:09

## 2018-05-04 RX ADMIN — INSULIN LISPRO 2 UNITS: 100 INJECTION, SOLUTION INTRAVENOUS; SUBCUTANEOUS at 07:20

## 2018-05-04 RX ADMIN — HYDROMORPHONE HYDROCHLORIDE 1 MG: 2 INJECTION INTRAMUSCULAR; INTRAVENOUS; SUBCUTANEOUS at 21:25

## 2018-05-04 RX ADMIN — VENLAFAXINE 75 MG: 37.5 TABLET ORAL at 17:09

## 2018-05-04 RX ADMIN — LABETALOL HYDROCHLORIDE 200 MG: 100 TABLET, FILM COATED ORAL at 10:04

## 2018-05-04 RX ADMIN — DIPHENHYDRAMINE HYDROCHLORIDE 25 MG: 25 CAPSULE ORAL at 10:10

## 2018-05-04 RX ADMIN — Medication 1 CAPSULE: at 10:05

## 2018-05-04 RX ADMIN — HYDROMORPHONE HYDROCHLORIDE 1 MG: 2 INJECTION INTRAMUSCULAR; INTRAVENOUS; SUBCUTANEOUS at 05:02

## 2018-05-04 RX ADMIN — ONDANSETRON HYDROCHLORIDE 4 MG: 2 INJECTION INTRAMUSCULAR; INTRAVENOUS at 05:02

## 2018-05-04 RX ADMIN — PROMETHAZINE HYDROCHLORIDE 6.25 MG: 6.25 SYRUP ORAL at 11:42

## 2018-05-04 RX ADMIN — INSULIN LISPRO 5 UNITS: 100 INJECTION, SOLUTION INTRAVENOUS; SUBCUTANEOUS at 17:08

## 2018-05-04 RX ADMIN — ONDANSETRON HYDROCHLORIDE 4 MG: 2 INJECTION INTRAMUSCULAR; INTRAVENOUS at 15:18

## 2018-05-04 RX ADMIN — OXYCODONE AND ACETAMINOPHEN 1 TABLET: 5; 325 TABLET ORAL at 20:14

## 2018-05-04 NOTE — PROGRESS NOTES
Physical Therapy Note:    Patient cleared for OOB activity by RN. Patient received with all lights out, sidelying, but aroused with verbal stimulus. Patient reports feeling nauseated, feverish and c/o abdominal and L LE pain. Requesting deferral of PT session at this time, requests PT return later today. Informed patient PT would try to return as schedule permits later today.     Donell Monge MS, PT

## 2018-05-04 NOTE — PROGRESS NOTES
Hospitalist Progress Note  Jeremiah Erazo MD  Answering service: [phone] from in house phone  Cell: [phone]      Date of Service:  2018  NAME:  Spencer Ormond  :  1980  MRN:  [id number]      Admission Summary:      Per H&P: 75-year-old woman with a past medical history significant for hypertension, type 2 diabetes, obstructive sleep apnea, depression, chronic kidney disease, narcotic dependency was in her usual state of health until about 3 days ago when the patient developed left leg pain and swelling. The pain is a constant 8/10 in severity. Patient described the pain as a sharp shooting pain in her left leg. No relief with pain medication. No known aggravating factors. Last month, the patient underwent a transmetatarsal resection of the left foot 2/2 diabetic foot. Patient also complained of abdominal pain, nausea, and vomiting. The abdominal pain is diffuse in location. Interval history / Subjective:     Patient was awake and sitting up in bed. She still looked markedly improved compared to when I first met her. MRI of the left foot still pending. Assessment & Plan:     Acute on chronic pancreatitis: Patient with N/V, abdominal pain and elevated lipase /amylase. Repeat Lipase trended close to normal values  -CT A/P with calcified pancreas  -pain control  -  IVF with ringer's lactate  - Patient eating and tolerating Jello today, advance as tolerated. Per Nutritionist, patient has yet to reach goal. Hold off on GI consult. Acute hyperkalemia due to kidney disease:  - now resolved.     Osteomyelitis of the left foot:  -the patient was on vancomycin and Zosyn initially by the admitting team  -Podiatry was consulted  - MRI of the left foot done, suspicious for Osteomyelitis  - On Doxycycline  -  ID on board    Acute on chronic kidney disease stage V  -holding diuretic and patient on fluids, per Renal - Nephrology following    Essential Hypertension: Uncontrolled  - Continue current anti-hypertensive's. - On hydralazine PRN SBP > 160 mm hg     Microcytic Anemia. This is most likely due to chronic kidney disease. Type 2 diabetes type 2: Continue sliding scale. accu checks    Obstructive sleep apnea    Depression: Continue home meds. Code status: Full  DVT prophylaxis: heparin    Care Plan discussed with: Patient/Family and Nurse  Disposition: TBD     Hospital Problems  Date Reviewed: 4/28/2018          Codes Class Noted POA    * (Principal)Osteomyelitis of left foot ([facility] [address] ICD-10-CM: M86.9  ICD-9-CM: 730.27  4/27/2018 Yes              Review of Systems:   Pertinent items are noted in HPI. Vital Signs:    Last 24hrs VS reviewed since prior progress note. Most recent are:  Visit Vitals    /82    Pulse 92    Temp 98.5 °F (36.9 °C)    Resp 15    Ht 5' 2\" (1.575 m)    Wt 54.7 kg (120 lb 9.5 oz)    SpO2 99%    BMI 22.06 kg/m2         Intake/Output Summary (Last 24 hours) at 05/04/18 1726  Last data filed at 05/03/18 1746   Gross per 24 hour   Intake              240 ml   Output                0 ml   Net              240 ml        Physical Examination:             Constitutional:  Mild to moderate distress   ENT:  Neck supple   Resp:  CTA bilaterally. CV:  Regular rhythm, increased rate    GI:  Soft, non distended, diffusely tender to light palpation. No rigidity    Musculoskeletal:  LLE larger than R (pateint states this is baseline). Partial amputation of L foot with wrapping    Neurologic:  Moves all extremities. AAOx3        Data Review:    Review and/or order of clinical lab test  Review and/or order of tests in the medicine section of CPT      Labs:     No results for input(s): WBC, HGB, HCT, PLT, HGBEXT, HCTEXT, PLTEXT, HGBEXT, HCTEXT, PLTEXT in the last 72 hours.   Recent Labs      05/04/18   0817  05/03/18   0326  05/02/18   0344   NA  140  141  143   K  4.1  4.5  4.2   CL  108 107  109*   CO2  23  26  27   BUN  55*  50*  43*   CREA  3.02*  2.99*  2.89*   GLU  146*  121*  223*   CA  7.7*  8.1*  9.0     Recent Labs      05/03/18   0920   LPSE  491*     Lab Results   Component Value Date/Time    Folate 61.0 (H) 04/30/2018 06:24 AM      Lab Results   Component Value Date/Time    Cholesterol, total 137 04/30/2018 06:24 AM    HDL Cholesterol 35 04/30/2018 06:24 AM    LDL, calculated 89 04/30/2018 06:24 AM    Triglyceride 65 04/30/2018 06:24 AM    CHOL/HDL Ratio 3.9 04/30/2018 06:24 AM     Lab Results   Component Value Date/Time    Glucose (POC) 297 (H) 05/04/2018 04:28 PM    Glucose (POC) 187 (H) 05/04/2018 12:24 PM    Glucose (POC) 190 (H) 05/04/2018 06:40 AM    Glucose (POC) 157 (H) 05/03/2018 11:31 PM    Glucose (POC) 87 05/03/2018 09:19 PM     Lab Results   Component Value Date/Time    Color YELLOW/STRAW 03/02/2018 10:50 PM    Appearance CLOUDY (A) 03/02/2018 10:50 PM    Specific gravity 1.017 03/02/2018 10:50 PM    Specific gravity 1.015 07/26/2010 11:18 AM    pH (UA) 5.0 03/02/2018 10:50 PM    Protein 100 (A) 03/02/2018 10:50 PM    Glucose NEGATIVE  03/02/2018 10:50 PM    Ketone NEGATIVE  03/02/2018 10:50 PM    Bilirubin NEGATIVE  03/02/2018 10:50 PM    Urobilinogen 1.0 03/02/2018 10:50 PM    Nitrites NEGATIVE  03/02/2018 10:50 PM    Leukocyte Esterase NEGATIVE  03/02/2018 10:50 PM    Epithelial cells MANY (A) 03/02/2018 10:50 PM    Bacteria 3+ (A) 03/02/2018 10:50 PM    WBC 5-10 03/02/2018 10:50 PM    RBC 0-5 03/02/2018 10:50 PM       Medications Reviewed:     Current Facility-Administered Medications   Medication Dose Route Frequency    HYDROmorphone (DILAUDID) injection 1 mg  1 mg IntraVENous Q3H PRN    alteplase (CATHFLO) 1 mg in sterile water (preservative free) 1 mL injection  1 mg InterCATHeter PRN    polyethylene glycol (MIRALAX) packet 17 g  17 g Oral DAILY PRN    hydrALAZINE (APRESOLINE) tablet 100 mg  100 mg Oral TID    labetalol (NORMODYNE) tablet 200 mg  200 mg Oral Q12H  LORazepam (ATIVAN) tablet 0.5 mg  0.5 mg Oral Q6H PRN    senna-docusate (PERICOLACE) 8.6-50 mg per tablet 2 Tab  2 Tab Oral DAILY    hydrALAZINE (APRESOLINE) 20 mg/mL injection 20 mg  20 mg IntraVENous Q4H PRN    lactated Ringers infusion  100 mL/hr IntraVENous CONTINUOUS    ondansetron (ZOFRAN) injection 4 mg  4 mg IntraVENous Q4H PRN    diphenhydrAMINE (BENADRYL) capsule 25 mg  25 mg Oral Q6H PRN    insulin glargine (LANTUS) injection 10 Units  10 Units SubCUTAneous QHS    nitroglycerin (NITROBID) 2 % ointment 2 Inch  2 Inch Topical BID    promethazine (PHENERGAN) 6.25 mg/5 mL syrup 6.25 mg  6.25 mg Oral Q6H PRN    sodium chloride (NS) flush 20 mL  20 mL InterCATHeter PRN    sodium chloride (NS) flush 10 mL  10 mL InterCATHeter Q24H    sodium chloride (NS) flush 10 mL  10 mL InterCATHeter PRN    sodium chloride (NS) flush 10 mL  10 mL InterCATHeter Q8H    sodium chloride (NS) flush 20 mL  20 mL InterCATHeter Q24H    NIFEdipine ER (PROCARDIA XL) tablet 90 mg  90 mg Oral DAILY    calcium carbonate (OS-BINA) tablet 500 mg [elemental]  500 mg Oral DAILY    QUEtiapine (SEROquel) tablet 100 mg  100 mg Oral BID    venlafaxine (EFFEXOR) tablet 75 mg  75 mg Oral BID WITH MEALS    sodium chloride (NS) flush 5-10 mL  5-10 mL IntraVENous Q8H    sodium chloride (NS) flush 5-10 mL  5-10 mL IntraVENous PRN    heparin (porcine) injection 5,000 Units  5,000 Units SubCUTAneous Q8H    albuterol-ipratropium (DUO-NEB) 2.5 MG-0.5 MG/3 ML  3 mL Nebulization Q4H PRN    lactobac ac& pc-s.therm-b.anim (CHRIS Q/RISAQUAD)  1 Cap Oral DAILY    insulin lispro (HUMALOG) injection   SubCUTAneous AC&HS    glucose chewable tablet 16 g  4 Tab Oral PRN    dextrose (D50W) injection syrg 12.5-25 g  12.5-25 g IntraVENous PRN    glucagon (GLUCAGEN) injection 1 mg  1 mg IntraMUSCular PRN    doxycycline (VIBRAMYCIN) 100 mg in 0.9% sodium chloride (MBP/ADV) 100 mL  100 mg IntraVENous Q12H    acetaminophen (TYLENOL) tablet 650 mg  650 mg Oral Q4H PRN    oxyCODONE-acetaminophen (PERCOCET) 5-325 mg per tablet 1 Tab  1 Tab Oral Q4H PRN     ______________________________________________________________________  EXPECTED LENGTH OF STAY: 3d 19h  ACTUAL LENGTH OF STAY:          7                 Jeffrey Douglas MD

## 2018-05-04 NOTE — PROGRESS NOTES
Chart reviewed. Transfer to [address] noted. Previous CRM notes noted. CRM will continue to follow for discharge planning.  LYRIC

## 2018-05-04 NOTE — PROGRESS NOTES
[facility]   [address] [facility], [address] [place] [address], [facility]  Phone: [phone]   [phone]       Nephrology Progress Note  Phuong Waller     1980     [id number]  Date of Admission : 4/27/2018 05/04/18    CC: Follow up for STONE       Assessment and Plan   STONE on CKD   - likely from volume depletion given poor po intake  - increase LR to 100/hr  - daily labs for now    Acute on Chronic Hyper K :  - K stable  - low K diet    Uncontrolled HTN:  - BP stable now  - cont current meds    Chronic Pancreatitis:  - lipase improving    CKD Stage IV :  - 2/2 DM, SCD  - previously dialysis dependent for ~ 6m. Failed RUE and RLE grafts     Sickle cell dz  - hgb stable    Anemia     Left foot osteomyelitis  - MRI findings noted  - per podiatry and ID     Interval History:  Seen and examined. Cr up slightly today. Not eating much. Still having some epigastric pain. No cp, sob or edema noted. Review of Systems: Pertinent items are noted in HPI.     Current Medications:   Current Facility-Administered Medications   Medication Dose Route Frequency    HYDROmorphone (DILAUDID) 2 mg/mL injection        HYDROmorphone (DILAUDID) injection 1 mg  1 mg IntraVENous Q3H PRN    alteplase (CATHFLO) 1 mg in sterile water (preservative free) 1 mL injection  1 mg InterCATHeter PRN    polyethylene glycol (MIRALAX) packet 17 g  17 g Oral DAILY PRN    hydrALAZINE (APRESOLINE) tablet 100 mg  100 mg Oral TID    labetalol (NORMODYNE) tablet 200 mg  200 mg Oral Q12H    LORazepam (ATIVAN) tablet 0.5 mg  0.5 mg Oral Q6H PRN    senna-docusate (PERICOLACE) 8.6-50 mg per tablet 2 Tab  2 Tab Oral DAILY    hydrALAZINE (APRESOLINE) 20 mg/mL injection 20 mg  20 mg IntraVENous Q4H PRN    lactated Ringers infusion  75 mL/hr IntraVENous CONTINUOUS    ondansetron (ZOFRAN) injection 4 mg  4 mg IntraVENous Q4H PRN    diphenhydrAMINE (BENADRYL) capsule 25 mg  25 mg Oral Q6H PRN    insulin glargine (LANTUS) injection 10 Units  10 Units SubCUTAneous QHS    nitroglycerin (NITROBID) 2 % ointment 2 Inch  2 Inch Topical BID    promethazine (PHENERGAN) 6.25 mg/5 mL syrup 6.25 mg  6.25 mg Oral Q6H PRN    sodium chloride (NS) flush 20 mL  20 mL InterCATHeter PRN    sodium chloride (NS) flush 10 mL  10 mL InterCATHeter Q24H    sodium chloride (NS) flush 10 mL  10 mL InterCATHeter PRN    sodium chloride (NS) flush 10 mL  10 mL InterCATHeter Q8H    sodium chloride (NS) flush 20 mL  20 mL InterCATHeter Q24H    NIFEdipine ER (PROCARDIA XL) tablet 90 mg  90 mg Oral DAILY    calcium carbonate (OS-BINA) tablet 500 mg [elemental]  500 mg Oral DAILY    QUEtiapine (SEROquel) tablet 100 mg  100 mg Oral BID    venlafaxine (EFFEXOR) tablet 75 mg  75 mg Oral BID WITH MEALS    sodium chloride (NS) flush 5-10 mL  5-10 mL IntraVENous Q8H    sodium chloride (NS) flush 5-10 mL  5-10 mL IntraVENous PRN    heparin (porcine) injection 5,000 Units  5,000 Units SubCUTAneous Q8H    albuterol-ipratropium (DUO-NEB) 2.5 MG-0.5 MG/3 ML  3 mL Nebulization Q4H PRN    lactobac ac& pc-s.therm-b.anim (CHRIS Q/RISAQUAD)  1 Cap Oral DAILY    insulin lispro (HUMALOG) injection   SubCUTAneous AC&HS    glucose chewable tablet 16 g  4 Tab Oral PRN    dextrose (D50W) injection syrg 12.5-25 g  12.5-25 g IntraVENous PRN    glucagon (GLUCAGEN) injection 1 mg  1 mg IntraMUSCular PRN    doxycycline (VIBRAMYCIN) 100 mg in 0.9% sodium chloride (MBP/ADV) 100 mL  100 mg IntraVENous Q12H    acetaminophen (TYLENOL) tablet 650 mg  650 mg Oral Q4H PRN    oxyCODONE-acetaminophen (PERCOCET) 5-325 mg per tablet 1 Tab  1 Tab Oral Q4H PRN      Allergies   Allergen Reactions    Erythromycin Itching    Morphine Itching    Toradol [Ketorolac] Rash       Objective:  Vitals:    Vitals:    05/03/18 2052 05/03/18 2219 05/04/18 0256 05/04/18 0956   BP: 143/78 133/71 132/65 164/84   Pulse: 99 88 88 100   Resp: 16  16 14   Temp: 98.9 °F (37.2 °C)  98.9 °F (37.2 °C) 99.1 °F (37.3 °C)   SpO2: 100%  96% 98%   Weight:       Height:         Intake and Output:     05/02 1901 - 05/04 0700  In: 480 [P.O.:480]  Out: -     Physical Examination:    General: Awake, alert  Neck:  Supple, no mass  Resp:  Lungs CTA B/L, no wheezing , normal respiratory effort  CV:  RRR,  no murmur or rub,trace LE edema  GI:  Soft, NT, + Bowel sounds, no hepatosplenomegaly  Neurologic:  Non focal  Skin:  No Rash  :  No escobedo     []    High complexity decision making was performed  []    Patient is at high-risk of decompensation with multiple organ involvement    Lab Data Personally Reviewed: I have reviewed all the pertinent labs, microbiology data and radiology studies during assessment. Recent Labs      05/04/18   0817  05/03/18   0326  05/02/18   0344   NA  140  141  143   K  4.1  4.5  4.2   CL  108  107  109*   CO2  23  26  27   GLU  146*  121*  223*   BUN  55*  50*  43*   CREA  3.02*  2.99*  2.89*   CA  7.7*  8.1*  9.0     No results for input(s): WBC, HGB, HCT, PLT, HGBEXT, HCTEXT, PLTEXT, HGBEXT, HCTEXT, PLTEXT in the last 72 hours.   Lab Results   Component Value Date/Time    Specimen Description: URINE 06/24/2013 08:00 PM    Specimen Description: URINE 06/17/2013 07:55 PM    Specimen Description: URINE 05/26/2013 10:10 AM    Specimen Description: URINE 04/22/2013 02:30 PM    Specimen Description: NARES 03/21/2013 12:30 PM     Lab Results   Component Value Date/Time    Culture result: NO GROWTH 5 DAYS 04/27/2018 05:11 PM    Culture result: FEW STAPHYLOCOCCUS HOMINIS SUBSPECIES HOMINIS (A) 03/11/2018 12:47 PM    Culture result: (A) 03/11/2018 12:47 PM     STAPHYLOCOCCUS EPIDERMIDIS (OXACILLIN RESISTANT) ISOLATED FROM THIO BROTH ONLY    Culture result: NO ANAEROBES ISOLATED 03/11/2018 12:47 PM    Culture result: NO GROWTH ON SOLID MEDIA AT 4 DAYS 03/11/2018 11:43 AM    Culture result: (A) 03/11/2018 11:43 AM     STAPHYLOCOCCUS EPIDERMIDIS (OXACILLIN RESISTANT) ISOLATED FROM THIO BROTH ONLY    Culture result: NO GROWTH ON SOLID MEDIA AT 4 DAYS 03/11/2018 11:43 AM    Culture result: (A) 03/11/2018 11:43 AM     STAPHYLOCOCCUS EPIDERMIDIS ISOLATED FROM THIO BROTH ONLY     Recent Results (from the past 24 hour(s))   GLUCOSE, POC    Collection Time: 05/03/18 11:12 AM   Result Value Ref Range    Glucose (POC) 291 (H) 65 - 100 mg/dL    Performed by Rell 132, POC    Collection Time: 05/03/18  5:37 PM   Result Value Ref Range    Glucose (POC) 110 (H) 65 - 100 mg/dL    Performed by Rell 132, POC    Collection Time: 05/03/18  9:19 PM   Result Value Ref Range    Glucose (POC) 87 65 - 100 mg/dL    Performed by Bonnie Lynn    GLUCOSE, POC    Collection Time: 05/03/18 11:31 PM   Result Value Ref Range    Glucose (POC) 157 (H) 65 - 100 mg/dL    Performed by DONTAE BREEN    GLUCOSE, POC    Collection Time: 05/04/18  6:40 AM   Result Value Ref Range    Glucose (POC) 190 (H) 65 - 100 mg/dL    Performed by Bryson Mack    METABOLIC PANEL, BASIC    Collection Time: 05/04/18  8:17 AM   Result Value Ref Range    Sodium 140 136 - 145 mmol/L    Potassium 4.1 3.5 - 5.1 mmol/L    Chloride 108 97 - 108 mmol/L    CO2 23 21 - 32 mmol/L    Anion gap 9 5 - 15 mmol/L    Glucose 146 (H) 65 - 100 mg/dL    BUN 55 (H) 6 - 20 MG/DL    Creatinine 3.02 (H) 0.55 - 1.02 MG/DL    BUN/Creatinine ratio 18 12 - 20      GFR est AA 21 (L) >60 ml/min/1.73m2    GFR est non-AA 17 (L) >60 ml/min/1.73m2    Calcium 7.7 (L) 8.5 - 10.1 MG/DL           Total time spent with patient:  xxx   min. Care Plan discussed with:  Patient     Family      RN      Consulting Physician [facility],         I have reviewed the flowsheets. Chart and Pertinent Notes have been reviewed. No change in PMH ,family and social history from Consult note.       Jamila Johnston MD

## 2018-05-04 NOTE — PROGRESS NOTES
[facility], [address] [place] [address] [address]  [address]  P: [phone]  F: [phone]    Foot and Ankle Surgery - Progress Note                                                   Assessment/Plan:  Charcot deformity left foot  Nonhealing surgical wound left foot  Osteomyelitis left foot  DM with neuropathy       Pt is status post Incision and drainage with removal of all nonviable soft tissue left foot  Proximal foot amputation left foot, Open bone biopsies left foot  Deep tendon transfer tibialis anterior tendon left foot  Adjacent transfer arteriomyofasciocutaneous plantar medial pedicle flap for delayed primary closure left foot DOS: 03-      Discussed results of MRI with patient explaining there is noted teresa changes compared to previously and that there is likely osteomye;itis involving the remaining tarsal bones. The patient remains at very high risk for limb loss. We discussed options of care moving forward including continued chronic wound care with another round of IV abx therapy VS Below knee amputation to the left leg. The wound remains superficial at this time. The patient would like to discuss with family prior to making any decisions regarding treatment plan.      If the patient decides to continue with conservative wound care and long term IV abx therapy they may follow with me at [facility] wound care center for follow up care    If they elect to undergo Below knee amputation we will consult vascular surgery     Pt is cleared from foot and ankle stand point once they determine treatment plan and are cleared by all other specialties       Labs/imaging reviewed, MRI left foot reviewed  abx per ID team-thank you  Pain medication per medicine team      We will plan for weekly changes and if discharged the patient will folow up in the wound center.  The patient may complete passive ROM exercises to the left thigh and lower leg but should be nonweightbearing left foot. Pt has full range of motion to upper body and full weight-bearing to the right foot.        Elevate and offload B/L LE. Float heels off edge of bed with foam block or air boots.       Foot and ankle will follow      HPI:  Pt is seen at bedside resting in NAD, No new pedal complaints at this time. Discussed case with nursing. Objective:  Vitals: Patient Vitals for the past 12 hrs:   BP Temp Pulse Resp SpO2   05/04/18 1511 153/82 98.5 °F (36.9 °C) 92 15 99 %   05/04/18 0956 164/84 99.1 °F (37.3 °C) 100 14 98 %       Dermatological:  Dressing left foot left clean dry and intact    Vascular:  deferred    Neurological:   Epicritic and protective threshold is deminished to B/L LE, Pt is unable to detect a 5.07 monofilament wire on 5/5 random tested spots B/L LE.     MSK:  deferred  . Imaging:  MRI left foot  IMPRESSION:   1. Status post amputation of the forefoot and a majority of the midfoot. Abnormal signal and increased destruction is seen in the remaining navicular and  cuboid bones that is highly suspicious for osteomyelitis. 2. Mild edema and mildly decreased T1 signal in the anterior process of the  calcaneus is suspicious for early osteomyelitis. 3. Trace edema in the talar head is more likely reactive than related to early  osteomyelitis. 4. Complex fluid collection surrounding the residual bones of the midfoot and  extending anteriorly in the stump.  Areas of increased T1 signal could be related  to residual blood products, but infection cannot be excluded    Labs:  Recent Labs      05/04/18   0817   CREA  3.02*   BUN  55*   NA  140   K  4.1   CL  108   CO2  23   GLU  146*         Segundo Washington, [place]  5/4/2018  [facility] of [address] [place] [address]. Karen [address] [place] [address]  P: [phone]  F: [phone]

## 2018-05-04 NOTE — PROGRESS NOTES
2153: Spoke with Dr. Deepika Kidd about patient's blood glucoseof 87 and order for 10 units of Lantus.  Orders given to hold Lantus and recheck blood glucose in two hours

## 2018-05-04 NOTE — PROGRESS NOTES
Follow up visit with Ms. Reanna Eland. Pt appeared to be resting with her lights off and door closed, but she responded when I entered her room. Pt indicated that she was coping with some pain and that her nurse was aware. Offered brief emotional support and shared prayer; assured her of ongoing prayers.     Margaret Pena, Palliative

## 2018-05-04 NOTE — DIABETES MGMT
Progress Note     Chart reviewed for elevated blood glucose ( > 180 mg/dL x 2 in the past 24 hours). Recommendations/ Comments: Lantus was held 5/3 per MD, which may have contributed to the elevated fasting glucose this morning. If appropriate, please consider decreasing Lantus by 20% - to 8 units at bedtime. Morning glucose: 190 mg/dL (per am POCT Glucose). Required 7 units of correction in the last 24 hours. Inpatient medications for glucose management:  1. Correction Scale: Lispro (Humalog) Normal Sensitivity scale to cover for glucose > 139 mg/dL before meals and for glucose >199 at bedtime      2. Lantus 10 units at bedtime     POC Glucose last 24hrs:   Lab Results   Component Value Date/Time     (H) 05/04/2018 08:17 AM    GLUCPOC 190 (H) 05/04/2018 06:40 AM    GLUCPOC 157 (H) 05/03/2018 11:31 PM    GLUCPOC 87 05/03/2018 09:19 PM        Estimated Creatinine Clearance: 19.8 mL/min (based on Cr of 3.02). Diet order:   Active Orders   Diet    DIET DIABETIC CONSISTENT CARB Regular        PO intake: Patient Vitals for the past 72 hrs:   % Diet Eaten   05/03/18 1746 65 %   05/03/18 1415 85 %   05/03/18 1000 75 %   05/02/18 0900 0 %   05/01/18 1550 0 %   05/01/18 1126 0 %       History of Diabetes:   Naatlia Tate is a 42 y.o. female with a past medical history significant for DM per  Damian Zaidi MD's H&P dated 4/28/2018. Prior to admission medications for glucose management: per past medical records  -none for DM    A1C:   Lab Results   Component Value Date/Time    Hemoglobin A1c 6.9 (H) 04/30/2018 06:24 AM    Hemoglobin A1c 8.8 (H) 03/03/2018 09:21 AM       Reference range*:  Increased risk for diabetes: 5.7 - 6.4%  Diabetes: >6.4%  Glycemic control for adults with diabetes: <7.0 %    *CITLALI PARSONS (2014). Diagnosis and classification of diabetes mellitus. Diabetes care, 37, S81. Thank you. Memory Closs.  Fede Santiago MPH, RN, BSN, [address] [place] [address]   [phone]    [facility] most hospitalized persons with hyperglycemia in the intensive care unit (ICU), a glucose range of 140 to 180 mg/dL is recommended, provided this target can be safely achieved. *  - For general medicine and surgery patients in non-ICU settings, a premeal glucose target <140 mg/dL and a random blood glucose <180 mg/dL are recommended. *    *based on Breann Ruiz., STEPHANIE Mo T., Jada Weiss., ... & Gordo Gonzalez. (2017). Consensus statement by the American Association of Clinical Endocrinologists and [address] [place] [address] of Endocrinology on the comprehensive type 2 diabetes management algorithm2017 executive summary. Endocrine Practice, 23(2), 882-301.

## 2018-05-04 NOTE — CDMP QUERY
Please clarify if this patient is (was) being treated/managed for:     => Severe Protein Calorie Malnutrition in the setting of weight loss requiring nutritional support  => Other explanation of clinical findings  => Clinically Undetermined (no explanation for clinical findings)    The medical record reflects the following clinical findings, treatment, and risk factors. Risk Factors:  weight loss  Clinical Indicators:   nutritional note-Pt with history of J-tube (unclear when this was removed). Agree this may be necessary in the future if she is unable to improve her po intake. If pursued, would recommend Suplena @ 40 mL/hr + 100 mL flush q 4 hours. This will provide 1728 kcal, 43 g protein and 1339 mL total free water (tf + flush)-- meeting 100% of estimated needs.      Pt with weight loss of 20# x 3-4 weeks, which is severe (15%) and 26% of body weight over the past year.     Patient meets criteria for Severe Protein Calorie Malnutrition as evidenced by:   ASPEN Malnutrition Criteria  Acute Illness, Chronic Illness, or Social/Enviornmental: Acute illness  Energy Intake: Less than/equal to 50% est energy req for greater than/equal to 5 days  Weight Loss: Greater than 5% x 1 mo  Muscle Mass: Moderate  ASPEN Malnutrition Score - Acute Illness: 18  Acute Illness - Malnutrition Diagnosis: Severe malnutrition. Treatment: nutritional consult,suplena    Please clarify and document your clinical opinion in the progress notes and discharge summary including the definitive and/or presumptive diagnosis, (suspected or probable), related to the above clinical findings. Please include clinical findings supporting your diagnosis.     Thank you,         Susie Dunlap [address] [place] [address]

## 2018-05-04 NOTE — PROGRESS NOTES
ID Progress Note  2018    Subjective:     She is not feeling well in general    Objective:     Antibiotics:  1. Doxycycline       Vitals:   Visit Vitals    /84 (BP 1 Location: Left arm, BP Patient Position: Supine)    Pulse 100    Temp 99.1 °F (37.3 °C)    Resp 14    Ht 5' 2\" (1.575 m)    Wt 54.7 kg (120 lb 9.5 oz)    SpO2 98%    BMI 22.06 kg/m2        Tmax:  Temp (24hrs), Av.9 °F (37.2 °C), Min:98.8 °F (37.1 °C), Max:99.1 °F (37.3 °C)      Exam:  Lungs:  clear to auscultation bilaterally  Heart:  regular rate and rhythm  Abdomen:  soft, non-tender. Bowel sounds normal. No masses,  no organomegaly  Wound dressed    Labs:      Recent Labs      18   0817  18   0326  18   0344   BUN  55*  50*  43*   CREA  3.02*  2.99*  2.89*       Cultures:     Lab Results   Component Value Date/Time    Specimen Description: URINE 2013 08:00 PM    Specimen Description: URINE 2013 07:55 PM    Specimen Description: URINE 2013 10:10 AM     Lab Results   Component Value Date/Time    Culture result: NO GROWTH 5 DAYS 2018 05:11 PM    Culture result: FEW STAPHYLOCOCCUS HOMINIS SUBSPECIES HOMINIS (A) 2018 12:47 PM    Culture result: (A) 2018 12:47 PM     STAPHYLOCOCCUS EPIDERMIDIS (OXACILLIN RESISTANT) ISOLATED FROM THIO BROTH ONLY    Culture result: NO ANAEROBES ISOLATED 2018 12:47 PM       Radiology:     Line/Insert Date:           Assessment:     1. Diabetic foot infection--MRI results noted--not sure whether this is the old infection resolving or a new problem  2. Renal insufficiency  3. DM    Objective:     1. Continue current therapy  2. ? options for sampling of tissue for culture    Michell Snow MD

## 2018-05-05 LAB
ALBUMIN SERPL-MCNC: 2.5 G/DL (ref 3.5–5)
ANION GAP SERPL CALC-SCNC: 10 MMOL/L (ref 5–15)
BASOPHILS # BLD: 0 K/UL (ref 0–0.1)
BASOPHILS NFR BLD: 0 % (ref 0–1)
BUN SERPL-MCNC: 59 MG/DL (ref 6–20)
BUN/CREAT SERPL: 19 (ref 12–20)
CALCIUM SERPL-MCNC: 7.5 MG/DL (ref 8.5–10.1)
CHLORIDE SERPL-SCNC: 108 MMOL/L (ref 97–108)
CO2 SERPL-SCNC: 22 MMOL/L (ref 21–32)
CREAT SERPL-MCNC: 3.06 MG/DL (ref 0.55–1.02)
DIFFERENTIAL METHOD BLD: ABNORMAL
EOSINOPHIL # BLD: 0.4 K/UL (ref 0–0.4)
EOSINOPHIL NFR BLD: 9 % (ref 0–7)
ERYTHROCYTE [DISTWIDTH] IN BLOOD BY AUTOMATED COUNT: 17.2 % (ref 11.5–14.5)
GLUCOSE BLD STRIP.AUTO-MCNC: 146 MG/DL (ref 65–100)
GLUCOSE BLD STRIP.AUTO-MCNC: 148 MG/DL (ref 65–100)
GLUCOSE BLD STRIP.AUTO-MCNC: 93 MG/DL (ref 65–100)
GLUCOSE BLD STRIP.AUTO-MCNC: 97 MG/DL (ref 65–100)
GLUCOSE SERPL-MCNC: 108 MG/DL (ref 65–100)
HCT VFR BLD AUTO: 22.8 % (ref 35–47)
HGB BLD-MCNC: 7.4 G/DL (ref 11.5–16)
IMM GRANULOCYTES # BLD: 0 K/UL (ref 0–0.04)
IMM GRANULOCYTES NFR BLD AUTO: 0 % (ref 0–0.5)
LYMPHOCYTES # BLD: 1.2 K/UL (ref 0.8–3.5)
LYMPHOCYTES NFR BLD: 24 % (ref 12–49)
MCH RBC QN AUTO: 25.2 PG (ref 26–34)
MCHC RBC AUTO-ENTMCNC: 32.5 G/DL (ref 30–36.5)
MCV RBC AUTO: 77.6 FL (ref 80–99)
MONOCYTES # BLD: 0.4 K/UL (ref 0–1)
MONOCYTES NFR BLD: 8 % (ref 5–13)
NEUTS SEG # BLD: 2.9 K/UL (ref 1.8–8)
NEUTS SEG NFR BLD: 58 % (ref 32–75)
NRBC # BLD: 0 K/UL (ref 0–0.01)
NRBC BLD-RTO: 0 PER 100 WBC
PHOSPHATE SERPL-MCNC: 4.8 MG/DL (ref 2.6–4.7)
PLATELET # BLD AUTO: 151 K/UL (ref 150–400)
POTASSIUM SERPL-SCNC: 4.4 MMOL/L (ref 3.5–5.1)
RBC # BLD AUTO: 2.94 M/UL (ref 3.8–5.2)
SERVICE CMNT-IMP: ABNORMAL
SERVICE CMNT-IMP: ABNORMAL
SERVICE CMNT-IMP: NORMAL
SERVICE CMNT-IMP: NORMAL
SODIUM SERPL-SCNC: 140 MMOL/L (ref 136–145)
WBC # BLD AUTO: 5 K/UL (ref 3.6–11)

## 2018-05-05 PROCEDURE — 74011250636 HC RX REV CODE- 250/636: Performed by: PHYSICAL MEDICINE & REHABILITATION

## 2018-05-05 PROCEDURE — 74011250636 HC RX REV CODE- 250/636: Performed by: INTERNAL MEDICINE

## 2018-05-05 PROCEDURE — 80069 RENAL FUNCTION PANEL: CPT | Performed by: INTERNAL MEDICINE

## 2018-05-05 PROCEDURE — 74011250637 HC RX REV CODE- 250/637: Performed by: INTERNAL MEDICINE

## 2018-05-05 PROCEDURE — 74011250637 HC RX REV CODE- 250/637: Performed by: PHYSICAL MEDICINE & REHABILITATION

## 2018-05-05 PROCEDURE — 74011636637 HC RX REV CODE- 636/637: Performed by: INTERNAL MEDICINE

## 2018-05-05 PROCEDURE — 85025 COMPLETE CBC W/AUTO DIFF WBC: CPT | Performed by: INTERNAL MEDICINE

## 2018-05-05 PROCEDURE — 74011000258 HC RX REV CODE- 258: Performed by: INTERNAL MEDICINE

## 2018-05-05 PROCEDURE — 74011000250 HC RX REV CODE- 250: Performed by: INTERNAL MEDICINE

## 2018-05-05 PROCEDURE — 82962 GLUCOSE BLOOD TEST: CPT

## 2018-05-05 PROCEDURE — 36415 COLL VENOUS BLD VENIPUNCTURE: CPT | Performed by: INTERNAL MEDICINE

## 2018-05-05 PROCEDURE — 65660000000 HC RM CCU STEPDOWN

## 2018-05-05 PROCEDURE — 74011250637 HC RX REV CODE- 250/637: Performed by: FAMILY MEDICINE

## 2018-05-05 RX ADMIN — QUETIAPINE FUMARATE 100 MG: 100 TABLET ORAL at 08:53

## 2018-05-05 RX ADMIN — Medication 10 ML: at 16:02

## 2018-05-05 RX ADMIN — HYDROMORPHONE HYDROCHLORIDE 1 MG: 2 INJECTION INTRAMUSCULAR; INTRAVENOUS; SUBCUTANEOUS at 03:43

## 2018-05-05 RX ADMIN — HYDROMORPHONE HYDROCHLORIDE 1 MG: 2 INJECTION INTRAMUSCULAR; INTRAVENOUS; SUBCUTANEOUS at 06:33

## 2018-05-05 RX ADMIN — HYDROMORPHONE HYDROCHLORIDE 1 MG: 2 INJECTION INTRAMUSCULAR; INTRAVENOUS; SUBCUTANEOUS at 00:24

## 2018-05-05 RX ADMIN — HYDROMORPHONE HYDROCHLORIDE 1 MG: 2 INJECTION INTRAMUSCULAR; INTRAVENOUS; SUBCUTANEOUS at 10:02

## 2018-05-05 RX ADMIN — HEPARIN SODIUM 5000 UNITS: 5000 INJECTION, SOLUTION INTRAVENOUS; SUBCUTANEOUS at 20:27

## 2018-05-05 RX ADMIN — DAPTOMYCIN 400 MG: 500 INJECTION, POWDER, LYOPHILIZED, FOR SOLUTION INTRAVENOUS at 15:35

## 2018-05-05 RX ADMIN — STANDARDIZED SENNA CONCENTRATE AND DOCUSATE SODIUM 2 TABLET: 8.6; 5 TABLET, FILM COATED ORAL at 09:00

## 2018-05-05 RX ADMIN — HYDROMORPHONE HYDROCHLORIDE 1 MG: 2 INJECTION INTRAMUSCULAR; INTRAVENOUS; SUBCUTANEOUS at 20:22

## 2018-05-05 RX ADMIN — INSULIN GLARGINE 10 UNITS: 100 INJECTION, SOLUTION SUBCUTANEOUS at 22:20

## 2018-05-05 RX ADMIN — ONDANSETRON HYDROCHLORIDE 4 MG: 2 INJECTION INTRAMUSCULAR; INTRAVENOUS at 03:43

## 2018-05-05 RX ADMIN — LABETALOL HYDROCHLORIDE 200 MG: 100 TABLET, FILM COATED ORAL at 08:53

## 2018-05-05 RX ADMIN — Medication 10 ML: at 14:00

## 2018-05-05 RX ADMIN — Medication 20 ML: at 16:02

## 2018-05-05 RX ADMIN — Medication 10 ML: at 06:32

## 2018-05-05 RX ADMIN — LORAZEPAM 0.5 MG: 0.5 TABLET ORAL at 08:53

## 2018-05-05 RX ADMIN — VENLAFAXINE 75 MG: 37.5 TABLET ORAL at 17:06

## 2018-05-05 RX ADMIN — PROMETHAZINE HYDROCHLORIDE 6.25 MG: 6.25 SYRUP ORAL at 17:12

## 2018-05-05 RX ADMIN — INSULIN LISPRO 2 UNITS: 100 INJECTION, SOLUTION INTRAVENOUS; SUBCUTANEOUS at 12:55

## 2018-05-05 RX ADMIN — LABETALOL HYDROCHLORIDE 200 MG: 100 TABLET, FILM COATED ORAL at 20:25

## 2018-05-05 RX ADMIN — NITROGLYCERIN 2 INCH: 20 OINTMENT TOPICAL at 08:54

## 2018-05-05 RX ADMIN — NIFEDIPINE 90 MG: 60 TABLET, FILM COATED, EXTENDED RELEASE ORAL at 08:52

## 2018-05-05 RX ADMIN — LORAZEPAM 0.5 MG: 0.5 TABLET ORAL at 20:29

## 2018-05-05 RX ADMIN — NITROGLYCERIN 2 INCH: 20 OINTMENT TOPICAL at 17:05

## 2018-05-05 RX ADMIN — ONDANSETRON HYDROCHLORIDE 4 MG: 2 INJECTION INTRAMUSCULAR; INTRAVENOUS at 16:00

## 2018-05-05 RX ADMIN — HYDRALAZINE HYDROCHLORIDE 100 MG: 50 TABLET ORAL at 15:35

## 2018-05-05 RX ADMIN — VENLAFAXINE 75 MG: 37.5 TABLET ORAL at 06:36

## 2018-05-05 RX ADMIN — ONDANSETRON HYDROCHLORIDE 4 MG: 2 INJECTION INTRAMUSCULAR; INTRAVENOUS at 20:20

## 2018-05-05 RX ADMIN — DOXYCYCLINE 100 MG: 100 INJECTION, POWDER, LYOPHILIZED, FOR SOLUTION INTRAVENOUS at 10:41

## 2018-05-05 RX ADMIN — HYDRALAZINE HYDROCHLORIDE 100 MG: 50 TABLET ORAL at 08:53

## 2018-05-05 RX ADMIN — Medication 10 ML: at 03:44

## 2018-05-05 RX ADMIN — HEPARIN SODIUM 5000 UNITS: 5000 INJECTION, SOLUTION INTRAVENOUS; SUBCUTANEOUS at 13:44

## 2018-05-05 RX ADMIN — QUETIAPINE FUMARATE 100 MG: 100 TABLET ORAL at 17:06

## 2018-05-05 RX ADMIN — HYDROMORPHONE HYDROCHLORIDE 1 MG: 2 INJECTION INTRAMUSCULAR; INTRAVENOUS; SUBCUTANEOUS at 12:56

## 2018-05-05 RX ADMIN — ONDANSETRON HYDROCHLORIDE 4 MG: 2 INJECTION INTRAMUSCULAR; INTRAVENOUS at 10:02

## 2018-05-05 RX ADMIN — Medication 1 CAPSULE: at 08:53

## 2018-05-05 RX ADMIN — HYDROMORPHONE HYDROCHLORIDE 1 MG: 2 INJECTION INTRAMUSCULAR; INTRAVENOUS; SUBCUTANEOUS at 16:46

## 2018-05-05 RX ADMIN — DIPHENHYDRAMINE HYDROCHLORIDE 25 MG: 25 CAPSULE ORAL at 17:12

## 2018-05-05 RX ADMIN — HYDRALAZINE HYDROCHLORIDE 100 MG: 50 TABLET ORAL at 22:19

## 2018-05-05 RX ADMIN — CALCIUM 500 MG: 500 TABLET ORAL at 08:53

## 2018-05-05 NOTE — PROGRESS NOTES
Bedside shift change report given to [phone] (oncoming nurse) by Geneva Harris (offgoing nurse). Report included the following information SBAR.

## 2018-05-05 NOTE — PROGRESS NOTES
ID Progress Note  2018    Subjective:     She is not feeling well in general--concerned about her foot    Objective:     Antibiotics:  1. Daptomycin       Vitals:   Visit Vitals    BP (!) 189/107    Pulse 88    Temp 98 °F (36.7 °C)    Resp 18    Ht 5' 2\" (1.575 m)    Wt 54.7 kg (120 lb 9.5 oz)    SpO2 96%    BMI 22.06 kg/m2        Tmax:  Temp (24hrs), Av.1 °F (36.7 °C), Min:97.5 °F (36.4 °C), Max:98.5 °F (36.9 °C)      Exam:  Lungs:  clear to auscultation bilaterally  Heart:  regular rate and rhythm  Abdomen:  soft, non-tender. Bowel sounds normal. No masses,  no organomegaly  Wound dressed    Labs:      Recent Labs      18   0342  18   0817  18   0326   WBC  5.0   --    --    HGB  7.4*   --    --    PLT  151   --    --    BUN  59*  55*  50*   CREA  3.06*  3.02*  2.99*       Cultures:     Lab Results   Component Value Date/Time    Specimen Description: URINE 2013 08:00 PM    Specimen Description: URINE 2013 07:55 PM    Specimen Description: URINE 2013 10:10 AM     Lab Results   Component Value Date/Time    Culture result: NO GROWTH 5 DAYS 2018 05:11 PM    Culture result: FEW STAPHYLOCOCCUS HOMINIS SUBSPECIES HOMINIS (A) 2018 12:47 PM    Culture result: (A) 2018 12:47 PM     STAPHYLOCOCCUS EPIDERMIDIS (OXACILLIN RESISTANT) ISOLATED FROM THIO BROTH ONLY    Culture result: NO ANAEROBES ISOLATED 2018 12:47 PM       Radiology:     Line/Insert Date:           Assessment:     1. Diabetic foot infection--MRI results noted--not sure whether this is the old infection resolving or a new problem  2. Renal insufficiency  3. DM    Objective:     1. Continue current therapy  2. ? options for sampling of tissue for culture  3.  She is deciding about amputation--I am willing to give course of daptomycin, but am not optimistic    Michell Snow MD

## 2018-05-05 NOTE — PROGRESS NOTES
Hospitalist Progress Note  Johana Hernandez MD  Answering service: [phone] OR 36 from in house phone  Cell: [phone]      Date of Service:  2018  NAME:  Salvatore Quevedo  :  1980  MRN:  [id number]      Admission Summary:      Per H&P: 41-year-old woman with a past medical history significant for hypertension, type 2 diabetes, obstructive sleep apnea, depression, chronic kidney disease, narcotic dependency was in her usual state of health until about 3 days ago when the patient developed left leg pain and swelling. The pain is a constant 8/10 in severity. Patient described the pain as a sharp shooting pain in her left leg. No relief with pain medication. No known aggravating factors. Last month, the patient underwent a transmetatarsal resection of the left foot 2/2 diabetic foot. Patient also complained of abdominal pain, nausea, and vomiting. The abdominal pain is diffuse in location. Interval history / Subjective:     Patient looking improved compared to last week. States she is feeling somewhat better. No current V, N improved, abdominal pain lessened. Assessment & Plan:     Acute on chronic pancreatitis: Patient with N/V, abdominal pain and elevated lipase /amylase. Repeat Lipase trended close to normal values  - CT A/P with calcified pancreas  - ain control  -  IVF with ringer's lactate  - Patient eating and tolerating Jello today, advance as tolerated. Per Nutritionist, patient has yet to reach goal    Acute hyperkalemia due to kidney disease:  - now resolved    Osteomyelitis of the left foot:  - the patient was on vancomycin and Zosyn initially by the admitting team  - Podiatry was consulted  - MRI of the left foot done, suspicious for Osteomyelitis.  Unclear if this is old or new  - On Doxycycline  - ID on board    Acute on chronic kidney disease stage V  - holding diuretic and patient on fluids, per Renal - Nephrology following    Essential Hypertension: Uncontrolled  - Continue current anti-hypertensive's. - On hydralazine PRN SBP > 160 mm hg     Microcytic Anemia. This is most likely due to chronic kidney disease. Type 2 diabetes type 2: Continue sliding scale. accu checks    Obstructive sleep apnea    Depression: Continue home meds. Code status: Full  DVT prophylaxis: heparin    Care Plan discussed with: Patient/Family and Nurse  Disposition: TBD     Hospital Problems  Date Reviewed: 4/28/2018          Codes Class Noted POA    * (Principal)Osteomyelitis of left foot ([facility] [address] ICD-10-CM: M86.9  ICD-9-CM: 730.27  4/27/2018 Yes              Review of Systems:   Pertinent items are noted in HPI. Vital Signs:    Last 24hrs VS reviewed since prior progress note. Most recent are:  Visit Vitals    BP (!) 158/91 (BP 1 Location: Left arm)    Pulse 88    Temp 98.4 °F (36.9 °C)    Resp 16    Ht 5' 2\" (1.575 m)    Wt 54.7 kg (120 lb 9.5 oz)    SpO2 96%    BMI 22.06 kg/m2       No intake or output data in the 24 hours ending 05/05/18 0729     Physical Examination:             Constitutional:  Mild to moderate distress   ENT:  Neck supple   Resp:  CTA bilaterally. CV:  Regular rhythm, increased rate    GI:  Soft, non distended, diffusely tender to palpation. No rigidity    Musculoskeletal:  LLE larger than R (patient states this is baseline). Partial amputation of L foot with wrapping    Neurologic:  Moves all extremities.   AAOx3        Data Review:    Review and/or order of clinical lab test  Review and/or order of tests in the medicine section of CPT      Labs:     Recent Labs      05/05/18   0342   WBC  5.0   HGB  7.4*   HCT  22.8*   PLT  151     Recent Labs      05/05/18   0342  05/04/18   0817  05/03/18   0326   NA  140  140  141   K  4.4  4.1  4.5   CL  108  108  107   CO2  22  23  26   BUN  59*  55*  50*   CREA  3.06*  3.02*  2.99*   GLU  108*  146*  121*   CA  7.5*  7.7*  8.1*   PHOS  4.8*   -- --      Recent Labs      05/05/18   0342  05/03/18   0920   ALB  2.5*   --    LPSE   --   491*     Lab Results   Component Value Date/Time    Folate 61.0 (H) 04/30/2018 06:24 AM      Lab Results   Component Value Date/Time    Cholesterol, total 137 04/30/2018 06:24 AM    HDL Cholesterol 35 04/30/2018 06:24 AM    LDL, calculated 89 04/30/2018 06:24 AM    Triglyceride 65 04/30/2018 06:24 AM    CHOL/HDL Ratio 3.9 04/30/2018 06:24 AM     Lab Results   Component Value Date/Time    Glucose (POC) 93 05/05/2018 06:37 AM    Glucose (POC) 99 05/04/2018 09:40 PM    Glucose (POC) 297 (H) 05/04/2018 04:28 PM    Glucose (POC) 187 (H) 05/04/2018 12:24 PM    Glucose (POC) 190 (H) 05/04/2018 06:40 AM     Lab Results   Component Value Date/Time    Color YELLOW/STRAW 03/02/2018 10:50 PM    Appearance CLOUDY (A) 03/02/2018 10:50 PM    Specific gravity 1.017 03/02/2018 10:50 PM    Specific gravity 1.015 07/26/2010 11:18 AM    pH (UA) 5.0 03/02/2018 10:50 PM    Protein 100 (A) 03/02/2018 10:50 PM    Glucose NEGATIVE  03/02/2018 10:50 PM    Ketone NEGATIVE  03/02/2018 10:50 PM    Bilirubin NEGATIVE  03/02/2018 10:50 PM    Urobilinogen 1.0 03/02/2018 10:50 PM    Nitrites NEGATIVE  03/02/2018 10:50 PM    Leukocyte Esterase NEGATIVE  03/02/2018 10:50 PM    Epithelial cells MANY (A) 03/02/2018 10:50 PM    Bacteria 3+ (A) 03/02/2018 10:50 PM    WBC 5-10 03/02/2018 10:50 PM    RBC 0-5 03/02/2018 10:50 PM       Medications Reviewed:     Current Facility-Administered Medications   Medication Dose Route Frequency    HYDROmorphone (DILAUDID) injection 1 mg  1 mg IntraVENous Q3H PRN    alteplase (CATHFLO) 1 mg in sterile water (preservative free) 1 mL injection  1 mg InterCATHeter PRN    polyethylene glycol (MIRALAX) packet 17 g  17 g Oral DAILY PRN    hydrALAZINE (APRESOLINE) tablet 100 mg  100 mg Oral TID    labetalol (NORMODYNE) tablet 200 mg  200 mg Oral Q12H    LORazepam (ATIVAN) tablet 0.5 mg  0.5 mg Oral Q6H PRN    senna-docusate (PERICOLACE) 8.6-50 mg per tablet 2 Tab  2 Tab Oral DAILY    hydrALAZINE (APRESOLINE) 20 mg/mL injection 20 mg  20 mg IntraVENous Q4H PRN    lactated Ringers infusion  100 mL/hr IntraVENous CONTINUOUS    ondansetron (ZOFRAN) injection 4 mg  4 mg IntraVENous Q4H PRN    diphenhydrAMINE (BENADRYL) capsule 25 mg  25 mg Oral Q6H PRN    insulin glargine (LANTUS) injection 10 Units  10 Units SubCUTAneous QHS    nitroglycerin (NITROBID) 2 % ointment 2 Inch  2 Inch Topical BID    promethazine (PHENERGAN) 6.25 mg/5 mL syrup 6.25 mg  6.25 mg Oral Q6H PRN    sodium chloride (NS) flush 20 mL  20 mL InterCATHeter PRN    sodium chloride (NS) flush 10 mL  10 mL InterCATHeter Q24H    sodium chloride (NS) flush 10 mL  10 mL InterCATHeter PRN    sodium chloride (NS) flush 10 mL  10 mL InterCATHeter Q8H    sodium chloride (NS) flush 20 mL  20 mL InterCATHeter Q24H    NIFEdipine ER (PROCARDIA XL) tablet 90 mg  90 mg Oral DAILY    calcium carbonate (OS-BINA) tablet 500 mg [elemental]  500 mg Oral DAILY    QUEtiapine (SEROquel) tablet 100 mg  100 mg Oral BID    venlafaxine (EFFEXOR) tablet 75 mg  75 mg Oral BID WITH MEALS    sodium chloride (NS) flush 5-10 mL  5-10 mL IntraVENous Q8H    sodium chloride (NS) flush 5-10 mL  5-10 mL IntraVENous PRN    heparin (porcine) injection 5,000 Units  5,000 Units SubCUTAneous Q8H    albuterol-ipratropium (DUO-NEB) 2.5 MG-0.5 MG/3 ML  3 mL Nebulization Q4H PRN    lactobac ac& pc-s.therm-b.anim (CHRIS Q/RISAQUAD)  1 Cap Oral DAILY    insulin lispro (HUMALOG) injection   SubCUTAneous AC&HS    glucose chewable tablet 16 g  4 Tab Oral PRN    dextrose (D50W) injection syrg 12.5-25 g  12.5-25 g IntraVENous PRN    glucagon (GLUCAGEN) injection 1 mg  1 mg IntraMUSCular PRN    doxycycline (VIBRAMYCIN) 100 mg in 0.9% sodium chloride (MBP/ADV) 100 mL  100 mg IntraVENous Q12H    acetaminophen (TYLENOL) tablet 650 mg  650 mg Oral Q4H PRN    oxyCODONE-acetaminophen (PERCOCET) 5-325 mg per tablet 1 Tab  1 Tab Oral Q4H PRN     ______________________________________________________________________  EXPECTED LENGTH OF STAY: 3d 19h  ACTUAL LENGTH OF STAY:          Jh Bazzi MD

## 2018-05-06 LAB
ALBUMIN SERPL-MCNC: 2.7 G/DL (ref 3.5–5)
ANION GAP SERPL CALC-SCNC: 10 MMOL/L (ref 5–15)
ANION GAP SERPL CALC-SCNC: 9 MMOL/L (ref 5–15)
BASOPHILS # BLD: 0 K/UL (ref 0–0.1)
BASOPHILS NFR BLD: 0 % (ref 0–1)
BUN SERPL-MCNC: 53 MG/DL (ref 6–20)
BUN SERPL-MCNC: 53 MG/DL (ref 6–20)
BUN/CREAT SERPL: 17 (ref 12–20)
BUN/CREAT SERPL: 18 (ref 12–20)
CALCIUM SERPL-MCNC: 8.1 MG/DL (ref 8.5–10.1)
CALCIUM SERPL-MCNC: 8.2 MG/DL (ref 8.5–10.1)
CHLORIDE SERPL-SCNC: 109 MMOL/L (ref 97–108)
CHLORIDE SERPL-SCNC: 109 MMOL/L (ref 97–108)
CO2 SERPL-SCNC: 21 MMOL/L (ref 21–32)
CO2 SERPL-SCNC: 22 MMOL/L (ref 21–32)
CREAT SERPL-MCNC: 2.98 MG/DL (ref 0.55–1.02)
CREAT SERPL-MCNC: 3.1 MG/DL (ref 0.55–1.02)
DIFFERENTIAL METHOD BLD: ABNORMAL
EOSINOPHIL # BLD: 0.3 K/UL (ref 0–0.4)
EOSINOPHIL NFR BLD: 5 % (ref 0–7)
ERYTHROCYTE [DISTWIDTH] IN BLOOD BY AUTOMATED COUNT: 16.5 % (ref 11.5–14.5)
GLUCOSE BLD STRIP.AUTO-MCNC: 102 MG/DL (ref 65–100)
GLUCOSE BLD STRIP.AUTO-MCNC: 115 MG/DL (ref 65–100)
GLUCOSE BLD STRIP.AUTO-MCNC: 264 MG/DL (ref 65–100)
GLUCOSE BLD STRIP.AUTO-MCNC: 95 MG/DL (ref 65–100)
GLUCOSE SERPL-MCNC: 101 MG/DL (ref 65–100)
GLUCOSE SERPL-MCNC: 97 MG/DL (ref 65–100)
HCT VFR BLD AUTO: 27 % (ref 35–47)
HGB BLD-MCNC: 8.8 G/DL (ref 11.5–16)
IMM GRANULOCYTES # BLD: 0 K/UL (ref 0–0.04)
IMM GRANULOCYTES NFR BLD AUTO: 0 % (ref 0–0.5)
LYMPHOCYTES # BLD: 0.9 K/UL (ref 0.8–3.5)
LYMPHOCYTES NFR BLD: 16 % (ref 12–49)
MAGNESIUM SERPL-MCNC: 1.4 MG/DL (ref 1.6–2.4)
MCH RBC QN AUTO: 25 PG (ref 26–34)
MCHC RBC AUTO-ENTMCNC: 32.6 G/DL (ref 30–36.5)
MCV RBC AUTO: 76.7 FL (ref 80–99)
MONOCYTES # BLD: 0.3 K/UL (ref 0–1)
MONOCYTES NFR BLD: 6 % (ref 5–13)
NEUTS SEG # BLD: 4 K/UL (ref 1.8–8)
NEUTS SEG NFR BLD: 73 % (ref 32–75)
NRBC # BLD: 0 K/UL (ref 0–0.01)
NRBC BLD-RTO: 0 PER 100 WBC
PHOSPHATE SERPL-MCNC: 4.3 MG/DL (ref 2.6–4.7)
PLATELET # BLD AUTO: 148 K/UL (ref 150–400)
PMV BLD AUTO: ABNORMAL FL (ref 8.9–12.9)
POTASSIUM SERPL-SCNC: 4.4 MMOL/L (ref 3.5–5.1)
POTASSIUM SERPL-SCNC: 4.4 MMOL/L (ref 3.5–5.1)
RBC # BLD AUTO: 3.52 M/UL (ref 3.8–5.2)
SERVICE CMNT-IMP: ABNORMAL
SERVICE CMNT-IMP: NORMAL
SODIUM SERPL-SCNC: 140 MMOL/L (ref 136–145)
SODIUM SERPL-SCNC: 140 MMOL/L (ref 136–145)
WBC # BLD AUTO: 5.5 K/UL (ref 3.6–11)

## 2018-05-06 PROCEDURE — 74011250637 HC RX REV CODE- 250/637: Performed by: FAMILY MEDICINE

## 2018-05-06 PROCEDURE — 74011250636 HC RX REV CODE- 250/636: Performed by: INTERNAL MEDICINE

## 2018-05-06 PROCEDURE — 85025 COMPLETE CBC W/AUTO DIFF WBC: CPT | Performed by: INTERNAL MEDICINE

## 2018-05-06 PROCEDURE — 82962 GLUCOSE BLOOD TEST: CPT

## 2018-05-06 PROCEDURE — 80069 RENAL FUNCTION PANEL: CPT | Performed by: INTERNAL MEDICINE

## 2018-05-06 PROCEDURE — 74011250636 HC RX REV CODE- 250/636: Performed by: HOSPITALIST

## 2018-05-06 PROCEDURE — 83735 ASSAY OF MAGNESIUM: CPT | Performed by: INTERNAL MEDICINE

## 2018-05-06 PROCEDURE — 74011250637 HC RX REV CODE- 250/637: Performed by: INTERNAL MEDICINE

## 2018-05-06 PROCEDURE — 74011636637 HC RX REV CODE- 636/637: Performed by: INTERNAL MEDICINE

## 2018-05-06 PROCEDURE — 36415 COLL VENOUS BLD VENIPUNCTURE: CPT | Performed by: INTERNAL MEDICINE

## 2018-05-06 PROCEDURE — 65660000000 HC RM CCU STEPDOWN

## 2018-05-06 PROCEDURE — 74011250636 HC RX REV CODE- 250/636: Performed by: PHYSICAL MEDICINE & REHABILITATION

## 2018-05-06 PROCEDURE — 74011250637 HC RX REV CODE- 250/637: Performed by: PHYSICAL MEDICINE & REHABILITATION

## 2018-05-06 PROCEDURE — 80048 BASIC METABOLIC PNL TOTAL CA: CPT | Performed by: INTERNAL MEDICINE

## 2018-05-06 RX ORDER — LABETALOL 100 MG/1
300 TABLET, FILM COATED ORAL EVERY 12 HOURS
Status: DISCONTINUED | OUTPATIENT
Start: 2018-05-06 | End: 2018-05-06

## 2018-05-06 RX ORDER — HYDROMORPHONE HYDROCHLORIDE 1 MG/ML
1 INJECTION, SOLUTION INTRAMUSCULAR; INTRAVENOUS; SUBCUTANEOUS
Status: DISCONTINUED | OUTPATIENT
Start: 2018-05-06 | End: 2018-05-09

## 2018-05-06 RX ORDER — LABETALOL 100 MG/1
400 TABLET, FILM COATED ORAL EVERY 12 HOURS
Status: DISCONTINUED | OUTPATIENT
Start: 2018-05-06 | End: 2018-05-06

## 2018-05-06 RX ADMIN — HYDROMORPHONE HYDROCHLORIDE 1 MG: 2 INJECTION INTRAMUSCULAR; INTRAVENOUS; SUBCUTANEOUS at 03:04

## 2018-05-06 RX ADMIN — HYDROMORPHONE HYDROCHLORIDE 1 MG: 2 INJECTION INTRAMUSCULAR; INTRAVENOUS; SUBCUTANEOUS at 13:09

## 2018-05-06 RX ADMIN — OXYCODONE AND ACETAMINOPHEN 1 TABLET: 5; 325 TABLET ORAL at 04:25

## 2018-05-06 RX ADMIN — Medication 10 ML: at 05:27

## 2018-05-06 RX ADMIN — HYDROMORPHONE HYDROCHLORIDE 1 MG: 2 INJECTION INTRAMUSCULAR; INTRAVENOUS; SUBCUTANEOUS at 05:45

## 2018-05-06 RX ADMIN — PROMETHAZINE HYDROCHLORIDE 6.25 MG: 6.25 SYRUP ORAL at 00:09

## 2018-05-06 RX ADMIN — NITROGLYCERIN 2 INCH: 20 OINTMENT TOPICAL at 07:31

## 2018-05-06 RX ADMIN — DIPHENHYDRAMINE HYDROCHLORIDE 25 MG: 25 CAPSULE ORAL at 02:26

## 2018-05-06 RX ADMIN — PROMETHAZINE HYDROCHLORIDE 6.25 MG: 6.25 SYRUP ORAL at 21:51

## 2018-05-06 RX ADMIN — DIPHENHYDRAMINE HYDROCHLORIDE 25 MG: 25 CAPSULE ORAL at 18:35

## 2018-05-06 RX ADMIN — Medication 10 ML: at 00:13

## 2018-05-06 RX ADMIN — ONDANSETRON HYDROCHLORIDE 4 MG: 2 INJECTION INTRAMUSCULAR; INTRAVENOUS at 16:27

## 2018-05-06 RX ADMIN — HYDROMORPHONE HYDROCHLORIDE 1 MG: 1 INJECTION, SOLUTION INTRAMUSCULAR; INTRAVENOUS; SUBCUTANEOUS at 21:44

## 2018-05-06 RX ADMIN — Medication 10 ML: at 14:00

## 2018-05-06 RX ADMIN — ONDANSETRON HYDROCHLORIDE 4 MG: 2 INJECTION INTRAMUSCULAR; INTRAVENOUS at 07:24

## 2018-05-06 RX ADMIN — LORAZEPAM 0.5 MG: 0.5 TABLET ORAL at 18:35

## 2018-05-06 RX ADMIN — ONDANSETRON HYDROCHLORIDE 4 MG: 2 INJECTION INTRAMUSCULAR; INTRAVENOUS at 02:20

## 2018-05-06 RX ADMIN — HEPARIN SODIUM 5000 UNITS: 5000 INJECTION, SOLUTION INTRAVENOUS; SUBCUTANEOUS at 05:25

## 2018-05-06 RX ADMIN — Medication 20 ML: at 14:00

## 2018-05-06 RX ADMIN — HYDRALAZINE HYDROCHLORIDE 100 MG: 50 TABLET ORAL at 21:54

## 2018-05-06 RX ADMIN — HEPARIN SODIUM 5000 UNITS: 5000 INJECTION, SOLUTION INTRAVENOUS; SUBCUTANEOUS at 13:09

## 2018-05-06 RX ADMIN — HYDRALAZINE HYDROCHLORIDE 20 MG: 20 INJECTION INTRAMUSCULAR; INTRAVENOUS at 03:56

## 2018-05-06 RX ADMIN — NIFEDIPINE 90 MG: 60 TABLET, FILM COATED, EXTENDED RELEASE ORAL at 09:47

## 2018-05-06 RX ADMIN — LABETALOL HYDROCHLORIDE 500 MG: 200 TABLET, FILM COATED ORAL at 23:04

## 2018-05-06 RX ADMIN — INSULIN LISPRO 3 UNITS: 100 INJECTION, SOLUTION INTRAVENOUS; SUBCUTANEOUS at 22:58

## 2018-05-06 RX ADMIN — PROMETHAZINE HYDROCHLORIDE 6.25 MG: 6.25 SYRUP ORAL at 05:06

## 2018-05-06 RX ADMIN — VENLAFAXINE 75 MG: 37.5 TABLET ORAL at 07:31

## 2018-05-06 RX ADMIN — INSULIN GLARGINE 10 UNITS: 100 INJECTION, SOLUTION SUBCUTANEOUS at 21:52

## 2018-05-06 RX ADMIN — Medication 1 CAPSULE: at 09:48

## 2018-05-06 RX ADMIN — HYDRALAZINE HYDROCHLORIDE 100 MG: 50 TABLET ORAL at 16:26

## 2018-05-06 RX ADMIN — STANDARDIZED SENNA CONCENTRATE AND DOCUSATE SODIUM 2 TABLET: 8.6; 5 TABLET, FILM COATED ORAL at 09:48

## 2018-05-06 RX ADMIN — HEPARIN SODIUM 5000 UNITS: 5000 INJECTION, SOLUTION INTRAVENOUS; SUBCUTANEOUS at 21:47

## 2018-05-06 RX ADMIN — HYDRALAZINE HYDROCHLORIDE 100 MG: 50 TABLET ORAL at 07:30

## 2018-05-06 RX ADMIN — VENLAFAXINE 75 MG: 37.5 TABLET ORAL at 16:26

## 2018-05-06 RX ADMIN — HYDROMORPHONE HYDROCHLORIDE 1 MG: 1 INJECTION, SOLUTION INTRAMUSCULAR; INTRAVENOUS; SUBCUTANEOUS at 19:05

## 2018-05-06 RX ADMIN — LABETALOL HYDROCHLORIDE 300 MG: 100 TABLET, FILM COATED ORAL at 07:30

## 2018-05-06 RX ADMIN — QUETIAPINE FUMARATE 100 MG: 100 TABLET ORAL at 09:49

## 2018-05-06 RX ADMIN — HYDROMORPHONE HYDROCHLORIDE 1 MG: 1 INJECTION, SOLUTION INTRAMUSCULAR; INTRAVENOUS; SUBCUTANEOUS at 16:27

## 2018-05-06 RX ADMIN — NITROGLYCERIN 2 INCH: 20 OINTMENT TOPICAL at 18:37

## 2018-05-06 RX ADMIN — QUETIAPINE FUMARATE 100 MG: 100 TABLET ORAL at 18:38

## 2018-05-06 RX ADMIN — HYDROMORPHONE HYDROCHLORIDE 1 MG: 2 INJECTION INTRAMUSCULAR; INTRAVENOUS; SUBCUTANEOUS at 00:12

## 2018-05-06 RX ADMIN — Medication 10 ML: at 00:12

## 2018-05-06 RX ADMIN — HYDROMORPHONE HYDROCHLORIDE 1 MG: 2 INJECTION INTRAMUSCULAR; INTRAVENOUS; SUBCUTANEOUS at 10:07

## 2018-05-06 RX ADMIN — CALCIUM 500 MG: 500 TABLET ORAL at 09:48

## 2018-05-06 NOTE — PROGRESS NOTES
Hospitalist Progress Note  Cipriano Acosta MD  Answering service: [phone] OR 36 from in house phone  Cell: [phone]      Date of Service:  2018  NAME:  Bisi Saldivar  :  1980  MRN:  [id number]      Admission Summary:      Per H&P: 73-year-old woman with a past medical history significant for hypertension, type 2 diabetes, obstructive sleep apnea, depression, chronic kidney disease, narcotic dependency was in her usual state of health until about 3 days ago when the patient developed left leg pain and swelling. The pain is a constant 8/10 in severity. Patient described the pain as a sharp shooting pain in her left leg. No relief with pain medication. No known aggravating factors. Last month, the patient underwent a transmetatarsal resection of the left foot 2/2 diabetic foot. Patient also complained of abdominal pain, nausea, and vomiting. The abdominal pain is diffuse in location. Interval history / Subjective:     Patient stating her abdominal pain is worse than yesterday. Discussed her case with Podiatry. Patient declining amputation at this time and wants to try a course of antibiotics IV rather than have further surgery. Assessment & Plan:     Acute on chronic pancreatitis: Patient with N/V, abdominal pain and elevated lipase /amylase. Repeat Lipase trended close to normal values. - CT A/P with calcified pancreas  - Pain control  - stop IVF today given hypertension  - Advance diet as tolerated    Acute hyperkalemia due to kidney disease:  - now resolved    Osteomyelitis of the left foot:  - the patient was on vancomycin and Zosyn initially by the admitting team, then switched to home doxy  - Podiatry was consulted. As above, patient declined further surgery  - MRI of the left foot done, suspicious for Osteomyelitis. ? Unclear if this is old or new  - On Zosyn and Daptomycin now  - ID on board.  Patient will likely need prolonged course and then to re-visit the need for surgery  - Appreciate recs    Acute on chronic kidney disease stage V  - holding diuretic and patient on fluids (will d/c today), per Renal   - Nephrology following    Essential Hypertension: Uncontrolled  - Up-titrate meds further today  - Patient with very high BP secondary to HTN and pain    Microcytic Anemia. This is most likely due to chronic kidney disease. Type 2 diabetes type 2: Continue sliding scale. Obstructive sleep apnea    Depression: Continue home meds. Code status: Full  DVT prophylaxis: heparin    Care Plan discussed with: Patient/Family and Nurse, Podiatry  Disposition: TBD, ?home with IV antibiotics in the next 1-2 days     Hospital Problems  Date Reviewed: 4/28/2018          Codes Class Noted POA    * (Principal)Osteomyelitis of left foot ([facility] [address] ICD-10-CM: M86.9  ICD-9-CM: 730.27  4/27/2018 Yes              Review of Systems:   Pertinent items are noted in HPI. Vital Signs:    Last 24hrs VS reviewed since prior progress note. Most recent are:  Visit Vitals    BP (!) 202/96    Pulse 99    Temp 98 °F (36.7 °C)    Resp 17    Ht 5' 2\" (1.575 m)    Wt 54.7 kg (120 lb 9.5 oz)    SpO2 100%    BMI 22.06 kg/m2       No intake or output data in the 24 hours ending 05/06/18 0730     Physical Examination:             Constitutional:  Mild distress   ENT:  Neck supple   Resp:  CTA bilaterally. CV:  Regular rhythm, increased rate    GI:  Soft, non distended, diffusely tender to palpation. No rigidity    Musculoskeletal:  LLE larger than R (patient states this is baseline). Partial amputation of L foot with wrapping    Neurologic:  Moves all extremities.   AAOx3        Data Review:    Review and/or order of clinical lab test  Review and/or order of tests in the medicine section of CPT      Labs:     Recent Labs      05/06/18   0453  05/05/18   0342   WBC  5.5  5.0   HGB  8.8*  7.4*   HCT  27.0*  22.8*   PLT  148*  151 Recent Labs      05/06/18   0453  05/05/18   0342  05/04/18   0817   NA  140  140  140  140   K  4.4  4.4  4.4  4.1   CL  109*  109*  108  108   CO2  21  22  22  23   BUN  53*  53*  59*  55*   CREA  3.10*  2.98*  3.06*  3.02*   GLU  101*  97  108*  146*   CA  8.2*  8.1*  7.5*  7.7*   MG  1.4*   --    --    PHOS  4.3  4.8*   --      Recent Labs      05/06/18   0453  05/05/18   0342  05/03/18   0920   ALB  2.7*  2.5*   --    LPSE   --    --   491*     Lab Results   Component Value Date/Time    Folate 61.0 (H) 04/30/2018 06:24 AM      Lab Results   Component Value Date/Time    Cholesterol, total 137 04/30/2018 06:24 AM    HDL Cholesterol 35 04/30/2018 06:24 AM    LDL, calculated 89 04/30/2018 06:24 AM    Triglyceride 65 04/30/2018 06:24 AM    CHOL/HDL Ratio 3.9 04/30/2018 06:24 AM     Lab Results   Component Value Date/Time    Glucose (POC) 102 (H) 05/06/2018 06:45 AM    Glucose (POC) 146 (H) 05/05/2018 09:53 PM    Glucose (POC) 97 05/05/2018 04:24 PM    Glucose (POC) 148 (H) 05/05/2018 11:23 AM    Glucose (POC) 93 05/05/2018 06:37 AM     Lab Results   Component Value Date/Time    Color YELLOW/STRAW 03/02/2018 10:50 PM    Appearance CLOUDY (A) 03/02/2018 10:50 PM    Specific gravity 1.017 03/02/2018 10:50 PM    Specific gravity 1.015 07/26/2010 11:18 AM    pH (UA) 5.0 03/02/2018 10:50 PM    Protein 100 (A) 03/02/2018 10:50 PM    Glucose NEGATIVE  03/02/2018 10:50 PM    Ketone NEGATIVE  03/02/2018 10:50 PM    Bilirubin NEGATIVE  03/02/2018 10:50 PM    Urobilinogen 1.0 03/02/2018 10:50 PM    Nitrites NEGATIVE  03/02/2018 10:50 PM    Leukocyte Esterase NEGATIVE  03/02/2018 10:50 PM    Epithelial cells MANY (A) 03/02/2018 10:50 PM    Bacteria 3+ (A) 03/02/2018 10:50 PM    WBC 5-10 03/02/2018 10:50 PM    RBC 0-5 03/02/2018 10:50 PM       Medications Reviewed:     Current Facility-Administered Medications   Medication Dose Route Frequency    labetalol (NORMODYNE) tablet 300 mg  300 mg Oral Q12H    DAPTOmycin (CUBICIN) 400 mg in 0.9% sodium chloride 50 mL IVPB RF formulation  400 mg IntraVENous Q48H    HYDROmorphone (DILAUDID) injection 1 mg  1 mg IntraVENous Q3H PRN    alteplase (CATHFLO) 1 mg in sterile water (preservative free) 1 mL injection  1 mg InterCATHeter PRN    polyethylene glycol (MIRALAX) packet 17 g  17 g Oral DAILY PRN    hydrALAZINE (APRESOLINE) tablet 100 mg  100 mg Oral TID    LORazepam (ATIVAN) tablet 0.5 mg  0.5 mg Oral Q6H PRN    senna-docusate (PERICOLACE) 8.6-50 mg per tablet 2 Tab  2 Tab Oral DAILY    hydrALAZINE (APRESOLINE) 20 mg/mL injection 20 mg  20 mg IntraVENous Q4H PRN    lactated Ringers infusion  100 mL/hr IntraVENous CONTINUOUS    ondansetron (ZOFRAN) injection 4 mg  4 mg IntraVENous Q4H PRN    diphenhydrAMINE (BENADRYL) capsule 25 mg  25 mg Oral Q6H PRN    insulin glargine (LANTUS) injection 10 Units  10 Units SubCUTAneous QHS    nitroglycerin (NITROBID) 2 % ointment 2 Inch  2 Inch Topical BID    promethazine (PHENERGAN) 6.25 mg/5 mL syrup 6.25 mg  6.25 mg Oral Q6H PRN    sodium chloride (NS) flush 20 mL  20 mL InterCATHeter PRN    sodium chloride (NS) flush 10 mL  10 mL InterCATHeter Q24H    sodium chloride (NS) flush 10 mL  10 mL InterCATHeter PRN    sodium chloride (NS) flush 10 mL  10 mL InterCATHeter Q8H    sodium chloride (NS) flush 20 mL  20 mL InterCATHeter Q24H    NIFEdipine ER (PROCARDIA XL) tablet 90 mg  90 mg Oral DAILY    calcium carbonate (OS-BINA) tablet 500 mg [elemental]  500 mg Oral DAILY    QUEtiapine (SEROquel) tablet 100 mg  100 mg Oral BID    venlafaxine (EFFEXOR) tablet 75 mg  75 mg Oral BID WITH MEALS    sodium chloride (NS) flush 5-10 mL  5-10 mL IntraVENous Q8H    sodium chloride (NS) flush 5-10 mL  5-10 mL IntraVENous PRN    heparin (porcine) injection 5,000 Units  5,000 Units SubCUTAneous Q8H    albuterol-ipratropium (DUO-NEB) 2.5 MG-0.5 MG/3 ML  3 mL Nebulization Q4H PRN    lactobac ac& pc-s.therm-b.anim (CHRIS Q/RISAQUAD)  1 Cap Oral DAILY    insulin lispro (HUMALOG) injection   SubCUTAneous AC&HS    glucose chewable tablet 16 g  4 Tab Oral PRN    dextrose (D50W) injection syrg 12.5-25 g  12.5-25 g IntraVENous PRN    glucagon (GLUCAGEN) injection 1 mg  1 mg IntraMUSCular PRN    acetaminophen (TYLENOL) tablet 650 mg  650 mg Oral Q4H PRN    oxyCODONE-acetaminophen (PERCOCET) 5-325 mg per tablet 1 Tab  1 Tab Oral Q4H PRN     ______________________________________________________________________  EXPECTED LENGTH OF STAY: 3d 19h  ACTUAL LENGTH OF STAY:          9                 Cipriano Acosta MD

## 2018-05-06 NOTE — PROGRESS NOTES
[facility], [address] [place] [address] [address]  [address]  P: [phone]  F: [phone]    Foot and Ankle Surgery - Progress Note                                                   Assessment/Plan:  Charcot deformity left foot  Nonhealing surgical wound left foot  Osteomyelitis left foot  DM with neuropathy       Pt is status post Incision and drainage with removal of all nonviable soft tissue left foot  Proximal foot amputation left foot, Open bone biopsies left foot  Deep tendon transfer tibialis anterior tendon left foot  Adjacent transfer arteriomyofasciocutaneous plantar medial pedicle flap for delayed primary closure left foot DOS: 03-      Discussed results of MRI with patient explaining there is noted teresa changes compared to previously and that there is likely osteomye;itis involving the remaining tarsal bones. The patient remains at very high risk for limb loss. We discussed options of care moving forward including continued chronic wound care with another round of IV abx therapy VS Below knee amputation to the left leg. The wound remains superficial at this time. I reviewed ID recs and note indicating theyt could attempt long term IV abx therapy- thank you. Pt has discussed options with family and    If the patient has decided she wants to try and do long term IV abx therapy and wound care as apposed to the below knee amputation. they may follow with me at [facility] wound care center for follow up care     Pt is cleared from foot and ankle stand point once cleared by all other specialties       Labs/imaging reviewed, MRI left foot reviewed  abx per ID team-thank you  Pain medication per medicine team      We will plan for weekly changes and if discharged the patient will folow up in the wound center.  The patient may complete passive ROM exercises to the left thigh and lower leg but should be nonweightbearing left foot.  Pt has full range of motion to upper body and full weight-bearing to the right foot.        Elevate and offload B/L LE. Float heels off edge of bed with foam block or air boots.       Foot and ankle will follow      HPI:  Pt is seen at bedside resting in NAD, No new pedal complaints at this time. Discussed pt wishes to continue long term IV abx therapy and wound care. Discussed case with nursing.     Objective:  Vitals: Patient Vitals for the past 12 hrs:   BP Temp Pulse Resp SpO2   05/06/18 0808 (!) 167/93 98 °F (36.7 °C) 83 16 100 %   05/06/18 0730 (!) 201/92 - (!) 101 - -   05/06/18 0656 (!) 202/96 - 99 - -   05/06/18 0600 192/89 - (!) 101 - -   05/06/18 0451 (!) 191/93 - (!) 101 - -   05/06/18 0427 (!) 198/94 - 99 - -   05/06/18 0421 (!) 195/95 - 99 - -   05/06/18 0411 (!) 194/94 - (!) 101 - -   05/06/18 0356 (!) 208/103 - (!) 101 - -   05/06/18 0346 (!) 194/112 98 °F (36.7 °C) 100 17 100 %   05/05/18 2219 (!) 158/91 - 92 - -       Dermatological:  Dressing left foot left clean dry and intact     Vascular:  deferred     Neurological:   Epicritic and protective threshold is deminished to B/L LE, Pt is unable to detect a 5.07 monofilament wire on 5/5 random tested spots B/L LE.      MSK:  deferred     Labs:  Recent Labs      05/06/18   0453   WBC  5.5   CREA  3.10*  2.98*   BUN  53*  53*   HGB  8.8*   HCT  27.0*   NA  140  140   K  4.4  4.4   CL  109*  109*   CO2  21  22   GLU  101*  Jh Caceres DPM  5/6/2018  [facility] [address] [place] [address]. Karen [address] [place] [address]  P: [phone]  F: [phone]

## 2018-05-06 NOTE — PROGRESS NOTES
ID Progress Note  2018    Subjective:     She is not feeling well in general--concerned about her foot, having problems with nausea    Objective:     Antibiotics:  1. Daptomycin       Vitals:   Visit Vitals    BP (!) 167/93    Pulse 83    Temp 98 °F (36.7 °C)    Resp 16    Ht 5' 2\" (1.575 m)    Wt 54.7 kg (120 lb 9.5 oz)    SpO2 100%    BMI 22.06 kg/m2        Tmax:  Temp (24hrs), Av.2 °F (36.8 °C), Min:98 °F (36.7 °C), Max:98.4 °F (36.9 °C)      Exam:  Lungs:  clear to auscultation bilaterally  Heart:  regular rate and rhythm  Abdomen:  soft, non-tender. Bowel sounds normal. No masses,  no organomegaly  Wound dressed    Labs:      Recent Labs      18   0453  18   0342  18   0817   WBC  5.5  5.0   --    HGB  8.8*  7.4*   --    PLT  148*  151   --    BUN  53*  53*  59*  55*   CREA  3.10*  2.98*  3.06*  3.02*       Cultures:     Lab Results   Component Value Date/Time    Specimen Description: URINE 2013 08:00 PM    Specimen Description: URINE 2013 07:55 PM    Specimen Description: URINE 2013 10:10 AM     Lab Results   Component Value Date/Time    Culture result: NO GROWTH 5 DAYS 2018 05:11 PM    Culture result: FEW STAPHYLOCOCCUS HOMINIS SUBSPECIES HOMINIS (A) 2018 12:47 PM    Culture result: (A) 2018 12:47 PM     STAPHYLOCOCCUS EPIDERMIDIS (OXACILLIN RESISTANT) ISOLATED FROM THIO BROTH ONLY    Culture result: NO ANAEROBES ISOLATED 2018 12:47 PM       Radiology:     Line/Insert Date:           Assessment:     1. Diabetic foot infection--MRI results noted--not sure whether this is the old infection resolving or a new problem  2. Renal insufficiency  3. DM    Objective:     1. Continue current therapy  2. ? options for sampling of tissue for culture  3.  She has elected to attempt another course of IV therapy--I am willing to do this, but she understands that there is a high risk of failure    Bernarda Otero MD

## 2018-05-06 NOTE — PROGRESS NOTES
Nurse Bashir Castellano gave bedside report to oncoming nurse Jocelynn Barnett RN by SBAR and [email or web address]

## 2018-05-06 NOTE — PROGRESS NOTES
Bedside shift change report given to Nasreen Vazquez (oncoming nurse) by Austin Mayo (offgoing nurse). Report included the following information SBAR.

## 2018-05-07 LAB
ALBUMIN SERPL-MCNC: 2.1 G/DL (ref 3.5–5)
ALBUMIN/GLOB SERPL: 0.6 {RATIO} (ref 1.1–2.2)
ALP SERPL-CCNC: 217 U/L (ref 45–117)
ALT SERPL-CCNC: 17 U/L (ref 12–78)
ANION GAP SERPL CALC-SCNC: 9 MMOL/L (ref 5–15)
AST SERPL-CCNC: 20 U/L (ref 15–37)
BASOPHILS # BLD: 0 K/UL (ref 0–0.1)
BASOPHILS # BLD: 0 K/UL (ref 0–0.1)
BASOPHILS NFR BLD: 0 % (ref 0–1)
BASOPHILS NFR BLD: 0 % (ref 0–1)
BILIRUB SERPL-MCNC: 0.2 MG/DL (ref 0.2–1)
BUN SERPL-MCNC: 49 MG/DL (ref 6–20)
BUN/CREAT SERPL: 17 (ref 12–20)
CALCIUM SERPL-MCNC: 7.5 MG/DL (ref 8.5–10.1)
CHLORIDE SERPL-SCNC: 112 MMOL/L (ref 97–108)
CK SERPL-CCNC: 30 U/L (ref 26–192)
CO2 SERPL-SCNC: 21 MMOL/L (ref 21–32)
CREAT SERPL-MCNC: 2.91 MG/DL (ref 0.55–1.02)
DIFFERENTIAL METHOD BLD: ABNORMAL
DIFFERENTIAL METHOD BLD: ABNORMAL
EOSINOPHIL # BLD: 0.3 K/UL (ref 0–0.4)
EOSINOPHIL # BLD: 0.3 K/UL (ref 0–0.4)
EOSINOPHIL NFR BLD: 5 % (ref 0–7)
EOSINOPHIL NFR BLD: 5 % (ref 0–7)
ERYTHROCYTE [DISTWIDTH] IN BLOOD BY AUTOMATED COUNT: 16.5 % (ref 11.5–14.5)
ERYTHROCYTE [DISTWIDTH] IN BLOOD BY AUTOMATED COUNT: 16.6 % (ref 11.5–14.5)
FIBRINOGEN PPP-MCNC: 132 MG/DL (ref 200–475)
FOLATE SERPL-MCNC: 33.8 NG/ML (ref 5–21)
GLOBULIN SER CALC-MCNC: 3.5 G/DL (ref 2–4)
GLUCOSE BLD STRIP.AUTO-MCNC: 44 MG/DL (ref 65–100)
GLUCOSE BLD STRIP.AUTO-MCNC: 46 MG/DL (ref 65–100)
GLUCOSE BLD STRIP.AUTO-MCNC: 69 MG/DL (ref 65–100)
GLUCOSE BLD STRIP.AUTO-MCNC: 73 MG/DL (ref 65–100)
GLUCOSE BLD STRIP.AUTO-MCNC: 81 MG/DL (ref 65–100)
GLUCOSE BLD STRIP.AUTO-MCNC: 82 MG/DL (ref 65–100)
GLUCOSE BLD STRIP.AUTO-MCNC: 88 MG/DL (ref 65–100)
GLUCOSE BLD STRIP.AUTO-MCNC: 89 MG/DL (ref 65–100)
GLUCOSE BLD STRIP.AUTO-MCNC: 95 MG/DL (ref 65–100)
GLUCOSE SERPL-MCNC: 80 MG/DL (ref 65–100)
HCT VFR BLD AUTO: 19.6 % (ref 35–47)
HCT VFR BLD AUTO: 20.5 % (ref 35–47)
HGB BLD-MCNC: 6.4 G/DL (ref 11.5–16)
HGB BLD-MCNC: 6.5 G/DL (ref 11.5–16)
IMM GRANULOCYTES # BLD: 0 K/UL (ref 0–0.04)
IMM GRANULOCYTES # BLD: 0 K/UL (ref 0–0.04)
IMM GRANULOCYTES NFR BLD AUTO: 0 % (ref 0–0.5)
IMM GRANULOCYTES NFR BLD AUTO: 0 % (ref 0–0.5)
IRON SATN MFR SERPL: 79 % (ref 20–50)
IRON SERPL-MCNC: 91 UG/DL (ref 35–150)
LDH SERPL L TO P-CCNC: 157 U/L (ref 81–246)
LYMPHOCYTES # BLD: 0.9 K/UL (ref 0.8–3.5)
LYMPHOCYTES # BLD: 1 K/UL (ref 0.8–3.5)
LYMPHOCYTES NFR BLD: 17 % (ref 12–49)
LYMPHOCYTES NFR BLD: 18 % (ref 12–49)
MCH RBC QN AUTO: 24.7 PG (ref 26–34)
MCH RBC QN AUTO: 25.4 PG (ref 26–34)
MCHC RBC AUTO-ENTMCNC: 31.7 G/DL (ref 30–36.5)
MCHC RBC AUTO-ENTMCNC: 32.7 G/DL (ref 30–36.5)
MCV RBC AUTO: 77.8 FL (ref 80–99)
MCV RBC AUTO: 77.9 FL (ref 80–99)
MONOCYTES # BLD: 0.3 K/UL (ref 0–1)
MONOCYTES # BLD: 0.4 K/UL (ref 0–1)
MONOCYTES NFR BLD: 6 % (ref 5–13)
MONOCYTES NFR BLD: 8 % (ref 5–13)
NEUTS SEG # BLD: 3.6 K/UL (ref 1.8–8)
NEUTS SEG # BLD: 3.7 K/UL (ref 1.8–8)
NEUTS SEG NFR BLD: 69 % (ref 32–75)
NEUTS SEG NFR BLD: 72 % (ref 32–75)
NRBC # BLD: 0 K/UL (ref 0–0.01)
NRBC # BLD: 0 K/UL (ref 0–0.01)
NRBC BLD-RTO: 0 PER 100 WBC
NRBC BLD-RTO: 0 PER 100 WBC
PHOSPHATE SERPL-MCNC: 4.2 MG/DL (ref 2.6–4.7)
PLATELET # BLD AUTO: 120 K/UL (ref 150–400)
PLATELET # BLD AUTO: 123 K/UL (ref 150–400)
PLATELET COMMENTS,PCOM: ABNORMAL
PMV BLD AUTO: ABNORMAL FL (ref 8.9–12.9)
POTASSIUM SERPL-SCNC: 4.3 MMOL/L (ref 3.5–5.1)
PROT SERPL-MCNC: 5.6 G/DL (ref 6.4–8.2)
RBC # BLD AUTO: 2.52 M/UL (ref 3.8–5.2)
RBC # BLD AUTO: 2.63 M/UL (ref 3.8–5.2)
RBC MORPH BLD: ABNORMAL
RETICS # AUTO: 0.01 M/UL (ref 0.02–0.08)
RETICS/RBC NFR AUTO: 0.6 % (ref 0.7–2.1)
SERVICE CMNT-IMP: ABNORMAL
SERVICE CMNT-IMP: ABNORMAL
SERVICE CMNT-IMP: NORMAL
SODIUM SERPL-SCNC: 142 MMOL/L (ref 136–145)
TIBC SERPL-MCNC: 115 UG/DL (ref 250–450)
VIT B12 SERPL-MCNC: 432 PG/ML (ref 193–986)
WBC # BLD AUTO: 5.2 K/UL (ref 3.6–11)
WBC # BLD AUTO: 5.3 K/UL (ref 3.6–11)

## 2018-05-07 PROCEDURE — 85384 FIBRINOGEN ACTIVITY: CPT | Performed by: INTERNAL MEDICINE

## 2018-05-07 PROCEDURE — 74011250636 HC RX REV CODE- 250/636: Performed by: INTERNAL MEDICINE

## 2018-05-07 PROCEDURE — 74011250637 HC RX REV CODE- 250/637: Performed by: FAMILY MEDICINE

## 2018-05-07 PROCEDURE — 74011250637 HC RX REV CODE- 250/637: Performed by: PHYSICAL MEDICINE & REHABILITATION

## 2018-05-07 PROCEDURE — 86921 COMPATIBILITY TEST INCUBATE: CPT | Performed by: INTERNAL MEDICINE

## 2018-05-07 PROCEDURE — 65660000000 HC RM CCU STEPDOWN

## 2018-05-07 PROCEDURE — 36430 TRANSFUSION BLD/BLD COMPNT: CPT

## 2018-05-07 PROCEDURE — 82550 ASSAY OF CK (CPK): CPT | Performed by: INTERNAL MEDICINE

## 2018-05-07 PROCEDURE — 86922 COMPATIBILITY TEST ANTIGLOB: CPT | Performed by: INTERNAL MEDICINE

## 2018-05-07 PROCEDURE — 74011250637 HC RX REV CODE- 250/637: Performed by: INTERNAL MEDICINE

## 2018-05-07 PROCEDURE — 83540 ASSAY OF IRON: CPT | Performed by: INTERNAL MEDICINE

## 2018-05-07 PROCEDURE — 85045 AUTOMATED RETICULOCYTE COUNT: CPT | Performed by: INTERNAL MEDICINE

## 2018-05-07 PROCEDURE — 97116 GAIT TRAINING THERAPY: CPT

## 2018-05-07 PROCEDURE — 85025 COMPLETE CBC W/AUTO DIFF WBC: CPT | Performed by: INTERNAL MEDICINE

## 2018-05-07 PROCEDURE — 82962 GLUCOSE BLOOD TEST: CPT

## 2018-05-07 PROCEDURE — 85660 RBC SICKLE CELL TEST: CPT | Performed by: INTERNAL MEDICINE

## 2018-05-07 PROCEDURE — 86920 COMPATIBILITY TEST SPIN: CPT | Performed by: INTERNAL MEDICINE

## 2018-05-07 PROCEDURE — 74011000258 HC RX REV CODE- 258: Performed by: INTERNAL MEDICINE

## 2018-05-07 PROCEDURE — 83615 LACTATE (LD) (LDH) ENZYME: CPT | Performed by: INTERNAL MEDICINE

## 2018-05-07 PROCEDURE — 30233N1 TRANSFUSION OF NONAUTOLOGOUS RED BLOOD CELLS INTO PERIPHERAL VEIN, PERCUTANEOUS APPROACH: ICD-10-PCS | Performed by: INTERNAL MEDICINE

## 2018-05-07 PROCEDURE — 86902 BLOOD TYPE ANTIGEN DONOR EA: CPT | Performed by: INTERNAL MEDICINE

## 2018-05-07 PROCEDURE — 82607 VITAMIN B-12: CPT | Performed by: INTERNAL MEDICINE

## 2018-05-07 PROCEDURE — 36415 COLL VENOUS BLD VENIPUNCTURE: CPT | Performed by: INTERNAL MEDICINE

## 2018-05-07 PROCEDURE — 86900 BLOOD TYPING SEROLOGIC ABO: CPT | Performed by: INTERNAL MEDICINE

## 2018-05-07 PROCEDURE — 74011636637 HC RX REV CODE- 636/637: Performed by: INTERNAL MEDICINE

## 2018-05-07 PROCEDURE — 84100 ASSAY OF PHOSPHORUS: CPT | Performed by: INTERNAL MEDICINE

## 2018-05-07 PROCEDURE — 80053 COMPREHEN METABOLIC PANEL: CPT | Performed by: INTERNAL MEDICINE

## 2018-05-07 PROCEDURE — P9016 RBC LEUKOCYTES REDUCED: HCPCS | Performed by: INTERNAL MEDICINE

## 2018-05-07 PROCEDURE — 82746 ASSAY OF FOLIC ACID SERUM: CPT | Performed by: INTERNAL MEDICINE

## 2018-05-07 RX ORDER — SODIUM CHLORIDE 9 MG/ML
250 INJECTION, SOLUTION INTRAVENOUS AS NEEDED
Status: DISCONTINUED | OUTPATIENT
Start: 2018-05-07 | End: 2018-05-15 | Stop reason: HOSPADM

## 2018-05-07 RX ORDER — SODIUM CHLORIDE, SODIUM LACTATE, POTASSIUM CHLORIDE, CALCIUM CHLORIDE 600; 310; 30; 20 MG/100ML; MG/100ML; MG/100ML; MG/100ML
50 INJECTION, SOLUTION INTRAVENOUS CONTINUOUS
Status: DISCONTINUED | OUTPATIENT
Start: 2018-05-07 | End: 2018-05-14

## 2018-05-07 RX ADMIN — SODIUM CHLORIDE, SODIUM LACTATE, POTASSIUM CHLORIDE, AND CALCIUM CHLORIDE 50 ML/HR: 600; 310; 30; 20 INJECTION, SOLUTION INTRAVENOUS at 12:00

## 2018-05-07 RX ADMIN — CALCIUM 500 MG: 500 TABLET ORAL at 09:05

## 2018-05-07 RX ADMIN — VENLAFAXINE 75 MG: 37.5 TABLET ORAL at 17:29

## 2018-05-07 RX ADMIN — ONDANSETRON HYDROCHLORIDE 4 MG: 2 INJECTION INTRAMUSCULAR; INTRAVENOUS at 03:41

## 2018-05-07 RX ADMIN — HEPARIN SODIUM 5000 UNITS: 5000 INJECTION, SOLUTION INTRAVENOUS; SUBCUTANEOUS at 06:37

## 2018-05-07 RX ADMIN — HYDROMORPHONE HYDROCHLORIDE 1 MG: 1 INJECTION, SOLUTION INTRAMUSCULAR; INTRAVENOUS; SUBCUTANEOUS at 03:41

## 2018-05-07 RX ADMIN — LABETALOL HYDROCHLORIDE 500 MG: 200 TABLET, FILM COATED ORAL at 23:13

## 2018-05-07 RX ADMIN — PROMETHAZINE HYDROCHLORIDE 6.25 MG: 6.25 SYRUP ORAL at 09:17

## 2018-05-07 RX ADMIN — Medication 10 ML: at 00:47

## 2018-05-07 RX ADMIN — STANDARDIZED SENNA CONCENTRATE AND DOCUSATE SODIUM 2 TABLET: 8.6; 5 TABLET, FILM COATED ORAL at 09:05

## 2018-05-07 RX ADMIN — HYDROMORPHONE HYDROCHLORIDE 1 MG: 1 INJECTION, SOLUTION INTRAMUSCULAR; INTRAVENOUS; SUBCUTANEOUS at 00:43

## 2018-05-07 RX ADMIN — LABETALOL HYDROCHLORIDE 500 MG: 200 TABLET, FILM COATED ORAL at 09:17

## 2018-05-07 RX ADMIN — Medication 1 CAPSULE: at 09:05

## 2018-05-07 RX ADMIN — DIPHENHYDRAMINE HYDROCHLORIDE 25 MG: 25 CAPSULE ORAL at 12:52

## 2018-05-07 RX ADMIN — ONDANSETRON HYDROCHLORIDE 4 MG: 2 INJECTION INTRAMUSCULAR; INTRAVENOUS at 12:19

## 2018-05-07 RX ADMIN — HYDRALAZINE HYDROCHLORIDE 100 MG: 50 TABLET ORAL at 23:08

## 2018-05-07 RX ADMIN — HYDROMORPHONE HYDROCHLORIDE 1 MG: 1 INJECTION, SOLUTION INTRAMUSCULAR; INTRAVENOUS; SUBCUTANEOUS at 15:16

## 2018-05-07 RX ADMIN — NITROGLYCERIN 2 INCH: 20 OINTMENT TOPICAL at 17:30

## 2018-05-07 RX ADMIN — Medication 10 ML: at 06:00

## 2018-05-07 RX ADMIN — Medication 20 ML: at 14:00

## 2018-05-07 RX ADMIN — DIPHENHYDRAMINE HYDROCHLORIDE 25 MG: 25 CAPSULE ORAL at 19:05

## 2018-05-07 RX ADMIN — LORAZEPAM 0.5 MG: 0.5 TABLET ORAL at 13:53

## 2018-05-07 RX ADMIN — DAPTOMYCIN 400 MG: 500 INJECTION, POWDER, LYOPHILIZED, FOR SOLUTION INTRAVENOUS at 15:15

## 2018-05-07 RX ADMIN — LORAZEPAM 0.5 MG: 0.5 TABLET ORAL at 19:48

## 2018-05-07 RX ADMIN — HYDROMORPHONE HYDROCHLORIDE 1 MG: 1 INJECTION, SOLUTION INTRAMUSCULAR; INTRAVENOUS; SUBCUTANEOUS at 09:37

## 2018-05-07 RX ADMIN — HYDROMORPHONE HYDROCHLORIDE 1 MG: 1 INJECTION, SOLUTION INTRAMUSCULAR; INTRAVENOUS; SUBCUTANEOUS at 21:24

## 2018-05-07 RX ADMIN — HYDRALAZINE HYDROCHLORIDE 100 MG: 50 TABLET ORAL at 15:16

## 2018-05-07 RX ADMIN — HEPARIN SODIUM 5000 UNITS: 5000 INJECTION, SOLUTION INTRAVENOUS; SUBCUTANEOUS at 23:08

## 2018-05-07 RX ADMIN — LORAZEPAM 0.5 MG: 0.5 TABLET ORAL at 07:31

## 2018-05-07 RX ADMIN — DIPHENHYDRAMINE HYDROCHLORIDE 25 MG: 25 CAPSULE ORAL at 00:45

## 2018-05-07 RX ADMIN — SODIUM CHLORIDE, SODIUM LACTATE, POTASSIUM CHLORIDE, AND CALCIUM CHLORIDE 50 ML/HR: 600; 310; 30; 20 INJECTION, SOLUTION INTRAVENOUS at 19:50

## 2018-05-07 RX ADMIN — DIPHENHYDRAMINE HYDROCHLORIDE 25 MG: 25 CAPSULE ORAL at 06:43

## 2018-05-07 RX ADMIN — EPOETIN ALFA 10000 UNITS: 10000 SOLUTION INTRAVENOUS; SUBCUTANEOUS at 17:54

## 2018-05-07 RX ADMIN — NITROGLYCERIN 2 INCH: 20 OINTMENT TOPICAL at 09:06

## 2018-05-07 RX ADMIN — HYDROMORPHONE HYDROCHLORIDE 1 MG: 1 INJECTION, SOLUTION INTRAMUSCULAR; INTRAVENOUS; SUBCUTANEOUS at 06:43

## 2018-05-07 RX ADMIN — QUETIAPINE FUMARATE 100 MG: 100 TABLET ORAL at 09:05

## 2018-05-07 RX ADMIN — Medication 10 ML: at 22:00

## 2018-05-07 RX ADMIN — INSULIN GLARGINE 10 UNITS: 100 INJECTION, SOLUTION SUBCUTANEOUS at 23:09

## 2018-05-07 RX ADMIN — NIFEDIPINE 90 MG: 60 TABLET, FILM COATED, EXTENDED RELEASE ORAL at 09:05

## 2018-05-07 RX ADMIN — LORAZEPAM 0.5 MG: 0.5 TABLET ORAL at 01:00

## 2018-05-07 RX ADMIN — ONDANSETRON HYDROCHLORIDE 4 MG: 2 INJECTION INTRAMUSCULAR; INTRAVENOUS at 18:22

## 2018-05-07 RX ADMIN — HYDRALAZINE HYDROCHLORIDE 100 MG: 50 TABLET ORAL at 09:05

## 2018-05-07 RX ADMIN — VENLAFAXINE 75 MG: 37.5 TABLET ORAL at 06:39

## 2018-05-07 RX ADMIN — HYDROMORPHONE HYDROCHLORIDE 1 MG: 1 INJECTION, SOLUTION INTRAMUSCULAR; INTRAVENOUS; SUBCUTANEOUS at 12:19

## 2018-05-07 RX ADMIN — QUETIAPINE FUMARATE 100 MG: 100 TABLET ORAL at 17:29

## 2018-05-07 RX ADMIN — HEPARIN SODIUM 5000 UNITS: 5000 INJECTION, SOLUTION INTRAVENOUS; SUBCUTANEOUS at 12:18

## 2018-05-07 RX ADMIN — HYDROMORPHONE HYDROCHLORIDE 1 MG: 1 INJECTION, SOLUTION INTRAMUSCULAR; INTRAVENOUS; SUBCUTANEOUS at 18:23

## 2018-05-07 NOTE — DIABETES MGMT
DTC Progress Note    Recommendations/ Comments: Chart reviewed for hyperglycemia. Pt received 3 units of correction insulin in the past 24 hours. Please consider,      -Increasing Lantus to 13 units daily    Current hospital DM medication:  Lantus 10 units daily,  Lispro correctional insulin - normal sensitivity. Chart reviewed on Simona Salas due to BG >180 mg/dL. Patient is a 42 y.o. female with known Type 2 DM complicated by CKD and gastroparesis on no DM medications at home noted on PTA medications list.  Admitted for osteomyelitis of left foot  A1c:   Lab Results   Component Value Date/Time    Hemoglobin A1c 6.9 (H) 04/30/2018 06:24 AM    Hemoglobin A1c 8.8 (H) 03/03/2018 09:21 AM       Recent Glucose Results:   Lab Results   Component Value Date/Time    GLU 80 05/07/2018 03:40 AM    GLUCPOC 88 05/07/2018 06:52 AM    GLUCPOC 73 05/07/2018 06:46 AM    GLUCPOC 46 (LL) 05/07/2018 06:34 AM      Lab Results   Component Value Date/Time    Creatinine 2.91 (H) 05/07/2018 03:40 AM     Estimated Creatinine Clearance: 20.5 mL/min (based on Cr of 2.91). Active Orders   Diet    DIET DIABETIC CONSISTENT CARB Regular      PO intake:   No data found. Will continue to follow as needed.     Thank you  Mark Duke RD, CDE

## 2018-05-07 NOTE — PROGRESS NOTES
Problem: Mobility Impaired (Adult and Pediatric)  Goal: *Acute Goals and Plan of Care (Insert Text)  Physical Therapy Goals  Initiated 5/3/2018  1. Patient will move from supine to sit and sit to supine, scoot up and down and roll side to side in bed with independence within 7 day(s). 2.  Patient will transfer from bed to chair and chair to bed with modified independence using the least restrictive device and while maintaining NWB on L LE within 7 day(s). 3.  Patient will perform sit to stand with modified independence while maintaining NWB on L LE within 7 day(s). 4.  Patient will ambulate with modified independence for 50 feet with the least restrictive device while maintaining NWB on L LE within 7 day(s). 5.  Patient will ascend/descend 4 stairs with handrail(s) with modified independence while maintaining NWB on L LE within 7 day(s). physical Therapy TREATMENT  Patient: Estela Khan (86 y.o. female)  Date: 5/7/2018  Diagnosis: Osteomyelitis of left foot ([address]  poor venous access Osteomyelitis of left foot ([facility])  Procedure(s) (LRB):  INFUSION CATHETER INSERTION (N/A) 8 Days Post-Op  Precautions: Fall, NWB (L LE)  Chart, physical therapy assessment, plan of care and goals were reviewed. ASSESSMENT:  Patient received supine in bed, pleasant, and agreeable to therapy. Overall mod I-CGA for safe mobility. Good adherence to NWB status on L LE with hop to gait. One minor LOB episode to L when stopping for standing rest break. Patient able to self correct. Returned to supine in bed and propped L LE on 2 pillows. Patient with minimal c/o pain and asymptomatic during gait. Attempted to find recliner chair for patient, as she stated \"If I had a recliner, I would be out of the bed all day,\" however unsuccessful. Placed foot stool in patient's room and will continue search for recliner. She will need stair training and a RW ordered prior to d/c.  Recommend HHPT vs no further therapy, pending progress with acute. Progression toward goals:  [x]    Improving appropriately and progressing toward goals  []    Improving slowly and progressing toward goals  []    Not making progress toward goals and plan of care will be adjusted     PLAN:  Patient continues to benefit from skilled intervention to address the above impairments. Continue treatment per established plan of care. Discharge Recommendations:  Home Health vs None  Further Equipment Recommendations for Discharge:  RW when close to d/c      SUBJECTIVE:   Patient stated She gave me some pain medicine recently.     OBJECTIVE DATA SUMMARY:   Critical Behavior:  Neurologic State: Alert, Appropriate for age  Orientation Level: Oriented X4  Cognition: Appropriate decision making, Appropriate for age attention/concentration, Appropriate safety awareness, Follows commands  Functional Mobility Training:  Bed Mobility:  Supine to Sit: Modified independent  Sit to Supine: Modified independent  Transfers:  Sit to Stand: Supervision  Stand to Sit: Supervision  Balance:  Sitting: Intact  Standing: Intact; With support  Ambulation/Gait Training:  Distance (ft): 50 Feet (ft)  Assistive Device: Walker, rolling;Gait belt  Ambulation - Level of Assistance: Contact guard assistance  Gait Abnormalities: Decreased step clearance; Step to gait (hop to pattern)  Left Side Weight Bearing: Non-weight bearing  Base of Support: Shift to right;Center of gravity altered  Speed/Ana Luisa: Pace decreased (<100 feet/min)  Pain:  Pain Scale 1: Visual  Pain Intensity 1: 0  Pain Location 1: Foot  Pain Orientation 1: Left  Pain Description 1: Aching  Pain Intervention(s) 1: Medication (see MAR)  Activity Tolerance:   VSS, minimal pain  Please refer to the flowsheet for vital signs taken during this treatment.   After treatment:   []    Patient left in no apparent distress sitting up in chair  [x]    Patient left in no apparent distress in bed  [x]    Call bell left within reach  [x]    Nursing notified  []    Caregiver present  []    Bed alarm activated    COMMUNICATION/COLLABORATION:   The patients plan of care was discussed with: Registered Nurse    Laury Ugarte PT, DPT   Time Calculation: 13 mins

## 2018-05-07 NOTE — PROGRESS NOTES
Follow up visit with Ms. Salas. Pt provided an update on how she is feeling and events of the weekend. Offered emotional support. Pt's maureen continues to be a source of comfort and strength. No additional needs expressed at this time, but she is receptive to  visits as able. Assurance of prayers offered.     Margaret Birmingham, Palliative

## 2018-05-07 NOTE — PROGRESS NOTES
Chart reviewed. CRM will continue to follow for discharge planning/ID plan. LYRIC ALICEA spoke with Dr. Cheng Weber. The patient will need IV dapto every other day at discharge. CRM will send to [facility] to check the insurance benefits for home. The other option will be the IV infusion center. Will follow.  LYRIC

## 2018-05-07 NOTE — DISCHARGE INSTRUCTIONS
Please keep dressing to left foot clean dry and intact, elevate and offload, partial weightbearing heel only left foot  Take medications as directed      Tunneled or Non Tunneled Catheter Discharge Instructions    Home Care Instructions: You can resume your regular diet. Do not drink alcohol, drive, or make any important legal decisions in the next 24 hours. Watch the site carefully for signs of infection, like fever, drainage, redness, and/or swelling. Your catheter should be \"packed\" with saline/heparin after each use or at least once a week if it is not being used. This \"packing\" should only be done by nurses experienced with caring for this type of catheter. You may shower in 48 hours, but you need to cover the catheter with plastic wrap and tape to keep it dry while you are showering. Keep the site clean, dry, and protected. Do not immerse yourself in water like in the case of tub baths or swimming as long as you have the catheter. Call If:   You should call your Physician and/or the Radiology Nurse if you have any signs of infection, shortness of breath, or if the dressing should come off or become moist.  You will be instructed on where to go for a new dressing. You should not have to change the dressings yourself, as that will be done by the nurses who access the catheter. Follow-Up Instructions:  Please see your ordering doctor as he/she has requested. Discharge Instructions       PATIENT ID: Jonathan Morales  MRN: [id number]   YOB: 1980    DATE OF ADMISSION: 4/27/2018  4:44 PM    DATE OF DISCHARGE: 5/14/2018    PRIMARY CARE PROVIDER: Lori Gilmore MD     ATTENDING PHYSICIAN: Cheli Veliz MD  DISCHARGING PROVIDER: Cheli Veliz MD    To contact this individual call [phone] and ask the  to page. If unavailable ask to be transferred the Adult Hospitalist Department.     DISCHARGE DIAGNOSES   Osteomyelitis of the left foot:home intravenous antibiotics as recommended by infectious disease doctor. Acute on chronic pancreatitis: Resolved   Sickle cell crisis. Resolved  Microcytic Anemia with acute drop,received blood in the hospital.Follow with primary doctor to monitor your hemoglobin. Type 2 diabetes type 2 : you said you take NPH 15 units twice daily. You have been noted to have low blood sugar in the hospital.You may start use once daily and check your blood sugar at least four times daily,before each meal and at bedtime. If your blood sugar is persistently above 140,you may go back to your usual dose. Please check with your primacy MD   Acute on chronic kidney disease stage V: you need close follow up with a kidney doctor. Do not take NSAIDS. Essential Hypertension:your blood pressure medications are adjusted while in the hospital and further adjustment may be needed by your doctors based to achieve target blood pressure ,less than 130/80 if possible      CONSULTATIONS: IP CONSULT TO NEPHROLOGY  IP CONSULT TO PODIATRY  IP CONSULT TO PALLIATIVE CARE - PROVIDER  IP CONSULT TO PALLIATIVE CARE - PROVIDER  IP CONSULT TO INFECTIOUS DISEASES    PROCEDURES/SURGERIES: Procedure(s) with comments:  INFUSION CATHETER INSERTION - central line insertion    PENDING TEST RESULTS:   At the time of discharge the following test results are still pending: none    FOLLOW UP APPOINTMENTS:   Follow-up Information     Follow up With Details Comments Contact Info    Kalina Marcelo DPM Schedule an appointment as soon as possible for a visit in 1 week follow up at [facility] wound care center [address] [facility] [address] [place], [place] [address] [facility] [address]  [address] [facility]  [phone]             ADDITIONAL CARE RECOMMENDATIONS:     DIET: Cardiac Diet and Diabetic Diet    ACTIVITY: Activity as tolerated          DISCHARGE MEDICATIONS:   See Medication Reconciliation Form    · It is important that you take the medication exactly as they are prescribed. · Keep your medication in the bottles provided by the pharmacist and keep a list of the medication names, dosages, and times to be taken in your wallet. · Do not take other medications without consulting your doctor. 1.  Diagnosis:  Diabetic foot infection  2. Routine Asher care  3. Antibiotic:  Daptomycin 400 mg IV Q 48 hours  4. Lab each Monday:                        CBC/diff/platelets                        BMP                        CPK                        ESR  5. Lab each Thursday:                        CBC/diff/platelets                        BMP                        CPK  6. Fax lab to Dr. Katherine Orozco @ [phone].  7.  Call Dr. Katherine Orozco @ [phone] for WBC under 4, creatinine over 4 or CPK over 300.  8.  Duration of therapy: 5 weeks                        Please call Dr. Katherine Orozco @ [phone] before stopping therapy. 9.  Allergies: Allergies   Allergen Reactions    Erythromycin Itching    Morphine Itching    Toradol [Ketorolac] Rash      Seth Lal MD                                                        NOTIFY YOUR PHYSICIAN FOR ANY OF THE FOLLOWING:   Fever over 101 degrees for 24 hours. Chest pain, shortness of breath, fever, chills, nausea, vomiting, diarrhea, change in mentation, falling, weakness, bleeding. Severe pain or pain not relieved by medications. Or, any other signs or symptoms that you may have questions about. DISPOSITION:   x Home With home intravenous antibiotics. OT  PT  HH x RN       SNF/Inpatient Rehab/LTAC    Independent/assisted living    Hospice    Other:         Signed:    Cierra Fulton MD  5/14/2018  6:13 PM

## 2018-05-07 NOTE — PROGRESS NOTES
Hospitalist Progress Note  Kaylyn Bruner MD  Answering service: [phone] from in house phone  Cell: [phone]      Date of Service:  2018  NAME:  Kayy Cueto  :  1980  MRN:  [id number]      Admission Summary:      Per H&P: 25-year-old woman with a past medical history significant for hypertension, type 2 diabetes, obstructive sleep apnea, depression, chronic kidney disease, narcotic dependency was in her usual state of health until about 3 days ago when the patient developed left leg pain and swelling. The pain is a constant 8/10 in severity. Patient described the pain as a sharp shooting pain in her left leg. No relief with pain medication. No known aggravating factors. Last month, the patient underwent a transmetatarsal resection of the left foot 2/2 diabetic foot. Patient also complained of abdominal pain, nausea, and vomiting. The abdominal pain is diffuse in location. Interval history / Subjective:     Patient with drop in H/H this AM and low retic count on repeat labs. Will transfuse. Patient stating her abdominal pain has improved. Tolerating some soup. Still requiring IV pain control. Discussed her case with Podiatry yesterday. Patient declining amputation at this time and wants to try a course of antibiotics IV rather than have further surgery. ID following. BP better controlled. Assessment & Plan:     Acute on chronic pancreatitis: Patient with N/V, abdominal pain and elevated lipase /amylase. Repeat Lipase trended close to normal values. - CT A/P with calcified pancreas  - Pain control. Discussed with patient that we need to start weaning IV and transition to PO once she tolerates eating more  - Advance diet as tolerated    Microcytic Anemia with acute drop. Hgb this AM at 6.5, re-checked and at 6.4.  Patient with low retic count and sickle cell disease vs. Trait.  -transfuse and monitor    Acute hyperkalemia due to kidney disease:  - now resolved    Osteomyelitis of the left foot:  - Podiatry was consulted. As above, patient declined further surgery  - MRI of the left foot done, suspicious for Osteomyelitis. ? Unclear if this is old or new  - On Zosyn and Daptomycin now  - ID on board. Patient will likely need prolonged course and then to re-visit the need for surgery  - Appreciate recs. Patient will likely leave on IV antibitoics    Acute on chronic kidney disease stage V  - holding diuretic  - Nephrology following    Essential Hypertension: Better control today  - Up-titrate meds as needed  - Patient with very high BP secondary to baseline HTN and pain associated with her pancreatitis flare    Type 2 diabetes type 2: Continue sliding scale. Obstructive sleep apnea    Depression: Continue home meds. Code status: Full  DVT prophylaxis: heparin    Care Plan discussed with: Patient/Family and Nurse  Disposition: TBD, ?home with IV antibiotics in the next 1-2 days     Hospital Problems  Date Reviewed: 4/28/2018          Codes Class Noted POA    * (Principal)Osteomyelitis of left foot ([facility] ICD-10-CM: M86.9  ICD-9-CM: 730.27  4/27/2018 Yes              Review of Systems:   Pertinent items are noted in HPI. Vital Signs:    Last 24hrs VS reviewed since prior progress note. Most recent are:  Visit Vitals    /71 (BP 1 Location: Right arm, BP Patient Position: At rest)    Pulse 74    Temp 98.5 °F (36.9 °C)    Resp 16    Ht 5' 2\" (1.575 m)    Wt 54.7 kg (120 lb 9.5 oz)    SpO2 100%    BMI 22.06 kg/m2       No intake or output data in the 24 hours ending 05/07/18 0751     Physical Examination:             Constitutional:  No distress   ENT:  Neck supple   Resp:  CTA bilaterally. CV:  Regular rhythm, normal rate    GI:  Soft, non distended, less tender to palpation.  No rigidity    Musculoskeletal:  LLE larger than R. Partial amputation of L foot with wrapping    Neurologic: Moves all extremities.   AAOx3        Data Review:    Review and/or order of clinical lab test  Review and/or order of tests in the medicine section of [facility]      Labs:     Recent Labs      05/07/18 0340 05/06/18 0453   WBC  5.3  5.5   HGB  6.5*  8.8*   HCT  20.5*  27.0*   PLT  120*  148*     Recent Labs      05/07/18 0340 05/06/18 0453 05/05/18 0342   NA  142  140  140  140   K  4.3  4.4  4.4  4.4   CL  112*  109*  109*  108   CO2  21  21  22  22   BUN  49*  53*  53*  59*   CREA  2.91*  3.10*  2.98*  3.06*   GLU  80  101*  97  108*   CA  7.5*  8.2*  8.1*  7.5*   MG   --   1.4*   --    PHOS  4.2  4.3  4.8*     Recent Labs      05/07/18 0340 05/06/18 0453 05/05/18 0342   SGOT  20   --    --    ALT  17   --    --    AP  217*   --    --    TBILI  0.2   --    --    TP  5.6*   --    --    ALB  2.1*  2.7*  2.5*   GLOB  3.5   --    --      Lab Results   Component Value Date/Time    Folate 61.0 (H) 04/30/2018 06:24 AM      Lab Results   Component Value Date/Time    Cholesterol, total 137 04/30/2018 06:24 AM    HDL Cholesterol 35 04/30/2018 06:24 AM    LDL, calculated 89 04/30/2018 06:24 AM    Triglyceride 65 04/30/2018 06:24 AM    CHOL/HDL Ratio 3.9 04/30/2018 06:24 AM     Lab Results   Component Value Date/Time    Glucose (POC) 88 05/07/2018 06:52 AM    Glucose (POC) 73 05/07/2018 06:46 AM    Glucose (POC) 46 (LL) 05/07/2018 06:34 AM    Glucose (POC) 44 (LL) 05/07/2018 06:33 AM    Glucose (POC) 264 (H) 05/06/2018 09:43 PM     Lab Results   Component Value Date/Time    Color YELLOW/STRAW 03/02/2018 10:50 PM    Appearance CLOUDY (A) 03/02/2018 10:50 PM    Specific gravity 1.017 03/02/2018 10:50 PM    Specific gravity 1.015 07/26/2010 11:18 AM    pH (UA) 5.0 03/02/2018 10:50 PM    Protein 100 (A) 03/02/2018 10:50 PM    Glucose NEGATIVE  03/02/2018 10:50 PM    Ketone NEGATIVE  03/02/2018 10:50 PM    Bilirubin NEGATIVE  03/02/2018 10:50 PM    Urobilinogen 1.0 03/02/2018 10:50 PM    Nitrites NEGATIVE 03/02/2018 10:50 PM    Leukocyte Esterase NEGATIVE  03/02/2018 10:50 PM    Epithelial cells MANY (A) 03/02/2018 10:50 PM    Bacteria 3+ (A) 03/02/2018 10:50 PM    WBC 5-10 03/02/2018 10:50 PM    RBC 0-5 03/02/2018 10:50 PM       Medications Reviewed:     Current Facility-Administered Medications   Medication Dose Route Frequency    labetalol (NORMODYNE) tablet 500 mg  500 mg Oral Q12H    HYDROmorphone (PF) (DILAUDID) injection 1 mg  1 mg IntraVENous Q2H PRN    DAPTOmycin (CUBICIN) 400 mg in 0.9% sodium chloride 50 mL IVPB RF formulation  400 mg IntraVENous Q48H    alteplase (CATHFLO) 1 mg in sterile water (preservative free) 1 mL injection  1 mg InterCATHeter PRN    polyethylene glycol (MIRALAX) packet 17 g  17 g Oral DAILY PRN    hydrALAZINE (APRESOLINE) tablet 100 mg  100 mg Oral TID    LORazepam (ATIVAN) tablet 0.5 mg  0.5 mg Oral Q6H PRN    senna-docusate (PERICOLACE) 8.6-50 mg per tablet 2 Tab  2 Tab Oral DAILY    hydrALAZINE (APRESOLINE) 20 mg/mL injection 20 mg  20 mg IntraVENous Q4H PRN    ondansetron (ZOFRAN) injection 4 mg  4 mg IntraVENous Q4H PRN    diphenhydrAMINE (BENADRYL) capsule 25 mg  25 mg Oral Q6H PRN    insulin glargine (LANTUS) injection 10 Units  10 Units SubCUTAneous QHS    nitroglycerin (NITROBID) 2 % ointment 2 Inch  2 Inch Topical BID    promethazine (PHENERGAN) 6.25 mg/5 mL syrup 6.25 mg  6.25 mg Oral Q6H PRN    sodium chloride (NS) flush 20 mL  20 mL InterCATHeter PRN    sodium chloride (NS) flush 10 mL  10 mL InterCATHeter Q24H    sodium chloride (NS) flush 10 mL  10 mL InterCATHeter PRN    sodium chloride (NS) flush 10 mL  10 mL InterCATHeter Q8H    sodium chloride (NS) flush 20 mL  20 mL InterCATHeter Q24H    NIFEdipine ER (PROCARDIA XL) tablet 90 mg  90 mg Oral DAILY    calcium carbonate (OS-BINA) tablet 500 mg [elemental]  500 mg Oral DAILY    QUEtiapine (SEROquel) tablet 100 mg  100 mg Oral BID    venlafaxine (EFFEXOR) tablet 75 mg  75 mg Oral BID WITH MEALS  sodium chloride (NS) flush 5-10 mL  5-10 mL IntraVENous Q8H    sodium chloride (NS) flush 5-10 mL  5-10 mL IntraVENous PRN    heparin (porcine) injection 5,000 Units  5,000 Units SubCUTAneous Q8H    albuterol-ipratropium (DUO-NEB) 2.5 MG-0.5 MG/3 ML  3 mL Nebulization Q4H PRN    lactobac ac& pc-s.therm-b.anim (CHRIS Q/RISAQUAD)  1 Cap Oral DAILY    insulin lispro (HUMALOG) injection   SubCUTAneous AC&HS    glucose chewable tablet 16 g  4 Tab Oral PRN    dextrose (D50W) injection syrg 12.5-25 g  12.5-25 g IntraVENous PRN    glucagon (GLUCAGEN) injection 1 mg  1 mg IntraMUSCular PRN    acetaminophen (TYLENOL) tablet 650 mg  650 mg Oral Q4H PRN    oxyCODONE-acetaminophen (PERCOCET) 5-325 mg per tablet 1 Tab  1 Tab Oral Q4H PRN     ______________________________________________________________________  EXPECTED LENGTH OF STAY: 3d 19h  ACTUAL LENGTH OF STAY:          10                 Mle Olmstead MD

## 2018-05-07 NOTE — PROGRESS NOTES
Bedside and Verbal shift change report given to Charly Pickens RN (oncoming nurse) by  Lilli obrien. Report included the following information SBAR, Kardex and MAR.

## 2018-05-07 NOTE — PROGRESS NOTES
ID Progress Note  2018    Subjective:     She is not feeling well in general--concerned about her foot, having problems with nausea    Objective:     Antibiotics:  1. Daptomycin       Vitals:   Visit Vitals    /90 (BP 1 Location: Right arm, BP Patient Position: At rest)    Pulse 85    Temp 98.4 °F (36.9 °C)    Resp 16    Ht 5' 2\" (1.575 m)    Wt 54.7 kg (120 lb 9.5 oz)    SpO2 100%    BMI 22.06 kg/m2        Tmax:  Temp (24hrs), Av.5 °F (36.9 °C), Min:97.8 °F (36.6 °C), Max:99.2 °F (37.3 °C)      Exam:  Lungs:  clear to auscultation bilaterally  Heart:  regular rate and rhythm  Abdomen:  soft, non-tender. Bowel sounds normal. No masses,  no organomegaly  Wound dressed    Labs:      Recent Labs      18   1120  18   0340  18   0453  18   0342   WBC  5.2  5.3  5.5  5.0   HGB  6.4*  6.5*  8.8*  7.4*   PLT  123*  120*  148*  151   BUN   --   49*  53*  53*  59*   CREA   --   2.91*  3.10*  2.98*  3.06*   SGOT   --   20   --    --    AP   --   217*   --    --    TBILI   --   0.2   --    --        Cultures:     Lab Results   Component Value Date/Time    Specimen Description: URINE 2013 08:00 PM    Specimen Description: URINE 2013 07:55 PM    Specimen Description: URINE 2013 10:10 AM     Lab Results   Component Value Date/Time    Culture result: NO GROWTH 5 DAYS 2018 05:11 PM    Culture result: FEW STAPHYLOCOCCUS HOMINIS SUBSPECIES HOMINIS (A) 2018 12:47 PM    Culture result: (A) 2018 12:47 PM     STAPHYLOCOCCUS EPIDERMIDIS (OXACILLIN RESISTANT) ISOLATED FROM THIO BROTH ONLY    Culture result: NO ANAEROBES ISOLATED 2018 12:47 PM       Radiology:     Line/Insert Date:           Assessment:     1. Diabetic foot infection--MRI results noted--not sure whether this is the old infection resolving or a new problem  2. Renal insufficiency  3. DM    Objective:     1. Continue current therapy  2. ? options for sampling of tissue for culture  3.  She has elected to attempt another course of IV therapy--I am willing to do this, but she understands that there is a high risk of failure--orders on chart when needed    Kelly Mello MD

## 2018-05-07 NOTE — PROGRESS NOTES
[facility]   [address] [facility], [facility] [address], [facility]  Phone: [phone]   [phone]       Nephrology Progress Note  Leoncio Toussaint     1980     [id number]  Date of Admission : 4/27/2018 05/07/18    CC: Follow up for STONE       Assessment and Plan   STONE on CKD   - resolved   - Cr back to baseline  - given her pain  And possibility of sickle crisis, restarted LR at 50 cc/ hr     Acute on Chronic Hyper K :  - resolved     Uncontrolled HTN:  - BP stable now  - cont current meds    Chronic Pancreatitis:  - lipase improving    CKD Stage IV :  - 2/2 DM, SCD  - previously dialysis dependent for ~ 6m. Failed RUE and RLE grafts     Sickle cell dz  - hgb dropped. Ordered iron profile, b12, folate, LDH, retic count   - type and cross match 1 unit of blood . She has antibodies and may have not be able to find blood right away   - transfuse tomorrow if Hb still low    Left foot osteomyelitis  - MRI findings noted  - per podiatry and ID     Interval History:  Seen and examined. Hb dropped. She reports generalized aches and pain  And worse in the  back  No cp, sob or edema noted. Review of Systems: Pertinent items are noted in HPI.     Current Medications:   Current Facility-Administered Medications   Medication Dose Route Frequency    epoetin bernard (EPOGEN;PROCRIT) injection 10,000 Units  10,000 Units SubCUTAneous Q MON, WED & FRI    labetalol (NORMODYNE) tablet 500 mg  500 mg Oral Q12H    HYDROmorphone (PF) (DILAUDID) injection 1 mg  1 mg IntraVENous Q2H PRN    DAPTOmycin (CUBICIN) 400 mg in 0.9% sodium chloride 50 mL IVPB RF formulation  400 mg IntraVENous Q48H    alteplase (CATHFLO) 1 mg in sterile water (preservative free) 1 mL injection  1 mg InterCATHeter PRN    polyethylene glycol (MIRALAX) packet 17 g  17 g Oral DAILY PRN    hydrALAZINE (APRESOLINE) tablet 100 mg  100 mg Oral TID    LORazepam (ATIVAN) tablet 0.5 mg  0.5 mg Oral Q6H PRN    senna-docusate (PERICOLACE) 8.6-50 mg per tablet 2 Tab  2 Tab Oral DAILY    hydrALAZINE (APRESOLINE) 20 mg/mL injection 20 mg  20 mg IntraVENous Q4H PRN    ondansetron (ZOFRAN) injection 4 mg  4 mg IntraVENous Q4H PRN    diphenhydrAMINE (BENADRYL) capsule 25 mg  25 mg Oral Q6H PRN    insulin glargine (LANTUS) injection 10 Units  10 Units SubCUTAneous QHS    nitroglycerin (NITROBID) 2 % ointment 2 Inch  2 Inch Topical BID    promethazine (PHENERGAN) 6.25 mg/5 mL syrup 6.25 mg  6.25 mg Oral Q6H PRN    sodium chloride (NS) flush 20 mL  20 mL InterCATHeter PRN    sodium chloride (NS) flush 10 mL  10 mL InterCATHeter Q24H    sodium chloride (NS) flush 10 mL  10 mL InterCATHeter PRN    sodium chloride (NS) flush 10 mL  10 mL InterCATHeter Q8H    sodium chloride (NS) flush 20 mL  20 mL InterCATHeter Q24H    NIFEdipine ER (PROCARDIA XL) tablet 90 mg  90 mg Oral DAILY    calcium carbonate (OS-BINA) tablet 500 mg [elemental]  500 mg Oral DAILY    QUEtiapine (SEROquel) tablet 100 mg  100 mg Oral BID    venlafaxine (EFFEXOR) tablet 75 mg  75 mg Oral BID WITH MEALS    sodium chloride (NS) flush 5-10 mL  5-10 mL IntraVENous Q8H    sodium chloride (NS) flush 5-10 mL  5-10 mL IntraVENous PRN    heparin (porcine) injection 5,000 Units  5,000 Units SubCUTAneous Q8H    albuterol-ipratropium (DUO-NEB) 2.5 MG-0.5 MG/3 ML  3 mL Nebulization Q4H PRN    lactobac ac& pc-s.therm-b.anim (CHRIS Q/RISAQUAD)  1 Cap Oral DAILY    insulin lispro (HUMALOG) injection   SubCUTAneous AC&HS    glucose chewable tablet 16 g  4 Tab Oral PRN    dextrose (D50W) injection syrg 12.5-25 g  12.5-25 g IntraVENous PRN    glucagon (GLUCAGEN) injection 1 mg  1 mg IntraMUSCular PRN    acetaminophen (TYLENOL) tablet 650 mg  650 mg Oral Q4H PRN    oxyCODONE-acetaminophen (PERCOCET) 5-325 mg per tablet 1 Tab  1 Tab Oral Q4H PRN      Allergies   Allergen Reactions    Erythromycin Itching    Morphine Itching    Toradol [Ketorolac] Rash Objective:  Vitals:    Vitals:    05/06/18 2304 05/07/18 0048 05/07/18 0345 05/07/18 0858   BP: (!) 168/98 110/62 125/71 167/90   Pulse: (!) 102 77 74 85   Resp:   16 16   Temp:   98.5 °F (36.9 °C) 98.4 °F (36.9 °C)   SpO2:   100% 100%   Weight:       Height:         Intake and Output:          Physical Examination:    General: Awake, alert  Neck:  Supple, no mass  Resp:  Lungs CTA B/L, no wheezing , normal respiratory effort  CV:  RRR,  no murmur or rub,trace LE edema  GI:  Soft, NT, + Bowel sounds, no hepatosplenomegaly  Neurologic:  Non focal  Skin:  No Rash  :  No escobedo     []    High complexity decision making was performed  []    Patient is at high-risk of decompensation with multiple organ involvement    Lab Data Personally Reviewed: I have reviewed all the pertinent labs, microbiology data and radiology studies during assessment.     Recent Labs      05/07/18 0340 05/06/18 0453 05/05/18 0342   NA  142  140  140  140   K  4.3  4.4  4.4  4.4   CL  112*  109*  109*  108   CO2  21  21  22  22   GLU  80  101*  97  108*   BUN  49*  53*  53*  59*   CREA  2.91*  3.10*  2.98*  3.06*   CA  7.5*  8.2*  8.1*  7.5*   MG   --   1.4*   --    PHOS  4.2  4.3  4.8*   ALB  2.1*  2.7*  2.5*   SGOT  20   --    --    ALT  17   --    --      Recent Labs      05/07/18   0340 05/06/18   0453  05/05/18   0342   WBC  5.3  5.5  5.0   HGB  6.5*  8.8*  7.4*   HCT  20.5*  27.0*  22.8*   PLT  120*  148*  151     Lab Results   Component Value Date/Time    Specimen Description: URINE 06/24/2013 08:00 PM    Specimen Description: URINE 06/17/2013 07:55 PM    Specimen Description: URINE 05/26/2013 10:10 AM    Specimen Description: URINE 04/22/2013 02:30 PM    Specimen Description: NARES 03/21/2013 12:30 PM     Lab Results   Component Value Date/Time    Culture result: NO GROWTH 5 DAYS 04/27/2018 05:11 PM    Culture result: FEW STAPHYLOCOCCUS HOMINIS SUBSPECIES HOMINIS (A) 03/11/2018 12:47 PM    Culture result: (A) 03/11/2018 12:47 PM     STAPHYLOCOCCUS EPIDERMIDIS (OXACILLIN RESISTANT) ISOLATED FROM THIO BROTH ONLY    Culture result: NO ANAEROBES ISOLATED 03/11/2018 12:47 PM    Culture result: NO GROWTH ON SOLID MEDIA AT 4 DAYS 03/11/2018 11:43 AM    Culture result: (A) 03/11/2018 11:43 AM     STAPHYLOCOCCUS EPIDERMIDIS (OXACILLIN RESISTANT) ISOLATED FROM THIO BROTH ONLY    Culture result: NO GROWTH ON SOLID MEDIA AT 4 DAYS 03/11/2018 11:43 AM    Culture result: (A) 03/11/2018 11:43 AM     STAPHYLOCOCCUS EPIDERMIDIS ISOLATED FROM THIO BROTH ONLY     Recent Results (from the past 24 hour(s))   GLUCOSE, POC    Collection Time: 05/06/18 11:57 AM   Result Value Ref Range    Glucose (POC) 115 (H) 65 - 100 mg/dL    Performed by Rod Warren    GLUCOSE, POC    Collection Time: 05/06/18  4:14 PM   Result Value Ref Range    Glucose (POC) 95 65 - 100 mg/dL    Performed by KARENA WHITLEY    GLUCOSE, POC    Collection Time: 05/06/18  9:43 PM   Result Value Ref Range    Glucose (POC) 264 (H) 65 - 100 mg/dL    Performed by Brittanie Webster    CBC WITH AUTOMATED DIFF    Collection Time: 05/07/18  3:40 AM   Result Value Ref Range    WBC 5.3 3.6 - 11.0 K/uL    RBC 2.63 (L) 3.80 - 5.20 M/uL    HGB 6.5 (L) 11.5 - 16.0 g/dL    HCT 20.5 (L) 35.0 - 47.0 %    MCV 77.9 (L) 80.0 - 99.0 FL    MCH 24.7 (L) 26.0 - 34.0 PG    MCHC 31.7 30.0 - 36.5 g/dL    RDW 16.6 (H) 11.5 - 14.5 %    PLATELET 094 (L) 990 - 400 K/uL    NRBC 0.0 0  WBC    ABSOLUTE NRBC 0.00 0.00 - 0.01 K/uL    NEUTROPHILS 69 32 - 75 %    LYMPHOCYTES 18 12 - 49 %    MONOCYTES 8 5 - 13 %    EOSINOPHILS 5 0 - 7 %    BASOPHILS 0 0 - 1 %    IMMATURE GRANULOCYTES 0 0.0 - 0.5 %    ABS. NEUTROPHILS 3.6 1.8 - 8.0 K/UL    ABS. LYMPHOCYTES 1.0 0.8 - 3.5 K/UL    ABS. MONOCYTES 0.4 0.0 - 1.0 K/UL    ABS. EOSINOPHILS 0.3 0.0 - 0.4 K/UL    ABS. BASOPHILS 0.0 0.0 - 0.1 K/UL    ABS. IMM.  GRANS. 0.0 0.00 - 0.04 K/UL    DF SMEAR SCANNED      PLATELET COMMENTS Large Platelets      RBC COMMENTS ANISOCYTOSIS  1+        RBC COMMENTS MICROCYTOSIS  1+        RBC COMMENTS HYPOCHROMIA  1+        RBC COMMENTS OVALOCYTES  PRESENT        RBC COMMENTS POLYCHROMASIA  1+        RBC COMMENTS TEARDROP CELLS  PRESENT       METABOLIC PANEL, COMPREHENSIVE    Collection Time: 05/07/18  3:40 AM   Result Value Ref Range    Sodium 142 136 - 145 mmol/L    Potassium 4.3 3.5 - 5.1 mmol/L    Chloride 112 (H) 97 - 108 mmol/L    CO2 21 21 - 32 mmol/L    Anion gap 9 5 - 15 mmol/L    Glucose 80 65 - 100 mg/dL    BUN 49 (H) 6 - 20 MG/DL    Creatinine 2.91 (H) 0.55 - 1.02 MG/DL    BUN/Creatinine ratio 17 12 - 20      GFR est AA 22 (L) >60 ml/min/1.73m2    GFR est non-AA 18 (L) >60 ml/min/1.73m2    Calcium 7.5 (L) 8.5 - 10.1 MG/DL    Bilirubin, total 0.2 0.2 - 1.0 MG/DL    ALT (SGPT) 17 12 - 78 U/L    AST (SGOT) 20 15 - 37 U/L    Alk. phosphatase 217 (H) 45 - 117 U/L    Protein, total 5.6 (L) 6.4 - 8.2 g/dL    Albumin 2.1 (L) 3.5 - 5.0 g/dL    Globulin 3.5 2.0 - 4.0 g/dL    A-G Ratio 0.6 (L) 1.1 - 2.2     CK    Collection Time: 05/07/18  3:40 AM   Result Value Ref Range    CK 30 26 - 192 U/L   PHOSPHORUS    Collection Time: 05/07/18  3:40 AM   Result Value Ref Range    Phosphorus 4.2 2.6 - 4.7 MG/DL   GLUCOSE, POC    Collection Time: 05/07/18  6:33 AM   Result Value Ref Range    Glucose (POC) 44 (LL) 65 - 100 mg/dL    Performed by [address] [place] [address], POC    Collection Time: 05/07/18  6:34 AM   Result Value Ref Range    Glucose (POC) 46 (LL) 65 - 100 mg/dL    Performed by [address] [place] [address], POC    Collection Time: 05/07/18  6:46 AM   Result Value Ref Range    Glucose (POC) 73 65 - 100 mg/dL    Performed by [address] [place] [address], POC    Collection Time: 05/07/18  6:52 AM   Result Value Ref Range    Glucose (POC) 88 65 - 100 mg/dL    Performed by Bo Valenzuela            Total time spent with patient:  xxx   min.                                Care Plan discussed with:  Patient     Family      RN      Consulting Physician Dennis Maravilla I have reviewed the flowsheets. Chart and Pertinent Notes have been reviewed. No change in PMH ,family and social history from Consult note.       John Yee MD

## 2018-05-07 NOTE — PROGRESS NOTES
Physical Therapy  5.7.18    Chart reviewed. Attempted to see patient for PT session, however patient just receiving her lunch which was re-heated. F/u later today as able for PT session. Note patient is NWB on L LE and will need RW ordered prior to d/c. Thank you.     Areli Calderón, PT, DPT

## 2018-05-07 NOTE — PROGRESS NOTES
IV Antibiotic Orders    1. Diagnosis:  Diabetic foot infection  2. Routine Asher care  3. Antibiotic:  Daptomycin 400 mg IV Q 48 hours  4. Lab each Monday:   CBC/diff/platelets   BMP   CPK   ESR  5. Lab each Thursday:   CBC/diff/platelets   BMP   CPK  6. Fax lab to Dr. Terese Perdomo @ [phone].  7.  Call Dr. Terese Perdomo @ [phone] for WBC under 4, creatinine over 4 or CPK over 300.  8.  Duration of therapy: 5 weeks   Please call Dr. Terese Perdomo @ [phone] before stopping therapy. 9.  Allergies:     Allergies   Allergen Reactions    Erythromycin Itching    Morphine Itching    Toradol [Ketorolac] Rash     Leonidas Salgado MD

## 2018-05-08 ENCOUNTER — APPOINTMENT (OUTPATIENT)
Dept: GENERAL RADIOLOGY | Age: 38
DRG: 637 | End: 2018-05-08
Attending: INTERNAL MEDICINE
Payer: MEDICARE

## 2018-05-08 ENCOUNTER — APPOINTMENT (OUTPATIENT)
Dept: ULTRASOUND IMAGING | Age: 38
DRG: 637 | End: 2018-05-08
Attending: HOSPITALIST
Payer: MEDICARE

## 2018-05-08 LAB
ALBUMIN SERPL-MCNC: 2.5 G/DL (ref 3.5–5)
ANION GAP SERPL CALC-SCNC: 8 MMOL/L (ref 5–15)
ANION GAP SERPL CALC-SCNC: 9 MMOL/L (ref 5–15)
BASOPHILS # BLD: 0 K/UL (ref 0–0.1)
BASOPHILS NFR BLD: 0 % (ref 0–1)
BUN SERPL-MCNC: 55 MG/DL (ref 6–20)
BUN SERPL-MCNC: 56 MG/DL (ref 6–20)
BUN/CREAT SERPL: 18 (ref 12–20)
BUN/CREAT SERPL: 18 (ref 12–20)
CALCIUM SERPL-MCNC: 7.8 MG/DL (ref 8.5–10.1)
CALCIUM SERPL-MCNC: 7.9 MG/DL (ref 8.5–10.1)
CHLORIDE SERPL-SCNC: 107 MMOL/L (ref 97–108)
CHLORIDE SERPL-SCNC: 109 MMOL/L (ref 97–108)
CO2 SERPL-SCNC: 21 MMOL/L (ref 21–32)
CO2 SERPL-SCNC: 21 MMOL/L (ref 21–32)
CREAT SERPL-MCNC: 3.12 MG/DL (ref 0.55–1.02)
CREAT SERPL-MCNC: 3.16 MG/DL (ref 0.55–1.02)
DIFFERENTIAL METHOD BLD: ABNORMAL
EOSINOPHIL # BLD: 0.3 K/UL (ref 0–0.4)
EOSINOPHIL NFR BLD: 5 % (ref 0–7)
ERYTHROCYTE [DISTWIDTH] IN BLOOD BY AUTOMATED COUNT: 16 % (ref 11.5–14.5)
GLUCOSE BLD STRIP.AUTO-MCNC: 100 MG/DL (ref 65–100)
GLUCOSE BLD STRIP.AUTO-MCNC: 211 MG/DL (ref 65–100)
GLUCOSE BLD STRIP.AUTO-MCNC: 232 MG/DL (ref 65–100)
GLUCOSE BLD STRIP.AUTO-MCNC: 98 MG/DL (ref 65–100)
GLUCOSE SERPL-MCNC: 103 MG/DL (ref 65–100)
GLUCOSE SERPL-MCNC: 105 MG/DL (ref 65–100)
HCT VFR BLD AUTO: 24.6 % (ref 35–47)
HGB BLD-MCNC: 8.1 G/DL (ref 11.5–16)
IMM GRANULOCYTES # BLD: 0 K/UL (ref 0–0.04)
IMM GRANULOCYTES NFR BLD AUTO: 0 % (ref 0–0.5)
LYMPHOCYTES # BLD: 0.8 K/UL (ref 0.8–3.5)
LYMPHOCYTES NFR BLD: 13 % (ref 12–49)
MCH RBC QN AUTO: 25.5 PG (ref 26–34)
MCHC RBC AUTO-ENTMCNC: 32.9 G/DL (ref 30–36.5)
MCV RBC AUTO: 77.4 FL (ref 80–99)
MONOCYTES # BLD: 0.4 K/UL (ref 0–1)
MONOCYTES NFR BLD: 7 % (ref 5–13)
NEUTS SEG # BLD: 4.7 K/UL (ref 1.8–8)
NEUTS SEG NFR BLD: 75 % (ref 32–75)
NRBC # BLD: 0 K/UL (ref 0–0.01)
NRBC BLD-RTO: 0 PER 100 WBC
PHOSPHATE SERPL-MCNC: 4.4 MG/DL (ref 2.6–4.7)
PLATELET # BLD AUTO: 126 K/UL (ref 150–400)
POTASSIUM SERPL-SCNC: 4.9 MMOL/L (ref 3.5–5.1)
POTASSIUM SERPL-SCNC: 4.9 MMOL/L (ref 3.5–5.1)
RBC # BLD AUTO: 3.18 M/UL (ref 3.8–5.2)
SERVICE CMNT-IMP: ABNORMAL
SERVICE CMNT-IMP: ABNORMAL
SERVICE CMNT-IMP: NORMAL
SERVICE CMNT-IMP: NORMAL
SODIUM SERPL-SCNC: 137 MMOL/L (ref 136–145)
SODIUM SERPL-SCNC: 138 MMOL/L (ref 136–145)
WBC # BLD AUTO: 6.3 K/UL (ref 3.6–11)

## 2018-05-08 PROCEDURE — 76770 US EXAM ABDO BACK WALL COMP: CPT

## 2018-05-08 PROCEDURE — 74011250636 HC RX REV CODE- 250/636: Performed by: INTERNAL MEDICINE

## 2018-05-08 PROCEDURE — 74011250637 HC RX REV CODE- 250/637: Performed by: INTERNAL MEDICINE

## 2018-05-08 PROCEDURE — 74011636637 HC RX REV CODE- 636/637: Performed by: INTERNAL MEDICINE

## 2018-05-08 PROCEDURE — 74011250637 HC RX REV CODE- 250/637: Performed by: FAMILY MEDICINE

## 2018-05-08 PROCEDURE — 85025 COMPLETE CBC W/AUTO DIFF WBC: CPT | Performed by: INTERNAL MEDICINE

## 2018-05-08 PROCEDURE — 74011250637 HC RX REV CODE- 250/637: Performed by: PHYSICAL MEDICINE & REHABILITATION

## 2018-05-08 PROCEDURE — 77010033678 HC OXYGEN DAILY

## 2018-05-08 PROCEDURE — 80069 RENAL FUNCTION PANEL: CPT | Performed by: INTERNAL MEDICINE

## 2018-05-08 PROCEDURE — 36415 COLL VENOUS BLD VENIPUNCTURE: CPT | Performed by: INTERNAL MEDICINE

## 2018-05-08 PROCEDURE — 80048 BASIC METABOLIC PNL TOTAL CA: CPT | Performed by: INTERNAL MEDICINE

## 2018-05-08 PROCEDURE — 82962 GLUCOSE BLOOD TEST: CPT

## 2018-05-08 PROCEDURE — 65270000029 HC RM PRIVATE

## 2018-05-08 PROCEDURE — 71101 X-RAY EXAM UNILAT RIBS/CHEST: CPT

## 2018-05-08 RX ADMIN — OXYCODONE AND ACETAMINOPHEN 1 TABLET: 5; 325 TABLET ORAL at 13:18

## 2018-05-08 RX ADMIN — SODIUM CHLORIDE, SODIUM LACTATE, POTASSIUM CHLORIDE, AND CALCIUM CHLORIDE 50 ML/HR: 600; 310; 30; 20 INJECTION, SOLUTION INTRAVENOUS at 18:59

## 2018-05-08 RX ADMIN — Medication 10 ML: at 04:21

## 2018-05-08 RX ADMIN — CALCIUM 500 MG: 500 TABLET ORAL at 10:44

## 2018-05-08 RX ADMIN — HEPARIN SODIUM 5000 UNITS: 5000 INJECTION, SOLUTION INTRAVENOUS; SUBCUTANEOUS at 04:20

## 2018-05-08 RX ADMIN — HYDROMORPHONE HYDROCHLORIDE 1 MG: 1 INJECTION, SOLUTION INTRAMUSCULAR; INTRAVENOUS; SUBCUTANEOUS at 11:39

## 2018-05-08 RX ADMIN — INSULIN LISPRO 3 UNITS: 100 INJECTION, SOLUTION INTRAVENOUS; SUBCUTANEOUS at 18:54

## 2018-05-08 RX ADMIN — Medication 10 ML: at 21:13

## 2018-05-08 RX ADMIN — Medication 1 CAPSULE: at 10:44

## 2018-05-08 RX ADMIN — ONDANSETRON HYDROCHLORIDE 4 MG: 2 INJECTION INTRAMUSCULAR; INTRAVENOUS at 21:11

## 2018-05-08 RX ADMIN — INSULIN GLARGINE 10 UNITS: 100 INJECTION, SOLUTION SUBCUTANEOUS at 21:12

## 2018-05-08 RX ADMIN — HYDROMORPHONE HYDROCHLORIDE 1 MG: 1 INJECTION, SOLUTION INTRAMUSCULAR; INTRAVENOUS; SUBCUTANEOUS at 04:20

## 2018-05-08 RX ADMIN — INSULIN LISPRO 2 UNITS: 100 INJECTION, SOLUTION INTRAVENOUS; SUBCUTANEOUS at 21:12

## 2018-05-08 RX ADMIN — LABETALOL HYDROCHLORIDE 500 MG: 200 TABLET, FILM COATED ORAL at 21:11

## 2018-05-08 RX ADMIN — NITROGLYCERIN 2 INCH: 20 OINTMENT TOPICAL at 18:54

## 2018-05-08 RX ADMIN — VENLAFAXINE 75 MG: 37.5 TABLET ORAL at 08:00

## 2018-05-08 RX ADMIN — VENLAFAXINE 75 MG: 37.5 TABLET ORAL at 18:54

## 2018-05-08 RX ADMIN — PROMETHAZINE HYDROCHLORIDE 6.25 MG: 6.25 SYRUP ORAL at 13:18

## 2018-05-08 RX ADMIN — HYDRALAZINE HYDROCHLORIDE 100 MG: 50 TABLET ORAL at 21:12

## 2018-05-08 RX ADMIN — Medication 20 ML: at 04:20

## 2018-05-08 RX ADMIN — QUETIAPINE FUMARATE 100 MG: 100 TABLET ORAL at 18:53

## 2018-05-08 RX ADMIN — HYDROMORPHONE HYDROCHLORIDE 1 MG: 1 INJECTION, SOLUTION INTRAMUSCULAR; INTRAVENOUS; SUBCUTANEOUS at 08:01

## 2018-05-08 RX ADMIN — HYDRALAZINE HYDROCHLORIDE 100 MG: 50 TABLET ORAL at 10:44

## 2018-05-08 RX ADMIN — HEPARIN SODIUM 5000 UNITS: 5000 INJECTION, SOLUTION INTRAVENOUS; SUBCUTANEOUS at 21:11

## 2018-05-08 RX ADMIN — NITROGLYCERIN 2 INCH: 20 OINTMENT TOPICAL at 10:44

## 2018-05-08 RX ADMIN — Medication 20 ML: at 15:02

## 2018-05-08 RX ADMIN — LABETALOL HYDROCHLORIDE 500 MG: 200 TABLET, FILM COATED ORAL at 11:39

## 2018-05-08 RX ADMIN — ONDANSETRON HYDROCHLORIDE 4 MG: 2 INJECTION INTRAMUSCULAR; INTRAVENOUS at 00:55

## 2018-05-08 RX ADMIN — STANDARDIZED SENNA CONCENTRATE AND DOCUSATE SODIUM 2 TABLET: 8.6; 5 TABLET, FILM COATED ORAL at 10:44

## 2018-05-08 RX ADMIN — NIFEDIPINE 90 MG: 60 TABLET, FILM COATED, EXTENDED RELEASE ORAL at 10:44

## 2018-05-08 RX ADMIN — Medication 10 ML: at 15:02

## 2018-05-08 RX ADMIN — Medication 10 ML: at 08:01

## 2018-05-08 RX ADMIN — DIPHENHYDRAMINE HYDROCHLORIDE 25 MG: 25 CAPSULE ORAL at 04:20

## 2018-05-08 RX ADMIN — QUETIAPINE FUMARATE 100 MG: 100 TABLET ORAL at 10:44

## 2018-05-08 RX ADMIN — HYDROMORPHONE HYDROCHLORIDE 1 MG: 1 INJECTION, SOLUTION INTRAMUSCULAR; INTRAVENOUS; SUBCUTANEOUS at 15:01

## 2018-05-08 RX ADMIN — HYDROMORPHONE HYDROCHLORIDE 1 MG: 1 INJECTION, SOLUTION INTRAMUSCULAR; INTRAVENOUS; SUBCUTANEOUS at 21:13

## 2018-05-08 RX ADMIN — HEPARIN SODIUM 5000 UNITS: 5000 INJECTION, SOLUTION INTRAVENOUS; SUBCUTANEOUS at 13:18

## 2018-05-08 RX ADMIN — HYDROMORPHONE HYDROCHLORIDE 1 MG: 1 INJECTION, SOLUTION INTRAMUSCULAR; INTRAVENOUS; SUBCUTANEOUS at 18:53

## 2018-05-08 NOTE — PROGRESS NOTES
The patient's out of pocket cost for home IV will be $1,050.00 for 5 weeks. CRM will discuss with the patient. The other option would be Infusion Center daily. Medicare will cover the cost out patient. LYRIC    CRM attempted to speak with the patient regarding the above. The patient was sleeping. CRM called her name x3 and she did not wake up. Will continue to follow. LYRIC    CRM spoke with the patient regarding the out of pocket cost. The patient is ok with the cost. CRM will send the orders to [facility] via [employer]. Will send to [facility] [employer] for nursing via [address]. Home care orders/picc line placement/discharge date pending, [facility]. Will follow. [address] and HCP accepted the case.  LYRIC

## 2018-05-08 NOTE — PROGRESS NOTES
Hospitalist Progress Note  Altagracia Contreras MD  Answering service: [phone] OR [phone] from in house phone        Date of Service:  2018  NAME:  Brianna Erazo  :  1980  MRN:  [id number]      Admission Summary:      Per H&P: 25-year-old woman with a past medical history significant for hypertension, type 2 diabetes, obstructive sleep apnea, depression, chronic kidney disease, narcotic dependency was in her usual state of health until about 3 days ago when the patient developed left leg pain and swelling. The pain is a constant 8/10 in severity. Patient described the pain as a sharp shooting pain in her left leg. No relief with pain medication. No known aggravating factors. Last month, the patient underwent a transmetatarsal resection of the left foot 2/2 diabetic foot. Patient also complained of abdominal pain, nausea, and vomiting. The abdominal pain is diffuse in location. F/u for osteomyelitis      Patient complained of left flank pain,she had heat pan on with some relief. Assessment & Plan:     Right flank pain  -Check UA. Renal US. A/P CT on  had shown markedly distended bladder. Sickle cell crisis  Microcytic Anemia with acute drop. Hgb 6.5 and re-checked and at 6.4,5/7. received 1 unit and HB upto 8. 2. Monitor  -IV hydration  -Pain control  -Supplemental o2    Acute on chronic pancreatitis: Patient with N/V, abdominal pain and elevated lipase /amylase. Repeat Lipase trended close to normal values. - CT A/P with calcified pancreas  - Pain control. Discussed with patient that we need to start weaning IV and transition to PO once she tolerates eating more  - Advance diet as tolerated    Acute hyperkalemia due to kidney disease:  - now resolved    Osteomyelitis of the left foot:  - Podiatry was consulted. As above, patient declined further surgery  - MRI of the left foot done, suspicious for Osteomyelitis. ?Unclear if this is old or new  - On Zosyn and Daptomycin now  - ID on board. Patient will likely need prolonged course and then to re-visit the need for surgery  - Appreciate recs. Patient will likely leave on IV antibitoics    Acute on chronic kidney disease stage V  - holding diuretic  - Nephrology following    Essential Hypertension: Better control today  - Up-titrate meds as needed  - Patient with very high BP secondary to baseline HTN and pain associated with her pancreatitis flare    Type 2 diabetes type 2: Continue sliding scale. Obstructive sleep apnea    Depression: Continue home meds. Code status: Full  DVT prophylaxis: heparin    Care Plan discussed with: Patient/Family and Nurse  Disposition:Home with home iv abx once all acute issue have resolved. Hospital Problems  Date Reviewed: 4/28/2018          Codes Class Noted POA    * (Principal)Osteomyelitis of left foot [facility]) ICD-10-CM: M86.9  ICD-9-CM: 730.27  4/27/2018 Yes              Review of Systems:   Pertinent items are noted in HPI. Vital Signs:    Last 24hrs VS reviewed since prior progress note. Most recent are:  Visit Vitals    /70 (BP 1 Location: Left arm, BP Patient Position: At rest)    Pulse 82    Temp 98.1 °F (36.7 °C)    Resp 18    Ht 5' 2\" (1.575 m)    Wt 54.7 kg (120 lb 9.5 oz)    SpO2 100%    BMI 22.06 kg/m2         Intake/Output Summary (Last 24 hours) at 05/08/18 1652  Last data filed at 05/08/18 0440   Gross per 24 hour   Intake                0 ml   Output              350 ml   Net             -350 ml        Physical Examination:             Constitutional:  No distress   ENT:  Neck supple   Resp:  CTA bilaterally. CV:  Regular rhythm, normal rate    GI:  Soft, non distended, less tender to palpation. No rigidity   JOSE  Left flank tenderness    Musculoskeletal:  LLE larger than R. Partial amputation of L foot with wrapping    Neurologic:  Moves all extremities.   AAOx3        Data Review:    Review and/or order of clinical lab test  Review and/or order of tests in the medicine section of [facility]      Labs:     Recent Labs      05/08/18 0419 05/07/18   1120   WBC  6.3  5.2   HGB  8.1*  6.4*   HCT  24.6*  19.6*   PLT  126*  123*     Recent Labs      05/08/18 0419 05/07/18   0340  05/06/18   0453   NA  138  137  142  140  140   K  4.9  4.9  4.3  4.4  4.4   CL  109*  107  112*  109*  109*   CO2  21  21  21  21  22   BUN  56*  55*  49*  53*  53*   CREA  3.16*  3.12*  2.91*  3.10*  2.98*   GLU  105*  103*  80  101*  97   CA  7.8*  7.9*  7.5*  8.2*  8.1*   MG   --    --   1.4*   PHOS  4.4  4.2  4.3     Recent Labs      05/08/18 0419 05/07/18   0340  05/06/18   0453   SGOT   --   20   --    ALT   --   17   --    AP   --   217*   --    TBILI   --   0.2   --    TP   --   5.6*   --    ALB  2.5*  2.1*  2.7*   GLOB   --   3.5   --      Lab Results   Component Value Date/Time    Folate 33.8 (H) 05/07/2018 11:20 AM      Lab Results   Component Value Date/Time    Cholesterol, total 137 04/30/2018 06:24 AM    HDL Cholesterol 35 04/30/2018 06:24 AM    LDL, calculated 89 04/30/2018 06:24 AM    Triglyceride 65 04/30/2018 06:24 AM    CHOL/HDL Ratio 3.9 04/30/2018 06:24 AM     Lab Results   Component Value Date/Time    Glucose (POC) 232 (H) 05/08/2018 04:26 PM    Glucose (POC) 98 05/08/2018 11:57 AM    Glucose (POC) 100 05/08/2018 06:34 AM    Glucose (POC) 95 05/07/2018 09:33 PM    Glucose (POC) 89 05/07/2018 09:16 PM     Lab Results   Component Value Date/Time    Color YELLOW/STRAW 03/02/2018 10:50 PM    Appearance CLOUDY (A) 03/02/2018 10:50 PM    Specific gravity 1.017 03/02/2018 10:50 PM    Specific gravity 1.015 07/26/2010 11:18 AM    pH (UA) 5.0 03/02/2018 10:50 PM    Protein 100 (A) 03/02/2018 10:50 PM    Glucose NEGATIVE  03/02/2018 10:50 PM    Ketone NEGATIVE  03/02/2018 10:50 PM    Bilirubin NEGATIVE  03/02/2018 10:50 PM    Urobilinogen 1.0 03/02/2018 10:50 PM    Nitrites NEGATIVE  03/02/2018 10:50 PM Leukocyte Esterase NEGATIVE  03/02/2018 10:50 PM    Epithelial cells MANY (A) 03/02/2018 10:50 PM    Bacteria 3+ (A) 03/02/2018 10:50 PM    WBC 5-10 03/02/2018 10:50 PM    RBC 0-5 03/02/2018 10:50 PM       Medications Reviewed:     Current Facility-Administered Medications   Medication Dose Route Frequency    epoetin bernard (EPOGEN;PROCRIT) injection 10,000 Units  10,000 Units SubCUTAneous Q MON, WED & FRI    lactated Ringers infusion  50 mL/hr IntraVENous CONTINUOUS    0.9% sodium chloride infusion 250 mL  250 mL IntraVENous PRN    labetalol (NORMODYNE) tablet 500 mg  500 mg Oral Q12H    HYDROmorphone (PF) (DILAUDID) injection 1 mg  1 mg IntraVENous Q2H PRN    DAPTOmycin (CUBICIN) 400 mg in 0.9% sodium chloride 50 mL IVPB RF formulation  400 mg IntraVENous Q48H    alteplase (CATHFLO) 1 mg in sterile water (preservative free) 1 mL injection  1 mg InterCATHeter PRN    polyethylene glycol (MIRALAX) packet 17 g  17 g Oral DAILY PRN    hydrALAZINE (APRESOLINE) tablet 100 mg  100 mg Oral TID    LORazepam (ATIVAN) tablet 0.5 mg  0.5 mg Oral Q6H PRN    senna-docusate (PERICOLACE) 8.6-50 mg per tablet 2 Tab  2 Tab Oral DAILY    hydrALAZINE (APRESOLINE) 20 mg/mL injection 20 mg  20 mg IntraVENous Q4H PRN    ondansetron (ZOFRAN) injection 4 mg  4 mg IntraVENous Q4H PRN    diphenhydrAMINE (BENADRYL) capsule 25 mg  25 mg Oral Q6H PRN    insulin glargine (LANTUS) injection 10 Units  10 Units SubCUTAneous QHS    nitroglycerin (NITROBID) 2 % ointment 2 Inch  2 Inch Topical BID    promethazine (PHENERGAN) 6.25 mg/5 mL syrup 6.25 mg  6.25 mg Oral Q6H PRN    sodium chloride (NS) flush 20 mL  20 mL InterCATHeter PRN    sodium chloride (NS) flush 10 mL  10 mL InterCATHeter Q24H    sodium chloride (NS) flush 10 mL  10 mL InterCATHeter PRN    sodium chloride (NS) flush 10 mL  10 mL InterCATHeter Q8H    sodium chloride (NS) flush 20 mL  20 mL InterCATHeter Q24H    NIFEdipine ER (PROCARDIA XL) tablet 90 mg  90 mg Oral DAILY    calcium carbonate (OS-BINA) tablet 500 mg [elemental]  500 mg Oral DAILY    QUEtiapine (SEROquel) tablet 100 mg  100 mg Oral BID    venlafaxine (EFFEXOR) tablet 75 mg  75 mg Oral BID WITH MEALS    sodium chloride (NS) flush 5-10 mL  5-10 mL IntraVENous Q8H    sodium chloride (NS) flush 5-10 mL  5-10 mL IntraVENous PRN    heparin (porcine) injection 5,000 Units  5,000 Units SubCUTAneous Q8H    albuterol-ipratropium (DUO-NEB) 2.5 MG-0.5 MG/3 ML  3 mL Nebulization Q4H PRN    lactobac ac& pc-s.therm-b.anim (CHRIS Q/RISAQUAD)  1 Cap Oral DAILY    insulin lispro (HUMALOG) injection   SubCUTAneous AC&HS    glucose chewable tablet 16 g  4 Tab Oral PRN    dextrose (D50W) injection syrg 12.5-25 g  12.5-25 g IntraVENous PRN    glucagon (GLUCAGEN) injection 1 mg  1 mg IntraMUSCular PRN    acetaminophen (TYLENOL) tablet 650 mg  650 mg Oral Q4H PRN    oxyCODONE-acetaminophen (PERCOCET) 5-325 mg per tablet 1 Tab  1 Tab Oral Q4H PRN     ______________________________________________________________________  EXPECTED LENGTH OF STAY: 3d 19h  ACTUAL LENGTH OF STAY:          11                 Eliceo Martínez MD

## 2018-05-08 NOTE — PROGRESS NOTES
ID Progress Note  2018    Subjective:     She is not feeling well in general--concerned about her foot, having problems with nausea--left posterior rib pain    Objective:     Antibiotics:  1. Daptomycin       Vitals:   Visit Vitals    /70 (BP 1 Location: Left arm, BP Patient Position: At rest)    Pulse 82    Temp 98.1 °F (36.7 °C)    Resp 18    Ht 5' 2\" (1.575 m)    Wt 54.7 kg (120 lb 9.5 oz)    SpO2 100%    BMI 22.06 kg/m2        Tmax:  Temp (24hrs), Av.1 °F (36.7 °C), Min:97.4 °F (36.3 °C), Max:98.7 °F (37.1 °C)      Exam:  Lungs:  clear to auscultation bilaterally  Heart:  regular rate and rhythm  Abdomen:  soft, non-tender. Bowel sounds normal. No masses,  no organomegaly  Wound dressed    Labs:      Recent Labs      18   0419  18   1120  18   0340  18   0453   WBC  6.3  5.2  5.3  5.5   HGB  8.1*  6.4*  6.5*  8.8*   PLT  126*  123*  120*  148*   BUN  56*  55*   --   49*  53*  53*   CREA  3.16*  3.12*   --   2.91*  3.10*  2.98*   SGOT   --    --   20   --    AP   --    --   217*   --    TBILI   --    --   0.2   --        Cultures:     Lab Results   Component Value Date/Time    Specimen Description: URINE 2013 08:00 PM    Specimen Description: URINE 2013 07:55 PM    Specimen Description: URINE 2013 10:10 AM     Lab Results   Component Value Date/Time    Culture result: NO GROWTH 5 DAYS 2018 05:11 PM    Culture result: FEW STAPHYLOCOCCUS HOMINIS SUBSPECIES HOMINIS (A) 2018 12:47 PM    Culture result: (A) 2018 12:47 PM     STAPHYLOCOCCUS EPIDERMIDIS (OXACILLIN RESISTANT) ISOLATED FROM THIO BROTH ONLY    Culture result: NO ANAEROBES ISOLATED 2018 12:47 PM       Radiology:     Line/Insert Date:           Assessment:     1. Diabetic foot infection--MRI results noted--not sure whether this is the old infection resolving or a new problem  2. Renal insufficiency  3. DM    Objective:     1. Continue current therapy  2. ? options for sampling of tissue for culture  3. She has elected to attempt another course of IV therapy--I am willing to do this, but she understands that there is a high risk of failure--orders on chart when needed  4.  Check rib films    Naz Tenorio MD

## 2018-05-08 NOTE — ROUTINE PROCESS
Bedside and Verbal shift change report given to Hyacinth Mendoza RN (oncoming nurse) by Maddison Colon RN  (offgoing nurse). Report given with KRISTOPHER, Rohan, MAR and Recent Results.

## 2018-05-08 NOTE — PROGRESS NOTES
[facility]   [address] [facility], [facility] [address], [facility]  Phone: [phone]   [phone]       Nephrology Progress Note  Leoncio Toussaint     1980     [id number]  Date of Admission : 4/27/2018 05/08/18    CC: Follow up for STONE       Assessment and Plan   STONE on CKD   - Cr relatively stable. At 3.1 mg/dl today   - continue gentle hydration     Acute on Chronic Hyper K :  - resolved     Uncontrolled HTN:  - BP stable now  - cont current meds    Chronic Pancreatitis:    CKD Stage IV :  - 2/2 DM, SCD  - previously dialysis dependent for ~ 6m. Failed RUE and RLE grafts     Sickle cell dz :  s/p transfusion 5/7  Anemia : continue epogen   Left foot osteomyelitis :  per podiatry and ID     Interval History:  Seen and examined. She reports improvement in her body pains but still has pains . Transfused yesterday. retic count was 0.6     Review of Systems: Pertinent items are noted in HPI.     Current Medications:   Current Facility-Administered Medications   Medication Dose Route Frequency    epoetin bernard (EPOGEN;PROCRIT) injection 10,000 Units  10,000 Units SubCUTAneous Q MON, WED & FRI    lactated Ringers infusion  50 mL/hr IntraVENous CONTINUOUS    0.9% sodium chloride infusion 250 mL  250 mL IntraVENous PRN    labetalol (NORMODYNE) tablet 500 mg  500 mg Oral Q12H    HYDROmorphone (PF) (DILAUDID) injection 1 mg  1 mg IntraVENous Q2H PRN    DAPTOmycin (CUBICIN) 400 mg in 0.9% sodium chloride 50 mL IVPB RF formulation  400 mg IntraVENous Q48H    alteplase (CATHFLO) 1 mg in sterile water (preservative free) 1 mL injection  1 mg InterCATHeter PRN    polyethylene glycol (MIRALAX) packet 17 g  17 g Oral DAILY PRN    hydrALAZINE (APRESOLINE) tablet 100 mg  100 mg Oral TID    LORazepam (ATIVAN) tablet 0.5 mg  0.5 mg Oral Q6H PRN    senna-docusate (PERICOLACE) 8.6-50 mg per tablet 2 Tab  2 Tab Oral DAILY    hydrALAZINE (APRESOLINE) 20 mg/mL injection 20 mg  20 mg IntraVENous Q4H PRN    ondansetron (ZOFRAN) injection 4 mg  4 mg IntraVENous Q4H PRN    diphenhydrAMINE (BENADRYL) capsule 25 mg  25 mg Oral Q6H PRN    insulin glargine (LANTUS) injection 10 Units  10 Units SubCUTAneous QHS    nitroglycerin (NITROBID) 2 % ointment 2 Inch  2 Inch Topical BID    promethazine (PHENERGAN) 6.25 mg/5 mL syrup 6.25 mg  6.25 mg Oral Q6H PRN    sodium chloride (NS) flush 20 mL  20 mL InterCATHeter PRN    sodium chloride (NS) flush 10 mL  10 mL InterCATHeter Q24H    sodium chloride (NS) flush 10 mL  10 mL InterCATHeter PRN    sodium chloride (NS) flush 10 mL  10 mL InterCATHeter Q8H    sodium chloride (NS) flush 20 mL  20 mL InterCATHeter Q24H    NIFEdipine ER (PROCARDIA XL) tablet 90 mg  90 mg Oral DAILY    calcium carbonate (OS-BINA) tablet 500 mg [elemental]  500 mg Oral DAILY    QUEtiapine (SEROquel) tablet 100 mg  100 mg Oral BID    venlafaxine (EFFEXOR) tablet 75 mg  75 mg Oral BID WITH MEALS    sodium chloride (NS) flush 5-10 mL  5-10 mL IntraVENous Q8H    sodium chloride (NS) flush 5-10 mL  5-10 mL IntraVENous PRN    heparin (porcine) injection 5,000 Units  5,000 Units SubCUTAneous Q8H    albuterol-ipratropium (DUO-NEB) 2.5 MG-0.5 MG/3 ML  3 mL Nebulization Q4H PRN    lactobac ac& pc-s.therm-b.anim (CHRIS Q/RISAQUAD)  1 Cap Oral DAILY    insulin lispro (HUMALOG) injection   SubCUTAneous AC&HS    glucose chewable tablet 16 g  4 Tab Oral PRN    dextrose (D50W) injection syrg 12.5-25 g  12.5-25 g IntraVENous PRN    glucagon (GLUCAGEN) injection 1 mg  1 mg IntraMUSCular PRN    acetaminophen (TYLENOL) tablet 650 mg  650 mg Oral Q4H PRN    oxyCODONE-acetaminophen (PERCOCET) 5-325 mg per tablet 1 Tab  1 Tab Oral Q4H PRN      Allergies   Allergen Reactions    Erythromycin Itching    Morphine Itching    Toradol [Ketorolac] Rash       Objective:  Vitals:    Vitals:    05/07/18 1951 05/07/18 2131 05/08/18 0646 05/08/18 0855   BP: 160/82 175/86 162/76 183/88   Pulse: 89 93 85 97 Resp: 16 16 16 16   Temp: 98.4 °F (36.9 °C) 98.3 °F (36.8 °C) 98.7 °F (37.1 °C) 98.4 °F (36.9 °C)   SpO2: 100% 100% 99% 100%   Weight:       Height:         Intake and Output:     05/06 1901 - 05/08 0700  In: 268 [P.O.:118; I.V.:150]  Out: 350 [Urine:350]    Physical Examination:    General: Awake, alert  Neck:  Supple, no mass  Resp:  Lungs CTA B/L, no wheezing , normal respiratory effort  CV:  RRR,  no murmur or rub,trace LE edema  GI:  Soft, NT, + Bowel sounds, no hepatosplenomegaly  Neurologic:  Non focal  Skin:  No Rash  :  No escobedo     []    High complexity decision making was performed  []    Patient is at high-risk of decompensation with multiple organ involvement    Lab Data Personally Reviewed: I have reviewed all the pertinent labs, microbiology data and radiology studies during assessment.     Recent Labs      05/08/18 0419 05/07/18 0340 05/06/18 0453   NA  138  137  142  140  140   K  4.9  4.9  4.3  4.4  4.4   CL  109*  107  112*  109*  109*   CO2  21  21  21  21  22   GLU  105*  103*  80  101*  97   BUN  56*  55*  49*  53*  53*   CREA  3.16*  3.12*  2.91*  3.10*  2.98*   CA  7.8*  7.9*  7.5*  8.2*  8.1*   MG   --    --   1.4*   PHOS  4.4  4.2  4.3   ALB  2.5*  2.1*  2.7*   SGOT   --   20   --    ALT   --   17   --      Recent Labs      05/08/18 0419 05/07/18   1120 05/07/18 0340 05/06/18   0453   WBC  6.3  5.2  5.3  5.5   HGB  8.1*  6.4*  6.5*  8.8*   HCT  24.6*  19.6*  20.5*  27.0*   PLT  126*  123*  120*  148*     Lab Results   Component Value Date/Time    Specimen Description: URINE 06/24/2013 08:00 PM    Specimen Description: URINE 06/17/2013 07:55 PM    Specimen Description: URINE 05/26/2013 10:10 AM    Specimen Description: URINE 04/22/2013 02:30 PM    Specimen Description: NARES 03/21/2013 12:30 PM     Lab Results   Component Value Date/Time    Culture result: NO GROWTH 5 DAYS 04/27/2018 05:11 PM    Culture result: FEW STAPHYLOCOCCUS HOMINIS SUBSPECIES HOMINIS (A) 03/11/2018 12:47 PM    Culture result: (A) 03/11/2018 12:47 PM     STAPHYLOCOCCUS EPIDERMIDIS (OXACILLIN RESISTANT) ISOLATED FROM THIO BROTH ONLY    Culture result: NO ANAEROBES ISOLATED 03/11/2018 12:47 PM    Culture result: NO GROWTH ON SOLID MEDIA AT 4 DAYS 03/11/2018 11:43 AM    Culture result: (A) 03/11/2018 11:43 AM     STAPHYLOCOCCUS EPIDERMIDIS (OXACILLIN RESISTANT) ISOLATED FROM THIO BROTH ONLY    Culture result: NO GROWTH ON SOLID MEDIA AT 4 DAYS 03/11/2018 11:43 AM    Culture result: (A) 03/11/2018 11:43 AM     STAPHYLOCOCCUS EPIDERMIDIS ISOLATED FROM THIO BROTH ONLY     Recent Results (from the past 24 hour(s))   GLUCOSE, POC    Collection Time: 05/07/18 12:14 PM   Result Value Ref Range    Glucose (POC) 82 65 - 100 mg/dL    Performed by Bossman Desir    GLUCOSE, POC    Collection Time: 05/07/18  4:07 PM   Result Value Ref Range    Glucose (POC) 81 65 - 100 mg/dL    Performed by Bismark Cary    GLUCOSE, POC    Collection Time: 05/07/18  9:16 PM   Result Value Ref Range    Glucose (POC) 89 65 - 100 mg/dL    Performed by Bismark Cary    GLUCOSE, POC    Collection Time: 05/07/18  9:33 PM   Result Value Ref Range    Glucose (POC) 95 65 - 100 mg/dL    Performed by Tomás Cardoso    RENAL FUNCTION PANEL    Collection Time: 05/08/18  4:19 AM   Result Value Ref Range    Sodium 137 136 - 145 mmol/L    Potassium 4.9 3.5 - 5.1 mmol/L    Chloride 107 97 - 108 mmol/L    CO2 21 21 - 32 mmol/L    Anion gap 9 5 - 15 mmol/L    Glucose 103 (H) 65 - 100 mg/dL    BUN 55 (H) 6 - 20 MG/DL    Creatinine 3.12 (H) 0.55 - 1.02 MG/DL    BUN/Creatinine ratio 18 12 - 20      GFR est AA 20 (L) >60 ml/min/1.73m2    GFR est non-AA 17 (L) >60 ml/min/1.73m2    Calcium 7.9 (L) 8.5 - 10.1 MG/DL    Phosphorus 4.4 2.6 - 4.7 MG/DL    Albumin 2.5 (L) 3.5 - 5.0 g/dL   CBC WITH AUTOMATED DIFF    Collection Time: 05/08/18  4:19 AM   Result Value Ref Range    WBC 6.3 3.6 - 11.0 K/uL    RBC 3.18 (L) 3.80 - 5.20 M/uL    HGB 8.1 (L) 11.5 - 16.0 g/dL    HCT 24.6 (L) 35.0 - 47.0 %    MCV 77.4 (L) 80.0 - 99.0 FL    MCH 25.5 (L) 26.0 - 34.0 PG    MCHC 32.9 30.0 - 36.5 g/dL    RDW 16.0 (H) 11.5 - 14.5 %    PLATELET 607 (L) 566 - 400 K/uL    NRBC 0.0 0  WBC    ABSOLUTE NRBC 0.00 0.00 - 0.01 K/uL    NEUTROPHILS 75 32 - 75 %    LYMPHOCYTES 13 12 - 49 %    MONOCYTES 7 5 - 13 %    EOSINOPHILS 5 0 - 7 %    BASOPHILS 0 0 - 1 %    IMMATURE GRANULOCYTES 0 0.0 - 0.5 %    ABS. NEUTROPHILS 4.7 1.8 - 8.0 K/UL    ABS. LYMPHOCYTES 0.8 0.8 - 3.5 K/UL    ABS. MONOCYTES 0.4 0.0 - 1.0 K/UL    ABS. EOSINOPHILS 0.3 0.0 - 0.4 K/UL    ABS. BASOPHILS 0.0 0.0 - 0.1 K/UL    ABS. IMM. GRANS. 0.0 0.00 - 0.04 K/UL    DF AUTOMATED     METABOLIC PANEL, BASIC    Collection Time: 05/08/18  4:19 AM   Result Value Ref Range    Sodium 138 136 - 145 mmol/L    Potassium 4.9 3.5 - 5.1 mmol/L    Chloride 109 (H) 97 - 108 mmol/L    CO2 21 21 - 32 mmol/L    Anion gap 8 5 - 15 mmol/L    Glucose 105 (H) 65 - 100 mg/dL    BUN 56 (H) 6 - 20 MG/DL    Creatinine 3.16 (H) 0.55 - 1.02 MG/DL    BUN/Creatinine ratio 18 12 - 20      GFR est AA 20 (L) >60 ml/min/1.73m2    GFR est non-AA 16 (L) >60 ml/min/1.73m2    Calcium 7.8 (L) 8.5 - 10.1 MG/DL   GLUCOSE, POC    Collection Time: 05/08/18  6:34 AM   Result Value Ref Range    Glucose (POC) 100 65 - 100 mg/dL    Performed by Galen Weston I have reviewed the flowsheets. Chart and Pertinent Notes have been reviewed. No change in PMH ,family and social history from Consult note.       Maximino Gary MD

## 2018-05-08 NOTE — ROUTINE PROCESS
Bedside and Verbal shift change report given to oncoming nurse by Doris Yuan RN (offgoing nurse). Report given with SBAR, Kardex, MAR and Recent Results.

## 2018-05-09 LAB
ALBUMIN SERPL-MCNC: 2.2 G/DL (ref 3.5–5)
ALBUMIN SERPL-MCNC: 2.3 G/DL (ref 3.5–5)
ALBUMIN/GLOB SERPL: 0.6 {RATIO} (ref 1.1–2.2)
ALP SERPL-CCNC: 234 U/L (ref 45–117)
ALT SERPL-CCNC: 18 U/L (ref 12–78)
ANION GAP SERPL CALC-SCNC: 9 MMOL/L (ref 5–15)
ANION GAP SERPL CALC-SCNC: 9 MMOL/L (ref 5–15)
APPEARANCE UR: ABNORMAL
AST SERPL-CCNC: 17 U/L (ref 15–37)
BACTERIA URNS QL MICRO: NEGATIVE /HPF
BASOPHILS # BLD: 0 K/UL (ref 0–0.1)
BASOPHILS NFR BLD: 0 % (ref 0–1)
BILIRUB SERPL-MCNC: 0.2 MG/DL (ref 0.2–1)
BILIRUB UR QL: NEGATIVE
BUN SERPL-MCNC: 45 MG/DL (ref 6–20)
BUN SERPL-MCNC: 45 MG/DL (ref 6–20)
BUN/CREAT SERPL: 16 (ref 12–20)
BUN/CREAT SERPL: 16 (ref 12–20)
CALCIUM SERPL-MCNC: 7.6 MG/DL (ref 8.5–10.1)
CALCIUM SERPL-MCNC: 7.8 MG/DL (ref 8.5–10.1)
CHLORIDE SERPL-SCNC: 109 MMOL/L (ref 97–108)
CHLORIDE SERPL-SCNC: 110 MMOL/L (ref 97–108)
CO2 SERPL-SCNC: 21 MMOL/L (ref 21–32)
CO2 SERPL-SCNC: 21 MMOL/L (ref 21–32)
COLOR UR: ABNORMAL
CREAT SERPL-MCNC: 2.8 MG/DL (ref 0.55–1.02)
CREAT SERPL-MCNC: 2.82 MG/DL (ref 0.55–1.02)
DIFFERENTIAL METHOD BLD: ABNORMAL
EOSINOPHIL # BLD: 0.3 K/UL (ref 0–0.4)
EOSINOPHIL NFR BLD: 6 % (ref 0–7)
EPITH CASTS URNS QL MICRO: ABNORMAL /LPF
ERYTHROCYTE [DISTWIDTH] IN BLOOD BY AUTOMATED COUNT: 16.6 % (ref 11.5–14.5)
GLOBULIN SER CALC-MCNC: 3.8 G/DL (ref 2–4)
GLUCOSE BLD STRIP.AUTO-MCNC: 119 MG/DL (ref 65–100)
GLUCOSE BLD STRIP.AUTO-MCNC: 121 MG/DL (ref 65–100)
GLUCOSE BLD STRIP.AUTO-MCNC: 227 MG/DL (ref 65–100)
GLUCOSE BLD STRIP.AUTO-MCNC: 31 MG/DL (ref 65–100)
GLUCOSE BLD STRIP.AUTO-MCNC: 67 MG/DL (ref 65–100)
GLUCOSE BLD STRIP.AUTO-MCNC: 73 MG/DL (ref 65–100)
GLUCOSE BLD STRIP.AUTO-MCNC: 82 MG/DL (ref 65–100)
GLUCOSE BLD STRIP.AUTO-MCNC: 94 MG/DL (ref 65–100)
GLUCOSE SERPL-MCNC: 65 MG/DL (ref 65–100)
GLUCOSE SERPL-MCNC: 68 MG/DL (ref 65–100)
GLUCOSE UR STRIP.AUTO-MCNC: NEGATIVE MG/DL
HCT VFR BLD AUTO: 23.5 % (ref 35–47)
HGB BLD-MCNC: 7.6 G/DL (ref 11.5–16)
HGB UR QL STRIP: NEGATIVE
HYALINE CASTS URNS QL MICRO: ABNORMAL /LPF (ref 0–5)
IMM GRANULOCYTES # BLD: 0 K/UL (ref 0–0.04)
IMM GRANULOCYTES NFR BLD AUTO: 0 % (ref 0–0.5)
KETONES UR QL STRIP.AUTO: NEGATIVE MG/DL
LEUKOCYTE ESTERASE UR QL STRIP.AUTO: NEGATIVE
LYMPHOCYTES # BLD: 0.7 K/UL (ref 0.8–3.5)
LYMPHOCYTES NFR BLD: 14 % (ref 12–49)
MCH RBC QN AUTO: 25.6 PG (ref 26–34)
MCHC RBC AUTO-ENTMCNC: 32.3 G/DL (ref 30–36.5)
MCV RBC AUTO: 79.1 FL (ref 80–99)
MONOCYTES # BLD: 0.4 K/UL (ref 0–1)
MONOCYTES NFR BLD: 9 % (ref 5–13)
NEUTS SEG # BLD: 3.4 K/UL (ref 1.8–8)
NEUTS SEG NFR BLD: 71 % (ref 32–75)
NITRITE UR QL STRIP.AUTO: NEGATIVE
NRBC # BLD: 0 K/UL (ref 0–0.01)
NRBC BLD-RTO: 0 PER 100 WBC
PH UR STRIP: 6.5 [PH] (ref 5–8)
PHOSPHATE SERPL-MCNC: 4 MG/DL (ref 2.6–4.7)
PLATELET # BLD AUTO: 121 K/UL (ref 150–400)
POTASSIUM SERPL-SCNC: 4.4 MMOL/L (ref 3.5–5.1)
POTASSIUM SERPL-SCNC: 4.5 MMOL/L (ref 3.5–5.1)
PROT SERPL-MCNC: 6.1 G/DL (ref 6.4–8.2)
PROT UR STRIP-MCNC: 30 MG/DL
RBC # BLD AUTO: 2.97 M/UL (ref 3.8–5.2)
RBC #/AREA URNS HPF: ABNORMAL /HPF (ref 0–5)
SERVICE CMNT-IMP: ABNORMAL
SERVICE CMNT-IMP: NORMAL
SODIUM SERPL-SCNC: 139 MMOL/L (ref 136–145)
SODIUM SERPL-SCNC: 140 MMOL/L (ref 136–145)
SP GR UR REFRACTOMETRY: 1.01 (ref 1–1.03)
UROBILINOGEN UR QL STRIP.AUTO: 0.2 EU/DL (ref 0.2–1)
WBC # BLD AUTO: 4.8 K/UL (ref 3.6–11)
WBC URNS QL MICRO: ABNORMAL /HPF (ref 0–4)

## 2018-05-09 PROCEDURE — 81001 URINALYSIS AUTO W/SCOPE: CPT | Performed by: HOSPITALIST

## 2018-05-09 PROCEDURE — 74011250637 HC RX REV CODE- 250/637: Performed by: INTERNAL MEDICINE

## 2018-05-09 PROCEDURE — 74011636637 HC RX REV CODE- 636/637: Performed by: INTERNAL MEDICINE

## 2018-05-09 PROCEDURE — 74011250637 HC RX REV CODE- 250/637: Performed by: FAMILY MEDICINE

## 2018-05-09 PROCEDURE — 74011250636 HC RX REV CODE- 250/636: Performed by: INTERNAL MEDICINE

## 2018-05-09 PROCEDURE — 85025 COMPLETE CBC W/AUTO DIFF WBC: CPT | Performed by: HOSPITALIST

## 2018-05-09 PROCEDURE — 74011250637 HC RX REV CODE- 250/637: Performed by: PHYSICAL MEDICINE & REHABILITATION

## 2018-05-09 PROCEDURE — 80069 RENAL FUNCTION PANEL: CPT | Performed by: INTERNAL MEDICINE

## 2018-05-09 PROCEDURE — 80053 COMPREHEN METABOLIC PANEL: CPT | Performed by: HOSPITALIST

## 2018-05-09 PROCEDURE — 74011250636 HC RX REV CODE- 250/636: Performed by: HOSPITALIST

## 2018-05-09 PROCEDURE — 65270000029 HC RM PRIVATE

## 2018-05-09 PROCEDURE — 82962 GLUCOSE BLOOD TEST: CPT

## 2018-05-09 PROCEDURE — 74011000258 HC RX REV CODE- 258: Performed by: INTERNAL MEDICINE

## 2018-05-09 PROCEDURE — 36415 COLL VENOUS BLD VENIPUNCTURE: CPT | Performed by: INTERNAL MEDICINE

## 2018-05-09 RX ORDER — HYDROMORPHONE HYDROCHLORIDE 1 MG/ML
1 INJECTION, SOLUTION INTRAMUSCULAR; INTRAVENOUS; SUBCUTANEOUS
Status: DISCONTINUED | OUTPATIENT
Start: 2018-05-09 | End: 2018-05-13

## 2018-05-09 RX ADMIN — HYDROMORPHONE HYDROCHLORIDE 1 MG: 1 INJECTION, SOLUTION INTRAMUSCULAR; INTRAVENOUS; SUBCUTANEOUS at 10:54

## 2018-05-09 RX ADMIN — ONDANSETRON HYDROCHLORIDE 4 MG: 2 INJECTION INTRAMUSCULAR; INTRAVENOUS at 14:22

## 2018-05-09 RX ADMIN — LABETALOL HYDROCHLORIDE 500 MG: 200 TABLET, FILM COATED ORAL at 22:31

## 2018-05-09 RX ADMIN — HYDRALAZINE HYDROCHLORIDE 100 MG: 50 TABLET ORAL at 09:26

## 2018-05-09 RX ADMIN — HYDROMORPHONE HYDROCHLORIDE 1 MG: 1 INJECTION, SOLUTION INTRAMUSCULAR; INTRAVENOUS; SUBCUTANEOUS at 00:11

## 2018-05-09 RX ADMIN — Medication 20 ML: at 13:53

## 2018-05-09 RX ADMIN — POLYETHYLENE GLYCOL 3350 17 G: 17 POWDER, FOR SOLUTION ORAL at 18:16

## 2018-05-09 RX ADMIN — NITROGLYCERIN 2 INCH: 20 OINTMENT TOPICAL at 09:25

## 2018-05-09 RX ADMIN — HYDROMORPHONE HYDROCHLORIDE 1 MG: 1 INJECTION, SOLUTION INTRAMUSCULAR; INTRAVENOUS; SUBCUTANEOUS at 17:55

## 2018-05-09 RX ADMIN — Medication 10 ML: at 13:54

## 2018-05-09 RX ADMIN — ONDANSETRON HYDROCHLORIDE 4 MG: 2 INJECTION INTRAMUSCULAR; INTRAVENOUS at 22:30

## 2018-05-09 RX ADMIN — Medication 1 CAPSULE: at 09:26

## 2018-05-09 RX ADMIN — HYDROMORPHONE HYDROCHLORIDE 1 MG: 1 INJECTION, SOLUTION INTRAMUSCULAR; INTRAVENOUS; SUBCUTANEOUS at 14:22

## 2018-05-09 RX ADMIN — OXYCODONE AND ACETAMINOPHEN 1 TABLET: 5; 325 TABLET ORAL at 20:13

## 2018-05-09 RX ADMIN — ONDANSETRON HYDROCHLORIDE 4 MG: 2 INJECTION INTRAMUSCULAR; INTRAVENOUS at 10:54

## 2018-05-09 RX ADMIN — Medication 10 ML: at 06:00

## 2018-05-09 RX ADMIN — HEPARIN SODIUM 5000 UNITS: 5000 INJECTION, SOLUTION INTRAVENOUS; SUBCUTANEOUS at 22:30

## 2018-05-09 RX ADMIN — LABETALOL HYDROCHLORIDE 500 MG: 200 TABLET, FILM COATED ORAL at 09:30

## 2018-05-09 RX ADMIN — Medication 10 ML: at 22:32

## 2018-05-09 RX ADMIN — STANDARDIZED SENNA CONCENTRATE AND DOCUSATE SODIUM 2 TABLET: 8.6; 5 TABLET, FILM COATED ORAL at 09:27

## 2018-05-09 RX ADMIN — QUETIAPINE FUMARATE 100 MG: 100 TABLET ORAL at 17:54

## 2018-05-09 RX ADMIN — SODIUM CHLORIDE, SODIUM LACTATE, POTASSIUM CHLORIDE, AND CALCIUM CHLORIDE 50 ML/HR: 600; 310; 30; 20 INJECTION, SOLUTION INTRAVENOUS at 14:29

## 2018-05-09 RX ADMIN — ONDANSETRON HYDROCHLORIDE 4 MG: 2 INJECTION INTRAMUSCULAR; INTRAVENOUS at 17:55

## 2018-05-09 RX ADMIN — HYDRALAZINE HYDROCHLORIDE 100 MG: 50 TABLET ORAL at 22:31

## 2018-05-09 RX ADMIN — ONDANSETRON HYDROCHLORIDE 4 MG: 2 INJECTION INTRAMUSCULAR; INTRAVENOUS at 03:00

## 2018-05-09 RX ADMIN — HEPARIN SODIUM 5000 UNITS: 5000 INJECTION, SOLUTION INTRAVENOUS; SUBCUTANEOUS at 13:53

## 2018-05-09 RX ADMIN — HEPARIN SODIUM 5000 UNITS: 5000 INJECTION, SOLUTION INTRAVENOUS; SUBCUTANEOUS at 05:00

## 2018-05-09 RX ADMIN — HYDROMORPHONE HYDROCHLORIDE 1 MG: 1 INJECTION, SOLUTION INTRAMUSCULAR; INTRAVENOUS; SUBCUTANEOUS at 07:36

## 2018-05-09 RX ADMIN — HYDRALAZINE HYDROCHLORIDE 100 MG: 50 TABLET ORAL at 17:54

## 2018-05-09 RX ADMIN — DAPTOMYCIN 400 MG: 500 INJECTION, POWDER, LYOPHILIZED, FOR SOLUTION INTRAVENOUS at 14:27

## 2018-05-09 RX ADMIN — QUETIAPINE FUMARATE 100 MG: 100 TABLET ORAL at 09:26

## 2018-05-09 RX ADMIN — EPOETIN ALFA 10000 UNITS: 10000 SOLUTION INTRAVENOUS; SUBCUTANEOUS at 18:16

## 2018-05-09 RX ADMIN — CALCIUM 500 MG: 500 TABLET ORAL at 09:26

## 2018-05-09 RX ADMIN — INSULIN LISPRO 2 UNITS: 100 INJECTION, SOLUTION INTRAVENOUS; SUBCUTANEOUS at 22:30

## 2018-05-09 RX ADMIN — NITROGLYCERIN 2 INCH: 20 OINTMENT TOPICAL at 17:55

## 2018-05-09 RX ADMIN — VENLAFAXINE 75 MG: 37.5 TABLET ORAL at 09:26

## 2018-05-09 RX ADMIN — ONDANSETRON HYDROCHLORIDE 4 MG: 2 INJECTION INTRAMUSCULAR; INTRAVENOUS at 07:36

## 2018-05-09 RX ADMIN — HYDROMORPHONE HYDROCHLORIDE 1 MG: 1 INJECTION, SOLUTION INTRAMUSCULAR; INTRAVENOUS; SUBCUTANEOUS at 03:30

## 2018-05-09 RX ADMIN — NIFEDIPINE 90 MG: 60 TABLET, FILM COATED, EXTENDED RELEASE ORAL at 09:25

## 2018-05-09 RX ADMIN — VENLAFAXINE 75 MG: 37.5 TABLET ORAL at 17:54

## 2018-05-09 RX ADMIN — HYDROMORPHONE HYDROCHLORIDE 1 MG: 1 INJECTION, SOLUTION INTRAMUSCULAR; INTRAVENOUS; SUBCUTANEOUS at 22:30

## 2018-05-09 NOTE — DIABETES MGMT
DTC Progress Note    Recommendations/ Comments: Chart review for extreme hypoglycemia. BG at 0339 by chemistry 31 mg/dl. FBG 67 mg/dl. Hypoglycemia occurred on 5/7/2018. Typically occurs after receiving correction per normal sensitivity scale. Elevated creatinine    Please consider,   Decrease Lantus to 5 units daily  Change lispro corerction scale to high sensitivity    Current hospital DM medication:  Lantus 10 units daily,  Lispro correctional insulin - normal sensitivity. Patient is a 42 y.o. female with known Type 2 DM complicated by CKD and gastroparesis on no DM medications at home noted on PTA medications list.  Admitted for osteomyelitis of left foot    A1c:   Lab Results   Component Value Date/Time    Hemoglobin A1c 6.9 (H) 04/30/2018 06:24 AM    Hemoglobin A1c 8.8 (H) 03/03/2018 09:21 AM       Recent Glucose Results:   Lab Results   Component Value Date/Time    GLU 65 05/09/2018 05:24 AM    GLU 68 05/09/2018 05:24 AM    GLUCPOC 82 05/09/2018 07:09 AM    GLUCPOC 73 05/09/2018 06:53 AM    GLUCPOC 67 05/09/2018 06:13 AM      Lab Results   Component Value Date/Time    Creatinine 2.82 (H) 05/09/2018 05:24 AM    Creatinine 2.80 (H) 05/09/2018 05:24 AM     Estimated Creatinine Clearance: 21.2 mL/min (based on Cr of 2.82). Active Orders   Diet    DIET DIABETIC CONSISTENT CARB Regular      PO intake:   Patient Vitals for the past 72 hrs:   % Diet Eaten   05/07/18 1302 30 %       Will continue to follow as needed.     Thank you  Washington Clarke RN, CDE  Pager [phone]

## 2018-05-09 NOTE — PROGRESS NOTES
Hospitalist Progress Note  Trenton hCilders MD  Answering service: [phone] OR [phone] from in house phone        Date of Service:  2018  NAME:  Isiah Murillo  :  1980  MRN:  [id number]      Admission Summary:      Per H&P: 12-year-old woman with a past medical history significant for hypertension, type 2 diabetes, obstructive sleep apnea, depression, chronic kidney disease, narcotic dependency was in her usual state of health until about 3 days ago when the patient developed left leg pain and swelling. The pain is a constant 8/10 in severity. Patient described the pain as a sharp shooting pain in her left leg. No relief with pain medication. No known aggravating factors. Last month, the patient underwent a transmetatarsal resection of the left foot 2/2 diabetic foot. Patient also complained of abdominal pain, nausea, and vomiting. The abdominal pain is diffuse in location. F/u for osteomyelitis      Patient still complains of generalized pain. She had reported left flank pain yesterday,renal US and ribs xray were negative. Assessment & Plan:     Type 2 diabetes type 2 with Hypoglycemia  -D/c lantus. Accucheks. Right flank pain  -Renal US,no hydronephrosis,stones. Ribs xray megative. A/P CT on  had shown markedly distended bladder.  -Heat pad,pain control      Sickle cell crisis  Microcytic Anemia with acute drop. Hgb 6.5 and re-checked and at 6.4,5/7. received 1 unit and HB upto 8. 2. Monitor  -IV hydration,encouraged pt to use oral pain medication for better control as the IV wears off quickly  -Pain control  -Supplemental o2    Acute on chronic pancreatitis: Patient with N/V, abdominal pain and elevated lipase /amylase. Repeat Lipase trended close to normal values. - CT A/P with calcified pancreas  - Pain control.  Discussed with patient that we need to start weaning IV and transition to PO once she tolerates eating more  - Advance diet as tolerated    Acute hyperkalemia due to kidney disease:  - now resolved    Osteomyelitis of the left foot:  - Podiatry was consulted. As above, patient declined further surgery  - MRI of the left foot done, suspicious for Osteomyelitis. ? Unclear if this is old or new  - On Zosyn and Daptomycin now  - ID on board. Patient will likely need prolonged course and then to re-visit the need for surgery  - Appreciate recs. Patient will likely leave on IV antibitoics    Acute on chronic kidney disease stage V  - holding diuretic  - Nephrology following    Essential Hypertension: Better control today  - Up-titrate meds as needed  - Patient with very high BP secondary to baseline HTN and pain associated with her pancreatitis flare    Obstructive sleep apnea    Depression: Continue home meds. Code status: Full  DVT prophylaxis: heparin    Care Plan discussed with: Patient/Family and Nurse  Disposition:Home with home iv abx once all acute issue have resolved. Hospital Problems  Date Reviewed: 4/28/2018          Codes Class Noted POA    * (Principal)Osteomyelitis of left foot [facility]) ICD-10-CM: M86.9  ICD-9-CM: 730.27  4/27/2018 Yes              Review of Systems:   Pertinent items are noted in HPI. Vital Signs:    Last 24hrs VS reviewed since prior progress note. Most recent are:  Visit Vitals    /80 (BP 1 Location: Left arm, BP Patient Position: At rest)    Pulse 88    Temp 98.5 °F (36.9 °C)    Resp 16    Ht 5' 2\" (1.575 m)    Wt 54.7 kg (120 lb 9.5 oz)    SpO2 99%    BMI 22.06 kg/m2       No intake or output data in the 24 hours ending 05/09/18 1430     Physical Examination:             Constitutional:  No distress   ENT:  Neck supple   Resp:  CTA bilaterally. CV:  Regular rhythm, normal rate    GI:  Soft, non distended, less tender to palpation.  No rigidity   JOSE  Left flank tenderness    Musculoskeletal:  LLE larger than R. Partial amputation of L foot with wrapping Neurologic:  Moves all extremities.   AAOx3        Data Review:    Review and/or order of clinical lab test  Review and/or order of tests in the medicine section of [facility]      Labs:     Recent Labs      05/09/18 0524 05/08/18 0419   WBC  4.8  6.3   HGB  7.6*  8.1*   HCT  23.5*  24.6*   PLT  121*  126*     Recent Labs      05/09/18 0524 05/08/18 0419 05/07/18   0340   NA  140  139  138  137  142   K  4.5  4.4  4.9  4.9  4.3   CL  110*  109*  109*  107  112*   CO2  21  21  21  21  21   BUN  45*  45*  56*  55*  49*   CREA  2.80*  2.82*  3.16*  3.12*  2.91*   GLU  68  65  105*  103*  80   CA  7.8*  7.6*  7.8*  7.9*  7.5*   PHOS  4.0  4.4  4.2     Recent Labs      05/09/18 0524 05/08/18 0419 05/07/18   0340   SGOT  17   --   20   ALT  18   --   17   AP  234*   --   217*   TBILI  0.2   --   0.2   TP  6.1*   --   5.6*   ALB  2.3*  2.2*  2.5*  2.1*   GLOB  3.8   --   3.5     Lab Results   Component Value Date/Time    Folate 33.8 (H) 05/07/2018 11:20 AM      Lab Results   Component Value Date/Time    Cholesterol, total 137 04/30/2018 06:24 AM    HDL Cholesterol 35 04/30/2018 06:24 AM    LDL, calculated 89 04/30/2018 06:24 AM    Triglyceride 65 04/30/2018 06:24 AM    CHOL/HDL Ratio 3.9 04/30/2018 06:24 AM     Lab Results   Component Value Date/Time    Glucose (POC) 119 (H) 05/09/2018 11:34 AM    Glucose (POC) 82 05/09/2018 07:09 AM    Glucose (POC) 73 05/09/2018 06:53 AM    Glucose (POC) 67 05/09/2018 06:13 AM    Glucose (POC) 121 (H) 05/09/2018 03:54 AM     Lab Results   Component Value Date/Time    Color YELLOW/STRAW 03/02/2018 10:50 PM    Appearance CLOUDY (A) 03/02/2018 10:50 PM    Specific gravity 1.017 03/02/2018 10:50 PM    Specific gravity 1.015 07/26/2010 11:18 AM    pH (UA) 5.0 03/02/2018 10:50 PM    Protein 100 (A) 03/02/2018 10:50 PM    Glucose NEGATIVE  03/02/2018 10:50 PM    Ketone NEGATIVE  03/02/2018 10:50 PM    Bilirubin NEGATIVE  03/02/2018 10:50 PM    Urobilinogen 1.0 03/02/2018 10:50 PM    Nitrites NEGATIVE  03/02/2018 10:50 PM    Leukocyte Esterase NEGATIVE  03/02/2018 10:50 PM    Epithelial cells MANY (A) 03/02/2018 10:50 PM    Bacteria 3+ (A) 03/02/2018 10:50 PM    WBC 5-10 03/02/2018 10:50 PM    RBC 0-5 03/02/2018 10:50 PM       Medications Reviewed:     Current Facility-Administered Medications   Medication Dose Route Frequency    HYDROmorphone (PF) (DILAUDID) injection 1 mg  1 mg IntraVENous Q4H PRN    epoetin bernard (EPOGEN;PROCRIT) injection 10,000 Units  10,000 Units SubCUTAneous Q MON, WED & FRI    lactated Ringers infusion  50 mL/hr IntraVENous CONTINUOUS    0.9% sodium chloride infusion 250 mL  250 mL IntraVENous PRN    labetalol (NORMODYNE) tablet 500 mg  500 mg Oral Q12H    DAPTOmycin (CUBICIN) 400 mg in 0.9% sodium chloride 50 mL IVPB RF formulation  400 mg IntraVENous Q48H    alteplase (CATHFLO) 1 mg in sterile water (preservative free) 1 mL injection  1 mg InterCATHeter PRN    polyethylene glycol (MIRALAX) packet 17 g  17 g Oral DAILY PRN    hydrALAZINE (APRESOLINE) tablet 100 mg  100 mg Oral TID    LORazepam (ATIVAN) tablet 0.5 mg  0.5 mg Oral Q6H PRN    senna-docusate (PERICOLACE) 8.6-50 mg per tablet 2 Tab  2 Tab Oral DAILY    hydrALAZINE (APRESOLINE) 20 mg/mL injection 20 mg  20 mg IntraVENous Q4H PRN    ondansetron (ZOFRAN) injection 4 mg  4 mg IntraVENous Q4H PRN    diphenhydrAMINE (BENADRYL) capsule 25 mg  25 mg Oral Q6H PRN    insulin glargine (LANTUS) injection 10 Units  10 Units SubCUTAneous QHS    nitroglycerin (NITROBID) 2 % ointment 2 Inch  2 Inch Topical BID    promethazine (PHENERGAN) 6.25 mg/5 mL syrup 6.25 mg  6.25 mg Oral Q6H PRN    sodium chloride (NS) flush 20 mL  20 mL InterCATHeter PRN    sodium chloride (NS) flush 10 mL  10 mL InterCATHeter Q24H    sodium chloride (NS) flush 10 mL  10 mL InterCATHeter PRN    sodium chloride (NS) flush 10 mL  10 mL InterCATHeter Q8H    sodium chloride (NS) flush 20 mL  20 mL InterCATHeter Q24H    NIFEdipine ER (PROCARDIA XL) tablet 90 mg  90 mg Oral DAILY    calcium carbonate (OS-BINA) tablet 500 mg [elemental]  500 mg Oral DAILY    QUEtiapine (SEROquel) tablet 100 mg  100 mg Oral BID    venlafaxine (EFFEXOR) tablet 75 mg  75 mg Oral BID WITH MEALS    sodium chloride (NS) flush 5-10 mL  5-10 mL IntraVENous Q8H    sodium chloride (NS) flush 5-10 mL  5-10 mL IntraVENous PRN    heparin (porcine) injection 5,000 Units  5,000 Units SubCUTAneous Q8H    albuterol-ipratropium (DUO-NEB) 2.5 MG-0.5 MG/3 ML  3 mL Nebulization Q4H PRN    lactobac ac& pc-s.therm-b.anim (CHRIS Q/RISAQUAD)  1 Cap Oral DAILY    insulin lispro (HUMALOG) injection   SubCUTAneous AC&HS    glucose chewable tablet 16 g  4 Tab Oral PRN    dextrose (D50W) injection syrg 12.5-25 g  12.5-25 g IntraVENous PRN    glucagon (GLUCAGEN) injection 1 mg  1 mg IntraMUSCular PRN    acetaminophen (TYLENOL) tablet 650 mg  650 mg Oral Q4H PRN    oxyCODONE-acetaminophen (PERCOCET) 5-325 mg per tablet 1 Tab  1 Tab Oral Q4H PRN     ______________________________________________________________________  EXPECTED LENGTH OF STAY: 3d 19h  ACTUAL LENGTH OF STAY:          12                 Germán Seay MD

## 2018-05-09 NOTE — PROGRESS NOTES
Physical Therapy    Reviewed chart and attempted to treat pt. Pt left sideline in bed. Pt had brief eye opening then closed her eyes. Pt did not respond to therapist at all. Deferred visit due to pt willingness to participate. Kiersten MARTIN reports pt is up ad yamil maintaining NWB. Per report pt will need rolling walker ordered prior to discharge.

## 2018-05-09 NOTE — PROGRESS NOTES
ID Progress Note  2018    Subjective:     She is not feeling well in general--concerned about her foot, having problems with nausea--left posterior rib pain    Objective:     Antibiotics:  1. Daptomycin       Vitals:   Visit Vitals    /80 (BP 1 Location: Left arm, BP Patient Position: At rest)    Pulse 88    Temp 98.5 °F (36.9 °C)    Resp 16    Ht 5' 2\" (1.575 m)    Wt 54.7 kg (120 lb 9.5 oz)    SpO2 99%    BMI 22.06 kg/m2        Tmax:  Temp (24hrs), Av.3 °F (36.8 °C), Min:98.1 °F (36.7 °C), Max:98.5 °F (36.9 °C)      Exam:  Lungs:  clear to auscultation bilaterally  Heart:  regular rate and rhythm  Abdomen:  soft, non-tender. Bowel sounds normal. No masses,  no organomegaly  Wound dressed    Labs:      Recent Labs      18   0524  18   0419  18   1120  18   0340   WBC  4.8  6.3  5.2  5.3   HGB  7.6*  8.1*  6.4*  6.5*   PLT  121*  126*  123*  120*   BUN  45*  45*  56*  55*   --   49*   CREA  2.80*  2.82*  3.16*  3.12*   --   2.91*   SGOT  17   --    --   20   AP  234*   --    --   217*   TBILI  0.2   --    --   0.2       Cultures:     Lab Results   Component Value Date/Time    Specimen Description: URINE 2013 08:00 PM    Specimen Description: URINE 2013 07:55 PM    Specimen Description: URINE 2013 10:10 AM     Lab Results   Component Value Date/Time    Culture result: NO GROWTH 5 DAYS 2018 05:11 PM    Culture result: FEW STAPHYLOCOCCUS HOMINIS SUBSPECIES HOMINIS (A) 2018 12:47 PM    Culture result: (A) 2018 12:47 PM     STAPHYLOCOCCUS EPIDERMIDIS (OXACILLIN RESISTANT) ISOLATED FROM THIO BROTH ONLY    Culture result: NO ANAEROBES ISOLATED 2018 12:47 PM       Radiology:     Line/Insert Date:           Assessment:     1. Diabetic foot infection--MRI results noted--not sure whether this is the old infection resolving or a new problem  2. Renal insufficiency  3. DM    Objective:     1. Continue current therapy  2. ? options for sampling of tissue for culture  3. She has elected to attempt another course of IV therapy--I am willing to do this, but she understands that there is a high risk of failure--orders on chart when needed  4.  Check rib films    Seth Lal MD

## 2018-05-09 NOTE — PROGRESS NOTES
HYPOGLYCEMIC EPISODE DOCUMENTATION    Patient with hypoglycemic episode(s) at 339 (time) on 05/09/2018 (date). BG value(s) pre-treatment 32    Was patient symptomatic? [x] yes, [] no  Patient was treated with the following rescue medications/treatments: [x] D50                [] Glucose tablets                [] Glucagon                [] 4oz juice                [] 6oz reg soda                [] 8oz low fat milk  BG value post-treatment: 121  Once BG treated and value greater than 80mg/dl, pt was provided with the following:  [x] snack  [] meal  Name of MD notified:Concetta  The following orders were received: No further orders.  Continue to monitor patient

## 2018-05-09 NOTE — PROGRESS NOTES
8:57 PM  Bedside and Verbal shift change report given to Kaycee MonVERONICA heard (oncoming nurse) by Carolina Alejo RN (offgoing nurse). Report included the following information SBAR, Kardex, Procedure Summary, Intake/Output, MAR and Recent Results.

## 2018-05-09 NOTE — PROGRESS NOTES
[facility]   [address] [facility], [address] [place] [address] Rd [address], [facility] [place]  Phone: [phone]   [phone]       Nephrology Progress Note  Tucker Chawla     1980     [id number]  Date of Admission : 4/27/2018 05/09/18    CC: Follow up for STONE       Assessment and Plan   STONE on CKD   - Cr continues to trend down . At 2.8 today   - continue IVF at 48 cc/ hr   - Kidney US - No stones     Acute on Chronic Hyper K :  - resolved     Uncontrolled HTN:  - BP stable now  - cont current meds    Chronic Pancreatitis    CKD Stage IV :  - 2/2 DM, SCD  - previously dialysis dependent for ~ 6m. Failed RUE and RLE grafts     Sickle cell dz :  s/p transfusion 5/7  Anemia : continue epogen   Left foot osteomyelitis :  per podiatry and ID     Interval History:  Seen and examined. She continues to have R flank pain - X rays and US ok . ? Sickle cell pain     Review of Systems: Pertinent items are noted in HPI.     Current Medications:   Current Facility-Administered Medications   Medication Dose Route Frequency    HYDROmorphone (PF) (DILAUDID) injection 1 mg  1 mg IntraVENous Q4H PRN    epoetin bernard (EPOGEN;PROCRIT) injection 10,000 Units  10,000 Units SubCUTAneous Q MON, WED & FRI    lactated Ringers infusion  50 mL/hr IntraVENous CONTINUOUS    0.9% sodium chloride infusion 250 mL  250 mL IntraVENous PRN    labetalol (NORMODYNE) tablet 500 mg  500 mg Oral Q12H    DAPTOmycin (CUBICIN) 400 mg in 0.9% sodium chloride 50 mL IVPB RF formulation  400 mg IntraVENous Q48H    alteplase (CATHFLO) 1 mg in sterile water (preservative free) 1 mL injection  1 mg InterCATHeter PRN    polyethylene glycol (MIRALAX) packet 17 g  17 g Oral DAILY PRN    hydrALAZINE (APRESOLINE) tablet 100 mg  100 mg Oral TID    LORazepam (ATIVAN) tablet 0.5 mg  0.5 mg Oral Q6H PRN    senna-docusate (PERICOLACE) 8.6-50 mg per tablet 2 Tab  2 Tab Oral DAILY    hydrALAZINE (APRESOLINE) 20 mg/mL injection 20 mg  20 mg IntraVENous Q4H PRN  ondansetron (ZOFRAN) injection 4 mg  4 mg IntraVENous Q4H PRN    diphenhydrAMINE (BENADRYL) capsule 25 mg  25 mg Oral Q6H PRN    insulin glargine (LANTUS) injection 10 Units  10 Units SubCUTAneous QHS    nitroglycerin (NITROBID) 2 % ointment 2 Inch  2 Inch Topical BID    promethazine (PHENERGAN) 6.25 mg/5 mL syrup 6.25 mg  6.25 mg Oral Q6H PRN    sodium chloride (NS) flush 20 mL  20 mL InterCATHeter PRN    sodium chloride (NS) flush 10 mL  10 mL InterCATHeter Q24H    sodium chloride (NS) flush 10 mL  10 mL InterCATHeter PRN    sodium chloride (NS) flush 10 mL  10 mL InterCATHeter Q8H    sodium chloride (NS) flush 20 mL  20 mL InterCATHeter Q24H    NIFEdipine ER (PROCARDIA XL) tablet 90 mg  90 mg Oral DAILY    calcium carbonate (OS-BINA) tablet 500 mg [elemental]  500 mg Oral DAILY    QUEtiapine (SEROquel) tablet 100 mg  100 mg Oral BID    venlafaxine (EFFEXOR) tablet 75 mg  75 mg Oral BID WITH MEALS    sodium chloride (NS) flush 5-10 mL  5-10 mL IntraVENous Q8H    sodium chloride (NS) flush 5-10 mL  5-10 mL IntraVENous PRN    heparin (porcine) injection 5,000 Units  5,000 Units SubCUTAneous Q8H    albuterol-ipratropium (DUO-NEB) 2.5 MG-0.5 MG/3 ML  3 mL Nebulization Q4H PRN    lactobac ac& pc-s.therm-b.anim (CHRIS Q/RISAQUAD)  1 Cap Oral DAILY    insulin lispro (HUMALOG) injection   SubCUTAneous AC&HS    glucose chewable tablet 16 g  4 Tab Oral PRN    dextrose (D50W) injection syrg 12.5-25 g  12.5-25 g IntraVENous PRN    glucagon (GLUCAGEN) injection 1 mg  1 mg IntraMUSCular PRN    acetaminophen (TYLENOL) tablet 650 mg  650 mg Oral Q4H PRN    oxyCODONE-acetaminophen (PERCOCET) 5-325 mg per tablet 1 Tab  1 Tab Oral Q4H PRN      Allergies   Allergen Reactions    Erythromycin Itching    Morphine Itching    Toradol [Ketorolac] Rash       Objective:  Vitals:    Vitals:    05/08/18 1538 05/08/18 2108 05/09/18 0247 05/09/18 0921   BP: 124/70 164/80 148/79 168/80   Pulse: 82 88 74 88   Resp: 18 14 15 16   Temp: 98.1 °F (36.7 °C) 98.4 °F (36.9 °C) 98.1 °F (36.7 °C) 98.5 °F (36.9 °C)   SpO2: 100% 96% 100% 99%   Weight:       Height:         Intake and Output:     05/07 1901 - 05/09 0700  In: -   Out: 350 [Urine:350]    Physical Examination:    General: Awake, alert  Neck:  Supple, no mass  Resp:  Lungs CTA B/L  CV:  RRR,  no murmur   GI:  Soft, NT, + BS  Neurologic:  Non focal  Skin:  No Rash  :  No escobedo     []    High complexity decision making was performed  []    Patient is at high-risk of decompensation with multiple organ involvement    Lab Data Personally Reviewed: I have reviewed all the pertinent labs, microbiology data and radiology studies during assessment.     Recent Labs      05/09/18   0524 05/08/18   0419  05/07/18   0340   NA  140  139  138  137  142   K  4.5  4.4  4.9  4.9  4.3   CL  110*  109*  109*  107  112*   CO2  21  21  21  21  21   GLU  68  65  105*  103*  80   BUN  45*  45*  56*  55*  49*   CREA  2.80*  2.82*  3.16*  3.12*  2.91*   CA  7.8*  7.6*  7.8*  7.9*  7.5*   PHOS  4.0  4.4  4.2   ALB  2.3*  2.2*  2.5*  2.1*   SGOT  17   --   20   ALT  18   --   17     Recent Labs      05/09/18   0524  05/08/18   0419  05/07/18   1120  05/07/18   0340   WBC  4.8  6.3  5.2  5.3   HGB  7.6*  8.1*  6.4*  6.5*   HCT  23.5*  24.6*  19.6*  20.5*   PLT  121*  126*  123*  120*     Lab Results   Component Value Date/Time    Specimen Description: URINE 06/24/2013 08:00 PM    Specimen Description: URINE 06/17/2013 07:55 PM    Specimen Description: URINE 05/26/2013 10:10 AM    Specimen Description: URINE 04/22/2013 02:30 PM    Specimen Description: NARES 03/21/2013 12:30 PM     Lab Results   Component Value Date/Time    Culture result: NO GROWTH 5 DAYS 04/27/2018 05:11 PM    Culture result: FEW STAPHYLOCOCCUS HOMINIS SUBSPECIES HOMINIS (A) 03/11/2018 12:47 PM    Culture result: (A) 03/11/2018 12:47 PM     STAPHYLOCOCCUS EPIDERMIDIS (OXACILLIN RESISTANT) ISOLATED FROM THIO BROTH ONLY Culture result: NO ANAEROBES ISOLATED 03/11/2018 12:47 PM    Culture result: NO GROWTH ON SOLID MEDIA AT 4 DAYS 03/11/2018 11:43 AM    Culture result: (A) 03/11/2018 11:43 AM     STAPHYLOCOCCUS EPIDERMIDIS (OXACILLIN RESISTANT) ISOLATED FROM THIO BROTH ONLY    Culture result: NO GROWTH ON SOLID MEDIA AT 4 DAYS 03/11/2018 11:43 AM    Culture result: (A) 03/11/2018 11:43 AM     STAPHYLOCOCCUS EPIDERMIDIS ISOLATED FROM THIO BROTH ONLY     Recent Results (from the past 24 hour(s))   GLUCOSE, POC    Collection Time: 05/08/18  4:26 PM   Result Value Ref Range    Glucose (POC) 232 (H) 65 - 100 mg/dL    Performed by KARENA WHITLEY    GLUCOSE, POC    Collection Time: 05/08/18  8:53 PM   Result Value Ref Range    Glucose (POC) 211 (H) 65 - 100 mg/dL    Performed by Jamey Jean    GLUCOSE, POC    Collection Time: 05/09/18  3:39 AM   Result Value Ref Range    Glucose (POC) 31 (LL) 65 - 100 mg/dL    Performed by DONTAE BREEN    GLUCOSE, POC    Collection Time: 05/09/18  3:54 AM   Result Value Ref Range    Glucose (POC) 121 (H) 65 - 100 mg/dL    Performed by DONTAE BREEN    RENAL FUNCTION PANEL    Collection Time: 05/09/18  5:24 AM   Result Value Ref Range    Sodium 139 136 - 145 mmol/L    Potassium 4.4 3.5 - 5.1 mmol/L    Chloride 109 (H) 97 - 108 mmol/L    CO2 21 21 - 32 mmol/L    Anion gap 9 5 - 15 mmol/L    Glucose 65 65 - 100 mg/dL    BUN 45 (H) 6 - 20 MG/DL    Creatinine 2.82 (H) 0.55 - 1.02 MG/DL    BUN/Creatinine ratio 16 12 - 20      GFR est AA 23 (L) >60 ml/min/1.73m2    GFR est non-AA 19 (L) >60 ml/min/1.73m2    Calcium 7.6 (L) 8.5 - 10.1 MG/DL    Phosphorus 4.0 2.6 - 4.7 MG/DL    Albumin 2.2 (L) 3.5 - 5.0 g/dL   CBC WITH AUTOMATED DIFF    Collection Time: 05/09/18  5:24 AM   Result Value Ref Range    WBC 4.8 3.6 - 11.0 K/uL    RBC 2.97 (L) 3.80 - 5.20 M/uL    HGB 7.6 (L) 11.5 - 16.0 g/dL    HCT 23.5 (L) 35.0 - 47.0 %    MCV 79.1 (L) 80.0 - 99.0 FL    MCH 25.6 (L) 26.0 - 34.0 PG    MCHC 32.3 30.0 - 36.5 g/dL    RDW 16.6 (H) 11.5 - 14.5 %    PLATELET 939 (L) 807 - 400 K/uL    NRBC 0.0 0  WBC    ABSOLUTE NRBC 0.00 0.00 - 0.01 K/uL    NEUTROPHILS 71 32 - 75 %    LYMPHOCYTES 14 12 - 49 %    MONOCYTES 9 5 - 13 %    EOSINOPHILS 6 0 - 7 %    BASOPHILS 0 0 - 1 %    IMMATURE GRANULOCYTES 0 0.0 - 0.5 %    ABS. NEUTROPHILS 3.4 1.8 - 8.0 K/UL    ABS. LYMPHOCYTES 0.7 (L) 0.8 - 3.5 K/UL    ABS. MONOCYTES 0.4 0.0 - 1.0 K/UL    ABS. EOSINOPHILS 0.3 0.0 - 0.4 K/UL    ABS. BASOPHILS 0.0 0.0 - 0.1 K/UL    ABS. IMM. GRANS. 0.0 0.00 - 0.04 K/UL    DF AUTOMATED     METABOLIC PANEL, COMPREHENSIVE    Collection Time: 05/09/18  5:24 AM   Result Value Ref Range    Sodium 140 136 - 145 mmol/L    Potassium 4.5 3.5 - 5.1 mmol/L    Chloride 110 (H) 97 - 108 mmol/L    CO2 21 21 - 32 mmol/L    Anion gap 9 5 - 15 mmol/L    Glucose 68 65 - 100 mg/dL    BUN 45 (H) 6 - 20 MG/DL    Creatinine 2.80 (H) 0.55 - 1.02 MG/DL    BUN/Creatinine ratio 16 12 - 20      GFR est AA 23 (L) >60 ml/min/1.73m2    GFR est non-AA 19 (L) >60 ml/min/1.73m2    Calcium 7.8 (L) 8.5 - 10.1 MG/DL    Bilirubin, total 0.2 0.2 - 1.0 MG/DL    ALT (SGPT) 18 12 - 78 U/L    AST (SGOT) 17 15 - 37 U/L    Alk. phosphatase 234 (H) 45 - 117 U/L    Protein, total 6.1 (L) 6.4 - 8.2 g/dL    Albumin 2.3 (L) 3.5 - 5.0 g/dL    Globulin 3.8 2.0 - 4.0 g/dL    A-G Ratio 0.6 (L) 1.1 - 2.2     GLUCOSE, POC    Collection Time: 05/09/18  6:13 AM   Result Value Ref Range    Glucose (POC) 67 65 - 100 mg/dL    Performed by Clifford Hall, POC    Collection Time: 05/09/18  6:53 AM   Result Value Ref Range    Glucose (POC) 73 65 - 100 mg/dL    Performed by Clifford Hall, POC    Collection Time: 05/09/18  7:09 AM   Result Value Ref Range    Glucose (POC) 82 65 - 100 mg/dL    Performed by Clifford Hall, POC    Collection Time: 05/09/18 11:34 AM   Result Value Ref Range    Glucose (POC) 119 (H) 65 - 100 mg/dL    Performed by Adam Galan I have reviewed the flowsheets.   Chart and Pertinent Notes have been reviewed. No change in PMH ,family and social history from Consult note.       Sidra Hester MD

## 2018-05-09 NOTE — PROGRESS NOTES
HYPOGLYCEMIC EPISODE DOCUMENTATION    Patient with hypoglycemic episode(s) at 613 (time) on 05/09/2018 (date). BG value(s) pre-treatment 79    Was patient symptomatic?  [] yes, [x] no  Patient was treated with the following rescue medications/treatments: [] D50                [] Glucose tablets                [] Glucagon                [x] 4oz juice                [] 6oz reg soda                [] 8oz low fat milk  BG value post-treatment: 82  Once BG treated and value greater than 80mg/dl, pt was provided with the following:  [] snack  [x] meal  Name of MD notified:Concetta  The following orders were received: No further orders

## 2018-05-10 LAB
ALBUMIN SERPL-MCNC: 2.3 G/DL (ref 3.5–5)
ALBUMIN SERPL-MCNC: 2.4 G/DL (ref 3.5–5)
ALBUMIN/GLOB SERPL: 0.6 {RATIO} (ref 1.1–2.2)
ALP SERPL-CCNC: 239 U/L (ref 45–117)
ALT SERPL-CCNC: 17 U/L (ref 12–78)
ANION GAP SERPL CALC-SCNC: 7 MMOL/L (ref 5–15)
ANION GAP SERPL CALC-SCNC: 9 MMOL/L (ref 5–15)
AST SERPL-CCNC: 13 U/L (ref 15–37)
BASOPHILS # BLD: 0 K/UL (ref 0–0.1)
BASOPHILS NFR BLD: 0 % (ref 0–1)
BILIRUB SERPL-MCNC: 0.2 MG/DL (ref 0.2–1)
BUN SERPL-MCNC: 38 MG/DL (ref 6–20)
BUN SERPL-MCNC: 40 MG/DL (ref 6–20)
BUN/CREAT SERPL: 13 (ref 12–20)
BUN/CREAT SERPL: 14 (ref 12–20)
CALCIUM SERPL-MCNC: 8 MG/DL (ref 8.5–10.1)
CALCIUM SERPL-MCNC: 8 MG/DL (ref 8.5–10.1)
CHLORIDE SERPL-SCNC: 109 MMOL/L (ref 97–108)
CHLORIDE SERPL-SCNC: 109 MMOL/L (ref 97–108)
CO2 SERPL-SCNC: 21 MMOL/L (ref 21–32)
CO2 SERPL-SCNC: 22 MMOL/L (ref 21–32)
CREAT SERPL-MCNC: 2.84 MG/DL (ref 0.55–1.02)
CREAT SERPL-MCNC: 2.85 MG/DL (ref 0.55–1.02)
DIFFERENTIAL METHOD BLD: ABNORMAL
EOSINOPHIL # BLD: 0.2 K/UL (ref 0–0.4)
EOSINOPHIL NFR BLD: 5 % (ref 0–7)
ERYTHROCYTE [DISTWIDTH] IN BLOOD BY AUTOMATED COUNT: 16.9 % (ref 11.5–14.5)
GLOBULIN SER CALC-MCNC: 3.8 G/DL (ref 2–4)
GLUCOSE BLD STRIP.AUTO-MCNC: 156 MG/DL (ref 65–100)
GLUCOSE BLD STRIP.AUTO-MCNC: 175 MG/DL (ref 65–100)
GLUCOSE BLD STRIP.AUTO-MCNC: 185 MG/DL (ref 65–100)
GLUCOSE BLD STRIP.AUTO-MCNC: 235 MG/DL (ref 65–100)
GLUCOSE BLD STRIP.AUTO-MCNC: 295 MG/DL (ref 65–100)
GLUCOSE SERPL-MCNC: 214 MG/DL (ref 65–100)
GLUCOSE SERPL-MCNC: 217 MG/DL (ref 65–100)
HCT VFR BLD AUTO: 23.8 % (ref 35–47)
HGB BLD-MCNC: 7.7 G/DL (ref 11.5–16)
IMM GRANULOCYTES # BLD: 0 K/UL (ref 0–0.04)
IMM GRANULOCYTES NFR BLD AUTO: 0 % (ref 0–0.5)
LIPASE SERPL-CCNC: 2049 U/L (ref 73–393)
LYMPHOCYTES # BLD: 0.9 K/UL (ref 0.8–3.5)
LYMPHOCYTES NFR BLD: 19 % (ref 12–49)
MCH RBC QN AUTO: 25.2 PG (ref 26–34)
MCHC RBC AUTO-ENTMCNC: 32.4 G/DL (ref 30–36.5)
MCV RBC AUTO: 78 FL (ref 80–99)
MONOCYTES # BLD: 0.5 K/UL (ref 0–1)
MONOCYTES NFR BLD: 10 % (ref 5–13)
NEUTS SEG # BLD: 3 K/UL (ref 1.8–8)
NEUTS SEG NFR BLD: 66 % (ref 32–75)
NRBC # BLD: 0 K/UL (ref 0–0.01)
NRBC BLD-RTO: 0 PER 100 WBC
PHOSPHATE SERPL-MCNC: 3.3 MG/DL (ref 2.6–4.7)
PLATELET # BLD AUTO: 133 K/UL (ref 150–400)
POTASSIUM SERPL-SCNC: 4.4 MMOL/L (ref 3.5–5.1)
POTASSIUM SERPL-SCNC: 4.5 MMOL/L (ref 3.5–5.1)
PROT SERPL-MCNC: 6.1 G/DL (ref 6.4–8.2)
RBC # BLD AUTO: 3.05 M/UL (ref 3.8–5.2)
RETICS # AUTO: 0.02 M/UL (ref 0.02–0.08)
RETICS/RBC NFR AUTO: 0.7 % (ref 0.7–2.1)
SERVICE CMNT-IMP: ABNORMAL
SODIUM SERPL-SCNC: 138 MMOL/L (ref 136–145)
SODIUM SERPL-SCNC: 139 MMOL/L (ref 136–145)
WBC # BLD AUTO: 4.6 K/UL (ref 3.6–11)

## 2018-05-10 PROCEDURE — 74011000250 HC RX REV CODE- 250: Performed by: INTERNAL MEDICINE

## 2018-05-10 PROCEDURE — 74011250636 HC RX REV CODE- 250/636: Performed by: INTERNAL MEDICINE

## 2018-05-10 PROCEDURE — 74011250637 HC RX REV CODE- 250/637: Performed by: PHYSICAL MEDICINE & REHABILITATION

## 2018-05-10 PROCEDURE — C9113 INJ PANTOPRAZOLE SODIUM, VIA: HCPCS | Performed by: INTERNAL MEDICINE

## 2018-05-10 PROCEDURE — 85045 AUTOMATED RETICULOCYTE COUNT: CPT | Performed by: HOSPITALIST

## 2018-05-10 PROCEDURE — 80069 RENAL FUNCTION PANEL: CPT | Performed by: INTERNAL MEDICINE

## 2018-05-10 PROCEDURE — 74011250637 HC RX REV CODE- 250/637: Performed by: INTERNAL MEDICINE

## 2018-05-10 PROCEDURE — 83690 ASSAY OF LIPASE: CPT | Performed by: INTERNAL MEDICINE

## 2018-05-10 PROCEDURE — 85025 COMPLETE CBC W/AUTO DIFF WBC: CPT | Performed by: HOSPITALIST

## 2018-05-10 PROCEDURE — 82962 GLUCOSE BLOOD TEST: CPT

## 2018-05-10 PROCEDURE — 74011250637 HC RX REV CODE- 250/637: Performed by: FAMILY MEDICINE

## 2018-05-10 PROCEDURE — 74011636637 HC RX REV CODE- 636/637: Performed by: INTERNAL MEDICINE

## 2018-05-10 PROCEDURE — 36415 COLL VENOUS BLD VENIPUNCTURE: CPT | Performed by: INTERNAL MEDICINE

## 2018-05-10 PROCEDURE — 74011250636 HC RX REV CODE- 250/636: Performed by: HOSPITALIST

## 2018-05-10 PROCEDURE — 65270000029 HC RM PRIVATE

## 2018-05-10 PROCEDURE — 80053 COMPREHEN METABOLIC PANEL: CPT | Performed by: HOSPITALIST

## 2018-05-10 RX ADMIN — QUETIAPINE FUMARATE 100 MG: 100 TABLET ORAL at 08:47

## 2018-05-10 RX ADMIN — HYDRALAZINE HYDROCHLORIDE 100 MG: 50 TABLET ORAL at 22:02

## 2018-05-10 RX ADMIN — HYDRALAZINE HYDROCHLORIDE 100 MG: 50 TABLET ORAL at 17:01

## 2018-05-10 RX ADMIN — HEPARIN SODIUM 5000 UNITS: 5000 INJECTION, SOLUTION INTRAVENOUS; SUBCUTANEOUS at 12:46

## 2018-05-10 RX ADMIN — Medication 1 CAPSULE: at 08:46

## 2018-05-10 RX ADMIN — ONDANSETRON HYDROCHLORIDE 4 MG: 2 INJECTION INTRAMUSCULAR; INTRAVENOUS at 01:56

## 2018-05-10 RX ADMIN — NIFEDIPINE 90 MG: 60 TABLET, FILM COATED, EXTENDED RELEASE ORAL at 08:46

## 2018-05-10 RX ADMIN — ONDANSETRON HYDROCHLORIDE 4 MG: 2 INJECTION INTRAMUSCULAR; INTRAVENOUS at 06:20

## 2018-05-10 RX ADMIN — STANDARDIZED SENNA CONCENTRATE AND DOCUSATE SODIUM 2 TABLET: 8.6; 5 TABLET, FILM COATED ORAL at 08:46

## 2018-05-10 RX ADMIN — NITROGLYCERIN 2 INCH: 20 OINTMENT TOPICAL at 08:47

## 2018-05-10 RX ADMIN — INSULIN LISPRO 2 UNITS: 100 INJECTION, SOLUTION INTRAVENOUS; SUBCUTANEOUS at 17:19

## 2018-05-10 RX ADMIN — HYDROMORPHONE HYDROCHLORIDE 1 MG: 1 INJECTION, SOLUTION INTRAMUSCULAR; INTRAVENOUS; SUBCUTANEOUS at 10:16

## 2018-05-10 RX ADMIN — INSULIN LISPRO 3 UNITS: 100 INJECTION, SOLUTION INTRAVENOUS; SUBCUTANEOUS at 07:24

## 2018-05-10 RX ADMIN — OXYCODONE AND ACETAMINOPHEN 1 TABLET: 5; 325 TABLET ORAL at 17:18

## 2018-05-10 RX ADMIN — ONDANSETRON HYDROCHLORIDE 4 MG: 2 INJECTION INTRAMUSCULAR; INTRAVENOUS at 15:26

## 2018-05-10 RX ADMIN — HYDROMORPHONE HYDROCHLORIDE 1 MG: 1 INJECTION, SOLUTION INTRAMUSCULAR; INTRAVENOUS; SUBCUTANEOUS at 15:25

## 2018-05-10 RX ADMIN — LABETALOL HYDROCHLORIDE 500 MG: 200 TABLET, FILM COATED ORAL at 22:02

## 2018-05-10 RX ADMIN — NITROGLYCERIN 2 INCH: 20 OINTMENT TOPICAL at 19:42

## 2018-05-10 RX ADMIN — QUETIAPINE FUMARATE 100 MG: 100 TABLET ORAL at 19:41

## 2018-05-10 RX ADMIN — Medication 10 ML: at 06:20

## 2018-05-10 RX ADMIN — VENLAFAXINE 75 MG: 37.5 TABLET ORAL at 08:47

## 2018-05-10 RX ADMIN — INSULIN LISPRO 3 UNITS: 100 INJECTION, SOLUTION INTRAVENOUS; SUBCUTANEOUS at 22:00

## 2018-05-10 RX ADMIN — OXYCODONE AND ACETAMINOPHEN 1 TABLET: 5; 325 TABLET ORAL at 08:47

## 2018-05-10 RX ADMIN — HYDROMORPHONE HYDROCHLORIDE 1 MG: 1 INJECTION, SOLUTION INTRAMUSCULAR; INTRAVENOUS; SUBCUTANEOUS at 06:20

## 2018-05-10 RX ADMIN — ONDANSETRON HYDROCHLORIDE 4 MG: 2 INJECTION INTRAMUSCULAR; INTRAVENOUS at 20:43

## 2018-05-10 RX ADMIN — OXYCODONE AND ACETAMINOPHEN 1 TABLET: 5; 325 TABLET ORAL at 12:46

## 2018-05-10 RX ADMIN — POLYETHYLENE GLYCOL 3350 17 G: 17 POWDER, FOR SOLUTION ORAL at 08:48

## 2018-05-10 RX ADMIN — CALCIUM 500 MG: 500 TABLET ORAL at 08:46

## 2018-05-10 RX ADMIN — INSULIN LISPRO 2 UNITS: 100 INJECTION, SOLUTION INTRAVENOUS; SUBCUTANEOUS at 12:59

## 2018-05-10 RX ADMIN — HYDROMORPHONE HYDROCHLORIDE 1 MG: 1 INJECTION, SOLUTION INTRAMUSCULAR; INTRAVENOUS; SUBCUTANEOUS at 01:56

## 2018-05-10 RX ADMIN — ONDANSETRON HYDROCHLORIDE 4 MG: 2 INJECTION INTRAMUSCULAR; INTRAVENOUS at 10:16

## 2018-05-10 RX ADMIN — LORAZEPAM 0.5 MG: 0.5 TABLET ORAL at 17:18

## 2018-05-10 RX ADMIN — HEPARIN SODIUM 5000 UNITS: 5000 INJECTION, SOLUTION INTRAVENOUS; SUBCUTANEOUS at 20:43

## 2018-05-10 RX ADMIN — HYDROMORPHONE HYDROCHLORIDE 1 MG: 1 INJECTION, SOLUTION INTRAMUSCULAR; INTRAVENOUS; SUBCUTANEOUS at 20:43

## 2018-05-10 RX ADMIN — SODIUM CHLORIDE 40 MG: 9 INJECTION INTRAMUSCULAR; INTRAVENOUS; SUBCUTANEOUS at 12:46

## 2018-05-10 RX ADMIN — VENLAFAXINE 75 MG: 37.5 TABLET ORAL at 17:01

## 2018-05-10 RX ADMIN — OXYCODONE AND ACETAMINOPHEN 1 TABLET: 5; 325 TABLET ORAL at 04:11

## 2018-05-10 RX ADMIN — HYDRALAZINE HYDROCHLORIDE 100 MG: 50 TABLET ORAL at 08:47

## 2018-05-10 NOTE — PROGRESS NOTES
Bedside shift change report given to Lorna (oncoming nurse) by Betty Ribeiro (offgoing nurse). Report included the following information SBAR, Kardex, Intake/Output, MAR and Recent Results.

## 2018-05-10 NOTE — PROGRESS NOTES
Hospitalist Progress Note  Delphine Norwood MD  Answering service: [phone] or [phone] from in house phone        Date of Service:  5/10/2018  NAME:  Pamella Glass  :  1980  MRN:  [id number]      Admission Summary:      Per H&P: 75-year-old woman with a past medical history significant for hypertension, type 2 diabetes, obstructive sleep apnea, depression, chronic kidney disease, narcotic dependency was in her usual state of health until about 3 days ago when the patient developed left leg pain and swelling. The pain is a constant 8/10 in severity. Patient described the pain as a sharp shooting pain in her left leg. No relief with pain medication. No known aggravating factors. Last month, the patient underwent a transmetatarsal resection of the left foot 2/2 diabetic foot. Patient also complained of abdominal pain, nausea, and vomiting. The abdominal pain is diffuse in location. F/u for osteomyelitis      Feeling better today except epigastric pain,no n/v. She agreed to slowly to transition to oral pain medications and we discussed discharge in 1-2 days. Assessment & Plan:       Sickle cell crisis  Microcytic Anemia with acute drop. Hgb 6.5 and re-checked and at 6.4,5/7. received 1 unit and HB upto 8. 2. Monitor  -IV hydration,encouraged pt to use oral pain medication for better control as the IV wears off quickly  -Pain control  -Supplemental o2    Acute on chronic pancreatitis: Patient with N/V, abdominal pain and elevated lipase /amylase. Repeat Lipase trended close to normal values. - CT A/P with calcified pancreas  - Pain control. Discussed with patient that we need to start weaning IV and transition to PO once she tolerates eating more  - Advance diet as tolerated      Type 2 diabetes type 2 with Hypoglycemia  -D/c lantus. Accucheks. Right flank pain  -Renal US,no hydronephrosis,stones. Ribs xray megative. A/P CT on 4/30 had shown markedly distended bladder.  -Heat pad,pain control    Acute hyperkalemia due to kidney disease:  - now resolved    Osteomyelitis of the left foot:  - Podiatry was consulted. As above, patient declined further surgery  - MRI of the left foot done, suspicious for Osteomyelitis. ? Unclear if this is old or new  - On Zosyn and Daptomycin now  - ID on board. Patient will likely need prolonged course and then to re-visit the need for surgery      Acute on chronic kidney disease stage V  - holding diuretic  - Nephrology following    Essential Hypertension: Better control today  - Up-titrate meds as needed  - Patient with very high BP secondary to baseline HTN and pain associated with her pancreatitis flare    Obstructive sleep apnea    Depression: Continue home meds. Severe protein caloric malnutrition due to limited oral intake  -Dietary recommendations noted. Oral intake improving. No need for artifical feeding at this time  -Oral supplements    Code status: Full  DVT prophylaxis: heparin    Care Plan discussed with: Patient/Family and Nurse  Disposition:Home with home iv abx in 1-2 days. Hospital Problems  Date Reviewed: 4/28/2018          Codes Class Noted POA    * (Principal)Osteomyelitis of left foot [facility]) ICD-10-CM: M86.9  ICD-9-CM: 730.27  4/27/2018 Yes              Review of Systems:   Pertinent items are noted in HPI. Vital Signs:    Last 24hrs VS reviewed since prior progress note. Most recent are:  Visit Vitals    /79    Pulse 86    Temp 98.2 °F (36.8 °C)    Resp 16    Ht 5' 2\" (1.575 m)    Wt 54.7 kg (120 lb 9.5 oz)    SpO2 100%    BMI 22.06 kg/m2       No intake or output data in the 24 hours ending 05/10/18 1439     Physical Examination:             Constitutional:  No distress   ENT:  Neck supple   Resp:  CTA bilaterally. CV:  Regular rhythm, normal rate    GI:  Soft, non distended, less tender to palpation.  No rigidity   JOSE  Left flank tenderness Musculoskeletal:  LLE larger than R. Partial amputation of L foot with wrapping    Neurologic:  Moves all extremities.   AAOx3        Data Review:    Review and/or order of clinical lab test  Review and/or order of tests in the medicine section of [facility]      Labs:     Recent Labs      05/10/18   0616  05/09/18   0524   WBC  4.6  4.8   HGB  7.7*  7.6*   HCT  23.8*  23.5*   PLT  133*  121*     Recent Labs      05/10/18   0616  05/09/18 0524 05/08/18   0419   NA  138  139  140  139  138  137   K  4.4  4.5  4.5  4.4  4.9  4.9   CL  109*  109*  110*  109*  109*  107   CO2  22  21  21  21  21  21   BUN  38*  40*  45*  45*  56*  55*   CREA  2.84*  2.85*  2.80*  2.82*  3.16*  3.12*   GLU  217*  214*  68  65  105*  103*   CA  8.0*  8.0*  7.8*  7.6*  7.8*  7.9*   PHOS  3.3  4.0  4.4     Recent Labs      05/10/18   0616  05/09/18   0524  05/08/18   0419   SGOT  13*  17   --    ALT  17  18   --    AP  239*  234*   --    TBILI  0.2  0.2   --    TP  6.1*  6.1*   --    ALB  2.3*  2.4*  2.3*  2.2*  2.5*   GLOB  3.8  3.8   --    LPSE  2049*   --    --      Lab Results   Component Value Date/Time    Folate 33.8 (H) 05/07/2018 11:20 AM      Lab Results   Component Value Date/Time    Cholesterol, total 137 04/30/2018 06:24 AM    HDL Cholesterol 35 04/30/2018 06:24 AM    LDL, calculated 89 04/30/2018 06:24 AM    Triglyceride 65 04/30/2018 06:24 AM    CHOL/HDL Ratio 3.9 04/30/2018 06:24 AM     Lab Results   Component Value Date/Time    Glucose (POC) 156 (H) 05/10/2018 11:07 AM    Glucose (POC) 235 (H) 05/10/2018 06:19 AM    Glucose (POC) 175 (H) 05/10/2018 03:02 AM    Glucose (POC) 227 (H) 05/09/2018 08:51 PM    Glucose (POC) 94 05/09/2018 03:59 PM     Lab Results   Component Value Date/Time    Color YELLOW/STRAW 05/09/2018 02:33 PM    Appearance CLOUDY (A) 05/09/2018 02:33 PM    Specific gravity 1.009 05/09/2018 02:33 PM    Specific gravity 1.015 07/26/2010 11:18 AM    pH (UA) 6.5 05/09/2018 02:33 PM    Protein 30 (A) 05/09/2018 02:33 PM    Glucose NEGATIVE  05/09/2018 02:33 PM    Ketone NEGATIVE  05/09/2018 02:33 PM    Bilirubin NEGATIVE  05/09/2018 02:33 PM    Urobilinogen 0.2 05/09/2018 02:33 PM    Nitrites NEGATIVE  05/09/2018 02:33 PM    Leukocyte Esterase NEGATIVE  05/09/2018 02:33 PM    Epithelial cells FEW 05/09/2018 02:33 PM    Bacteria NEGATIVE  05/09/2018 02:33 PM    WBC 0-4 05/09/2018 02:33 PM    RBC 0-5 05/09/2018 02:33 PM       Medications Reviewed:     Current Facility-Administered Medications   Medication Dose Route Frequency    pantoprazole (PROTONIX) 40 mg in sodium chloride 0.9% 10 mL injection  40 mg IntraVENous Q24H    HYDROmorphone (PF) (DILAUDID) injection 1 mg  1 mg IntraVENous Q4H PRN    epoetin bernard (EPOGEN;PROCRIT) injection 10,000 Units  10,000 Units SubCUTAneous Q MON, WED & FRI    lactated Ringers infusion  50 mL/hr IntraVENous CONTINUOUS    0.9% sodium chloride infusion 250 mL  250 mL IntraVENous PRN    labetalol (NORMODYNE) tablet 500 mg  500 mg Oral Q12H    DAPTOmycin (CUBICIN) 400 mg in 0.9% sodium chloride 50 mL IVPB RF formulation  400 mg IntraVENous Q48H    alteplase (CATHFLO) 1 mg in sterile water (preservative free) 1 mL injection  1 mg InterCATHeter PRN    polyethylene glycol (MIRALAX) packet 17 g  17 g Oral DAILY PRN    hydrALAZINE (APRESOLINE) tablet 100 mg  100 mg Oral TID    LORazepam (ATIVAN) tablet 0.5 mg  0.5 mg Oral Q6H PRN    senna-docusate (PERICOLACE) 8.6-50 mg per tablet 2 Tab  2 Tab Oral DAILY    hydrALAZINE (APRESOLINE) 20 mg/mL injection 20 mg  20 mg IntraVENous Q4H PRN    ondansetron (ZOFRAN) injection 4 mg  4 mg IntraVENous Q4H PRN    diphenhydrAMINE (BENADRYL) capsule 25 mg  25 mg Oral Q6H PRN    nitroglycerin (NITROBID) 2 % ointment 2 Inch  2 Inch Topical BID    promethazine (PHENERGAN) 6.25 mg/5 mL syrup 6.25 mg  6.25 mg Oral Q6H PRN    sodium chloride (NS) flush 20 mL  20 mL InterCATHeter PRN    sodium chloride (NS) flush 10 mL  10 mL InterCATHeter Q24H    sodium chloride (NS) flush 10 mL  10 mL InterCATHeter PRN    sodium chloride (NS) flush 10 mL  10 mL InterCATHeter Q8H    sodium chloride (NS) flush 20 mL  20 mL InterCATHeter Q24H    NIFEdipine ER (PROCARDIA XL) tablet 90 mg  90 mg Oral DAILY    calcium carbonate (OS-BINA) tablet 500 mg [elemental]  500 mg Oral DAILY    QUEtiapine (SEROquel) tablet 100 mg  100 mg Oral BID    venlafaxine (EFFEXOR) tablet 75 mg  75 mg Oral BID WITH MEALS    sodium chloride (NS) flush 5-10 mL  5-10 mL IntraVENous Q8H    sodium chloride (NS) flush 5-10 mL  5-10 mL IntraVENous PRN    heparin (porcine) injection 5,000 Units  5,000 Units SubCUTAneous Q8H    albuterol-ipratropium (DUO-NEB) 2.5 MG-0.5 MG/3 ML  3 mL Nebulization Q4H PRN    lactobac ac& pc-s.therm-b.anim (CHRIS Q/RISAQUAD)  1 Cap Oral DAILY    insulin lispro (HUMALOG) injection   SubCUTAneous AC&HS    glucose chewable tablet 16 g  4 Tab Oral PRN    dextrose (D50W) injection syrg 12.5-25 g  12.5-25 g IntraVENous PRN    glucagon (GLUCAGEN) injection 1 mg  1 mg IntraMUSCular PRN    acetaminophen (TYLENOL) tablet 650 mg  650 mg Oral Q4H PRN    oxyCODONE-acetaminophen (PERCOCET) 5-325 mg per tablet 1 Tab  1 Tab Oral Q4H PRN     ______________________________________________________________________  EXPECTED LENGTH OF STAY: 3d 19h  ACTUAL LENGTH OF STAY:          13                 Deep Hughes MD

## 2018-05-10 NOTE — DIABETES MGMT
DTC Progress Note    Recommendations/ Comments: Chart reviewed for variable blood sugars. Pt had an extremely low blood sugar of 31 mg/dl on 5/9/18 at ~3am.  Lantus 10 was discontinued and her blood sugars now are >180 mg/dl, ranging 175-235 mg/dl. If appropriate, consider:  Adding Lantus to 5 units daily  Change lispro corerction scale to high sensitivity    Current hospital DM medication:    Lispro correctional insulin - normal sensitivity. Patient is a 42 y.o. female with known Type 2 DM complicated by CKD and gastroparesis on no DM medications at home noted on PTA medications list.  Admitted for osteomyelitis of left foot    A1c:   Lab Results   Component Value Date/Time    Hemoglobin A1c 6.9 (H) 04/30/2018 06:24 AM    Hemoglobin A1c 8.8 (H) 03/03/2018 09:21 AM       Recent Glucose Results:   Lab Results   Component Value Date/Time     (H) 05/10/2018 06:16 AM     (H) 05/10/2018 06:16 AM    GLUCPOC 235 (H) 05/10/2018 06:19 AM    GLUCPOC 175 (H) 05/10/2018 03:02 AM    GLUCPOC 227 (H) 05/09/2018 08:51 PM      Lab Results   Component Value Date/Time    Creatinine 2.85 (H) 05/10/2018 06:16 AM    Creatinine 2.84 (H) 05/10/2018 06:16 AM     Estimated Creatinine Clearance: 21 mL/min (based on Cr of 2.85). Active Orders   Diet    DIET DIABETIC CONSISTENT CARB Regular      PO intake:   Patient Vitals for the past 72 hrs:   % Diet Eaten   05/09/18 0921 50 %   05/07/18 1302 30 %       Will continue to follow as needed.     Thank you  Amilcar Ortega, RD   Pager [phone]

## 2018-05-10 NOTE — PROGRESS NOTES
[facility]   [address] [facility], [address] [place] [address], [facility]  Phone: [phone]   [phone]       Nephrology Progress Note  [place]     1980     [id number]  Date of Admission : 4/27/2018  05/10/18    CC: Follow up for STONE       Assessment and Plan   STONE on CKD   - Cr stable   - continue IVF at 50 cc/ hr     Acute on Chronic Hyper K :  - resolved     Uncontrolled HTN:  - BP stable now  - cont current meds    Chronic Pancreatitis : recheck Lipase , started PPI     CKD Stage IV :  - 2/2 DM, SCD  - previously dialysis dependent for ~ 6m. Failed RUE and RLE grafts     Sickle cell dz :  s/p transfusion 5/7  Anemia : continue epogen   Left foot osteomyelitis :  per podiatry and ID     Interval History:  Seen and examined. Reports epigastric pain , denies any reflux Sx    Review of Systems: Pertinent items are noted in HPI.     Current Medications:   Current Facility-Administered Medications   Medication Dose Route Frequency    pantoprazole (PROTONIX) 40 mg in sodium chloride 0.9% 10 mL injection  40 mg IntraVENous Q24H    HYDROmorphone (PF) (DILAUDID) injection 1 mg  1 mg IntraVENous Q4H PRN    epoetin bernard (EPOGEN;PROCRIT) injection 10,000 Units  10,000 Units SubCUTAneous Q MON, WED & FRI    lactated Ringers infusion  50 mL/hr IntraVENous CONTINUOUS    0.9% sodium chloride infusion 250 mL  250 mL IntraVENous PRN    labetalol (NORMODYNE) tablet 500 mg  500 mg Oral Q12H    DAPTOmycin (CUBICIN) 400 mg in 0.9% sodium chloride 50 mL IVPB RF formulation  400 mg IntraVENous Q48H    alteplase (CATHFLO) 1 mg in sterile water (preservative free) 1 mL injection  1 mg InterCATHeter PRN    polyethylene glycol (MIRALAX) packet 17 g  17 g Oral DAILY PRN    hydrALAZINE (APRESOLINE) tablet 100 mg  100 mg Oral TID    LORazepam (ATIVAN) tablet 0.5 mg  0.5 mg Oral Q6H PRN    senna-docusate (PERICOLACE) 8.6-50 mg per tablet 2 Tab  2 Tab Oral DAILY    hydrALAZINE (APRESOLINE) 20 mg/mL injection 20 mg  20 mg IntraVENous Q4H PRN    ondansetron (ZOFRAN) injection 4 mg  4 mg IntraVENous Q4H PRN    diphenhydrAMINE (BENADRYL) capsule 25 mg  25 mg Oral Q6H PRN    nitroglycerin (NITROBID) 2 % ointment 2 Inch  2 Inch Topical BID    promethazine (PHENERGAN) 6.25 mg/5 mL syrup 6.25 mg  6.25 mg Oral Q6H PRN    sodium chloride (NS) flush 20 mL  20 mL InterCATHeter PRN    sodium chloride (NS) flush 10 mL  10 mL InterCATHeter Q24H    sodium chloride (NS) flush 10 mL  10 mL InterCATHeter PRN    sodium chloride (NS) flush 10 mL  10 mL InterCATHeter Q8H    sodium chloride (NS) flush 20 mL  20 mL InterCATHeter Q24H    NIFEdipine ER (PROCARDIA XL) tablet 90 mg  90 mg Oral DAILY    calcium carbonate (OS-BINA) tablet 500 mg [elemental]  500 mg Oral DAILY    QUEtiapine (SEROquel) tablet 100 mg  100 mg Oral BID    venlafaxine (EFFEXOR) tablet 75 mg  75 mg Oral BID WITH MEALS    sodium chloride (NS) flush 5-10 mL  5-10 mL IntraVENous Q8H    sodium chloride (NS) flush 5-10 mL  5-10 mL IntraVENous PRN    heparin (porcine) injection 5,000 Units  5,000 Units SubCUTAneous Q8H    albuterol-ipratropium (DUO-NEB) 2.5 MG-0.5 MG/3 ML  3 mL Nebulization Q4H PRN    lactobac ac& pc-s.therm-b.anim (CHRIS Q/RISAQUAD)  1 Cap Oral DAILY    insulin lispro (HUMALOG) injection   SubCUTAneous AC&HS    glucose chewable tablet 16 g  4 Tab Oral PRN    dextrose (D50W) injection syrg 12.5-25 g  12.5-25 g IntraVENous PRN    glucagon (GLUCAGEN) injection 1 mg  1 mg IntraMUSCular PRN    acetaminophen (TYLENOL) tablet 650 mg  650 mg Oral Q4H PRN    oxyCODONE-acetaminophen (PERCOCET) 5-325 mg per tablet 1 Tab  1 Tab Oral Q4H PRN      Allergies   Allergen Reactions    Erythromycin Itching    Morphine Itching    Toradol [Ketorolac] Rash       Objective:  Vitals:    Vitals:    05/09/18 2011 05/09/18 2230 05/10/18 0257 05/10/18 0842   BP: 127/66 144/71 139/67 174/79   Pulse: 87 95 78 86   Resp: 16  16 16   Temp: 98.8 °F (37.1 °C) 97.6 °F (36.4 °C) 98.2 °F (36.8 °C)   SpO2: 99%  100% 100%   Weight:       Height:         Intake and Output:          Physical Examination:    General: Awake, alert  Neck:  Supple, no mass  Resp:  Lungs CTA B/L  CV:  RRR,  no murmur   GI:  Soft, NT, + BS  Neurologic:  Non focal  Skin:  No Rash  :  No escobedo     []    High complexity decision making was performed  []    Patient is at high-risk of decompensation with multiple organ involvement    Lab Data Personally Reviewed: I have reviewed all the pertinent labs, microbiology data and radiology studies during assessment.     Recent Labs      05/10/18   0616  05/09/18   0524 05/08/18   0419   NA  138  139  140  139  138  137   K  4.4  4.5  4.5  4.4  4.9  4.9   CL  109*  109*  110*  109*  109*  107   CO2  22  21  21  21  21  21   GLU  217*  214*  68  65  105*  103*   BUN  38*  40*  45*  45*  56*  55*   CREA  2.84*  2.85*  2.80*  2.82*  3.16*  3.12*   CA  8.0*  8.0*  7.8*  7.6*  7.8*  7.9*   PHOS  3.3  4.0  4.4   ALB  2.3*  2.4*  2.3*  2.2*  2.5*   SGOT  13*  17   --    ALT  17  18   --      Recent Labs      05/10/18   0616  05/09/18   0524  05/08/18   0419   WBC  4.6  4.8  6.3   HGB  7.7*  7.6*  8.1*   HCT  23.8*  23.5*  24.6*   PLT  133*  121*  126*     Lab Results   Component Value Date/Time    Specimen Description: URINE 06/24/2013 08:00 PM    Specimen Description: URINE 06/17/2013 07:55 PM    Specimen Description: URINE 05/26/2013 10:10 AM    Specimen Description: URINE 04/22/2013 02:30 PM    Specimen Description: NARES 03/21/2013 12:30 PM     Lab Results   Component Value Date/Time    Culture result: NO GROWTH 5 DAYS 04/27/2018 05:11 PM    Culture result: FEW STAPHYLOCOCCUS HOMINIS SUBSPECIES HOMINIS (A) 03/11/2018 12:47 PM    Culture result: (A) 03/11/2018 12:47 PM     STAPHYLOCOCCUS EPIDERMIDIS (OXACILLIN RESISTANT) ISOLATED FROM THIO BROTH ONLY    Culture result: NO ANAEROBES ISOLATED 03/11/2018 12:47 PM    Culture result: NO GROWTH ON SOLID MEDIA AT 4 DAYS 03/11/2018 11:43 AM    Culture result: (A) 03/11/2018 11:43 AM     STAPHYLOCOCCUS EPIDERMIDIS (OXACILLIN RESISTANT) ISOLATED FROM THIO BROTH ONLY    Culture result: NO GROWTH ON SOLID MEDIA AT 4 DAYS 03/11/2018 11:43 AM    Culture result: (A) 03/11/2018 11:43 AM     STAPHYLOCOCCUS EPIDERMIDIS ISOLATED FROM THIO BROTH ONLY     Recent Results (from the past 24 hour(s))   URINALYSIS W/ RFLX MICROSCOPIC    Collection Time: 05/09/18  2:33 PM   Result Value Ref Range    Color YELLOW/STRAW      Appearance CLOUDY (A) CLEAR      Specific gravity 1.009 1.003 - 1.030      pH (UA) 6.5 5.0 - 8.0      Protein 30 (A) NEG mg/dL    Glucose NEGATIVE  NEG mg/dL    Ketone NEGATIVE  NEG mg/dL    Bilirubin NEGATIVE  NEG      Blood NEGATIVE  NEG      Urobilinogen 0.2 0.2 - 1.0 EU/dL    Nitrites NEGATIVE  NEG      Leukocyte Esterase NEGATIVE  NEG      WBC 0-4 0 - 4 /hpf    RBC 0-5 0 - 5 /hpf    Epithelial cells FEW FEW /lpf    Bacteria NEGATIVE  NEG /hpf    Hyaline cast 0-2 0 - 5 /lpf   GLUCOSE, POC    Collection Time: 05/09/18  3:59 PM   Result Value Ref Range    Glucose (POC) 94 65 - 100 mg/dL    Performed by [facility]    GLUCOSE, POC    Collection Time: 05/09/18  8:51 PM   Result Value Ref Range    Glucose (POC) 227 (H) 65 - 100 mg/dL    Performed by [facility]    GLUCOSE, POC    Collection Time: 05/10/18  3:02 AM   Result Value Ref Range    Glucose (POC) 175 (H) 65 - 100 mg/dL    Performed by [facility]    RENAL FUNCTION PANEL    Collection Time: 05/10/18  6:16 AM   Result Value Ref Range    Sodium 139 136 - 145 mmol/L    Potassium 4.5 3.5 - 5.1 mmol/L    Chloride 109 (H) 97 - 108 mmol/L    CO2 21 21 - 32 mmol/L    Anion gap 9 5 - 15 mmol/L    Glucose 214 (H) 65 - 100 mg/dL    BUN 40 (H) 6 - 20 MG/DL    Creatinine 2.85 (H) 0.55 - 1.02 MG/DL    BUN/Creatinine ratio 14 12 - 20      GFR est AA 22 (L) >60 ml/min/1.73m2    GFR est non-AA 18 (L) >60 ml/min/1.73m2    Calcium 8.0 (L) 8.5 - 10.1 MG/DL Phosphorus 3.3 2.6 - 4.7 MG/DL    Albumin 2.4 (L) 3.5 - 5.0 g/dL   CBC WITH AUTOMATED DIFF    Collection Time: 05/10/18  6:16 AM   Result Value Ref Range    WBC 4.6 3.6 - 11.0 K/uL    RBC 3.05 (L) 3.80 - 5.20 M/uL    HGB 7.7 (L) 11.5 - 16.0 g/dL    HCT 23.8 (L) 35.0 - 47.0 %    MCV 78.0 (L) 80.0 - 99.0 FL    MCH 25.2 (L) 26.0 - 34.0 PG    MCHC 32.4 30.0 - 36.5 g/dL    RDW 16.9 (H) 11.5 - 14.5 %    PLATELET 503 (L) 869 - 400 K/uL    NRBC 0.0 0  WBC    ABSOLUTE NRBC 0.00 0.00 - 0.01 K/uL    NEUTROPHILS 66 32 - 75 %    LYMPHOCYTES 19 12 - 49 %    MONOCYTES 10 5 - 13 %    EOSINOPHILS 5 0 - 7 %    BASOPHILS 0 0 - 1 %    IMMATURE GRANULOCYTES 0 0.0 - 0.5 %    ABS. NEUTROPHILS 3.0 1.8 - 8.0 K/UL    ABS. LYMPHOCYTES 0.9 0.8 - 3.5 K/UL    ABS. MONOCYTES 0.5 0.0 - 1.0 K/UL    ABS. EOSINOPHILS 0.2 0.0 - 0.4 K/UL    ABS. BASOPHILS 0.0 0.0 - 0.1 K/UL    ABS. IMM. GRANS. 0.0 0.00 - 0.04 K/UL    DF AUTOMATED     METABOLIC PANEL, COMPREHENSIVE    Collection Time: 05/10/18  6:16 AM   Result Value Ref Range    Sodium 138 136 - 145 mmol/L    Potassium 4.4 3.5 - 5.1 mmol/L    Chloride 109 (H) 97 - 108 mmol/L    CO2 22 21 - 32 mmol/L    Anion gap 7 5 - 15 mmol/L    Glucose 217 (H) 65 - 100 mg/dL    BUN 38 (H) 6 - 20 MG/DL    Creatinine 2.84 (H) 0.55 - 1.02 MG/DL    BUN/Creatinine ratio 13 12 - 20      GFR est AA 22 (L) >60 ml/min/1.73m2    GFR est non-AA 19 (L) >60 ml/min/1.73m2    Calcium 8.0 (L) 8.5 - 10.1 MG/DL    Bilirubin, total 0.2 0.2 - 1.0 MG/DL    ALT (SGPT) 17 12 - 78 U/L    AST (SGOT) 13 (L) 15 - 37 U/L    Alk.  phosphatase 239 (H) 45 - 117 U/L    Protein, total 6.1 (L) 6.4 - 8.2 g/dL    Albumin 2.3 (L) 3.5 - 5.0 g/dL    Globulin 3.8 2.0 - 4.0 g/dL    A-G Ratio 0.6 (L) 1.1 - 2.2     RETICULOCYTE COUNT    Collection Time: 05/10/18  6:16 AM   Result Value Ref Range    Reticulocyte count 0.7 0.7 - 2.1 %    Absolute Retic Cnt. 0.0214 0.0164 - 0.0776 M/ul   LIPASE    Collection Time: 05/10/18  6:16 AM   Result Value Ref Range    Lipase 2049 (H) 73 - 393 U/L   GLUCOSE, POC    Collection Time: 05/10/18  6:19 AM   Result Value Ref Range    Glucose (POC) 235 (H) 65 - 100 mg/dL    Performed by Clifford Hall POC    Collection Time: 05/10/18 11:07 AM   Result Value Ref Range    Glucose (POC) 156 (H) 65 - 100 mg/dL    Performed by Nuria Hoffman      I have reviewed the flowsheets. Chart and Pertinent Notes have been reviewed. No change in PMH ,family and social history from Consult note.       Deartoño Kat MD

## 2018-05-10 NOTE — PROGRESS NOTES
Checking lipase and added PPI   Full note to follow       [facility]  Office :[phone]  Fax: [phone]

## 2018-05-10 NOTE — PROGRESS NOTES
ID Progress Note  5/10/2018    Subjective:     She is not feeling well in general--concerned about her foot, having problems with nausea--left posterior rib pain    Objective:     Antibiotics:  1. Daptomycin       Vitals:   Visit Vitals    BP (!) 163/94 (BP 1 Location: Left arm, BP Patient Position: At rest)    Pulse 86    Temp 98.2 °F (36.8 °C)    Resp 16    Ht 5' 2\" (1.575 m)    Wt 54.7 kg (120 lb 9.5 oz)    SpO2 100%    BMI 22.06 kg/m2        Tmax:  Temp (24hrs), Av.2 °F (36.8 °C), Min:97.6 °F (36.4 °C), Max:98.8 °F (37.1 °C)      Exam:  Lungs:  clear to auscultation bilaterally  Heart:  regular rate and rhythm  Abdomen:  soft, non-tender. Bowel sounds normal. No masses,  no organomegaly  Wound dressed    Labs:      Recent Labs      05/10/18   0616  18   0524  18   0419   WBC  4.6  4.8  6.3   HGB  7.7*  7.6*  8.1*   PLT  133*  121*  126*   BUN  38*  40*  45*  45*  56*  55*   CREA  2.84*  2.85*  2.80*  2.82*  3.16*  3.12*   SGOT  13*  17   --    AP  239*  234*   --    TBILI  0.2  0.2   --        Cultures:     Lab Results   Component Value Date/Time    Specimen Description: URINE 2013 08:00 PM    Specimen Description: URINE 2013 07:55 PM    Specimen Description: URINE 2013 10:10 AM     Lab Results   Component Value Date/Time    Culture result: NO GROWTH 5 DAYS 2018 05:11 PM    Culture result: FEW STAPHYLOCOCCUS HOMINIS SUBSPECIES HOMINIS (A) 2018 12:47 PM    Culture result: (A) 2018 12:47 PM     STAPHYLOCOCCUS EPIDERMIDIS (OXACILLIN RESISTANT) ISOLATED FROM THIO BROTH ONLY    Culture result: NO ANAEROBES ISOLATED 2018 12:47 PM       Radiology:     Line/Insert Date:           Assessment:     1. Diabetic foot infection--MRI results noted--not sure whether this is the old infection resolving or a new problem  2. Renal insufficiency  3. DM  4. Abdominal pain--elevated lipase--history of pancreatitis in the past    Objective:     1.  Continue current therapy  2. ? options for sampling of tissue for culture  3.  She has elected to attempt another course of IV therapy--I am willing to do this, but she understands that there is a high risk of failure--orders on chart when needed    Zackary Mcgill MD

## 2018-05-10 NOTE — PROGRESS NOTES
8:07 PM  Bedside and Verbal shift change report given to Pham Gamez RN (oncoming nurse) by Luis Díaz RN (offgoing nurse). Report included the following information SBAR, Kardex, Intake/Output, MAR and Recent Results.

## 2018-05-11 LAB
ABO + RH BLD: NORMAL
ALBUMIN SERPL-MCNC: 2.4 G/DL (ref 3.5–5)
ALBUMIN SERPL-MCNC: 2.4 G/DL (ref 3.5–5)
ALBUMIN/GLOB SERPL: 0.6 {RATIO} (ref 1.1–2.2)
ALP SERPL-CCNC: 252 U/L (ref 45–117)
ALT SERPL-CCNC: 17 U/L (ref 12–78)
ANION GAP SERPL CALC-SCNC: 8 MMOL/L (ref 5–15)
ANION GAP SERPL CALC-SCNC: 9 MMOL/L (ref 5–15)
ANTIGENS PRESENT RBC DONR: NORMAL
ANTIGENS PRESENT RBC DONR: NORMAL
AST SERPL-CCNC: 14 U/L (ref 15–37)
BASOPHILS # BLD: 0 K/UL (ref 0–0.1)
BASOPHILS NFR BLD: 0 % (ref 0–1)
BILIRUB SERPL-MCNC: 0.2 MG/DL (ref 0.2–1)
BLD PROD TYP BPU: NORMAL
BLD PROD TYP BPU: NORMAL
BLOOD BANK CMNT PATIENT-IMP: NORMAL
BLOOD GROUP ANTIBODIES SERPL: NORMAL
BPU ID: NORMAL
BPU ID: NORMAL
BUN SERPL-MCNC: 33 MG/DL (ref 6–20)
BUN SERPL-MCNC: 34 MG/DL (ref 6–20)
BUN/CREAT SERPL: 12 (ref 12–20)
BUN/CREAT SERPL: 13 (ref 12–20)
CALCIUM SERPL-MCNC: 8.1 MG/DL (ref 8.5–10.1)
CALCIUM SERPL-MCNC: 8.1 MG/DL (ref 8.5–10.1)
CHLORIDE SERPL-SCNC: 107 MMOL/L (ref 97–108)
CHLORIDE SERPL-SCNC: 107 MMOL/L (ref 97–108)
CO2 SERPL-SCNC: 23 MMOL/L (ref 21–32)
CO2 SERPL-SCNC: 23 MMOL/L (ref 21–32)
CREAT SERPL-MCNC: 2.69 MG/DL (ref 0.55–1.02)
CREAT SERPL-MCNC: 2.71 MG/DL (ref 0.55–1.02)
CROSSMATCH RESULT,%XM: NORMAL
CROSSMATCH RESULT,%XM: NORMAL
DIFFERENTIAL METHOD BLD: ABNORMAL
EOSINOPHIL # BLD: 0.3 K/UL (ref 0–0.4)
EOSINOPHIL NFR BLD: 7 % (ref 0–7)
ERYTHROCYTE [DISTWIDTH] IN BLOOD BY AUTOMATED COUNT: 17.2 % (ref 11.5–14.5)
GLOBULIN SER CALC-MCNC: 3.9 G/DL (ref 2–4)
GLUCOSE BLD STRIP.AUTO-MCNC: 135 MG/DL (ref 65–100)
GLUCOSE BLD STRIP.AUTO-MCNC: 202 MG/DL (ref 65–100)
GLUCOSE BLD STRIP.AUTO-MCNC: 203 MG/DL (ref 65–100)
GLUCOSE BLD STRIP.AUTO-MCNC: 208 MG/DL (ref 65–100)
GLUCOSE BLD STRIP.AUTO-MCNC: 214 MG/DL (ref 65–100)
GLUCOSE BLD STRIP.AUTO-MCNC: 231 MG/DL (ref 65–100)
GLUCOSE SERPL-MCNC: 199 MG/DL (ref 65–100)
GLUCOSE SERPL-MCNC: 200 MG/DL (ref 65–100)
HCT VFR BLD AUTO: 24.2 % (ref 35–47)
HGB BLD-MCNC: 7.9 G/DL (ref 11.5–16)
IMM GRANULOCYTES # BLD: 0 K/UL (ref 0–0.04)
IMM GRANULOCYTES NFR BLD AUTO: 0 % (ref 0–0.5)
LIPASE SERPL-CCNC: 1846 U/L (ref 73–393)
LYMPHOCYTES # BLD: 0.8 K/UL (ref 0.8–3.5)
LYMPHOCYTES NFR BLD: 21 % (ref 12–49)
MCH RBC QN AUTO: 25.9 PG (ref 26–34)
MCHC RBC AUTO-ENTMCNC: 32.6 G/DL (ref 30–36.5)
MCV RBC AUTO: 79.3 FL (ref 80–99)
MONOCYTES # BLD: 0.4 K/UL (ref 0–1)
MONOCYTES NFR BLD: 11 % (ref 5–13)
NEUTS SEG # BLD: 2.1 K/UL (ref 1.8–8)
NEUTS SEG NFR BLD: 61 % (ref 32–75)
NRBC # BLD: 0 K/UL (ref 0–0.01)
NRBC BLD-RTO: 0 PER 100 WBC
PHOSPHATE SERPL-MCNC: 2.9 MG/DL (ref 2.6–4.7)
PLATELET # BLD AUTO: 156 K/UL (ref 150–400)
POTASSIUM SERPL-SCNC: 4.6 MMOL/L (ref 3.5–5.1)
POTASSIUM SERPL-SCNC: 4.6 MMOL/L (ref 3.5–5.1)
PROT SERPL-MCNC: 6.3 G/DL (ref 6.4–8.2)
RBC # BLD AUTO: 3.05 M/UL (ref 3.8–5.2)
RBC MORPH BLD: ABNORMAL
SERVICE CMNT-IMP: ABNORMAL
SODIUM SERPL-SCNC: 138 MMOL/L (ref 136–145)
SODIUM SERPL-SCNC: 139 MMOL/L (ref 136–145)
SPECIMEN EXP DATE BLD: NORMAL
STATUS OF UNIT,%ST: NORMAL
STATUS OF UNIT,%ST: NORMAL
UNIT DIVISION, %UDIV: 0
UNIT DIVISION, %UDIV: 0
WBC # BLD AUTO: 3.6 K/UL (ref 3.6–11)

## 2018-05-11 PROCEDURE — 74011636637 HC RX REV CODE- 636/637: Performed by: HOSPITALIST

## 2018-05-11 PROCEDURE — 85025 COMPLETE CBC W/AUTO DIFF WBC: CPT | Performed by: HOSPITALIST

## 2018-05-11 PROCEDURE — 74011250636 HC RX REV CODE- 250/636: Performed by: HOSPITALIST

## 2018-05-11 PROCEDURE — 74011250637 HC RX REV CODE- 250/637: Performed by: FAMILY MEDICINE

## 2018-05-11 PROCEDURE — 80069 RENAL FUNCTION PANEL: CPT | Performed by: INTERNAL MEDICINE

## 2018-05-11 PROCEDURE — 83690 ASSAY OF LIPASE: CPT | Performed by: HOSPITALIST

## 2018-05-11 PROCEDURE — 74011250637 HC RX REV CODE- 250/637: Performed by: PHYSICAL MEDICINE & REHABILITATION

## 2018-05-11 PROCEDURE — 74011250636 HC RX REV CODE- 250/636: Performed by: INTERNAL MEDICINE

## 2018-05-11 PROCEDURE — 82962 GLUCOSE BLOOD TEST: CPT

## 2018-05-11 PROCEDURE — 74011636637 HC RX REV CODE- 636/637: Performed by: INTERNAL MEDICINE

## 2018-05-11 PROCEDURE — 36415 COLL VENOUS BLD VENIPUNCTURE: CPT | Performed by: INTERNAL MEDICINE

## 2018-05-11 PROCEDURE — 74011250637 HC RX REV CODE- 250/637: Performed by: INTERNAL MEDICINE

## 2018-05-11 PROCEDURE — 74011000258 HC RX REV CODE- 258: Performed by: INTERNAL MEDICINE

## 2018-05-11 PROCEDURE — 65270000029 HC RM PRIVATE

## 2018-05-11 PROCEDURE — 74011250637 HC RX REV CODE- 250/637: Performed by: HOSPITALIST

## 2018-05-11 PROCEDURE — 80053 COMPREHEN METABOLIC PANEL: CPT | Performed by: HOSPITALIST

## 2018-05-11 RX ORDER — INSULIN GLARGINE 100 [IU]/ML
5 INJECTION, SOLUTION SUBCUTANEOUS
Status: DISCONTINUED | OUTPATIENT
Start: 2018-05-11 | End: 2018-05-15 | Stop reason: HOSPADM

## 2018-05-11 RX ORDER — PANTOPRAZOLE SODIUM 40 MG/1
40 TABLET, DELAYED RELEASE ORAL
Status: DISCONTINUED | OUTPATIENT
Start: 2018-05-11 | End: 2018-05-15 | Stop reason: HOSPADM

## 2018-05-11 RX ADMIN — HYDROMORPHONE HYDROCHLORIDE 1 MG: 1 INJECTION, SOLUTION INTRAMUSCULAR; INTRAVENOUS; SUBCUTANEOUS at 22:25

## 2018-05-11 RX ADMIN — HYDRALAZINE HYDROCHLORIDE 100 MG: 50 TABLET ORAL at 22:08

## 2018-05-11 RX ADMIN — VENLAFAXINE 75 MG: 37.5 TABLET ORAL at 09:54

## 2018-05-11 RX ADMIN — HYDRALAZINE HYDROCHLORIDE 100 MG: 50 TABLET ORAL at 09:55

## 2018-05-11 RX ADMIN — DAPTOMYCIN 400 MG: 500 INJECTION, POWDER, LYOPHILIZED, FOR SOLUTION INTRAVENOUS at 15:13

## 2018-05-11 RX ADMIN — INSULIN LISPRO 3 UNITS: 100 INJECTION, SOLUTION INTRAVENOUS; SUBCUTANEOUS at 11:58

## 2018-05-11 RX ADMIN — HYDROMORPHONE HYDROCHLORIDE 1 MG: 1 INJECTION, SOLUTION INTRAMUSCULAR; INTRAVENOUS; SUBCUTANEOUS at 16:02

## 2018-05-11 RX ADMIN — ONDANSETRON HYDROCHLORIDE 4 MG: 2 INJECTION INTRAMUSCULAR; INTRAVENOUS at 16:01

## 2018-05-11 RX ADMIN — NITROGLYCERIN 2 INCH: 20 OINTMENT TOPICAL at 09:54

## 2018-05-11 RX ADMIN — LORAZEPAM 0.5 MG: 0.5 TABLET ORAL at 15:12

## 2018-05-11 RX ADMIN — QUETIAPINE FUMARATE 100 MG: 100 TABLET ORAL at 19:14

## 2018-05-11 RX ADMIN — INSULIN GLARGINE 5 UNITS: 100 INJECTION, SOLUTION SUBCUTANEOUS at 22:13

## 2018-05-11 RX ADMIN — ONDANSETRON HYDROCHLORIDE 4 MG: 2 INJECTION INTRAMUSCULAR; INTRAVENOUS at 22:22

## 2018-05-11 RX ADMIN — HYDROMORPHONE HYDROCHLORIDE 1 MG: 1 INJECTION, SOLUTION INTRAMUSCULAR; INTRAVENOUS; SUBCUTANEOUS at 06:43

## 2018-05-11 RX ADMIN — OXYCODONE AND ACETAMINOPHEN 1 TABLET: 5; 325 TABLET ORAL at 09:55

## 2018-05-11 RX ADMIN — ONDANSETRON HYDROCHLORIDE 4 MG: 2 INJECTION INTRAMUSCULAR; INTRAVENOUS at 06:43

## 2018-05-11 RX ADMIN — NIFEDIPINE 90 MG: 60 TABLET, FILM COATED, EXTENDED RELEASE ORAL at 09:55

## 2018-05-11 RX ADMIN — Medication 1 CAPSULE: at 09:55

## 2018-05-11 RX ADMIN — Medication 10 ML: at 22:09

## 2018-05-11 RX ADMIN — OXYCODONE AND ACETAMINOPHEN 1 TABLET: 5; 325 TABLET ORAL at 04:26

## 2018-05-11 RX ADMIN — ONDANSETRON HYDROCHLORIDE 4 MG: 2 INJECTION INTRAMUSCULAR; INTRAVENOUS at 11:59

## 2018-05-11 RX ADMIN — CALCIUM 500 MG: 500 TABLET ORAL at 09:55

## 2018-05-11 RX ADMIN — STANDARDIZED SENNA CONCENTRATE AND DOCUSATE SODIUM 2 TABLET: 8.6; 5 TABLET, FILM COATED ORAL at 09:55

## 2018-05-11 RX ADMIN — PANTOPRAZOLE SODIUM 40 MG: 40 TABLET, DELAYED RELEASE ORAL at 19:14

## 2018-05-11 RX ADMIN — LABETALOL HYDROCHLORIDE 500 MG: 200 TABLET, FILM COATED ORAL at 09:00

## 2018-05-11 RX ADMIN — QUETIAPINE FUMARATE 100 MG: 100 TABLET ORAL at 09:55

## 2018-05-11 RX ADMIN — VENLAFAXINE 75 MG: 37.5 TABLET ORAL at 19:14

## 2018-05-11 RX ADMIN — HEPARIN SODIUM 5000 UNITS: 5000 INJECTION, SOLUTION INTRAVENOUS; SUBCUTANEOUS at 22:07

## 2018-05-11 RX ADMIN — EPOETIN ALFA 10000 UNITS: 10000 SOLUTION INTRAVENOUS; SUBCUTANEOUS at 19:14

## 2018-05-11 RX ADMIN — HYDROMORPHONE HYDROCHLORIDE 1 MG: 1 INJECTION, SOLUTION INTRAMUSCULAR; INTRAVENOUS; SUBCUTANEOUS at 02:00

## 2018-05-11 RX ADMIN — INSULIN LISPRO 3 UNITS: 100 INJECTION, SOLUTION INTRAVENOUS; SUBCUTANEOUS at 19:29

## 2018-05-11 RX ADMIN — HEPARIN SODIUM 5000 UNITS: 5000 INJECTION, SOLUTION INTRAVENOUS; SUBCUTANEOUS at 15:13

## 2018-05-11 RX ADMIN — SODIUM CHLORIDE, SODIUM LACTATE, POTASSIUM CHLORIDE, AND CALCIUM CHLORIDE 50 ML/HR: 600; 310; 30; 20 INJECTION, SOLUTION INTRAVENOUS at 02:00

## 2018-05-11 RX ADMIN — HYDRALAZINE HYDROCHLORIDE 100 MG: 50 TABLET ORAL at 16:02

## 2018-05-11 RX ADMIN — Medication 10 ML: at 22:00

## 2018-05-11 RX ADMIN — ONDANSETRON HYDROCHLORIDE 4 MG: 2 INJECTION INTRAMUSCULAR; INTRAVENOUS at 02:00

## 2018-05-11 RX ADMIN — SODIUM CHLORIDE, SODIUM LACTATE, POTASSIUM CHLORIDE, AND CALCIUM CHLORIDE 50 ML/HR: 600; 310; 30; 20 INJECTION, SOLUTION INTRAVENOUS at 22:31

## 2018-05-11 RX ADMIN — HYDROMORPHONE HYDROCHLORIDE 1 MG: 1 INJECTION, SOLUTION INTRAMUSCULAR; INTRAVENOUS; SUBCUTANEOUS at 11:59

## 2018-05-11 RX ADMIN — INSULIN LISPRO 3 UNITS: 100 INJECTION, SOLUTION INTRAVENOUS; SUBCUTANEOUS at 06:43

## 2018-05-11 NOTE — PROGRESS NOTES
Physical Therapy  5.11.18    Chart reviewed. Note last Podiatry note (5/6/18) indicates patient should remain NWB on L LE. Spoke with patient regarding this, and she appeared surprised by this. Educated patient on NWB status and this will need to continue at d/c with RW use at home. She indicated she has a RW at home, therefore cancelled order. She also has a ramp and does not need stair training for safe d/c home. Patient seemingly clear on NWB status and use of RW at d/c. She politely deferred mobility at this time due to pain. Discussed all the above with RN. Thank you.     Areli Jones, PT, DPT

## 2018-05-11 NOTE — PROGRESS NOTES
Chart reviewed. CRM will continue to follow for discharge planning when medically stable. [facility] and [facility] [address] will follow the patient at home for IV management. Orders have been sent. Picc line placement and home health orders for IV abx management pending. MAAME in chart.  LYRIC

## 2018-05-11 NOTE — PROGRESS NOTES
Follow up visit with Simona. No specific worries or needs expressed today. Pt provided an update on her health concerns and her plans to discharge home (hopefully soon). Pt appears to be coping well at the time of my visit. She spoke of her love of summertime and how she was looking forward to the warmer weather. Offered a spoken blessing for patient.     Margaret Oconnor, Palliative

## 2018-05-11 NOTE — PROGRESS NOTES
Bedside shift change report given to Lorna (oncoming nurse) by Angelo Garcia (offgoing nurse). Report included the following information SBAR, Kardex, Intake/Output, MAR and Recent Results.

## 2018-05-11 NOTE — DIABETES MGMT
DTC Progress Note    Recommendations/ Comments: Chart reviewed for blood sugars trending up >200. Pt had an extremely low blood sugar of 31 mg/dl on 5/9/18 at ~3am.  Lantus 10 was discontinued and her blood sugars now are  ranging 156-295 mg/dl.        If appropriate, consider:  Adding Lantus to 5 units daily  Change lispro corerction scale to high sensitivity     Current hospital DM medication:    Lispro correctional insulin - normal sensitivity.     Patient is a 42 y.o. female with known Type 2 DM complicated by CKD and gastroparesis on no DM medications at home noted on PTA medications list.  Admitted for osteomyelitis of left foot    A1c:   Lab Results   Component Value Date/Time    Hemoglobin A1c 6.9 (H) 04/30/2018 06:24 AM    Hemoglobin A1c 8.8 (H) 03/03/2018 09:21 AM       Recent Glucose Results: Lab Results   Component Value Date/Time     (H) 05/11/2018 06:58 AM     (H) 05/11/2018 06:58 AM    GLUCPOC 208 (H) 05/11/2018 06:26 AM    GLUCPOC 202 (H) 05/11/2018 02:36 AM    GLUCPOC 295 (H) 05/10/2018 09:15 PM        Lab Results   Component Value Date/Time    Creatinine 2.71 (H) 05/11/2018 06:58 AM    Creatinine 2.69 (H) 05/11/2018 06:58 AM     Estimated Creatinine Clearance: 22 mL/min (based on Cr of 2.71). Active Orders   Diet    DIET DIABETIC CONSISTENT CARB Regular        PO intake: Patient Vitals for the past 72 hrs:   % Diet Eaten   05/09/18 0921 50 %       Will continue to follow as needed. Thank you  Isatu Velazquez, JUAN ALBERTO, CDE

## 2018-05-11 NOTE — PROGRESS NOTES
ID Progress Note  2018    Subjective:     She is not feeling well in general--concerned about her foot, having problems with nausea--left posterior rib pain    Objective:     Antibiotics:  1. Daptomycin       Vitals:   Visit Vitals    /88 (BP 1 Location: Left arm, BP Patient Position: At rest)    Pulse 98    Temp 98.6 °F (37 °C)    Resp 17    Ht 5' 2\" (1.575 m)    Wt 54.7 kg (120 lb 9.5 oz)    SpO2 98%    BMI 22.06 kg/m2        Tmax:  Temp (24hrs), Av.2 °F (36.8 °C), Min:98 °F (36.7 °C), Max:98.6 °F (37 °C)      Exam:  Lungs:  clear to auscultation bilaterally  Heart:  regular rate and rhythm  Abdomen:  soft, non-tender. Bowel sounds normal. No masses,  no organomegaly  Wound dressed    Labs:      Recent Labs      18   0658  05/10/18   0616  18   0524   WBC  3.6  4.6  4.8   HGB  7.9*  7.7*  7.6*   PLT  156  133*  121*   BUN  34*  33*  38*  40*  45*  45*   CREA  2.69*  2.71*  2.84*  2.85*  2.80*  2.82*   SGOT  14*  13*  17   AP  252*  239*  234*   TBILI  0.2  0.2  0.2       Cultures:     Lab Results   Component Value Date/Time    Specimen Description: URINE 2013 08:00 PM    Specimen Description: URINE 2013 07:55 PM    Specimen Description: URINE 2013 10:10 AM     Lab Results   Component Value Date/Time    Culture result: NO GROWTH 5 DAYS 2018 05:11 PM    Culture result: FEW STAPHYLOCOCCUS HOMINIS SUBSPECIES HOMINIS (A) 2018 12:47 PM    Culture result: (A) 2018 12:47 PM     STAPHYLOCOCCUS EPIDERMIDIS (OXACILLIN RESISTANT) ISOLATED FROM THIO BROTH ONLY    Culture result: NO ANAEROBES ISOLATED 2018 12:47 PM       Radiology:     Line/Insert Date:           Assessment:     1. Diabetic foot infection--MRI results noted--not sure whether this is the old infection resolving or a new problem  2. Renal insufficiency  3. DM  4. Abdominal pain--pancreatitis    Objective:     1. Continue current therapy  2.  She has elected to attempt another course of IV therapy--I am willing to do this, but she understands that there is a high risk of failure--orders on chart when needed  3.  Group will see over the weekend if asked    Karla Leon MD

## 2018-05-11 NOTE — PROGRESS NOTES
Hospitalist Progress Note  Cheli Veliz MD  Answering service: [phone] OR [phone] from in house phone        Date of Service:  2018  NAME:  Jonathan Morales  :  1980  MRN:  [id number]      Admission Summary:      Per H&P: 27-year-old woman with a past medical history significant for hypertension, type 2 diabetes, obstructive sleep apnea, depression, chronic kidney disease, narcotic dependency was in her usual state of health until about 3 days ago when the patient developed left leg pain and swelling. The pain is a constant 8/10 in severity. Patient described the pain as a sharp shooting pain in her left leg. No relief with pain medication. No known aggravating factors. Last month, the patient underwent a transmetatarsal resection of the left foot 2/2 diabetic foot. Patient also complained of abdominal pain, nausea, and vomiting. The abdominal pain is diffuse in location. F/u for osteomyelitis      Moving wound in the room. Abdominal pain has eased today,she continue to eat and tolerate po. Assessment & Plan:       Acute on chronic pancreatitis: Patient with N/V, abdominal pain and elevated lipase /amylase. Lipase trending,but tolerating PO. If pain is severe,patient advised to stop taking po. - CT A/P with calcified pancreas  - Pain flared up 5/10,but better today  -monitor sxs and lipase. Sickle cell crisis  Microcytic Anemia with acute drop. Hgb 6.5 and re-checked and at 6.4,5/7. received 1 unit and HB stable upper 7-8 range. -IV hydration,encouraged pt to use oral pain medication for better control as the IV wears off quickly  -Pain control  -Supplemental o2      Type 2 diabetes type 2 with Hypoglycemia,no with hyperglycemia  -Restart lantus at lower dose,5 units QHS. Accucheks. Right flank pain  -Renal US,no hydronephrosis,stones. Ribs xray megative. A/P CT on  had shown markedly distended bladder.  -Heat pad,pain control    Acute hyperkalemia due to kidney disease:  - now resolved    Osteomyelitis of the left foot:  - Podiatry was consulted. As above, patient declined further surgery  - MRI of the left foot done, suspicious for Osteomyelitis. ? Unclear if this is old or new  - On Zosyn and Daptomycin now  - ID on board. Patient will likely need prolonged course and then to re-visit the need for surgery      Acute on chronic kidney disease stage V  - holding diuretic  - Nephrology following    Essential Hypertension: Better control today  - Up-titrate meds as needed  - Patient with very high BP secondary to baseline HTN and pain associated with her pancreatitis flare    Obstructive sleep apnea    Depression: Continue home meds. Severe protein caloric malnutrition due to limited oral intake  -Dietary recommendations noted. Oral intake improving. No need for artifical feeding at this time  -Oral supplements    Code status: Full  DVT prophylaxis: heparin    Care Plan discussed with: Patient/Family and Nurse  Disposition:Home with home iv abx,now monitoring for abdominal pain/pancreatitis. Hospital Problems  Date Reviewed: 4/28/2018          Codes Class Noted POA    * (Principal)Osteomyelitis of left foot [facility]) ICD-10-CM: M86.9  ICD-9-CM: 730.27  4/27/2018 Yes              Review of Systems:   Pertinent items are noted in HPI. Vital Signs:    Last 24hrs VS reviewed since prior progress note. Most recent are:  Visit Vitals    /75 (BP 1 Location: Right arm)    Pulse 86    Temp 98.1 °F (36.7 °C)    Resp 16    Ht 5' 2\" (1.575 m)    Wt 54.7 kg (120 lb 9.5 oz)    SpO2 100%    BMI 22.06 kg/m2       No intake or output data in the 24 hours ending 05/11/18 1132     Physical Examination:             Constitutional:  No distress   ENT:  Neck supple   Resp:  CTA bilaterally. CV:  Regular rhythm, normal rate    GI:  Soft, non distended, less tender to palpation.  No rigidity   JOSE  Left flank tenderness    Musculoskeletal:  LLE larger than R. Partial amputation of L foot with wrapping    Neurologic:  Moves all extremities.   AAOx3        Data Review:    Review and/or order of clinical lab test  Review and/or order of tests in the medicine section of [facility]      Labs:     Recent Labs      05/11/18 0658  05/10/18   0616   WBC  3.6  4.6   HGB  7.9*  7.7*   HCT  24.2*  23.8*   PLT  156  133*     Recent Labs      05/11/18   0658  05/10/18   0616  05/09/18   0524   NA  139  138  138  139  140  139   K  4.6  4.6  4.4  4.5  4.5  4.4   CL  107  107  109*  109*  110*  109*   CO2  23  23  22  21  21  21   BUN  34*  33*  38*  40*  45*  45*   CREA  2.69*  2.71*  2.84*  2.85*  2.80*  2.82*   GLU  199*  200*  217*  214*  68  65   CA  8.1*  8.1*  8.0*  8.0*  7.8*  7.6*   PHOS  2.9  3.3  4.0     Recent Labs      05/11/18 0658  05/10/18   0616  05/09/18   0524   SGOT  14*  13*  17   ALT  17  17  18   AP  252*  239*  234*   TBILI  0.2  0.2  0.2   TP  6.3*  6.1*  6.1*   ALB  2.4*  2.4*  2.3*  2.4*  2.3*  2.2*   GLOB  3.9  3.8  3.8   LPSE  1846*  2049*   --      Lab Results   Component Value Date/Time    Folate 33.8 (H) 05/07/2018 11:20 AM      Lab Results   Component Value Date/Time    Cholesterol, total 137 04/30/2018 06:24 AM    HDL Cholesterol 35 04/30/2018 06:24 AM    LDL, calculated 89 04/30/2018 06:24 AM    Triglyceride 65 04/30/2018 06:24 AM    CHOL/HDL Ratio 3.9 04/30/2018 06:24 AM     Lab Results   Component Value Date/Time    Glucose (POC) 208 (H) 05/11/2018 06:26 AM    Glucose (POC) 202 (H) 05/11/2018 02:36 AM    Glucose (POC) 295 (H) 05/10/2018 09:15 PM    Glucose (POC) 185 (H) 05/10/2018 04:43 PM    Glucose (POC) 156 (H) 05/10/2018 11:07 AM     Lab Results   Component Value Date/Time    Color YELLOW/STRAW 05/09/2018 02:33 PM    Appearance CLOUDY (A) 05/09/2018 02:33 PM    Specific gravity 1.009 05/09/2018 02:33 PM    Specific gravity 1.015 07/26/2010 11:18 AM    pH (UA) 6.5 05/09/2018 02:33 PM    Protein 30 (A) 05/09/2018 02:33 PM    Glucose NEGATIVE  05/09/2018 02:33 PM    Ketone NEGATIVE  05/09/2018 02:33 PM    Bilirubin NEGATIVE  05/09/2018 02:33 PM    Urobilinogen 0.2 05/09/2018 02:33 PM    Nitrites NEGATIVE  05/09/2018 02:33 PM    Leukocyte Esterase NEGATIVE  05/09/2018 02:33 PM    Epithelial cells FEW 05/09/2018 02:33 PM    Bacteria NEGATIVE  05/09/2018 02:33 PM    WBC 0-4 05/09/2018 02:33 PM    RBC 0-5 05/09/2018 02:33 PM       Medications Reviewed:     Current Facility-Administered Medications   Medication Dose Route Frequency    insulin glargine (LANTUS) injection 5 Units  5 Units SubCUTAneous QHS    pantoprazole (PROTONIX) 40 mg in sodium chloride 0.9% 10 mL injection  40 mg IntraVENous Q24H    HYDROmorphone (PF) (DILAUDID) injection 1 mg  1 mg IntraVENous Q4H PRN    epoetin bernard (EPOGEN;PROCRIT) injection 10,000 Units  10,000 Units SubCUTAneous Q MON, WED & FRI    lactated Ringers infusion  50 mL/hr IntraVENous CONTINUOUS    0.9% sodium chloride infusion 250 mL  250 mL IntraVENous PRN    labetalol (NORMODYNE) tablet 500 mg  500 mg Oral Q12H    DAPTOmycin (CUBICIN) 400 mg in 0.9% sodium chloride 50 mL IVPB RF formulation  400 mg IntraVENous Q48H    alteplase (CATHFLO) 1 mg in sterile water (preservative free) 1 mL injection  1 mg InterCATHeter PRN    polyethylene glycol (MIRALAX) packet 17 g  17 g Oral DAILY PRN    hydrALAZINE (APRESOLINE) tablet 100 mg  100 mg Oral TID    LORazepam (ATIVAN) tablet 0.5 mg  0.5 mg Oral Q6H PRN    senna-docusate (PERICOLACE) 8.6-50 mg per tablet 2 Tab  2 Tab Oral DAILY    hydrALAZINE (APRESOLINE) 20 mg/mL injection 20 mg  20 mg IntraVENous Q4H PRN    ondansetron (ZOFRAN) injection 4 mg  4 mg IntraVENous Q4H PRN    diphenhydrAMINE (BENADRYL) capsule 25 mg  25 mg Oral Q6H PRN    nitroglycerin (NITROBID) 2 % ointment 2 Inch  2 Inch Topical BID    promethazine (PHENERGAN) 6.25 mg/5 mL syrup 6.25 mg  6.25 mg Oral Q6H PRN    sodium chloride (NS) flush 20 mL  20 mL InterCATHeter PRN    sodium chloride (NS) flush 10 mL  10 mL InterCATHeter Q24H    sodium chloride (NS) flush 10 mL  10 mL InterCATHeter PRN    sodium chloride (NS) flush 10 mL  10 mL InterCATHeter Q8H    sodium chloride (NS) flush 20 mL  20 mL InterCATHeter Q24H    NIFEdipine ER (PROCARDIA XL) tablet 90 mg  90 mg Oral DAILY    calcium carbonate (OS-BINA) tablet 500 mg [elemental]  500 mg Oral DAILY    QUEtiapine (SEROquel) tablet 100 mg  100 mg Oral BID    venlafaxine (EFFEXOR) tablet 75 mg  75 mg Oral BID WITH MEALS    sodium chloride (NS) flush 5-10 mL  5-10 mL IntraVENous Q8H    sodium chloride (NS) flush 5-10 mL  5-10 mL IntraVENous PRN    heparin (porcine) injection 5,000 Units  5,000 Units SubCUTAneous Q8H    albuterol-ipratropium (DUO-NEB) 2.5 MG-0.5 MG/3 ML  3 mL Nebulization Q4H PRN    lactobac ac& pc-s.therm-b.anim (CHRIS Q/RISAQUAD)  1 Cap Oral DAILY    insulin lispro (HUMALOG) injection   SubCUTAneous AC&HS    glucose chewable tablet 16 g  4 Tab Oral PRN    dextrose (D50W) injection syrg 12.5-25 g  12.5-25 g IntraVENous PRN    glucagon (GLUCAGEN) injection 1 mg  1 mg IntraMUSCular PRN    acetaminophen (TYLENOL) tablet 650 mg  650 mg Oral Q4H PRN    oxyCODONE-acetaminophen (PERCOCET) 5-325 mg per tablet 1 Tab  1 Tab Oral Q4H PRN     ______________________________________________________________________  EXPECTED LENGTH OF STAY: 3d 19h  ACTUAL LENGTH OF STAY:          14                 Samina Dinh MD

## 2018-05-11 NOTE — PROGRESS NOTES
[facility]   [address] [address] [facility], [facility], [facility]  Phone: [phone]   [phone]       Nephrology Progress Note  Coby Brown     1980     [id number]  Date of Admission : 4/27/2018 05/11/18    CC: Follow up for STONE       Assessment and Plan   STONE on CKD   - Cr stable   - continue IVF at 50 cc/ hr     Acute on Chronic Hyper K :  - resolved     Uncontrolled HTN:  - BP stable now  - cont current meds    Chronic Pancreatitis :   - per primary team     CKD Stage IV :  - 2/2 DM, SCD  - previously dialysis dependent for ~ 6m. Failed RUE and RLE grafts     Sickle cell dz :  s/p transfusion 5/7  Anemia : continue epogen   Left foot osteomyelitis :  per podiatry and ID     Interval History:  Seen and examined. No new sx. Continued upper abdo pain . Tolerating PPI     Review of Systems: Pertinent items are noted in HPI.     Current Medications:   Current Facility-Administered Medications   Medication Dose Route Frequency    insulin glargine (LANTUS) injection 5 Units  5 Units SubCUTAneous QHS    pantoprazole (PROTONIX) tablet 40 mg  40 mg Oral ACB&D    HYDROmorphone (PF) (DILAUDID) injection 1 mg  1 mg IntraVENous Q4H PRN    epoetin bernard (EPOGEN;PROCRIT) injection 10,000 Units  10,000 Units SubCUTAneous Q MON, WED & FRI    lactated Ringers infusion  50 mL/hr IntraVENous CONTINUOUS    0.9% sodium chloride infusion 250 mL  250 mL IntraVENous PRN    labetalol (NORMODYNE) tablet 500 mg  500 mg Oral Q12H    DAPTOmycin (CUBICIN) 400 mg in 0.9% sodium chloride 50 mL IVPB RF formulation  400 mg IntraVENous Q48H    alteplase (CATHFLO) 1 mg in sterile water (preservative free) 1 mL injection  1 mg InterCATHeter PRN    polyethylene glycol (MIRALAX) packet 17 g  17 g Oral DAILY PRN    hydrALAZINE (APRESOLINE) tablet 100 mg  100 mg Oral TID    LORazepam (ATIVAN) tablet 0.5 mg  0.5 mg Oral Q6H PRN    senna-docusate (PERICOLACE) 8.6-50 mg per tablet 2 Tab  2 Tab Oral DAILY    hydrALAZINE (APRESOLINE) 20 mg/mL injection 20 mg  20 mg IntraVENous Q4H PRN    ondansetron (ZOFRAN) injection 4 mg  4 mg IntraVENous Q4H PRN    diphenhydrAMINE (BENADRYL) capsule 25 mg  25 mg Oral Q6H PRN    promethazine (PHENERGAN) 6.25 mg/5 mL syrup 6.25 mg  6.25 mg Oral Q6H PRN    sodium chloride (NS) flush 20 mL  20 mL InterCATHeter PRN    sodium chloride (NS) flush 10 mL  10 mL InterCATHeter Q24H    sodium chloride (NS) flush 10 mL  10 mL InterCATHeter PRN    sodium chloride (NS) flush 10 mL  10 mL InterCATHeter Q8H    sodium chloride (NS) flush 20 mL  20 mL InterCATHeter Q24H    NIFEdipine ER (PROCARDIA XL) tablet 90 mg  90 mg Oral DAILY    calcium carbonate (OS-BINA) tablet 500 mg [elemental]  500 mg Oral DAILY    QUEtiapine (SEROquel) tablet 100 mg  100 mg Oral BID    venlafaxine (EFFEXOR) tablet 75 mg  75 mg Oral BID WITH MEALS    sodium chloride (NS) flush 5-10 mL  5-10 mL IntraVENous Q8H    sodium chloride (NS) flush 5-10 mL  5-10 mL IntraVENous PRN    heparin (porcine) injection 5,000 Units  5,000 Units SubCUTAneous Q8H    albuterol-ipratropium (DUO-NEB) 2.5 MG-0.5 MG/3 ML  3 mL Nebulization Q4H PRN    lactobac ac& pc-s.therm-b.anim (CHRIS Q/RISAQUAD)  1 Cap Oral DAILY    insulin lispro (HUMALOG) injection   SubCUTAneous AC&HS    glucose chewable tablet 16 g  4 Tab Oral PRN    dextrose (D50W) injection syrg 12.5-25 g  12.5-25 g IntraVENous PRN    glucagon (GLUCAGEN) injection 1 mg  1 mg IntraMUSCular PRN    acetaminophen (TYLENOL) tablet 650 mg  650 mg Oral Q4H PRN    oxyCODONE-acetaminophen (PERCOCET) 5-325 mg per tablet 1 Tab  1 Tab Oral Q4H PRN      Allergies   Allergen Reactions    Erythromycin Itching    Morphine Itching    Toradol [Ketorolac] Rash       Objective:  Vitals:    Vitals:    05/10/18 2042 05/10/18 2202 05/11/18 0330 05/11/18 0821   BP: 176/89 177/88 155/81 163/75   Pulse: 90 92 88 86   Resp: 16  16 16   Temp: 98 °F (36.7 °C)  98.1 °F (36.7 °C) 98.1 °F (36.7 °C) SpO2: 100%  100% 100%   Weight:       Height:         Intake and Output:          Physical Examination:    General: Awake, alert  Neck:  Supple, no mass  Resp:  Lungs CTA B/L  CV:  RRR,  no murmur   GI:  Soft, NT, + BS  Neurologic:  Non focal  Skin:  No Rash  :  No escobedo     []    High complexity decision making was performed  []    Patient is at high-risk of decompensation with multiple organ involvement    Lab Data Personally Reviewed: I have reviewed all the pertinent labs, microbiology data and radiology studies during assessment.     Recent Labs      05/11/18   0658  05/10/18   0616  05/09/18   0524   NA  139  138  138  139  140  139   K  4.6  4.6  4.4  4.5  4.5  4.4   CL  107  107  109*  109*  110*  109*   CO2  23  23  22  21  21  21   GLU  199*  200*  217*  214*  68  65   BUN  34*  33*  38*  40*  45*  45*   CREA  2.69*  2.71*  2.84*  2.85*  2.80*  2.82*   CA  8.1*  8.1*  8.0*  8.0*  7.8*  7.6*   PHOS  2.9  3.3  4.0   ALB  2.4*  2.4*  2.3*  2.4*  2.3*  2.2*   SGOT  14*  13*  17   ALT  17  17  18     Recent Labs      05/11/18   0658  05/10/18   0616  05/09/18   0524   WBC  3.6  4.6  4.8   HGB  7.9*  7.7*  7.6*   HCT  24.2*  23.8*  23.5*   PLT  156  133*  121*     Lab Results   Component Value Date/Time    Specimen Description: URINE 06/24/2013 08:00 PM    Specimen Description: URINE 06/17/2013 07:55 PM    Specimen Description: URINE 05/26/2013 10:10 AM    Specimen Description: URINE 04/22/2013 02:30 PM    Specimen Description: NARES 03/21/2013 12:30 PM     Lab Results   Component Value Date/Time    Culture result: NO GROWTH 5 DAYS 04/27/2018 05:11 PM    Culture result: FEW STAPHYLOCOCCUS HOMINIS SUBSPECIES HOMINIS (A) 03/11/2018 12:47 PM    Culture result: (A) 03/11/2018 12:47 PM     STAPHYLOCOCCUS EPIDERMIDIS (OXACILLIN RESISTANT) ISOLATED FROM THIO BROTH ONLY    Culture result: NO ANAEROBES ISOLATED 03/11/2018 12:47 PM    Culture result: NO GROWTH ON SOLID MEDIA AT 4 DAYS 03/11/2018 11:43 AM    Culture result: (A) 03/11/2018 11:43 AM     STAPHYLOCOCCUS EPIDERMIDIS (OXACILLIN RESISTANT) ISOLATED FROM THIO BROTH ONLY    Culture result: NO GROWTH ON SOLID MEDIA AT 4 DAYS 03/11/2018 11:43 AM    Culture result: (A) 03/11/2018 11:43 AM     STAPHYLOCOCCUS EPIDERMIDIS ISOLATED FROM THIO BROTH ONLY     Recent Results (from the past 24 hour(s))   GLUCOSE, POC    Collection Time: 05/10/18  4:43 PM   Result Value Ref Range    Glucose (POC) 185 (H) 65 - 100 mg/dL    Performed by Larry Bharti    GLUCOSE, POC    Collection Time: 05/10/18  9:15 PM   Result Value Ref Range    Glucose (POC) 295 (H) 65 - 100 mg/dL    Performed by Larry Hay    GLUCOSE, POC    Collection Time: 05/11/18  2:36 AM   Result Value Ref Range    Glucose (POC) 202 (H) 65 - 100 mg/dL    Performed by DONTAE BREEN    GLUCOSE, POC    Collection Time: 05/11/18  6:26 AM   Result Value Ref Range    Glucose (POC) 208 (H) 65 - 100 mg/dL    Performed by Danni Miller    RENAL FUNCTION PANEL    Collection Time: 05/11/18  6:58 AM   Result Value Ref Range    Sodium 138 136 - 145 mmol/L    Potassium 4.6 3.5 - 5.1 mmol/L    Chloride 107 97 - 108 mmol/L    CO2 23 21 - 32 mmol/L    Anion gap 8 5 - 15 mmol/L    Glucose 200 (H) 65 - 100 mg/dL    BUN 33 (H) 6 - 20 MG/DL    Creatinine 2.71 (H) 0.55 - 1.02 MG/DL    BUN/Creatinine ratio 12 12 - 20      GFR est AA 24 (L) >60 ml/min/1.73m2    GFR est non-AA 20 (L) >60 ml/min/1.73m2    Calcium 8.1 (L) 8.5 - 10.1 MG/DL    Phosphorus 2.9 2.6 - 4.7 MG/DL    Albumin 2.4 (L) 3.5 - 5.0 g/dL   CBC WITH AUTOMATED DIFF    Collection Time: 05/11/18  6:58 AM   Result Value Ref Range    WBC 3.6 3.6 - 11.0 K/uL    RBC 3.05 (L) 3.80 - 5.20 M/uL    HGB 7.9 (L) 11.5 - 16.0 g/dL    HCT 24.2 (L) 35.0 - 47.0 %    MCV 79.3 (L) 80.0 - 99.0 FL    MCH 25.9 (L) 26.0 - 34.0 PG    MCHC 32.6 30.0 - 36.5 g/dL    RDW 17.2 (H) 11.5 - 14.5 %    PLATELET 599 171 - 871 K/uL    NRBC 0.0 0  WBC    ABSOLUTE NRBC 0.00 0.00 - 0.01 K/uL    NEUTROPHILS 61 32 - 75 %    LYMPHOCYTES 21 12 - 49 %    MONOCYTES 11 5 - 13 %    EOSINOPHILS 7 0 - 7 %    BASOPHILS 0 0 - 1 %    IMMATURE GRANULOCYTES 0 0.0 - 0.5 %    ABS. NEUTROPHILS 2.1 1.8 - 8.0 K/UL    ABS. LYMPHOCYTES 0.8 0.8 - 3.5 K/UL    ABS. MONOCYTES 0.4 0.0 - 1.0 K/UL    ABS. EOSINOPHILS 0.3 0.0 - 0.4 K/UL    ABS. BASOPHILS 0.0 0.0 - 0.1 K/UL    ABS. IMM. GRANS. 0.0 0.00 - 0.04 K/UL    DF AUTOMATED      RBC COMMENTS ANISOCYTOSIS  1+        RBC COMMENTS TEARDROP CELLS  PRESENT        RBC COMMENTS MICROCYTOSIS  1+        RBC COMMENTS HYPOCHROMIA  1+       METABOLIC PANEL, COMPREHENSIVE    Collection Time: 05/11/18  6:58 AM   Result Value Ref Range    Sodium 139 136 - 145 mmol/L    Potassium 4.6 3.5 - 5.1 mmol/L    Chloride 107 97 - 108 mmol/L    CO2 23 21 - 32 mmol/L    Anion gap 9 5 - 15 mmol/L    Glucose 199 (H) 65 - 100 mg/dL    BUN 34 (H) 6 - 20 MG/DL    Creatinine 2.69 (H) 0.55 - 1.02 MG/DL    BUN/Creatinine ratio 13 12 - 20      GFR est AA 24 (L) >60 ml/min/1.73m2    GFR est non-AA 20 (L) >60 ml/min/1.73m2    Calcium 8.1 (L) 8.5 - 10.1 MG/DL    Bilirubin, total 0.2 0.2 - 1.0 MG/DL    ALT (SGPT) 17 12 - 78 U/L    AST (SGOT) 14 (L) 15 - 37 U/L    Alk. phosphatase 252 (H) 45 - 117 U/L    Protein, total 6.3 (L) 6.4 - 8.2 g/dL    Albumin 2.4 (L) 3.5 - 5.0 g/dL    Globulin 3.9 2.0 - 4.0 g/dL    A-G Ratio 0.6 (L) 1.1 - 2.2     LIPASE    Collection Time: 05/11/18  6:58 AM   Result Value Ref Range    Lipase 1846 (H) 73 - 393 U/L   GLUCOSE, POC    Collection Time: 05/11/18 11:34 AM   Result Value Ref Range    Glucose (POC) 203 (H) 65 - 100 mg/dL    Performed by Terrance Brasher      I have reviewed the flowsheets. Chart and Pertinent Notes have been reviewed. No change in PMH ,family and social history from Consult note.       Stan Hill MD

## 2018-05-11 NOTE — PROGRESS NOTES
Bedside shift change report given to Kwaku Killian (oncoming nurse) by Eugene Stewart (offgoing nurse). Report included the following information SBAR.

## 2018-05-12 LAB
ANION GAP SERPL CALC-SCNC: 8 MMOL/L (ref 5–15)
BUN SERPL-MCNC: 29 MG/DL (ref 6–20)
BUN/CREAT SERPL: 11 (ref 12–20)
CALCIUM SERPL-MCNC: 8.2 MG/DL (ref 8.5–10.1)
CHLORIDE SERPL-SCNC: 108 MMOL/L (ref 97–108)
CO2 SERPL-SCNC: 25 MMOL/L (ref 21–32)
CREAT SERPL-MCNC: 2.59 MG/DL (ref 0.55–1.02)
GLUCOSE BLD STRIP.AUTO-MCNC: 134 MG/DL (ref 65–100)
GLUCOSE BLD STRIP.AUTO-MCNC: 206 MG/DL (ref 65–100)
GLUCOSE BLD STRIP.AUTO-MCNC: 300 MG/DL (ref 65–100)
GLUCOSE BLD STRIP.AUTO-MCNC: 301 MG/DL (ref 65–100)
GLUCOSE SERPL-MCNC: 124 MG/DL (ref 65–100)
HCT VFR BLD AUTO: 25.6 % (ref 35–47)
HGB BLD-MCNC: 8.2 G/DL (ref 11.5–16)
LIPASE SERPL-CCNC: 1776 U/L (ref 73–393)
POTASSIUM SERPL-SCNC: 4.2 MMOL/L (ref 3.5–5.1)
SERVICE CMNT-IMP: ABNORMAL
SODIUM SERPL-SCNC: 141 MMOL/L (ref 136–145)

## 2018-05-12 PROCEDURE — 36415 COLL VENOUS BLD VENIPUNCTURE: CPT | Performed by: HOSPITALIST

## 2018-05-12 PROCEDURE — 65270000029 HC RM PRIVATE

## 2018-05-12 PROCEDURE — 80048 BASIC METABOLIC PNL TOTAL CA: CPT | Performed by: HOSPITALIST

## 2018-05-12 PROCEDURE — 85018 HEMOGLOBIN: CPT | Performed by: HOSPITALIST

## 2018-05-12 PROCEDURE — 74011250636 HC RX REV CODE- 250/636: Performed by: INTERNAL MEDICINE

## 2018-05-12 PROCEDURE — 83690 ASSAY OF LIPASE: CPT | Performed by: HOSPITALIST

## 2018-05-12 PROCEDURE — 74011250637 HC RX REV CODE- 250/637: Performed by: HOSPITALIST

## 2018-05-12 PROCEDURE — 82962 GLUCOSE BLOOD TEST: CPT

## 2018-05-12 PROCEDURE — 74011250637 HC RX REV CODE- 250/637: Performed by: FAMILY MEDICINE

## 2018-05-12 PROCEDURE — 74011250637 HC RX REV CODE- 250/637: Performed by: INTERNAL MEDICINE

## 2018-05-12 PROCEDURE — 74011250636 HC RX REV CODE- 250/636: Performed by: HOSPITALIST

## 2018-05-12 PROCEDURE — 74011250637 HC RX REV CODE- 250/637: Performed by: PHYSICAL MEDICINE & REHABILITATION

## 2018-05-12 PROCEDURE — 74011636637 HC RX REV CODE- 636/637: Performed by: INTERNAL MEDICINE

## 2018-05-12 PROCEDURE — 74011636637 HC RX REV CODE- 636/637: Performed by: HOSPITALIST

## 2018-05-12 RX ADMIN — INSULIN GLARGINE 5 UNITS: 100 INJECTION, SOLUTION SUBCUTANEOUS at 21:25

## 2018-05-12 RX ADMIN — HYDRALAZINE HYDROCHLORIDE 100 MG: 50 TABLET ORAL at 11:59

## 2018-05-12 RX ADMIN — QUETIAPINE FUMARATE 100 MG: 100 TABLET ORAL at 19:09

## 2018-05-12 RX ADMIN — INSULIN LISPRO 4 UNITS: 100 INJECTION, SOLUTION INTRAVENOUS; SUBCUTANEOUS at 21:25

## 2018-05-12 RX ADMIN — HYDROMORPHONE HYDROCHLORIDE 1 MG: 1 INJECTION, SOLUTION INTRAMUSCULAR; INTRAVENOUS; SUBCUTANEOUS at 11:59

## 2018-05-12 RX ADMIN — Medication 10 ML: at 22:00

## 2018-05-12 RX ADMIN — POLYETHYLENE GLYCOL 3350 17 G: 17 POWDER, FOR SOLUTION ORAL at 15:57

## 2018-05-12 RX ADMIN — VENLAFAXINE 75 MG: 37.5 TABLET ORAL at 08:30

## 2018-05-12 RX ADMIN — VENLAFAXINE 75 MG: 37.5 TABLET ORAL at 19:09

## 2018-05-12 RX ADMIN — HYDROMORPHONE HYDROCHLORIDE 1 MG: 1 INJECTION, SOLUTION INTRAMUSCULAR; INTRAVENOUS; SUBCUTANEOUS at 07:02

## 2018-05-12 RX ADMIN — ONDANSETRON HYDROCHLORIDE 4 MG: 2 INJECTION INTRAMUSCULAR; INTRAVENOUS at 19:09

## 2018-05-12 RX ADMIN — Medication 10 ML: at 06:00

## 2018-05-12 RX ADMIN — ONDANSETRON HYDROCHLORIDE 4 MG: 2 INJECTION INTRAMUSCULAR; INTRAVENOUS at 15:49

## 2018-05-12 RX ADMIN — Medication 10 ML: at 13:44

## 2018-05-12 RX ADMIN — ONDANSETRON HYDROCHLORIDE 4 MG: 2 INJECTION INTRAMUSCULAR; INTRAVENOUS at 06:59

## 2018-05-12 RX ADMIN — HYDROMORPHONE HYDROCHLORIDE 1 MG: 1 INJECTION, SOLUTION INTRAMUSCULAR; INTRAVENOUS; SUBCUTANEOUS at 23:53

## 2018-05-12 RX ADMIN — HEPARIN SODIUM 5000 UNITS: 5000 INJECTION, SOLUTION INTRAVENOUS; SUBCUTANEOUS at 05:21

## 2018-05-12 RX ADMIN — CALCIUM 500 MG: 500 TABLET ORAL at 12:00

## 2018-05-12 RX ADMIN — PANTOPRAZOLE SODIUM 40 MG: 40 TABLET, DELAYED RELEASE ORAL at 07:48

## 2018-05-12 RX ADMIN — HYDROMORPHONE HYDROCHLORIDE 1 MG: 1 INJECTION, SOLUTION INTRAMUSCULAR; INTRAVENOUS; SUBCUTANEOUS at 02:39

## 2018-05-12 RX ADMIN — HYDROMORPHONE HYDROCHLORIDE 1 MG: 1 INJECTION, SOLUTION INTRAMUSCULAR; INTRAVENOUS; SUBCUTANEOUS at 19:09

## 2018-05-12 RX ADMIN — ONDANSETRON HYDROCHLORIDE 4 MG: 2 INJECTION INTRAMUSCULAR; INTRAVENOUS at 23:53

## 2018-05-12 RX ADMIN — INSULIN LISPRO 7 UNITS: 100 INJECTION, SOLUTION INTRAVENOUS; SUBCUTANEOUS at 13:43

## 2018-05-12 RX ADMIN — Medication 10 ML: at 13:45

## 2018-05-12 RX ADMIN — OXYCODONE AND ACETAMINOPHEN 1 TABLET: 5; 325 TABLET ORAL at 13:43

## 2018-05-12 RX ADMIN — DIPHENHYDRAMINE HYDROCHLORIDE 25 MG: 25 CAPSULE ORAL at 13:43

## 2018-05-12 RX ADMIN — HYDROMORPHONE HYDROCHLORIDE 1 MG: 1 INJECTION, SOLUTION INTRAMUSCULAR; INTRAVENOUS; SUBCUTANEOUS at 15:49

## 2018-05-12 RX ADMIN — STANDARDIZED SENNA CONCENTRATE AND DOCUSATE SODIUM 2 TABLET: 8.6; 5 TABLET, FILM COATED ORAL at 12:01

## 2018-05-12 RX ADMIN — Medication 1 CAPSULE: at 12:01

## 2018-05-12 RX ADMIN — ONDANSETRON HYDROCHLORIDE 4 MG: 2 INJECTION INTRAMUSCULAR; INTRAVENOUS at 02:39

## 2018-05-12 RX ADMIN — ONDANSETRON HYDROCHLORIDE 4 MG: 2 INJECTION INTRAMUSCULAR; INTRAVENOUS at 11:59

## 2018-05-12 RX ADMIN — SODIUM CHLORIDE, SODIUM LACTATE, POTASSIUM CHLORIDE, AND CALCIUM CHLORIDE 50 ML/HR: 600; 310; 30; 20 INJECTION, SOLUTION INTRAVENOUS at 19:09

## 2018-05-12 RX ADMIN — HYDRALAZINE HYDROCHLORIDE 100 MG: 50 TABLET ORAL at 15:48

## 2018-05-12 RX ADMIN — PANTOPRAZOLE SODIUM 40 MG: 40 TABLET, DELAYED RELEASE ORAL at 15:48

## 2018-05-12 RX ADMIN — HEPARIN SODIUM 5000 UNITS: 5000 INJECTION, SOLUTION INTRAVENOUS; SUBCUTANEOUS at 13:43

## 2018-05-12 RX ADMIN — LABETALOL HYDROCHLORIDE 500 MG: 200 TABLET, FILM COATED ORAL at 12:00

## 2018-05-12 RX ADMIN — INSULIN LISPRO 3 UNITS: 100 INJECTION, SOLUTION INTRAVENOUS; SUBCUTANEOUS at 07:49

## 2018-05-12 RX ADMIN — QUETIAPINE FUMARATE 100 MG: 100 TABLET ORAL at 12:01

## 2018-05-12 RX ADMIN — NIFEDIPINE 90 MG: 60 TABLET, FILM COATED, EXTENDED RELEASE ORAL at 12:00

## 2018-05-12 RX ADMIN — Medication 20 ML: at 13:44

## 2018-05-12 NOTE — PROGRESS NOTES
[facility], [address] [place] [address],[address]  [address]  P: [phone]  F: [phone]    Foot and Ankle Surgery - Progress Note                                                   Assessment/Plan:  Charcot deformity left foot  Nonhealing surgical wound left foot  Osteomyelitis left foot  DM with neuropathy       Pt is status post Incision and drainage with removal of all nonviable soft tissue left foot  Proximal foot amputation left foot, Open bone biopsies left foot  Deep tendon transfer tibialis anterior tendon left foot  Adjacent transfer arteriomyofasciocutaneous plantar medial pedicle flap for delayed primary closure left foot DOS: 03-      Pt is cleared from foot and ankle stand point once cleared by all other specialties       Labs/imaging reviewed, MRI left foot reviewed with patient  abx per [facility]  Pain medication per medicine team      We will plan for weekly changes of the bandage. the patient will folow up in the wound center.  The patient may complete passive ROM exercises to the left thigh and lower leg but should be partial weightbearing heel only for transfer left foot. Pt has full range of motion to upper body and full weight-bearing to the right foot.        Elevate and offload B/L LE. Float heels off edge of bed with foam block or air boots.       Foot and ankle will follow      HPI:  Pt is seen at bedside resting in NAD, No new pedal complaints at this time. Continues to have significant flank pain to the abdomen. Discussed pt wishes to continue long term IV abx therapy and wound care. Discussed case with nursing.       Objective:  Vitals: Patient Vitals for the past 12 hrs:   BP Temp Pulse Resp SpO2   05/11/18 1438 158/88 98.6 °F (37 °C) 98 17 98 %       Dermatological:  Dressing to left foot left clean dry and intact, no strike-through noted      Vascular:  Deferred       Neurological:   Epicritic and protective threshold is deminished to B/L LE, Pt is unable to detect a 5.07 monofilament wire on 5/5 random tested spots B/L LE.       MSK:  deferred .    Labs:  Recent Labs      05/11/18   0658   WBC  3.6   CREA  2.69*  2.71*   BUN  34*  33*   HGB  7.9*   HCT  24.2*   NA  139  138   K  4.6  4.6   CL  107  107   CO2  23  23   GLU  199*  200*         Andreia Quant, DPM  5/11/2018  [facility] of [address] [place] [address]. Karen [address] [place] [address]  P: [phone]  F: [phone]

## 2018-05-12 NOTE — PROGRESS NOTES
Bedside shift change report given to Francesco Sales (oncoming nurse) by Lucero Laguna (offgoing nurse). Report included the following information SBAR.

## 2018-05-12 NOTE — PROGRESS NOTES
Bedside shift change report given to Kiersten (oncoming nurse) by Ophelia Stuart (offgoing nurse). Report included the following information SBAR, Kardex, Intake/Output, MAR and Recent Results.

## 2018-05-12 NOTE — PROGRESS NOTES
Hospitalist Progress Note  Deanna Ayala MD  Answering service: [phone] OR [phone] from in house phone        Date of Service:  2018  NAME:  Renetta Fernandez  :  1980  MRN:  [id number]      Admission Summary:      Per H&P: 70-year-old woman with a past medical history significant for hypertension, type 2 diabetes, obstructive sleep apnea, depression, chronic kidney disease, narcotic dependency was in her usual state of health until about 3 days ago when the patient developed left leg pain and swelling. The pain is a constant 8/10 in severity. Patient described the pain as a sharp shooting pain in her left leg. No relief with pain medication. No known aggravating factors. Last month, the patient underwent a transmetatarsal resection of the left foot 2/2 diabetic foot. Patient also complained of abdominal pain, nausea, and vomiting. The abdominal pain is diffuse in location. F/u for osteomyelitis      Abdominal pain better. Assessment & Plan:       Acute on chronic pancreatitis: Patient with N/V, abdominal pain and elevated lipase /amylase. Lipase trending,but tolerating PO. If pain is severe,patient advised to stop taking po. - CT A/P with calcified pancreas  - Pain flared up 5/10,but better today  -monitor sxs and lipase. Sickle cell crisis  Microcytic Anemia with acute drop. Hgb 6.5 and re-checked and at 6.4,5/7. received 1 unit and HB stable upper 7-8 range. -IV hydration,encouraged pt to use oral pain medication for better control as the IV wears off quickly  -Pain control  -Supplemental o2      Type 2 diabetes type 2 with Hypoglycemia,no with hyperglycemia  -Restart lantus at lower dose,5 units QHS. Accucheks. Right flank pain  -Renal US,no hydronephrosis,stones. Ribs xray megative. A/P CT on  had shown markedly distended bladder.  -Heat pad,pain control    Acute hyperkalemia due to kidney disease:  - now resolved    Osteomyelitis of the left foot:  - Podiatry was consulted. As above, patient declined further surgery  - MRI of the left foot done, suspicious for Osteomyelitis. ? Unclear if this is old or new  - On Zosyn and Daptomycin now  - ID on board. Patient will likely need prolonged course and then to re-visit the need for surgery      Acute on chronic kidney disease stage V  - holding diuretic  - Nephrology following    Essential Hypertension: Better control today  - Up-titrate meds as needed  - Patient with very high BP secondary to baseline HTN and pain associated with her pancreatitis flare    Obstructive sleep apnea    Depression: Continue home meds. Severe protein caloric malnutrition due to limited oral intake  -Dietary recommendations noted. Oral intake improving. No need for artifical feeding at this time  -Oral supplements    Code status: Full  DVT prophylaxis: heparin    Care Plan discussed with: Patient/Family and Nurse  Disposition:Home with home iv abx,now monitoring for abdominal pain/pancreatitis. Hospital Problems  Date Reviewed: 4/28/2018          Codes Class Noted POA    * (Principal)Osteomyelitis of left foot [facility]) ICD-10-CM: M86.9  ICD-9-CM: 730.27  4/27/2018 Yes              Review of Systems:   Pertinent items are noted in HPI. Vital Signs:    Last 24hrs VS reviewed since prior progress note. Most recent are:  Visit Vitals    /78 (BP 1 Location: Right arm, BP Patient Position: At rest)    Pulse 90    Temp 98.6 °F (37 °C)    Resp 16    Ht 5' 2\" (1.575 m)    Wt 54.7 kg (120 lb 9.5 oz)    SpO2 100%    BMI 22.06 kg/m2       No intake or output data in the 24 hours ending 05/12/18 1246     Physical Examination:             Constitutional:  No distress   ENT:  Neck supple   Resp:  CTA bilaterally. CV:  Regular rhythm, normal rate    GI:  Soft, non distended, less tender to palpation.  No rigidity   JOSE  Left flank tenderness    Musculoskeletal:  LLE larger than R. Partial amputation of L foot with wrapping    Neurologic:  Moves all extremities.   AAOx3        Data Review:    Review and/or order of clinical lab test  Review and/or order of tests in the medicine section of [facility]      Labs:     Recent Labs      05/12/18   0259  05/11/18   0658  05/10/18   0616   WBC   --   3.6  4.6   HGB  8.2*  7.9*  7.7*   HCT  25.6*  24.2*  23.8*   PLT   --   156  133*     Recent Labs      05/12/18 0259 05/11/18 0658  05/10/18   0616   NA  141  139  138  138  139   K  4.2  4.6  4.6  4.4  4.5   CL  108  107  107  109*  109*   CO2  25  23  23  22  21   BUN  29*  34*  33*  38*  40*   CREA  2.59*  2.69*  2.71*  2.84*  2.85*   GLU  124*  199*  200*  217*  214*   CA  8.2*  8.1*  8.1*  8.0*  8.0*   PHOS   --   2.9  3.3     Recent Labs      05/12/18 0259 05/11/18 0658  05/10/18   0616   SGOT   --   14*  13*   ALT   --   17  17   AP   --   252*  239*   TBILI   --   0.2  0.2   TP   --   6.3*  6.1*   ALB   --   2.4*  2.4*  2.3*  2.4*   GLOB   --   3.9  3.8   LPSE  1776*  1846*  2049*     Lab Results   Component Value Date/Time    Folate 33.8 (H) 05/07/2018 11:20 AM      Lab Results   Component Value Date/Time    Cholesterol, total 137 04/30/2018 06:24 AM    HDL Cholesterol 35 04/30/2018 06:24 AM    LDL, calculated 89 04/30/2018 06:24 AM    Triglyceride 65 04/30/2018 06:24 AM    CHOL/HDL Ratio 3.9 04/30/2018 06:24 AM     Lab Results   Component Value Date/Time    Glucose (POC) 134 (H) 05/12/2018 04:28 PM    Glucose (POC) 300 (H) 05/12/2018 01:09 PM    Glucose (POC) 206 (H) 05/12/2018 07:11 AM    Glucose (POC) 135 (H) 05/11/2018 10:01 PM    Glucose (POC) 231 (H) 05/11/2018 07:16 PM     Lab Results   Component Value Date/Time    Color YELLOW/STRAW 05/09/2018 02:33 PM    Appearance CLOUDY (A) 05/09/2018 02:33 PM    Specific gravity 1.009 05/09/2018 02:33 PM    Specific gravity 1.015 07/26/2010 11:18 AM    pH (UA) 6.5 05/09/2018 02:33 PM    Protein 30 (A) 05/09/2018 02:33 PM Glucose NEGATIVE  05/09/2018 02:33 PM    Ketone NEGATIVE  05/09/2018 02:33 PM    Bilirubin NEGATIVE  05/09/2018 02:33 PM    Urobilinogen 0.2 05/09/2018 02:33 PM    Nitrites NEGATIVE  05/09/2018 02:33 PM    Leukocyte Esterase NEGATIVE  05/09/2018 02:33 PM    Epithelial cells FEW 05/09/2018 02:33 PM    Bacteria NEGATIVE  05/09/2018 02:33 PM    WBC 0-4 05/09/2018 02:33 PM    RBC 0-5 05/09/2018 02:33 PM       Medications Reviewed:     Current Facility-Administered Medications   Medication Dose Route Frequency    insulin glargine (LANTUS) injection 5 Units  5 Units SubCUTAneous QHS    pantoprazole (PROTONIX) tablet 40 mg  40 mg Oral ACB&D    HYDROmorphone (PF) (DILAUDID) injection 1 mg  1 mg IntraVENous Q4H PRN    epoetin bernard (EPOGEN;PROCRIT) injection 10,000 Units  10,000 Units SubCUTAneous Q MON, WED & FRI    lactated Ringers infusion  50 mL/hr IntraVENous CONTINUOUS    0.9% sodium chloride infusion 250 mL  250 mL IntraVENous PRN    labetalol (NORMODYNE) tablet 500 mg  500 mg Oral Q12H    DAPTOmycin (CUBICIN) 400 mg in 0.9% sodium chloride 50 mL IVPB RF formulation  400 mg IntraVENous Q48H    alteplase (CATHFLO) 1 mg in sterile water (preservative free) 1 mL injection  1 mg InterCATHeter PRN    polyethylene glycol (MIRALAX) packet 17 g  17 g Oral DAILY PRN    hydrALAZINE (APRESOLINE) tablet 100 mg  100 mg Oral TID    LORazepam (ATIVAN) tablet 0.5 mg  0.5 mg Oral Q6H PRN    senna-docusate (PERICOLACE) 8.6-50 mg per tablet 2 Tab  2 Tab Oral DAILY    hydrALAZINE (APRESOLINE) 20 mg/mL injection 20 mg  20 mg IntraVENous Q4H PRN    ondansetron (ZOFRAN) injection 4 mg  4 mg IntraVENous Q4H PRN    diphenhydrAMINE (BENADRYL) capsule 25 mg  25 mg Oral Q6H PRN    promethazine (PHENERGAN) 6.25 mg/5 mL syrup 6.25 mg  6.25 mg Oral Q6H PRN    sodium chloride (NS) flush 20 mL  20 mL InterCATHeter PRN    sodium chloride (NS) flush 10 mL  10 mL InterCATHeter Q24H    sodium chloride (NS) flush 10 mL  10 mL InterCATHeter PRN    sodium chloride (NS) flush 10 mL  10 mL InterCATHeter Q8H    sodium chloride (NS) flush 20 mL  20 mL InterCATHeter Q24H    NIFEdipine ER (PROCARDIA XL) tablet 90 mg  90 mg Oral DAILY    calcium carbonate (OS-BINA) tablet 500 mg [elemental]  500 mg Oral DAILY    QUEtiapine (SEROquel) tablet 100 mg  100 mg Oral BID    venlafaxine (EFFEXOR) tablet 75 mg  75 mg Oral BID WITH MEALS    sodium chloride (NS) flush 5-10 mL  5-10 mL IntraVENous Q8H    sodium chloride (NS) flush 5-10 mL  5-10 mL IntraVENous PRN    heparin (porcine) injection 5,000 Units  5,000 Units SubCUTAneous Q8H    albuterol-ipratropium (DUO-NEB) 2.5 MG-0.5 MG/3 ML  3 mL Nebulization Q4H PRN    lactobac ac& pc-s.therm-b.anim (CHRIS Q/RISAQUAD)  1 Cap Oral DAILY    insulin lispro (HUMALOG) injection   SubCUTAneous AC&HS    glucose chewable tablet 16 g  4 Tab Oral PRN    dextrose (D50W) injection syrg 12.5-25 g  12.5-25 g IntraVENous PRN    glucagon (GLUCAGEN) injection 1 mg  1 mg IntraMUSCular PRN    acetaminophen (TYLENOL) tablet 650 mg  650 mg Oral Q4H PRN    oxyCODONE-acetaminophen (PERCOCET) 5-325 mg per tablet 1 Tab  1 Tab Oral Q4H PRN     ______________________________________________________________________  EXPECTED LENGTH OF STAY: 3d 19h  ACTUAL LENGTH OF STAY:          15                 Deanna Ayala MD

## 2018-05-13 LAB
ANION GAP SERPL CALC-SCNC: 7 MMOL/L (ref 5–15)
BUN SERPL-MCNC: 31 MG/DL (ref 6–20)
BUN/CREAT SERPL: 12 (ref 12–20)
CALCIUM SERPL-MCNC: 7.9 MG/DL (ref 8.5–10.1)
CHLORIDE SERPL-SCNC: 106 MMOL/L (ref 97–108)
CO2 SERPL-SCNC: 25 MMOL/L (ref 21–32)
CREAT SERPL-MCNC: 2.69 MG/DL (ref 0.55–1.02)
GLUCOSE BLD STRIP.AUTO-MCNC: 127 MG/DL (ref 65–100)
GLUCOSE BLD STRIP.AUTO-MCNC: 208 MG/DL (ref 65–100)
GLUCOSE BLD STRIP.AUTO-MCNC: 218 MG/DL (ref 65–100)
GLUCOSE BLD STRIP.AUTO-MCNC: 244 MG/DL (ref 65–100)
GLUCOSE SERPL-MCNC: 171 MG/DL (ref 65–100)
HCT VFR BLD AUTO: 24.3 % (ref 35–47)
HGB BLD-MCNC: 7.6 G/DL (ref 11.5–16)
LIPASE SERPL-CCNC: 1225 U/L (ref 73–393)
POTASSIUM SERPL-SCNC: 4.6 MMOL/L (ref 3.5–5.1)
SERVICE CMNT-IMP: ABNORMAL
SODIUM SERPL-SCNC: 138 MMOL/L (ref 136–145)

## 2018-05-13 PROCEDURE — 74011250637 HC RX REV CODE- 250/637: Performed by: INTERNAL MEDICINE

## 2018-05-13 PROCEDURE — 36415 COLL VENOUS BLD VENIPUNCTURE: CPT | Performed by: HOSPITALIST

## 2018-05-13 PROCEDURE — 74011636637 HC RX REV CODE- 636/637: Performed by: HOSPITALIST

## 2018-05-13 PROCEDURE — 85018 HEMOGLOBIN: CPT | Performed by: HOSPITALIST

## 2018-05-13 PROCEDURE — 74011250637 HC RX REV CODE- 250/637: Performed by: FAMILY MEDICINE

## 2018-05-13 PROCEDURE — 83690 ASSAY OF LIPASE: CPT | Performed by: HOSPITALIST

## 2018-05-13 PROCEDURE — 65270000029 HC RM PRIVATE

## 2018-05-13 PROCEDURE — 74011250637 HC RX REV CODE- 250/637: Performed by: HOSPITALIST

## 2018-05-13 PROCEDURE — 80048 BASIC METABOLIC PNL TOTAL CA: CPT | Performed by: HOSPITALIST

## 2018-05-13 PROCEDURE — 74011250636 HC RX REV CODE- 250/636: Performed by: INTERNAL MEDICINE

## 2018-05-13 PROCEDURE — 74011636637 HC RX REV CODE- 636/637: Performed by: INTERNAL MEDICINE

## 2018-05-13 PROCEDURE — 82962 GLUCOSE BLOOD TEST: CPT

## 2018-05-13 PROCEDURE — 74011250636 HC RX REV CODE- 250/636: Performed by: HOSPITALIST

## 2018-05-13 PROCEDURE — 74011000258 HC RX REV CODE- 258: Performed by: INTERNAL MEDICINE

## 2018-05-13 PROCEDURE — 74011250637 HC RX REV CODE- 250/637: Performed by: PHYSICAL MEDICINE & REHABILITATION

## 2018-05-13 RX ORDER — HYDROMORPHONE HYDROCHLORIDE 1 MG/ML
1 INJECTION, SOLUTION INTRAMUSCULAR; INTRAVENOUS; SUBCUTANEOUS
Status: DISCONTINUED | OUTPATIENT
Start: 2018-05-13 | End: 2018-05-13 | Stop reason: SDUPTHER

## 2018-05-13 RX ORDER — HYDROMORPHONE HYDROCHLORIDE 2 MG/ML
1 INJECTION, SOLUTION INTRAMUSCULAR; INTRAVENOUS; SUBCUTANEOUS
Status: DISCONTINUED | OUTPATIENT
Start: 2018-05-13 | End: 2018-05-14

## 2018-05-13 RX ADMIN — ONDANSETRON HYDROCHLORIDE 4 MG: 2 INJECTION INTRAMUSCULAR; INTRAVENOUS at 03:56

## 2018-05-13 RX ADMIN — VENLAFAXINE 75 MG: 37.5 TABLET ORAL at 06:37

## 2018-05-13 RX ADMIN — Medication 10 ML: at 13:50

## 2018-05-13 RX ADMIN — ONDANSETRON HYDROCHLORIDE 4 MG: 2 INJECTION INTRAMUSCULAR; INTRAVENOUS at 07:49

## 2018-05-13 RX ADMIN — PANTOPRAZOLE SODIUM 40 MG: 40 TABLET, DELAYED RELEASE ORAL at 17:07

## 2018-05-13 RX ADMIN — Medication 10 ML: at 21:40

## 2018-05-13 RX ADMIN — HYDRALAZINE HYDROCHLORIDE 100 MG: 50 TABLET ORAL at 09:33

## 2018-05-13 RX ADMIN — INSULIN LISPRO 3 UNITS: 100 INJECTION, SOLUTION INTRAVENOUS; SUBCUTANEOUS at 17:06

## 2018-05-13 RX ADMIN — PANTOPRAZOLE SODIUM 40 MG: 40 TABLET, DELAYED RELEASE ORAL at 06:37

## 2018-05-13 RX ADMIN — INSULIN GLARGINE 5 UNITS: 100 INJECTION, SOLUTION SUBCUTANEOUS at 21:41

## 2018-05-13 RX ADMIN — VENLAFAXINE 75 MG: 37.5 TABLET ORAL at 17:06

## 2018-05-13 RX ADMIN — Medication 1 CAPSULE: at 09:33

## 2018-05-13 RX ADMIN — Medication 10 ML: at 21:41

## 2018-05-13 RX ADMIN — HYDRALAZINE HYDROCHLORIDE 100 MG: 50 TABLET ORAL at 21:40

## 2018-05-13 RX ADMIN — POLYETHYLENE GLYCOL 3350 17 G: 17 POWDER, FOR SOLUTION ORAL at 11:09

## 2018-05-13 RX ADMIN — HYDROMORPHONE HYDROCHLORIDE 1 MG: 2 INJECTION INTRAMUSCULAR; INTRAVENOUS; SUBCUTANEOUS at 19:29

## 2018-05-13 RX ADMIN — Medication 10 ML: at 06:33

## 2018-05-13 RX ADMIN — LABETALOL HYDROCHLORIDE 500 MG: 200 TABLET, FILM COATED ORAL at 21:50

## 2018-05-13 RX ADMIN — INSULIN LISPRO 3 UNITS: 100 INJECTION, SOLUTION INTRAVENOUS; SUBCUTANEOUS at 07:01

## 2018-05-13 RX ADMIN — HYDROMORPHONE HYDROCHLORIDE 1 MG: 1 INJECTION, SOLUTION INTRAMUSCULAR; INTRAVENOUS; SUBCUTANEOUS at 07:49

## 2018-05-13 RX ADMIN — QUETIAPINE FUMARATE 100 MG: 100 TABLET ORAL at 18:10

## 2018-05-13 RX ADMIN — ONDANSETRON HYDROCHLORIDE 4 MG: 2 INJECTION INTRAMUSCULAR; INTRAVENOUS at 13:42

## 2018-05-13 RX ADMIN — HYDROMORPHONE HYDROCHLORIDE 1 MG: 1 INJECTION, SOLUTION INTRAMUSCULAR; INTRAVENOUS; SUBCUTANEOUS at 13:42

## 2018-05-13 RX ADMIN — HYDRALAZINE HYDROCHLORIDE 100 MG: 50 TABLET ORAL at 17:06

## 2018-05-13 RX ADMIN — STANDARDIZED SENNA CONCENTRATE AND DOCUSATE SODIUM 2 TABLET: 8.6; 5 TABLET, FILM COATED ORAL at 09:32

## 2018-05-13 RX ADMIN — ONDANSETRON HYDROCHLORIDE 4 MG: 2 INJECTION INTRAMUSCULAR; INTRAVENOUS at 19:29

## 2018-05-13 RX ADMIN — DAPTOMYCIN 400 MG: 500 INJECTION, POWDER, LYOPHILIZED, FOR SOLUTION INTRAVENOUS at 17:07

## 2018-05-13 RX ADMIN — HYDROMORPHONE HYDROCHLORIDE 1 MG: 1 INJECTION, SOLUTION INTRAMUSCULAR; INTRAVENOUS; SUBCUTANEOUS at 03:56

## 2018-05-13 RX ADMIN — INSULIN LISPRO 2 UNITS: 100 INJECTION, SOLUTION INTRAVENOUS; SUBCUTANEOUS at 21:41

## 2018-05-13 RX ADMIN — HYDRALAZINE HYDROCHLORIDE 100 MG: 50 TABLET ORAL at 01:05

## 2018-05-13 RX ADMIN — Medication 20 ML: at 13:49

## 2018-05-13 RX ADMIN — LABETALOL HYDROCHLORIDE 500 MG: 200 TABLET, FILM COATED ORAL at 09:32

## 2018-05-13 RX ADMIN — OXYCODONE AND ACETAMINOPHEN 1 TABLET: 5; 325 TABLET ORAL at 01:06

## 2018-05-13 RX ADMIN — CALCIUM 500 MG: 500 TABLET ORAL at 09:33

## 2018-05-13 RX ADMIN — QUETIAPINE FUMARATE 100 MG: 100 TABLET ORAL at 09:33

## 2018-05-13 RX ADMIN — NIFEDIPINE 90 MG: 60 TABLET, FILM COATED, EXTENDED RELEASE ORAL at 09:32

## 2018-05-13 NOTE — PROGRESS NOTES
Hospitalist Progress Note  Zach Cuadra MD  Answering service: [phone] OR [phone] from in house phone        Date of Service:  2018  NAME:  Brissa Salguero  :  1980  MRN:  [id number]      Admission Summary:      Per H&P: 33-year-old woman with a past medical history significant for hypertension, type 2 diabetes, obstructive sleep apnea, depression, chronic kidney disease, narcotic dependency was in her usual state of health until about 3 days ago when the patient developed left leg pain and swelling. The pain is a constant 8/10 in severity. Patient described the pain as a sharp shooting pain in her left leg. No relief with pain medication. No known aggravating factors. Last month, the patient underwent a transmetatarsal resection of the left foot 2/2 diabetic foot. Patient also complained of abdominal pain, nausea, and vomiting. The abdominal pain is diffuse in location. F/u for osteomyelitis      Abdominal pain easing. Assessment & Plan:     Acute on chronic pancreatitis: Patient with N/V, abdominal pain and elevated lipase /amylase. Lipase trending,but tolerating PO. If pain is severe,patient advised to stop taking po. - CT A/P with calcified pancreas  - Pain flared up 5/10,but better today  -Monitor sxs and lipase. Sickle cell crisis  Microcytic Anemia with acute drop. Hgb 6.5 and re-checked and at 6.4,5/7. received 1 unit and HB stable upper 7-8 range. -IV hydration,encouraged pt to use oral pain medication for better control as the IV wears off quickly  -Pain control  -Supplemental o2      Type 2 diabetes type 2 with Hypoglycemia,no with hyperglycemia  -Restart lantus at lower dose,5 units QHS. Accucheks. Right flank pain  -Renal US,no hydronephrosis,stones. Ribs xray megative. A/P CT on  had shown markedly distended bladder.  -Heat pad,pain control    Acute hyperkalemia due to kidney disease:  - now resolved    Osteomyelitis of the left foot:  - Podiatry was consulted. As above, patient declined further surgery  - MRI of the left foot done, suspicious for Osteomyelitis. ? Unclear if this is old or new  - On Zosyn and Daptomycin now  - ID on board. Patient will likely need prolonged course and then to re-visit the need for surgery      Acute on chronic kidney disease stage V  - holding diuretic  - Nephrology following    Essential Hypertension: Better control today  - Up-titrate meds as needed  - Patient with very high BP secondary to baseline HTN and pain associated with her pancreatitis flare    Obstructive sleep apnea    Depression: Continue home meds. Severe protein caloric malnutrition due to limited oral intake  -Dietary recommendations noted. Oral intake improving. No need for artifical feeding at this time  -Oral supplements    Code status: Full  DVT prophylaxis: heparin    Care Plan discussed with: Patient/Family and Nurse  Disposition:Home with home iv abx,now monitoring for abdominal pain/pancreatitis. Possible d/c Monday. Hospital Problems  Date Reviewed: 4/28/2018          Codes Class Noted POA    * (Principal)Osteomyelitis of left foot [facility]) ICD-10-CM: M86.9  ICD-9-CM: 730.27  4/27/2018 Yes              Review of Systems:   Pertinent items are noted in HPI. Vital Signs:    Last 24hrs VS reviewed since prior progress note. Most recent are:  Visit Vitals    /78    Pulse 86    Temp 98.5 °F (36.9 °C)    Resp 16    Ht 5' 2\" (1.575 m)    Wt 54.7 kg (120 lb 9.5 oz)    SpO2 97%    BMI 22.06 kg/m2       No intake or output data in the 24 hours ending 05/13/18 1631     Physical Examination:             Constitutional:  No distress   ENT:  Neck supple   Resp:  CTA bilaterally. CV:  Regular rhythm, normal rate    GI:  Soft, non distended, less tender to palpation.  No rigidity   JOSE  Left flank tenderness    Musculoskeletal:  LLE larger than R. Partial amputation of L foot with wrapping    Neurologic:  Moves all extremities.   AAOx3        Data Review:    Review and/or order of clinical lab test  Review and/or order of tests in the medicine section of [facility]      Labs:     Recent Labs      05/13/18 0350 05/12/18 0259 05/11/18 0658   WBC   --    --   3.6   HGB  7.6*  8.2*  7.9*   HCT  24.3*  25.6*  24.2*   PLT   --    --   156     Recent Labs      05/13/18 0350 05/12/18 0259 05/11/18 0658   NA  138  141  139  138   K  4.6  4.2  4.6  4.6   CL  106  108  107  107   CO2  25  25  23  23   BUN  31*  29*  34*  33*   CREA  2.69*  2.59*  2.69*  2.71*   GLU  171*  124*  199*  200*   CA  7.9*  8.2*  8.1*  8.1*   PHOS   --    --   2.9     Recent Labs      05/13/18 0350 05/12/18 0259 05/11/18 0658   SGOT   --    --   14*   ALT   --    --   17   AP   --    --   252*   TBILI   --    --   0.2   TP   --    --   6.3*   ALB   --    --   2.4*  2.4*   GLOB   --    --   3.9   LPSE  1225*  1776*  1846*     Lab Results   Component Value Date/Time    Folate 33.8 (H) 05/07/2018 11:20 AM      Lab Results   Component Value Date/Time    Cholesterol, total 137 04/30/2018 06:24 AM    HDL Cholesterol 35 04/30/2018 06:24 AM    LDL, calculated 89 04/30/2018 06:24 AM    Triglyceride 65 04/30/2018 06:24 AM    CHOL/HDL Ratio 3.9 04/30/2018 06:24 AM     Lab Results   Component Value Date/Time    Glucose (POC) 218 (H) 05/13/2018 04:07 PM    Glucose (POC) 127 (H) 05/13/2018 11:13 AM    Glucose (POC) 208 (H) 05/13/2018 06:32 AM    Glucose (POC) 301 (H) 05/12/2018 09:18 PM    Glucose (POC) 134 (H) 05/12/2018 04:28 PM     Lab Results   Component Value Date/Time    Color YELLOW/STRAW 05/09/2018 02:33 PM    Appearance CLOUDY (A) 05/09/2018 02:33 PM    Specific gravity 1.009 05/09/2018 02:33 PM    Specific gravity 1.015 07/26/2010 11:18 AM    pH (UA) 6.5 05/09/2018 02:33 PM    Protein 30 (A) 05/09/2018 02:33 PM    Glucose NEGATIVE  05/09/2018 02:33 PM    Ketone NEGATIVE  05/09/2018 02:33 PM    Bilirubin NEGATIVE  05/09/2018 02:33 PM    Urobilinogen 0.2 05/09/2018 02:33 PM    Nitrites NEGATIVE  05/09/2018 02:33 PM    Leukocyte Esterase NEGATIVE  05/09/2018 02:33 PM    Epithelial cells FEW 05/09/2018 02:33 PM    Bacteria NEGATIVE  05/09/2018 02:33 PM    WBC 0-4 05/09/2018 02:33 PM    RBC 0-5 05/09/2018 02:33 PM       Medications Reviewed:     Current Facility-Administered Medications   Medication Dose Route Frequency    HYDROmorphone (PF) (DILAUDID) injection 1 mg  1 mg IntraVENous Q6H PRN    insulin glargine (LANTUS) injection 5 Units  5 Units SubCUTAneous QHS    pantoprazole (PROTONIX) tablet 40 mg  40 mg Oral ACB&D    epoetin bernard (EPOGEN;PROCRIT) injection 10,000 Units  10,000 Units SubCUTAneous Q MON, WED & FRI    lactated Ringers infusion  50 mL/hr IntraVENous CONTINUOUS    0.9% sodium chloride infusion 250 mL  250 mL IntraVENous PRN    labetalol (NORMODYNE) tablet 500 mg  500 mg Oral Q12H    DAPTOmycin (CUBICIN) 400 mg in 0.9% sodium chloride 50 mL IVPB RF formulation  400 mg IntraVENous Q48H    alteplase (CATHFLO) 1 mg in sterile water (preservative free) 1 mL injection  1 mg InterCATHeter PRN    polyethylene glycol (MIRALAX) packet 17 g  17 g Oral DAILY PRN    hydrALAZINE (APRESOLINE) tablet 100 mg  100 mg Oral TID    LORazepam (ATIVAN) tablet 0.5 mg  0.5 mg Oral Q6H PRN    senna-docusate (PERICOLACE) 8.6-50 mg per tablet 2 Tab  2 Tab Oral DAILY    hydrALAZINE (APRESOLINE) 20 mg/mL injection 20 mg  20 mg IntraVENous Q4H PRN    ondansetron (ZOFRAN) injection 4 mg  4 mg IntraVENous Q4H PRN    diphenhydrAMINE (BENADRYL) capsule 25 mg  25 mg Oral Q6H PRN    promethazine (PHENERGAN) 6.25 mg/5 mL syrup 6.25 mg  6.25 mg Oral Q6H PRN    sodium chloride (NS) flush 20 mL  20 mL InterCATHeter PRN    sodium chloride (NS) flush 10 mL  10 mL InterCATHeter Q24H    sodium chloride (NS) flush 10 mL  10 mL InterCATHeter PRN    sodium chloride (NS) flush 10 mL  10 mL InterCATHeter Q8H    sodium chloride (NS) flush 20 mL  20 mL InterCATHeter Q24H    NIFEdipine ER (PROCARDIA XL) tablet 90 mg  90 mg Oral DAILY    calcium carbonate (OS-BINA) tablet 500 mg [elemental]  500 mg Oral DAILY    QUEtiapine (SEROquel) tablet 100 mg  100 mg Oral BID    venlafaxine (EFFEXOR) tablet 75 mg  75 mg Oral BID WITH MEALS    sodium chloride (NS) flush 5-10 mL  5-10 mL IntraVENous Q8H    sodium chloride (NS) flush 5-10 mL  5-10 mL IntraVENous PRN    heparin (porcine) injection 5,000 Units  5,000 Units SubCUTAneous Q8H    albuterol-ipratropium (DUO-NEB) 2.5 MG-0.5 MG/3 ML  3 mL Nebulization Q4H PRN    lactobac ac& pc-s.therm-b.anim (CHRIS Q/RISAQUAD)  1 Cap Oral DAILY    insulin lispro (HUMALOG) injection   SubCUTAneous AC&HS    glucose chewable tablet 16 g  4 Tab Oral PRN    dextrose (D50W) injection syrg 12.5-25 g  12.5-25 g IntraVENous PRN    glucagon (GLUCAGEN) injection 1 mg  1 mg IntraMUSCular PRN    acetaminophen (TYLENOL) tablet 650 mg  650 mg Oral Q4H PRN    oxyCODONE-acetaminophen (PERCOCET) 5-325 mg per tablet 1 Tab  1 Tab Oral Q4H PRN     ______________________________________________________________________  EXPECTED LENGTH OF STAY: 3d 19h  ACTUAL LENGTH OF STAY:          16                 Prabhu Heller MD

## 2018-05-13 NOTE — PROGRESS NOTES
9:28 PM  Bedside and Verbal shift change report given to Ranid Sharpe RN (oncoming nurse) by Cole Trejo RN (offgoing nurse). Report included the following information SBAR, Kardex, Procedure Summary, Intake/Output, MAR and Recent Results.

## 2018-05-14 ENCOUNTER — APPOINTMENT (OUTPATIENT)
Dept: INTERVENTIONAL RADIOLOGY/VASCULAR | Age: 38
DRG: 637 | End: 2018-05-14
Attending: HOSPITALIST
Payer: MEDICARE

## 2018-05-14 VITALS
HEIGHT: 62 IN | BODY MASS INDEX: 22.19 KG/M2 | WEIGHT: 120.59 LBS | TEMPERATURE: 98.8 F | RESPIRATION RATE: 16 BRPM | SYSTOLIC BLOOD PRESSURE: 144 MMHG | OXYGEN SATURATION: 100 % | DIASTOLIC BLOOD PRESSURE: 74 MMHG | HEART RATE: 77 BPM

## 2018-05-14 LAB
ANION GAP SERPL CALC-SCNC: 6 MMOL/L (ref 5–15)
BUN SERPL-MCNC: 28 MG/DL (ref 6–20)
BUN/CREAT SERPL: 11 (ref 12–20)
CALCIUM SERPL-MCNC: 7.9 MG/DL (ref 8.5–10.1)
CHLORIDE SERPL-SCNC: 109 MMOL/L (ref 97–108)
CK SERPL-CCNC: 30 U/L (ref 26–192)
CO2 SERPL-SCNC: 25 MMOL/L (ref 21–32)
CREAT SERPL-MCNC: 2.56 MG/DL (ref 0.55–1.02)
GLUCOSE BLD STRIP.AUTO-MCNC: 205 MG/DL (ref 65–100)
GLUCOSE BLD STRIP.AUTO-MCNC: 219 MG/DL (ref 65–100)
GLUCOSE SERPL-MCNC: 223 MG/DL (ref 65–100)
HCT VFR BLD AUTO: 24.9 % (ref 35–47)
HGB BLD-MCNC: 7.8 G/DL (ref 11.5–16)
LIPASE SERPL-CCNC: 1041 U/L (ref 73–393)
POTASSIUM SERPL-SCNC: 4.9 MMOL/L (ref 3.5–5.1)
SERVICE CMNT-IMP: ABNORMAL
SERVICE CMNT-IMP: ABNORMAL
SODIUM SERPL-SCNC: 140 MMOL/L (ref 136–145)

## 2018-05-14 PROCEDURE — 74011250637 HC RX REV CODE- 250/637: Performed by: FAMILY MEDICINE

## 2018-05-14 PROCEDURE — 74011250637 HC RX REV CODE- 250/637: Performed by: INTERNAL MEDICINE

## 2018-05-14 PROCEDURE — 77030002986 HC SUT PROL J&J -A

## 2018-05-14 PROCEDURE — 77030018719 HC DRSG PTCH ANTIMIC J&J -A

## 2018-05-14 PROCEDURE — 74011250636 HC RX REV CODE- 250/636: Performed by: HOSPITALIST

## 2018-05-14 PROCEDURE — 36415 COLL VENOUS BLD VENIPUNCTURE: CPT | Performed by: HOSPITALIST

## 2018-05-14 PROCEDURE — 74011250637 HC RX REV CODE- 250/637: Performed by: PHYSICAL MEDICINE & REHABILITATION

## 2018-05-14 PROCEDURE — 82550 ASSAY OF CK (CPK): CPT | Performed by: HOSPITALIST

## 2018-05-14 PROCEDURE — 82962 GLUCOSE BLOOD TEST: CPT

## 2018-05-14 PROCEDURE — 85018 HEMOGLOBIN: CPT | Performed by: HOSPITALIST

## 2018-05-14 PROCEDURE — C1751 CATH, INF, PER/CENT/MIDLINE: HCPCS

## 2018-05-14 PROCEDURE — 36561 INSERT TUNNELED CV CATH: CPT

## 2018-05-14 PROCEDURE — 74011000250 HC RX REV CODE- 250: Performed by: HOSPITALIST

## 2018-05-14 PROCEDURE — 83690 ASSAY OF LIPASE: CPT | Performed by: HOSPITALIST

## 2018-05-14 PROCEDURE — 74011250636 HC RX REV CODE- 250/636: Performed by: INTERNAL MEDICINE

## 2018-05-14 PROCEDURE — 74011636637 HC RX REV CODE- 636/637: Performed by: INTERNAL MEDICINE

## 2018-05-14 PROCEDURE — 80048 BASIC METABOLIC PNL TOTAL CA: CPT | Performed by: HOSPITALIST

## 2018-05-14 PROCEDURE — 74011250636 HC RX REV CODE- 250/636: Performed by: RADIOLOGY

## 2018-05-14 PROCEDURE — 74011250637 HC RX REV CODE- 250/637: Performed by: HOSPITALIST

## 2018-05-14 RX ORDER — HEPARIN 100 UNIT/ML
500 SYRINGE INTRAVENOUS
Status: DISCONTINUED | OUTPATIENT
Start: 2018-05-14 | End: 2018-05-15 | Stop reason: HOSPADM

## 2018-05-14 RX ORDER — LIDOCAINE HYDROCHLORIDE 10 MG/ML
10 INJECTION INFILTRATION; PERINEURAL
Status: DISCONTINUED | OUTPATIENT
Start: 2018-05-14 | End: 2018-05-15 | Stop reason: HOSPADM

## 2018-05-14 RX ORDER — FENTANYL CITRATE 50 UG/ML
200 INJECTION, SOLUTION INTRAMUSCULAR; INTRAVENOUS
Status: DISCONTINUED | OUTPATIENT
Start: 2018-05-14 | End: 2018-05-14 | Stop reason: HOSPADM

## 2018-05-14 RX ORDER — PROMETHAZINE HYDROCHLORIDE 25 MG/1
25 TABLET ORAL
Qty: 20 TAB | Refills: 0 | Status: ON HOLD | OUTPATIENT
Start: 2018-05-14 | End: 2018-07-02

## 2018-05-14 RX ORDER — HYDRALAZINE HYDROCHLORIDE 100 MG/1
100 TABLET, FILM COATED ORAL 3 TIMES DAILY
Qty: 90 TAB | Refills: 0 | Status: SHIPPED | OUTPATIENT
Start: 2018-05-14 | End: 2018-06-13

## 2018-05-14 RX ORDER — MIDAZOLAM HYDROCHLORIDE 1 MG/ML
5 INJECTION, SOLUTION INTRAMUSCULAR; INTRAVENOUS
Status: DISCONTINUED | OUTPATIENT
Start: 2018-05-14 | End: 2018-05-14 | Stop reason: HOSPADM

## 2018-05-14 RX ORDER — AMOXICILLIN 250 MG
2 CAPSULE ORAL DAILY
Qty: 30 TAB | Refills: 0 | Status: ON HOLD | OUTPATIENT
Start: 2018-05-15 | End: 2018-07-02

## 2018-05-14 RX ORDER — PANTOPRAZOLE SODIUM 40 MG/1
40 TABLET, DELAYED RELEASE ORAL
Qty: 60 TAB | Refills: 0 | Status: SHIPPED | OUTPATIENT
Start: 2018-05-14 | End: 2018-06-13

## 2018-05-14 RX ORDER — LABETALOL 100 MG/1
200 TABLET, FILM COATED ORAL 2 TIMES DAILY
Qty: 120 TAB | Refills: 0 | Status: SHIPPED | OUTPATIENT
Start: 2018-05-14 | End: 2018-06-13

## 2018-05-14 RX ORDER — LABETALOL 100 MG/1
200 TABLET, FILM COATED ORAL 2 TIMES DAILY
Qty: 120 TAB | Refills: 0 | OUTPATIENT
Start: 2018-05-14 | End: 2018-05-14

## 2018-05-14 RX ORDER — CLONIDINE HYDROCHLORIDE 0.2 MG/1
0.2 TABLET ORAL 3 TIMES DAILY
Qty: 90 TAB | Refills: 0 | Status: SHIPPED | OUTPATIENT
Start: 2018-05-14 | End: 2018-06-13

## 2018-05-14 RX ORDER — SODIUM CHLORIDE 0.9 % (FLUSH) 0.9 %
5-10 SYRINGE (ML) INJECTION EVERY 8 HOURS
Status: DISCONTINUED | OUTPATIENT
Start: 2018-05-14 | End: 2018-05-15 | Stop reason: HOSPADM

## 2018-05-14 RX ORDER — NALOXONE HYDROCHLORIDE 0.4 MG/ML
0.4 INJECTION, SOLUTION INTRAMUSCULAR; INTRAVENOUS; SUBCUTANEOUS AS NEEDED
Status: DISCONTINUED | OUTPATIENT
Start: 2018-05-14 | End: 2018-05-14 | Stop reason: HOSPADM

## 2018-05-14 RX ORDER — LIDOCAINE HYDROCHLORIDE 10 MG/ML
10 INJECTION INFILTRATION; PERINEURAL
Status: COMPLETED | OUTPATIENT
Start: 2018-05-14 | End: 2018-05-14

## 2018-05-14 RX ORDER — NIFEDIPINE 90 MG/1
90 TABLET, EXTENDED RELEASE ORAL DAILY
Qty: 30 TAB | Refills: 0 | Status: SHIPPED | OUTPATIENT
Start: 2018-05-15 | End: 2018-06-14

## 2018-05-14 RX ORDER — OXYCODONE AND ACETAMINOPHEN 5; 325 MG/1; MG/1
1 TABLET ORAL
Qty: 20 TAB | Refills: 0 | Status: ON HOLD | OUTPATIENT
Start: 2018-05-14 | End: 2018-07-02

## 2018-05-14 RX ORDER — CLONIDINE HYDROCHLORIDE 0.1 MG/1
0.2 TABLET ORAL 3 TIMES DAILY
Status: DISCONTINUED | OUTPATIENT
Start: 2018-05-14 | End: 2018-05-15 | Stop reason: HOSPADM

## 2018-05-14 RX ORDER — SODIUM CHLORIDE 0.9 % (FLUSH) 0.9 %
5-10 SYRINGE (ML) INJECTION AS NEEDED
Status: DISCONTINUED | OUTPATIENT
Start: 2018-05-14 | End: 2018-05-15 | Stop reason: HOSPADM

## 2018-05-14 RX ORDER — FLUMAZENIL 0.1 MG/ML
0.5 INJECTION INTRAVENOUS ONCE
Status: DISCONTINUED | OUTPATIENT
Start: 2018-05-14 | End: 2018-05-14 | Stop reason: HOSPADM

## 2018-05-14 RX ADMIN — MIDAZOLAM HYDROCHLORIDE 2 MG: 1 INJECTION, SOLUTION INTRAMUSCULAR; INTRAVENOUS at 16:12

## 2018-05-14 RX ADMIN — LIDOCAINE HYDROCHLORIDE 10 ML: 10 INJECTION, SOLUTION INFILTRATION; PERINEURAL at 16:22

## 2018-05-14 RX ADMIN — Medication 20 ML: at 14:00

## 2018-05-14 RX ADMIN — FENTANYL CITRATE 50 MCG: 50 INJECTION, SOLUTION INTRAMUSCULAR; INTRAVENOUS at 16:21

## 2018-05-14 RX ADMIN — OXYCODONE AND ACETAMINOPHEN 1 TABLET: 5; 325 TABLET ORAL at 14:44

## 2018-05-14 RX ADMIN — Medication 10 ML: at 14:00

## 2018-05-14 RX ADMIN — Medication 10 ML: at 06:00

## 2018-05-14 RX ADMIN — INSULIN LISPRO 2 UNITS: 100 INJECTION, SOLUTION INTRAVENOUS; SUBCUTANEOUS at 11:30

## 2018-05-14 RX ADMIN — HYDROMORPHONE HYDROCHLORIDE 1 MG: 2 INJECTION INTRAMUSCULAR; INTRAVENOUS; SUBCUTANEOUS at 02:17

## 2018-05-14 RX ADMIN — VENLAFAXINE 75 MG: 37.5 TABLET ORAL at 07:29

## 2018-05-14 RX ADMIN — POLYETHYLENE GLYCOL 3350 17 G: 17 POWDER, FOR SOLUTION ORAL at 08:29

## 2018-05-14 RX ADMIN — OXYCODONE AND ACETAMINOPHEN 1 TABLET: 5; 325 TABLET ORAL at 19:26

## 2018-05-14 RX ADMIN — ONDANSETRON HYDROCHLORIDE 4 MG: 2 INJECTION INTRAMUSCULAR; INTRAVENOUS at 08:17

## 2018-05-14 RX ADMIN — QUETIAPINE FUMARATE 100 MG: 100 TABLET ORAL at 08:17

## 2018-05-14 RX ADMIN — CLONIDINE HYDROCHLORIDE 0.2 MG: 0.1 TABLET ORAL at 10:14

## 2018-05-14 RX ADMIN — MIDAZOLAM HYDROCHLORIDE 0.5 MG: 1 INJECTION, SOLUTION INTRAMUSCULAR; INTRAVENOUS at 16:24

## 2018-05-14 RX ADMIN — CALCIUM 500 MG: 500 TABLET ORAL at 08:17

## 2018-05-14 RX ADMIN — INSULIN LISPRO 3 UNITS: 100 INJECTION, SOLUTION INTRAVENOUS; SUBCUTANEOUS at 07:28

## 2018-05-14 RX ADMIN — NIFEDIPINE 90 MG: 60 TABLET, FILM COATED, EXTENDED RELEASE ORAL at 08:17

## 2018-05-14 RX ADMIN — Medication 1 CAPSULE: at 08:17

## 2018-05-14 RX ADMIN — HYDRALAZINE HYDROCHLORIDE 100 MG: 50 TABLET ORAL at 08:17

## 2018-05-14 RX ADMIN — LORAZEPAM 0.5 MG: 0.5 TABLET ORAL at 10:14

## 2018-05-14 RX ADMIN — LABETALOL HYDROCHLORIDE 500 MG: 200 TABLET, FILM COATED ORAL at 08:20

## 2018-05-14 RX ADMIN — PANTOPRAZOLE SODIUM 40 MG: 40 TABLET, DELAYED RELEASE ORAL at 07:30

## 2018-05-14 RX ADMIN — ONDANSETRON HYDROCHLORIDE 4 MG: 2 INJECTION INTRAMUSCULAR; INTRAVENOUS at 02:17

## 2018-05-14 RX ADMIN — HYDROMORPHONE HYDROCHLORIDE 1 MG: 2 INJECTION INTRAMUSCULAR; INTRAVENOUS; SUBCUTANEOUS at 08:17
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
## 2018-05-14 NOTE — PROGRESS NOTES
The patient will be ready for discharge today after IV teaching and IR line placement. AT [address] and HCP were notified this am. Kartik Durant will come and teach later today.  LYRIC

## 2018-05-14 NOTE — PROGRESS NOTES
Per IR, pt needs to be NPO till procedure 1-2pm.     Per IR hold heparin at 1300.     1400 - Per IR, waiting on bed to open up

## 2018-05-14 NOTE — DIABETES MGMT
DTC Progress Note    Recommendations/ Comments: Chart reviewed due to hyperglycemia. Blood sugars >200. Pt has required 10 units of correction over the last 24 hours.    If appropriate, consider:  Increasing Lantus to 7 units daily  Change lispro corerction scale to high sensitivity     Current hospital DM medication:    Lispro correctional insulin - normal sensitivity.     Patient is a 42 y.o. female with known Type 2 DM complicated by CKD and gastroparesis on no DM medications at home noted on PTA medications list.  Admitted for osteomyelitis of left foot    A1c:   Lab Results   Component Value Date/Time    Hemoglobin A1c 6.9 (H) 04/30/2018 06:24 AM    Hemoglobin A1c 8.8 (H) 03/03/2018 09:21 AM       Recent Glucose Results:   Lab Results   Component Value Date/Time     (H) 05/14/2018 05:18 AM    GLUCPOC 205 (H) 05/14/2018 11:36 AM    GLUCPOC 219 (H) 05/14/2018 06:16 AM    GLUCPOC 244 (H) 05/13/2018 09:23 PM        Lab Results   Component Value Date/Time    Creatinine 2.56 (H) 05/14/2018 05:18 AM     Estimated Creatinine Clearance: 23.3 mL/min (based on Cr of 2.56). Active Orders   Diet    DIET DIABETIC CONSISTENT CARB Regular        PO intake:   Patient Vitals for the past 72 hrs:   % Diet Eaten   05/13/18 0928 50 %   05/12/18 1154 75 %       Will continue to follow as needed.     Thank you  Lubna Shetty RD, CDE

## 2018-05-14 NOTE — PROGRESS NOTES
Problem: Falls - Risk of  Goal: *Absence of Falls  Document Blake Fall Risk and appropriate interventions in the flowsheet. Outcome: Progressing Towards Goal  Fall Risk Interventions:  Mobility Interventions: Patient to call before getting OOB         Medication Interventions: Evaluate medications/consider consulting pharmacy    Elimination Interventions: Call light in reach    History of Falls Interventions: Evaluate medications/consider consulting pharmacy        Problem: Pressure Injury - Risk of  Goal: *Prevention of pressure injury  Document Antonio Scale and appropriate interventions in the flowsheet. Interventions:  Offload heels- especially left heel as skin cannot be assessed.   Turn approximately every 2 hours       Outcome: Progressing Towards Goal  Pressure Injury Interventions:  Sensory Interventions: Assess changes in LOC, Assess need for specialty bed, Check visual cues for pain    Moisture Interventions: Absorbent underpads    Activity Interventions: Increase time out of bed    Mobility Interventions: HOB 30 degrees or less    Nutrition Interventions: Document food/fluid/supplement intake    Friction and Shear Interventions: Lift sheet, HOB 30 degrees or less

## 2018-05-14 NOTE — ROUTINE PROCESS
Problem: Postpartum (Vaginal Delivery) (Adult,Obstetrics,Pediatric)  Goal: Signs and Symptoms of Listed Potential Problems Will be Absent, Minimized or Managed (Postpartum)  Signs and symptoms of listed potential problems will be absent, minimized or managed by discharge/transition of care (reference Postpartum (Vaginal Delivery) (Adult,Obstetrics,Pediatric) CPG).   Outcome: Improving  Taking po prn meds to be proactive for cramping and backache that she had earlier in the day. Is requesting to exclusively pump and bottle EBM. Also wants to supplement with formula until her milk has come in. She states that is what she did last time and it worked well and that is what she is going to do this time. I offered to help with breastfeeding if she wanted and she declined. Gave her formula, encouraged to pump every 3 hours and feed the baby the EBM first. Went over washing and cleaning pump and bottle supplies. Answered all questions. Declined her stool softener because she did have a BM yesterday.       TRANSFER - OUT REPORT:    Verbal report given to [address] [place] [address], [facility] RN on Zo Antonio  being transferred to [address] [place] [address] for routine progression of care       Report consisted of patients Situation, Background, Assessment and   Recommendations(SBAR). Information from the following report(s) SBAR was reviewed with the receiving nurse. Lines:   Triple Lumen 04/29/18 Right Subclavian (Active)   Central Line Being Utilized Yes 5/14/2018 12:20 PM   Criteria for Appropriate Use Long term IV/antibiotic administration 5/14/2018 12:20 PM   Site Assessment Clean, dry, & intact 5/14/2018 12:20 PM   Infiltration Assessment 0 5/14/2018 12:20 PM   Affected Extremity/Extremities Color distal to insertion site pink (or appropriate for race) 5/14/2018 12:20 PM   Date of Last Dressing Change 04/29/18 5/4/2018  9:56 AM   Dressing Status Clean, dry, & intact 5/14/2018 12:20 PM   Dressing Type Disk with Chlorhexadine gluconate (CHG); Topical skin adhesive; Tape 5/14/2018 12:20 PM   Action Taken Open ports on tubing capped 5/14/2018 12:20 PM   Proximal Hub Color/Line Status Brown; Infusing;Flushed 5/13/2018  9:40 PM   Positive Blood Return (Medial Site) Yes 5/13/2018  9:40 PM   Medial Hub Color/Line Status Blue;Capped 5/13/2018  9:40 PM   Positive Blood Return (Lateral Site) No 5/13/2018  9:40 PM   Distal Hub Color/Line Status White;Capped;Flushed 5/13/2018  9:40 PM   Positive Blood Return (Site #3) Yes 5/13/2018  9:40 PM   Alcohol Cap Used Yes 5/13/2018  9:40 PM       Venous Access Device Chest Port (Active)       Venous Access Device Powerline  03/06/18 Upper chest (subclavicular area, right (Active)       Venous Access Device powerline 05/14/18 Upper chest (subclavicular area), left (Active)   Central Line Being Utilized Yes 5/14/2018  4:57 PM   Criteria for Appropriate Use Long term IV/antibiotic administration 5/14/2018  4:57 PM   Site Assessment Clean, dry, & intact 5/14/2018  4:57 PM   Date of Last Dressing Change 05/14/18 5/14/2018 4:57 PM   Dressing Status Clean, dry, & intact 5/14/2018  4:57 PM   Dressing Type Bacteriocidal 5/14/2018  4:57 PM   Positive Blood Return (Medial Site) Yes 5/14/2018  4:57 PM   Action Taken (Medial Site) Flushed 5/14/2018  4:57 PM   Positive Blood Return (Lateral Site) Yes 5/14/2018  4:57 PM   Action Taken (Lateral Site) Flushed 5/14/2018  4:57 PM   Alcohol Cap Used Yes 5/14/2018  4:57 PM        Opportunity for questions and clarification was provided. Patient transported with:   transport on stretcher back to unit; right central line discontinued by RN per MD;  Left powerline dsg dry and intact; chloraprep wafer intact (biopatch). Placement confirmed by fluoroscopy.

## 2018-05-14 NOTE — PROGRESS NOTES
8:20 PM  Bedside and Verbal shift change report given to Shirin Metzger RN (oncoming nurse) by Germania Badillo RN (offgoing nurse). Report included the following information SBAR, Kardex, OR Summary, Intake/Output, MAR and Recent Results.

## 2018-05-14 NOTE — PROGRESS NOTES
Left Subclavian Asher cath line put in by IR, RN Aylin Sanchez with IV education came to visit pt, PT clearance per N/A, ATHC and IV set up per CRM, DC order per Oziel    I have reviewed discharge instructions with the patient. The patient verbalized understanding. All belongings packed and sent to DC; phone wallet keys glasses prescriptions, instructions. Right subclavian removed. Per Nurse Supervisor Abdiel Wooten, no cab voucher will be issued for patient given how far away they live, pt to have family member pick them up at 9pm tonight. Bedside shift change report given to Kim (oncoming nurse) by Anton Garcia (offgoing nurse). Report included the following information SBAR, Intake/Output and MAR.

## 2018-06-09 ENCOUNTER — APPOINTMENT (OUTPATIENT)
Dept: CT IMAGING | Age: 38
DRG: 871 | End: 2018-06-09
Attending: NURSE PRACTITIONER
Payer: MEDICARE

## 2018-06-09 ENCOUNTER — APPOINTMENT (OUTPATIENT)
Dept: CT IMAGING | Age: 38
DRG: 871 | End: 2018-06-09
Attending: EMERGENCY MEDICINE
Payer: MEDICARE

## 2018-06-09 ENCOUNTER — HOSPITAL ENCOUNTER (INPATIENT)
Age: 38
LOS: 5 days | Discharge: LEFT BEFORE DISCHARGE | DRG: 871 | End: 2018-06-14
Attending: EMERGENCY MEDICINE | Admitting: INTERNAL MEDICINE
Payer: MEDICARE

## 2018-06-09 DIAGNOSIS — M54.50 LUMBAR PAIN: ICD-10-CM

## 2018-06-09 DIAGNOSIS — N17.9 ACUTE RENAL FAILURE SUPERIMPOSED ON CHRONIC KIDNEY DISEASE, UNSPECIFIED CKD STAGE, UNSPECIFIED ACUTE RENAL FAILURE TYPE (HCC): ICD-10-CM

## 2018-06-09 DIAGNOSIS — E11.10 DIABETIC KETOACIDOSIS WITHOUT COMA ASSOCIATED WITH TYPE 2 DIABETES MELLITUS (HCC): Primary | ICD-10-CM

## 2018-06-09 DIAGNOSIS — R41.82 ALTERED MENTAL STATUS, UNSPECIFIED ALTERED MENTAL STATUS TYPE: ICD-10-CM

## 2018-06-09 DIAGNOSIS — N18.9 ACUTE RENAL FAILURE SUPERIMPOSED ON CHRONIC KIDNEY DISEASE, UNSPECIFIED CKD STAGE, UNSPECIFIED ACUTE RENAL FAILURE TYPE (HCC): ICD-10-CM

## 2018-06-09 DIAGNOSIS — R73.9 HYPERGLYCEMIA: ICD-10-CM

## 2018-06-09 DIAGNOSIS — K85.90 ACUTE PANCREATITIS, UNSPECIFIED COMPLICATION STATUS, UNSPECIFIED PANCREATITIS TYPE: ICD-10-CM

## 2018-06-09 DIAGNOSIS — E83.51 HYPOCALCEMIA: ICD-10-CM

## 2018-06-09 LAB
ALBUMIN SERPL-MCNC: 2.9 G/DL (ref 3.5–5)
ALBUMIN/GLOB SERPL: 0.7 {RATIO} (ref 1.1–2.2)
ALP SERPL-CCNC: 275 U/L (ref 45–117)
ALT SERPL-CCNC: 21 U/L (ref 12–78)
AMORPH CRY URNS QL MICRO: ABNORMAL
AMPHET UR QL SCN: NEGATIVE
AMYLASE SERPL-CCNC: 122 U/L (ref 25–115)
ANION GAP SERPL CALC-SCNC: 16 MMOL/L (ref 5–15)
APPEARANCE UR: ABNORMAL
ARTERIAL PATENCY WRIST A: ABNORMAL
AST SERPL-CCNC: 17 U/L (ref 15–37)
BACTERIA URNS QL MICRO: NEGATIVE /HPF
BARBITURATES UR QL SCN: NEGATIVE
BASE DEFICIT BLD-SCNC: 10 MMOL/L
BASOPHILS # BLD: 0 K/UL (ref 0–0.1)
BASOPHILS NFR BLD: 0 % (ref 0–1)
BDY SITE: ABNORMAL
BENZODIAZ UR QL: NEGATIVE
BILIRUB SERPL-MCNC: 0.2 MG/DL (ref 0.2–1)
BILIRUB UR QL: NEGATIVE
BUN SERPL-MCNC: 86 MG/DL (ref 6–20)
BUN/CREAT SERPL: 18 (ref 12–20)
CA-I BLD-SCNC: 0.84 MMOL/L (ref 1.12–1.32)
CALCIUM SERPL-MCNC: 5.7 MG/DL (ref 8.5–10.1)
CANNABINOIDS UR QL SCN: NEGATIVE
CHLORIDE SERPL-SCNC: 96 MMOL/L (ref 97–108)
CO2 SERPL-SCNC: 13 MMOL/L (ref 21–32)
COCAINE UR QL SCN: NEGATIVE
COLOR UR: ABNORMAL
CREAT SERPL-MCNC: 4.89 MG/DL (ref 0.55–1.02)
DIFFERENTIAL METHOD BLD: ABNORMAL
DRUG SCRN COMMENT,DRGCM: NORMAL
EOSINOPHIL # BLD: 0.1 K/UL (ref 0–0.4)
EOSINOPHIL NFR BLD: 2 % (ref 0–7)
EPITH CASTS URNS QL MICRO: ABNORMAL /LPF
ERYTHROCYTE [DISTWIDTH] IN BLOOD BY AUTOMATED COUNT: 15.5 % (ref 11.5–14.5)
ETHANOL SERPL-MCNC: <10 MG/DL
GAS FLOW.O2 O2 DELIVERY SYS: ABNORMAL L/MIN
GAS FLOW.O2 SETTING OXYMISER: 1 L/M
GLOBULIN SER CALC-MCNC: 4.3 G/DL (ref 2–4)
GLUCOSE SERPL-MCNC: 719 MG/DL (ref 65–100)
GLUCOSE UR STRIP.AUTO-MCNC: >1000 MG/DL
HCG UR QL: NEGATIVE
HCO3 BLD-SCNC: 17.3 MMOL/L (ref 22–26)
HCT VFR BLD AUTO: 25.8 % (ref 35–47)
HGB BLD-MCNC: 8.1 G/DL (ref 11.5–16)
HGB UR QL STRIP: NEGATIVE
IMM GRANULOCYTES # BLD: 0 K/UL (ref 0–0.04)
IMM GRANULOCYTES NFR BLD AUTO: 0 % (ref 0–0.5)
KETONES UR QL STRIP.AUTO: NEGATIVE MG/DL
LACTATE SERPL-SCNC: 0.5 MMOL/L (ref 0.4–2)
LEUKOCYTE ESTERASE UR QL STRIP.AUTO: NEGATIVE
LIPASE SERPL-CCNC: 1459 U/L (ref 73–393)
LYMPHOCYTES # BLD: 0.7 K/UL (ref 0.8–3.5)
LYMPHOCYTES NFR BLD: 12 % (ref 12–49)
MCH RBC QN AUTO: 25.9 PG (ref 26–34)
MCHC RBC AUTO-ENTMCNC: 31.4 G/DL (ref 30–36.5)
MCV RBC AUTO: 82.4 FL (ref 80–99)
METHADONE UR QL: NEGATIVE
MONOCYTES # BLD: 0.4 K/UL (ref 0–1)
MONOCYTES NFR BLD: 6 % (ref 5–13)
NEUTS SEG # BLD: 4.8 K/UL (ref 1.8–8)
NEUTS SEG NFR BLD: 80 % (ref 32–75)
NITRITE UR QL STRIP.AUTO: NEGATIVE
NRBC # BLD: 0 K/UL (ref 0–0.01)
NRBC BLD-RTO: 0 PER 100 WBC
OPIATES UR QL: NEGATIVE
PCO2 BLD: 40.6 MMHG (ref 35–45)
PCP UR QL: NEGATIVE
PH BLD: 7.24 [PH] (ref 7.35–7.45)
PH UR STRIP: 5 [PH] (ref 5–8)
PLATELET # BLD AUTO: 131 K/UL (ref 150–400)
PO2 BLD: 126 MMHG (ref 80–100)
POTASSIUM SERPL-SCNC: 5.4 MMOL/L (ref 3.5–5.1)
PROT SERPL-MCNC: 7.2 G/DL (ref 6.4–8.2)
PROT UR STRIP-MCNC: 100 MG/DL
RBC # BLD AUTO: 3.13 M/UL (ref 3.8–5.2)
RBC #/AREA URNS HPF: ABNORMAL /HPF (ref 0–5)
RBC MORPH BLD: ABNORMAL
RBC MORPH BLD: ABNORMAL
RETICS # AUTO: 0.04 M/UL (ref 0.02–0.08)
RETICS/RBC NFR AUTO: 1.2 % (ref 0.7–2.1)
SAO2 % BLD: 98 % (ref 92–97)
SODIUM SERPL-SCNC: 125 MMOL/L (ref 136–145)
SP GR UR REFRACTOMETRY: 1.02 (ref 1–1.03)
SPECIMEN TYPE: ABNORMAL
UR CULT HOLD, URHOLD: NORMAL
UROBILINOGEN UR QL STRIP.AUTO: 0.2 EU/DL (ref 0.2–1)
WBC # BLD AUTO: 6 K/UL (ref 3.6–11)
WBC URNS QL MICRO: ABNORMAL /HPF (ref 0–4)

## 2018-06-09 PROCEDURE — 96365 THER/PROPH/DIAG IV INF INIT: CPT

## 2018-06-09 PROCEDURE — 80053 COMPREHEN METABOLIC PANEL: CPT

## 2018-06-09 PROCEDURE — 81001 URINALYSIS AUTO W/SCOPE: CPT | Performed by: NURSE PRACTITIONER

## 2018-06-09 PROCEDURE — 36415 COLL VENOUS BLD VENIPUNCTURE: CPT

## 2018-06-09 PROCEDURE — 99285 EMERGENCY DEPT VISIT HI MDM: CPT

## 2018-06-09 PROCEDURE — 81025 URINE PREGNANCY TEST: CPT

## 2018-06-09 PROCEDURE — 80307 DRUG TEST PRSMV CHEM ANLYZR: CPT | Performed by: NURSE PRACTITIONER

## 2018-06-09 PROCEDURE — 83690 ASSAY OF LIPASE: CPT | Performed by: NURSE PRACTITIONER

## 2018-06-09 PROCEDURE — 85025 COMPLETE CBC W/AUTO DIFF WBC: CPT | Performed by: NURSE PRACTITIONER

## 2018-06-09 PROCEDURE — 74011250636 HC RX REV CODE- 250/636: Performed by: NURSE PRACTITIONER

## 2018-06-09 PROCEDURE — 74011000250 HC RX REV CODE- 250: Performed by: NURSE PRACTITIONER

## 2018-06-09 PROCEDURE — 82150 ASSAY OF AMYLASE: CPT | Performed by: NURSE PRACTITIONER

## 2018-06-09 PROCEDURE — 96374 THER/PROPH/DIAG INJ IV PUSH: CPT

## 2018-06-09 PROCEDURE — 74176 CT ABD & PELVIS W/O CONTRAST: CPT

## 2018-06-09 PROCEDURE — 74011000258 HC RX REV CODE- 258: Performed by: NURSE PRACTITIONER

## 2018-06-09 PROCEDURE — 85045 AUTOMATED RETICULOCYTE COUNT: CPT | Performed by: NURSE PRACTITIONER

## 2018-06-09 PROCEDURE — 83605 ASSAY OF LACTIC ACID: CPT | Performed by: NURSE PRACTITIONER

## 2018-06-09 PROCEDURE — 96375 TX/PRO/DX INJ NEW DRUG ADDON: CPT

## 2018-06-09 PROCEDURE — 82803 BLOOD GASES ANY COMBINATION: CPT

## 2018-06-09 PROCEDURE — 65610000006 HC RM INTENSIVE CARE

## 2018-06-09 PROCEDURE — 96361 HYDRATE IV INFUSION ADD-ON: CPT

## 2018-06-09 PROCEDURE — 36600 WITHDRAWAL OF ARTERIAL BLOOD: CPT

## 2018-06-09 PROCEDURE — 77030011943

## 2018-06-09 RX ORDER — MAGNESIUM SULFATE 100 %
4 CRYSTALS MISCELLANEOUS AS NEEDED
Status: DISCONTINUED | OUTPATIENT
Start: 2018-06-09 | End: 2018-06-14 | Stop reason: HOSPADM

## 2018-06-09 RX ORDER — SODIUM BICARBONATE 1 MEQ/ML
50 SYRINGE (ML) INTRAVENOUS
Status: COMPLETED | OUTPATIENT
Start: 2018-06-09 | End: 2018-06-09

## 2018-06-09 RX ORDER — FENTANYL CITRATE 50 UG/ML
25 INJECTION, SOLUTION INTRAMUSCULAR; INTRAVENOUS
Status: DISCONTINUED | OUTPATIENT
Start: 2018-06-09 | End: 2018-06-10

## 2018-06-09 RX ORDER — HEPARIN SODIUM 5000 [USP'U]/ML
5000 INJECTION, SOLUTION INTRAVENOUS; SUBCUTANEOUS EVERY 8 HOURS
Status: DISCONTINUED | OUTPATIENT
Start: 2018-06-09 | End: 2018-06-09

## 2018-06-09 RX ORDER — INSULIN LISPRO 100 [IU]/ML
INJECTION, SOLUTION INTRAVENOUS; SUBCUTANEOUS
Status: DISCONTINUED | OUTPATIENT
Start: 2018-06-10 | End: 2018-06-12

## 2018-06-09 RX ORDER — SODIUM CHLORIDE 9 MG/ML
150 INJECTION, SOLUTION INTRAVENOUS CONTINUOUS
Status: DISCONTINUED | OUTPATIENT
Start: 2018-06-09 | End: 2018-06-10

## 2018-06-09 RX ORDER — HYDROMORPHONE HYDROCHLORIDE 1 MG/ML
1 INJECTION, SOLUTION INTRAMUSCULAR; INTRAVENOUS; SUBCUTANEOUS
Status: DISCONTINUED | OUTPATIENT
Start: 2018-06-09 | End: 2018-06-09

## 2018-06-09 RX ORDER — SODIUM CHLORIDE 0.9 % (FLUSH) 0.9 %
5-10 SYRINGE (ML) INJECTION EVERY 8 HOURS
Status: DISCONTINUED | OUTPATIENT
Start: 2018-06-09 | End: 2018-06-14 | Stop reason: HOSPADM

## 2018-06-09 RX ORDER — ONDANSETRON 2 MG/ML
4 INJECTION INTRAMUSCULAR; INTRAVENOUS
Status: DISCONTINUED | OUTPATIENT
Start: 2018-06-09 | End: 2018-06-14 | Stop reason: HOSPADM

## 2018-06-09 RX ORDER — HEPARIN SODIUM 5000 [USP'U]/ML
5000 INJECTION, SOLUTION INTRAVENOUS; SUBCUTANEOUS EVERY 8 HOURS
Status: DISCONTINUED | OUTPATIENT
Start: 2018-06-09 | End: 2018-06-09 | Stop reason: SDUPTHER

## 2018-06-09 RX ORDER — SODIUM CHLORIDE, SODIUM LACTATE, POTASSIUM CHLORIDE, CALCIUM CHLORIDE 600; 310; 30; 20 MG/100ML; MG/100ML; MG/100ML; MG/100ML
500 INJECTION, SOLUTION INTRAVENOUS CONTINUOUS
Status: DISCONTINUED | OUTPATIENT
Start: 2018-06-09 | End: 2018-06-10

## 2018-06-09 RX ORDER — DEXTROSE 50 % IN WATER (D50W) INTRAVENOUS SYRINGE
12.5-25 AS NEEDED
Status: DISCONTINUED | OUTPATIENT
Start: 2018-06-09 | End: 2018-06-14 | Stop reason: HOSPADM

## 2018-06-09 RX ORDER — SODIUM CHLORIDE 0.9 % (FLUSH) 0.9 %
5-10 SYRINGE (ML) INJECTION AS NEEDED
Status: DISCONTINUED | OUTPATIENT
Start: 2018-06-09 | End: 2018-06-14 | Stop reason: HOSPADM

## 2018-06-09 RX ORDER — HYDROMORPHONE HYDROCHLORIDE 1 MG/ML
1 INJECTION, SOLUTION INTRAMUSCULAR; INTRAVENOUS; SUBCUTANEOUS
Status: COMPLETED | OUTPATIENT
Start: 2018-06-09 | End: 2018-06-09

## 2018-06-09 RX ORDER — HYDROMORPHONE HYDROCHLORIDE 2 MG/ML
1 INJECTION, SOLUTION INTRAMUSCULAR; INTRAVENOUS; SUBCUTANEOUS
Status: COMPLETED | OUTPATIENT
Start: 2018-06-09 | End: 2018-06-10

## 2018-06-09 RX ADMIN — CALCIUM GLUCONATE 2 G: 98 INJECTION, SOLUTION INTRAVENOUS at 23:24

## 2018-06-09 RX ADMIN — SODIUM CHLORIDE 1000 ML: 900 INJECTION, SOLUTION INTRAVENOUS at 22:08

## 2018-06-09 RX ADMIN — SODIUM BICARBONATE 50 MEQ: 84 INJECTION, SOLUTION INTRAVENOUS at 23:15

## 2018-06-09 RX ADMIN — Medication 1 MG: at 23:14

## 2018-06-09 RX ADMIN — SODIUM CHLORIDE 1000 ML: 900 INJECTION, SOLUTION INTRAVENOUS at 23:18

## 2018-06-09 NOTE — IP AVS SNAPSHOT
[address] [facility] 13 
[phone] Patient: Iglesia Marcum MRN: [id number] About your hospitalization You were admitted on:  June 9, 2018 You last received care in the:  [address] You were discharged on:  June 14, 2018 Why you were hospitalized Your primary diagnosis was:  Dka (Diabetic Ketoacidoses) ([facility]) Follow-up Information None Discharge Orders None A check dino indicates which time of day the medication should be taken. My Medications ASK your doctor about these medications Instructions Each Dose to Equal  
 Morning Noon Evening Bedtime  
 albuterol 2.5 mg /3 mL (0.083 %) nebulizer solution Commonly known as:  PROVENTIL VENTOLIN Your last dose was: Your next dose is:    
   
   
 2.5 mg by Nebulization route every four (4) hours as needed. 2.5 mg  
    
   
   
   
  
 calcium carbonate 500 mg calcium (1,250 mg) tablet Commonly known as:  OS-BINA Your last dose was: Your next dose is: Take 1 Tab by mouth daily. 1 Tab  
    
   
   
   
  
 cholecalciferol 50,000 unit capsule Commonly known as:  VITAMIN D3 Your last dose was: Your next dose is: Take 1 Cap by mouth every seven (7) days. 1 Cap  
    
   
   
   
  
 cloNIDine HCl 0.2 mg tablet Commonly known as:  CATAPRES Ask about: Should I take this medication? Your last dose was: Your next dose is: Take 1 Tab by mouth three (3) times daily for 30 days. 0.2 mg  
    
   
   
   
  
 cyanocobalamin 1,000 mcg tablet Your last dose was: Your next dose is: Take 1,000 mcg by mouth daily. 1000 mcg  
    
   
   
   
  
 hydrALAZINE 100 mg tablet Commonly known as:  APRESOLINE Ask about: Should I take this medication? Your last dose was: Your next dose is: Take 1 Tab by mouth three (3) times daily for 30 days. 100 mg  
    
   
   
   
  
 L. acidoph & paracasei- S therm- Bifido 8 billion cell Cap cap Commonly known as:  CHRIS-Q/RISAQUAD Your last dose was: Your next dose is: Take 1 Cap by mouth daily for 30 days. 1 Cap  
    
   
   
   
  
 labetalol 100 mg tablet Commonly known as:  Hawk Katz Ask about: Should I take this medication? Your last dose was: Your next dose is: Take 2 Tabs by mouth two (2) times a day for 30 days. 200 mg NIFEdipine ER 90 mg ER tablet Commonly known as:  PROCARDIA XL Your last dose was: Your next dose is: Take 1 Tab by mouth daily for 30 days. 90 mg  
    
   
   
   
  
 oxyCODONE-acetaminophen 5-325 mg per tablet Commonly known as:  PERCOCET Your last dose was: Your next dose is: Take 1 Tab by mouth every six (6) hours as needed for Pain. Max Daily Amount: 4 Tabs. Scheduled 1 Tab  
    
   
   
   
  
 pantoprazole 40 mg tablet Commonly known as:  PROTONIX Ask about: Should I take this medication? Your last dose was: Your next dose is: Take 1 Tab by mouth Before breakfast and dinner for 30 days. 40 mg  
    
   
   
   
  
 promethazine 25 mg tablet Commonly known as:  PHENERGAN Your last dose was: Your next dose is: Take 1 Tab by mouth every eight (8) hours as needed for Nausea. 25 mg  
    
   
   
   
  
 senna-docusate 8.6-50 mg per tablet Commonly known as:  Nini Jerusalem Your last dose was: Your next dose is: Take 2 Tabs by mouth daily. 2 Tab SEROquel 100 mg tablet Generic drug:  QUEtiapine Your last dose was: Your next dose is: Take 100 mg by mouth two (2) times a day.   
 100 mg  
    
   
   
   
  
 venlafaxine 75 mg tablet Commonly known as:  [facility] Your last dose was: Your next dose is: Take 1 Tab by mouth two (2) times daily (with meals). 75 mg Opioid Education Prescription Opioids: What You Need to Know: 
 
Prescription opioids can be used to help relieve moderate-to-severe pain and are often prescribed following a surgery or injury, or for certain health conditions. These medications can be an important part of treatment but also come with serious risks. Opioids are strong pain medicines. Examples include hydrocodone, oxycodone, fentanyl, and morphine. Heroin is an example of an illegal opioid. It is important to work with your health care provider to make sure you are getting the safest, most effective care. WHAT ARE THE RISKS AND SIDE EFFECTS OF OPIOID USE? Prescription opioids carry serious risks of addiction and overdose, especially with prolonged use. An opioid overdose, often marked by slow breathing, can cause sudden death. The use of prescription opioids can have a number of side effects as well, even when taken as directed. · Tolerance-meaning you might need to take more of a medication for the same pain relief · Physical dependence-meaning you have symptoms of withdrawal when the medication is stopped. Withdrawal symptoms can include nausea, sweating, chills, diarrhea, stomach cramps, and muscle aches. Withdrawal can last up to several weeks, depending on which drug you took and how long you took it. · Increased sensitivity to pain · Constipation · Nausea, vomiting, and dry mouth · Sleepiness and dizziness · Confusion · Depression · Low levels of testosterone that can result in lower sex drive, energy, and strength · Itching and sweating RISKS ARE GREATER WITH:      
· History of drug misuse, substance use disorder, or overdose · Mental health conditions (such as depression or anxiety) · Sleep apnea · Older age (72 years or older) · Pregnancy Avoid alcohol while taking prescription opioids. Also, unless specifically advised by your health care provider, medications to avoid include: · Benzodiazepines (such as Xanax or Valium) · Muscle relaxants (such as Soma or Flexeril) · Hypnotics (such as Ambien or Lunesta) · Other prescription opioids KNOW YOUR OPTIONS Talk to your health care provider about ways to manage your pain that don't involve prescription opioids. Some of these options may actually work better and have fewer risks and side effects. Options may include: 
· Pain relievers such as acetaminophen, ibuprofen, and naproxen · Some medications that are also used for depression or seizures · Physical therapy and exercise · Counseling to help patients learn how to cope better with triggers of pain and stress. · Application of heat or cold compress · Massage therapy · Relaxation techniques Be Informed Make sure you know the name of your medication, how much and how often to take it, and its potential risks & side effects. IF YOU ARE PRESCRIBED OPIOIDS FOR PAIN: 
· Never take opioids in greater amounts or more often than prescribed. Remember the goal is not to be pain-free but to manage your pain at a tolerable level. · Follow up with your primary care provider to: · Work together to create a plan on how to manage your pain. · Talk about ways to help manage your pain that don't involve prescription opioids. · Talk about any and all concerns and side effects. · Help prevent misuse and abuse. · Never sell or share prescription opioids · Help prevent misuse and abuse. · Store prescription opioids in a secure place and out of reach of others (this may include visitors, children, friends, and family).  
· Safely dispose of unused/unwanted prescription opioids: Find your community drug take-back program or your pharmacy mail-back program, or flush them down the toilet, following guidance from the Food and Drug Administration (www.fda.gov/Drugs/ResourcesForYou). · Visit www.cdc.gov/drugoverdose to learn about the risks of opioid abuse and overdose. · If you believe you may be struggling with addiction, tell your health care provider and ask for guidance or call [employer] at 7-801-551-JTFP. Discharge Instructions None Introducing [facility] SERVICES! [employer] introduces [employer] patient portal. Now you can access parts of your medical record, email your doctor's office, and request medication refills online. 1. In your internet browser, go to [email or web address] 2. Click on the First Time User? Click Here link in the Sign In box. You will see the New Member Sign Up page. 3. Enter your Huiyuan Access Code exactly as it appears below. You will not need to use this code after youve completed the sign-up process. If you do not sign up before the expiration date, you must request a new code. · H[employer]uan Access Code: [facility] Expires: 9/7/2018  9:43 PM 
 
4. Enter the last four digits of your Social Security Number (xxxx) and Date of Birth (mm/dd/yyyy) as indicated and click Submit. You will be taken to the next sign-up page. 5. Create a Huiyuan ID. This will be your Huiyuan login ID and cannot be changed, so think of one that is secure and easy to remember. 6. Create a H[employer]yuan password. You can change your password at any time. 7. Enter your Password Reset Question and Answer. This can be used at a later time if you forget your password. 8. Enter your e-mail address. You will receive e-mail notification when new information is available in [address] 9. Click Sign Up. You can now view and download portions of your medical record.  
10. Click the Download Summary menu link to download a portable copy of your medical information. If you have questions, please visit the Frequently Asked Questions section of the [employer] website. Remember, H-art (WPP) is NOT to be used for urgent needs. For medical emergencies, dial 911. Now available from your iPhone and Android! Introducing Kirk Gomez As a [facility] [employer] [facility] patient, I wanted to make you aware of our electronic visit tool called Kirk Gomez. [employer] allows you to connect within minutes with a medical provider 24 hours a day, seven days a week via a mobile device or tablet or logging into a secure website from your computer. You can access Kirk Gomez from anywhere in the [place]. A virtual visit might be right for you when you have a simple condition and feel like you just dont want to get out of bed, or cant get away from work for an appointment, when your regular [facility] provider is not available (evenings, weekends or holidays), or when youre out of town and need minor care. Electronic visits cost only $49 and if the [employer]IPexpert provider determines a prescription is needed to treat your condition, one can be electronically transmitted to a nearby pharmacy*. Please take a moment to enroll today if you have not already done so. The enrollment process is free and takes just a few minutes. To enroll, please download the [employer] jose to your tablet or phone, or visit [email or web address] to enroll on your computer. And, as an [address] [place] [address] patient with a [employer] account, the results of your visits will be scanned into your electronic medical record and your primary care provider will be able to view the scanned results. We urge you to continue to see your regular [facility] provider for your ongoing medical care.   And while your primary care provider may not be the one available when you seek a Kirk Gomez virtual visit, the peace of mind you get from getting a real diagnosis real time can be priceless. For more information on Kirk Gomez, view our Frequently Asked Questions (FAQs) at [email or web address]. Sincerely, 
 
Sera Cm MD 
Chief Medical Officer [address] Pen[address]za *:  certain medications cannot be prescribed via Kirk Gomez Unresulted tests-please follow up with your PCP on these results Procedure/Test Authorizing Provider Kristofer Ontiveros MD  
 AMYLASE Mau Edward, LESTER  
 BETA-HYDROXYBUTYRATE Dorina Lundborg, MD  
 BLOOD GAS, ARTERIAL Gerson Kong MD  
 BLOOD GAS, ARTERIAL Gerson Kong MD  
 CBC W/O DIFF Dorina Lundborg, MD  
 CBC WITH AUTOMATED DIFF Gerson Kong MD  
 CBC WITH AUTOMATED DIFF Gerson Kong MD  
 CBC WITH AUTOMATED DIFF Carmela Reardon MD  
 CBC WITH AUTOMATED DIFF aCrmela Reardon MD  
 CBC WITH AUTOMATED DIFF Rosezena Remedies, MD  
 CBC WITH AUTOMATED DIFF Mau Edward, NP  
 CK Ainsley Sicard, MD  
 CK W/ CKMB & INDEX Carmela Reardon MD  
 CORTISOL, AM Carmela Reardon MD  
 CORTISOL, AM Amber Peña MD  
 CT ABD PELV WO CONT Raquel Hawkins MD  
 CT HEAD WO CONT Carmela Reardon MD  
 CULTURE, BLOOD, PAIRED Amber Peña MD  
 DRUG SCREEN, [address] NP  
 EKG, [address], [facility], NP  
 [facility], NP  
 [place] Historical Provider [place] [facility] Provider [place] [facility] Provider [place] Historical Provider [place] [facility] Provider [place] [facility] Provider [place] [facility] Provider [place] [facility] Provider [place] [facility] Provider [place] [facility] Provider [place] [facility] Provider [place] Historical Provider [place] Historical Provider [place] [facility] Provider [place] [facility] Provider [place] [facility] Provider [place] [facility] Provider [place] [facility] Provider [place] [facility] Provider [place] [facility] Provider [place] [facility] Provider [place] [facility] Provider [place] [facility] Provider [place] [facility] Provider [place] [facility] Provider [place] [facility] Provider [place] [facility] Provider [place] [facility] Provider [place] [facility] Provider [place] [facility] Provider [place] [facility] Provider [place] [facility] Provider [place] [facility] Provider [place] [facility] Provider [place] [facility] Provider [place] [facility] Provider [place] [facility] Provider [place] [facility] Provider [place] Historical Provider [place] Historical Provider [place] Historical Provider [place] Historical Provider [place] Historical Provider [place] Historical Provider [place] Historical Provider [place] Historical Provider [place] Historical Provider [place] Historical Provider [place] Historical Provider [place] Historical Provider [place] Historical Provider  HEMOGLOBIN A1C WITH David Perez MD  
 LACTIC ACID Adia Holbrook, NP  
 LD MD Nasim Garcia MD Virgel Coco, LESTER  
 LIPID PANEL MD Cassi Jordan MD Beuford Province, MD  
 MAGNESIUM MD Bambi Cleary MD  
 MAGNESIUM MD Bambi Jordan MD  
 Josafat Burdick MD  
 MAGNESIUM Uyen Silva MD  
 MAGNESIUM MD Radu Giraldo MD  
 METABOLIC PANEL, Carmen Reece MD  
 METABOLIC PANEL, Luisa Salinas MD  
 METABOLIC PANEL, Liz Galan MD  
 METABOLIC PANEL, COMPREHENSIVE Russell Ivy MD  
 METABOLIC PANEL, COMPREHENSIVE Elvie Garcia MD  
 METABOLIC PANEL, COMPREHENSIVE Karla Webster MD  
 PHOSPHORUS MD Lora Dejesus MD  
 PHOSPHORUS MD Brayden Mcknight MD  
 PHOSPHORUS Uyen Silva MD  
 [address] [place] Historical Provider POC G3 - PUL Diane Zabala MD  
 POC G3 - PUL Elroy Maurice MD  
 POC G3 - PUL Elroy Maurice MD  
 PTH INTACT Maximino Gary MD  
 RETICULOCYTE COUNT Shine Geremias, NP  
 SAMPLES BEING HELD Karla Webster MD  
 TROPONIN I Uyen Silva MD  
 TROPONIN I Uyen Silva MD  
 TSH 3RD GENERATION Uyen Silva MD  
 TSH 3RD GENERATION Uyen Silva MD  
 URINALYSIS W/MICROSCOPIC Elvie Garcia MD  
 URINALYSIS W/MICROSCOPIC Shine Geremias, NP  
 URINE CULTURE HOLD SAMPLE Shine Geremias, NP  
 XR ABD (KUB) Maximino Gary MD  
 XR ABD (KUB) Maximino Gary MD  
 XR Lora Verdugo MD  
 XR Lora Verdugo MD  
 XR CHEST PORT Elvie Garcia MD  
 XR CHEST PORT Elvie Garcia MD  
  
Providers Seen During Your Hospitalization Provider Specialty Primary office phone Karla Webster MD Emergency Medicine [phone] Alexei Irizarry MD Internal Medicine [phone] Fredy Shannon MD Internal Medicine [phone] Rosa Levin MD Internal Medicine [phone] Your Primary Care Physician (PCP) Primary Care Physician Office Phone Office Fax 410 [address], [address] [facility] [phone] You are allergic to the following Allergen Reactions Fentanyl Itching Erythromycin Itching Morphine Itching Toradol (Ketorolac) Rash Recent Documentation Height Weight Breastfeeding? BMI OB Status Smoking Status 1.575 m 66.8 kg No 26.94 kg/m2 Polycystic Ovarian Syndrome Never Smoker Emergency Contacts Name Discharge Info Relation Home Work Mobile Stephen Garcia DISCHARGE CAREGIVER [3] Spouse [3] [phone] Patient Belongings The following personal items are in your possession at time of discharge: 
  Dental Appliances: None  Visual Aid: None      Home Medications: None   Jewelry: None  Clothing: At bedside    Other Valuables: None Please provide this summary of care documentation to your next provider. Signatures-by signing, you are acknowledging that this After Visit Summary has been reviewed with you and you have received a copy. Patient Signature:  ____________________________________________________________ Date:  ____________________________________________________________  
  
Tiffanie Rebecca Provider Signature:  ____________________________________________________________ Date:  ____________________________________________________________

## 2018-06-09 NOTE — IP AVS SNAPSHOT
[address] 
[phone] Patient: Lorraine Long MRN: [id number] EA A check dino indicates which time of day the medication should be taken. My Medications ASK your doctor about these medications Instructions Each Dose to Equal  
 Morning Noon Evening Bedtime  
 albuterol 2.5 mg /3 mL (0.083 %) nebulizer solution Commonly known as:  PROVENTIL VENTOLIN Your last dose was: Your next dose is:    
   
   
 2.5 mg by Nebulization route every four (4) hours as needed. 2.5 mg  
    
   
   
   
  
 calcium carbonate 500 mg calcium (1,250 mg) tablet Commonly known as:  OS-BINA Your last dose was: Your next dose is: Take 1 Tab by mouth daily. 1 Tab  
    
   
   
   
  
 cholecalciferol 50,000 unit capsule Commonly known as:  VITAMIN D3 Your last dose was: Your next dose is: Take 1 Cap by mouth every seven (7) days. 1 Cap  
    
   
   
   
  
 cloNIDine HCl 0.2 mg tablet Commonly known as:  CATAPRES Ask about: Should I take this medication? Your last dose was: Your next dose is: Take 1 Tab by mouth three (3) times daily for 30 days. 0.2 mg  
    
   
   
   
  
 cyanocobalamin 1,000 mcg tablet Your last dose was: Your next dose is: Take 1,000 mcg by mouth daily. 1000 mcg  
    
   
   
   
  
 hydrALAZINE 100 mg tablet Commonly known as:  APRESOLINE Ask about: Should I take this medication? Your last dose was: Your next dose is: Take 1 Tab by mouth three (3) times daily for 30 days. 100 mg  
    
   
   
   
  
 L. acidoph & paracasei- S therm- Bifido 8 billion cell Cap cap Commonly known as:  CHRIS-Q/RISAQUAD Your last dose was: Your next dose is: Take 1 Cap by mouth daily for 30 days. 1 Cap labetalol 100 mg tablet Commonly known as:  Hawk Katz Ask about: Should I take this medication? Your last dose was: Your next dose is: Take 2 Tabs by mouth two (2) times a day for 30 days. 200 mg NIFEdipine ER 90 mg ER tablet Commonly known as:  PROCARDIA XL Your last dose was: Your next dose is: Take 1 Tab by mouth daily for 30 days. 90 mg  
    
   
   
   
  
 oxyCODONE-acetaminophen 5-325 mg per tablet Commonly known as:  PERCOCET Your last dose was: Your next dose is: Take 1 Tab by mouth every six (6) hours as needed for Pain. Max Daily Amount: 4 Tabs. Scheduled 1 Tab  
    
   
   
   
  
 pantoprazole 40 mg tablet Commonly known as:  PROTONIX Ask about: Should I take this medication? Your last dose was: Your next dose is: Take 1 Tab by mouth Before breakfast and dinner for 30 days. 40 mg  
    
   
   
   
  
 promethazine 25 mg tablet Commonly known as:  PHENERGAN Your last dose was: Your next dose is: Take 1 Tab by mouth every eight (8) hours as needed for Nausea. 25 mg  
    
   
   
   
  
 senna-docusate 8.6-50 mg per tablet Commonly known as:  Nini Tomah Your last dose was: Your next dose is: Take 2 Tabs by mouth daily. 2 Tab SEROquel 100 mg tablet Generic drug:  QUEtiapine Your last dose was: Your next dose is: Take 100 mg by mouth two (2) times a day. 100 mg  
    
   
   
   
  
 venlafaxine 75 mg tablet Commonly known as:  [facility] Your last dose was: Your next dose is: Take 1 Tab by mouth two (2) times daily (with meals).   
 75 mg

## 2018-06-10 ENCOUNTER — APPOINTMENT (OUTPATIENT)
Dept: GENERAL RADIOLOGY | Age: 38
DRG: 871 | End: 2018-06-10
Attending: INTERNAL MEDICINE
Payer: MEDICARE

## 2018-06-10 ENCOUNTER — APPOINTMENT (OUTPATIENT)
Dept: CT IMAGING | Age: 38
DRG: 871 | End: 2018-06-10
Attending: INTERNAL MEDICINE
Payer: MEDICARE

## 2018-06-10 ENCOUNTER — ANESTHESIA EVENT (OUTPATIENT)
Dept: SURGERY | Age: 38
DRG: 871 | End: 2018-06-10
Payer: MEDICARE

## 2018-06-10 ENCOUNTER — ANESTHESIA (OUTPATIENT)
Dept: SURGERY | Age: 38
DRG: 871 | End: 2018-06-10
Payer: MEDICARE

## 2018-06-10 ENCOUNTER — APPOINTMENT (OUTPATIENT)
Dept: GENERAL RADIOLOGY | Age: 38
DRG: 871 | End: 2018-06-10
Attending: HOSPITALIST
Payer: MEDICARE

## 2018-06-10 LAB
ADMINISTERED INITIALS, ADMINIT: NORMAL
AMORPH CRY URNS QL MICRO: ABNORMAL
AMYLASE SERPL-CCNC: 88 U/L (ref 25–115)
ANION GAP SERPL CALC-SCNC: 13 MMOL/L (ref 5–15)
ANION GAP SERPL CALC-SCNC: 13 MMOL/L (ref 5–15)
ANION GAP SERPL CALC-SCNC: 14 MMOL/L (ref 5–15)
APPEARANCE UR: ABNORMAL
ARTERIAL PATENCY WRIST A: YES
ARTERIAL PATENCY WRIST A: YES
ATRIAL RATE: 82 BPM
B-OH-BUTYR SERPL-SCNC: 0.22 MMOL/L
BACTERIA URNS QL MICRO: NEGATIVE /HPF
BASE DEFICIT BLD-SCNC: 15 MMOL/L
BASE DEFICIT BLD-SCNC: 18 MMOL/L
BASOPHILS # BLD: 0 K/UL (ref 0–0.1)
BASOPHILS # BLD: 0 K/UL (ref 0–0.1)
BASOPHILS NFR BLD: 0 % (ref 0–1)
BASOPHILS NFR BLD: 0 % (ref 0–1)
BDY SITE: ABNORMAL
BDY SITE: ABNORMAL
BILIRUB UR QL: NEGATIVE
BUN SERPL-MCNC: 83 MG/DL (ref 6–20)
BUN SERPL-MCNC: 86 MG/DL (ref 6–20)
BUN SERPL-MCNC: 86 MG/DL (ref 6–20)
BUN SERPL-MCNC: 88 MG/DL (ref 6–20)
BUN SERPL-MCNC: 88 MG/DL (ref 6–20)
BUN/CREAT SERPL: 19 (ref 12–20)
BUN/CREAT SERPL: 19 (ref 12–20)
BUN/CREAT SERPL: 21 (ref 12–20)
BUN/CREAT SERPL: 22 (ref 12–20)
BUN/CREAT SERPL: 22 (ref 12–20)
CALCIUM SERPL-MCNC: 6.1 MG/DL (ref 8.5–10.1)
CALCIUM SERPL-MCNC: 6.3 MG/DL (ref 8.5–10.1)
CALCIUM SERPL-MCNC: 6.4 MG/DL (ref 8.5–10.1)
CALCULATED P AXIS, ECG09: 55 DEGREES
CALCULATED R AXIS, ECG10: 24 DEGREES
CALCULATED T AXIS, ECG11: 52 DEGREES
CHLORIDE SERPL-SCNC: 104 MMOL/L (ref 97–108)
CHLORIDE SERPL-SCNC: 108 MMOL/L (ref 97–108)
CHLORIDE SERPL-SCNC: 109 MMOL/L (ref 97–108)
CHOLEST SERPL-MCNC: 128 MG/DL
CK MB CFR SERPL CALC: 3.2 % (ref 0–2.5)
CK MB CFR SERPL CALC: 3.5 % (ref 0–2.5)
CK MB SERPL-MCNC: 2.8 NG/ML (ref 5–25)
CK MB SERPL-MCNC: 3.5 NG/ML (ref 5–25)
CK SERPL-CCNC: 100 U/L (ref 26–192)
CK SERPL-CCNC: 87 U/L (ref 26–192)
CO2 SERPL-SCNC: 13 MMOL/L (ref 21–32)
CO2 SERPL-SCNC: 13 MMOL/L (ref 21–32)
CO2 SERPL-SCNC: 14 MMOL/L (ref 21–32)
CO2 SERPL-SCNC: 15 MMOL/L (ref 21–32)
CO2 SERPL-SCNC: 18 MMOL/L (ref 21–32)
COLOR UR: ABNORMAL
CORTIS AM PEAK SERPL-MCNC: 26.1 UG/DL (ref 4.3–22.4)
CREAT SERPL-MCNC: 3.86 MG/DL (ref 0.55–1.02)
CREAT SERPL-MCNC: 4.09 MG/DL (ref 0.55–1.02)
CREAT SERPL-MCNC: 4.18 MG/DL (ref 0.55–1.02)
CREAT SERPL-MCNC: 4.41 MG/DL (ref 0.55–1.02)
CREAT SERPL-MCNC: 4.5 MG/DL (ref 0.55–1.02)
D50 ADMINISTERED, D50ADM: 0 ML
D50 ORDER, D50ORD: 0 ML
DIAGNOSIS, 93000: NORMAL
DIFFERENTIAL METHOD BLD: ABNORMAL
DIFFERENTIAL METHOD BLD: ABNORMAL
EOSINOPHIL # BLD: 0 K/UL (ref 0–0.4)
EOSINOPHIL # BLD: 0.1 K/UL (ref 0–0.4)
EOSINOPHIL NFR BLD: 1 % (ref 0–7)
EOSINOPHIL NFR BLD: 1 % (ref 0–7)
EPITH CASTS URNS QL MICRO: ABNORMAL /LPF
ERYTHROCYTE [DISTWIDTH] IN BLOOD BY AUTOMATED COUNT: 15.1 % (ref 11.5–14.5)
ERYTHROCYTE [DISTWIDTH] IN BLOOD BY AUTOMATED COUNT: 15.1 % (ref 11.5–14.5)
EST. AVERAGE GLUCOSE BLD GHB EST-MCNC: 180 MG/DL
GAS FLOW.O2 O2 DELIVERY SYS: ABNORMAL L/MIN
GAS FLOW.O2 O2 DELIVERY SYS: ABNORMAL L/MIN
GAS FLOW.O2 SETTING OXYMISER: 22 BPM
GLSCOM COMMENTS: NORMAL
GLUCOSE BLD STRIP.AUTO-MCNC: 107 MG/DL (ref 65–100)
GLUCOSE BLD STRIP.AUTO-MCNC: 111 MG/DL (ref 65–100)
GLUCOSE BLD STRIP.AUTO-MCNC: 134 MG/DL (ref 65–100)
GLUCOSE BLD STRIP.AUTO-MCNC: 161 MG/DL (ref 65–100)
GLUCOSE BLD STRIP.AUTO-MCNC: 175 MG/DL (ref 65–100)
GLUCOSE BLD STRIP.AUTO-MCNC: 180 MG/DL (ref 65–100)
GLUCOSE BLD STRIP.AUTO-MCNC: 180 MG/DL (ref 65–100)
GLUCOSE BLD STRIP.AUTO-MCNC: 181 MG/DL (ref 65–100)
GLUCOSE BLD STRIP.AUTO-MCNC: 199 MG/DL (ref 65–100)
GLUCOSE BLD STRIP.AUTO-MCNC: 204 MG/DL (ref 65–100)
GLUCOSE BLD STRIP.AUTO-MCNC: 229 MG/DL (ref 65–100)
GLUCOSE BLD STRIP.AUTO-MCNC: 230 MG/DL (ref 65–100)
GLUCOSE BLD STRIP.AUTO-MCNC: 273 MG/DL (ref 65–100)
GLUCOSE BLD STRIP.AUTO-MCNC: 287 MG/DL (ref 65–100)
GLUCOSE BLD STRIP.AUTO-MCNC: 296 MG/DL (ref 65–100)
GLUCOSE BLD STRIP.AUTO-MCNC: 297 MG/DL (ref 65–100)
GLUCOSE BLD STRIP.AUTO-MCNC: 437 MG/DL (ref 65–100)
GLUCOSE BLD STRIP.AUTO-MCNC: 82 MG/DL (ref 65–100)
GLUCOSE SERPL-MCNC: 103 MG/DL (ref 65–100)
GLUCOSE SERPL-MCNC: 144 MG/DL (ref 65–100)
GLUCOSE SERPL-MCNC: 198 MG/DL (ref 65–100)
GLUCOSE SERPL-MCNC: 238 MG/DL (ref 65–100)
GLUCOSE SERPL-MCNC: 241 MG/DL (ref 65–100)
GLUCOSE UR STRIP.AUTO-MCNC: >1000 MG/DL
GLUCOSE, GLC: 107 MG/DL
GLUCOSE, GLC: 111 MG/DL
GLUCOSE, GLC: 134 MG/DL
GLUCOSE, GLC: 161 MG/DL
GLUCOSE, GLC: 175 MG/DL
GLUCOSE, GLC: 180 MG/DL
GLUCOSE, GLC: 180 MG/DL
GLUCOSE, GLC: 181 MG/DL
GLUCOSE, GLC: 199 MG/DL
GLUCOSE, GLC: 204 MG/DL
GLUCOSE, GLC: 229 MG/DL
GLUCOSE, GLC: 230 MG/DL
GLUCOSE, GLC: 273 MG/DL
GLUCOSE, GLC: 287 MG/DL
GLUCOSE, GLC: 296 MG/DL
GLUCOSE, GLC: 297 MG/DL
GLUCOSE, GLC: 437 MG/DL
GLUCOSE, GLC: 601 MG/DL
GLUCOSE, GLC: 82 MG/DL
HBA1C MFR BLD: 7.9 % (ref 4.2–6.3)
HCO3 BLD-SCNC: 11.3 MMOL/L (ref 22–26)
HCO3 BLD-SCNC: 13.6 MMOL/L (ref 22–26)
HCT VFR BLD AUTO: 25.1 % (ref 35–47)
HCT VFR BLD AUTO: 26.1 % (ref 35–47)
HDLC SERPL-MCNC: 51 MG/DL
HDLC SERPL: 2.5 {RATIO} (ref 0–5)
HGB BLD-MCNC: 8 G/DL (ref 11.5–16)
HGB BLD-MCNC: 8.3 G/DL (ref 11.5–16)
HGB UR QL STRIP: NEGATIVE
HIGH TARGET, HITG: 250 MG/DL
IMM GRANULOCYTES # BLD: 0 K/UL (ref 0–0.04)
IMM GRANULOCYTES # BLD: 0 K/UL (ref 0–0.04)
IMM GRANULOCYTES NFR BLD AUTO: 0 % (ref 0–0.5)
IMM GRANULOCYTES NFR BLD AUTO: 1 % (ref 0–0.5)
INSULIN ADMINSTERED, INSADM: 0 UNITS/HOUR
INSULIN ADMINSTERED, INSADM: 0.1 UNITS/HOUR
INSULIN ADMINSTERED, INSADM: 0.5 UNITS/HOUR
INSULIN ADMINSTERED, INSADM: 10.8 UNITS/HOUR
INSULIN ADMINSTERED, INSADM: 2.3 UNITS/HOUR
INSULIN ADMINSTERED, INSADM: 2.4 UNITS/HOUR
INSULIN ADMINSTERED, INSADM: 4.3 UNITS/HOUR
INSULIN ADMINSTERED, INSADM: 4.5 UNITS/HOUR
INSULIN ADMINSTERED, INSADM: 7.5 UNITS/HOUR
INSULIN ORDER, INSORD: 0 UNITS/HOUR
INSULIN ORDER, INSORD: 0.1 UNITS/HOUR
INSULIN ORDER, INSORD: 0.5 UNITS/HOUR
INSULIN ORDER, INSORD: 10.8 UNITS/HOUR
INSULIN ORDER, INSORD: 2.3 UNITS/HOUR
INSULIN ORDER, INSORD: 2.4 UNITS/HOUR
INSULIN ORDER, INSORD: 4.3 UNITS/HOUR
INSULIN ORDER, INSORD: 4.5 UNITS/HOUR
INSULIN ORDER, INSORD: 7.5 UNITS/HOUR
KETONES UR QL STRIP.AUTO: NEGATIVE MG/DL
LDH SERPL L TO P-CCNC: 153 U/L (ref 81–246)
LDLC SERPL CALC-MCNC: 67.4 MG/DL (ref 0–100)
LEUKOCYTE ESTERASE UR QL STRIP.AUTO: NEGATIVE
LIPASE SERPL-CCNC: 548 U/L (ref 73–393)
LIPID PROFILE,FLP: NORMAL
LOW TARGET, LOT: 150 MG/DL
LYMPHOCYTES # BLD: 0.6 K/UL (ref 0.8–3.5)
LYMPHOCYTES # BLD: 0.7 K/UL (ref 0.8–3.5)
LYMPHOCYTES NFR BLD: 10 % (ref 12–49)
LYMPHOCYTES NFR BLD: 12 % (ref 12–49)
MAGNESIUM SERPL-MCNC: 1.2 MG/DL (ref 1.6–2.4)
MAGNESIUM SERPL-MCNC: 1.3 MG/DL (ref 1.6–2.4)
MAGNESIUM SERPL-MCNC: 1.6 MG/DL (ref 1.6–2.4)
MCH RBC QN AUTO: 25.7 PG (ref 26–34)
MCH RBC QN AUTO: 25.7 PG (ref 26–34)
MCHC RBC AUTO-ENTMCNC: 31.8 G/DL (ref 30–36.5)
MCHC RBC AUTO-ENTMCNC: 31.9 G/DL (ref 30–36.5)
MCV RBC AUTO: 80.7 FL (ref 80–99)
MCV RBC AUTO: 80.8 FL (ref 80–99)
MINUTES UNTIL NEXT BG, NBG: 120 MIN
MINUTES UNTIL NEXT BG, NBG: 60 MIN
MONOCYTES # BLD: 0.3 K/UL (ref 0–1)
MONOCYTES # BLD: 0.4 K/UL (ref 0–1)
MONOCYTES NFR BLD: 5 % (ref 5–13)
MONOCYTES NFR BLD: 6 % (ref 5–13)
MUCOUS THREADS URNS QL MICRO: ABNORMAL /LPF
MULTIPLIER, MUL: 0
MULTIPLIER, MUL: 0.01
MULTIPLIER, MUL: 0.01
MULTIPLIER, MUL: 0.02
NEUTS SEG # BLD: 4.7 K/UL (ref 1.8–8)
NEUTS SEG # BLD: 4.7 K/UL (ref 1.8–8)
NEUTS SEG NFR BLD: 81 % (ref 32–75)
NEUTS SEG NFR BLD: 83 % (ref 32–75)
NITRITE UR QL STRIP.AUTO: NEGATIVE
NRBC # BLD: 0 K/UL (ref 0–0.01)
NRBC # BLD: 0 K/UL (ref 0–0.01)
NRBC BLD-RTO: 0 PER 100 WBC
NRBC BLD-RTO: 0 PER 100 WBC
O2/TOTAL GAS SETTING VFR VENT: 100 %
ORDER INITIALS, ORDINIT: NORMAL
OTHER,OTHU: ABNORMAL
P-R INTERVAL, ECG05: 156 MS
PCO2 BLD: 32.9 MMHG (ref 35–45)
PCO2 BLD: 37 MMHG (ref 35–45)
PEEP RESPIRATORY: 5 CMH2O
PH BLD: 7.15 [PH] (ref 7.35–7.45)
PH BLD: 7.17 [PH] (ref 7.35–7.45)
PH UR STRIP: 5 [PH] (ref 5–8)
PHOSPHATE SERPL-MCNC: 6.9 MG/DL (ref 2.6–4.7)
PHOSPHATE SERPL-MCNC: 7.1 MG/DL (ref 2.6–4.7)
PIP ISTAT,IPIP: 25
PLATELET # BLD AUTO: 122 K/UL (ref 150–400)
PLATELET # BLD AUTO: 134 K/UL (ref 150–400)
PMV BLD AUTO: 12.2 FL (ref 8.9–12.9)
PO2 BLD: 110 MMHG (ref 80–100)
PO2 BLD: 534 MMHG (ref 80–100)
POTASSIUM SERPL-SCNC: 4.1 MMOL/L (ref 3.5–5.1)
POTASSIUM SERPL-SCNC: 4.3 MMOL/L (ref 3.5–5.1)
POTASSIUM SERPL-SCNC: 4.8 MMOL/L (ref 3.5–5.1)
POTASSIUM SERPL-SCNC: 4.9 MMOL/L (ref 3.5–5.1)
POTASSIUM SERPL-SCNC: 5.1 MMOL/L (ref 3.5–5.1)
PROT UR STRIP-MCNC: 100 MG/DL
Q-T INTERVAL, ECG07: 422 MS
QRS DURATION, ECG06: 94 MS
QTC CALCULATION (BEZET), ECG08: 493 MS
RBC # BLD AUTO: 3.11 M/UL (ref 3.8–5.2)
RBC # BLD AUTO: 3.23 M/UL (ref 3.8–5.2)
RBC #/AREA URNS HPF: ABNORMAL /HPF (ref 0–5)
SAO2 % BLD: 100 % (ref 92–97)
SAO2 % BLD: 97 % (ref 92–97)
SERVICE CMNT-IMP: ABNORMAL
SERVICE CMNT-IMP: NORMAL
SODIUM SERPL-SCNC: 134 MMOL/L (ref 136–145)
SODIUM SERPL-SCNC: 136 MMOL/L (ref 136–145)
SP GR UR REFRACTOMETRY: 1.01 (ref 1–1.03)
SPECIMEN TYPE: ABNORMAL
SPECIMEN TYPE: ABNORMAL
TOTAL RESP. RATE, ITRR: 22
TRIGL SERPL-MCNC: 48 MG/DL (ref ?–150)
TROPONIN I SERPL-MCNC: <0.04 NG/ML
TSH SERPL DL<=0.05 MIU/L-ACNC: 2.98 UIU/ML (ref 0.36–3.74)
UROBILINOGEN UR QL STRIP.AUTO: 0.2 EU/DL (ref 0.2–1)
VENTILATION MODE VENT: ABNORMAL
VENTRICULAR RATE, ECG03: 82 BPM
VLDLC SERPL CALC-MCNC: 9.6 MG/DL
VT SETTING VENT: 400 ML
WBC # BLD AUTO: 5.7 K/UL (ref 3.6–11)
WBC # BLD AUTO: 5.8 K/UL (ref 3.6–11)
WBC URNS QL MICRO: ABNORMAL /HPF (ref 0–4)
YEAST BUDDING URNS QL: PRESENT

## 2018-06-10 PROCEDURE — 87040 BLOOD CULTURE FOR BACTERIA: CPT | Performed by: HOSPITALIST

## 2018-06-10 PROCEDURE — 84484 ASSAY OF TROPONIN QUANT: CPT | Performed by: INTERNAL MEDICINE

## 2018-06-10 PROCEDURE — 77030008477 HC STYL SATN SLP COVD -A

## 2018-06-10 PROCEDURE — 0BH17EZ INSERTION OF ENDOTRACHEAL AIRWAY INTO TRACHEA, VIA NATURAL OR ARTIFICIAL OPENING: ICD-10-PCS | Performed by: ANESTHESIOLOGY

## 2018-06-10 PROCEDURE — 74011250636 HC RX REV CODE- 250/636

## 2018-06-10 PROCEDURE — 82962 GLUCOSE BLOOD TEST: CPT

## 2018-06-10 PROCEDURE — 82553 CREATINE MB FRACTION: CPT | Performed by: INTERNAL MEDICINE

## 2018-06-10 PROCEDURE — 74011250636 HC RX REV CODE- 250/636: Performed by: NURSE PRACTITIONER

## 2018-06-10 PROCEDURE — 83735 ASSAY OF MAGNESIUM: CPT | Performed by: INTERNAL MEDICINE

## 2018-06-10 PROCEDURE — 82533 TOTAL CORTISOL: CPT | Performed by: HOSPITALIST

## 2018-06-10 PROCEDURE — 77030013079 HC BLNKT BAIR HGGR 3M -A

## 2018-06-10 PROCEDURE — 84100 ASSAY OF PHOSPHORUS: CPT | Performed by: INTERNAL MEDICINE

## 2018-06-10 PROCEDURE — 80061 LIPID PANEL: CPT | Performed by: INTERNAL MEDICINE

## 2018-06-10 PROCEDURE — 77010033678 HC OXYGEN DAILY

## 2018-06-10 PROCEDURE — 83690 ASSAY OF LIPASE: CPT | Performed by: INTERNAL MEDICINE

## 2018-06-10 PROCEDURE — 83615 LACTATE (LD) (LDH) ENZYME: CPT | Performed by: HOSPITALIST

## 2018-06-10 PROCEDURE — 77030008684 HC TU ET CUF COVD -B: Performed by: ANESTHESIOLOGY

## 2018-06-10 PROCEDURE — 94640 AIRWAY INHALATION TREATMENT: CPT

## 2018-06-10 PROCEDURE — 83036 HEMOGLOBIN GLYCOSYLATED A1C: CPT | Performed by: INTERNAL MEDICINE

## 2018-06-10 PROCEDURE — 71045 X-RAY EXAM CHEST 1 VIEW: CPT

## 2018-06-10 PROCEDURE — 84443 ASSAY THYROID STIM HORMONE: CPT | Performed by: INTERNAL MEDICINE

## 2018-06-10 PROCEDURE — 74011250636 HC RX REV CODE- 250/636: Performed by: INTERNAL MEDICINE

## 2018-06-10 PROCEDURE — 74011000250 HC RX REV CODE- 250: Performed by: OTOLARYNGOLOGY

## 2018-06-10 PROCEDURE — 74011250637 HC RX REV CODE- 250/637: Performed by: HOSPITALIST

## 2018-06-10 PROCEDURE — 5A1945Z RESPIRATORY VENTILATION, 24-96 CONSECUTIVE HOURS: ICD-10-PCS | Performed by: INTERNAL MEDICINE

## 2018-06-10 PROCEDURE — 81001 URINALYSIS AUTO W/SCOPE: CPT | Performed by: INTERNAL MEDICINE

## 2018-06-10 PROCEDURE — 36600 WITHDRAWAL OF ARTERIAL BLOOD: CPT

## 2018-06-10 PROCEDURE — 70450 CT HEAD/BRAIN W/O DYE: CPT

## 2018-06-10 PROCEDURE — 74011000250 HC RX REV CODE- 250

## 2018-06-10 PROCEDURE — 82803 BLOOD GASES ANY COMBINATION: CPT

## 2018-06-10 PROCEDURE — 93005 ELECTROCARDIOGRAM TRACING: CPT

## 2018-06-10 PROCEDURE — 65620000000 HC RM CCU GENERAL

## 2018-06-10 PROCEDURE — 85025 COMPLETE CBC W/AUTO DIFF WBC: CPT | Performed by: INTERNAL MEDICINE

## 2018-06-10 PROCEDURE — 77030034848

## 2018-06-10 PROCEDURE — 74011000250 HC RX REV CODE- 250: Performed by: HOSPITALIST

## 2018-06-10 PROCEDURE — 36415 COLL VENOUS BLD VENIPUNCTURE: CPT | Performed by: INTERNAL MEDICINE

## 2018-06-10 PROCEDURE — 74011000250 HC RX REV CODE- 250: Performed by: INTERNAL MEDICINE

## 2018-06-10 PROCEDURE — 74011000258 HC RX REV CODE- 258: Performed by: HOSPITALIST

## 2018-06-10 PROCEDURE — 74011000258 HC RX REV CODE- 258: Performed by: INTERNAL MEDICINE

## 2018-06-10 PROCEDURE — 94002 VENT MGMT INPAT INIT DAY: CPT

## 2018-06-10 PROCEDURE — 80048 BASIC METABOLIC PNL TOTAL CA: CPT | Performed by: INTERNAL MEDICINE

## 2018-06-10 PROCEDURE — 82150 ASSAY OF AMYLASE: CPT | Performed by: INTERNAL MEDICINE

## 2018-06-10 PROCEDURE — 76010000138 HC OR TIME 0.5 TO 1 HR: Performed by: OTOLARYNGOLOGY

## 2018-06-10 PROCEDURE — 74011250637 HC RX REV CODE- 250/637: Performed by: OTOLARYNGOLOGY

## 2018-06-10 PROCEDURE — 76060000032 HC ANESTHESIA 0.5 TO 1 HR: Performed by: OTOLARYNGOLOGY

## 2018-06-10 PROCEDURE — 74011250636 HC RX REV CODE- 250/636: Performed by: HOSPITALIST

## 2018-06-10 PROCEDURE — 77030012602 HC SPNG PTTY NEUR J&J -B: Performed by: OTOLARYNGOLOGY

## 2018-06-10 PROCEDURE — 77030008683 HC TU ET CUF COVD -A: Performed by: OTOLARYNGOLOGY

## 2018-06-10 PROCEDURE — 82010 KETONE BODYS QUAN: CPT | Performed by: INTERNAL MEDICINE

## 2018-06-10 PROCEDURE — 74011636637 HC RX REV CODE- 636/637: Performed by: INTERNAL MEDICINE

## 2018-06-10 PROCEDURE — 77030026438 HC STYL ET INTUB CARD -A: Performed by: ANESTHESIOLOGY

## 2018-06-10 RX ORDER — LIDOCAINE HYDROCHLORIDE AND EPINEPHRINE 10; 10 MG/ML; UG/ML
INJECTION, SOLUTION INFILTRATION; PERINEURAL AS NEEDED
Status: DISCONTINUED | OUTPATIENT
Start: 2018-06-10 | End: 2018-06-10 | Stop reason: HOSPADM

## 2018-06-10 RX ORDER — PROPOFOL 10 MG/ML
0-50 VIAL (ML) INTRAVENOUS
Status: DISCONTINUED | OUTPATIENT
Start: 2018-06-10 | End: 2018-06-14

## 2018-06-10 RX ORDER — LORAZEPAM 2 MG/ML
2 INJECTION INTRAMUSCULAR
Status: COMPLETED | OUTPATIENT
Start: 2018-06-10 | End: 2018-06-10

## 2018-06-10 RX ORDER — ALBUTEROL SULFATE 0.83 MG/ML
2.5 SOLUTION RESPIRATORY (INHALATION)
Status: DISCONTINUED | OUTPATIENT
Start: 2018-06-10 | End: 2018-06-14 | Stop reason: HOSPADM

## 2018-06-10 RX ORDER — HYDRALAZINE HYDROCHLORIDE 20 MG/ML
10 INJECTION INTRAMUSCULAR; INTRAVENOUS
Status: DISCONTINUED | OUTPATIENT
Start: 2018-06-10 | End: 2018-06-14 | Stop reason: HOSPADM

## 2018-06-10 RX ORDER — LORAZEPAM 2 MG/ML
INJECTION INTRAMUSCULAR
Status: COMPLETED
Start: 2018-06-10 | End: 2018-06-10

## 2018-06-10 RX ORDER — SCOLOPAMINE TRANSDERMAL SYSTEM 1 MG/1
1 PATCH, EXTENDED RELEASE TRANSDERMAL
Status: DISCONTINUED | OUTPATIENT
Start: 2018-06-10 | End: 2018-06-14 | Stop reason: HOSPADM

## 2018-06-10 RX ORDER — SODIUM CHLORIDE 0.9 % (FLUSH) 0.9 %
5-10 SYRINGE (ML) INJECTION EVERY 8 HOURS
Status: DISCONTINUED | OUTPATIENT
Start: 2018-06-10 | End: 2018-06-14 | Stop reason: HOSPADM

## 2018-06-10 RX ORDER — BISACODYL 5 MG
5 TABLET, DELAYED RELEASE (ENTERIC COATED) ORAL DAILY PRN
Status: DISCONTINUED | OUTPATIENT
Start: 2018-06-10 | End: 2018-06-14 | Stop reason: HOSPADM

## 2018-06-10 RX ORDER — SODIUM CHLORIDE 0.9 % (FLUSH) 0.9 %
5-10 SYRINGE (ML) INJECTION AS NEEDED
Status: DISCONTINUED | OUTPATIENT
Start: 2018-06-10 | End: 2018-06-14 | Stop reason: HOSPADM

## 2018-06-10 RX ORDER — GLYCOPYRROLATE 0.2 MG/ML
INJECTION INTRAMUSCULAR; INTRAVENOUS AS NEEDED
Status: DISCONTINUED | OUTPATIENT
Start: 2018-06-10 | End: 2018-06-10 | Stop reason: HOSPADM

## 2018-06-10 RX ORDER — MIDAZOLAM HYDROCHLORIDE 1 MG/ML
INJECTION, SOLUTION INTRAMUSCULAR; INTRAVENOUS AS NEEDED
Status: DISCONTINUED | OUTPATIENT
Start: 2018-06-10 | End: 2018-06-10 | Stop reason: HOSPADM

## 2018-06-10 RX ORDER — MAGNESIUM SULFATE HEPTAHYDRATE 40 MG/ML
2 INJECTION, SOLUTION INTRAVENOUS ONCE
Status: COMPLETED | OUTPATIENT
Start: 2018-06-10 | End: 2018-06-11

## 2018-06-10 RX ORDER — DEXTROSE MONOHYDRATE AND SODIUM CHLORIDE 5; .9 G/100ML; G/100ML
150 INJECTION, SOLUTION INTRAVENOUS CONTINUOUS
Status: DISCONTINUED | OUTPATIENT
Start: 2018-06-10 | End: 2018-06-10

## 2018-06-10 RX ORDER — LEVOFLOXACIN 5 MG/ML
250 INJECTION, SOLUTION INTRAVENOUS
Status: DISCONTINUED | OUTPATIENT
Start: 2018-06-10 | End: 2018-06-14 | Stop reason: HOSPADM

## 2018-06-10 RX ORDER — DIPHENHYDRAMINE HYDROCHLORIDE 50 MG/ML
50 INJECTION, SOLUTION INTRAMUSCULAR; INTRAVENOUS EVERY 6 HOURS
Status: DISCONTINUED | OUTPATIENT
Start: 2018-06-10 | End: 2018-06-14 | Stop reason: HOSPADM

## 2018-06-10 RX ORDER — PHENYLEPHRINE HCL IN 0.9% NACL 0.4MG/10ML
SYRINGE (ML) INTRAVENOUS AS NEEDED
Status: DISCONTINUED | OUTPATIENT
Start: 2018-06-10 | End: 2018-06-10 | Stop reason: HOSPADM

## 2018-06-10 RX ORDER — DEXAMETHASONE SODIUM PHOSPHATE 4 MG/ML
4 INJECTION, SOLUTION INTRA-ARTICULAR; INTRALESIONAL; INTRAMUSCULAR; INTRAVENOUS; SOFT TISSUE ONCE
Status: COMPLETED | OUTPATIENT
Start: 2018-06-10 | End: 2018-06-10

## 2018-06-10 RX ORDER — IPRATROPIUM BROMIDE AND ALBUTEROL SULFATE 2.5; .5 MG/3ML; MG/3ML
3 SOLUTION RESPIRATORY (INHALATION)
Status: DISCONTINUED | OUTPATIENT
Start: 2018-06-10 | End: 2018-06-14 | Stop reason: HOSPADM

## 2018-06-10 RX ORDER — HEPARIN SODIUM 5000 [USP'U]/ML
5000 INJECTION, SOLUTION INTRAVENOUS; SUBCUTANEOUS EVERY 8 HOURS
Status: DISCONTINUED | OUTPATIENT
Start: 2018-06-10 | End: 2018-06-14 | Stop reason: HOSPADM

## 2018-06-10 RX ORDER — SODIUM CHLORIDE, SODIUM LACTATE, POTASSIUM CHLORIDE, CALCIUM CHLORIDE 600; 310; 30; 20 MG/100ML; MG/100ML; MG/100ML; MG/100ML
INJECTION, SOLUTION INTRAVENOUS
Status: DISCONTINUED | OUTPATIENT
Start: 2018-06-10 | End: 2018-06-10 | Stop reason: HOSPADM

## 2018-06-10 RX ORDER — FENTANYL CITRATE 50 UG/ML
50-100 INJECTION, SOLUTION INTRAMUSCULAR; INTRAVENOUS
Status: DISCONTINUED | OUTPATIENT
Start: 2018-06-10 | End: 2018-06-14

## 2018-06-10 RX ADMIN — SODIUM CHLORIDE 0.7 MCG/KG/HR: 900 INJECTION, SOLUTION INTRAVENOUS at 20:15

## 2018-06-10 RX ADMIN — MIDAZOLAM HYDROCHLORIDE 3 MG: 1 INJECTION, SOLUTION INTRAMUSCULAR; INTRAVENOUS at 09:32

## 2018-06-10 RX ADMIN — HEPARIN SODIUM 5000 UNITS: 5000 INJECTION, SOLUTION INTRAVENOUS; SUBCUTANEOUS at 05:34

## 2018-06-10 RX ADMIN — DIPHENHYDRAMINE HYDROCHLORIDE 50 MG: 50 INJECTION, SOLUTION INTRAMUSCULAR; INTRAVENOUS at 11:00

## 2018-06-10 RX ADMIN — LEVOFLOXACIN 250 MG: 5 INJECTION, SOLUTION INTRAVENOUS at 10:51

## 2018-06-10 RX ADMIN — FENTANYL CITRATE 100 MCG: 50 INJECTION, SOLUTION INTRAMUSCULAR; INTRAVENOUS at 17:09

## 2018-06-10 RX ADMIN — PROPOFOL 50 MCG/KG/MIN: 10 INJECTION, EMULSION INTRAVENOUS at 19:53

## 2018-06-10 RX ADMIN — IPRATROPIUM BROMIDE AND ALBUTEROL SULFATE 3 ML: .5; 3 SOLUTION RESPIRATORY (INHALATION) at 20:51

## 2018-06-10 RX ADMIN — Medication 10 ML: at 00:56

## 2018-06-10 RX ADMIN — Medication 10 ML: at 22:22

## 2018-06-10 RX ADMIN — FENTANYL CITRATE 100 MCG: 50 INJECTION, SOLUTION INTRAMUSCULAR; INTRAVENOUS at 11:47

## 2018-06-10 RX ADMIN — FENTANYL CITRATE 100 MCG: 50 INJECTION, SOLUTION INTRAMUSCULAR; INTRAVENOUS at 22:38

## 2018-06-10 RX ADMIN — FENTANYL CITRATE 100 MCG: 50 INJECTION, SOLUTION INTRAMUSCULAR; INTRAVENOUS at 15:12

## 2018-06-10 RX ADMIN — Medication 80 MCG: at 09:41

## 2018-06-10 RX ADMIN — METHYLPREDNISOLONE SODIUM SUCCINATE 80 MG: 40 INJECTION, POWDER, FOR SOLUTION INTRAMUSCULAR; INTRAVENOUS at 21:15

## 2018-06-10 RX ADMIN — SODIUM CHLORIDE 0.4 MCG/KG/HR: 900 INJECTION, SOLUTION INTRAVENOUS at 17:49

## 2018-06-10 RX ADMIN — FENTANYL CITRATE 25 MCG: 50 INJECTION, SOLUTION INTRAMUSCULAR; INTRAVENOUS at 05:33

## 2018-06-10 RX ADMIN — MAGNESIUM SULFATE HEPTAHYDRATE 2 G: 40 INJECTION, SOLUTION INTRAVENOUS at 23:14

## 2018-06-10 RX ADMIN — Medication 10 ML: at 05:37

## 2018-06-10 RX ADMIN — HYDRALAZINE HYDROCHLORIDE 10 MG: 20 INJECTION INTRAMUSCULAR; INTRAVENOUS at 13:23

## 2018-06-10 RX ADMIN — SODIUM CHLORIDE, SODIUM LACTATE, POTASSIUM CHLORIDE, AND CALCIUM CHLORIDE 500 ML/HR: 600; 310; 30; 20 INJECTION, SOLUTION INTRAVENOUS at 00:31

## 2018-06-10 RX ADMIN — GLYCOPYRROLATE 0.2 MG: 0.2 INJECTION INTRAMUSCULAR; INTRAVENOUS at 09:32

## 2018-06-10 RX ADMIN — SODIUM CHLORIDE, SODIUM LACTATE, POTASSIUM CHLORIDE, CALCIUM CHLORIDE: 600; 310; 30; 20 INJECTION, SOLUTION INTRAVENOUS at 09:32

## 2018-06-10 RX ADMIN — LORAZEPAM 2 MG: 2 INJECTION INTRAMUSCULAR; INTRAVENOUS at 08:04

## 2018-06-10 RX ADMIN — Medication 10 ML: at 15:14

## 2018-06-10 RX ADMIN — MIDAZOLAM HYDROCHLORIDE 1 MG: 1 INJECTION, SOLUTION INTRAMUSCULAR; INTRAVENOUS at 09:34

## 2018-06-10 RX ADMIN — CALCIUM GLUCONATE 2 G: 98 INJECTION, SOLUTION INTRAVENOUS at 22:59

## 2018-06-10 RX ADMIN — Medication 80 MCG: at 09:40

## 2018-06-10 RX ADMIN — WATER: 1000 INJECTION, SOLUTION INTRAVENOUS at 11:22

## 2018-06-10 RX ADMIN — PROPOFOL 50 MCG/KG/MIN: 10 INJECTION, EMULSION INTRAVENOUS at 14:41

## 2018-06-10 RX ADMIN — CALCIUM GLUCONATE 2 G: 98 INJECTION, SOLUTION INTRAVENOUS at 10:17

## 2018-06-10 RX ADMIN — FAMOTIDINE 20 MG: 10 INJECTION, SOLUTION INTRAVENOUS at 10:52

## 2018-06-10 RX ADMIN — WATER: 1000 INJECTION, SOLUTION INTRAVENOUS at 21:45

## 2018-06-10 RX ADMIN — PROPOFOL 50 MCG/KG/MIN: 10 INJECTION, EMULSION INTRAVENOUS at 13:30

## 2018-06-10 RX ADMIN — DEXAMETHASONE SODIUM PHOSPHATE 4 MG: 4 INJECTION, SOLUTION INTRAMUSCULAR; INTRAVENOUS at 08:26

## 2018-06-10 RX ADMIN — HEPARIN SODIUM 5000 UNITS: 5000 INJECTION, SOLUTION INTRAVENOUS; SUBCUTANEOUS at 12:21

## 2018-06-10 RX ADMIN — SODIUM CHLORIDE 10.8 UNITS/HR: 900 INJECTION, SOLUTION INTRAVENOUS at 00:53

## 2018-06-10 RX ADMIN — CALCIUM GLUCONATE 2 G: 98 INJECTION, SOLUTION INTRAVENOUS at 17:29

## 2018-06-10 RX ADMIN — DIPHENHYDRAMINE HYDROCHLORIDE 50 MG: 50 INJECTION, SOLUTION INTRAMUSCULAR; INTRAVENOUS at 23:19

## 2018-06-10 RX ADMIN — HEPARIN SODIUM 5000 UNITS: 5000 INJECTION, SOLUTION INTRAVENOUS; SUBCUTANEOUS at 21:13

## 2018-06-10 RX ADMIN — FENTANYL CITRATE 100 MCG: 50 INJECTION, SOLUTION INTRAMUSCULAR; INTRAVENOUS at 13:28

## 2018-06-10 RX ADMIN — HYDROMORPHONE HYDROCHLORIDE 1 MG: 2 INJECTION INTRAMUSCULAR; INTRAVENOUS; SUBCUTANEOUS at 00:47

## 2018-06-10 RX ADMIN — LORAZEPAM 2 MG: 2 INJECTION INTRAMUSCULAR at 08:04

## 2018-06-10 RX ADMIN — DEXTROSE MONOHYDRATE AND SODIUM CHLORIDE 150 ML/HR: 5; .9 INJECTION, SOLUTION INTRAVENOUS at 04:45

## 2018-06-10 RX ADMIN — PROPOFOL 25 MCG/KG/MIN: 10 INJECTION, EMULSION INTRAVENOUS at 10:09

## 2018-06-10 RX ADMIN — HYDRALAZINE HYDROCHLORIDE 10 MG: 20 INJECTION INTRAMUSCULAR; INTRAVENOUS at 20:05

## 2018-06-10 RX ADMIN — SODIUM CHLORIDE 150 ML/HR: 900 INJECTION, SOLUTION INTRAVENOUS at 00:35

## 2018-06-10 RX ADMIN — MIDAZOLAM HYDROCHLORIDE 1 MG: 1 INJECTION, SOLUTION INTRAMUSCULAR; INTRAVENOUS at 09:36

## 2018-06-10 RX ADMIN — DIPHENHYDRAMINE HYDROCHLORIDE 50 MG: 50 INJECTION, SOLUTION INTRAMUSCULAR; INTRAVENOUS at 18:05

## 2018-06-10 RX ADMIN — SODIUM CHLORIDE 2.4 UNITS/HR: 900 INJECTION, SOLUTION INTRAVENOUS at 22:19

## 2018-06-10 RX ADMIN — Medication 80 MCG: at 09:39

## 2018-06-10 RX ADMIN — METHYLPREDNISOLONE SODIUM SUCCINATE 80 MG: 40 INJECTION, POWDER, FOR SOLUTION INTRAMUSCULAR; INTRAVENOUS at 14:09

## 2018-06-10 NOTE — PROGRESS NOTES
Renal Dosing/Monitoring  Medication: famotidine   Current regimen:  20mg every 12 hr  Recent Labs      06/10/18   1044  06/10/18   0744  06/10/18   0328   CREA  4.09*  4.50*  4.41*   BUN  88*  86*  83*     Estimated CrCl:  16 ml/min  Plan: Change to 20mg every 24 hr per [address] [place] [address] P&T Committee Protocol with respect to renal function. Pharmacy will continue to monitor patient daily and will make dosage adjustments based upon changing renal function.     Pinkie Kawasaki, PharmD, BCPP, [facility], [address] [place] [address]

## 2018-06-10 NOTE — PERIOP NOTES
Report given to CCU staff. Staff waited to receive pt and transferred pt back to ccu.   Vent settings given to CCU staff

## 2018-06-10 NOTE — CONSULTS
[facility]   [address] [facility], [facility], [facility]  Phone: [phone] NOTE     Patient: Estela Khan MRN: [id number]  PCP: Hany Jiang MD   :     1980  Age:   42 y.o. Sex:  female      Referring physician: Yolanda Lemus MD  Reason for consultation: 42 y.o. female with DKA (diabetic ketoacidoses) ([facility]  angio edema complicated by STONE   Admission Date: 2018  9:41 PM  LOS: 1 day      ASSESSMENT and PLAN :   STONE on CKD :  - 2/2 pre renal azotemia from Uncontrolled BG ? DKA, possible sepsis,  - Continue IVF   - no emergent need for HD    CKD Stage IV :  - baseline Cr 2.6-2.8 mg/dl   - previously on dialysis and recovered     Combined AG and Non Gap Met Acidosis :  - No ketones : less likely DKA  - has significant CKD + ARF: likely causes   - changed to Isotonic Bicarb gtt    Hyponatremia :   - pseudohyponatremia + volume depletion     Hyperkalemia :  - avoid Kayexalate   - Insulin gtt will help     Angioedema : ? Cause ? Opioids - check UDS  - steroids     SCD w/ Anemia     Left Foot Osteo : On Dapto -      Chronic pancreatitis : Lipase trending down     Hypothermia : - r/o Line sepsis        Subjective:   HPI: Estela Khan is a 42 y.o.  female who has been admitted to the hospital for ? DKA  She was found to be have angioedema, resp failure from metabolic acidosis and she is intuabted just now. Unable to get any hx .she is still getting IV abx for foot Osteo.  She was found to be in DKA, ARF and met acidosis w/ electrolyte derangement and we were asked to manage her renal issues     Past Medical Hx:   Past Medical History:   Diagnosis Date    ARF (acute renal failure) ([facility] requiring dialysis     Asthma     CKD (chronic kidney disease)     Diabetes ([facility]     Gastroparesis     Gastric Pacer- REMOVED 2015    GERD (gastroesophageal reflux disease)     Hypertension     Narcotic dependence ([address]     THU (obstructive sleep apnea)     wears 2 LPM oxygen at night    Other ill-defined conditions(799.89)     Polycystic ovarian syndrome     Seizures (HCC)     Sickle cell trait ([facility]     Thromboembolus ([facility] to her left arm and was told she had one in left leg recently        Past Surgical Hx:     Past Surgical History:   Procedure Laterality Date    HX CATARACT REMOVAL  3/5/12    right    HX DILATION AND CURETTAGE      ablation    HX GASTRIC BYPASS      HX GI      j tube placement and removal    HX OTHER SURGICAL  2010    Gastric Pacer- REMOVED 2015    HX VASCULAR ACCESS      gray cath rt subclavian    HX VASCULAR ACCESS      HD access right thigh         Allergies   Allergen Reactions    Erythromycin Itching    Morphine Itching    Toradol [Ketorolac] Rash       Social Hx:  reports that she has never smoked. She has never used smokeless tobacco. She reports that she does not drink alcohol or use illicit drugs.      Family History   Problem Relation Age of Onset    Cancer Mother      lung    Hypertension Mother     Cancer Father      kidney    Stroke Father      3 strokes: 59-72    Heart Disease Father 72     CABG    Hypertension Father     Cancer Sister      pancreatic    Cancer Maternal Aunt      breast    Cancer Paternal Aunt      breast    Schizophrenia Sister      was in [facility], now ass't living    Other Sister       AIDS    Other Other      nephew of AIDS       Review of Systems:  Unable to obtain ROS due to intubated status      Objective:    Vitals:    Vitals:    06/10/18 0600 06/10/18 0800 06/10/18 1012 06/10/18 1016   BP: 108/56  143/72    Pulse: 81  99 98   Resp: 12  22 22   Temp:  (!) 93.9 °F (34.4 °C) (!) 93.6 °F (34.2 °C)    SpO2:   100% 100%   Weight:       Height:         I&O's:   0701 - 06/10 0700  In: 538.2 [I.V.:538.2]  Out: -   Visit Vitals    /72    Pulse 98    Temp (!) 93.6 °F (34.2 °C)    Resp 22    Ht 5' 2\" (1.575 m)    Wt 67.2 kg (148 lb 2.4 oz)    SpO2 100%    BMI 27.1 kg/m2       Physical Exam:  General:intubated   HEENT: swelling of eyes, lips and tongue   Neck:lines +, Left SC  Lungs : DIMINISHED b/l   CVS: TACHY   Abdomen: SCARS, non tender   Extremities: No edema   Skin: No rash or lesions.   Lymph nodes: No palpable nodes  MS: No joint swelling, erythema, warmth  Neurologic: on vent   Psych:unable to assess    Laboratory Results:    Recent Labs      06/10/18   0744  06/10/18   0328  06/09/18   2203   NA  136  134*  125*   K  4.8  4.1  5.4*   CL  108  108  96*   CO2  15*  13*  13*   GLU  103*  238*  719*   BUN  86*  83*  86*   CREA  4.50*  4.41*  4.89*   CA  6.1*  6.1*  5.7*   MG  1.6  1.3*   --    PHOS  7.1*  6.9*   --    ALB   --    --   2.9*   SGOT   --    --   17   ALT   --    --   21     Recent Labs      06/10/18   0744  06/10/18   0328  06/09/18   2203   WBC  5.8  5.7  6.0   HGB  8.3*  8.0*  8.1*   HCT  26.1*  25.1*  25.8*   PLT  134*  122*  131*     Lab Results   Component Value Date/Time    Specimen Description: URINE 06/24/2013 08:00 PM    Specimen Description: URINE 06/17/2013 07:55 PM    Specimen Description: URINE 05/26/2013 10:10 AM     Lab Results   Component Value Date/Time    Culture result: NO GROWTH 5 DAYS 04/27/2018 05:11 PM    Culture result: FEW STAPHYLOCOCCUS HOMINIS SUBSPECIES HOMINIS (A) 03/11/2018 12:47 PM    Culture result: (A) 03/11/2018 12:47 PM     STAPHYLOCOCCUS EPIDERMIDIS (OXACILLIN RESISTANT) ISOLATED FROM THIO BROTH ONLY    Culture result: NO ANAEROBES ISOLATED 03/11/2018 12:47 PM     Recent Results (from the past 24 hour(s))   URINALYSIS W/MICROSCOPIC    Collection Time: 06/09/18 10:00 PM   Result Value Ref Range    Color YELLOW/STRAW      Appearance CLOUDY (A) CLEAR      Specific gravity 1.017 1.003 - 1.030      pH (UA) 5.0 5.0 - 8.0      Protein 100 (A) NEG mg/dL    Glucose >1000 (A) NEG mg/dL    Ketone NEGATIVE  NEG mg/dL    Bilirubin NEGATIVE  NEG      Blood NEGATIVE NEG      Urobilinogen 0.2 0.2 - 1.0 EU/dL    Nitrites NEGATIVE  NEG      Leukocyte Esterase NEGATIVE  NEG      WBC 0-4 0 - 4 /hpf    RBC 0-5 0 - 5 /hpf    Epithelial cells FEW FEW /lpf    Bacteria NEGATIVE  NEG /hpf    Amorphous Crystals FEW (A) NEG     URINE CULTURE HOLD SAMPLE    Collection Time: 06/09/18 10:00 PM   Result Value Ref Range    Urine culture hold        URINE ON HOLD IN MICROBIOLOGY DEPT FOR 3 DAYS. IF UNPRESERVED URINE IS SUBMITTED, IT CANNOT BE USED FOR ADDITIONAL TESTING AFTER 24 HRS, RECOLLECTION WILL BE REQUIRED. DRUG SCREEN, URINE    Collection Time: 06/09/18 10:00 PM   Result Value Ref Range    AMPHETAMINES NEGATIVE  NEG      BARBITURATES NEGATIVE  NEG      BENZODIAZEPINES NEGATIVE  NEG      COCAINE NEGATIVE  NEG      METHADONE NEGATIVE  NEG      OPIATES NEGATIVE  NEG      PCP(PHENCYCLIDINE) NEGATIVE  NEG      THC (TH-CANNABINOL) NEGATIVE  NEG      Drug screen comment (NOTE)    METABOLIC PANEL, COMPREHENSIVE    Collection Time: 06/09/18 10:03 PM   Result Value Ref Range    Sodium 125 (L) 136 - 145 mmol/L    Potassium 5.4 (H) 3.5 - 5.1 mmol/L    Chloride 96 (L) 97 - 108 mmol/L    CO2 13 (LL) 21 - 32 mmol/L    Anion gap 16 (H) 5 - 15 mmol/L    Glucose 719 (HH) 65 - 100 mg/dL    BUN 86 (H) 6 - 20 MG/DL    Creatinine 4.89 (H) 0.55 - 1.02 MG/DL    BUN/Creatinine ratio 18 12 - 20      GFR est AA 12 (L) >60 ml/min/1.73m2    GFR est non-AA 10 (L) >60 ml/min/1.73m2    Calcium 5.7 (LL) 8.5 - 10.1 MG/DL    Bilirubin, total 0.2 0.2 - 1.0 MG/DL    ALT (SGPT) 21 12 - 78 U/L    AST (SGOT) 17 15 - 37 U/L    Alk.  phosphatase 275 (H) 45 - 117 U/L    Protein, total 7.2 6.4 - 8.2 g/dL    Albumin 2.9 (L) 3.5 - 5.0 g/dL    Globulin 4.3 (H) 2.0 - 4.0 g/dL    A-G Ratio 0.7 (L) 1.1 - 2.2     CBC WITH AUTOMATED DIFF    Collection Time: 06/09/18 10:03 PM   Result Value Ref Range    WBC 6.0 3.6 - 11.0 K/uL    RBC 3.13 (L) 3.80 - 5.20 M/uL    HGB 8.1 (L) 11.5 - 16.0 g/dL    HCT 25.8 (L) 35.0 - 47.0 %    MCV 82.4 80.0 - 99.0 FL    MCH 25.9 (L) 26.0 - 34.0 PG    MCHC 31.4 30.0 - 36.5 g/dL    RDW 15.5 (H) 11.5 - 14.5 %    PLATELET 054 (L) 152 - 400 K/uL    NRBC 0.0 0  WBC    ABSOLUTE NRBC 0.00 0.00 - 0.01 K/uL    NEUTROPHILS 80 (H) 32 - 75 %    LYMPHOCYTES 12 12 - 49 %    MONOCYTES 6 5 - 13 %    EOSINOPHILS 2 0 - 7 %    BASOPHILS 0 0 - 1 %    IMMATURE GRANULOCYTES 0 0.0 - 0.5 %    ABS. NEUTROPHILS 4.8 1.8 - 8.0 K/UL    ABS. LYMPHOCYTES 0.7 (L) 0.8 - 3.5 K/UL    ABS. MONOCYTES 0.4 0.0 - 1.0 K/UL    ABS. EOSINOPHILS 0.1 0.0 - 0.4 K/UL    ABS. BASOPHILS 0.0 0.0 - 0.1 K/UL    ABS. IMM.  GRANS. 0.0 0.00 - 0.04 K/UL    DF SMEAR SCANNED      RBC COMMENTS ANISOCYTOSIS  1+        RBC COMMENTS SCHISTOCYTES  PRESENT       RETICULOCYTE COUNT    Collection Time: 06/09/18 10:03 PM   Result Value Ref Range    Reticulocyte count 1.2 0.7 - 2.1 %    Absolute Retic Cnt. 0.0366 0.0164 - 0.0776 M/ul   ETHYL ALCOHOL    Collection Time: 06/09/18 10:03 PM   Result Value Ref Range    ALCOHOL(ETHYL),SERUM <10 <10 MG/DL   LIPASE    Collection Time: 06/09/18 10:03 PM   Result Value Ref Range    Lipase 1459 (H) 73 - 393 U/L   AMYLASE    Collection Time: 06/09/18 10:03 PM   Result Value Ref Range    Amylase 122 (H) 25 - 115 U/L   LACTIC ACID    Collection Time: 06/09/18 10:08 PM   Result Value Ref Range    Lactic acid 0.5 0.4 - 2.0 MMOL/L   HCG URINE, QL. - POC    Collection Time: 06/09/18 10:40 PM   Result Value Ref Range    Pregnancy test,urine (POC) NEGATIVE  NEG     POC EG7    Collection Time: 06/09/18 11:22 PM   Result Value Ref Range    Calcium, ionized (POC) 0.84 (L) 1.12 - 1.32 mmol/L    pH (POC) 7.238 (LL) 7.35 - 7.45      pCO2 (POC) 40.6 35.0 - 45.0 MMHG    pO2 (POC) 126 (H) 80 - 100 MMHG    HCO3 (POC) 17.3 (L) 22 - 26 MMOL/L    Base deficit (POC) 10 mmol/L    sO2 (POC) 98 (H) 92 - 97 %    Site LEFT RADIAL      Device: NASAL CANNULA      Flow rate (POC) 1 L/M    Allens test (POC) N/A      Specimen type (POC) ARTERIAL     EKG, 12 LEAD, INITIAL Collection Time: 06/10/18 12:07 AM   Result Value Ref Range    Ventricular Rate 82 BPM    Atrial Rate 82 BPM    P-R Interval 156 ms    QRS Duration 94 ms    Q-T Interval 422 ms    QTC Calculation (Bezet) 493 ms    Calculated P Axis 55 degrees    Calculated R Axis 24 degrees    Calculated T Axis 52 degrees    Diagnosis       Normal sinus rhythm  Prolonged QT  When compared with ECG of 02-MAY-2018 01:18,  Nonspecific T wave abnormality no longer evident in Lateral leads     GLUCOSTABILIZER    Collection Time: 06/10/18 12:53 AM   Result Value Ref Range    Glucose 601 mg/dL    Insulin order 10.8 units/hour    Insulin adminstered 10.8 units/hour    Multiplier 0.020     Low target 150 mg/dL    High target 250 mg/dL    D50 order 0.0 ml    D50 administered 0.00 ml    Minutes until next BG 60 min    Order initials adele     Administered initials salas MARTINEZ Comments     GLUCOSE, POC    Collection Time: 06/10/18  2:08 AM   Result Value Ref Range    Glucose (POC) 437 (H) 65 - 100 mg/dL    Performed by Magnolia Brumfield    Collection Time: 06/10/18  2:10 AM   Result Value Ref Range    Glucose 437 mg/dL    Insulin order 7.5 units/hour    Insulin adminstered 7.5 units/hour    Multiplier 0.020     Low target 150 mg/dL    High target 250 mg/dL    D50 order 0.0 ml    D50 administered 0.00 ml    Minutes until next BG 60 min    Order initials RENEE     Administered initials RENEE MARTINEZ Comments     GLUCOSE, POC    Collection Time: 06/10/18  3:24 AM   Result Value Ref Range    Glucose (POC) 273 (H) 65 - 100 mg/dL    Performed by Lubna Tanner    Collection Time: 06/10/18  3:25 AM   Result Value Ref Range    Glucose 273 mg/dL    Insulin order 4.3 units/hour    Insulin adminstered 4.3 units/hour    Multiplier 0.020     Low target 150 mg/dL    High target 250 mg/dL    D50 order 0.0 ml    D50 administered 0.00 ml    Minutes until next BG 60 min    Order initials TR     Administered initials RENEE MARTINEZ Comments     METABOLIC PANEL, BASIC    Collection Time: 06/10/18  3:28 AM   Result Value Ref Range    Sodium 134 (L) 136 - 145 mmol/L    Potassium 4.1 3.5 - 5.1 mmol/L    Chloride 108 97 - 108 mmol/L    CO2 13 (LL) 21 - 32 mmol/L    Anion gap 13 5 - 15 mmol/L    Glucose 238 (H) 65 - 100 mg/dL    BUN 83 (H) 6 - 20 MG/DL    Creatinine 4.41 (H) 0.55 - 1.02 MG/DL    BUN/Creatinine ratio 19 12 - 20      GFR est AA 14 (L) >60 ml/min/1.73m2    GFR est non-AA 11 (L) >60 ml/min/1.73m2    Calcium 6.1 (LL) 8.5 - 10.1 MG/DL   MAGNESIUM    Collection Time: 06/10/18  3:28 AM   Result Value Ref Range    Magnesium 1.3 (L) 1.6 - 2.4 mg/dL   PHOSPHORUS    Collection Time: 06/10/18  3:28 AM   Result Value Ref Range    Phosphorus 6.9 (H) 2.6 - 4.7 MG/DL   CBC WITH AUTOMATED DIFF    Collection Time: 06/10/18  3:28 AM   Result Value Ref Range    WBC 5.7 3.6 - 11.0 K/uL    RBC 3.11 (L) 3.80 - 5.20 M/uL    HGB 8.0 (L) 11.5 - 16.0 g/dL    HCT 25.1 (L) 35.0 - 47.0 %    MCV 80.7 80.0 - 99.0 FL    MCH 25.7 (L) 26.0 - 34.0 PG    MCHC 31.9 30.0 - 36.5 g/dL    RDW 15.1 (H) 11.5 - 14.5 %    PLATELET 651 (L) 928 - 400 K/uL    MPV 12.2 8.9 - 12.9 FL    NRBC 0.0 0  WBC    ABSOLUTE NRBC 0.00 0.00 - 0.01 K/uL    NEUTROPHILS 83 (H) 32 - 75 %    LYMPHOCYTES 10 (L) 12 - 49 %    MONOCYTES 5 5 - 13 %    EOSINOPHILS 1 0 - 7 %    BASOPHILS 0 0 - 1 %    IMMATURE GRANULOCYTES 1 (H) 0.0 - 0.5 %    ABS. NEUTROPHILS 4.7 1.8 - 8.0 K/UL    ABS. LYMPHOCYTES 0.6 (L) 0.8 - 3.5 K/UL    ABS. MONOCYTES 0.3 0.0 - 1.0 K/UL    ABS. EOSINOPHILS 0.1 0.0 - 0.4 K/UL    ABS. BASOPHILS 0.0 0.0 - 0.1 K/UL    ABS. IMM.  GRANS. 0.0 0.00 - 0.04 K/UL    DF AUTOMATED     HEMOGLOBIN A1C WITH EAG    Collection Time: 06/10/18  3:28 AM   Result Value Ref Range    Hemoglobin A1c 7.9 (H) 4.2 - 6.3 %    Est. average glucose 180 mg/dL   GLUCOSE, POC    Collection Time: 06/10/18  4:28 AM   Result Value Ref Range    Glucose (POC) 175 (H) 65 - 100 mg/dL    Performed by Júnior Genao GLUCOSTABILIZER    Collection Time: 06/10/18  4:28 AM   Result Value Ref Range    Glucose 175 mg/dL    Insulin order 2.3 units/hour    Insulin adminstered 2.3 units/hour    Multiplier 0.020     Low target 150 mg/dL    High target 250 mg/dL    D50 order 0.0 ml    D50 administered 0.00 ml    Minutes until next BG 60 min    Order initials TR     Administered initials TR     GLSCOM Comments     POC G3 - PUL    Collection Time: 06/10/18  5:22 AM   Result Value Ref Range    pH (POC) 7.146 (LL) 7.35 - 7.45      pCO2 (POC) 32.9 (L) 35.0 - 45.0 MMHG    pO2 (POC) 110 (H) 80 - 100 MMHG    HCO3 (POC) 11.3 (L) 22 - 26 MMOL/L    sO2 (POC) 97 92 - 97 %    Base deficit (POC) 18 mmol/L    Site LEFT BRACHIAL      Device: ROOM AIR      Allens test (POC) YES      Specimen type (POC) ARTERIAL     GLUCOSE, POC    Collection Time: 06/10/18  5:31 AM   Result Value Ref Range    Glucose (POC) 107 (H) 65 - 100 mg/dL    Performed by Samantha Herring    Collection Time: 06/10/18  5:31 AM   Result Value Ref Range    Glucose 107 mg/dL    Insulin order 0.5 units/hour    Insulin adminstered 0.5 units/hour    Multiplier 0.010     Low target 150 mg/dL    High target 250 mg/dL    D50 order 0.0 ml    D50 administered 0.00 ml    Minutes until next BG 60 min    Order initials sga     Administered initials sga     GLSCOM Comments     GLUCOSE, POC    Collection Time: 06/10/18  6:34 AM   Result Value Ref Range    Glucose (POC) 82 65 - 100 mg/dL    Performed by Samantha Herring    Collection Time: 06/10/18  6:34 AM   Result Value Ref Range    Glucose 82 mg/dL    Insulin order 0.0 units/hour    Insulin adminstered 0.0 units/hour    Multiplier 0.000     Low target 150 mg/dL    High target 250 mg/dL    D50 order 0.0 ml    D50 administered 0.00 ml    Minutes until next BG 60 min    Order initials tr     Administered initials tr     GLSCOM Comments     CBC WITH AUTOMATED DIFF    Collection Time: 06/10/18  7:44 AM   Result Value Ref Range    WBC 5.8 3.6 - 11.0 K/uL    RBC 3.23 (L) 3.80 - 5.20 M/uL    HGB 8.3 (L) 11.5 - 16.0 g/dL    HCT 26.1 (L) 35.0 - 47.0 %    MCV 80.8 80.0 - 99.0 FL    MCH 25.7 (L) 26.0 - 34.0 PG    MCHC 31.8 30.0 - 36.5 g/dL    RDW 15.1 (H) 11.5 - 14.5 %    PLATELET 188 (L) 392 - 400 K/uL    NRBC 0.0 0  WBC    ABSOLUTE NRBC 0.00 0.00 - 0.01 K/uL    NEUTROPHILS 81 (H) 32 - 75 %    LYMPHOCYTES 12 12 - 49 %    MONOCYTES 6 5 - 13 %    EOSINOPHILS 1 0 - 7 %    BASOPHILS 0 0 - 1 %    IMMATURE GRANULOCYTES 0 0.0 - 0.5 %    ABS. NEUTROPHILS 4.7 1.8 - 8.0 K/UL    ABS. LYMPHOCYTES 0.7 (L) 0.8 - 3.5 K/UL    ABS. MONOCYTES 0.4 0.0 - 1.0 K/UL    ABS. EOSINOPHILS 0.0 0.0 - 0.4 K/UL    ABS. BASOPHILS 0.0 0.0 - 0.1 K/UL    ABS. IMM.  GRANS. 0.0 0.00 - 0.04 K/UL    DF AUTOMATED     LIPID PANEL    Collection Time: 06/10/18  7:44 AM   Result Value Ref Range    LIPID PROFILE          Cholesterol, total 128 <200 MG/DL    Triglyceride 48 <150 MG/DL    HDL Cholesterol 51 MG/DL    LDL, calculated 67.4 0 - 100 MG/DL    VLDL, calculated 9.6 MG/DL    CHOL/HDL Ratio 2.5 0 - 5.0     PHOSPHORUS    Collection Time: 06/10/18  7:44 AM   Result Value Ref Range    Phosphorus 7.1 (H) 2.6 - 4.7 MG/DL   CK W/ CKMB & INDEX    Collection Time: 06/10/18  7:44 AM   Result Value Ref Range     26 - 192 U/L    CK - MB 3.5 <3.6 NG/ML    CK-MB Index 3.5 (H) 0 - 2.5     TSH 3RD GENERATION    Collection Time: 06/10/18  7:44 AM   Result Value Ref Range    TSH 2.98 0.36 - 3.74 uIU/mL   AMYLASE    Collection Time: 06/10/18  7:44 AM   Result Value Ref Range    Amylase 88 25 - 115 U/L   LIPASE    Collection Time: 06/10/18  7:44 AM   Result Value Ref Range    Lipase 548 (H) 73 - 393 U/L   TROPONIN I    Collection Time: 06/10/18  7:44 AM   Result Value Ref Range    Troponin-I, Qt. <0.04 <7.98 ng/mL   METABOLIC PANEL, BASIC    Collection Time: 06/10/18  7:44 AM   Result Value Ref Range    Sodium 136 136 - 145 mmol/L    Potassium 4.8 3.5 - 5.1 mmol/L    Chloride 108 97 - 108 mmol/L    CO2 15 (LL) 21 - 32 mmol/L    Anion gap 13 5 - 15 mmol/L    Glucose 103 (H) 65 - 100 mg/dL    BUN 86 (H) 6 - 20 MG/DL    Creatinine 4.50 (H) 0.55 - 1.02 MG/DL    BUN/Creatinine ratio 19 12 - 20      GFR est AA 13 (L) >60 ml/min/1.73m2    GFR est non-AA 11 (L) >60 ml/min/1.73m2    Calcium 6.1 (LL) 8.5 - 10.1 MG/DL   MAGNESIUM    Collection Time: 06/10/18  7:44 AM   Result Value Ref Range    Magnesium 1.6 1.6 - 2.4 mg/dL   LD    Collection Time: 06/10/18  7:44 AM   Result Value Ref Range     81 - 246 U/L   GLUCOSE, POC    Collection Time: 06/10/18  7:49 AM   Result Value Ref Range    Glucose (POC) >600 (HH) 65 - 100 mg/dL    Performed by Nixon Paniagau    GLUCOSE, POC    Collection Time: 06/10/18  7:50 AM   Result Value Ref Range    Glucose (POC) 111 (H) 65 - 100 mg/dL    Performed by Tanya Elena    Collection Time: 06/10/18  7:51 AM   Result Value Ref Range    Glucose 111 mg/dL    Insulin order 0.0 units/hour    Insulin adminstered 0.0 units/hour    Multiplier 0.000     Low target 150 mg/dL    High target 250 mg/dL    D50 order 0.0 ml    D50 administered 0.00 ml    Minutes until next BG 60 min    Order initials JK     Administered initials JOVI MARTINEZ Comments     GLUCOSE, POC    Collection Time: 06/10/18  8:52 AM   Result Value Ref Range    Glucose (POC) 134 (H) 65 - 100 mg/dL    Performed by Alie Paz    Collection Time: 06/10/18  8:55 AM   Result Value Ref Range    Glucose 134 mg/dL    Insulin order 0.0 units/hour    Insulin adminstered 0.0 units/hour    Multiplier 0.000     Low target 150 mg/dL    High target 250 mg/dL    D50 order 0.0 ml    D50 administered 0.00 ml    Minutes until next BG 60 min    Order initials JOVI     Administered initials JOVI     GLGALE Comments     GLUCOSE, POC    Collection Time: 06/10/18 10:15 AM   Result Value Ref Range    Glucose (POC) 161 (H) 65 - 100 mg/dL    Performed by Tanya Jaimes Time: 06/10/18 10:16 AM   Result Value Ref Range    Glucose 161 mg/dL    Insulin order 0.1 units/hour    Insulin adminstered 0.1 units/hour    Multiplier 0.000     Low target 150 mg/dL    High target 250 mg/dL    D50 order 0.0 ml    D50 administered 0.00 ml    Minutes until next BG 60 min    Order initials as     Administered initials as     GLSCOM Comments             Urine dipstick:   Lab Results   Component Value Date/Time    Color YELLOW/STRAW 06/09/2018 10:00 PM    Appearance CLOUDY (A) 06/09/2018 10:00 PM    Specific gravity 1.017 06/09/2018 10:00 PM    Specific gravity 1.015 07/26/2010 11:18 AM    pH (UA) 5.0 06/09/2018 10:00 PM    Protein 100 (A) 06/09/2018 10:00 PM    Glucose >1000 (A) 06/09/2018 10:00 PM    Ketone NEGATIVE  06/09/2018 10:00 PM    Bilirubin NEGATIVE  06/09/2018 10:00 PM    Urobilinogen 0.2 06/09/2018 10:00 PM    Nitrites NEGATIVE  06/09/2018 10:00 PM    Leukocyte Esterase NEGATIVE  06/09/2018 10:00 PM    Epithelial cells FEW 06/09/2018 10:00 PM    Bacteria NEGATIVE  06/09/2018 10:00 PM    WBC 0-4 06/09/2018 10:00 PM    RBC 0-5 06/09/2018 10:00 PM        Medications list Personally Reviewed   [x]      Yes     []               No       Medications:  Prior to Admission medications    Medication Sig Start Date End Date Taking? Authorizing Provider   hydrALAZINE (APRESOLINE) 100 mg tablet Take 1 Tab by mouth three (3) times daily for 30 days. 5/14/18 6/13/18  Germán Seay MD   oxyCODONE-acetaminophen (PERCOCET) 5-325 mg per tablet Take 1 Tab by mouth every six (6) hours as needed for Pain. Max Daily Amount: 4 Tabs. Scheduled 5/14/18   Germán Seay MD   promethazine (PHENERGAN) 25 mg tablet Take 1 Tab by mouth every eight (8) hours as needed for Nausea. 5/14/18   Germán Seay MD   L. acidoph & paracasei- S therm- Bifido (CHRIS-Q/RISAQUAD) 8 billion cell cap cap Take 1 Cap by mouth daily for 30 days.  5/15/18 6/14/18  Germán Seay MD   NIFEdipine ER (PROCARDIA XL) 90 mg ER tablet Take 1 Tab by mouth daily for 30 days. 5/15/18 6/14/18  Bartolome Ramirez MD   pantoprazole (PROTONIX) 40 mg tablet Take 1 Tab by mouth Before breakfast and dinner for 30 days. 5/14/18 6/13/18  Bartolome Ramirez MD   senna-docusate (PERICOLACE) 8.6-50 mg per tablet Take 2 Tabs by mouth daily. 5/15/18   Bartolome Ramirez MD   cloNIDine HCl (CATAPRES) 0.2 mg tablet Take 1 Tab by mouth three (3) times daily for 30 days. 5/14/18 6/13/18  Bartolome Ramirez MD   labetalol (NORMODYNE) 100 mg tablet Take 2 Tabs by mouth two (2) times a day for 30 days. 5/14/18 6/13/18  Bartolome Ramirez MD   QUEtiapine (SEROQUEL) 100 mg tablet Take 100 mg by mouth two (2) times a day. Historical Provider   venlafaxine (EFFEXOR) 75 mg tablet Take 1 Tab by mouth two (2) times daily (with meals). 3/21/18   Floridalma Yuan MD   Cholecalciferol, Vitamin D3, 50,000 unit cap Take 1 Cap by mouth every seven (7) days. Historical Provider   calcium carbonate (OS-BINA) 500 mg calcium (1,250 mg) tablet Take 1 Tab by mouth daily. Historical Provider   cyanocobalamin 1,000 mcg tablet Take 1,000 mcg by mouth daily. Historical Provider   albuterol (PROVENTIL VENTOLIN) 2.5 mg /3 mL (0.083 %) nebulizer solution 2.5 mg by Nebulization route every four (4) hours as needed. Historical Provider        Thank you for allowing us to participate in the care of this patient. We will follow patient. Please dont hesitate to call with any questions    John Yee MD  [facility] [address]  Phone - [phone]   Fax - [phone]  [email or web address]. [email or web address]

## 2018-06-10 NOTE — ED NOTES
Patient asleep in bed, responds to repeated stimulation. Assisted patient to use bedpan, patietn unable to keep eyes open during conversation. Upon assistance with bedpan, patient began crying loudly. Patient unable to void, straight cathed. Patient yelling loudly during procedure, patient asleep when procedure completed.

## 2018-06-10 NOTE — H&P
[facility]  HISTORY AND PHYSICAL      Jannette Medina  MR#: [id number]  : 1980  ACCOUNT #: [de-identified]   ADMIT DATE: 2018    PRIMARY CARE PHYSICIAN:  Xiomy Diaz MD    SOURCE OF INFORMATION:  Patient, ED medical records and old medical records. CHIEF COMPLAINT:  Back pain. HISTORY OF PRESENT ILLNESS:  This is a 78-year-old woman with a past medical history significant for chronic pancreatitis, type 1 diabetes, asthma, chronic kidney disease, anemia, hypertension, depression, obstructive sleep apnea, sickle cell anemia, ongoing osteomyelitis of the left foot, who was in her usual state of health until the day of presentation at the emergency room when the patient developed back pain. The pain is located at the lower back bilaterally with no radiation. The pain started 2 hours before coming to the emergency room. The pain is constant, 7/10 in severity, sharp pain. The patient has history of sickle cell anemia and stated that this episode of low back pain is similar to her sickle cell pain crisis. The patient has history of narcotic abuse. There was an initial concern that the patient may be abusing pain medications when the patient arrived at the emergency room because she was constantly falling asleep and required repeated stimulation to carry on a conversation with the patient, but urine drug screen was negative. Her lab work done in the emergency room showed that the patient was in diabetic ketoacidosis. She also has elevated amylase and lipase level, as well as worsening of her chronic kidney disease. Patient was referred to the hospitalist service for evaluation for admission. No history of fever, no rigors, no chills. Patient was last admitted to this hospital from 2018 through 2018. I was the hospitalist that admitted the patient at that time.   The patient was admitted and treated for left foot osteomyelitis, as well as acute on chronic pancreatitis. Patient has a partial amputation of the left foot. Was seen by the infectious disease consultant during that hospitalization. A PICC line was placed. Patient was discharged home on daptomycin 400 mg every 48 hours for a 5 week course of the antibiotics. Patient is scheduled for left below knee amputation on 06/14/2018 at this hospital.    PAST MEDICAL HISTORY:  Type 1 diabetes; chronic pancreatitis; asthma; chronic kidney disease; anemia; hypertension; depression; obstructive sleep apnea; sickle cell disease; osteomyelitis, left foot, present on admission. ALLERGIES:  PATIENT IS ALLERGIC TO ERYTHROMYCIN, MORPHINE AND TORADOL. MEDICATIONS:  Daptomycin 400 mg intravenously every 48 hours, albuterol nebulizer every 4 hours as needed, calcium carbonate 1 tablet daily, clonidine 0.2 mg 3 times daily, hydralazine 100 mg 3 times daily. Labetalol 200 mg twice daily, Procardia-XL 90 mg daily,  Percocet 5/325 one tablet every 6 hours as needed for pain, Protonix 40 mg daily, Phenergan 25 mg every 8 hours as needed for nausea, Seroquel 100 mg twice daily, Effexor 75 mg twice daily. FAMILY HISTORY:  This was reviewed. Mother had lung cancer, hypertension. Her father had kidney cancer and stroke, heart disease and hypertension. PAST SURGICAL HISTORY:  Significant for cataract extraction, gastric bypass, cholecystectomy, PICC line placement for intravenous antibiotics, partial amputation of left foot. SOCIAL HISTORY:  No history of alcohol or tobacco abuse. REVIEW OF SYSTEMS:    HEAD, EYES, EARS, NOSE AND THROAT:  No headache, no dizziness, no blurring of vision, no photophobia. RESPIRATORY:  No cough, no shortness of breath, no hemoptysis. CARDIOVASCULAR:  No chest pain, no orthopnea, no palpitations. GASTROINTESTINAL:  This is positive for some nausea. No vomiting, no diarrhea, no constipation. GENITOURINARY:  No dysuria, no urgency and no frequency.   All other systems are reviewed and are negative. PHYSICAL EXAMINATION:  GENERAL:  Patient appeared ill, in critical condition. VITAL SIGNS:  On arrival at the emergency room, temperature is 97.6, pulse 88, respiratory rate 20, blood pressure , oxygen saturation 100% on nasal cannula. HEAD:  Normocephalic, atraumatic. EYES:  Unable to assess eye movement, but no redness, no drainage, no discharge. EARS:  Normal external ears with no obvious drainage. NOSE:  No deformity and no drainage. MOUTH AND THROAT:  No visible oral lesions. NECK:  Supple, no JVD, no thyromegaly. CHEST:  Clear breath sounds. No wheezing, no crackles. HEART:  Normal S1 and S2, regular. No clinically appreciable murmur. ABDOMEN:  Soft, nontender. Normal bowel sounds. CENTRAL NERVOUS SYSTEM:  Lethargic, but easily arousable. No gross focal neurological deficits. EXTREMITIES:  No edema. Pulses 2+ right leg. MUSCULOSKELETAL:  Partial amputation of left foot. SKIN:  No active skin lesions seen on the exposed part of the body. PSYCHIATRIC:  Unable to assess mood and affect. LYMPHATIC SYSTEM:  No cervical lymphadenopathy. DIAGNOSTIC DATA:  EKG shows normal sinus rhythm, prolonged QT interval.  CT scan of the abdomen and pelvis shows no acute pathology, but moderate fecal stasis and chronic pancreatitis. LABORATORY DATA:  Hematology:  WBC 6.0, hemoglobin 8.1, hematocrit 25.8, platelet 378. Chemistry:  Sodium 125, potassium 5.4, chloride 96, CO2 of 13, glucose 719, BUN 86, creatinine 4.89, calcium 5.7, bilirubin total 0.2, ALT 21, AST 17, alkaline phosphatase 275, total protein 7.2, albumin level 2.9, globulin 4.3. Urine pregnancy test negative. Serum alcohol level less than 10. Urinalysis is significant for negative nitrite, negative leuk esterase, negative bacteria. Amylase 122, lipase 1459. Urine drug screen negative. ASSESSMENT:  1. Type 1 diabetes with diabetic ketoacidosis. 2.  Acute on chronic pancreatitis.   3. Asthma. 4.  Acute delirium. 5.  Hyponatremia. 6.  Hypocalcemia. 7.  Acute on chronic kidney disease stage V.  8.  Anemia. 9.  Thrombocytopenia. 10.  Hypertension  11. Depression. 12.  Low back pain. 13.  Obstructive sleep apnea. 14.  Sickle cell pain crisis. 15.  Osteomyelitis, left foot. PLAN:  1. Type 1 diabetes with diabetic ketoacidosis. We will admit the patient for further evaluation and treatment. We will place the patient on treatment as per DKA protocol. The DKA is most likely a result of noncompliance with treatment. Patient will be admitted into the intensive care unit for close monitoring. 2.  Acute on chronic pancreatitis. We will carry out conservative therapy, which will include IV fluid therapy as well as pain control. We will monitor the patient's amylase and lipase levels. Patient may require gastroenterology consult if there is no improvement. 3.  Asthma. Will continue with preadmission medication. 4.  Acute delirium. This is most likely due to metabolic event such as electrolyte abnormalities and diabetic ketoacidosis. Will identify and treat underlying etiological factors. Will also obtain a CT scan of the head to rule out acute pathology. 5.  Hyponatremia. This is due to the diabetic ketoacidosis. Will carry out hydration with normal saline. Will repeat sodium level. 6.  Hypocalcemia. This is most likely as a result of the acute on chronic pancreatitis. Calcium was replaced in the emergency room. We will repeat calcium level. 7.  Acute on chronic kidney disease stage V. Will carry out hydration with normal saline. Will monitor the patient's renal function. Patient will require nephrology consult. Patient was seen by nephrologist during the most recent hospitalization. 8.  Anemia. This is most likely due to chronic kidney disease. Could also be due to the patient sickle cell anemia. Will monitor the patient's hemoglobin and hematocrit closely. Will check stool guaiac to rule out occult gastrointestinal bleed. 9.  Thrombocytopenia. Patient is asymptomatic. Will monitor the patient's platelet count. 10.  Hypertension. Will resume preadmission medications as tolerated by the patient's blood pressure. 11.  Depression. Will resume home medications. Would check a TSH level. 12.  Low back pain. This most likely as a result of the patient sickle cell pain crisis. Will carry out pain control. CT scan of the abdomen and pelvis did not show any acute pathology. 13.  Obstructive sleep apnea. Will place the patient on CPAP with home settings. 14.  Sickle cell pain crisis. Will carry out pain control. Will carry out hydration with normal saline. 15.  Osteomyelitis, left foot. Continue with daptomycin 400 mg IV q. 48  hours. Patient will require infectious disease consult. Patient is awaiting a left below knee amputation. 16.  Other issues. Code status:  SHE IS A FULL CODE. Will place the patient on heparin for DVT prophylaxis.       MD NICOLE Hopson/RONEL  D: 06/10/2018 03:54     T: 06/10/2018 06:33  JOB #: [id number]  CC: Mak Taylor MD

## 2018-06-10 NOTE — CONSULTS
Neuro consult completed. Dictated note to follow. Pt with sickle cell disease and poorly controlled DM type I admitted in DKA with Glu 719, acute on chronic renal failure with BUN/Cr 86/4.89, hyponatremia Na 125 on admission now 136, hypocalcemia with iCa 0.84, who became agitated this AM and was found to have angioedema and is now intubated and sedated on propofol and fentanyl. UDS was negative. Exam limited, does spontaneously open eyes and moves extremities spontaneously. Suspect metabolic encephalopathy from multiple medical issues. CT head pending.   Will reassess in AM.

## 2018-06-10 NOTE — ED PROVIDER NOTES
HPI Comments: Natalia Tate is a 42 y.o. female with Hx of sickle cell disease, poorly control type I diabetes, opiate dependence, polysubstance abuse, gastroparesis, asthma, HTN, THU, CKD  who presents via EMS to [facility] ED with cc of lower back pain. Pt reports onset 2h ago while not doing anything exertional. It is difficult to obtain a history for the patient because she is frequently falling asleep and requiring repeated verbal stimuli to have conversation. She states the pain feels typical for her sickle cell crisis. She did not take any medication PTA for her s/sx. She has an active Rx for Percocet that was filled last week per . She reports she has not taken Percocet in 2 weeks. No urinary/ fecal incontinence, fevers, chills, body aches, nausea, vomiting, diarrhea, or any other urinary symptoms. She has recent hospitalization 2/2 osteomyelitis of the L foot which required IV Abx at home. She has a power PICC to the L chest wall. She reports that she is scheduled for a L BKA on 6/14/18 at this hospital. (-) tobacco, (-) ETOH, (+) hx of substance abuse. Medical Record Review: A      PCP: Denver Rain MD    There are no other complaints, changes or physical findings at this time. The history is provided by the patient and the EMS personnel.         Past Medical History:   Diagnosis Date    ARF (acute renal failure) ([address] requiring dialysis 2011    Asthma     CKD (chronic kidney disease)     Diabetes ([address]     Gastroparesis 2010    Gastric Pacer- REMOVED 07/2015    GERD (gastroesophageal reflux disease)     Hypertension     Narcotic dependence ([address]     THU (obstructive sleep apnea)     wears 2 LPM oxygen at night    Other ill-defined conditions(799.89)     Polycystic ovarian syndrome     Seizures (HCC)     Sickle cell trait ([address]     Thromboembolus ([address] to her left arm and was told she had one in left leg recently       Past Surgical History:   Procedure Laterality Date    HX CATARACT REMOVAL  3/5/12    right    HX DILATION AND CURETTAGE      ablation    HX GASTRIC BYPASS      HX GI      j tube placement and removal    HX OTHER SURGICAL      Gastric Pacer- REMOVED 2015    HX VASCULAR ACCESS      gray cath rt subclavian    HX VASCULAR ACCESS      HD access right thigh         Family History:   Problem Relation Age of Onset    Cancer Mother      lung    Hypertension Mother     Cancer Father      kidney    Stroke Father      3 strokes: 59-72    Heart Disease Father 72     CABG    Hypertension Father     Cancer Sister      pancreatic    Cancer Maternal Aunt      breast    Cancer Paternal Aunt      breast    Schizophrenia Sister      was in [address], now ass't living    Other Sister       AIDS    Other Other      nephew of AIDS       Social History     Social History    Marital status:      Spouse name: N/A    Number of children: N/A    Years of education: N/A     Occupational History    Not on file. Social History Main Topics    Smoking status: Never Smoker    Smokeless tobacco: Never Used    Alcohol use No    Drug use: No    Sexual activity: Yes     Partners: Male     Birth control/ protection: None     Other Topics Concern    Not on file     Social History Narrative    Lives with her  and father         ALLERGIES: Erythromycin; Morphine; and Toradol [ketorolac]    Review of Systems   Reason unable to perform ROS: difficult to obtain 2/2 pt not easily arousable. Constitutional: Negative for activity change, appetite change, chills and fever. HENT: Negative for congestion, rhinorrhea, sinus pressure, sneezing and sore throat. Eyes: Negative for pain, discharge and visual disturbance. Respiratory: Negative for cough and shortness of breath. Cardiovascular: Negative for chest pain. Gastrointestinal: Negative for abdominal pain, diarrhea, nausea and vomiting.    Genitourinary: Negative for dysuria, flank pain, frequency and urgency. Musculoskeletal: Positive for back pain. Negative for arthralgias, gait problem, joint swelling, myalgias and neck pain. Skin: Negative for color change and rash. Neurological: Negative for dizziness, speech difficulty, weakness, light-headedness, numbness and headaches. Psychiatric/Behavioral: Negative for agitation, behavioral problems and confusion. All other systems reviewed and are negative. Vitals:    06/09/18 2146 06/09/18 2155   BP: 127/65    Pulse: 86    Resp: 20    Temp: 97.6 °F (36.4 °C)    SpO2:  100%            Physical Exam   Constitutional: She is oriented to person, place, and time. She appears well-developed and well-nourished. She appears listless. No distress. HENT:   Head: Normocephalic and atraumatic. Right Ear: External ear normal.   Left Ear: External ear normal.   Nose: Nose normal.   Mouth/Throat: Oropharynx is clear and moist. No oropharyngeal exudate. Eyes: Conjunctivae and EOM are normal. Pupils are equal, round, and reactive to light. Neck: Normal range of motion. Neck supple. Cardiovascular: Normal rate, regular rhythm, normal heart sounds and intact distal pulses. Pulmonary/Chest: Effort normal and breath sounds normal.   Abdominal: Soft. Bowel sounds are normal. There is no tenderness. There is no rebound and no guarding. Musculoskeletal: Normal range of motion. Neurological: She is oriented to person, place, and time. She appears listless. Skin: Skin is warm and dry. Psychiatric: Judgment and thought content normal. Her affect is blunt. Her speech is delayed. She is slowed and withdrawn. Cognition and memory are normal.   Nursing note and vitals reviewed.        MDM  Number of Diagnoses or Management Options  Acute pancreatitis, unspecified complication status, unspecified pancreatitis type:   Acute renal failure superimposed on chronic kidney disease, unspecified CKD stage, unspecified acute renal failure type [facility]): Diabetic ketoacidosis without coma associated with type 2 diabetes mellitus ([facility]): Hyperglycemia:   Hypocalcemia:   Lumbar pain:   Diagnosis management comments: Ddx; DKA, HHNK, dehydration, lumbar pain, sickle cell crisis, sepsis, pancreatitis, electrolyte imbalance, ARF w/ CKD     41 yo F presents w/ lower back pain x2h per EMS. Pt initially non-communicative but would answer some questions appropriately. Improved mentation during visit. CBC stable, CMP w/ Low Na, High BG, low Ca, high K and ARF noted. DKA present, lipase/ amylase elevated w/ acute pancreatitis present. CT Abd/ Pelvis pending. Fluid resuscitation began w/ electrolyte replacement. Dr. Vergara Orf to bedside to update patient/ assess patient and spoke w/ hospitalist for admission. Amount and/or Complexity of Data Reviewed  Clinical lab tests: ordered and reviewed  Tests in the radiology section of CPT®: ordered  Review and summarize past medical records: yes  Discuss the patient with other providers: yes Elvia Banks )          ED Course       Procedures    9:59 PM  [place]  review   05/22/2018 OXYCODONE-ACETAMINOPHEN 5-325 60.0 30 SA AKB [id number] VIRGI ([id number]) 0 15.0 MME [place]   05/14/2018 2 05/14/2018 OXYCODONE-ACETAMINOPHEN 5-325 20.0 5 WO LOLA [id number]       11:07 PM  Pt now awake, crying- states she is having severe abdominal and lower back pain. Requesting IV Dilaudid for pain as she reports that is the only thing that works for her and she has tolerated in the past. Dr. Jai Schumacher to bedside, pt made aware of electrolyte abnormalities and requirement for admission. Additionally orders placed, medication ordered for pain management.    José Moreno NP    CRITICAL CARE NOTE :    11:24 PM      IMPENDING DETERIORATION -Cardiovascular, CNS, Metabolic and Renal    ASSOCIATED RISK FACTORS - Hypotension, Dysrhythmia, Metabolic changes, Dehydration and CNS Decompensation    MANAGEMENT- Bedside Assessment and Supervision of Care    INTERPRETATION - CT Scan, Blood Gases, ECG and Blood Pressure    INTERVENTIONS - Metobolic interventions    TREATMENT RESPONSE -Stable    PERFORMED BY - Self and Physician      NOTES   :      I have spent 90 minutes of critical care time involved in lab review, consultations with specialist, family decision- making, bedside attention and documentation. During this entire length of time I was immediately available to the patient . Ruth Aguirre NP    12:44 AM  EKG reviewed at 0015 by Dr. Samantha Barnett  EKG: normal sinus rhythm; Vent rate= 82; FL=096; QRS=94; HSv=761; P-R-T 43-34-85. LABORATORY TESTS:  Recent Results (from the past 12 hour(s))   URINALYSIS W/MICROSCOPIC    Collection Time: 06/09/18 10:00 PM   Result Value Ref Range    Color YELLOW/STRAW      Appearance CLOUDY (A) CLEAR      Specific gravity 1.017 1.003 - 1.030      pH (UA) 5.0 5.0 - 8.0      Protein 100 (A) NEG mg/dL    Glucose >1000 (A) NEG mg/dL    Ketone NEGATIVE  NEG mg/dL    Bilirubin NEGATIVE  NEG      Blood NEGATIVE  NEG      Urobilinogen 0.2 0.2 - 1.0 EU/dL    Nitrites NEGATIVE  NEG      Leukocyte Esterase NEGATIVE  NEG      WBC 0-4 0 - 4 /hpf    RBC 0-5 0 - 5 /hpf    Epithelial cells FEW FEW /lpf    Bacteria NEGATIVE  NEG /hpf    Amorphous Crystals FEW (A) NEG     URINE CULTURE HOLD SAMPLE    Collection Time: 06/09/18 10:00 PM   Result Value Ref Range    Urine culture hold        URINE ON HOLD IN MICROBIOLOGY DEPT FOR 3 DAYS. IF UNPRESERVED URINE IS SUBMITTED, IT CANNOT BE USED FOR ADDITIONAL TESTING AFTER 24 HRS, RECOLLECTION WILL BE REQUIRED.    DRUG SCREEN, URINE    Collection Time: 06/09/18 10:00 PM   Result Value Ref Range    AMPHETAMINES NEGATIVE  NEG      BARBITURATES NEGATIVE  NEG      BENZODIAZEPINES NEGATIVE  NEG      COCAINE NEGATIVE  NEG      METHADONE NEGATIVE  NEG      OPIATES NEGATIVE  NEG      PCP(PHENCYCLIDINE) NEGATIVE  NEG      THC (TH-CANNABINOL) NEGATIVE  NEG      Drug screen comment (NOTE)    METABOLIC PANEL, COMPREHENSIVE Collection Time: 06/09/18 10:03 PM   Result Value Ref Range    Sodium 125 (L) 136 - 145 mmol/L    Potassium 5.4 (H) 3.5 - 5.1 mmol/L    Chloride 96 (L) 97 - 108 mmol/L    CO2 13 (LL) 21 - 32 mmol/L    Anion gap 16 (H) 5 - 15 mmol/L    Glucose 719 (HH) 65 - 100 mg/dL    BUN 86 (H) 6 - 20 MG/DL    Creatinine 4.89 (H) 0.55 - 1.02 MG/DL    BUN/Creatinine ratio 18 12 - 20      GFR est AA 12 (L) >60 ml/min/1.73m2    GFR est non-AA 10 (L) >60 ml/min/1.73m2    Calcium 5.7 (LL) 8.5 - 10.1 MG/DL    Bilirubin, total 0.2 0.2 - 1.0 MG/DL    ALT (SGPT) 21 12 - 78 U/L    AST (SGOT) 17 15 - 37 U/L    Alk. phosphatase 275 (H) 45 - 117 U/L    Protein, total 7.2 6.4 - 8.2 g/dL    Albumin 2.9 (L) 3.5 - 5.0 g/dL    Globulin 4.3 (H) 2.0 - 4.0 g/dL    A-G Ratio 0.7 (L) 1.1 - 2.2     CBC WITH AUTOMATED DIFF    Collection Time: 06/09/18 10:03 PM   Result Value Ref Range    WBC 6.0 3.6 - 11.0 K/uL    RBC 3.13 (L) 3.80 - 5.20 M/uL    HGB 8.1 (L) 11.5 - 16.0 g/dL    HCT 25.8 (L) 35.0 - 47.0 %    MCV 82.4 80.0 - 99.0 FL    MCH 25.9 (L) 26.0 - 34.0 PG    MCHC 31.4 30.0 - 36.5 g/dL    RDW 15.5 (H) 11.5 - 14.5 %    PLATELET 424 (L) 921 - 400 K/uL    NRBC 0.0 0  WBC    ABSOLUTE NRBC 0.00 0.00 - 0.01 K/uL    NEUTROPHILS 80 (H) 32 - 75 %    LYMPHOCYTES 12 12 - 49 %    MONOCYTES 6 5 - 13 %    EOSINOPHILS 2 0 - 7 %    BASOPHILS 0 0 - 1 %    IMMATURE GRANULOCYTES 0 0.0 - 0.5 %    ABS. NEUTROPHILS 4.8 1.8 - 8.0 K/UL    ABS. LYMPHOCYTES 0.7 (L) 0.8 - 3.5 K/UL    ABS. MONOCYTES 0.4 0.0 - 1.0 K/UL    ABS. EOSINOPHILS 0.1 0.0 - 0.4 K/UL    ABS. BASOPHILS 0.0 0.0 - 0.1 K/UL    ABS. IMM.  GRANS. 0.0 0.00 - 0.04 K/UL    DF SMEAR SCANNED      RBC COMMENTS ANISOCYTOSIS  1+        RBC COMMENTS SCHISTOCYTES  PRESENT       RETICULOCYTE COUNT    Collection Time: 06/09/18 10:03 PM   Result Value Ref Range    Reticulocyte count 1.2 0.7 - 2.1 %    Absolute Retic Cnt. 0.0366 0.0164 - 0.0776 M/ul   ETHYL ALCOHOL    Collection Time: 06/09/18 10:03 PM   Result Value Ref Range    ALCOHOL(ETHYL),SERUM <10 <10 MG/DL   LIPASE    Collection Time: 06/09/18 10:03 PM   Result Value Ref Range    Lipase 1459 (H) 73 - 393 U/L   AMYLASE    Collection Time: 06/09/18 10:03 PM   Result Value Ref Range    Amylase 122 (H) 25 - 115 U/L   LACTIC ACID    Collection Time: 06/09/18 10:08 PM   Result Value Ref Range    Lactic acid 0.5 0.4 - 2.0 MMOL/L   HCG URINE, QL. - POC    Collection Time: 06/09/18 10:40 PM   Result Value Ref Range    Pregnancy test,urine (POC) NEGATIVE  NEG     POC EG7    Collection Time: 06/09/18 11:22 PM   Result Value Ref Range    Calcium, ionized (POC) 0.84 (L) 1.12 - 1.32 mmol/L    pH (POC) 7.238 (LL) 7.35 - 7.45      pCO2 (POC) 40.6 35.0 - 45.0 MMHG    pO2 (POC) 126 (H) 80 - 100 MMHG    HCO3 (POC) 17.3 (L) 22 - 26 MMOL/L    Base deficit (POC) 10 mmol/L    sO2 (POC) 98 (H) 92 - 97 %    Site LEFT RADIAL      Device: NASAL CANNULA      Flow rate (POC) 1 L/M    Allens test (POC) N/A      Specimen type (POC) ARTERIAL         IMAGING RESULTS:  CT ABD PELV WO CONT    (Results Pending)       MEDICATIONS GIVEN:  Medications   calcium gluconate 2 g in 0.9% sodium chloride 100 mL IVPB (2 g IntraVENous New Bag 6/9/18 4952)   sodium chloride 0.9 % bolus infusion 1,000 mL (1,000 mL IntraVENous New Bag 6/9/18 8486)   lactated Ringers infusion (not administered)   fentaNYL citrate (PF) injection 25 mcg (not administered)   ondansetron (ZOFRAN) injection 4 mg (not administered)   0.9% sodium chloride infusion (not administered)   sodium chloride (NS) flush 5-10 mL (not administered)   sodium chloride (NS) flush 5-10 mL (not administered)   insulin regular (NOVOLIN R, HUMULIN R) 100 Units in 0.9% sodium chloride 100 mL infusion (not administered)   insulin lispro (HUMALOG) injection (not administered)   glucose chewable tablet 16 g (not administered)   dextrose (D50W) injection syrg 12.5-25 g (not administered)   glucagon (GLUCAGEN) injection 1 mg (not administered)   sodium chloride 0.9 % bolus infusion 1,000 mL (1,000 mL IntraVENous New Bag 6/9/18 2208)   HYDROmorphone (DILAUDID) injection 1 mg (1 mg IntraVENous Given 6/9/18 2314)   sodium bicarbonate 8.4 % (1 mEq/mL) injection 50 mEq (50 mEq IntraVENous Given 6/9/18 2315)       IMPRESSION:  1. Diabetic ketoacidosis without coma associated with type 2 diabetes mellitus ([facility]    2. Hyperglycemia    3. Hypocalcemia    4. Lumbar pain    5. Acute renal failure superimposed on chronic kidney disease, unspecified CKD stage, unspecified acute renal failure type ([facility]    6. Acute pancreatitis, unspecified complication status, unspecified pancreatitis type        PLAN:  Admit Note:  11:45 PM  Patient is being admitted to the hospital by Dr. Maddy Weaver. Doc to Doc report was provided by Dr. Shea Shafer. The results of their tests and reasons for their admission have been discussed with them and/or available family. They convey agreement and understanding for the need to be admitted and for their admission diagnosis. Consultation has been made with the inpatient physician specialist for hospitalization.   Venus Schreiber NP

## 2018-06-10 NOTE — ANESTHESIA POSTPROCEDURE EVALUATION
Post-Anesthesia Evaluation and Assessment    Patient: Gila Mail MRN: [id number]  SSN: [id number]    YOB: 1980  Age: 42 y.o. Sex: female       Cardiovascular Function/Vital Signs  Visit Vitals    /76    Pulse 76    Temp 35.4 °C (95.7 °F)    Resp 28    Ht 5' 2\" (1.575 m)    Wt 67.2 kg (148 lb 2.4 oz)    SpO2 100%    BMI 27.1 kg/m2       Patient is status post MAC, general anesthesia for Procedure(s): Intubatioin--oral by Dr Nadeen Reyes on stand-by. Nausea/Vomiting: None    Postoperative hydration reviewed and adequate. Pain:  Pain Scale 1: Adult Nonverbal Pain Scale (06/10/18 0900)  Pain Intensity 1: 0 (06/10/18 0900)   Managed    Neurological Status:   Neuro  Neurologic State: Pharmacologically induced (comment) (06/10/18 1200)  Orientation Level: Unable to verbalize (06/10/18 1200)  Cognition: Unable to assess (comment) (06/10/18 1200)  Speech: Intubated (06/10/18 1200)   At baseline    Mental Status and Level of Consciousness: Arousable    Pulmonary Status:   O2 Device: Other (comment) (Ventilation via ETT) (06/10/18 1012)   Adequate oxygenation and airway patent    Complications related to anesthesia: None    Post-anesthesia assessment completed.  No concerns    Signed By: Jesse Meek MD     [place] 10, 2018

## 2018-06-10 NOTE — PROGRESS NOTES
Hospitalist Progress Note  Liza Escobar MD  Answering service: [phone] from in house phone  Cell: [phone]      Date of Service:  6/10/2018  NAME:  Natalia Tate  :  1980  MRN:  [id number]      Admission Summary: This is a 68-year-old woman with a past medical history significant for chronic pancreatitis, type 1 diabetes, asthma, chronic kidney disease, anemia, hypertension, depression, obstructive sleep apnea, sickle cell anemia, ongoing osteomyelitis of the left foot, who was in her usual state of health until the day of presentation at the emergency room when the patient developed back pain. The pain is located at the lower back bilaterally with no radiation. Interval history / Subjective:   Patient became confused, restless, agitated, trying to get out of bed,      Assessment & Plan:     DKA with Type DM-II  -continue insulin gtt, monitor finger stick glucose   -A1c 7.9    Hypothermia possible due to sepsis  -on Daptomycin, add levaquin  -will do paired blood cx   -UA no evidence of UTI  -will do chest x ray  -check random cortisol and cortisol in am  -check TSH    Acute Encephalopathy metabolic vs infection   -confused, agitated, restless, try to get out of bed  -ativan x 1  -toxicology screen negative, alcohol <10  -continue neuro check  -will do CT head    STONE on CKD stage IV-V  -give sodium bicarb drip  - Creatinine improving 4.89 to 4.41    Non AG MA due to renal insufficiency   -start sodium bicarb gtt  -repeat bmp     Swelling of tongue ?  Angioedema  -decadron 4 mg IV x 1 dose    Elevated liver enzyme multifactorial   -continue IVF  -CT of abdomen no mass in the liver or biliary dilatation  -monitor liver enzyme if it doesn't improves will do hepatitis panel    Hypocalcemia   -corrected ca level 6.98  -IV calcium gluconate 2 g IV x 1    Hx of osteomyelitis of left foot  -on Daptomycin  -consult to ID    Hx of sickle cell disease  -continue IVF  -check LDH  -monitor cbc  -on prn fentanyl     Hx of chronic pancreatitis  -CT Abdomen pelvis moderate fecal stasis, chronic pancreatitis, s/p gastric bypass and cholecystectomy, no acute abnormality    Hyponatremia  -improving, continue     HTN  -BP normal  -her home hydralazine and clonidine is held, monitor BP    Hx of gastric by pass  -CT abdomen pelvis no acute finding    Chronic low back pain  -on prn fentanyl    Hx of THU  -monitor pulse ox q hs  -continue oxygen support,    Hx of asthma  -not bronchospastic  -prn duo neb  -chest x ray     Hx of depression  -will resume her seroquel and venlafaxine when she able to take poa      Full Code, high risk for decompensation       Code status: Full Code  DVT prophylaxis: heparin    Care Plan discussed with: Patient/Family and Nurse  Disposition: D     Hospital Problems  Date Reviewed: 6/10/2018          Codes Class Noted POA    * (Principal)DKA (diabetic ketoacidoses) ([facility] ICD-10-CM: E13.10  ICD-9-CM: 250.10  6/9/2018 Yes                Vital Signs:    Last 24hrs VS reviewed since prior progress note. Most recent are:  Visit Vitals    /56    Pulse 81    Temp 95.5 °F (35.3 °C)    Resp 12    Ht 5' 2\" (1.575 m)    Wt 67.2 kg (148 lb 2.4 oz)    SpO2 91%    BMI 27.1 kg/m2         Intake/Output Summary (Last 24 hours) at 06/10/18 0755  Last data filed at 06/10/18 0400   Gross per 24 hour   Intake           538.19 ml   Output                0 ml   Net           538.19 ml        Physical Examination:             Constitutional:  No acute distress, restless, uncooperative   ENT:  Swollen tongue, oral mucous moist, oropharynx benign. Neck supple,    Resp:  Decreased bronchial breath sound bilaterally. No wheezing/rhonchi/rales. No accessory muscle use   CV:  Regular rhythm, normal rate, no murmurs, gallops, rubs    GI:  Soft, non distended, non tender.  normoactive bowel sounds, no hepatosplenomegaly     Musculoskeletal:  Left foot metatarsal amputation    Neurologic:  Confused, disoriented, restless, moves all extremities     Skin:  Left foot healed ulcer        Data Review:    Review and/or order of clinical lab test      Labs:     Recent Labs      06/10/18   0328  06/09/18   2203   WBC  5.7  6.0   HGB  8.0*  8.1*   HCT  25.1*  25.8*   PLT  122*  131*     Recent Labs      06/10/18   0328  06/09/18   2203   NA  134*  125*   K  4.1  5.4*   CL  108  96*   CO2  13*  13*   BUN  83*  86*   CREA  4.41*  4.89*   GLU  238*  719*   CA  6.1*  5.7*   MG  1.3*   --    PHOS  6.9*   --      Recent Labs      06/09/18 2203   SGOT  17   ALT  21   AP  275*   TBILI  0.2   TP  7.2   ALB  2.9*   GLOB  4.3*   AML  122*   LPSE  1459*     No results for input(s): INR, PTP, APTT in the last 72 hours. No lab exists for component: INREXT   No results for input(s): FE, TIBC, PSAT, FERR in the last 72 hours. Lab Results   Component Value Date/Time    Folate 33.8 (H) 05/07/2018 11:20 AM      No results for input(s): PH, PCO2, PO2 in the last 72 hours. No results for input(s): CPK, CKNDX, TROIQ in the last 72 hours.     No lab exists for component: CPKMB  Lab Results   Component Value Date/Time    Cholesterol, total 137 04/30/2018 06:24 AM    HDL Cholesterol 35 04/30/2018 06:24 AM    LDL, calculated 89 04/30/2018 06:24 AM    Triglyceride 65 04/30/2018 06:24 AM    CHOL/HDL Ratio 3.9 04/30/2018 06:24 AM     Lab Results   Component Value Date/Time    Glucose (POC) 82 06/10/2018 06:34 AM    Glucose (POC) 107 (H) 06/10/2018 05:31 AM    Glucose (POC) 175 (H) 06/10/2018 04:28 AM    Glucose (POC) 273 (H) 06/10/2018 03:24 AM    Glucose (POC) 437 (H) 06/10/2018 02:08 AM     Lab Results   Component Value Date/Time    Color YELLOW/STRAW 06/09/2018 10:00 PM    Appearance CLOUDY (A) 06/09/2018 10:00 PM    Specific gravity 1.017 06/09/2018 10:00 PM    Specific gravity 1.015 07/26/2010 11:18 AM    pH (UA) 5.0 06/09/2018 10:00 PM Protein 100 (A) 06/09/2018 10:00 PM    Glucose >1000 (A) 06/09/2018 10:00 PM    Ketone NEGATIVE  06/09/2018 10:00 PM    Bilirubin NEGATIVE  06/09/2018 10:00 PM    Urobilinogen 0.2 06/09/2018 10:00 PM    Nitrites NEGATIVE  06/09/2018 10:00 PM    Leukocyte Esterase NEGATIVE  06/09/2018 10:00 PM    Epithelial cells FEW 06/09/2018 10:00 PM    Bacteria NEGATIVE  06/09/2018 10:00 PM    WBC 0-4 06/09/2018 10:00 PM    RBC 0-5 06/09/2018 10:00 PM         Medications Reviewed:     Current Facility-Administered Medications   Medication Dose Route Frequency    albuterol (PROVENTIL VENTOLIN) nebulizer solution 2.5 mg  2.5 mg Nebulization Q4H PRN    sodium chloride (NS) flush 5-10 mL  5-10 mL IntraVENous Q8H    sodium chloride (NS) flush 5-10 mL  5-10 mL IntraVENous PRN    bisacodyl (DULCOLAX) tablet 5 mg  5 mg Oral DAILY PRN    heparin (porcine) injection 5,000 Units  5,000 Units SubCUTAneous Q8H    hydrALAZINE (APRESOLINE) 20 mg/mL injection 10 mg  10 mg IntraVENous Q6H PRN    [START ON 6/11/2018] DAPTOmycin (CUBICIN) 400 mg in 0.9% sodium chloride 50 mL IVPB RF formulation  400 mg IntraVENous Q48H    dextrose 5% and 0.9% NaCl infusion  150 mL/hr IntraVENous CONTINUOUS    fentaNYL citrate (PF) injection 25 mcg  25 mcg IntraVENous Q4H PRN    ondansetron (ZOFRAN) injection 4 mg  4 mg IntraVENous Q4H PRN    sodium chloride (NS) flush 5-10 mL  5-10 mL IntraVENous Q8H    sodium chloride (NS) flush 5-10 mL  5-10 mL IntraVENous PRN    insulin regular (NOVOLIN R, HUMULIN R) 100 Units in 0.9% sodium chloride 100 mL infusion  0-50 Units/hr IntraVENous TITRATE    insulin lispro (HUMALOG) injection   SubCUTAneous TIDAC    glucose chewable tablet 16 g  4 Tab Oral PRN    dextrose (D50W) injection syrg 12.5-25 g  12.5-25 g IntraVENous PRN    glucagon (GLUCAGEN) injection 1 mg  1 mg IntraMUSCular PRN     ______________________________________________________________________  EXPECTED LENGTH OF STAY: - - -  ACTUAL LENGTH OF STAY:          1                 Wendie Esquivel MD

## 2018-06-10 NOTE — ROUTINE PROCESS
TRANSFER - OUT REPORT:    Verbal report given to Zuri(name) on Simona Salas  being transferred to CCU(unit) for routine progression of care       Report consisted of patients Situation, Background, Assessment and   Recommendations(SBAR). Information from the following report(s) SBAR, Kardex and ED Summary was reviewed with the receiving nurse. Lines:   PICC Double Lumen 06/09/18 (Active)        Opportunity for questions and clarification was provided.       Patient transported with:   Registered Nurse

## 2018-06-10 NOTE — PROGRESS NOTES
Patient seen and examined     IMPRESSION    1. Respiratory failure due to angioedema     2. Left foot diabetic infection possibly with osteomyelitis    3. Renal failure    4. Metabolic acidosis     5. DM     6. Chronic pancreatitis     PLAN    1. The left foot looks good. She is almost at the end of her planned course of daptomycin. Hopefully this can be discontinued in the next days. 2. There is some concern for line infection because of the hypothermia. Continue levaquin for now pending cultures. I gather pcn based regimen was not given due to the unknown etiology for the angioedema.

## 2018-06-10 NOTE — PROGRESS NOTES
Problem: Falls - Risk of  Goal: *Absence of Falls  Document Blake Fall Risk and appropriate interventions in the flowsheet. Outcome: Progressing Towards Goal  Fall Risk Interventions:  Mobility Interventions: Communicate number of staff needed for ambulation/transfer, Bed/chair exit alarm, Patient to call before getting OOB         Medication Interventions: Assess postural VS orthostatic hypotension, Evaluate medications/consider consulting pharmacy, Patient to call before getting OOB    Elimination Interventions: Call light in reach, Patient to call for help with toileting needs, Bed/chair exit alarm             Problem: Pressure Injury - Risk of  Goal: *Prevention of pressure injury  Document Antonio Scale and appropriate interventions in the flowsheet.    Outcome: Progressing Towards Goal  Pressure Injury Interventions:  Sensory Interventions: Assess changes in LOC, Assess need for specialty bed    Moisture Interventions: Assess need for specialty bed, Check for incontinence Q2 hours and as needed    Activity Interventions: Assess need for specialty bed, Increase time out of bed         Nutrition Interventions: Document food/fluid/supplement intake, Discuss nutritional consult with provider

## 2018-06-10 NOTE — PROGRESS NOTES
Full consult to follow. Patient with progressive angioedema. Not protecting airway well. Needs intubation. Discussed with anesthesia. Will need to be intubated in the OR.   Gardner Leyden, MD

## 2018-06-10 NOTE — CONSULTS
[facility]  Pulmonary, Critical Care, and Sleep Medicine    Initial Patient Consult    Name: Phuong Waller MRN: [id number]   : 1980 Hospital: [facility]    Date: 6/10/2018        IMPRESSION:   · Acute respiratory failure - emergent intubation in OR for airway protection  · Anion-gap metabolic acidosis - likely DKA, but no ketones in urine (possibly due to very low pH), normal lactate  · Acute angioedema - uncertain cause, no obvious trigger, very few drugs given prior to event (?dilaudid with h/o itching from morphine) - no reports of airway compromise while in ER, but had very significant findings on arrival to ICU  · Possible DKA as above  · Chronic osteomyelitis of left foot, on outpatient IV antibiotics   · Sickle cell disease +/- pain crisis  · Asthma   · Acute/chronic renal failure  · Chronic pancreatitis   · H/O noncompliance       RECOMMENDATIONS:   · Emergent intubation completed in OR with ENT on standby, now with 6.5 ET tube  · Ventilator support - needs high MV for now due to acidosis  · Insulin drip per protocol  · Check serum ketones  · IV fluids   · Steroids/H2 blocker/antihistamines for angioedema  · Cultures  · Empiric antibiotics   · Renal eval pending  · Replete electrolytes  · DVT/GI prophylaxis  · Critically ill with high risk for further decompensation. Total critical care time exclusive of procedures 50 minutes     Subjective: This patient has been seen and evaluated at the request of Dr. Adenike Chau for respiratory failure and sepsis. Patient is a 42 y.o. female with multiple medical problems including DM, chronic pancreatitis, chronic osteomyelitis, sickle cell disease, presented to the ER today with back pain and lethargy. She reported that her pain felt like a sickle cell crisis. Found to have AG metabolic acidosis. Felt to have DKA, but no ketones in urine. She was admitted to the ICU but she has since developed swelling of face and tongue. On my arrival to the ICU, she was sedated with lorazepam due to combativeness, and she was unresponsive and not adequately protecting her airway. Past Medical History:   Diagnosis Date    ARF (acute renal failure) ([facility] [address] requiring dialysis 2011    Asthma     CKD (chronic kidney disease)     Diabetes ([address]     Gastroparesis 2010    Gastric Pacer- REMOVED 07/2015    GERD (gastroesophageal reflux disease)     Hypertension     Narcotic dependence ([facility] [address]     THU (obstructive sleep apnea)     wears 2 LPM oxygen at night    Other ill-defined conditions(799.89)     Polycystic ovarian syndrome     Seizures (HCC)     Sickle cell trait ([facility] [address]     Thromboembolus ([facility] [address] to her left arm and was told she had one in left leg recently      Past Surgical History:   Procedure Laterality Date    HX CATARACT REMOVAL  3/5/12    right    HX DILATION AND CURETTAGE      ablation    HX GASTRIC BYPASS  2015    HX GI      j tube placement and removal    HX OTHER SURGICAL  2010    Gastric Pacer- REMOVED 07/2015    HX VASCULAR ACCESS      gray cath rt subclavian    HX VASCULAR ACCESS      HD access right thigh      Prior to Admission medications    Medication Sig Start Date End Date Taking? Authorizing Provider   hydrALAZINE (APRESOLINE) 100 mg tablet Take 1 Tab by mouth three (3) times daily for 30 days. 5/14/18 6/13/18  Collette Hamburg, MD   oxyCODONE-acetaminophen (PERCOCET) 5-325 mg per tablet Take 1 Tab by mouth every six (6) hours as needed for Pain. Max Daily Amount: 4 Tabs. Scheduled 5/14/18   Collette Hamburg, MD   promethazine (PHENERGAN) 25 mg tablet Take 1 Tab by mouth every eight (8) hours as needed for Nausea. 5/14/18   Collette Hamburg, MD   L. acidoph & paracasei- S therm- Bifido (CHRIS-Q/RISAQUAD) 8 billion cell cap cap Take 1 Cap by mouth daily for 30 days. 5/15/18 6/14/18  Collette Hamburg, MD   NIFEdipine ER (PROCARDIA XL) 90 mg ER tablet Take 1 Tab by mouth daily for 30 days.  5/15/18 6/14/18  FCKYSQC T MD Oziel   pantoprazole (PROTONIX) 40 mg tablet Take 1 Tab by mouth Before breakfast and dinner for 30 days. 18  Jie Suazo MD   senna-docusate (PERICOLACE) 8.6-50 mg per tablet Take 2 Tabs by mouth daily. 5/15/18   Jie Suazo MD   cloNIDine HCl (CATAPRES) 0.2 mg tablet Take 1 Tab by mouth three (3) times daily for 30 days. 18  Jie Suazo MD   labetalol (NORMODYNE) 100 mg tablet Take 2 Tabs by mouth two (2) times a day for 30 days. 18  Jie Suazo MD   QUEtiapine (SEROQUEL) 100 mg tablet Take 100 mg by mouth two (2) times a day. Historical Provider   venlafaxine (EFFEXOR) 75 mg tablet Take 1 Tab by mouth two (2) times daily (with meals). 3/21/18   Sid Bacon MD   Cholecalciferol, Vitamin D3, 50,000 unit cap Take 1 Cap by mouth every seven (7) days. Historical Provider   calcium carbonate (OS-BINA) 500 mg calcium (1,250 mg) tablet Take 1 Tab by mouth daily. Historical Provider   cyanocobalamin 1,000 mcg tablet Take 1,000 mcg by mouth daily. Historical Provider   albuterol (PROVENTIL VENTOLIN) 2.5 mg /3 mL (0.083 %) nebulizer solution 2.5 mg by Nebulization route every four (4) hours as needed.     Historical Provider     Allergies   Allergen Reactions    Erythromycin Itching    Morphine Itching    Toradol [Ketorolac] Rash      Social History   Substance Use Topics    Smoking status: Never Smoker    Smokeless tobacco: Never Used    Alcohol use No      Family History   Problem Relation Age of Onset    Cancer Mother      lung    Hypertension Mother     Cancer Father      kidney    Stroke Father      3 strokes: 59-72    Heart Disease Father 72     CABG    Hypertension Father     Cancer Sister      pancreatic    Cancer Maternal Aunt      breast    Cancer Paternal Aunt      breast    Schizophrenia Sister      was in [facility], now ass't living    Other Sister       AIDS    Other Other      nephew of AIDS        Current Facility-Administered Medications   Medication Dose Route Frequency    sodium chloride (NS) flush 5-10 mL  5-10 mL IntraVENous Q8H    heparin (porcine) injection 5,000 Units  5,000 Units SubCUTAneous Q8H    [START ON 2018] DAPTOmycin (CUBICIN) 400 mg in 0.9% sodium chloride 50 mL IVPB RF formulation  400 mg IntraVENous Q48H    levoFLOXacin (LEVAQUIN) 250 mg in D5W IVPB  250 mg IntraVENous Q48H    sodium bicarbonate (8.4%) 150 mEq in dextrose 5 % - 0.45% NaCl 1,000 mL infusion   IntraVENous CONTINUOUS    calcium gluconate 2 g in 0.9% sodium chloride 100 mL IVPB  2 g IntraVENous ONCE    sodium chloride (NS) flush 5-10 mL  5-10 mL IntraVENous Q8H    insulin regular (NOVOLIN R, HUMULIN R) 100 Units in 0.9% sodium chloride 100 mL infusion  0-50 Units/hr IntraVENous TITRATE    insulin lispro (HUMALOG) injection   SubCUTAneous TIDAC       Review of Systems:  Review of systems not obtained due to patient factors. Objective:   Vital Signs:    Visit Vitals    /56    Pulse 81    Temp (!) 93.9 °F (34.4 °C)    Resp 12    Ht 5' 2\" (1.575 m)    Wt 67.2 kg (148 lb 2.4 oz)    SpO2 91%    BMI 27.1 kg/m2       O2 Device: Nasal cannula   O2 Flow Rate (L/min): 2 l/min   Temp (24hrs), Av.3 °F (35.2 °C), Min:93 °F (33.9 °C), Max:97.6 °F (36.4 °C)       Intake/Output:   Last shift:         Last 3 shifts:  1901 - 06/10 0700  In: 538.2 [I.V.:538.2]  Out: -     Intake/Output Summary (Last 24 hours) at 06/10/18 0858  Last data filed at 06/10/18 0400   Gross per 24 hour   Intake           538.19 ml   Output                0 ml   Net           538.19 ml      Physical Exam:   General:  Lethargic, ill appearing   Head:  Normocephalic, without obvious abnormality, atraumati, some facial edema. Eyes:  Conjunctivae/corneas clear. Has moderate periorbital edema   Nose: Nares normal. Septum midline.  Mucosa normal.     Throat: Angioedema of lips and tongue, mild stridor, loud snoring   Neck: Supple, symmetrical, trachea midline    Lungs:   Scattered ronchi, kussmaul respirations   Chest wall:  No tenderness or deformity. Heart:  Tachy, regular   Abdomen:   Soft, non-tender. Bowel sounds normal.    Extremities: left transmetatarsal amputation   Skin: Skin color, texture, turgor normal. No rashes or lesions   Lymph nodes: Cervical, supraclavicular nodes normal.   Neurologic: Sedated      Data review:     Recent Results (from the past 24 hour(s))   URINALYSIS W/MICROSCOPIC    Collection Time: 06/09/18 10:00 PM   Result Value Ref Range    Color YELLOW/STRAW      Appearance CLOUDY (A) CLEAR      Specific gravity 1.017 1.003 - 1.030      pH (UA) 5.0 5.0 - 8.0      Protein 100 (A) NEG mg/dL    Glucose >1000 (A) NEG mg/dL    Ketone NEGATIVE  NEG mg/dL    Bilirubin NEGATIVE  NEG      Blood NEGATIVE  NEG      Urobilinogen 0.2 0.2 - 1.0 EU/dL    Nitrites NEGATIVE  NEG      Leukocyte Esterase NEGATIVE  NEG      WBC 0-4 0 - 4 /hpf    RBC 0-5 0 - 5 /hpf    Epithelial cells FEW FEW /lpf    Bacteria NEGATIVE  NEG /hpf    Amorphous Crystals FEW (A) NEG     URINE CULTURE HOLD SAMPLE    Collection Time: 06/09/18 10:00 PM   Result Value Ref Range    Urine culture hold        URINE ON HOLD IN MICROBIOLOGY DEPT FOR 3 DAYS. IF UNPRESERVED URINE IS SUBMITTED, IT CANNOT BE USED FOR ADDITIONAL TESTING AFTER 24 HRS, RECOLLECTION WILL BE REQUIRED.    DRUG SCREEN, URINE    Collection Time: 06/09/18 10:00 PM   Result Value Ref Range    AMPHETAMINES NEGATIVE  NEG      BARBITURATES NEGATIVE  NEG      BENZODIAZEPINES NEGATIVE  NEG      COCAINE NEGATIVE  NEG      METHADONE NEGATIVE  NEG      OPIATES NEGATIVE  NEG      PCP(PHENCYCLIDINE) NEGATIVE  NEG      THC (TH-CANNABINOL) NEGATIVE  NEG      Drug screen comment (NOTE)    METABOLIC PANEL, COMPREHENSIVE    Collection Time: 06/09/18 10:03 PM   Result Value Ref Range    Sodium 125 (L) 136 - 145 mmol/L    Potassium 5.4 (H) 3.5 - 5.1 mmol/L    Chloride 96 (L) 97 - 108 mmol/L    CO2 13 (LL) 21 - 32 mmol/L Anion gap 16 (H) 5 - 15 mmol/L    Glucose 719 (HH) 65 - 100 mg/dL    BUN 86 (H) 6 - 20 MG/DL    Creatinine 4.89 (H) 0.55 - 1.02 MG/DL    BUN/Creatinine ratio 18 12 - 20      GFR est AA 12 (L) >60 ml/min/1.73m2    GFR est non-AA 10 (L) >60 ml/min/1.73m2    Calcium 5.7 (LL) 8.5 - 10.1 MG/DL    Bilirubin, total 0.2 0.2 - 1.0 MG/DL    ALT (SGPT) 21 12 - 78 U/L    AST (SGOT) 17 15 - 37 U/L    Alk. phosphatase 275 (H) 45 - 117 U/L    Protein, total 7.2 6.4 - 8.2 g/dL    Albumin 2.9 (L) 3.5 - 5.0 g/dL    Globulin 4.3 (H) 2.0 - 4.0 g/dL    A-G Ratio 0.7 (L) 1.1 - 2.2     CBC WITH AUTOMATED DIFF    Collection Time: 06/09/18 10:03 PM   Result Value Ref Range    WBC 6.0 3.6 - 11.0 K/uL    RBC 3.13 (L) 3.80 - 5.20 M/uL    HGB 8.1 (L) 11.5 - 16.0 g/dL    HCT 25.8 (L) 35.0 - 47.0 %    MCV 82.4 80.0 - 99.0 FL    MCH 25.9 (L) 26.0 - 34.0 PG    MCHC 31.4 30.0 - 36.5 g/dL    RDW 15.5 (H) 11.5 - 14.5 %    PLATELET 929 (L) 057 - 400 K/uL    NRBC 0.0 0  WBC    ABSOLUTE NRBC 0.00 0.00 - 0.01 K/uL    NEUTROPHILS 80 (H) 32 - 75 %    LYMPHOCYTES 12 12 - 49 %    MONOCYTES 6 5 - 13 %    EOSINOPHILS 2 0 - 7 %    BASOPHILS 0 0 - 1 %    IMMATURE GRANULOCYTES 0 0.0 - 0.5 %    ABS. NEUTROPHILS 4.8 1.8 - 8.0 K/UL    ABS. LYMPHOCYTES 0.7 (L) 0.8 - 3.5 K/UL    ABS. MONOCYTES 0.4 0.0 - 1.0 K/UL    ABS. EOSINOPHILS 0.1 0.0 - 0.4 K/UL    ABS. BASOPHILS 0.0 0.0 - 0.1 K/UL    ABS. IMM.  GRANS. 0.0 0.00 - 0.04 K/UL    DF SMEAR SCANNED      RBC COMMENTS ANISOCYTOSIS  1+        RBC COMMENTS SCHISTOCYTES  PRESENT       RETICULOCYTE COUNT    Collection Time: 06/09/18 10:03 PM   Result Value Ref Range    Reticulocyte count 1.2 0.7 - 2.1 %    Absolute Retic Cnt. 0.0366 0.0164 - 0.0776 M/ul   ETHYL ALCOHOL    Collection Time: 06/09/18 10:03 PM   Result Value Ref Range    ALCOHOL(ETHYL),SERUM <10 <10 MG/DL   LIPASE    Collection Time: 06/09/18 10:03 PM   Result Value Ref Range    Lipase 1459 (H) 73 - 393 U/L   AMYLASE    Collection Time: 06/09/18 10:03 PM Result Value Ref Range    Amylase 122 (H) 25 - 115 U/L   LACTIC ACID    Collection Time: 06/09/18 10:08 PM   Result Value Ref Range    Lactic acid 0.5 0.4 - 2.0 MMOL/L   HCG URINE, QL. - POC    Collection Time: 06/09/18 10:40 PM   Result Value Ref Range    Pregnancy test,urine (POC) NEGATIVE  NEG     POC EG7    Collection Time: 06/09/18 11:22 PM   Result Value Ref Range    Calcium, ionized (POC) 0.84 (L) 1.12 - 1.32 mmol/L    pH (POC) 7.238 (LL) 7.35 - 7.45      pCO2 (POC) 40.6 35.0 - 45.0 MMHG    pO2 (POC) 126 (H) 80 - 100 MMHG    HCO3 (POC) 17.3 (L) 22 - 26 MMOL/L    Base deficit (POC) 10 mmol/L    sO2 (POC) 98 (H) 92 - 97 %    Site LEFT RADIAL      Device: NASAL CANNULA      Flow rate (POC) 1 L/M    Allens test (POC) N/A      Specimen type (POC) ARTERIAL     EKG, 12 LEAD, INITIAL    Collection Time: 06/10/18 12:07 AM   Result Value Ref Range    Ventricular Rate 82 BPM    Atrial Rate 82 BPM    P-R Interval 156 ms    QRS Duration 94 ms    Q-T Interval 422 ms    QTC Calculation (Bezet) 493 ms    Calculated P Axis 55 degrees    Calculated R Axis 24 degrees    Calculated T Axis 52 degrees    Diagnosis       Normal sinus rhythm  Prolonged QT  When compared with ECG of 02-MAY-2018 01:18,  Nonspecific T wave abnormality no longer evident in Lateral leads     GLUCOSTABILIZER    Collection Time: 06/10/18 12:53 AM   Result Value Ref Range    Glucose 601 mg/dL    Insulin order 10.8 units/hour    Insulin adminstered 10.8 units/hour    Multiplier 0.020     Low target 150 mg/dL    High target 250 mg/dL    D50 order 0.0 ml    D50 administered 0.00 ml    Minutes until next BG 60 min    Order initials jgj     Administered initials gw     GLSCOM Comments     GLUCOSE, POC    Collection Time: 06/10/18  2:08 AM   Result Value Ref Range    Glucose (POC) 437 (H) 65 - 100 mg/dL    Performed by Casi Zuniga    Collection Time: 06/10/18  2:10 AM   Result Value Ref Range    Glucose 437 mg/dL    Insulin order 7.5 units/hour    Insulin adminstered 7.5 units/hour    Multiplier 0.020     Low target 150 mg/dL    High target 250 mg/dL    D50 order 0.0 ml    D50 administered 0.00 ml    Minutes until next BG 60 min    Order initials TR     Administered initials TR     GLSCOM Comments     GLUCOSE, POC    Collection Time: 06/10/18  3:24 AM   Result Value Ref Range    Glucose (POC) 273 (H) 65 - 100 mg/dL    Performed by Les Proper    Collection Time: 06/10/18  3:25 AM   Result Value Ref Range    Glucose 273 mg/dL    Insulin order 4.3 units/hour    Insulin adminstered 4.3 units/hour    Multiplier 0.020     Low target 150 mg/dL    High target 250 mg/dL    D50 order 0.0 ml    D50 administered 0.00 ml    Minutes until next BG 60 min    Order initials TR     Administered initials TR     GLSCKAT Comments     METABOLIC PANEL, BASIC    Collection Time: 06/10/18  3:28 AM   Result Value Ref Range    Sodium 134 (L) 136 - 145 mmol/L    Potassium 4.1 3.5 - 5.1 mmol/L    Chloride 108 97 - 108 mmol/L    CO2 13 (LL) 21 - 32 mmol/L    Anion gap 13 5 - 15 mmol/L    Glucose 238 (H) 65 - 100 mg/dL    BUN 83 (H) 6 - 20 MG/DL    Creatinine 4.41 (H) 0.55 - 1.02 MG/DL    BUN/Creatinine ratio 19 12 - 20      GFR est AA 14 (L) >60 ml/min/1.73m2    GFR est non-AA 11 (L) >60 ml/min/1.73m2    Calcium 6.1 (LL) 8.5 - 10.1 MG/DL   MAGNESIUM    Collection Time: 06/10/18  3:28 AM   Result Value Ref Range    Magnesium 1.3 (L) 1.6 - 2.4 mg/dL   PHOSPHORUS    Collection Time: 06/10/18  3:28 AM   Result Value Ref Range    Phosphorus 6.9 (H) 2.6 - 4.7 MG/DL   CBC WITH AUTOMATED DIFF    Collection Time: 06/10/18  3:28 AM   Result Value Ref Range    WBC 5.7 3.6 - 11.0 K/uL    RBC 3.11 (L) 3.80 - 5.20 M/uL    HGB 8.0 (L) 11.5 - 16.0 g/dL    HCT 25.1 (L) 35.0 - 47.0 %    MCV 80.7 80.0 - 99.0 FL    MCH 25.7 (L) 26.0 - 34.0 PG    MCHC 31.9 30.0 - 36.5 g/dL    RDW 15.1 (H) 11.5 - 14.5 %    PLATELET 757 (L) 066 - 400 K/uL    MPV 12.2 8.9 - 12.9 FL    NRBC 0.0 0  WBC    ABSOLUTE NRBC 0.00 0.00 - 0.01 K/uL    NEUTROPHILS 83 (H) 32 - 75 %    LYMPHOCYTES 10 (L) 12 - 49 %    MONOCYTES 5 5 - 13 %    EOSINOPHILS 1 0 - 7 %    BASOPHILS 0 0 - 1 %    IMMATURE GRANULOCYTES 1 (H) 0.0 - 0.5 %    ABS. NEUTROPHILS 4.7 1.8 - 8.0 K/UL    ABS. LYMPHOCYTES 0.6 (L) 0.8 - 3.5 K/UL    ABS. MONOCYTES 0.3 0.0 - 1.0 K/UL    ABS. EOSINOPHILS 0.1 0.0 - 0.4 K/UL    ABS. BASOPHILS 0.0 0.0 - 0.1 K/UL    ABS. IMM.  GRANS. 0.0 0.00 - 0.04 K/UL    DF AUTOMATED     HEMOGLOBIN A1C WITH EAG    Collection Time: 06/10/18  3:28 AM   Result Value Ref Range    Hemoglobin A1c 7.9 (H) 4.2 - 6.3 %    Est. average glucose 180 mg/dL   GLUCOSE, POC    Collection Time: 06/10/18  4:28 AM   Result Value Ref Range    Glucose (POC) 175 (H) 65 - 100 mg/dL    Performed by Estela GamingTurf    Collection Time: 06/10/18  4:28 AM   Result Value Ref Range    Glucose 175 mg/dL    Insulin order 2.3 units/hour    Insulin adminstered 2.3 units/hour    Multiplier 0.020     Low target 150 mg/dL    High target 250 mg/dL    D50 order 0.0 ml    D50 administered 0.00 ml    Minutes until next BG 60 min    Order initials TR     Administered initials TR     GLSCOM Comments     POC G3 - PUL    Collection Time: 06/10/18  5:22 AM   Result Value Ref Range    pH (POC) 7.146 (LL) 7.35 - 7.45      pCO2 (POC) 32.9 (L) 35.0 - 45.0 MMHG    pO2 (POC) 110 (H) 80 - 100 MMHG    HCO3 (POC) 11.3 (L) 22 - 26 MMOL/L    sO2 (POC) 97 92 - 97 %    Base deficit (POC) 18 mmol/L    Site LEFT BRACHIAL      Device: ROOM AIR      Allens test (POC) YES      Specimen type (POC) ARTERIAL     GLUCOSE, POC    Collection Time: 06/10/18  5:31 AM   Result Value Ref Range    Glucose (POC) 107 (H) 65 - 100 mg/dL    Performed by Estela Rogel    Collection Time: 06/10/18  5:31 AM   Result Value Ref Range    Glucose 107 mg/dL    Insulin order 0.5 units/hour    Insulin adminstered 0.5 units/hour    Multiplier 0.010     Low target 150 mg/dL    High target 250 mg/dL    D50 order 0.0 ml    D50 administered 0.00 ml    Minutes until next BG 60 min    Order initials sga     Administered initials sga     GLSCOM Comments     GLUCOSE, POC    Collection Time: 06/10/18  6:34 AM   Result Value Ref Range    Glucose (POC) 82 65 - 100 mg/dL    Performed by Javon Goodman    Collection Time: 06/10/18  6:34 AM   Result Value Ref Range    Glucose 82 mg/dL    Insulin order 0.0 units/hour    Insulin adminstered 0.0 units/hour    Multiplier 0.000     Low target 150 mg/dL    High target 250 mg/dL    D50 order 0.0 ml    D50 administered 0.00 ml    Minutes until next BG 60 min    Order initials tr     Administered initials tr     GLSCOM Comments     CBC WITH AUTOMATED DIFF    Collection Time: 06/10/18  7:44 AM   Result Value Ref Range    WBC 5.8 3.6 - 11.0 K/uL    RBC 3.23 (L) 3.80 - 5.20 M/uL    HGB 8.3 (L) 11.5 - 16.0 g/dL    HCT 26.1 (L) 35.0 - 47.0 %    MCV 80.8 80.0 - 99.0 FL    MCH 25.7 (L) 26.0 - 34.0 PG    MCHC 31.8 30.0 - 36.5 g/dL    RDW 15.1 (H) 11.5 - 14.5 %    PLATELET 619 (L) 460 - 400 K/uL    NRBC 0.0 0  WBC    ABSOLUTE NRBC 0.00 0.00 - 0.01 K/uL    NEUTROPHILS 81 (H) 32 - 75 %    LYMPHOCYTES 12 12 - 49 %    MONOCYTES 6 5 - 13 %    EOSINOPHILS 1 0 - 7 %    BASOPHILS 0 0 - 1 %    IMMATURE GRANULOCYTES 0 0.0 - 0.5 %    ABS. NEUTROPHILS 4.7 1.8 - 8.0 K/UL    ABS. LYMPHOCYTES 0.7 (L) 0.8 - 3.5 K/UL    ABS. MONOCYTES 0.4 0.0 - 1.0 K/UL    ABS. EOSINOPHILS 0.0 0.0 - 0.4 K/UL    ABS. BASOPHILS 0.0 0.0 - 0.1 K/UL    ABS. IMM.  GRANS. 0.0 0.00 - 0.04 K/UL    DF AUTOMATED     LIPID PANEL    Collection Time: 06/10/18  7:44 AM   Result Value Ref Range    LIPID PROFILE          Cholesterol, total 128 <200 MG/DL    Triglyceride 48 <150 MG/DL    HDL Cholesterol 51 MG/DL    LDL, calculated 67.4 0 - 100 MG/DL    VLDL, calculated 9.6 MG/DL    CHOL/HDL Ratio 2.5 0 - 5.0     PHOSPHORUS    Collection Time: 06/10/18  7:44 AM   Result Value Ref Range Phosphorus 7.1 (H) 2.6 - 4.7 MG/DL   CK W/ CKMB & INDEX    Collection Time: 06/10/18  7:44 AM   Result Value Ref Range     26 - 192 U/L    CK - MB 3.5 <3.6 NG/ML    CK-MB Index 3.5 (H) 0 - 2.5     TSH 3RD GENERATION    Collection Time: 06/10/18  7:44 AM   Result Value Ref Range    TSH 2.98 0.36 - 3.74 uIU/mL   AMYLASE    Collection Time: 06/10/18  7:44 AM   Result Value Ref Range    Amylase 88 25 - 115 U/L   LIPASE    Collection Time: 06/10/18  7:44 AM   Result Value Ref Range    Lipase 548 (H) 73 - 393 U/L   TROPONIN I    Collection Time: 06/10/18  7:44 AM   Result Value Ref Range    Troponin-I, Qt. <0.04 <2.96 ng/mL   METABOLIC PANEL, BASIC    Collection Time: 06/10/18  7:44 AM   Result Value Ref Range    Sodium 136 136 - 145 mmol/L    Potassium 4.8 3.5 - 5.1 mmol/L    Chloride 108 97 - 108 mmol/L    CO2 15 (LL) 21 - 32 mmol/L    Anion gap 13 5 - 15 mmol/L    Glucose 103 (H) 65 - 100 mg/dL    BUN 86 (H) 6 - 20 MG/DL    Creatinine 4.50 (H) 0.55 - 1.02 MG/DL    BUN/Creatinine ratio 19 12 - 20      GFR est AA 13 (L) >60 ml/min/1.73m2    GFR est non-AA 11 (L) >60 ml/min/1.73m2    Calcium 6.1 (LL) 8.5 - 10.1 MG/DL   MAGNESIUM    Collection Time: 06/10/18  7:44 AM   Result Value Ref Range    Magnesium 1.6 1.6 - 2.4 mg/dL   GLUCOSE, POC    Collection Time: 06/10/18  7:49 AM   Result Value Ref Range    Glucose (POC) >600 (HH) 65 - 100 mg/dL    Performed by GEORGES TIPTON    GLUCOSE, POC    Collection Time: 06/10/18  7:50 AM   Result Value Ref Range    Glucose (POC) 111 (H) 65 - 100 mg/dL    Performed by Avery Harris    Collection Time: 06/10/18  7:51 AM   Result Value Ref Range    Glucose 111 mg/dL    Insulin order 0.0 units/hour    Insulin adminstered 0.0 units/hour    Multiplier 0.000     Low target 150 mg/dL    High target 250 mg/dL    D50 order 0.0 ml    D50 administered 0.00 ml    Minutes until next BG 60 min    Order initials HUIK     Administered initials JOVI     GLSCOM Comments GLUCOSTABILIZER    Collection Time: 06/10/18  8:55 AM   Result Value Ref Range    Glucose 134 mg/dL    Insulin order 0.0 units/hour    Insulin adminstered 0.0 units/hour    Multiplier 0.000     Low target 150 mg/dL    High target 250 mg/dL    D50 order 0.0 ml    D50 administered 0.00 ml    Minutes until next BG 60 min    Order initials JK     Administered initials JK     GLSCOM Comments         Imaging:  I have personally reviewed the patients radiographs and have reviewed the reports:  CT abd/pelvis reviewed, chest X-ray pending        Gardner Leyden, MD

## 2018-06-10 NOTE — PROGRESS NOTES
0115 TRANSFER - IN REPORT:    Verbal report received from Savannah Mohs (name) on Stoney Holloway  being received from ED (unit) for routine progression of care      Report consisted of patients Situation, Background, Assessment and   Recommendations(SBAR). Information from the following report(s) SBAR, Kardex, Procedure Summary, Intake/Output, MAR, Recent Results and Cardiac Rhythm NSR was reviewed with the receiving nurse. Opportunity for questions and clarification was provided. Assessment completed upon patients arrival to unit and care assumed. 0215 Pt arrived to unit minimally responsive. But able to say \"okay\" and nod when asked repeatedly. Pt temp 33 C rectally, devon hugger placed    0430 Pt switched from NS to D5NS fluids for BG < 250. Patient thrashing around in the bed, selectively responsive with \"no\" or \"yes\" but continually thrashing in the bed posing great fall risk. MD aware that pt cannot get Head CT at this time. 0600 Restraints applied.

## 2018-06-10 NOTE — PROGRESS NOTES
0730 Bedside and Verbal shift change report given to Syeda Lyons (oncoming nurse) by Valerie Adamson (offgoing nurse). Report included the following information SBAR, Kardex, ED Summary, Procedure Summary, Intake/Output, MAR, Accordion and Recent Results. 0800 Pt agitated, screaming no command following. Dr. Cody Magallon @ bedside. Orders for 2mg Ativan. Pts temp 93.6 devon hugger applied. 0830 Pt increasing angioedema, pulmonary consulted. [address] @ bedside, orders ro intubate pt to protect airway. Anesthesia paged. 0900 Anesthesia @ bedside Pt to go down to OR for difficult intubation for possible trach placement. 0905 Pt down to OR    1016 Pt back to unit, successful intubation. Dr. Rahul Luna and Dr. Otilia Rush @ bedside, orders received. 1100 ABG obtained, Dr. Rahul Luna paged, orders received. 1230 Attempted to call patient's , unable to reach. 1545 Pt down to CT    1600 Pt back on unit    1730 Pt becoming agitated on 50 propofol, Dr. Cody Magallon paged, orders received for precedex    1930 Bedside and Verbal shift change report given to Demond Pham (oncoming nurse) by Syeda Lyons (offgoing nurse). Report included the following information SBAR, Kardex, ED Summary, Procedure Summary, Intake/Output, MAR, Accordion and Recent Results.

## 2018-06-10 NOTE — CONSULTS
Dr. Mikey Mckeon,    Thank you for involving me in this patient's care. Please see below for my assessment and feel free to contact me directly with any questions. -BF    OTOLARYNGOLOGY - HEAD AND NECK SURGERY HISTORY AND PHYSICAL    Requesting Physician:    Arlyn Daigle MD     CC:   angioedema    HPI:     Josee Brar is a 42 y.o. female seen today in consultation at the request of Dr. Mikey Mckeon for angioedema. Patient presented to ED with DKA. Progressive tongue swelling started overnight then with SOB and stridor. I was called emergently to be available for tracheostomy. I arrived and patient was sedated in OR pending intubation. Unable to obtain history from patient.     Past Medical History:   Diagnosis Date    ARF (acute renal failure) ([address] requiring dialysis 2011    Asthma     CKD (chronic kidney disease)     Diabetes ([address]     Gastroparesis 2010    Gastric Pacer- REMOVED 07/2015    GERD (gastroesophageal reflux disease)     Hypertension     Narcotic dependence ([address]     THU (obstructive sleep apnea)     wears 2 LPM oxygen at night    Other ill-defined conditions(799.89)     Polycystic ovarian syndrome     Seizures ([address]     Sickle cell trait ([address]     Thromboembolus ([address] to her left arm and was told she had one in left leg recently     Past Surgical History:   Procedure Laterality Date    HX CATARACT REMOVAL  3/5/12    right    HX DILATION AND CURETTAGE      ablation    HX GASTRIC BYPASS  2015    HX GI      j tube placement and removal    HX OTHER SURGICAL  2010    Gastric Pacer- REMOVED 07/2015    HX VASCULAR ACCESS      gray cath rt subclavian    HX VASCULAR ACCESS      HD access right thigh     Current Facility-Administered Medications   Medication Dose Route Frequency    albuterol (PROVENTIL VENTOLIN) nebulizer solution 2.5 mg  2.5 mg Nebulization Q4H PRN    sodium chloride (NS) flush 5-10 mL  5-10 mL IntraVENous Q8H    sodium chloride (NS) flush 5-10 mL  5-10 mL IntraVENous PRN    bisacodyl (DULCOLAX) tablet 5 mg  5 mg Oral DAILY PRN    heparin (porcine) injection 5,000 Units  5,000 Units SubCUTAneous Q8H    hydrALAZINE (APRESOLINE) 20 mg/mL injection 10 mg  10 mg IntraVENous Q6H PRN    [START ON 6/11/2018] DAPTOmycin (CUBICIN) 400 mg in 0.9% sodium chloride 50 mL IVPB RF formulation  400 mg IntraVENous Q48H    levoFLOXacin (LEVAQUIN) 250 mg in D5W IVPB  250 mg IntraVENous Q48H    sodium bicarbonate (8.4%) 150 mEq in dextrose 5 % - 0.45% NaCl 1,000 mL infusion   IntraVENous CONTINUOUS    calcium gluconate 2 g in 0.9% sodium chloride 100 mL IVPB  2 g IntraVENous ONCE    fentaNYL citrate (PF) injection 25 mcg  25 mcg IntraVENous Q4H PRN    ondansetron (ZOFRAN) injection 4 mg  4 mg IntraVENous Q4H PRN    sodium chloride (NS) flush 5-10 mL  5-10 mL IntraVENous Q8H    sodium chloride (NS) flush 5-10 mL  5-10 mL IntraVENous PRN    insulin regular (NOVOLIN R, HUMULIN R) 100 Units in 0.9% sodium chloride 100 mL infusion  0-50 Units/hr IntraVENous TITRATE    insulin lispro (HUMALOG) injection   SubCUTAneous TIDAC    glucose chewable tablet 16 g  4 Tab Oral PRN    dextrose (D50W) injection syrg 12.5-25 g  12.5-25 g IntraVENous PRN    glucagon (GLUCAGEN) injection 1 mg  1 mg IntraMUSCular PRN     Facility-Administered Medications Ordered in Other Encounters   Medication Dose Route Frequency    lactated Ringers infusion   IntraVENous CONTINUOUS    midazolam (VERSED) injection   IntraVENous PRN    PHENYLephrine (NEOSYNEPHRINE) in NS syringe   IntraVENous PRN    glycopyrrolate (ROBINUL) injection   IntraVENous PRN      Allergies   Allergen Reactions    Erythromycin Itching    Morphine Itching    Toradol [Ketorolac] Rash     Family History   Problem Relation Age of Onset    Cancer Mother      lung    Hypertension Mother     Cancer Father      kidney    Stroke Father      3 strokes: 59-72    Heart Disease Father 72     CABG    Hypertension Father     Cancer Sister      pancreatic    Cancer Maternal Aunt      breast    Cancer Paternal Aunt      breast    Schizophrenia Sister      was in [address], now ass't living    Other Sister       AIDS    Other Other      nephew of AIDS     Social History   Substance Use Topics    Smoking status: Never Smoker    Smokeless tobacco: Never Used    Alcohol use No         REVIEW OF SYSTEMS  n/a    Visit Vitals    /56    Pulse 81    Temp (!) 93.9 °F (34.4 °C)    Resp 12    Ht 5' 2\" (1.575 m)    Wt 67.2 kg (148 lb 2.4 oz)    SpO2 91%    BMI 27.1 kg/m2        PHYSICAL EXAM  General:  No acute distress. Alert and oriented x 3. MSK:   Normal muscle bulk  Psych:  Mood and affect appropriate. Neuro:              CN II - XII grossly intact bilaterally. Eyes:  PERRL/EOMI, no nystagmus. ENT:   Oral tongue diffusely enlarged. Lips not involved. Lymph:  Neck soft and supple without lymphadenopathy. Resp:   audible stridor  Skin:   Head and neck skin is without suspicious lesions. PROCEDURE: orotracheal intubation  Neck was injected with 4cc of 1% lidocaine with epinephrine. Nose packed with pledgets with neosynephrine then removed. I was present for a successful orotracheal intubation by Dr. Alcira Hall. Tracheostomy was not needed. ASSESSMENT/PLAN:  41 yo F with angioedema, airway compromise, and DKA. Continue insulin drip per primary team.  I recommend Decadron Q8H if acceptable from diabetes standpoint. Benadry, famotidine, allergy consult. Will follow closely. Octavia Head, [address] [facility] [address], [facility]  [address] [facility], [address]   (Q) [phone]  (T) [phone]  (X) [phone]

## 2018-06-10 NOTE — ANESTHESIA PREPROCEDURE EVALUATION
Anesthetic History   No history of anesthetic complications            Review of Systems / Medical History  Patient summary reviewed, nursing notes reviewed and pertinent labs reviewed    Pulmonary  Within defined limits      Sleep apnea: CPAP    Asthma        Neuro/Psych   Within defined limits  seizures         Cardiovascular  Within defined limits  Hypertension                   GI/Hepatic/Renal     GERD    Renal disease: CRI       Endo/Other  Within defined limits  Diabetes: using insulin    Anemia     Other Findings   Comments: Sickle cell trait  Airway emergency to OR possibe trach           Physical Exam    Airway  Mallampati: IV  TM Distance: > 6 cm  Neck ROM: decreased range of motion   Mouth opening: Diminished (comment)    Comments: Angioedema tongue swelling airway compromise Cardiovascular  Regular rate and rhythm,  S1 and S2 normal,  no murmur, click, rub, or gallop             Dental  No notable dental hx       Pulmonary  Breath sounds clear to auscultation               Abdominal  GI exam deferred       Other Findings            Anesthetic Plan    ASA: 3, emergent  Anesthesia type: MAC

## 2018-06-11 ENCOUNTER — APPOINTMENT (OUTPATIENT)
Dept: GENERAL RADIOLOGY | Age: 38
DRG: 871 | End: 2018-06-11
Attending: INTERNAL MEDICINE
Payer: MEDICARE

## 2018-06-11 LAB
ADMINISTERED INITIALS, ADMINIT: NORMAL
ALBUMIN SERPL-MCNC: 2.5 G/DL (ref 3.5–5)
ALBUMIN/GLOB SERPL: 0.7 {RATIO} (ref 1.1–2.2)
ALP SERPL-CCNC: 146 U/L (ref 45–117)
ALT SERPL-CCNC: 16 U/L (ref 12–78)
ANION GAP SERPL CALC-SCNC: 12 MMOL/L (ref 5–15)
ANION GAP SERPL CALC-SCNC: 14 MMOL/L (ref 5–15)
ANION GAP SERPL CALC-SCNC: 15 MMOL/L (ref 5–15)
ANION GAP SERPL CALC-SCNC: 15 MMOL/L (ref 5–15)
ARTERIAL PATENCY WRIST A: YES
AST SERPL-CCNC: 11 U/L (ref 15–37)
BASE DEFICIT BLD-SCNC: 5 MMOL/L
BDY SITE: ABNORMAL
BILIRUB SERPL-MCNC: 0.2 MG/DL (ref 0.2–1)
BUN SERPL-MCNC: 81 MG/DL (ref 6–20)
BUN SERPL-MCNC: 82 MG/DL (ref 6–20)
BUN SERPL-MCNC: 83 MG/DL (ref 6–20)
BUN SERPL-MCNC: 84 MG/DL (ref 6–20)
BUN/CREAT SERPL: 20 (ref 12–20)
BUN/CREAT SERPL: 20 (ref 12–20)
BUN/CREAT SERPL: 21 (ref 12–20)
BUN/CREAT SERPL: 21 (ref 12–20)
CALCIUM SERPL-MCNC: 7.3 MG/DL (ref 8.5–10.1)
CALCIUM SERPL-MCNC: 7.3 MG/DL (ref 8.5–10.1)
CALCIUM SERPL-MCNC: 7.4 MG/DL (ref 8.5–10.1)
CALCIUM SERPL-MCNC: 7.5 MG/DL (ref 8.5–10.1)
CALCIUM SERPL-MCNC: 7.9 MG/DL (ref 8.5–10.1)
CHLORIDE SERPL-SCNC: 104 MMOL/L (ref 97–108)
CHLORIDE SERPL-SCNC: 107 MMOL/L (ref 97–108)
CHLORIDE SERPL-SCNC: 108 MMOL/L (ref 97–108)
CHLORIDE SERPL-SCNC: 108 MMOL/L (ref 97–108)
CK MB CFR SERPL CALC: 3.1 % (ref 0–2.5)
CK MB SERPL-MCNC: 1.6 NG/ML (ref 5–25)
CK SERPL-CCNC: 51 U/L (ref 26–192)
CO2 SERPL-SCNC: 17 MMOL/L (ref 21–32)
CO2 SERPL-SCNC: 17 MMOL/L (ref 21–32)
CO2 SERPL-SCNC: 19 MMOL/L (ref 21–32)
CO2 SERPL-SCNC: 19 MMOL/L (ref 21–32)
CORTIS AM PEAK SERPL-MCNC: 13.8 UG/DL (ref 4.3–22.4)
CREAT SERPL-MCNC: 3.91 MG/DL (ref 0.55–1.02)
CREAT SERPL-MCNC: 3.97 MG/DL (ref 0.55–1.02)
CREAT SERPL-MCNC: 4.02 MG/DL (ref 0.55–1.02)
CREAT SERPL-MCNC: 4.12 MG/DL (ref 0.55–1.02)
D50 ADMINISTERED, D50ADM: 0 ML
D50 ORDER, D50ORD: 0 ML
ERYTHROCYTE [DISTWIDTH] IN BLOOD BY AUTOMATED COUNT: 14.6 % (ref 11.5–14.5)
GAS FLOW.O2 O2 DELIVERY SYS: ABNORMAL L/MIN
GLOBULIN SER CALC-MCNC: 3.6 G/DL (ref 2–4)
GLSCOM COMMENTS: NORMAL
GLUCOSE BLD STRIP.AUTO-MCNC: 102 MG/DL (ref 65–100)
GLUCOSE BLD STRIP.AUTO-MCNC: 103 MG/DL (ref 65–100)
GLUCOSE BLD STRIP.AUTO-MCNC: 123 MG/DL (ref 65–100)
GLUCOSE BLD STRIP.AUTO-MCNC: 124 MG/DL (ref 65–100)
GLUCOSE BLD STRIP.AUTO-MCNC: 128 MG/DL (ref 65–100)
GLUCOSE BLD STRIP.AUTO-MCNC: 133 MG/DL (ref 65–100)
GLUCOSE BLD STRIP.AUTO-MCNC: 135 MG/DL (ref 65–100)
GLUCOSE BLD STRIP.AUTO-MCNC: 154 MG/DL (ref 65–100)
GLUCOSE BLD STRIP.AUTO-MCNC: 163 MG/DL (ref 65–100)
GLUCOSE BLD STRIP.AUTO-MCNC: 172 MG/DL (ref 65–100)
GLUCOSE BLD STRIP.AUTO-MCNC: 181 MG/DL (ref 65–100)
GLUCOSE BLD STRIP.AUTO-MCNC: 194 MG/DL (ref 65–100)
GLUCOSE BLD STRIP.AUTO-MCNC: 203 MG/DL (ref 65–100)
GLUCOSE BLD STRIP.AUTO-MCNC: 206 MG/DL (ref 65–100)
GLUCOSE BLD STRIP.AUTO-MCNC: 222 MG/DL (ref 65–100)
GLUCOSE BLD STRIP.AUTO-MCNC: 249 MG/DL (ref 65–100)
GLUCOSE BLD STRIP.AUTO-MCNC: 251 MG/DL (ref 65–100)
GLUCOSE BLD STRIP.AUTO-MCNC: 254 MG/DL (ref 65–100)
GLUCOSE BLD STRIP.AUTO-MCNC: 260 MG/DL (ref 65–100)
GLUCOSE BLD STRIP.AUTO-MCNC: 272 MG/DL (ref 65–100)
GLUCOSE BLD STRIP.AUTO-MCNC: 276 MG/DL (ref 65–100)
GLUCOSE BLD STRIP.AUTO-MCNC: 278 MG/DL (ref 65–100)
GLUCOSE SERPL-MCNC: 145 MG/DL (ref 65–100)
GLUCOSE SERPL-MCNC: 154 MG/DL (ref 65–100)
GLUCOSE SERPL-MCNC: 156 MG/DL (ref 65–100)
GLUCOSE SERPL-MCNC: 199 MG/DL (ref 65–100)
GLUCOSE, GLC: 102 MG/DL
GLUCOSE, GLC: 103 MG/DL
GLUCOSE, GLC: 123 MG/DL
GLUCOSE, GLC: 124 MG/DL
GLUCOSE, GLC: 128 MG/DL
GLUCOSE, GLC: 133 MG/DL
GLUCOSE, GLC: 134 MG/DL
GLUCOSE, GLC: 154 MG/DL
GLUCOSE, GLC: 163 MG/DL
GLUCOSE, GLC: 172 MG/DL
GLUCOSE, GLC: 181 MG/DL
GLUCOSE, GLC: 194 MG/DL
GLUCOSE, GLC: 203 MG/DL
GLUCOSE, GLC: 206 MG/DL
GLUCOSE, GLC: 222 MG/DL
GLUCOSE, GLC: 249 MG/DL
GLUCOSE, GLC: 251 MG/DL
GLUCOSE, GLC: 254 MG/DL
GLUCOSE, GLC: 260 MG/DL
GLUCOSE, GLC: 272 MG/DL
GLUCOSE, GLC: 276 MG/DL
GLUCOSE, GLC: 278 MG/DL
HCO3 BLD-SCNC: 18 MMOL/L (ref 22–26)
HCT VFR BLD AUTO: 22.3 % (ref 35–47)
HGB BLD-MCNC: 7.6 G/DL (ref 11.5–16)
HIGH TARGET, HITG: 250 MG/DL
INSULIN ADMINSTERED, INSADM: 0 UNITS/HOUR
INSULIN ADMINSTERED, INSADM: 0.1 UNITS/HOUR
INSULIN ADMINSTERED, INSADM: 0.4 UNITS/HOUR
INSULIN ADMINSTERED, INSADM: 0.6 UNITS/HOUR
INSULIN ADMINSTERED, INSADM: 1.4 UNITS/HOUR
INSULIN ADMINSTERED, INSADM: 1.5 UNITS/HOUR
INSULIN ADMINSTERED, INSADM: 1.9 UNITS/HOUR
INSULIN ADMINSTERED, INSADM: 2.2 UNITS/HOUR
INSULIN ADMINSTERED, INSADM: 2.8 UNITS/HOUR
INSULIN ADMINSTERED, INSADM: 3.4 UNITS/HOUR
INSULIN ADMINSTERED, INSADM: 3.6 UNITS/HOUR
INSULIN ADMINSTERED, INSADM: 4 UNITS/HOUR
INSULIN ADMINSTERED, INSADM: 4.2 UNITS/HOUR
INSULIN ADMINSTERED, INSADM: 4.3 UNITS/HOUR
INSULIN ADMINSTERED, INSADM: 4.4 UNITS/HOUR
INSULIN ADMINSTERED, INSADM: 5.7 UNITS/HOUR
INSULIN ADMINSTERED, INSADM: 6.5 UNITS/HOUR
INSULIN ORDER, INSORD: 0 UNITS/HOUR
INSULIN ORDER, INSORD: 0.1 UNITS/HOUR
INSULIN ORDER, INSORD: 0.4 UNITS/HOUR
INSULIN ORDER, INSORD: 0.6 UNITS/HOUR
INSULIN ORDER, INSORD: 1.4 UNITS/HOUR
INSULIN ORDER, INSORD: 1.5 UNITS/HOUR
INSULIN ORDER, INSORD: 1.9 UNITS/HOUR
INSULIN ORDER, INSORD: 2.2 UNITS/HOUR
INSULIN ORDER, INSORD: 2.8 UNITS/HOUR
INSULIN ORDER, INSORD: 3.4 UNITS/HOUR
INSULIN ORDER, INSORD: 3.6 UNITS/HOUR
INSULIN ORDER, INSORD: 4 UNITS/HOUR
INSULIN ORDER, INSORD: 4.2 UNITS/HOUR
INSULIN ORDER, INSORD: 4.3 UNITS/HOUR
INSULIN ORDER, INSORD: 4.4 UNITS/HOUR
INSULIN ORDER, INSORD: 5.7 UNITS/HOUR
INSULIN ORDER, INSORD: 6.5 UNITS/HOUR
LOW TARGET, LOT: 150 MG/DL
MAGNESIUM SERPL-MCNC: 1.7 MG/DL (ref 1.6–2.4)
MAGNESIUM SERPL-MCNC: 1.9 MG/DL (ref 1.6–2.4)
MAGNESIUM SERPL-MCNC: 2.1 MG/DL (ref 1.6–2.4)
MAGNESIUM SERPL-MCNC: 2.5 MG/DL (ref 1.6–2.4)
MCH RBC QN AUTO: 25.9 PG (ref 26–34)
MCHC RBC AUTO-ENTMCNC: 34.1 G/DL (ref 30–36.5)
MCV RBC AUTO: 76.1 FL (ref 80–99)
MINUTES UNTIL NEXT BG, NBG: 60 MIN
MULTIPLIER, MUL: 0
MULTIPLIER, MUL: 0.01
MULTIPLIER, MUL: 0.02
MULTIPLIER, MUL: 0.03
NRBC # BLD: 0 K/UL (ref 0–0.01)
NRBC BLD-RTO: 0 PER 100 WBC
O2/TOTAL GAS SETTING VFR VENT: 50 %
ORDER INITIALS, ORDINIT: NORMAL
PCO2 BLD: 21.7 MMHG (ref 35–45)
PH BLD: 7.53 [PH] (ref 7.35–7.45)
PHOSPHATE SERPL-MCNC: 6 MG/DL (ref 2.6–4.7)
PLATELET # BLD AUTO: 133 K/UL (ref 150–400)
PO2 BLD: 269 MMHG (ref 80–100)
POTASSIUM SERPL-SCNC: 3.7 MMOL/L (ref 3.5–5.1)
POTASSIUM SERPL-SCNC: 3.9 MMOL/L (ref 3.5–5.1)
POTASSIUM SERPL-SCNC: 3.9 MMOL/L (ref 3.5–5.1)
POTASSIUM SERPL-SCNC: 4.4 MMOL/L (ref 3.5–5.1)
PROT SERPL-MCNC: 6.1 G/DL (ref 6.4–8.2)
PTH-INTACT SERPL-MCNC: 1199.3 PG/ML (ref 18.4–88)
RBC # BLD AUTO: 2.93 M/UL (ref 3.8–5.2)
SAO2 % BLD: 100 % (ref 92–97)
SERVICE CMNT-IMP: ABNORMAL
SODIUM SERPL-SCNC: 135 MMOL/L (ref 136–145)
SODIUM SERPL-SCNC: 140 MMOL/L (ref 136–145)
SPECIMEN TYPE: ABNORMAL
TOTAL RESP. RATE, ITRR: 28
TROPONIN I SERPL-MCNC: <0.04 NG/ML
TSH SERPL DL<=0.05 MIU/L-ACNC: 0.89 UIU/ML (ref 0.36–3.74)
WBC # BLD AUTO: 5.9 K/UL (ref 3.6–11)

## 2018-06-11 PROCEDURE — 82550 ASSAY OF CK (CPK): CPT | Performed by: INTERNAL MEDICINE

## 2018-06-11 PROCEDURE — 74011000258 HC RX REV CODE- 258: Performed by: HOSPITALIST

## 2018-06-11 PROCEDURE — 74011250637 HC RX REV CODE- 250/637: Performed by: INTERNAL MEDICINE

## 2018-06-11 PROCEDURE — 74011000250 HC RX REV CODE- 250: Performed by: INTERNAL MEDICINE

## 2018-06-11 PROCEDURE — 83735 ASSAY OF MAGNESIUM: CPT | Performed by: INTERNAL MEDICINE

## 2018-06-11 PROCEDURE — 80048 BASIC METABOLIC PNL TOTAL CA: CPT | Performed by: INTERNAL MEDICINE

## 2018-06-11 PROCEDURE — 74011250636 HC RX REV CODE- 250/636: Performed by: INTERNAL MEDICINE

## 2018-06-11 PROCEDURE — 84484 ASSAY OF TROPONIN QUANT: CPT | Performed by: INTERNAL MEDICINE

## 2018-06-11 PROCEDURE — 74011636637 HC RX REV CODE- 636/637: Performed by: INTERNAL MEDICINE

## 2018-06-11 PROCEDURE — 84443 ASSAY THYROID STIM HORMONE: CPT | Performed by: INTERNAL MEDICINE

## 2018-06-11 PROCEDURE — 82533 TOTAL CORTISOL: CPT | Performed by: INTERNAL MEDICINE

## 2018-06-11 PROCEDURE — 94003 VENT MGMT INPAT SUBQ DAY: CPT

## 2018-06-11 PROCEDURE — 77030013140 HC MSK NEB VYRM -A

## 2018-06-11 PROCEDURE — 80053 COMPREHEN METABOLIC PANEL: CPT | Performed by: INTERNAL MEDICINE

## 2018-06-11 PROCEDURE — 74011000258 HC RX REV CODE- 258: Performed by: INTERNAL MEDICINE

## 2018-06-11 PROCEDURE — 74011000250 HC RX REV CODE- 250: Performed by: HOSPITALIST

## 2018-06-11 PROCEDURE — 82962 GLUCOSE BLOOD TEST: CPT

## 2018-06-11 PROCEDURE — 84100 ASSAY OF PHOSPHORUS: CPT | Performed by: INTERNAL MEDICINE

## 2018-06-11 PROCEDURE — 94640 AIRWAY INHALATION TREATMENT: CPT

## 2018-06-11 PROCEDURE — 85027 COMPLETE CBC AUTOMATED: CPT | Performed by: INTERNAL MEDICINE

## 2018-06-11 PROCEDURE — 74011250636 HC RX REV CODE- 250/636

## 2018-06-11 PROCEDURE — 71045 X-RAY EXAM CHEST 1 VIEW: CPT

## 2018-06-11 PROCEDURE — 82803 BLOOD GASES ANY COMBINATION: CPT

## 2018-06-11 PROCEDURE — 36415 COLL VENOUS BLD VENIPUNCTURE: CPT | Performed by: INTERNAL MEDICINE

## 2018-06-11 PROCEDURE — 36600 WITHDRAWAL OF ARTERIAL BLOOD: CPT

## 2018-06-11 PROCEDURE — 83970 ASSAY OF PARATHORMONE: CPT | Performed by: INTERNAL MEDICINE

## 2018-06-11 PROCEDURE — 65620000000 HC RM CCU GENERAL

## 2018-06-11 RX ORDER — CHLORHEXIDINE GLUCONATE 1.2 MG/ML
15 RINSE ORAL 2 TIMES DAILY
Status: DISCONTINUED | OUTPATIENT
Start: 2018-06-11 | End: 2018-06-14 | Stop reason: HOSPADM

## 2018-06-11 RX ORDER — HYDRALAZINE HYDROCHLORIDE 20 MG/ML
INJECTION INTRAMUSCULAR; INTRAVENOUS
Status: COMPLETED
Start: 2018-06-11 | End: 2018-06-11

## 2018-06-11 RX ORDER — DEXAMETHASONE SODIUM PHOSPHATE 4 MG/ML
4 INJECTION, SOLUTION INTRA-ARTICULAR; INTRALESIONAL; INTRAMUSCULAR; INTRAVENOUS; SOFT TISSUE EVERY 8 HOURS
Status: DISCONTINUED | OUTPATIENT
Start: 2018-06-11 | End: 2018-06-11

## 2018-06-11 RX ORDER — MAGNESIUM SULFATE 100 %
4 CRYSTALS MISCELLANEOUS AS NEEDED
Status: DISCONTINUED | OUTPATIENT
Start: 2018-06-11 | End: 2018-06-11 | Stop reason: SDUPTHER

## 2018-06-11 RX ORDER — MAGNESIUM SULFATE HEPTAHYDRATE 40 MG/ML
2 INJECTION, SOLUTION INTRAVENOUS ONCE
Status: COMPLETED | OUTPATIENT
Start: 2018-06-11 | End: 2018-06-11

## 2018-06-11 RX ORDER — CLONIDINE 0.3 MG/24H
1 PATCH, EXTENDED RELEASE TRANSDERMAL
Status: DISCONTINUED | OUTPATIENT
Start: 2018-06-11 | End: 2018-06-14 | Stop reason: HOSPADM

## 2018-06-11 RX ORDER — POLYVINYL ALCOHOL 14 MG/ML
1 SOLUTION/ DROPS OPHTHALMIC AS NEEDED
Status: DISCONTINUED | OUTPATIENT
Start: 2018-06-11 | End: 2018-06-14 | Stop reason: HOSPADM

## 2018-06-11 RX ORDER — INSULIN LISPRO 100 [IU]/ML
INJECTION, SOLUTION INTRAVENOUS; SUBCUTANEOUS EVERY 6 HOURS
Status: DISCONTINUED | OUTPATIENT
Start: 2018-06-11 | End: 2018-06-12

## 2018-06-11 RX ORDER — DEXTROSE 50 % IN WATER (D50W) INTRAVENOUS SYRINGE
12.5-25 AS NEEDED
Status: DISCONTINUED | OUTPATIENT
Start: 2018-06-11 | End: 2018-06-11 | Stop reason: SDUPTHER

## 2018-06-11 RX ADMIN — IPRATROPIUM BROMIDE AND ALBUTEROL SULFATE 3 ML: .5; 3 SOLUTION RESPIRATORY (INHALATION) at 20:19

## 2018-06-11 RX ADMIN — Medication 10 ML: at 21:54

## 2018-06-11 RX ADMIN — SODIUM CHLORIDE 6.5 UNITS/HR: 900 INJECTION, SOLUTION INTRAVENOUS at 00:25

## 2018-06-11 RX ADMIN — DIPHENHYDRAMINE HYDROCHLORIDE 50 MG: 50 INJECTION, SOLUTION INTRAMUSCULAR; INTRAVENOUS at 05:23

## 2018-06-11 RX ADMIN — HEPARIN SODIUM 5000 UNITS: 5000 INJECTION, SOLUTION INTRAVENOUS; SUBCUTANEOUS at 20:14

## 2018-06-11 RX ADMIN — IPRATROPIUM BROMIDE AND ALBUTEROL SULFATE 3 ML: .5; 3 SOLUTION RESPIRATORY (INHALATION) at 17:04

## 2018-06-11 RX ADMIN — PROPOFOL 50 MCG/KG/MIN: 10 INJECTION, EMULSION INTRAVENOUS at 19:25

## 2018-06-11 RX ADMIN — SODIUM CHLORIDE 0 UNITS/HR: 900 INJECTION, SOLUTION INTRAVENOUS at 05:25

## 2018-06-11 RX ADMIN — NITROGLYCERIN 2 INCH: 20 OINTMENT TOPICAL at 00:30

## 2018-06-11 RX ADMIN — Medication 10 ML: at 05:22

## 2018-06-11 RX ADMIN — MAGNESIUM SULFATE HEPTAHYDRATE 2 G: 40 INJECTION, SOLUTION INTRAVENOUS at 03:38

## 2018-06-11 RX ADMIN — HYDRALAZINE HYDROCHLORIDE 20 MG: 20 INJECTION INTRAMUSCULAR; INTRAVENOUS at 20:15

## 2018-06-11 RX ADMIN — FENTANYL CITRATE 100 MCG: 50 INJECTION, SOLUTION INTRAMUSCULAR; INTRAVENOUS at 11:09

## 2018-06-11 RX ADMIN — HEPARIN SODIUM 5000 UNITS: 5000 INJECTION, SOLUTION INTRAVENOUS; SUBCUTANEOUS at 12:08

## 2018-06-11 RX ADMIN — PROPOFOL 40 MCG/KG/MIN: 10 INJECTION, EMULSION INTRAVENOUS at 04:07

## 2018-06-11 RX ADMIN — HYDRALAZINE HYDROCHLORIDE 10 MG: 20 INJECTION INTRAMUSCULAR; INTRAVENOUS at 11:51

## 2018-06-11 RX ADMIN — NITROGLYCERIN 1 INCH: 20 OINTMENT TOPICAL at 08:37

## 2018-06-11 RX ADMIN — HYDRALAZINE HYDROCHLORIDE 10 MG: 20 INJECTION INTRAMUSCULAR; INTRAVENOUS at 20:12

## 2018-06-11 RX ADMIN — PROPOFOL 40 MCG/KG/MIN: 10 INJECTION, EMULSION INTRAVENOUS at 05:30

## 2018-06-11 RX ADMIN — IPRATROPIUM BROMIDE AND ALBUTEROL SULFATE 3 ML: .5; 3 SOLUTION RESPIRATORY (INHALATION) at 08:28

## 2018-06-11 RX ADMIN — NITROGLYCERIN 1 INCH: 20 OINTMENT TOPICAL at 18:00

## 2018-06-11 RX ADMIN — SODIUM CHLORIDE 4.2 UNITS/HR: 900 INJECTION, SOLUTION INTRAVENOUS at 22:55

## 2018-06-11 RX ADMIN — SODIUM CHLORIDE 0.5 MCG/KG/HR: 900 INJECTION, SOLUTION INTRAVENOUS at 13:08

## 2018-06-11 RX ADMIN — DAPTOMYCIN 400 MG: 500 INJECTION, POWDER, LYOPHILIZED, FOR SOLUTION INTRAVENOUS at 18:05

## 2018-06-11 RX ADMIN — Medication 10 ML: at 05:23

## 2018-06-11 RX ADMIN — IPRATROPIUM BROMIDE AND ALBUTEROL SULFATE 3 ML: .5; 3 SOLUTION RESPIRATORY (INHALATION) at 12:12

## 2018-06-11 RX ADMIN — Medication 10 ML: at 13:02

## 2018-06-11 RX ADMIN — SODIUM CHLORIDE 0.4 MCG/KG/HR: 900 INJECTION, SOLUTION INTRAVENOUS at 13:57

## 2018-06-11 RX ADMIN — PROPOFOL 50 MCG/KG/MIN: 10 INJECTION, EMULSION INTRAVENOUS at 00:57

## 2018-06-11 RX ADMIN — SODIUM CHLORIDE 0.6 MCG/KG/HR: 900 INJECTION, SOLUTION INTRAVENOUS at 08:46

## 2018-06-11 RX ADMIN — SODIUM CHLORIDE 0.1 UNITS/HR: 900 INJECTION, SOLUTION INTRAVENOUS at 07:24

## 2018-06-11 RX ADMIN — PROPOFOL 50 MCG/KG/MIN: 10 INJECTION, EMULSION INTRAVENOUS at 10:12

## 2018-06-11 RX ADMIN — CALCIUM GLUCONATE 2 G: 94 INJECTION, SOLUTION INTRAVENOUS at 03:39

## 2018-06-11 RX ADMIN — SODIUM CHLORIDE 0.6 UNITS/HR: 900 INJECTION, SOLUTION INTRAVENOUS at 18:28

## 2018-06-11 RX ADMIN — HEPARIN SODIUM 5000 UNITS: 5000 INJECTION, SOLUTION INTRAVENOUS; SUBCUTANEOUS at 05:23

## 2018-06-11 RX ADMIN — DIPHENHYDRAMINE HYDROCHLORIDE 50 MG: 50 INJECTION, SOLUTION INTRAMUSCULAR; INTRAVENOUS at 11:08

## 2018-06-11 RX ADMIN — FENTANYL CITRATE 100 MCG: 50 INJECTION, SOLUTION INTRAMUSCULAR; INTRAVENOUS at 08:49

## 2018-06-11 RX ADMIN — METHYLPREDNISOLONE SODIUM SUCCINATE 80 MG: 40 INJECTION, POWDER, FOR SOLUTION INTRAMUSCULAR; INTRAVENOUS at 21:53

## 2018-06-11 RX ADMIN — HYDRALAZINE HYDROCHLORIDE 10 MG: 20 INJECTION INTRAMUSCULAR; INTRAVENOUS at 06:15

## 2018-06-11 RX ADMIN — DEXTROSE MONOHYDRATE: 5 INJECTION, SOLUTION INTRAVENOUS at 19:23

## 2018-06-11 RX ADMIN — DEXTROSE MONOHYDRATE: 5 INJECTION, SOLUTION INTRAVENOUS at 07:15

## 2018-06-11 RX ADMIN — CHLORHEXIDINE GLUCONATE 15 ML: 1.2 RINSE ORAL at 20:15

## 2018-06-11 RX ADMIN — DIPHENHYDRAMINE HYDROCHLORIDE 50 MG: 50 INJECTION, SOLUTION INTRAMUSCULAR; INTRAVENOUS at 18:03

## 2018-06-11 RX ADMIN — FENTANYL CITRATE 100 MCG: 50 INJECTION, SOLUTION INTRAMUSCULAR; INTRAVENOUS at 06:51

## 2018-06-11 RX ADMIN — BENZALKONIUM CHLORIDE, SODIUM PHOSPHATE, DIBASIC, EDETATE DISODIUM,SODIUM PHOSPHATE, MONOBASIC, SODIUM CHLORIDE, WATER, PHOSPHORIC ACID, SODIUM HYDROXIDE 1 DROP: 14 SOLUTION INTRAOCULAR at 11:49

## 2018-06-11 RX ADMIN — Medication 10 ML: at 13:01

## 2018-06-11 RX ADMIN — CHLORHEXIDINE GLUCONATE 15 ML: 1.2 RINSE ORAL at 11:49

## 2018-06-11 RX ADMIN — METHYLPREDNISOLONE SODIUM SUCCINATE 80 MG: 40 INJECTION, POWDER, FOR SOLUTION INTRAMUSCULAR; INTRAVENOUS at 05:23

## 2018-06-11 RX ADMIN — PROPOFOL 50 MCG/KG/MIN: 10 INJECTION, EMULSION INTRAVENOUS at 15:13

## 2018-06-11 RX ADMIN — FAMOTIDINE 20 MG: 10 INJECTION, SOLUTION INTRAVENOUS at 08:38

## 2018-06-11 RX ADMIN — SODIUM CHLORIDE 1.9 UNITS/HR: 900 INJECTION, SOLUTION INTRAVENOUS at 21:52

## 2018-06-11 RX ADMIN — METHYLPREDNISOLONE SODIUM SUCCINATE 80 MG: 40 INJECTION, POWDER, FOR SOLUTION INTRAMUSCULAR; INTRAVENOUS at 13:05

## 2018-06-11 RX ADMIN — SODIUM CHLORIDE 5.7 UNITS/HR: 900 INJECTION, SOLUTION INTRAVENOUS at 13:00

## 2018-06-11 NOTE — INTERDISCIPLINARY ROUNDS
IDR/SLIDR Summary          Patient: Phuong Waller MRN: [id number]    Age: 42 y.o. YOB: 1980 Room/Bed: [address] [place] [address]   Admit Diagnosis: DKA (diabetic ketoacidoses) ([facility]  angio edema  Principal Diagnosis: DKA (diabetic ketoacidoses) ([facility]   Goals: Follow commands, wean vent settings  Readmission: NO  Quality Measure: Not applicable  VTE Prophylaxis: Chemical  Influenza Vaccine screening completed? NO  Pneumococcal Vaccine screening completed? NO  Mobility needs: Yes   Nutrition plan:Yes  Consults:P.T, O.T., Respiratory and Case Management    Financial concerns:Yes  Escalated to CM? YES  RRAT Score: 12     [facility]   Testing due for pt today?  NO  LOS: 2 days Expected length of stay 7 days  Discharge plan: D/C back to rehab   PCP: Crystal Bergman MD  Transportation needs: Yes    Days before discharge:two or more days before discharge   Discharge disposition: Rehab    Signed:     Swathi Gamble  6/11/2018  12:01 AM

## 2018-06-11 NOTE — CONSULTS
[address]    Meghan Solo  MR#: [id number]  : 1980  ACCOUNT #: [de-identified]   DATE OF SERVICE: 06/10/2018    NEUROLOGY CONSULTATION      HISTORY OF PRESENT ILLNESS:  This is a 68-year-old female who was admitted yesterday on  after presenting to the emergency department with complaints of low back pain, found to be in DKA and acute on chronic renal failure. All of this history is obtained from the chart and the nursing staff; patient is intubated and sedated. Patient was noted in the emergency department to be hypersomnolent, requiring constant stimulation to participate in the exam and history. This morning in the ICU, she became very agitated, was yelling and not cooperative. She was subsequently given Ativan, causing sedation, noted to have angioedema and there was concern she was not protecting her airway. She was noted to be short of breath and have stridor. She was intubated emergently in the OR. At presentation, her glucose was 719, her BUN 86, creatinine 4.89, she was hyponatremic with a sodium of 125, now 136, potassium was 5.4, hemoglobin was 8.1, her ionized calcium was 0.84. She has a history of polysubstance abuse, but her urine drug screen was completely negative. She does have a history of sickle cell disease and was reportedly on Percocet at home. Most recently, she had osteomyelitis involving her left foot with partial amputation and planned BKA, currently on IV antibiotics at home. PAST MEDICAL HISTORY:    1. Sickle cell disease. 2.  Diabetes mellitus type 1, poorly controlled. 3.  Polysubstance abuse. 4.  Gastroparesis with history of a gastric pacer, now removed. 5.  Asthma. 6.  Hypertension. 7.  Obstructive sleep apnea, on supplemental oxygen at night. 8.  Chronic kidney disease. 9.  Osteomyelitis of the left foot, on IV antibiotics with planned BKA. 10.  PICC line placement.   11.  Reported history of seizure, though I cannot readily find information of this in the chart. 12.  Thromboembolus in the left arm and left leg in the past.  13.  Cataract surgery on the right. 14.  Gastric bypass. 15.  History of J-tube placement and then takedown. REVIEW OF SYSTEMS:  Cannot be obtained secondary to patient's intubated and sedated state. MEDICATIONS:  At home, daptomycin IV, albuterol, calcium carbonate, clonidine, hydralazine, labetalol, Procardia, Percocet, Protonix, Phenergan, Seroquel, Effexor. Currently sedated with propofol and fentanyl. ALLERGIES:  TORADOL, MORPHINE, AND ERYTHROMYCIN. SOCIAL HISTORY:  She lives with her  and her father. No reported alcohol or tobacco use, but positive history of drug use. FAMILY HISTORY:  Mom with lung cancer, hypertension. Dad with kidney cancer, strokes, coronary artery disease, status post CABG. Sister with pancreatic cancer. Sister  with AIDS. Maternal and paternal aunts with breast cancer. PHYSICAL EXAMINATION:  VITAL SIGNS:  Blood pressure 134/76, pulse 87, respiratory rate 28, satting 100% on the ventilator, temperature is 99.7. She was hypothermic earlier with a temperature of 93. GENERAL:  She is a well-nourished, well-developed female lying in bed, intubated, sedated, in no obvious distress. She has periorbital edema. HEART:  Regular rhythm, rate. No murmur appreciated. EXTREMITIES:  She has 2+ radial pulses. No peripheral edema. NEUROLOGIC:  Mental status:  She opens her eyes spontaneously during the exam, does not follow any commands, does not track the examiner. Cranial nerves:  No obvious facial asymmetry, but no facial activation. Pupils were round and reactive. No corneal reflex appreciated. No response to noxious stimuli to her naris. Motor exam:  She moved her bilateral upper extremities and right lower extremity spontaneously; no movement seen in the left lower extremity.   Reflexes were 2+ and symmetric in the upper extremities, unable to elicit in the lower extremities. Her toes were mute. Coordination and gait cannot be assessed due to patient factors. STUDIES AND REPORTS:  Other than that mentioned above, her platelet count was 524. UA was unremarkable. CT of the head performed just prior to my exam was reviewed in PACS. It is unremarkable. ASSESSMENT AND PLAN:  This is a 64-year-old female with type 1 diabetes, poorly controlled, sickle cell disease, chronic renal failure, presenting in diabetic ketoacidosis with acute on chronic renal failure and altered mental status worsened this morning with hypothermia and angioedema of unclear etiology, now intubated and sedated, limiting the exam.  With the CT of the head negative for any acute changes, suspect this is all metabolic encephalopathy from multiple medical issues. We will continue to monitor her. If no improvement as sedation is lightened, consider EEG.       MD SMILEY Gordon / PN  D: 06/10/2018 20:41     T: 06/10/2018 22:32  JOB #: [id number]

## 2018-06-11 NOTE — PROGRESS NOTES
06/11/2018; 10:00 -   CM participated in IDRs on patient, and CM did independent chart review. Patient is currently intubated. She has been on IV ABX at home due to infection of the L foot. Patient has already had a partial amputation of the L foot, and she is scheduled for a L BKA on 6/14/18. Patient has a medical history of: DKA, DMI, renal failure, and sickle cell anemia, with AMS. Original plan was to extubate the patient on 6/12/18, but this is likely not happening. Patient should have tube feeds initiated today, 6/11/18. Patient has not had anyone at bedside, and bedside nurse was able to reach emergency contact (Spouse) today, 6/11/18. Per bedside nurse, it seemed as though the spouse is not actively involved with the patient's care. CM to continue following for ongoing discharge planning.     CRM: Britni Jones, MPH; Z: [phone]

## 2018-06-11 NOTE — PROGRESS NOTES
0730 Bedside and Verbal shift change report given to Tarik Rossi (oncoming nurse) by Riley Pham RN (offgoing nurse). Report included the following information SBAR.   Chastity Lewis MD paged and asked if he wanted to continue the insulin gtt. Plan to bridge to q6 sliding scale today and start tube feedings. MD stated he would come see pt and then place orders. 0915 Attempted to call emergency contact about pt being in hospital. No success. Dimitri Cazares MD, pulmonary, at bedside. Plan to keep pt on vent for 24 hrs longer at least and request for morning ABG. Respiratory paged. 0940 Pt  contacted unit. Information in chart updated.  updated on pt condition. Ame 92 at bedside. Questioned on whether or not to give sliding scale on top of insulin gtt. Ordered to only administer gtt. NG tube not possible today due to swelling. 1530 Bedside and Verbal shift change report given to Gloria Mederos (oncoming nurse) by Shelli RN (offgoing nurse). Report included the following information SBAR.

## 2018-06-11 NOTE — CONSULTS
[address]    Lesly Del Valle  MR#: [id number]  : 1980  ACCOUNT #: [de-identified]   DATE OF SERVICE: 06/10/2018    REQUESTING PHYSICIAN:  Dr. Elvin Herrmann. REASON FOR CONSULTATION:  Left foot diabetic infection. Gopal Ashley is a 78-year-old woman who is currently intubated. The history is taken from the admitting note. Her medical history is significant for renal insufficiency, diabetes and chronic pancreatitis. She is currently being treated by my colleague, Dr. Michele Lorenzo, for a left foot diabetic infection with daptomycin. She actually presented with back pain. According to the note, it was about 7/10 in severity. She has a history of sickle cell anemia as well and she thought this was a part of her sickle cell crisis. In the ER, her lab work revealed that she had an elevated glucose of 719. Her CO2 was 13 and her anion gap was 16. She was treated as if she had diabetic ketoacidosis, although her ketones were negative. She then developed angioedema and had to be emergently intubated. She is currently in the ICU now and we are being asked to comment on her left foot infection. ALLERGIES:  ERYTHROMYCIN, MORPHINE AND TORADOL. CURRENT MEDICATIONS:  1. Albuterol. 2.  Calcium gluconate. 3.  Daptomycin. 4.  Benadryl. 5.  Pepcid. 6.  Humalog. 7.  Levaquin. 8.  Solu-Medrol. 9.  Scopolamine. REVIEW OF SYSTEMS:  Unobtainable. PAST MEDICAL HISTORY:  1.  Diabetes. 2.  Chronic pancreatitis. 3.  Asthma. 4.  Chronic kidney disease. 5.  Hypertension. 6.  Depression. 7.  Obstructive sleep apnea. 8.  Sickle cell disease. 9.  Left foot diabetic infection. PAST SURGICAL HISTORY:  1. Cataract extraction. 2.  Gastric bypass. 3.  Cholecystectomy. 4. PICC line placement for IV antibiotics. 5.  Partial amputation of the left foot. SOCIAL HISTORY:  Does not smoke tobacco or drink alcohol.     FAMILY HISTORY:  Mother had lung cancer and hypertension. Father kidney cancer, stroke, heart disease, and hypertension. PHYSICAL EXAMINATION:  GENERAL:  She is currently intubated. Her eyelids are swollen. HEENT:  She has pink conjunctivae, anicteric sclerae. No JVD. No cervical lymphadenopathy. External ears are normal.  LUNGS:  Clear to auscultation. No rales, wheeze or rhonchi. Her lips are swollen as well. HEART:  Regular rate and rhythm. No murmurs, rubs or clicks. ABDOMEN:  Soft, nontender. No guarding or rebound. GENITOURINARY:  No CVA tenderness. MUSCULOSKELETAL:  Knees are not warm and not tender. PSYCHIATRIC:  Exam could not be done. INTEGUMENT:  I did not notice any rash. NEUROLOGIC:  She could not obey commands. LABS:  White blood cell count is 5.8, hemoglobin 8.3, platelets 539. CO2 14, creatinine 4.18,  calcium is 6.3. AST 17, ALT 21, alkaline phosphatase 275, total bilirubin 0.2. CK is 87. ABG shows a pH of 7.173, pCO2 of 37, CO2 514, bicarbonate 13.6. Lactic acid 0.5. Urine WBC 0-4 white blood cells. Creatinine 4.1. A CT of the head shows no acute process. IMPRESSION:   1. Respiratory failure due to angioedema. 2.  Left foot diabetic infection, possibly with osteomyelitis. 3.  Renal failure. 4.  Metabolic acidosis. 5.  Diabetes. 6.  Chronic pancreatitis. PLAN:  1. The left foot looks good. She is almost at the end of her planned course of daptomycin. Hopefully, this can be discontinued in the next few days. 2. There is some concern for line infection because of the hypothermia. I would continue Levaquin for now pending the blood culture results. I gathered a penicillin-based regimen was not given due to the no etiology for the angioedema. Thank you for the consult.       MD ADAMA Galloway / RN  D: 06/10/2018 17:45     T: 06/10/2018 18:25  JOB #: [id number]

## 2018-06-11 NOTE — DIABETES MGMT
DTC Insulin Drip   Progress Note     Recommendations/ Comments:  Today BG at 0722 156 mg/dL, gap 15  On 6/9/23018  mg/dL, gap 16. Insulin gtt begun. On insulin gtt. At 1156  mg/dL, rate 4 units/hr. Received 6.5 units in the past 6 hours. Solu medrol 80 mg Q 8 hrs. On vent. Patient will require basal insulin 2 hours prior to discontinuing the drip. Patient calculates by weight to require 15 units of basal insulin. She will need the transition dose and a daily dose of basal insulin. Chart reviewed on Simona Salas. Patient is 42 y.o. female  Pt with known Type 1 DM. No diabetes medications are listed in PTA. She has a complex history including Type 1 DM, chronic pancreatitis, CKD, sickle cell, s/p gastric bypass, osteomyelitis - awaiting LBKA  . Consult receieved for Assessment of Home Management - will complete when pt appropriate. A1c:   Lab Results   Component Value Date/Time    Hemoglobin A1c 7.9 (H) 06/10/2018 03:28 AM         Recent Glucose Results:   Lab Results   Component Value Date/Time     (H) 06/11/2018 07:22 AM     (H) 06/11/2018 07:22 AM     (H) 06/11/2018 02:24 AM    GLUCPOC 260 (H) 06/11/2018 11:56 AM    GLUCPOC 276 (H) 06/11/2018 10:53 AM    GLUCPOC 249 (H) 06/11/2018 09:38 AM        Lab Results   Component Value Date/Time    Creatinine 4.12 (H) 06/11/2018 07:22 AM    Creatinine 3.91 (H) 06/11/2018 07:22 AM       Active Orders   Diet    DIET NPO        PO intake: No data found. Will continue to follow as needed. Thank you.   Loco Mares RN, CDE

## 2018-06-11 NOTE — PROGRESS NOTES
Problem: Falls - Risk of  Goal: *Absence of Falls  Document Blake Fall Risk and appropriate interventions in the flowsheet. Outcome: Progressing Towards Goal  Fall Risk Interventions:  Mobility Interventions: Assess mobility with egress test, Mechanical lift, Communicate number of staff needed for ambulation/transfer, Patient to call before getting OOB, Strengthening exercises (ROM-active/passive)         Medication Interventions: Assess postural VS orthostatic hypotension, Evaluate medications/consider consulting pharmacy, Teach patient to arise slowly, Patient to call before getting OOB, Bed/chair exit alarm    Elimination Interventions: Call light in reach, Patient to call for help with toileting needs, Toilet paper/wipes in reach, Toileting schedule/hourly rounds, Bed/chair exit alarm             Problem: Pressure Injury - Risk of  Goal: *Prevention of pressure injury  Document Antonio Scale and appropriate interventions in the flowsheet. Outcome: Progressing Towards Goal  Pressure Injury Interventions:  Sensory Interventions: Assess changes in LOC, Avoid rigorous massage over bony prominences, Chair cushion, Discuss PT/OT consult with provider, Float heels, Keep linens dry and wrinkle-free, Maintain/enhance activity level, Minimize linen layers, Use 30-degree side-lying position, Turn and reposition approx.  every two hours (pillows and wedges if needed)    Moisture Interventions: Assess need for specialty bed, Check for incontinence Q2 hours and as needed, Contain wound drainage, Limit adult briefs, Maintain skin hydration (lotion/cream), Minimize layers, Moisture barrier, Offer toileting Q_hr, Absorbent underpads    Activity Interventions: Assess need for specialty bed, Pressure redistribution bed/mattress(bed type)    Mobility Interventions: Assess need for specialty bed, Float heels, Pressure redistribution bed/mattress (bed type), HOB 30 degrees or less, PT/OT evaluation    Nutrition Interventions: Document food/fluid/supplement intake, Discuss nutritional consult with provider, Offer support with meals,snacks and hydration    Friction and Shear Interventions: Apply protective barrier, creams and emollients, Foam dressings/transparent film/skin sealants, HOB 30 degrees or less, Lift sheet, Lift team/patient mobility team, Minimize layers, Sit at 90-degree angle, Transferring/repositioning devices

## 2018-06-11 NOTE — INTERDISCIPLINARY ROUNDS
IDR/SLIDR Summary          Patient: Gwen Lozada MRN: [id number]    Age: 42 y.o. YOB: 1980 Room/Bed: [address] [place] [address]   Admit Diagnosis: DKA (diabetic ketoacidoses) ([facility]  angio edema  Principal Diagnosis: DKA (diabetic ketoacidoses) ([facility]   Goals: Follow commands, wean vent settings  Readmission: NO  Quality Measure: Not applicable  VTE Prophylaxis: Chemical  Influenza Vaccine screening completed? NO  Pneumococcal Vaccine screening completed? NO  Mobility needs: Yes   Nutrition plan:Yes  Consults:P.T, O.T., Respiratory and Case Management    Financial concerns:Yes  Escalated to CM? YES  RRAT Score:      [address]   Testing due for pt today?  YES  LOS: 2 days Expected length of stay 7 days  Discharge plan: d/c back to rehab   PCP: Vamsi Murcia MD  Transportation needs: Yes    Days before discharge:two or more days before discharge   Discharge disposition: Rehab    Signed:     Juma Brown  6/11/2018  12:01 AM

## 2018-06-11 NOTE — PROGRESS NOTES
ID Progress Note  2018    Subjective:     Patient seen and examined, she remains critically ill on the vent    Objective:     Antibiotics:  1. Daptomycin  2. Levaquin       Vitals:   Visit Vitals    /78    Pulse 63    Temp 96.6 °F (35.9 °C)    Resp 20    Ht 5' 2\" (1.575 m)    Wt 67 kg (147 lb 11.3 oz)    SpO2 97%    Breastfeeding No    BMI 27.02 kg/m2        Tmax:  Temp (24hrs), Av.5 °F (36.4 °C), Min:96.6 °F (35.9 °C), Max:99.7 °F (37.6 °C)      Exam:  Lungs:  clear to auscultation bilaterally  Heart:  regular rate and rhythm  Abdomen:  soft, non-tender. Bowel sounds normal. No masses,  no organomegaly  Foot looks good, skin without rash    Labs:      Recent Labs      18   1359  18   0722  18   0224  06/10/18   2150  06/10/18   1624   06/10/18   0744  06/10/18   0328  18   2203   WBC   --    --   5.9   --    --    --   5.8  5.7  6.0   HGB   --    --   7.6*   --    --    --   8.3*  8.0*  8.1*   PLT   --    --   133*   --    --    --   134*  122*  131*   BUN  82*  84*  81*  83*  86*  88*   < >  86*  83*  86*   CREA  4.02*  4.12*  3.91*  3.97*  3.86*  4.18*   < >  4.50*  4.41*  4.89*   SGOT   --   11*   --    --    --    --    --    --   17   AP   --   146*   --    --    --    --    --    --   275*   TBILI   --   0.2   --    --    --    --    --    --   0.2    < > = values in this interval not displayed.        Cultures:     Lab Results   Component Value Date/Time    Specimen Description: URINE 2013 08:00 PM    Specimen Description: URINE 2013 07:55 PM    Specimen Description: URINE 2013 10:10 AM     Lab Results   Component Value Date/Time    Culture result: NO GROWTH AFTER 17 HOURS 06/10/2018 10:42 AM    Culture result: NO GROWTH 5 DAYS 2018 05:11 PM    Culture result: FEW STAPHYLOCOCCUS HOMINIS SUBSPECIES HOMINIS (A) 2018 12:47 PM    Culture result: (A) 2018 12:47 PM     STAPHYLOCOCCUS EPIDERMIDIS (OXACILLIN RESISTANT) ISOLATED FROM THIO BROTH ONLY    Culture result: NO ANAEROBES ISOLATED 03/11/2018 12:47 PM       Radiology:     Line/Insert Date:           Assessment:     1. DKA  2. Diabetic foot infection--responding to therapy  3. ?sepsis--blood cultures negative to date  4. ESRD    Objective:     1.  Continue current therapy for the time being    Neil Holloway MD

## 2018-06-11 NOTE — PROGRESS NOTES
[facility]   [address] [facility], [address] [place] [address], [facility]  Phone: [phone]   [phone]       Nephrology Progress Note  Salvatore Quevedo     1980     [id number]  Date of Admission : 6/9/2018 06/11/18    CC: Follow up for STONE       Assessment and Plan   STONE on CKD :  - resolving slowly   - reduced rate of IVF       CKD Stage IV :  - baseline Cr 2.6-2.8 mg/dl   - previously on dialysis and recovered      Combined AG and Non Gap Met Acidosis :  - changed Bicarb gtt to D5W as base fluid      Hyponatremia :   - pseudohyponatremia + volume depletion   - resolved      Hyperkalemia :  -resolved     Hypocalcemia :  - check Phos, PTH, Vit D   - replete PRN while she is on bicarb gtt      Angioedema : ? Cause ? Opioids   - steroids per primary team      SCD w/ Anemia     HTN   - added clonidine patch and Nitro paste      Left Foot Osteo : On Dapto -       Chronic pancreatitis : Lipase trending down      Hypothermia : - r/o Line sepsis      Interval History:  Facial swelling better  On 405 Fio2  BP high   Cr improving   UOP 3.1 L   Hypocalcemia     Review of Systems: A comprehensive review of systems was negative.     Current Medications:   Current Facility-Administered Medications   Medication Dose Route Frequency    sodium bicarbonate (8.4%) 150 mEq in dextrose 5% 1,000 mL infusion   IntraVENous CONTINUOUS    cloNIDine (CATAPRES) 0.3 mg/24 hr patch 1 Patch  1 Patch TransDERmal Q7D    nitroglycerin (NITROBID) 2 % ointment 1 Inch  1 Inch Topical BID    albuterol (PROVENTIL VENTOLIN) nebulizer solution 2.5 mg  2.5 mg Nebulization Q4H PRN    sodium chloride (NS) flush 5-10 mL  5-10 mL IntraVENous Q8H    sodium chloride (NS) flush 5-10 mL  5-10 mL IntraVENous PRN    bisacodyl (DULCOLAX) tablet 5 mg  5 mg Oral DAILY PRN    heparin (porcine) injection 5,000 Units  5,000 Units SubCUTAneous Q8H    hydrALAZINE (APRESOLINE) 20 mg/mL injection 10 mg  10 mg IntraVENous Q6H PRN    DAPTOmycin (CUBICIN) 400 mg in 0.9% sodium chloride 50 mL IVPB RF formulation  400 mg IntraVENous Q48H    levoFLOXacin (LEVAQUIN) 250 mg in D5W IVPB  250 mg IntraVENous Q48H    propofol (DIPRIVAN) infusion  0-50 mcg/kg/min IntraVENous TITRATE    fentaNYL citrate (PF) injection  mcg   mcg IntraVENous Q2H PRN    methylPREDNISolone (PF) (SOLU-MEDROL) injection 80 mg  80 mg IntraVENous Q8H    albuterol-ipratropium (DUO-NEB) 2.5 MG-0.5 MG/3 ML  3 mL Nebulization QID RT    diphenhydrAMINE (BENADRYL) injection 50 mg  50 mg IntraVENous Q6H    famotidine (PF) (PEPCID) 20 mg in sodium chloride 0.9% 10 mL injection  20 mg IntraVENous Q24H    scopolamine (TRANSDERM-SCOP) 1 mg over 3 days 1 Patch  1 Patch TransDERmal Q72H    dexmedeTOMidine (PRECEDEX) 400 mcg in 0.9% sodium chloride 100 mL infusion  0.2-0.7 mcg/kg/hr IntraVENous TITRATE    ondansetron (ZOFRAN) injection 4 mg  4 mg IntraVENous Q4H PRN    sodium chloride (NS) flush 5-10 mL  5-10 mL IntraVENous Q8H    sodium chloride (NS) flush 5-10 mL  5-10 mL IntraVENous PRN    insulin regular (NOVOLIN R, HUMULIN R) 100 Units in 0.9% sodium chloride 100 mL infusion  0-50 Units/hr IntraVENous TITRATE    insulin lispro (HUMALOG) injection   SubCUTAneous TIDAC    glucose chewable tablet 16 g  4 Tab Oral PRN    dextrose (D50W) injection syrg 12.5-25 g  12.5-25 g IntraVENous PRN    glucagon (GLUCAGEN) injection 1 mg  1 mg IntraMUSCular PRN      Allergies   Allergen Reactions    Erythromycin Itching    Morphine Itching    Toradol [Ketorolac] Rash       Objective:  Vitals:    Vitals:    06/11/18 0500 06/11/18 0600 06/11/18 0615 06/11/18 0700   BP: 148/87 174/90 (!) 191/96 160/88   Pulse: 64 60 65 73   Resp: 28 28     Temp:  97.3 °F (36.3 °C)     SpO2: 98% 98%  100%   Weight:       Height:         Intake and Output:     06/09 1901 - 06/11 0700  In: 3973.1 [I.V.:3973.1]  Out: 3123 [Urine:3145]    Physical Examination:  Pt intubated    Yes  General: On vent HEENT            FACIAL swelling +  Neck:  Supple, no mass  Resp:  Diminished   CV:  RRR,  no murmur   GI:  Soft, NT, + BS  Neurologic:  Sedated on vent   Skin:  No Rash  :  [place] +    []    High complexity decision making was performed  []    Patient is at high-risk of decompensation with multiple organ involvement    Lab Data Personally Reviewed: I have reviewed all the pertinent labs, microbiology data and radiology studies during assessment. Recent Labs      06/11/18   0224  06/10/18   2150  06/10/18   1624  06/10/18   1044  06/10/18   0744  06/10/18   0328   06/09/18   2203   NA  140  136  136  136  136  134*   --   125*   K  3.9  4.3  4.9  5.1  4.8  4.1   --   5.4*   CL  108  104  108  109*  108  108   --   96*   CO2  17*  18*  14*  13*  15*  13*   --   13*   GLU  154*  241*  198*  144*  103*  238*   --   719*   BUN  83*  86*  88*  88*  86*  83*   --   86*   CREA  3.97*  3.86*  4.18*  4.09*  4.50*  4.41*   --   4.89*   CA  7.3*  6.4*  6.3*  6.1*  6.1*  6.1*   --   5.7*   MG  1.7  1.2*  1.3*  1.3*  1.6  1.3*   < >   --    PHOS   --    --    --    --   7.1*  6.9*   --    --    ALB   --    --    --    --    --    --    --   2.9*   SGOT   --    --    --    --    --    --    --   17   ALT   --    --    --    --    --    --    --   21    < > = values in this interval not displayed.      Recent Labs      06/11/18   0224  06/10/18   0744  06/10/18   0328  06/09/18   2203   WBC  5.9  5.8  5.7  6.0   HGB  7.6*  8.3*  8.0*  8.1*   HCT  22.3*  26.1*  25.1*  25.8*   PLT  133*  134*  122*  131*     Lab Results   Component Value Date/Time    Specimen Description: URINE 06/24/2013 08:00 PM    Specimen Description: URINE 06/17/2013 07:55 PM    Specimen Description: URINE 05/26/2013 10:10 AM    Specimen Description: URINE 04/22/2013 02:30 PM    Specimen Description: NARES 03/21/2013 12:30 PM     Lab Results   Component Value Date/Time    Culture result: NO GROWTH AFTER 17 HOURS 06/10/2018 10:42 AM    Culture result: NO GROWTH 5 DAYS 04/27/2018 05:11 PM    Culture result: FEW STAPHYLOCOCCUS HOMINIS SUBSPECIES HOMINIS (A) 03/11/2018 12:47 PM    Culture result: (A) 03/11/2018 12:47 PM     STAPHYLOCOCCUS EPIDERMIDIS (OXACILLIN RESISTANT) ISOLATED FROM THIO BROTH ONLY    Culture result: NO ANAEROBES ISOLATED 03/11/2018 12:47 PM     Recent Results (from the past 24 hour(s))   CBC WITH AUTOMATED DIFF    Collection Time: 06/10/18  7:44 AM   Result Value Ref Range    WBC 5.8 3.6 - 11.0 K/uL    RBC 3.23 (L) 3.80 - 5.20 M/uL    HGB 8.3 (L) 11.5 - 16.0 g/dL    HCT 26.1 (L) 35.0 - 47.0 %    MCV 80.8 80.0 - 99.0 FL    MCH 25.7 (L) 26.0 - 34.0 PG    MCHC 31.8 30.0 - 36.5 g/dL    RDW 15.1 (H) 11.5 - 14.5 %    PLATELET 487 (L) 947 - 400 K/uL    NRBC 0.0 0  WBC    ABSOLUTE NRBC 0.00 0.00 - 0.01 K/uL    NEUTROPHILS 81 (H) 32 - 75 %    LYMPHOCYTES 12 12 - 49 %    MONOCYTES 6 5 - 13 %    EOSINOPHILS 1 0 - 7 %    BASOPHILS 0 0 - 1 %    IMMATURE GRANULOCYTES 0 0.0 - 0.5 %    ABS. NEUTROPHILS 4.7 1.8 - 8.0 K/UL    ABS. LYMPHOCYTES 0.7 (L) 0.8 - 3.5 K/UL    ABS. MONOCYTES 0.4 0.0 - 1.0 K/UL    ABS. EOSINOPHILS 0.0 0.0 - 0.4 K/UL    ABS. BASOPHILS 0.0 0.0 - 0.1 K/UL    ABS. IMM.  GRANS. 0.0 0.00 - 0.04 K/UL    DF AUTOMATED     LIPID PANEL    Collection Time: 06/10/18  7:44 AM   Result Value Ref Range    LIPID PROFILE          Cholesterol, total 128 <200 MG/DL    Triglyceride 48 <150 MG/DL    HDL Cholesterol 51 MG/DL    LDL, calculated 67.4 0 - 100 MG/DL    VLDL, calculated 9.6 MG/DL    CHOL/HDL Ratio 2.5 0 - 5.0     PHOSPHORUS    Collection Time: 06/10/18  7:44 AM   Result Value Ref Range    Phosphorus 7.1 (H) 2.6 - 4.7 MG/DL   CK W/ CKMB & INDEX    Collection Time: 06/10/18  7:44 AM   Result Value Ref Range     26 - 192 U/L    CK - MB 3.5 <3.6 NG/ML    CK-MB Index 3.5 (H) 0 - 2.5     TSH 3RD GENERATION    Collection Time: 06/10/18  7:44 AM   Result Value Ref Range    TSH 2.98 0.36 - 3.74 uIU/mL   AMYLASE    Collection Time: 06/10/18  7:44 AM Result Value Ref Range    Amylase 88 25 - 115 U/L   LIPASE    Collection Time: 06/10/18  7:44 AM   Result Value Ref Range    Lipase 548 (H) 73 - 393 U/L   TROPONIN I    Collection Time: 06/10/18  7:44 AM   Result Value Ref Range    Troponin-I, Qt. <0.04 <2.63 ng/mL   METABOLIC PANEL, BASIC    Collection Time: 06/10/18  7:44 AM   Result Value Ref Range    Sodium 136 136 - 145 mmol/L    Potassium 4.8 3.5 - 5.1 mmol/L    Chloride 108 97 - 108 mmol/L    CO2 15 (LL) 21 - 32 mmol/L    Anion gap 13 5 - 15 mmol/L    Glucose 103 (H) 65 - 100 mg/dL    BUN 86 (H) 6 - 20 MG/DL    Creatinine 4.50 (H) 0.55 - 1.02 MG/DL    BUN/Creatinine ratio 19 12 - 20      GFR est AA 13 (L) >60 ml/min/1.73m2    GFR est non-AA 11 (L) >60 ml/min/1.73m2    Calcium 6.1 (LL) 8.5 - 10.1 MG/DL   MAGNESIUM    Collection Time: 06/10/18  7:44 AM   Result Value Ref Range    Magnesium 1.6 1.6 - 2.4 mg/dL   LD    Collection Time: 06/10/18  7:44 AM   Result Value Ref Range     81 - 246 U/L   GLUCOSE, POC    Collection Time: 06/10/18  7:49 AM   Result Value Ref Range    Glucose (POC) >600 (HH) 65 - 100 mg/dL    Performed by GEORGES TIPTON    GLUCOSE, POC    Collection Time: 06/10/18  7:50 AM   Result Value Ref Range    Glucose (POC) 111 (H) 65 - 100 mg/dL    Performed by Filomena Negro    Collection Time: 06/10/18  7:51 AM   Result Value Ref Range    Glucose 111 mg/dL    Insulin order 0.0 units/hour    Insulin adminstered 0.0 units/hour    Multiplier 0.000     Low target 150 mg/dL    High target 250 mg/dL    D50 order 0.0 ml    D50 administered 0.00 ml    Minutes until next BG 60 min    Order initials JK     Administered initials JK     GLSCOM Comments     GLUCOSE, POC    Collection Time: 06/10/18  8:52 AM   Result Value Ref Range    Glucose (POC) 134 (H) 65 - 100 mg/dL    Performed by Magali Weaver    Collection Time: 06/10/18  8:55 AM   Result Value Ref Range    Glucose 134 mg/dL    Insulin order 0.0 units/hour Insulin adminstered 0.0 units/hour    Multiplier 0.000     Low target 150 mg/dL    High target 250 mg/dL    D50 order 0.0 ml    D50 administered 0.00 ml    Minutes until next BG 60 min    Order initials JK     Administered initials JK     GLSCOM Comments     GLUCOSE, POC    Collection Time: 06/10/18 10:15 AM   Result Value Ref Range    Glucose (POC) 161 (H) 65 - 100 mg/dL    Performed by Gloria Oliva    Collection Time: 06/10/18 10:16 AM   Result Value Ref Range    Glucose 161 mg/dL    Insulin order 0.1 units/hour    Insulin adminstered 0.1 units/hour    Multiplier 0.000     Low target 150 mg/dL    High target 250 mg/dL    D50 order 0.0 ml    D50 administered 0.00 ml    Minutes until next BG 60 min    Order initials as     Administered initials as     GLSCOM Comments     CULTURE, BLOOD, PAIRED    Collection Time: 06/10/18 10:42 AM   Result Value Ref Range    Special Requests: NO SPECIAL REQUESTS      Culture result: NO GROWTH AFTER 17 HOURS     METABOLIC PANEL, BASIC    Collection Time: 06/10/18 10:44 AM   Result Value Ref Range    Sodium 136 136 - 145 mmol/L    Potassium 5.1 3.5 - 5.1 mmol/L    Chloride 109 (H) 97 - 108 mmol/L    CO2 13 (LL) 21 - 32 mmol/L    Anion gap 14 5 - 15 mmol/L    Glucose 144 (H) 65 - 100 mg/dL    BUN 88 (H) 6 - 20 MG/DL    Creatinine 4.09 (H) 0.55 - 1.02 MG/DL    BUN/Creatinine ratio 22 (H) 12 - 20      GFR est AA 15 (L) >60 ml/min/1.73m2    GFR est non-AA 12 (L) >60 ml/min/1.73m2    Calcium 6.1 (LL) 8.5 - 10.1 MG/DL   MAGNESIUM    Collection Time: 06/10/18 10:44 AM   Result Value Ref Range    Magnesium 1.3 (L) 1.6 - 2.4 mg/dL   CORTISOL, AM    Collection Time: 06/10/18 10:44 AM   Result Value Ref Range    Cortisol, a.m. 26.1 (H) 4.3 - 22.4 ug/dL   BETA-HYDROXYBUTYRATE    Collection Time: 06/10/18 10:44 AM   Result Value Ref Range    B-hydroxybutyrate 0.22 <0.40 mmol/L   URINALYSIS W/MICROSCOPIC    Collection Time: 06/10/18 10:44 AM   Result Value Ref Range    Color YELLOW/STRAW      Appearance CLOUDY (A) CLEAR      Specific gravity 1.014 1.003 - 1.030      pH (UA) 5.0 5.0 - 8.0      Protein 100 (A) NEG mg/dL    Glucose >1000 (A) NEG mg/dL    Ketone NEGATIVE  NEG mg/dL    Bilirubin NEGATIVE  NEG      Blood NEGATIVE  NEG      Urobilinogen 0.2 0.2 - 1.0 EU/dL    Nitrites NEGATIVE  NEG      Leukocyte Esterase NEGATIVE  NEG      WBC 0-4 0 - 4 /hpf    RBC 0-5 0 - 5 /hpf    Epithelial cells MODERATE (A) FEW /lpf    Bacteria NEGATIVE  NEG /hpf    Mucus TRACE (A) NEG /lpf    Amorphous Crystals 1+ (A) NEG    Budding yeast PRESENT (A) NEG      Other: Renal Epithelial cells Present     POC G3 - PUL    Collection Time: 06/10/18 10:58 AM   Result Value Ref Range    FIO2 (POC) 100 %    pH (POC) 7.173 (LL) 7.35 - 7.45      pCO2 (POC) 37.0 35.0 - 45.0 MMHG    pO2 (POC) 534 (H) 80 - 100 MMHG    HCO3 (POC) 13.6 (L) 22 - 26 MMOL/L    sO2 (POC) 100 (H) 92 - 97 %    Base deficit (POC) 15 mmol/L    Site RIGHT RADIAL      Device: VENT      Mode ASSIST CONTROL      Tidal volume 400 ml    Set Rate 22 bpm    PEEP/CPAP (POC) 5 cmH2O    PIP (POC) 25      Allens test (POC) YES      Specimen type (POC) ARTERIAL      Total resp.  rate 22     GLUCOSE, POC    Collection Time: 06/10/18 11:16 AM   Result Value Ref Range    Glucose (POC) 180 (H) 65 - 100 mg/dL    Performed by Magali Weaver    Collection Time: 06/10/18 11:16 AM   Result Value Ref Range    Glucose 180 mg/dL    Insulin order 0.1 units/hour    Insulin adminstered 0.1 units/hour    Multiplier 0.000     Low target 150 mg/dL    High target 250 mg/dL    D50 order 0.0 ml    D50 administered 0.00 ml    Minutes until next BG 60 min    Order initials JK     Administered initials JK     GLSCOM Comments     GLUCOSE, POC    Collection Time: 06/10/18 12:18 PM   Result Value Ref Range    Glucose (POC) 181 (H) 65 - 100 mg/dL    Performed by Filomena Negro    Collection Time: 06/10/18 12:19 PM   Result Value Ref Range Glucose 181 mg/dL    Insulin order 0.1 units/hour    Insulin adminstered 0.1 units/hour    Multiplier 0.000     Low target 150 mg/dL    High target 250 mg/dL    D50 order 0.0 ml    D50 administered 0.00 ml    Minutes until next BG 60 min    Order initials JK     Administered initials HUIK     GLSCOM Comments     GLUCOSE, POC    Collection Time: 06/10/18  1:21 PM   Result Value Ref Range    Glucose (POC) 180 (H) 65 - 100 mg/dL    Performed by Chino Chapman    Collection Time: 06/10/18  1:21 PM   Result Value Ref Range    Glucose 180 mg/dL    Insulin order 0.1 units/hour    Insulin adminstered 0.1 units/hour    Multiplier 0.000     Low target 150 mg/dL    High target 250 mg/dL    D50 order 0.0 ml    D50 administered 0.00 ml    Minutes until next BG 60 min    Order initials JK     Administered initials HUIK     GLSCOM Comments     GLUCOSE, POC    Collection Time: 06/10/18  2:23 PM   Result Value Ref Range    Glucose (POC) 199 (H) 65 - 100 mg/dL    Performed by Chino Chapman    Collection Time: 06/10/18  2:24 PM   Result Value Ref Range    Glucose 199 mg/dL    Insulin order 0.1 units/hour    Insulin adminstered 0.1 units/hour    Multiplier 0.000     Low target 150 mg/dL    High target 250 mg/dL    D50 order 0.0 ml    D50 administered 0.00 ml    Minutes until next BG 60 min    Order initials HUIK     Administered initials JOVI     GLSCOM Comments     GLUCOSE, POC    Collection Time: 06/10/18  3:25 PM   Result Value Ref Range    Glucose (POC) 204 (H) 65 - 100 mg/dL    Performed by Chino Chapman    Collection Time: 06/10/18  3:26 PM   Result Value Ref Range    Glucose 204 mg/dL    Insulin order 0.1 units/hour    Insulin adminstered 0.1 units/hour    Multiplier 0.000     Low target 150 mg/dL    High target 250 mg/dL    D50 order 0.0 ml    D50 administered 0.00 ml    Minutes until next  min    Order initials cs     Administered initials cs     GLSCOM Comments     METABOLIC PANEL, BASIC    Collection Time: 06/10/18  4:24 PM   Result Value Ref Range    Sodium 136 136 - 145 mmol/L    Potassium 4.9 3.5 - 5.1 mmol/L    Chloride 108 97 - 108 mmol/L    CO2 14 (LL) 21 - 32 mmol/L    Anion gap 14 5 - 15 mmol/L    Glucose 198 (H) 65 - 100 mg/dL    BUN 88 (H) 6 - 20 MG/DL    Creatinine 4.18 (H) 0.55 - 1.02 MG/DL    BUN/Creatinine ratio 21 (H) 12 - 20      GFR est AA 14 (L) >60 ml/min/1.73m2    GFR est non-AA 12 (L) >60 ml/min/1.73m2    Calcium 6.3 (LL) 8.5 - 10.1 MG/DL   MAGNESIUM    Collection Time: 06/10/18  4:24 PM   Result Value Ref Range    Magnesium 1.3 (L) 1.6 - 2.4 mg/dL   CK W/ CKMB & INDEX    Collection Time: 06/10/18  4:24 PM   Result Value Ref Range    CK 87 26 - 192 U/L    CK - MB 2.8 <3.6 NG/ML    CK-MB Index 3.2 (H) 0 - 2.5     GLUCOSE, POC    Collection Time: 06/10/18  5:28 PM   Result Value Ref Range    Glucose (POC) 230 (H) 65 - 100 mg/dL    Performed by Glorai Oliva    Collection Time: 06/10/18  5:28 PM   Result Value Ref Range    Glucose 230 mg/dL    Insulin order 0.1 units/hour    Insulin adminstered 0.1 units/hour    Multiplier 0.000     Low target 150 mg/dL    High target 250 mg/dL    D50 order 0.0 ml    D50 administered 0.00 ml    Minutes until next  min    Order initials cs     Administered initials cs     GLSCOM Comments     GLUCOSE, POC    Collection Time: 06/10/18  7:18 PM   Result Value Ref Range    Glucose (POC) 229 (H) 65 - 100 mg/dL    Performed by Gloria Oliva    Collection Time: 06/10/18  7:19 PM   Result Value Ref Range    Glucose 229 mg/dL    Insulin order 0.1 units/hour    Insulin adminstered 0.1 units/hour    Multiplier 0.000     Low target 150 mg/dL    High target 250 mg/dL    D50 order 0.0 ml    D50 administered 0.00 ml    Minutes until next  min    Order initials HUIK     Administered initials JOVI     GLSCOM Comments     GLUCOSE, POC    Collection Time: 06/10/18  9:21 PM   Result Value Ref Range    Glucose (POC) 296 (H) 65 - 100 mg/dL    Performed by Naty Velazco    Collection Time: 06/10/18  9:23 PM   Result Value Ref Range    Glucose 296 mg/dL    Insulin order 0.1 units/hour    Insulin adminstered 0.1 units/hour    Multiplier 0.000     Low target 150 mg/dL    High target 250 mg/dL    D50 order 0.0 ml    D50 administered 0.00 ml    Minutes until next BG 60 min    Order initials EP     Administered initials EP     GLSCOM Comments     METABOLIC PANEL, BASIC    Collection Time: 06/10/18  9:50 PM   Result Value Ref Range    Sodium 136 136 - 145 mmol/L    Potassium 4.3 3.5 - 5.1 mmol/L    Chloride 104 97 - 108 mmol/L    CO2 18 (L) 21 - 32 mmol/L    Anion gap 14 5 - 15 mmol/L    Glucose 241 (H) 65 - 100 mg/dL    BUN 86 (H) 6 - 20 MG/DL    Creatinine 3.86 (H) 0.55 - 1.02 MG/DL    BUN/Creatinine ratio 22 (H) 12 - 20      GFR est AA 16 (L) >60 ml/min/1.73m2    GFR est non-AA 13 (L) >60 ml/min/1.73m2    Calcium 6.4 (LL) 8.5 - 10.1 MG/DL   MAGNESIUM    Collection Time: 06/10/18  9:50 PM   Result Value Ref Range    Magnesium 1.2 (L) 1.6 - 2.4 mg/dL   GLUCOSE, POC    Collection Time: 06/10/18 10:18 PM   Result Value Ref Range    Glucose (POC) 297 (H) 65 - 100 mg/dL    Performed by Naty Velazco    Collection Time: 06/10/18 10:18 PM   Result Value Ref Range    Glucose 297 mg/dL    Insulin order 2.4 units/hour    Insulin adminstered 2.4 units/hour    Multiplier 0.010     Low target 150 mg/dL    High target 250 mg/dL    D50 order 0.0 ml    D50 administered 0.00 ml    Minutes until next BG 60 min    Order initials ep     Administered initials EP     GLSCOM Comments     GLUCOSE, POC    Collection Time: 06/10/18 11:20 PM   Result Value Ref Range    Glucose (POC) 287 (H) 65 - 100 mg/dL    Performed by Naty Velazco    Collection Time: 06/10/18 11:21 PM   Result Value Ref Range    Glucose 287 mg/dL    Insulin order 4.5 units/hour    Insulin adminstered 4.5 units/hour Multiplier 0.020     Low target 150 mg/dL    High target 250 mg/dL    D50 order 0.0 ml    D50 administered 0.00 ml    Minutes until next BG 60 min    Order initials EP     Administered initials EP     GLSCOM Comments     GLUCOSE, POC    Collection Time: 06/11/18 12:24 AM   Result Value Ref Range    Glucose (POC) 278 (H) 65 - 100 mg/dL    Performed by RADSONE    Collection Time: 06/11/18 12:25 AM   Result Value Ref Range    Glucose 278 mg/dL    Insulin order 6.5 units/hour    Insulin adminstered 6.5 units/hour    Multiplier 0.030     Low target 150 mg/dL    High target 250 mg/dL    D50 order 0.0 ml    D50 administered 0.00 ml    Minutes until next BG 60 min    Order initials EP     Administered initials EP     GLSCOM Comments     GLUCOSE, POC    Collection Time: 06/11/18  1:29 AM   Result Value Ref Range    Glucose (POC) 203 (H) 65 - 100 mg/dL    Performed by RADSONE    Collection Time: 06/11/18  1:29 AM   Result Value Ref Range    Glucose 203 mg/dL    Insulin order 4.3 units/hour    Insulin adminstered 4.3 units/hour    Multiplier 0.030     Low target 150 mg/dL    High target 250 mg/dL    D50 order 0.0 ml    D50 administered 0.00 ml    Minutes until next BG 60 min    Order initials EP     Administered initials EP     GLSCOM Comments     METABOLIC PANEL, BASIC    Collection Time: 06/11/18  2:24 AM   Result Value Ref Range    Sodium 140 136 - 145 mmol/L    Potassium 3.9 3.5 - 5.1 mmol/L    Chloride 108 97 - 108 mmol/L    CO2 17 (L) 21 - 32 mmol/L    Anion gap 15 5 - 15 mmol/L    Glucose 154 (H) 65 - 100 mg/dL    BUN 83 (H) 6 - 20 MG/DL    Creatinine 3.97 (H) 0.55 - 1.02 MG/DL    BUN/Creatinine ratio 21 (H) 12 - 20      GFR est AA 15 (L) >60 ml/min/1.73m2    GFR est non-AA 13 (L) >60 ml/min/1.73m2    Calcium 7.3 (L) 8.5 - 10.1 MG/DL   MAGNESIUM    Collection Time: 06/11/18  2:24 AM   Result Value Ref Range    Magnesium 1.7 1.6 - 2.4 mg/dL   CBC W/O DIFF Collection Time: 06/11/18  2:24 AM   Result Value Ref Range    WBC 5.9 3.6 - 11.0 K/uL    RBC 2.93 (L) 3.80 - 5.20 M/uL    HGB 7.6 (L) 11.5 - 16.0 g/dL    HCT 22.3 (L) 35.0 - 47.0 %    MCV 76.1 (L) 80.0 - 99.0 FL    MCH 25.9 (L) 26.0 - 34.0 PG    MCHC 34.1 30.0 - 36.5 g/dL    RDW 14.6 (H) 11.5 - 14.5 %    PLATELET 440 (L) 370 - 400 K/uL    NRBC 0.0 0  WBC    ABSOLUTE NRBC 0.00 0.00 - 0.01 K/uL   CK W/ CKMB & INDEX    Collection Time: 06/11/18  2:24 AM   Result Value Ref Range    CK 51 26 - 192 U/L    CK - MB 1.6 <3.6 NG/ML    CK-MB Index 3.1 (H) 0 - 2.5     TROPONIN I    Collection Time: 06/11/18  2:24 AM   Result Value Ref Range    Troponin-I, Qt. <0.04 <0.05 ng/mL   GLUCOSE, POC    Collection Time: 06/11/18  2:24 AM   Result Value Ref Range    Glucose (POC) 172 (H) 65 - 100 mg/dL    Performed by Dwaine Carrasquillo    TSH 3RD GENERATION    Collection Time: 06/11/18  2:24 AM   Result Value Ref Range    TSH 0.89 0.36 - 3.74 uIU/mL   GLUCOSTABILIZER    Collection Time: 06/11/18  2:25 AM   Result Value Ref Range    Glucose 172 mg/dL    Insulin order 3.4 units/hour    Insulin adminstered 3.4 units/hour    Multiplier 0.030     Low target 150 mg/dL    High target 250 mg/dL    D50 order 0.0 ml    D50 administered 0.00 ml    Minutes until next BG 60 min    Order initials EP     Administered initials EP     GLSCOM Comments     GLUCOSE, POC    Collection Time: 06/11/18  3:27 AM   Result Value Ref Range    Glucose (POC) 133 (H) 65 - 100 mg/dL    Performed by Jonah Lam    Collection Time: 06/11/18  3:28 AM   Result Value Ref Range    Glucose 133 mg/dL    Insulin order 1.5 units/hour    Insulin adminstered 1.5 units/hour    Multiplier 0.020     Low target 150 mg/dL    High target 250 mg/dL    D50 order 0.0 ml    D50 administered 0.00 ml    Minutes until next BG 60 min    Order initials EP     Administered initials EP     GLSCOM Comments     GLUCOSE, POC    Collection Time: 06/11/18  4:31 AM   Result Value Ref Range    Glucose (POC) 103 (H) 65 - 100 mg/dL    Performed by Hermelindo Stacy    Collection Time: 06/11/18  4:31 AM   Result Value Ref Range    Glucose 103 mg/dL    Insulin order 0.4 units/hour    Insulin adminstered 0.4 units/hour    Multiplier 0.010     Low target 150 mg/dL    High target 250 mg/dL    D50 order 0.0 ml    D50 administered 0.00 ml    Minutes until next BG 60 min    Order initials EP     Administered initials EP     GLSCOM Comments     GLUCOSE, POC    Collection Time: 06/11/18  5:20 AM   Result Value Ref Range    Glucose (POC) 102 (H) 65 - 100 mg/dL    Performed by [employer] Stacy    Collection Time: 06/11/18  5:21 AM   Result Value Ref Range    Glucose 102 mg/dL    Insulin order 0.0 units/hour    Insulin adminstered 0.0 units/hour    Multiplier 0.000     Low target 150 mg/dL    High target 250 mg/dL    D50 order 0.0 ml    D50 administered 0.00 ml    Minutes until next BG 60 min    Order initials EP     Administered initials EP     [facility] Comments     GLUCOSE, POC    Collection Time: 06/11/18  6:22 AM   Result Value Ref Range    Glucose (POC) 123 (H) 65 - 100 mg/dL    Performed by Hermelindo Stacy    Collection Time: 06/11/18  6:23 AM   Result Value Ref Range    Glucose 123 mg/dL    Insulin order 0.0 units/hour    Insulin adminstered 0.0 units/hour    Multiplier 0.000     Low target 150 mg/dL    High target 250 mg/dL    D50 order 0.0 ml    D50 administered 0.00 ml    Minutes until next BG 60 min    Order initials EP     Administered initials EP     [facility]SCOM Comments     GLUCOSTABILIZER    Collection Time: 06/11/18  7:23 AM   Result Value Ref Range    Glucose 163 mg/dL    Insulin order 0.1 units/hour    Insulin adminstered 0.1 units/hour    Multiplier 0.000     Low target 150 mg/dL    High target 250 mg/dL    D50 order 0.0 ml    D50 administered 0.00 ml    Minutes until next BG 60 min    Order initials Ep     Administered initials EP [facility] Comments               I have reviewed the flowsheets. Chart and Pertinent Notes have been reviewed. No change in PMH ,family and social history from Consult note.       Forest Chau MD

## 2018-06-11 NOTE — PROGRESS NOTES
1945: Bedside shift change report given to [facility] (oncoming nurse) by Arturo Grayson (offgoing nurse). Report included the following information SBAR, Kardex, Procedure Summary, Intake/Output, MAR, Accordion, Recent Results, Med Rec Status, Cardiac Rhythm NSR and Alarm Parameters . Bilateral soft wrist restraints in place for pt safety. Multiple infusions- see flow sheet for rates. 2000: Pt reaching for ETT when being repositioned. Pt is not following any commands, weak cough noted with suction, and pt is not responding to her name stated     2230: Spoke with Dr Melinda Waller in regards to low electrolytes, orders received and placed in system. 2330: Warming blanket applied as temperature trending down. 0000: No changes from earlier assessment. sBP is elevated and Dr Melinda Waller to place orders. 0330: Spoke with Dr Melinda Waller in regards to low electrolytes orders received- see eMAR    0400: No changes in assessment from earlier    0600: Pt attempting to sit up out of bed while vented and sedated, sBP is elevated hydralazine given , VS remain high, pain meds given - see eMAR.     0750: Bedside shift change report given to Pamela (oncoming nurse) by [facility] (offgoing nurse). Report included the following information SBAR, Kardex, Procedure Summary, Intake/Output, MAR, Recent Results, Med Rec Status and Cardiac Rhythm NSR. Problem: Falls - Risk of  Goal: *Absence of Falls  Document Blake Fall Risk and appropriate interventions in the flowsheet.    Outcome: Progressing Towards Goal  Fall Risk Interventions:  Mobility Interventions: Communicate number of staff needed for ambulation/transfer, Bed/chair exit alarm, Patient to call before getting OOB      Medication Interventions: Assess postural VS orthostatic hypotension, Evaluate medications/consider consulting pharmacy, Patient to call before getting OOB    Elimination Interventions: Call light in reach, Patient to call for help with toileting needs, Bed/chair exit alarm        Problem: Pressure Injury - Risk of  Goal: *Prevention of pressure injury  Document Antonio Scale and appropriate interventions in the flowsheet.    Outcome: Progressing Towards Goal  Pressure Injury Interventions:  Sensory Interventions: Assess changes in LOC, Assess need for specialty bed    Moisture Interventions: Assess need for specialty bed, Check for incontinence Q2 hours and as needed    Activity Interventions: Assess need for specialty bed, Increase time out of bed         Nutrition Interventions: Document food/fluid/supplement intake, Discuss nutritional consult with provider

## 2018-06-11 NOTE — PROGRESS NOTES
[facility]  Pulmonary, Critical Care, and Sleep Medicine    Initial Patient Consult    Name: Karley Landry MRN: [id number]   : 1980 Hospital: [facility]    Date: 2018        IMPRESSION:   · Acute respiratory failure - emergent intubation in OR for airway protection  · Anion-gap metabolic acidosis - likely DKA, but no ketones in urine (possibly due to very low pH), normal lactate  · Acute angioedema - uncertain cause, no obvious trigger, very few drugs given prior to event (?dilaudid with h/o itching from morphine) - no reports of airway compromise while in ER, but had very significant findings on arrival to ICU  · Possible DKA as above  · Chronic osteomyelitis of left foot, on outpatient IV antibiotics   · Sickle cell disease +/- pain crisis  · Asthma   · Acute/chronic renal failure  · Chronic pancreatitis   · H/O noncompliance       RECOMMENDATIONS:   · Emergent intubation completed in OR with ENT on standby, now with 6.5 ET tube- 24 of 48 hours of steroids  · Ventilator support - needs high MV for now due to acidosis- repeat ABG  · Insulin drip per protocol  · IV fluids   · Steroids/H2 blocker/antihistamines for angioedema  · Cultures  · Empiric antibiotics - ID following  · Renal eval pending  · Replete electrolytes  · On heparin  · Sedatives reviewed  · DVT/GI prophylaxis  · Critically ill with high risk for further decompensation. D/W RN/RT. CCT 30'       Subjective:     Seen and examined. Sedated. On bicarb drip. ABG pending-- last with severe metabolic acidosis. On steroids, benadryl and pepcid. CXR with mild left basilar atelectasis    6/10  This patient has been seen and evaluated at the request of Dr. Jignesh Schroeder for respiratory failure and sepsis. Patient is a 42 y.o. female with multiple medical problems including DM, chronic pancreatitis, chronic osteomyelitis, sickle cell disease, presented to the ER today with back pain and lethargy.   She reported that her pain felt like a sickle cell crisis. Found to have AG metabolic acidosis. Felt to have DKA, but no ketones in urine. She was admitted to the ICU but she has since developed swelling of face and tongue. On my arrival to the ICU, she was sedated with lorazepam due to combativeness, and she was unresponsive and not adequately protecting her airway. Past Medical History:   Diagnosis Date    ARF (acute renal failure) ([facility] [address] requiring dialysis 2011    Asthma     CKD (chronic kidney disease)     Diabetes ([address]     Gastroparesis 2010    Gastric Pacer- REMOVED 07/2015    GERD (gastroesophageal reflux disease)     Hypertension     Narcotic dependence ([address]     THU (obstructive sleep apnea)     wears 2 LPM oxygen at night    Other ill-defined conditions(799.89)     Polycystic ovarian syndrome     Seizures (HCC)     Sickle cell trait ([facility] [address]     Thromboembolus ([facility] [address] to her left arm and was told she had one in left leg recently      Past Surgical History:   Procedure Laterality Date    HX CATARACT REMOVAL  3/5/12    right    HX DILATION AND CURETTAGE      ablation    HX GASTRIC BYPASS  2015    HX GI      j tube placement and removal    HX OTHER SURGICAL  2010    Gastric Pacer- REMOVED 07/2015    HX VASCULAR ACCESS      gray cath rt subclavian    HX VASCULAR ACCESS      HD access right thigh      Prior to Admission medications    Medication Sig Start Date End Date Taking? Authorizing Provider   hydrALAZINE (APRESOLINE) 100 mg tablet Take 1 Tab by mouth three (3) times daily for 30 days. 5/14/18 6/13/18  Annetta Jerome MD   oxyCODONE-acetaminophen (PERCOCET) 5-325 mg per tablet Take 1 Tab by mouth every six (6) hours as needed for Pain. Max Daily Amount: 4 Tabs. Scheduled 5/14/18   Annetta Jerome MD   promethazine (PHENERGAN) 25 mg tablet Take 1 Tab by mouth every eight (8) hours as needed for Nausea.  5/14/18   Annetta Jerome MD   L. acidoph & paracasei- S therm- Bifido (CHRIS-Q/RISAQUAD) 8 billion cell cap cap Take 1 Cap by mouth daily for 30 days. 5/15/18 6/14/18  Germán Seay MD   NIFEdipine ER (PROCARDIA XL) 90 mg ER tablet Take 1 Tab by mouth daily for 30 days. 5/15/18 6/14/18  Germán Seay MD   pantoprazole (PROTONIX) 40 mg tablet Take 1 Tab by mouth Before breakfast and dinner for 30 days. 5/14/18 6/13/18  Germán Seay MD   senna-docusate (PERICOLACE) 8.6-50 mg per tablet Take 2 Tabs by mouth daily. 5/15/18   Germán Seay MD   cloNIDine HCl (CATAPRES) 0.2 mg tablet Take 1 Tab by mouth three (3) times daily for 30 days. 5/14/18 6/13/18  Germán Seay MD   labetalol (NORMODYNE) 100 mg tablet Take 2 Tabs by mouth two (2) times a day for 30 days. 5/14/18 6/13/18  Germán Seay MD   QUEtiapine (SEROQUEL) 100 mg tablet Take 100 mg by mouth two (2) times a day. Historical Provider   venlafaxine (EFFEXOR) 75 mg tablet Take 1 Tab by mouth two (2) times daily (with meals). 3/21/18   Rick Pederson MD   Cholecalciferol, Vitamin D3, 50,000 unit cap Take 1 Cap by mouth every seven (7) days. Historical Provider   calcium carbonate (OS-BINA) 500 mg calcium (1,250 mg) tablet Take 1 Tab by mouth daily. Historical Provider   cyanocobalamin 1,000 mcg tablet Take 1,000 mcg by mouth daily. Historical Provider   albuterol (PROVENTIL VENTOLIN) 2.5 mg /3 mL (0.083 %) nebulizer solution 2.5 mg by Nebulization route every four (4) hours as needed.     Historical Provider     Allergies   Allergen Reactions    Erythromycin Itching    Morphine Itching    Toradol [Ketorolac] Rash      Social History   Substance Use Topics    Smoking status: Never Smoker    Smokeless tobacco: Never Used    Alcohol use No      Family History   Problem Relation Age of Onset    Cancer Mother      lung    Hypertension Mother     Cancer Father      kidney    Stroke Father      3 strokes: 59-72    Heart Disease Father 72     CABG    Hypertension Father     Cancer Sister      pancreatic    Cancer Maternal Aunt      breast    Cancer Paternal Aunt      breast    Schizophrenia Sister      was in [address], now ass't living    Other Sister       AIDS    Other Other      nephew of AIDS        Current Facility-Administered Medications   Medication Dose Route Frequency    sodium bicarbonate (8.4%) 150 mEq in dextrose 5% 1,000 mL infusion   IntraVENous CONTINUOUS    cloNIDine (CATAPRES) 0.3 mg/24 hr patch 1 Patch  1 Patch TransDERmal Q7D    nitroglycerin (NITROBID) 2 % ointment 1 Inch  1 Inch Topical BID    insulin lispro (HUMALOG) injection   SubCUTAneous Q6H    sodium chloride (NS) flush 5-10 mL  5-10 mL IntraVENous Q8H    heparin (porcine) injection 5,000 Units  5,000 Units SubCUTAneous Q8H    DAPTOmycin (CUBICIN) 400 mg in 0.9% sodium chloride 50 mL IVPB RF formulation  400 mg IntraVENous Q48H    levoFLOXacin (LEVAQUIN) 250 mg in D5W IVPB  250 mg IntraVENous Q48H    propofol (DIPRIVAN) infusion  0-50 mcg/kg/min IntraVENous TITRATE    methylPREDNISolone (PF) (SOLU-MEDROL) injection 80 mg  80 mg IntraVENous Q8H    albuterol-ipratropium (DUO-NEB) 2.5 MG-0.5 MG/3 ML  3 mL Nebulization QID RT    diphenhydrAMINE (BENADRYL) injection 50 mg  50 mg IntraVENous Q6H    famotidine (PF) (PEPCID) 20 mg in sodium chloride 0.9% 10 mL injection  20 mg IntraVENous Q24H    scopolamine (TRANSDERM-SCOP) 1 mg over 3 days 1 Patch  1 Patch TransDERmal Q72H    dexmedeTOMidine (PRECEDEX) 400 mcg in 0.9% sodium chloride 100 mL infusion  0.2-0.7 mcg/kg/hr IntraVENous TITRATE    sodium chloride (NS) flush 5-10 mL  5-10 mL IntraVENous Q8H    insulin regular (NOVOLIN R, HUMULIN R) 100 Units in 0.9% sodium chloride 100 mL infusion  0-50 Units/hr IntraVENous TITRATE    insulin lispro (HUMALOG) injection   SubCUTAneous TIDAC       Review of Systems:  Review of systems not obtained due to patient factors.     Objective:   Vital Signs:    Visit Vitals    BP (!) 146/93    Pulse 66    Temp 97.3 °F (36.3 °C)    Resp 28    Ht 5' 2\" (1.575 m)    Wt 67 kg (147 lb 11.3 oz)    SpO2 100%    Breastfeeding No    BMI 27.02 kg/m2       O2 Device: Ventilator   O2 Flow Rate (L/min): 2 l/min   Temp (24hrs), Av.5 °F (35.8 °C), Min:93.6 °F (34.2 °C), Max:99.7 °F (37.6 °C)       Intake/Output:   Last shift:      701 - 1900  In: -   Out: 450 [Urine:450]  Last 3 shifts: 1901 -  07  In: 3973.1 [I.V.:3973.1]  Out: 1264 [Urine:3145]    Intake/Output Summary (Last 24 hours) at 18 0930  Last data filed at 18 0800   Gross per 24 hour   Intake          3434.92 ml   Output             3595 ml   Net          -160.08 ml      Physical Exam:   General:  Lethargic, ill appearing   Head:  Normocephalic, without obvious abnormality, atraumati, some facial edema. Eyes:  Conjunctivae/corneas clear. Has moderate periorbital edema   Nose: Nares normal. Septum midline. Mucosa normal.     Throat: Angioedema of lips and tongue, mild stridor, loud snoring   Neck: Supple, symmetrical, trachea midline    Lungs:   Scattered ronchi, kussmaul respirations   Chest wall:  No tenderness or deformity. Heart:  Tachy, regular   Abdomen:   Soft, non-tender.  Bowel sounds normal.    Extremities: left transmetatarsal amputation   Skin: Skin color, texture, turgor normal. No rashes or lesions   Lymph nodes: Cervical, supraclavicular nodes normal.   Neurologic: Sedated      Data review:     Recent Results (from the past 24 hour(s))   GLUCOSE, POC    Collection Time: 06/10/18 10:15 AM   Result Value Ref Range    Glucose (POC) 161 (H) 65 - 100 mg/dL    Performed by Lala Shen    Collection Time: 06/10/18 10:16 AM   Result Value Ref Range    Glucose 161 mg/dL    Insulin order 0.1 units/hour    Insulin adminstered 0.1 units/hour    Multiplier 0.000     Low target 150 mg/dL    High target 250 mg/dL    D50 order 0.0 ml    D50 administered 0.00 ml    Minutes until next BG 60 min    Order initials as     Administered initials as     GLSCOM Comments     CULTURE, BLOOD, PAIRED    Collection Time: 06/10/18 10:42 AM   Result Value Ref Range    Special Requests: NO SPECIAL REQUESTS      Culture result: NO GROWTH AFTER 17 HOURS     METABOLIC PANEL, BASIC    Collection Time: 06/10/18 10:44 AM   Result Value Ref Range    Sodium 136 136 - 145 mmol/L    Potassium 5.1 3.5 - 5.1 mmol/L    Chloride 109 (H) 97 - 108 mmol/L    CO2 13 (LL) 21 - 32 mmol/L    Anion gap 14 5 - 15 mmol/L    Glucose 144 (H) 65 - 100 mg/dL    BUN 88 (H) 6 - 20 MG/DL    Creatinine 4.09 (H) 0.55 - 1.02 MG/DL    BUN/Creatinine ratio 22 (H) 12 - 20      GFR est AA 15 (L) >60 ml/min/1.73m2    GFR est non-AA 12 (L) >60 ml/min/1.73m2    Calcium 6.1 (LL) 8.5 - 10.1 MG/DL   MAGNESIUM    Collection Time: 06/10/18 10:44 AM   Result Value Ref Range    Magnesium 1.3 (L) 1.6 - 2.4 mg/dL   CORTISOL, AM    Collection Time: 06/10/18 10:44 AM   Result Value Ref Range    Cortisol, a.m. 26.1 (H) 4.3 - 22.4 ug/dL   BETA-HYDROXYBUTYRATE    Collection Time: 06/10/18 10:44 AM   Result Value Ref Range    B-hydroxybutyrate 0.22 <0.40 mmol/L   URINALYSIS W/MICROSCOPIC    Collection Time: 06/10/18 10:44 AM   Result Value Ref Range    Color YELLOW/STRAW      Appearance CLOUDY (A) CLEAR      Specific gravity 1.014 1.003 - 1.030      pH (UA) 5.0 5.0 - 8.0      Protein 100 (A) NEG mg/dL    Glucose >1000 (A) NEG mg/dL    Ketone NEGATIVE  NEG mg/dL    Bilirubin NEGATIVE  NEG      Blood NEGATIVE  NEG      Urobilinogen 0.2 0.2 - 1.0 EU/dL    Nitrites NEGATIVE  NEG      Leukocyte Esterase NEGATIVE  NEG      WBC 0-4 0 - 4 /hpf    RBC 0-5 0 - 5 /hpf    Epithelial cells MODERATE (A) FEW /lpf    Bacteria NEGATIVE  NEG /hpf    Mucus TRACE (A) NEG /lpf    Amorphous Crystals 1+ (A) NEG    Budding yeast PRESENT (A) NEG      Other: Renal Epithelial cells Present     POC G3 - PUL    Collection Time: 06/10/18 10:58 AM   Result Value Ref Range    FIO2 (POC) 100 %    pH (POC) 7.173 (LL) 7.35 - 7.45      pCO2 (POC) 37.0 35.0 - 45.0 MMHG    pO2 (POC) 534 (H) 80 - 100 MMHG    HCO3 (POC) 13.6 (L) 22 - 26 MMOL/L    sO2 (POC) 100 (H) 92 - 97 %    Base deficit (POC) 15 mmol/L    Site RIGHT RADIAL      Device: VENT      Mode ASSIST CONTROL      Tidal volume 400 ml    Set Rate 22 bpm    PEEP/CPAP (POC) 5 cmH2O    PIP (POC) 25      Allens test (POC) YES      Specimen type (POC) ARTERIAL      Total resp.  rate 22     GLUCOSE, POC    Collection Time: 06/10/18 11:16 AM   Result Value Ref Range    Glucose (POC) 180 (H) 65 - 100 mg/dL    Performed by Priti Garza    Collection Time: 06/10/18 11:16 AM   Result Value Ref Range    Glucose 180 mg/dL    Insulin order 0.1 units/hour    Insulin adminstered 0.1 units/hour    Multiplier 0.000     Low target 150 mg/dL    High target 250 mg/dL    D50 order 0.0 ml    D50 administered 0.00 ml    Minutes until next BG 60 min    Order initials JK     Administered initials JOVI     GLSCOM Comments     GLUCOSE, POC    Collection Time: 06/10/18 12:18 PM   Result Value Ref Range    Glucose (POC) 181 (H) 65 - 100 mg/dL    Performed by Tere Cunhave    Collection Time: 06/10/18 12:19 PM   Result Value Ref Range    Glucose 181 mg/dL    Insulin order 0.1 units/hour    Insulin adminstered 0.1 units/hour    Multiplier 0.000     Low target 150 mg/dL    High target 250 mg/dL    D50 order 0.0 ml    D50 administered 0.00 ml    Minutes until next BG 60 min    Order initials JK     Administered initials JOVI     GLSCKAT Comments     GLUCOSE, POC    Collection Time: 06/10/18  1:21 PM   Result Value Ref Range    Glucose (POC) 180 (H) 65 - 100 mg/dL    Performed by Tere Sieve    Collection Time: 06/10/18  1:21 PM   Result Value Ref Range    Glucose 180 mg/dL    Insulin order 0.1 units/hour    Insulin adminstered 0.1 units/hour    Multiplier 0.000     Low target 150 mg/dL    High target 250 mg/dL    D50 order 0.0 ml    D50 administered 0.00 ml    Minutes until next BG 60 min    Order initials JK     Administered initials JOVI     GLSCOM Comments     GLUCOSE, POC    Collection Time: 06/10/18  2:23 PM   Result Value Ref Range    Glucose (POC) 199 (H) 65 - 100 mg/dL    Performed by Andreea Santoyo    Collection Time: 06/10/18  2:24 PM   Result Value Ref Range    Glucose 199 mg/dL    Insulin order 0.1 units/hour    Insulin adminstered 0.1 units/hour    Multiplier 0.000     Low target 150 mg/dL    High target 250 mg/dL    D50 order 0.0 ml    D50 administered 0.00 ml    Minutes until next BG 60 min    Order initials JK     Administered initials JK     GLSCOM Comments     GLUCOSE, POC    Collection Time: 06/10/18  3:25 PM   Result Value Ref Range    Glucose (POC) 204 (H) 65 - 100 mg/dL    Performed by Andreeamaury Santoyo    Collection Time: 06/10/18  3:26 PM   Result Value Ref Range    Glucose 204 mg/dL    Insulin order 0.1 units/hour    Insulin adminstered 0.1 units/hour    Multiplier 0.000     Low target 150 mg/dL    High target 250 mg/dL    D50 order 0.0 ml    D50 administered 0.00 ml    Minutes until next  min    Order initials cs     Administered initials cs     GLSCOM Comments     METABOLIC PANEL, BASIC    Collection Time: 06/10/18  4:24 PM   Result Value Ref Range    Sodium 136 136 - 145 mmol/L    Potassium 4.9 3.5 - 5.1 mmol/L    Chloride 108 97 - 108 mmol/L    CO2 14 (LL) 21 - 32 mmol/L    Anion gap 14 5 - 15 mmol/L    Glucose 198 (H) 65 - 100 mg/dL    BUN 88 (H) 6 - 20 MG/DL    Creatinine 4.18 (H) 0.55 - 1.02 MG/DL    BUN/Creatinine ratio 21 (H) 12 - 20      GFR est AA 14 (L) >60 ml/min/1.73m2    GFR est non-AA 12 (L) >60 ml/min/1.73m2    Calcium 6.3 (LL) 8.5 - 10.1 MG/DL   MAGNESIUM    Collection Time: 06/10/18  4:24 PM   Result Value Ref Range    Magnesium 1.3 (L) 1.6 - 2.4 mg/dL   CK W/ CKMB & INDEX    Collection Time: 06/10/18  4:24 PM   Result Value Ref Range    CK 87 26 - 192 U/L    CK - MB 2.8 <3.6 NG/ML    CK-MB Index 3.2 (H) 0 - 2.5 GLUCOSE, POC    Collection Time: 06/10/18  5:28 PM   Result Value Ref Range    Glucose (POC) 230 (H) 65 - 100 mg/dL    Performed by Dean Cunningham    Collection Time: 06/10/18  5:28 PM   Result Value Ref Range    Glucose 230 mg/dL    Insulin order 0.1 units/hour    Insulin adminstered 0.1 units/hour    Multiplier 0.000     Low target 150 mg/dL    High target 250 mg/dL    D50 order 0.0 ml    D50 administered 0.00 ml    Minutes until next  min    Order initials cs     Administered initials cs     GLSCOM Comments     GLUCOSE, POC    Collection Time: 06/10/18  7:18 PM   Result Value Ref Range    Glucose (POC) 229 (H) 65 - 100 mg/dL    Performed by Dean Cunningham    Collection Time: 06/10/18  7:19 PM   Result Value Ref Range    Glucose 229 mg/dL    Insulin order 0.1 units/hour    Insulin adminstered 0.1 units/hour    Multiplier 0.000     Low target 150 mg/dL    High target 250 mg/dL    D50 order 0.0 ml    D50 administered 0.00 ml    Minutes until next  min    Order initials JK     Administered initials JK     GLSCOM Comments     GLUCOSE, POC    Collection Time: 06/10/18  9:21 PM   Result Value Ref Range    Glucose (POC) 296 (H) 65 - 100 mg/dL    Performed by Ganesh Pérez    Collection Time: 06/10/18  9:23 PM   Result Value Ref Range    Glucose 296 mg/dL    Insulin order 0.1 units/hour    Insulin adminstered 0.1 units/hour    Multiplier 0.000     Low target 150 mg/dL    High target 250 mg/dL    D50 order 0.0 ml    D50 administered 0.00 ml    Minutes until next BG 60 min    Order initials EP     Administered initials EP     GLSCOM Comments     METABOLIC PANEL, BASIC    Collection Time: 06/10/18  9:50 PM   Result Value Ref Range    Sodium 136 136 - 145 mmol/L    Potassium 4.3 3.5 - 5.1 mmol/L    Chloride 104 97 - 108 mmol/L    CO2 18 (L) 21 - 32 mmol/L    Anion gap 14 5 - 15 mmol/L    Glucose 241 (H) 65 - 100 mg/dL    BUN 86 (H) 6 - 20 MG/DL    Creatinine 3.86 (H) 0.55 - 1.02 MG/DL    BUN/Creatinine ratio 22 (H) 12 - 20      GFR est AA 16 (L) >60 ml/min/1.73m2    GFR est non-AA 13 (L) >60 ml/min/1.73m2    Calcium 6.4 (LL) 8.5 - 10.1 MG/DL   MAGNESIUM    Collection Time: 06/10/18  9:50 PM   Result Value Ref Range    Magnesium 1.2 (L) 1.6 - 2.4 mg/dL   GLUCOSE, POC    Collection Time: 06/10/18 10:18 PM   Result Value Ref Range    Glucose (POC) 297 (H) 65 - 100 mg/dL    Performed by Verita Pouch    Collection Time: 06/10/18 10:18 PM   Result Value Ref Range    Glucose 297 mg/dL    Insulin order 2.4 units/hour    Insulin adminstered 2.4 units/hour    Multiplier 0.010     Low target 150 mg/dL    High target 250 mg/dL    D50 order 0.0 ml    D50 administered 0.00 ml    Minutes until next BG 60 min    Order initials ep     Administered initials EP     GLSCOM Comments     GLUCOSE, POC    Collection Time: 06/10/18 11:20 PM   Result Value Ref Range    Glucose (POC) 287 (H) 65 - 100 mg/dL    Performed by Verita Pouch    Collection Time: 06/10/18 11:21 PM   Result Value Ref Range    Glucose 287 mg/dL    Insulin order 4.5 units/hour    Insulin adminstered 4.5 units/hour    Multiplier 0.020     Low target 150 mg/dL    High target 250 mg/dL    D50 order 0.0 ml    D50 administered 0.00 ml    Minutes until next BG 60 min    Order initials EP     Administered initials EP     GLSCOM Comments     GLUCOSE, POC    Collection Time: 06/11/18 12:24 AM   Result Value Ref Range    Glucose (POC) 278 (H) 65 - 100 mg/dL    Performed by Verita Pouch    Collection Time: 06/11/18 12:25 AM   Result Value Ref Range    Glucose 278 mg/dL    Insulin order 6.5 units/hour    Insulin adminstered 6.5 units/hour    Multiplier 0.030     Low target 150 mg/dL    High target 250 mg/dL    D50 order 0.0 ml    D50 administered 0.00 ml    Minutes until next BG 60 min    Order initials EP     Administered initials EP     GLSCOM Comments     GLUCOSE, POC Collection Time: 06/11/18  1:29 AM   Result Value Ref Range    Glucose (POC) 203 (H) 65 - 100 mg/dL    Performed by Sarah Mejía    Collection Time: 06/11/18  1:29 AM   Result Value Ref Range    Glucose 203 mg/dL    Insulin order 4.3 units/hour    Insulin adminstered 4.3 units/hour    Multiplier 0.030     Low target 150 mg/dL    High target 250 mg/dL    D50 order 0.0 ml    D50 administered 0.00 ml    Minutes until next BG 60 min    Order initials EP     Administered initials EP     GLSCOM Comments     METABOLIC PANEL, BASIC    Collection Time: 06/11/18  2:24 AM   Result Value Ref Range    Sodium 140 136 - 145 mmol/L    Potassium 3.9 3.5 - 5.1 mmol/L    Chloride 108 97 - 108 mmol/L    CO2 17 (L) 21 - 32 mmol/L    Anion gap 15 5 - 15 mmol/L    Glucose 154 (H) 65 - 100 mg/dL    BUN 83 (H) 6 - 20 MG/DL    Creatinine 3.97 (H) 0.55 - 1.02 MG/DL    BUN/Creatinine ratio 21 (H) 12 - 20      GFR est AA 15 (L) >60 ml/min/1.73m2    GFR est non-AA 13 (L) >60 ml/min/1.73m2    Calcium 7.3 (L) 8.5 - 10.1 MG/DL   MAGNESIUM    Collection Time: 06/11/18  2:24 AM   Result Value Ref Range    Magnesium 1.7 1.6 - 2.4 mg/dL   CBC W/O DIFF    Collection Time: 06/11/18  2:24 AM   Result Value Ref Range    WBC 5.9 3.6 - 11.0 K/uL    RBC 2.93 (L) 3.80 - 5.20 M/uL    HGB 7.6 (L) 11.5 - 16.0 g/dL    HCT 22.3 (L) 35.0 - 47.0 %    MCV 76.1 (L) 80.0 - 99.0 FL    MCH 25.9 (L) 26.0 - 34.0 PG    MCHC 34.1 30.0 - 36.5 g/dL    RDW 14.6 (H) 11.5 - 14.5 %    PLATELET 731 (L) 479 - 400 K/uL    NRBC 0.0 0  WBC    ABSOLUTE NRBC 0.00 0.00 - 0.01 K/uL   CK W/ CKMB & INDEX    Collection Time: 06/11/18  2:24 AM   Result Value Ref Range    CK 51 26 - 192 U/L    CK - MB 1.6 <3.6 NG/ML    CK-MB Index 3.1 (H) 0 - 2.5     TROPONIN I    Collection Time: 06/11/18  2:24 AM   Result Value Ref Range    Troponin-I, Qt. <0.04 <0.05 ng/mL   GLUCOSE, POC    Collection Time: 06/11/18  2:24 AM   Result Value Ref Range    Glucose (POC) 172 (H) 65 - 100 mg/dL    Performed by Shawna Gonzalez    TSH 3RD GENERATION    Collection Time: 06/11/18  2:24 AM   Result Value Ref Range    TSH 0.89 0.36 - 3.74 uIU/mL   GLUCOSTABILIZER    Collection Time: 06/11/18  2:25 AM   Result Value Ref Range    Glucose 172 mg/dL    Insulin order 3.4 units/hour    Insulin adminstered 3.4 units/hour    Multiplier 0.030     Low target 150 mg/dL    High target 250 mg/dL    D50 order 0.0 ml    D50 administered 0.00 ml    Minutes until next BG 60 min    Order initials EP     Administered initials EP     GLSCOM Comments     GLUCOSE, POC    Collection Time: 06/11/18  3:27 AM   Result Value Ref Range    Glucose (POC) 133 (H) 65 - 100 mg/dL    Performed by Lisa Louis    Collection Time: 06/11/18  3:28 AM   Result Value Ref Range    Glucose 133 mg/dL    Insulin order 1.5 units/hour    Insulin adminstered 1.5 units/hour    Multiplier 0.020     Low target 150 mg/dL    High target 250 mg/dL    D50 order 0.0 ml    D50 administered 0.00 ml    Minutes until next BG 60 min    Order initials EP     Administered initials EP     GLSCOM Comments     GLUCOSE, POC    Collection Time: 06/11/18  4:31 AM   Result Value Ref Range    Glucose (POC) 103 (H) 65 - 100 mg/dL    Performed by Lias Louis    Collection Time: 06/11/18  4:31 AM   Result Value Ref Range    Glucose 103 mg/dL    Insulin order 0.4 units/hour    Insulin adminstered 0.4 units/hour    Multiplier 0.010     Low target 150 mg/dL    High target 250 mg/dL    D50 order 0.0 ml    D50 administered 0.00 ml    Minutes until next BG 60 min    Order initials EP     Administered initials EP     GLSCOM Comments     GLUCOSE, POC    Collection Time: 06/11/18  5:20 AM   Result Value Ref Range    Glucose (POC) 102 (H) 65 - 100 mg/dL    Performed by Lisa Louis    Collection Time: 06/11/18  5:21 AM   Result Value Ref Range    Glucose 102 mg/dL    Insulin order 0.0 units/hour    Insulin adminstered 0.0 units/hour    Multiplier 0.000     Low target 150 mg/dL    High target 250 mg/dL    D50 order 0.0 ml    D50 administered 0.00 ml    Minutes until next BG 60 min    Order initials EP     Administered initials EP     GLSCOM Comments     GLUCOSE, POC    Collection Time: 06/11/18  6:22 AM   Result Value Ref Range    Glucose (POC) 123 (H) 65 - 100 mg/dL    Performed by Ana María Brantley    Collection Time: 06/11/18  6:23 AM   Result Value Ref Range    Glucose 123 mg/dL    Insulin order 0.0 units/hour    Insulin adminstered 0.0 units/hour    Multiplier 0.000     Low target 150 mg/dL    High target 250 mg/dL    D50 order 0.0 ml    D50 administered 0.00 ml    Minutes until next BG 60 min    Order initials EP     Administered initials EP     GLSCOM Comments     PHOSPHORUS    Collection Time: 06/11/18  7:22 AM   Result Value Ref Range    Phosphorus 6.0 (H) 2.6 - 4.7 MG/DL   METABOLIC PANEL, BASIC    Collection Time: 06/11/18  7:22 AM   Result Value Ref Range    Sodium 135 (L) 136 - 145 mmol/L    Potassium 4.4 3.5 - 5.1 mmol/L    Chloride 104 97 - 108 mmol/L    CO2 19 (L) 21 - 32 mmol/L    Anion gap 12 5 - 15 mmol/L    Glucose 145 (H) 65 - 100 mg/dL    BUN 81 (H) 6 - 20 MG/DL    Creatinine 3.91 (H) 0.55 - 1.02 MG/DL    BUN/Creatinine ratio 21 (H) 12 - 20      GFR est AA 16 (L) >60 ml/min/1.73m2    GFR est non-AA 13 (L) >60 ml/min/1.73m2    Calcium 7.9 (L) 8.5 - 10.1 MG/DL   MAGNESIUM    Collection Time: 06/11/18  7:22 AM   Result Value Ref Range    Magnesium 2.5 (H) 1.6 - 2.4 mg/dL   GLUCOSE, POC    Collection Time: 06/11/18  7:22 AM   Result Value Ref Range    Glucose (POC) 163 (H) 65 - 100 mg/dL    Performed by Ana María Brantley    Collection Time: 06/11/18  7:23 AM   Result Value Ref Range    Glucose 163 mg/dL    Insulin order 0.1 units/hour    Insulin adminstered 0.1 units/hour    Multiplier 0.000     Low target 150 mg/dL    High target 250 mg/dL    D50 order 0.0 ml    D50 administered 0.00 ml    Minutes until next BG 60 min    Order initials Ep     Administered initials EP     GLSCOM Comments     GLUCOSE, POC    Collection Time: 06/11/18  8:32 AM   Result Value Ref Range    Glucose (POC) 222 (H) 65 - 100 mg/dL    Performed by Niharika Santiago    Collection Time: 06/11/18  8:36 AM   Result Value Ref Range    Glucose 222 mg/dL    Insulin order 0.1 units/hour    Insulin adminstered 0.1 units/hour    Multiplier 0.000     Low target 150 mg/dL    High target 250 mg/dL    D50 order 0.0 ml    D50 administered 0.00 ml    Minutes until next BG 60 min    Order initials cs     Administered initials cs     GLSCOM Comments         Imaging:  I have personally reviewed the patients radiographs and have reviewed the reports:  CT abd/pelvis reviewed, chest X-ray reviewed     Medical decision making:   I have reviewed the flowsheet and previous day's notes  Patient has acute or chronic illness that poses a threat to life or bodily function  Review and order of Clinical lab tests  Review and Order of Radiology tests  Independent visualization of Image  Reviewed Ventilator / NiPPV  Parenteral controlled substances - Reviewed/ Adjusted / Azalee Gaster / Started  High Risk Drug therapy requiring intensive monitoring for toxicity: eg steroids, pressors, antibiotics       Yordan Grant MD

## 2018-06-11 NOTE — PROGRESS NOTES
Hospitalist Progress Note  Liza Escobar MD  Answering service: [phone] from in house phone  Cell: [phone]      Date of Service:  2018  NAME:  Natalia Tate  :  1980  MRN:  [id number]      Admission Summary: This is a 73-year-old woman with a past medical history significant for chronic pancreatitis, type 1 diabetes, asthma, chronic kidney disease, anemia, hypertension, depression, obstructive sleep apnea, sickle cell anemia, ongoing osteomyelitis of the left foot, who was in her usual state of health until the day of presentation at the emergency room when the patient developed back pain. The pain is located at the lower back bilaterally with no radiation. Interval history / Subjective: Intubated on 6/10, on ventilator support,     Assessment & Plan:     DKA with Type DM-I  -improving with insulin gtt, monitor finger stick glucose   -Finger stick glucose 102-278/overnight  -A1c 7.9    Hypothermia possible due to sepsis  -on Daptomycin, levaquin and IVF  -blood cx on 6/10 no growth so far  -UA no evidence of UTI  - random cortisol and cortisol in am normal  -TSH normal  -improved and off warming blanket     Acute respiratory failure possible due to sepsis POA  -intubate, on ventilator support  -repeated chest x ray on  improved aeration in the left lower lobe, favoring atelectasis. No evidence of a new acute cardiopulmonary process.   -pulmonologist on board    Acute Encephalopathy metabolic vs infection   -confused, agitated, restless, try to get out of bed on 6/10 , ativan x 1 on 6/10  -toxicology screen negative, alcohol <10  -continue neuro check  -CT head no acute finding    STONE on CKD stage IV-V  -on sodium bicarb drip  -Creatinine improving 4.89 to 3.91  -nephrologist on board    Non AG MA due to renal insufficiency   -on sodium bicarb gtt  -improving  -repeat bmp     Swelling of tongue due to acute Angioedema unclear etiology  -decadron 4 mg IV x 1 dose  -finger stick glucose improved, add iv solumedrol, on benadryl and monitor    Elevated liver enzyme multifactorial   -continue IVF  -CT of abdomen no mass in the liver or biliary dilatation  -monitor liver enzyme if it doesn't improves will do hepatitis panel    Hypocalcemia   -IV calcium gluconate 2 g IV x 2 on 6/10  -improved    Hx of osteomyelitis of left foot  -on Daptomycin  -ID on board    Hx of sickle cell disease  -continue IVF  -no evidence of occlusive crisis  - normal  -monitor cbc  -on prn fentanyl     Hx of chronic pancreatitis  -CT Abdomen pelvis moderate fecal stasis, chronic pancreatitis, s/p gastric bypass and cholecystectomy, no acute abnormality   -elevated but improving    Hyponatremia  -improving, continue monitoring    HTN poorly controlled  -BP Elevated, clonidine and nitro patch placed, on prn iv hydralazine  -her home hydralazine and clonidine is held, monitor BP    Hx of gastric by pass  -CT abdomen pelvis no acute finding    Chronic low back pain  -on prn fentanyl    Hx of THU  -now on ventilator    Hx of asthma  -not bronchospastic  -prn duo neb  -chest x ray     Hx of depression  -will resume her seroquel and venlafaxine when she able to take po meds      Full Code, at risk for decompensation       Code status: Full Code  DVT prophylaxis: heparin    Care Plan discussed with: Patient/Family and Nurse  Disposition: TBD     Hospital Problems  Date Reviewed: 6/10/2018          Codes Class Noted POA    * (Principal)DKA (diabetic ketoacidoses) ([facility] ICD-10-CM: E13.10  ICD-9-CM: 250.10  6/9/2018 Yes                Vital Signs:    Last 24hrs VS reviewed since prior progress note.  Most recent are:  Visit Vitals    BP (!) 168/93    Pulse 67    Temp 97.3 °F (36.3 °C)    Resp 28    Ht 5' 2\" (1.575 m)    Wt 67 kg (147 lb 11.3 oz)    SpO2 100%    Breastfeeding No    BMI 27.02 kg/m2         Intake/Output Summary (Last 24 hours) at 06/11/18 1052  Last data filed at 06/11/18 1000   Gross per 24 hour   Intake          3331.54 ml   Output             3745 ml   Net          -413.46 ml        Physical Examination:             Constitutional:  Intubated and on ventilator   ENT:  Swollen tongue, oral mucous moist, oropharynx benign. Neck supple,    Resp:  Decreased bronchial breath sound bilaterally. No wheezing/rhonchi/rales. No accessory muscle use   CV:  Regular rhythm, normal rate, no murmurs, gallops, rubs    GI:  Soft, non distended, non tender. normoactive bowel sounds, no hepatosplenomegaly     Musculoskeletal:  Left foot transmetatarsal amputation    Neurologic:  Sedated, on propofol     Skin:  Left foot healed ulcer        Data Review:    Review and/or order of clinical lab test      Labs:     Recent Labs      06/11/18   0224  06/10/18   0744   WBC  5.9  5.8   HGB  7.6*  8.3*   HCT  22.3*  26.1*   PLT  133*  134*     Recent Labs      06/11/18   0722  06/11/18 0224  06/10/18   2150   06/10/18   0744  06/10/18   0328   NA  135*  140  136   < >  136  134*   K  4.4  3.9  4.3   < >  4.8  4.1   CL  104  108  104   < >  108  108   CO2  19*  17*  18*   < >  15*  13*   BUN  81*  83*  86*   < >  86*  83*   CREA  3.91*  3.97*  3.86*   < >  4.50*  4.41*   GLU  145*  154*  241*   < >  103*  238*   CA  7.9*  7.3*  6.4*   < >  6.1*  6.1*   MG  2.5*  1.7  1.2*   < >  1.6  1.3*   PHOS  6.0*   --    --    --   7.1*  6.9*    < > = values in this interval not displayed. Recent Labs      06/10/18   0744  06/09/18   2203   SGOT   --   17   ALT   --   21   AP   --   275*   TBILI   --   0.2   TP   --   7.2   ALB   --   2.9*   GLOB   --   4.3*   AML  88  122*   LPSE  548*  1459*     No results for input(s): INR, PTP, APTT in the last 72 hours. No lab exists for component: INREXT, INREXT   No results for input(s): FE, TIBC, PSAT, FERR in the last 72 hours.    Lab Results   Component Value Date/Time    Folate 33.8 (H) 05/07/2018 11:20 AM      No results for input(s): PH, PCO2, PO2 in the last 72 hours.   Recent Labs      06/11/18   0224  06/10/18   1624  06/10/18   0744   CPK  51  87  100   CKNDX  3.1*  3.2*  3.5*   TROIQ  <0.04   --   <0.04     Lab Results   Component Value Date/Time    Cholesterol, total 128 06/10/2018 07:44 AM    HDL Cholesterol 51 06/10/2018 07:44 AM    LDL, calculated 67.4 06/10/2018 07:44 AM    Triglyceride 48 06/10/2018 07:44 AM    CHOL/HDL Ratio 2.5 06/10/2018 07:44 AM     Lab Results   Component Value Date/Time    Glucose (POC) 249 (H) 06/11/2018 09:38 AM    Glucose (POC) 222 (H) 06/11/2018 08:32 AM    Glucose (POC) 163 (H) 06/11/2018 07:22 AM    Glucose (POC) 123 (H) 06/11/2018 06:22 AM    Glucose (POC) 102 (H) 06/11/2018 05:20 AM     Lab Results   Component Value Date/Time    Color YELLOW/STRAW 06/10/2018 10:44 AM    Appearance CLOUDY (A) 06/10/2018 10:44 AM    Specific gravity 1.014 06/10/2018 10:44 AM    Specific gravity 1.015 07/26/2010 11:18 AM    pH (UA) 5.0 06/10/2018 10:44 AM    Protein 100 (A) 06/10/2018 10:44 AM    Glucose >1000 (A) 06/10/2018 10:44 AM    Ketone NEGATIVE  06/10/2018 10:44 AM    Bilirubin NEGATIVE  06/10/2018 10:44 AM    Urobilinogen 0.2 06/10/2018 10:44 AM    Nitrites NEGATIVE  06/10/2018 10:44 AM    Leukocyte Esterase NEGATIVE  06/10/2018 10:44 AM    Epithelial cells MODERATE (A) 06/10/2018 10:44 AM    Bacteria NEGATIVE  06/10/2018 10:44 AM    WBC 0-4 06/10/2018 10:44 AM    RBC 0-5 06/10/2018 10:44 AM         Medications Reviewed:     Current Facility-Administered Medications   Medication Dose Route Frequency    sodium bicarbonate (8.4%) 150 mEq in dextrose 5% 1,000 mL infusion   IntraVENous CONTINUOUS    cloNIDine (CATAPRES) 0.3 mg/24 hr patch 1 Patch  1 Patch TransDERmal Q7D    nitroglycerin (NITROBID) 2 % ointment 1 Inch  1 Inch Topical BID    insulin lispro (HUMALOG) injection   SubCUTAneous Q6H    albuterol (PROVENTIL VENTOLIN) nebulizer solution 2.5 mg  2.5 mg Nebulization Q4H PRN    sodium chloride (NS) flush 5-10 mL  5-10 mL IntraVENous Q8H    sodium chloride (NS) flush 5-10 mL  5-10 mL IntraVENous PRN    bisacodyl (DULCOLAX) tablet 5 mg  5 mg Oral DAILY PRN    heparin (porcine) injection 5,000 Units  5,000 Units SubCUTAneous Q8H    hydrALAZINE (APRESOLINE) 20 mg/mL injection 10 mg  10 mg IntraVENous Q6H PRN    DAPTOmycin (CUBICIN) 400 mg in 0.9% sodium chloride 50 mL IVPB RF formulation  400 mg IntraVENous Q48H    levoFLOXacin (LEVAQUIN) 250 mg in D5W IVPB  250 mg IntraVENous Q48H    propofol (DIPRIVAN) infusion  0-50 mcg/kg/min IntraVENous TITRATE    fentaNYL citrate (PF) injection  mcg   mcg IntraVENous Q2H PRN    methylPREDNISolone (PF) (SOLU-MEDROL) injection 80 mg  80 mg IntraVENous Q8H    albuterol-ipratropium (DUO-NEB) 2.5 MG-0.5 MG/3 ML  3 mL Nebulization QID RT    diphenhydrAMINE (BENADRYL) injection 50 mg  50 mg IntraVENous Q6H    famotidine (PF) (PEPCID) 20 mg in sodium chloride 0.9% 10 mL injection  20 mg IntraVENous Q24H    scopolamine (TRANSDERM-SCOP) 1 mg over 3 days 1 Patch  1 Patch TransDERmal Q72H    dexmedeTOMidine (PRECEDEX) 400 mcg in 0.9% sodium chloride 100 mL infusion  0.2-0.7 mcg/kg/hr IntraVENous TITRATE    ondansetron (ZOFRAN) injection 4 mg  4 mg IntraVENous Q4H PRN    sodium chloride (NS) flush 5-10 mL  5-10 mL IntraVENous Q8H    sodium chloride (NS) flush 5-10 mL  5-10 mL IntraVENous PRN    insulin regular (NOVOLIN R, HUMULIN R) 100 Units in 0.9% sodium chloride 100 mL infusion  0-50 Units/hr IntraVENous TITRATE    insulin lispro (HUMALOG) injection   SubCUTAneous TIDAC    glucose chewable tablet 16 g  4 Tab Oral PRN    dextrose (D50W) injection syrg 12.5-25 g  12.5-25 g IntraVENous PRN    glucagon (GLUCAGEN) injection 1 mg  1 mg IntraMUSCular PRN     ______________________________________________________________________  EXPECTED LENGTH OF STAY: - - -  ACTUAL LENGTH OF STAY:          2                 Osvaldo Del Toro MD

## 2018-06-11 NOTE — PROGRESS NOTES
1615    Report completed with offgoing nurse at bedside using: SBAR,  GTT VERIFY,   [address]      1900  Patient repositioned, escobedo emptied, no distress noted    2330    Bedside and Verbal shift change report given to oncoming nurse. Report included the following information SBAR, Kardex, Intake/Output, MAR and Recent Results.

## 2018-06-11 NOTE — PROGRESS NOTES
Spiritual Care Assessment/Progress Note  [facility]      NAME: Yogesh Carmichael      MRN: [id number]  AGE: 42 y.o. SEX: female  Mu-ism Affiliation: Christian   Language: English     6/11/2018     Total Time (in minutes): 5     Spiritual Assessment begun in [facility] CORONARY CARE through conversation with:         []Patient        [] Family    [] Friend(s)        Reason for Consult: Initial/Spiritual assessment, critical care     Spiritual beliefs: (Please include comment if needed)     [] Identifies with a maureen tradition:     [] Supported by a maureen community:      [] Claims no spiritual orientation:      [] Seeking spiritual identity:           [] Adheres to an individual form of spirituality:      [x] Not able to assess:                     Identified resources for coping:      [] Prayer                               [] Music                  [] Guided Imagery     [] Family/friends                 [] Pet visits     [] Devotional reading                         [] Unknown     [] Other:                                            Interventions offered during this visit: (See comments for more details)    Patient Interventions: Other (comment)           Plan of Care:     [] Support spiritual and/or cultural needs    [] Support AMD and/or advance care planning process      [] Support grieving process   [] Coordinate Rites and/or Rituals    [] Coordination with community clergy   [] No spiritual needs identified at this time   [] Detailed Plan of Care below (See Comments)  [] Make referral to Music Therapy  [] Make referral to Pet Therapy     [] Make referral to Addiction services  [] Make referral to [facility]  [] Make referral to Spiritual Care Partner  [] No future visits requested        [x] Follow up visits as needed     Comments: Dangelo Vidal is attempting an initial spiritual assessment with pt in CCU 21. Pt is intubated and sedated. No family present.  discussed pt's care with RN.  Pt  does not appear to have visited the hospital yet, but is keeping in touch via phone. Spiritual care will continue to follow as able and as needed.      [id number] Joie Espinoza M.Div, M.S, [place] 832 available at [phone]

## 2018-06-12 ENCOUNTER — APPOINTMENT (OUTPATIENT)
Dept: GENERAL RADIOLOGY | Age: 38
DRG: 871 | End: 2018-06-12
Attending: INTERNAL MEDICINE
Payer: MEDICARE

## 2018-06-12 LAB
ADMINISTERED INITIALS, ADMINIT: NORMAL
ALBUMIN SERPL-MCNC: 2.3 G/DL (ref 3.5–5)
ALBUMIN/GLOB SERPL: 0.6 {RATIO} (ref 1.1–2.2)
ALP SERPL-CCNC: 130 U/L (ref 45–117)
ALT SERPL-CCNC: 13 U/L (ref 12–78)
ANION GAP SERPL CALC-SCNC: 14 MMOL/L (ref 5–15)
AST SERPL-CCNC: 9 U/L (ref 15–37)
BASOPHILS # BLD: 0 K/UL (ref 0–0.1)
BASOPHILS NFR BLD: 0 % (ref 0–1)
BILIRUB SERPL-MCNC: 0.1 MG/DL (ref 0.2–1)
BUN SERPL-MCNC: 76 MG/DL (ref 6–20)
BUN/CREAT SERPL: 19 (ref 12–20)
CALCIUM SERPL-MCNC: 7.2 MG/DL (ref 8.5–10.1)
CHLORIDE SERPL-SCNC: 105 MMOL/L (ref 97–108)
CO2 SERPL-SCNC: 21 MMOL/L (ref 21–32)
CREAT SERPL-MCNC: 3.98 MG/DL (ref 0.55–1.02)
D50 ADMINISTERED, D50ADM: 0 ML
D50 ORDER, D50ORD: 0 ML
DIFFERENTIAL METHOD BLD: ABNORMAL
EOSINOPHIL # BLD: 0 K/UL (ref 0–0.4)
EOSINOPHIL NFR BLD: 0 % (ref 0–7)
ERYTHROCYTE [DISTWIDTH] IN BLOOD BY AUTOMATED COUNT: 14.5 % (ref 11.5–14.5)
GLOBULIN SER CALC-MCNC: 3.7 G/DL (ref 2–4)
GLSCOM COMMENTS: NORMAL
GLUCOSE BLD STRIP.AUTO-MCNC: 101 MG/DL (ref 65–100)
GLUCOSE BLD STRIP.AUTO-MCNC: 111 MG/DL (ref 65–100)
GLUCOSE BLD STRIP.AUTO-MCNC: 118 MG/DL (ref 65–100)
GLUCOSE BLD STRIP.AUTO-MCNC: 132 MG/DL (ref 65–100)
GLUCOSE BLD STRIP.AUTO-MCNC: 136 MG/DL (ref 65–100)
GLUCOSE BLD STRIP.AUTO-MCNC: 141 MG/DL (ref 65–100)
GLUCOSE BLD STRIP.AUTO-MCNC: 165 MG/DL (ref 65–100)
GLUCOSE BLD STRIP.AUTO-MCNC: 166 MG/DL (ref 65–100)
GLUCOSE BLD STRIP.AUTO-MCNC: 176 MG/DL (ref 65–100)
GLUCOSE BLD STRIP.AUTO-MCNC: 181 MG/DL (ref 65–100)
GLUCOSE BLD STRIP.AUTO-MCNC: 202 MG/DL (ref 65–100)
GLUCOSE BLD STRIP.AUTO-MCNC: 223 MG/DL (ref 65–100)
GLUCOSE BLD STRIP.AUTO-MCNC: 255 MG/DL (ref 65–100)
GLUCOSE BLD STRIP.AUTO-MCNC: 261 MG/DL (ref 65–100)
GLUCOSE BLD STRIP.AUTO-MCNC: NORMAL MG/DL (ref 65–100)
GLUCOSE SERPL-MCNC: 190 MG/DL (ref 65–100)
GLUCOSE, GLC: 101 MG/DL
GLUCOSE, GLC: 111 MG/DL
GLUCOSE, GLC: 118 MG/DL
GLUCOSE, GLC: 132 MG/DL
GLUCOSE, GLC: 141 MG/DL
GLUCOSE, GLC: 176 MG/DL
GLUCOSE, GLC: 181 MG/DL
GLUCOSE, GLC: 223 MG/DL
GLUCOSE, GLC: 255 MG/DL
GLUCOSE, GLC: 261 MG/DL
HCT VFR BLD AUTO: 22.7 % (ref 35–47)
HGB BLD-MCNC: 7.8 G/DL (ref 11.5–16)
HIGH TARGET, HITG: 250 MG/DL
IMM GRANULOCYTES # BLD: 0 K/UL (ref 0–0.04)
IMM GRANULOCYTES NFR BLD AUTO: 0 % (ref 0–0.5)
INSULIN ADMINSTERED, INSADM: 0 UNITS/HOUR
INSULIN ADMINSTERED, INSADM: 0 UNITS/HOUR
INSULIN ADMINSTERED, INSADM: 0.1 UNITS/HOUR
INSULIN ADMINSTERED, INSADM: 0.4 UNITS/HOUR
INSULIN ADMINSTERED, INSADM: 1.2 UNITS/HOUR
INSULIN ADMINSTERED, INSADM: 2.4 UNITS/HOUR
INSULIN ADMINSTERED, INSADM: 4.6 UNITS/HOUR
INSULIN ADMINSTERED, INSADM: 5.9 UNITS/HOUR
INSULIN ADMINSTERED, INSADM: 6.5 UNITS/HOUR
INSULIN ADMINSTERED, INSADM: 8 UNITS/HOUR
INSULIN ORDER, INSORD: 0 UNITS/HOUR
INSULIN ORDER, INSORD: 0 UNITS/HOUR
INSULIN ORDER, INSORD: 0.1 UNITS/HOUR
INSULIN ORDER, INSORD: 0.4 UNITS/HOUR
INSULIN ORDER, INSORD: 1.2 UNITS/HOUR
INSULIN ORDER, INSORD: 2.4 UNITS/HOUR
INSULIN ORDER, INSORD: 4.6 UNITS/HOUR
INSULIN ORDER, INSORD: 5.9 UNITS/HOUR
INSULIN ORDER, INSORD: 6.5 UNITS/HOUR
INSULIN ORDER, INSORD: 8 UNITS/HOUR
LOW TARGET, LOT: 150 MG/DL
LYMPHOCYTES # BLD: 0.4 K/UL (ref 0.8–3.5)
LYMPHOCYTES NFR BLD: 5 % (ref 12–49)
MAGNESIUM SERPL-MCNC: 2.2 MG/DL (ref 1.6–2.4)
MCH RBC QN AUTO: 25.6 PG (ref 26–34)
MCHC RBC AUTO-ENTMCNC: 34.4 G/DL (ref 30–36.5)
MCV RBC AUTO: 74.4 FL (ref 80–99)
MINUTES UNTIL NEXT BG, NBG: 60 MIN
MONOCYTES # BLD: 0.1 K/UL (ref 0–1)
MONOCYTES NFR BLD: 2 % (ref 5–13)
MULTIPLIER, MUL: 0
MULTIPLIER, MUL: 0.01
MULTIPLIER, MUL: 0.02
MULTIPLIER, MUL: 0.03
MULTIPLIER, MUL: 0.03
MULTIPLIER, MUL: 0.04
NEUTS SEG # BLD: 6.5 K/UL (ref 1.8–8)
NEUTS SEG NFR BLD: 93 % (ref 32–75)
NRBC # BLD: 0 K/UL (ref 0–0.01)
NRBC BLD-RTO: 0 PER 100 WBC
ORDER INITIALS, ORDINIT: NORMAL
PHOSPHATE SERPL-MCNC: 6.4 MG/DL (ref 2.6–4.7)
PLATELET # BLD AUTO: 147 K/UL (ref 150–400)
POTASSIUM SERPL-SCNC: 3.6 MMOL/L (ref 3.5–5.1)
PROT SERPL-MCNC: 6 G/DL (ref 6.4–8.2)
RBC # BLD AUTO: 3.05 M/UL (ref 3.8–5.2)
RBC MORPH BLD: ABNORMAL
SERVICE CMNT-IMP: ABNORMAL
SERVICE CMNT-IMP: NORMAL
SODIUM SERPL-SCNC: 140 MMOL/L (ref 136–145)
WBC # BLD AUTO: 7 K/UL (ref 3.6–11)

## 2018-06-12 PROCEDURE — 74011000250 HC RX REV CODE- 250: Performed by: INTERNAL MEDICINE

## 2018-06-12 PROCEDURE — 74011250637 HC RX REV CODE- 250/637: Performed by: INTERNAL MEDICINE

## 2018-06-12 PROCEDURE — 83735 ASSAY OF MAGNESIUM: CPT | Performed by: INTERNAL MEDICINE

## 2018-06-12 PROCEDURE — 94003 VENT MGMT INPAT SUBQ DAY: CPT

## 2018-06-12 PROCEDURE — 82962 GLUCOSE BLOOD TEST: CPT

## 2018-06-12 PROCEDURE — 36415 COLL VENOUS BLD VENIPUNCTURE: CPT | Performed by: INTERNAL MEDICINE

## 2018-06-12 PROCEDURE — 71045 X-RAY EXAM CHEST 1 VIEW: CPT

## 2018-06-12 PROCEDURE — 74011250636 HC RX REV CODE- 250/636: Performed by: INTERNAL MEDICINE

## 2018-06-12 PROCEDURE — 85025 COMPLETE CBC W/AUTO DIFF WBC: CPT | Performed by: INTERNAL MEDICINE

## 2018-06-12 PROCEDURE — 84100 ASSAY OF PHOSPHORUS: CPT | Performed by: INTERNAL MEDICINE

## 2018-06-12 PROCEDURE — 74011000250 HC RX REV CODE- 250: Performed by: HOSPITALIST

## 2018-06-12 PROCEDURE — 80053 COMPREHEN METABOLIC PANEL: CPT | Performed by: INTERNAL MEDICINE

## 2018-06-12 PROCEDURE — 94640 AIRWAY INHALATION TREATMENT: CPT

## 2018-06-12 PROCEDURE — 65620000000 HC RM CCU GENERAL

## 2018-06-12 PROCEDURE — 74011636637 HC RX REV CODE- 636/637: Performed by: INTERNAL MEDICINE

## 2018-06-12 PROCEDURE — 74011250636 HC RX REV CODE- 250/636: Performed by: HOSPITALIST

## 2018-06-12 PROCEDURE — 74011000258 HC RX REV CODE- 258: Performed by: HOSPITALIST

## 2018-06-12 PROCEDURE — 77010033678 HC OXYGEN DAILY

## 2018-06-12 RX ORDER — DEXTROSE 50 % IN WATER (D50W) INTRAVENOUS SYRINGE
12.5-25 AS NEEDED
Status: DISCONTINUED | OUTPATIENT
Start: 2018-06-12 | End: 2018-06-12 | Stop reason: SDUPTHER

## 2018-06-12 RX ORDER — SODIUM BICARBONATE 650 MG/1
650 TABLET ORAL 2 TIMES DAILY
Status: DISCONTINUED | OUTPATIENT
Start: 2018-06-12 | End: 2018-06-14 | Stop reason: HOSPADM

## 2018-06-12 RX ORDER — INSULIN LISPRO 100 [IU]/ML
INJECTION, SOLUTION INTRAVENOUS; SUBCUTANEOUS EVERY 4 HOURS
Status: DISCONTINUED | OUTPATIENT
Start: 2018-06-12 | End: 2018-06-14 | Stop reason: HOSPADM

## 2018-06-12 RX ORDER — CALCIUM CARBONATE 200(500)MG
400 TABLET,CHEWABLE ORAL
Status: DISCONTINUED | OUTPATIENT
Start: 2018-06-12 | End: 2018-06-14 | Stop reason: HOSPADM

## 2018-06-12 RX ORDER — MAGNESIUM SULFATE 100 %
4 CRYSTALS MISCELLANEOUS AS NEEDED
Status: DISCONTINUED | OUTPATIENT
Start: 2018-06-12 | End: 2018-06-12 | Stop reason: SDUPTHER

## 2018-06-12 RX ORDER — INSULIN GLARGINE 100 [IU]/ML
20 INJECTION, SOLUTION SUBCUTANEOUS DAILY
Status: DISCONTINUED | OUTPATIENT
Start: 2018-06-12 | End: 2018-06-14 | Stop reason: HOSPADM

## 2018-06-12 RX ORDER — BUMETANIDE 0.25 MG/ML
2 INJECTION INTRAMUSCULAR; INTRAVENOUS ONCE
Status: COMPLETED | OUTPATIENT
Start: 2018-06-12 | End: 2018-06-12

## 2018-06-12 RX ADMIN — DIPHENHYDRAMINE HYDROCHLORIDE 50 MG: 50 INJECTION, SOLUTION INTRAMUSCULAR; INTRAVENOUS at 23:45

## 2018-06-12 RX ADMIN — INSULIN LISPRO 2 UNITS: 100 INJECTION, SOLUTION INTRAVENOUS; SUBCUTANEOUS at 23:45

## 2018-06-12 RX ADMIN — PROPOFOL 50 MCG/KG/MIN: 10 INJECTION, EMULSION INTRAVENOUS at 08:56

## 2018-06-12 RX ADMIN — SODIUM CHLORIDE 0.5 MCG/KG/HR: 900 INJECTION, SOLUTION INTRAVENOUS at 18:02

## 2018-06-12 RX ADMIN — HEPARIN SODIUM 5000 UNITS: 5000 INJECTION, SOLUTION INTRAVENOUS; SUBCUTANEOUS at 05:18

## 2018-06-12 RX ADMIN — METHYLPREDNISOLONE SODIUM SUCCINATE 80 MG: 40 INJECTION, POWDER, FOR SOLUTION INTRAMUSCULAR; INTRAVENOUS at 14:26

## 2018-06-12 RX ADMIN — SODIUM CHLORIDE 0.4 MCG/KG/HR: 900 INJECTION, SOLUTION INTRAVENOUS at 06:12

## 2018-06-12 RX ADMIN — CHLORHEXIDINE GLUCONATE 15 ML: 1.2 RINSE ORAL at 08:48

## 2018-06-12 RX ADMIN — BUMETANIDE 2 MG: 0.25 INJECTION INTRAMUSCULAR; INTRAVENOUS at 08:32

## 2018-06-12 RX ADMIN — PROPOFOL 50 MCG/KG/MIN: 10 INJECTION, EMULSION INTRAVENOUS at 14:29

## 2018-06-12 RX ADMIN — HYDRALAZINE HYDROCHLORIDE 10 MG: 20 INJECTION INTRAMUSCULAR; INTRAVENOUS at 18:19

## 2018-06-12 RX ADMIN — CHLORHEXIDINE GLUCONATE 15 ML: 1.2 RINSE ORAL at 21:03

## 2018-06-12 RX ADMIN — Medication 10 ML: at 14:27

## 2018-06-12 RX ADMIN — METHYLPREDNISOLONE SODIUM SUCCINATE 80 MG: 40 INJECTION, POWDER, FOR SOLUTION INTRAMUSCULAR; INTRAVENOUS at 05:17

## 2018-06-12 RX ADMIN — METHYLPREDNISOLONE SODIUM SUCCINATE 80 MG: 40 INJECTION, POWDER, FOR SOLUTION INTRAMUSCULAR; INTRAVENOUS at 21:01

## 2018-06-12 RX ADMIN — Medication 10 ML: at 05:18

## 2018-06-12 RX ADMIN — PROPOFOL 50 MCG/KG/MIN: 10 INJECTION, EMULSION INTRAVENOUS at 23:45

## 2018-06-12 RX ADMIN — DIPHENHYDRAMINE HYDROCHLORIDE 50 MG: 50 INJECTION, SOLUTION INTRAMUSCULAR; INTRAVENOUS at 12:21

## 2018-06-12 RX ADMIN — INSULIN GLARGINE 20 UNITS: 100 INJECTION, SOLUTION SUBCUTANEOUS at 08:33

## 2018-06-12 RX ADMIN — INSULIN LISPRO 3 UNITS: 100 INJECTION, SOLUTION INTRAVENOUS; SUBCUTANEOUS at 12:21

## 2018-06-12 RX ADMIN — HEPARIN SODIUM 5000 UNITS: 5000 INJECTION, SOLUTION INTRAVENOUS; SUBCUTANEOUS at 12:21

## 2018-06-12 RX ADMIN — FENTANYL CITRATE 100 MCG: 50 INJECTION, SOLUTION INTRAMUSCULAR; INTRAVENOUS at 00:10

## 2018-06-12 RX ADMIN — INSULIN LISPRO 2 UNITS: 100 INJECTION, SOLUTION INTRAVENOUS; SUBCUTANEOUS at 20:09

## 2018-06-12 RX ADMIN — Medication 10 ML: at 05:19

## 2018-06-12 RX ADMIN — PROPOFOL 50 MCG/KG/MIN: 10 INJECTION, EMULSION INTRAVENOUS at 02:08

## 2018-06-12 RX ADMIN — LEVOFLOXACIN 250 MG: 5 INJECTION, SOLUTION INTRAVENOUS at 08:32

## 2018-06-12 RX ADMIN — NITROGLYCERIN 1 INCH: 20 OINTMENT TOPICAL at 18:02

## 2018-06-12 RX ADMIN — FENTANYL CITRATE 50 MCG: 50 INJECTION, SOLUTION INTRAMUSCULAR; INTRAVENOUS at 07:04

## 2018-06-12 RX ADMIN — Medication 10 ML: at 14:26

## 2018-06-12 RX ADMIN — NITROGLYCERIN 1 INCH: 20 OINTMENT TOPICAL at 08:33

## 2018-06-12 RX ADMIN — IPRATROPIUM BROMIDE AND ALBUTEROL SULFATE 3 ML: .5; 3 SOLUTION RESPIRATORY (INHALATION) at 15:11

## 2018-06-12 RX ADMIN — PROPOFOL 50 MCG/KG/MIN: 10 INJECTION, EMULSION INTRAVENOUS at 18:02

## 2018-06-12 RX ADMIN — DIPHENHYDRAMINE HYDROCHLORIDE 50 MG: 50 INJECTION, SOLUTION INTRAMUSCULAR; INTRAVENOUS at 18:02

## 2018-06-12 RX ADMIN — FENTANYL CITRATE 100 MCG: 50 INJECTION, SOLUTION INTRAMUSCULAR; INTRAVENOUS at 20:58

## 2018-06-12 RX ADMIN — FAMOTIDINE 20 MG: 10 INJECTION, SOLUTION INTRAVENOUS at 08:40

## 2018-06-12 RX ADMIN — DIPHENHYDRAMINE HYDROCHLORIDE 50 MG: 50 INJECTION, SOLUTION INTRAMUSCULAR; INTRAVENOUS at 00:10

## 2018-06-12 RX ADMIN — FENTANYL CITRATE 50 MCG: 50 INJECTION, SOLUTION INTRAMUSCULAR; INTRAVENOUS at 07:19

## 2018-06-12 RX ADMIN — IPRATROPIUM BROMIDE AND ALBUTEROL SULFATE 3 ML: .5; 3 SOLUTION RESPIRATORY (INHALATION) at 20:20

## 2018-06-12 RX ADMIN — HEPARIN SODIUM 5000 UNITS: 5000 INJECTION, SOLUTION INTRAVENOUS; SUBCUTANEOUS at 20:09

## 2018-06-12 RX ADMIN — Medication 10 ML: at 21:00

## 2018-06-12 RX ADMIN — IPRATROPIUM BROMIDE AND ALBUTEROL SULFATE 3 ML: .5; 3 SOLUTION RESPIRATORY (INHALATION) at 07:18

## 2018-06-12 RX ADMIN — FENTANYL CITRATE 100 MCG: 50 INJECTION, SOLUTION INTRAMUSCULAR; INTRAVENOUS at 05:17

## 2018-06-12 RX ADMIN — DIPHENHYDRAMINE HYDROCHLORIDE 50 MG: 50 INJECTION, SOLUTION INTRAMUSCULAR; INTRAVENOUS at 05:18

## 2018-06-12 RX ADMIN — FENTANYL CITRATE 100 MCG: 50 INJECTION, SOLUTION INTRAMUSCULAR; INTRAVENOUS at 03:13

## 2018-06-12 RX ADMIN — IPRATROPIUM BROMIDE AND ALBUTEROL SULFATE 3 ML: .5; 3 SOLUTION RESPIRATORY (INHALATION) at 12:02

## 2018-06-12 NOTE — PROGRESS NOTES
Problem: Falls - Risk of  Goal: *Absence of Falls  Document Blake Fall Risk and appropriate interventions in the flowsheet. Outcome: Progressing Towards Goal  Fall Risk Interventions:  Mobility Interventions: Assess mobility with egress test         Medication Interventions: Evaluate medications/consider consulting pharmacy, Patient to call before getting OOB, Teach patient to arise slowly    Elimination Interventions: Call light in reach             Problem: Pressure Injury - Risk of  Goal: *Prevention of pressure injury  Document Antonio Scale and appropriate interventions in the flowsheet. Outcome: Progressing Towards Goal  Pressure Injury Interventions:  Sensory Interventions: Assess changes in LOC, Assess need for specialty bed, Float heels, Pressure redistribution bed/mattress (bed type), Turn and reposition approx. every two hours (pillows and wedges if needed)    Moisture Interventions: Absorbent underpads, Check for incontinence Q2 hours and as needed    Activity Interventions: Assess need for specialty bed, Increase time out of bed    Mobility Interventions: Assess need for specialty bed, HOB 30 degrees or less, Turn and reposition approx.  every two hours(pillow and wedges)    Nutrition Interventions: Document food/fluid/supplement intake    Friction and Shear Interventions: Apply protective barrier, creams and emollients, HOB 30 degrees or less, Transferring/repositioning devices

## 2018-06-12 NOTE — PROGRESS NOTES
2330 Bedside report received from [facility]. Alina verified. Pt bucking vent. 0000 Pt has not received any pain medicine in last 13 hours. /104. Pt awake and bucking vent on 50 of propofol and 0.4 of Precedex. Given fentanyl with immediate effect. 0400 Able to wean to propofol. Will keep precedex on throughout SAT and SBT. Brady hugger off.  0700 Plan for SBT, given half dose of fent, stopped fent. Stopped fluids per Dr. Lindy Rivera. Give lasix before sbt, then given rest of fentanyl and restarted propofol. 0730 Bedside report given to Margaret.

## 2018-06-12 NOTE — PROGRESS NOTES
Problem: Falls - Risk of  Goal: *Absence of Falls  Document Blake Fall Risk and appropriate interventions in the flowsheet.    Fall Risk Interventions:  Mobility Interventions: Assess mobility with egress test         Medication Interventions: Evaluate medications/consider consulting pharmacy, Patient to call before getting OOB, Teach patient to arise slowly    Elimination Interventions: Call light in reach

## 2018-06-12 NOTE — PROGRESS NOTES
Hospitalist Progress Note  Parish Guerrero MD  Answering service: [phone] OR 36 from in house phone  Cell: [phone]       Date of Service:  2018  NAME:  Alejandro Herndon  :  1980  MRN:  [id number]    Admission Summary: This is a 57-year-old woman with a past medical history significant for chronic pancreatitis, type 1 diabetes, asthma, chronic kidney disease, anemia, hypertension, depression, obstructive sleep apnea, sickle cell anemia, ongoing osteomyelitis of the left foot, who was in her usual state of health until the day of presentation at the emergency room when the patient developed back pain.  The pain is located at the lower back bilaterally with no radiation.      Interval history / Subjective:     2018   This note is a follow up note for multiple medical issues as listed below and partially expanded on herewith. Pt sedated on vent, no apparent acuate distress. bs coming down, Lantus given and will give daily, no anion gap.       Assessment & Plan:      DKA with Type DM-I  -improving with insulin gtt, monitor finger stick glucose   -Finger stick glucose 102-278/overnight  -A1c 7.9  Lab Results   Component Value Date/Time    Glucose 190 (H) 2018 02:29 AM    Glucose (POC) 202 (H) 2018 12:03 PM         Hypothermia possible due to sepsis  -on Daptomycin, levaquin and IVF  -blood cx on 6/10 no growth so far  -UA no evidence of UTI  - random cortisol and cortisol in am normal  -TSH normal  -improved and off warming blanket   Temp Readings from Last 1 Encounters:   18 99.3 °F (37.4 °C)         Acute respiratory failure possible due to sepsis POA  -intubate, on ventilator support  -repeated chest x ray on  improved aeration in the left lower lobe, favoring atelectasis. No evidence of a new acute cardiopulmonary process.   -pulmonologist on board  Intubated on 6/10, on ventilator suppor     Acute Encephalopathy metabolic vs infection   -confused, agitated, restless, try to get out of bed on 6/10 , ativan x 1 on 6/10  -toxicology screen negative, alcohol <10  -continue neuro check  -CT head no acute finding     STONE on CKD stage IV-V  -on sodium bicarb drip  -Creatinine improving 4.89 to 3.91  -nephrologist on board  Lab Results   Component Value Date/Time    Creatinine (POC) 4.1 (H) 04/27/2018 10:04 PM    Creatinine 3.98 (H) 06/12/2018 02:29 AM         Non AG MA due to renal insufficiency   -on sodium bicarb gtt  -improving  -repeat bmp      Swelling of tongue due to acute Angioedema unclear etiology  -decadron 4 mg IV x 1 dose  -finger stick glucose improved, add iv solumedrol, on benadryl and monitor     Elevated liver enzyme multifactorial   -continue IVF  -CT of abdomen no mass in the liver or biliary dilatation  -monitor liver enzyme if it doesn't improves will do hepatitis panel     Hypocalcemia   -IV calcium gluconate 2 g IV x 2 on 6/10  -improved  Lab Results   Component Value Date/Time    Calcium 7.2 (L) 06/12/2018 02:29 AM    Phosphorus 6.4 (H) 06/12/2018 02:29 AM          Hx of osteomyelitis of left foot  -on Daptomycin  -ID on board     Hx of sickle cell disease  -continue IVF  -no evidence of occlusive crisis  - normal  -monitor cbc  -on prn fentanyl   Lab Results   Component Value Date/Time    Hemoglobin (POC) 11.2 (L) 06/10/2013 11:41 AM    HGB 7.8 (L) 06/12/2018 02:29 AM         Hx of chronic pancreatitis  -CT Abdomen pelvis moderate fecal stasis, chronic pancreatitis, s/p gastric bypass and cholecystectomy, no acute abnormality   -elevated but improving     Hyponatremia  -improving, continue monitoring  Recent Labs      06/12/18   0229   NA  140          HTN poorly controlled  -BP Elevated, clonidine and nitro patch placed, on prn iv hydralazine  -her home hydralazine and clonidine is held, monitor BP     Hx of gastric by pass  -CT abdomen pelvis no acute finding     Chronic low back pain  -on prn fentanyl     Hx of THU  -now on ventilator     Hx of asthma  -not bronchospastic  -prn duo neb  -chest x ray      Hx of depression  -will resume her seroquel and venlafaxine when she able to take po meds        Full Code, at risk for decompensation         Code status: Full Code  DVT prophylaxis: heparin     Care Plan discussed with: Patient/Family and Nurse  Disposition: TBD              Hospital Problems  Date Reviewed: 6/10/2018          Codes Class Noted POA    * (Principal)DKA (diabetic ketoacidoses) ([facility] [address] ICD-10-CM: E13.10  ICD-9-CM: 250.10  6/9/2018 Yes                Review of Systems:   Review of systems not obtained due to patient factors. Vital Signs:    Last 24hrs VS reviewed since prior progress note. Most recent are:  Visit Vitals    /78    Pulse 73    Temp 99.3 °F (37.4 °C)    Resp 20    Ht 5' 2\" (1.575 m)    Wt 67.2 kg (148 lb 2.4 oz)    SpO2 100%    Breastfeeding No    BMI 27.1 kg/m2         Intake/Output Summary (Last 24 hours) at 06/12/18 1618  Last data filed at 06/12/18 1500   Gross per 24 hour   Intake          1857.65 ml   Output             3175 ml   Net         -1317.35 ml        Physical Examination:             Constitutional:  on vent ergo: acute distress,     ENT:    Neck supple,    Resp:  CTA bilaterally on vent . No wheezing/rhonchi/rales. CV:   Controled rate , no gallops, rubs    GI:  Soft, non distended, non tender.  normoactive bowel sounds, no hepatosplenomegaly     Musculoskeletal:  trace  edema, warm,1+ pulses throughout    Neurologic:  sedated on vent             Data Review:    Review and/or order of clinical lab test      Labs:     Recent Labs      06/12/18 0229 06/11/18   0224   WBC  7.0  5.9   HGB  7.8*  7.6*   HCT  22.7*  22.3*   PLT  147*  133*     Recent Labs      06/12/18   0229 06/11/18   1359  06/11/18   0722   06/10/18   0744   NA  140  140  140  135*   < >  136   K  3.6  3.7  3.9  4.4   < > 4.8   CL  105  107  108  104   < >  108   CO2  21  19*  17*  19*   < >  15*   BUN  76*  82*  84*  81*   < >  86*   CREA  3.98*  4.02*  4.12*  3.91*   < >  4.50*   GLU  190*  199*  156*  145*   < >  103*   CA  7.2*  7.3*  7.4*  7.5*  7.9*   < >  6.1*   MG  2.2  2.1  1.9  2.5*   < >  1.6   PHOS  6.4*   --   6.0*   --   7.1*    < > = values in this interval not displayed. Recent Labs      06/12/18 0229 06/11/18   0722  06/10/18   0744  06/09/18   2203   SGOT  9*  11*   --   17   ALT  13  16   --   21   AP  130*  146*   --   275*   TBILI  0.1*  0.2   --   0.2   TP  6.0*  6.1*   --   7.2   ALB  2.3*  2.5*   --   2.9*   GLOB  3.7  3.6   --   4.3*   AML   --    --   88  122*   LPSE   --    --   548*  1459*     No results for input(s): INR, PTP, APTT in the last 72 hours. No lab exists for component: INREXT   No results for input(s): FE, TIBC, PSAT, FERR in the last 72 hours. Lab Results   Component Value Date/Time    Folate 33.8 (H) 05/07/2018 11:20 AM      No results for input(s): PH, PCO2, PO2 in the last 72 hours.   Recent Labs      06/11/18   0224  06/10/18   1624  06/10/18   0744   CPK  51  87  100   CKNDX  3.1*  3.2*  3.5*   TROIQ  <0.04   --   <0.04     Lab Results   Component Value Date/Time    Cholesterol, total 128 06/10/2018 07:44 AM    HDL Cholesterol 51 06/10/2018 07:44 AM    LDL, calculated 67.4 06/10/2018 07:44 AM    Triglyceride 48 06/10/2018 07:44 AM    CHOL/HDL Ratio 2.5 06/10/2018 07:44 AM     Lab Results   Component Value Date/Time    Glucose (POC) 202 (H) 06/12/2018 12:03 PM    Glucose (POC) 181 (H) 06/12/2018 09:17 AM    Glucose (POC) 132 (H) 06/12/2018 08:12 AM    Glucose (POC) 111 (H) 06/12/2018 07:09 AM    Glucose (POC) 101 (H) 06/12/2018 06:11 AM     Lab Results   Component Value Date/Time    Color YELLOW/STRAW 06/10/2018 10:44 AM    Appearance CLOUDY (A) 06/10/2018 10:44 AM    Specific gravity 1.014 06/10/2018 10:44 AM    Specific gravity 1.015 07/26/2010 11:18 AM    pH (UA) 5.0 06/10/2018 10:44 AM    Protein 100 (A) 06/10/2018 10:44 AM    Glucose >1000 (A) 06/10/2018 10:44 AM    Ketone NEGATIVE  06/10/2018 10:44 AM    Bilirubin NEGATIVE  06/10/2018 10:44 AM    Urobilinogen 0.2 06/10/2018 10:44 AM    Nitrites NEGATIVE  06/10/2018 10:44 AM    Leukocyte Esterase NEGATIVE  06/10/2018 10:44 AM    Epithelial cells MODERATE (A) 06/10/2018 10:44 AM    Bacteria NEGATIVE  06/10/2018 10:44 AM    WBC 0-4 06/10/2018 10:44 AM    RBC 0-5 06/10/2018 10:44 AM         Medications Reviewed:     Current Facility-Administered Medications   Medication Dose Route Frequency    calcium carbonate (TUMS) chewable tablet 400 mg [elemental]  400 mg Oral TID WITH MEALS    sodium bicarbonate tablet 650 mg  650 mg Oral BID    insulin glargine (LANTUS) injection 20 Units  20 Units SubCUTAneous DAILY    insulin lispro (HUMALOG) injection   SubCUTAneous Q4H    cloNIDine (CATAPRES) 0.3 mg/24 hr patch 1 Patch  1 Patch TransDERmal Q7D    nitroglycerin (NITROBID) 2 % ointment 1 Inch  1 Inch Topical BID    chlorhexidine (PERIDEX) 0.12 % mouthwash 15 mL  15 mL Oral BID    polyvinyl alcohol (LIQUIFILM TEARS) 1.4 % ophthalmic solution 1 Drop  1 Drop Both Eyes PRN    albuterol (PROVENTIL VENTOLIN) nebulizer solution 2.5 mg  2.5 mg Nebulization Q4H PRN    sodium chloride (NS) flush 5-10 mL  5-10 mL IntraVENous Q8H    sodium chloride (NS) flush 5-10 mL  5-10 mL IntraVENous PRN    bisacodyl (DULCOLAX) tablet 5 mg  5 mg Oral DAILY PRN    heparin (porcine) injection 5,000 Units  5,000 Units SubCUTAneous Q8H    hydrALAZINE (APRESOLINE) 20 mg/mL injection 10 mg  10 mg IntraVENous Q6H PRN    DAPTOmycin (CUBICIN) 400 mg in 0.9% sodium chloride 50 mL IVPB RF formulation  400 mg IntraVENous Q48H    levoFLOXacin (LEVAQUIN) 250 mg in D5W IVPB  250 mg IntraVENous Q48H    propofol (DIPRIVAN) infusion  0-50 mcg/kg/min IntraVENous TITRATE    fentaNYL citrate (PF) injection  mcg   mcg IntraVENous Q2H PRN    methylPREDNISolone (PF) (SOLU-MEDROL) injection 80 mg  80 mg IntraVENous Q8H    albuterol-ipratropium (DUO-NEB) 2.5 MG-0.5 MG/3 ML  3 mL Nebulization QID RT    diphenhydrAMINE (BENADRYL) injection 50 mg  50 mg IntraVENous Q6H    famotidine (PF) (PEPCID) 20 mg in sodium chloride 0.9% 10 mL injection  20 mg IntraVENous Q24H    scopolamine (TRANSDERM-SCOP) 1 mg over 3 days 1 Patch  1 Patch TransDERmal Q72H    dexmedeTOMidine (PRECEDEX) 400 mcg in 0.9% sodium chloride 100 mL infusion  0.2-0.7 mcg/kg/hr IntraVENous TITRATE    ondansetron (ZOFRAN) injection 4 mg  4 mg IntraVENous Q4H PRN    sodium chloride (NS) flush 5-10 mL  5-10 mL IntraVENous Q8H    sodium chloride (NS) flush 5-10 mL  5-10 mL IntraVENous PRN    insulin regular (NOVOLIN R, HUMULIN R) 100 Units in 0.9% sodium chloride 100 mL infusion  0-50 Units/hr IntraVENous TITRATE    glucose chewable tablet 16 g  4 Tab Oral PRN    dextrose (D50W) injection syrg 12.5-25 g  12.5-25 g IntraVENous PRN    glucagon (GLUCAGEN) injection 1 mg  1 mg IntraMUSCular PRN     ______________________________________________________________________  EXPECTED LENGTH OF STAY: 4d 21h  ACTUAL LENGTH OF STAY:          3                 Alicia Shaikh MD

## 2018-06-12 NOTE — PROGRESS NOTES
ID Progress Note  2018    Subjective:     Patient seen and examined, she remains critically ill on the vent    Objective:     Antibiotics:  1. Daptomycin  2. Levaquin       Vitals:   Visit Vitals    /78    Pulse 73    Temp 99.3 °F (37.4 °C)    Resp 20    Ht 5' 2\" (1.575 m)    Wt 67.2 kg (148 lb 2.4 oz)    SpO2 100%    Breastfeeding No    BMI 27.1 kg/m2        Tmax:  Temp (24hrs), Av.5 °F (36.4 °C), Min:94.6 °F (34.8 °C), Max:99.3 °F (37.4 °C)      Exam:  Lungs:  clear to auscultation bilaterally  Heart:  regular rate and rhythm  Abdomen:  soft, non-tender. Bowel sounds normal. No masses,  no organomegaly  Foot looks good, skin without rash    Labs:      Recent Labs      18   0229  18   1359  18   0722  18   0224  06/10/18   2150   06/10/18   0744  06/10/18   0328  18   2203   WBC  7.0   --    --   5.9   --    --   5.8  5.7  6.0   HGB  7.8*   --    --   7.6*   --    --   8.3*  8.0*  8.1*   PLT  147*   --    --   133*   --    --   134*  122*  131*   BUN  76*  82*  84*  81*  83*  86*   < >  86*  83*  86*   CREA  3.98*  4.02*  4.12*  3.91*  3.97*  3.86*   < >  4.50*  4.41*  4.89*   SGOT  9*   --   11*   --    --    --    --    --   17   AP  130*   --   146*   --    --    --    --    --   275*   TBILI  0.1*   --   0.2   --    --    --    --    --   0.2    < > = values in this interval not displayed.        Cultures:     Lab Results   Component Value Date/Time    Specimen Description: URINE 2013 08:00 PM    Specimen Description: URINE 2013 07:55 PM    Specimen Description: URINE 2013 10:10 AM     Lab Results   Component Value Date/Time    Culture result: NO GROWTH 2 DAYS 06/10/2018 10:42 AM    Culture result: NO GROWTH 5 DAYS 2018 05:11 PM    Culture result: FEW STAPHYLOCOCCUS HOMINIS SUBSPECIES HOMINIS (A) 2018 12:47 PM    Culture result: (A) 2018 12:47 PM     STAPHYLOCOCCUS EPIDERMIDIS (OXACILLIN RESISTANT) ISOLATED FROM [facility]IO BROTH ONLY    Culture result: NO ANAEROBES ISOLATED 03/11/2018 12:47 PM       Radiology:     Line/Insert Date:           Assessment:     1. DKA  2. Diabetic foot infection--responding to therapy  3. ?sepsis--blood cultures negative to date  4. ESRD    Objective:     1.  Continue current therapy for the time being    Karla Leon MD

## 2018-06-12 NOTE — DIABETES MGMT
DTC Insulin Drip   Progress Note     Recommendations/ Comments:   Noted pt transitioned off insulin drip today at around 10 am.  She received 20 units of Lantus 2 hours prior. Noted last two anion gap values were 14 mmol/L. Solu medrol 80 mg Q 8 hrs. On vent, NPO. Noted attempt to wean off sedation but pt became agitated. Chart reviewed on [facility]. Patient is 42 y.o. female  Pt with known Type 1 DM. No diabetes medications are listed in PTA. She has a complex history including Type 1 DM, chronic pancreatitis, CKD, sickle cell, s/p gastric bypass, osteomyelitis    Consult receieved for Assessment of Home Management - will complete when pt appropriate. A1c:   Lab Results   Component Value Date/Time    Hemoglobin A1c 7.9 (H) 06/10/2018 03:28 AM         Recent Glucose Results:   Lab Results   Component Value Date/Time     (H) 06/12/2018 02:29 AM    GLUCPOC 202 (H) 06/12/2018 12:03 PM    GLUCPOC 181 (H) 06/12/2018 09:17 AM    GLUCPOC 132 (H) 06/12/2018 08:12 AM        Lab Results   Component Value Date/Time    Creatinine 3.98 (H) 06/12/2018 02:29 AM       Active Orders   Diet    DIET NPO        PO intake: No data found. Will continue to follow as needed. Thank you.   Srinivasa Watters RD

## 2018-06-12 NOTE — PROGRESS NOTES
[facility]   [address] [facility], [address] [place] [address], [facility]  Phone: [phone]   [phone]       Nephrology Progress Note  Umang Vigil     1980     [id number]  Date of Admission : 6/9/2018 06/12/18    CC: Follow up for STONE       Assessment and Plan   STONE on CKD :  - resolving slowly   - stopped IVF   - Bumex 2 mg IV now before extubation attempts      CKD Stage IV :  - baseline Cr 2.6-2.8 mg/dl   - previously on dialysis and recovered      Combined AG and Non Gap Met Acidosis :  - better  - ordered oral Bicarb      Hyponatremia :   - pseudohyponatremia + volume depletion   - resolved      Hyperkalemia :  -resolved     Sec Hyperparathyroidism :  - non complaint w/ Calcitriol and Phos Binders   - start tums + calcitriol 0.5 mcg daily      Angioedema : ? Cause ? Opioids   - on steroids      SCD w/ Anemia     HTN   - labile from opioid withdrawal      Left Foot Osteo     Chronic pancreatitis     Hypothermia : - r/o Line sepsis      Interval History:  Seen and examined  Awake, not following commands   3.2 L UOP       Review of Systems: A comprehensive review of systems was negative.     Current Medications:   Current Facility-Administered Medications   Medication Dose Route Frequency    calcium carbonate (TUMS) chewable tablet 400 mg [elemental]  400 mg Oral TID WITH MEALS    bumetanide (BUMEX) injection 2 mg  2 mg IntraVENous ONCE    sodium bicarbonate tablet 650 mg  650 mg Oral BID    cloNIDine (CATAPRES) 0.3 mg/24 hr patch 1 Patch  1 Patch TransDERmal Q7D    nitroglycerin (NITROBID) 2 % ointment 1 Inch  1 Inch Topical BID    insulin lispro (HUMALOG) injection   SubCUTAneous Q6H    chlorhexidine (PERIDEX) 0.12 % mouthwash 15 mL  15 mL Oral BID    polyvinyl alcohol (LIQUIFILM TEARS) 1.4 % ophthalmic solution 1 Drop  1 Drop Both Eyes PRN    albuterol (PROVENTIL VENTOLIN) nebulizer solution 2.5 mg  2.5 mg Nebulization Q4H PRN    sodium chloride (NS) flush 5-10 mL  5-10 mL IntraVENous Q8H    sodium chloride (NS) flush 5-10 mL  5-10 mL IntraVENous PRN    bisacodyl (DULCOLAX) tablet 5 mg  5 mg Oral DAILY PRN    heparin (porcine) injection 5,000 Units  5,000 Units SubCUTAneous Q8H    hydrALAZINE (APRESOLINE) 20 mg/mL injection 10 mg  10 mg IntraVENous Q6H PRN    DAPTOmycin (CUBICIN) 400 mg in 0.9% sodium chloride 50 mL IVPB RF formulation  400 mg IntraVENous Q48H    levoFLOXacin (LEVAQUIN) 250 mg in D5W IVPB  250 mg IntraVENous Q48H    propofol (DIPRIVAN) infusion  0-50 mcg/kg/min IntraVENous TITRATE    fentaNYL citrate (PF) injection  mcg   mcg IntraVENous Q2H PRN    methylPREDNISolone (PF) (SOLU-MEDROL) injection 80 mg  80 mg IntraVENous Q8H    albuterol-ipratropium (DUO-NEB) 2.5 MG-0.5 MG/3 ML  3 mL Nebulization QID RT    diphenhydrAMINE (BENADRYL) injection 50 mg  50 mg IntraVENous Q6H    famotidine (PF) (PEPCID) 20 mg in sodium chloride 0.9% 10 mL injection  20 mg IntraVENous Q24H    scopolamine (TRANSDERM-SCOP) 1 mg over 3 days 1 Patch  1 Patch TransDERmal Q72H    dexmedeTOMidine (PRECEDEX) 400 mcg in 0.9% sodium chloride 100 mL infusion  0.2-0.7 mcg/kg/hr IntraVENous TITRATE    ondansetron (ZOFRAN) injection 4 mg  4 mg IntraVENous Q4H PRN    sodium chloride (NS) flush 5-10 mL  5-10 mL IntraVENous Q8H    sodium chloride (NS) flush 5-10 mL  5-10 mL IntraVENous PRN    insulin regular (NOVOLIN R, HUMULIN R) 100 Units in 0.9% sodium chloride 100 mL infusion  0-50 Units/hr IntraVENous TITRATE    insulin lispro (HUMALOG) injection   SubCUTAneous TIDAC    glucose chewable tablet 16 g  4 Tab Oral PRN    dextrose (D50W) injection syrg 12.5-25 g  12.5-25 g IntraVENous PRN    glucagon (GLUCAGEN) injection 1 mg  1 mg IntraMUSCular PRN      Allergies   Allergen Reactions    Erythromycin Itching    Morphine Itching    Toradol [Ketorolac] Rash       Objective:  Vitals:    Vitals:    06/12/18 0357 06/12/18 0400 06/12/18 0500 06/12/18 0600   BP:  96/55 133/72 136/75   Pulse: 74 76 84 73   Resp: 20      Temp:  99.3 °F (37.4 °C) 99.3 °F (37.4 °C)    SpO2: 98% 98% 98% 98%   Weight:  67.2 kg (148 lb 2.4 oz)     Height:         Intake and Output:     06/10 1901 - 06/12 0700  In: 4322.6 [I.V.:4322.6]  Out: 2846 [Urine:4945]    Physical Examination:  Pt intubated    Yes  General: On vent   HEENT            FACIAL swelling +  Neck:  Supple, no mass  Resp:  Diminished   CV:  RRR,  no murmur   GI:  Soft, NT, + BS  Neurologic:  Sedated on vent   Skin:  No Rash  :  [place] +    []    High complexity decision making was performed  []    Patient is at high-risk of decompensation with multiple organ involvement    Lab Data Personally Reviewed: I have reviewed all the pertinent labs, microbiology data and radiology studies during assessment. Recent Labs      06/12/18   0229  06/11/18   1359  06/11/18   0722  06/11/18   0224  06/10/18   2150   06/10/18   0744  06/10/18   0328   06/09/18   2203   NA  140  140  140  135*  140  136   < >  136  134*   --   125*   K  3.6  3.7  3.9  4.4  3.9  4.3   < >  4.8  4.1   --   5.4*   CL  105  107  108  104  108  104   < >  108  108   --   96*   CO2  21  19*  17*  19*  17*  18*   < >  15*  13*   --   13*   GLU  190*  199*  156*  145*  154*  241*   < >  103*  238*   --   719*   BUN  76*  82*  84*  81*  83*  86*   < >  86*  83*   --   86*   CREA  3.98*  4.02*  4.12*  3.91*  3.97*  3.86*   < >  4.50*  4.41*   --   4.89*   CA  7.2*  7.3*  7.4*  7.5*  7.9*  7.3*  6.4*   < >  6.1*  6.1*   --   5.7*   MG  2.2  2.1  1.9  2.5*  1.7  1.2*   < >  1.6  1.3*   < >   --    PHOS  6.4*   --   6.0*   --    --    --   7.1*  6.9*   --    --    ALB  2.3*   --   2.5*   --    --    --    --    --    --   2.9*   SGOT  9*   --   11*   --    --    --    --    --    --   17   ALT  13   --   16   --    --    --    --    --    --   21    < > = values in this interval not displayed.      Recent Labs      06/12/18   0229  06/11/18   0224  06/10/18   0744  06/10/18 0328  06/09/18   2203   WBC  7.0  5.9  5.8  5.7  6.0   HGB  7.8*  7.6*  8.3*  8.0*  8.1*   HCT  22.7*  22.3*  26.1*  25.1*  25.8*   PLT  147*  133*  134*  122*  131*     Lab Results   Component Value Date/Time    Specimen Description: URINE 06/24/2013 08:00 PM    Specimen Description: URINE 06/17/2013 07:55 PM    Specimen Description: URINE 05/26/2013 10:10 AM    Specimen Description: URINE 04/22/2013 02:30 PM    Specimen Description: NARES 03/21/2013 12:30 PM     Lab Results   Component Value Date/Time    Culture result: NO GROWTH AFTER 17 HOURS 06/10/2018 10:42 AM    Culture result: NO GROWTH 5 DAYS 04/27/2018 05:11 PM    Culture result: FEW STAPHYLOCOCCUS HOMINIS SUBSPECIES HOMINIS (A) 03/11/2018 12:47 PM    Culture result: (A) 03/11/2018 12:47 PM     STAPHYLOCOCCUS EPIDERMIDIS (OXACILLIN RESISTANT) ISOLATED FROM THIO BROTH ONLY    Culture result: NO ANAEROBES ISOLATED 03/11/2018 12:47 PM     Recent Results (from the past 24 hour(s))   METABOLIC PANEL, COMPREHENSIVE    Collection Time: 06/11/18  7:22 AM   Result Value Ref Range    Sodium 140 136 - 145 mmol/L    Potassium 3.9 3.5 - 5.1 mmol/L    Chloride 108 97 - 108 mmol/L    CO2 17 (L) 21 - 32 mmol/L    Anion gap 15 5 - 15 mmol/L    Glucose 156 (H) 65 - 100 mg/dL    BUN 84 (H) 6 - 20 MG/DL    Creatinine 4.12 (H) 0.55 - 1.02 MG/DL    BUN/Creatinine ratio 20 12 - 20      GFR est AA 15 (L) >60 ml/min/1.73m2    GFR est non-AA 12 (L) >60 ml/min/1.73m2    Calcium 7.5 (L) 8.5 - 10.1 MG/DL    Bilirubin, total 0.2 0.2 - 1.0 MG/DL    ALT (SGPT) 16 12 - 78 U/L    AST (SGOT) 11 (L) 15 - 37 U/L    Alk.  phosphatase 146 (H) 45 - 117 U/L    Protein, total 6.1 (L) 6.4 - 8.2 g/dL    Albumin 2.5 (L) 3.5 - 5.0 g/dL    Globulin 3.6 2.0 - 4.0 g/dL    A-G Ratio 0.7 (L) 1.1 - 2.2     MAGNESIUM    Collection Time: 06/11/18  7:22 AM   Result Value Ref Range    Magnesium 1.9 1.6 - 2.4 mg/dL   PHOSPHORUS    Collection Time: 06/11/18  7:22 AM   Result Value Ref Range    Phosphorus 6.0 (H) 2.6 - 4.7 MG/DL   METABOLIC PANEL, BASIC    Collection Time: 06/11/18  7:22 AM   Result Value Ref Range    Sodium 135 (L) 136 - 145 mmol/L    Potassium 4.4 3.5 - 5.1 mmol/L    Chloride 104 97 - 108 mmol/L    CO2 19 (L) 21 - 32 mmol/L    Anion gap 12 5 - 15 mmol/L    Glucose 145 (H) 65 - 100 mg/dL    BUN 81 (H) 6 - 20 MG/DL    Creatinine 3.91 (H) 0.55 - 1.02 MG/DL    BUN/Creatinine ratio 21 (H) 12 - 20      GFR est AA 16 (L) >60 ml/min/1.73m2    GFR est non-AA 13 (L) >60 ml/min/1.73m2    Calcium 7.9 (L) 8.5 - 10.1 MG/DL   MAGNESIUM    Collection Time: 06/11/18  7:22 AM   Result Value Ref Range    Magnesium 2.5 (H) 1.6 - 2.4 mg/dL   GLUCOSE, POC    Collection Time: 06/11/18  7:22 AM   Result Value Ref Range    Glucose (POC) 163 (H) 65 - 100 mg/dL    Performed by Kayden Solis    Collection Time: 06/11/18  7:23 AM   Result Value Ref Range    Glucose 163 mg/dL    Insulin order 0.1 units/hour    Insulin adminstered 0.1 units/hour    Multiplier 0.000     Low target 150 mg/dL    High target 250 mg/dL    D50 order 0.0 ml    D50 administered 0.00 ml    Minutes until next BG 60 min    Order initials Ep     Administered initials EP     GLSCOM Comments     CORTISOL, AM    Collection Time: 06/11/18  7:43 AM   Result Value Ref Range    Cortisol, a.m. 13.8 4.3 - 22.4 ug/dL   GLUCOSE, POC    Collection Time: 06/11/18  8:32 AM   Result Value Ref Range    Glucose (POC) 222 (H) 65 - 100 mg/dL    Performed by Murphy Bueno    Collection Time: 06/11/18  8:36 AM   Result Value Ref Range    Glucose 222 mg/dL    Insulin order 0.1 units/hour    Insulin adminstered 0.1 units/hour    Multiplier 0.000     Low target 150 mg/dL    High target 250 mg/dL    D50 order 0.0 ml    D50 administered 0.00 ml    Minutes until next BG 60 min    Order initials cs     Administered initials cs     GLSCOM Comments     GLUCOSE, POC    Collection Time: 06/11/18  9:38 AM   Result Value Ref Range    Glucose (POC) 249 (H) 65 - 100 mg/dL    Performed by Claudio Hansen    Collection Time: 06/11/18  9:38 AM   Result Value Ref Range    Glucose 249 mg/dL    Insulin order 0.1 units/hour    Insulin adminstered 0.1 units/hour    Multiplier 0.000     Low target 150 mg/dL    High target 250 mg/dL    D50 order 0.0 ml    D50 administered 0.00 ml    Minutes until next BG 60 min    Order initials nwc     Administered initials nwc     GLSCOM Comments     POC G3 - PUL    Collection Time: 06/11/18 10:27 AM   Result Value Ref Range    FIO2 (POC) 50 %    pH (POC) 7.526 (H) 7.35 - 7.45      pCO2 (POC) 21.7 (L) 35.0 - 45.0 MMHG    pO2 (POC) 269 (H) 80 - 100 MMHG    HCO3 (POC) 18.0 (L) 22 - 26 MMOL/L    sO2 (POC) 100 (H) 92 - 97 %    Base deficit (POC) 5 mmol/L    Site LEFT RADIAL      Device: VENT      Allens test (POC) YES      Specimen type (POC) ARTERIAL      Total resp.  rate 28     GLUCOSE, POC    Collection Time: 06/11/18 10:53 AM   Result Value Ref Range    Glucose (POC) 276 (H) 65 - 100 mg/dL    Performed by Claudio Hansen    Collection Time: 06/11/18 10:53 AM   Result Value Ref Range    Glucose 276 mg/dL    Insulin order 2.2 units/hour    Insulin adminstered 2.2 units/hour    Multiplier 0.010     Low target 150 mg/dL    High target 250 mg/dL    D50 order 0.0 ml    D50 administered 0.00 ml    Minutes until next BG 60 min    Order initials cs     Administered initials cs     GLSCOM Comments     GLUCOSE, POC    Collection Time: 06/11/18 11:56 AM   Result Value Ref Range    Glucose (POC) 260 (H) 65 - 100 mg/dL    Performed by Claudio Hansen    Collection Time: 06/11/18 11:56 AM   Result Value Ref Range    Glucose 260 mg/dL    Insulin order 4.0 units/hour    Insulin adminstered 4.0 units/hour    Multiplier 0.020     Low target 150 mg/dL    High target 250 mg/dL    D50 order 0.0 ml    D50 administered 0.00 ml    Minutes until next BG 60 min    Order initials cs     Administered initials cs GLSCOM Comments     GLUCOSE, POC    Collection Time: 06/11/18 12:59 PM   Result Value Ref Range    Glucose (POC) 251 (H) 65 - 100 mg/dL    Performed by Hitesh Swartz    Collection Time: 06/11/18  1:00 PM   Result Value Ref Range    Glucose 251 mg/dL    Insulin order 5.7 units/hour    Insulin adminstered 5.7 units/hour    Multiplier 0.030     Low target 150 mg/dL    High target 250 mg/dL    D50 order 0.0 ml    D50 administered 0.00 ml    Minutes until next BG 60 min    Order initials cs     Administered initials cs     GLSCOM Comments     PTH INTACT    Collection Time: 06/11/18  1:59 PM   Result Value Ref Range    Calcium 7.3 (L) 8.5 - 10.1 MG/DL    PTH, Intact 1199.3 (H) 18.4 - 94.0 pg/mL   METABOLIC PANEL, BASIC    Collection Time: 06/11/18  1:59 PM   Result Value Ref Range    Sodium 140 136 - 145 mmol/L    Potassium 3.7 3.5 - 5.1 mmol/L    Chloride 107 97 - 108 mmol/L    CO2 19 (L) 21 - 32 mmol/L    Anion gap 14 5 - 15 mmol/L    Glucose 199 (H) 65 - 100 mg/dL    BUN 82 (H) 6 - 20 MG/DL    Creatinine 4.02 (H) 0.55 - 1.02 MG/DL    BUN/Creatinine ratio 20 12 - 20      GFR est AA 15 (L) >60 ml/min/1.73m2    GFR est non-AA 12 (L) >60 ml/min/1.73m2    Calcium 7.4 (L) 8.5 - 10.1 MG/DL   MAGNESIUM    Collection Time: 06/11/18  1:59 PM   Result Value Ref Range    Magnesium 2.1 1.6 - 2.4 mg/dL   GLUCOSE, POC    Collection Time: 06/11/18  2:02 PM   Result Value Ref Range    Glucose (POC) 181 (H) 65 - 100 mg/dL    Performed by Hitesh Swartz    Collection Time: 06/11/18  2:02 PM   Result Value Ref Range    Glucose 181 mg/dL    Insulin order 3.6 units/hour    Insulin adminstered 3.6 units/hour    Multiplier 0.030     Low target 150 mg/dL    High target 250 mg/dL    D50 order 0.0 ml    D50 administered 0.00 ml    Minutes until next BG 60 min    Order initials nwc     Administered initials nwc     GLSCOM Comments     GLUCOSE, POC    Collection Time: 06/11/18  3:10 PM   Result Value Ref Range    Glucose (POC) 206 (H) 65 - 100 mg/dL    Performed by Andreea Gut    Collection Time: 06/11/18  3:20 PM   Result Value Ref Range    Glucose 206 mg/dL    Insulin order 4.4 units/hour    Insulin adminstered 4.4 units/hour    Multiplier 0.030     Low target 150 mg/dL    High target 250 mg/dL    D50 order 0.0 ml    D50 administered 0.00 ml    Minutes until next BG 60 min    Order initials hp     Administered initials hp     GLSCOM Comments     GLUCOSE, POC    Collection Time: 06/11/18  4:15 PM   Result Value Ref Range    Glucose (POC) 154 (H) 65 - 100 mg/dL    Performed by Cathleen Lyman    Collection Time: 06/11/18  4:16 PM   Result Value Ref Range    Glucose 154 mg/dL    Insulin order 2.8 units/hour    Insulin adminstered 2.8 units/hour    Multiplier 0.030     Low target 150 mg/dL    High target 250 mg/dL    D50 order 0.0 ml    D50 administered 0.00 ml    Minutes until next BG 60 min    Order initials nwc     Administered initials nwc     GLSCOM Comments     GLUCOSE, POC    Collection Time: 06/11/18  5:21 PM   Result Value Ref Range    Glucose (POC) 128 (H) 65 - 100 mg/dL    Performed by Cathleen Lyman    Collection Time: 06/11/18  5:22 PM   Result Value Ref Range    Glucose 128 mg/dL    Insulin order 1.4 units/hour    Insulin adminstered 1.4 units/hour    Multiplier 0.020     Low target 150 mg/dL    High target 250 mg/dL    D50 order 0.0 ml    D50 administered 0.00 ml    Minutes until next BG 60 min    Order initials lms     Administered initials lms     GLSCOM Comments     GLUCOSE, POC    Collection Time: 06/11/18  6:27 PM   Result Value Ref Range    Glucose (POC) 124 (H) 65 - 100 mg/dL    Performed by Andreea Gut    Collection Time: 06/11/18  6:27 PM   Result Value Ref Range    Glucose 124 mg/dL    Insulin order 0.6 units/hour    Insulin adminstered 0.6 units/hour    Multiplier 0.010     Low target 150 mg/dL    High target 250 mg/dL    D50 order 0.0 ml    D50 administered 0.00 ml    Minutes until next BG 60 min    Order initials lms     Administered initials lms     GLSCOM Comments     GLUCOSE, POC    Collection Time: 06/11/18  7:32 PM   Result Value Ref Range    Glucose (POC) 135 (H) 65 - 100 mg/dL    Performed by Radha Joel    Collection Time: 06/11/18  7:36 PM   Result Value Ref Range    Glucose 134 mg/dL    Insulin order 0.0 units/hour    Insulin adminstered 0.0 units/hour    Multiplier 0.000     Low target 150 mg/dL    High target 250 mg/dL    D50 order 0.0 ml    D50 administered 0.00 ml    Minutes until next BG 60 min    Order initials lms     Administered initials lms     GLSCOM Comments     GLUCOSE, POC    Collection Time: 06/11/18  8:40 PM   Result Value Ref Range    Glucose (POC) 194 (H) 65 - 100 mg/dL    Performed by Krista Cabrera    Collection Time: 06/11/18  8:41 PM   Result Value Ref Range    Glucose 194 mg/dL    Insulin order 0.1 units/hour    Insulin adminstered 0.1 units/hour    Multiplier 0.000     Low target 150 mg/dL    High target 250 mg/dL    D50 order 0.0 ml    D50 administered 0.00 ml    Minutes until next BG 60 min    Order initials ad     Administered initials ad     GLSCOM Comments     GLUCOSE, POC    Collection Time: 06/11/18  9:51 PM   Result Value Ref Range    Glucose (POC) 254 (H) 65 - 100 mg/dL    Performed by Rohith Nettles    Collection Time: 06/11/18  9:51 PM   Result Value Ref Range    Glucose 254 mg/dL    Insulin order 1.9 units/hour    Insulin adminstered 1.9 units/hour    Multiplier 0.010     Low target 150 mg/dL    High target 250 mg/dL    D50 order 0.0 ml    D50 administered 0.00 ml    Minutes until next BG 60 min    Order initials lms     Administered initials lms     GLSCOM Comments     GLUCOSE, POC    Collection Time: 06/11/18 10:54 PM   Result Value Ref Range    Glucose (POC) 272 (H) 65 - 100 mg/dL    Performed by Tai Swartz    GLUCOSTABILIZER    Collection Time: 06/11/18 10:54 PM   Result Value Ref Range    Glucose 272 mg/dL    Insulin order 4.2 units/hour    Insulin adminstered 4.2 units/hour    Multiplier 0.020     Low target 150 mg/dL    High target 250 mg/dL    D50 order 0.0 ml    D50 administered 0.00 ml    Minutes until next BG 60 min    Order initials lms     Administered initials lms     GLSCOM Comments     GLUCOSE, POC    Collection Time: 06/12/18 12:02 AM   Result Value Ref Range    Glucose (POC) 255 (H) 65 - 100 mg/dL    Performed by Gil Mast    Collection Time: 06/12/18 12:02 AM   Result Value Ref Range    Glucose 255 mg/dL    Insulin order 5.9 units/hour    Insulin adminstered 5.9 units/hour    Multiplier 0.030     Low target 150 mg/dL    High target 250 mg/dL    D50 order 0.0 ml    D50 administered 0.00 ml    Minutes until next BG 60 min    Order initials TR     Administered initials TR     JUAN Comments     GLUCOSE, POC    Collection Time: 06/12/18  1:04 AM   Result Value Ref Range    Glucose (POC) 261 (H) 65 - 100 mg/dL    Performed by Santino Anderson    Collection Time: 06/12/18  1:05 AM   Result Value Ref Range    Glucose 261 mg/dL    Insulin order 8.0 units/hour    Insulin adminstered 8.0 units/hour    Multiplier 0.040     Low target 150 mg/dL    High target 250 mg/dL    D50 order 0.0 ml    D50 administered 0.00 ml    Minutes until next BG 60 min    Order initials TR     Administered initials RENEE     GLGALE Comments     GLUCOSE, POC    Collection Time: 06/12/18  2:06 AM   Result Value Ref Range    Glucose (POC) 223 (H) 65 - 100 mg/dL    Performed by Gil Mast    Collection Time: 06/12/18  2:07 AM   Result Value Ref Range    Glucose 223 mg/dL    Insulin order 6.5 units/hour    Insulin adminstered 6.5 units/hour    Multiplier 0.040     Low target 150 mg/dL    High target 250 mg/dL    D50 order 0.0 ml    D50 administered 0.00 ml Minutes until next BG 60 min    Order initials TR     Administered initials TR     GLSCOM Comments     CBC WITH AUTOMATED DIFF    Collection Time: 06/12/18  2:29 AM   Result Value Ref Range    WBC 7.0 3.6 - 11.0 K/uL    RBC 3.05 (L) 3.80 - 5.20 M/uL    HGB 7.8 (L) 11.5 - 16.0 g/dL    HCT 22.7 (L) 35.0 - 47.0 %    MCV 74.4 (L) 80.0 - 99.0 FL    MCH 25.6 (L) 26.0 - 34.0 PG    MCHC 34.4 30.0 - 36.5 g/dL    RDW 14.5 11.5 - 14.5 %    PLATELET 848 (L) 278 - 400 K/uL    NRBC 0.0 0  WBC    ABSOLUTE NRBC 0.00 0.00 - 0.01 K/uL    NEUTROPHILS 93 (H) 32 - 75 %    LYMPHOCYTES 5 (L) 12 - 49 %    MONOCYTES 2 (L) 5 - 13 %    EOSINOPHILS 0 0 - 7 %    BASOPHILS 0 0 - 1 %    IMMATURE GRANULOCYTES 0 0.0 - 0.5 %    ABS. NEUTROPHILS 6.5 1.8 - 8.0 K/UL    ABS. LYMPHOCYTES 0.4 (L) 0.8 - 3.5 K/UL    ABS. MONOCYTES 0.1 0.0 - 1.0 K/UL    ABS. EOSINOPHILS 0.0 0.0 - 0.4 K/UL    ABS. BASOPHILS 0.0 0.0 - 0.1 K/UL    ABS. IMM. GRANS. 0.0 0.00 - 0.04 K/UL    DF SMEAR SCANNED      RBC COMMENTS ANISOCYTOSIS  1+        RBC COMMENTS TARGET CELLS  PRESENT        RBC COMMENTS MICROCYTOSIS  1+        RBC COMMENTS HYPOCHROMIA  PRESENT       METABOLIC PANEL, COMPREHENSIVE    Collection Time: 06/12/18  2:29 AM   Result Value Ref Range    Sodium 140 136 - 145 mmol/L    Potassium 3.6 3.5 - 5.1 mmol/L    Chloride 105 97 - 108 mmol/L    CO2 21 21 - 32 mmol/L    Anion gap 14 5 - 15 mmol/L    Glucose 190 (H) 65 - 100 mg/dL    BUN 76 (H) 6 - 20 MG/DL    Creatinine 3.98 (H) 0.55 - 1.02 MG/DL    BUN/Creatinine ratio 19 12 - 20      GFR est AA 15 (L) >60 ml/min/1.73m2    GFR est non-AA 13 (L) >60 ml/min/1.73m2    Calcium 7.2 (L) 8.5 - 10.1 MG/DL    Bilirubin, total 0.1 (L) 0.2 - 1.0 MG/DL    ALT (SGPT) 13 12 - 78 U/L    AST (SGOT) 9 (L) 15 - 37 U/L    Alk.  phosphatase 130 (H) 45 - 117 U/L    Protein, total 6.0 (L) 6.4 - 8.2 g/dL    Albumin 2.3 (L) 3.5 - 5.0 g/dL    Globulin 3.7 2.0 - 4.0 g/dL    A-G Ratio 0.6 (L) 1.1 - 2.2     MAGNESIUM    Collection Time: 06/12/18  2:29 AM   Result Value Ref Range    Magnesium 2.2 1.6 - 2.4 mg/dL   PHOSPHORUS    Collection Time: 06/12/18  2:29 AM   Result Value Ref Range    Phosphorus 6.4 (H) 2.6 - 4.7 MG/DL   GLUCOSE, POC    Collection Time: 06/12/18  3:06 AM   Result Value Ref Range    Glucose (POC) 176 (H) 65 - 100 mg/dL    Performed by Devika Shaikh    Collection Time: 06/12/18  3:07 AM   Result Value Ref Range    Glucose 176 mg/dL    Insulin order 4.6 units/hour    Insulin adminstered 4.6 units/hour    Multiplier 0.040     Low target 150 mg/dL    High target 250 mg/dL    D50 order 0.0 ml    D50 administered 0.00 ml    Minutes until next BG 60 min    Order initials TR     Administered initials RENEE MARTINEZ Comments     GLUCOSE, POC    Collection Time: 06/12/18  4:04 AM   Result Value Ref Range    Glucose (POC) 141 (H) 65 - 100 mg/dL    Performed by Devika Shaikh    Collection Time: 06/12/18  4:04 AM   Result Value Ref Range    Glucose 141 mg/dL    Insulin order 2.4 units/hour    Insulin adminstered 2.4 units/hour    Multiplier 0.030     Low target 150 mg/dL    High target 250 mg/dL    D50 order 0.0 ml    D50 administered 0.00 ml    Minutes until next BG 60 min    Order initials TR     Administered initials RENEE MARTINEZ Comments     GLUCOSE, POC    Collection Time: 06/12/18  5:07 AM   Result Value Ref Range    Glucose (POC) 118 (H) 65 - 100 mg/dL    Performed by Devika Shaikh    Collection Time: 06/12/18  5:08 AM   Result Value Ref Range    Glucose 118 mg/dL    Insulin order 1.2 units/hour    Insulin adminstered 1.2 units/hour    Multiplier 0.020     Low target 150 mg/dL    High target 250 mg/dL    D50 order 0.0 ml    D50 administered 0.00 ml    Minutes until next BG 60 min    Order initials TR     Administered initials RENEE MARTINEZ Comments     GLUCOSE, POC    Collection Time: 06/12/18  6:11 AM   Result Value Ref Range    Glucose (POC) 101 (H) 65 - 100 mg/dL Performed by [employer]    Collection Time: 06/12/18  6:11 AM   Result Value Ref Range    Glucose 101 mg/dL    Insulin order 0.4 units/hour    Insulin adminstered 0.4 units/hour    Multiplier 0.010     Low target 150 mg/dL    High target 250 mg/dL    D50 order 0.0 ml    D50 administered 0.00 ml    Minutes until next BG 60 min    Order initials TR     Administered initials TR     [facility] Comments     GLUCOSE, POC    Collection Time: 06/12/18  7:09 AM   Result Value Ref Range    Glucose (POC) 111 (H) 65 - 100 mg/dL    Performed by [employer]    Collection Time: 06/12/18  7:10 AM   Result Value Ref Range    Glucose 111 mg/dL    Insulin order 0.0 units/hour    Insulin adminstered 0.0 units/hour    Multiplier 0.000     Low target 150 mg/dL    High target 250 mg/dL    D50 order 0.0 ml    D50 administered 0.00 ml    Minutes until next BG 60 min    Order initials TR     Administered initials tr     [facility] Comments               I have reviewed the flowsheets. Chart and Pertinent Notes have been reviewed. No change in PMH ,family and social history from Consult note.       Dorie Quinn MD

## 2018-06-12 NOTE — PROGRESS NOTES
[facility]  Pulmonary, Critical Care, and Sleep Medicine    Initial Patient Consult    Name: Karley Landry MRN: [id number]   : 1980 Hospital: [facility]   Date: 2018        IMPRESSION:   · Acute respiratory failure - emergent intubation in OR for airway protection  · Anion-gap metabolic acidosis - likely DKA, but no ketones in urine (possibly due to very low pH), normal lactate  · Acute angioedema - uncertain cause, no obvious trigger, very few drugs given prior to event (?dilaudid with h/o itching from morphine) - no reports of airway compromise while in ER, but had very significant findings on arrival to ICU  · Possible DKA as above  · Chronic osteomyelitis of left foot, on outpatient IV antibiotics   · Sickle cell disease +/- pain crisis  · Asthma   · Acute/chronic renal failure  · Chronic pancreatitis   · H/O noncompliance       RECOMMENDATIONS:   · SBT in AM and check for cuff leak. If leak present and passes SBT will give trial extubation. Continue current abx as well as Steroids/H2 blocker/antihistamines for angioedema  · Cultures  · Renal eval pending  · Replete electrolytes  · On heparin  · Sedatives reviewed  · DVT/GI prophylaxis  · Critically ill with high risk for further decompensation. D/W RN/RT. CCT 30'       Subjective:   RASS -2, follows some commands .     Past Medical History:   Diagnosis Date    ARF (acute renal failure) ([address] requiring dialysis     Asthma     CKD (chronic kidney disease)     Diabetes ([address]     Gastroparesis     Gastric Pacer- REMOVED 2015    GERD (gastroesophageal reflux disease)     Hypertension     Narcotic dependence ([address]     THU (obstructive sleep apnea)     wears 2 LPM oxygen at night    Other ill-defined conditions(799.89)     Polycystic ovarian syndrome     Seizures (HCC)     Sickle cell trait ([address]     Thromboembolus ([address] to her left arm and was told she had one in left leg recently      Past Surgical History:   Procedure Laterality Date    HX CATARACT REMOVAL  3/5/12    right    HX DILATION AND CURETTAGE      ablation    HX GASTRIC BYPASS  2015    HX GI      j tube placement and removal    HX OTHER SURGICAL  2010    Gastric Pacer- REMOVED 07/2015    HX VASCULAR ACCESS      gray cath rt subclavian    HX VASCULAR ACCESS      HD access right thigh      Prior to Admission medications    Medication Sig Start Date End Date Taking? Authorizing Provider   hydrALAZINE (APRESOLINE) 100 mg tablet Take 1 Tab by mouth three (3) times daily for 30 days. 5/14/18 6/13/18  [address] [place] [address] MD Gilma   oxyCODONE-acetaminophen (PERCOCET) 5-325 mg per tablet Take 1 Tab by mouth every six (6) hours as needed for Pain. Max Daily Amount: 4 Tabs. Scheduled 5/14/18   [address] [place] [address] MD Gilma   promethazine (PHENERGAN) 25 mg tablet Take 1 Tab by mouth every eight (8) hours as needed for Nausea. 5/14/18   [address] [place] [address], MD   LGiovanni acidoph & paracasei- S therm- Bifido (CHRIS-Q/RISAQUAD) 8 billion cell cap cap Take 1 Cap by mouth daily for 30 days. 5/15/18 6/14/18  [address] [place] [address] MD Gilma   NIFEdipine ER (PROCARDIA XL) 90 mg ER tablet Take 1 Tab by mouth daily for 30 days. 5/15/18 6/14/18  [address] [place] [address] MD Gilma   pantoprazole (PROTONIX) 40 mg tablet Take 1 Tab by mouth Before breakfast and dinner for 30 days. 5/14/18 6/13/18  [address] [place] [address] MD Gilma   senna-docusate (PERICOLACE) 8.6-50 mg per tablet Take 2 Tabs by mouth daily. 5/15/18   [address] [place] [address] MD Gilma   cloNIDine HCl (CATAPRES) 0.2 mg tablet Take 1 Tab by mouth three (3) times daily for 30 days. 5/14/18 6/13/18  [address] [place] [address] MD Gilma   labetalol (NORMODYNE) 100 mg tablet Take 2 Tabs by mouth two (2) times a day for 30 days. 5/14/18 6/13/18  [address] [place] [address] MD Gilma   QUEtiapine (SEROQUEL) 100 mg tablet Take 100 mg by mouth two (2) times a day. Historical Provider   venlafaxine (EFFEXOR) 75 mg tablet Take 1 Tab by mouth two (2) times daily (with meals).  3/21/18   Floridalma Yuan MD Cholecalciferol, Vitamin D3, 50,000 unit cap Take 1 Cap by mouth every seven (7) days. Historical Provider   calcium carbonate (OS-BINA) 500 mg calcium (1,250 mg) tablet Take 1 Tab by mouth daily. Historical Provider   cyanocobalamin 1,000 mcg tablet Take 1,000 mcg by mouth daily. Historical Provider   albuterol (PROVENTIL VENTOLIN) 2.5 mg /3 mL (0.083 %) nebulizer solution 2.5 mg by Nebulization route every four (4) hours as needed.     Historical Provider     Allergies   Allergen Reactions    Erythromycin Itching    Morphine Itching    Toradol [Ketorolac] Rash      Social History   Substance Use Topics    Smoking status: Never Smoker    Smokeless tobacco: Never Used    Alcohol use No      Family History   Problem Relation Age of Onset    Cancer Mother      lung    Hypertension Mother     Cancer Father      kidney    Stroke Father      3 strokes: 59-72    Heart Disease Father 72     CABG    Hypertension Father     Cancer Sister      pancreatic    Cancer Maternal Aunt      breast    Cancer Paternal Aunt      breast    Schizophrenia Sister      was in [facility], now ass't living    Other Sister       AIDS    Other Other      nephew of AIDS        Current Facility-Administered Medications   Medication Dose Route Frequency    calcium carbonate (TUMS) chewable tablet 400 mg [elemental]  400 mg Oral TID WITH MEALS    sodium bicarbonate tablet 650 mg  650 mg Oral BID    insulin glargine (LANTUS) injection 20 Units  20 Units SubCUTAneous DAILY    insulin lispro (HUMALOG) injection   SubCUTAneous Q4H    cloNIDine (CATAPRES) 0.3 mg/24 hr patch 1 Patch  1 Patch TransDERmal Q7D    nitroglycerin (NITROBID) 2 % ointment 1 Inch  1 Inch Topical BID    chlorhexidine (PERIDEX) 0.12 % mouthwash 15 mL  15 mL Oral BID    sodium chloride (NS) flush 5-10 mL  5-10 mL IntraVENous Q8H    heparin (porcine) injection 5,000 Units  5,000 Units SubCUTAneous Q8H    DAPTOmycin (CUBICIN) 400 mg in 0.9% sodium chloride 50 mL IVPB RF formulation  400 mg IntraVENous Q48H    levoFLOXacin (LEVAQUIN) 250 mg in D5W IVPB  250 mg IntraVENous Q48H    propofol (DIPRIVAN) infusion  0-50 mcg/kg/min IntraVENous TITRATE    methylPREDNISolone (PF) (SOLU-MEDROL) injection 80 mg  80 mg IntraVENous Q8H    albuterol-ipratropium (DUO-NEB) 2.5 MG-0.5 MG/3 ML  3 mL Nebulization QID RT    diphenhydrAMINE (BENADRYL) injection 50 mg  50 mg IntraVENous Q6H    famotidine (PF) (PEPCID) 20 mg in sodium chloride 0.9% 10 mL injection  20 mg IntraVENous Q24H    scopolamine (TRANSDERM-SCOP) 1 mg over 3 days 1 Patch  1 Patch TransDERmal Q72H    dexmedeTOMidine (PRECEDEX) 400 mcg in 0.9% sodium chloride 100 mL infusion  0.2-0.7 mcg/kg/hr IntraVENous TITRATE    sodium chloride (NS) flush 5-10 mL  5-10 mL IntraVENous Q8H    insulin regular (NOVOLIN R, HUMULIN R) 100 Units in 0.9% sodium chloride 100 mL infusion  0-50 Units/hr IntraVENous TITRATE       Review of Systems:  Review of systems not obtained due to patient factors. Objective:   Vital Signs:    Visit Vitals    /77    Pulse 83    Temp 99 °F (37.2 °C)    Resp 20    Ht 5' 2\" (1.575 m)    Wt 67.2 kg (148 lb 2.4 oz)    SpO2 100%    Breastfeeding No    BMI 27.1 kg/m2       O2 Device: Ventilator   O2 Flow Rate (L/min): 2 l/min   Temp (24hrs), Av.2 °F (36.2 °C), Min:94.6 °F (34.8 °C), Max:99.3 °F (37.4 °C)       Intake/Output:   Last shift:       0701 -  1900  In: 113.1 [I.V.:113.1]  Out: 675 [Urine:675]  Last 3 shifts: 06/10 1901 -  0700  In: 4359.4 [I.V.:4359.4]  Out: 4945 [Urine:4945]    Intake/Output Summary (Last 24 hours) at 18 1227  Last data filed at 18 1200   Gross per 24 hour   Intake          2024.03 ml   Output             3050 ml   Net         -1025.97 ml      Physical Exam:   General:  Lethargic, ill appearing   Head:  Normocephalic, without obvious abnormality, atraumati, some facial edema. Eyes:  Conjunctivae/corneas clear. Has moderate periorbital edema   Nose: Nares normal. Septum midline. Mucosa normal.     Throat: Angioedema of lips and tongue, mild stridor, loud snoring   Neck: Supple, symmetrical, trachea midline    Lungs:   Scattered ronchi, kussmaul respirations   Chest wall:  No tenderness or deformity. Heart:  Tachy, regular   Abdomen:   Soft, non-tender.  Bowel sounds normal.    Extremities: left transmetatarsal amputation   Skin: Skin color, texture, turgor normal. No rashes or lesions   Lymph nodes: Cervical, supraclavicular nodes normal.   Neurologic: Sedated      Data review:     Recent Results (from the past 24 hour(s))   GLUCOSE, POC    Collection Time: 06/11/18 12:59 PM   Result Value Ref Range    Glucose (POC) 251 (H) 65 - 100 mg/dL    Performed by Hitesh Swartz    Collection Time: 06/11/18  1:00 PM   Result Value Ref Range    Glucose 251 mg/dL    Insulin order 5.7 units/hour    Insulin adminstered 5.7 units/hour    Multiplier 0.030     Low target 150 mg/dL    High target 250 mg/dL    D50 order 0.0 ml    D50 administered 0.00 ml    Minutes until next BG 60 min    Order initials cs     Administered initials cs     GLSCOM Comments     PTH INTACT    Collection Time: 06/11/18  1:59 PM   Result Value Ref Range    Calcium 7.3 (L) 8.5 - 10.1 MG/DL    PTH, Intact 1199.3 (H) 18.4 - 63.4 pg/mL   METABOLIC PANEL, BASIC    Collection Time: 06/11/18  1:59 PM   Result Value Ref Range    Sodium 140 136 - 145 mmol/L    Potassium 3.7 3.5 - 5.1 mmol/L    Chloride 107 97 - 108 mmol/L    CO2 19 (L) 21 - 32 mmol/L    Anion gap 14 5 - 15 mmol/L    Glucose 199 (H) 65 - 100 mg/dL    BUN 82 (H) 6 - 20 MG/DL    Creatinine 4.02 (H) 0.55 - 1.02 MG/DL    BUN/Creatinine ratio 20 12 - 20      GFR est AA 15 (L) >60 ml/min/1.73m2    GFR est non-AA 12 (L) >60 ml/min/1.73m2    Calcium 7.4 (L) 8.5 - 10.1 MG/DL   MAGNESIUM    Collection Time: 06/11/18  1:59 PM   Result Value Ref Range    Magnesium 2.1 1.6 - 2.4 mg/dL   GLUCOSE, POC    Collection Time: 06/11/18  2:02 PM   Result Value Ref Range    Glucose (POC) 181 (H) 65 - 100 mg/dL    Performed by Lynford Fleischer    Collection Time: 06/11/18  2:02 PM   Result Value Ref Range    Glucose 181 mg/dL    Insulin order 3.6 units/hour    Insulin adminstered 3.6 units/hour    Multiplier 0.030     Low target 150 mg/dL    High target 250 mg/dL    D50 order 0.0 ml    D50 administered 0.00 ml    Minutes until next BG 60 min    Order initials nwc     Administered initials nw     GLSCOM Comments     GLUCOSE, POC    Collection Time: 06/11/18  3:10 PM   Result Value Ref Range    Glucose (POC) 206 (H) 65 - 100 mg/dL    Performed by Flo Horta    Collection Time: 06/11/18  3:20 PM   Result Value Ref Range    Glucose 206 mg/dL    Insulin order 4.4 units/hour    Insulin adminstered 4.4 units/hour    Multiplier 0.030     Low target 150 mg/dL    High target 250 mg/dL    D50 order 0.0 ml    D50 administered 0.00 ml    Minutes until next BG 60 min    Order initials hp     Administered initials hp     GLSCOM Comments     GLUCOSE, POC    Collection Time: 06/11/18  4:15 PM   Result Value Ref Range    Glucose (POC) 154 (H) 65 - 100 mg/dL    Performed by Lynford Fleischer    Collection Time: 06/11/18  4:16 PM   Result Value Ref Range    Glucose 154 mg/dL    Insulin order 2.8 units/hour    Insulin adminstered 2.8 units/hour    Multiplier 0.030     Low target 150 mg/dL    High target 250 mg/dL    D50 order 0.0 ml    D50 administered 0.00 ml    Minutes until next BG 60 min    Order initials nw     Administered initials nw     GLSCOM Comments     GLUCOSE, POC    Collection Time: 06/11/18  5:21 PM   Result Value Ref Range    Glucose (POC) 128 (H) 65 - 100 mg/dL    Performed by Lynford Fleischer    Collection Time: 06/11/18  5:22 PM   Result Value Ref Range    Glucose 128 mg/dL    Insulin order 1.4 units/hour    Insulin adminstered 1.4 units/hour    Multiplier 0.020     Low target 150 mg/dL    High target 250 mg/dL    D50 order 0.0 ml    D50 administered 0.00 ml    Minutes until next BG 60 min    Order initials lms     Administered initials lms     GLSCOM Comments     GLUCOSE, POC    Collection Time: 06/11/18  6:27 PM   Result Value Ref Range    Glucose (POC) 124 (H) 65 - 100 mg/dL    Performed by Candelario Maurice    Collection Time: 06/11/18  6:27 PM   Result Value Ref Range    Glucose 124 mg/dL    Insulin order 0.6 units/hour    Insulin adminstered 0.6 units/hour    Multiplier 0.010     Low target 150 mg/dL    High target 250 mg/dL    D50 order 0.0 ml    D50 administered 0.00 ml    Minutes until next BG 60 min    Order initials lms     Administered initials lms     GLSCOM Comments     GLUCOSE, POC    Collection Time: 06/11/18  7:32 PM   Result Value Ref Range    Glucose (POC) 135 (H) 65 - 100 mg/dL    Performed by Flora Cade    Collection Time: 06/11/18  7:36 PM   Result Value Ref Range    Glucose 134 mg/dL    Insulin order 0.0 units/hour    Insulin adminstered 0.0 units/hour    Multiplier 0.000     Low target 150 mg/dL    High target 250 mg/dL    D50 order 0.0 ml    D50 administered 0.00 ml    Minutes until next BG 60 min    Order initials lms     Administered initials lms     GLSCOM Comments     GLUCOSE, POC    Collection Time: 06/11/18  8:40 PM   Result Value Ref Range    Glucose (POC) 194 (H) 65 - 100 mg/dL    Performed by Sera Cat    Collection Time: 06/11/18  8:41 PM   Result Value Ref Range    Glucose 194 mg/dL    Insulin order 0.1 units/hour    Insulin adminstered 0.1 units/hour    Multiplier 0.000     Low target 150 mg/dL    High target 250 mg/dL    D50 order 0.0 ml    D50 administered 0.00 ml    Minutes until next BG 60 min    Order initials ad     Administered initials ad     GLSCOM Comments     GLUCOSE, POC    Collection Time: 06/11/18  9:51 PM   Result Value Ref Range    Glucose (POC) 254 (H) 65 - 100 mg/dL    Performed by Angelina Manuel    Collection Time: 06/11/18  9:51 PM   Result Value Ref Range    Glucose 254 mg/dL    Insulin order 1.9 units/hour    Insulin adminstered 1.9 units/hour    Multiplier 0.010     Low target 150 mg/dL    High target 250 mg/dL    D50 order 0.0 ml    D50 administered 0.00 ml    Minutes until next BG 60 min    Order initials lms     Administered initials lms     GLSCOM Comments     GLUCOSE, POC    Collection Time: 06/11/18 10:54 PM   Result Value Ref Range    Glucose (POC) 272 (H) 65 - 100 mg/dL    Performed by Angelina Manuel    Collection Time: 06/11/18 10:54 PM   Result Value Ref Range    Glucose 272 mg/dL    Insulin order 4.2 units/hour    Insulin adminstered 4.2 units/hour    Multiplier 0.020     Low target 150 mg/dL    High target 250 mg/dL    D50 order 0.0 ml    D50 administered 0.00 ml    Minutes until next BG 60 min    Order initials lms     Administered initials lms     GLSCOM Comments     GLUCOSE, POC    Collection Time: 06/12/18 12:02 AM   Result Value Ref Range    Glucose (POC) 255 (H) 65 - 100 mg/dL    Performed by Misty Wallace    Collection Time: 06/12/18 12:02 AM   Result Value Ref Range    Glucose 255 mg/dL    Insulin order 5.9 units/hour    Insulin adminstered 5.9 units/hour    Multiplier 0.030     Low target 150 mg/dL    High target 250 mg/dL    D50 order 0.0 ml    D50 administered 0.00 ml    Minutes until next BG 60 min    Order initials TR     Administered initials TR     GLSCOM Comments     GLUCOSE, POC    Collection Time: 06/12/18  1:04 AM   Result Value Ref Range    Glucose (POC) 261 (H) 65 - 100 mg/dL    Performed by Avelino Carney    Collection Time: 06/12/18  1:05 AM   Result Value Ref Range    Glucose 261 mg/dL    Insulin order 8.0 units/hour    Insulin adminstered 8.0 units/hour    Multiplier 0.040     Low target 150 mg/dL    High target 250 mg/dL    D50 order 0.0 ml    D50 administered 0.00 ml    Minutes until next BG 60 min    Order initials TR     Administered initials TR     GLSCOM Comments     GLUCOSE, POC    Collection Time: 06/12/18  2:06 AM   Result Value Ref Range    Glucose (POC) 223 (H) 65 - 100 mg/dL    Performed by Margaret Kingston    Collection Time: 06/12/18  2:07 AM   Result Value Ref Range    Glucose 223 mg/dL    Insulin order 6.5 units/hour    Insulin adminstered 6.5 units/hour    Multiplier 0.040     Low target 150 mg/dL    High target 250 mg/dL    D50 order 0.0 ml    D50 administered 0.00 ml    Minutes until next BG 60 min    Order initials TR     Administered initials TR     GLSCOM Comments     CBC WITH AUTOMATED DIFF    Collection Time: 06/12/18  2:29 AM   Result Value Ref Range    WBC 7.0 3.6 - 11.0 K/uL    RBC 3.05 (L) 3.80 - 5.20 M/uL    HGB 7.8 (L) 11.5 - 16.0 g/dL    HCT 22.7 (L) 35.0 - 47.0 %    MCV 74.4 (L) 80.0 - 99.0 FL    MCH 25.6 (L) 26.0 - 34.0 PG    MCHC 34.4 30.0 - 36.5 g/dL    RDW 14.5 11.5 - 14.5 %    PLATELET 544 (L) 118 - 400 K/uL    NRBC 0.0 0  WBC    ABSOLUTE NRBC 0.00 0.00 - 0.01 K/uL    NEUTROPHILS 93 (H) 32 - 75 %    LYMPHOCYTES 5 (L) 12 - 49 %    MONOCYTES 2 (L) 5 - 13 %    EOSINOPHILS 0 0 - 7 %    BASOPHILS 0 0 - 1 %    IMMATURE GRANULOCYTES 0 0.0 - 0.5 %    ABS. NEUTROPHILS 6.5 1.8 - 8.0 K/UL    ABS. LYMPHOCYTES 0.4 (L) 0.8 - 3.5 K/UL    ABS. MONOCYTES 0.1 0.0 - 1.0 K/UL    ABS. EOSINOPHILS 0.0 0.0 - 0.4 K/UL    ABS. BASOPHILS 0.0 0.0 - 0.1 K/UL    ABS. IMM.  GRANS. 0.0 0.00 - 0.04 K/UL    DF SMEAR SCANNED      RBC COMMENTS ANISOCYTOSIS  1+        RBC COMMENTS TARGET CELLS  PRESENT        RBC COMMENTS MICROCYTOSIS  1+        RBC COMMENTS HYPOCHROMIA  PRESENT       METABOLIC PANEL, COMPREHENSIVE    Collection Time: 06/12/18  2:29 AM   Result Value Ref Range    Sodium 140 136 - 145 mmol/L    Potassium 3.6 3.5 - 5.1 mmol/L    Chloride 105 97 - 108 mmol/L    CO2 21 21 - 32 mmol/L Anion gap 14 5 - 15 mmol/L    Glucose 190 (H) 65 - 100 mg/dL    BUN 76 (H) 6 - 20 MG/DL    Creatinine 3.98 (H) 0.55 - 1.02 MG/DL    BUN/Creatinine ratio 19 12 - 20      GFR est AA 15 (L) >60 ml/min/1.73m2    GFR est non-AA 13 (L) >60 ml/min/1.73m2    Calcium 7.2 (L) 8.5 - 10.1 MG/DL    Bilirubin, total 0.1 (L) 0.2 - 1.0 MG/DL    ALT (SGPT) 13 12 - 78 U/L    AST (SGOT) 9 (L) 15 - 37 U/L    Alk.  phosphatase 130 (H) 45 - 117 U/L    Protein, total 6.0 (L) 6.4 - 8.2 g/dL    Albumin 2.3 (L) 3.5 - 5.0 g/dL    Globulin 3.7 2.0 - 4.0 g/dL    A-G Ratio 0.6 (L) 1.1 - 2.2     MAGNESIUM    Collection Time: 06/12/18  2:29 AM   Result Value Ref Range    Magnesium 2.2 1.6 - 2.4 mg/dL   PHOSPHORUS    Collection Time: 06/12/18  2:29 AM   Result Value Ref Range    Phosphorus 6.4 (H) 2.6 - 4.7 MG/DL   GLUCOSE, POC    Collection Time: 06/12/18  3:06 AM   Result Value Ref Range    Glucose (POC) 176 (H) 65 - 100 mg/dL    Performed by [email or web address]    Collection Time: 06/12/18  3:07 AM   Result Value Ref Range    Glucose 176 mg/dL    Insulin order 4.6 units/hour    Insulin adminstered 4.6 units/hour    Multiplier 0.040     Low target 150 mg/dL    High target 250 mg/dL    D50 order 0.0 ml    D50 administered 0.00 ml    Minutes until next BG 60 min    Order initials TR     Administered initials TR     GLSCOM Comments     GLUCOSE, POC    Collection Time: 06/12/18  4:04 AM   Result Value Ref Range    Glucose (POC) 141 (H) 65 - 100 mg/dL    Performed by [email or web address]    Collection Time: 06/12/18  4:04 AM   Result Value Ref Range    Glucose 141 mg/dL    Insulin order 2.4 units/hour    Insulin adminstered 2.4 units/hour    Multiplier 0.030     Low target 150 mg/dL    High target 250 mg/dL    D50 order 0.0 ml    D50 administered 0.00 ml    Minutes until next BG 60 min    Order initials TR     Administered initials TR     GLSCOM Comments     GLUCOSE, POC    Collection Time: 06/12/18  5:07 AM   Result Value Ref Range    Glucose (POC) 118 (H) 65 - 100 mg/dL    Performed by Rafael Bonner    Collection Time: 06/12/18  5:08 AM   Result Value Ref Range    Glucose 118 mg/dL    Insulin order 1.2 units/hour    Insulin adminstered 1.2 units/hour    Multiplier 0.020     Low target 150 mg/dL    High target 250 mg/dL    D50 order 0.0 ml    D50 administered 0.00 ml    Minutes until next BG 60 min    Order initials TR     Administered initials RENEE Brady     GLUCOSE, POC    Collection Time: 06/12/18  6:11 AM   Result Value Ref Range    Glucose (POC) 101 (H) 65 - 100 mg/dL    Performed by Rafael Bonner    Collection Time: 06/12/18  6:11 AM   Result Value Ref Range    Glucose 101 mg/dL    Insulin order 0.4 units/hour    Insulin adminstered 0.4 units/hour    Multiplier 0.010     Low target 150 mg/dL    High target 250 mg/dL    D50 order 0.0 ml    D50 administered 0.00 ml    Minutes until next BG 60 min    Order initials TR     Administered initials RENEE MARTINEZ Comments     GLUCOSE, POC    Collection Time: 06/12/18  7:09 AM   Result Value Ref Range    Glucose (POC) 111 (H) 65 - 100 mg/dL    Performed by Rafael Bonner    Collection Time: 06/12/18  7:10 AM   Result Value Ref Range    Glucose 111 mg/dL    Insulin order 0.0 units/hour    Insulin adminstered 0.0 units/hour    Multiplier 0.000     Low target 150 mg/dL    High target 250 mg/dL    D50 order 0.0 ml    D50 administered 0.00 ml    Minutes until next BG 60 min    Order initials TR     Administered initials renee MARTINEZ Comments     GLUCOSE, POC    Collection Time: 06/12/18  8:12 AM   Result Value Ref Range    Glucose (POC) 132 (H) 65 - 100 mg/dL    Performed by YANCI FERREIRA    GLUCOSTABILIZER    Collection Time: 06/12/18  8:13 AM   Result Value Ref Range    Glucose 132 mg/dL    Insulin order 0.0 units/hour    Insulin adminstered 0.0 units/hour    Multiplier 0.000     Low target 150 mg/dL    High target 250 mg/dL    D50 order 0.0 ml    D50 administered 0.00 ml    Minutes until next BG 60 min    Order initials AEA     Administered initials AEA     GLSCOM Comments     GLUCOSE, POC    Collection Time: 06/12/18  9:17 AM   Result Value Ref Range    Glucose (POC) 181 (H) 65 - 100 mg/dL    Performed by Maddi FERREIRA    GLUCOSTABILIZER    Collection Time: 06/12/18  9:18 AM   Result Value Ref Range    Glucose 181 mg/dL    Insulin order 0.1 units/hour    Insulin adminstered 0.1 units/hour    Multiplier 0.000     Low target 150 mg/dL    High target 250 mg/dL    D50 order 0.0 ml    D50 administered 0.00 ml    Minutes until next BG 60 min    Order initials AEA     Administered initials AEA     GLSCOM Comments     GLUCOSE, POC    Collection Time: 06/12/18 12:03 PM   Result Value Ref Range    Glucose (POC) 202 (H) 65 - 100 mg/dL    Performed by YANCI FERREIRA        Imaging:  I have personally reviewed the patients radiographs and have reviewed the reports:  PCXR mild edema     Medical decision making:   I have reviewed the flowsheet and previous day's notes  Patient has acute or chronic illness that poses a threat to life or bodily function  Review and order of Clinical lab tests  Review and Order of Radiology tests  Independent visualization of Image  Reviewed Ventilator / NiPPV  Parenteral controlled substances - Reviewed/ Adjusted / Crystal Dee / Started  High Risk Drug therapy requiring intensive monitoring for toxicity: eg steroids, pressors, antibiotics       Awais Natarajan MD

## 2018-06-12 NOTE — PROGRESS NOTES
Problem: Falls - Risk of  Goal: *Absence of Falls  Document Blake Fall Risk and appropriate interventions in the flowsheet. Outcome: Progressing Towards Goal  Fall Risk Interventions:  Mobility Interventions: Assess mobility with egress test         Medication Interventions: Evaluate medications/consider consulting pharmacy    Elimination Interventions: Call light in reach, Patient to call for help with toileting needs, Toilet paper/wipes in reach             Problem: Pressure Injury - Risk of  Goal: *Prevention of pressure injury  Document Antonio Scale and appropriate interventions in the flowsheet. Outcome: Progressing Towards Goal  Pressure Injury Interventions:  Sensory Interventions: Assess changes in LOC, Discuss PT/OT consult with provider, Minimize linen layers, Turn and reposition approx.  every two hours (pillows and wedges if needed)    Moisture Interventions: Apply protective barrier, creams and emollients, Absorbent underpads, Limit adult briefs    Activity Interventions: Assess need for specialty bed    Mobility Interventions: Assess need for specialty bed, HOB 30 degrees or less, PT/OT evaluation    Nutrition Interventions: Document food/fluid/supplement intake    Friction and Shear Interventions: Apply protective barrier, creams and emollients, HOB 30 degrees or less, Lift sheet

## 2018-06-12 NOTE — PROGRESS NOTES
06/12/2018; 10:00 -   CM participated in IDRs on patient. Patient continues to be intubated. It was attempted to wean her sedation, and the patient became very agitated. Patient is now off of the continuous insulin drip.   CRM: Robert Marie, MPH; Z: [phone]

## 2018-06-12 NOTE — PROGRESS NOTES
0730 Bedside report received from [place], [facility]. Assumed care of the pt.         1622 Dr. Gunner Elias, hospitalist at bedside. Orders received. 1930 Bedside and Verbal shift change report given to VERONICA Hutchins (oncoming nurse) by [facility], RN (offgoing nurse). Report included the following information SBAR, Kardex, Intake/Output, Med Rec Status and Cardiac Rhythm NSR.

## 2018-06-13 ENCOUNTER — APPOINTMENT (OUTPATIENT)
Dept: GENERAL RADIOLOGY | Age: 38
DRG: 871 | End: 2018-06-13
Attending: INTERNAL MEDICINE
Payer: MEDICARE

## 2018-06-13 LAB
ANION GAP SERPL CALC-SCNC: 13 MMOL/L (ref 5–15)
BASOPHILS # BLD: 0 K/UL (ref 0–0.1)
BASOPHILS NFR BLD: 0 % (ref 0–1)
BUN SERPL-MCNC: 78 MG/DL (ref 6–20)
BUN/CREAT SERPL: 19 (ref 12–20)
CALCIUM SERPL-MCNC: 6.8 MG/DL (ref 8.5–10.1)
CHLORIDE SERPL-SCNC: 109 MMOL/L (ref 97–108)
CO2 SERPL-SCNC: 20 MMOL/L (ref 21–32)
CREAT SERPL-MCNC: 4.18 MG/DL (ref 0.55–1.02)
DIFFERENTIAL METHOD BLD: ABNORMAL
EOSINOPHIL # BLD: 0 K/UL (ref 0–0.4)
EOSINOPHIL NFR BLD: 0 % (ref 0–7)
ERYTHROCYTE [DISTWIDTH] IN BLOOD BY AUTOMATED COUNT: 14.2 % (ref 11.5–14.5)
GLUCOSE BLD STRIP.AUTO-MCNC: 146 MG/DL (ref 65–100)
GLUCOSE BLD STRIP.AUTO-MCNC: 171 MG/DL (ref 65–100)
GLUCOSE BLD STRIP.AUTO-MCNC: 174 MG/DL (ref 65–100)
GLUCOSE BLD STRIP.AUTO-MCNC: 181 MG/DL (ref 65–100)
GLUCOSE BLD STRIP.AUTO-MCNC: 181 MG/DL (ref 65–100)
GLUCOSE SERPL-MCNC: 163 MG/DL (ref 65–100)
HCT VFR BLD AUTO: 23.1 % (ref 35–47)
HGB BLD-MCNC: 7.6 G/DL (ref 11.5–16)
IMM GRANULOCYTES # BLD: 0 K/UL (ref 0–0.04)
IMM GRANULOCYTES NFR BLD AUTO: 0 % (ref 0–0.5)
LYMPHOCYTES # BLD: 0.6 K/UL (ref 0.8–3.5)
LYMPHOCYTES NFR BLD: 11 % (ref 12–49)
MAGNESIUM SERPL-MCNC: 2 MG/DL (ref 1.6–2.4)
MCH RBC QN AUTO: 24.9 PG (ref 26–34)
MCHC RBC AUTO-ENTMCNC: 32.9 G/DL (ref 30–36.5)
MCV RBC AUTO: 75.7 FL (ref 80–99)
MONOCYTES # BLD: 0.2 K/UL (ref 0–1)
MONOCYTES NFR BLD: 4 % (ref 5–13)
NEUTS SEG # BLD: 4.3 K/UL (ref 1.8–8)
NEUTS SEG NFR BLD: 84 % (ref 32–75)
NRBC # BLD: 0 K/UL (ref 0–0.01)
NRBC BLD-RTO: 0 PER 100 WBC
PLATELET # BLD AUTO: 154 K/UL (ref 150–400)
POTASSIUM SERPL-SCNC: 4 MMOL/L (ref 3.5–5.1)
RBC # BLD AUTO: 3.05 M/UL (ref 3.8–5.2)
SERVICE CMNT-IMP: ABNORMAL
SODIUM SERPL-SCNC: 142 MMOL/L (ref 136–145)
WBC # BLD AUTO: 5.1 K/UL (ref 3.6–11)

## 2018-06-13 PROCEDURE — 74018 RADEX ABDOMEN 1 VIEW: CPT

## 2018-06-13 PROCEDURE — 74011250636 HC RX REV CODE- 250/636: Performed by: INTERNAL MEDICINE

## 2018-06-13 PROCEDURE — 74011000250 HC RX REV CODE- 250: Performed by: INTERNAL MEDICINE

## 2018-06-13 PROCEDURE — 74011636637 HC RX REV CODE- 636/637: Performed by: INTERNAL MEDICINE

## 2018-06-13 PROCEDURE — 74011000250 HC RX REV CODE- 250: Performed by: HOSPITALIST

## 2018-06-13 PROCEDURE — 74011250637 HC RX REV CODE- 250/637: Performed by: INTERNAL MEDICINE

## 2018-06-13 PROCEDURE — 77030013079 HC BLNKT BAIR HGGR 3M -A

## 2018-06-13 PROCEDURE — 85025 COMPLETE CBC W/AUTO DIFF WBC: CPT | Performed by: INTERNAL MEDICINE

## 2018-06-13 PROCEDURE — 77030004950 HC CATH ENTRL NG COVD -A

## 2018-06-13 PROCEDURE — 94003 VENT MGMT INPAT SUBQ DAY: CPT

## 2018-06-13 PROCEDURE — 82962 GLUCOSE BLOOD TEST: CPT

## 2018-06-13 PROCEDURE — 94640 AIRWAY INHALATION TREATMENT: CPT

## 2018-06-13 PROCEDURE — 74011250637 HC RX REV CODE- 250/637: Performed by: HOSPITALIST

## 2018-06-13 PROCEDURE — 65620000000 HC RM CCU GENERAL

## 2018-06-13 PROCEDURE — 74011000258 HC RX REV CODE- 258: Performed by: HOSPITALIST

## 2018-06-13 PROCEDURE — 74011000258 HC RX REV CODE- 258: Performed by: INTERNAL MEDICINE

## 2018-06-13 PROCEDURE — 36415 COLL VENOUS BLD VENIPUNCTURE: CPT | Performed by: INTERNAL MEDICINE

## 2018-06-13 PROCEDURE — 83735 ASSAY OF MAGNESIUM: CPT | Performed by: INTERNAL MEDICINE

## 2018-06-13 PROCEDURE — 80048 BASIC METABOLIC PNL TOTAL CA: CPT | Performed by: INTERNAL MEDICINE

## 2018-06-13 RX ORDER — FAMOTIDINE 20 MG/1
20 TABLET, FILM COATED ORAL DAILY
Status: DISCONTINUED | OUTPATIENT
Start: 2018-06-14 | End: 2018-06-14 | Stop reason: HOSPADM

## 2018-06-13 RX ORDER — BUMETANIDE 0.25 MG/ML
1 INJECTION INTRAMUSCULAR; INTRAVENOUS ONCE
Status: COMPLETED | OUTPATIENT
Start: 2018-06-13 | End: 2018-06-13

## 2018-06-13 RX ORDER — CHLORTHALIDONE 25 MG/1
25 TABLET ORAL DAILY
Status: DISCONTINUED | OUTPATIENT
Start: 2018-06-13 | End: 2018-06-14 | Stop reason: HOSPADM

## 2018-06-13 RX ORDER — NITROGLYCERIN 20 MG/100ML
0-200 INJECTION INTRAVENOUS
Status: DISCONTINUED | OUTPATIENT
Start: 2018-06-13 | End: 2018-06-14

## 2018-06-13 RX ORDER — METOPROLOL TARTRATE 5 MG/5ML
2.5 INJECTION INTRAVENOUS EVERY 6 HOURS
Status: DISCONTINUED | OUTPATIENT
Start: 2018-06-13 | End: 2018-06-14

## 2018-06-13 RX ADMIN — CHLORTHALIDONE 25 MG: 25 TABLET ORAL at 10:42

## 2018-06-13 RX ADMIN — DIPHENHYDRAMINE HYDROCHLORIDE 50 MG: 50 INJECTION, SOLUTION INTRAMUSCULAR; INTRAVENOUS at 05:42

## 2018-06-13 RX ADMIN — PROPOFOL 50 MCG/KG/MIN: 10 INJECTION, EMULSION INTRAVENOUS at 18:56

## 2018-06-13 RX ADMIN — METOPROLOL TARTRATE 2.5 MG: 5 INJECTION, SOLUTION INTRAVENOUS at 13:53

## 2018-06-13 RX ADMIN — IPRATROPIUM BROMIDE AND ALBUTEROL SULFATE 3 ML: .5; 3 SOLUTION RESPIRATORY (INHALATION) at 07:24

## 2018-06-13 RX ADMIN — HYDRALAZINE HYDROCHLORIDE 10 MG: 20 INJECTION INTRAMUSCULAR; INTRAVENOUS at 08:47

## 2018-06-13 RX ADMIN — CALCIUM CARBONATE (ANTACID) CHEW TAB 500 MG 400 MG: 500 CHEW TAB at 12:28

## 2018-06-13 RX ADMIN — Medication 10 ML: at 21:00

## 2018-06-13 RX ADMIN — HEPARIN SODIUM 5000 UNITS: 5000 INJECTION, SOLUTION INTRAVENOUS; SUBCUTANEOUS at 04:00

## 2018-06-13 RX ADMIN — FAMOTIDINE 20 MG: 10 INJECTION, SOLUTION INTRAVENOUS at 08:47

## 2018-06-13 RX ADMIN — SODIUM CHLORIDE 0.5 MCG/KG/HR: 900 INJECTION, SOLUTION INTRAVENOUS at 05:42

## 2018-06-13 RX ADMIN — IPRATROPIUM BROMIDE AND ALBUTEROL SULFATE 3 ML: .5; 3 SOLUTION RESPIRATORY (INHALATION) at 15:29

## 2018-06-13 RX ADMIN — HYDRALAZINE HYDROCHLORIDE 10 MG: 20 INJECTION INTRAMUSCULAR; INTRAVENOUS at 00:03

## 2018-06-13 RX ADMIN — CALCIUM CARBONATE (ANTACID) CHEW TAB 500 MG 400 MG: 500 CHEW TAB at 16:43

## 2018-06-13 RX ADMIN — NITROGLYCERIN 1 INCH: 20 OINTMENT TOPICAL at 18:50

## 2018-06-13 RX ADMIN — FENTANYL CITRATE 100 MCG: 50 INJECTION, SOLUTION INTRAMUSCULAR; INTRAVENOUS at 16:42

## 2018-06-13 RX ADMIN — HEPARIN SODIUM 5000 UNITS: 5000 INJECTION, SOLUTION INTRAVENOUS; SUBCUTANEOUS at 20:55

## 2018-06-13 RX ADMIN — Medication 10 ML: at 05:43

## 2018-06-13 RX ADMIN — IPRATROPIUM BROMIDE AND ALBUTEROL SULFATE 3 ML: .5; 3 SOLUTION RESPIRATORY (INHALATION) at 12:56

## 2018-06-13 RX ADMIN — CHLORHEXIDINE GLUCONATE 15 ML: 1.2 RINSE ORAL at 21:00

## 2018-06-13 RX ADMIN — SODIUM BICARBONATE 650 MG: 650 TABLET ORAL at 10:42

## 2018-06-13 RX ADMIN — BUMETANIDE 1 MG: 0.25 INJECTION INTRAMUSCULAR; INTRAVENOUS at 09:16

## 2018-06-13 RX ADMIN — PROPOFOL 50 MCG/KG/MIN: 10 INJECTION, EMULSION INTRAVENOUS at 13:51

## 2018-06-13 RX ADMIN — PROPOFOL 50 MCG/KG/MIN: 10 INJECTION, EMULSION INTRAVENOUS at 04:00

## 2018-06-13 RX ADMIN — INSULIN LISPRO 2 UNITS: 100 INJECTION, SOLUTION INTRAVENOUS; SUBCUTANEOUS at 09:15

## 2018-06-13 RX ADMIN — FENTANYL CITRATE 50 MCG: 50 INJECTION, SOLUTION INTRAMUSCULAR; INTRAVENOUS at 12:40

## 2018-06-13 RX ADMIN — INSULIN LISPRO 2 UNITS: 100 INJECTION, SOLUTION INTRAVENOUS; SUBCUTANEOUS at 12:20

## 2018-06-13 RX ADMIN — DIPHENHYDRAMINE HYDROCHLORIDE 50 MG: 50 INJECTION, SOLUTION INTRAMUSCULAR; INTRAVENOUS at 12:20

## 2018-06-13 RX ADMIN — HYDRALAZINE HYDROCHLORIDE 10 MG: 20 INJECTION INTRAMUSCULAR; INTRAVENOUS at 21:01

## 2018-06-13 RX ADMIN — INSULIN LISPRO 2 UNITS: 100 INJECTION, SOLUTION INTRAVENOUS; SUBCUTANEOUS at 20:54

## 2018-06-13 RX ADMIN — METOPROLOL TARTRATE 2.5 MG: 5 INJECTION, SOLUTION INTRAVENOUS at 18:19

## 2018-06-13 RX ADMIN — IPRATROPIUM BROMIDE AND ALBUTEROL SULFATE 3 ML: .5; 3 SOLUTION RESPIRATORY (INHALATION) at 20:46

## 2018-06-13 RX ADMIN — INSULIN GLARGINE 20 UNITS: 100 INJECTION, SOLUTION SUBCUTANEOUS at 09:16

## 2018-06-13 RX ADMIN — FENTANYL CITRATE 100 MCG: 50 INJECTION, SOLUTION INTRAMUSCULAR; INTRAVENOUS at 20:45

## 2018-06-13 RX ADMIN — EPOETIN ALFA 20000 UNITS: 20000 SOLUTION INTRAVENOUS; SUBCUTANEOUS at 16:43

## 2018-06-13 RX ADMIN — NITROGLYCERIN 1 INCH: 20 OINTMENT TOPICAL at 08:47

## 2018-06-13 RX ADMIN — DIPHENHYDRAMINE HYDROCHLORIDE 50 MG: 50 INJECTION, SOLUTION INTRAMUSCULAR; INTRAVENOUS at 18:19

## 2018-06-13 RX ADMIN — SODIUM BICARBONATE 650 MG: 650 TABLET ORAL at 18:19

## 2018-06-13 RX ADMIN — Medication 10 ML: at 13:58

## 2018-06-13 RX ADMIN — SODIUM CHLORIDE 0.7 MCG/KG/HR: 900 INJECTION, SOLUTION INTRAVENOUS at 18:18

## 2018-06-13 RX ADMIN — INSULIN LISPRO 2 UNITS: 100 INJECTION, SOLUTION INTRAVENOUS; SUBCUTANEOUS at 04:00

## 2018-06-13 RX ADMIN — METHYLPREDNISOLONE SODIUM SUCCINATE 80 MG: 40 INJECTION, POWDER, FOR SOLUTION INTRAMUSCULAR; INTRAVENOUS at 13:53

## 2018-06-13 RX ADMIN — CHLORHEXIDINE GLUCONATE 15 ML: 1.2 RINSE ORAL at 09:20

## 2018-06-13 RX ADMIN — METHYLPREDNISOLONE SODIUM SUCCINATE 80 MG: 40 INJECTION, POWDER, FOR SOLUTION INTRAMUSCULAR; INTRAVENOUS at 21:00

## 2018-06-13 RX ADMIN — DAPTOMYCIN 400 MG: 500 INJECTION, POWDER, LYOPHILIZED, FOR SOLUTION INTRAVENOUS at 18:33

## 2018-06-13 RX ADMIN — INSULIN LISPRO 2 UNITS: 100 INJECTION, SOLUTION INTRAVENOUS; SUBCUTANEOUS at 16:42

## 2018-06-13 RX ADMIN — NITROGLYCERIN 10 MCG/MIN: 20 INJECTION INTRAVENOUS at 17:21

## 2018-06-13 RX ADMIN — METHYLPREDNISOLONE SODIUM SUCCINATE 80 MG: 40 INJECTION, POWDER, FOR SOLUTION INTRAMUSCULAR; INTRAVENOUS at 05:42

## 2018-06-13 RX ADMIN — HEPARIN SODIUM 5000 UNITS: 5000 INJECTION, SOLUTION INTRAVENOUS; SUBCUTANEOUS at 12:20

## 2018-06-13 RX ADMIN — HYDRALAZINE HYDROCHLORIDE 10 MG: 20 INJECTION INTRAMUSCULAR; INTRAVENOUS at 14:53

## 2018-06-13 RX ADMIN — FENTANYL CITRATE 100 MCG: 50 INJECTION, SOLUTION INTRAMUSCULAR; INTRAVENOUS at 02:48

## 2018-06-13 NOTE — PROGRESS NOTES
Hospitalist Progress Note  Scott Wheat MD  Answering service: [phone] from in house phone  Cell: [phone]       Date of Service:  2018  NAME:  Jonathan Morales  :  1980  MRN:  [id number]    Admission Summary: This is a 49-year-old woman with a past medical history significant for chronic pancreatitis, type 1 diabetes, asthma, chronic kidney disease, anemia, hypertension, depression, obstructive sleep apnea, sickle cell anemia, ongoing osteomyelitis of the left foot, who was in her usual state of health until the day of presentation at the emergency room when the patient developed back pain.  The pain is located at the lower back bilaterally with no radiation.      Interval history / Subjective:   :  Pt sedated on vent, no apparent acuate distress. bs coming down, Lantus given and will give daily, no anion gap. :  Cont on vent,  Off insuliln drip, bs 's stable,       Assessment & Plan:      DKA with Type DM-I  -improving with insulin gtt, monitor finger stick glucose   -Finger stick glucose 102-278/overnight  -A1c 7.9  Lab Results   Component Value Date/Time    Glucose 163 (H) 2018 03:52 AM    Glucose (POC) 171 (H) 2018 12:07 PM         Hypothermia possible due to sepsis  -on Daptomycin, levaquin and IVF  -blood cx on 6/10 no growth so far  -UA no evidence of UTI  - random cortisol and cortisol in am normal  -TSH normal  -improved and off warming blanket   Temp Readings from Last 1 Encounters:   18 97.7 °F (36.5 °C)         Acute respiratory failure possible due to sepsis POA  -intubate, on ventilator support  -repeated chest x ray on  improved aeration in the left lower lobe, favoring atelectasis. No evidence of a new acute cardiopulmonary process.   -pulmonologist on board  Intubated on 6/10, on ventilator suppor     Acute Encephalopathy metabolic vs infection   -confused, agitated, restless, try to get out of bed on 6/10 , ativan x 1 on 6/10  -toxicology screen negative, alcohol <10  -continue neuro check  -CT head no acute finding     STONE on CKD stage IV-V  -on sodium bicarb drip  -Creatinine improving 4.89 to 3.91  -nephrologist on board  Lab Results   Component Value Date/Time    Creatinine (POC) 4.1 (H) 04/27/2018 10:04 PM    Creatinine 4.18 (H) 06/13/2018 03:52 AM         Non AG MA due to renal insufficiency   -on sodium bicarb gtt  -improving  -repeat bmp  [place] 6/12 and 6/13      Swelling of tongue due to acute Angioedema unclear etiology  -decadron 4 mg IV x 1 dose  -finger stick glucose improved, add iv solumedrol, on benadryl and monitor     Elevated liver enzyme multifactorial   -continue IVF  -CT of abdomen no mass in the liver or biliary dilatation  -monitor liver enzyme if it doesn't improves will do hepatitis panel     Hypocalcemia   -IV calcium gluconate 2 g IV x 2 on 6/10  -improved  Lab Results   Component Value Date/Time    Calcium 6.8 (L) 06/13/2018 03:52 AM    Phosphorus 6.4 (H) 06/12/2018 02:29 AM          Hx of osteomyelitis of left foot  -on Daptomycin  -ID on board     Hx of sickle cell disease  -continue IVF  -no evidence of occlusive crisis  - normal  -monitor cbc  -on prn fentanyl   Lab Results   Component Value Date/Time    Hemoglobin (POC) 11.2 (L) 06/10/2013 11:41 AM    HGB 7.6 (L) 06/13/2018 03:52 AM         Hx of chronic pancreatitis  -CT Abdomen pelvis moderate fecal stasis, chronic pancreatitis, s/p gastric bypass and cholecystectomy, no acute abnormality   -elevated but improving     Hyponatremia  -improving, continue monitoring  Recent Labs      06/13/18   0352   NA  142          HTN poorly controlled  -BP Elevated, clonidine and nitro patch placed, on prn iv hydralazine  -her home hydralazine and clonidine is held, monitor BP     Hx of gastric by pass  -CT abdomen pelvis no acute finding     Chronic low back pain  -on prn fentanyl     Hx of THU  -now on ventilator     Hx of asthma  -not bronchospastic  -prn duo neb  -chest x ray      Hx of depression  -will resume her seroquel and venlafaxine when she able to take po meds        Full Code, at risk for decompensation         Code status: Full Code  DVT prophylaxis: heparin     Care Plan discussed with: Patient/Family and Nurse  Disposition: TBD              Hospital Problems  Date Reviewed: 6/10/2018          Codes Class Noted POA    * (Principal)DKA (diabetic ketoacidoses) ([facility] ICD-10-CM: E13.10  ICD-9-CM: 250.10  6/9/2018 Yes                Review of Systems:   Review of systems not obtained due to patient factors. Vital Signs:    Last 24hrs VS reviewed since prior progress note. Most recent are:  Visit Vitals    BP (!) 161/91    Pulse 65    Temp 97.7 °F (36.5 °C)    Resp 20    Ht 5' 2\" (1.575 m)    Wt 67.3 kg (148 lb 5.9 oz)    SpO2 100%    Breastfeeding No    BMI 27.14 kg/m2         Intake/Output Summary (Last 24 hours) at 06/13/18 1432  Last data filed at 06/13/18 1400   Gross per 24 hour   Intake           624.82 ml   Output             2135 ml   Net         -1510.18 ml        Physical Examination:             Constitutional:  on vent ergo: acute distress,     ENT:    Neck supple,    Resp:  CTA bilaterally on vent . No wheezing/rhonchi/rales. CV:   Controled rate , no gallops, rubs    GI:  Soft, non distended, non tender.  normoactive bowel sounds, no hepatosplenomegaly     Musculoskeletal:  trace  edema, warm  throughout    Neurologic:  sedated on vent             Data Review:    Review and/or order of clinical lab test      Labs:     Recent Labs      06/13/18 0352 06/12/18 0229   WBC  5.1  7.0   HGB  7.6*  7.8*   HCT  23.1*  22.7*   PLT  154  147*     Recent Labs      06/13/18   0352  06/12/18   0229  06/11/18   1359  06/11/18   0722   NA  142  140  140  140  135*   K  4.0  3.6  3.7  3.9  4.4   CL  109*  105  107  108  104   CO2  20*  21  19*  17*  19*   BUN  78* 76*  82*  84*  81*   CREA  4.18*  3.98*  4.02*  4.12*  3.91*   GLU  163*  190*  199*  156*  145*   CA  6.8*  7.2*  7.3*  7.4*  7.5*  7.9*   MG  2.0  2.2  2.1  1.9  2.5*   PHOS   --   6.4*   --   6.0*     Recent Labs      06/12/18 0229 06/11/18   0722   SGOT  9*  11*   ALT  13  16   AP  130*  146*   TBILI  0.1*  0.2   TP  6.0*  6.1*   ALB  2.3*  2.5*   GLOB  3.7  3.6     No results for input(s): INR, PTP, APTT in the last 72 hours. No lab exists for component: INREXT, INREXT   No results for input(s): FE, TIBC, PSAT, FERR in the last 72 hours. Lab Results   Component Value Date/Time    Folate 33.8 (H) 05/07/2018 11:20 AM      No results for input(s): PH, PCO2, PO2 in the last 72 hours.   Recent Labs      06/11/18   0224  06/10/18   1624   CPK  51  87   CKNDX  3.1*  3.2*   TROIQ  <0.04   --      Lab Results   Component Value Date/Time    Cholesterol, total 128 06/10/2018 07:44 AM    HDL Cholesterol 51 06/10/2018 07:44 AM    LDL, calculated 67.4 06/10/2018 07:44 AM    Triglyceride 48 06/10/2018 07:44 AM    CHOL/HDL Ratio 2.5 06/10/2018 07:44 AM     Lab Results   Component Value Date/Time    Glucose (POC) 171 (H) 06/13/2018 12:07 PM    Glucose (POC) 181 (H) 06/13/2018 08:45 AM    Glucose (POC) 181 (H) 06/13/2018 03:51 AM    Glucose (POC) 165 (H) 06/12/2018 11:41 PM    Glucose (POC) 166 (H) 06/12/2018 08:04 PM     Lab Results   Component Value Date/Time    Color YELLOW/STRAW 06/10/2018 10:44 AM    Appearance CLOUDY (A) 06/10/2018 10:44 AM    Specific gravity 1.014 06/10/2018 10:44 AM    Specific gravity 1.015 07/26/2010 11:18 AM    pH (UA) 5.0 06/10/2018 10:44 AM    Protein 100 (A) 06/10/2018 10:44 AM    Glucose >1000 (A) 06/10/2018 10:44 AM    Ketone NEGATIVE  06/10/2018 10:44 AM    Bilirubin NEGATIVE  06/10/2018 10:44 AM    Urobilinogen 0.2 06/10/2018 10:44 AM    Nitrites NEGATIVE  06/10/2018 10:44 AM    Leukocyte Esterase NEGATIVE  06/10/2018 10:44 AM    Epithelial cells MODERATE (A) 06/10/2018 10:44 AM Bacteria NEGATIVE  06/10/2018 10:44 AM    WBC 0-4 06/10/2018 10:44 AM    RBC 0-5 06/10/2018 10:44 AM         Medications Reviewed:     Current Facility-Administered Medications   Medication Dose Route Frequency    chlorthalidone (HYGROTEN) tablet 25 mg  25 mg Oral DAILY    epoetin bernard (EPOGEN;PROCRIT) injection 20,000 Units  20,000 Units SubCUTAneous Q MON, WED & FRI    metoprolol (LOPRESSOR) injection 2.5 mg  2.5 mg IntraVENous Q6H    [START ON 6/14/2018] famotidine (PEPCID) tablet 20 mg  20 mg Oral DAILY    calcium carbonate (TUMS) chewable tablet 400 mg [elemental]  400 mg Oral TID WITH MEALS    sodium bicarbonate tablet 650 mg  650 mg Oral BID    insulin glargine (LANTUS) injection 20 Units  20 Units SubCUTAneous DAILY    insulin lispro (HUMALOG) injection   SubCUTAneous Q4H    cloNIDine (CATAPRES) 0.3 mg/24 hr patch 1 Patch  1 Patch TransDERmal Q7D    nitroglycerin (NITROBID) 2 % ointment 1 Inch  1 Inch Topical BID    chlorhexidine (PERIDEX) 0.12 % mouthwash 15 mL  15 mL Oral BID    polyvinyl alcohol (LIQUIFILM TEARS) 1.4 % ophthalmic solution 1 Drop  1 Drop Both Eyes PRN    albuterol (PROVENTIL VENTOLIN) nebulizer solution 2.5 mg  2.5 mg Nebulization Q4H PRN    sodium chloride (NS) flush 5-10 mL  5-10 mL IntraVENous Q8H    sodium chloride (NS) flush 5-10 mL  5-10 mL IntraVENous PRN    bisacodyl (DULCOLAX) tablet 5 mg  5 mg Oral DAILY PRN    heparin (porcine) injection 5,000 Units  5,000 Units SubCUTAneous Q8H    hydrALAZINE (APRESOLINE) 20 mg/mL injection 10 mg  10 mg IntraVENous Q6H PRN    DAPTOmycin (CUBICIN) 400 mg in 0.9% sodium chloride 50 mL IVPB RF formulation  400 mg IntraVENous Q48H    levoFLOXacin (LEVAQUIN) 250 mg in D5W IVPB  250 mg IntraVENous Q48H    propofol (DIPRIVAN) infusion  0-50 mcg/kg/min IntraVENous TITRATE    fentaNYL citrate (PF) injection  mcg   mcg IntraVENous Q2H PRN    methylPREDNISolone (PF) (SOLU-MEDROL) injection 80 mg  80 mg IntraVENous Q8H  albuterol-ipratropium (DUO-NEB) 2.5 MG-0.5 MG/3 ML  3 mL Nebulization QID RT    diphenhydrAMINE (BENADRYL) injection 50 mg  50 mg IntraVENous Q6H    scopolamine (TRANSDERM-SCOP) 1 mg over 3 days 1 Patch  1 Patch TransDERmal Q72H    dexmedeTOMidine (PRECEDEX) 400 mcg in 0.9% sodium chloride 100 mL infusion  0.2-0.7 mcg/kg/hr IntraVENous TITRATE    ondansetron (ZOFRAN) injection 4 mg  4 mg IntraVENous Q4H PRN    sodium chloride (NS) flush 5-10 mL  5-10 mL IntraVENous Q8H    sodium chloride (NS) flush 5-10 mL  5-10 mL IntraVENous PRN    insulin regular (NOVOLIN R, HUMULIN R) 100 Units in 0.9% sodium chloride 100 mL infusion  0-50 Units/hr IntraVENous TITRATE    glucose chewable tablet 16 g  4 Tab Oral PRN    dextrose (D50W) injection syrg 12.5-25 g  12.5-25 g IntraVENous PRN    glucagon (GLUCAGEN) injection 1 mg  1 mg IntraMUSCular PRN     ______________________________________________________________________  EXPECTED LENGTH OF STAY: 4d 21h  ACTUAL LENGTH OF STAY:          4                 Gregory Butler MD

## 2018-06-13 NOTE — PROGRESS NOTES
Clinical Pharmacy Note: IV to PO Automatic Conversion  Please note: Simona Looney medication(s) (Famotidine) has/have been changed from IV to PO based on the following critiera:    - Patient is taking scheduled oral medications  - Patient is tolerating tube feeds at goal rate or a full liquid, soft or regular diet    This IV to PO conversion is based on the [employer] approved automatic conversion policy for eligible patients. Please call with questions.

## 2018-06-13 NOTE — INTERDISCIPLINARY ROUNDS
IDR/SLIDR Summary          Patient: Rudie Harada MRN: [id number]    Age: 42 y.o. YOB: 1980 Room/Bed: [address] [place] [address]   Admit Diagnosis: DKA (diabetic ketoacidoses) ([facility]  angio edema  Principal Diagnosis: DKA (diabetic ketoacidoses) ([facility]   Goals: Follow commands, wean vent settings  Readmission: NO  Quality Measure: Not applicable  VTE Prophylaxis: Chemical  Influenza Vaccine screening completed? NO  Pneumococcal Vaccine screening completed? NO  Mobility needs: Yes   Nutrition plan:Yes  Consults:P.T, O.T., Respiratory and Case Management    Financial concerns:Yes  Escalated to CM? YES  RRAT Score: 12     [facility]   Testing due for pt today?  NO  LOS: 3 days Expected length of stay 7 days  Discharge plan: D/C back to rehab   PCP: Bhargav Ann MD  Transportation needs: Yes    Days before discharge:two or more days before discharge   Discharge disposition: Rehab    Signed:     Ganga Carl RN  6/12/2018  12:01 AM

## 2018-06-13 NOTE — PROGRESS NOTES
[facility]   [address] [facility], [facility] [address], [facility]  Phone: [phone]   [phone]       Nephrology Progress Note  Floranoencbhanu Cueto     1980     [id number]  Date of Admission : 6/9/2018 06/13/18    CC: Follow up for STONE       Assessment and Plan   STONE on CKD :  - slightly worse after diuresis   - Bumex 1 mg IV today   - start Chlorthalidone when she gets DHT      CKD Stage IV :  - baseline Cr 2.6-2.8 mg/dl   - previously on dialysis and recovered      Met Acidosis :  - oral Bicarb     CKD-MBD  - Oral Calcitriol  + Tums when enteral access established   - if not --> will start IV hectorol     Angioedema : ? Cause ? Opioids   - on steroids      Sickle Cell Disease w/ Anemia   - started Epogen due to advanced CKD    HTN   Left Foot Osteo  Chronic pancreatitis     Interval History:  Seen and examined  Remained intubated   No enteral access  UOP good   Cr slightly worse     Review of Systems: A comprehensive review of systems was negative.     Current Medications:   Current Facility-Administered Medications   Medication Dose Route Frequency    chlorthalidone (HYGROTEN) tablet 25 mg  25 mg Oral DAILY    epoetin bernard (EPOGEN;PROCRIT) injection 20,000 Units  20,000 Units SubCUTAneous Q MON, WED & FRI    bumetanide (BUMEX) injection 1 mg  1 mg IntraVENous ONCE    calcium carbonate (TUMS) chewable tablet 400 mg [elemental]  400 mg Oral TID WITH MEALS    sodium bicarbonate tablet 650 mg  650 mg Oral BID    insulin glargine (LANTUS) injection 20 Units  20 Units SubCUTAneous DAILY    insulin lispro (HUMALOG) injection   SubCUTAneous Q4H    cloNIDine (CATAPRES) 0.3 mg/24 hr patch 1 Patch  1 Patch TransDERmal Q7D    nitroglycerin (NITROBID) 2 % ointment 1 Inch  1 Inch Topical BID    chlorhexidine (PERIDEX) 0.12 % mouthwash 15 mL  15 mL Oral BID    polyvinyl alcohol (LIQUIFILM TEARS) 1.4 % ophthalmic solution 1 Drop  1 Drop Both Eyes PRN    albuterol (PROVENTIL VENTOLIN) nebulizer solution 2.5 mg  2.5 mg Nebulization Q4H PRN    sodium chloride (NS) flush 5-10 mL  5-10 mL IntraVENous Q8H    sodium chloride (NS) flush 5-10 mL  5-10 mL IntraVENous PRN    bisacodyl (DULCOLAX) tablet 5 mg  5 mg Oral DAILY PRN    heparin (porcine) injection 5,000 Units  5,000 Units SubCUTAneous Q8H    hydrALAZINE (APRESOLINE) 20 mg/mL injection 10 mg  10 mg IntraVENous Q6H PRN    DAPTOmycin (CUBICIN) 400 mg in 0.9% sodium chloride 50 mL IVPB RF formulation  400 mg IntraVENous Q48H    levoFLOXacin (LEVAQUIN) 250 mg in D5W IVPB  250 mg IntraVENous Q48H    propofol (DIPRIVAN) infusion  0-50 mcg/kg/min IntraVENous TITRATE    fentaNYL citrate (PF) injection  mcg   mcg IntraVENous Q2H PRN    methylPREDNISolone (PF) (SOLU-MEDROL) injection 80 mg  80 mg IntraVENous Q8H    albuterol-ipratropium (DUO-NEB) 2.5 MG-0.5 MG/3 ML  3 mL Nebulization QID RT    diphenhydrAMINE (BENADRYL) injection 50 mg  50 mg IntraVENous Q6H    famotidine (PF) (PEPCID) 20 mg in sodium chloride 0.9% 10 mL injection  20 mg IntraVENous Q24H    scopolamine (TRANSDERM-SCOP) 1 mg over 3 days 1 Patch  1 Patch TransDERmal Q72H    dexmedeTOMidine (PRECEDEX) 400 mcg in 0.9% sodium chloride 100 mL infusion  0.2-0.7 mcg/kg/hr IntraVENous TITRATE    ondansetron (ZOFRAN) injection 4 mg  4 mg IntraVENous Q4H PRN    sodium chloride (NS) flush 5-10 mL  5-10 mL IntraVENous Q8H    sodium chloride (NS) flush 5-10 mL  5-10 mL IntraVENous PRN    insulin regular (NOVOLIN R, HUMULIN R) 100 Units in 0.9% sodium chloride 100 mL infusion  0-50 Units/hr IntraVENous TITRATE    glucose chewable tablet 16 g  4 Tab Oral PRN    dextrose (D50W) injection syrg 12.5-25 g  12.5-25 g IntraVENous PRN    glucagon (GLUCAGEN) injection 1 mg  1 mg IntraMUSCular PRN      Allergies   Allergen Reactions    Erythromycin Itching    Morphine Itching    Toradol [Ketorolac] Rash       Objective:  Vitals:    Vitals:    06/13/18 0600 06/13/18 0700 06/13/18 0726 06/13/18 0800   BP: 153/87 157/90  167/88   Pulse: 64 64 67 65   Resp:   20    Temp:    98.4 °F (36.9 °C)   SpO2: 100% 100% 100% 100%   Weight: 67.3 kg (148 lb 5.9 oz)      Height:         Intake and Output:  06/13 0701 - 06/13 1900  In: -   Out: 100 [Urine:100]  06/11 1901 - 06/13 0700  In: 1772.5 [I.V.:1772.5]  Out: 2761 [Urine:4340]    Physical Examination:  Pt intubated    Yes  General: On vent   HEENT            FACIAL swelling +  Neck:  Supple, no mass  Resp:  Diminished   CV:  RRR,  no murmur   GI:  Soft, NT, + BS  Neurologic:  Sedated on vent   Skin:  No Rash  :  [place] +    []    High complexity decision making was performed  []    Patient is at high-risk of decompensation with multiple organ involvement    Lab Data Personally Reviewed: I have reviewed all the pertinent labs, microbiology data and radiology studies during assessment.     Recent Labs      06/13/18 0352 06/12/18 0229 06/11/18 1359  06/11/18 0722 06/11/18 0224   NA  142  140  140  140  135*  140   K  4.0  3.6  3.7  3.9  4.4  3.9   CL  109*  105  107  108  104  108   CO2  20*  21  19*  17*  19*  17*   GLU  163*  190*  199*  156*  145*  154*   BUN  78*  76*  82*  84*  81*  83*   CREA  4.18*  3.98*  4.02*  4.12*  3.91*  3.97*   CA  6.8*  7.2*  7.3*  7.4*  7.5*  7.9*  7.3*   MG  2.0  2.2  2.1  1.9  2.5*  1.7   PHOS   --   6.4*   --   6.0*   --    ALB   --   2.3*   --   2.5*   --    SGOT   --   9*   --   11*   --    ALT   --   13   --   16   --      Recent Labs      06/13/18   0352  06/12/18   0229  06/11/18   0224   WBC  5.1  7.0  5.9   HGB  7.6*  7.8*  7.6*   HCT  23.1*  22.7*  22.3*   PLT  154  147*  133*     Lab Results   Component Value Date/Time    Specimen Description: URINE 06/24/2013 08:00 PM    Specimen Description: URINE 06/17/2013 07:55 PM    Specimen Description: URINE 05/26/2013 10:10 AM    Specimen Description: URINE 04/22/2013 02:30 PM    Specimen Description: NARES 03/21/2013 12:30 PM     Lab Results   Component Value Date/Time    Culture result: NO GROWTH 3 DAYS 06/10/2018 10:42 AM    Culture result: NO GROWTH 5 DAYS 04/27/2018 05:11 PM    Culture result: FEW STAPHYLOCOCCUS HOMINIS SUBSPECIES HOMINIS (A) 03/11/2018 12:47 PM    Culture result: (A) 03/11/2018 12:47 PM     STAPHYLOCOCCUS EPIDERMIDIS (OXACILLIN RESISTANT) ISOLATED FROM THIO BROTH ONLY    Culture result: NO ANAEROBES ISOLATED 03/11/2018 12:47 PM     Recent Results (from the past 24 hour(s))   GLUCOSE, POC    Collection Time: 06/12/18  9:17 AM   Result Value Ref Range    Glucose (POC) 181 (H) 65 - 100 mg/dL    Performed by Heather FERREIRA    GLUCOSTABILIZER    Collection Time: 06/12/18  9:18 AM   Result Value Ref Range    Glucose 181 mg/dL    Insulin order 0.1 units/hour    Insulin adminstered 0.1 units/hour    Multiplier 0.000     Low target 150 mg/dL    High target 250 mg/dL    D50 order 0.0 ml    D50 administered 0.00 ml    Minutes until next BG 60 min    Order initials AEA     Administered initials AEA     GLSCOM Comments     GLUCOSE, POC    Collection Time: 06/12/18 12:03 PM   Result Value Ref Range    Glucose (POC) 202 (H) 65 - 100 mg/dL    Performed by YANCI FERREIRA    GLUCOSE, POC    Collection Time: 06/12/18  4:25 PM   Result Value Ref Range    Glucose (POC) 136 (H) 65 - 100 mg/dL    Performed by YANCI FERREIRA    GLUCOSE, POC    Collection Time: 06/12/18  8:04 PM   Result Value Ref Range    Glucose (POC) 166 (H) 65 - 100 mg/dL    Performed by June Bailey    GLUCOSE, POC    Collection Time: 06/12/18 11:41 PM   Result Value Ref Range    Glucose (POC) 165 (H) 65 - 100 mg/dL    Performed by June Bailey    GLUCOSE, POC    Collection Time: 06/13/18  3:51 AM   Result Value Ref Range    Glucose (POC) 181 (H) 65 - 100 mg/dL    Performed by June Bailey    METABOLIC PANEL, BASIC    Collection Time: 06/13/18  3:52 AM   Result Value Ref Range    Sodium 142 136 - 145 mmol/L    Potassium 4.0 3.5 - 5.1 mmol/L    Chloride 109 (H) 97 - 108 mmol/L CO2 20 (L) 21 - 32 mmol/L    Anion gap 13 5 - 15 mmol/L    Glucose 163 (H) 65 - 100 mg/dL    BUN 78 (H) 6 - 20 MG/DL    Creatinine 4.18 (H) 0.55 - 1.02 MG/DL    BUN/Creatinine ratio 19 12 - 20      GFR est AA 14 (L) >60 ml/min/1.73m2    GFR est non-AA 12 (L) >60 ml/min/1.73m2    Calcium 6.8 (L) 8.5 - 10.1 MG/DL   CBC WITH AUTOMATED DIFF    Collection Time: 06/13/18  3:52 AM   Result Value Ref Range    WBC 5.1 3.6 - 11.0 K/uL    RBC 3.05 (L) 3.80 - 5.20 M/uL    HGB 7.6 (L) 11.5 - 16.0 g/dL    HCT 23.1 (L) 35.0 - 47.0 %    MCV 75.7 (L) 80.0 - 99.0 FL    MCH 24.9 (L) 26.0 - 34.0 PG    MCHC 32.9 30.0 - 36.5 g/dL    RDW 14.2 11.5 - 14.5 %    PLATELET 185 536 - 692 K/uL    NRBC 0.0 0  WBC    ABSOLUTE NRBC 0.00 0.00 - 0.01 K/uL    NEUTROPHILS 84 (H) 32 - 75 %    LYMPHOCYTES 11 (L) 12 - 49 %    MONOCYTES 4 (L) 5 - 13 %    EOSINOPHILS 0 0 - 7 %    BASOPHILS 0 0 - 1 %    IMMATURE GRANULOCYTES 0 0.0 - 0.5 %    ABS. NEUTROPHILS 4.3 1.8 - 8.0 K/UL    ABS. LYMPHOCYTES 0.6 (L) 0.8 - 3.5 K/UL    ABS. MONOCYTES 0.2 0.0 - 1.0 K/UL    ABS. EOSINOPHILS 0.0 0.0 - 0.4 K/UL    ABS. BASOPHILS 0.0 0.0 - 0.1 K/UL    ABS. IMM. GRANS. 0.0 0.00 - 0.04 K/UL    DF AUTOMATED     MAGNESIUM    Collection Time: 06/13/18  3:52 AM   Result Value Ref Range    Magnesium 2.0 1.6 - 2.4 mg/dL             I have reviewed the flowsheets. Chart and Pertinent Notes have been reviewed. No change in PMH ,family and social history from Consult note.       Mary Hollingsworth MD

## 2018-06-13 NOTE — PROGRESS NOTES
1930 Bedside report received from Margaret MARTIN    5038 Pt in pain after bath. Given fentanyl PRN    Problem: Falls - Risk of  Goal: *Absence of Falls  Document Blake Fall Risk and appropriate interventions in the flowsheet.    Outcome: Progressing Towards Goal  Fall Risk Interventions:  Mobility Interventions: Assess mobility with egress test         Medication Interventions: Evaluate medications/consider consulting pharmacy, Patient to call before getting OOB, Teach patient to arise slowly    Elimination Interventions: Call light in reach

## 2018-06-13 NOTE — DIABETES MGMT
DTC Insulin Drip   Progress Note     Recommendations/ Comments:   BG's ranging 163 mg/dL - 181 mg/dL. Transitioned off insulin gtt yesterday. Remains critically ill on vent. NPO  Solu medrol 80 mg Q 8 hrs. If appropriate please consider  Increasing Lsntus to 25 units daily    Chart reviewed on Simona Salas. Patient is 42 y.o. female  Pt with known Type 1 DM. No diabetes medications are listed in PTA. She has a complex history including Type 1 DM, chronic pancreatitis, CKD, sickle cell, s/p gastric bypass, osteomyelitis    Consult receieved for Assessment of Home Management - will complete when pt appropriate. A1c:   Lab Results   Component Value Date/Time    Hemoglobin A1c 7.9 (H) 06/10/2018 03:28 AM         Recent Glucose Results:   Lab Results   Component Value Date/Time     (H) 06/13/2018 03:52 AM    GLUCPOC 171 (H) 06/13/2018 12:07 PM    GLUCPOC 181 (H) 06/13/2018 08:45 AM    GLUCPOC 181 (H) 06/13/2018 03:51 AM        Lab Results   Component Value Date/Time    Creatinine 4.18 (H) 06/13/2018 03:52 AM       Active Orders   Diet    DIET NPO        PO intake: No data found. Will continue to follow as needed. Thank you.   Yecenia Roland, Certified Diabetes Educator

## 2018-06-13 NOTE — PROGRESS NOTES
0730 [facility] Assumed care of the pt.      0820 Dr. Brayden Ferreira, nephrology at bedside. 0830 Dobhoff placed. KUB obtained. 0843 propofol turned off for SAT.    0900 SBT started. SBT fialed due to apnea. Dr. Nick Lora notified. 1230 /97, Dr. David Rodriguez notified. Abner Jose 79 Dr. David Rodriguez, hospitalist at bedside. Orders received. 1600 /98. Dr. David Rodriguez, hospitalist paged. Temp 35.5 C. Brady blancagger applied to pt.    1634 Dr. David Rodriguez made aware of pt.'s BP, awaiting orders. 1721 /91 (113) Nitroglycerin drip started. 1845 Temp 37.1C. Brady hugger turned off    1850 Nitro drip stopped to give abx due to drug compatability and limited IV access. Nitro paste applied. 1930 Bedside and Verbal shift change report given to VERONICA Hutchins (oncoming nurse) by Kena Brar RN (offgoing nurse). Report included the following information SBAR, Kardex, Intake/Output, Recent Results and Cardiac Rhythm NSR.

## 2018-06-13 NOTE — PROGRESS NOTES
ID Progress Note  2018    Subjective:     Patient seen and examined, she remains critically ill on the vent    Objective:     Antibiotics:  1. Daptomycin  2. Levaquin       Vitals:   Visit Vitals    BP (!) 161/91    Pulse 65    Temp 97.7 °F (36.5 °C)    Resp 20    Ht 5' 2\" (1.575 m)    Wt 67.3 kg (148 lb 5.9 oz)    SpO2 100%    Breastfeeding No    BMI 27.14 kg/m2        Tmax:  Temp (24hrs), Av.5 °F (36.9 °C), Min:97.7 °F (36.5 °C), Max:99.1 °F (37.3 °C)      Exam:  Lungs:  clear to auscultation bilaterally  Heart:  regular rate and rhythm  Abdomen:  soft, non-tender. Bowel sounds normal. No masses,  no organomegaly  Foot looks good, skin without rash    Labs:      Recent Labs      18   0352  18   0229  18   1359  18   0722  18   0224   WBC  5.1  7.0   --    --   5.9   HGB  7.6*  7.8*   --    --   7.6*   PLT  154  147*   --    --   133*   BUN  78*  76*  82*  84*  81*  83*   CREA  4.18*  3.98*  4.02*  4.12*  3.91*  3.97*   SGOT   --   9*   --   11*   --    AP   --   130*   --   146*   --    TBILI   --   0.1*   --   0.2   --        Cultures:     Lab Results   Component Value Date/Time    Specimen Description: URINE 2013 08:00 PM    Specimen Description: URINE 2013 07:55 PM    Specimen Description: URINE 2013 10:10 AM     Lab Results   Component Value Date/Time    Culture result: NO GROWTH 3 DAYS 06/10/2018 10:42 AM    Culture result: NO GROWTH 5 DAYS 2018 05:11 PM    Culture result: FEW STAPHYLOCOCCUS HOMINIS SUBSPECIES HOMINIS (A) 2018 12:47 PM    Culture result: (A) 2018 12:47 PM     STAPHYLOCOCCUS EPIDERMIDIS (OXACILLIN RESISTANT) ISOLATED FROM THIO BROTH ONLY    Culture result: NO ANAEROBES ISOLATED 2018 12:47 PM       Radiology:     Line/Insert Date:           Assessment:     1. DKA  2. Diabetic foot infection--responding to therapy  3. ?sepsis--blood cultures negative to date  4. ESRD    Objective:     1.  Continue current therapy for the time being    Nirali Terrell MD

## 2018-06-13 NOTE — PROGRESS NOTES
06/13/2018; 10:00 -   CM participated in IDRs on patient. Patient continues to be intubated. Her angio edema has decreased, but her gastroparesis continues. Patient did not pass her breathing trial this morning. Patient does not have family at bedside, but her spouse did call the unit yesterday to inquire why she is not answering her cell phone. He does not appear to understand the severity of her condition.   CRM: Darron Simon, MPH; Z: [phone]

## 2018-06-13 NOTE — PROGRESS NOTES
Problem: Falls - Risk of  Goal: *Absence of Falls  Document Blake Fall Risk and appropriate interventions in the flowsheet. Outcome: Progressing Towards Goal  Fall Risk Interventions:  Mobility Interventions: Bed/chair exit alarm         Medication Interventions: Evaluate medications/consider consulting pharmacy    Elimination Interventions: Call light in reach, Toileting schedule/hourly rounds             Problem: Pressure Injury - Risk of  Goal: *Prevention of pressure injury  Document Antonio Scale and appropriate interventions in the flowsheet. Outcome: Progressing Towards Goal  Pressure Injury Interventions:  Sensory Interventions: Assess changes in LOC    Moisture Interventions: Absorbent underpads, Apply protective barrier, creams and emollients, Maintain skin hydration (lotion/cream)    Activity Interventions: Assess need for specialty bed    Mobility Interventions: Assess need for specialty bed, HOB 30 degrees or less, Pressure redistribution bed/mattress (bed type), Turn and reposition approx.  every two hours(pillow and wedges)    Nutrition Interventions: Document food/fluid/supplement intake    Friction and Shear Interventions: Apply protective barrier, creams and emollients, HOB 30 degrees or less

## 2018-06-13 NOTE — PROGRESS NOTES
[facility]  Pulmonary, Critical Care, and Sleep Medicine    Initial Patient Consult    Name: Alejandro Herndon MRN: [id number]   : 1980 Hospital: [facility]    Date: 2018        IMPRESSION:   · Acute respiratory failure - emergent intubation in OR for airway protection  · Anion-gap metabolic acidosis - likely DKA, but no ketones in urine (possibly due to very low pH), normal lactate  · Acute angioedema - uncertain cause, no obvious trigger, very few drugs given prior to event (?dilaudid with h/o itching from morphine) - no reports of airway compromise while in ER, but had very significant findings on arrival to ICU  · Possible DKA as above  · Chronic osteomyelitis of left foot, on outpatient IV antibiotics   · Sickle cell disease +/- pain crisis  · Asthma   · Acute/chronic renal failure  · Chronic pancreatitis   · H/O noncompliance       RECOMMENDATIONS:   · SBT in AM and check for cuff leak. If leak present and passes SBT will give trial extubation. Continue current abx as well as Steroids/H2 blocker/antihistamines for angioedema- failed today secondary to apnea, may need longer trial off propofol for SBT and control of agitation with precedex. Adding small dose of haldol to help  · Cultures  · Renal eval pending  · Replete electrolytes  · On heparin  · On pepcid  · On steroids- wean off when extubated  · Sedatives reviewed  · On antibiotics  · On antihypertensives  · DVT/GI prophylaxis  · Critically ill with high risk for further decompensation. D/W RN/RT. CCT 30'       Subjective:   RASS -2, follows some commands .     Past Medical History:   Diagnosis Date    ARF (acute renal failure) ([address] requiring dialysis     Asthma     CKD (chronic kidney disease)     Diabetes ([address]     Gastroparesis     Gastric Pacer- REMOVED 2015    GERD (gastroesophageal reflux disease)     Hypertension     Narcotic dependence ([address]     THU (obstructive sleep apnea) wears 2 LPM oxygen at night    Other ill-defined conditions(799.89)     Polycystic ovarian syndrome     Seizures (HCC)     S[facility]     Thromboembolus ([facility] to her left arm and was told she had one in left leg recently      Past Surgical History:   Procedure Laterality Date    HX CATARACT REMOVAL  3/5/12    right    HX DILATION AND CURETTAGE      ablation    HX GASTRIC BYPASS  2015    HX GI      j tube placement and removal    HX OTHER SURGICAL  2010    Gastric Pacer- REMOVED 07/2015    HX VASCULAR ACCESS      gray cath rt subclavian    HX VASCULAR ACCESS      HD access right thigh      Prior to Admission medications    Medication Sig Start Date End Date Taking? Authorizing Provider   hydrALAZINE (APRESOLINE) 100 mg tablet Take 1 Tab by mouth three (3) times daily for 30 days. 5/14/18 6/13/18  Eliceo Martínez MD   oxyCODONE-acetaminophen (PERCOCET) 5-325 mg per tablet Take 1 Tab by mouth every six (6) hours as needed for Pain. Max Daily Amount: 4 Tabs. Scheduled 5/14/18   Eliceo Martínez MD   promethazine (PHENERGAN) 25 mg tablet Take 1 Tab by mouth every eight (8) hours as needed for Nausea. 5/14/18   Eliceo Martínez MD   L. acidoph & paracasei- S therm- Bifido (CHRIS-Q/RISAQUAD) 8 billion cell cap cap Take 1 Cap by mouth daily for 30 days. 5/15/18 6/14/18  Eliceo Martínez MD   NIFEdipine ER (PROCARDIA XL) 90 mg ER tablet Take 1 Tab by mouth daily for 30 days. 5/15/18 6/14/18  Eliceo Martínez MD   pantoprazole (PROTONIX) 40 mg tablet Take 1 Tab by mouth Before breakfast and dinner for 30 days. 5/14/18 6/13/18  Eliceo Martínez MD   senna-docusate (PERICOLACE) 8.6-50 mg per tablet Take 2 Tabs by mouth daily. 5/15/18   Eliceo Martínez MD   cloNIDine HCl (CATAPRES) 0.2 mg tablet Take 1 Tab by mouth three (3) times daily for 30 days. 5/14/18 6/13/18  Eliceo Martínez MD   labetalol (NORMODYNE) 100 mg tablet Take 2 Tabs by mouth two (2) times a day for 30 days.  5/14/18 6/13/18  Lizz Villegas YONATAN Ludwig MD   QUEtiapine (SEROQUEL) 100 mg tablet Take 100 mg by mouth two (2) times a day. Historical Provider   venlafaxine (EFFEXOR) 75 mg tablet Take 1 Tab by mouth two (2) times daily (with meals). 3/21/18   Carlos Orosco MD   Cholecalciferol, Vitamin D3, 50,000 unit cap Take 1 Cap by mouth every seven (7) days. Historical Provider   calcium carbonate (OS-BINA) 500 mg calcium (1,250 mg) tablet Take 1 Tab by mouth daily. Historical Provider   cyanocobalamin 1,000 mcg tablet Take 1,000 mcg by mouth daily. Historical Provider   albuterol (PROVENTIL VENTOLIN) 2.5 mg /3 mL (0.083 %) nebulizer solution 2.5 mg by Nebulization route every four (4) hours as needed.     Historical Provider     Allergies   Allergen Reactions    Erythromycin Itching    Morphine Itching    Toradol [Ketorolac] Rash      Social History   Substance Use Topics    Smoking status: Never Smoker    Smokeless tobacco: Never Used    Alcohol use No      Family History   Problem Relation Age of Onset    Cancer Mother      lung    Hypertension Mother     Cancer Father      kidney    Stroke Father      3 strokes: 59-72    Heart Disease Father 72     CABG    Hypertension Father     Cancer Sister      pancreatic    Cancer Maternal Aunt      breast    Cancer Paternal Aunt      breast    Schizophrenia Sister      was in [facility], now ass't living    Other Sister       AIDS    Other Other      nephew of AIDS        Current Facility-Administered Medications   Medication Dose Route Frequency    chlorthalidone (HYGROTEN) tablet 25 mg  25 mg Oral DAILY    epoetin bernard (EPOGEN;PROCRIT) injection 20,000 Units  20,000 Units SubCUTAneous Q MON, WED & FRI    calcium carbonate (TUMS) chewable tablet 400 mg [elemental]  400 mg Oral TID WITH MEALS    sodium bicarbonate tablet 650 mg  650 mg Oral BID    insulin glargine (LANTUS) injection 20 Units  20 Units SubCUTAneous DAILY    insulin lispro (HUMALOG) injection   SubCUTAneous Q4H    cloNIDine (CATAPRES) 0.3 mg/24 hr patch 1 Patch  1 Patch TransDERmal Q7D    nitroglycerin (NITROBID) 2 % ointment 1 Inch  1 Inch Topical BID    chlorhexidine (PERIDEX) 0.12 % mouthwash 15 mL  15 mL Oral BID    sodium chloride (NS) flush 5-10 mL  5-10 mL IntraVENous Q8H    heparin (porcine) injection 5,000 Units  5,000 Units SubCUTAneous Q8H    DAPTOmycin (CUBICIN) 400 mg in 0.9% sodium chloride 50 mL IVPB RF formulation  400 mg IntraVENous Q48H    levoFLOXacin (LEVAQUIN) 250 mg in D5W IVPB  250 mg IntraVENous Q48H    propofol (DIPRIVAN) infusion  0-50 mcg/kg/min IntraVENous TITRATE    methylPREDNISolone (PF) (SOLU-MEDROL) injection 80 mg  80 mg IntraVENous Q8H    albuterol-ipratropium (DUO-NEB) 2.5 MG-0.5 MG/3 ML  3 mL Nebulization QID RT    diphenhydrAMINE (BENADRYL) injection 50 mg  50 mg IntraVENous Q6H    famotidine (PF) (PEPCID) 20 mg in sodium chloride 0.9% 10 mL injection  20 mg IntraVENous Q24H    scopolamine (TRANSDERM-SCOP) 1 mg over 3 days 1 Patch  1 Patch TransDERmal Q72H    dexmedeTOMidine (PRECEDEX) 400 mcg in 0.9% sodium chloride 100 mL infusion  0.2-0.7 mcg/kg/hr IntraVENous TITRATE    sodium chloride (NS) flush 5-10 mL  5-10 mL IntraVENous Q8H    insulin regular (NOVOLIN R, HUMULIN R) 100 Units in 0.9% sodium chloride 100 mL infusion  0-50 Units/hr IntraVENous TITRATE       Review of Systems:  Review of systems not obtained due to patient factors.     Objective:   Vital Signs:    Visit Vitals    /84    Pulse 65    Temp 98.4 °F (36.9 °C)    Resp 20    Ht 5' 2\" (1.575 m)    Wt 67.3 kg (148 lb 5.9 oz)    SpO2 100%    Breastfeeding No    BMI 27.14 kg/m2       O2 Device: Ventilator   O2 Flow Rate (L/min): 2 l/min   Temp (24hrs), Av.8 °F (37.1 °C), Min:98.4 °F (36.9 °C), Max:99.3 °F (37.4 °C)       Intake/Output:   Last shift:      701 - 1900  In: 28.6 [I.V.:28.6]  Out: 410 [Urine:410]  Last 3 shifts: 1901 - 700  In: 1801.1 [I.V.:1801.1]  Out: 4340 [Urine:4340]    Intake/Output Summary (Last 24 hours) at 06/13/18 1154  Last data filed at 06/13/18 1000   Gross per 24 hour   Intake            600.6 ml   Output             1950 ml   Net          -1349.4 ml      Physical Exam:   General:  Lethargic, ill appearing   Head:  Normocephalic, without obvious abnormality, atraumati, some facial edema. Eyes:  Conjunctivae/corneas clear. Has moderate periorbital edema   Nose: Nares normal. Septum midline. Mucosa normal.     Throat: Angioedema of lips and tongue, mild stridor, loud snoring   Neck: Supple, symmetrical, trachea midline    Lungs:   Scattered ronchi, kussmaul respirations   Chest wall:  No tenderness or deformity. Heart:  Tachy, regular   Abdomen:   Soft, non-tender.  Bowel sounds normal.    Extremities: left transmetatarsal amputation   Skin: Skin color, texture, turgor normal. No rashes or lesions   Lymph nodes: Cervical, supraclavicular nodes normal.   Neurologic: Sedated      Data review:     Recent Results (from the past 24 hour(s))   GLUCOSE, POC    Collection Time: 06/12/18 12:03 PM   Result Value Ref Range    Glucose (POC) 202 (H) 65 - 100 mg/dL    Performed by YANCI FERREIRA    GLUCOSE, POC    Collection Time: 06/12/18  4:25 PM   Result Value Ref Range    Glucose (POC) 136 (H) 65 - 100 mg/dL    Performed by YANCI FERREIRA    GLUCOSE, POC    Collection Time: 06/12/18  8:04 PM   Result Value Ref Range    Glucose (POC) 166 (H) 65 - 100 mg/dL    Performed by June Bailey    GLUCOSE, POC    Collection Time: 06/12/18 11:41 PM   Result Value Ref Range    Glucose (POC) 165 (H) 65 - 100 mg/dL    Performed by June Bailey    GLUCOSE, POC    Collection Time: 06/13/18  3:51 AM   Result Value Ref Range    Glucose (POC) 181 (H) 65 - 100 mg/dL    Performed by June Bailey    METABOLIC PANEL, BASIC    Collection Time: 06/13/18  3:52 AM   Result Value Ref Range    Sodium 142 136 - 145 mmol/L    Potassium 4.0 3.5 - 5.1 mmol/L    Chloride 109 (H) 97 - 108 mmol/L    CO2 20 (L) 21 - 32 mmol/L    Anion gap 13 5 - 15 mmol/L    Glucose 163 (H) 65 - 100 mg/dL    BUN 78 (H) 6 - 20 MG/DL    Creatinine 4.18 (H) 0.55 - 1.02 MG/DL    BUN/Creatinine ratio 19 12 - 20      GFR est AA 14 (L) >60 ml/min/1.73m2    GFR est non-AA 12 (L) >60 ml/min/1.73m2    Calcium 6.8 (L) 8.5 - 10.1 MG/DL   CBC WITH AUTOMATED DIFF    Collection Time: 06/13/18  3:52 AM   Result Value Ref Range    WBC 5.1 3.6 - 11.0 K/uL    RBC 3.05 (L) 3.80 - 5.20 M/uL    HGB 7.6 (L) 11.5 - 16.0 g/dL    HCT 23.1 (L) 35.0 - 47.0 %    MCV 75.7 (L) 80.0 - 99.0 FL    MCH 24.9 (L) 26.0 - 34.0 PG    MCHC 32.9 30.0 - 36.5 g/dL    RDW 14.2 11.5 - 14.5 %    PLATELET 426 674 - 012 K/uL    NRBC 0.0 0  WBC    ABSOLUTE NRBC 0.00 0.00 - 0.01 K/uL    NEUTROPHILS 84 (H) 32 - 75 %    LYMPHOCYTES 11 (L) 12 - 49 %    MONOCYTES 4 (L) 5 - 13 %    EOSINOPHILS 0 0 - 7 %    BASOPHILS 0 0 - 1 %    IMMATURE GRANULOCYTES 0 0.0 - 0.5 %    ABS. NEUTROPHILS 4.3 1.8 - 8.0 K/UL    ABS. LYMPHOCYTES 0.6 (L) 0.8 - 3.5 K/UL    ABS. MONOCYTES 0.2 0.0 - 1.0 K/UL    ABS. EOSINOPHILS 0.0 0.0 - 0.4 K/UL    ABS. BASOPHILS 0.0 0.0 - 0.1 K/UL    ABS. IMM.  GRANS. 0.0 0.00 - 0.04 K/UL    DF AUTOMATED     MAGNESIUM    Collection Time: 06/13/18  3:52 AM   Result Value Ref Range    Magnesium 2.0 1.6 - 2.4 mg/dL   GLUCOSE, POC    Collection Time: 06/13/18  8:45 AM   Result Value Ref Range    Glucose (POC) 181 (H) 65 - 100 mg/dL    Performed by YANCI FERREIRA        Imaging:  I have personally reviewed the patients radiographs and have reviewed the reports:  PCXR mild edema     Medical decision making:   I have reviewed the flowsheet and previous day's notes  Patient has acute or chronic illness that poses a threat to life or bodily function  Review and order of Clinical lab tests  Review and Order of Radiology tests  Independent visualization of Image  Reviewed Ventilator / NiPPV  Parenteral controlled substances - Reviewed/ Adjusted / Abdiel Tiwari / Started  High Risk Drug therapy requiring intensive monitoring for toxicity: eg steroids, pressors, antibiotics       Shanon Harris MD

## 2018-06-14 ENCOUNTER — APPOINTMENT (OUTPATIENT)
Dept: GENERAL RADIOLOGY | Age: 38
DRG: 871 | End: 2018-06-14
Attending: INTERNAL MEDICINE
Payer: MEDICARE

## 2018-06-14 VITALS
WEIGHT: 147.27 LBS | SYSTOLIC BLOOD PRESSURE: 127 MMHG | HEIGHT: 62 IN | DIASTOLIC BLOOD PRESSURE: 78 MMHG | BODY MASS INDEX: 27.1 KG/M2 | HEART RATE: 114 BPM | RESPIRATION RATE: 20 BRPM | OXYGEN SATURATION: 100 % | TEMPERATURE: 97.1 F

## 2018-06-14 LAB
ALBUMIN SERPL-MCNC: 2.3 G/DL (ref 3.5–5)
ALBUMIN/GLOB SERPL: 0.6 {RATIO} (ref 1.1–2.2)
ALP SERPL-CCNC: 119 U/L (ref 45–117)
ALT SERPL-CCNC: 11 U/L (ref 12–78)
ANION GAP SERPL CALC-SCNC: 14 MMOL/L (ref 5–15)
AST SERPL-CCNC: 8 U/L (ref 15–37)
BASOPHILS # BLD: 0 K/UL (ref 0–0.1)
BASOPHILS NFR BLD: 0 % (ref 0–1)
BILIRUB SERPL-MCNC: 0.3 MG/DL (ref 0.2–1)
BUN SERPL-MCNC: 70 MG/DL (ref 6–20)
BUN/CREAT SERPL: 17 (ref 12–20)
CALCIUM SERPL-MCNC: 7.4 MG/DL (ref 8.5–10.1)
CHLORIDE SERPL-SCNC: 109 MMOL/L (ref 97–108)
CO2 SERPL-SCNC: 19 MMOL/L (ref 21–32)
CREAT SERPL-MCNC: 4.2 MG/DL (ref 0.55–1.02)
DIFFERENTIAL METHOD BLD: ABNORMAL
EOSINOPHIL # BLD: 0 K/UL (ref 0–0.4)
EOSINOPHIL NFR BLD: 0 % (ref 0–7)
ERYTHROCYTE [DISTWIDTH] IN BLOOD BY AUTOMATED COUNT: 14.6 % (ref 11.5–14.5)
GLOBULIN SER CALC-MCNC: 3.6 G/DL (ref 2–4)
GLUCOSE BLD STRIP.AUTO-MCNC: 121 MG/DL (ref 65–100)
GLUCOSE BLD STRIP.AUTO-MCNC: 135 MG/DL (ref 65–100)
GLUCOSE BLD STRIP.AUTO-MCNC: 371 MG/DL (ref 65–100)
GLUCOSE SERPL-MCNC: 177 MG/DL (ref 65–100)
HCT VFR BLD AUTO: 23.6 % (ref 35–47)
HGB BLD-MCNC: 7.8 G/DL (ref 11.5–16)
IMM GRANULOCYTES # BLD: 0 K/UL (ref 0–0.04)
IMM GRANULOCYTES NFR BLD AUTO: 1 % (ref 0–0.5)
LYMPHOCYTES # BLD: 0.6 K/UL (ref 0.8–3.5)
LYMPHOCYTES NFR BLD: 15 % (ref 12–49)
MAGNESIUM SERPL-MCNC: 2 MG/DL (ref 1.6–2.4)
MCH RBC QN AUTO: 25.6 PG (ref 26–34)
MCHC RBC AUTO-ENTMCNC: 33.1 G/DL (ref 30–36.5)
MCV RBC AUTO: 77.4 FL (ref 80–99)
MONOCYTES # BLD: 0.3 K/UL (ref 0–1)
MONOCYTES NFR BLD: 7 % (ref 5–13)
NEUTS SEG # BLD: 3.3 K/UL (ref 1.8–8)
NEUTS SEG NFR BLD: 77 % (ref 32–75)
NRBC # BLD: 0.02 K/UL (ref 0–0.01)
NRBC BLD-RTO: 0.5 PER 100 WBC
PHOSPHATE SERPL-MCNC: 5.7 MG/DL (ref 2.6–4.7)
PLATELET # BLD AUTO: 156 K/UL (ref 150–400)
PLATELET COMMENTS,PCOM: ABNORMAL
POTASSIUM SERPL-SCNC: 3.6 MMOL/L (ref 3.5–5.1)
PROT SERPL-MCNC: 5.9 G/DL (ref 6.4–8.2)
RBC # BLD AUTO: 3.05 M/UL (ref 3.8–5.2)
RBC MORPH BLD: ABNORMAL
SERVICE CMNT-IMP: ABNORMAL
SODIUM SERPL-SCNC: 142 MMOL/L (ref 136–145)
WBC # BLD AUTO: 4.2 K/UL (ref 3.6–11)

## 2018-06-14 PROCEDURE — 74011250637 HC RX REV CODE- 250/637: Performed by: INTERNAL MEDICINE

## 2018-06-14 PROCEDURE — 74011000258 HC RX REV CODE- 258: Performed by: HOSPITALIST

## 2018-06-14 PROCEDURE — 74011000258 HC RX REV CODE- 258: Performed by: INTERNAL MEDICINE

## 2018-06-14 PROCEDURE — 84100 ASSAY OF PHOSPHORUS: CPT | Performed by: INTERNAL MEDICINE

## 2018-06-14 PROCEDURE — 74011000250 HC RX REV CODE- 250: Performed by: INTERNAL MEDICINE

## 2018-06-14 PROCEDURE — 82962 GLUCOSE BLOOD TEST: CPT

## 2018-06-14 PROCEDURE — 74011000250 HC RX REV CODE- 250: Performed by: HOSPITALIST

## 2018-06-14 PROCEDURE — 74011250636 HC RX REV CODE- 250/636: Performed by: INTERNAL MEDICINE

## 2018-06-14 PROCEDURE — 80053 COMPREHEN METABOLIC PANEL: CPT | Performed by: INTERNAL MEDICINE

## 2018-06-14 PROCEDURE — 74011000250 HC RX REV CODE- 250

## 2018-06-14 PROCEDURE — 85025 COMPLETE CBC W/AUTO DIFF WBC: CPT | Performed by: INTERNAL MEDICINE

## 2018-06-14 PROCEDURE — 83735 ASSAY OF MAGNESIUM: CPT | Performed by: INTERNAL MEDICINE

## 2018-06-14 PROCEDURE — 94640 AIRWAY INHALATION TREATMENT: CPT

## 2018-06-14 PROCEDURE — 94003 VENT MGMT INPAT SUBQ DAY: CPT

## 2018-06-14 PROCEDURE — 74011250636 HC RX REV CODE- 250/636: Performed by: HOSPITALIST

## 2018-06-14 PROCEDURE — 36415 COLL VENOUS BLD VENIPUNCTURE: CPT | Performed by: INTERNAL MEDICINE

## 2018-06-14 PROCEDURE — 74011636637 HC RX REV CODE- 636/637: Performed by: INTERNAL MEDICINE

## 2018-06-14 PROCEDURE — 71045 X-RAY EXAM CHEST 1 VIEW: CPT

## 2018-06-14 RX ORDER — BACITRACIN 500 UNIT/G
1 PACKET (EA) TOPICAL AS NEEDED
Status: DISCONTINUED | OUTPATIENT
Start: 2018-06-14 | End: 2018-06-14 | Stop reason: HOSPADM

## 2018-06-14 RX ORDER — HYDROMORPHONE HYDROCHLORIDE 1 MG/ML
0.5 INJECTION, SOLUTION INTRAMUSCULAR; INTRAVENOUS; SUBCUTANEOUS ONCE
Status: COMPLETED | OUTPATIENT
Start: 2018-06-14 | End: 2018-06-14

## 2018-06-14 RX ORDER — SODIUM CHLORIDE 0.9 % (FLUSH) 0.9 %
10 SYRINGE (ML) INJECTION EVERY 8 HOURS
Status: DISCONTINUED | OUTPATIENT
Start: 2018-06-14 | End: 2018-06-14 | Stop reason: HOSPADM

## 2018-06-14 RX ORDER — SODIUM CHLORIDE 450 MG/100ML
50 INJECTION, SOLUTION INTRAVENOUS CONTINUOUS
Status: DISCONTINUED | OUTPATIENT
Start: 2018-06-14 | End: 2018-06-14 | Stop reason: HOSPADM

## 2018-06-14 RX ORDER — METOPROLOL TARTRATE 50 MG/1
50 TABLET ORAL 2 TIMES DAILY
Status: DISCONTINUED | OUTPATIENT
Start: 2018-06-14 | End: 2018-06-14 | Stop reason: HOSPADM

## 2018-06-14 RX ORDER — SODIUM CHLORIDE 0.9 % (FLUSH) 0.9 %
10 SYRINGE (ML) INJECTION AS NEEDED
Status: DISCONTINUED | OUTPATIENT
Start: 2018-06-14 | End: 2018-06-14 | Stop reason: HOSPADM

## 2018-06-14 RX ORDER — SODIUM CHLORIDE 0.9 % (FLUSH) 0.9 %
10 SYRINGE (ML) INJECTION EVERY 24 HOURS
Status: DISCONTINUED | OUTPATIENT
Start: 2018-06-14 | End: 2018-06-14 | Stop reason: HOSPADM

## 2018-06-14 RX ORDER — MINOXIDIL 2.5 MG/1
5 TABLET ORAL DAILY
Status: DISCONTINUED | OUTPATIENT
Start: 2018-06-14 | End: 2018-06-14 | Stop reason: HOSPADM

## 2018-06-14 RX ORDER — MINOXIDIL 2.5 MG/1
5 TABLET ORAL DAILY
Status: DISCONTINUED | OUTPATIENT
Start: 2018-06-15 | End: 2018-06-14

## 2018-06-14 RX ORDER — SODIUM CHLORIDE 0.9 % (FLUSH) 0.9 %
20 SYRINGE (ML) INJECTION AS NEEDED
Status: DISCONTINUED | OUTPATIENT
Start: 2018-06-14 | End: 2018-06-14 | Stop reason: HOSPADM

## 2018-06-14 RX ORDER — HYDROMORPHONE HYDROCHLORIDE 1 MG/ML
0.5 INJECTION, SOLUTION INTRAMUSCULAR; INTRAVENOUS; SUBCUTANEOUS
Status: DISCONTINUED | OUTPATIENT
Start: 2018-06-14 | End: 2018-06-14 | Stop reason: HOSPADM

## 2018-06-14 RX ORDER — CLONIDINE 0.2 MG/24H
1 PATCH, EXTENDED RELEASE TRANSDERMAL
Status: DISCONTINUED | OUTPATIENT
Start: 2018-06-14 | End: 2018-06-14 | Stop reason: HOSPADM

## 2018-06-14 RX ADMIN — METOPROLOL TARTRATE 2.5 MG: 5 INJECTION, SOLUTION INTRAVENOUS at 11:11

## 2018-06-14 RX ADMIN — RACEPINEPHRINE HYDROCHLORIDE 0.25 ML: 11.25 SOLUTION RESPIRATORY (INHALATION) at 08:48

## 2018-06-14 RX ADMIN — Medication 10 ML: at 09:04

## 2018-06-14 RX ADMIN — Medication 10 ML: at 06:58

## 2018-06-14 RX ADMIN — Medication 10 ML: at 14:00

## 2018-06-14 RX ADMIN — CALCIUM CARBONATE (ANTACID) CHEW TAB 500 MG 400 MG: 500 CHEW TAB at 11:09

## 2018-06-14 RX ADMIN — MINOXIDIL 5 MG: 2.5 TABLET ORAL at 14:54

## 2018-06-14 RX ADMIN — METHYLPREDNISOLONE SODIUM SUCCINATE 40 MG: 40 INJECTION, POWDER, FOR SOLUTION INTRAMUSCULAR; INTRAVENOUS at 14:10

## 2018-06-14 RX ADMIN — HEPARIN SODIUM 5000 UNITS: 5000 INJECTION, SOLUTION INTRAVENOUS; SUBCUTANEOUS at 14:10

## 2018-06-14 RX ADMIN — INSULIN LISPRO 7 UNITS: 100 INJECTION, SOLUTION INTRAVENOUS; SUBCUTANEOUS at 04:27

## 2018-06-14 RX ADMIN — NITROGLYCERIN 55 MCG/MIN: 20 INJECTION INTRAVENOUS at 11:24

## 2018-06-14 RX ADMIN — METHYLPREDNISOLONE SODIUM SUCCINATE 80 MG: 40 INJECTION, POWDER, FOR SOLUTION INTRAMUSCULAR; INTRAVENOUS at 06:52

## 2018-06-14 RX ADMIN — Medication 0.5 MG: at 09:42

## 2018-06-14 RX ADMIN — INSULIN GLARGINE 20 UNITS: 100 INJECTION, SOLUTION SUBCUTANEOUS at 08:54

## 2018-06-14 RX ADMIN — FENTANYL CITRATE 100 MCG: 50 INJECTION, SOLUTION INTRAMUSCULAR; INTRAVENOUS at 06:55

## 2018-06-14 RX ADMIN — SODIUM CHLORIDE 50 ML/HR: 450 INJECTION, SOLUTION INTRAVENOUS at 11:24

## 2018-06-14 RX ADMIN — FAMOTIDINE 20 MG: 20 TABLET ORAL at 08:54

## 2018-06-14 RX ADMIN — FENTANYL CITRATE 100 MCG: 50 INJECTION, SOLUTION INTRAMUSCULAR; INTRAVENOUS at 02:05

## 2018-06-14 RX ADMIN — SODIUM CHLORIDE 0.5 MCG/KG/HR: 900 INJECTION, SOLUTION INTRAVENOUS at 04:00

## 2018-06-14 RX ADMIN — CHLORHEXIDINE GLUCONATE 15 ML: 1.2 RINSE ORAL at 09:04

## 2018-06-14 RX ADMIN — LEVOFLOXACIN 250 MG: 5 INJECTION, SOLUTION INTRAVENOUS at 09:28

## 2018-06-14 RX ADMIN — DIPHENHYDRAMINE HYDROCHLORIDE 50 MG: 50 INJECTION, SOLUTION INTRAMUSCULAR; INTRAVENOUS at 04:26

## 2018-06-14 RX ADMIN — DIPHENHYDRAMINE HYDROCHLORIDE 50 MG: 50 INJECTION, SOLUTION INTRAMUSCULAR; INTRAVENOUS at 09:04

## 2018-06-14 RX ADMIN — HEPARIN SODIUM 5000 UNITS: 5000 INJECTION, SOLUTION INTRAVENOUS; SUBCUTANEOUS at 04:26

## 2018-06-14 RX ADMIN — SODIUM BICARBONATE 650 MG: 650 TABLET ORAL at 08:54

## 2018-06-14 RX ADMIN — Medication 0.5 MG: at 14:11

## 2018-06-14 RX ADMIN — NITROGLYCERIN 1 INCH: 20 OINTMENT TOPICAL at 08:55

## 2018-06-14 RX ADMIN — Medication 0.5 MG: at 11:58

## 2018-06-14 RX ADMIN — CHLORTHALIDONE 25 MG: 25 TABLET ORAL at 08:54

## 2018-06-14 RX ADMIN — CALCIUM CARBONATE (ANTACID) CHEW TAB 500 MG 400 MG: 500 CHEW TAB at 08:54

## 2018-06-14 RX ADMIN — IPRATROPIUM BROMIDE AND ALBUTEROL SULFATE 3 ML: .5; 3 SOLUTION RESPIRATORY (INHALATION) at 08:34

## 2018-06-14 RX ADMIN — IPRATROPIUM BROMIDE AND ALBUTEROL SULFATE 3 ML: .5; 3 SOLUTION RESPIRATORY (INHALATION) at 12:56

## 2018-06-14 RX ADMIN — PROPOFOL 20 MCG/KG/MIN: 10 INJECTION, EMULSION INTRAVENOUS at 06:05

## 2018-06-14 NOTE — PROGRESS NOTES
0730 Bedside and Verbal shift change report given to Megha Mclain and leonardo rn  (oncoming nurse) by Kemi Tran  (offgoing nurse). Report included the following information SBAR, ED Summary, MAR, Recent Results and Cardiac Rhythm nsr .      7111 pt having low tidal voluymes alarms, cuff appears to be deflated due to a hole. Paged respiratory. 0800 respiratory at bedside     0802 pulmonary paged    0840 Dr Favian Castro  at bedside. Received orders for that pt is ok to extubate     0848 pt extubated successfully to 2L NC. No complications noted yet. Received orders for racepinephrine. racepinephrine given post extubation. 8710 pt said she is allergic to fentanyl. Received orders for trial dose of 0.5 mg of dilaudid from dr. Ballesteros Alt     1030 pt passed bedside swallow exam     1500 pt is irritable and agitated in regards to staying in the hospital, pt wants to leave AMA. Notified the pt of the importance of remaining in the hospital until medically cleared. Paged Dr. Jossie Scherer    [phone] Dr. Jossie Scherer at bedside. Pt still not agreeing to remain in hospital. [place] paper signed by pt.    4818 pt on the phone to call for a ride.      9166 pt discharged

## 2018-06-14 NOTE — INTERDISCIPLINARY ROUNDS
IDR/SLIDR Summary          Patient: Gila Mail MRN: [id number]    Age: 42 y.o. YOB: 1980 Room/Bed: [address] [place] [address]   Admit Diagnosis: DKA (diabetic ketoacidoses) ([facility]  angio edema  Principal Diagnosis: DKA (diabetic ketoacidoses) ([facility]   Goals: Follow commands, wean vent settings  Readmission: NO  Quality Measure: Not applicable  VTE Prophylaxis: Chemical  Influenza Vaccine screening completed? NO  Pneumococcal Vaccine screening completed? NO  Mobility needs: Yes   Nutrition plan:Yes  Consults:P.T, O.T., Respiratory and Case Management    Financial concerns:Yes  Escalated to CM? YES  RRAT Score: 12     [facility]   Testing due for pt today?  NO  LOS: 5 days Expected length of stay 7 days  Discharge plan: D/C back to rehab   PCP: Nevin Cohn MD  Transportation needs: Yes    Days before discharge:two or more days before discharge   Discharge disposition: Rehab    Signed:     Selina Monroy RN  6/14/2018  12:01 AM

## 2018-06-14 NOTE — PROGRESS NOTES
Problem: Falls - Risk of  Goal: *Absence of Falls  Document Blake Fall Risk and appropriate interventions in the flowsheet. Outcome: Progressing Towards Goal  Fall Risk Interventions:  Mobility Interventions: Bed/chair exit alarm, PT Consult for mobility concerns, OT consult for ADLs         Medication Interventions: Evaluate medications/consider consulting pharmacy    Elimination Interventions: Call light in reach             Problem: Pressure Injury - Risk of  Goal: *Prevention of pressure injury  Document Antonio Scale and appropriate interventions in the flowsheet.    Outcome: Progressing Towards Goal  Pressure Injury Interventions:  Sensory Interventions: Assess changes in LOC    Moisture Interventions: Absorbent underpads    Activity Interventions: Assess need for specialty bed, Increase time out of bed, PT/OT evaluation    Mobility Interventions: HOB 30 degrees or less, Pressure redistribution bed/mattress (bed type)    Nutrition Interventions: Document food/fluid/supplement intake    Friction and Shear Interventions: HOB 30 degrees or less, Lift sheet

## 2018-06-14 NOTE — DIABETES MGMT
DTC Insulin Drip   Progress Note     Recommendations/ Comments:   BG's today 135 FBG and 121 @1121. She received lispro 10 units correction yesterday. Extubated this morning. Diet advanced  Solu medrol 80 mg Q 8 hrs. DTC will continue to follow  Observe BG response to po intake    Chart reviewed on Simona Salas. Patient is 42 y.o. female  Pt with known Type 1 DM. No diabetes medications are listed in PTA. She has a complex history including Type 1 DM, chronic pancreatitis, CKD, sickle cell, s/p gastric bypass, osteomyelitis    Consult receieved for Assessment of Home Management - will complete when pt appropriate. A1c:   Lab Results   Component Value Date/Time    Hemoglobin A1c 7.9 (H) 06/10/2018 03:28 AM         Recent Glucose Results:   Lab Results   Component Value Date/Time     (H) 06/14/2018 04:19 AM    GLUCPOC 121 (H) 06/14/2018 11:12 AM    GLUCPOC 135 (H) 06/14/2018 08:35 AM    GLUCPOC 371 (H) 06/14/2018 04:21 AM        Lab Results   Component Value Date/Time    Creatinine 4.20 (H) 06/14/2018 04:19 AM       Active Orders   Diet    DIET DIABETIC CONSISTENT CARB Regular; 2 GM NA (House Low NA)        PO intake: No data found. Will continue to follow as needed. Thank you.   Sherine Cunningham, Certified Diabetes Educator

## 2018-06-14 NOTE — PROGRESS NOTES
1930 Bedside report received from [address] [place] [address] N. 2030 pt uncomfortable after bath.  Given fentanyl  0200 Given fetanyl for pain in

## 2018-06-14 NOTE — PROGRESS NOTES
0730 Bedside and Verbal shift change report given to David Betancourt (oncoming nurse) by Cuauhtemoc Perez (offgoing nurse). Report included the following information SBAR, Kardex, Procedure Summary, Intake/Output, MAR, Accordion and Recent Results. 0758 Pt having low tidal volume alarms. Cuff deflated. Upon inspection pt bit cuff tube, hole noticed. Resp paged. 0800 Resp therapy @ bedside. [id number] [place] paged. 200 Dr. Karey Chris @ [facility], [place] to extubate.  will give recemic epi treatment after extubation

## 2018-06-14 NOTE — PROGRESS NOTES
6/14/18; 10:00 -   ELY participated in IDRs on patient. Patient was extubated today, and she decuffed herself by biting through the tube. NOTE: the patient had been staying at [facility] for IV Antibiotics. At this time, it is unclear if she will be returning to this location.   CRM: Darron Simon, MPH; Z: [phone]

## 2018-06-14 NOTE — PROGRESS NOTES
Extubated patient to nasal cannula. Tolerated well. Patient was placed on O2 via nasal cannula and given a post extubation once dose of racemic epinephrine in nebulizer. BBS note coarse with diminished lower lung fields.

## 2018-06-14 NOTE — PROGRESS NOTES
[facility]   [address] [address], [address], [facility]  Phone: [phone]   [phone]       Nephrology Progress Note  Orestes Sanz     1980     [id number]  Date of Admission : 6/9/2018 06/14/18    CC: Follow up for STONE       Assessment and Plan   STONE on CKD :  -Cr stable   - start 1/2 NS at 50 cc/ hr   - No diuretics today other than chlorthalidone      CKD Stage IV :  - baseline Cr 2.6-2.8 mg/dl   - previously on dialysis and recovered      Met Acidosis :  - oral Bicarb     CKD-MBD  - Oral Calcitriol  + Tums through DHT      Angioedema : ? Cause ? Opioids   - on steroids      Sickle Cell Disease w/ Anemia   - started Epogen due to advanced CKD    HTN   Left Foot Osteo  Chronic pancreatitis     Interval History:  Seen and examined  Remained intubated   Awake and following all commands   BP high needing Nitro Gtt     Review of Systems: A comprehensive review of systems was negative.     Current Medications:   Current Facility-Administered Medications   Medication Dose Route Frequency    sodium chloride (NS) flush 20 mL  20 mL InterCATHeter PRN    sodium chloride (NS) flush 10 mL  10 mL InterCATHeter Q24H    sodium chloride (NS) flush 10 mL  10 mL InterCATHeter PRN    sodium chloride (NS) flush 10 mL  10 mL InterCATHeter Q8H    bacitracin 500 unit/gram packet 1 Packet  1 Packet Topical PRN    chlorthalidone (HYGROTEN) tablet 25 mg  25 mg Oral DAILY    epoetin bernard (EPOGEN;PROCRIT) injection 20,000 Units  20,000 Units SubCUTAneous Q MON, WED & FRI    metoprolol (LOPRESSOR) injection 2.5 mg  2.5 mg IntraVENous Q6H    famotidine (PEPCID) tablet 20 mg  20 mg Oral DAILY    nitroglycerin (Tridil) 200 mcg/ml infusion  0-200 mcg/min IntraVENous TITRATE    calcium carbonate (TUMS) chewable tablet 400 mg [elemental]  400 mg Oral TID WITH MEALS    sodium bicarbonate tablet 650 mg  650 mg Oral BID    insulin glargine (LANTUS) injection 20 Units  20 Units SubCUTAneous DAILY    insulin lispro (HUMALOG) injection   SubCUTAneous Q4H    cloNIDine (CATAPRES) 0.3 mg/24 hr patch 1 Patch  1 Patch TransDERmal Q7D    nitroglycerin (NITROBID) 2 % ointment 1 Inch  1 Inch Topical BID    chlorhexidine (PERIDEX) 0.12 % mouthwash 15 mL  15 mL Oral BID    polyvinyl alcohol (LIQUIFILM TEARS) 1.4 % ophthalmic solution 1 Drop  1 Drop Both Eyes PRN    albuterol (PROVENTIL VENTOLIN) nebulizer solution 2.5 mg  2.5 mg Nebulization Q4H PRN    sodium chloride (NS) flush 5-10 mL  5-10 mL IntraVENous Q8H    sodium chloride (NS) flush 5-10 mL  5-10 mL IntraVENous PRN    bisacodyl (DULCOLAX) tablet 5 mg  5 mg Oral DAILY PRN    heparin (porcine) injection 5,000 Units  5,000 Units SubCUTAneous Q8H    hydrALAZINE (APRESOLINE) 20 mg/mL injection 10 mg  10 mg IntraVENous Q6H PRN    DAPTOmycin (CUBICIN) 400 mg in 0.9% sodium chloride 50 mL IVPB RF formulation  400 mg IntraVENous Q48H    levoFLOXacin (LEVAQUIN) 250 mg in D5W IVPB  250 mg IntraVENous Q48H    fentaNYL citrate (PF) injection  mcg   mcg IntraVENous Q2H PRN    methylPREDNISolone (PF) (SOLU-MEDROL) injection 80 mg  80 mg IntraVENous Q8H    albuterol-ipratropium (DUO-NEB) 2.5 MG-0.5 MG/3 ML  3 mL Nebulization QID RT    diphenhydrAMINE (BENADRYL) injection 50 mg  50 mg IntraVENous Q6H    scopolamine (TRANSDERM-SCOP) 1 mg over 3 days 1 Patch  1 Patch TransDERmal Q72H    ondansetron (ZOFRAN) injection 4 mg  4 mg IntraVENous Q4H PRN    sodium chloride (NS) flush 5-10 mL  5-10 mL IntraVENous Q8H    sodium chloride (NS) flush 5-10 mL  5-10 mL IntraVENous PRN    glucose chewable tablet 16 g  4 Tab Oral PRN    dextrose (D50W) injection syrg 12.5-25 g  12.5-25 g IntraVENous PRN    glucagon (GLUCAGEN) injection 1 mg  1 mg IntraMUSCular PRN      Allergies   Allergen Reactions    Erythromycin Itching    Morphine Itching    Toradol [Ketorolac] Rash       Objective:  Vitals:    Vitals:    06/14/18 0800 06/14/18 0852 06/14/18 0900 06/14/18 0934   BP: (!) 163/96 (!) 180/112 (!) 187/107    Pulse: 68 65 63    Resp: 20      Temp: 97.1 °F (36.2 °C)      SpO2: 100%  100% 99%   Weight:       Height:         Intake and Output:  06/14 0701 - 06/14 1900  In: 231.6 [I.V.:111.6]  Out: 300 [Urine:300]  06/12 1901 - 06/14 0700  In: 1200.3 [I.V.:1200.3]  Out: 3365 [Urine:3365]    Physical Examination:  Pt intubated    Yes  General: On vent   HEENT            FACIAL swelling +  Neck:  Supple, no mass  Resp:  Diminished   CV:  RRR,  no murmur   GI:  Soft, NT, + BS  Neurologic:  Sedated on vent   Skin:  No Rash  :  [place] +    []    High complexity decision making was performed  []    Patient is at high-risk of decompensation with multiple organ involvement    Lab Data Personally Reviewed: I have reviewed all the pertinent labs, microbiology data and radiology studies during assessment.     Recent Labs      06/14/18 0419 06/13/18 0352 06/12/18 0229 06/11/18   1359   NA  142  142  140  140   K  3.6  4.0  3.6  3.7   CL  109*  109*  105  107   CO2  19*  20*  21  19*   GLU  177*  163*  190*  199*   BUN  70*  78*  76*  82*   CREA  4.20*  4.18*  3.98*  4.02*   CA  7.4*  6.8*  7.2*  7.3*  7.4*   MG  2.0  2.0  2.2  2.1   PHOS  5.7*   --   6.4*   --    ALB  2.3*   --   2.3*   --    SGOT  8*   --   9*   --    ALT  11*   --   13   --      Recent Labs      06/14/18 0419 06/13/18 0352 06/12/18 0229   WBC  4.2  5.1  7.0   HGB  7.8*  7.6*  7.8*   HCT  23.6*  23.1*  22.7*   PLT  156  154  147*     Lab Results   Component Value Date/Time    Specimen Description: URINE 06/24/2013 08:00 PM    Specimen Description: URINE 06/17/2013 07:55 PM    Specimen Description: URINE 05/26/2013 10:10 AM    Specimen Description: URINE 04/22/2013 02:30 PM    Specimen Description: NARES 03/21/2013 12:30 PM     Lab Results   Component Value Date/Time    Culture result: NO GROWTH 4 DAYS 06/10/2018 10:42 AM    Culture result: NO GROWTH 5 DAYS 04/27/2018 05:11 PM    Culture result: FEW STAPHYLOCOCCUS HOMINIS SUBSPECIES HOMINIS (A) 03/11/2018 12:47 PM    Culture result: (A) 03/11/2018 12:47 PM     STAPHYLOCOCCUS EPIDERMIDIS (OXACILLIN RESISTANT) ISOLATED FROM THIO BROTH ONLY    Culture result: NO ANAEROBES ISOLATED 03/11/2018 12:47 PM     Recent Results (from the past 24 hour(s))   GLUCOSE, POC    Collection Time: 06/13/18 12:07 PM   Result Value Ref Range    Glucose (POC) 171 (H) 65 - 100 mg/dL    Performed by YANCI FERREIRA    GLUCOSE, POC    Collection Time: 06/13/18  4:17 PM   Result Value Ref Range    Glucose (POC) 146 (H) 65 - 100 mg/dL    Performed by YANCI FERREIRA    GLUCOSE, POC    Collection Time: 06/13/18  8:54 PM   Result Value Ref Range    Glucose (POC) 174 (H) 65 - 100 mg/dL    Performed by June Bailey    CBC WITH AUTOMATED DIFF    Collection Time: 06/14/18  4:19 AM   Result Value Ref Range    WBC 4.2 3.6 - 11.0 K/uL    RBC 3.05 (L) 3.80 - 5.20 M/uL    HGB 7.8 (L) 11.5 - 16.0 g/dL    HCT 23.6 (L) 35.0 - 47.0 %    MCV 77.4 (L) 80.0 - 99.0 FL    MCH 25.6 (L) 26.0 - 34.0 PG    MCHC 33.1 30.0 - 36.5 g/dL    RDW 14.6 (H) 11.5 - 14.5 %    PLATELET 456 994 - 981 K/uL    NRBC 0.5 (H) 0  WBC    ABSOLUTE NRBC 0.02 (H) 0.00 - 0.01 K/uL    NEUTROPHILS 77 (H) 32 - 75 %    LYMPHOCYTES 15 12 - 49 %    MONOCYTES 7 5 - 13 %    EOSINOPHILS 0 0 - 7 %    BASOPHILS 0 0 - 1 %    IMMATURE GRANULOCYTES 1 (H) 0.0 - 0.5 %    ABS. NEUTROPHILS 3.3 1.8 - 8.0 K/UL    ABS. LYMPHOCYTES 0.6 (L) 0.8 - 3.5 K/UL    ABS. MONOCYTES 0.3 0.0 - 1.0 K/UL    ABS. EOSINOPHILS 0.0 0.0 - 0.4 K/UL    ABS. BASOPHILS 0.0 0.0 - 0.1 K/UL    ABS. IMM.  GRANS. 0.0 0.00 - 0.04 K/UL    DF SMEAR SCANNED      PLATELET COMMENTS Large Platelets      RBC COMMENTS ANISOCYTOSIS  1+        RBC COMMENTS MICROCYTOSIS  1+        RBC COMMENTS HYPOCHROMIA  1+        RBC COMMENTS OVALOCYTES  PRESENT        RBC COMMENTS TARGET CELLS  PRESENT        RBC COMMENTS SPHEROCYTES  PRESENT       METABOLIC PANEL, COMPREHENSIVE    Collection Time: 06/14/18 4:19 AM   Result Value Ref Range    Sodium 142 136 - 145 mmol/L    Potassium 3.6 3.5 - 5.1 mmol/L    Chloride 109 (H) 97 - 108 mmol/L    CO2 19 (L) 21 - 32 mmol/L    Anion gap 14 5 - 15 mmol/L    Glucose 177 (H) 65 - 100 mg/dL    BUN 70 (H) 6 - 20 MG/DL    Creatinine 4.20 (H) 0.55 - 1.02 MG/DL    BUN/Creatinine ratio 17 12 - 20      GFR est AA 14 (L) >60 ml/min/1.73m2    GFR est non-AA 12 (L) >60 ml/min/1.73m2    Calcium 7.4 (L) 8.5 - 10.1 MG/DL    Bilirubin, total 0.3 0.2 - 1.0 MG/DL    ALT (SGPT) 11 (L) 12 - 78 U/L    AST (SGOT) 8 (L) 15 - 37 U/L    Alk. phosphatase 119 (H) 45 - 117 U/L    Protein, total 5.9 (L) 6.4 - 8.2 g/dL    Albumin 2.3 (L) 3.5 - 5.0 g/dL    Globulin 3.6 2.0 - 4.0 g/dL    A-G Ratio 0.6 (L) 1.1 - 2.2     PHOSPHORUS    Collection Time: 06/14/18  4:19 AM   Result Value Ref Range    Phosphorus 5.7 (H) 2.6 - 4.7 MG/DL   MAGNESIUM    Collection Time: 06/14/18  4:19 AM   Result Value Ref Range    Magnesium 2.0 1.6 - 2.4 mg/dL   GLUCOSE, POC    Collection Time: 06/14/18  4:21 AM   Result Value Ref Range    Glucose (POC) 371 (H) 65 - 100 mg/dL    Performed by June Bailey    GLUCOSE, POC    Collection Time: 06/14/18  8:35 AM   Result Value Ref Range    Glucose (POC) 135 (H) 65 - 100 mg/dL    Performed by Raul Stanley I have reviewed the flowsheets. Chart and Pertinent Notes have been reviewed. No change in PMH ,family and social history from Consult note.       Laurartoño Kat MD

## 2018-06-14 NOTE — PROGRESS NOTES
Hospitalist Progress Note  Chinmay Mars MD  Answering service: [phone] OR [phone] from in house phone  Cell: [phone]       Date of Service:  2018  NAME:  Brianna Erazo  :  1980  MRN:  [id number]    Admission Summary: This is a 64-year-old woman with a past medical history significant for chronic pancreatitis, type 1 diabetes, asthma, chronic kidney disease, anemia, hypertension, depression, obstructive sleep apnea, sickle cell anemia, ongoing osteomyelitis of the left foot, who was in her usual state of health until the day of presentation at the emergency room when the patient developed back pain.  The pain is located at the lower back bilaterally with no radiation.      Interval history / Subjective:   :  Pt sedated on vent, no apparent acuate distress. bs coming down, Lantus given and will give daily, no anion gap. :  Cont on vent,  Off insuliln drip, bs 's stable,     :  Off vent this am.  Trans to  Tele,   bp management meds adjusted see MAR.  Pt wo expressed concern, eating meal in bed, for podiatry consult as  ? anticpated need for intervention,        Assessment & Plan:      DKA with Type DM-I  -improving with insulin gtt, monitor finger stick glucose   -Finger stick glucose 102-278/overnight  -A1c 7.9  Lab Results   Component Value Date/Time    Glucose 177 (H) 2018 04:19 AM    Glucose (POC) 121 (H) 2018 11:12 AM      Cont on current rx 2018      Hypothermia possible due to sepsis  -on Daptomycin, levaquin and IVF  -blood cx on 6/10 no growth so far  -UA no evidence of UTI  - random cortisol and cortisol in am normal  -TSH normal  -improved and off warming blanket   Temp Readings from Last 1 Encounters:   18 97.1 °F (36.2 °C)    resolved.      Acute respiratory failure possible due to sepsis POA  -intubate, on ventilator support  -repeated chest x ray on  improved aeration in the left lower lobe, favoring atelectasis. No evidence of a new acute cardiopulmonary process.   -pulmonologist on board  Intubated on 6/10, on ventilator suppor til temporal arteritis now 02 nasal      Acute Encephalopathy metabolic vs infection   -confused, agitated, restless, try to get out of bed on 6/10 , ativan x 1 on 6/10  -toxicology screen negative, alcohol <10  -continue neuro check  -CT head no acute finding  Resolved      STONE on CKD stage IV-V  -on sodium bicarb drip  -Creatinine improving 4.89 to 3.91  -nephrologist on board  Lab Results   Component Value Date/Time    Creatinine (POC) 4.1 (H) 04/27/2018 10:04 PM    Creatinine 4.20 (H) 06/14/2018 04:19 AM         Non AG MA due to renal insufficiency   -on sodium bicarb gtt  -improving  -repeat bmp  [place] 6/12 and 6/13      Swelling of tongue due to acute Angioedema unclear etiology  -decadron 4 mg IV x 1 dose  -finger stick glucose improved, add iv solumedrol, on benadryl and monitor     Elevated liver enzyme multifactorial   -continue IVF  -CT of abdomen no mass in the liver or biliary dilatation  -monitor liver enzyme if it doesn't improves will do hepatitis panel     Hypocalcemia   -IV calcium gluconate 2 g IV x 2 on 6/10  -improved  Lab Results   Component Value Date/Time    Calcium 7.4 (L) 06/14/2018 04:19 AM    Phosphorus 5.7 (H) 06/14/2018 04:19 AM          Hx of osteomyelitis of left foot  -on Daptomycin  -ID on board     Hx of sickle cell disease  -continue IVF  -no evidence of occlusive crisis  - normal  -monitor cbc  -on prn fentanyl   Lab Results   Component Value Date/Time    Hemoglobin (POC) 11.2 (L) 06/10/2013 11:41 AM    HGB 7.8 (L) 06/14/2018 04:19 AM         Hx of chronic pancreatitis  -CT Abdomen pelvis moderate fecal stasis, chronic pancreatitis, s/p gastric bypass and cholecystectomy, no acute abnormality   -elevated but improving     Hyponatremia  -improving, continue monitoring  Recent Labs      06/14/18   0419   NA  142    HTN poorly controlled  -BP Elevated, clonidine and nitro patch placed, on prn iv hydralazine  -her home hydralazine and clonidine is held, monitor BP     Hx of gastric by pass  -CT abdomen pelvis no acute finding     Chronic low back pain  -on prn fentanyl     Hx of THU  -now on ventilator     Hx of asthma  -not bronchospastic  -prn duo neb  -chest x ray      Hx of depression  -will resume her seroquel and venlafaxine when she able to take po meds        Full Code, at risk for decompensation         Code status: Full Code  DVT prophylaxis: heparin     Care Plan discussed with: Patient/Family and Nurse  Disposition: [facility]              Hospital Problems  Date Reviewed: 6/10/2018          Codes Class Noted POA    * (Principal)[facility] k[facility]acidoses) ([facility] ICD-10-CM: E13.10  ICD-9-CM: 250.10  6/9/2018 Yes                Review of Systems:   Denies cp, sob n/v/d/f/chills. Vital Signs:    Last 24hrs VS reviewed since prior progress note. Most recent are:  Visit Vitals    BP (!) 168/104    Pulse 81    Temp 97.1 °F (36.2 °C)    Resp 20    Ht 5' 2\" (1.575 m)    Wt 66.8 kg (147 lb 4.3 oz)    SpO2 100%    Breastfeeding No    BMI 26.94 kg/m2         Intake/Output Summary (Last 24 hours) at 06/14/18 1404  Last data filed at 06/14/18 1200   Gross per 24 hour   Intake           976.74 ml   Output             2085 ml   Net         -1108.26 ml        Physical Examination:             Constitutional:  on vent ergo: acute distress,     ENT:    Neck supple,    Resp:  CTA bilaterally  . No wheezing/rhonchi/rales. CV:   Controled rate , no gallops, rubs    GI:  Soft, non distended, non tender.  normoactive bowel sounds, no hepatosplenomegaly     Musculoskeletal:  trace  edema, warm  throughout    Neurologic: Non focal             Data Review:    Review and/or order of clinical lab test      Labs:     Recent Labs      06/14/18   0419  06/13/18   0352   WBC  4.2  5.1   HGB  7.8*  7.6*   HCT  23.6*  23.1*   PLT 156  154     Recent Labs      06/14/18 0419  06/13/18   0352  06/12/18 0229   NA  142  142  140   K  3.6  4.0  3.6   CL  109*  109*  105   CO2  19*  20*  21   BUN  70*  78*  76*   CREA  4.20*  4.18*  3.98*   GLU  177*  163*  190*   CA  7.4*  6.8*  7.2*   MG  2.0  2.0  2.2   PHOS  5.7*   --   6.4*     Recent Labs      06/14/18 0419 06/12/18 0229   SGOT  8*  9*   ALT  11*  13   AP  119*  130*   TBILI  0.3  0.1*   TP  5.9*  6.0*   ALB  2.3*  2.3*   GLOB  3.6  3.7     No results for input(s): INR, PTP, APTT in the last 72 hours. No lab exists for component: INREXT, INREXT   No results for input(s): FE, TIBC, PSAT, FERR in the last 72 hours. Lab Results   Component Value Date/Time    Folate 33.8 (H) 05/07/2018 11:20 AM      No results for input(s): PH, PCO2, PO2 in the last 72 hours. No results for input(s): CPK, CKNDX, TROIQ in the last 72 hours.     No lab exists for component: CPKMB  Lab Results   Component Value Date/Time    Cholesterol, total 128 06/10/2018 07:44 AM    HDL Cholesterol 51 06/10/2018 07:44 AM    LDL, calculated 67.4 06/10/2018 07:44 AM    Triglyceride 48 06/10/2018 07:44 AM    CHOL/HDL Ratio 2.5 06/10/2018 07:44 AM     Lab Results   Component Value Date/Time    Glucose (POC) 121 (H) 06/14/2018 11:12 AM    Glucose (POC) 135 (H) 06/14/2018 08:35 AM    Glucose (POC) 371 (H) 06/14/2018 04:21 AM    Glucose (POC) 174 (H) 06/13/2018 08:54 PM    Glucose (POC) 146 (H) 06/13/2018 04:17 PM     Lab Results   Component Value Date/Time    Color YELLOW/STRAW 06/10/2018 10:44 AM    Appearance CLOUDY (A) 06/10/2018 10:44 AM    Specific gravity 1.014 06/10/2018 10:44 AM    Specific gravity 1.015 07/26/2010 11:18 AM    pH (UA) 5.0 06/10/2018 10:44 AM    Protein 100 (A) 06/10/2018 10:44 AM    Glucose >1000 (A) 06/10/2018 10:44 AM    Ketone NEGATIVE  06/10/2018 10:44 AM    Bilirubin NEGATIVE  06/10/2018 10:44 AM    Urobilinogen 0.2 06/10/2018 10:44 AM    Nitrites NEGATIVE  06/10/2018 10:44 AM    Leukocyte Esterase NEGATIVE  06/10/2018 10:44 AM    Epithelial cells MODERATE (A) 06/10/2018 10:44 AM    Bacteria NEGATIVE  06/10/2018 10:44 AM    WBC 0-4 06/10/2018 10:44 AM    RBC 0-5 06/10/2018 10:44 AM         Medications Reviewed:     Current Facility-Administered Medications   Medication Dose Route Frequency    sodium chloride (NS) flush 20 mL  20 mL InterCATHeter PRN    sodium chloride (NS) flush 10 mL  10 mL InterCATHeter Q24H    sodium chloride (NS) flush 10 mL  10 mL InterCATHeter PRN    sodium chloride (NS) flush 10 mL  10 mL InterCATHeter Q8H    bacitracin 500 unit/gram packet 1 Packet  1 Packet Topical PRN    0.45% sodium chloride infusion  50 mL/hr IntraVENous CONTINUOUS    HYDROmorphone (DILAUDID) injection 0.5 mg  0.5 mg IntraVENous Q2H PRN    methylPREDNISolone (PF) (SOLU-MEDROL) injection 40 mg  40 mg IntraVENous Q8H    cloNIDine (CATAPRES) 0.2 mg/24 hr patch 1 Patch  1 Patch TransDERmal Q7D    metoprolol tartrate (LOPRESSOR) tablet 50 mg  50 mg Oral BID    minoxidil (LONITEN) tablet 5 mg  5 mg Oral DAILY    chlorthalidone (HYGROTEN) tablet 25 mg  25 mg Oral DAILY    epoetin bernard (EPOGEN;PROCRIT) injection 20,000 Units  20,000 Units SubCUTAneous Q MON, WED & FRI    famotidine (PEPCID) tablet 20 mg  20 mg Oral DAILY    calcium carbonate (TUMS) chewable tablet 400 mg [elemental]  400 mg Oral TID WITH MEALS    sodium bicarbonate tablet 650 mg  650 mg Oral BID    insulin glargine (LANTUS) injection 20 Units  20 Units SubCUTAneous DAILY    insulin lispro (HUMALOG) injection   SubCUTAneous Q4H    cloNIDine (CATAPRES) 0.3 mg/24 hr patch 1 Patch  1 Patch TransDERmal Q7D    nitroglycerin (NITROBID) 2 % ointment 1 Inch  1 Inch Topical BID    chlorhexidine (PERIDEX) 0.12 % mouthwash 15 mL  15 mL Oral BID    polyvinyl alcohol (LIQUIFILM TEARS) 1.4 % ophthalmic solution 1 Drop  1 Drop Both Eyes PRN    albuterol (PROVENTIL VENTOLIN) nebulizer solution 2.5 mg  2.5 mg Nebulization Q4H PRN    sodium chloride (NS) flush 5-10 mL  5-10 mL IntraVENous Q8H    sodium chloride (NS) flush 5-10 mL  5-10 mL IntraVENous PRN    bisacodyl (DULCOLAX) tablet 5 mg  5 mg Oral DAILY PRN    heparin (porcine) injection 5,000 Units  5,000 Units SubCUTAneous Q8H    hydrALAZINE (APRESOLINE) 20 mg/mL injection 10 mg  10 mg IntraVENous Q6H PRN    DAPTOmycin (CUBICIN) 400 mg in 0.9% sodium chloride 50 mL IVPB RF formulation  400 mg IntraVENous Q48H    levoFLOXacin (LEVAQUIN) 250 mg in D5W IVPB  250 mg IntraVENous Q48H    albuterol-ipratropium (DUO-NEB) 2.5 MG-0.5 MG/3 ML  3 mL Nebulization QID RT    diphenhydrAMINE (BENADRYL) injection 50 mg  50 mg IntraVENous Q6H    scopolamine (TRANSDERM-SCOP) 1 mg over 3 days 1 Patch  1 Patch TransDERmal Q72H    ondansetron (ZOFRAN) injection 4 mg  4 mg IntraVENous Q4H PRN    sodium chloride (NS) flush 5-10 mL  5-10 mL IntraVENous Q8H    sodium chloride (NS) flush 5-10 mL  5-10 mL IntraVENous PRN    glucose chewable tablet 16 g  4 Tab Oral PRN    dextrose (D50W) injection syrg 12.5-25 g  12.5-25 g IntraVENous PRN    glucagon (GLUCAGEN) injection 1 mg  1 mg IntraMUSCular PRN     ______________________________________________________________________  EXPECTED LENGTH OF STAY: 4d 21h  ACTUAL LENGTH OF STAY:          5                 Scott Wheat MD

## 2018-06-14 NOTE — PROGRESS NOTES
1930 Bedside report received from [facility] Pt bathed  2045 Pt agitated after bathing. Given fentanyl and hydralazine prn.  0200 Pt awake, nods yes when asked if in pain. 0400 Able to decrease Nitro gtt    0730 Bedside report given to Russel Fam RN    Problem: Falls - Risk of  Goal: *Absence of Falls  Document Blake Fall Risk and appropriate interventions in the flowsheet. Outcome: Progressing Towards Goal  Fall Risk Interventions:  Mobility Interventions: Bed/chair exit alarm         Medication Interventions: Evaluate medications/consider consulting pharmacy    Elimination Interventions: Call light in reach, Toileting schedule/hourly rounds             Problem: Pressure Injury - Risk of  Goal: *Prevention of pressure injury  Document Antonio Scale and appropriate interventions in the flowsheet. Outcome: Progressing Towards Goal  Pressure Injury Interventions:  Sensory Interventions: Assess changes in LOC    Moisture Interventions: Absorbent underpads, Apply protective barrier, creams and emollients, Maintain skin hydration (lotion/cream)    Activity Interventions: Assess need for specialty bed    Mobility Interventions: Assess need for specialty bed, HOB 30 degrees or less, Pressure redistribution bed/mattress (bed type), Turn and reposition approx.  every two hours(pillow and wedges)    Nutrition Interventions: Document food/fluid/supplement intake    Friction and Shear Interventions: Apply protective barrier, creams and emollients, HOB 30 degrees or less

## 2018-06-14 NOTE — PROGRESS NOTES
[facility]  Pulmonary, Critical Care, and Sleep Medicine        Name: Adia Marte MRN: [id number]   : 1980 [facility]   Date: 2018        IMPRESSION:   · Acute respiratory failure - emergent intubation in OR for airway protection  · Anion-gap metabolic acidosis - likely DKA, but no ketones in urine (possibly due to very low pH), normal lactate  · Acute angioedema - uncertain cause, no obvious trigger, very few drugs given prior to event no reports of airway compromise while in ER, but had very significant findings on arrival to ICU  · Possible DKA as above  · Chronic osteomyelitis of left foot, on outpatient IV antibiotics   · Sickle cell disease +/- pain crisis  · Asthma   · Acute/chronic renal failure  · Chronic pancreatitis   · H/O noncompliance       RECOMMENDATIONS:   · Passed SBT- will extubate and closely monitor in CCU today. · Angioedema better- trigger unclear. · Cultures  · Stop propofol and precedex  · On pepcid  · On steroids- wean off when extubated  · Sedatives reviewed  · On antibiotics  · On antihypertensives  · DVT/GI prophylaxis  · Acutely ill and at risk for decline due to resp failure     Subjective:   RASS 0 alert wants ETT out RSBI 20 on PSV 5/5 follows commands.      Past Medical History:   Diagnosis Date    ARF (acute renal failure) ([address] requiring dialysis     Asthma     CKD (chronic kidney disease)     Diabetes ([address]     Gastroparesis     Gastric Pacer- REMOVED 2015    GERD (gastroesophageal reflux disease)     Hypertension     Narcotic dependence ([address]     THU (obstructive sleep apnea)     wears 2 LPM oxygen at night    Other ill-defined conditions(799.89)     Polycystic ovarian syndrome     Seizures ([address]     Sickle cell trait ([address]     Thromboembolus ([address] to her left arm and was told she had one in left leg recently      Past Surgical History:   Procedure Laterality Date    HX CATARACT REMOVAL 3/5/12    right    HX DILATION AND CURETTAGE      ablation    HX GASTRIC BYPASS  2015    HX GI      j tube placement and removal    HX OTHER SURGICAL  2010    Gastric Pacer- REMOVED 07/2015    HX VASCULAR ACCESS      gray cath rt subclavian    HX VASCULAR ACCESS      HD access right thigh      Prior to Admission medications    Medication Sig Start Date End Date Taking? Authorizing Provider   hydrALAZINE (APRESOLINE) 100 mg tablet Take 1 Tab by mouth three (3) times daily for 30 days. 5/14/18 6/13/18  Maurice Zaragoza MD   oxyCODONE-acetaminophen (PERCOCET) 5-325 mg per tablet Take 1 Tab by mouth every six (6) hours as needed for Pain. Max Daily Amount: 4 Tabs. Scheduled 5/14/18   Maurice Zaragoza MD   promethazine (PHENERGAN) 25 mg tablet Take 1 Tab by mouth every eight (8) hours as needed for Nausea. 5/14/18   Maurice Zaargoza MD   L. acidoph & paracasei- S therm- Bifido (CHRIS-Q/RISAQUAD) 8 billion cell cap cap Take 1 Cap by mouth daily for 30 days. 5/15/18 6/14/18  Maurice Zaragoza MD   NIFEdipine ER (PROCARDIA XL) 90 mg ER tablet Take 1 Tab by mouth daily for 30 days. 5/15/18 6/14/18  Maurice Zaragoza MD   pantoprazole (PROTONIX) 40 mg tablet Take 1 Tab by mouth Before breakfast and dinner for 30 days. 5/14/18 6/13/18  Maurice Zaragoza MD   senna-docusate (PERICOLACE) 8.6-50 mg per tablet Take 2 Tabs by mouth daily. 5/15/18   Maurice Zaragoza MD   cloNIDine HCl (CATAPRES) 0.2 mg tablet Take 1 Tab by mouth three (3) times daily for 30 days. 5/14/18 6/13/18  Maurice Zaragoza MD   labetalol (NORMODYNE) 100 mg tablet Take 2 Tabs by mouth two (2) times a day for 30 days. 5/14/18 6/13/18  Maurice Zaragoza MD   QUEtiapine (SEROQUEL) 100 mg tablet Take 100 mg by mouth two (2) times a day. Historical Provider   venlafaxine (EFFEXOR) 75 mg tablet Take 1 Tab by mouth two (2) times daily (with meals).  3/21/18   Camila Barnes MD   Cholecalciferol, Vitamin D3, 50,000 unit cap Take 1 Cap by mouth every seven (7) days.    Historical Provider   calcium carbonate (OS-BINA) 500 mg calcium (1,250 mg) tablet Take 1 Tab by mouth daily. Historical Provider   cyanocobalamin 1,000 mcg tablet Take 1,000 mcg by mouth daily. Historical Provider   albuterol (PROVENTIL VENTOLIN) 2.5 mg /3 mL (0.083 %) nebulizer solution 2.5 mg by Nebulization route every four (4) hours as needed.     Historical Provider     Allergies   Allergen Reactions    Erythromycin Itching    Morphine Itching    Toradol [Ketorolac] Rash      Social History   Substance Use Topics    Smoking status: Never Smoker    Smokeless tobacco: Never Used    Alcohol use No      Family History   Problem Relation Age of Onset    Cancer Mother      lung    Hypertension Mother     Cancer Father      kidney    Stroke Father      3 strokes: 59-72    Heart Disease Father 72     CABG    Hypertension Father     Cancer Sister      pancreatic    Cancer Maternal Aunt      breast    Cancer Paternal Aunt      breast    Schizophrenia Sister      was in [address], now ass't living    Other Sister       AIDS    Other Other      nephew of AIDS        Current Facility-Administered Medications   Medication Dose Route Frequency    sodium chloride (NS) flush 10 mL  10 mL InterCATHeter Q24H    sodium chloride (NS) flush 10 mL  10 mL InterCATHeter Q8H    chlorthalidone (HYGROTEN) tablet 25 mg  25 mg Oral DAILY    epoetin bernard (EPOGEN;PROCRIT) injection 20,000 Units  20,000 Units SubCUTAneous Q MON, WED & FRI    metoprolol (LOPRESSOR) injection 2.5 mg  2.5 mg IntraVENous Q6H    famotidine (PEPCID) tablet 20 mg  20 mg Oral DAILY    nitroglycerin (Tridil) 200 mcg/ml infusion  0-200 mcg/min IntraVENous TITRATE    calcium carbonate (TUMS) chewable tablet 400 mg [elemental]  400 mg Oral TID WITH MEALS    sodium bicarbonate tablet 650 mg  650 mg Oral BID    insulin glargine (LANTUS) injection 20 Units  20 Units SubCUTAneous DAILY    insulin lispro (HUMALOG) injection SubCUTAneous Q4H    cloNIDine (CATAPRES) 0.3 mg/24 hr patch 1 Patch  1 Patch TransDERmal Q7D    nitroglycerin (NITROBID) 2 % ointment 1 Inch  1 Inch Topical BID    chlorhexidine (PERIDEX) 0.12 % mouthwash 15 mL  15 mL Oral BID    sodium chloride (NS) flush 5-10 mL  5-10 mL IntraVENous Q8H    heparin (porcine) injection 5,000 Units  5,000 Units SubCUTAneous Q8H    DAPTOmycin (CUBICIN) 400 mg in 0.9% sodium chloride 50 mL IVPB RF formulation  400 mg IntraVENous Q48H    levoFLOXacin (LEVAQUIN) 250 mg in D5W IVPB  250 mg IntraVENous Q48H    methylPREDNISolone (PF) (SOLU-MEDROL) injection 80 mg  80 mg IntraVENous Q8H    albuterol-ipratropium (DUO-NEB) 2.5 MG-0.5 MG/3 ML  3 mL Nebulization QID RT    diphenhydrAMINE (BENADRYL) injection 50 mg  50 mg IntraVENous Q6H    scopolamine (TRANSDERM-SCOP) 1 mg over 3 days 1 Patch  1 Patch TransDERmal Q72H    sodium chloride (NS) flush 5-10 mL  5-10 mL IntraVENous Q8H       Review of Systems:  Review of systems not obtained due to patient factors. Objective:   Vital Signs:    Visit Vitals    BP (!) 187/107    Pulse 63    Temp 97.1 °F (36.2 °C)    Resp 20    Ht 5' 2\" (1.575 m)    Wt 66.8 kg (147 lb 4.3 oz)    SpO2 99%    Breastfeeding No    BMI 26.94 kg/m2       O2 Device: Nasal cannula   O2 Flow Rate (L/min): 2 l/min   Temp (24hrs), Av.4 °F (36.3 °C), Min:95.9 °F (35.5 °C), Max:98.8 °F (37.1 °C)       Intake/Output:   Last shift:       07 -  1900  In: 231.6 [I.V.:111.6]  Out: 300 [Urine:300]  Last 3 shifts: 1901 -  0700  In: 1200.3 [I.V.:1200.3]  Out: 3365 [Urine:3365]    Intake/Output Summary (Last 24 hours) at 18 1012  Last data filed at 18 1818   Gross per 24 hour   Intake          1007.24 ml   Output             2405 ml   Net         -1397.76 ml      Physical Exam:   General:  Lethargic, ill appearing   Head:  Normocephalic, without obvious abnormality, atraumati, some facial edema.    Eyes:  Conjunctivae/corneas clear. Has moderate periorbital edema   Nose: Nares normal. Septum midline. Mucosa normal.     Throat: Angioedema of lips and tongue, mild stridor, loud snoring   Neck: Supple, symmetrical, trachea midline    Lungs:   Scattered ronchi, kussmaul respirations   Chest wall:  No tenderness or deformity. Heart:  Tachy, regular   Abdomen:   Soft, non-tender. Bowel sounds normal.    Extremities: left transmetatarsal amputation   Skin: Skin color, texture, turgor normal. No rashes or lesions   Lymph nodes: Cervical, supraclavicular nodes normal.   Neurologic: Sedated      Data review:     Recent Results (from the past 24 hour(s))   GLUCOSE, POC    Collection Time: 06/13/18 12:07 PM   Result Value Ref Range    Glucose (POC) 171 (H) 65 - 100 mg/dL    Performed by YANCI FERREIRA    GLUCOSE, POC    Collection Time: 06/13/18  4:17 PM   Result Value Ref Range    Glucose (POC) 146 (H) 65 - 100 mg/dL    Performed by YANCI FERREIRA    GLUCOSE, POC    Collection Time: 06/13/18  8:54 PM   Result Value Ref Range    Glucose (POC) 174 (H) 65 - 100 mg/dL    Performed by June Bailey    CBC WITH AUTOMATED DIFF    Collection Time: 06/14/18  4:19 AM   Result Value Ref Range    WBC 4.2 3.6 - 11.0 K/uL    RBC 3.05 (L) 3.80 - 5.20 M/uL    HGB 7.8 (L) 11.5 - 16.0 g/dL    HCT 23.6 (L) 35.0 - 47.0 %    MCV 77.4 (L) 80.0 - 99.0 FL    MCH 25.6 (L) 26.0 - 34.0 PG    MCHC 33.1 30.0 - 36.5 g/dL    RDW 14.6 (H) 11.5 - 14.5 %    PLATELET 912 858 - 098 K/uL    NRBC 0.5 (H) 0  WBC    ABSOLUTE NRBC 0.02 (H) 0.00 - 0.01 K/uL    NEUTROPHILS 77 (H) 32 - 75 %    LYMPHOCYTES 15 12 - 49 %    MONOCYTES 7 5 - 13 %    EOSINOPHILS 0 0 - 7 %    BASOPHILS 0 0 - 1 %    IMMATURE GRANULOCYTES 1 (H) 0.0 - 0.5 %    ABS. NEUTROPHILS 3.3 1.8 - 8.0 K/UL    ABS. LYMPHOCYTES 0.6 (L) 0.8 - 3.5 K/UL    ABS. MONOCYTES 0.3 0.0 - 1.0 K/UL    ABS. EOSINOPHILS 0.0 0.0 - 0.4 K/UL    ABS. BASOPHILS 0.0 0.0 - 0.1 K/UL    ABS. IMM.  GRANS. 0.0 0.00 - 0.04 K/UL    DF SMEAR SCANNED PLATELET COMMENTS Large Platelets      RBC COMMENTS ANISOCYTOSIS  1+        RBC COMMENTS MICROCYTOSIS  1+        RBC COMMENTS HYPOCHROMIA  1+        RBC COMMENTS OVALOCYTES  PRESENT        RBC COMMENTS TARGET CELLS  PRESENT        RBC COMMENTS SPHEROCYTES  PRESENT       METABOLIC PANEL, COMPREHENSIVE    Collection Time: 06/14/18  4:19 AM   Result Value Ref Range    Sodium 142 136 - 145 mmol/L    Potassium 3.6 3.5 - 5.1 mmol/L    Chloride 109 (H) 97 - 108 mmol/L    CO2 19 (L) 21 - 32 mmol/L    Anion gap 14 5 - 15 mmol/L    Glucose 177 (H) 65 - 100 mg/dL    BUN 70 (H) 6 - 20 MG/DL    Creatinine 4.20 (H) 0.55 - 1.02 MG/DL    BUN/Creatinine ratio 17 12 - 20      GFR est AA 14 (L) >60 ml/min/1.73m2    GFR est non-AA 12 (L) >60 ml/min/1.73m2    Calcium 7.4 (L) 8.5 - 10.1 MG/DL    Bilirubin, total 0.3 0.2 - 1.0 MG/DL    ALT (SGPT) 11 (L) 12 - 78 U/L    AST (SGOT) 8 (L) 15 - 37 U/L    Alk.  phosphatase 119 (H) 45 - 117 U/L    Protein, total 5.9 (L) 6.4 - 8.2 g/dL    Albumin 2.3 (L) 3.5 - 5.0 g/dL    Globulin 3.6 2.0 - 4.0 g/dL    A-G Ratio 0.6 (L) 1.1 - 2.2     PHOSPHORUS    Collection Time: 06/14/18  4:19 AM   Result Value Ref Range    Phosphorus 5.7 (H) 2.6 - 4.7 MG/DL   MAGNESIUM    Collection Time: 06/14/18  4:19 AM   Result Value Ref Range    Magnesium 2.0 1.6 - 2.4 mg/dL   GLUCOSE, POC    Collection Time: 06/14/18  4:21 AM   Result Value Ref Range    Glucose (POC) 371 (H) 65 - 100 mg/dL    Performed by June Bailey    GLUCOSE, POC    Collection Time: 06/14/18  8:35 AM   Result Value Ref Range    Glucose (POC) 135 (H) 65 - 100 mg/dL    Performed by [facility]        Imaging:  I have personally reviewed the patients radiographs and have reviewed the reports:  PCXR clear     Medical decision making:   I have reviewed the flowsheet and previous day's notes  Patient has acute or chronic illness that poses a threat to life or bodily function  Review and order of Clinical lab tests  Review and Order of Radiology tests  Independent visualization of Image  Reviewed Ventilator / NiPPV  Parenteral controlled substances - Reviewed/ Adjusted / Weaned / Started  High Risk Drug therapy requiring intensive monitoring for toxicity: eg steroids, pressors, antibiotics       Elena Rausch MD

## 2018-06-14 NOTE — INTERDISCIPLINARY ROUNDS
IDR/SLIDR Summary          Patient: Umang Vigil MRN: [id number]    Age: 42 y.o. YOB: 1980 Room/Bed: [address] [place] [address]   Admit Diagnosis: DKA (diabetic ketoacidoses) ([facility]  angio edema  Principal Diagnosis: DKA (diabetic ketoacidoses) ([facility]   Goals: Follow commands, wean vent settings  Readmission: NO  Quality Measure: Not applicable  VTE Prophylaxis: Chemical  Influenza Vaccine screening completed? NO  Pneumococcal Vaccine screening completed? NO  Mobility needs: Yes   Nutrition plan:Yes  Consults:P.T, O.T., Respiratory and Case Management    Financial concerns:Yes  Escalated to CM? YES  RRAT Score: 12     [facility]   Testing due for pt today?  NO  LOS: 5 days Expected length of stay 7 days  Discharge plan: D/C back to rehab   PCP: Johnathan Melendez MD  Transportation needs: Yes    Days before discharge:two or more days before discharge   Discharge disposition: Rehab    Signed:     Lesly Worrell RN  6/14/2018  12:01 AM

## 2018-06-15 LAB
BACTERIA SPEC CULT: NORMAL
SERVICE CMNT-IMP: NORMAL

## 2018-06-16 ENCOUNTER — HOSPITAL ENCOUNTER (EMERGENCY)
Age: 38
Discharge: HOME | End: 2018-06-16
Attending: EMERGENCY MEDICINE
Payer: MEDICARE

## 2018-06-16 VITALS
BODY MASS INDEX: 29.52 KG/M2 | HEART RATE: 90 BPM | RESPIRATION RATE: 21 BRPM | WEIGHT: 160.4 LBS | HEIGHT: 62 IN | DIASTOLIC BLOOD PRESSURE: 80 MMHG | TEMPERATURE: 97.9 F | SYSTOLIC BLOOD PRESSURE: 140 MMHG | OXYGEN SATURATION: 100 %

## 2018-06-16 DIAGNOSIS — E16.2 HYPOGLYCEMIA: Primary | ICD-10-CM

## 2018-06-16 DIAGNOSIS — E83.51 HYPOCALCEMIA: ICD-10-CM

## 2018-06-16 LAB
ALBUMIN SERPL-MCNC: 2.8 G/DL (ref 3.5–5)
ALBUMIN/GLOB SERPL: 0.7 {RATIO} (ref 1.1–2.2)
ALP SERPL-CCNC: 135 U/L (ref 45–117)
ALT SERPL-CCNC: 15 U/L (ref 12–78)
ANION GAP SERPL CALC-SCNC: 11 MMOL/L (ref 5–15)
AST SERPL-CCNC: 15 U/L (ref 15–37)
BASOPHILS # BLD: 0 K/UL (ref 0–0.1)
BASOPHILS NFR BLD: 0 % (ref 0–1)
BILIRUB SERPL-MCNC: 0.2 MG/DL (ref 0.2–1)
BUN SERPL-MCNC: 62 MG/DL (ref 6–20)
BUN/CREAT SERPL: 15 (ref 12–20)
CALCIUM SERPL-MCNC: 6.4 MG/DL (ref 8.5–10.1)
CHLORIDE SERPL-SCNC: 109 MMOL/L (ref 97–108)
CO2 SERPL-SCNC: 21 MMOL/L (ref 21–32)
CREAT SERPL-MCNC: 4.06 MG/DL (ref 0.55–1.02)
DIFFERENTIAL METHOD BLD: ABNORMAL
EOSINOPHIL # BLD: 0 K/UL (ref 0–0.4)
EOSINOPHIL NFR BLD: 1 % (ref 0–7)
ERYTHROCYTE [DISTWIDTH] IN BLOOD BY AUTOMATED COUNT: 14.2 % (ref 11.5–14.5)
GLOBULIN SER CALC-MCNC: 3.9 G/DL (ref 2–4)
GLUCOSE BLD STRIP.AUTO-MCNC: 92 MG/DL (ref 65–100)
GLUCOSE BLD STRIP.AUTO-MCNC: 96 MG/DL (ref 65–100)
GLUCOSE SERPL-MCNC: 86 MG/DL (ref 65–100)
HCT VFR BLD AUTO: 25.4 % (ref 35–47)
HGB BLD-MCNC: 8.1 G/DL (ref 11.5–16)
IMM GRANULOCYTES # BLD: 0 K/UL (ref 0–0.04)
IMM GRANULOCYTES NFR BLD AUTO: 1 % (ref 0–0.5)
LYMPHOCYTES # BLD: 0.9 K/UL (ref 0.8–3.5)
LYMPHOCYTES NFR BLD: 15 % (ref 12–49)
MCH RBC QN AUTO: 25.1 PG (ref 26–34)
MCHC RBC AUTO-ENTMCNC: 31.9 G/DL (ref 30–36.5)
MCV RBC AUTO: 78.6 FL (ref 80–99)
MONOCYTES # BLD: 0.3 K/UL (ref 0–1)
MONOCYTES NFR BLD: 5 % (ref 5–13)
NEUTS SEG # BLD: 5 K/UL (ref 1.8–8)
NEUTS SEG NFR BLD: 80 % (ref 32–75)
NRBC # BLD: 0 K/UL (ref 0–0.01)
NRBC BLD-RTO: 0 PER 100 WBC
PLATELET # BLD AUTO: 151 K/UL (ref 150–400)
POTASSIUM SERPL-SCNC: 3.5 MMOL/L (ref 3.5–5.1)
PROT SERPL-MCNC: 6.7 G/DL (ref 6.4–8.2)
RBC # BLD AUTO: 3.23 M/UL (ref 3.8–5.2)
SERVICE CMNT-IMP: NORMAL
SERVICE CMNT-IMP: NORMAL
SODIUM SERPL-SCNC: 141 MMOL/L (ref 136–145)
WBC # BLD AUTO: 6.2 K/UL (ref 3.6–11)

## 2018-06-16 PROCEDURE — 74011000250 HC RX REV CODE- 250: Performed by: EMERGENCY MEDICINE

## 2018-06-16 PROCEDURE — 99285 EMERGENCY DEPT VISIT HI MDM: CPT

## 2018-06-16 PROCEDURE — 85025 COMPLETE CBC W/AUTO DIFF WBC: CPT | Performed by: EMERGENCY MEDICINE

## 2018-06-16 PROCEDURE — 74011250637 HC RX REV CODE- 250/637: Performed by: EMERGENCY MEDICINE

## 2018-06-16 PROCEDURE — 36415 COLL VENOUS BLD VENIPUNCTURE: CPT | Performed by: EMERGENCY MEDICINE

## 2018-06-16 PROCEDURE — 82962 GLUCOSE BLOOD TEST: CPT

## 2018-06-16 PROCEDURE — 80053 COMPREHEN METABOLIC PANEL: CPT | Performed by: EMERGENCY MEDICINE

## 2018-06-16 RX ORDER — CLONIDINE HYDROCHLORIDE 0.1 MG/1
0.2 TABLET ORAL
Status: COMPLETED | OUTPATIENT
Start: 2018-06-16 | End: 2018-06-16

## 2018-06-16 RX ORDER — LIDOCAINE HYDROCHLORIDE 20 MG/ML
10 INJECTION, SOLUTION INFILTRATION; PERINEURAL
Status: COMPLETED | OUTPATIENT
Start: 2018-06-16 | End: 2018-06-16

## 2018-06-16 RX ORDER — CALCIUM CARBONATE 200(500)MG
200 TABLET,CHEWABLE ORAL ONCE
Status: COMPLETED | OUTPATIENT
Start: 2018-06-16 | End: 2018-06-16

## 2018-06-16 RX ORDER — CALCIUM CARBONATE 200(500)MG
1 TABLET,CHEWABLE ORAL 3 TIMES DAILY
Qty: 21 TAB | Refills: 0 | Status: SHIPPED | OUTPATIENT
Start: 2018-06-16 | End: 2018-07-04

## 2018-06-16 RX ADMIN — CLONIDINE HYDROCHLORIDE 0.2 MG: 0.1 TABLET ORAL at 10:39

## 2018-06-16 RX ADMIN — CALCIUM CARBONATE (ANTACID) CHEW TAB 500 MG 200 MG: 500 CHEW TAB at 11:58

## 2018-06-16 RX ADMIN — LIDOCAINE HYDROCHLORIDE 200 MG: 20 INJECTION, SOLUTION INFILTRATION; PERINEURAL at 10:49

## 2018-06-16 NOTE — DISCHARGE INSTRUCTIONS
Hypoglycemia: Care Instructions  Your Care Instructions    Hypoglycemia means that your blood sugar is low and your body is not getting enough fuel. Some people get low blood sugar from not eating often enough. Some medicines to treat diabetes can cause low blood sugar. People who have had surgery on their stomachs or intestines may get hypoglycemia. Problems with the pancreas, kidneys, or liver also can cause low blood sugar. A snack or drink with sugar in it will raise your blood sugar and should ease your symptoms right away. Your doctor may recommend that you change or stop your medicines until you can get your blood sugar levels under control. In the long run, you may need to change your diet and eating habits so that you get enough fuel for your body throughout the day. Follow-up care is a key part of your treatment and safety. Be sure to make and go to all appointments, and call your doctor if you are having problems. It's also a good idea to know your test results and keep a list of the medicines you take. How can you care for yourself at home? · Learn to recognize the early signs of low blood sugar. Signs include:  ¨ Nausea. ¨ Hunger. ¨ Feeling nervous, irritable, or shaky. ¨ Cold, clammy, wet skin. ¨ Sweating (when you are not exercising). ¨ A fast heartbeat. ¨ Numbness or tingling of the fingertips or lips. · If you feel an episode of low blood sugar coming on, drink fruit juice or sugared (not diet) soda, or eat sugar in the form of candy, cubes, or tablets. COH are another [employer]. · Eat small, frequent meals so that you do not get too hungry between meals. · Balance extra exercise with eating more. · Keep a written record of your low blood sugar episodes, including when you last ate and what you ate, so that you can learn what causes your blood sugar to drop.   · Make sure your family, friends, and coworkers know the symptoms of low blood sugar and know what to do to get your sugar level up. · Wear medical alert jewelry that lists your condition. You can buy this at most drugstores. When should you call for help? Call 911 anytime you think you may need emergency care. For example, call if:  ? · You passed out (lost consciousness). ? · You are confused or cannot think clearly. ? · Your blood sugar is very high or very low. ? Watch closely for changes in your health, and be sure to contact your doctor if:  ? · Your blood sugar stays outside the level your doctor set for you. ? · You have any problems. Where can you learn more? Go to http://luciana-kyle.info/. Enter Z859 in the search box to learn more about \"Hypoglycemia: Care Instructions. \"  Current as of: March 13, 2017  Content Version: 11.4  © 1902-4545 [employer]. Care instructions adapted under license by [employer] (which disclaims liability or warranty for this information). If you have questions about a medical condition or this instruction, always ask your healthcare professional. [place] [address] any warranty or liability for your use of this information. Hypocalcemia: Care Instructions  Your Care Instructions    Hypocalcemia means that the level of calcium in your blood is lower than it should be. Your doctor may have done tests to check your calcium levels because you had certain symptoms. These include tingling or twitching of your muscles. Your doctor may do more tests to find out why your calcium is low and to see how well your kidneys and other organs are working. Your doctor will also want to see how well your parathyroid gland is working. This gland controls calcium levels in your blood. You may have this problem because you are not getting enough calcium in your diet. Or your body may not be absorbing the calcium as it should. You may be able to get your calcium up to a safe level by taking supplements.  If your levels are very low, your doctor may give you a calcium shot, possibly along with magnesium. You will probably also be given vitamin D, because you need it to absorb calcium. After your doctor has your calcium levels up, be sure to get plenty of calcium in your diet. If you have a kidney or parathyroid problem, you may need to keep taking extra calcium. Follow-up care is a key part of your treatment and safety. Be sure to make and go to all appointments, and call your doctor if you are having problems. It's also a good idea to know your test results and keep a list of the medicines you take. How can you care for yourself at home? · Take your medicines exactly as prescribed. Call your doctor if you think you are having a problem with your medicine. · Eat foods rich in calcium. These include yogurt, cheese, milk, and dark green vegetables. This is the best way to get the calcium you need. You can get vitamin D from eggs, fatty fish, cereal, and milk. · Talk to your doctor about taking a calcium plus vitamin D supplement. · Stay active. Regular weight-bearing exercise, such as walking, can help keep your bones strong. It can also improve your overall health. · Spend a small amount of time outside in the sun without sunscreen. The sun helps your body make vitamin D. Talk to your doctor first if you have had skin cancer or you are at high risk for skin cancer. When should you call for help? Call your doctor now or seek immediate medical care if:  ? · You feel numb or have tingling in your fingers and hands or toes and feet. ? · You are confused or are having trouble remembering things. ? · You have muscle spasms or cramps. ? · Your heart seems to be speeding up and then slowing down or skipping beats. ? · You are feeling down or blue, or you are not enjoying things like you once did. You may be depressed, which is common in people with hypocalcemia. Depression can be treated. ? Watch closely for changes in your health, and be sure to contact your doctor if:  ? · You do not get better as expected. Where can you learn more? Go to http://luciana-kyle.info/. Enter P520 in the search box to learn more about \"Hypocalcemia: Care Instructions. \"  Current as of: May 12, 2017  Content Version: 11.4  © 8501-4843 [employer]. Care instructions adapted under license by [employer] (which disclaims liability or warranty for this information). If you have questions about a medical condition or this instruction, always ask your healthcare professional. [address] any warranty or liability for your use of this information.

## 2018-06-16 NOTE — ED PROVIDER NOTES
HPI Comments: 42 y.o. female with past medical history significant for thromboembolus, asthma, diabetes, HTN, PCOS, seizures, GERD, gastroparesis, and CKD who presents from home via EMS for an evaluation of hypoglycemia. Pt states she woke up feeling lightheaded, then passed out and fell. Per EMS, pt's spouse called EMS as the pt was unresponsive. On EMS arrival, pt's BS was 23, given D50 25mg and 15g oral glucose, and BS is 91 on arrival to ED. Pt states she feels \"okay\" now. Pt reports taking her insulin last night, none this morning, does not take any oral diabetes medications. Pt states her  administers her insulin. EMS placed an IO in her L leg, as the pt has a PICC line in the L chest for abx. Pt reports a history of HTN, did not take her medication this morning. Pt denies any diaphoresis. There are no other acute medical concerns at this time. Old Chart Review: Pt was admitted on 6/9 for DKA, acute respiratory failure, possible due to sepsis, and needed intubation. Pt also had acute encephalopathy and STONE on CKD. Pt was discharged within the last 2 days. Social hx: Nonsmoker; No EtOH use  PCP: Miguel Birmingham MD    Note written by Ivanna Manzo, as dictated by Clara Lee MD 9:26 AM    The history is provided by the patient. No  was used.         Past Medical History:   Diagnosis Date    ARF (acute renal failure) ([address] requiring dialysis 2011    Asthma     CKD (chronic kidney disease)     Diabetes ([address]     Gastroparesis 2010    Gastric Pacer- REMOVED 07/2015    GERD (gastroesophageal reflux disease)     Hypertension     Narcotic dependence ([address]     THU (obstructive sleep apnea)     wears 2 LPM oxygen at night    Other ill-defined conditions(799.89)     Polycystic ovarian syndrome     Seizures (HCC)     Sickle cell trait ([address]     Thromboembolus ([address] to her left arm and was told she had one in left leg recently       Past Surgical History: Procedure Laterality Date    HX CATARACT REMOVAL  3/5/12    right    HX DILATION AND CURETTAGE      ablation    HX GASTRIC BYPASS      HX GI      j tube placement and removal    HX OTHER SURGICAL      Gastric Pacer- REMOVED 2015    HX VASCULAR ACCESS      gray cath rt subclavian    HX VASCULAR ACCESS      HD access right thigh         Family History:   Problem Relation Age of Onset    Cancer Mother      lung    Hypertension Mother     Cancer Father      kidney    Stroke Father      3 strokes: 59-72    Heart Disease Father 72     CABG    Hypertension Father     Cancer Sister      pancreatic    Cancer Maternal Aunt      breast    Cancer Paternal Aunt      breast    Schizophrenia Sister      was in [address], now ass't living    Other Sister       AIDS    Other Other      nephew of AIDS       Social History     Social History    Marital status:      Spouse name: N/A    Number of children: N/A    Years of education: N/A     Occupational History    Not on file. Social History Main Topics    Smoking status: Never Smoker    Smokeless tobacco: Never Used    Alcohol use No    Drug use: No    Sexual activity: Yes     Partners: Male     Birth control/ protection: None     Other Topics Concern    Not on file     Social History Narrative    Lives with her  and father         ALLERGIES: Fentanyl; Erythromycin; Morphine; and Toradol [ketorolac]    Review of Systems   Constitutional: Negative for diaphoresis. Neurological: Positive for syncope and light-headedness. All other systems reviewed and are negative. Vitals:    18 0924 18 0945   BP: (!) 191/100 155/87   Pulse: 90 86   Resp: 20 18   Temp: 97.7 °F (36.5 °C)    SpO2: 100% 100%   Weight: 72.8 kg (160 lb 6.4 oz)    Height: 5' 2\" (1.575 m)             Physical Exam   Constitutional: She is oriented to person, place, and time. She appears well-developed and well-nourished. No distress. HENT:   Head: Normocephalic and atraumatic. Eyes: Conjunctivae are normal.   Neck: Neck supple. Cardiovascular: Normal rate and regular rhythm. Pulmonary/Chest: Effort normal. No stridor. No respiratory distress. Abdominal: She exhibits no distension. Musculoskeletal: Normal range of motion. IO to L leg. PICC line to L chest. Transmetatarsal amputation to L foot. Neurological: She is alert and oriented to person, place, and time. No cranial nerve deficit or sensory deficit. Coordination normal.   Slighted slowed, normal que of speech. Slight slurred speech. A&O x3. No neurological deficits. Skin: Skin is warm and dry. Psychiatric: She has a normal mood and affect. Nursing note and vitals reviewed. Note written by Ivanna Milian, as dictated by Lexy Holloway MD 9:26 AM    MDM    42 y.o. female presents with hypoglycemic episode that occurred during waking today. Pt states she ate a chocolate bar in bed overnight to try and prevent herself from dropping her glucose with insulin. Her hypoglycemia was corrected by IO admin of D50 and oral glucose by EMS. She was able to eat multiple breakfast trays here and had no recurrences. No long-acting agents or other complicating feautres. She continues to have persistent asymptomatic hypocalcemia that was present during last admission and she has been noncompliant with supplementation. I recommended tums as a cheaper, more available source of calcium. Discussed eating more wholesome balanced meals instead of candy to prevent early morning hypoglycemic episodes and close discussion with her PCP to adjust medications appropriately. ED Course       Procedures    IO was removed by me from left tibia with administration of local anaesthesia without complication. PROGRESS NOTE:  11:10 AM  Pt's speech and mentation have cleared to baseline.

## 2018-06-16 NOTE — ED NOTES
Discharge instructions given to pt. All questions answered and pt verbalized understanding. V/S stable @ time of discharge. Case mgmt set up transportation home via taxi. Pt waiting in waiting room.

## 2018-06-16 NOTE — ED TRIAGE NOTES
Pt arrives via EMS from home for hypoglycemia. Spouse called EMS, pt was unresponsive to voice/pain. BG 23 upon arrival. Treated with D50 25g & 15g oral glucose. BG 89 per ems. Pt arrives with IO in Left Leg.

## 2018-06-23 ENCOUNTER — HOSPITAL ENCOUNTER (INPATIENT)
Age: 38
LOS: 10 days | Discharge: HOME | DRG: 314 | End: 2018-07-04
Attending: EMERGENCY MEDICINE | Admitting: HOSPITALIST
Payer: MEDICARE

## 2018-06-23 ENCOUNTER — APPOINTMENT (OUTPATIENT)
Dept: GENERAL RADIOLOGY | Age: 38
DRG: 314 | End: 2018-06-23
Attending: EMERGENCY MEDICINE
Payer: MEDICARE

## 2018-06-23 DIAGNOSIS — R50.9 FEVER, UNSPECIFIED FEVER CAUSE: ICD-10-CM

## 2018-06-23 DIAGNOSIS — D57.00 SICKLE CELL CRISIS (HCC): Primary | ICD-10-CM

## 2018-06-23 DIAGNOSIS — M86.9 OSTEOMYELITIS OF LEFT FOOT, UNSPECIFIED TYPE (HCC): ICD-10-CM

## 2018-06-23 PROCEDURE — 83690 ASSAY OF LIPASE: CPT | Performed by: EMERGENCY MEDICINE

## 2018-06-23 PROCEDURE — 87186 SC STD MICRODIL/AGAR DIL: CPT | Performed by: HOSPITALIST

## 2018-06-23 PROCEDURE — 87077 CULTURE AEROBIC IDENTIFY: CPT | Performed by: HOSPITALIST

## 2018-06-23 PROCEDURE — 86920 COMPATIBILITY TEST SPIN: CPT | Performed by: HOSPITALIST

## 2018-06-23 PROCEDURE — 71045 X-RAY EXAM CHEST 1 VIEW: CPT

## 2018-06-23 PROCEDURE — 87040 BLOOD CULTURE FOR BACTERIA: CPT | Performed by: HOSPITALIST

## 2018-06-23 PROCEDURE — 85730 THROMBOPLASTIN TIME PARTIAL: CPT | Performed by: EMERGENCY MEDICINE

## 2018-06-23 PROCEDURE — 96365 THER/PROPH/DIAG IV INF INIT: CPT

## 2018-06-23 PROCEDURE — 86902 BLOOD TYPE ANTIGEN DONOR EA: CPT | Performed by: HOSPITALIST

## 2018-06-23 PROCEDURE — 81001 URINALYSIS AUTO W/SCOPE: CPT | Performed by: EMERGENCY MEDICINE

## 2018-06-23 PROCEDURE — 83036 HEMOGLOBIN GLYCOSYLATED A1C: CPT | Performed by: HOSPITALIST

## 2018-06-23 PROCEDURE — 86921 COMPATIBILITY TEST INCUBATE: CPT | Performed by: HOSPITALIST

## 2018-06-23 PROCEDURE — 99284 EMERGENCY DEPT VISIT MOD MDM: CPT

## 2018-06-23 PROCEDURE — 36415 COLL VENOUS BLD VENIPUNCTURE: CPT | Performed by: EMERGENCY MEDICINE

## 2018-06-23 PROCEDURE — 74011250637 HC RX REV CODE- 250/637: Performed by: EMERGENCY MEDICINE

## 2018-06-23 PROCEDURE — 85660 RBC SICKLE CELL TEST: CPT | Performed by: HOSPITALIST

## 2018-06-23 PROCEDURE — 87086 URINE CULTURE/COLONY COUNT: CPT | Performed by: HOSPITALIST

## 2018-06-23 PROCEDURE — 86900 BLOOD TYPING SEROLOGIC ABO: CPT | Performed by: HOSPITALIST

## 2018-06-23 PROCEDURE — 85610 PROTHROMBIN TIME: CPT | Performed by: EMERGENCY MEDICINE

## 2018-06-23 PROCEDURE — 80053 COMPREHEN METABOLIC PANEL: CPT | Performed by: EMERGENCY MEDICINE

## 2018-06-23 PROCEDURE — 74011000258 HC RX REV CODE- 258: Performed by: EMERGENCY MEDICINE

## 2018-06-23 PROCEDURE — 86922 COMPATIBILITY TEST ANTIGLOB: CPT | Performed by: HOSPITALIST

## 2018-06-23 PROCEDURE — 85045 AUTOMATED RETICULOCYTE COUNT: CPT | Performed by: EMERGENCY MEDICINE

## 2018-06-23 PROCEDURE — 74011250636 HC RX REV CODE- 250/636: Performed by: EMERGENCY MEDICINE

## 2018-06-23 PROCEDURE — 85025 COMPLETE CBC W/AUTO DIFF WBC: CPT | Performed by: EMERGENCY MEDICINE

## 2018-06-23 PROCEDURE — 93005 ELECTROCARDIOGRAM TRACING: CPT

## 2018-06-23 PROCEDURE — 83605 ASSAY OF LACTIC ACID: CPT | Performed by: EMERGENCY MEDICINE

## 2018-06-23 RX ORDER — ACETAMINOPHEN 325 MG/1
650 TABLET ORAL
Status: COMPLETED | OUTPATIENT
Start: 2018-06-23 | End: 2018-06-23

## 2018-06-23 RX ORDER — VANCOMYCIN/0.9 % SOD CHLORIDE 1.5G/250ML
1500 PLASTIC BAG, INJECTION (ML) INTRAVENOUS
Status: COMPLETED | OUTPATIENT
Start: 2018-06-23 | End: 2018-06-24

## 2018-06-23 RX ORDER — SODIUM CHLORIDE 0.9 % (FLUSH) 0.9 %
5-10 SYRINGE (ML) INJECTION AS NEEDED
Status: DISCONTINUED | OUTPATIENT
Start: 2018-06-23 | End: 2018-07-04 | Stop reason: HOSPADM

## 2018-06-23 RX ORDER — SODIUM CHLORIDE 0.9 % (FLUSH) 0.9 %
5-10 SYRINGE (ML) INJECTION AS NEEDED
Status: DISCONTINUED | OUTPATIENT
Start: 2018-06-23 | End: 2018-07-02 | Stop reason: SDUPTHER

## 2018-06-23 RX ADMIN — SODIUM CHLORIDE 1000 ML: 900 INJECTION, SOLUTION INTRAVENOUS at 23:41

## 2018-06-23 RX ADMIN — PIPERACILLIN SODIUM AND TAZOBACTAM SODIUM 3.38 G: 3; .375 INJECTION, POWDER, LYOPHILIZED, FOR SOLUTION INTRAVENOUS at 23:43

## 2018-06-23 RX ADMIN — ACETAMINOPHEN 650 MG: 325 TABLET ORAL at 23:47

## 2018-06-23 NOTE — IP AVS SNAPSHOT
2700 [facility] [address] [place] [address] 
[phone] Patient: Aiden Eduardo MRN: [id number] About your hospitalization You were admitted on:  June 24, 2018 You last received care in the:  [place] [address] You were discharged on:  July 4, 2018 Why you were hospitalized Your primary diagnosis was:  Sickle Cell Crisis (Hcc) Your diagnoses also included:  Osteomyelitis Of Left Foot (Hcc), Metabolic Encephalopathy, Ckd (Chronic Kidney Disease), Htn (Hypertension), Diabetes Mellitus Type I (Hcc), Charcot Ankle, Left Follow-up Information Follow up With Details Comments Contact Info Joshua Jacobson MD On 7/5/2018 Hospital f/u PCP appointment Thursday, 7/5/18 @ 1:30 p.m.  4080 [facility] [address] [place] [address] 
[phone] Luis E Hinkle MD  in  1-2 week for discharge follow up  [facility] [address] [address] [phone] Juanito Patel MD In 2 weeks discharge follow up  [facility] 1640 [address] [place] [address] 
[phone] Reza Schafer DPM  call to make appointment 2027 [facility] [address] [facility] [phone] Discharge Orders None A check dino indicates which time of day the medication should be taken. My Medications START taking these medications Instructions Each Dose to Equal  
 Morning Noon Evening Bedtime  
 amLODIPine 10 mg tablet Commonly known as:  Lesia Pace Your last dose was: Your next dose is: Take 1 Tab by mouth daily. 10 mg  
    
   
   
   
  
 calcitRIOL 0.25 mcg capsule Commonly known as:  ROCALTROL Your last dose was: Your next dose is: Take 1 Cap by mouth daily. 0.25 mcg  
    
   
   
   
  
 ciprofloxacin HCl 500 mg tablet Commonly known as:  CIPRO Your last dose was: Your next dose is: Take 1 Tab by mouth daily. 500 mg  
    
   
   
   
  
 cloNIDine HCl 0.1 mg tablet Commonly known as:  CATAPRES Your last dose was: Your next dose is: Take 1 Tab by mouth two (2) times a day. 0.1 mg  
    
   
   
   
  
 Diabetic Supplies, Miscellan. Kit Your last dose was: Your next dose is: USE AS DIRECTED  
     
   
   
   
  
 hydrALAZINE 100 mg tablet Commonly known as:  APRESOLINE Your last dose was: Your next dose is: Take 1 Tab by mouth three (3) times daily. 100 mg  
    
   
   
   
  
 insulin glargine 100 unit/mL injection Commonly known as:  LANTUS U-100 INSULIN Your last dose was: Your next dose is:    
   
   
 10 Units by SubCUTAneous route daily. 10 Units  
    
   
   
   
  
 sodium bicarbonate 650 mg tablet Your last dose was: Your next dose is: Take 1 Tab by mouth two (2) times a day. 650 mg CONTINUE taking these medications Instructions Each Dose to Equal  
 Morning Noon Evening Bedtime  
 albuterol 2.5 mg /3 mL (0.083 %) nebulizer solution Commonly known as:  PROVENTIL VENTOLIN Your last dose was: Your next dose is:    
   
   
 2.5 mg by Nebulization route every four (4) hours as needed. 2.5 mg  
    
   
   
   
  
 cholecalciferol 50,000 unit capsule Commonly known as:  VITAMIN D3 Your last dose was: Your next dose is: Take 1 Cap by mouth every seven (7) days. [address] Units  
    
   
   
   
  
 cyanocobalamin 1,000 mcg tablet Your last dose was: Your next dose is: Take 1,000 mcg by mouth daily. 1000 mcg  
    
   
   
   
  
 oxyCODONE-acetaminophen 5-325 mg per tablet Commonly known as:  PERCOCET Your last dose was: Your next dose is: Take 1 Tab by mouth every six (6) hours as needed for Pain.  Max Daily Amount: 4 Tabs. Scheduled 1 Tab  
    
   
   
   
  
 promethazine 25 mg tablet Commonly known as:  PHENERGAN Your last dose was: Your next dose is: Take 1 Tab by mouth every eight (8) hours as needed for Nausea. 25 mg  
    
   
   
   
  
 QUEtiapine 100 mg tablet Commonly known as:  SEROquel Your last dose was: Your next dose is: Take 1 Tab by mouth two (2) times a day. 100 mg  
    
   
   
   
  
 senna-docusate 8.6-50 mg per tablet Commonly known as:  Emanuel Rincon Your last dose was: Your next dose is: Take 2 Tabs by mouth daily. 2 Tab  
    
   
   
   
  
 venlafaxine 75 mg tablet Commonly known as:  [facility] Your last dose was: Your next dose is: Take 1 Tab by mouth two (2) times daily (with meals). 75 mg  
    
   
   
   
  
  
STOP taking these medications   
 calcium carbonate 200 mg calcium (500 mg) Chew Commonly known as:  TUMS Where to Get Your Medications These medications were sent to [facility], [address] [place] [address] [address] [place] [address]  [address] [place] [address], [place] [address] Phone:  [phone]  
  cholecalciferol 50,000 unit capsule Diabetic Supplies, [place] [address]. Kit Information on where to get these meds will be given to you by the nurse or doctor. ! Ask your nurse or doctor about these medications  
  amLODIPine 10 mg tablet  
 calcitRIOL 0.25 mcg capsule  
 ciprofloxacin HCl 500 mg tablet  
 cloNIDine HCl 0.1 mg tablet  
 hydrALAZINE 100 mg tablet  
 insulin glargine 100 unit/mL injection  
 oxyCODONE-acetaminophen 5-325 mg per tablet  
 promethazine 25 mg tablet QUEtiapine 100 mg tablet  
 senna-docusate 8.6-50 mg per tablet  
 sodium bicarbonate 650 mg tablet  
 venlafaxine 75 mg tablet Opioid Education Prescription Opioids: What You Need to Know: Prescription opioids can be used to help relieve moderate-to-severe pain and are often prescribed following a surgery or injury, or for certain health conditions. These medications can be an important part of treatment but also come with serious risks. Opioids are strong pain medicines. Examples include hydrocodone, oxycodone, fentanyl, and morphine. Heroin is an example of an illegal opioid. It is important to work with your health care provider to make sure you are getting the safest, most effective care. WHAT ARE THE RISKS AND SIDE EFFECTS OF OPIOID USE? Prescription opioids carry serious risks of addiction and overdose, especially with prolonged use. An opioid overdose, often marked by slow breathing, can cause sudden death. The use of prescription opioids can have a number of side effects as well, even when taken as directed. · Tolerance-meaning you might need to take more of a medication for the same pain relief · Physical dependence-meaning you have symptoms of withdrawal when the medication is stopped. Withdrawal symptoms can include nausea, sweating, chills, diarrhea, stomach cramps, and muscle aches. Withdrawal can last up to several weeks, depending on which drug you took and how long you took it. · Increased sensitivity to pain · Constipation · Nausea, vomiting, and dry mouth · Sleepiness and dizziness · Confusion · Depression · Low levels of testosterone that can result in lower sex drive, energy, and strength · Itching and sweating RISKS ARE GREATER WITH:      
· History of drug misuse, substance use disorder, or overdose · Mental health conditions (such as depression or anxiety) · Sleep apnea · Older age (72 years or older) · Pregnancy Avoid alcohol while taking prescription opioids. Also, unless specifically advised by your health care provider, medications to avoid include: · Benzodiazepines (such as Xanax or Valium) · Muscle relaxants (such as Soma or Flexeril) · Hypnotics (such as Ambien or Lunesta) · Other prescription opioids KNOW YOUR OPTIONS Talk to your health care provider about ways to manage your pain that don't involve prescription opioids. Some of these options may actually work better and have fewer risks and side effects. Options may include: 
· Pain relievers such as acetaminophen, ibuprofen, and naproxen · Some medications that are also used for depression or seizures · Physical therapy and exercise · Counseling to help patients learn how to cope better with triggers of pain and stress. · Application of heat or cold compress · Massage therapy · Relaxation techniques Be Informed Make sure you know the name of your medication, how much and how often to take it, and its potential risks & side effects. IF YOU ARE PRESCRIBED OPIOIDS FOR PAIN: 
· Never take opioids in greater amounts or more often than prescribed. Remember the goal is not to be pain-free but to manage your pain at a tolerable level. · Follow up with your primary care provider to: · Work together to create a plan on how to manage your pain. · Talk about ways to help manage your pain that don't involve prescription opioids. · Talk about any and all concerns and side effects. · Help prevent misuse and abuse. · Never sell or share prescription opioids · Help prevent misuse and abuse. · Store prescription opioids in a secure place and out of reach of others (this may include visitors, children, friends, and family). · Safely dispose of unused/unwanted prescription opioids: Find your community drug take-back program or your pharmacy mail-back program, or flush them down the toilet, following guidance from the Food and Drug Administration (www.fda.gov/Drugs/ResourcesForYou). · Visit www.cdc.gov/drugoverdose to learn about the risks of opioid abuse and overdose.  
· If you believe you may be struggling with addiction, tell your health care provider and ask for guidance or call [address] [place] [address] at [phone]. Discharge Instructions Please bring this form with you to show your primary care provider at your follow-up appointment. Primary care provider:  Dr. Jeanine Light MD 
 
Discharging provider:  Callum Stein MD 
 
You have been admitted to the hospital with the following diagnoses: · Sickle cell crisis ([address] · central line FOLLOW-UP CARE RECOMMENDATIONS: 
 
APPOINTMENTS: 
Follow-up Information Follow up With Details Comments Contact Info Jeanine Light MD On 7/5/2018 Hospital f/u PCP appointment Thursday, 7/5/18 @ 1:30 p.m.  7050 [facility] [address] [place] [address] 
[phone] Biju Gonzalez MD  in  1-2 week for discharge follow up  [facility] [address] [address] 
[phone] Mary Arambula MD In 2 weeks discharge follow up  [facility] 1640 [address] [place] [address] 
[phone] FOLLOW-UP TESTS recommended:  
- follow up with  regarding Amputation discussion - Complete antibiotic course as prescribe PENDING TEST RESULTS: 
At the time of your discharge the following test results are still pending: none Please make sure you review these results with your outpatient follow-up provider(s). SYMPTOMS to watch for: chest pain, shortness of breath, fever, chills, nausea, vomiting, diarrhea, change in mentation, falling, weakness, bleeding. DIET/what to eat:  Diabetic Diet ACTIVITY:  Activity as tolerated WOUND CARE: NONE 
 
EQUIPMENT needed:  NONE What to do if new or unexpected symptoms occur? If you experience any of the above symptoms (or should other concerns or questions arise after discharge) please call your primary care physician. Return to the emergency room if you cannot get hold of your doctor. · It is very important that you keep your follow-up appointment(s). · Please bring discharge papers, medication list (and/or medication bottles) to your follow-up appointments for review by your outpatient provider(s). · Please check the list of medications and be sure it includes every medication (even non-prescription medications) that your provider wants you to take. · It is important that you take the medication exactly as they are prescribed. · Keep your medication in the bottles provided by the pharmacist and keep a list of the medication names, dosages, and times to be taken in your wallet. · Do not take other medications without consulting your doctor. · If you have any questions about your medications or other instructions, please talk to your nurse or care provider before you leave the hospital. 
 
I understand that if any problems occur once I am at home I am to contact my physician. These instructions were explained to me and I had the opportunity to ask questions. DISCHARGE SUMMARY from Nurse PATIENT INSTRUCTIONS: 
 
After general anesthesia or intravenous sedation, for 24 hours or while taking prescription Narcotics: · Limit your activities · Do not drive and operate hazardous machinery · Do not make important personal or business decisions · Do  not drink alcoholic beverages · If you have not urinated within 8 hours after discharge, please contact your surgeon on call. Report the following to your surgeon: 
· Excessive pain, swelling, redness or odor of or around the surgical area · Temperature over 100.5 · Nausea and vomiting lasting longer than 4 hours or if unable to take medications · Any signs of decreased circulation or nerve impairment to extremity: change in color, persistent  numbness, tingling, coldness or increase pain · Any questions What to do at Home: 
Recommended activity: {discharge activity:62977}, *** 
 
 If you experience any of the following symptoms ***, please follow up with ***. *  Please give a list of your current medications to your Primary Care Provider. *  Please update this list whenever your medications are discontinued, doses are 
    changed, or new medications (including over-the-counter products) are added. *  Please carry medication information at all times in case of emergency situations. These are general instructions for a healthy lifestyle: No smoking/ No tobacco products/ Avoid exposure to second hand smoke Surgeon General's Warning:  Quitting smoking now greatly reduces serious risk to your health. Obesity, smoking, and sedentary lifestyle greatly increases your risk for illness A healthy diet, regular physical exercise & weight monitoring are important for maintaining a healthy lifestyle You may be retaining fluid if you have a history of heart failure or if you experience any of the following symptoms:  Weight gain of 3 pounds or more overnight or 5 pounds in a week, increased swelling in our hands or feet or shortness of breath while lying flat in bed. Please call your doctor as soon as you notice any of these symptoms; do not wait until your next office visit. Recognize signs and symptoms of STROKE: 
 
F-face looks uneven A-arms unable to move or move unevenly S-speech slurred or non-existent T-time-call 911 as soon as signs and symptoms begin-DO NOT go Back to bed or wait to see if you get better-TIME IS BRAIN. Warning Signs of HEART ATTACK Call 911 if you have these symptoms: 
? Chest discomfort. Most heart attacks involve discomfort in the center of the chest that lasts more than a few minutes, or that goes away and comes back. It can feel like uncomfortable pressure, squeezing, fullness, or pain. ? Discomfort in other areas of the upper body. Symptoms can include pain or discomfort in one or both arms, the back, neck, jaw, or stomach. ? Shortness of breath with or without chest discomfort. ? Other signs may include breaking out in a cold sweat, nausea, or lightheadedness. Don't wait more than five minutes to call [address]! Fast action can save your life. Calling 911 is almost always the fastest way to get lifesaving treatment. Emergency Medical Services staff can begin treatment when they arrive  up to an hour sooner than if someone gets to the hospital by car. The discharge information has been reviewed with the {PATIENT PARENT GUARDIAN:14385}. The {PATIENT PARENT GUARDIAN:60916} verbalized understanding. Discharge medications reviewed with the {Dishcarge meds reviewed XIPE:67646} and appropriate educational materials and side effects teaching were provided. ___________________________________________________________________________________________________________________________________ MyChart Activation Thank you for requesting access to [facility]. Please follow the instructions below to securely access and download your online medical record. [facility] allows you to send messages to your doctor, view your test results, renew your prescriptions, schedule appointments, and more. How Do I Sign Up? 1. In your internet browser, go to [email or web address]. 2. Click on the First Time User? Click Here link in the Sign In box. You will see the New Member Sign Up page. 3. Enter your [facility] Access Code exactly as it appears below. You will not need to use this code after youve completed the sign-up process. If you do not sign up before the expiration date, you must request a new code. [facility] Access Code: [email or web address] Expires: 2018  9:43 PM (This is the date your [facility] access code will ) 4. Enter the last four digits of your Social Security Number (xxxx) and Date of Birth (mm/dd/yyyy) as indicated and click Submit. You will be taken to the next sign-up page. 5. Create a Algisys ID. This will be your Algisys login ID and cannot be changed, so think of one that is secure and easy to remember. 6. Create a Algisys password. You can change your password at any time. 7. Enter your Password Reset Question and Answer. This can be used at a later time if you forget your password. 8. Enter your e-mail address. You will receive e-mail notification when new information is available in [address] 9. Click Sign Up. You can now view and download portions of your medical record. 10. Click the Download Summary menu link to download a portable copy of your medical information. Additional Information If you have questions, please visit the Frequently Asked Questions section of the Algisys website at [email or web address] [email or web address] Remember, Algisys is NOT to be used for urgent needs. For medical emergencies, dial 911. Algisys Announcement We are excited to announce that we are making your provider's discharge notes available to you in Algisys. You will see these notes when they are completed and signed by the physician that discharged you from your recent hospital stay. If you have any questions or concerns about any information you see in Algisys, please call the Health Information Department where you were seen or reach out to your Primary Care Provider for more information about your plan of care. Introducing [facility]! Mauro Garcia introduces Algisys patient portal. Now you can access parts of your medical record, email your doctor's office, and request medication refills online. 1. In your internet browser, go to [email or web address] 2. Click on the First Time User? Click Here link in the Sign In box. You will see the New Member Sign Up page. 3. Enter your Algisys Access Code exactly as it appears below. You will not need to use this code after youve completed the sign-up process.  If you do not sign up before the expiration date, you must request a new code. · GaBoom Access Code: [facility] Expires: 9/7/2018  9:43 PM 
 
4. Enter the last four digits of your Social Security Number (xxxx) and Date of Birth (mm/dd/yyyy) as indicated and click Submit. You will be taken to the next sign-up page. 5. Create a GaBoom ID. This will be your GaBoom login ID and cannot be changed, so think of one that is secure and easy to remember. 6. Create a GaBoom password. You can change your password at any time. 7. Enter your Password Reset Question and Answer. This can be used at a later time if you forget your password. 8. Enter your e-mail address. You will receive e-mail notification when new information is available in [address] 9. Click Sign Up. You can now view and download portions of your medical record. 10. Click the Download Summary menu link to download a portable copy of your medical information. If you have questions, please visit the Frequently Asked Questions section of the GaBoom website. Remember, GaBoom is NOT to be used for urgent needs. For medical emergencies, dial 911. Now available from your iPhone and Android! Introducing Kirk Gomez As a [facility] patient, I wanted to make you aware of our electronic visit tool called Kirk Gomez. [facility] 24/7 allows you to connect within minutes with a medical provider 24 hours a day, seven days a week via a mobile device or tablet or logging into a secure website from your computer. You can access Kirk Gomez from anywhere in the [place].  
 
A virtual visit might be right for you when you have a simple condition and feel like you just dont want to get out of bed, or cant get away from work for an appointment, when your regular [facility] provider is not available (evenings, weekends or holidays), or when youre out of town and need minor care. Electronic visits cost only $49 and if the eTask.it 24/7 provider determines a prescription is needed to treat your condition, one can be electronically transmitted to a nearby pharmacy*. Please take a moment to enroll today if you have not already done so. The enrollment process is free and takes just a few minutes. To enroll, please download the ProBinder jose to your tablet or phone, or visit [email or web address] to enroll on your computer. And, as an [address] [place] [address] patient with a [employer] account, the results of your visits will be scanned into your electronic medical record and your primary care provider will be able to view the scanned results. We urge you to continue to see your regular [facility] provider for your ongoing medical care. And while your primary care provider may not be the one available when you seek a [employer]sys virtual visit, the peace of mind you get from getting a real diagnosis real time can be priceless. For more information on Vaccsys, view our Frequently Asked Questions (FAQs) at [email or web address]. Sincerely, 
 
Isiah Patricio MD 
Chief Medical Officer [facility] *:  certain medications cannot be prescribed via Vaccsys Providers Seen During Your Hospitalization Provider Specialty Primary office phone Severa Fergusson, MD Emergency Medicine [phone] Flip Kenyon MD Hospitalist [phone] Freddie Stanford MD Internal Medicine [phone] Erica Adam MD Internal Medicine [phone] Jose Tompkins MD Internal Medicine [phone] Jv Mcconnell MD Internal Medicine [phone] Vladimir Bruner MD Internal Medicine [phone] Your Primary Care Physician (PCP) Primary Care Physician Office Phone Office Fax [address], [address] [facility] [phone] You are allergic to the following Allergen Reactions Fentanyl Itching Angioedema \"throat closed up\" per pt Erythromycin Itching Toradol (Ketorolac) Rash Morphine Itching Recent Documentation Height Weight BMI OB Status Smoking Status 1.575 m 63.1 kg 25.46 kg/m2 Polycystic Ovarian Syndrome Never Smoker Emergency Contacts Name Discharge Info Relation Home Work Mobile Stephen Garcia DISCHARGE CAREGIVER [3] Spouse [3] [phone] Patient Belongings The following personal items are in your possession at time of discharge: 
  Dental Appliances: None  Visual Aid: Glasses, With patient      Home Medications: None   Jewelry: None  Clothing: Pants, Undergarments, Shorts, With patient    Other Valuables: Cell Phone Please provide this summary of care documentation to your next provider. Signatures-by signing, you are acknowledging that this After Visit Summary has been reviewed with you and you have received a copy. Patient Signature:  ____________________________________________________________ Date:  ____________________________________________________________  
  
Amari Vargas Provider Signature:  ____________________________________________________________ Date:  ____________________________________________________________

## 2018-06-24 PROBLEM — D57.00 SICKLE CELL CRISIS (HCC): Status: ACTIVE | Noted: 2018-06-24

## 2018-06-24 LAB
ALBUMIN SERPL-MCNC: 2.6 G/DL (ref 3.5–5)
ALBUMIN/GLOB SERPL: 0.6 {RATIO} (ref 1.1–2.2)
ALP SERPL-CCNC: 176 U/L (ref 45–117)
ALT SERPL-CCNC: 22 U/L (ref 12–78)
ANION GAP SERPL CALC-SCNC: 8 MMOL/L (ref 5–15)
ANION GAP SERPL CALC-SCNC: 9 MMOL/L (ref 5–15)
APPEARANCE UR: CLEAR
APTT PPP: 33.9 SEC (ref 22.1–32)
ARTERIAL PATENCY WRIST A: ABNORMAL
ARTERIAL PATENCY WRIST A: YES
AST SERPL-CCNC: 19 U/L (ref 15–37)
ATRIAL RATE: 126 BPM
BACTERIA URNS QL MICRO: NEGATIVE /HPF
BASE DEFICIT BLD-SCNC: 11 MMOL/L
BASE DEFICIT BLD-SCNC: 13 MMOL/L
BASOPHILS # BLD: 0 K/UL (ref 0–0.1)
BASOPHILS NFR BLD: 0 % (ref 0–1)
BDY SITE: ABNORMAL
BDY SITE: ABNORMAL
BILIRUB SERPL-MCNC: 0.3 MG/DL (ref 0.2–1)
BILIRUB UR QL: NEGATIVE
BUN SERPL-MCNC: 56 MG/DL (ref 6–20)
BUN SERPL-MCNC: 61 MG/DL (ref 6–20)
BUN/CREAT SERPL: 19 (ref 12–20)
BUN/CREAT SERPL: 19 (ref 12–20)
CA-I BLD-SCNC: 0.97 MMOL/L (ref 1.12–1.32)
CALCIUM SERPL-MCNC: 5.9 MG/DL (ref 8.5–10.1)
CALCIUM SERPL-MCNC: 6.7 MG/DL (ref 8.5–10.1)
CALCULATED P AXIS, ECG09: 44 DEGREES
CALCULATED R AXIS, ECG10: 21 DEGREES
CALCULATED T AXIS, ECG11: 54 DEGREES
CHLORIDE SERPL-SCNC: 112 MMOL/L (ref 97–108)
CHLORIDE SERPL-SCNC: 114 MMOL/L (ref 97–108)
CK SERPL-CCNC: 97 U/L (ref 26–192)
CO2 SERPL-SCNC: 17 MMOL/L (ref 21–32)
CO2 SERPL-SCNC: 20 MMOL/L (ref 21–32)
COLOR UR: ABNORMAL
CREAT SERPL-MCNC: 2.95 MG/DL (ref 0.55–1.02)
CREAT SERPL-MCNC: 3.27 MG/DL (ref 0.55–1.02)
DIAGNOSIS, 93000: NORMAL
DIFFERENTIAL METHOD BLD: ABNORMAL
EOSINOPHIL # BLD: 0.1 K/UL (ref 0–0.4)
EOSINOPHIL NFR BLD: 1 % (ref 0–7)
EPITH CASTS URNS QL MICRO: ABNORMAL /LPF
ERYTHROCYTE [DISTWIDTH] IN BLOOD BY AUTOMATED COUNT: 16.9 % (ref 11.5–14.5)
EST. AVERAGE GLUCOSE BLD GHB EST-MCNC: 180 MG/DL
GAS FLOW.O2 O2 DELIVERY SYS: ABNORMAL L/MIN
GAS FLOW.O2 O2 DELIVERY SYS: ABNORMAL L/MIN
GAS FLOW.O2 SETTING OXYMISER: 1 L/M
GAS FLOW.O2 SETTING OXYMISER: 1 L/M
GLOBULIN SER CALC-MCNC: 4.4 G/DL (ref 2–4)
GLUCOSE BLD STRIP.AUTO-MCNC: 106 MG/DL (ref 65–100)
GLUCOSE BLD STRIP.AUTO-MCNC: 112 MG/DL (ref 65–100)
GLUCOSE BLD STRIP.AUTO-MCNC: 119 MG/DL (ref 65–100)
GLUCOSE BLD STRIP.AUTO-MCNC: 134 MG/DL (ref 65–100)
GLUCOSE BLD STRIP.AUTO-MCNC: 170 MG/DL (ref 65–100)
GLUCOSE BLD STRIP.AUTO-MCNC: 173 MG/DL (ref 65–100)
GLUCOSE BLD STRIP.AUTO-MCNC: 180 MG/DL (ref 65–100)
GLUCOSE BLD STRIP.AUTO-MCNC: 182 MG/DL (ref 65–100)
GLUCOSE BLD STRIP.AUTO-MCNC: 27 MG/DL (ref 65–100)
GLUCOSE BLD STRIP.AUTO-MCNC: 272 MG/DL (ref 65–100)
GLUCOSE BLD STRIP.AUTO-MCNC: 30 MG/DL (ref 65–100)
GLUCOSE BLD STRIP.AUTO-MCNC: 31 MG/DL (ref 65–100)
GLUCOSE BLD STRIP.AUTO-MCNC: 37 MG/DL (ref 65–100)
GLUCOSE BLD STRIP.AUTO-MCNC: 37 MG/DL (ref 65–100)
GLUCOSE BLD STRIP.AUTO-MCNC: 46 MG/DL (ref 65–100)
GLUCOSE BLD STRIP.AUTO-MCNC: 591 MG/DL (ref 65–100)
GLUCOSE BLD STRIP.AUTO-MCNC: 60 MG/DL (ref 65–100)
GLUCOSE BLD STRIP.AUTO-MCNC: 60 MG/DL (ref 65–100)
GLUCOSE BLD STRIP.AUTO-MCNC: 82 MG/DL (ref 65–100)
GLUCOSE BLD STRIP.AUTO-MCNC: 91 MG/DL (ref 65–100)
GLUCOSE BLD STRIP.AUTO-MCNC: 91 MG/DL (ref 65–100)
GLUCOSE SERPL-MCNC: 25 MG/DL (ref 65–100)
GLUCOSE SERPL-MCNC: 35 MG/DL (ref 65–100)
GLUCOSE SERPL-MCNC: 50 MG/DL (ref 65–100)
GLUCOSE UR STRIP.AUTO-MCNC: NEGATIVE MG/DL
HBA1C MFR BLD: 7.9 % (ref 4.2–6.3)
HCO3 BLD-SCNC: 15.8 MMOL/L (ref 22–26)
HCO3 BLD-SCNC: 17.2 MMOL/L (ref 22–26)
HCT VFR BLD AUTO: 23.1 % (ref 35–47)
HGB BLD-MCNC: 7.3 G/DL (ref 11.5–16)
HGB UR QL STRIP: NEGATIVE
HYALINE CASTS URNS QL MICRO: ABNORMAL /LPF (ref 0–5)
IMM GRANULOCYTES # BLD: 0.1 K/UL (ref 0–0.04)
IMM GRANULOCYTES NFR BLD AUTO: 1 % (ref 0–0.5)
INR PPP: 1.1 (ref 0.9–1.1)
KETONES UR QL STRIP.AUTO: NEGATIVE MG/DL
LACTATE SERPL-SCNC: 0.5 MMOL/L (ref 0.4–2)
LACTATE SERPL-SCNC: 0.5 MMOL/L (ref 0.4–2)
LEUKOCYTE ESTERASE UR QL STRIP.AUTO: NEGATIVE
LIPASE SERPL-CCNC: 660 U/L (ref 73–393)
LYMPHOCYTES # BLD: 0.4 K/UL (ref 0.8–3.5)
LYMPHOCYTES NFR BLD: 6 % (ref 12–49)
MCH RBC QN AUTO: 25.7 PG (ref 26–34)
MCHC RBC AUTO-ENTMCNC: 31.6 G/DL (ref 30–36.5)
MCV RBC AUTO: 81.3 FL (ref 80–99)
MONOCYTES # BLD: 0.5 K/UL (ref 0–1)
MONOCYTES NFR BLD: 8 % (ref 5–13)
NEUTS SEG # BLD: 5.5 K/UL (ref 1.8–8)
NEUTS SEG NFR BLD: 84 % (ref 32–75)
NITRITE UR QL STRIP.AUTO: NEGATIVE
NRBC # BLD: 0.02 K/UL (ref 0–0.01)
NRBC BLD-RTO: 0.3 PER 100 WBC
O2/TOTAL GAS SETTING VFR VENT: 24 %
O2/TOTAL GAS SETTING VFR VENT: 24 %
P-R INTERVAL, ECG05: 122 MS
PCO2 BLD: 43.5 MMHG (ref 35–45)
PCO2 BLD: 47.3 MMHG (ref 35–45)
PH BLD: 7.17 [PH] (ref 7.35–7.45)
PH BLD: 7.17 [PH] (ref 7.35–7.45)
PH UR STRIP: 6 [PH] (ref 5–8)
PLATELET # BLD AUTO: 150 K/UL (ref 150–400)
PLATELET COMMENTS,PCOM: ABNORMAL
PMV BLD AUTO: 11.8 FL (ref 8.9–12.9)
PO2 BLD: 125 MMHG (ref 80–100)
PO2 BLD: 42 MMHG (ref 80–100)
POTASSIUM SERPL-SCNC: 4 MMOL/L (ref 3.5–5.1)
POTASSIUM SERPL-SCNC: 4.3 MMOL/L (ref 3.5–5.1)
PROT SERPL-MCNC: 7 G/DL (ref 6.4–8.2)
PROT UR STRIP-MCNC: 100 MG/DL
PROTHROMBIN TIME: 11.2 SEC (ref 9–11.1)
Q-T INTERVAL, ECG07: 310 MS
QRS DURATION, ECG06: 72 MS
QTC CALCULATION (BEZET), ECG08: 448 MS
RBC # BLD AUTO: 2.84 M/UL (ref 3.8–5.2)
RBC #/AREA URNS HPF: ABNORMAL /HPF (ref 0–5)
RBC MORPH BLD: ABNORMAL
RETICS # AUTO: 0.1 M/UL (ref 0.02–0.08)
RETICS/RBC NFR AUTO: 3.5 % (ref 0.7–2.1)
SAO2 % BLD: 64 % (ref 92–97)
SAO2 % BLD: 98 % (ref 92–97)
SERVICE CMNT-IMP: ABNORMAL
SERVICE CMNT-IMP: NORMAL
SODIUM SERPL-SCNC: 138 MMOL/L (ref 136–145)
SODIUM SERPL-SCNC: 142 MMOL/L (ref 136–145)
SP GR UR REFRACTOMETRY: 1.01 (ref 1–1.03)
SPECIMEN TYPE: ABNORMAL
SPECIMEN TYPE: ABNORMAL
THERAPEUTIC RANGE,PTTT: ABNORMAL SECS (ref 58–77)
TOTAL RESP. RATE, ITRR: 16
TOTAL RESP. RATE, ITRR: 16
UA: UC IF INDICATED,UAUC: ABNORMAL
UROBILINOGEN UR QL STRIP.AUTO: 0.2 EU/DL (ref 0.2–1)
VENTRICULAR RATE, ECG03: 126 BPM
WBC # BLD AUTO: 6.6 K/UL (ref 3.6–11)
WBC URNS QL MICRO: ABNORMAL /HPF (ref 0–4)

## 2018-06-24 PROCEDURE — 74011000250 HC RX REV CODE- 250: Performed by: HOSPITALIST

## 2018-06-24 PROCEDURE — 74011250636 HC RX REV CODE- 250/636: Performed by: HOSPITALIST

## 2018-06-24 PROCEDURE — 82947 ASSAY GLUCOSE BLOOD QUANT: CPT

## 2018-06-24 PROCEDURE — 83605 ASSAY OF LACTIC ACID: CPT | Performed by: EMERGENCY MEDICINE

## 2018-06-24 PROCEDURE — 36600 WITHDRAWAL OF ARTERIAL BLOOD: CPT

## 2018-06-24 PROCEDURE — 82550 ASSAY OF CK (CPK): CPT | Performed by: HOSPITALIST

## 2018-06-24 PROCEDURE — 74011250637 HC RX REV CODE- 250/637: Performed by: HOSPITALIST

## 2018-06-24 PROCEDURE — 82962 GLUCOSE BLOOD TEST: CPT

## 2018-06-24 PROCEDURE — 82803 BLOOD GASES ANY COMBINATION: CPT

## 2018-06-24 PROCEDURE — 80048 BASIC METABOLIC PNL TOTAL CA: CPT | Performed by: HOSPITALIST

## 2018-06-24 PROCEDURE — 74011250636 HC RX REV CODE- 250/636: Performed by: EMERGENCY MEDICINE

## 2018-06-24 PROCEDURE — 74011000258 HC RX REV CODE- 258: Performed by: HOSPITALIST

## 2018-06-24 PROCEDURE — 74011000258 HC RX REV CODE- 258: Performed by: EMERGENCY MEDICINE

## 2018-06-24 PROCEDURE — 36415 COLL VENOUS BLD VENIPUNCTURE: CPT | Performed by: HOSPITALIST

## 2018-06-24 PROCEDURE — 87077 CULTURE AEROBIC IDENTIFY: CPT | Performed by: HOSPITALIST

## 2018-06-24 PROCEDURE — 96375 TX/PRO/DX INJ NEW DRUG ADDON: CPT

## 2018-06-24 PROCEDURE — 87186 SC STD MICRODIL/AGAR DIL: CPT | Performed by: HOSPITALIST

## 2018-06-24 PROCEDURE — 65620000000 HC RM CCU GENERAL

## 2018-06-24 RX ORDER — QUETIAPINE FUMARATE 100 MG/1
100 TABLET, FILM COATED ORAL 2 TIMES DAILY
Status: DISCONTINUED | OUTPATIENT
Start: 2018-06-24 | End: 2018-07-04 | Stop reason: HOSPADM

## 2018-06-24 RX ORDER — SODIUM CHLORIDE 450 MG/100ML
50 INJECTION, SOLUTION INTRAVENOUS CONTINUOUS
Status: DISCONTINUED | OUTPATIENT
Start: 2018-06-24 | End: 2018-06-24

## 2018-06-24 RX ORDER — SODIUM CHLORIDE 0.9 % (FLUSH) 0.9 %
10 SYRINGE (ML) INJECTION AS NEEDED
Status: DISCONTINUED | OUTPATIENT
Start: 2018-06-24 | End: 2018-07-04 | Stop reason: HOSPADM

## 2018-06-24 RX ORDER — DEXTROSE MONOHYDRATE 100 MG/ML
50 INJECTION, SOLUTION INTRAVENOUS CONTINUOUS
Status: DISCONTINUED | OUTPATIENT
Start: 2018-06-24 | End: 2018-06-26

## 2018-06-24 RX ORDER — INSULIN LISPRO 100 [IU]/ML
INJECTION, SOLUTION INTRAVENOUS; SUBCUTANEOUS
Status: DISCONTINUED | OUTPATIENT
Start: 2018-06-24 | End: 2018-07-04 | Stop reason: HOSPADM

## 2018-06-24 RX ORDER — MAGNESIUM SULFATE 100 %
4 CRYSTALS MISCELLANEOUS AS NEEDED
Status: DISCONTINUED | OUTPATIENT
Start: 2018-06-24 | End: 2018-07-04 | Stop reason: HOSPADM

## 2018-06-24 RX ORDER — HYDRALAZINE HYDROCHLORIDE 20 MG/ML
20 INJECTION INTRAMUSCULAR; INTRAVENOUS
Status: DISCONTINUED | OUTPATIENT
Start: 2018-06-24 | End: 2018-06-26

## 2018-06-24 RX ORDER — HYDROMORPHONE HYDROCHLORIDE 1 MG/ML
0.5 INJECTION, SOLUTION INTRAMUSCULAR; INTRAVENOUS; SUBCUTANEOUS
Status: DISCONTINUED | OUTPATIENT
Start: 2018-06-24 | End: 2018-06-24

## 2018-06-24 RX ORDER — DIPHENHYDRAMINE HYDROCHLORIDE 50 MG/ML
25 INJECTION, SOLUTION INTRAMUSCULAR; INTRAVENOUS
Status: COMPLETED | OUTPATIENT
Start: 2018-06-24 | End: 2018-06-24

## 2018-06-24 RX ORDER — SODIUM BICARBONATE 650 MG/1
650 TABLET ORAL 2 TIMES DAILY
Status: DISCONTINUED | OUTPATIENT
Start: 2018-06-24 | End: 2018-07-04 | Stop reason: HOSPADM

## 2018-06-24 RX ORDER — OXYCODONE AND ACETAMINOPHEN 5; 325 MG/1; MG/1
1 TABLET ORAL
Status: DISCONTINUED | OUTPATIENT
Start: 2018-06-24 | End: 2018-07-03

## 2018-06-24 RX ORDER — HYDRALAZINE HYDROCHLORIDE 20 MG/ML
INJECTION INTRAMUSCULAR; INTRAVENOUS
Status: DISPENSED
Start: 2018-06-24 | End: 2018-06-24

## 2018-06-24 RX ORDER — FUROSEMIDE 10 MG/ML
40 INJECTION INTRAMUSCULAR; INTRAVENOUS ONCE
Status: COMPLETED | OUTPATIENT
Start: 2018-06-24 | End: 2018-06-24

## 2018-06-24 RX ORDER — ALBUTEROL SULFATE 0.83 MG/ML
2.5 SOLUTION RESPIRATORY (INHALATION)
Status: DISCONTINUED | OUTPATIENT
Start: 2018-06-24 | End: 2018-07-04 | Stop reason: HOSPADM

## 2018-06-24 RX ORDER — CALCIUM CARBONATE 200(500)MG
200 TABLET,CHEWABLE ORAL 3 TIMES DAILY
Status: DISCONTINUED | OUTPATIENT
Start: 2018-06-24 | End: 2018-07-04 | Stop reason: HOSPADM

## 2018-06-24 RX ORDER — HYDROMORPHONE HYDROCHLORIDE 1 MG/ML
1 INJECTION, SOLUTION INTRAMUSCULAR; INTRAVENOUS; SUBCUTANEOUS ONCE
Status: COMPLETED | OUTPATIENT
Start: 2018-06-24 | End: 2018-06-24

## 2018-06-24 RX ORDER — HEPARIN SODIUM 5000 [USP'U]/ML
5000 INJECTION, SOLUTION INTRAVENOUS; SUBCUTANEOUS EVERY 8 HOURS
Status: DISCONTINUED | OUTPATIENT
Start: 2018-06-24 | End: 2018-07-02 | Stop reason: SDUPTHER

## 2018-06-24 RX ORDER — CALCITRIOL 0.25 UG/1
0.25 CAPSULE ORAL DAILY
Status: DISCONTINUED | OUTPATIENT
Start: 2018-06-24 | End: 2018-07-04 | Stop reason: HOSPADM

## 2018-06-24 RX ORDER — DEXTROSE 50 % IN WATER (D50W) INTRAVENOUS SYRINGE
50
Status: COMPLETED | OUTPATIENT
Start: 2018-06-24 | End: 2018-06-24

## 2018-06-24 RX ORDER — DEXTROSE 50 % IN WATER (D50W) INTRAVENOUS SYRINGE
12.5-25 AS NEEDED
Status: DISCONTINUED | OUTPATIENT
Start: 2018-06-24 | End: 2018-07-04 | Stop reason: HOSPADM

## 2018-06-24 RX ORDER — SODIUM CHLORIDE 0.9 % (FLUSH) 0.9 %
5-10 SYRINGE (ML) INJECTION EVERY 8 HOURS
Status: DISCONTINUED | OUTPATIENT
Start: 2018-06-24 | End: 2018-07-04 | Stop reason: HOSPADM

## 2018-06-24 RX ORDER — SODIUM CHLORIDE 0.9 % (FLUSH) 0.9 %
5-10 SYRINGE (ML) INJECTION AS NEEDED
Status: DISCONTINUED | OUTPATIENT
Start: 2018-06-24 | End: 2018-07-02 | Stop reason: SDUPTHER

## 2018-06-24 RX ORDER — HYDROMORPHONE HYDROCHLORIDE 1 MG/ML
1 INJECTION, SOLUTION INTRAMUSCULAR; INTRAVENOUS; SUBCUTANEOUS
Status: DISCONTINUED | OUTPATIENT
Start: 2018-06-24 | End: 2018-06-24

## 2018-06-24 RX ORDER — LANOLIN ALCOHOL/MO/W.PET/CERES
1000 CREAM (GRAM) TOPICAL DAILY
Status: DISCONTINUED | OUTPATIENT
Start: 2018-06-24 | End: 2018-07-04 | Stop reason: HOSPADM

## 2018-06-24 RX ORDER — DEXTROSE 50 % IN WATER (D50W) INTRAVENOUS SYRINGE
12.5-25 AS NEEDED
Status: DISCONTINUED | OUTPATIENT
Start: 2018-06-24 | End: 2018-06-24 | Stop reason: SDUPTHER

## 2018-06-24 RX ORDER — SODIUM CHLORIDE 0.9 % (FLUSH) 0.9 %
10 SYRINGE (ML) INJECTION EVERY 8 HOURS
Status: DISCONTINUED | OUTPATIENT
Start: 2018-06-24 | End: 2018-07-04 | Stop reason: HOSPADM

## 2018-06-24 RX ORDER — FENTANYL CITRATE 50 UG/ML
25 INJECTION, SOLUTION INTRAMUSCULAR; INTRAVENOUS
Status: DISCONTINUED | OUTPATIENT
Start: 2018-06-24 | End: 2018-06-24

## 2018-06-24 RX ORDER — PROMETHAZINE HYDROCHLORIDE 25 MG/1
25 TABLET ORAL
Status: DISCONTINUED | OUTPATIENT
Start: 2018-06-24 | End: 2018-07-04 | Stop reason: HOSPADM

## 2018-06-24 RX ORDER — DIPHENHYDRAMINE HCL 25 MG
25 CAPSULE ORAL
Status: DISCONTINUED | OUTPATIENT
Start: 2018-06-24 | End: 2018-07-04 | Stop reason: HOSPADM

## 2018-06-24 RX ORDER — POLYETHYLENE GLYCOL 3350 17 G/17G
17 POWDER, FOR SOLUTION ORAL DAILY
Status: DISCONTINUED | OUTPATIENT
Start: 2018-06-24 | End: 2018-07-04 | Stop reason: HOSPADM

## 2018-06-24 RX ORDER — VENLAFAXINE 37.5 MG/1
75 TABLET ORAL 2 TIMES DAILY WITH MEALS
Status: DISCONTINUED | OUTPATIENT
Start: 2018-06-24 | End: 2018-07-04 | Stop reason: HOSPADM

## 2018-06-24 RX ORDER — HYDROMORPHONE HYDROCHLORIDE 1 MG/ML
1 INJECTION, SOLUTION INTRAMUSCULAR; INTRAVENOUS; SUBCUTANEOUS
Status: DISCONTINUED | OUTPATIENT
Start: 2018-06-24 | End: 2018-06-24 | Stop reason: SDUPTHER

## 2018-06-24 RX ORDER — HYDROMORPHONE HYDROCHLORIDE 1 MG/ML
0.5 INJECTION, SOLUTION INTRAMUSCULAR; INTRAVENOUS; SUBCUTANEOUS
Status: DISCONTINUED | OUTPATIENT
Start: 2018-06-24 | End: 2018-06-26

## 2018-06-24 RX ORDER — HYDRALAZINE HYDROCHLORIDE 50 MG/1
100 TABLET, FILM COATED ORAL 3 TIMES DAILY
Status: DISCONTINUED | OUTPATIENT
Start: 2018-06-24 | End: 2018-07-04 | Stop reason: HOSPADM

## 2018-06-24 RX ORDER — AMOXICILLIN 250 MG
2 CAPSULE ORAL DAILY
Status: DISCONTINUED | OUTPATIENT
Start: 2018-06-24 | End: 2018-07-04 | Stop reason: HOSPADM

## 2018-06-24 RX ORDER — INSULIN LISPRO 100 [IU]/ML
INJECTION, SOLUTION INTRAVENOUS; SUBCUTANEOUS
Status: DISCONTINUED | OUTPATIENT
Start: 2018-06-24 | End: 2018-06-24 | Stop reason: SDUPTHER

## 2018-06-24 RX ORDER — MAGNESIUM SULFATE 100 %
4 CRYSTALS MISCELLANEOUS AS NEEDED
Status: DISCONTINUED | OUTPATIENT
Start: 2018-06-24 | End: 2018-06-24 | Stop reason: SDUPTHER

## 2018-06-24 RX ORDER — SODIUM CHLORIDE 0.9 % (FLUSH) 0.9 %
10 SYRINGE (ML) INJECTION EVERY 24 HOURS
Status: DISCONTINUED | OUTPATIENT
Start: 2018-06-24 | End: 2018-07-04 | Stop reason: HOSPADM

## 2018-06-24 RX ORDER — ACETAMINOPHEN 500 MG
500 TABLET ORAL
Status: DISCONTINUED | OUTPATIENT
Start: 2018-06-24 | End: 2018-07-03

## 2018-06-24 RX ORDER — SODIUM CHLORIDE 0.9 % (FLUSH) 0.9 %
20 SYRINGE (ML) INJECTION AS NEEDED
Status: DISCONTINUED | OUTPATIENT
Start: 2018-06-24 | End: 2018-07-04 | Stop reason: HOSPADM

## 2018-06-24 RX ADMIN — Medication 10 ML: at 21:12

## 2018-06-24 RX ADMIN — HYDROMORPHONE HYDROCHLORIDE 1 MG: 1 INJECTION, SOLUTION INTRAMUSCULAR; INTRAVENOUS; SUBCUTANEOUS at 04:38

## 2018-06-24 RX ADMIN — QUETIAPINE FUMARATE 100 MG: 100 TABLET ORAL at 08:42

## 2018-06-24 RX ADMIN — DIPHENHYDRAMINE HYDROCHLORIDE 25 MG: 25 CAPSULE ORAL at 02:59

## 2018-06-24 RX ADMIN — Medication 10 ML: at 06:00

## 2018-06-24 RX ADMIN — HEPARIN SODIUM 5000 UNITS: 5000 INJECTION, SOLUTION INTRAVENOUS; SUBCUTANEOUS at 09:19

## 2018-06-24 RX ADMIN — HYDRALAZINE HYDROCHLORIDE 100 MG: 50 TABLET, FILM COATED ORAL at 21:12

## 2018-06-24 RX ADMIN — HYDROMORPHONE HYDROCHLORIDE 1 MG: 1 INJECTION, SOLUTION INTRAMUSCULAR; INTRAVENOUS; SUBCUTANEOUS at 08:37

## 2018-06-24 RX ADMIN — DEXTROSE MONOHYDRATE 12.5 G: 25 INJECTION, SOLUTION INTRAVENOUS at 21:21

## 2018-06-24 RX ADMIN — HYDROMORPHONE HYDROCHLORIDE 0.5 MG: 1 INJECTION, SOLUTION INTRAMUSCULAR; INTRAVENOUS; SUBCUTANEOUS at 21:12

## 2018-06-24 RX ADMIN — HYDRALAZINE HYDROCHLORIDE 100 MG: 50 TABLET, FILM COATED ORAL at 16:45

## 2018-06-24 RX ADMIN — WATER 1 MG: 1 INJECTION INTRAMUSCULAR; INTRAVENOUS; SUBCUTANEOUS at 21:36

## 2018-06-24 RX ADMIN — Medication 10 ML: at 08:43

## 2018-06-24 RX ADMIN — VENLAFAXINE 75 MG: 37.5 TABLET ORAL at 08:42

## 2018-06-24 RX ADMIN — PIPERACILLIN SODIUM AND TAZOBACTAM SODIUM 3.38 G: 3; .375 INJECTION, POWDER, LYOPHILIZED, FOR SOLUTION INTRAVENOUS at 23:03

## 2018-06-24 RX ADMIN — HYDROMORPHONE HYDROCHLORIDE 0.5 MG: 1 INJECTION, SOLUTION INTRAMUSCULAR; INTRAVENOUS; SUBCUTANEOUS at 14:50

## 2018-06-24 RX ADMIN — CALCIUM CHLORIDE 1 G: 100 INJECTION INTRAVENOUS; INTRAVENTRICULAR at 10:29

## 2018-06-24 RX ADMIN — DIPHENHYDRAMINE HYDROCHLORIDE 25 MG: 50 INJECTION, SOLUTION INTRAMUSCULAR; INTRAVENOUS at 00:13

## 2018-06-24 RX ADMIN — HEPARIN SODIUM 5000 UNITS: 5000 INJECTION, SOLUTION INTRAVENOUS; SUBCUTANEOUS at 16:44

## 2018-06-24 RX ADMIN — DIPHENHYDRAMINE HYDROCHLORIDE 25 MG: 25 CAPSULE ORAL at 14:53

## 2018-06-24 RX ADMIN — HEPARIN SODIUM 5000 UNITS: 5000 INJECTION, SOLUTION INTRAVENOUS; SUBCUTANEOUS at 23:03

## 2018-06-24 RX ADMIN — DEXTROSE MONOHYDRATE 25 G: 25 INJECTION, SOLUTION INTRAVENOUS at 19:12

## 2018-06-24 RX ADMIN — PROMETHAZINE HYDROCHLORIDE 25 MG: 25 TABLET ORAL at 17:28

## 2018-06-24 RX ADMIN — SODIUM CHLORIDE 75 ML/HR: 4.5 INJECTION, SOLUTION INTRAVENOUS at 02:44

## 2018-06-24 RX ADMIN — SODIUM BICARBONATE 650 MG: 650 TABLET ORAL at 17:00

## 2018-06-24 RX ADMIN — DIPHENHYDRAMINE HYDROCHLORIDE 25 MG: 25 CAPSULE ORAL at 21:12

## 2018-06-24 RX ADMIN — CALCIUM CARBONATE (ANTACID) CHEW TAB 500 MG 200 MG: 500 CHEW TAB at 16:45

## 2018-06-24 RX ADMIN — ACETAMINOPHEN 500 MG: 500 TABLET, FILM COATED ORAL at 16:59

## 2018-06-24 RX ADMIN — OXYCODONE HYDROCHLORIDE AND ACETAMINOPHEN 1 TABLET: 5; 325 TABLET ORAL at 02:59

## 2018-06-24 RX ADMIN — Medication 1000 MCG: at 08:42

## 2018-06-24 RX ADMIN — DIPHENHYDRAMINE HYDROCHLORIDE 25 MG: 25 CAPSULE ORAL at 08:42

## 2018-06-24 RX ADMIN — Medication 10 ML: at 14:00

## 2018-06-24 RX ADMIN — QUETIAPINE FUMARATE 100 MG: 100 TABLET ORAL at 17:00

## 2018-06-24 RX ADMIN — SENNOSIDES AND DOCUSATE SODIUM 2 TABLET: 8.6; 5 TABLET ORAL at 08:42

## 2018-06-24 RX ADMIN — CALCIUM CARBONATE (ANTACID) CHEW TAB 500 MG 200 MG: 500 CHEW TAB at 08:42

## 2018-06-24 RX ADMIN — DEXTROSE MONOHYDRATE 25 G: 25 INJECTION, SOLUTION INTRAVENOUS at 20:22

## 2018-06-24 RX ADMIN — DEXTROSE MONOHYDRATE 25 G: 25 INJECTION, SOLUTION INTRAVENOUS at 07:24

## 2018-06-24 RX ADMIN — HYDRALAZINE HYDROCHLORIDE 100 MG: 50 TABLET, FILM COATED ORAL at 08:42

## 2018-06-24 RX ADMIN — VENLAFAXINE 75 MG: 37.5 TABLET ORAL at 16:45

## 2018-06-24 RX ADMIN — DEXTROSE MONOHYDRATE 25 G: 25 INJECTION, SOLUTION INTRAVENOUS at 12:31

## 2018-06-24 RX ADMIN — DEXTROSE MONOHYDRATE 75 ML/HR: 10 INJECTION, SOLUTION INTRAVENOUS at 17:54

## 2018-06-24 RX ADMIN — POLYETHYLENE GLYCOL 3350 17 G: 17 POWDER, FOR SOLUTION ORAL at 09:19

## 2018-06-24 RX ADMIN — DEXTROSE MONOHYDRATE 25 G: 25 INJECTION, SOLUTION INTRAVENOUS at 00:47

## 2018-06-24 RX ADMIN — SODIUM BICARBONATE 650 MG: 650 TABLET ORAL at 12:50

## 2018-06-24 RX ADMIN — HYDROMORPHONE HYDROCHLORIDE 1 MG: 1 INJECTION, SOLUTION INTRAMUSCULAR; INTRAVENOUS; SUBCUTANEOUS at 00:13

## 2018-06-24 RX ADMIN — PIPERACILLIN SODIUM AND TAZOBACTAM SODIUM 3.38 G: 3; .375 INJECTION, POWDER, LYOPHILIZED, FOR SOLUTION INTRAVENOUS at 08:43

## 2018-06-24 RX ADMIN — CALCITRIOL 0.25 MCG: 0.25 CAPSULE, LIQUID FILLED ORAL at 12:50

## 2018-06-24 RX ADMIN — SODIUM CHLORIDE 226 ML: 900 INJECTION, SOLUTION INTRAVENOUS at 00:32

## 2018-06-24 RX ADMIN — FUROSEMIDE 40 MG: 10 INJECTION, SOLUTION INTRAMUSCULAR; INTRAVENOUS at 08:42

## 2018-06-24 RX ADMIN — VANCOMYCIN HYDROCHLORIDE 1500 MG: 10 INJECTION, POWDER, LYOPHILIZED, FOR SOLUTION INTRAVENOUS at 00:32

## 2018-06-24 RX ADMIN — DEXTROSE MONOHYDRATE 25 G: 25 INJECTION, SOLUTION INTRAVENOUS at 17:27

## 2018-06-24 RX ADMIN — HYDRALAZINE HYDROCHLORIDE 20 MG: 20 INJECTION INTRAMUSCULAR; INTRAVENOUS at 01:07

## 2018-06-24 RX ADMIN — PIPERACILLIN SODIUM AND TAZOBACTAM SODIUM 3.38 G: 3; .375 INJECTION, POWDER, LYOPHILIZED, FOR SOLUTION INTRAVENOUS at 16:45

## 2018-06-24 RX ADMIN — CALCIUM CARBONATE (ANTACID) CHEW TAB 500 MG 200 MG: 500 CHEW TAB at 21:12

## 2018-06-24 RX ADMIN — SODIUM BICARBONATE: 84 INJECTION, SOLUTION INTRAVENOUS at 13:12

## 2018-06-24 NOTE — PROGRESS NOTES
HYPOGLYCEMIC EPISODE DOCUMENTATION    Patient with hypoglycemic episode(s) at 0702(time) on 06/24/18(date). BG value(s) pre-treatment 32  Was patient symptomatic? [x] yes, [] no -lethargic, diaphoratic not able to remain alert  Patient was treated with the following rescue medications/treatments: [x] D50                [] Glucose tablets                [] Glucagon                [] 4oz juice                [] 6oz reg soda                [] 8oz low fat milk  BG value post-treatment:119  Once BG treated and value greater than 80mg/dl, pt was provided with the following:  [x] snack (OJ at pt request)  [x] meal  Breakfast ordered. Name of MD notified:Madelyn  The following orders were received: provide breakfast and continue to monitor    Lab conformation not sent as pt was symptomatic and D50 was given before lab could be obtained.

## 2018-06-24 NOTE — PROGRESS NOTES
PT attempted to see patient x 3 attempts. Initially patient refused and asked that PT come back later because she had just received her pain medicine. 2nd attempt patient was soundly sleeping. 3rd attempt RN reported that patients blood sugar was in the 30's. PT will defer treatment until patient is more appropriate.    Cedric Mccoy, PT

## 2018-06-24 NOTE — H&P
HISTORY AND PHYSICAL      PCP: Zenaida Perry MD  History source: the patient      CC: pain all over      HPI: 42 y.o lady w/ sickle-cell disease, type 1 DM, CKD stage 4, L foot osteomyelitis s/p partial amputation currently on IV daptomycin therapy, who presents with diffuse body aches and fever. She was recently discharged from here for DKA (hospital course complicated by respiratory failure, angioedema of unclear etiology, sepsis, STONE), and was doing well until around 7 AM yesterday when she developed sudden-onset pain \"all over,\" mainly her extremities, back, and abdomen. The pain is achy and constant, exacerbated with moving and without alleviating factors (tried acetaminophen). It is typical of her sickle cell crises. This was associated with subjective fevers nausea and vomiting. No diarrhea, constipation, or bleeding. She denies dyspnea or chest pain.       PMH/PSH:  Past Medical History:   Diagnosis Date    ARF (acute renal failure) ([address] requiring dialysis 2011    Asthma     CKD (chronic kidney disease)     Diabetes ([address]     Gastroparesis 2010    Gastric Pacer- REMOVED 07/2015    GERD (gastroesophageal reflux disease)     Hypertension     Narcotic dependence ([address]     THU (obstructive sleep apnea)     wears 2 LPM oxygen at night    Other ill-defined conditions(799.89)     Polycystic ovarian syndrome     Seizures (HCC)     Sickle cell trait ([address]     Thromboembolus ([address] to her left arm and was told she had one in left leg recently     Past Surgical History:   Procedure Laterality Date    HX CATARACT REMOVAL  3/5/12    right    HX DILATION AND CURETTAGE      ablation    HX GASTRIC BYPASS  2015    HX GI      j tube placement and removal    HX OTHER SURGICAL  2010    Gastric Pacer- REMOVED 07/2015    HX VASCULAR ACCESS      gray cath rt subclavian    HX VASCULAR ACCESS      HD access right thigh       Home meds:   Prior to Admission medications    Medication Sig Start Date End Date Taking? Authorizing Provider   calcium carbonate (TUMS) 200 mg calcium (500 mg) chew Take 1 Tab by mouth three (3) times daily for 7 days. 18  Sandhya Caceres MD   oxyCODONE-acetaminophen (PERCOCET) 5-325 mg per tablet Take 1 Tab by mouth every six (6) hours as needed for Pain. Max Daily Amount: 4 Tabs. Scheduled 18   Danette June MD   promethazine (PHENERGAN) 25 mg tablet Take 1 Tab by mouth every eight (8) hours as needed for Nausea. 18   Danette June MD   senna-docusate (PERICOLACE) 8.6-50 mg per tablet Take 2 Tabs by mouth daily. 5/15/18   Danette June MD   QUEtiapine (SEROQUEL) 100 mg tablet Take 100 mg by mouth two (2) times a day. Historical Provider   venlafaxine (EFFEXOR) 75 mg tablet Take 1 Tab by mouth two (2) times daily (with meals). 3/21/18   Viviana Garcia MD   Cholecalciferol, Vitamin D3, 50,000 unit cap Take 1 Cap by mouth every seven (7) days. Historical Provider   cyanocobalamin 1,000 mcg tablet Take 1,000 mcg by mouth daily. Historical Provider   albuterol (PROVENTIL VENTOLIN) 2.5 mg /3 mL (0.083 %) nebulizer solution 2.5 mg by Nebulization route every four (4) hours as needed. Historical Provider       Allergies:   Allergies   Allergen Reactions    Fentanyl Itching    Erythromycin Itching    Morphine Itching    Toradol [Ketorolac] Rash       FH:  Family History   Problem Relation Age of Onset    Cancer Mother      lung    Hypertension Mother     Cancer Father      kidney    Stroke Father      3 strokes: 59-72    Heart Disease Father 72     CABG    Hypertension Father     Cancer Sister      pancreatic    Cancer Maternal Aunt      breast    Cancer Paternal Aunt      breast    Schizophrenia Sister      was in [address], now ass't living    Other Sister       AIDS    Other Other      nephew of AIDS       SH:  Social History   Substance Use Topics    Smoking status: Never Smoker    Smokeless tobacco: Never Used    Alcohol use No       ROS: A comprehensive review of systems was negative except for that written in the HPI. PHYSICAL EXAM:  Visit Vitals    BP (!) 169/100    Pulse (!) 127    Temp 100.3 °F (37.9 °C)    Resp 21    Ht 5' 2\" (1.575 m)    Wt 74.2 kg (163 lb 9.3 oz)    LMP 06/04/2018 (Exact Date)    SpO2 99%    BMI 29.92 kg/m2       Gen: appears ill and uncomfortable  HEENT: anicteric sclerae, pale conjunctiva, oropharynx clear, MM moist  Neck: supple, trachea midline, no adenopathy  Heart: RR, tachycardic, no MRG, no JVD, 2+ b/l LE edema  Lungs: CTA b/l, non-labored respirations  Abd: soft, mild diffuse tenderness, ND, BS+  Extr: warm, L foot partial amputation site intact, no drainage, it is diffusely tender, no excess warmth or erythema. There is a left tunneled PICC with a clean site  Skin: dry, no rash  Neuro: CN II-XII grossly intact, normal speech, moves all extremities  Psych: normal mood, appropriate affect, lethargic but easily awakened and appropriate      Labs/Imaging:  Recent Results (from the past 24 hour(s))   METABOLIC PANEL, COMPREHENSIVE    Collection Time: 06/23/18 11:32 PM   Result Value Ref Range    Sodium 142 136 - 145 mmol/L    Potassium 4.3 3.5 - 5.1 mmol/L    Chloride 114 (H) 97 - 108 mmol/L    CO2 20 (L) 21 - 32 mmol/L    Anion gap 8 5 - 15 mmol/L    Glucose 50 (L) 65 - 100 mg/dL    BUN 61 (H) 6 - 20 MG/DL    Creatinine 3.27 (H) 0.55 - 1.02 MG/DL    BUN/Creatinine ratio 19 12 - 20      GFR est AA 19 (L) >60 ml/min/1.73m2    GFR est non-AA 16 (L) >60 ml/min/1.73m2    Calcium 6.7 (L) 8.5 - 10.1 MG/DL    Bilirubin, total 0.3 0.2 - 1.0 MG/DL    ALT (SGPT) 22 12 - 78 U/L    AST (SGOT) 19 15 - 37 U/L    Alk.  phosphatase 176 (H) 45 - 117 U/L    Protein, total 7.0 6.4 - 8.2 g/dL    Albumin 2.6 (L) 3.5 - 5.0 g/dL    Globulin 4.4 (H) 2.0 - 4.0 g/dL    A-G Ratio 0.6 (L) 1.1 - 2.2     CBC WITH AUTOMATED DIFF    Collection Time: 06/23/18 11:32 PM   Result Value Ref Range    WBC 6.6 3.6 - 11.0 K/uL    RBC 2.84 (L) 3.80 - 5.20 M/uL    HGB 7.3 (L) 11.5 - 16.0 g/dL    HCT 23.1 (L) 35.0 - 47.0 %    MCV 81.3 80.0 - 99.0 FL    MCH 25.7 (L) 26.0 - 34.0 PG    MCHC 31.6 30.0 - 36.5 g/dL    RDW 16.9 (H) 11.5 - 14.5 %    PLATELET 048 711 - 729 K/uL    MPV 11.8 8.9 - 12.9 FL    NRBC 0.3 (H) 0  WBC    ABSOLUTE NRBC 0.02 (H) 0.00 - 0.01 K/uL    NEUTROPHILS 84 (H) 32 - 75 %    LYMPHOCYTES 6 (L) 12 - 49 %    MONOCYTES 8 5 - 13 %    EOSINOPHILS 1 0 - 7 %    BASOPHILS 0 0 - 1 %    IMMATURE GRANULOCYTES 1 (H) 0.0 - 0.5 %    ABS. NEUTROPHILS 5.5 1.8 - 8.0 K/UL    ABS. LYMPHOCYTES 0.4 (L) 0.8 - 3.5 K/UL    ABS. MONOCYTES 0.5 0.0 - 1.0 K/UL    ABS. EOSINOPHILS 0.1 0.0 - 0.4 K/UL    ABS. BASOPHILS 0.0 0.0 - 0.1 K/UL    ABS. IMM.  GRANS. 0.1 (H) 0.00 - 0.04 K/UL    DF SMEAR SCANNED      PLATELET COMMENTS Large Platelets      RBC COMMENTS ANISOCYTOSIS  1+        RBC COMMENTS MICROCYTOSIS  1+        RBC COMMENTS HYPOCHROMIA  1+        RBC COMMENTS OVALOCYTES  PRESENT        RBC COMMENTS POLYCHROMASIA  1+       URINALYSIS W/ REFLEX CULTURE    Collection Time: 06/23/18 11:32 PM   Result Value Ref Range    Color YELLOW/STRAW      Appearance CLEAR CLEAR      Specific gravity 1.010 1.003 - 1.030      pH (UA) 6.0 5.0 - 8.0      Protein 100 (A) NEG mg/dL    Glucose NEGATIVE  NEG mg/dL    Ketone NEGATIVE  NEG mg/dL    Bilirubin NEGATIVE  NEG      Blood NEGATIVE  NEG      Urobilinogen 0.2 0.2 - 1.0 EU/dL    Nitrites NEGATIVE  NEG      Leukocyte Esterase NEGATIVE  NEG      WBC 0-4 0 - 4 /hpf    RBC 0-5 0 - 5 /hpf    Epithelial cells FEW FEW /lpf    Bacteria NEGATIVE  NEG /hpf    UA:UC IF INDICATED CULTURE NOT INDICATED BY UA RESULT CNI      Hyaline cast 0-2 0 - 5 /lpf   RETICULOCYTE COUNT    Collection Time: 06/23/18 11:32 PM   Result Value Ref Range    Reticulocyte count 3.5 (H) 0.7 - 2.1 %    Absolute Retic Cnt. 0.1003 (H) 0.0164 - 0.0776 M/ul   PROTHROMBIN TIME + INR    Collection Time: 06/23/18 11:32 PM   Result Value Ref Range    INR 1.1 0.9 - 1.1      Prothrombin time 11.2 (H) 9.0 - 11.1 sec   PTT    Collection Time: 06/23/18 11:32 PM   Result Value Ref Range    aPTT 33.9 (H) 22.1 - 32.0 sec    aPTT, therapeutic range     58.0 - 77.0 SECS   LIPASE    Collection Time: 06/23/18 11:32 PM   Result Value Ref Range    Lipase 660 (H) 73 - 393 U/L   LACTIC ACID    Collection Time: 06/23/18 11:37 PM   Result Value Ref Range    Lactic acid 0.5 0.4 - 2.0 MMOL/L   EKG, 12 LEAD, INITIAL    Collection Time: 06/23/18 11:59 PM   Result Value Ref Range    Ventricular Rate 126 BPM    Atrial Rate 126 BPM    P-R Interval 122 ms    QRS Duration 72 ms    Q-T Interval 310 ms    QTC Calculation (Bezet) 448 ms    Calculated P Axis 44 degrees    Calculated R Axis 21 degrees    Calculated T Axis 54 degrees    Diagnosis       Sinus tachycardia  When compared with ECG of 10-SHENA-2018 00:07,  Vent. rate has increased BY  44 BPM         Recent Labs      06/23/18 2332   WBC  6.6   HGB  7.3*   HCT  23.1*   PLT  150     Recent Labs      06/23/18 2332   NA  142   K  4.3   CL  114*   CO2  20*   BUN  61*   CREA  3.27*   GLU  50*   CA  6.7*     Recent Labs      06/23/18 2332   SGOT  19   ALT  22   AP  176*   TBILI  0.3   TP  7.0   ALB  2.6*   GLOB  4.4*   LPSE  660*       No results for input(s): CPK, CKNDX, TROIQ in the last 72 hours. No lab exists for component: CPKMB    Recent Labs      06/23/18 2332   INR  1.1   PTP  11.2*   APTT  33.9*        No results for input(s): PH, PCO2, PO2 in the last 72 hours. Xr Chest Port    Result Date: 6/24/2018  IMPRESSION: Mild interstitial pulmonary edema appearance. Assessment & Plan: 42 y.o lady w/ sickle-cell disease, type 1 DM, CKD stage 4, L foot osteomyelitis s/p partial amputation currently on IV daptomycin therapy, who presents with fever and a sickle cell crisis.     Acute sickle-cell crisis: (POA)  -support with IV fluids  -monitor H/H transfuse for hgb>7  -pain control with IV fentanyl, PO oxycodone, acetaminophen    Suspected sepsis: (POA) based on fever and tachycardia. Source is unclear but ddx includes L foot osteomyelitis (last MRI showed findings concerning for osteo), or PICC line infection. SIRS may also be explained by her sickle cell crisis though. -f/u blood cultures, peripheral and PICC line  -empiric abx w/ IV Zosyn and daptomycin  -IV fluids as above  -she is hypertensive, edematous and CXR notes pulm edema though lungs clear on exam. Monitor volume state closely.  Reduced IVF rate to 75 mL/hr    Type I DM w/ hypoglycemia: (POA)  -improved after D50  -resume home NPH at half dose  -SSI    Metabolic acidosis:  -resume home calcium bicarb    HTN: uncontrolled  -resume home meds  -PRN IV hydralazine    CKD stage 4: stable    Angioedema with respiratory failure during last admission    DVT ppx: sq heparin  Code status: full  Disposition: TBD    Signed By: Kurt Mullen MD     June 24, 2018

## 2018-06-24 NOTE — PROGRESS NOTES
Problem: Sepsis: Day 2  Goal: Medications  Outcome: Progressing Towards Goal  Pt with gram negative rods, Dr. Andrew Tello notified, no orders received. Pt remains afebrile. Will continue to monitor. HYPOGLYCEMIC EPISODE DOCUMENTATION    Patient with hypoglycemic episode(s) at 1229(time) on 06/24/2018(date). BG value(s) pre-treatment 27    Was patient symptomatic? [x] yes, [] no  Patient was treated with the following rescue medications/treatments: [x] D50                [] Glucose tablets                [] Glucagon                [] 4oz juice                [] 6oz reg soda                [] 8oz low fat milk  BG value post-treatment: 180  Once BG treated and value greater than 80mg/dl, pt was provided with the following:  [] snack  [x] meal  Name of MD notified:Dr. Andrew Tello  The following orders were received: pt started on [facility]    Patient with hypoglycemic episode(s) at 1725(time) on 6/24/2018(date). BG value(s) pre-treatment 40    Was patient symptomatic? [x] yes, [] no  Patient was treated with the following rescue medications/treatments: [x] D50                [] Glucose tablets                [] Glucagon                [] 4oz juice                [] 6oz reg soda                [] 8oz low fat milk  BG value post-treatment: 112  Once BG treated and value greater than 80mg/dl, pt was provided with the following:  [] snack  [x] meal  Name of MD notified:Dr. Andrew Tello  The following orders were received: increase D5 fluids    HYPOGLYCEMIC EPISODE DOCUMENTATION    Patient with hypoglycemic episode(s) at 1907(time) on 6/24/2018(date). BG value(s) pre-treatment 55    Was patient symptomatic?  [x] yes, [] no  Patient was treated with the following rescue medications/treatments: [x] D50                [] Glucose tablets                [] Glucagon                [] 4oz juice                [] 6oz reg soda                [] 8oz low fat milk  BG value post-treatment: 134  Once BG treated and value greater than 80mg/dl, pt was provided with the following:  [] snack  [x] meal  Name of MD notified:Dr. Darin Lacy  The following orders were received: fluids switched to [address]    Patient with hypoglycemic episode(s) at 2010(time) on 6/24/2018(date). BG value(s) pre-treatment 61    Was patient symptomatic? [x] yes, [] no  Patient was treated with the following rescue medications/treatments: [x] D50                [] Glucose tablets                [] Glucagon                [] 4oz juice                [] 6oz reg soda                [] 8oz low fat milk  BG value post-treatment: 272  Once BG treated and value greater than 80mg/dl, pt was provided with the following:  [] snack  [x] meal  Name of MD notified:Dr. Darin Lcay  The following orders were received: transfer pt to CCU    TRANSFER - OUT REPORT:    Verbal report given to 1401 aleksandra  RN(name) on [address]  being transferred to CCU 21(unit) for urgent transfer       Report consisted of patients Situation, Background, Assessment and   Recommendations(SBAR). Information from the following report(s) SBAR, Kardex, ED Summary, Procedure Summary, Intake/Output, MAR, Accordion, Recent Results, Med Rec Status, Cardiac Rhythm NSR and Alarm Parameters  was reviewed with the receiving nurse. Lines:   PICC Double Lumen 06/09/18 Left; Other(comment) (Active)   Central Line Being Utilized Yes 6/24/2018  4:00 PM   Criteria for Appropriate Use Long term IV/antibiotic administration 6/24/2018  4:00 PM   Site Assessment Clean, dry, & intact 6/24/2018  4:00 PM   Phlebitis Assessment 0 6/24/2018  4:00 PM   Infiltration Assessment 0 6/24/2018  4:00 PM   Date of Last Dressing Change 06/24/18 6/24/2018  4:00 PM   Dressing Status Clean, dry, & intact 6/24/2018  4:00 PM   Action Taken Open ports on tubing capped 6/24/2018  4:00 PM   Dressing Type Disk with Chlorhexadine gluconate (CHG); Transparent;Tape 6/24/2018  4:00 PM   Hub Color/Line Status Purple;Capped 6/24/2018  4:00 PM   Positive Blood Return (Site #1) Yes 6/24/2018  4:00 PM   Hub Color/Line Status White;Capped 6/24/2018  4:00 PM   Positive Blood Return (Site #2) Yes 6/24/2018  4:00 PM   Alcohol Cap Used Yes 6/24/2018  4:00 PM       Venous Access Device 06/16/18 (Active)        Opportunity for questions and clarification was provided.       Patient transported with:   O2 @ 1 liters  Registered Nurse

## 2018-06-24 NOTE — ED NOTES
9143  Hospitalist at bedside to evaluate  Discussed HR and BP with Dr. Fortunato Arevalo. Orders to transfer to IMCU, stop NS bolus, and give PRN hydralazine. 1323  Lab called regarding inability to process blood cultures. Dr. Jose E Cortes paged. Verbal orders to pull 1 pair of blood cultures off pts line. IMCU rn notified.

## 2018-06-24 NOTE — ED NOTES
TRANSFER - OUT REPORT:    Verbal report given to Fortunato Trinidad (name) on Lorraine Vega  being transferred to [facility] (unit) for routine progression of care       Report consisted of patients Situation, Background, Assessment and   Recommendations(SBAR). Information from the following report(s) SBAR, ED Summary and Cardiac Rhythm Sinus Tach was reviewed with the receiving nurse. Lines:   PICC Double Lumen 06/09/18 Left; Other(comment) (Active)       Venous Access Device 06/16/18 (Active)        Opportunity for questions and clarification was provided.       Patient transported with:   Monitor  O2 @ 1 liters  Registered Nurse

## 2018-06-24 NOTE — PROGRESS NOTES
Occupational Therapy Note [id number] -   6.24.2018    Orders acknowledged and chart reviewed in prep for OT evaluation. Patient cleared by RN to be seen for OT, patient received sitting upright in bed. Patient politely declining OT today as patient reporting she is \"not feeling well because my sugars keep dropping\" (noted BG in 30's earlier today, now 91). Will f/u Monday for OT evaluation. Thank you. Enrique Rangel MS, OTR/L

## 2018-06-24 NOTE — CONSULTS
NEPHROLOGY CONSULT NOTE     Patient: Edis Styles MRN: [id number]  PCP: Tobi Avitia MD   :     1980  Age:   42 y.o. Sex:  female      Referring physician: Brandi Cardona MD  Reason for consultation: 42 y.o. female with Sickle cell crisis ([email or web address] complicated by STONE   Admission Date: 2018 10:57 PM  LOS: 0 days      ASSESSMENT and PLAN :   STONE on CKD :  - 2/2 volume depletion  - Cr close to baseline  - cont gentle IVF  - monitor UOP     CKD Stage IV :  - baseline Cr 2.6-2.8 mg/dl   - previously on dialysis and recovered      AG acidosis:  - likely from mild STONE  - ok to start oral bicarb     Sickle cell crisis:  - pain controlled  - cont gentle IVF  - transfuse PRN     Hypocalcemia:  - IV calcium gluconate being administered now    Left Foot Osteo :   - on dapto  - CPK ok     IDDM:  - episode of hypoglycemia this AM  - per hospitalist    Sepsis:  - on zosyn and daptomycin  - cultures pending     Active Problems / Assessment AAActive  :   Principal Problem:    Sickle cell crisis ([facility] (2018)    Active Problems:    HTN (hypertension) (10/14/2011)      Diabetes mellitus type I ([facility] (3/29/2012)      Overview: New pt appt:  May 8, 2013 with dr Joya Claudio      dx'd age 14; + neuropathy, nephropathy      On ssi, regular      Diabetes health maintenance:      Last A1C: 11.7 2013 10.7 3/2012      Last eye exam , had cataract R eye, needs L eye done, Dr Yannick Desai       (OD) [phone], another doc did surgery: Dr Corina Carrasco MD same practice       [phone]      Last microalbumin:  n/a Last creatinine: 1.65 2013; 1.9 12      Last microfilament exam/Last podiatry: 12 intact, nails thickened      Last lipids:   trig 173, HDL 70 .4 w/o statin; no record       in former PCP notes why it was d/c'd       ACE/ARB: no due to CKD                  CKD (chronic kidney disease) (2012)      Overview: Dr Yue Sharif; Required HD in past with acute exacerbation, AV graft R       thigh      Supposed to make appt:       Dot Renteria MD  [address], [facility]       [phone]       Metabolic encephalopathy (8/4/3194)      Osteomyelitis of left foot [facility]) (4/27/2018)         Subjective:   HPI: Caty Chawla is a 42 y.o.  female who has been admitted to the hospital for diffuse body aches. She has a hx of Type I DM, SCD, CKD 4 with Cr around 2.6 to 2.8, prior hx of dialysis. She was recently discharged after DKA, sepsis, STONE, resp failure. She denies any fevers or chills. Her Cr on admission was 3.3 and is down to 2.95. Her hgb was 7.3. She was treated with IV hydration for suspected sepsis and sickle crisis. She is lethargic, but will answer simple questions. She was given lasix x 1 today and her IVF were reduced due to volume overload.     Past Medical Hx:   Past Medical History:   Diagnosis Date    ARF (acute renal failure) ([address] requiring dialysis 2011    Asthma     CKD (chronic kidney disease)     Diabetes ([address]     Gastroparesis 2010    Gastric Pacer- REMOVED 07/2015    GERD (gastroesophageal reflux disease)     Hypertension     Narcotic dependence ([address]     THU (obstructive sleep apnea)     wears 2 LPM oxygen at night    Other ill-defined conditions(799.89)     Polycystic ovarian syndrome     Seizures (HCC)     Sickle cell trait ([address]     Thromboembolus ([address] to her left arm and was told she had one in left leg recently        Past Surgical Hx:     Past Surgical History:   Procedure Laterality Date    HX CATARACT REMOVAL  3/5/12    right    HX DILATION AND CURETTAGE      ablation    HX GASTRIC BYPASS  2015    HX GI      j tube placement and removal    HX OTHER SURGICAL  2010    Gastric Pacer- REMOVED 07/2015    HX VASCULAR ACCESS      gray cath rt subclavian    HX VASCULAR ACCESS      HD access right thigh       Medications:  Prior to Admission medications    Medication Sig Start Date End Date Taking? Authorizing Provider   calcium carbonate (TUMS) 200 mg calcium (500 mg) chew Take 1 Tab by mouth three (3) times daily for 7 days. 6/16/18 6/23/18  Danelle Hill MD   oxyCODONE-acetaminophen (PERCOCET) 5-325 mg per tablet Take 1 Tab by mouth every six (6) hours as needed for Pain. Max Daily Amount: 4 Tabs. Scheduled 5/14/18   Umesh Morris MD   promethazine (PHENERGAN) 25 mg tablet Take 1 Tab by mouth every eight (8) hours as needed for Nausea. 5/14/18   Umesh Morris MD   senna-docusate (PERICOLACE) 8.6-50 mg per tablet Take 2 Tabs by mouth daily. 5/15/18   Umesh Morris MD   QUEtiapine (SEROQUEL) 100 mg tablet Take 100 mg by mouth two (2) times a day. Historical Provider   venlafaxine (EFFEXOR) 75 mg tablet Take 1 Tab by mouth two (2) times daily (with meals). 3/21/18   Jose Trevizo MD   Cholecalciferol, Vitamin D3, 50,000 unit cap Take 1 Cap by mouth every seven (7) days. Historical Provider   cyanocobalamin 1,000 mcg tablet Take 1,000 mcg by mouth daily. Historical Provider   albuterol (PROVENTIL VENTOLIN) 2.5 mg /3 mL (0.083 %) nebulizer solution 2.5 mg by Nebulization route every four (4) hours as needed. Historical Provider       Allergies   Allergen Reactions    Fentanyl Itching and Swelling     \"throat closed up\" per pt      Erythromycin Itching    Morphine Itching    Toradol [Ketorolac] Rash       Social Hx:  reports that she has never smoked. She has never used smokeless tobacco. She reports that she does not drink alcohol or use illicit drugs.      Family History   Problem Relation Age of Onset    Cancer Mother      lung    Hypertension Mother     Cancer Father      kidney    Stroke Father      3 strokes: 59-72    Heart Disease Father 72     CABG    Hypertension Father     Cancer Sister      pancreatic    Cancer Maternal Aunt      breast    Cancer Paternal Aunt      breast    Schizophrenia Sister was in [address], now ass't living    Other Sister       AIDS    Other Other      nephew of AIDS       Review of Systems:  A twelve point review of system was performed today. Pertinent positives and negatives are mentioned in the HPI. The reminder of the ROS is negative and noncontributory. Objective:    Vitals:    Vitals:    18 0100 18 0208 18 0239 18 0835   BP: (!) 192/98 147/84  152/90   Pulse: (!) 132 (!) 131  (!) 102   Resp: 26   14   Temp: 99 °F (37.2 °C) 99.2 °F (37.3 °C)  97.5 °F (36.4 °C)   SpO2: 100%   100%   Weight:   75.1 kg (165 lb 9.1 oz)    Height:         I&O's:   0701 -  0700  In: 500 [I.V.:500]  Out: -   Visit Vitals    /90 (BP 1 Location: Right arm, BP Patient Position: Sitting)    Pulse (!) 102    Temp 97.5 °F (36.4 °C)    Resp 14    Ht 5' 2\" (1.575 m)    Wt 75.1 kg (165 lb 9.1 oz)    LMP 2018 (Exact Date)    SpO2 100%    BMI 30.28 kg/m2       Physical Exam:  General:Alert, No distress,   HEENT: Eyes are PERRL. Conjunctiva without pallor ,erythema. The sclerae without icterus. .   Neck:Supple,no mass palpable  Lungs : Clears to auscultation Bilaterally, Normal respiratory effort  CVS: RRR, S1 S2 normal, No rub,  no LE edema  Abdomen: Soft, Non tender, No hepatosplenomegaly, bowel sounds present  Extremities: No cyanosis, No clubbing  Skin: No rash or lesions.   Lymph nodes: No palpable nodes  MS: No joint swelling, erythema, warmth  Neurologic: non focal, AAO x 3  Psych: normal affect    Laboratory Results:    Lab Results   Component Value Date    BUN 56 (H) 2018     2018    K 4.0 2018     (H) 2018    CO2 17 (L) 2018       Lab Results   Component Value Date    BUN 56 (H) 2018    BUN 61 (H) 2018    BUN 62 (H) 2018    BUN 70 (H) 2018    BUN 78 (H) 2018    K 4.0 2018    K 4.3 2018    K 3.5 2018    K 3.6 2018    K 4.0 2018 Lab Results   Component Value Date    WBC 6.6 06/23/2018    RBC 2.84 (L) 06/23/2018    HGB 7.3 (L) 06/23/2018    HCT 23.1 (L) 06/23/2018    MCV 81.3 06/23/2018    MCH 25.7 (L) 06/23/2018    RDW 16.9 (H) 06/23/2018     06/23/2018       Lab Results   Component Value Date    PHOS 5.7 (H) 06/14/2018       Urine dipstick:   Lab Results   Component Value Date/Time    Color YELLOW/STRAW 06/23/2018 11:32 PM    Appearance CLEAR 06/23/2018 11:32 PM    Specific gravity 1.010 06/23/2018 11:32 PM    Specific gravity 1.015 07/26/2010 11:18 AM    pH (UA) 6.0 06/23/2018 11:32 PM    Protein 100 (A) 06/23/2018 11:32 PM    Glucose NEGATIVE  06/23/2018 11:32 PM    Ketone NEGATIVE  06/23/2018 11:32 PM    Bilirubin NEGATIVE  06/23/2018 11:32 PM    Urobilinogen 0.2 06/23/2018 11:32 PM    Nitrites NEGATIVE  06/23/2018 11:32 PM    Leukocyte Esterase NEGATIVE  06/23/2018 11:32 PM    Epithelial cells FEW 06/23/2018 11:32 PM    Bacteria NEGATIVE  06/23/2018 11:32 PM    WBC 0-4 06/23/2018 11:32 PM    RBC 0-5 06/23/2018 11:32 PM       I have reviewed the following: All pertinent labs, microbiology data, radiology imaging for my assessment           Thank you for allowing us to participate in the care of this patient. We will follow patient.  Please dont hesitate to call with any questions    Jennifer May MD  6/24/2018    [address] [place] [address]

## 2018-06-24 NOTE — ED PROVIDER NOTES
HPI Comments: 42 y.o. female with extensive past medical history, please see list, significant for CKD, HTN, Gastric bypass, Thromboembolus, DM, Asthma, gastroparesis who presents from home with chief complaint of fever and generalized body aches. Pt reports having a subjective fever over the past two days. She reports awaking this morning with generalized body aches. Throughout the day she has developed nausea and vomiting. Pt states that she is presently on an antibiotic, Daptomycin for infection of her left leg. Pt denies cough or congestion. She further denies chest pain, shortness of breath, headache, new numbness or weakness. There are no other acute medical concerns at this time. Chart review: Pt admitted 6/9/18 for DKA, acute encphalopathy, STONE on CKD, Hypothermia possibly due to sepsis. Acute respiratory failure due to sepsis, pt intubated, extubated to nasal cannula. Pt discharged home on Daptomycin. PCP: Jeanine Light MD    Note written by Ivanna Hidalgo, as dictated by Rui Thompson MD 11:37 PM         The history is provided by the patient.         Past Medical History:   Diagnosis Date    ARF (acute renal failure) ([address] requiring dialysis 2011    Asthma     CKD (chronic kidney disease)     Diabetes ([address]     Gastroparesis 2010    Gastric Pacer- REMOVED 07/2015    GERD (gastroesophageal reflux disease)     Hypertension     Narcotic dependence ([address]     THU (obstructive sleep apnea)     wears 2 LPM oxygen at night    Other ill-defined conditions(799.89)     Polycystic ovarian syndrome     Seizures (HCC)     Sickle cell trait ([address]     Thromboembolus ([address] to her left arm and was told she had one in left leg recently       Past Surgical History:   Procedure Laterality Date    HX CATARACT REMOVAL  3/5/12    right    HX DILATION AND CURETTAGE      ablation    HX GASTRIC BYPASS  2015    HX GI      j tube placement and removal    HX OTHER SURGICAL  2010    Gastric Pacer- REMOVED 2015    HX VASCULAR ACCESS      gray cath rt subclavian    HX VASCULAR ACCESS      HD access right thigh         Family History:   Problem Relation Age of Onset    Cancer Mother      lung    Hypertension Mother     Cancer Father      kidney    Stroke Father      3 strokes: 59-72    Heart Disease Father 72     CABG    Hypertension Father     Cancer Sister      pancreatic    Cancer Maternal Aunt      breast    Cancer Paternal Aunt      breast    Schizophrenia Sister      was in [address], now ass't living    Other Sister       AIDS    Other Other      nephew of AIDS       Social History     Social History    Marital status:      Spouse name: N/A    Number of children: N/A    Years of education: N/A     Occupational History    Not on file. Social History Main Topics    Smoking status: Never Smoker    Smokeless tobacco: Never Used    Alcohol use No    Drug use: No    Sexual activity: Yes     Partners: Male     Birth control/ protection: None     Other Topics Concern    Not on file     Social History Narrative    Lives with her  and father         ALLERGIES: Fentanyl; Erythromycin; Morphine; and Toradol [ketorolac]    Review of Systems   Constitutional: Positive for chills and fever. HENT: Negative for congestion. Respiratory: Negative for cough. Cardiovascular: Negative for chest pain. Gastrointestinal: Positive for nausea and vomiting. Negative for abdominal pain. Genitourinary: Negative for difficulty urinating. Musculoskeletal: Positive for arthralgias and myalgias. Neurological: Negative for weakness, numbness and headaches. All other systems reviewed and are negative. Vitals:    18 2305   BP: (!) 187/105   Pulse: (!) 125   Resp: 26   Temp: 100.3 °F (37.9 °C)   SpO2: 100%   Weight: 74.2 kg (163 lb 9.3 oz)   Height: 5' 2\" (1.575 m)            Physical Exam   Constitutional: She is oriented to person, place, and time.  She appears well-developed and well-nourished. well appearing  Up walking around in room   HENT:   Head: Normocephalic and atraumatic. Right Ear: External ear normal.   Left Ear: External ear normal.   Nose: Nose normal.   Mouth/Throat: Oropharynx is clear and moist.   Eyes: EOM are normal. Pupils are equal, round, and reactive to light. No scleral icterus. Neck: Normal range of motion. Neck supple. No JVD present. No tracheal deviation present. No thyromegaly present. Cardiovascular: Regular rhythm, normal heart sounds and intact distal pulses. Exam reveals no friction rub. No murmur heard. HR elevated 120s and regular   Pulmonary/Chest: Effort normal and breath sounds normal. No stridor. No respiratory distress. She has no wheezes. She has no rales. She exhibits no tenderness. Abdominal: Soft. Bowel sounds are normal. She exhibits no distension. There is no tenderness. There is no rebound and no guarding. Musculoskeletal: Normal range of motion. She exhibits no edema or tenderness. Left foot - all 5 toes amputated. Foot warm, no drainage or swelling   Lymphadenopathy:     She has no cervical adenopathy. Neurological: She is alert and oriented to person, place, and time. She has normal reflexes. No cranial nerve deficit. Coordination normal.   Skin: Skin is warm and dry. No rash noted. No erythema. Psychiatric: She has a normal mood and affect. Her behavior is normal. Judgment and thought content normal.   Nursing note and vitals reviewed. Note written by Ivanna Red, as dictated by Errol Lacy MD 11:41 PM    MDM  Number of Diagnoses or Management Options  Diagnosis management comments: 25-year-old  female with multiple medical problems including sickle cell, osteomyelitis of the left foot, recent sepsis, presents with sickle cell pain and fever. We'll check chest x-ray, blood work, cultures, urinalysis.  Differential diagnosis includes sepsis, sickle cell crisis, dehydration. Will give IV fluids. Will check blood work and chest x-ray. Will monitor closely and likely admit. Patient agrees. We'll give broad spectrum antibiotics as well. Amount and/or Complexity of Data Reviewed  Clinical lab tests: ordered and reviewed  Tests in the radiology section of CPT®: ordered and reviewed  Tests in the medicine section of CPT®: ordered and reviewed  Discussion of test results with the performing providers: yes  Decide to obtain previous medical records or to obtain history from someone other than the patient: yes  Obtain history from someone other than the patient: yes  Review and summarize past medical records: yes [facility] notes)  Discuss the patient with other providers: yes  Independent visualization of images, tracings, or specimens: yes    Risk of Complications, Morbidity, and/or Mortality  Presenting problems: high  Diagnostic procedures: high  Management options: high          ED Course       Procedures    ED EKG interpretation:  Rhythm: sinus tachycardia; Rate (approx.): 126; Normal axis, normal intervals, no ST elevations or depressions. Note written by Ivanna Hidalgo, as dictated by Rui Thompson MD 12:02 AM      CXR shows no acute process    Labs show likely sickle cell crisis w/ elevated retic count    Will cover her w/ broad abx for her foot osteo too    IVF are running for sepsis protocol    CONSULT NOTE:  12:20 AM Rui Thompson MD communicated with Dr. Elise Franco, Consult for Hospitalist via Doctor Cody Ibanez. Discussed available diagnostic tests and clinical findings. Dr. Elise Franco to see and admit patient for further evaluation and treatment.

## 2018-06-24 NOTE — ED TRIAGE NOTES
Patient comes in with chief complaint of sickle cell crisis with pain all over.  Pain started this AM. Additionally she has swelling in her LEFT leg where she has been battling infection from toe amputation in April. + Fever

## 2018-06-24 NOTE — PROGRESS NOTES
Admitted early this am    1. SCC : Pain better. Decrease Fluids. Retic count not too how. She   Ran out of pain meds. 2. Hypoglycemia : Stop NPH Follow sugars. Started on D5  Due to persistent hypoglycemia . 3. On Daptomycin CPK normal  4. CKD with fluid overload Ordered dose of  Lasix. Follow   Chest xray tomorrow. 5. Probably can  Stop Zosyn soon. 6. Burn dino on back ? Heating pad. 7. Hypertension: Patient is not sure about BP meds . She was discharged on Hydralazine, clonidine and Labetolol  will restart Hydralazine and monitor BP. 8. Metabolic acidosis : Start on Sodium Bicarbonate. 9. Hypocalcemia : Add Rocaltrol to tums. Received IV Calcium.

## 2018-06-25 ENCOUNTER — APPOINTMENT (OUTPATIENT)
Dept: GENERAL RADIOLOGY | Age: 38
DRG: 314 | End: 2018-06-25
Attending: HOSPITALIST
Payer: MEDICARE

## 2018-06-25 PROBLEM — M14.672 CHARCOT ANKLE, LEFT: Status: ACTIVE | Noted: 2018-06-25

## 2018-06-25 LAB
ALBUMIN SERPL-MCNC: 2.2 G/DL (ref 3.5–5)
ALBUMIN/GLOB SERPL: 0.6 {RATIO} (ref 1.1–2.2)
ALP SERPL-CCNC: 196 U/L (ref 45–117)
ALT SERPL-CCNC: 96 U/L (ref 12–78)
ANION GAP SERPL CALC-SCNC: 9 MMOL/L (ref 5–15)
AST SERPL-CCNC: 89 U/L (ref 15–37)
BILIRUB SERPL-MCNC: 0.2 MG/DL (ref 0.2–1)
BUN SERPL-MCNC: 58 MG/DL (ref 6–20)
BUN/CREAT SERPL: 18 (ref 12–20)
CALCIUM SERPL-MCNC: 7 MG/DL (ref 8.5–10.1)
CHLORIDE SERPL-SCNC: 110 MMOL/L (ref 97–108)
CO2 SERPL-SCNC: 19 MMOL/L (ref 21–32)
CREAT SERPL-MCNC: 3.26 MG/DL (ref 0.55–1.02)
ERYTHROCYTE [DISTWIDTH] IN BLOOD BY AUTOMATED COUNT: 16.5 % (ref 11.5–14.5)
EST. AVERAGE GLUCOSE BLD GHB EST-MCNC: 171 MG/DL
GLOBULIN SER CALC-MCNC: 3.9 G/DL (ref 2–4)
GLUCOSE BLD STRIP.AUTO-MCNC: 116 MG/DL (ref 65–100)
GLUCOSE BLD STRIP.AUTO-MCNC: 126 MG/DL (ref 65–100)
GLUCOSE BLD STRIP.AUTO-MCNC: 144 MG/DL (ref 65–100)
GLUCOSE BLD STRIP.AUTO-MCNC: 148 MG/DL (ref 65–100)
GLUCOSE BLD STRIP.AUTO-MCNC: 259 MG/DL (ref 65–100)
GLUCOSE BLD STRIP.AUTO-MCNC: 42 MG/DL (ref 65–100)
GLUCOSE BLD STRIP.AUTO-MCNC: 44 MG/DL (ref 65–100)
GLUCOSE BLD STRIP.AUTO-MCNC: 73 MG/DL (ref 65–100)
GLUCOSE SERPL-MCNC: 36 MG/DL (ref 65–100)
HBA1C MFR BLD: 7.6 % (ref 4.2–6.3)
HCT VFR BLD AUTO: 20.8 % (ref 35–47)
HGB BLD-MCNC: 6.5 G/DL (ref 11.5–16)
MAGNESIUM SERPL-MCNC: 1.6 MG/DL (ref 1.6–2.4)
MCH RBC QN AUTO: 25.3 PG (ref 26–34)
MCHC RBC AUTO-ENTMCNC: 31.3 G/DL (ref 30–36.5)
MCV RBC AUTO: 80.9 FL (ref 80–99)
NRBC # BLD: 0 K/UL (ref 0–0.01)
NRBC BLD-RTO: 0 PER 100 WBC
PHOSPHATE SERPL-MCNC: 5.8 MG/DL (ref 2.6–4.7)
PLATELET # BLD AUTO: 131 K/UL (ref 150–400)
PMV BLD AUTO: 12.5 FL (ref 8.9–12.9)
POTASSIUM SERPL-SCNC: 4.5 MMOL/L (ref 3.5–5.1)
PROT SERPL-MCNC: 6.1 G/DL (ref 6.4–8.2)
RBC # BLD AUTO: 2.57 M/UL (ref 3.8–5.2)
RETICS # AUTO: 0.06 M/UL (ref 0.02–0.08)
RETICS/RBC NFR AUTO: 2.5 % (ref 0.7–2.1)
SERVICE CMNT-IMP: ABNORMAL
SERVICE CMNT-IMP: NORMAL
SODIUM SERPL-SCNC: 138 MMOL/L (ref 136–145)
WBC # BLD AUTO: 3.8 K/UL (ref 3.6–11)

## 2018-06-25 PROCEDURE — 74011250636 HC RX REV CODE- 250/636: Performed by: HOSPITALIST

## 2018-06-25 PROCEDURE — 71046 X-RAY EXAM CHEST 2 VIEWS: CPT

## 2018-06-25 PROCEDURE — 74011000250 HC RX REV CODE- 250: Performed by: HOSPITALIST

## 2018-06-25 PROCEDURE — 83735 ASSAY OF MAGNESIUM: CPT | Performed by: HOSPITALIST

## 2018-06-25 PROCEDURE — 85027 COMPLETE CBC AUTOMATED: CPT | Performed by: HOSPITALIST

## 2018-06-25 PROCEDURE — 74011250637 HC RX REV CODE- 250/637: Performed by: HOSPITALIST

## 2018-06-25 PROCEDURE — 36415 COLL VENOUS BLD VENIPUNCTURE: CPT | Performed by: HOSPITALIST

## 2018-06-25 PROCEDURE — 97530 THERAPEUTIC ACTIVITIES: CPT

## 2018-06-25 PROCEDURE — 83036 HEMOGLOBIN GLYCOSYLATED A1C: CPT | Performed by: HOSPITALIST

## 2018-06-25 PROCEDURE — 84100 ASSAY OF PHOSPHORUS: CPT | Performed by: HOSPITALIST

## 2018-06-25 PROCEDURE — 97116 GAIT TRAINING THERAPY: CPT

## 2018-06-25 PROCEDURE — 74011000258 HC RX REV CODE- 258: Performed by: HOSPITALIST

## 2018-06-25 PROCEDURE — 82962 GLUCOSE BLOOD TEST: CPT

## 2018-06-25 PROCEDURE — 97161 PT EVAL LOW COMPLEX 20 MIN: CPT

## 2018-06-25 PROCEDURE — 74011636637 HC RX REV CODE- 636/637: Performed by: HOSPITALIST

## 2018-06-25 PROCEDURE — 65660000001 HC RM ICU INTERMED STEPDOWN

## 2018-06-25 PROCEDURE — 85045 AUTOMATED RETICULOCYTE COUNT: CPT | Performed by: HOSPITALIST

## 2018-06-25 PROCEDURE — 80053 COMPREHEN METABOLIC PANEL: CPT | Performed by: HOSPITALIST

## 2018-06-25 PROCEDURE — 97165 OT EVAL LOW COMPLEX 30 MIN: CPT

## 2018-06-25 RX ORDER — SODIUM CHLORIDE 9 MG/ML
250 INJECTION, SOLUTION INTRAVENOUS AS NEEDED
Status: DISCONTINUED | OUTPATIENT
Start: 2018-06-25 | End: 2018-07-04 | Stop reason: HOSPADM

## 2018-06-25 RX ADMIN — HYDROMORPHONE HYDROCHLORIDE 0.5 MG: 1 INJECTION, SOLUTION INTRAMUSCULAR; INTRAVENOUS; SUBCUTANEOUS at 21:03

## 2018-06-25 RX ADMIN — HYDROMORPHONE HYDROCHLORIDE 0.5 MG: 1 INJECTION, SOLUTION INTRAMUSCULAR; INTRAVENOUS; SUBCUTANEOUS at 08:57

## 2018-06-25 RX ADMIN — DEXTROSE MONOHYDRATE 50 ML/HR: 10 INJECTION, SOLUTION INTRAVENOUS at 12:12

## 2018-06-25 RX ADMIN — Medication 10 ML: at 07:01

## 2018-06-25 RX ADMIN — Medication 10 ML: at 14:55

## 2018-06-25 RX ADMIN — HEPARIN SODIUM 5000 UNITS: 5000 INJECTION, SOLUTION INTRAVENOUS; SUBCUTANEOUS at 08:21

## 2018-06-25 RX ADMIN — DEXTROSE MONOHYDRATE 12.5 G: 25 INJECTION, SOLUTION INTRAVENOUS at 07:04

## 2018-06-25 RX ADMIN — DIPHENHYDRAMINE HYDROCHLORIDE 25 MG: 25 CAPSULE ORAL at 14:54

## 2018-06-25 RX ADMIN — Medication 10 ML: at 21:04

## 2018-06-25 RX ADMIN — SODIUM BICARBONATE 650 MG: 650 TABLET ORAL at 17:36

## 2018-06-25 RX ADMIN — QUETIAPINE FUMARATE 100 MG: 100 TABLET ORAL at 17:36

## 2018-06-25 RX ADMIN — CALCIUM CARBONATE (ANTACID) CHEW TAB 500 MG 200 MG: 500 CHEW TAB at 21:03

## 2018-06-25 RX ADMIN — DAPTOMYCIN 400 MG: 500 INJECTION, POWDER, LYOPHILIZED, FOR SOLUTION INTRAVENOUS at 18:00

## 2018-06-25 RX ADMIN — INSULIN LISPRO 5 UNITS: 100 INJECTION, SOLUTION INTRAVENOUS; SUBCUTANEOUS at 17:37

## 2018-06-25 RX ADMIN — PROMETHAZINE HYDROCHLORIDE 25 MG: 25 TABLET ORAL at 19:12

## 2018-06-25 RX ADMIN — CALCITRIOL 0.25 MCG: 0.25 CAPSULE, LIQUID FILLED ORAL at 10:43

## 2018-06-25 RX ADMIN — QUETIAPINE FUMARATE 100 MG: 100 TABLET ORAL at 08:21

## 2018-06-25 RX ADMIN — HEPARIN SODIUM 5000 UNITS: 5000 INJECTION, SOLUTION INTRAVENOUS; SUBCUTANEOUS at 15:01

## 2018-06-25 RX ADMIN — VENLAFAXINE 75 MG: 37.5 TABLET ORAL at 17:36

## 2018-06-25 RX ADMIN — Medication 1000 MCG: at 08:21

## 2018-06-25 RX ADMIN — SENNOSIDES AND DOCUSATE SODIUM 2 TABLET: 8.6; 5 TABLET ORAL at 08:21

## 2018-06-25 RX ADMIN — POLYETHYLENE GLYCOL 3350 17 G: 17 POWDER, FOR SOLUTION ORAL at 08:21

## 2018-06-25 RX ADMIN — HYDRALAZINE HYDROCHLORIDE 100 MG: 50 TABLET, FILM COATED ORAL at 15:01

## 2018-06-25 RX ADMIN — ACETAMINOPHEN 500 MG: 500 TABLET, FILM COATED ORAL at 07:17

## 2018-06-25 RX ADMIN — CALCIUM CARBONATE (ANTACID) CHEW TAB 500 MG 200 MG: 500 CHEW TAB at 08:21

## 2018-06-25 RX ADMIN — HYDRALAZINE HYDROCHLORIDE 100 MG: 50 TABLET, FILM COATED ORAL at 21:03

## 2018-06-25 RX ADMIN — PIPERACILLIN SODIUM AND TAZOBACTAM SODIUM 3.38 G: 3; .375 INJECTION, POWDER, LYOPHILIZED, FOR SOLUTION INTRAVENOUS at 08:27

## 2018-06-25 RX ADMIN — PIPERACILLIN SODIUM AND TAZOBACTAM SODIUM 3.38 G: 3; .375 INJECTION, POWDER, LYOPHILIZED, FOR SOLUTION INTRAVENOUS at 15:00

## 2018-06-25 RX ADMIN — SODIUM BICARBONATE 650 MG: 650 TABLET ORAL at 08:21

## 2018-06-25 RX ADMIN — HYDROMORPHONE HYDROCHLORIDE 0.5 MG: 1 INJECTION, SOLUTION INTRAMUSCULAR; INTRAVENOUS; SUBCUTANEOUS at 14:54

## 2018-06-25 RX ADMIN — HYDRALAZINE HYDROCHLORIDE 100 MG: 50 TABLET, FILM COATED ORAL at 08:21

## 2018-06-25 RX ADMIN — DIPHENHYDRAMINE HYDROCHLORIDE 25 MG: 25 CAPSULE ORAL at 08:57

## 2018-06-25 RX ADMIN — HYDROMORPHONE HYDROCHLORIDE 0.5 MG: 1 INJECTION, SOLUTION INTRAMUSCULAR; INTRAVENOUS; SUBCUTANEOUS at 03:03

## 2018-06-25 RX ADMIN — CALCIUM CARBONATE (ANTACID) CHEW TAB 500 MG 200 MG: 500 CHEW TAB at 15:01

## 2018-06-25 RX ADMIN — DEXTROSE MONOHYDRATE 25 G: 25 INJECTION, SOLUTION INTRAVENOUS at 03:32

## 2018-06-25 RX ADMIN — DIPHENHYDRAMINE HYDROCHLORIDE 25 MG: 25 CAPSULE ORAL at 03:09

## 2018-06-25 RX ADMIN — VENLAFAXINE 75 MG: 37.5 TABLET ORAL at 10:44

## 2018-06-25 NOTE — CONSULTS
[address]    [place]  MR#: [id number]  : 1980  ACCOUNT #: [de-identified]   DATE OF SERVICE: 2018    The patient is in CCU bed 21. ATTENDING PHYSICIAN:  Lis Mendes MD.    CHIEF COMPLAINT:  Aching all over like her sickle cell crisis. REASON FOR CONSULTATION:  Osteomyelitis of the left foot; rule out sepsis. The consultation was requested by Dr. Lis Mendes. The history is obtained by interview of the patient and review of the medical records. HISTORY OF PRESENT ILLNESS:  This patient is a 38-year-old -American woman with a history of sickle cell disease, NIDDM, chronic kidney disease, GERD, hypertension, obstructive sleep apnea syndrome, and polycystic ovary syndrome. The patient has osteomyelitis of the left foot following a left transmetatarsal amputation earlier this year. The patient reports that plans are now underway to proceed to a left below-the-knee amputation because of persistent osteomyelitis in the residual portion of her left foot. This patient has been receiving daptomycin as an outpatient for the infection in her left foot. However, on the day of admission, she felt achy all over and just did not feel well. She thought she was having the beginning of a sickle cell crisis. The patient presented to the Emergency Room and was admitted. She denies having fever, chills, sweats, or systemic symptoms at home. The patient was admitted to the hospital but then transferred to the CCU, and we are now asked to see her for further evaluation. She has been hypoglycemic today. PAST MEDICAL HISTORY:  Tobacco:  None. Alcohol:  None. MEDICATIONS PRIOR TO ADMISSION:  1. Albuterol by nebulizer. 2.  Calcium carbonate 1 p.o. t.i.d.  3.  Vitamin D.  4.  Vitamin B12 1000 mcg p.o. daily. 5.  Percocet 5/325 1 p.o. q.6 hours p.r.n. pain. 6.  Promethazine p.r.n. nausea.   7.  Seroquel 100 mg p.o. b.i.d.  8. Sherrie-Colace 8.6/50 mg 2 p.o. daily. 9.  Venlafaxine (Effexor) 75 mg p.o. b.i.d. ALLERGIES:  1. TORADOL -- ITCHING. 2.  ERYTHROMYCIN -- ITCHING. 3.  FENTANYL -- ITCHING. 4.  MORPHINE -- ITCHING. ILLNESSES:  1. NIDDM diagnosed about 25 years ago when she was in her early teens. 2.  Chronic kidney disease. 3.  Hypertension. 4.  Obstructive sleep apnea syndrome. 5.  Polycystic ovary syndrome. 6.  Seizure disorder. 7.  Sickle cell disease. 8.  GERD. SURGERIES:  1. Left foot TMA earlier this year. 2.  Gastric bypass surgery in 2016 by Dr. Suzette Perkins. The patient reports that she weighed 285 pounds before the gastric bypass surgery, now weighs about 160 pounds. SOCIAL HISTORY:  The patient lives in the [facility]. FAMILY HISTORY:  Unobtainable. She is not clear on her family's medical problems. The patient is lethargic. REVIEW OF SYSTEMS:  CONSTITUTIONAL:  She does not recall having fever or chills at home. HEENT:  No headache and no recent visual change. No sore throat. LYMPHATIC:  No history of adenopathy. DERMATOLOGIC:  No recent rashes. RESPIRATORY:  No cough or hemoptysis. CARDIOVASCULAR:  No chest pain and no history of heart murmurs. GASTROINTESTINAL:  No nausea, vomiting, abdominal pain, diarrhea. GENITOURINARY:  No dysuria. MUSCULOSKELETAL:  She has had some pain in her left leg that she attributes to her regular crisis pain. NEUROLOGIC:  No history of confusion, seizure, stroke, syncope. PHYSICAL EXAMINATION:  GENERAL:  No acute distress. VITAL SIGNS:  Blood pressure 86/54, heart rate 110, respiratory rate 16. The T-max is 100.3. HEENT:  Sclerae and conjunctivae benign, oropharynx unremarkable. NECK:  Supple. NODES:  No adenopathy. SKIN:  No rashes. LUNGS:  Few rales at the bases. CARDIOVASCULAR:  Regular rate and rhythm with soft systolic murmur. ABDOMEN:  Soft, nondistended, nontender, no masses, bowel sounds are present.   BACK:  No CVA tenderness. EXTREMITIES:  No pretibial edema. Left foot has a well-healed TMA incision, but the left foot remains a bit swollen compared to the right foot without erythema, warmth, or focal tenderness. NEUROLOGIC:  Alert and oriented. LABORATORY DATA:  CBC reveals a hemoglobin of 7.3 and a white count of 6600 and platelets 140,419. The chemistry data reveals a BUN of 56 and creatinine 2.95. Bilirubin 0.3 with SGOT 19, SGPT 22, alkaline phosphatase 176, and lipase 660. MICROBIOLOGY DATA:  Blood cultures on 06/23/2018, now reveal gram-negative rods in 2 out of 2 bottles. Repeat blood cultures sent today are pending. IMPRESSION:  1.  Gram-negative bacteremia -- The source of the bacteremia is not certain at this time. Her urinalysis was unremarkable. Her abdominal exam is rather benign. She is currently on daptomycin and Zosyn, and I will leave her on this combination for now. We will await sensitivity data. 2.  History of osteomyelitis of the left foot -- the patient indicates that there are plans for proceeding to a left BKA. 3.  NIDDM. 4.  Chronic kidney disease. 5.  Hypertension. 6.  Polycystic ovary syndrome. 7.  Seizure disorder. 8.  Sickle cell disease. 9.  Obstructive sleep apnea syndrome. 10.  Gastroesophageal reflux disease. PLAN:  1. Continue daptomycin and Zosyn. 2.  Consider CT scan of the abdomen and pelvis and ultrasound as well. Thank you very much for letting me see this patient with you.       MD ROJELIO Solares /   D: 06/25/2018 00:13     T: 06/25/2018 04:50  JOB #: [id number]  CC: Alyson Herrera MD  CC: Mendel Martinez MD

## 2018-06-25 NOTE — PROGRESS NOTES
[address]                   6/25/2018      RE: Denisse Guillermo      To Whom it May Concern: This is to certify that Denisse Guillermo  is in the hospital in the Coronary Care Unit and is ill at this time. Please feel free to contact my office if you have any questions or concerns. Thank you for your assistance in this matter.  [phone]    Sincerely,      Beather Closs, RN

## 2018-06-25 NOTE — PROGRESS NOTES
Foot and Ankle  Progress Note    Admit Date: 6/23/2018  Hospital day 2    Subjective:     Patient had transmet amputation left foot in April 2018 by Dr. Umesh De Luna. Patient was admitted thru ER 2 days ago ago secondary to pain in the left leg and over all body pain related to a sickle cell crisis.      Current Facility-Administered Medications   Medication Dose Route Frequency    0.9% sodium chloride infusion 250 mL  250 mL IntraVENous PRN    prochlorperazine (COMPAZINE) with saline injection 5 mg  5 mg IntraVENous Q6H PRN    venlafaxine (EFFEXOR) tablet 75 mg  75 mg Oral BID WITH MEALS    senna-docusate (PERICOLACE) 8.6-50 mg per tablet 2 Tab  2 Tab Oral DAILY    QUEtiapine (SEROquel) tablet 100 mg  100 mg Oral BID    oxyCODONE-acetaminophen (PERCOCET) 5-325 mg per tablet 1 Tab  1 Tab Oral Q6H PRN    cyanocobalamin (VITAMIN B12) tablet 1,000 mcg  1,000 mcg Oral DAILY    calcium carbonate (TUMS) chewable tablet 200 mg [elemental]  200 mg Oral TID    albuterol (PROVENTIL VENTOLIN) nebulizer solution 2.5 mg  2.5 mg Nebulization Q4H PRN    sodium chloride (NS) flush 5-10 mL  5-10 mL IntraVENous Q8H    sodium chloride (NS) flush 5-10 mL  5-10 mL IntraVENous PRN    heparin (porcine) injection 5,000 Units  5,000 Units SubCUTAneous Q8H    acetaminophen (TYLENOL) tablet 500 mg  500 mg Oral Q4H PRN    hydrALAZINE (APRESOLINE) 20 mg/mL injection 20 mg  20 mg IntraVENous Q6H PRN    glucose chewable tablet 16 g  4 Tab Oral PRN    dextrose (D50W) injection syrg 12.5-25 g  12.5-25 g IntraVENous PRN    glucagon (GLUCAGEN) injection 1 mg  1 mg IntraMUSCular PRN    diphenhydrAMINE (BENADRYL) capsule 25 mg  25 mg Oral Q6H PRN    DAPTOmycin (CUBICIN) 400 mg in 0.9% sodium chloride 50 mL IVPB RF formulation  400 mg IntraVENous Q48H    piperacillin-tazobactam (ZOSYN) 3.375 g in 0.9% sodium chloride (MBP/ADV) 100 mL  3.375 g IntraVENous Q8H    insulin lispro (HUMALOG) injection   SubCUTAneous AC&HS    hydrALAZINE (APRESOLINE) tablet 100 mg  100 mg Oral TID    sodium chloride (NS) flush 20 mL  20 mL InterCATHeter PRN    sodium chloride (NS) flush 10 mL  10 mL InterCATHeter Q24H    sodium chloride (NS) flush 10 mL  10 mL InterCATHeter PRN    sodium chloride (NS) flush 10 mL  10 mL InterCATHeter Q8H    alteplase (CATHFLO) 1 mg in sterile water (preservative free) 1 mL injection  1 mg InterCATHeter PRN    polyethylene glycol (MIRALAX) packet 17 g  17 g Oral DAILY    sodium bicarbonate tablet 650 mg  650 mg Oral BID    calcitRIOL (ROCALTROL) capsule 0.25 mcg  0.25 mcg Oral DAILY    HYDROmorphone (DILAUDID) syringe 0.5 mg  0.5 mg IntraVENous Q6H PRN    promethazine (PHENERGAN) tablet 25 mg  25 mg Oral Q8H PRN    dextrose 10% infusion  50 mL/hr IntraVENous CONTINUOUS    sodium chloride (NS) flush 5-10 mL  5-10 mL IntraVENous PRN    sodium chloride (NS) flush 5-10 mL  5-10 mL IntraVENous PRN        Review of Systems  No change from admitting H&P    Objective:     Patient Vitals for the past 8 hrs:   BP Temp Pulse Resp SpO2   06/25/18 1543 159/80 98.5 °F (36.9 °C) (!) 113 20 98 %   06/25/18 1519 154/86 - (!) 117 21 97 %   06/25/18 1200 161/87 98.2 °F (36.8 °C) (!) 113 20 99 %     06/25 0701 - 06/25 1900  In: 1145.8 [P.O.:400;  I.V.:745.8]  Out: 825 [Urine:825]  06/23 1901 - 06/25 0700  In: 3432.5 [P.O.:1440; I.V.:1992.5]  Out: 2400 [Urine:2400]    Physical Exam: Lower Extremities   Palpable pedal pulses   Diminished epicritic sensation lower legs  Edema lower legs; left>right  No increased redness nor warmth of the left lower limb as compared to the right lower limb  No open wounds left foot; shallow calloused tissue lateral left foot  Left foot is not clinically infected    WBC's 3.8    All labs reviewed          Data Review   Recent Results (from the past 24 hour(s))   GLUCOSE, POC    Collection Time: 06/24/18  7:07 PM   Result Value Ref Range    Glucose (POC) 46 (LL) 65 - 100 mg/dL    Performed by America Romero GLUCOSE, POC    Collection Time: 06/24/18  7:25 PM   Result Value Ref Range    Glucose (POC) 134 (H) 65 - 100 mg/dL    Performed by Cody Logan    GLUCOSE, POC    Collection Time: 06/24/18  8:10 PM   Result Value Ref Range    Glucose (POC) 60 (L) 65 - 100 mg/dL    Performed by [employer]    GLUCOSE, POC    Collection Time: 06/24/18  8:28 PM   Result Value Ref Range    Glucose (POC) 272 (H) 65 - 100 mg/dL    Performed by [employer]    GLUCOSE, POC    Collection Time: 06/24/18  8:31 PM   Result Value Ref Range    Glucose (POC) 170 (H) 65 - 100 mg/dL    Performed by [employer]    GLUCOSE, POC    Collection Time: 06/24/18  9:20 PM   Result Value Ref Range    Glucose (POC) 60 (L) 65 - 100 mg/dL    Performed by Elina Deleon    GLUCOSE, POC    Collection Time: 06/24/18  9:36 PM   Result Value Ref Range    Glucose (POC) 106 (H) 65 - 100 mg/dL    Performed by [employer]    GLUCOSE, POC    Collection Time: 06/24/18 11:07 PM   Result Value Ref Range    Glucose (POC) 82 65 - 100 mg/dL    Performed by June Bailey    HEMOGLOBIN A1C WITH EAG    Collection Time: 06/25/18  3:04 AM   Result Value Ref Range    Hemoglobin A1c 7.6 (H) 4.2 - 6.3 %    Est. average glucose 171 mg/dL   GLUCOSE, POC    Collection Time: 06/25/18  3:29 AM   Result Value Ref Range    Glucose (POC) 44 (LL) 65 - 100 mg/dL    Performed by Elina Apa    METABOLIC PANEL, COMPREHENSIVE    Collection Time: 06/25/18  3:30 AM   Result Value Ref Range    Sodium 138 136 - 145 mmol/L    Potassium 4.5 3.5 - 5.1 mmol/L    Chloride 110 (H) 97 - 108 mmol/L    CO2 19 (L) 21 - 32 mmol/L    Anion gap 9 5 - 15 mmol/L    Glucose 36 (LL) 65 - 100 mg/dL    BUN 58 (H) 6 - 20 MG/DL    Creatinine 3.26 (H) 0.55 - 1.02 MG/DL    BUN/Creatinine ratio 18 12 - 20      GFR est AA 19 (L) >60 ml/min/1.73m2    GFR est non-AA 16 (L) >60 ml/min/1.73m2    Calcium 7.0 (L) 8.5 - 10.1 MG/DL    Bilirubin, total 0.2 0.2 - 1.0 MG/DL    ALT (SGPT) 96 (H) 12 - 78 U/L AST (SGOT) 89 (H) 15 - 37 U/L    Alk.  phosphatase 196 (H) 45 - 117 U/L    Protein, total 6.1 (L) 6.4 - 8.2 g/dL    Albumin 2.2 (L) 3.5 - 5.0 g/dL    Globulin 3.9 2.0 - 4.0 g/dL    A-G Ratio 0.6 (L) 1.1 - 2.2     CBC W/O DIFF    Collection Time: 06/25/18  3:30 AM   Result Value Ref Range    WBC 3.8 3.6 - 11.0 K/uL    RBC 2.57 (L) 3.80 - 5.20 M/uL    HGB 6.5 (L) 11.5 - 16.0 g/dL    HCT 20.8 (L) 35.0 - 47.0 %    MCV 80.9 80.0 - 99.0 FL    MCH 25.3 (L) 26.0 - 34.0 PG    MCHC 31.3 30.0 - 36.5 g/dL    RDW 16.5 (H) 11.5 - 14.5 %    PLATELET 226 (L) 245 - 400 K/uL    MPV 12.5 8.9 - 12.9 FL    NRBC 0.0 0  WBC    ABSOLUTE NRBC 0.00 0.00 - 0.01 K/uL   MAGNESIUM    Collection Time: 06/25/18  3:30 AM   Result Value Ref Range    Magnesium 1.6 1.6 - 2.4 mg/dL   RETICULOCYTE COUNT    Collection Time: 06/25/18  3:30 AM   Result Value Ref Range    Reticulocyte count 2.5 (H) 0.7 - 2.1 %    Absolute Retic Cnt. 0.0630 0.0164 - 0.0776 M/ul   PHOSPHORUS    Collection Time: 06/25/18  3:30 AM   Result Value Ref Range    Phosphorus 5.8 (H) 2.6 - 4.7 MG/DL   GLUCOSE, POC    Collection Time: 06/25/18  3:31 AM   Result Value Ref Range    Glucose (POC) 42 (LL) 65 - 100 mg/dL    Performed by June Bailey    GLUCOSE, POC    Collection Time: 06/25/18  3:49 AM   Result Value Ref Range    Glucose (POC) 116 (H) 65 - 100 mg/dL    Performed by June Bailey    GLUCOSE, POC    Collection Time: 06/25/18  7:00 AM   Result Value Ref Range    Glucose (POC) 73 65 - 100 mg/dL    Performed by June Bailey    GLUCOSE, POC    Collection Time: 06/25/18  7:19 AM   Result Value Ref Range    Glucose (POC) 148 (H) 65 - 100 mg/dL    Performed by June Bailey    GLUCOSE, POC    Collection Time: 06/25/18 12:12 PM   Result Value Ref Range    Glucose (POC) 126 (H) 65 - 100 mg/dL    Performed by Keke Gandhi, POC    Collection Time: 06/25/18  4:22 PM   Result Value Ref Range    Glucose (POC) 259 (H) 65 - 100 mg/dL    Performed by Denise Vela Assessment:     Principal Problem:    Sickle cell crisis ([facility] (6/24/2018)    Active Problems:    HTN (hypertension) (10/14/2011)      Diabetes mellitus type I ([facility] (3/29/2012)      Overview: New pt appt: May 8, 2013 with dr Josefa Sutton      dx'd age 14; + neuropathy, nephropathy      On ssi, regular      Diabetes health maintenance:      Last A1C: 11.7 1/2013 10.7 3/2012      Last eye exam 2012, had cataract R eye, needs L eye done, Dr Megan Martinez       (OD) [phone], another doc did surgery: Dr Arben Metcalf MD same practice       [phone] f [phone]      Last microalbumin:  n/a Last creatinine: 1.65 1/2013; 1.9 9/17/12      Last microfilament exam/Last podiatry: 9/20/12 intact, nails thickened      Last lipids: 1/201  trig 173, HDL 70 .4 w/o statin; no record       in former PCP notes why it was d/c'd       ACE/ARB: no due to CKD                  CKD (chronic kidney disease) (9/19/2012)      Overview: Dr Nory Gillespie; Required HD in past with acute exacerbation, AV graft R       thigh      Supposed to make appt:       [facility], [facility]       [address] [place] [address]       [phone]       Metabolic encephalopathy (3/6/1575)      Osteomyelitis of left foot ([facility] (4/27/2018)    Charcot left foot    Plan:   Left foot is not clinically infected. WBC's are at 3.8. I feel that this patients symptoms are secondary to charcot discomfort as well as sickle cell crisis. From a podiatry standpoint this patient can be released and to follow with Dr. Luisito Chappell within a  week of discharge. Dr. Nissa Bach to manage antibiotic therapy.

## 2018-06-25 NOTE — ROUTINE PROCESS
Bedside shift change report given to April, RN (oncoming nurse) by Gaby Davis RN (offgoing nurse).  Report included the following information SBAR, Kardex, ED Summary, Intake/Output, MAR, Recent Results, Med Rec Status and Cardiac Rhythm ST.

## 2018-06-25 NOTE — PROGRESS NOTES
Problem: Falls - Risk of  Goal: *Absence of Falls  Document Blake Fall Risk and appropriate interventions in the flowsheet. Outcome: Progressing Towards Goal  Fall Risk Interventions:  Mobility Interventions: Assess mobility with egress test, Communicate number of staff needed for ambulation/transfer, OT consult for ADLs, Patient to call before getting OOB, PT Consult for mobility concerns, Strengthening exercises (ROM-active/passive), PT Consult for assist device competence         Medication Interventions: Assess postural VS orthostatic hypotension, Evaluate medications/consider consulting pharmacy, Patient to call before getting OOB, Teach patient to arise slowly         History of Falls Interventions: Consult care management for discharge planning, Door open when patient unattended, Evaluate medications/consider consulting pharmacy, Room close to nurse's station, Investigate reason for fall        Problem: Pressure Injury - Risk of  Goal: *Prevention of pressure injury  Document Antonio Scale and appropriate interventions in the flowsheet. Outcome: Progressing Towards Goal  Pressure Injury Interventions:             Activity Interventions: Assess need for specialty bed, Increase time out of bed, Pressure redistribution bed/mattress(bed type), PT/OT evaluation, Chair cushion    Mobility Interventions: Assess need for specialty bed, Chair cushion, Float heels, HOB 30 degrees or less, Pressure redistribution bed/mattress (bed type), PT/OT evaluation    Nutrition Interventions: Document food/fluid/supplement intake, Discuss nutritional consult with provider, Offer support with meals,snacks and hydration

## 2018-06-25 NOTE — PROGRESS NOTES
physical Therapy EVALUATION/DISCHARGE  Patient: Michele Sánchez (41 y.o. female)  Date: 6/25/2018  Primary Diagnosis: Sickle cell crisis [facility])        Precautions:   Fall  ASSESSMENT :  Based on the objective data described below, the patient was admitted with sickle cell crisis. Patient received supine in bed and agreeable to therapy. RN cleared for PT/OT evaluation pending blood transfusion. VSS during evaluation and pt denied dizziness during positional changes, but reported baseline fatigue. Patient reported being modified independent prior to admission when she is feeling well, but her spouse and father will assist as needed at home. Patient owns a rollator and cane. Patient completed supine to sit independently, sit<>stand with supervision and ambulated in the hallway without difficulty. Patient does not require any further acute PT needs at this time. Recommend patient remain OOB and ambulate with RN staff as tolerated. .    Skilled physical therapy is not indicated at this time. PLAN :  Discharge Recommendations: None  Further Equipment Recommendations for Discharge: none     SUBJECTIVE:   Patient stated I am feeling pretty tired.     OBJECTIVE DATA SUMMARY:   HISTORY:    Past Medical History:   Diagnosis Date    ARF (acute renal failure) ([address] requiring dialysis 2011    Asthma     CKD (chronic kidney disease)     Diabetes ([address]     Gastroparesis 2010    Gastric Pacer- REMOVED 07/2015    GERD (gastroesophageal reflux disease)     Hypertension     Narcotic dependence ([address]     THU (obstructive sleep apnea)     wears 2 LPM oxygen at night    Other ill-defined conditions(799.89)     Polycystic ovarian syndrome     Seizures (HCC)     Sickle cell trait ([address]     Thromboembolus ([address] to her left arm and was told she had one in left leg recently     Past Surgical History:   Procedure Laterality Date    HX CATARACT REMOVAL  3/5/12    right    HX DILATION AND CURETTAGE      ablation    HX GASTRIC BYPASS  2015    HX GI      j tube placement and removal    HX OTHER SURGICAL  2010    Gastric Pacer- REMOVED 07/2015    HX VASCULAR ACCESS      gray cath rt subclavian    HX VASCULAR ACCESS      HD access right thigh     Prior Level of Function/Home Situation: see above  Personal factors and/or comorbidities impacting plan of care:     Home Situation  Home Environment: Private residence  Wheelchair Ramp: Yes  One/Two Story Residence: One story  Living Alone: No  Support Systems: Spouse/Significant Other/Partner, Parent  Patient Expects to be Discharged to[de-identified] Private residence  Current DME Used/Available at Home: Grab bars, Raised toilet seat, Cane, straight, Walker, rollator  Tub or Shower Type: Tub/Shower combination    EXAMINATION/PRESENTATION/DECISION MAKING:   Critical Behavior:              Hearing: Auditory  Auditory Impairment: None  Skin:    Edema:   Range Of Motion:  AROM: Within functional limits                       Strength:    Strength:  Within functional limits                    Tone & Sensation:                  Sensation: Intact               Coordination:     Vision:      Functional Mobility:  Bed Mobility:     Supine to Sit: Supervision        Transfers:  Sit to Stand: Supervision  Stand to Sit: Supervision                       Balance:   Sitting: Intact  Standing: Intact  Ambulation/Gait Training:  Distance (ft): 100 Feet (ft)  Assistive Device: Gait belt  Ambulation - Level of Assistance: Supervision        Gait Abnormalities: Decreased step clearance        Base of Support: Widened     Speed/Ana Luisa: Pace decreased (<100 feet/min)        Functional Measure:  Tinetti test:    Sitting Balance: 1  Arises: 1  Attempts to Rise: 2  Immediate Standing Balance: 2  Standing Balance: 2  Nudged: 2  Eyes Closed: 1  Turn 360 Degrees - Continuous/Discontinuous: 1  Turn 360 Degrees - Steady/Unsteady: 1  Sitting Down: 1  Balance Score: 14  Indication of Gait: 1  R Step Length/Height: 1  L Step Length/Height: 1  R Foot Clearance: 1  L Foot Clearance: 1  Step Symmetry: 1  Step Continuity: 1  Path: 1  Trunk: 2  Walking Time: 1  Gait Score: 11  Total Score: 25       Tinetti Test and G-code impairment scale:  Percentage of Impairment CH    0%   CI    1-19% CJ    20-39% CK    40-59% CL    60-79% CM    80-99% CN     100%   Tinetti  Score 0-28 28 23-27 17-22 12-16 6-11 1-5 0       Tinetti Tool Score Risk of Falls  <19 = High Fall Risk  19-24 = Moderate Fall Risk  25-28 = Low Fall Risk  Tinetti ME. Performance-Oriented Assessment of Mobility Problems in Elderly Patients. Emmanuel 66; [id number]. (Scoring Description: PT Bulletin Feb. 10, 1993)    Older adults: Ashwin Rahman et al, 2009; n = 1000 [facility] elderly evaluated with ABC, LILLY, ADL, and IADL)  · Mean LILLY score for males aged 69-68 years = 26.21(3.40)  · Mean LILLY score for females age 69-68 years = 25.16(4.30)  · Mean LILLY score for males over 80 years = 23.29(6.02)  · Mean LILLY score for females over 80 years = 17.20(8.32)         G codes: In compliance with CMSs Claims Based Outcome Reporting, the following G-code set was chosen for this patient based on their primary functional limitation being treated: The outcome measure chosen to determine the severity of the functional limitation was the Tinetti with a score of 25/28 which was correlated with the impairment scale. ? Mobility - Walking and Moving Around:     - CURRENT STATUS: CI - 1%-19% impaired, limited or restricted    - GOAL STATUS: CI - 1%-19% impaired, limited or restricted    - D/C STATUS:  CI - 1%-19% impaired, limited or restricted         Based on the above components, the patient evaluation is determined to be of the following complexity level: LOW     Pain:  Pain Scale 1: Numeric (0 - 10)  Pain Intensity 1: 4  Pain Location 1: Generalized  Activity Tolerance:   Good. VSS  Please refer to the flowsheet for vital signs taken during this treatment.   After treatment:   [] Patient left in no apparent distress sitting up in chair  [x]   Patient left in no apparent distress in bed  [x]   Call bell left within reach  [x]   Nursing notified  []   Caregiver present  []   Bed alarm activated    COMMUNICATION/EDUCATION:   Communication/Collaboration:  [x]   Fall prevention education was provided and the patient/caregiver indicated understanding. [x]   Patient/family have participated as able and agree with findings and recommendations. []   Patient is unable to participate in plan of care at this time.   Findings and recommendations were discussed with: Occupational Therapist and Registered Nurse    Thank you for this referral.  Yamini Andre, PT, DPT   Time Calculation: 16 mins

## 2018-06-25 NOTE — DIABETES MGMT
[facility] Progress Note    Recommendations/ Comments: Chart review for severe hypoglycemia. Multiple episodes of BG < 50 since yesterday. BG 30 mg/dL at 1227 and in the 30's again at 1725. Overnight BG results in the 40's. She required treatment with IV dextrose 5 times yesterday and twice already today. IV of D10 @ 75 begun yesterday. BG @ 0700 73 mg/dL today. If appropriate please   Change lispro correction to high sensitivity    Current hospital DM medication: lispro correction scale, normal sensitivity    Chart reviewed on Simona Salas. Patient is a 42 y.o. female with known DM - she was discharged on lantus 5 units on 6/14/2018. No DM meds lispro in PTA. Pt is well known to [facility] from many previous admission: 6/9 - 6/14/2018, 4/27-5/14/2018, 3/2 - 3/27/2018 in addition to admissions in previous years. Hx is complex including Type 1 DM, chronic pancreatitis, CKD, Sickle Cell, s/p gastric bypass, osteomyelitis and partial amputation. Admitted 6/24/2018 with fever, n/vom    She was transfused 3/10/2018  On 6/25/2018 Hgb 6.5  Therefore, A1c may not be accurate    A1c:   Lab Results   Component Value Date/Time    Hemoglobin A1c 7.6 (H) 06/25/2018 03:04 AM    Hemoglobin A1c 7.9 (H) 06/23/2018 11:29 PM       Recent Glucose Results: Lab Results   Component Value Date/Time    GLU 36 (LL) 06/25/2018 03:30 AM    GLU 25 (LL) 06/24/2018 12:40 PM    GLUCPOC 148 (H) 06/25/2018 07:19 AM    GLUCPOC 73 06/25/2018 07:00 AM    GLUCPOC 116 (H) 06/25/2018 03:49 AM        Lab Results   Component Value Date/Time    Creatinine 3.26 (H) 06/25/2018 03:30 AM     Estimated Creatinine Clearance: 22.3 mL/min (based on Cr of 3.26). Active Orders   Diet    DIET RENAL WITH OPTIONS 70-70-70 (House); Regular; Consistent Carb 2000kcal        PO intake: Patient Vitals for the past 72 hrs:   % Diet Eaten   06/24/18 1754 100 %   06/24/18 1300 100 %   06/24/18 0900 100 %       Will continue to follow as needed.     Thank you  Rashaad Greenfield RN, CDE

## 2018-06-25 NOTE — PROGRESS NOTES
Nephrology Progress Note  Byron Reggie  Date of Admission : 6/23/2018    CC: Follow up for STONE on CKD       Assessment and Plan     STONE on CKD :  - Cr up slightly  - hold further IVF  - transfuse today followed by lasix  - daily labs for now      CKD Stage IV :  - baseline Cr 2.6-2.8 mg/dl   - previously on dialysis and recovered       AG acidosis:  - likely from mild STONE  - on oral bicarb      Sickle cell crisis:  - hgb 6.5  - would transfuse today  - follow by lasix      Hypocalcemia:  - IV calcium gluconate being administered now     Left Foot Osteo :   - on dapto  - CPK ok      IDDM:  - episode of hypoglycemia this AM  - per hospitalist     Sepsis:  - on zosyn and daptomycin  - cultures pending       Interval History:  Seen and examined. Transferred to CCU for ongoing hypoglycemia. On dextrose drip at 75cc/hr. No cp, sob reported. MSK pain has improved. Current Medications: all current  Medications have been eviewed in EPIC  Review of Systems: Pertinent items are noted in HPI.     Objective:  Vitals:    Vitals:    06/25/18 0300 06/25/18 0400 06/25/18 0500 06/25/18 0600   BP: 141/74 123/70 142/82 152/90   Pulse: (!) 112 (!) 108 (!) 105 (!) 105   Resp: 16 14 13 16   Temp:  97.9 °F (36.6 °C)     SpO2: 97% 96% 98% 98%   Weight:  77.2 kg (170 lb 3.1 oz)  77.2 kg (170 lb 3.1 oz)   Height:         Intake and Output:     06/23 1901 - 06/25 0700  In: 3357.5 [P.O.:1440; I.V.:1917.5]  Out: 1400 [Urine:1400]    Physical Examination:  Pt intubated     No  General: NAD,Conversant   Neck:  Supple, no mass  Resp:  Lungs CTA B/L, no wheezing , normal respiratory effort  CV:  RRR,  no murmur or rub, 1+ LE edema  GI:  Soft, NT, + Bowel sounds, no hepatosplenomegaly  Neurologic:  Non focal  Psych:             AAO x 3 appropriate affect   Skin:  No Rash  :  No escobedo    []    High complexity decision making was performed  []    Patient is at high-risk of decompensation with multiple organ involvement    Lab Data Personally Reviewed: I have reviewed all the pertinent labs, microbiology data and radiology studies during assessment. Recent Labs      06/25/18   0330  06/24/18   1240  06/24/18   0359  06/23/18   2332   NA  138   --   138  142   K  4.5   --   4.0  4.3   CL  110*   --   112*  114*   CO2  19*   --   17*  20*   GLU  36*  25*  35*  50*   BUN  58*   --   56*  61*   CREA  3.26*   --   2.95*  3.27*   CA  7.0*   --   5.9*  6.7*   MG  1.6   --    --    --    PHOS  5.8*   --    --    --    ALB  2.2*   --    --   2.6*   SGOT  89*   --    --   19   ALT  96*   --    --   22   INR   --    --    --   1.1     Recent Labs      06/25/18   0330  06/23/18   2332   WBC  3.8  6.6   HGB  6.5*  7.3*   HCT  20.8*  23.1*   PLT  131*  150     Lab Results   Component Value Date/Time    Specimen Description: URINE 06/24/2013 08:00 PM    Specimen Description: URINE 06/17/2013 07:55 PM    Specimen Description: URINE 05/26/2013 10:10 AM     Lab Results   Component Value Date/Time    Culture result: (A) 06/24/2018 07:30 AM     GRAM POSITIVE COCCI IN PAIRS GROWING IN 1 OF 2 BOTTLES DRAWN . .(SITE= L CHEST PICC)    Culture result: (A) 06/24/2018 07:30 AM     PRELIMINARY REPORT OF GRAM POSITIVE COCCI IN PAIRS GROWING IN 1 OF 2 BOTTLES DRAWN . ..(SITE= L CHEST PICC) CALLED TO AND READ BACK BY LAURO ACOSTA ([place]) ON 6/25/18 AT 0633 BY VI    Culture result: REMAINING BOTTLE(S) HAS/HAVE NO GROWTH SO FAR 06/24/2018 07:30 AM     Recent Results (from the past 24 hour(s))   GLUCOSE, POC    Collection Time: 06/24/18  7:07 AM   Result Value Ref Range    Glucose (POC) 27 (LL) 65 - 100 mg/dL    Performed by Bill Martinez POC    Collection Time: 06/24/18  7:10 AM   Result Value Ref Range    Glucose (POC) 591 (H) 65 - 100 mg/dL    Performed by MASTER Barker    Collection Time: 06/24/18  7:11 AM   Result Value Ref Range    Glucose (POC) 182 (H) 65 - 100 mg/dL    Performed by MASTER Barker    Collection Time: 06/24/18  7:21 AM   Result Value Ref Range    Glucose (POC) 119 (H) 65 - 100 mg/dL    Performed by [address], BLOOD, LINE    Collection Time: 06/24/18  7:30 AM   Result Value Ref Range    Special Requests: NO SPECIAL REQUESTS      Culture result: (A)       GRAM POSITIVE COCCI IN PAIRS GROWING IN 1 OF 2 BOTTLES DRAWN . .(SITE= L CHEST PICC)    Culture result: (A)       PRELIMINARY REPORT OF GRAM POSITIVE COCCI IN PAIRS GROWING IN 1 OF 2 BOTTLES DRAWN . ..(SITE= L CHEST PICC) CALLED TO AND READ BACK BY LAURO ACOSTA ([place]) ON 6/25/18 AT 0633 BY VI    Culture result: REMAINING BOTTLE(S) HAS/HAVE NO GROWTH SO FAR     POC EG7    Collection Time: 06/24/18  9:53 AM   Result Value Ref Range    Calcium, ionized (POC) 0.97 (L) 1.12 - 1.32 mmol/L    FIO2 (POC) 24 %    pH (POC) 7.168 (LL) 7.35 - 7.45      pCO2 (POC) 47.3 (H) 35.0 - 45.0 MMHG    pO2 (POC) 42 (LL) 80 - 100 MMHG    HCO3 (POC) 17.2 (L) 22 - 26 MMOL/L    Base deficit (POC) 11 mmol/L    sO2 (POC) 64 (L) 92 - 97 %    Site OTHER      Device: NASAL CANNULA      Flow rate (POC) 1 L/M    Allens test (POC) N/A      Specimen type (POC) VENOUS BLOOD      Total resp. rate 16     POC G3 - PUL    Collection Time: 06/24/18 10:35 AM   Result Value Ref Range    FIO2 (POC) 24 %    pH (POC) 7.169 (LL) 7.35 - 7.45      pCO2 (POC) 43.5 35.0 - 45.0 MMHG    pO2 (POC) 125 (H) 80 - 100 MMHG    HCO3 (POC) 15.8 (L) 22 - 26 MMOL/L    sO2 (POC) 98 (H) 92 - 97 %    Base deficit (POC) 13 mmol/L    Site LEFT RADIAL      Device: NASAL CANNULA      Flow rate (POC) 1 L/M    Allens test (POC) YES      Specimen type (POC) ARTERIAL      Total resp.  rate 16     GLUCOSE, POC    Collection Time: 06/24/18 12:27 PM   Result Value Ref Range    Glucose (POC) 31 (LL) 65 - 100 mg/dL    Performed by [employer] [facility]    GLUCOSE, POC    Collection Time: 06/24/18 12:29 PM   Result Value Ref Range    Glucose (POC) 30 (LL) 65 - 100 mg/dL    Performed by [employer] P    GLUCOSE, POC Collection Time: 06/24/18 12:37 PM   Result Value Ref Range    Glucose (POC) 180 (H) 65 - 100 mg/dL    Performed by [address] [place] [address], RANDOM    Collection Time: 06/24/18 12:40 PM   Result Value Ref Range    Glucose 25 (LL) 65 - 100 mg/dL   GLUCOSE, POC    Collection Time: 06/24/18  1:37 PM   Result Value Ref Range    Glucose (POC) 91 65 - 100 mg/dL    Performed by [address] [place] [address], POC    Collection Time: 06/24/18  2:34 PM   Result Value Ref Range    Glucose (POC) 91 65 - 100 mg/dL    Performed by [address] [place] [address], POC    Collection Time: 06/24/18  5:25 PM   Result Value Ref Range    Glucose (POC) 37 (LL) 65 - 100 mg/dL    Performed by [employer]    GLUCOSE, POC    Collection Time: 06/24/18  5:27 PM   Result Value Ref Range    Glucose (POC) 37 (LL) 65 - 100 mg/dL    Performed by [employer]    GLUCOSE, POC    Collection Time: 06/24/18  5:52 PM   Result Value Ref Range    Glucose (POC) 112 (H) 65 - 100 mg/dL    Performed by [employer]    GLUCOSE, POC    Collection Time: 06/24/18  7:07 PM   Result Value Ref Range    Glucose (POC) 46 (LL) 65 - 100 mg/dL    Performed by [employer]    GLUCOSE, POC    Collection Time: 06/24/18  7:25 PM   Result Value Ref Range    Glucose (POC) 134 (H) 65 - 100 mg/dL    Performed by Tyron Eubanks, POC    Collection Time: 06/24/18  8:10 PM   Result Value Ref Range    Glucose (POC) 60 (L) 65 - 100 mg/dL    Performed by Gaby Desir, POC    Collection Time: 06/24/18  8:28 PM   Result Value Ref Range    Glucose (POC) 272 (H) 65 - 100 mg/dL    Performed by Gaby Desir, POC    Collection Time: 06/24/18  8:31 PM   Result Value Ref Range    Glucose (POC) 170 (H) 65 - 100 mg/dL    Performed by Gaby Desir, POC    Collection Time: 06/24/18  9:20 PM   Result Value Ref Range    Glucose (POC) 60 (L) 65 - 100 mg/dL    Performed by [address], POC    Collection Time: 06/24/18  9:36 PM Result Value Ref Range    Glucose (POC) 106 (H) 65 - 100 mg/dL    Performed by Linnea Yuan    GLUCOSE, POC    Collection Time: 06/24/18 11:07 PM   Result Value Ref Range    Glucose (POC) 82 65 - 100 mg/dL    Performed by June Bailey    HEMOGLOBIN A1C WITH EAG    Collection Time: 06/25/18  3:04 AM   Result Value Ref Range    Hemoglobin A1c 7.6 (H) 4.2 - 6.3 %    Est. average glucose 171 mg/dL   GLUCOSE, POC    Collection Time: 06/25/18  3:29 AM   Result Value Ref Range    Glucose (POC) 44 (LL) 65 - 100 mg/dL    Performed by June Bailey    METABOLIC PANEL, COMPREHENSIVE    Collection Time: 06/25/18  3:30 AM   Result Value Ref Range    Sodium 138 136 - 145 mmol/L    Potassium 4.5 3.5 - 5.1 mmol/L    Chloride 110 (H) 97 - 108 mmol/L    CO2 19 (L) 21 - 32 mmol/L    Anion gap 9 5 - 15 mmol/L    Glucose 36 (LL) 65 - 100 mg/dL    BUN 58 (H) 6 - 20 MG/DL    Creatinine 3.26 (H) 0.55 - 1.02 MG/DL    BUN/Creatinine ratio 18 12 - 20      GFR est AA 19 (L) >60 ml/min/1.73m2    GFR est non-AA 16 (L) >60 ml/min/1.73m2    Calcium 7.0 (L) 8.5 - 10.1 MG/DL    Bilirubin, total 0.2 0.2 - 1.0 MG/DL    ALT (SGPT) 96 (H) 12 - 78 U/L    AST (SGOT) 89 (H) 15 - 37 U/L    Alk.  phosphatase 196 (H) 45 - 117 U/L    Protein, total 6.1 (L) 6.4 - 8.2 g/dL    Albumin 2.2 (L) 3.5 - 5.0 g/dL    Globulin 3.9 2.0 - 4.0 g/dL    A-G Ratio 0.6 (L) 1.1 - 2.2     CBC W/O DIFF    Collection Time: 06/25/18  3:30 AM   Result Value Ref Range    WBC 3.8 3.6 - 11.0 K/uL    RBC 2.57 (L) 3.80 - 5.20 M/uL    HGB 6.5 (L) 11.5 - 16.0 g/dL    HCT 20.8 (L) 35.0 - 47.0 %    MCV 80.9 80.0 - 99.0 FL    MCH 25.3 (L) 26.0 - 34.0 PG    MCHC 31.3 30.0 - 36.5 g/dL    RDW 16.5 (H) 11.5 - 14.5 %    PLATELET 864 (L) 851 - 400 K/uL    MPV 12.5 8.9 - 12.9 FL    NRBC 0.0 0  WBC    ABSOLUTE NRBC 0.00 0.00 - 0.01 K/uL   MAGNESIUM    Collection Time: 06/25/18  3:30 AM   Result Value Ref Range    Magnesium 1.6 1.6 - 2.4 mg/dL   RETICULOCYTE COUNT    Collection Time: 06/25/18  3:30 AM   Result Value Ref Range    Reticulocyte count 2.5 (H) 0.7 - 2.1 %    Absolute Retic Cnt. 0.0630 0.0164 - 0.0776 M/ul   PHOSPHORUS    Collection Time: 06/25/18  3:30 AM   Result Value Ref Range    Phosphorus 5.8 (H) 2.6 - 4.7 MG/DL   GLUCOSE, POC    Collection Time: 06/25/18  3:31 AM   Result Value Ref Range    Glucose (POC) 42 (LL) 65 - 100 mg/dL    Performed by June Bailey    GLUCOSE, POC    Collection Time: 06/25/18  3:49 AM   Result Value Ref Range    Glucose (POC) 116 (H) 65 - 100 mg/dL    Performed by June Bailey    GLUCOSE, POC    Collection Time: 06/25/18  7:00 AM   Result Value Ref Range    Glucose (POC) 73 65 - 100 mg/dL    Performed by Stewart Penaloza MD  [address] [place] [address]  Phone - [phone]   Fax - [phone]  www. [email or web address]

## 2018-06-25 NOTE — PROGRESS NOTES
Infectious Diseases Consultation     Please see dictated note     Impression      1. GN bacteremia -- ? Source: No evidence for  source. Occult abd source is a concern    2. Hx osteomyelitis of the left foot: A potential source for the bacteremia    3. NIDDM    4. CKD    5. HTN    6. PCOS    7. Seizure disorder    8. Sickle cell disease    9. THU    10. GERD    Recommend:      1. Continue Daptomycin and Zosyn -- await identification of the GNR in the blood cultures     2.  Consider CT abd & pelvis and US on 6/25    Cher[place],   Ghada Dillard MD  6/24/2018  10:07 PM

## 2018-06-25 NOTE — PROGRESS NOTES
Hospitalist Progress Note  Beatris Raines MD  Answering service: [phone] from in house phone        Date of Service:  2018  NAME:  Shayla Miles  :  1980  MRN:  [id number]      Admission Summary:   42 y.o lady w/ sickle-cell disease, type 1 DM, CKD stage 4, L foot osteomyelitis s/p partial amputation currently on IV daptomycin therapy, who presents with diffuse body aches and fever. She was recently discharged from here for DKA (hospital course complicated by respiratory failure, angioedema of unclear etiology, sepsis, STONE), and was doing well until around 7 AM yesterday when she developed sudden-onset pain \"all over,\" mainly her extremities, back, and abdomen. The pain is achy and constant, exacerbated with moving and without alleviating factors (tried acetaminophen). It is typical of her sickle cell crises. This was associated with subjective fevers nausea and vomiting. No diarrhea, constipation, or bleeding. She denies dyspnea or chest pain. Interval history / Subjective:   Seen pt in CCU not in acute distress     Assessment & Plan:     Acute sickle-cell crisis: (POA)  -support with IV fluids  -monitor H/H transfuse for hgb>7 1 unit ordered  -pain control with IV fentanyl, PO oxycodone, acetaminophen     Suspected sepsis: (POA) based on fever and tachycardia. Source is unclear but ddx includes L foot osteomyelitis (last MRI showed findings concerning for osteo), or PICC line infection. SIRS may also be explained by her sickle cell crisis though.   -f/u blood cultures, peripheral and PICC line  -empiric abx w/ IV Zosyn and daptomycin  -IV fluids as above  - ID following   -CXR notes pulm edema though lungs clear on exam. Repeat CXR  no acute issues     Type I DM w/ hypoglycemia: (POA)  -improved after D50  -resume home NPH at half dose  -SSI     Metabolic acidosis:  -resume home calcium bicarb     HTN: uncontrolled  -resume home meds  -PRN IV hydralazine     CKD stage 4: stable     Angioedema with respiratory failure during last admission     Code status:Full  DVT prophylaxis: SCD    Care Plan discussed with: Patient/Family and Nurse  Disposition: TBD     Hospital Problems  Date Reviewed: 6/10/2018          Codes Class Noted POA    * (Principal)Sickle cell crisis ([facility] [address] ICD-10-CM: D57.00  ICD-9-CM: 282.62  6/24/2018 Unknown        Osteomyelitis of left foot ([facility] [address] ICD-10-CM: M86.9  ICD-9-CM: 730.27  4/27/2018 Yes        Metabolic encephalopathy [facility]-LC: G93.41  ICD-9-CM: 348.31  3/9/2016 Yes        CKD (chronic kidney disease) ICD-10-CM: N18.9  ICD-9-CM: 585.9  9/19/2012 Yes    Overview Addendum 1/17/2013  6:44 PM by Hernandez Flynn; Required HD in past with acute exacerbation, AV graft R thigh  Supposed to make appt:   Rudy Abernathy MD  [facility]   [facility] [address], [place] [address]   [phone]              Diabetes mellitus type I [facility]) ICD-10-CM: E10.9  ICD-9-CM: 250.01  3/29/2012 Yes    Overview Addendum 2/12/2013  7:43 AM by Ag pt appt: May 8, 2013 with dr Gardenia Chang  dx'd age 14; + neuropathy, nephropathy  On ssi, regular  Diabetes health maintenance:  Last A1C: 11.7 1/2013 10.7 3/2012  Last eye exam 2012, had cataract R eye, needs L eye done, Dr Avila Alvarez (OD) [phone], another doc did surgery: Dr Fredrick West MD same practice [phone] f [phone]  Last microalbumin:  n/a Last creatinine: 1.65 1/2013; 1.9 9/17/12  Last microfilament exam/Last podiatry: 9/20/12 intact, nails thickened  Last lipids: 1/201  trig 173, HDL 70 .4 w/o statin; no record in former PCP notes why it was d/c'd   ACE/ARB: no due to CKD                 HTN (hypertension) (Chronic) ICD-10-CM: I10  ICD-9-CM: 401.9  10/14/2011 Yes                Review of Systems:   A comprehensive review of systems was negative.        Vital Signs:    Last 24hrs VS reviewed since prior progress note. Most recent are:  Visit Vitals    /80 (BP 1 Location: Left arm, BP Patient Position: At rest)    Pulse (!) 113    Temp 98.5 °F (36.9 °C)    Resp 20    Ht 5' 2\" (1.575 m)    Wt 77.2 kg (170 lb 3.1 oz)    SpO2 98%    BMI 31.13 kg/m2         Intake/Output Summary (Last 24 hours) at 06/25/18 1605  Last data filed at 06/25/18 1519   Gross per 24 hour   Intake          2848. 33 ml   Output             2225 ml   Net           623.33 ml        Physical Examination:             Constitutional:  No acute distress   ENT:  Oral mucous moist   Resp:  CTA bilaterally. CV:  Regular rhythm, normal rate, tachy    GI:  Soft, non distended, non tender. bs+    Musculoskeletal:  No edema, warm, 2+ pulses throughout    Neurologic:  Moves all extremities. AAOx3, CN II-XII reviewed     Psych:  Good insight, Not anxious nor agitated. Data Review:    I personally reviewed  Image and labs      Labs:     Recent Labs      06/25/18 0330 06/23/18 2332   WBC  3.8  6.6   HGB  6.5*  7.3*   HCT  20.8*  23.1*   PLT  131*  150     Recent Labs      06/25/18   0330  06/24/18   1240  06/24/18   0359  06/23/18 2332   NA  138   --   138  142   K  4.5   --   4.0  4.3   CL  110*   --   112*  114*   CO2  19*   --   17*  20*   BUN  58*   --   56*  61*   CREA  3.26*   --   2.95*  3.27*   GLU  36*  25*  35*  50*   CA  7.0*   --   5.9*  6.7*   MG  1.6   --    --    --    PHOS  5.8*   --    --    --      Recent Labs      06/25/18   0330  06/23/18 2332   SGOT  89*  19   ALT  96*  22   AP  196*  176*   TBILI  0.2  0.3   TP  6.1*  7.0   ALB  2.2*  2.6*   GLOB  3.9  4.4*   LPSE   --   660*     Recent Labs      06/23/18   2332   INR  1.1   PTP  11.2*   APTT  33.9*      No results for input(s): FE, TIBC, PSAT, FERR in the last 72 hours. Lab Results   Component Value Date/Time    Folate 33.8 (H) 05/07/2018 11:20 AM      No results for input(s): PH, PCO2, PO2 in the last 72 hours.   Recent Labs 06/24/18   0359   CPK  97     Lab Results   Component Value Date/Time    Cholesterol, total 128 06/10/2018 07:44 AM    HDL Cholesterol 51 06/10/2018 07:44 AM    LDL, calculated 67.4 06/10/2018 07:44 AM    Triglyceride 48 06/10/2018 07:44 AM    CHOL/HDL Ratio 2.5 06/10/2018 07:44 AM     Lab Results   Component Value Date/Time    Glucose (POC) 126 (H) 06/25/2018 12:12 PM    Glucose (POC) 148 (H) 06/25/2018 07:19 AM    Glucose (POC) 73 06/25/2018 07:00 AM    Glucose (POC) 116 (H) 06/25/2018 03:49 AM    Glucose (POC) 42 (LL) 06/25/2018 03:31 AM     Lab Results   Component Value Date/Time    Color YELLOW/STRAW 06/23/2018 11:32 PM    Appearance CLEAR 06/23/2018 11:32 PM    Specific gravity 1.010 06/23/2018 11:32 PM    Specific gravity 1.015 07/26/2010 11:18 AM    pH (UA) 6.0 06/23/2018 11:32 PM    Protein 100 (A) 06/23/2018 11:32 PM    Glucose NEGATIVE  06/23/2018 11:32 PM    Ketone NEGATIVE  06/23/2018 11:32 PM    Bilirubin NEGATIVE  06/23/2018 11:32 PM    Urobilinogen 0.2 06/23/2018 11:32 PM    Nitrites NEGATIVE  06/23/2018 11:32 PM    Leukocyte Esterase NEGATIVE  06/23/2018 11:32 PM    Epithelial cells FEW 06/23/2018 11:32 PM    Bacteria NEGATIVE  06/23/2018 11:32 PM    WBC 0-4 06/23/2018 11:32 PM    RBC 0-5 06/23/2018 11:32 PM         Medications Reviewed:     Current Facility-Administered Medications   Medication Dose Route Frequency    0.9% sodium chloride infusion 250 mL  250 mL IntraVENous PRN    venlafaxine (EFFEXOR) tablet 75 mg  75 mg Oral BID WITH MEALS    senna-docusate (PERICOLACE) 8.6-50 mg per tablet 2 Tab  2 Tab Oral DAILY    QUEtiapine (SEROquel) tablet 100 mg  100 mg Oral BID    oxyCODONE-acetaminophen (PERCOCET) 5-325 mg per tablet 1 Tab  1 Tab Oral Q6H PRN    cyanocobalamin (VITAMIN B12) tablet 1,000 mcg  1,000 mcg Oral DAILY    calcium carbonate (TUMS) chewable tablet 200 mg [elemental]  200 mg Oral TID    albuterol (PROVENTIL VENTOLIN) nebulizer solution 2.5 mg  2.5 mg Nebulization Q4H PRN    sodium chloride (NS) flush 5-10 mL  5-10 mL IntraVENous Q8H    sodium chloride (NS) flush 5-10 mL  5-10 mL IntraVENous PRN    heparin (porcine) injection 5,000 Units  5,000 Units SubCUTAneous Q8H    acetaminophen (TYLENOL) tablet 500 mg  500 mg Oral Q4H PRN    hydrALAZINE (APRESOLINE) 20 mg/mL injection 20 mg  20 mg IntraVENous Q6H PRN    glucose chewable tablet 16 g  4 Tab Oral PRN    dextrose (D50W) injection syrg 12.5-25 g  12.5-25 g IntraVENous PRN    glucagon (GLUCAGEN) injection 1 mg  1 mg IntraMUSCular PRN    diphenhydrAMINE (BENADRYL) capsule 25 mg  25 mg Oral Q6H PRN    DAPTOmycin (CUBICIN) 400 mg in 0.9% sodium chloride 50 mL IVPB RF formulation  400 mg IntraVENous Q48H    piperacillin-tazobactam (ZOSYN) 3.375 g in 0.9% sodium chloride (MBP/ADV) 100 mL  3.375 g IntraVENous Q8H    insulin lispro (HUMALOG) injection   SubCUTAneous AC&HS    hydrALAZINE (APRESOLINE) tablet 100 mg  100 mg Oral TID    sodium chloride (NS) flush 20 mL  20 mL InterCATHeter PRN    sodium chloride (NS) flush 10 mL  10 mL InterCATHeter Q24H    sodium chloride (NS) flush 10 mL  10 mL InterCATHeter PRN    sodium chloride (NS) flush 10 mL  10 mL InterCATHeter Q8H    alteplase (CATHFLO) 1 mg in sterile water (preservative free) 1 mL injection  1 mg InterCATHeter PRN    polyethylene glycol (MIRALAX) packet 17 g  17 g Oral DAILY    sodium bicarbonate tablet 650 mg  650 mg Oral BID    calcitRIOL (ROCALTROL) capsule 0.25 mcg  0.25 mcg Oral DAILY    HYDROmorphone (DILAUDID) syringe 0.5 mg  0.5 mg IntraVENous Q6H PRN    promethazine (PHENERGAN) tablet 25 mg  25 mg Oral Q8H PRN    dextrose 10% infusion  50 mL/hr IntraVENous CONTINUOUS    sodium chloride (NS) flush 5-10 mL  5-10 mL IntraVENous PRN    sodium chloride (NS) flush 5-10 mL  5-10 mL IntraVENous PRN     ______________________________________________________________________  EXPECTED LENGTH OF STAY: 4d 21h  ACTUAL LENGTH OF STAY:          1 Lorrie Mejia MD

## 2018-06-25 NOTE — PROGRESS NOTES
Occupational Therapy EVALUATION/discharge  Patient: Kori Grace (41 y.o. female)  Date: 6/25/2018  Primary Diagnosis: Sickle cell crisis [facility])        Precautions: Fall    ASSESSMENT:   Based on the objective data described below, the patient presents with overall independence-supervision for ADLs and functional mobility s/p admission for sickle cell crisis. Patient received in supine, agreeable to therapy, seen with PT to maximize safety and functional mobility pending blood transfusion d/t low HGB. Patient reporting she feels close to her Mod I baseline, overall fatigued. Completing bed mobility, lower body dressing, and in-unit ambulation with supervision, has family that can assist at home PRN. Patient has no acute OT needs, recommend she d/c home with family support PRN when medically stable. Recommend with nursing patient to complete as able in order to maintain strength, endurance and independence: ADLs with supervision/setup, OOB to chair 3x/day, and mobilizing to the bathroom for toileting with supervision. Thank you for your assistance. Further skilled acute occupational therapy is not indicated at this time. Discharge Recommendations: None  Further Equipment Recommendations for Discharge: None      SUBJECTIVE:   Patient stated I feel fine, just tired.     OBJECTIVE DATA SUMMARY:   HISTORY:   Past Medical History:   Diagnosis Date    ARF (acute renal failure) ([address] requiring dialysis 2011    Asthma     CKD (chronic kidney disease)     Diabetes ([address]     Gastroparesis 2010    Gastric Pacer- REMOVED 07/2015    GERD (gastroesophageal reflux disease)     Hypertension     Narcotic dependence ([address]     THU (obstructive sleep apnea)     wears 2 LPM oxygen at night    Other ill-defined conditions(799.89)     Polycystic ovarian syndrome     Seizures (HCC)     Sickle cell trait ([address]     Thromboembolus ([address] to her left arm and was told she had one in left leg recently     Past Surgical History:   Procedure Laterality Date    HX CATARACT REMOVAL  3/5/12    right    HX DILATION AND CURETTAGE      ablation    HX GASTRIC BYPASS  2015    HX GI      j tube placement and removal    HX OTHER SURGICAL  2010    Gastric Pacer- REMOVED 07/2015    HX VASCULAR ACCESS      gray cath rt subclavian    HX VASCULAR ACCESS      HD access right thigh       Prior Level of Function/Environment/Context: PTA, independent-Mod I, lives in a 1 level mobile home with her  and father, father assists PRN (LLE edema),  assists/compeltes IADLs. Wears Home O2 (2L NC) PRN, patient reporting she feels when she needs it and when she's getting too much O2    Home Situation  Home Environment: Private residence  Wheelchair Ramp: Yes  One/Two Story Residence: One story  Living Alone: No  Support Systems: Spouse/Significant Other/Partner, Parent  Patient Expects to be Discharged to[de-identified] Private residence  Current DME Used/Available at Home: Grab bars, Raised toilet seat, Cane, straight, Walker, rollator  Tub or Shower Type: Tub/Shower combination    Hand dominance: Right    EXAMINATION OF PERFORMANCE DEFICITS:  Cognitive/Behavioral Status:  Neurologic State: Alert  Orientation Level: Oriented X4  Cognition: Appropriate decision making; Appropriate for age attention/concentration; Appropriate safety awareness; Follows commands  Perception: Appears intact  Perseveration: No perseveration noted  Safety/Judgement: Awareness of environment; Fall prevention; Insight into deficits;Home safety    Skin: Appears intact    Edema: None noted in BUEs, swelling in BLEs    Hearing: Auditory  Auditory Impairment: None    Vision/Perceptual:    Tracking: Able to track stimulus in all quadrants w/o difficulty      Acuity: Within Defined Limits         Range of Motion:  In BUEs  AROM: Within functional limits     Strength: In BUEs  Strength:  Within functional limits     Coordination:  Coordination: Within functional limits  Fine Motor Skills-Upper: Left Intact; Right Intact    Gross Motor Skills-Upper: Left Intact; Right Intact    Tone & Sensation:  In BUEs  Tone: Normal  Sensation: Intact       Balance:  Sitting: Intact  Standing: Intact    Functional Mobility and Transfers for ADLs:  Bed Mobility:  Supine to Sit: Supervision  Scooting: Supervision    Transfers:  Sit to Stand: Supervision  Stand to Sit: Supervision  Toilet Transfer : Supervision (Inferred per functional mobility)    ADL Assessment:  Feeding: Independent*    Oral Facial Hygiene/Grooming: Independent*    Bathing: Independent*    Upper Body Dressing: Independent*    Lower Body Dressing: Independent    Toileting: Independent*   *Inferred per functional mobility, safety awareness, BUE ROM/strength, & activity tolerance              ADL Intervention and task modifications:     Lower Body Dressing Assistance  Dressing Assistance: Independent  Socks: Independent  Leg Crossed Method Used: Yes  Position Performed: Seated edge of bed         Cognitive Retraining  Safety/Judgement: Awareness of environment; Fall prevention; Insight into deficits;Home safety    Therapeutic Exercise:  Bed mobility, transfers, and in-unit ambulation completed with SPV     Functional Measure:  Barthel Index:    Bathin  Bladder: 10  Bowels: 10  Groomin  Dressing: 10  Feeding: 10  Mobility: 15  Stairs: 10  Toilet Use: 10  Transfer (Bed to Chair and Back): 15  Total: 100       Barthel and G-code impairment scale:  Percentage of impairment CH  0% CI  1-19% CJ  20-39% CK  40-59% CL  60-79% CM  80-99% CN  100%   Barthel Score 0-100 100 99-80 79-60 59-40 20-39 1-19   0   Barthel Score 0-20 20 17-19 13-16 9-12 5-8 1-4 0      The Barthel ADL Index: Guidelines  1. The index should be used as a record of what a patient does, not as a record of what a patient could do. 2. The main aim is to establish degree of independence from any help, physical or verbal, however minor and for whatever reason.   3. The need for supervision renders the patient not independent. 4. A patient's performance should be established using the best available evidence. Asking the patient, friends/relatives and nurses are the usual sources, but direct observation and common sense are also important. However direct testing is not needed. 5. Usually the patient's performance over the preceding 24-48 hours is important, but occasionally longer periods will be relevant. 6. Middle categories imply that the patient supplies over 50 per cent of the effort. 7. Use of aids to be independent is allowed. Lacy Suazo., BarthelARAVIND (4431). Functional evaluation: the Barthel Index. [facility] (14)2. Harjit Rodriguez radha YOMAIRA SylvesterF, Gopal Rodriguez., [place]., [place], [address] (1999). Measuring the change indisability after inpatient rehabilitation; comparison of the responsiveness of the Barthel Index and Functional [place] Measure. Journal of Neurology, Neurosurgery, and Psychiatry, 66(4), 250-171. Cosme Escobar, TRAV.J.A, IVÁN HoranJ.PHI, & Symone Diane, M.A. (2004.) Assessment of post-stroke quality of life in cost-effectiveness studies: The usefulness of the Barthel Index and the EuroQoL-5D. Quality of Life Research, 13, 293-27         G codes: In compliance with CMSs Claims Based Outcome Reporting, the following G-code set was chosen for this patient based on their primary functional limitation being treated: The outcome measure chosen to determine the severity of the functional limitation was the Barthel Index with a score of 100/100 which was correlated with the impairment scale. ?  Self Care:     - CURRENT STATUS: CH - 0% impaired, limited or restricted    - GOAL STATUS: CH - 0% impaired, limited or restricted    - D/C STATUS:  CH - 0% impaired, limited or restricted     Occupational Therapy Evaluation Charge Determination   History Examination Decision-Making   LOW Complexity : Brief history review  LOW Complexity : 1-3 performance deficits relating to physical, cognitive , or psychosocial skils that result in activity limitations and / or participation restrictions  LOW Complexity : No comorbidities that affect functional and no verbal or physical assistance needed to complete eval tasks       Based on the above components, the patient evaluation is determined to be of the following complexity level: LOW   Pain:  Pain Scale 1: Numeric (0 - 10)  Pain Intensity 1: 4  Pain Location 1: Generalized  Pain Orientation 1: Other (comment) (Everywhere)  Pain Description 1: Aching  Pain Intervention(s) 1: Medication (see MAR)    Activity Tolerance:   Good, VSS with activity    Please refer to the flowsheet for vital signs taken during this treatment. After treatment:   [x]  Patient left in no apparent distress sitting EOB  []  Patient left in no apparent distress in bed  [x]  Call bell left within reach  [x]  Nursing notified  []  Caregiver present  []  Bed alarm activated    COMMUNICATION/EDUCATION:   Communication/Collaboration:  [x]      Home safety education was provided and the patient/caregiver indicated understanding. [x]      Patient/family have participated as able and agree with findings and recommendations. []      Patient is unable to participate in plan of care at this time.   Findings and recommendations were discussed with: Physical Therapist and Registered Nurse    Chelsea Dutta OT  Time Calculation: 16 mins

## 2018-06-25 NOTE — PROGRESS NOTES
TRANSFER - OUT REPORT:    Verbal report given to Serenity Quinones(name) on [employer]  being transferred to [facility]) for routine progression of care       Report consisted of patients Situation, Background, Assessment and   Recommendations(SBAR). Information from the following report(s) SBAR, Procedure Summary, Intake/Output and MAR was reviewed with the receiving nurse. Lines:   PICC Double Lumen 06/09/18 Left; Other(comment) (Active)   Central Line Being Utilized Yes 6/25/2018  4:00 AM   Criteria for Appropriate Use Long term IV/antibiotic administration 6/25/2018  4:00 AM   Site Assessment Clean, dry, & intact 6/25/2018  4:00 AM   Phlebitis Assessment 0 6/25/2018  4:00 AM   Infiltration Assessment 0 6/25/2018  4:00 AM   Date of Last Dressing Change 06/24/18 6/25/2018  4:00 AM   Dressing Status Clean, dry, & intact 6/25/2018  4:00 AM   Action Taken Open ports on tubing capped 6/25/2018  4:00 AM   Dressing Type Disk with Chlorhexadine gluconate (CHG) 6/25/2018  4:00 AM   Hub Color/Line Status Purple; Infusing 6/25/2018  4:00 AM   Positive Blood Return (Site #1) Yes 6/25/2018  4:00 AM   Hub Color/Line Status White; Infusing 6/25/2018  4:00 AM   Positive Blood Return (Site #2) Yes 6/25/2018  4:00 AM   Alcohol Cap Used Yes 6/25/2018  4:00 AM       Venous Access Device 06/16/18 (Active)        Opportunity for questions and clarification was provided.       Patient transported with:   Monitor  Registered Nurse  Tech

## 2018-06-25 NOTE — PROGRESS NOTES
Problem: Falls - Risk of  Goal: *Absence of Falls  Document Blake Fall Risk and appropriate interventions in the flowsheet. Outcome: Progressing Towards Goal  Fall Risk Interventions:  Mobility Interventions: Assess mobility with egress test         Medication Interventions: Assess postural VS orthostatic hypotension, Evaluate medications/consider consulting pharmacy, Teach patient to arise slowly         History of Falls Interventions: Consult care management for discharge planning        Problem: Pressure Injury - Risk of  Goal: *Prevention of pressure injury  Document Antonio Scale and appropriate interventions in the flowsheet. Outcome: Progressing Towards Goal  Pressure Injury Interventions:             Activity Interventions: Assess need for specialty bed, Increase time out of bed    Mobility Interventions: Assess need for specialty bed    Nutrition Interventions: Document food/fluid/supplement intake

## 2018-06-26 LAB
BACTERIA SPEC CULT: NORMAL
CC UR VC: NORMAL
GLUCOSE BLD STRIP.AUTO-MCNC: 167 MG/DL (ref 65–100)
GLUCOSE BLD STRIP.AUTO-MCNC: 225 MG/DL (ref 65–100)
GLUCOSE BLD STRIP.AUTO-MCNC: 256 MG/DL (ref 65–100)
GLUCOSE BLD STRIP.AUTO-MCNC: 54 MG/DL (ref 65–100)
GLUCOSE BLD STRIP.AUTO-MCNC: 55 MG/DL (ref 65–100)
GLUCOSE BLD STRIP.AUTO-MCNC: 69 MG/DL (ref 65–100)
GLUCOSE BLD STRIP.AUTO-MCNC: 84 MG/DL (ref 65–100)
SERVICE CMNT-IMP: ABNORMAL
SERVICE CMNT-IMP: NORMAL

## 2018-06-26 PROCEDURE — 74011250637 HC RX REV CODE- 250/637: Performed by: HOSPITALIST

## 2018-06-26 PROCEDURE — 82962 GLUCOSE BLOOD TEST: CPT

## 2018-06-26 PROCEDURE — 65660000000 HC RM CCU STEPDOWN

## 2018-06-26 PROCEDURE — 74011250637 HC RX REV CODE- 250/637: Performed by: INTERNAL MEDICINE

## 2018-06-26 PROCEDURE — 74011250636 HC RX REV CODE- 250/636: Performed by: HOSPITALIST

## 2018-06-26 PROCEDURE — 74011000258 HC RX REV CODE- 258: Performed by: HOSPITALIST

## 2018-06-26 PROCEDURE — 30233N1 TRANSFUSION OF NONAUTOLOGOUS RED BLOOD CELLS INTO PERIPHERAL VEIN, PERCUTANEOUS APPROACH: ICD-10-PCS | Performed by: HOSPITALIST

## 2018-06-26 PROCEDURE — 74011250636 HC RX REV CODE- 250/636: Performed by: INTERNAL MEDICINE

## 2018-06-26 PROCEDURE — P9016 RBC LEUKOCYTES REDUCED: HCPCS | Performed by: HOSPITALIST

## 2018-06-26 PROCEDURE — 36430 TRANSFUSION BLD/BLD COMPNT: CPT

## 2018-06-26 PROCEDURE — 74011636637 HC RX REV CODE- 636/637: Performed by: HOSPITALIST

## 2018-06-26 PROCEDURE — 93970 EXTREMITY STUDY: CPT

## 2018-06-26 PROCEDURE — 74011000250 HC RX REV CODE- 250: Performed by: INTERNAL MEDICINE

## 2018-06-26 RX ORDER — LABETALOL HYDROCHLORIDE 5 MG/ML
10 INJECTION, SOLUTION INTRAVENOUS
Status: DISCONTINUED | OUTPATIENT
Start: 2018-06-26 | End: 2018-06-27

## 2018-06-26 RX ORDER — CLONIDINE HYDROCHLORIDE 0.1 MG/1
0.1 TABLET ORAL
Status: COMPLETED | OUTPATIENT
Start: 2018-06-26 | End: 2018-06-26

## 2018-06-26 RX ORDER — HYDROMORPHONE HYDROCHLORIDE 1 MG/ML
1 INJECTION, SOLUTION INTRAMUSCULAR; INTRAVENOUS; SUBCUTANEOUS
Status: DISCONTINUED | OUTPATIENT
Start: 2018-06-26 | End: 2018-07-02

## 2018-06-26 RX ORDER — FUROSEMIDE 40 MG/1
40 TABLET ORAL 2 TIMES DAILY
Status: DISCONTINUED | OUTPATIENT
Start: 2018-06-26 | End: 2018-06-27

## 2018-06-26 RX ADMIN — Medication 10 ML: at 21:20

## 2018-06-26 RX ADMIN — Medication 10 ML: at 21:21

## 2018-06-26 RX ADMIN — DIPHENHYDRAMINE HYDROCHLORIDE 25 MG: 25 CAPSULE ORAL at 09:09

## 2018-06-26 RX ADMIN — SODIUM BICARBONATE 650 MG: 650 TABLET ORAL at 08:54

## 2018-06-26 RX ADMIN — HYDROMORPHONE HYDROCHLORIDE 1 MG: 1 INJECTION, SOLUTION INTRAMUSCULAR; INTRAVENOUS; SUBCUTANEOUS at 16:09

## 2018-06-26 RX ADMIN — VENLAFAXINE 75 MG: 37.5 TABLET ORAL at 17:48

## 2018-06-26 RX ADMIN — HYDRALAZINE HYDROCHLORIDE 100 MG: 50 TABLET, FILM COATED ORAL at 08:54

## 2018-06-26 RX ADMIN — HYDRALAZINE HYDROCHLORIDE 100 MG: 50 TABLET, FILM COATED ORAL at 21:20

## 2018-06-26 RX ADMIN — INSULIN LISPRO 3 UNITS: 100 INJECTION, SOLUTION INTRAVENOUS; SUBCUTANEOUS at 12:14

## 2018-06-26 RX ADMIN — HEPARIN SODIUM 5000 UNITS: 5000 INJECTION, SOLUTION INTRAVENOUS; SUBCUTANEOUS at 08:54

## 2018-06-26 RX ADMIN — HYDROMORPHONE HYDROCHLORIDE 0.5 MG: 1 INJECTION, SOLUTION INTRAMUSCULAR; INTRAVENOUS; SUBCUTANEOUS at 08:54

## 2018-06-26 RX ADMIN — HYDRALAZINE HYDROCHLORIDE 100 MG: 50 TABLET, FILM COATED ORAL at 16:09

## 2018-06-26 RX ADMIN — OXYCODONE HYDROCHLORIDE AND ACETAMINOPHEN 1 TABLET: 5; 325 TABLET ORAL at 03:23

## 2018-06-26 RX ADMIN — DIPHENHYDRAMINE HYDROCHLORIDE 25 MG: 25 CAPSULE ORAL at 04:04

## 2018-06-26 RX ADMIN — HEPARIN SODIUM 5000 UNITS: 5000 INJECTION, SOLUTION INTRAVENOUS; SUBCUTANEOUS at 00:16

## 2018-06-26 RX ADMIN — PIPERACILLIN SODIUM AND TAZOBACTAM SODIUM 3.38 G: 3; .375 INJECTION, POWDER, LYOPHILIZED, FOR SOLUTION INTRAVENOUS at 00:16

## 2018-06-26 RX ADMIN — Medication 10 ML: at 06:19

## 2018-06-26 RX ADMIN — CLONIDINE HYDROCHLORIDE 0.1 MG: 0.1 TABLET ORAL at 06:18

## 2018-06-26 RX ADMIN — HEPARIN SODIUM 5000 UNITS: 5000 INJECTION, SOLUTION INTRAVENOUS; SUBCUTANEOUS at 16:09

## 2018-06-26 RX ADMIN — CALCIUM CARBONATE (ANTACID) CHEW TAB 500 MG 200 MG: 500 CHEW TAB at 21:20

## 2018-06-26 RX ADMIN — FUROSEMIDE 40 MG: 40 TABLET ORAL at 17:48

## 2018-06-26 RX ADMIN — HYDROMORPHONE HYDROCHLORIDE 1 MG: 1 INJECTION, SOLUTION INTRAMUSCULAR; INTRAVENOUS; SUBCUTANEOUS at 21:20

## 2018-06-26 RX ADMIN — QUETIAPINE FUMARATE 100 MG: 100 TABLET ORAL at 17:48

## 2018-06-26 RX ADMIN — INSULIN LISPRO 3 UNITS: 100 INJECTION, SOLUTION INTRAVENOUS; SUBCUTANEOUS at 06:34

## 2018-06-26 RX ADMIN — PIPERACILLIN SODIUM AND TAZOBACTAM SODIUM 3.38 G: 3; .375 INJECTION, POWDER, LYOPHILIZED, FOR SOLUTION INTRAVENOUS at 16:08

## 2018-06-26 RX ADMIN — Medication 1000 MCG: at 08:54

## 2018-06-26 RX ADMIN — PIPERACILLIN SODIUM AND TAZOBACTAM SODIUM 3.38 G: 3; .375 INJECTION, POWDER, LYOPHILIZED, FOR SOLUTION INTRAVENOUS at 08:55

## 2018-06-26 RX ADMIN — OXYCODONE HYDROCHLORIDE AND ACETAMINOPHEN 1 TABLET: 5; 325 TABLET ORAL at 17:48

## 2018-06-26 RX ADMIN — HEPARIN SODIUM 5000 UNITS: 5000 INJECTION, SOLUTION INTRAVENOUS; SUBCUTANEOUS at 23:58

## 2018-06-26 RX ADMIN — Medication 10 ML: at 08:55

## 2018-06-26 RX ADMIN — POLYETHYLENE GLYCOL 3350 17 G: 17 POWDER, FOR SOLUTION ORAL at 08:54

## 2018-06-26 RX ADMIN — LABETALOL HYDROCHLORIDE 10 MG: 5 INJECTION, SOLUTION INTRAVENOUS at 12:14

## 2018-06-26 RX ADMIN — VENLAFAXINE 75 MG: 37.5 TABLET ORAL at 08:54

## 2018-06-26 RX ADMIN — CALCITRIOL 0.25 MCG: 0.25 CAPSULE, LIQUID FILLED ORAL at 08:54

## 2018-06-26 RX ADMIN — HYDRALAZINE HYDROCHLORIDE 20 MG: 20 INJECTION INTRAMUSCULAR; INTRAVENOUS at 02:39

## 2018-06-26 RX ADMIN — DIPHENHYDRAMINE HYDROCHLORIDE 25 MG: 25 CAPSULE ORAL at 16:09

## 2018-06-26 RX ADMIN — HYDROMORPHONE HYDROCHLORIDE 1 MG: 1 INJECTION, SOLUTION INTRAMUSCULAR; INTRAVENOUS; SUBCUTANEOUS at 12:14

## 2018-06-26 RX ADMIN — QUETIAPINE FUMARATE 100 MG: 100 TABLET ORAL at 08:54

## 2018-06-26 RX ADMIN — PIPERACILLIN SODIUM AND TAZOBACTAM SODIUM 3.38 G: 3; .375 INJECTION, POWDER, LYOPHILIZED, FOR SOLUTION INTRAVENOUS at 23:58

## 2018-06-26 RX ADMIN — SODIUM BICARBONATE 650 MG: 650 TABLET ORAL at 17:48

## 2018-06-26 RX ADMIN — SENNOSIDES AND DOCUSATE SODIUM 2 TABLET: 8.6; 5 TABLET ORAL at 08:54

## 2018-06-26 RX ADMIN — CALCIUM CARBONATE (ANTACID) CHEW TAB 500 MG 200 MG: 500 CHEW TAB at 16:09

## 2018-06-26 RX ADMIN — CALCIUM CARBONATE (ANTACID) CHEW TAB 500 MG 200 MG: 500 CHEW TAB at 08:54

## 2018-06-26 RX ADMIN — HYDROMORPHONE HYDROCHLORIDE 0.5 MG: 1 INJECTION, SOLUTION INTRAMUSCULAR; INTRAVENOUS; SUBCUTANEOUS at 03:03

## 2018-06-26 RX ADMIN — OXYCODONE HYDROCHLORIDE AND ACETAMINOPHEN 1 TABLET: 5; 325 TABLET ORAL at 11:14

## 2018-06-26 NOTE — PROGRESS NOTES
Problem: Falls - Risk of  Goal: *Absence of Falls  Document Blake Fall Risk and appropriate interventions in the flowsheet. Outcome: Progressing Towards Goal  Fall Risk Interventions:  Mobility Interventions: Communicate number of staff needed for ambulation/transfer, OT consult for ADLs, Patient to call before getting OOB, PT Consult for mobility concerns, PT Consult for assist device competence         Medication Interventions: Evaluate medications/consider consulting pharmacy, Patient to call before getting OOB, Teach patient to arise slowly         History of Falls Interventions: Consult care management for discharge planning        Problem: Sepsis: Day 2  Goal: *Hemodynamically stable  Outcome: Progressing Towards Goal  Pt appears to be hemodynamically stable throughout shift. Goal: *Tolerating diet  Outcome: Progressing Towards Goal  Pt on diabetic cardiac diet. Pt eats 75 - 100% of daily meals. Will continue to monitor. Goal: Activity/Safety  Outcome: Progressing Towards Goal  Pt belongings within reach. Pt instructed to utilize call bell for assistance when needed. Goal: Consults, if ordered  Outcome: Progressing Towards Goal  ID consulted d/t cellulitis in L leg. Pt planned for amputation on L leg next week. Will continue to monitor.

## 2018-06-26 NOTE — PROGRESS NOTES
Problem: Diabetes Self-Management  Goal: *Developing strategies to address psychosocial issues  Describe feelings about living with diabetes; identify support needed and support network   Outcome: Progressing Towards Goal  Discuss with patient support network in place. Patient states has support network will continue to discuss feelings about living with diabetes and see what diabetes education the patient may benefit the most from. [id number] Patient BP is elevated PRN hydralazine is not due yet. Called hospitalist spoke with Dr. Gwendolyn Cheney MD ordered one time dose of clonidine 0.1 mg. Will continue to monitor patient. Bedside shift change report given to VERONICA Lucas (oncoming nurse) by VERONICA Long (offgoing nurse). Report included the following information SBAR.

## 2018-06-26 NOTE — PROGRESS NOTES
Hospitalist Progress Note  Jose Tompkins MD  Answering service: [phone] OR [phone] from in house phone        Date of Service:  2018  NAME:  Alma Linares  :  1980  MRN:  [id number]      Admission Summary:   43 y/o AA female with PMH of sickle-cell disease, type 1 DM, CKD stage 4, L foot osteomyelitis s/p partial amputation currently on IV daptomycin therapy admitted on 18 for diffuse body aches and fever with suspicion of acute sickle cell crisis. She was recently discharged from here for DKA (hospital course complicated by respiratory failure, angioedema of unclear etiology, sepsis and STONE    Interval history / Subjective:   Pt seen and examined this pm with nurse and CM present in the room. Pt was sitting on chair at bedside. Her pain was not well controlled earlier this am. Dilaudid dose was increased. Assessment & Plan:     1. Acute sickle-cell crisis: (POA)  -Continue supportive care with pain control. Stop IVF due to fluid overload. -Continue to monitor H/H. No need to transfused now. 2. Suspected sepsis: (POA)   -Based on fever and tachycardia. Source is unclear but ddx includes L foot osteomyelitis (last MRI showed findings concerning for osteo), or PICC line infection. -SIRS may also be explained by her sickle cell crisis though. -f/u blood cultures, peripheral and PICC line  -Continue empiric abx w/ IV Zosyn and daptomycin  - ID following   -CXR notes pulm edema though lungs clear on exam. Repeat CXR  no acute issues     3. Type I DM w/ hypoglycemia: (POA)  -Improved after D50  -Continue NPH and SSI.      4. Metabolic acidosis:  -Continue NaHCO3 tabs.     5. HTN: uncontrolled  -BP still high. Will resume Lasix. Continue current bp meds.     6. CKD stage 4:   -Stable.  Will monitor closely.      Code status:Full  DVT prophylaxis: SCD    Care Plan discussed with: Patient/Family and Nurse  Disposition: TBD     Hospital Problems  Date Reviewed: 6/10/2018          Codes Class Noted POA    Charcot ankle, left ICD-10-CM: M14.672  ICD-9-CM: 094.0, 713.5  6/25/2018 Unknown        * (Principal)Sickle cell crisis ([facility] ICD-10-CM: D57.00  ICD-9-CM: 282.62  6/24/2018 Unknown        Osteomyelitis of left foot (HCC) ICD-10-CM: M86.9  ICD-9-CM: 730.27  4/27/2018 Yes        Metabolic encephalopathy ZMA-41-IM: G93.41  ICD-9-CM: 348.31  3/9/2016 Yes        CKD (chronic kidney disease) ICD-10-CM: N18.9  ICD-9-CM: 585.9  9/19/2012 Yes    Overview Addendum 1/17/2013  6:44 PM by Atilio Hernandez; Required HD in past with acute exacerbation, AV graft R thigh  Supposed to make appt:   Charlette Gibbons MD  [facility]   [address] [place] [address], [place]  [facility]   [phone]              Diabetes mellitus type I [facility]) ICD-10-CM: E10.9  ICD-9-CM: 250.01  3/29/2012 Yes    Overview Addendum 2/12/2013  7:43 AM by Ag pt appt: May 8, 2013 with dr Jonathan Rai  dx'd age 14; + neuropathy, nephropathy  On ssi, regular  Diabetes health maintenance:  Last A1C: 11.7 1/2013 10.7 3/2012  Last eye exam 2012, had cataract R eye, needs L eye done, Dr Vicente Pulse (OD) [phone], another doc did surgery: Dr Freddy York MD same practice [phone]  Last microalbumin:  n/a Last creatinine: 1.65 1/2013; 1.9 9/17/12  Last microfilament exam/Last podiatry: 9/20/12 intact, nails thickened  Last lipids: 1/201  trig 173, HDL 70 .4 w/o statin; no record in former PCP notes why it was d/c'd   ACE/ARB: no due to CKD                 HTN (hypertension) (Chronic) ICD-10-CM: I10  ICD-9-CM: 401.9  10/14/2011 Yes                Review of Systems:   A comprehensive review of systems was negative. Vital Signs:    Last 24hrs VS reviewed since prior progress note.  Most recent are:  Visit Vitals    BP (!) 175/97 (BP 1 Location: Right arm, BP Patient Position: At rest)    Pulse 98    Temp 98.3 °F (36.8 °C)    Resp 14    Ht 5' 2\" (1.575 m)    Wt 75.7 kg (166 lb 14.2 oz)    SpO2 99%    BMI 30.52 kg/m2       No intake or output data in the 24 hours ending 06/26/18 1950     Physical Examination:             Constitutional:  No acute distress   ENT:  Oral mucous moist   Resp:  CTA bilaterally. CV:  Regular rhythm, normal rate, tachy    GI:  Soft, non distended, non tender. bs+    Musculoskeletal:  2 + edema, warm, 2+ pulses throughout    Neurologic:  Moves all extremities. AAOx3, CN II-XII reviewed     Psych:  Good insight, Not anxious nor agitated.        Data Review:    I personally reviewed  Image and labs      Labs:     Recent Labs      06/25/18 0330 06/23/18 2332   WBC  3.8  6.6   HGB  6.5*  7.3*   HCT  20.8*  23.1*   PLT  131*  150     Recent Labs      06/25/18 0330 06/24/18   1240  06/24/18 0359 06/23/18 2332   NA  138   --   138  142   K  4.5   --   4.0  4.3   CL  110*   --   112*  114*   CO2  19*   --   17*  20*   BUN  58*   --   56*  61*   CREA  3.26*   --   2.95*  3.27*   GLU  36*  25*  35*  50*   CA  7.0*   --   5.9*  6.7*   MG  1.6   --    --    --    PHOS  5.8*   --    --    --      Recent Labs      06/25/18 0330 06/23/18 2332   SGOT  89*  19   ALT  96*  22   AP  196*  176*   TBILI  0.2  0.3   TP  6.1*  7.0   ALB  2.2*  2.6*   GLOB  3.9  4.4*   LPSE   --   660*     Recent Labs      06/23/18 2332   INR  1.1   PTP  11.2*   APTT  33.9*          Lab Results   Component Value Date/Time    Folate 33.8 (H) 05/07/2018 11:20 AM          Recent Labs      06/24/18 0359   CPK  97     Lab Results   Component Value Date/Time    Cholesterol, total 128 06/10/2018 07:44 AM    HDL Cholesterol 51 06/10/2018 07:44 AM    LDL, calculated 67.4 06/10/2018 07:44 AM    Triglyceride 48 06/10/2018 07:44 AM    CHOL/HDL Ratio 2.5 06/10/2018 07:44 AM     Lab Results   Component Value Date/Time    Glucose (POC) 84 06/26/2018 04:52 PM    Glucose (POC) 69 06/26/2018 04:36 PM    Glucose (POC) 55 (L) 06/26/2018 04:19 PM    Glucose (POC) 54 (L) 06/26/2018 04:18 PM    Glucose (POC) 225 (H) 06/26/2018 11:42 AM     Lab Results   Component Value Date/Time    Color YELLOW/STRAW 06/23/2018 11:32 PM    Appearance CLEAR 06/23/2018 11:32 PM    Specific gravity 1.010 06/23/2018 11:32 PM    Specific gravity 1.015 07/26/2010 11:18 AM    pH (UA) 6.0 06/23/2018 11:32 PM    Protein 100 (A) 06/23/2018 11:32 PM    Glucose NEGATIVE  06/23/2018 11:32 PM    Ketone NEGATIVE  06/23/2018 11:32 PM    Bilirubin NEGATIVE  06/23/2018 11:32 PM    Urobilinogen 0.2 06/23/2018 11:32 PM    Nitrites NEGATIVE  06/23/2018 11:32 PM    Leukocyte Esterase NEGATIVE  06/23/2018 11:32 PM    Epithelial cells FEW 06/23/2018 11:32 PM    Bacteria NEGATIVE  06/23/2018 11:32 PM    WBC 0-4 06/23/2018 11:32 PM    RBC 0-5 06/23/2018 11:32 PM         Medications Reviewed:     Current Facility-Administered Medications   Medication Dose Route Frequency    labetalol (NORMODYNE;TRANDATE) injection 10 mg  10 mg IntraVENous Q6H PRN    HYDROmorphone (DILAUDID) syringe 1 mg  1 mg IntraVENous Q4H PRN    furosemide (LASIX) tablet 40 mg  40 mg Oral BID    0.9% sodium chloride infusion 250 mL  250 mL IntraVENous PRN    prochlorperazine (COMPAZINE) with saline injection 5 mg  5 mg IntraVENous Q6H PRN    venlafaxine (EFFEXOR) tablet 75 mg  75 mg Oral BID WITH MEALS    senna-docusate (PERICOLACE) 8.6-50 mg per tablet 2 Tab  2 Tab Oral DAILY    QUEtiapine (SEROquel) tablet 100 mg  100 mg Oral BID    oxyCODONE-acetaminophen (PERCOCET) 5-325 mg per tablet 1 Tab  1 Tab Oral Q6H PRN    cyanocobalamin (VITAMIN B12) tablet 1,000 mcg  1,000 mcg Oral DAILY    calcium carbonate (TUMS) chewable tablet 200 mg [elemental]  200 mg Oral TID    albuterol (PROVENTIL VENTOLIN) nebulizer solution 2.5 mg  2.5 mg Nebulization Q4H PRN    sodium chloride (NS) flush 5-10 mL  5-10 mL IntraVENous Q8H    sodium chloride (NS) flush 5-10 mL  5-10 mL IntraVENous PRN    heparin (porcine) injection 5,000 Units  5,000 Units SubCUTAneous Q8H    acetaminophen (TYLENOL) tablet 500 mg  500 mg Oral Q4H PRN    glucose chewable tablet 16 g  4 Tab Oral PRN    dextrose (D50W) injection syrg 12.5-25 g  12.5-25 g IntraVENous PRN    glucagon (GLUCAGEN) injection 1 mg  1 mg IntraMUSCular PRN    diphenhydrAMINE (BENADRYL) capsule 25 mg  25 mg Oral Q6H PRN    DAPTOmycin (CUBICIN) 400 mg in 0.9% sodium chloride 50 mL IVPB RF formulation  400 mg IntraVENous Q48H    piperacillin-tazobactam (ZOSYN) 3.375 g in 0.9% sodium chloride (MBP/ADV) 100 mL  3.375 g IntraVENous Q8H    insulin lispro (HUMALOG) injection   SubCUTAneous AC&HS    hydrALAZINE (APRESOLINE) tablet 100 mg  100 mg Oral TID    sodium chloride (NS) flush 20 mL  20 mL InterCATHeter PRN    sodium chloride (NS) flush 10 mL  10 mL InterCATHeter Q24H    sodium chloride (NS) flush 10 mL  10 mL InterCATHeter PRN    sodium chloride (NS) flush 10 mL  10 mL InterCATHeter Q8H    alteplase (CATHFLO) 1 mg in sterile water (preservative free) 1 mL injection  1 mg InterCATHeter PRN    polyethylene glycol (MIRALAX) packet 17 g  17 g Oral DAILY    sodium bicarbonate tablet 650 mg  650 mg Oral BID    calcitRIOL (ROCALTROL) capsule 0.25 mcg  0.25 mcg Oral DAILY    promethazine (PHENERGAN) tablet 25 mg  25 mg Oral Q8H PRN    sodium chloride (NS) flush 5-10 mL  5-10 mL IntraVENous PRN    sodium chloride (NS) flush 5-10 mL  5-10 mL IntraVENous PRN     ______________________________________________________________________  EXPECTED LENGTH OF STAY: 4d 21h  ACTUAL LENGTH OF STAY:          2                 Jose Tompkins MD

## 2018-06-26 NOTE — PROGRESS NOTES
Infectious Diseases Progress Note    Antibiotic Summary:  Daptomycin Begun pta  Zosyn   -- present      Subjective:     She continues to c/o pain. No SOB, N, V, D    Objective:     Vitals:   Visit Vitals    /87 (BP 1 Location: Left arm, BP Patient Position: At rest;Supine; Head of bed elevated (Comment degrees))    Pulse (!) 113    Temp 98.7 °F (37.1 °C)    Resp 21    Ht 5' 2\" (1.575 m)    Wt 77.2 kg (170 lb 3.1 oz)    LMP 2018 (Exact Date)    SpO2 98%    BMI 31.13 kg/m2        Tmax:  Temp (24hrs), Av.2 °F (36.8 °C), Min:97.9 °F (36.6 °C), Max:98.7 °F (37.1 °C)      Exam:  General appearance: alert, no distress  Lungs: clear to auscultation bilaterally  Heart: regular rate and rhythm  Abdomen: soft, non-tender. Bowel sounds normal.   LLE: Foot and TMA are unremarkable. LLE swollen compared to RLE    IV Lines: Left IJ Asher    Labs:    Recent Labs      18   0330  18   0359  18   2332   WBC  3.8   --   6.6   HGB  6.5*   --   7.3*   PLT  131*   --   150   BUN  58*  56*  61*   CREA  3.26*  2.95*  3.27*   TBILI  0.2   --   0.3   SGOT  89*   --   19   AP  196*   --   176*     BLOOD CULTURES:    = GNRs in 2 of 2 bottles    = GPC in pairs in 1 of 2 bottles    Assessment:     1. Bacteremia -- GNRs in 2/2 bottles on  and GPC in pairs in 1 of 2 bottles on   -- ? Source: No evidence for  source. Occult abd source is a concern as well as line associated bacteremia     2. Hx osteomyelitis of the left foot: A potential source for the bacteremia    3. NIDDM     4. CKD     5. HTN     6. PCOS     7. Seizure disorder     8. Sickle cell disease     9. THU     10. GERD    Plan:     1. Continue Dapto and Zosyn    2. Would remove the Asher catheter    3.  Venous doppler LLE    Naif Zhou MD

## 2018-06-26 NOTE — PROGRESS NOTES
Reason for Readmission:     Sickle cell crisis         RRAT Score and Risk Level:     18 but it needs to be higher as she has had multiple admissions in last year and she has multiple issues - HTN, GERD, CKD Stage IV, sickle cell, polycystic ovarian disease, DM Type 1, partial ampulation of L foot due to osteomyelitis, seizures, THU, Narcotic dependence, Gastroparesis (gastric pacer removed July 2015), thromboembolus. Level of Readmission:    3      Care Conference scheduled:   No.       Resources/supports as identified by patient/family:   Lives with spouse in trailer, but was in residential [facility]) as recently as March 2018. Top Challenges facing patient (as identified by patient/family and CM): Finances/Medication cost?     Has Medicare and Medicaid, so all costs should be covered. She receives disability. Transportation      [employer]. Support system or lack thereof? . Patient has walker, cane, rollator, and wheelchair. Living arrangements? Lives in a trailer with spouse, father, and sister. Has ramp to enter. Self-care/ADLs/Cognition? Does some self care. Cognition is OK. Current Advanced Directive/Advance Care Plan:  None. Plan for utilizing home health:   TBD. Went to [facility] at last discharge for IV ABX as she could not afford co-pay for antibiotics. Likelihood of additional readmission:   High. Transition of Care Plan:    Based on readmission, the patient's previous Plan of Care   has been evaluated and/or modified. The current Transition of Care Plan is:      Pain control of sickle cell with IV fentanyl, PO oxycodone, and acetaminophen. IV ABX and IV fluids. Uncontrolled HTN, PRN IV hydralazine. CM to follow.

## 2018-06-26 NOTE — PROCEDURES
[facility]  *** FINAL REPORT ***    Name: Chaz Cortez  MRN: [id number]    Inpatient  : 1980  HIS Order #: [id number]  [address] [facility] Visit #: [id number]  Date: 2018    TYPE OF TEST: Peripheral Venous Testing    REASON FOR TEST  Pain in limb, Limb swelling    Right Leg:-  Deep venous thrombosis:           No  Superficial venous thrombosis:    No  Deep venous insufficiency:        Not examined  Superficial venous insufficiency: Not examined    Left Leg:-  Deep venous thrombosis:           Yes  Proximal extent of thrombus:      Popliteal At The Knee  Superficial venous thrombosis:    No  Deep venous insufficiency:        Not examined  Superficial venous insufficiency: Not examined      INTERPRETATION/FINDINGS  PROCEDURE:  Color duplex ultrasound imaging of lower extremity veins. FINDINGS:       Right: The common femoral, deep femoral, femoral, popliteal,  posterior tibial, peroneal, and great saphenous are patent and without   evidence of thrombus;  each is fully compressible and there is no  narrowing of the flow channel on color Doppler imaging. Phasic flow  is observed in the common femoral vein. Left: Consistent with thrombosis involving the popliteal vein as  demonstrated by vein non-compressibility, and by a narrowing or  occlusion of the flow channel on color Doppler imaging. The common  femoral, deep femoral, femoral  posterior tibial, peroneal, and great  saphenous are patent and without evidence of thrombus;  each is fully  compressible and there is no narrowing of the flow channel on color  Doppler imaging. Phasic flow is observed in the common femoral vein. IMPRESSION:  There is evidence of vein thrombosis, as described above. The ultrasound appearance is more consistent with a chronic than an  acute process. ADDITIONAL COMMENTS    I have personally reviewed the data relevant to the interpretation of  this  study. TECHNOLOGIST: NATE Rosenberg  Signed: 06/26/2018 02:02 PM    PHYSICIAN: Maximo Ayers MD  Signed: 06/27/2018 10:10 AM

## 2018-06-26 NOTE — PROGRESS NOTES
Nephrology Progress Note  Gallo Henderson  Date of Admission : 6/23/2018    CC: Follow up for STONE on CKD       Assessment and Plan     STONE on CKD :  - repeat labs in AM      CKD Stage IV :  - baseline Cr 2.6-2.8 mg/dl   - previously on dialysis and recovered       AG acidosis:  - cont oral bicarb      Sickle cell crisis:  - transfused 1 unit PRBCs on 6/25     Left Foot Osteo :   - on dapto  - CPK ok      IDDM:  - episode of hypoglycemia this AM  - per hospitalist     Bacteremia:  - abx per ID  - ? Source, possibly foot  - on dapto and zosyn  - will need lines removed    LE edema:  - L > R, dopplers ordered       Interval History:  Seen and examined. Feeling ok. Received 1 unit of PRBCs late last night. No cp, sob, n/v/d reported. No labs from today. Current Medications: all current  Medications have been eviewed in EPIC  Review of Systems: Pertinent items are noted in HPI. Objective:  Vitals:    Vitals:    06/26/18 0320 06/26/18 0348 06/26/18 0436 06/26/18 0723   BP: 165/90 173/84 (!) 173/95    Pulse: (!) 117 (!) 117 (!) 115 (!) 107   Resp: 16 14 16 15   Temp: 98.2 °F (36.8 °C) 98.2 °F (36.8 °C) 98.1 °F (36.7 °C) 98.5 °F (36.9 °C)   SpO2: 96% 94% 93% 93%   Weight:       Height:         Intake and Output:     06/24 1901 - 06/26 0700  In: 2428.3 [P.O.:400;  I.V.:2028.3]  Out: 2407 [Urine:2225]    Physical Examination:  Pt intubated     No  General: NAD,Conversant   Neck:  Supple, no mass  Resp:  Lungs CTA B/L, no wheezing , normal respiratory effort  CV:  RRR,  no murmur or rub, 1 -2 + LE edema, L > R  GI:  Soft, NT, + Bowel sounds, no hepatosplenomegaly  Neurologic:  Non focal  Psych:             AAO x 3 appropriate affect   Skin:  No Rash  :  No escobedo    []    High complexity decision making was performed  []    Patient is at high-risk of decompensation with multiple organ involvement    Lab Data Personally Reviewed: I have reviewed all the pertinent labs, microbiology data and radiology studies during assessment. Recent Labs      06/25/18   0330  06/24/18   1240  06/24/18   0359  06/23/18   2332   NA  138   --   138  142   K  4.5   --   4.0  4.3   CL  110*   --   112*  114*   CO2  19*   --   17*  20*   GLU  36*  25*  35*  50*   BUN  58*   --   56*  61*   CREA  3.26*   --   2.95*  3.27*   CA  7.0*   --   5.9*  6.7*   MG  1.6   --    --    --    PHOS  5.8*   --    --    --    ALB  2.2*   --    --   2.6*   SGOT  89*   --    --   19   ALT  96*   --    --   22   INR   --    --    --   1.1     Recent Labs      06/25/18   0330  06/23/18   2332   WBC  3.8  6.6   HGB  6.5*  7.3*   HCT  20.8*  23.1*   PLT  131*  150     Lab Results   Component Value Date/Time    Specimen Description: URINE 06/24/2013 08:00 PM    Specimen Description: URINE 06/17/2013 07:55 PM    Specimen Description: URINE 05/26/2013 10:10 AM     Lab Results   Component Value Date/Time    Culture result: (A) 06/24/2018 07:30 AM     ALPHA STREPTOCOCCUS, NOT S. PNEUMONIAE GROWING IN 1 OF 2 BOTTLES DRAWN . .(SITE= L CHEST PICC) SENSITIVITY TO FOLLOW    Culture result: (A) 06/24/2018 07:30 AM     PRELIMINARY REPORT OF GRAM POSITIVE COCCI IN PAIRS GROWING IN 1 OF 2 BOTTLES DRAWN . ..(SITE= L CHEST PICC) CALLED TO AND READ BACK BY LAURO ACOSTA ([place]) ON 6/25/18 AT 0574 BY VI    Culture result: REMAINING BOTTLE(S) HAS/HAVE NO GROWTH SO FAR 06/24/2018 07:30 AM     Recent Results (from the past 24 hour(s))   GLUCOSE, POC    Collection Time: 06/25/18 12:12 PM   Result Value Ref Range    Glucose (POC) 126 (H) 65 - 100 mg/dL    Performed by Chhaya Barroso, POC    Collection Time: 06/25/18  4:22 PM   Result Value Ref Range    Glucose (POC) 259 (H) 65 - 100 mg/dL    Performed by Alisha Dias    GLUCOSE, POC    Collection Time: 06/25/18  9:46 PM   Result Value Ref Range    Glucose (POC) 144 (H) 65 - 100 mg/dL    Performed by [address] [place] [address], POC    Collection Time: 06/26/18  6:13 AM   Result Value Ref Range    Glucose (POC) 256 (H) 65 - 100 mg/dL    Performed by Ronak Barclay MD  [facility]   [address] [address]  Phone - [phone]   Fax - [phone]  [email or web address]

## 2018-06-26 NOTE — DIABETES MGMT
DTC Progress Note    Recommendations/ Comments: Chart review for increasing blood sugars following hypoglycemia yesterday. Noted that D10% was running, but has now been discontinued per nurse who reported this in rounds. If appropriate please   Change lispro correction to high sensitivity    Current hospital DM medication: lispro correction scale, normal sensitivity    Chart reviewed on Simona Salas. Patient is a 42 y.o. female with known DM - she was discharged on lantus 5 units on 6/14/2018. No DM meds lispro in PTA. Pt is well known to DTC from many previous admission: 6/9 - 6/14/2018, 4/27-5/14/2018, 3/2 - 3/27/2018 in addition to admissions in previous years. She was transfused 3/10/2018  On 6/25/2018 Hgb 6.5  Therefore, A1c may not be accurate    A1c:   Lab Results   Component Value Date/Time    Hemoglobin A1c 7.6 (H) 06/25/2018 03:04 AM    Hemoglobin A1c 7.9 (H) 06/23/2018 11:29 PM       Recent Glucose Results:   Lab Results   Component Value Date/Time    GLUCPOC 225 (H) 06/26/2018 11:42 AM    GLUCPOC 256 (H) 06/26/2018 06:13 AM    GLUCPOC 144 (H) 06/25/2018 09:46 PM        Lab Results   Component Value Date/Time    Creatinine 3.26 (H) 06/25/2018 03:30 AM     Estimated Creatinine Clearance: 22.1 mL/min (based on Cr of 3.26). Active Orders   Diet    DIET RENAL WITH OPTIONS 70-70-70 (House); Regular; Consistent Carb 2000kcal        PO intake:   Patient Vitals for the past 72 hrs:   % Diet Eaten   06/25/18 1318 100 %   06/24/18 1754 100 %   06/24/18 1300 100 %   06/24/18 0900 100 %       Will continue to follow as needed.     Thank you  Henok Yuan, MS, RN, CDE

## 2018-06-27 LAB
ABO + RH BLD: NORMAL
ALBUMIN SERPL-MCNC: 2.6 G/DL (ref 3.5–5)
ANION GAP SERPL CALC-SCNC: 11 MMOL/L (ref 5–15)
ANTIGENS PRESENT RBC DONR: NORMAL
ANTIGENS PRESENT RBC DONR: NORMAL
BLD PROD TYP BPU: NORMAL
BLD PROD TYP BPU: NORMAL
BLOOD BANK CMNT PATIENT-IMP: NORMAL
BLOOD GROUP ANTIBODIES SERPL: NORMAL
BPU ID: NORMAL
BPU ID: NORMAL
BUN SERPL-MCNC: 49 MG/DL (ref 6–20)
BUN/CREAT SERPL: 13 (ref 12–20)
CALCIUM SERPL-MCNC: 7.1 MG/DL (ref 8.5–10.1)
CHLORIDE SERPL-SCNC: 109 MMOL/L (ref 97–108)
CO2 SERPL-SCNC: 19 MMOL/L (ref 21–32)
CREAT SERPL-MCNC: 3.7 MG/DL (ref 0.55–1.02)
CROSSMATCH RESULT,%XM: NORMAL
CROSSMATCH RESULT,%XM: NORMAL
ERYTHROCYTE [DISTWIDTH] IN BLOOD BY AUTOMATED COUNT: 16.3 % (ref 11.5–14.5)
GLUCOSE BLD STRIP.AUTO-MCNC: 107 MG/DL (ref 65–100)
GLUCOSE BLD STRIP.AUTO-MCNC: 179 MG/DL (ref 65–100)
GLUCOSE BLD STRIP.AUTO-MCNC: 221 MG/DL (ref 65–100)
GLUCOSE BLD STRIP.AUTO-MCNC: 273 MG/DL (ref 65–100)
GLUCOSE SERPL-MCNC: 239 MG/DL (ref 65–100)
HCT VFR BLD AUTO: 23.9 % (ref 35–47)
HGB BLD-MCNC: 7.7 G/DL (ref 11.5–16)
MCH RBC QN AUTO: 25.8 PG (ref 26–34)
MCHC RBC AUTO-ENTMCNC: 32.2 G/DL (ref 30–36.5)
MCV RBC AUTO: 79.9 FL (ref 80–99)
NRBC # BLD: 0 K/UL (ref 0–0.01)
NRBC BLD-RTO: 0 PER 100 WBC
PHOSPHATE SERPL-MCNC: 5.8 MG/DL (ref 2.6–4.7)
PHYSICIAN INSTRUCTIO,%PI: NORMAL
PLATELET # BLD AUTO: 165 K/UL (ref 150–400)
PMV BLD AUTO: 13 FL (ref 8.9–12.9)
POTASSIUM SERPL-SCNC: 5.3 MMOL/L (ref 3.5–5.1)
RBC # BLD AUTO: 2.99 M/UL (ref 3.8–5.2)
SERVICE CMNT-IMP: ABNORMAL
SODIUM SERPL-SCNC: 139 MMOL/L (ref 136–145)
SPECIMEN EXP DATE BLD: NORMAL
STATUS OF UNIT,%ST: NORMAL
STATUS OF UNIT,%ST: NORMAL
UNIT DIVISION, %UDIV: 0
UNIT DIVISION, %UDIV: 0
WBC # BLD AUTO: 3.2 K/UL (ref 3.6–11)

## 2018-06-27 PROCEDURE — 74011000250 HC RX REV CODE- 250: Performed by: INTERNAL MEDICINE

## 2018-06-27 PROCEDURE — 65660000000 HC RM CCU STEPDOWN

## 2018-06-27 PROCEDURE — 74011000258 HC RX REV CODE- 258: Performed by: HOSPITALIST

## 2018-06-27 PROCEDURE — 74011250636 HC RX REV CODE- 250/636: Performed by: INTERNAL MEDICINE

## 2018-06-27 PROCEDURE — 85027 COMPLETE CBC AUTOMATED: CPT | Performed by: INTERNAL MEDICINE

## 2018-06-27 PROCEDURE — 74011000258 HC RX REV CODE- 258: Performed by: INTERNAL MEDICINE

## 2018-06-27 PROCEDURE — 74011250637 HC RX REV CODE- 250/637: Performed by: HOSPITALIST

## 2018-06-27 PROCEDURE — 87040 BLOOD CULTURE FOR BACTERIA: CPT | Performed by: INTERNAL MEDICINE

## 2018-06-27 PROCEDURE — 36415 COLL VENOUS BLD VENIPUNCTURE: CPT | Performed by: INTERNAL MEDICINE

## 2018-06-27 PROCEDURE — 80069 RENAL FUNCTION PANEL: CPT | Performed by: INTERNAL MEDICINE

## 2018-06-27 PROCEDURE — 74011250637 HC RX REV CODE- 250/637: Performed by: INTERNAL MEDICINE

## 2018-06-27 PROCEDURE — 74011636637 HC RX REV CODE- 636/637: Performed by: HOSPITALIST

## 2018-06-27 PROCEDURE — 82962 GLUCOSE BLOOD TEST: CPT

## 2018-06-27 PROCEDURE — 74011250636 HC RX REV CODE- 250/636: Performed by: HOSPITALIST

## 2018-06-27 RX ORDER — FUROSEMIDE 40 MG/1
80 TABLET ORAL 2 TIMES DAILY
Status: DISCONTINUED | OUTPATIENT
Start: 2018-06-27 | End: 2018-06-27

## 2018-06-27 RX ORDER — AMLODIPINE BESYLATE 5 MG/1
5 TABLET ORAL DAILY
Status: DISCONTINUED | OUTPATIENT
Start: 2018-06-28 | End: 2018-06-27

## 2018-06-27 RX ORDER — AMLODIPINE BESYLATE 5 MG/1
5 TABLET ORAL DAILY
Status: DISCONTINUED | OUTPATIENT
Start: 2018-06-27 | End: 2018-06-29

## 2018-06-27 RX ORDER — LABETALOL HYDROCHLORIDE 5 MG/ML
20 INJECTION, SOLUTION INTRAVENOUS
Status: DISCONTINUED | OUTPATIENT
Start: 2018-06-27 | End: 2018-07-04 | Stop reason: HOSPADM

## 2018-06-27 RX ADMIN — PIPERACILLIN SODIUM AND TAZOBACTAM SODIUM 3.38 G: 3; .375 INJECTION, POWDER, LYOPHILIZED, FOR SOLUTION INTRAVENOUS at 09:42

## 2018-06-27 RX ADMIN — HYDROMORPHONE HYDROCHLORIDE 1 MG: 1 INJECTION, SOLUTION INTRAMUSCULAR; INTRAVENOUS; SUBCUTANEOUS at 16:07

## 2018-06-27 RX ADMIN — CALCIUM CARBONATE (ANTACID) CHEW TAB 500 MG 200 MG: 500 CHEW TAB at 16:55

## 2018-06-27 RX ADMIN — Medication 1000 MCG: at 09:47

## 2018-06-27 RX ADMIN — LABETALOL HYDROCHLORIDE 20 MG: 5 INJECTION, SOLUTION INTRAVENOUS at 08:30

## 2018-06-27 RX ADMIN — AMLODIPINE BESYLATE 5 MG: 5 TABLET ORAL at 14:43

## 2018-06-27 RX ADMIN — HYDROMORPHONE HYDROCHLORIDE 1 MG: 1 INJECTION, SOLUTION INTRAMUSCULAR; INTRAVENOUS; SUBCUTANEOUS at 01:33

## 2018-06-27 RX ADMIN — HYDRALAZINE HYDROCHLORIDE 100 MG: 50 TABLET, FILM COATED ORAL at 21:35

## 2018-06-27 RX ADMIN — SODIUM BICARBONATE 650 MG: 650 TABLET ORAL at 09:47

## 2018-06-27 RX ADMIN — PIPERACILLIN SODIUM AND TAZOBACTAM SODIUM 3.38 G: 3; .375 INJECTION, POWDER, LYOPHILIZED, FOR SOLUTION INTRAVENOUS at 21:36

## 2018-06-27 RX ADMIN — OXYCODONE HYDROCHLORIDE AND ACETAMINOPHEN 1 TABLET: 5; 325 TABLET ORAL at 00:01

## 2018-06-27 RX ADMIN — Medication 10 ML: at 11:14

## 2018-06-27 RX ADMIN — CALCITRIOL 0.25 MCG: 0.25 CAPSULE, LIQUID FILLED ORAL at 09:48

## 2018-06-27 RX ADMIN — INSULIN LISPRO 5 UNITS: 100 INJECTION, SOLUTION INTRAVENOUS; SUBCUTANEOUS at 07:25

## 2018-06-27 RX ADMIN — Medication 10 ML: at 14:00

## 2018-06-27 RX ADMIN — DIPHENHYDRAMINE HYDROCHLORIDE 25 MG: 25 CAPSULE ORAL at 05:43

## 2018-06-27 RX ADMIN — HYDRALAZINE HYDROCHLORIDE 100 MG: 50 TABLET, FILM COATED ORAL at 16:55

## 2018-06-27 RX ADMIN — OXYCODONE HYDROCHLORIDE AND ACETAMINOPHEN 1 TABLET: 5; 325 TABLET ORAL at 17:05

## 2018-06-27 RX ADMIN — Medication 10 ML: at 21:36

## 2018-06-27 RX ADMIN — DAPTOMYCIN 400 MG: 500 INJECTION, POWDER, LYOPHILIZED, FOR SOLUTION INTRAVENOUS at 18:19

## 2018-06-27 RX ADMIN — HEPARIN SODIUM 5000 UNITS: 5000 INJECTION, SOLUTION INTRAVENOUS; SUBCUTANEOUS at 16:55

## 2018-06-27 RX ADMIN — SENNOSIDES AND DOCUSATE SODIUM 2 TABLET: 8.6; 5 TABLET ORAL at 09:48

## 2018-06-27 RX ADMIN — NITROGLYCERIN 1 INCH: 20 OINTMENT TOPICAL at 07:19

## 2018-06-27 RX ADMIN — DIPHENHYDRAMINE HYDROCHLORIDE 25 MG: 25 CAPSULE ORAL at 18:40

## 2018-06-27 RX ADMIN — Medication: at 21:44

## 2018-06-27 RX ADMIN — POLYETHYLENE GLYCOL 3350 17 G: 17 POWDER, FOR SOLUTION ORAL at 09:48

## 2018-06-27 RX ADMIN — INSULIN LISPRO 3 UNITS: 100 INJECTION, SOLUTION INTRAVENOUS; SUBCUTANEOUS at 16:30

## 2018-06-27 RX ADMIN — DIPHENHYDRAMINE HYDROCHLORIDE 25 MG: 25 CAPSULE ORAL at 21:35

## 2018-06-27 RX ADMIN — HYDROMORPHONE HYDROCHLORIDE 1 MG: 1 INJECTION, SOLUTION INTRAMUSCULAR; INTRAVENOUS; SUBCUTANEOUS at 05:42

## 2018-06-27 RX ADMIN — OXYCODONE HYDROCHLORIDE AND ACETAMINOPHEN 1 TABLET: 5; 325 TABLET ORAL at 09:48

## 2018-06-27 RX ADMIN — QUETIAPINE FUMARATE 100 MG: 100 TABLET ORAL at 18:19

## 2018-06-27 RX ADMIN — Medication 10 ML: at 21:37

## 2018-06-27 RX ADMIN — LABETALOL HYDROCHLORIDE 10 MG: 5 INJECTION, SOLUTION INTRAVENOUS at 00:11

## 2018-06-27 RX ADMIN — DIPHENHYDRAMINE HYDROCHLORIDE 25 MG: 25 CAPSULE ORAL at 09:52

## 2018-06-27 RX ADMIN — Medication 10 ML: at 05:43

## 2018-06-27 RX ADMIN — FUROSEMIDE 80 MG: 40 TABLET ORAL at 09:47

## 2018-06-27 RX ADMIN — VENLAFAXINE 75 MG: 37.5 TABLET ORAL at 09:48

## 2018-06-27 RX ADMIN — LABETALOL HYDROCHLORIDE 10 MG: 5 INJECTION, SOLUTION INTRAVENOUS at 06:20

## 2018-06-27 RX ADMIN — CALCIUM CARBONATE (ANTACID) CHEW TAB 500 MG 200 MG: 500 CHEW TAB at 21:35

## 2018-06-27 RX ADMIN — VENLAFAXINE 75 MG: 37.5 TABLET ORAL at 16:55

## 2018-06-27 RX ADMIN — HYDROMORPHONE HYDROCHLORIDE 1 MG: 1 INJECTION, SOLUTION INTRAMUSCULAR; INTRAVENOUS; SUBCUTANEOUS at 11:10

## 2018-06-27 RX ADMIN — QUETIAPINE FUMARATE 100 MG: 100 TABLET ORAL at 09:47

## 2018-06-27 RX ADMIN — SODIUM BICARBONATE 650 MG: 650 TABLET ORAL at 18:19

## 2018-06-27 RX ADMIN — DIPHENHYDRAMINE HYDROCHLORIDE 25 MG: 25 CAPSULE ORAL at 01:33

## 2018-06-27 RX ADMIN — HYDROMORPHONE HYDROCHLORIDE 1 MG: 1 INJECTION, SOLUTION INTRAMUSCULAR; INTRAVENOUS; SUBCUTANEOUS at 21:36

## 2018-06-27 RX ADMIN — HYDRALAZINE HYDROCHLORIDE 100 MG: 50 TABLET, FILM COATED ORAL at 09:48

## 2018-06-27 RX ADMIN — CALCIUM CARBONATE (ANTACID) CHEW TAB 500 MG 200 MG: 500 CHEW TAB at 09:47

## 2018-06-27 RX ADMIN — HEPARIN SODIUM 5000 UNITS: 5000 INJECTION, SOLUTION INTRAVENOUS; SUBCUTANEOUS at 09:55

## 2018-06-27 NOTE — DIABETES MGMT
DTC Progress Note    Recommendations/ Comments: Chart review for fluctuating blood sugars ( mg/dL in the last 24 hours). Hypoglycemia often occurring following correction for high blood sugar. If appropriate please   Change lispro correction to high sensitivity    Current hospital DM medication: lispro correction scale, normal sensitivity    Chart reviewed on Simona Salas. Patient is a 42 y.o. female with known DM - she was discharged on lantus 5 units on 6/14/2018. No DM meds lispro in PTA. Pt is well known to DTC from many previous admission: 6/9 - 6/14/2018, 4/27-5/14/2018, 3/2 - 3/27/2018 in addition to admissions in previous years. She was transfused 3/10/2018  On 6/25/2018 Hgb 6.5  Therefore, A1c may not be accurate    A1c:   Lab Results   Component Value Date/Time    Hemoglobin A1c 7.6 (H) 06/25/2018 03:04 AM    Hemoglobin A1c 7.9 (H) 06/23/2018 11:29 PM       Recent Glucose Results:   Lab Results   Component Value Date/Time     (H) 06/27/2018 04:06 AM    GLUCPOC 107 (H) 06/27/2018 11:19 AM    GLUCPOC 273 (H) 06/27/2018 06:45 AM    GLUCPOC 167 (H) 06/26/2018 09:34 PM        Lab Results   Component Value Date/Time    Creatinine 3.70 (H) 06/27/2018 04:06 AM     Estimated Creatinine Clearance: 19.3 mL/min (based on Cr of 3.7). Active Orders   Diet    DIET RENAL WITH OPTIONS 70-70-70 (House); Regular; Consistent Carb 2000kcal        PO intake:   Patient Vitals for the past 72 hrs:   % Diet Eaten   06/25/18 1318 100 %   06/24/18 1754 100 %       Will continue to follow as needed.     Thank you  Charles Bellamy, MS, RN, CDE

## 2018-06-27 NOTE — PROGRESS NOTES
Hospitalist Progress Note  Jose Tompkins MD  Answering service: [phone] OR [phone] from in house phone        Date of Service:  2018  NAME:  Debo Duarte  :  1980  MRN:  [id number]      Admission Summary:   43 y/o AA female with PMH of sickle-cell disease, type 1 DM, CKD stage 4, L foot osteomyelitis s/p partial amputation currently on IV daptomycin therapy admitted on 18 for diffuse body aches and fever with suspicion of acute sickle cell crisis. She was recently discharged from here for DKA (hospital course complicated by respiratory failure, angioedema of unclear etiology, sepsis and STONE    Interval history / Subjective:   Pt seen and examined. She was seen walking around the unit this am.   Pt now sitting on chair at bedside. Pt states her pain is better controlled today. Assessment & Plan:     1. Acute sickle-cell crisis: (POA)  -Continue supportive care with pain control. Stop IVF due to fluid overload. -Continue to monitor H/H. No need to transfuse now. 2. Suspected sepsis: (POA)  -Source is unclear but ddx includes Lt foot osteomyelitis (last MRI showed findings concerning for osteo), or PICC line infection. -GNRs in 2/2 bottles on  and GPC in pairs in 1 of 2 bottles on     -Continue empiric abx w/ IV Zosyn and daptomycin  - ID follow up appreciated. Asher catheter to be removed. Repeat blood cultures from this am pending. -CXR notes pulm edema though lungs clear on exam. Repeat CXR  no acute issues     3. Type I DM w/ hypoglycemia: (POA)  -Improved after D50  -Continue NPH and SSI.      4. Metabolic acidosis:  -Likely due to CKD 4. Continue NaHCO3 tabs.     5. HTN: uncontrolled  -BP still high but better today. Will changed Lasix to her home dose of 80 mg bid. Continue current bp meds.     6. CKD stage 4:   -Renal fxn is worse. Likely due to progressive CKD.    -Nephrology follow up appreciated. Will monitor closely. 7. Lt LE DVT:   -Pt has a previous history for DVT which was treated. -Duplex suggest chronic DVT. -Will hold off on treatment for now and d/w Hem/onc for recs.      Code status:Full  DVT prophylaxis: SCD    Care Plan discussed with: Patient/Family and Nurse  Disposition: TBD     Hospital Problems  Date Reviewed: 6/10/2018          Codes Class Noted POA    Charcot ankle, left ICD-10-CM: M14.672  ICD-9-CM: 094.0, 713.5  6/25/2018 Unknown        * (Principal)Sickle cell crisis ([facility] [address] ICD-10-CM: D57.00  ICD-9-CM: 282.62  6/24/2018 Unknown        Osteomyelitis of left foot ([facility] [address] ICD-10-CM: M86.9  ICD-9-CM: 730.27  4/27/2018 Yes        Metabolic encephalopathy ZJQ-74-IV: G93.41  ICD-9-CM: 348.31  3/9/2016 Yes        CKD (chronic kidney disease) ICD-10-CM: N18.9  ICD-9-CM: 585.9  9/19/2012 Yes    Overview Addendum 1/17/2013  6:44 PM by Atilio Hernandez; Required HD in past with acute exacerbation, AV graft R thigh  Supposed to make appt:   Charlette Gibbons MD  [facility]   [address] [place] [address], [place] [address]   [phone]              Diabetes mellitus type I [facility]) ICD-10-CM: E10.9  ICD-9-CM: 250.01  3/29/2012 Yes    Overview Addendum 2/12/2013  7:43 AM by Ag monsalve appt:  May 8, 2013 with dr Jonathan Rai  dx'd age 14; + neuropathy, nephropathy  On ssi, regular  Diabetes health maintenance:  Last A1C: 11.7 1/2013 10.7 3/2012  Last eye exam 2012, had cataract R eye, needs L eye done, Dr Vicente Pulse (OD) [phone], another doc did surgery: Dr Freddy York MD same practice [phone] f [phone]  Last microalbumin:  n/a Last creatinine: 1.65 1/2013; 1.9 9/17/12  Last microfilament exam/Last podiatry: 9/20/12 intact, nails thickened  Last lipids: 1/201  trig 173, HDL 70 .4 w/o statin; no record in former PCP notes why it was d/c'd   ACE/ARB: no due to CKD                 HTN (hypertension) (Chronic) ICD-10-CM: I10  ICD-9-CM: 401.9  10/14/2011 Yes                Review of Systems:   A comprehensive review of systems was negative. Vital Signs:    Last 24hrs VS reviewed since prior progress note. Most recent are:  Visit Vitals    /76 (BP 1 Location: Right arm, BP Patient Position: Sitting)    Pulse 100    Temp 98.4 °F (36.9 °C)    Resp 20    Ht 5' 2\" (1.575 m)    Wt 74.6 kg (164 lb 7.4 oz)    SpO2 100%    BMI 30.08 kg/m2       No intake or output data in the 24 hours ending 06/27/18 1724     Physical Examination:             Constitutional:  No acute distress   ENT:  Oral mucous moist   Resp:  CTA bilaterally. CV:  Regular rhythm, normal rate, tachy    GI:  Soft, non distended, non tender. bs+    Musculoskeletal:  2 + edema, warm, 2+ pulses throughout    Neurologic:  Moves all extremities. AAOx3, CN II-XII reviewed     Psych:  Good insight, Not anxious nor agitated.        Data Review:    I personally reviewed  Image and labs      Labs:     Recent Labs      06/27/18   0406  06/25/18   0330   WBC  3.2*  3.8   HGB  7.7*  6.5*   HCT  23.9*  20.8*   PLT  165  131*     Recent Labs      06/27/18   0406  06/25/18   0330   NA  139  138   K  5.3*  4.5   CL  109*  110*   CO2  19*  19*   BUN  49*  58*   CREA  3.70*  3.26*   GLU  239*  36*   CA  7.1*  7.0*   MG   --   1.6   PHOS  5.8*  5.8*     Recent Labs      06/27/18   0406  06/25/18   0330   SGOT   --   89*   ALT   --   96*   AP   --   196*   TBILI   --   0.2   TP   --   6.1*   ALB  2.6*  2.2*   GLOB   --   3.9         Lab Results   Component Value Date/Time    Folate 33.8 (H) 05/07/2018 11:20 AM              Lab Results   Component Value Date/Time    Cholesterol, total 128 06/10/2018 07:44 AM    HDL Cholesterol 51 06/10/2018 07:44 AM    LDL, calculated 67.4 06/10/2018 07:44 AM    Triglyceride 48 06/10/2018 07:44 AM    CHOL/HDL Ratio 2.5 06/10/2018 07:44 AM     Lab Results   Component Value Date/Time    Glucose (POC) 221 (H) 06/27/2018 04:54 PM Glucose (POC) 107 (H) 06/27/2018 11:19 AM    Glucose (POC) 273 (H) 06/27/2018 06:45 AM    Glucose (POC) 167 (H) 06/26/2018 09:34 PM    Glucose (POC) 84 06/26/2018 04:52 PM     Lab Results   Component Value Date/Time    Color YELLOW/STRAW 06/23/2018 11:32 PM    Appearance CLEAR 06/23/2018 11:32 PM    Specific gravity 1.010 06/23/2018 11:32 PM    Specific gravity 1.015 07/26/2010 11:18 AM    pH (UA) 6.0 06/23/2018 11:32 PM    Protein 100 (A) 06/23/2018 11:32 PM    Glucose NEGATIVE  06/23/2018 11:32 PM    Ketone NEGATIVE  06/23/2018 11:32 PM    Bilirubin NEGATIVE  06/23/2018 11:32 PM    Urobilinogen 0.2 06/23/2018 11:32 PM    Nitrites NEGATIVE  06/23/2018 11:32 PM    Leukocyte Esterase NEGATIVE  06/23/2018 11:32 PM    Epithelial cells FEW 06/23/2018 11:32 PM    Bacteria NEGATIVE  06/23/2018 11:32 PM    WBC 0-4 06/23/2018 11:32 PM    RBC 0-5 06/23/2018 11:32 PM         Medications Reviewed:     Current Facility-Administered Medications   Medication Dose Route Frequency    labetalol (NORMODYNE;TRANDATE) injection 20 mg  20 mg IntraVENous Q6H PRN    piperacillin-tazobactam (ZOSYN) 3.375 g in 0.9% sodium chloride (MBP/ADV) 100 mL  3.375 g IntraVENous Q12H    amLODIPine (NORVASC) tablet 5 mg  5 mg Oral DAILY    HYDROmorphone (DILAUDID) syringe 1 mg  1 mg IntraVENous Q4H PRN    0.9% sodium chloride infusion 250 mL  250 mL IntraVENous PRN    prochlorperazine (COMPAZINE) with saline injection 5 mg  5 mg IntraVENous Q6H PRN    venlafaxine (EFFEXOR) tablet 75 mg  75 mg Oral BID WITH MEALS    senna-docusate (PERICOLACE) 8.6-50 mg per tablet 2 Tab  2 Tab Oral DAILY    QUEtiapine (SEROquel) tablet 100 mg  100 mg Oral BID    oxyCODONE-acetaminophen (PERCOCET) 5-325 mg per tablet 1 Tab  1 Tab Oral Q6H PRN    cyanocobalamin (VITAMIN B12) tablet 1,000 mcg  1,000 mcg Oral DAILY    calcium carbonate (TUMS) chewable tablet 200 mg [elemental]  200 mg Oral TID    albuterol (PROVENTIL VENTOLIN) nebulizer solution 2.5 mg  2.5 mg Nebulization Q4H PRN    sodium chloride (NS) flush 5-10 mL  5-10 mL IntraVENous Q8H    sodium chloride (NS) flush 5-10 mL  5-10 mL IntraVENous PRN    heparin (porcine) injection 5,000 Units  5,000 Units SubCUTAneous Q8H    acetaminophen (TYLENOL) tablet 500 mg  500 mg Oral Q4H PRN    glucose chewable tablet 16 g  4 Tab Oral PRN    dextrose (D50W) injection syrg 12.5-25 g  12.5-25 g IntraVENous PRN    glucagon (GLUCAGEN) injection 1 mg  1 mg IntraMUSCular PRN    diphenhydrAMINE (BENADRYL) capsule 25 mg  25 mg Oral Q6H PRN    DAPTOmycin (CUBICIN) 400 mg in 0.9% sodium chloride 50 mL IVPB RF formulation  400 mg IntraVENous Q48H    insulin lispro (HUMALOG) injection   SubCUTAneous AC&HS    hydrALAZINE (APRESOLINE) tablet 100 mg  100 mg Oral TID    sodium chloride (NS) flush 20 mL  20 mL InterCATHeter PRN    sodium chloride (NS) flush 10 mL  10 mL InterCATHeter Q24H    sodium chloride (NS) flush 10 mL  10 mL InterCATHeter PRN    sodium chloride (NS) flush 10 mL  10 mL InterCATHeter Q8H    alteplase (CATHFLO) 1 mg in sterile water (preservative free) 1 mL injection  1 mg InterCATHeter PRN    polyethylene glycol (MIRALAX) packet 17 g  17 g Oral DAILY    sodium bicarbonate tablet 650 mg  650 mg Oral BID    calcitRIOL (ROCALTROL) capsule 0.25 mcg  0.25 mcg Oral DAILY    promethazine (PHENERGAN) tablet 25 mg  25 mg Oral Q8H PRN    sodium chloride (NS) flush 5-10 mL  5-10 mL IntraVENous PRN    sodium chloride (NS) flush 5-10 mL  5-10 mL IntraVENous PRN     ______________________________________________________________________  EXPECTED LENGTH OF STAY: 4d 21h  ACTUAL LENGTH OF STAY:          3                 oJse Tompkins MD

## 2018-06-27 NOTE — PROGRESS NOTES
Micheline DICKEY.  Patient requested Eucerin lotion for itching. 1955-Bedside and Verbal shift change report given to VERONICA Long (oncoming nurse) by Abhijit Lemons RN (offgoing nurse). Report included the following information SBAR, Kardex, Intake/Output, MAR and Recent Results.

## 2018-06-27 NOTE — PROGRESS NOTES
Problem: Sepsis: Day 2  Goal: *Tolerating diet  Outcome: Progressing Towards Goal  Patient is tolerating diet well. Educated on nutritional supplements, and good sources of nutrition. Patient verbalized understanding. 2901 Patient BP elevated gave PRN dose of labetelol. Will continue to monitor patient q15 and call MD.  Janeth Bensonier hospitalist spoke with MD.He ordered nitro oint and recheck BP in one hour. Bedside shift change report given to Kai Arenas (oncoming nurse) by VERONICA Long (offgoing nurse). Report included the following information SBAR.

## 2018-06-27 NOTE — PROGRESS NOTES
Infectious Diseases Progress Note    Antibiotic Summary:  Daptomycin Begun pta  Zosyn   -- present      Subjective:     She feels better today. LLE swelling seems better to her. No HA, CP, N, V, D. Pain that she attributes to her SS crisis seems better. Objective:     Vitals:   Visit Vitals    /75 (BP 1 Location: Right arm, BP Patient Position: At rest)    Pulse (!) 108    Temp 98.1 °F (36.7 °C)    Resp 16    Ht 5' 2\" (1.575 m)    Wt 75.7 kg (166 lb 14.2 oz)    LMP 2018 (Exact Date)    SpO2 99%    BMI 30.52 kg/m2        Tmax:  Temp (24hrs), Av.3 °F (36.8 °C), Min:98.1 °F (36.7 °C), Max:98.8 °F (37.1 °C)      Exam:  General appearance: alert, no distress  Lungs: clear to auscultation bilaterally  Heart: regular rate and rhythm  Abdomen: soft, non-tender. Bowel sounds normal.   LLE: Foot and TMA are unremarkable. LLE swelling distal to the knee seems less prominent. IV Lines: Left IJ Asher    Labs:    Recent Labs      18   0330  18   0359  18   2332   WBC  3.8   --   6.6   HGB  6.5*   --   7.3*   PLT  131*   --   150   BUN  58*  56*  61*   CREA  3.26*  2.95*  3.27*   TBILI  0.2   --   0.3   SGOT  89*   --   19   AP  196*   --   176*     BLOOD CULTURES:    = GNRs in 2 of 2 bottles    = GPC in pairs in 1 of 2 bottles    Assessment:     1. Bacteremia -- GNRs in 2/2 bottles on  and GPC in pairs in 1 of 2 bottles on   -- ? Source: No evidence for  source. Occult abd source is a concern as well as line associated bacteremia     2. Hx osteomyelitis of the left foot: A potential source for the bacteremia but the exam is benign    3. Hx of DVT: She states that she has had bilateral DVT in the past. The doppler today showed changes suggesting chronic DVT of the left popliteal vein -- defer management to the Hospitalist team    4. NIDDM     5. CKD    6. Chronic pancreatitis by CT      7. HTN     8. PCOS     9. Seizure disorder     10.  Sickle cell disease     11. THU     12. GERD    Plan:     1. Continue Dapto and Zosyn -- still awaiting identification of the blood isolates    2. Would remove the Asher catheter    3.  Repeat blood cultures in AM      Renita Aparicio MD

## 2018-06-27 NOTE — PROGRESS NOTES
Nephrology Progress Note  Rosario Guan  Date of Admission : 6/23/2018    CC: Follow up for STONE on CKD       Assessment and Plan     STONE on CKD :  - Cr up slightly  - hold diuretics today and monitor  - likely just progression of her underlying CKD      CKD Stage IV :  - baseline Cr 2.6-2.8 mg/dl at best  - appears to be progressing  - previously on dialysis and recovered       AG acidosis:  - cont oral bicarb      Sickle cell crisis:  - transfused 1 unit PRBCs on 6/25     HTN:  - on max dose hydralazine  - add amlodipine today  - hold on RAAS blockade given her progressive CKD    Left Foot Osteo :   - on dapto  - CPK ok      IDDM:  - per hospitalist     Bacteremia:  - abx per ID  - ? Source, possibly foot  - on dapto and zosyn  - will need lines removed    LLE DVT:  - appears chronic  - no on AC at this time       Interval History:  Seen and examined. Feeling ok. Pain improving. LLE swelling stable, RLE w/o edema. No cp, sob reported. Current Medications: all current  Medications have been eviewed in EPIC  Review of Systems: Pertinent items are noted in HPI.     Objective:  Vitals:    Vitals:    06/27/18 0719 06/27/18 0812 06/27/18 0947 06/27/18 1117   BP: (!) 212/105 (!) 188/105 (!) 197/95 (!) 166/97   Pulse: 89 92 85 91   Resp:  11  11   Temp:    97.9 °F (36.6 °C)   SpO2:  99%  100%   Weight:       Height:         Intake and Output:          Physical Examination:  Pt intubated     No  General: NAD,Conversant   Neck:  Supple, no mass  Resp:  Lungs CTA B/L, no wheezing , normal respiratory effort  CV:  RRR,  no murmur or rub, 1 -2 + LE edema, L > R  GI:  Soft, NT, + Bowel sounds, no hepatosplenomegaly  Neurologic:  Non focal  Psych:             AAO x 3 appropriate affect   Skin:  No Rash  :  No escobedo    []    High complexity decision making was performed  []    Patient is at high-risk of decompensation with multiple organ involvement    Lab Data Personally Reviewed: I have reviewed all the pertinent labs, microbiology data and radiology studies during assessment. Recent Labs      06/27/18   0406  06/25/18   0330  06/24/18   1240   NA  139  138   --    K  5.3*  4.5   --    CL  109*  110*   --    CO2  19*  19*   --    GLU  239*  36*  25*   BUN  49*  58*   --    CREA  3.70*  3.26*   --    CA  7.1*  7.0*   --    MG   --   1.6   --    PHOS  5.8*  5.8*   --    ALB  2.6*  2.2*   --    SGOT   --   89*   --    ALT   --   96*   --      Recent Labs      06/27/18   0406  06/25/18   0330   WBC  3.2*  3.8   HGB  7.7*  6.5*   HCT  23.9*  20.8*   PLT  165  131*     Lab Results   Component Value Date/Time    Specimen Description: URINE 06/24/2013 08:00 PM    Specimen Description: URINE 06/17/2013 07:55 PM    Specimen Description: URINE 05/26/2013 10:10 AM     Lab Results   Component Value Date/Time    Culture result: (A) 06/24/2018 07:30 AM     ALPHA STREPTOCOCCUS, NOT S. PNEUMONIAE GROWING IN 1 OF 2 BOTTLES DRAWN . .(SITE= L CHEST PICC) SENSITIVITY TO FOLLOW    Culture result: (A) 06/24/2018 07:30 AM     PRELIMINARY REPORT OF GRAM POSITIVE COCCI IN PAIRS GROWING IN 1 OF 2 BOTTLES DRAWN . ..(SITE= L CHEST PICC) CALLED TO AND READ BACK BY LAURO ACOSTA ([place]) ON 6/25/18 AT 0633 BY VI    Culture result: REMAINING BOTTLE(S) HAS/HAVE NO GROWTH SO FAR 06/24/2018 07:30 AM     Recent Results (from the past 24 hour(s))   GLUCOSE, POC    Collection Time: 06/26/18  4:18 PM   Result Value Ref Range    Glucose (POC) 54 (L) 65 - 100 mg/dL    Performed by MASTER Montalvo    Collection Time: 06/26/18  4:19 PM   Result Value Ref Range    Glucose (POC) 55 (L) 65 - 100 mg/dL    Performed by Eladio Jerome POC    Collection Time: 06/26/18  4:36 PM   Result Value Ref Range    Glucose (POC) 69 65 - 100 mg/dL    Performed by David Chris    GLUCOSE, POC    Collection Time: 06/26/18  4:52 PM   Result Value Ref Range    Glucose (POC) 84 65 - 100 mg/dL    Performed by MASTER Montalvo Collection Time: 06/26/18  9:34 PM   Result Value Ref Range    Glucose (POC) 167 (H) 65 - 100 mg/dL    Performed by Unkown     RENAL FUNCTION PANEL    Collection Time: 06/27/18  4:06 AM   Result Value Ref Range    Sodium 139 136 - 145 mmol/L    Potassium 5.3 (H) 3.5 - 5.1 mmol/L    Chloride 109 (H) 97 - 108 mmol/L    CO2 19 (L) 21 - 32 mmol/L    Anion gap 11 5 - 15 mmol/L    Glucose 239 (H) 65 - 100 mg/dL    BUN 49 (H) 6 - 20 MG/DL    Creatinine 3.70 (H) 0.55 - 1.02 MG/DL    BUN/Creatinine ratio 13 12 - 20      GFR est AA 17 (L) >60 ml/min/1.73m2    GFR est non-AA 14 (L) >60 ml/min/1.73m2    Calcium 7.1 (L) 8.5 - 10.1 MG/DL    Phosphorus 5.8 (H) 2.6 - 4.7 MG/DL    Albumin 2.6 (L) 3.5 - 5.0 g/dL   CBC W/O DIFF    Collection Time: 06/27/18  4:06 AM   Result Value Ref Range    WBC 3.2 (L) 3.6 - 11.0 K/uL    RBC 2.99 (L) 3.80 - 5.20 M/uL    HGB 7.7 (L) 11.5 - 16.0 g/dL    HCT 23.9 (L) 35.0 - 47.0 %    MCV 79.9 (L) 80.0 - 99.0 FL    MCH 25.8 (L) 26.0 - 34.0 PG    MCHC 32.2 30.0 - 36.5 g/dL    RDW 16.3 (H) 11.5 - 14.5 %    PLATELET 173 927 - 335 K/uL    MPV 13.0 (H) 8.9 - 12.9 FL    NRBC 0.0 0  WBC    ABSOLUTE NRBC 0.00 0.00 - 0.01 K/uL   GLUCOSE, POC    Collection Time: 06/27/18  6:45 AM   Result Value Ref Range    Glucose (POC) 273 (H) 65 - 100 mg/dL    Performed by Denisse CLINTON    GLUCOSE, POC    Collection Time: 06/27/18 11:19 AM   Result Value Ref Range    Glucose (POC) 107 (H) 65 - 100 mg/dL    Performed by Ramos Landeros MD  [facility]   [address] [facility] [address]  Phone - [phone]   Fax - [phone]  [email or web address]

## 2018-06-27 NOTE — PROGRESS NOTES
0745: Bedside shift change report given to Zulema Chiang RN (oncoming nurse) by VERONICA Long (offgoing nurse). Report included the following information SBAR, Kardex, ED Summary, MAR, Accordion, Recent Results and Cardiac Rhythm NSR.     1600: Bedside shift change report given to Reyes Jacobson RN (oncoming nurse) by Zulema Chiang RN (offgoing nurse). Report included the following information SBAR, Kardex, ED Summary, MAR, Accordion, Recent Results and Cardiac Rhythm NSR.

## 2018-06-27 NOTE — PROGRESS NOTES
Problem: Sepsis: Day 3  Goal: Activity/Safety  Outcome: Progressing Towards Goal  Pt felt well enough to ambulate in the spencer today. She has been up in the chair for most of the morning. She is able to ambulate in her room well.

## 2018-06-27 NOTE — PROGRESS NOTES
Left lower extremity duplex shows evidence of chronic popliteal vein thrombosis. We will hold anticoagulation for now. AM team should consider consulting hematology for their opinion.

## 2018-06-28 LAB
ANION GAP SERPL CALC-SCNC: 12 MMOL/L (ref 5–15)
BACTERIA SPEC CULT: ABNORMAL
BACTERIA SPEC CULT: ABNORMAL
BASOPHILS # BLD: 0.1 K/UL (ref 0–0.1)
BASOPHILS NFR BLD: 3 % (ref 0–1)
BUN SERPL-MCNC: 49 MG/DL (ref 6–20)
BUN/CREAT SERPL: 14 (ref 12–20)
CALCIUM SERPL-MCNC: 7.3 MG/DL (ref 8.5–10.1)
CHLORIDE SERPL-SCNC: 107 MMOL/L (ref 97–108)
CO2 SERPL-SCNC: 21 MMOL/L (ref 21–32)
CREAT SERPL-MCNC: 3.6 MG/DL (ref 0.55–1.02)
DIFFERENTIAL METHOD BLD: ABNORMAL
EOSINOPHIL # BLD: 0.2 K/UL (ref 0–0.4)
EOSINOPHIL NFR BLD: 7 % (ref 0–7)
ERYTHROCYTE [DISTWIDTH] IN BLOOD BY AUTOMATED COUNT: 16.2 % (ref 11.5–14.5)
GLUCOSE BLD STRIP.AUTO-MCNC: 125 MG/DL (ref 65–100)
GLUCOSE BLD STRIP.AUTO-MCNC: 130 MG/DL (ref 65–100)
GLUCOSE BLD STRIP.AUTO-MCNC: 188 MG/DL (ref 65–100)
GLUCOSE BLD STRIP.AUTO-MCNC: 241 MG/DL (ref 65–100)
GLUCOSE SERPL-MCNC: 125 MG/DL (ref 65–100)
HCT VFR BLD AUTO: 25.2 % (ref 35–47)
HGB BLD-MCNC: 8 G/DL (ref 11.5–16)
IMM GRANULOCYTES # BLD: 0 K/UL
IMM GRANULOCYTES NFR BLD AUTO: 0 %
LYMPHOCYTES # BLD: 0.8 K/UL (ref 0.8–3.5)
LYMPHOCYTES NFR BLD: 24 % (ref 12–49)
MAGNESIUM SERPL-MCNC: 1.9 MG/DL (ref 1.6–2.4)
MCH RBC QN AUTO: 25.9 PG (ref 26–34)
MCHC RBC AUTO-ENTMCNC: 31.7 G/DL (ref 30–36.5)
MCV RBC AUTO: 81.6 FL (ref 80–99)
MONOCYTES # BLD: 0 K/UL (ref 0–1)
MONOCYTES NFR BLD: 1 % (ref 5–13)
NEUTS SEG # BLD: 2.2 K/UL (ref 1.8–8)
NEUTS SEG NFR BLD: 65 % (ref 32–75)
NRBC # BLD: 0 K/UL (ref 0–0.01)
NRBC BLD-RTO: 0 PER 100 WBC
PLATELET # BLD AUTO: 163 K/UL (ref 150–400)
PMV BLD AUTO: 11.2 FL (ref 8.9–12.9)
POTASSIUM SERPL-SCNC: 5.4 MMOL/L (ref 3.5–5.1)
RBC # BLD AUTO: 3.09 M/UL (ref 3.8–5.2)
RBC MORPH BLD: ABNORMAL
SERVICE CMNT-IMP: ABNORMAL
SODIUM SERPL-SCNC: 140 MMOL/L (ref 136–145)
WBC # BLD AUTO: 3.3 K/UL (ref 3.6–11)

## 2018-06-28 PROCEDURE — 80048 BASIC METABOLIC PNL TOTAL CA: CPT | Performed by: INTERNAL MEDICINE

## 2018-06-28 PROCEDURE — 36415 COLL VENOUS BLD VENIPUNCTURE: CPT | Performed by: INTERNAL MEDICINE

## 2018-06-28 PROCEDURE — 82962 GLUCOSE BLOOD TEST: CPT

## 2018-06-28 PROCEDURE — 74011250637 HC RX REV CODE- 250/637: Performed by: HOSPITALIST

## 2018-06-28 PROCEDURE — 74011250636 HC RX REV CODE- 250/636: Performed by: HOSPITALIST

## 2018-06-28 PROCEDURE — 74011636637 HC RX REV CODE- 636/637: Performed by: HOSPITALIST

## 2018-06-28 PROCEDURE — 85025 COMPLETE CBC W/AUTO DIFF WBC: CPT | Performed by: INTERNAL MEDICINE

## 2018-06-28 PROCEDURE — 83735 ASSAY OF MAGNESIUM: CPT | Performed by: INTERNAL MEDICINE

## 2018-06-28 PROCEDURE — 74011000250 HC RX REV CODE- 250: Performed by: INTERNAL MEDICINE

## 2018-06-28 PROCEDURE — 51798 US URINE CAPACITY MEASURE: CPT

## 2018-06-28 PROCEDURE — 65660000000 HC RM CCU STEPDOWN

## 2018-06-28 PROCEDURE — 74011250637 HC RX REV CODE- 250/637: Performed by: INTERNAL MEDICINE

## 2018-06-28 PROCEDURE — 74011250636 HC RX REV CODE- 250/636: Performed by: INTERNAL MEDICINE

## 2018-06-28 PROCEDURE — 74011000258 HC RX REV CODE- 258: Performed by: INTERNAL MEDICINE

## 2018-06-28 RX ORDER — BACITRACIN 500 UNIT/G
1 PACKET (EA) TOPICAL AS NEEDED
Status: DISCONTINUED | OUTPATIENT
Start: 2018-06-28 | End: 2018-07-04 | Stop reason: HOSPADM

## 2018-06-28 RX ORDER — SODIUM CHLORIDE 0.9 % (FLUSH) 0.9 %
5 SYRINGE (ML) INJECTION EVERY 8 HOURS
Status: DISCONTINUED | OUTPATIENT
Start: 2018-06-28 | End: 2018-07-04 | Stop reason: HOSPADM

## 2018-06-28 RX ORDER — SODIUM CHLORIDE 0.9 % (FLUSH) 0.9 %
5 SYRINGE (ML) INJECTION EVERY 24 HOURS
Status: DISCONTINUED | OUTPATIENT
Start: 2018-06-28 | End: 2018-07-04 | Stop reason: HOSPADM

## 2018-06-28 RX ORDER — SODIUM CHLORIDE 0.9 % (FLUSH) 0.9 %
5 SYRINGE (ML) INJECTION AS NEEDED
Status: DISCONTINUED | OUTPATIENT
Start: 2018-06-28 | End: 2018-07-04 | Stop reason: HOSPADM

## 2018-06-28 RX ADMIN — VENLAFAXINE 75 MG: 37.5 TABLET ORAL at 08:01

## 2018-06-28 RX ADMIN — HYDRALAZINE HYDROCHLORIDE 100 MG: 50 TABLET, FILM COATED ORAL at 19:02

## 2018-06-28 RX ADMIN — INSULIN LISPRO 2 UNITS: 100 INJECTION, SOLUTION INTRAVENOUS; SUBCUTANEOUS at 06:55

## 2018-06-28 RX ADMIN — HYDRALAZINE HYDROCHLORIDE 100 MG: 50 TABLET, FILM COATED ORAL at 08:00

## 2018-06-28 RX ADMIN — SENNOSIDES AND DOCUSATE SODIUM 2 TABLET: 8.6; 5 TABLET ORAL at 08:01

## 2018-06-28 RX ADMIN — CALCIUM CARBONATE (ANTACID) CHEW TAB 500 MG 200 MG: 500 CHEW TAB at 19:02

## 2018-06-28 RX ADMIN — SODIUM BICARBONATE 650 MG: 650 TABLET ORAL at 08:01

## 2018-06-28 RX ADMIN — Medication 10 ML: at 06:10

## 2018-06-28 RX ADMIN — INSULIN LISPRO 2 UNITS: 100 INJECTION, SOLUTION INTRAVENOUS; SUBCUTANEOUS at 22:47

## 2018-06-28 RX ADMIN — OXYCODONE HYDROCHLORIDE AND ACETAMINOPHEN 1 TABLET: 5; 325 TABLET ORAL at 08:02

## 2018-06-28 RX ADMIN — Medication 10 ML: at 11:44

## 2018-06-28 RX ADMIN — DIPHENHYDRAMINE HYDROCHLORIDE 25 MG: 25 CAPSULE ORAL at 08:00

## 2018-06-28 RX ADMIN — Medication 1000 MCG: at 08:01

## 2018-06-28 RX ADMIN — HYDROMORPHONE HYDROCHLORIDE 1 MG: 1 INJECTION, SOLUTION INTRAMUSCULAR; INTRAVENOUS; SUBCUTANEOUS at 01:49

## 2018-06-28 RX ADMIN — POLYETHYLENE GLYCOL 3350 17 G: 17 POWDER, FOR SOLUTION ORAL at 08:00

## 2018-06-28 RX ADMIN — HYDROMORPHONE HYDROCHLORIDE 1 MG: 1 INJECTION, SOLUTION INTRAMUSCULAR; INTRAVENOUS; SUBCUTANEOUS at 06:10

## 2018-06-28 RX ADMIN — AMLODIPINE BESYLATE 5 MG: 5 TABLET ORAL at 08:01

## 2018-06-28 RX ADMIN — OXYCODONE HYDROCHLORIDE AND ACETAMINOPHEN 1 TABLET: 5; 325 TABLET ORAL at 13:59

## 2018-06-28 RX ADMIN — DIPHENHYDRAMINE HYDROCHLORIDE 25 MG: 25 CAPSULE ORAL at 15:47

## 2018-06-28 RX ADMIN — QUETIAPINE FUMARATE 100 MG: 100 TABLET ORAL at 08:01

## 2018-06-28 RX ADMIN — SODIUM BICARBONATE 650 MG: 650 TABLET ORAL at 19:02

## 2018-06-28 RX ADMIN — VENLAFAXINE 75 MG: 37.5 TABLET ORAL at 19:07

## 2018-06-28 RX ADMIN — PIPERACILLIN SODIUM AND TAZOBACTAM SODIUM 3.38 G: 3; .375 INJECTION, POWDER, LYOPHILIZED, FOR SOLUTION INTRAVENOUS at 11:42

## 2018-06-28 RX ADMIN — HYDROMORPHONE HYDROCHLORIDE 1 MG: 1 INJECTION, SOLUTION INTRAMUSCULAR; INTRAVENOUS; SUBCUTANEOUS at 15:47

## 2018-06-28 RX ADMIN — QUETIAPINE FUMARATE 100 MG: 100 TABLET ORAL at 19:02

## 2018-06-28 RX ADMIN — HEPARIN SODIUM 5000 UNITS: 5000 INJECTION, SOLUTION INTRAVENOUS; SUBCUTANEOUS at 19:03

## 2018-06-28 RX ADMIN — Medication 10 ML: at 15:08

## 2018-06-28 RX ADMIN — CALCITRIOL 0.25 MCG: 0.25 CAPSULE, LIQUID FILLED ORAL at 08:01

## 2018-06-28 RX ADMIN — LABETALOL HYDROCHLORIDE 20 MG: 5 INJECTION, SOLUTION INTRAVENOUS at 14:33

## 2018-06-28 RX ADMIN — HYDRALAZINE HYDROCHLORIDE 100 MG: 50 TABLET, FILM COATED ORAL at 23:17

## 2018-06-28 RX ADMIN — HYDROMORPHONE HYDROCHLORIDE 1 MG: 1 INJECTION, SOLUTION INTRAMUSCULAR; INTRAVENOUS; SUBCUTANEOUS at 11:38

## 2018-06-28 RX ADMIN — CALCIUM CARBONATE (ANTACID) CHEW TAB 500 MG 200 MG: 500 CHEW TAB at 08:00

## 2018-06-28 RX ADMIN — LABETALOL HYDROCHLORIDE 20 MG: 5 INJECTION, SOLUTION INTRAVENOUS at 06:55

## 2018-06-28 RX ADMIN — CALCIUM CARBONATE (ANTACID) CHEW TAB 500 MG 200 MG: 500 CHEW TAB at 23:17

## 2018-06-28 RX ADMIN — OXYCODONE HYDROCHLORIDE AND ACETAMINOPHEN 1 TABLET: 5; 325 TABLET ORAL at 22:47

## 2018-06-28 RX ADMIN — HEPARIN SODIUM 5000 UNITS: 5000 INJECTION, SOLUTION INTRAVENOUS; SUBCUTANEOUS at 08:10

## 2018-06-28 RX ADMIN — HEPARIN SODIUM 5000 UNITS: 5000 INJECTION, SOLUTION INTRAVENOUS; SUBCUTANEOUS at 01:49

## 2018-06-28 NOTE — PROGRESS NOTES
Hospital follow-up PCP transitional care appointment has been scheduled with Dr. Vik Reynolds for Thursday, 7/5/18 at 1:30 p.m. Pending patient discharge.   Jodie Murguia, Care Management Specialist.

## 2018-06-28 NOTE — PROGRESS NOTES
Nephrology Progress Note  Octavia Parks  Date of Admission : 6/23/2018    CC: Follow up for STONE on CKD       Assessment and Plan     STONE on CKD :  - Cr up to 3.7 yesterday  - likely just progression of her underlying CKD  - bladder scan today  - will f/u repeat labs      CKD Stage IV :  - baseline Cr 2.6-2.8 mg/dl at best  - appears to be progressing  - previously on dialysis and recovered       AG acidosis:  - cont oral bicarb      Sickle cell crisis:  - transfused 1 unit PRBCs on 6/25     HTN:  - on max dose hydralazine  - add amlodipine today  - hold on RAAS blockade given her progressive CKD    Left Foot Osteo :   - on dapto  - CPK ok      IDDM:  - per hospitalist     Bacteremia:  - abx per ID  - ? Source, possibly foot  - on dapto and zosyn    LLE DVT:  - appears chronic  - no on AC at this time       Interval History:  Seen and examined. Appears to be doing well. No cp, sob, n/v/d reported today. Labs pending. Current Medications: all current  Medications have been eviewed in EPIC  Review of Systems: Pertinent items are noted in HPI.     Objective:  Vitals:    Vitals:    06/28/18 0331 06/28/18 0648 06/28/18 0716 06/28/18 0800   BP: (!) 178/95 (!) 208/106 (!) 183/101 158/89   Pulse: (!) 106 (!) 102 86 85   Resp: 16 14     Temp: 98.5 °F (36.9 °C) 98 °F (36.7 °C)     SpO2: 96% 96%     Weight: 72.6 kg (160 lb 0.9 oz)      Height:         Intake and Output:          Physical Examination:  Pt intubated     No  General: NAD,Conversant   Neck:  Supple, no mass  Resp:  Lungs CTA B/L, no wheezing , normal respiratory effort  CV:  RRR,  no murmur or rub, 1 -2 + LE edema, L > R  GI:  Soft, NT, + Bowel sounds, no hepatosplenomegaly  Neurologic:  Non focal  Psych:             AAO x 3 appropriate affect   Skin:  No Rash  :  No escobedo    []    High complexity decision making was performed  []    Patient is at high-risk of decompensation with multiple organ involvement    Lab Data Personally Reviewed: I have reviewed all the pertinent labs, microbiology data and radiology studies during assessment. Recent Labs      06/27/18   0406   NA  139   K  5.3*   CL  109*   CO2  19*   GLU  239*   BUN  49*   CREA  3.70*   CA  7.1*   PHOS  5.8*   ALB  2.6*     Recent Labs      06/27/18   0406   WBC  3.2*   HGB  7.7*   HCT  23.9*   PLT  165     Lab Results   Component Value Date/Time    Specimen Description: URINE 06/24/2013 08:00 PM    Specimen Description: URINE 06/17/2013 07:55 PM    Specimen Description: URINE 05/26/2013 10:10 AM     Lab Results   Component Value Date/Time    Culture result: NO GROWTH 1 DAY 06/27/2018 04:06 AM    Culture result: (A) 06/24/2018 07:30 AM     ALPHA STREPTOCOCCUS, NOT S. PNEUMONIAE GROWING IN 1 OF 2 BOTTLES DRAWN . .(SITE= L CHEST PICC) (SENSITIVITIES PERFORMED BY E-TEST)    Culture result: (A) 06/24/2018 07:30 AM     PRELIMINARY REPORT OF GRAM POSITIVE COCCI IN PAIRS GROWING IN 1 OF 2 BOTTLES DRAWN . ..(SITE= L CHEST PICC) CALLED TO AND READ BACK BY LAURO ACOSTA ([place]) ON 6/25/18 AT 0633 BY VI    Culture result: REMAINING BOTTLE(S) HAS/HAVE NO GROWTH SO FAR 06/24/2018 07:30 AM     Recent Results (from the past 24 hour(s))   GLUCOSE, POC    Collection Time: 06/27/18 11:19 AM   Result Value Ref Range    Glucose (POC) 107 (H) 65 - 100 mg/dL    Performed by Khadijah Robles    GLUCOSE, POC    Collection Time: 06/27/18  4:54 PM   Result Value Ref Range    Glucose (POC) 221 (H) 65 - 100 mg/dL    Performed by Valentino Keepers    GLUCOSE, POC    Collection Time: 06/27/18  9:26 PM   Result Value Ref Range    Glucose (POC) 179 (H) 65 - 100 mg/dL    Performed by Taiwo Clifford    GLUCOSE, POC    Collection Time: 06/28/18  6:14 AM   Result Value Ref Range    Glucose (POC) 188 (H) 65 - 100 mg/dL    Performed by Unkown                 Joanne Gallagher MD  [address]  Phone - [phone]   Fax - [phone]  [email or web address]

## 2018-06-28 NOTE — PROGRESS NOTES
Infectious Diseases Progress Note    Antibiotic Summary:  Daptomycin Begun pta  Zosyn   -- present      Subjective:     Pain from the SS crisis continues to keily. No new symptoms. No N, V, D.    Objective:     Vitals:   Visit Vitals    /72 (BP 1 Location: Right arm, BP Patient Position: Sitting)    Pulse 98    Temp 98 °F (36.7 °C)    Resp 18    Ht 5' 2\" (1.575 m)    Wt 74.6 kg (164 lb 7.4 oz)    LMP 2018 (Exact Date)    SpO2 99%    BMI 30.08 kg/m2        Tmax:  Temp (24hrs), Av.1 °F (36.7 °C), Min:97.9 °F (36.6 °C), Max:98.4 °F (36.9 °C)      Exam:  General appearance: alert, no distress  Lungs: clear to auscultation bilaterally  Heart: regular rate and rhythm  Abdomen: soft, non-tender. Bowel sounds normal.   LLE: Foot and TMA are unremarkable. LLE swelling distal to the knee is less prominent. IV Lines: Left IJ Asher    Labs:    Recent Labs      18   0406  18   0330   WBC  3.2*  3.8   HGB  7.7*  6.5*   PLT  165  131*   BUN  49*  58*   CREA  3.70*  3.26*   TBILI   --   0.2   SGOT   --   89*   AP   --   196*     BLOOD CULTURES:    = GNRs in 2 of 2 bottles (organism still not yet identified)    = alpha Streptococcus (PCN resistant) in 1 of 2 bottles    = pending    Assessment:     1. Bacteremia -- GNRs in 2/2 bottles on  and PCN resistant alpha Streptococcus in 1 of 2 bottles on   -- ? Source: The GNR has not yet been identified. The significance of alpha Strept in 1 bottle only cannot be determined. No evidence for  source. Occult abd source is a concern as well as line associated bacteremia     2. Hx osteomyelitis of the left foot: A potential source for the bacteremia but the exam is benign    3. Hx of DVT: She states that she has had bilateral DVT in the past. The doppler today showed changes suggesting chronic DVT of the left popliteal vein -- defer management to the Hospitalist team    4. NIDDM     5. CKD    6. Chronic pancreatitis by CT      7. HTN     8. PCOS     9. Seizure disorder     10. Sickle cell disease     11. THU     12. GERD    Plan:     1. Continue Dapto and Zosyn -- still awaiting identification of the GNR in the blood culture    2. Would remove the Asher catheter    3.  Repeat blood cultures [facility] President., MD

## 2018-06-28 NOTE — PROGRESS NOTES
Hospitalist Progress Note  Jose Tompkins MD  Answering service: [phone] or [phone] from in house phone        Date of Service:  2018  NAME:  Tez Gill  :  1980  MRN:  [id number]      Admission Summary:   41 y/o AA female with PMH of sickle-cell disease, type 1 DM, CKD stage 4, L foot osteomyelitis s/p partial amputation currently on IV daptomycin therapy admitted on 18 for diffuse body aches and fever with suspicion of acute sickle cell crisis. She was recently discharged from here for DKA (hospital course complicated by respiratory failure, angioedema of unclear etiology, sepsis and STONE    Interval history / Subjective:   Pt seen and examined. Pt sitting on chair at bedside. Pt states her pain is better controlled today. No new complaints. Assessment & Plan:     1. Acute sickle-cell crisis: (POA)  -Continue supportive care with pain control. Already stopped IVF due to fluid overload.  -Hb 8.0 today. Continue to monitor H/H. No need to transfuse now. 2. Suspected sepsis: (POA)  -Source is unclear but ddx includes Lt foot osteomyelitis (last MRI showed findings concerning for osteo), or PICC line infection. -GNRs in 2/2 bottles on  and GPC in pairs in 1 of 2 bottles on     -Continue empiric abx w/ IV Zosyn and daptomycin  - ID follow up appreciated. Asher catheter has been removed. -Repeat blood cultures from 18 is negative so far. -Will need a definitive antibiotic choice and duration of treatment from ID especially now that pt has no IV access. 3. Type I DM w/ hypoglycemia: (POA)  -BS stable at this time.   -Continue NPH and SSI.      4. Metabolic acidosis:  -Likely due to CKD 4. Continue NaHCO3 tabs.     5. HTN: uncontrolled  -BP still high but better today. Continue current bp meds.     6. CKD stage 4:   -Renal fxn is relatively unchanged today. This is likely progressive CKD. -Nephrology follow up appreciated. Will monitor closely.  -Pt is getting close to needing HD. 7. Lt LE DVT:   -Pt has a previous history for DVT which was treated for 1 year per pt and she just finished treatment recently. -Duplex suggest chronic DVT. -Will hold off on treatment for now      Code status:Full  DVT prophylaxis: SCD    Care Plan discussed with: Patient/Family and Nurse  Disposition: TBD     Hospital Problems  Date Reviewed: 6/10/2018          Codes Class Noted POA    Charcot ankle, left ICD-10-CM: M14.672  ICD-9-CM: 094.0, 713.5  6/25/2018 Unknown        * (Principal)Sickle cell crisis ([facility] [address] ICD-10-CM: D57.00  ICD-9-CM: 282.62  6/24/2018 Unknown        Osteomyelitis of left foot ([facility] [address] ICD-10-CM: M86.9  ICD-9-CM: 730.27  4/27/2018 Yes        Metabolic encephalopathy [facility]: G93.41  ICD-9-CM: 348.31  3/9/2016 Yes        CKD (chronic kidney disease) ICD-10-CM: N18.9  ICD-9-CM: 585.9  9/19/2012 Yes    Overview Addendum 1/17/2013  6:44 PM by Daniel Barrientos; Required HD in past with acute exacerbation, AV graft R thigh  Supposed to make appt:   Amber Palacios MD  [facility]   [address] [place] [address]   [phone]              Diabetes mellitus type I [facility]) ICD-10-CM: E10.9  ICD-9-CM: 250.01  3/29/2012 Yes    Overview Addendum 2/12/2013  7:43 AM by Ag pt appt:  May 8, 2013 with dr Juanita Bueno  dx'd age 14; + neuropathy, nephropathy  On ssi, regular  Diabetes health maintenance:  Last A1C: 11.7 1/2013 10.7 3/2012  Last eye exam 2012, had cataract R eye, needs L eye done, Dr Juwan Galeana (OD) [phone], another doc did surgery: Dr Ryann Valle MD same practice [phone]  Last microalbumin:  n/a Last creatinine: 1.65 1/2013; 1.9 9/17/12  Last microfilament exam/Last podiatry: 9/20/12 intact, nails thickened  Last lipids: 1/201  trig 173, HDL 70 .4 w/o statin; no record in former PCP notes why it was d/c'd   ACE/ARB: no due to CKD                 HTN (hypertension) (Chronic) ICD-10-CM: I10  ICD-9-CM: 401.9  10/14/2011 Yes                Review of Systems:   A comprehensive review of systems was negative. Vital Signs:    Last 24hrs VS reviewed since prior progress note. Most recent are:  Visit Vitals    /74 (BP 1 Location: Left arm, BP Patient Position: At rest)    Pulse 100    Temp 98.8 °F (37.1 °C)    Resp 15    Ht 5' 2\" (1.575 m)    Wt 72.6 kg (160 lb 0.9 oz)    SpO2 96%    BMI 29.27 kg/m2       No intake or output data in the 24 hours ending 06/28/18 1851     Physical Examination:             Constitutional:  No acute distress   ENT:  Oral mucous moist   Resp:  CTA bilaterally. CV:  Regular rhythm, normal rate, tachy    GI:  Soft, non distended, non tender. bs+    Musculoskeletal:  2 + edema, warm, 2+ pulses throughout    Neurologic:  Moves all extremities. AAOx3, CN II-XII reviewed     Psych:  Good insight, Not anxious nor agitated.        Data Review:    I personally reviewed  Image and labs      Labs:     Recent Labs      06/28/18   1154  06/27/18   0406   WBC  3.3*  3.2*   HGB  8.0*  7.7*   HCT  25.2*  23.9*   PLT  163  165     Recent Labs      06/28/18   1154  06/27/18   0406   NA  140  139   K  5.4*  5.3*   CL  107  109*   CO2  21  19*   BUN  49*  49*   CREA  3.60*  3.70*   GLU  125*  239*   CA  7.3*  7.1*   MG  1.9   --    PHOS   --   5.8*     Recent Labs      06/27/18   0406   ALB  2.6*         Lab Results   Component Value Date/Time    Folate 33.8 (H) 05/07/2018 11:20 AM              Lab Results   Component Value Date/Time    Cholesterol, total 128 06/10/2018 07:44 AM    HDL Cholesterol 51 06/10/2018 07:44 AM    LDL, calculated 67.4 06/10/2018 07:44 AM    Triglyceride 48 06/10/2018 07:44 AM    CHOL/HDL Ratio 2.5 06/10/2018 07:44 AM     Lab Results   Component Value Date/Time    Glucose (POC) 125 (H) 06/28/2018 04:52 PM    Glucose (POC) 130 (H) 06/28/2018 11:56 AM    Glucose (POC) 188 (H) 06/28/2018 06:14 AM    Glucose (POC) 179 (H) 06/27/2018 09:26 PM    Glucose (POC) 221 (H) 06/27/2018 04:54 PM     Lab Results   Component Value Date/Time    Color YELLOW/STRAW 06/23/2018 11:32 PM    Appearance CLEAR 06/23/2018 11:32 PM    Specific gravity 1.010 06/23/2018 11:32 PM    Specific gravity 1.015 07/26/2010 11:18 AM    pH (UA) 6.0 06/23/2018 11:32 PM    Protein 100 (A) 06/23/2018 11:32 PM    Glucose NEGATIVE  06/23/2018 11:32 PM    Ketone NEGATIVE  06/23/2018 11:32 PM    Bilirubin NEGATIVE  06/23/2018 11:32 PM    Urobilinogen 0.2 06/23/2018 11:32 PM    Nitrites NEGATIVE  06/23/2018 11:32 PM    Leukocyte Esterase NEGATIVE  06/23/2018 11:32 PM    Epithelial cells FEW 06/23/2018 11:32 PM    Bacteria NEGATIVE  06/23/2018 11:32 PM    WBC 0-4 06/23/2018 11:32 PM    RBC 0-5 06/23/2018 11:32 PM         Medications Reviewed:     Current Facility-Administered Medications   Medication Dose Route Frequency    bacitracin 500 unit/gram packet 1 Packet  1 Packet Topical PRN    labetalol (NORMODYNE;TRANDATE) injection 20 mg  20 mg IntraVENous Q6H PRN    piperacillin-tazobactam (ZOSYN) 3.375 g in 0.9% sodium chloride (MBP/ADV) 100 mL  3.375 g IntraVENous Q12H    amLODIPine (NORVASC) tablet 5 mg  5 mg Oral DAILY    white petrolatum-mineral oil (EUCERIN) cream   Topical PRN    HYDROmorphone (DILAUDID) syringe 1 mg  1 mg IntraVENous Q4H PRN    0.9% sodium chloride infusion 250 mL  250 mL IntraVENous PRN    prochlorperazine (COMPAZINE) with saline injection 5 mg  5 mg IntraVENous Q6H PRN    venlafaxine (EFFEXOR) tablet 75 mg  75 mg Oral BID WITH MEALS    senna-docusate (PERICOLACE) 8.6-50 mg per tablet 2 Tab  2 Tab Oral DAILY    QUEtiapine (SEROquel) tablet 100 mg  100 mg Oral BID    oxyCODONE-acetaminophen (PERCOCET) 5-325 mg per tablet 1 Tab  1 Tab Oral Q6H PRN    cyanocobalamin (VITAMIN B12) tablet 1,000 mcg  1,000 mcg Oral DAILY    calcium carbonate (TUMS) chewable tablet 200 mg [elemental]  200 mg Oral TID    albuterol (PROVENTIL VENTOLIN) nebulizer solution 2.5 mg  2.5 mg Nebulization Q4H PRN    sodium chloride (NS) flush 5-10 mL  5-10 mL IntraVENous Q8H    sodium chloride (NS) flush 5-10 mL  5-10 mL IntraVENous PRN    heparin (porcine) injection 5,000 Units  5,000 Units SubCUTAneous Q8H    acetaminophen (TYLENOL) tablet 500 mg  500 mg Oral Q4H PRN    glucose chewable tablet 16 g  4 Tab Oral PRN    dextrose (D50W) injection syrg 12.5-25 g  12.5-25 g IntraVENous PRN    glucagon (GLUCAGEN) injection 1 mg  1 mg IntraMUSCular PRN    diphenhydrAMINE (BENADRYL) capsule 25 mg  25 mg Oral Q6H PRN    DAPTOmycin (CUBICIN) 400 mg in 0.9% sodium chloride 50 mL IVPB RF formulation  400 mg IntraVENous Q48H    insulin lispro (HUMALOG) injection   SubCUTAneous AC&HS    hydrALAZINE (APRESOLINE) tablet 100 mg  100 mg Oral TID    sodium chloride (NS) flush 20 mL  20 mL InterCATHeter PRN    sodium chloride (NS) flush 10 mL  10 mL InterCATHeter Q24H    sodium chloride (NS) flush 10 mL  10 mL InterCATHeter PRN    sodium chloride (NS) flush 10 mL  10 mL InterCATHeter Q8H    alteplase (CATHFLO) 1 mg in sterile water (preservative free) 1 mL injection  1 mg InterCATHeter PRN    polyethylene glycol (MIRALAX) packet 17 g  17 g Oral DAILY    sodium bicarbonate tablet 650 mg  650 mg Oral BID    calcitRIOL (ROCALTROL) capsule 0.25 mcg  0.25 mcg Oral DAILY    promethazine (PHENERGAN) tablet 25 mg  25 mg Oral Q8H PRN    sodium chloride (NS) flush 5-10 mL  5-10 mL IntraVENous PRN    sodium chloride (NS) flush 5-10 mL  5-10 mL IntraVENous PRN     ______________________________________________________________________  EXPECTED LENGTH OF STAY: 4d 21h  ACTUAL LENGTH OF STAY:          4                 Jose Tompkins MD
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
## 2018-06-28 NOTE — PROGRESS NOTES
06/28/18 0331   Vitals   Temp 98.5 °F (36.9 °C)   Temp Source Oral   Pulse (Heart Rate) (!) 106   Heart Rate Source Monitor   Resp Rate 16   O2 Sat (%) 96 %   Level of Consciousness Alert   BP (!) 178/95   MAP (Calculated) 123   BP 1 Location Right arm   BP 1 Method Automatic   BP Patient Position At rest   MEWS Score 2   Height/Weight   Weight 72.6 kg (160 lb 0.9 oz)  (rn april notified)   Weight Source Standing scale (comment)   BMI (calculated) 29.3   4lb weight loss. Will notify oncoming nurse.

## 2018-06-28 NOTE — PROGRESS NOTES
Problem: Sepsis: Day 2  Goal: Activity/Safety  Outcome: Progressing Towards Goal  Discuss with patient activity safety and use of o2 while doing activity. Bedside shift change report given to RN (oncoming nurse) by VERONICA Long (offgoing nurse). Report included the following information SBAR.

## 2018-06-29 LAB
ANION GAP SERPL CALC-SCNC: 8 MMOL/L (ref 5–15)
BACTERIA SPEC CULT: ABNORMAL
BASOPHILS # BLD: 0 K/UL (ref 0–0.1)
BASOPHILS NFR BLD: 0 % (ref 0–1)
BUN SERPL-MCNC: 45 MG/DL (ref 6–20)
BUN/CREAT SERPL: 12 (ref 12–20)
CALCIUM SERPL-MCNC: 7.5 MG/DL (ref 8.5–10.1)
CHLORIDE SERPL-SCNC: 110 MMOL/L (ref 97–108)
CO2 SERPL-SCNC: 23 MMOL/L (ref 21–32)
CREAT SERPL-MCNC: 3.61 MG/DL (ref 0.55–1.02)
DIFFERENTIAL METHOD BLD: ABNORMAL
EOSINOPHIL # BLD: 0.2 K/UL (ref 0–0.4)
EOSINOPHIL NFR BLD: 5 % (ref 0–7)
ERYTHROCYTE [DISTWIDTH] IN BLOOD BY AUTOMATED COUNT: 16.2 % (ref 11.5–14.5)
GLUCOSE BLD STRIP.AUTO-MCNC: 107 MG/DL (ref 65–100)
GLUCOSE BLD STRIP.AUTO-MCNC: 284 MG/DL (ref 65–100)
GLUCOSE BLD STRIP.AUTO-MCNC: 303 MG/DL (ref 65–100)
GLUCOSE BLD STRIP.AUTO-MCNC: 92 MG/DL (ref 65–100)
GLUCOSE SERPL-MCNC: 78 MG/DL (ref 65–100)
HCT VFR BLD AUTO: 25.3 % (ref 35–47)
HGB BLD-MCNC: 8 G/DL (ref 11.5–16)
IMM GRANULOCYTES # BLD: 0 K/UL (ref 0–0.04)
IMM GRANULOCYTES NFR BLD AUTO: 0 % (ref 0–0.5)
LYMPHOCYTES # BLD: 0.9 K/UL (ref 0.8–3.5)
LYMPHOCYTES NFR BLD: 21 % (ref 12–49)
MCH RBC QN AUTO: 25.5 PG (ref 26–34)
MCHC RBC AUTO-ENTMCNC: 31.6 G/DL (ref 30–36.5)
MCV RBC AUTO: 80.6 FL (ref 80–99)
MONOCYTES # BLD: 0.4 K/UL (ref 0–1)
MONOCYTES NFR BLD: 8 % (ref 5–13)
NEUTS SEG # BLD: 3 K/UL (ref 1.8–8)
NEUTS SEG NFR BLD: 66 % (ref 32–75)
NRBC # BLD: 0 K/UL (ref 0–0.01)
NRBC BLD-RTO: 0 PER 100 WBC
PLATELET # BLD AUTO: 184 K/UL (ref 150–400)
PMV BLD AUTO: 11.4 FL (ref 8.9–12.9)
POTASSIUM SERPL-SCNC: 5.3 MMOL/L (ref 3.5–5.1)
RBC # BLD AUTO: 3.14 M/UL (ref 3.8–5.2)
SERVICE CMNT-IMP: ABNORMAL
SERVICE CMNT-IMP: NORMAL
SODIUM SERPL-SCNC: 141 MMOL/L (ref 136–145)
WBC # BLD AUTO: 4.5 K/UL (ref 3.6–11)

## 2018-06-29 PROCEDURE — 74011000258 HC RX REV CODE- 258: Performed by: HOSPITALIST

## 2018-06-29 PROCEDURE — 74011000258 HC RX REV CODE- 258: Performed by: INTERNAL MEDICINE

## 2018-06-29 PROCEDURE — 65660000000 HC RM CCU STEPDOWN

## 2018-06-29 PROCEDURE — 74011000250 HC RX REV CODE- 250: Performed by: INTERNAL MEDICINE

## 2018-06-29 PROCEDURE — 74011250636 HC RX REV CODE- 250/636: Performed by: HOSPITALIST

## 2018-06-29 PROCEDURE — 06HN33Z INSERTION OF INFUSION DEVICE INTO LEFT FEMORAL VEIN, PERCUTANEOUS APPROACH: ICD-10-PCS | Performed by: ANESTHESIOLOGY

## 2018-06-29 PROCEDURE — 85025 COMPLETE CBC W/AUTO DIFF WBC: CPT | Performed by: INTERNAL MEDICINE

## 2018-06-29 PROCEDURE — 36415 COLL VENOUS BLD VENIPUNCTURE: CPT | Performed by: INTERNAL MEDICINE

## 2018-06-29 PROCEDURE — 80048 BASIC METABOLIC PNL TOTAL CA: CPT | Performed by: INTERNAL MEDICINE

## 2018-06-29 PROCEDURE — 82962 GLUCOSE BLOOD TEST: CPT

## 2018-06-29 PROCEDURE — 74011250637 HC RX REV CODE- 250/637: Performed by: HOSPITALIST

## 2018-06-29 PROCEDURE — 74011250637 HC RX REV CODE- 250/637: Performed by: INTERNAL MEDICINE

## 2018-06-29 PROCEDURE — 76010000093 HC SPECIAL PROCEDURE

## 2018-06-29 PROCEDURE — 74011636637 HC RX REV CODE- 636/637: Performed by: HOSPITALIST

## 2018-06-29 PROCEDURE — 74011250636 HC RX REV CODE- 250/636: Performed by: INTERNAL MEDICINE

## 2018-06-29 RX ORDER — AMLODIPINE BESYLATE 5 MG/1
10 TABLET ORAL DAILY
Status: DISCONTINUED | OUTPATIENT
Start: 2018-06-30 | End: 2018-07-04 | Stop reason: HOSPADM

## 2018-06-29 RX ORDER — CLONIDINE HYDROCHLORIDE 0.1 MG/1
0.1 TABLET ORAL 2 TIMES DAILY
Status: DISCONTINUED | OUTPATIENT
Start: 2018-06-29 | End: 2018-07-04 | Stop reason: HOSPADM

## 2018-06-29 RX ADMIN — Medication 1000 MCG: at 08:49

## 2018-06-29 RX ADMIN — Medication 5 ML: at 22:00

## 2018-06-29 RX ADMIN — AMLODIPINE BESYLATE 5 MG: 5 TABLET ORAL at 08:49

## 2018-06-29 RX ADMIN — SODIUM BICARBONATE 650 MG: 650 TABLET ORAL at 08:50

## 2018-06-29 RX ADMIN — Medication 10 ML: at 22:00

## 2018-06-29 RX ADMIN — Medication 5 ML: at 06:00

## 2018-06-29 RX ADMIN — HYDRALAZINE HYDROCHLORIDE 100 MG: 50 TABLET, FILM COATED ORAL at 17:26

## 2018-06-29 RX ADMIN — DIPHENHYDRAMINE HYDROCHLORIDE 25 MG: 25 CAPSULE ORAL at 01:51

## 2018-06-29 RX ADMIN — HEPARIN SODIUM 5000 UNITS: 5000 INJECTION, SOLUTION INTRAVENOUS; SUBCUTANEOUS at 08:52

## 2018-06-29 RX ADMIN — DIPHENHYDRAMINE HYDROCHLORIDE 25 MG: 25 CAPSULE ORAL at 21:35

## 2018-06-29 RX ADMIN — SENNOSIDES AND DOCUSATE SODIUM 2 TABLET: 8.6; 5 TABLET ORAL at 08:49

## 2018-06-29 RX ADMIN — Medication 10 ML: at 06:00

## 2018-06-29 RX ADMIN — HYDROMORPHONE HYDROCHLORIDE 1 MG: 1 INJECTION, SOLUTION INTRAMUSCULAR; INTRAVENOUS; SUBCUTANEOUS at 15:17

## 2018-06-29 RX ADMIN — QUETIAPINE FUMARATE 100 MG: 100 TABLET ORAL at 08:49

## 2018-06-29 RX ADMIN — HEPARIN SODIUM 5000 UNITS: 5000 INJECTION, SOLUTION INTRAVENOUS; SUBCUTANEOUS at 00:00

## 2018-06-29 RX ADMIN — Medication 10 ML: at 15:16

## 2018-06-29 RX ADMIN — SODIUM BICARBONATE 650 MG: 650 TABLET ORAL at 17:25

## 2018-06-29 RX ADMIN — CALCIUM CARBONATE (ANTACID) CHEW TAB 500 MG 200 MG: 500 CHEW TAB at 08:48

## 2018-06-29 RX ADMIN — VENLAFAXINE 75 MG: 37.5 TABLET ORAL at 17:26

## 2018-06-29 RX ADMIN — HYDROMORPHONE HYDROCHLORIDE 1 MG: 1 INJECTION, SOLUTION INTRAMUSCULAR; INTRAVENOUS; SUBCUTANEOUS at 01:50

## 2018-06-29 RX ADMIN — HYDROMORPHONE HYDROCHLORIDE 1 MG: 1 INJECTION, SOLUTION INTRAMUSCULAR; INTRAVENOUS; SUBCUTANEOUS at 11:05

## 2018-06-29 RX ADMIN — CALCIUM CARBONATE (ANTACID) CHEW TAB 500 MG 200 MG: 500 CHEW TAB at 17:25

## 2018-06-29 RX ADMIN — CALCITRIOL 0.25 MCG: 0.25 CAPSULE, LIQUID FILLED ORAL at 08:48

## 2018-06-29 RX ADMIN — OXYCODONE HYDROCHLORIDE AND ACETAMINOPHEN 1 TABLET: 5; 325 TABLET ORAL at 05:58

## 2018-06-29 RX ADMIN — HYDRALAZINE HYDROCHLORIDE 100 MG: 50 TABLET, FILM COATED ORAL at 08:49

## 2018-06-29 RX ADMIN — HYDROMORPHONE HYDROCHLORIDE 1 MG: 1 INJECTION, SOLUTION INTRAMUSCULAR; INTRAVENOUS; SUBCUTANEOUS at 05:58

## 2018-06-29 RX ADMIN — HEPARIN SODIUM 5000 UNITS: 5000 INJECTION, SOLUTION INTRAVENOUS; SUBCUTANEOUS at 17:26

## 2018-06-29 RX ADMIN — Medication 10 ML: at 11:10

## 2018-06-29 RX ADMIN — DAPTOMYCIN 400 MG: 500 INJECTION, POWDER, LYOPHILIZED, FOR SOLUTION INTRAVENOUS at 17:44

## 2018-06-29 RX ADMIN — HYDRALAZINE HYDROCHLORIDE 100 MG: 50 TABLET, FILM COATED ORAL at 21:35

## 2018-06-29 RX ADMIN — LABETALOL HYDROCHLORIDE 20 MG: 5 INJECTION, SOLUTION INTRAVENOUS at 13:47

## 2018-06-29 RX ADMIN — OXYCODONE HYDROCHLORIDE AND ACETAMINOPHEN 1 TABLET: 5; 325 TABLET ORAL at 17:25

## 2018-06-29 RX ADMIN — CALCIUM CARBONATE (ANTACID) CHEW TAB 500 MG 200 MG: 500 CHEW TAB at 21:25

## 2018-06-29 RX ADMIN — HYDROMORPHONE HYDROCHLORIDE 1 MG: 1 INJECTION, SOLUTION INTRAMUSCULAR; INTRAVENOUS; SUBCUTANEOUS at 21:25

## 2018-06-29 RX ADMIN — INSULIN LISPRO 7 UNITS: 100 INJECTION, SOLUTION INTRAVENOUS; SUBCUTANEOUS at 07:19

## 2018-06-29 RX ADMIN — PIPERACILLIN SODIUM AND TAZOBACTAM SODIUM 3.38 G: 3; .375 INJECTION, POWDER, LYOPHILIZED, FOR SOLUTION INTRAVENOUS at 11:05

## 2018-06-29 RX ADMIN — VENLAFAXINE 75 MG: 37.5 TABLET ORAL at 08:49

## 2018-06-29 RX ADMIN — QUETIAPINE FUMARATE 100 MG: 100 TABLET ORAL at 17:25

## 2018-06-29 RX ADMIN — POLYETHYLENE GLYCOL 3350 17 G: 17 POWDER, FOR SOLUTION ORAL at 08:48

## 2018-06-29 RX ADMIN — DIPHENHYDRAMINE HYDROCHLORIDE 25 MG: 25 CAPSULE ORAL at 11:09

## 2018-06-29 RX ADMIN — INSULIN LISPRO 5 UNITS: 100 INJECTION, SOLUTION INTRAVENOUS; SUBCUTANEOUS at 17:26

## 2018-06-29 RX ADMIN — Medication 5 ML: at 21:00

## 2018-06-29 NOTE — PROGRESS NOTES
Patient without IV access at shift change. Previous RN verbalized Hospitalist okay with patient getting Midline and would place order. Order not placed and Hospitalist no longer able to be reached. Previous RN in [employer] as of right now with PICC team to try to attempt to get IV access for patient who is on IV antibiotics, Iv hypertensive medications and IV pain medications. 2048-PICC team verbalized they will be coming later to place IV access for this patient. RN informed PICC team of patient's need for IV HTN medication at this time, they will come as soon as able. 2155- PICC team RN at bedside says patient has 1 viable option on right side for midline but if midline is placed this may compromise future plan for long term PICC. Patient has NO options for a peripheral IV of any size even while using ultrasound per PICC Team RN. PICC RN would like EJ placed until a plan is in place that clearly states if pt will need future PICC. 2159- ANESTHESIA PAGED to ask for placement of EJ. Awaiting call back. [address] [place] [address] Melinda DICKEY returned call, spoke to this author and PICC team RN. MD says he is on call for central line placements only, RN informed MD of issues with patient having very few viable options for line and EJ would be preferred to preserve possible future need for PICC. MD says he would like hospitalist paged and asked for an order for central line, he will bring pt to OR and if unable to get EJ then he will place a central line. Hospitalist paged and informed of patient's history and PICC team recommendation. Also informed of Nephrologist being okay with midline. Hospitalist verbalized RN may place order for Central line via anesthesiologist if EJ unable to be placed. 0- RN spoke to Anesthesiologist clarisa- Orders are in chart and MD said patient will be sent for when ready.

## 2018-06-29 NOTE — PROGRESS NOTES
Hospitalist Progress Note  Jose Tompkins MD  Answering service: [phone] OR [phone] from in house phone        Date of Service:  2018  NAME:  Mike Vang  :  1980  MRN:  [id number]      Admission Summary:   41 y/o AA female with PMH of sickle-cell disease, type 1 DM, CKD stage 4, L foot osteomyelitis s/p partial amputation currently on IV daptomycin therapy admitted on 18 for diffuse body aches and fever with suspicion of acute sickle cell crisis. She was recently discharged from here for DKA (hospital course complicated by respiratory failure, angioedema of unclear etiology, sepsis and STONE    Interval history / Subjective:   Pt seen and examined. Pt sitting on chair at bedside. Pt states her pain is better controlled today. No new complaints. Assessment & Plan:     1. Acute sickle-cell crisis: (POA)  -Continue supportive care with pain control. Already stopped IVF due to fluid overload.  -Hb is stable at 8.0 today. Continue to monitor H/H. No need to transfuse at this time. 2. Suspected sepsis: (POA)  -Source is unclear but ddx includes Lt foot osteomyelitis (last MRI showed findings concerning for osteo), or PICC line infection. -GNRs in 2/2 bottles on  and GPC in pairs in 1 of 2 bottles on     -Continue empiric abx w/ IV Zosyn and daptomycin  - ID follow up appreciated. Asher catheter has been removed. -Repeat blood cultures from 18 is negative so far. -Spoke to ID. Will need to clarify how much longer she needs to be on Daptomycin. [id number] Geovanna Miranda with with to po Cipro for bacteremia. 3. Type I DM w/ hypoglycemia: (POA)  -BS stable at this time.   -Continue NPH and SSI.      4. Metabolic acidosis:  -Likely due to CKD 4. Continue NaHCO3 tabs.     5. HTN: uncontrolled  -BP still uncontrolled. Will increase Norvasc to 10 mg po daily and add Clonidine po.      6.  CKD stage 4:   -Renal fxn is relatively unchanged today. This is likely progressive CKD. -Nephrology follow up appreciated. Will monitor closely.  -Pt is getting close to needing HD. 7. Lt LE DVT:   -Pt has a previous history for DVT which was treated for 1 year per pt and she just finished treatment recently. -Duplex suggest chronic DVT. -Will hold off on treatment for now      Code status:Full  DVT prophylaxis: SCD    Care Plan discussed with: Patient/Family and Nurse  Disposition: D     Hospital Problems  Date Reviewed: 6/10/2018          Codes Class Noted POA    Charcot ankle, left ICD-10-CM: M14.672  ICD-9-CM: 094.0, 713.5  6/25/2018 Unknown        * (Principal)Sickle cell crisis ([facility] [address] ICD-10-CM: D57.00  ICD-9-CM: 282.62  6/24/2018 Unknown        Osteomyelitis of left foot ([facility] [address] ICD-10-CM: M86.9  ICD-9-CM: 730.27  4/27/2018 Yes        Metabolic encephalopathy BEO-94-HERNANDEZ: G93.41  ICD-9-CM: 348.31  3/9/2016 Yes        CKD (chronic kidney disease) ICD-10-CM: N18.9  ICD-9-CM: 585.9  9/19/2012 Yes    Overview Addendum 1/17/2013  6:44 PM by Hernandez Flynn; Required HD in past with acute exacerbation, AV graft R thigh  Supposed to make appt:   Rudy Abernathy MD  [facility]   [address] [place] [address]   [phone]              Diabetes mellitus type I [facility]) ICD-10-CM: E10.9  ICD-9-CM: 250.01  3/29/2012 Yes    Overview Addendum 2/12/2013  7:43 AM by Ag pt appt:  May 8, 2013 with dr Gardenia Chang  dx'd age 14; + neuropathy, nephropathy  On ssi, regular  Diabetes health maintenance:  Last A1C: 11.7 1/2013 10.7 3/2012  Last eye exam 2012, had cataract R eye, needs L eye done, Dr Avila Alvarez [phone], another doc did surgery: Dr Fredrick West MD same practice [phone]  Last microalbumin:  n/a Last creatinine: 1.65 1/2013; 1.9 9/17/12  Last microfilament exam/Last podiatry: 9/20/12 intact, nails thickened  Last lipids: 1/201  trig 173, HDL 70 .4 w/o statin; no record in former PCP notes why it was d/c'd   ACE/ARB: no due to CKD                 HTN (hypertension) (Chronic) ICD-10-CM: I10  ICD-9-CM: 401.9  10/14/2011 Yes                Review of Systems:   A comprehensive review of systems was negative. Vital Signs:    Last 24hrs VS reviewed since prior progress note. Most recent are:  Visit Vitals    BP (!) 189/92    Pulse 96    Temp 98 °F (36.7 °C)    Resp 17    Ht 5' 2\" (1.575 m)    Wt 69.4 kg (153 lb)    SpO2 98%    BMI 27.98 kg/m2       No intake or output data in the 24 hours ending 06/29/18 1736     Physical Examination:             Constitutional:  No acute distress   ENT:  Oral mucous moist   Resp:  CTA bilaterally. CV:  Regular rhythm, normal rate, tachy    GI:  Soft, non distended, non tender. bs+    Musculoskeletal:  2 + edema, warm, 2+ pulses throughout    Neurologic:  Moves all extremities. AAOx3, CN II-XII reviewed     Psych:  Good insight, Not anxious nor agitated.        Data Review:    I personally reviewed  Image and labs      Labs:     Recent Labs      06/29/18   1119  06/28/18   1154   WBC  4.5  3.3*   HGB  8.0*  8.0*   HCT  25.3*  25.2*   PLT  184  163     Recent Labs      06/29/18   1119  06/28/18   1154  06/27/18   0406   NA  141  140  139   K  5.3*  5.4*  5.3*   CL  110*  107  109*   CO2  23  21  19*   BUN  45*  49*  49*   CREA  3.61*  3.60*  3.70*   GLU  78  125*  239*   CA  7.5*  7.3*  7.1*   MG   --   1.9   --    PHOS   --    --   5.8*     Recent Labs      06/27/18   0406   ALB  2.6*         Lab Results   Component Value Date/Time    Folate 33.8 (H) 05/07/2018 11:20 AM              Lab Results   Component Value Date/Time    Cholesterol, total 128 06/10/2018 07:44 AM    HDL Cholesterol 51 06/10/2018 07:44 AM    LDL, calculated 67.4 06/10/2018 07:44 AM    Triglyceride 48 06/10/2018 07:44 AM    CHOL/HDL Ratio 2.5 06/10/2018 07:44 AM     Lab Results   Component Value Date/Time    Glucose (POC) 284 (H) 06/29/2018 04:29 PM    Glucose (POC) 92 06/29/2018 11:40 AM    Glucose (POC) 303 (H) 06/29/2018 06:54 AM    Glucose (POC) 241 (H) 06/28/2018 09:36 PM    Glucose (POC) 125 (H) 06/28/2018 04:52 PM     Lab Results   Component Value Date/Time    Color YELLOW/STRAW 06/23/2018 11:32 PM    Appearance CLEAR 06/23/2018 11:32 PM    Specific gravity 1.010 06/23/2018 11:32 PM    Specific gravity 1.015 07/26/2010 11:18 AM    pH (UA) 6.0 06/23/2018 11:32 PM    Protein 100 (A) 06/23/2018 11:32 PM    Glucose NEGATIVE  06/23/2018 11:32 PM    Ketone NEGATIVE  06/23/2018 11:32 PM    Bilirubin NEGATIVE  06/23/2018 11:32 PM    Urobilinogen 0.2 06/23/2018 11:32 PM    Nitrites NEGATIVE  06/23/2018 11:32 PM    Leukocyte Esterase NEGATIVE  06/23/2018 11:32 PM    Epithelial cells FEW 06/23/2018 11:32 PM    Bacteria NEGATIVE  06/23/2018 11:32 PM    WBC 0-4 06/23/2018 11:32 PM    RBC 0-5 06/23/2018 11:32 PM         Medications Reviewed:     Current Facility-Administered Medications   Medication Dose Route Frequency    bacitracin 500 unit/gram packet 1 Packet  1 Packet Topical PRN    sodium chloride (NS) flush 5 mL  5 mL InterCATHeter Q24H    sodium chloride (NS) flush 5 mL  5 mL InterCATHeter PRN    sodium chloride (NS) flush 5 mL  5 mL InterCATHeter Q8H    labetalol (NORMODYNE;TRANDATE) injection 20 mg  20 mg IntraVENous Q6H PRN    piperacillin-tazobactam (ZOSYN) 3.375 g in 0.9% sodium chloride (MBP/ADV) 100 mL  3.375 g IntraVENous Q12H    amLODIPine (NORVASC) tablet 5 mg  5 mg Oral DAILY    white petrolatum-mineral oil (EUCERIN) cream   Topical PRN    HYDROmorphone (DILAUDID) syringe 1 mg  1 mg IntraVENous Q4H PRN    0.9% sodium chloride infusion 250 mL  250 mL IntraVENous PRN    prochlorperazine (COMPAZINE) with saline injection 5 mg  5 mg IntraVENous Q6H PRN    venlafaxine (EFFEXOR) tablet 75 mg  75 mg Oral BID WITH MEALS    senna-docusate (PERICOLACE) 8.6-50 mg per tablet 2 Tab  2 Tab Oral DAILY    QUEtiapine (SEROquel) tablet 100 mg  100 mg Oral BID    oxyCODONE-acetaminophen (PERCOCET) 5-325 mg per tablet 1 Tab  1 Tab Oral Q6H PRN    cyanocobalamin (VITAMIN B12) tablet 1,000 mcg  1,000 mcg Oral DAILY    calcium carbonate (TUMS) chewable tablet 200 mg [elemental]  200 mg Oral TID    albuterol (PROVENTIL VENTOLIN) nebulizer solution 2.5 mg  2.5 mg Nebulization Q4H PRN    sodium chloride (NS) flush 5-10 mL  5-10 mL IntraVENous Q8H    sodium chloride (NS) flush 5-10 mL  5-10 mL IntraVENous PRN    heparin (porcine) injection 5,000 Units  5,000 Units SubCUTAneous Q8H    acetaminophen (TYLENOL) tablet 500 mg  500 mg Oral Q4H PRN    glucose chewable tablet 16 g  4 Tab Oral PRN    dextrose (D50W) injection syrg 12.5-25 g  12.5-25 g IntraVENous PRN    glucagon (GLUCAGEN) injection 1 mg  1 mg IntraMUSCular PRN    diphenhydrAMINE (BENADRYL) capsule 25 mg  25 mg Oral Q6H PRN    DAPTOmycin (CUBICIN) 400 mg in 0.9% sodium chloride 50 mL IVPB RF formulation  400 mg IntraVENous Q48H    insulin lispro (HUMALOG) injection   SubCUTAneous AC&HS    hydrALAZINE (APRESOLINE) tablet 100 mg  100 mg Oral TID    sodium chloride (NS) flush 20 mL  20 mL InterCATHeter PRN    sodium chloride (NS) flush 10 mL  10 mL InterCATHeter Q24H    sodium chloride (NS) flush 10 mL  10 mL InterCATHeter PRN    sodium chloride (NS) flush 10 mL  10 mL InterCATHeter Q8H    alteplase (CATHFLO) 1 mg in sterile water (preservative free) 1 mL injection  1 mg InterCATHeter PRN    polyethylene glycol (MIRALAX) packet 17 g  17 g Oral DAILY    sodium bicarbonate tablet 650 mg  650 mg Oral BID    calcitRIOL (ROCALTROL) capsule 0.25 mcg  0.25 mcg Oral DAILY    promethazine (PHENERGAN) tablet 25 mg  25 mg Oral Q8H PRN    sodium chloride (NS) flush 5-10 mL  5-10 mL IntraVENous PRN    sodium chloride (NS) flush 5-10 mL  5-10 mL IntraVENous PRN     ______________________________________________________________________  EXPECTED LENGTH OF STAY: 4d 21h  ACTUAL LENGTH OF STAY:          5                 Jose Tompkins MD

## 2018-06-29 NOTE — PROGRESS NOTES
Problem: Falls - Risk of  Goal: *Absence of Falls  Document Blake Fall Risk and appropriate interventions in the flowsheet.    Outcome: Progressing Towards Goal  Fall Risk Interventions:  Mobility Interventions: Patient to call before getting OOB, Communicate number of staff needed for ambulation/transfer         Medication Interventions: Teach patient to arise slowly, Patient to call before getting OOB, Evaluate medications/consider consulting pharmacy    Elimination Interventions: Call light in reach, Patient to call for help with toileting needs    History of Falls Interventions: Door open when patient unattended

## 2018-06-29 NOTE — PROGRESS NOTES
0800: Bedside shift change report given to Kip Arredondo RN (oncoming nurse) by Shital Amor RN (offgoing nurse). Report included the following information SBAR, Kardex, MAR, Accordion, Recent Results and Cardiac Rhythm Sinus Bradycardia. 1156: Paged Dr. Finn Jones to inquire about plan to transition pt to PO ABX. Per Dr. Finn Jones, there is no current plan to transition pt to PO ABX and IV ABX will be continued. Will pass information along to attending, Leda. 1745: Informed MD Leda of pt's uncontrolled BP despite tx with scheduled hydralazine 100mg TID and amlodipine 5mg daily as well as Q6hr PRN Labetolol. Orders received for Clonidine 0.1mg BID and amlodipine dose change to 10mg daily. Pt currently eating dinner. Will continue to monitor. 2000: Bedside shift change report given to Subha Brock RN (oncoming nurse) by Kip Arredondo RN (offgoing nurse). Report included the following information SBAR, Kardex, MAR, Accordion, Recent Results and Cardiac Rhythm NSR-Sinus tachycardia.

## 2018-06-29 NOTE — PROGRESS NOTES
Spiritual Care Partner Volunteer visited patient in Rm 425 on 6/29/18. Documented by:   Chaplain Lopez MDiv, MACE  [phone] [phone]

## 2018-06-29 NOTE — PROGRESS NOTES
Central Line Procedure Note    Indication: Inadequate venous access    Risks, benefits, and alternatives explained and patient agrees to proceed. Patient positioned in Trendelenburg position. 7-Step Sterility Protocol followed. (cap, mask, sterile gown, sterile gloves, large sterile sheet, hand hygiene, 2% chlorhexidine for cutaneous antisepsis)  Local with 5 mL 1% Lidocaine injected at insertion site. Left femoral cannulated x 2 attempt(s) utilizing sterile Seldinger technique. Venous cannulation confirmed with column drop test.    Catheter secured & Biopatch applied. Sterile Tegaderm placed. CXR pending.

## 2018-06-29 NOTE — PROGRESS NOTES
Nephrology Progress Note  Artemio Nowak  Date of Admission : 6/23/2018    CC: Follow up for STONE on CKD       Assessment and Plan     STONE on CKD :  - Cr stable at 3.6  - likely just progression of her underlying CKD  - no changes at this time  - daily labs while here      CKD Stage IV :  - baseline Cr 2.6-2.8 mg/dl at best  - appears to be progressing  - previously on dialysis and recovered       AG acidosis:  - cont oral bicarb      Sickle cell crisis:  - transfused 1 unit PRBCs on 6/25     HTN:  - on max dose hydralazine  - add amlodipine today  - hold on RAAS blockade given her progressive CKD    Left Foot Osteo :   - on dapto  - CPK ok      IDDM:  - per hospitalist     Klebsiella and strep bacteremia  - Asher removed  - repeat cultures from 6/27 neg  - hopefully can be swtiched to oral abx    LLE DVT:  - appears chronic  - no on AC at this time       Interval History:  Seen and examined. Appears to be doing well. No cp, sob, n/v/d reported today. Labs from yesterday showing stable Cr. No IV access for abx at this time. Current Medications: all current  Medications have been eviewed in EPIC  Review of Systems: Pertinent items are noted in HPI.     Objective:  Vitals:    Vitals:    06/29/18 0115 06/29/18 0400 06/29/18 0758 06/29/18 0848   BP: (!) 190/97 168/74 (!) 225/112 (!) 210/124   Pulse: (!) 112 (!) 107 (!) 101 (!) 106   Resp: 10 16 18    Temp:  98.7 °F (37.1 °C) 98.2 °F (36.8 °C)    SpO2: 96% 96% 96%    Weight:  69.4 kg (153 lb)     Height:         Intake and Output:          Physical Examination:  Pt intubated     No  General: NAD,Conversant   Neck:  Supple, no mass  Resp:  Lungs CTA B/L, no wheezing , normal respiratory effort  CV:  RRR,  no murmur or rub, 1 -2 + LE edema, L > R  GI:  Soft, NT, + Bowel sounds, no hepatosplenomegaly  Neurologic:  Non focal  Psych:             AAO x 3 appropriate affect   Skin:  No Rash  :  No escobedo    []    High complexity decision making was performed  [] Patient is at high-risk of decompensation with multiple organ involvement    Lab Data Personally Reviewed: I have reviewed all the pertinent labs, microbiology data and radiology studies during assessment. Recent Labs      06/28/18   1154  06/27/18   0406   NA  140  139   K  5.4*  5.3*   CL  107  109*   CO2  21  19*   GLU  125*  239*   BUN  49*  49*   CREA  3.60*  3.70*   CA  7.3*  7.1*   MG  1.9   --    PHOS   --   5.8*   ALB   --   2.6*     Recent Labs      06/28/18   1154  06/27/18   0406   WBC  3.3*  3.2*   HGB  8.0*  7.7*   HCT  25.2*  23.9*   PLT  163  165     Lab Results   Component Value Date/Time    Specimen Description: URINE 06/24/2013 08:00 PM    Specimen Description: URINE 06/17/2013 07:55 PM    Specimen Description: URINE 05/26/2013 10:10 AM     Lab Results   Component Value Date/Time    Culture result: NO GROWTH 2 DAYS 06/27/2018 04:06 AM    Culture result: (A) 06/24/2018 07:30 AM     ALPHA STREPTOCOCCUS, NOT S. PNEUMONIAE GROWING IN 1 OF 2 BOTTLES DRAWN . .(SITE= L CHEST PICC) (SENSITIVITIES PERFORMED BY E-TEST)    Culture result: (A) 06/24/2018 07:30 AM     PRELIMINARY REPORT OF GRAM POSITIVE COCCI IN PAIRS GROWING IN 1 OF 2 BOTTLES DRAWN . ..(SITE= L CHEST PICC) CALLED TO AND READ BACK BY LAURO ACOSTA ([place]) ON 6/25/18 AT 0633 BY VI    Culture result: REMAINING BOTTLE(S) HAS/HAVE NO GROWTH SO FAR 06/24/2018 07:30 AM     Recent Results (from the past 24 hour(s))   CBC WITH AUTOMATED DIFF    Collection Time: 06/28/18 11:54 AM   Result Value Ref Range    WBC 3.3 (L) 3.6 - 11.0 K/uL    RBC 3.09 (L) 3.80 - 5.20 M/uL    HGB 8.0 (L) 11.5 - 16.0 g/dL    HCT 25.2 (L) 35.0 - 47.0 %    MCV 81.6 80.0 - 99.0 FL    MCH 25.9 (L) 26.0 - 34.0 PG    MCHC 31.7 30.0 - 36.5 g/dL    RDW 16.2 (H) 11.5 - 14.5 %    PLATELET 815 666 - 724 K/uL    MPV 11.2 8.9 - 12.9 FL    NRBC 0.0 0  WBC    ABSOLUTE NRBC 0.00 0.00 - 0.01 K/uL    NEUTROPHILS 65 32 - 75 %    LYMPHOCYTES 24 12 - 49 %    MONOCYTES 1 (L) 5 - 13 % EOSINOPHILS 7 0 - 7 %    BASOPHILS 3 (H) 0 - 1 %    IMMATURE GRANULOCYTES 0 %    ABS. NEUTROPHILS 2.2 1.8 - 8.0 K/UL    ABS. LYMPHOCYTES 0.8 0.8 - 3.5 K/UL    ABS. MONOCYTES 0.0 0.0 - 1.0 K/UL    ABS. EOSINOPHILS 0.2 0.0 - 0.4 K/UL    ABS. BASOPHILS 0.1 0.0 - 0.1 K/UL    ABS. IMM. GRANS. 0.0 K/UL    DF MANUAL      RBC COMMENTS ANISOCYTOSIS  1+       METABOLIC PANEL, BASIC    Collection Time: 06/28/18 11:54 AM   Result Value Ref Range    Sodium 140 136 - 145 mmol/L    Potassium 5.4 (H) 3.5 - 5.1 mmol/L    Chloride 107 97 - 108 mmol/L    CO2 21 21 - 32 mmol/L    Anion gap 12 5 - 15 mmol/L    Glucose 125 (H) 65 - 100 mg/dL    BUN 49 (H) 6 - 20 MG/DL    Creatinine 3.60 (H) 0.55 - 1.02 MG/DL    BUN/Creatinine ratio 14 12 - 20      GFR est AA 17 (L) >60 ml/min/1.73m2    GFR est non-AA 14 (L) >60 ml/min/1.73m2    Calcium 7.3 (L) 8.5 - 10.1 MG/DL   MAGNESIUM    Collection Time: 06/28/18 11:54 AM   Result Value Ref Range    Magnesium 1.9 1.6 - 2.4 mg/dL   GLUCOSE, POC    Collection Time: 06/28/18 11:56 AM   Result Value Ref Range    Glucose (POC) 130 (H) 65 - 100 mg/dL    Performed by Daisy Jesus, POC    Collection Time: 06/28/18  4:52 PM   Result Value Ref Range    Glucose (POC) 125 (H) 65 - 100 mg/dL    Performed by Skip Kamara, POC    Collection Time: 06/28/18  9:36 PM   Result Value Ref Range    Glucose (POC) 241 (H) 65 - 100 mg/dL    Performed by Lulu Boone    GLUCOSE, POC    Collection Time: 06/29/18  6:54 AM   Result Value Ref Range    Glucose (POC) 303 (H) 65 - 100 mg/dL    Performed by Marci aJmes MD  [address]  Phone - [phone]   Fax - [phone]  [email or web address]

## 2018-06-29 NOTE — PROGRESS NOTES
0730: Bedside shift change report given to Reyes Singh RN (oncoming nurse) by VERONICA Long (offgoing nurse). Report included the following information SBAR, Kardex, MAR, Accordion, Recent Results and Cardiac Rhythm NSR.     1530: MD Leda notified during rounds of pt's potassium of 5.4 and continued increased BP. No new orders received. Pt stable. Will continue to monitor. 2000: Bedside shift change report given to Dilip Reddy RN (oncoming nurse) by Reyes Singh RN (offgoing nurse). Report included the following information SBAR, Kardex, ED Summary, Intake/Output, MAR, Accordion, Recent Results and Cardiac Rhythm NSR.

## 2018-06-30 LAB
ALBUMIN SERPL-MCNC: 2.8 G/DL (ref 3.5–5)
ANION GAP SERPL CALC-SCNC: 8 MMOL/L (ref 5–15)
BASOPHILS # BLD: 0 K/UL (ref 0–0.1)
BASOPHILS NFR BLD: 1 % (ref 0–1)
BUN SERPL-MCNC: 46 MG/DL (ref 6–20)
BUN/CREAT SERPL: 12 (ref 12–20)
CALCIUM SERPL-MCNC: 7.8 MG/DL (ref 8.5–10.1)
CHLORIDE SERPL-SCNC: 108 MMOL/L (ref 97–108)
CO2 SERPL-SCNC: 23 MMOL/L (ref 21–32)
CREAT SERPL-MCNC: 3.75 MG/DL (ref 0.55–1.02)
DIFFERENTIAL METHOD BLD: ABNORMAL
EOSINOPHIL # BLD: 0.2 K/UL (ref 0–0.4)
EOSINOPHIL NFR BLD: 5 % (ref 0–7)
ERYTHROCYTE [DISTWIDTH] IN BLOOD BY AUTOMATED COUNT: 16.2 % (ref 11.5–14.5)
GLUCOSE BLD STRIP.AUTO-MCNC: 155 MG/DL (ref 65–100)
GLUCOSE BLD STRIP.AUTO-MCNC: 190 MG/DL (ref 65–100)
GLUCOSE BLD STRIP.AUTO-MCNC: 287 MG/DL (ref 65–100)
GLUCOSE BLD STRIP.AUTO-MCNC: 66 MG/DL (ref 65–100)
GLUCOSE BLD STRIP.AUTO-MCNC: 84 MG/DL (ref 65–100)
GLUCOSE SERPL-MCNC: 238 MG/DL (ref 65–100)
HCT VFR BLD AUTO: 26.1 % (ref 35–47)
HGB BLD-MCNC: 8.3 G/DL (ref 11.5–16)
IMM GRANULOCYTES # BLD: 0 K/UL (ref 0–0.04)
IMM GRANULOCYTES NFR BLD AUTO: 0 % (ref 0–0.5)
LYMPHOCYTES # BLD: 1.1 K/UL (ref 0.8–3.5)
LYMPHOCYTES NFR BLD: 26 % (ref 12–49)
MCH RBC QN AUTO: 25.9 PG (ref 26–34)
MCHC RBC AUTO-ENTMCNC: 31.8 G/DL (ref 30–36.5)
MCV RBC AUTO: 81.6 FL (ref 80–99)
MONOCYTES # BLD: 0.3 K/UL (ref 0–1)
MONOCYTES NFR BLD: 8 % (ref 5–13)
NEUTS SEG # BLD: 2.4 K/UL (ref 1.8–8)
NEUTS SEG NFR BLD: 60 % (ref 32–75)
NRBC # BLD: 0 K/UL (ref 0–0.01)
NRBC BLD-RTO: 0 PER 100 WBC
PHOSPHATE SERPL-MCNC: 4.8 MG/DL (ref 2.6–4.7)
PLATELET # BLD AUTO: 228 K/UL (ref 150–400)
PMV BLD AUTO: 12.6 FL (ref 8.9–12.9)
POTASSIUM SERPL-SCNC: 5.6 MMOL/L (ref 3.5–5.1)
RBC # BLD AUTO: 3.2 M/UL (ref 3.8–5.2)
SERVICE CMNT-IMP: ABNORMAL
SERVICE CMNT-IMP: NORMAL
SERVICE CMNT-IMP: NORMAL
SODIUM SERPL-SCNC: 139 MMOL/L (ref 136–145)
WBC # BLD AUTO: 4 K/UL (ref 3.6–11)

## 2018-06-30 PROCEDURE — 36415 COLL VENOUS BLD VENIPUNCTURE: CPT | Performed by: INTERNAL MEDICINE

## 2018-06-30 PROCEDURE — 74011250636 HC RX REV CODE- 250/636: Performed by: INTERNAL MEDICINE

## 2018-06-30 PROCEDURE — 65660000000 HC RM CCU STEPDOWN

## 2018-06-30 PROCEDURE — 74011250637 HC RX REV CODE- 250/637: Performed by: HOSPITALIST

## 2018-06-30 PROCEDURE — 85025 COMPLETE CBC W/AUTO DIFF WBC: CPT | Performed by: INTERNAL MEDICINE

## 2018-06-30 PROCEDURE — 74011000250 HC RX REV CODE- 250: Performed by: INTERNAL MEDICINE

## 2018-06-30 PROCEDURE — 74011636637 HC RX REV CODE- 636/637: Performed by: HOSPITALIST

## 2018-06-30 PROCEDURE — 74011250636 HC RX REV CODE- 250/636: Performed by: HOSPITALIST

## 2018-06-30 PROCEDURE — 74011250637 HC RX REV CODE- 250/637: Performed by: INTERNAL MEDICINE

## 2018-06-30 PROCEDURE — 82962 GLUCOSE BLOOD TEST: CPT

## 2018-06-30 PROCEDURE — 74011000258 HC RX REV CODE- 258: Performed by: INTERNAL MEDICINE

## 2018-06-30 PROCEDURE — 80069 RENAL FUNCTION PANEL: CPT | Performed by: INTERNAL MEDICINE

## 2018-06-30 RX ORDER — SODIUM POLYSTYRENE SULFONATE 15 G/60ML
30 SUSPENSION ORAL; RECTAL
Status: DISCONTINUED | OUTPATIENT
Start: 2018-06-30 | End: 2018-06-30

## 2018-06-30 RX ADMIN — Medication 10 ML: at 22:00

## 2018-06-30 RX ADMIN — Medication 5 ML: at 06:00

## 2018-06-30 RX ADMIN — LABETALOL HYDROCHLORIDE 20 MG: 5 INJECTION, SOLUTION INTRAVENOUS at 08:02

## 2018-06-30 RX ADMIN — DIPHENHYDRAMINE HYDROCHLORIDE 25 MG: 25 CAPSULE ORAL at 06:08

## 2018-06-30 RX ADMIN — OXYCODONE HYDROCHLORIDE AND ACETAMINOPHEN 1 TABLET: 5; 325 TABLET ORAL at 17:31

## 2018-06-30 RX ADMIN — Medication 10 ML: at 14:00

## 2018-06-30 RX ADMIN — HYDRALAZINE HYDROCHLORIDE 100 MG: 50 TABLET, FILM COATED ORAL at 17:26

## 2018-06-30 RX ADMIN — OXYCODONE HYDROCHLORIDE AND ACETAMINOPHEN 1 TABLET: 5; 325 TABLET ORAL at 00:42

## 2018-06-30 RX ADMIN — CLONIDINE HYDROCHLORIDE 0.1 MG: 0.1 TABLET ORAL at 17:27

## 2018-06-30 RX ADMIN — CALCITRIOL 0.25 MCG: 0.25 CAPSULE, LIQUID FILLED ORAL at 09:05

## 2018-06-30 RX ADMIN — HEPARIN SODIUM 5000 UNITS: 5000 INJECTION, SOLUTION INTRAVENOUS; SUBCUTANEOUS at 17:29

## 2018-06-30 RX ADMIN — CALCIUM CARBONATE (ANTACID) CHEW TAB 500 MG 200 MG: 500 CHEW TAB at 17:27

## 2018-06-30 RX ADMIN — HEPARIN SODIUM 5000 UNITS: 5000 INJECTION, SOLUTION INTRAVENOUS; SUBCUTANEOUS at 09:07

## 2018-06-30 RX ADMIN — HYDROMORPHONE HYDROCHLORIDE 1 MG: 1 INJECTION, SOLUTION INTRAMUSCULAR; INTRAVENOUS; SUBCUTANEOUS at 06:08

## 2018-06-30 RX ADMIN — VENLAFAXINE 75 MG: 37.5 TABLET ORAL at 09:06

## 2018-06-30 RX ADMIN — HYDRALAZINE HYDROCHLORIDE 100 MG: 50 TABLET, FILM COATED ORAL at 21:44

## 2018-06-30 RX ADMIN — HYDROMORPHONE HYDROCHLORIDE 1 MG: 1 INJECTION, SOLUTION INTRAMUSCULAR; INTRAVENOUS; SUBCUTANEOUS at 21:44

## 2018-06-30 RX ADMIN — Medication 5 ML: at 14:00

## 2018-06-30 RX ADMIN — DIPHENHYDRAMINE HYDROCHLORIDE 25 MG: 25 CAPSULE ORAL at 21:44

## 2018-06-30 RX ADMIN — HYDROMORPHONE HYDROCHLORIDE 1 MG: 1 INJECTION, SOLUTION INTRAMUSCULAR; INTRAVENOUS; SUBCUTANEOUS at 15:24

## 2018-06-30 RX ADMIN — INSULIN LISPRO 2 UNITS: 100 INJECTION, SOLUTION INTRAVENOUS; SUBCUTANEOUS at 17:28

## 2018-06-30 RX ADMIN — CALCIUM CARBONATE (ANTACID) CHEW TAB 500 MG 200 MG: 500 CHEW TAB at 21:44

## 2018-06-30 RX ADMIN — PIPERACILLIN SODIUM AND TAZOBACTAM SODIUM 3.38 G: 3; .375 INJECTION, POWDER, LYOPHILIZED, FOR SOLUTION INTRAVENOUS at 11:22

## 2018-06-30 RX ADMIN — Medication 10 ML: at 15:25

## 2018-06-30 RX ADMIN — HEPARIN SODIUM 5000 UNITS: 5000 INJECTION, SOLUTION INTRAVENOUS; SUBCUTANEOUS at 23:20

## 2018-06-30 RX ADMIN — HYDRALAZINE HYDROCHLORIDE 100 MG: 50 TABLET, FILM COATED ORAL at 09:05

## 2018-06-30 RX ADMIN — SODIUM BICARBONATE 650 MG: 650 TABLET ORAL at 17:27

## 2018-06-30 RX ADMIN — SENNOSIDES AND DOCUSATE SODIUM 2 TABLET: 8.6; 5 TABLET ORAL at 09:05

## 2018-06-30 RX ADMIN — CALCIUM CARBONATE (ANTACID) CHEW TAB 500 MG 200 MG: 500 CHEW TAB at 09:06

## 2018-06-30 RX ADMIN — AMLODIPINE BESYLATE 10 MG: 5 TABLET ORAL at 09:05

## 2018-06-30 RX ADMIN — POLYETHYLENE GLYCOL 3350 17 G: 17 POWDER, FOR SOLUTION ORAL at 09:07

## 2018-06-30 RX ADMIN — HEPARIN SODIUM 5000 UNITS: 5000 INJECTION, SOLUTION INTRAVENOUS; SUBCUTANEOUS at 00:39

## 2018-06-30 RX ADMIN — HYDROMORPHONE HYDROCHLORIDE 1 MG: 1 INJECTION, SOLUTION INTRAMUSCULAR; INTRAVENOUS; SUBCUTANEOUS at 01:29

## 2018-06-30 RX ADMIN — QUETIAPINE FUMARATE 100 MG: 100 TABLET ORAL at 17:27

## 2018-06-30 RX ADMIN — SODIUM BICARBONATE 650 MG: 650 TABLET ORAL at 09:06

## 2018-06-30 RX ADMIN — PIPERACILLIN SODIUM AND TAZOBACTAM SODIUM 3.38 G: 3; .375 INJECTION, POWDER, LYOPHILIZED, FOR SOLUTION INTRAVENOUS at 00:39

## 2018-06-30 RX ADMIN — PATIROMER 16.8 G: 8.4 POWDER, FOR SUSPENSION ORAL at 14:18

## 2018-06-30 RX ADMIN — HYDROMORPHONE HYDROCHLORIDE 1 MG: 1 INJECTION, SOLUTION INTRAMUSCULAR; INTRAVENOUS; SUBCUTANEOUS at 11:21

## 2018-06-30 RX ADMIN — Medication 5 ML: at 22:00

## 2018-06-30 RX ADMIN — QUETIAPINE FUMARATE 100 MG: 100 TABLET ORAL at 09:06

## 2018-06-30 RX ADMIN — CLONIDINE HYDROCHLORIDE 0.1 MG: 0.1 TABLET ORAL at 09:05

## 2018-06-30 RX ADMIN — Medication 5 ML: at 21:00

## 2018-06-30 RX ADMIN — Medication 1000 MCG: at 09:06

## 2018-06-30 RX ADMIN — Medication 10 ML: at 06:00

## 2018-06-30 RX ADMIN — Medication 10 ML: at 11:23

## 2018-06-30 RX ADMIN — DIPHENHYDRAMINE HYDROCHLORIDE 25 MG: 25 CAPSULE ORAL at 14:14

## 2018-06-30 RX ADMIN — INSULIN LISPRO 5 UNITS: 100 INJECTION, SOLUTION INTRAVENOUS; SUBCUTANEOUS at 08:00

## 2018-06-30 RX ADMIN — PIPERACILLIN SODIUM AND TAZOBACTAM SODIUM 3.38 G: 3; .375 INJECTION, POWDER, LYOPHILIZED, FOR SOLUTION INTRAVENOUS at 23:20

## 2018-06-30 RX ADMIN — INSULIN LISPRO 2 UNITS: 100 INJECTION, SOLUTION INTRAVENOUS; SUBCUTANEOUS at 12:17

## 2018-06-30 RX ADMIN — VENLAFAXINE 75 MG: 37.5 TABLET ORAL at 17:27

## 2018-06-30 NOTE — PROGRESS NOTES
Infectious Diseases Progress Note    Antibiotic Summary:  Daptomycin Begun pta  Zosyn   -- present      Subjective:     She feels better. SS crisis pain is much better. Objective:     Vitals:   Visit Vitals    /71 (BP 1 Location: Right arm, BP Patient Position: At rest)    Pulse 99    Temp 97.9 °F (36.6 °C)    Resp 18    Ht 5' 2\" (1.575 m)    Wt 69.4 kg (153 lb)    LMP 2018 (Exact Date)    SpO2 95%    BMI 27.98 kg/m2        Tmax:  Temp (24hrs), Av.3 °F (36.8 °C), Min:97.9 °F (36.6 °C), Max:98.7 °F (37.1 °C)      Exam:  General appearance: alert, no distress  Lungs: clear to auscultation bilaterally  Heart: regular rate and rhythm  Abdomen: soft, non-tender. Bowel sounds normal.   LLE: Foot and TMA are unremarkable. LLE swelling distal to the knee is less prominent. IV Lines: Left femoral CVL inserted     Labs:    Recent Labs      18   1119  18   1154  18   0406   WBC  4.5  3.3*  3.2*   HGB  8.0*  8.0*  7.7*   PLT  184  163  165   BUN  45*  49*  49*   CREA  3.61*  3.60*  3.70*     BLOOD CULTURES:    = Klebsiella pneumoniae in 2 of 2 bottles    = alpha Streptococcus (PCN resistant) in 1 of 2 bottles    = NGSF    Assessment:     1. Bacteremia -- Klebsiella pneumoniae in 2/2 bottles on  and PCN resistant alpha Streptococcus in 1 of 2 bottles on   -- ? Line associated Klebsiella bacteremia: . No evidence for  source. Occult abd source is a concern as well as line associated bacteremia. The significance of alpha Strept in 1 bottle only cannot be determined      2. Hx osteomyelitis of the left foot: A potential source for the bacteremia but the exam is benign    3. Hx of DVT: She states that she has had bilateral DVT in the past. The doppler today showed changes suggesting chronic DVT of the left popliteal vein -- defer management to the Hospitalist team    4. NIDDM     5. CKD    6. Chronic pancreatitis by CT      7. HTN     8. PCOS     9. Seizure disorder     10. Sickle cell disease     11. THU     12. GERD    Plan:     1. Continue Dapto and Zosyn       We will check the patient again on Monday. Please call if problems arise over the weekend.     Jovana Campbell MD

## 2018-06-30 NOTE — PROGRESS NOTES
Problem: Sepsis: Day 6  Goal: Activity/Safety  Outcome: Progressing Towards Goal  Pt walked 7 laps around unit this afternoon. Tolerated well.

## 2018-06-30 NOTE — PROGRESS NOTES
Problem: Sepsis: Day 2  Goal: Activity/Safety  Outcome: Progressing Towards Goal  Pt. Out of bed and steady on her feet. Discussed safety during activity with patient. Problem: Hypertension  Goal: *Blood pressure within specified parameters  Outcome: Progressing Towards Goal  Blood pressure elevated prior to medication in the AM. After AM medications blood pressure lowered. Bedside shift change report given to Liz Libman, RN (oncoming nurse) by Lizzie Rider RN and Caitlin Chen RN (offgoing nurse). Report included the following information SBAR, Kardex, Intake/Output, MAR, Accordion, Recent Results, Med Rec Status and Cardiac Rhythm Sinus Tach.

## 2018-06-30 NOTE — PROGRESS NOTES
Hospitalist Progress Note  Jose Tompkins MD  Answering service: [phone] OR [phone] from in house phone        Date of Service:  2018  NAME:  Chuck Rai  :  1980  MRN:  [id number]      Admission Summary:   43 y/o AA female with PMH of sickle-cell disease, type 1 DM, CKD stage 4, L foot osteomyelitis s/p partial amputation apparently completed IV daptomycin PTA about 2-3 weeks ago admitted on 18 for diffuse body aches and fever with suspicion of acute sickle cell crisis and sepsis of unclear etiology. She was recently discharged from here for DKA (hospital course complicated by respiratory failure, angioedema of unclear etiology, sepsis and STONE    Interval history / Subjective:   Pt seen and examined. Pt sitting on chair at bedside. Pt states her pain is better controlled today. No new complaints today. BP was up to 207/110 this am.      Assessment & Plan:     1. Acute sickle-cell crisis: (POA)  -This seems to be under control at this time   -Continue supportive care with pain control. Already stopped IVF due to fluid overload.  -Hb is stable at 8.3 today. Continue to monitor H/H. No need to transfuse at this time.   -Pt will go home with Percocet. 2. Suspected sepsis: (POA)  -Source is unclear but ddx includes Lt foot osteomyelitis (last MRI showed findings concerning for osteo), or PICC line infection. -GNRs in 2/2 bottles on  and GPC in pairs in 1 of 2 bottles on . Klebsiella in one bottle and Strep mitis in another.  -Continue empiric abx w/ IV Zosyn and daptomycin  - ID follow up appreciated. Asher catheter has been removed. -Repeat blood cultures from 18 is negative so far. -Spoke to ID. Will need to clarify how much longer she needs to be on Daptomycin. Ok with switching pt to po Cipro or Levaquin for Klebsiella bacteremia.  Pt seems to have completed Daptomycin treatment for osteo about 2-3 weeks ago (then monitored by Dr Jose Wiseman). Was only restarted during this admission. 3. Type I DM w/ hypoglycemia: (POA)  -BS stable at this time.   -Continue NPH and SSI.      4. Metabolic acidosis:  -Likely due to CKD 4. Continue NaHCO3 tabs.     5. HTN: uncontrolled  -BP is improving with increasing Norvasc to 10 mg po daily and add Clonidine po.   -Continue to monitor closely.     6. CKD stage 4:   -Renal fxn is relatively unchanged today. This is likely progressive CKD. -Nephrology follow up appreciated. Will monitor closely.  -Pt is getting close to needing HD. 7. Lt LE DVT:   -Pt has a previous history for DVT which was treated for 1 year per pt and she just finished treatment recently. -Duplex suggest chronic DVT. -Will hold off on treatment for now      Code status:Full  DVT prophylaxis: SCD    Care Plan discussed with: Patient/Family and Nurse  Disposition: TBD     Hospital Problems  Date Reviewed: 6/10/2018          Codes Class Noted POA    Charcot ankle, left ICD-10-CM: M14.672  ICD-9-CM: 094.0, 713.5  6/25/2018 Unknown        * (Principal)Sickle cell crisis ([facility] ICD-10-CM: D57.00  ICD-9-CM: 282.62  6/24/2018 Unknown        Osteomyelitis of left foot ([facility] ICD-10-CM: M86.9  ICD-9-CM: 730.27  4/27/2018 Yes        Metabolic encephalopathy LLZ-04-EN: G93.41  ICD-9-CM: 348.31  3/9/2016 Yes        CKD (chronic kidney disease) ICD-10-CM: N18.9  ICD-9-CM: 585.9  9/19/2012 Yes    Overview Addendum 1/17/2013  6:44 PM by Elaine Ryan; Required HD in past with acute exacerbation, AV graft R thigh  Supposed to make appt:   Claudette Gaskin, MD  [facility]   [address] [place] [address], [place] [address]   [phone]              Diabetes mellitus type I [facility]) ICD-10-CM: E10.9  ICD-9-CM: 250.01  3/29/2012 Yes    Overview Addendum 2/12/2013  7:43 AM by Ag monsalve appt:  May 8, 2013 with dr Ruby Martins  dx'd age 14; + neuropathy, nephropathy  On ssi, regular  Diabetes health maintenance:  Last A1C: 11.7 1/2013 10.7 3/2012  Last eye exam 2012, had cataract R eye, needs L eye done, Dr Brett Harp (OD) [phone], another doc did surgery: Dr Natty Adames MD same practice [phone] f [phone]  Last microalbumin:  n/a Last creatinine: 1.65 1/2013; 1.9 9/17/12  Last microfilament exam/Last podiatry: 9/20/12 intact, nails thickened  Last lipids: 1/201  trig 173, HDL 70 .4 w/o statin; no record in former PCP notes why it was d/c'd   ACE/ARB: no due to CKD                 HTN (hypertension) (Chronic) ICD-10-CM: I10  ICD-9-CM: 401.9  10/14/2011 Yes                Review of Systems:   A comprehensive review of systems was negative. Vital Signs:    Last 24hrs VS reviewed since prior progress note. Most recent are:  Visit Vitals    /82 (BP 1 Location: Right arm)    Pulse 96    Temp 98.5 °F (36.9 °C)    Resp 13    Ht 5' 2\" (1.575 m)    Wt 67.3 kg (148 lb 5.9 oz)    SpO2 99%    BMI 27.14 kg/m2       No intake or output data in the 24 hours ending 06/30/18 1701     Physical Examination:             Constitutional:  No acute distress   ENT:  Oral mucous moist   Resp:  CTA bilaterally. CV:  Regular rhythm, normal rate, tachy    GI:  Soft, non distended, non tender. bs+    Musculoskeletal:  2 + edema, warm, 2+ pulses throughout    Neurologic:  Moves all extremities. AAOx3, CN II-XII reviewed     Psych:  Good insight, Not anxious nor agitated.        Data Review:    I personally reviewed  Image and labs      Labs:     Recent Labs      06/30/18   0621  06/29/18   1119   WBC  4.0  4.5   HGB  8.3*  8.0*   HCT  26.1*  25.3*   PLT  228  184     Recent Labs      06/30/18   0621  06/29/18   1119  06/28/18   1154   NA  139  141  140   K  5.6*  5.3*  5.4*   CL  108  110*  107   CO2  23  23  21   BUN  46*  45*  49*   CREA  3.75*  3.61*  3.60*   GLU  238*  78  125*   CA  7.8*  7.5*  7.3*   MG   --    --   1.9   PHOS  4.8*   --    -- Recent Labs      06/30/18   0621   ALB  2.8*         Lab Results   Component Value Date/Time    Folate 33.8 (H) 05/07/2018 11:20 AM              Lab Results   Component Value Date/Time    Cholesterol, total 128 06/10/2018 07:44 AM    HDL Cholesterol 51 06/10/2018 07:44 AM    LDL, calculated 67.4 06/10/2018 07:44 AM    Triglyceride 48 06/10/2018 07:44 AM    CHOL/HDL Ratio 2.5 06/10/2018 07:44 AM     Lab Results   Component Value Date/Time    Glucose (POC) 190 (H) 06/30/2018 04:35 PM    Glucose (POC) 155 (H) 06/30/2018 11:47 AM    Glucose (POC) 287 (H) 06/30/2018 07:04 AM    Glucose (POC) 107 (H) 06/29/2018 09:21 PM    Glucose (POC) 284 (H) 06/29/2018 04:29 PM     Lab Results   Component Value Date/Time    Color YELLOW/STRAW 06/23/2018 11:32 PM    Appearance CLEAR 06/23/2018 11:32 PM    Specific gravity 1.010 06/23/2018 11:32 PM    Specific gravity 1.015 07/26/2010 11:18 AM    pH (UA) 6.0 06/23/2018 11:32 PM    Protein 100 (A) 06/23/2018 11:32 PM    Glucose NEGATIVE  06/23/2018 11:32 PM    Ketone NEGATIVE  06/23/2018 11:32 PM    Bilirubin NEGATIVE  06/23/2018 11:32 PM    Urobilinogen 0.2 06/23/2018 11:32 PM    Nitrites NEGATIVE  06/23/2018 11:32 PM    Leukocyte Esterase NEGATIVE  06/23/2018 11:32 PM    Epithelial cells FEW 06/23/2018 11:32 PM    Bacteria NEGATIVE  06/23/2018 11:32 PM    WBC 0-4 06/23/2018 11:32 PM    RBC 0-5 06/23/2018 11:32 PM         Medications Reviewed:     Current Facility-Administered Medications   Medication Dose Route Frequency    patiromer calcium sorbitex (VELTASSA) powder 16.8 g  16.8 g Oral DAILY    amLODIPine (NORVASC) tablet 10 mg  10 mg Oral DAILY    cloNIDine HCl (CATAPRES) tablet 0.1 mg  0.1 mg Oral BID    bacitracin 500 unit/gram packet 1 Packet  1 Packet Topical PRN    sodium chloride (NS) flush 5 mL  5 mL InterCATHeter Q24H    sodium chloride (NS) flush 5 mL  5 mL InterCATHeter PRN    sodium chloride (NS) flush 5 mL  5 mL InterCATHeter Q8H    labetalol (NORMODYNE;TRANDATE) injection 20 mg  20 mg IntraVENous Q6H PRN    piperacillin-tazobactam (ZOSYN) 3.375 g in 0.9% sodium chloride (MBP/ADV) 100 mL  3.375 g IntraVENous Q12H    white petrolatum-mineral oil (EUCERIN) cream   Topical PRN    HYDROmorphone (DILAUDID) syringe 1 mg  1 mg IntraVENous Q4H PRN    0.9% sodium chloride infusion 250 mL  250 mL IntraVENous PRN    prochlorperazine (COMPAZINE) with saline injection 5 mg  5 mg IntraVENous Q6H PRN    venlafaxine (EFFEXOR) tablet 75 mg  75 mg Oral BID WITH MEALS    senna-docusate (PERICOLACE) 8.6-50 mg per tablet 2 Tab  2 Tab Oral DAILY    QUEtiapine (SEROquel) tablet 100 mg  100 mg Oral BID    oxyCODONE-acetaminophen (PERCOCET) 5-325 mg per tablet 1 Tab  1 Tab Oral Q6H PRN    cyanocobalamin (VITAMIN B12) tablet 1,000 mcg  1,000 mcg Oral DAILY    calcium carbonate (TUMS) chewable tablet 200 mg [elemental]  200 mg Oral TID    albuterol (PROVENTIL VENTOLIN) nebulizer solution 2.5 mg  2.5 mg Nebulization Q4H PRN    sodium chloride (NS) flush 5-10 mL  5-10 mL IntraVENous Q8H    sodium chloride (NS) flush 5-10 mL  5-10 mL IntraVENous PRN    heparin (porcine) injection 5,000 Units  5,000 Units SubCUTAneous Q8H    acetaminophen (TYLENOL) tablet 500 mg  500 mg Oral Q4H PRN    glucose chewable tablet 16 g  4 Tab Oral PRN    dextrose (D50W) injection syrg 12.5-25 g  12.5-25 g IntraVENous PRN    glucagon (GLUCAGEN) injection 1 mg  1 mg IntraMUSCular PRN    diphenhydrAMINE (BENADRYL) capsule 25 mg  25 mg Oral Q6H PRN    DAPTOmycin (CUBICIN) 400 mg in 0.9% sodium chloride 50 mL IVPB RF formulation  400 mg IntraVENous Q48H    insulin lispro (HUMALOG) injection   SubCUTAneous AC&HS    hydrALAZINE (APRESOLINE) tablet 100 mg  100 mg Oral TID    sodium chloride (NS) flush 20 mL  20 mL InterCATHeter PRN    sodium chloride (NS) flush 10 mL  10 mL InterCATHeter Q24H    sodium chloride (NS) flush 10 mL  10 mL InterCATHeter PRN    sodium chloride (NS) flush 10 mL 10 mL InterCATHeter Q8H    alteplase (CATHFLO) 1 mg in sterile water (preservative free) 1 mL injection  1 mg InterCATHeter PRN    polyethylene glycol (MIRALAX) packet 17 g  17 g Oral DAILY    sodium bicarbonate tablet 650 mg  650 mg Oral BID    calcitRIOL (ROCALTROL) capsule 0.25 mcg  0.25 mcg Oral DAILY    promethazine (PHENERGAN) tablet 25 mg  25 mg Oral Q8H PRN    sodium chloride (NS) flush 5-10 mL  5-10 mL IntraVENous PRN    sodium chloride (NS) flush 5-10 mL  5-10 mL IntraVENous PRN     ______________________________________________________________________  EXPECTED LENGTH OF STAY: 4d 21h  ACTUAL LENGTH OF STAY:          6                 Jose Tompkins MD

## 2018-06-30 NOTE — PROGRESS NOTES
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

## 2018-06-30 NOTE — PROGRESS NOTES
0603 - 5 lb weight loss noted. Will address with day RN.    Farzad Hutson - Pt BP noted to be 236/118. Message sent to pharmacy to send PRN labetalol. 4205 - PRN labetalol given. 0848 - Pt /110. Pt to get morning BP medications. Gabriel notify day RN.    0900 - Bedside and Verbal shift change report given to Ubaldo (oncoming nurse) by Rosalino Yang (offgoing nurse). Report included the following information SBAR, Kardex, ED Summary, Procedure Summary, Intake/Output, MAR, Accordion, Recent Results, Med Rec Status and Cardiac Rhythm NSR/ST. Problem: Falls - Risk of  Goal: *Absence of Falls  Document Blake Fall Risk and appropriate interventions in the flowsheet. Outcome: Progressing Towards Goal  Fall Risk Interventions:  Mobility Interventions: Communicate number of staff needed for ambulation/transfer        Medication Interventions: Evaluate medications/consider consulting pharmacy, Patient to call before getting OOB, Teach patient to arise slowly, Assess postural VS orthostatic hypotension     Elimination Interventions: Call light in reach, Patient to call for help with toileting needs     History of Falls Interventions: Consult care management for discharge planning, Door open when patient unattended, Evaluate medications/consider consulting pharmacy, Room close to nurse's station           Problem: Hypertension  Goal: *Blood pressure within specified parameters  Outcome: Not Progressing Towards Goal  This Am pt SBP above 200. Zeinab Jean PRN labetalol given.  Day RN made aware.

## 2018-07-01 LAB
ALBUMIN SERPL-MCNC: 2.7 G/DL (ref 3.5–5)
ANION GAP SERPL CALC-SCNC: 7 MMOL/L (ref 5–15)
BUN SERPL-MCNC: 45 MG/DL (ref 6–20)
BUN/CREAT SERPL: 12 (ref 12–20)
CALCIUM SERPL-MCNC: 7.9 MG/DL (ref 8.5–10.1)
CHLORIDE SERPL-SCNC: 108 MMOL/L (ref 97–108)
CK SERPL-CCNC: 32 U/L (ref 26–192)
CO2 SERPL-SCNC: 23 MMOL/L (ref 21–32)
CREAT SERPL-MCNC: 3.81 MG/DL (ref 0.55–1.02)
GLUCOSE BLD STRIP.AUTO-MCNC: 205 MG/DL (ref 65–100)
GLUCOSE BLD STRIP.AUTO-MCNC: 217 MG/DL (ref 65–100)
GLUCOSE BLD STRIP.AUTO-MCNC: 244 MG/DL (ref 65–100)
GLUCOSE BLD STRIP.AUTO-MCNC: 96 MG/DL (ref 65–100)
GLUCOSE SERPL-MCNC: 236 MG/DL (ref 65–100)
PHOSPHATE SERPL-MCNC: 4.5 MG/DL (ref 2.6–4.7)
POTASSIUM SERPL-SCNC: 5.7 MMOL/L (ref 3.5–5.1)
SERVICE CMNT-IMP: ABNORMAL
SERVICE CMNT-IMP: NORMAL
SODIUM SERPL-SCNC: 138 MMOL/L (ref 136–145)

## 2018-07-01 PROCEDURE — 82962 GLUCOSE BLOOD TEST: CPT

## 2018-07-01 PROCEDURE — 74011000250 HC RX REV CODE- 250: Performed by: INTERNAL MEDICINE

## 2018-07-01 PROCEDURE — 74011000258 HC RX REV CODE- 258: Performed by: HOSPITALIST

## 2018-07-01 PROCEDURE — 74011250636 HC RX REV CODE- 250/636: Performed by: HOSPITALIST

## 2018-07-01 PROCEDURE — 74011250637 HC RX REV CODE- 250/637: Performed by: INTERNAL MEDICINE

## 2018-07-01 PROCEDURE — 74011250637 HC RX REV CODE- 250/637: Performed by: HOSPITALIST

## 2018-07-01 PROCEDURE — 65660000000 HC RM CCU STEPDOWN

## 2018-07-01 PROCEDURE — 36415 COLL VENOUS BLD VENIPUNCTURE: CPT | Performed by: INTERNAL MEDICINE

## 2018-07-01 PROCEDURE — 80069 RENAL FUNCTION PANEL: CPT | Performed by: INTERNAL MEDICINE

## 2018-07-01 PROCEDURE — 74011250636 HC RX REV CODE- 250/636: Performed by: INTERNAL MEDICINE

## 2018-07-01 PROCEDURE — 74011000258 HC RX REV CODE- 258: Performed by: INTERNAL MEDICINE

## 2018-07-01 PROCEDURE — 74011636637 HC RX REV CODE- 636/637: Performed by: HOSPITALIST

## 2018-07-01 PROCEDURE — 82550 ASSAY OF CK (CPK): CPT | Performed by: INTERNAL MEDICINE

## 2018-07-01 RX ORDER — SODIUM CHLORIDE 0.9 % (FLUSH) 0.9 %
SYRINGE (ML) INJECTION
Status: COMPLETED
Start: 2018-07-01 | End: 2018-07-01

## 2018-07-01 RX ADMIN — Medication 5 ML: at 21:41

## 2018-07-01 RX ADMIN — DIPHENHYDRAMINE HYDROCHLORIDE 25 MG: 25 CAPSULE ORAL at 05:56

## 2018-07-01 RX ADMIN — Medication 10 ML: at 11:13

## 2018-07-01 RX ADMIN — OXYCODONE HYDROCHLORIDE AND ACETAMINOPHEN 1 TABLET: 5; 325 TABLET ORAL at 00:21

## 2018-07-01 RX ADMIN — Medication 5 ML: at 21:40

## 2018-07-01 RX ADMIN — CLONIDINE HYDROCHLORIDE 0.1 MG: 0.1 TABLET ORAL at 17:28

## 2018-07-01 RX ADMIN — ACETAMINOPHEN 500 MG: 500 TABLET, FILM COATED ORAL at 13:56

## 2018-07-01 RX ADMIN — POLYETHYLENE GLYCOL 3350 17 G: 17 POWDER, FOR SOLUTION ORAL at 08:03

## 2018-07-01 RX ADMIN — Medication 10 ML: at 05:57

## 2018-07-01 RX ADMIN — HYDROMORPHONE HYDROCHLORIDE 1 MG: 1 INJECTION, SOLUTION INTRAMUSCULAR; INTRAVENOUS; SUBCUTANEOUS at 01:35

## 2018-07-01 RX ADMIN — Medication 10 ML: at 21:41

## 2018-07-01 RX ADMIN — HYDROMORPHONE HYDROCHLORIDE 1 MG: 1 INJECTION, SOLUTION INTRAMUSCULAR; INTRAVENOUS; SUBCUTANEOUS at 10:01

## 2018-07-01 RX ADMIN — HYDRALAZINE HYDROCHLORIDE 100 MG: 50 TABLET, FILM COATED ORAL at 08:02

## 2018-07-01 RX ADMIN — VENLAFAXINE 75 MG: 37.5 TABLET ORAL at 08:02

## 2018-07-01 RX ADMIN — Medication 1000 MCG: at 08:02

## 2018-07-01 RX ADMIN — HYDROMORPHONE HYDROCHLORIDE 1 MG: 1 INJECTION, SOLUTION INTRAMUSCULAR; INTRAVENOUS; SUBCUTANEOUS at 13:56

## 2018-07-01 RX ADMIN — INSULIN LISPRO 3 UNITS: 100 INJECTION, SOLUTION INTRAVENOUS; SUBCUTANEOUS at 06:49

## 2018-07-01 RX ADMIN — PIPERACILLIN SODIUM AND TAZOBACTAM SODIUM 3.38 G: 3; .375 INJECTION, POWDER, LYOPHILIZED, FOR SOLUTION INTRAVENOUS at 11:13

## 2018-07-01 RX ADMIN — HYDROMORPHONE HYDROCHLORIDE 1 MG: 1 INJECTION, SOLUTION INTRAMUSCULAR; INTRAVENOUS; SUBCUTANEOUS at 05:57

## 2018-07-01 RX ADMIN — DIPHENHYDRAMINE HYDROCHLORIDE 25 MG: 25 CAPSULE ORAL at 14:01

## 2018-07-01 RX ADMIN — HYDROMORPHONE HYDROCHLORIDE 1 MG: 1 INJECTION, SOLUTION INTRAMUSCULAR; INTRAVENOUS; SUBCUTANEOUS at 17:27

## 2018-07-01 RX ADMIN — HEPARIN SODIUM 5000 UNITS: 5000 INJECTION, SOLUTION INTRAVENOUS; SUBCUTANEOUS at 23:26

## 2018-07-01 RX ADMIN — AMLODIPINE BESYLATE 10 MG: 5 TABLET ORAL at 08:01

## 2018-07-01 RX ADMIN — QUETIAPINE FUMARATE 100 MG: 100 TABLET ORAL at 17:28

## 2018-07-01 RX ADMIN — Medication 10 ML: at 21:40

## 2018-07-01 RX ADMIN — Medication 10 ML: at 13:57

## 2018-07-01 RX ADMIN — HEPARIN SODIUM 5000 UNITS: 5000 INJECTION, SOLUTION INTRAVENOUS; SUBCUTANEOUS at 08:03

## 2018-07-01 RX ADMIN — CLONIDINE HYDROCHLORIDE 0.1 MG: 0.1 TABLET ORAL at 08:02

## 2018-07-01 RX ADMIN — HYDROMORPHONE HYDROCHLORIDE 1 MG: 1 INJECTION, SOLUTION INTRAMUSCULAR; INTRAVENOUS; SUBCUTANEOUS at 21:40

## 2018-07-01 RX ADMIN — HEPARIN SODIUM 5000 UNITS: 5000 INJECTION, SOLUTION INTRAVENOUS; SUBCUTANEOUS at 15:31

## 2018-07-01 RX ADMIN — INSULIN LISPRO 3 UNITS: 100 INJECTION, SOLUTION INTRAVENOUS; SUBCUTANEOUS at 11:22

## 2018-07-01 RX ADMIN — LABETALOL HYDROCHLORIDE 20 MG: 5 INJECTION, SOLUTION INTRAVENOUS at 05:56

## 2018-07-01 RX ADMIN — SODIUM BICARBONATE 650 MG: 650 TABLET ORAL at 17:28

## 2018-07-01 RX ADMIN — CALCIUM CARBONATE (ANTACID) CHEW TAB 500 MG 200 MG: 500 CHEW TAB at 15:31

## 2018-07-01 RX ADMIN — SODIUM BICARBONATE 650 MG: 650 TABLET ORAL at 08:02

## 2018-07-01 RX ADMIN — CALCIUM CARBONATE (ANTACID) CHEW TAB 500 MG 200 MG: 500 CHEW TAB at 08:02

## 2018-07-01 RX ADMIN — INSULIN LISPRO 3 UNITS: 100 INJECTION, SOLUTION INTRAVENOUS; SUBCUTANEOUS at 15:31

## 2018-07-01 RX ADMIN — SENNOSIDES AND DOCUSATE SODIUM 2 TABLET: 8.6; 5 TABLET ORAL at 08:02

## 2018-07-01 RX ADMIN — CALCITRIOL 0.25 MCG: 0.25 CAPSULE, LIQUID FILLED ORAL at 08:01

## 2018-07-01 RX ADMIN — CALCIUM CARBONATE (ANTACID) CHEW TAB 500 MG 200 MG: 500 CHEW TAB at 21:40

## 2018-07-01 RX ADMIN — DAPTOMYCIN 400 MG: 500 INJECTION, POWDER, LYOPHILIZED, FOR SOLUTION INTRAVENOUS at 17:49

## 2018-07-01 RX ADMIN — PIPERACILLIN SODIUM AND TAZOBACTAM SODIUM 3.38 G: 3; .375 INJECTION, POWDER, LYOPHILIZED, FOR SOLUTION INTRAVENOUS at 23:26

## 2018-07-01 RX ADMIN — HYDRALAZINE HYDROCHLORIDE 100 MG: 50 TABLET, FILM COATED ORAL at 15:31

## 2018-07-01 RX ADMIN — QUETIAPINE FUMARATE 100 MG: 100 TABLET ORAL at 08:02

## 2018-07-01 RX ADMIN — VENLAFAXINE 75 MG: 37.5 TABLET ORAL at 15:38

## 2018-07-01 RX ADMIN — HYDRALAZINE HYDROCHLORIDE 100 MG: 50 TABLET, FILM COATED ORAL at 21:40

## 2018-07-01 NOTE — PROGRESS NOTES
1307:  Pt walking in hallway. She asked for \"IV pain medication and Benadryl\" a few minutes earlier. After reviewing MAR, found IV pain medication not due unit after 1400. I offered the Benadryl and Percocet but she wanted to wait for the IV pain medication.

## 2018-07-01 NOTE — PROGRESS NOTES
Hospitalist Progress Note  Montserrat Chacon MD  Answering service: [phone] OR [phone] from in house phone  Cell: [phone]       Date of Service:  2018  NAME:  Nimco Ceja  :  1980  MRN:  [id number]    Admission Summary:   43 y/o AA female with PMH of sickle-cell disease, type 1 DM, CKD stage 4, L foot osteomyelitis s/p partial amputation apparently completed IV daptomycin PTA about 2-3 weeks ago admitted on 18 for diffuse body aches and fever with suspicion of acute sickle cell crisis and sepsis of unclear etiology. She was recently discharged from here for DKA (hospital course complicated by respiratory failure, angioedema of unclear etiology, sepsis and STONE     Interval history / Subjective:       :  No new issues up in halls  F/u on retic count in am       Assessment & Plan:      1. Acute sickle-cell crisis: (POA)  -This seems to be under control at this time   -Continue supportive care with pain control. Already stopped IVF due to fluid overload.  -Hb is stable at 8.3 today. Continue to monitor H/H. No need to transfuse at this time.   -Pt will go home with Percocet. Stable  retic count for am      2. Suspected sepsis: (POA)  -Source is unclear but ddx includes Lt foot osteomyelitis (last MRI showed findings concerning for osteo), or PICC line infection. -GNRs in 2/2 bottles on  and GPC in pairs in 1 of 2 bottles on . Klebsiella in one bottle and Strep mitis in another.  -Continue empiric abx w/ IV Zosyn and daptomycin  - ID follow up appreciated. Asher catheter has been removed. -Repeat blood cultures from 18 is negative so far. -Spoke to ID. Will need to clarify how much longer she needs to be on Daptomycin. Ok with switching pt to po Cipro or Levaquin for Klebsiella bacteremia. Pt seems to have completed Daptomycin treatment for osteo about 2-3 weeks ago (then monitored by Dr Carmen Allen).  Was only restarted during this admission. awaot advice from UZAIR coronadofor moving to alternative abx.      3. Type I DM w/ hypoglycemia: (POA)  -BS stable at this time.   -Continue NPH and SSI.       4. Metabolic acidosis:  -Likely due to CKD 4. Continue NaHCO3 tabs.      5. HTN: uncontrolled  -BP is improving with increasing Norvasc to 10 mg po daily and add Clonidine po.   -Continue to monitor closely.      6. CKD stage 4:   -Renal fxn is relatively unchanged today. This is likely progressive CKD. -Nephrology follow up appreciated. Will monitor closely.  -Pt is getting close to needing HD.      7. Lt LE DVT:   -Pt has a previous history for DVT which was treated for 1 year per pt and she just finished treatment recently. -Duplex suggest chronic DVT. -Will hold off on treatment for now      Code status:Full  DVT prophylaxis: SCD     Care Plan discussed with: Patient/Family and Nurse  Disposition: D          Hospital Problems  Date Reviewed: 6/10/2018          Codes Class Noted POA    Charcot ankle, left ICD-10-CM: M14.672  ICD-9-CM: 094.0, 713.5  6/25/2018 Unknown        * (Principal)Sickle cell crisis ([facility] ICD-10-CM: D57.00  ICD-9-CM: 282.62  6/24/2018 Unknown        Osteomyelitis of left foot ([facility]) ICD-10-CM: M86.9  ICD-9-CM: 730.27  4/27/2018 Yes        Metabolic encephalopathy QUJ-62-BR: G93.41  ICD-9-CM: 348.31  3/9/2016 Yes        CKD (chronic kidney disease) ICD-10-CM: N18.9  ICD-9-CM: 585.9  9/19/2012 Yes    Overview Addendum 1/17/2013  6:44 PM by Alisia Zavala; Required HD in past with acute exacerbation, AV graft R thigh  Supposed to make appt:   Ascencion Lucero MD  [facility]   [address] [place] [address], [place] [address]   [phone]              Diabetes mellitus type I [facility]) ICD-10-CM: E10.9  ICD-9-CM: 250.01  3/29/2012 Yes    Overview Addendum 2/12/2013  7:43 AM by Ag monsalve appt:  May 8, 2013 with dr Allan Thurman  dx'd age 14; + neuropathy, nephropathy  On ssi, regular  Diabetes health maintenance:  Last A1C: 11.7 1/2013 10.7 3/2012  Last eye exam 2012, had cataract R eye, needs L eye done, Dr Jyothi Moe (OD) [phone], another doc did surgery: Dr Emanuel Barajas MD same practice [phone] f [phone]  Last microalbumin:  n/a Last creatinine: 1.65 1/2013; 1.9 9/17/12  Last microfilament exam/Last podiatry: 9/20/12 intact, nails thickened  Last lipids: 1/201  trig 173, HDL 70 .4 w/o statin; no record in former PCP notes why it was d/c'd   ACE/ARB: no due to CKD                 HTN (hypertension) (Chronic) ICD-10-CM: I10  ICD-9-CM: 401.9  10/14/2011 Yes                Review of Systems:   bno n/v/d/f/chills, some pain in legs. Vital Signs:    Last 24hrs VS reviewed since prior progress note. Most recent are:  Visit Vitals    /77 (BP 1 Location: Right arm, BP Patient Position: At rest)    Pulse 93    Temp 97.6 °F (36.4 °C)    Resp 18    Ht 5' 2\" (1.575 m)    Wt 67 kg (147 lb 11.3 oz)    SpO2 100%    BMI 27.02 kg/m2         Intake/Output Summary (Last 24 hours) at 07/01/18 1644  Last data filed at 07/01/18 1300   Gross per 24 hour   Intake             2050 ml   Output                3 ml   Net             2047 ml        Physical Examination:             Constitutional:  No acute distress, cooperative, pleasant    ENT:  Oral mucous moist, oropharynx benign. Neck supple,    Resp:  CTA bilaterally. No wheezing/rhonchi/rales. No accessory muscle use   CV:  Regular rhythm, normal rate, no murmurs, gallops, rubs    GI:  Soft, non distended, non tender. normoactive bowel sounds, no hepatosplenomegaly     Musculoskeletal:  No edema, warm, 2+ pulses throughout    Neurologic:  Moves all extremities.   AAOx3, CN II-XII reviewed     Skin:  Good turgor, no rashes or ulcers       Data Review:    Review and/or order of clinical lab test      Labs:     Recent Labs      06/30/18   0621  06/29/18   1119   WBC  4.0  4.5   HGB  8.3*  8.0*   HCT 26.1*  25.3*   PLT  228  184     Recent Labs      07/01/18   0548  06/30/18   0621  06/29/18   1119   NA  138  139  141   K  5.7*  5.6*  5.3*   CL  108  108  110*   CO2  23  23  23   BUN  45*  46*  45*   CREA  3.81*  3.75*  3.61*   GLU  236*  238*  78   CA  7.9*  7.8*  7.5*   PHOS  4.5  4.8*   --      Recent Labs      07/01/18   0548  06/30/18   0621   ALB  2.7*  2.8*     No results for input(s): INR, PTP, APTT in the last 72 hours. No lab exists for component: INREXT   No results for input(s): FE, TIBC, PSAT, FERR in the last 72 hours. Lab Results   Component Value Date/Time    Folate 33.8 (H) 05/07/2018 11:20 AM      No results for input(s): PH, PCO2, PO2 in the last 72 hours.   Recent Labs      07/01/18   0548   CPK  32     Lab Results   Component Value Date/Time    Cholesterol, total 128 06/10/2018 07:44 AM    HDL Cholesterol 51 06/10/2018 07:44 AM    LDL, calculated 67.4 06/10/2018 07:44 AM    Triglyceride 48 06/10/2018 07:44 AM    CHOL/HDL Ratio 2.5 06/10/2018 07:44 AM     Lab Results   Component Value Date/Time    Glucose (POC) 205 (H) 07/01/2018 03:30 PM    Glucose (POC) 217 (H) 07/01/2018 11:12 AM    Glucose (POC) 244 (H) 07/01/2018 06:06 AM    Glucose (POC) 84 06/30/2018 09:29 PM    Glucose (POC) 66 06/30/2018 09:12 PM     Lab Results   Component Value Date/Time    Color YELLOW/STRAW 06/23/2018 11:32 PM    Appearance CLEAR 06/23/2018 11:32 PM    Specific gravity 1.010 06/23/2018 11:32 PM    Specific gravity 1.015 07/26/2010 11:18 AM    pH (UA) 6.0 06/23/2018 11:32 PM    Protein 100 (A) 06/23/2018 11:32 PM    Glucose NEGATIVE  06/23/2018 11:32 PM    Ketone NEGATIVE  06/23/2018 11:32 PM    Bilirubin NEGATIVE  06/23/2018 11:32 PM    Urobilinogen 0.2 06/23/2018 11:32 PM    Nitrites NEGATIVE  06/23/2018 11:32 PM    Leukocyte Esterase NEGATIVE  06/23/2018 11:32 PM    Epithelial cells FEW 06/23/2018 11:32 PM    Bacteria NEGATIVE  06/23/2018 11:32 PM    WBC 0-4 06/23/2018 11:32 PM    RBC 0-5 06/23/2018 11:32 PM         Medications Reviewed:     Current Facility-Administered Medications   Medication Dose Route Frequency    patiromer calcium sorbitex (VELTASSA) powder 16.8 g  16.8 g Oral DAILY    amLODIPine (NORVASC) tablet 10 mg  10 mg Oral DAILY    cloNIDine HCl (CATAPRES) tablet 0.1 mg  0.1 mg Oral BID    bacitracin 500 unit/gram packet 1 Packet  1 Packet Topical PRN    sodium chloride (NS) flush 5 mL  5 mL InterCATHeter Q24H    sodium chloride (NS) flush 5 mL  5 mL InterCATHeter PRN    sodium chloride (NS) flush 5 mL  5 mL InterCATHeter Q8H    labetalol (NORMODYNE;TRANDATE) injection 20 mg  20 mg IntraVENous Q6H PRN    piperacillin-tazobactam (ZOSYN) 3.375 g in 0.9% sodium chloride (MBP/ADV) 100 mL  3.375 g IntraVENous Q12H    white petrolatum-mineral oil (EUCERIN) cream   Topical PRN    HYDROmorphone (DILAUDID) syringe 1 mg  1 mg IntraVENous Q4H PRN    0.9% sodium chloride infusion 250 mL  250 mL IntraVENous PRN    prochlorperazine (COMPAZINE) with saline injection 5 mg  5 mg IntraVENous Q6H PRN    venlafaxine (EFFEXOR) tablet 75 mg  75 mg Oral BID WITH MEALS    senna-docusate (PERICOLACE) 8.6-50 mg per tablet 2 Tab  2 Tab Oral DAILY    QUEtiapine (SEROquel) tablet 100 mg  100 mg Oral BID    oxyCODONE-acetaminophen (PERCOCET) 5-325 mg per tablet 1 Tab  1 Tab Oral Q6H PRN    cyanocobalamin (VITAMIN B12) tablet 1,000 mcg  1,000 mcg Oral DAILY    calcium carbonate (TUMS) chewable tablet 200 mg [elemental]  200 mg Oral TID    albuterol (PROVENTIL VENTOLIN) nebulizer solution 2.5 mg  2.5 mg Nebulization Q4H PRN    sodium chloride (NS) flush 5-10 mL  5-10 mL IntraVENous Q8H    sodium chloride (NS) flush 5-10 mL  5-10 mL IntraVENous PRN    heparin (porcine) injection 5,000 Units  5,000 Units SubCUTAneous Q8H    acetaminophen (TYLENOL) tablet 500 mg  500 mg Oral Q4H PRN    glucose chewable tablet 16 g  4 Tab Oral PRN    dextrose (D50W) injection syrg 12.5-25 g  12.5-25 g IntraVENous PRN    glucagon (GLUCAGEN) injection 1 mg  1 mg IntraMUSCular PRN    diphenhydrAMINE (BENADRYL) capsule 25 mg  25 mg Oral Q6H PRN    DAPTOmycin (CUBICIN) 400 mg in 0.9% sodium chloride 50 mL IVPB RF formulation  400 mg IntraVENous Q48H    insulin lispro (HUMALOG) injection   SubCUTAneous AC&HS    hydrALAZINE (APRESOLINE) tablet 100 mg  100 mg Oral TID    sodium chloride (NS) flush 20 mL  20 mL InterCATHeter PRN    sodium chloride (NS) flush 10 mL  10 mL InterCATHeter Q24H    sodium chloride (NS) flush 10 mL  10 mL InterCATHeter PRN    sodium chloride (NS) flush 10 mL  10 mL InterCATHeter Q8H    alteplase (CATHFLO) 1 mg in sterile water (preservative free) 1 mL injection  1 mg InterCATHeter PRN    polyethylene glycol (MIRALAX) packet 17 g  17 g Oral DAILY    sodium bicarbonate tablet 650 mg  650 mg Oral BID    calcitRIOL (ROCALTROL) capsule 0.25 mcg  0.25 mcg Oral DAILY    promethazine (PHENERGAN) tablet 25 mg  25 mg Oral Q8H PRN    sodium chloride (NS) flush 5-10 mL  5-10 mL IntraVENous PRN    sodium chloride (NS) flush 5-10 mL  5-10 mL IntraVENous PRN     ______________________________________________________________________  EXPECTED LENGTH OF STAY: 4d 21h  ACTUAL LENGTH OF STAY:          7                 Lavelle Giron MD

## 2018-07-01 NOTE — PROGRESS NOTES
2112 - HYPOGLYCEMIC EPISODE DOCUMENTATION    Patient with hypoglycemic episode(s) at 2112(time) on 06/30/18(date). BG value(s) pre-treatment 77    Was patient symptomatic? [x] yes, [] no (Drowsy, shaking)  Patient was treated with the following rescue medications/treatments: [] D50                [] Glucose tablets                [] Glucagon                [x] 4oz juice                [] 6oz reg soda                [] 8oz low fat milk  BG value post-treatment: 84  Once BG treated and value greater than 80mg/dl, pt was provided with the following:  [x] snack (2nd apple juice and soup)  [] meal  *Also, pt no longer shaky or drowsy. Resting comfortably in bed watching TV. Problem: Falls - Risk of  Goal: *Absence of Falls  Document Blake Fall Risk and appropriate interventions in the flowsheet. Outcome: Progressing Towards Goal  Fall Risk Interventions:  Mobility Interventions: Communicate number of staff needed for ambulation/transfer        Medication Interventions: Assess postural VS orthostatic hypotension, Evaluate medications/consider consulting pharmacy, Teach patient to arise slowly     Elimination Interventions: Call light in reach, Patient to call for help with toileting needs     History of Falls Interventions: Consult care management for discharge planning, Door open when patient unattended, Evaluate medications/consider consulting pharmacy, Room close to nurse's station     Pt has had no falls during admission. Call bell and belongings within reach.  Pt up ad yamil.     Problem: Hypertension  Goal: *Blood pressure within specified parameters  Outcome: Progressing Towards Goal  Pt BP meds adjusted today, BP under better control, last /66.            2302 -TRANSFER - OUT REPORT:    Verbal report given to Lakeshia(name) on [facility]  being transferred to [facility](unit) for routine progression of care       Report consisted of patients Situation, Background, Assessment and Recommendations(SBAR). Information from the following report(s) SBAR, Kardex, ED Summary, Procedure Summary, Intake/Output, MAR, Accordion, Recent Results, Med Rec Status and Cardiac Rhythm NSR/ST was reviewed with the receiving nurse. Lines:   Triple Lumen 06/29/18 Left Femoral (Active)   Central Line Being Utilized Yes 6/30/2018  8:50 PM   Criteria for Appropriate Use Other (comment) 6/30/2018  8:50 PM   Site Assessment Clean, dry, & intact 6/30/2018  8:50 PM   Infiltration Assessment 0 6/30/2018  8:50 PM   Affected Extremity/Extremities Color distal to insertion site pink (or appropriate for race) 6/30/2018  8:50 PM   Date of Last Dressing Change 06/29/18 6/30/2018  8:50 PM   Dressing Status Clean, dry, & intact 6/30/2018  8:50 PM   Dressing Type Disk with Chlorhexadine gluconate (CHG); Transparent 6/30/2018  8:50 PM   Action Taken Open ports on tubing capped 6/30/2018  8:50 PM   Proximal Hub Color/Line Status Blue;Capped 6/30/2018  8:50 PM   Positive Blood Return (Medial Site) Yes 6/30/2018  8:50 PM   Medial Hub Color/Line Status White;Capped 6/30/2018  8:50 PM   Positive Blood Return (Lateral Site) Yes 6/30/2018  8:50 PM   Distal Hub Color/Line Status Brown;Capped 6/30/2018  8:50 PM   Positive Blood Return (Site #3) No 6/30/2018  8:50 PM   Alcohol Cap Used Yes 6/30/2018  8:50 PM       Venous Access Device 06/16/18 (Active)        Opportunity for questions and clarification was provided.       Patient transported with:   Registered Nurse  Tech

## 2018-07-02 LAB
ALBUMIN SERPL-MCNC: 2.6 G/DL (ref 3.5–5)
ANION GAP SERPL CALC-SCNC: 6 MMOL/L (ref 5–15)
ANION GAP SERPL CALC-SCNC: 8 MMOL/L (ref 5–15)
BACTERIA SPEC CULT: NORMAL
BASOPHILS # BLD: 0 K/UL (ref 0–0.1)
BASOPHILS NFR BLD: 0 % (ref 0–1)
BUN SERPL-MCNC: 45 MG/DL (ref 6–20)
BUN SERPL-MCNC: 45 MG/DL (ref 6–20)
BUN/CREAT SERPL: 11 (ref 12–20)
BUN/CREAT SERPL: 12 (ref 12–20)
CALCIUM SERPL-MCNC: 7.6 MG/DL (ref 8.5–10.1)
CALCIUM SERPL-MCNC: 7.7 MG/DL (ref 8.5–10.1)
CHLORIDE SERPL-SCNC: 109 MMOL/L (ref 97–108)
CHLORIDE SERPL-SCNC: 110 MMOL/L (ref 97–108)
CO2 SERPL-SCNC: 23 MMOL/L (ref 21–32)
CO2 SERPL-SCNC: 23 MMOL/L (ref 21–32)
CREAT SERPL-MCNC: 3.9 MG/DL (ref 0.55–1.02)
CREAT SERPL-MCNC: 3.99 MG/DL (ref 0.55–1.02)
DIFFERENTIAL METHOD BLD: ABNORMAL
EOSINOPHIL # BLD: 0.2 K/UL (ref 0–0.4)
EOSINOPHIL #/AREA URNS HPF: NEGATIVE /[HPF]
EOSINOPHIL NFR BLD: 7 % (ref 0–7)
ERYTHROCYTE [DISTWIDTH] IN BLOOD BY AUTOMATED COUNT: 16.1 % (ref 11.5–14.5)
GLUCOSE BLD STRIP.AUTO-MCNC: 199 MG/DL (ref 65–100)
GLUCOSE BLD STRIP.AUTO-MCNC: 261 MG/DL (ref 65–100)
GLUCOSE BLD STRIP.AUTO-MCNC: 298 MG/DL (ref 65–100)
GLUCOSE BLD STRIP.AUTO-MCNC: 82 MG/DL (ref 65–100)
GLUCOSE SERPL-MCNC: 207 MG/DL (ref 65–100)
GLUCOSE SERPL-MCNC: 209 MG/DL (ref 65–100)
HCT VFR BLD AUTO: 23.6 % (ref 35–47)
HGB BLD-MCNC: 7.3 G/DL (ref 11.5–16)
IMM GRANULOCYTES # BLD: 0 K/UL (ref 0–0.04)
IMM GRANULOCYTES NFR BLD AUTO: 0 % (ref 0–0.5)
IRON SATN MFR SERPL: 33 % (ref 20–50)
IRON SERPL-MCNC: 67 UG/DL (ref 35–150)
LYMPHOCYTES # BLD: 1 K/UL (ref 0.8–3.5)
LYMPHOCYTES NFR BLD: 28 % (ref 12–49)
MCH RBC QN AUTO: 25.6 PG (ref 26–34)
MCHC RBC AUTO-ENTMCNC: 30.9 G/DL (ref 30–36.5)
MCV RBC AUTO: 82.8 FL (ref 80–99)
MONOCYTES # BLD: 0.3 K/UL (ref 0–1)
MONOCYTES NFR BLD: 10 % (ref 5–13)
NEUTS SEG # BLD: 1.9 K/UL (ref 1.8–8)
NEUTS SEG NFR BLD: 55 % (ref 32–75)
NRBC # BLD: 0 K/UL (ref 0–0.01)
NRBC BLD-RTO: 0 PER 100 WBC
PHOSPHATE SERPL-MCNC: 4.9 MG/DL (ref 2.6–4.7)
PLATELET # BLD AUTO: 195 K/UL (ref 150–400)
PMV BLD AUTO: 12.3 FL (ref 8.9–12.9)
POTASSIUM SERPL-SCNC: 6.5 MMOL/L (ref 3.5–5.1)
POTASSIUM SERPL-SCNC: 6.6 MMOL/L (ref 3.5–5.1)
RBC # BLD AUTO: 2.85 M/UL (ref 3.8–5.2)
RBC MORPH BLD: ABNORMAL
RBC MORPH BLD: ABNORMAL
RETICS # AUTO: 0.05 M/UL (ref 0.02–0.08)
RETICS/RBC NFR AUTO: 1.8 % (ref 0.7–2.1)
SERVICE CMNT-IMP: ABNORMAL
SERVICE CMNT-IMP: NORMAL
SERVICE CMNT-IMP: NORMAL
SODIUM SERPL-SCNC: 138 MMOL/L (ref 136–145)
SODIUM SERPL-SCNC: 141 MMOL/L (ref 136–145)
TIBC SERPL-MCNC: 205 UG/DL (ref 250–450)
WBC # BLD AUTO: 3.4 K/UL (ref 3.6–11)

## 2018-07-02 PROCEDURE — 36415 COLL VENOUS BLD VENIPUNCTURE: CPT | Performed by: INTERNAL MEDICINE

## 2018-07-02 PROCEDURE — 83540 ASSAY OF IRON: CPT | Performed by: INTERNAL MEDICINE

## 2018-07-02 PROCEDURE — 74011250636 HC RX REV CODE- 250/636: Performed by: INTERNAL MEDICINE

## 2018-07-02 PROCEDURE — 74011250637 HC RX REV CODE- 250/637: Performed by: HOSPITALIST

## 2018-07-02 PROCEDURE — 82962 GLUCOSE BLOOD TEST: CPT

## 2018-07-02 PROCEDURE — 85025 COMPLETE CBC W/AUTO DIFF WBC: CPT | Performed by: INTERNAL MEDICINE

## 2018-07-02 PROCEDURE — 74011250637 HC RX REV CODE- 250/637: Performed by: INTERNAL MEDICINE

## 2018-07-02 PROCEDURE — 87205 SMEAR GRAM STAIN: CPT | Performed by: INTERNAL MEDICINE

## 2018-07-02 PROCEDURE — 74011000250 HC RX REV CODE- 250: Performed by: INTERNAL MEDICINE

## 2018-07-02 PROCEDURE — 74011250636 HC RX REV CODE- 250/636: Performed by: HOSPITALIST

## 2018-07-02 PROCEDURE — 74011636637 HC RX REV CODE- 636/637: Performed by: HOSPITALIST

## 2018-07-02 PROCEDURE — 80048 BASIC METABOLIC PNL TOTAL CA: CPT | Performed by: INTERNAL MEDICINE

## 2018-07-02 PROCEDURE — 85045 AUTOMATED RETICULOCYTE COUNT: CPT | Performed by: INTERNAL MEDICINE

## 2018-07-02 PROCEDURE — 77010033678 HC OXYGEN DAILY

## 2018-07-02 PROCEDURE — 65270000032 HC RM SEMIPRIVATE

## 2018-07-02 PROCEDURE — 74011000258 HC RX REV CODE- 258: Performed by: INTERNAL MEDICINE

## 2018-07-02 PROCEDURE — 80069 RENAL FUNCTION PANEL: CPT | Performed by: INTERNAL MEDICINE

## 2018-07-02 RX ORDER — QUETIAPINE FUMARATE 100 MG/1
100 TABLET, FILM COATED ORAL 2 TIMES DAILY
Qty: 60 TAB | Refills: 0 | Status: SHIPPED | OUTPATIENT
Start: 2018-07-02 | End: 2018-07-04

## 2018-07-02 RX ORDER — OXYCODONE AND ACETAMINOPHEN 5; 325 MG/1; MG/1
1 TABLET ORAL
Qty: 20 TAB | Refills: 0 | Status: SHIPPED | OUTPATIENT
Start: 2018-07-02 | End: 2018-07-04

## 2018-07-02 RX ORDER — HYDROMORPHONE HYDROCHLORIDE 1 MG/ML
1 INJECTION, SOLUTION INTRAMUSCULAR; INTRAVENOUS; SUBCUTANEOUS
Status: DISCONTINUED | OUTPATIENT
Start: 2018-07-02 | End: 2018-07-02

## 2018-07-02 RX ORDER — DEXTROSE 50 % IN WATER (D50W) INTRAVENOUS SYRINGE
12.5 ONCE
Status: COMPLETED | OUTPATIENT
Start: 2018-07-02 | End: 2018-07-02

## 2018-07-02 RX ORDER — LEVOFLOXACIN 5 MG/ML
750 INJECTION, SOLUTION INTRAVENOUS ONCE
Status: COMPLETED | OUTPATIENT
Start: 2018-07-02 | End: 2018-07-02

## 2018-07-02 RX ORDER — ASPIRIN 325 MG
50000 TABLET, DELAYED RELEASE (ENTERIC COATED) ORAL
Qty: 5 CAP | Refills: 0 | Status: SHIPPED | OUTPATIENT
Start: 2018-07-02

## 2018-07-02 RX ORDER — PROMETHAZINE HYDROCHLORIDE 25 MG/1
25 TABLET ORAL
Qty: 20 TAB | Refills: 0 | Status: SHIPPED | OUTPATIENT
Start: 2018-07-02 | End: 2018-07-04

## 2018-07-02 RX ORDER — LEVOFLOXACIN 5 MG/ML
500 INJECTION, SOLUTION INTRAVENOUS
Status: DISCONTINUED | OUTPATIENT
Start: 2018-07-04 | End: 2018-07-04 | Stop reason: HOSPADM

## 2018-07-02 RX ORDER — INSULIN GLARGINE 100 [IU]/ML
8 INJECTION, SOLUTION SUBCUTANEOUS DAILY
Status: DISCONTINUED | OUTPATIENT
Start: 2018-07-03 | End: 2018-07-04 | Stop reason: HOSPADM

## 2018-07-02 RX ORDER — AMOXICILLIN 250 MG
2 CAPSULE ORAL DAILY
Qty: 30 TAB | Refills: 0 | Status: SHIPPED | OUTPATIENT
Start: 2018-07-02 | End: 2018-07-04

## 2018-07-02 RX ORDER — CLONIDINE HYDROCHLORIDE 0.1 MG/1
0.1 TABLET ORAL 2 TIMES DAILY
Qty: 60 TAB | Refills: 0 | Status: SHIPPED | OUTPATIENT
Start: 2018-07-02 | End: 2018-07-04

## 2018-07-02 RX ORDER — VENLAFAXINE 75 MG/1
75 TABLET ORAL 2 TIMES DAILY WITH MEALS
Qty: 60 TAB | Refills: 0 | Status: SHIPPED | OUTPATIENT
Start: 2018-07-02 | End: 2018-07-04

## 2018-07-02 RX ORDER — SODIUM POLYSTYRENE SULFONATE 15 G/60ML
30 SUSPENSION ORAL; RECTAL
Status: DISPENSED | OUTPATIENT
Start: 2018-07-02 | End: 2018-07-03

## 2018-07-02 RX ORDER — INSULIN GLARGINE 100 [IU]/ML
10 INJECTION, SOLUTION SUBCUTANEOUS DAILY
Status: DISCONTINUED | OUTPATIENT
Start: 2018-07-02 | End: 2018-07-02

## 2018-07-02 RX ORDER — SODIUM POLYSTYRENE SULFONATE 15 G/60ML
15 SUSPENSION ORAL; RECTAL
Status: DISCONTINUED | OUTPATIENT
Start: 2018-07-02 | End: 2018-07-02

## 2018-07-02 RX ADMIN — SENNOSIDES AND DOCUSATE SODIUM 2 TABLET: 8.6; 5 TABLET ORAL at 08:25

## 2018-07-02 RX ADMIN — SODIUM BICARBONATE 650 MG: 650 TABLET ORAL at 18:21

## 2018-07-02 RX ADMIN — QUETIAPINE FUMARATE 100 MG: 100 TABLET ORAL at 08:26

## 2018-07-02 RX ADMIN — Medication 5 ML: at 21:16

## 2018-07-02 RX ADMIN — SODIUM BICARBONATE 650 MG: 650 TABLET ORAL at 08:26

## 2018-07-02 RX ADMIN — INSULIN LISPRO 5 UNITS: 100 INJECTION, SOLUTION INTRAVENOUS; SUBCUTANEOUS at 16:30

## 2018-07-02 RX ADMIN — CLONIDINE HYDROCHLORIDE 0.1 MG: 0.1 TABLET ORAL at 18:28

## 2018-07-02 RX ADMIN — HEPARIN SODIUM 5000 UNITS: 5000 INJECTION, SOLUTION INTRAVENOUS; SUBCUTANEOUS at 08:26

## 2018-07-02 RX ADMIN — Medication 10 ML: at 06:13

## 2018-07-02 RX ADMIN — QUETIAPINE FUMARATE 100 MG: 100 TABLET ORAL at 18:21

## 2018-07-02 RX ADMIN — Medication 1000 MCG: at 08:25

## 2018-07-02 RX ADMIN — OXYCODONE HYDROCHLORIDE AND ACETAMINOPHEN 1 TABLET: 5; 325 TABLET ORAL at 16:19

## 2018-07-02 RX ADMIN — Medication 10 ML: at 06:14

## 2018-07-02 RX ADMIN — LABETALOL HYDROCHLORIDE 20 MG: 5 INJECTION, SOLUTION INTRAVENOUS at 03:42

## 2018-07-02 RX ADMIN — DIPHENHYDRAMINE HYDROCHLORIDE 25 MG: 25 CAPSULE ORAL at 16:33

## 2018-07-02 RX ADMIN — PIPERACILLIN SODIUM AND TAZOBACTAM SODIUM 3.38 G: 3; .375 INJECTION, POWDER, LYOPHILIZED, FOR SOLUTION INTRAVENOUS at 12:18

## 2018-07-02 RX ADMIN — HYDRALAZINE HYDROCHLORIDE 100 MG: 50 TABLET, FILM COATED ORAL at 21:15

## 2018-07-02 RX ADMIN — HYDRALAZINE HYDROCHLORIDE 100 MG: 50 TABLET, FILM COATED ORAL at 16:21

## 2018-07-02 RX ADMIN — CALCIUM CARBONATE (ANTACID) CHEW TAB 500 MG 200 MG: 500 CHEW TAB at 21:15

## 2018-07-02 RX ADMIN — HYDROMORPHONE HYDROCHLORIDE 1 MG: 1 INJECTION, SOLUTION INTRAMUSCULAR; INTRAVENOUS; SUBCUTANEOUS at 02:10

## 2018-07-02 RX ADMIN — HYDRALAZINE HYDROCHLORIDE 100 MG: 50 TABLET, FILM COATED ORAL at 08:26

## 2018-07-02 RX ADMIN — AMLODIPINE BESYLATE 10 MG: 5 TABLET ORAL at 08:25

## 2018-07-02 RX ADMIN — PATIROMER 16.8 G: 8.4 POWDER, FOR SUSPENSION ORAL at 08:24

## 2018-07-02 RX ADMIN — DIPHENHYDRAMINE HYDROCHLORIDE 25 MG: 25 CAPSULE ORAL at 02:10

## 2018-07-02 RX ADMIN — INSULIN LISPRO 2 UNITS: 100 INJECTION, SOLUTION INTRAVENOUS; SUBCUTANEOUS at 12:19

## 2018-07-02 RX ADMIN — POLYETHYLENE GLYCOL 3350 17 G: 17 POWDER, FOR SOLUTION ORAL at 08:24

## 2018-07-02 RX ADMIN — DEXTROSE MONOHYDRATE 12.5 G: 25 INJECTION, SOLUTION INTRAVENOUS at 15:00

## 2018-07-02 RX ADMIN — CALCIUM CARBONATE (ANTACID) CHEW TAB 500 MG 200 MG: 500 CHEW TAB at 16:21

## 2018-07-02 RX ADMIN — LEVOFLOXACIN 750 MG: 5 INJECTION, SOLUTION INTRAVENOUS at 18:22

## 2018-07-02 RX ADMIN — HYDROMORPHONE HYDROCHLORIDE 1 MG: 1 INJECTION, SOLUTION INTRAMUSCULAR; INTRAVENOUS; SUBCUTANEOUS at 06:13

## 2018-07-02 RX ADMIN — HEPARIN SODIUM 5000 UNITS: 5000 INJECTION, SOLUTION INTRAVENOUS; SUBCUTANEOUS at 16:00

## 2018-07-02 RX ADMIN — CALCITRIOL 0.25 MCG: 0.25 CAPSULE, LIQUID FILLED ORAL at 08:25

## 2018-07-02 RX ADMIN — VENLAFAXINE 75 MG: 37.5 TABLET ORAL at 08:25

## 2018-07-02 RX ADMIN — OXYCODONE HYDROCHLORIDE AND ACETAMINOPHEN 1 TABLET: 5; 325 TABLET ORAL at 09:59

## 2018-07-02 RX ADMIN — INSULIN LISPRO 5 UNITS: 100 INJECTION, SOLUTION INTRAVENOUS; SUBCUTANEOUS at 06:56

## 2018-07-02 RX ADMIN — CLONIDINE HYDROCHLORIDE 0.1 MG: 0.1 TABLET ORAL at 08:25

## 2018-07-02 RX ADMIN — VENLAFAXINE 75 MG: 37.5 TABLET ORAL at 16:33

## 2018-07-02 RX ADMIN — CALCIUM CARBONATE (ANTACID) CHEW TAB 500 MG 200 MG: 500 CHEW TAB at 08:25

## 2018-07-02 RX ADMIN — Medication 10 ML: at 21:16

## 2018-07-02 RX ADMIN — DIPHENHYDRAMINE HYDROCHLORIDE 25 MG: 25 CAPSULE ORAL at 09:59

## 2018-07-02 RX ADMIN — Medication 5 ML: at 06:56

## 2018-07-02 NOTE — PROGRESS NOTES
Nephrology Progress Note  Kori Grace  Date of Admission : 6/23/2018    CC: Follow up for STONE on CKD       Assessment and Plan     STONE on CKD :  - progressively worse   - need to stop Zosyn -- she had severe interstitial disease and Cr anmd K are rising     Hyperkalemia :  - diet, Chronic Intersititial disease etc   - medical management today  - consulted dietafshann for education   - recheck K this evening       CKD Stage IV :  - baseline Cr 2.6-2.8 mg/dl at best  - appears to be progressing  - previously on dialysis and recovered       AG acidosis:  - cont oral bicarb      Sickle cell crisis:  - transfused 1 unit PRBCs on 6/25     HTN:  - adjust meds     Left Foot Osteo :   - on dapto and zosyn   - CPK ok      IDDM:  - per hospitalist     Bacteremia:  - abx per ID  - ? Source, possibly foot  - on dapto and zosyn    LLE DVT:  - appears chronic  - no on AC at this time       Interval History:  Seen and examined. K high, Cr rising. She reports having several BMs today     Current Medications: all current  Medications have been eviewed in EPIC  Review of Systems: Pertinent items are noted in HPI. Objective:  Vitals:    Vitals:    07/02/18 0334 07/02/18 0346 07/02/18 0748 07/02/18 1204   BP: (!) 184/100 159/67 121/74 166/90   Pulse: (!) 117 88 74 95   Resp: 18 18 18   Temp: 98.7 °F (37.1 °C)  97.9 °F (36.6 °C) 98 °F (36.7 °C)   SpO2: 96%  95% 96%   Weight:       Height:         Intake and Output:     06/30 1901 - 07/02 0700  In: 2708 [P.O.:600;  I.V.:950]  Out: -     Physical Examination:  Pt intubated     No  General: NAD,Conversant   Neck:  Supple, no mass  Resp:  Lungs CTA B/L, no wheezing , normal respiratory effort  CV:  RRR,  no murmur or rub, 1 -2 + LE edema, L > R  GI:  Soft, NT, + Bowel sounds, no hepatosplenomegaly  Neurologic:  Non focal  Psych:             AAO x 3 appropriate affect   Skin:  No Rash  :  No escobedo    []    High complexity decision making was performed  []    Patient is at high-risk of decompensation with multiple organ involvement    Lab Data Personally Reviewed: I have reviewed all the pertinent labs, microbiology data and radiology studies during assessment. Recent Labs      07/02/18 0358 07/01/18   0548  06/30/18   0621   NA  141  138  138  139   K  6.6*  6.5*  5.7*  5.6*   CL  110*  109*  108  108   CO2  23  23  23  23   GLU  209*  207*  236*  238*   BUN  45*  45*  45*  46*   CREA  3.90*  3.99*  3.81*  3.75*   CA  7.7*  7.6*  7.9*  7.8*   PHOS  4.9*  4.5  4.8*   ALB  2.6*  2.7*  2.8*     Recent Labs      07/02/18 0358  06/30/18   0621   WBC  3.4*  4.0   HGB  7.3*  8.3*   HCT  23.6*  26.1*   PLT  195  228     Lab Results   Component Value Date/Time    Specimen Description: URINE 06/24/2013 08:00 PM    Specimen Description: URINE 06/17/2013 07:55 PM    Specimen Description: URINE 05/26/2013 10:10 AM     Lab Results   Component Value Date/Time    Culture result: NO GROWTH 5 DAYS 06/27/2018 04:06 AM    Culture result: (A) 06/24/2018 07:30 AM     STREPTOCOCCUS MITIS/ORALIS GROWING IN 1 OF 2 BOTTLES DRAWN . .(SITE= L CHEST PICC) (SENSITIVITIES PERFORMED BY E-TEST)    Culture result: (A) 06/24/2018 07:30 AM     PRELIMINARY REPORT OF GRAM POSITIVE COCCI IN PAIRS GROWING IN 1 OF 2 BOTTLES DRAWN . ..(SITE= L CHEST PICC) CALLED TO AND READ BACK BY LAURO ACOSTA ([place]) ON 6/25/18 AT 0633 BY VI    Culture result: REMAINING BOTTLE(S) HAS/HAVE NO GROWTH IN 5 DAYS 06/24/2018 07:30 AM     Recent Results (from the past 24 hour(s))   GLUCOSE, POC    Collection Time: 07/01/18  3:30 PM   Result Value Ref Range    Glucose (POC) 205 (H) 65 - 100 mg/dL    Performed by Martina Reyes, POC    Collection Time: 07/01/18  9:42 PM   Result Value Ref Range    Glucose (POC) 96 65 - 100 mg/dL    Performed by Debora Nava    RENAL FUNCTION PANEL    Collection Time: 07/02/18  3:58 AM   Result Value Ref Range    Sodium 141 136 - 145 mmol/L    Potassium 6.6 (HH) 3.5 - 5.1 mmol/L Chloride 110 (H) 97 - 108 mmol/L    CO2 23 21 - 32 mmol/L    Anion gap 8 5 - 15 mmol/L    Glucose 209 (H) 65 - 100 mg/dL    BUN 45 (H) 6 - 20 MG/DL    Creatinine 3.90 (H) 0.55 - 1.02 MG/DL    BUN/Creatinine ratio 12 12 - 20      GFR est AA 16 (L) >60 ml/min/1.73m2    GFR est non-AA 13 (L) >60 ml/min/1.73m2    Calcium 7.7 (L) 8.5 - 10.1 MG/DL    Phosphorus 4.9 (H) 2.6 - 4.7 MG/DL    Albumin 2.6 (L) 3.5 - 5.0 g/dL   RETICULOCYTE COUNT    Collection Time: 07/02/18  3:58 AM   Result Value Ref Range    Reticulocyte count 1.8 0.7 - 2.1 %    Absolute Retic Cnt. 0.0507 0.0164 - 0.0776 M/ul   CBC WITH AUTOMATED DIFF    Collection Time: 07/02/18  3:58 AM   Result Value Ref Range    WBC 3.4 (L) 3.6 - 11.0 K/uL    RBC 2.85 (L) 3.80 - 5.20 M/uL    HGB 7.3 (L) 11.5 - 16.0 g/dL    HCT 23.6 (L) 35.0 - 47.0 %    MCV 82.8 80.0 - 99.0 FL    MCH 25.6 (L) 26.0 - 34.0 PG    MCHC 30.9 30.0 - 36.5 g/dL    RDW 16.1 (H) 11.5 - 14.5 %    PLATELET 759 475 - 034 K/uL    MPV 12.3 8.9 - 12.9 FL    NRBC 0.0 0  WBC    ABSOLUTE NRBC 0.00 0.00 - 0.01 K/uL    NEUTROPHILS 55 32 - 75 %    LYMPHOCYTES 28 12 - 49 %    MONOCYTES 10 5 - 13 %    EOSINOPHILS 7 0 - 7 %    BASOPHILS 0 0 - 1 %    IMMATURE GRANULOCYTES 0 0.0 - 0.5 %    ABS. NEUTROPHILS 1.9 1.8 - 8.0 K/UL    ABS. LYMPHOCYTES 1.0 0.8 - 3.5 K/UL    ABS. MONOCYTES 0.3 0.0 - 1.0 K/UL    ABS. EOSINOPHILS 0.2 0.0 - 0.4 K/UL    ABS. BASOPHILS 0.0 0.0 - 0.1 K/UL    ABS. IMM.  GRANS. 0.0 0.00 - 0.04 K/UL    DF SMEAR SCANNED      RBC COMMENTS TARGET CELLS  PRESENT        RBC COMMENTS ANISOCYTOSIS  1+       METABOLIC PANEL, BASIC    Collection Time: 07/02/18  3:58 AM   Result Value Ref Range    Sodium 138 136 - 145 mmol/L    Potassium 6.5 (H) 3.5 - 5.1 mmol/L    Chloride 109 (H) 97 - 108 mmol/L    CO2 23 21 - 32 mmol/L    Anion gap 6 5 - 15 mmol/L    Glucose 207 (H) 65 - 100 mg/dL    BUN 45 (H) 6 - 20 MG/DL    Creatinine 3.99 (H) 0.55 - 1.02 MG/DL    BUN/Creatinine ratio 11 (L) 12 - 20      GFR est AA 15 (L) >60 ml/min/1.73m2    GFR est non-AA 13 (L) >60 ml/min/1.73m2    Calcium 7.6 (L) 8.5 - 10.1 MG/DL   GLUCOSE, POC    Collection Time: 07/02/18  6:49 AM   Result Value Ref Range    Glucose (POC) 298 (H) 65 - 100 mg/dL    Performed by Cuauhtemoc Guo, POC    Collection Time: 07/02/18 12:01 PM   Result Value Ref Range    Glucose (POC) 199 (H) 65 - 100 mg/dL    Performed by Nichole Flowers MD  [address]  Phone - [phone]   Fax - [phone]  [email or web address]

## 2018-07-02 NOTE — PROGRESS NOTES
Problem: Falls - Risk of  Goal: *Absence of Falls  Document Blake Fall Risk and appropriate interventions in the flowsheet. Outcome: Progressing Towards Goal  Fall Risk Interventions:  Mobility Interventions: Assess mobility with egress test         Medication Interventions: Teach patient to arise slowly    Elimination Interventions: Call light in reach    History of Falls Interventions: Evaluate medications/consider consulting pharmacy        Problem: Pressure Injury - Risk of  Goal: *Prevention of pressure injury  Document Antonio Scale and appropriate interventions in the flowsheet. Outcome: Progressing Towards Goal  Pressure Injury Interventions:             Activity Interventions: Increase time out of bed    Mobility Interventions: Pressure redistribution bed/mattress (bed type)    Nutrition Interventions: Document food/fluid/supplement intake

## 2018-07-02 NOTE — PROGRESS NOTES
NUTRITION  Pt seen for:       []           Supplements  []             PO intake check   []           Food Allergies  []             Food Preferences/tolerances    [x]           Rescreen   [x]             Education    []           Diet order clarification []             Other            RECOMMENDATIONS:   Continue diet as ordered  Continue daily weights    SUBJECTIVE/OBJECTIVE:   Chart reviewed, discussed with RN and team during interdisciplinary rounds. Pt is familiar to this nutrition team from previous admissions. She is eating well at meals without issues. Up walking the halls and states she's feeling well. No nutrition risk identified at this time. Will rescreen as indicated. Addendum: Consult received for low potassium diet education. Reviewed low potassium foods list, potassium content of foods at the bedside as well as Glucerna Shake supplements that the pt has been drinking twice/day. Discussed Glucerna alternatives for discharge (different potassium content based on which formula is chosen). No further questions at this time. Will follow for additional information as needed. Diet: Renal 70-70-70 2000 kcal consistent carb;  AM/HS snack; Glucerna Shake TID    Intake: [x]           Good     []           Fair      []           Poor   Patient Vitals for the past 100 hrs:   % Diet Eaten   07/01/18 1300 100 %   07/01/18 0758 100 %   06/30/18 1831 100 %   06/30/18 1242 100 %   06/30/18 0921 100 %     Weight Changes:   Last 3 Recorded Weights in this Encounter    07/01/18 0539 07/01/18 0758 07/02/18 0103   Weight: 67 kg (147 lb 11.3 oz) 67 kg (147 lb 11.3 oz) 66.5 kg (146 lb 9.7 oz)     Nutrition Problems Identified:  [x]      None    PLAN:   [x]           Rescreen per screening protocol    Rescreen:  []            At Nutrition Risk           [x]            Not at Nutrition Risk, rescreen per screening protocol    Gretchen Moody, [address] [facility]

## 2018-07-02 NOTE — PROGRESS NOTES
Day #1 of levofloxacin  Indication:  bacteremia, foot infection   Current regimen:  750 mg IV q48h  Abx regimen: daptomycin, Zosyn changed to levofloxacin  Recent Labs      18   0358  18   0548  18   0621   WBC  3.4*   --   4.0   CREA  3.90*  3.99*  3.81*  3.75*   BUN  45*  45*  45*  46*     Est CrCl: 17 ml/min; Temp (24hrs), Av.2 °F (36.8 °C), Min:97.6 °F (36.4 °C), Max:98.7 °F (37.1 °C)    Cultures: no new    Plan: Change to 750 mg IV x1 followed by 500 mg IV q48h for CrCl < 20 ml/min.  Pharmacy will continue to monitor patient and will adjust dose as appropriate

## 2018-07-02 NOTE — PROGRESS NOTES
ID Progress Note  2018    Subjective:     Has complaints of diffuse aches and pains    Objective:     Antibiotics:  1. Zosyn   2. Daptomycin       Vitals:   Visit Vitals    BP (!) 178/93 (BP 1 Location: Left arm, BP Patient Position: Sitting)    Pulse 98    Temp 98.7 °F (37.1 °C)    Resp 16    Ht 5' 2\" (1.575 m)    Wt 66.5 kg (146 lb 9.7 oz)    LMP 2018 (Exact Date)    SpO2 99%    BMI 26.81 kg/m2        Tmax:  Temp (24hrs), Av.2 °F (36.8 °C), Min:97.6 °F (36.4 °C), Max:98.7 °F (37.1 °C)      Exam:  Lungs:  clear to auscultation bilaterally  Heart:  regular rate and rhythm  Abdomen:  soft, non-tender. Bowel sounds normal. No masses,  no organomegaly    Labs:      Recent Labs      18   0358  18   0548  18   0621   WBC  3.4*   --   4.0   HGB  7.3*   --   8.3*   PLT  195   --   228   BUN  45*  45*  45*  46*   CREA  3.90*  3.99*  3.81*  3.75*       Cultures:     Lab Results   Component Value Date/Time    Specimen Description: URINE 2013 08:00 PM    Specimen Description: URINE 2013 07:55 PM    Specimen Description: URINE 2013 10:10 AM     Lab Results   Component Value Date/Time    Culture result: NO GROWTH 5 DAYS 2018 04:06 AM    Culture result: (A) 2018 07:30 AM     STREPTOCOCCUS MITIS/ORALIS GROWING IN 1 OF 2 BOTTLES DRAWN . .(SITE= L CHEST PICC) (SENSITIVITIES PERFORMED BY E-TEST)    Culture result: (A) 2018 07:30 AM     PRELIMINARY REPORT OF GRAM POSITIVE COCCI IN PAIRS GROWING IN 1 OF 2 BOTTLES DRAWN . ..(SITE= L CHEST PICC) CALLED TO AND READ BACK BY LAURO StubbsBison) ON 18 AT 6399 BY VI    Culture result: REMAINING BOTTLE(S) HAS/HAVE NO GROWTH IN 5 DAYS 2018 07:30 AM       Radiology:     Line/Insert Date:           Assessment:     1. Bacteremia  2. Advanced renal disease  3. Left foot infection    Objective:     1. Continue dapto  2.  Change to quinolone therapy    Mac Mcclendon MD

## 2018-07-02 NOTE — PROGRESS NOTES
Bedside shift change report given to April (oncoming nurse) by Ubaldo Hector (offgoing nurse).  Report included the following information SBAR, Intake/Output, MAR, Recent Results and Cardiac Rhythm NSR - ST.

## 2018-07-02 NOTE — PROGRESS NOTES
Pt requested \"IV pain meds and benadryl\" , stated her pain is 7/10. Pt is walking in hallway and talking on phone. Informed pt that the dilaudid is every 6 hours and she just received it at 0615. Offered pt percocet, she stated \"I'll take the percocet but I want the IV pain med, you need to call the doctor and request a one time dose and then I'll wait the 6 hours\". Pt became frustrated and asked which doctor changed it from 4 hours to 6 hours. Informed pt that the hospitalist changed the order and that I can give him a call in regards to this issue. DR lobo.

## 2018-07-02 NOTE — DIABETES MGMT
[facility] Progress Note    Recommendations/ Comments: Chart review for fluctuating blood sugars. Noted 8 units of Lantus ordered to start tomorrow. If appropriate please   1. Change lispro correction to high sensitivity due to renal status  2. Continue Lantus 8 units  3. Considering adding Humalog 2 units ac tid as long as pt is eating at least 50% of her tray. Current hospital DM medication: lispro correction scale, normal sensitivity and Lantus 8 units (to start tomorrow)    Chart reviewed on Simona Salas. Patient is a 42 y.o. female with known DM - she was discharged on lantus 5 units on 6/14/2018. No DM meds lispro in PTA. Pt is well known to [facility] from many previous admission: 6/9 - 6/14/2018, 4/27-5/14/2018, 3/2 - 3/27/2018 in addition to admissions in previous years. She was transfused 3/10/2018  On 6/25/2018 Hgb 6.5  Therefore, A1c may not be accurate    A1c:   Lab Results   Component Value Date/Time    Hemoglobin A1c 7.6 (H) 06/25/2018 03:04 AM    Hemoglobin A1c 7.9 (H) 06/23/2018 11:29 PM       Recent Glucose Results:   Lab Results   Component Value Date/Time     (H) 07/02/2018 03:58 AM     (H) 07/02/2018 03:58 AM    GLUCPOC 199 (H) 07/02/2018 12:01 PM    GLUCPOC 298 (H) 07/02/2018 06:49 AM    GLUCPOC 96 07/01/2018 09:42 PM        Lab Results   Component Value Date/Time    Creatinine 3.90 (H) 07/02/2018 03:58 AM    Creatinine 3.99 (H) 07/02/2018 03:58 AM     Estimated Creatinine Clearance: 17.3 mL/min (based on Cr of 3.9). Active Orders   Diet    DIET RENAL WITH OPTIONS 70-70-70 (House); Regular; Consistent Carb 2000kcal; Low Potassium, Low Phosphorus        PO intake:   Patient Vitals for the past 72 hrs:   % Diet Eaten   07/01/18 1300 100 %   07/01/18 0758 100 %   06/30/18 1831 100 %   06/30/18 1242 100 %   06/30/18 0921 100 %       Will continue to follow as needed.     Thank you  Marylene Binning, RD, CDE

## 2018-07-02 NOTE — PROGRESS NOTES
[address] [facility] to Dr. Nidhi Ta patient is to be discharged today, give oral pain medications discontinue Ilsa Robertson, RN    [id number] spoke with Dr. Nidhi Ta he advised patient still needs ABX per ID, Dr. Nissa Bach, patient to transfer to medical unit.   [facility]

## 2018-07-02 NOTE — PROGRESS NOTES
TRANSFER - IN REPORT:    Verbal report received from April(name) on Simona Salas  being received from [facility](unit) for routine progression of care      Report consisted of patients Situation, Background, Assessment and   Recommendations(SBAR). Information from the following report(s) SBAR, Kardex, Intake/Output, MAR, Recent Results and Cardiac Rhythm NSR/ Sinus Tach was reviewed with the receiving nurse. Opportunity for questions and clarification was provided. Assessment completed upon patients arrival to unit and care assumed.

## 2018-07-02 NOTE — PROGRESS NOTES
TRANSFER - OUT REPORT:    Verbal report given to Tracy Tompkins RN(name) on Artemio Nowak  being transferred to (unit) for routine progression of care       Report consisted of patients Situation, Background, Assessment and   Recommendations(SBAR). Information from the following report(s) SBAR, Intake/Output, MAR, Accordion and Cardiac Rhythm SR/ST was reviewed with the receiving nurse. Lines:   Triple Lumen 06/29/18 Left Femoral (Active)   Central Line Being Utilized Yes 7/2/2018  3:46 AM   Criteria for Appropriate Use Limited/no vessel suitable for conventional peripheral access 7/2/2018  3:46 AM   Site Assessment Clean, dry, & intact 7/2/2018  3:46 AM   Infiltration Assessment 0 7/2/2018  3:46 AM   Affected Extremity/Extremities Color distal to insertion site pink (or appropriate for race) 7/2/2018  3:46 AM   Date of Last Dressing Change 05/29/18 7/2/2018  3:46 AM   Dressing Status Clean, dry, & intact 7/2/2018  3:46 AM   Dressing Type Disk with Chlorhexadine gluconate (CHG) 7/2/2018  3:46 AM   Action Taken Open ports on tubing capped 7/2/2018  3:46 AM   Proximal Hub Color/Line Status Blue;Capped 7/2/2018  3:46 AM   Positive Blood Return (Medial Site) Yes 7/2/2018  3:46 AM   Medial Hub Color/Line Status White;Capped 7/2/2018  3:46 AM   Positive Blood Return (Lateral Site) Yes 7/2/2018  3:46 AM   Distal Hub Color/Line Status Brown;Capped 7/2/2018  3:46 AM   Positive Blood Return (Site #3) Yes 7/2/2018  3:46 AM   Alcohol Cap Used Yes 7/2/2018  3:46 AM       Venous Access Device 06/16/18 (Active)        Opportunity for questions and clarification was provided.       Patient transported with:   [employer]BOX

## 2018-07-02 NOTE — CONSULTS
Vascular Surgery Consult    Subjective:      Corbin Preciado is a 42 y.o. female who presents for evaluation of diffuse pain. She is well known to me from prior hospitalization at [facility] months ago. She underwent several left foot debridements and ultimately a complex TMA with Dr. Rebekah John. She ultimately had done well from a foot standpoint however there has been a residual wound and she underwent an MRI in May of this year that showed a fluid collection and possible osteomyelitis changes. She had a discussion with Dr. Rebekah John and the plan was to meet me in the office for discussion of an elective BKA of the left given the persistent osteo. She missed that appointment 1-2 weeks ago due to her admission with throat edema. She has no specific complaints but doesn't feel great. Denies fevers and chills.      Past Medical History:   Diagnosis Date    ARF (acute renal failure) ([address] requiring dialysis 2011    Asthma     CKD (chronic kidney disease)     Diabetes ([address]     Gastroparesis 2010    Gastric Pacer- REMOVED 07/2015    GERD (gastroesophageal reflux disease)     Hypertension     Narcotic dependence ([address]     THU (obstructive sleep apnea)     wears 2 LPM oxygen at night    Other ill-defined conditions(799.89)     Polycystic ovarian syndrome     Seizures (HCC)     Sickle cell trait ([address]     Thromboembolus ([address] to her left arm and was told she had one in left leg recently     Past Surgical History:   Procedure Laterality Date    HX CATARACT REMOVAL  3/5/12    right    HX DILATION AND CURETTAGE      ablation    HX GASTRIC BYPASS  2015    HX GI      j tube placement and removal    HX OTHER SURGICAL  2010    Gastric Pacer- REMOVED 07/2015    HX VASCULAR ACCESS      gray cath rt subclavian    HX VASCULAR ACCESS      HD access right thigh      Family History   Problem Relation Age of Onset    Cancer Mother      lung    Hypertension Mother     Cancer Father      kidney    Stroke Father      3 strokes: 59-72    Heart Disease Father 72     CABG    Hypertension Father     Cancer Sister      pancreatic    Cancer Maternal Aunt      breast    Cancer Paternal Aunt      breast    Schizophrenia Sister      was in [facility], now ass't living    Other Sister       AIDS    Other Other      nephew of AIDS     Social History     Social History    Marital status:      Spouse name: N/A    Number of children: N/A    Years of education: N/A     Social History Main Topics    Smoking status: Never Smoker    Smokeless tobacco: Never Used    Alcohol use No    Drug use: No    Sexual activity: Yes     Partners: Male     Birth control/ protection: None     Other Topics Concern    None     Social History Narrative    Lives with her  and father      Current Facility-Administered Medications   Medication Dose Route Frequency Provider Last Rate Last Dose    sodium polystyrene (KAYEXALATE) 15 gram/60 mL oral suspension 30 g  30 g Oral NOW Deanne Blandon MD        [START ON 7/3/2018] insulin glargine (LANTUS) injection 8 Units  8 Units SubCUTAneous DAILY Antonieta Neville MD        levoFLOXacin (LEVAQUIN) 750 mg in D5W IVPB  750 mg IntraVENous ONCE Bobbi Fuentes MD        [START ON 2018] levoFLOXacin (LEVAQUIN) 500 mg in D5W IVPB  500 mg IntraVENous Q48H Mellisa Dietrich MD        amLODIPine (NORVASC) tablet 10 mg  10 mg Oral DAILY Jose Tompkins MD   10 mg at 18 0825    cloNIDine HCl (CATAPRES) tablet 0.1 mg  0.1 mg Oral BID Jose Tompkins MD   0.1 mg at 18 0825    bacitracin 500 unit/gram packet 1 Packet  1 Packet Topical PRN Jose Tompkins MD        sodium chloride (NS) flush 5 mL  5 mL InterCATHeter Q24H Jose Tompkins MD   5 mL at 18 2141    sodium chloride (NS) flush 5 mL  5 mL InterCATHeter PRN Jose Tompkins MD        sodium chloride (NS) flush 5 mL  5 mL InterCATHeter Q8H Jose Tompkins MD   5 mL at 07/02/18 0656    labetalol (NORMODYNE;TRANDATE) injection 20 mg  20 mg IntraVENous Q6H PRN Jose Tompkins MD   20 mg at 07/02/18 0342    white petrolatum-mineral oil (EUCERIN) cream   Topical PRN Jose Tompkins MD        0.9% sodium chloride infusion 250 mL  250 mL IntraVENous PRN Naomie Reza MD        prochlorperazine (COMPAZINE) with saline injection 5 mg  5 mg IntraVENous Q6H PRN Naomie Reza MD        venlafaxine [facility]) tablet 75 mg  75 mg Oral BID WITH MEALS Juana Lyles MD   75 mg at 07/02/18 1633    senna-docusate (Vertie Cody) 8.6-50 mg per tablet 2 Tab  2 Tab Oral DAILY Juana Lyles MD   2 Tab at 07/02/18 0825    QUEtiapine (SEROquel) tablet 100 mg  100 mg Oral BID Juana Lyles MD   100 mg at 07/02/18 0826    oxyCODONE-acetaminophen (PERCOCET) 5-325 mg per tablet 1 Tab  1 Tab Oral Q6H PRN Juana Lyles MD   1 Tab at 07/02/18 1619    cyanocobalamin (VITAMIN B12) tablet 1,000 mcg  1,000 mcg Oral DAILY Juana Lyles MD   1,000 mcg at 07/02/18 0825    calcium carbonate (TUMS) chewable tablet 200 mg [elemental]  200 mg Oral TID Juana Lyles MD   200 mg at 07/02/18 1621    albuterol (PROVENTIL VENTOLIN) nebulizer solution 2.5 mg  2.5 mg Nebulization Q4H PRN Juana Lyles MD        sodium chloride (NS) flush 5-10 mL  5-10 mL IntraVENous Q8H Juana Lyles MD   10 mL at 07/02/18 1191    acetaminophen (TYLENOL) tablet 500 mg  500 mg Oral Q4H PRN Juana Lyles MD   500 mg at 07/01/18 1356    glucose chewable tablet 16 g  4 Tab Oral PRN Juana Lyles MD        dextrose (D50W) injection syrg 12.5-25 g  12.5-25 g IntraVENous PRN Juana Lyles MD   12.5 g at 06/25/18 0704    glucagon (GLUCAGEN) injection 1 mg  1 mg IntraMUSCular PRN Juana Lyles MD        diphenhydrAMINE (BENADRYL) capsule 25 mg  25 mg Oral Q6H PRN Juana Lyles MD   25 mg at 07/02/18 1633    DAPTOmycin (CUBICIN) 400 mg in 0.9% sodium chloride 50 mL IVPB RF formulation  400 mg IntraVENous Q48H Shelia Henning MD   400 mg at 18 1749    insulin lispro (HUMALOG) injection   SubCUTAneous AC&HS Vance Cooper MD   5 Units at 18 1630    hydrALAZINE (APRESOLINE) tablet 100 mg  100 mg Oral TID Vance Cooper MD   100 mg at 18 1621    sodium chloride (NS) flush 20 mL  20 mL InterCATHeter PRN Vance Cooper MD        sodium chloride (NS) flush 10 mL  10 mL InterCATHeter Q24H Vance Cooper MD   10 mL at 18 1113    sodium chloride (NS) flush 10 mL  10 mL InterCATHeter PRN Vance Cooper MD   10 mL at 18 1525    sodium chloride (NS) flush 10 mL  10 mL InterCATHeter Q8H Vance Cooper MD   10 mL at 18 7146    alteplase (CATHFLO) 1 mg in sterile water (preservative free) 1 mL injection  1 mg InterCATHeter PRN Vance Cooper MD   1 mg at 18 2136    polyethylene glycol (MIRALAX) packet 17 g  17 g Oral DAILY Vance Cooper MD   17 g at 18 0530    sodium bicarbonate tablet 650 mg  650 mg Oral BID Vance Cooper MD   650 mg at 18 4902    calcitRIOL (ROCALTROL) capsule 0.25 mcg  0.25 mcg Oral DAILY Vance Cooper MD   0.25 mcg at 18 0825    promethazine (PHENERGAN) tablet 25 mg  25 mg Oral Q8H PRN Vance Cooper MD   25 mg at 18 1912    sodium chloride (NS) flush 5-10 mL  5-10 mL IntraVENous PRN David Elizabeth MD            Allergies   Allergen Reactions    Fentanyl Itching and Angioedema     \"throat closed up\" per pt      Erythromycin Itching    Toradol [Ketorolac] Rash    Morphine Itching       Review of Systems:  A comprehensive review of systems was negative except for that written in the History of Present Illness.     Objective:      Patient Vitals for the past 8 hrs:   BP Temp Pulse Resp SpO2   18 1605 (!) 178/93 98.7 °F (37.1 °C) 98 16 99 %   18 1204 166/90 98 °F (36.7 °C) 95 18 96 %       Temp (24hrs), Av.2 °F (36.8 °C), Min:97.6 °F (36.4 °C), Max:98.7 °F (37.1 °C)      Physical Exam:  GENERAL: alert, cooperative, no distress, appears stated age, THROAT & NECK: normal and no erythema or exudates noted. , LUNG: clear to auscultation bilaterally, HEART: regular rate and rhythm, S1, S2 normal, no murmur, click, rub or gallop, ABDOMEN: soft, non-tender. Bowel sounds normal. No masses,  no organomegaly, EXTREMITIES:  extremities normal, atraumatic, no cyanosis or edema, SKIN: Normal., NEUROLOGIC: negative, PSYCHIATRIC: non focal Left TMA with lateral eschar. No drainage or erythema. Assessment:     41 y/o AAF s/p left TMA with chronic osteomyelitis and bacteremia    Plan:     Mrs. Gabby Sparks is a 42year old female with left foot chronic osteomyelitis from an MRI in 5/2018. She was scheduled to see me for discussion of an elective BKA 1-2 weeks ago. We had that discussion today. I discussed with her the nature of the procedure, the expected outcomes and the typical course. Her left leg is very benign appearing and the superficial eschar is underwhelming. She would like to continue with antibiotics but she seems reasonable and understands that she can not be on them forever. Please call with questions. I will follow peripherally and available for further discussion.     Thanks for the consult     Signed By: Florentin Augustine MD     July 2, 2018

## 2018-07-03 LAB
ALBUMIN SERPL-MCNC: 2.8 G/DL (ref 3.5–5)
ALBUMIN/GLOB SERPL: 0.7 {RATIO} (ref 1.1–2.2)
ALP SERPL-CCNC: 166 U/L (ref 45–117)
ALT SERPL-CCNC: 23 U/L (ref 12–78)
ANION GAP SERPL CALC-SCNC: 4 MMOL/L (ref 5–15)
AST SERPL-CCNC: 15 U/L (ref 15–37)
BASOPHILS # BLD: 0 K/UL (ref 0–0.1)
BASOPHILS NFR BLD: 0 % (ref 0–1)
BILIRUB DIRECT SERPL-MCNC: 0.1 MG/DL (ref 0–0.2)
BILIRUB SERPL-MCNC: 0.3 MG/DL (ref 0.2–1)
BUN SERPL-MCNC: 47 MG/DL (ref 6–20)
BUN/CREAT SERPL: 12 (ref 12–20)
CALCIUM SERPL-MCNC: 8.2 MG/DL (ref 8.5–10.1)
CHLORIDE SERPL-SCNC: 109 MMOL/L (ref 97–108)
CO2 SERPL-SCNC: 24 MMOL/L (ref 21–32)
CREAT SERPL-MCNC: 3.96 MG/DL (ref 0.55–1.02)
DIFFERENTIAL METHOD BLD: ABNORMAL
EOSINOPHIL # BLD: 0.2 K/UL (ref 0–0.4)
EOSINOPHIL NFR BLD: 6 % (ref 0–7)
ERYTHROCYTE [DISTWIDTH] IN BLOOD BY AUTOMATED COUNT: 16 % (ref 11.5–14.5)
GLOBULIN SER CALC-MCNC: 4.1 G/DL (ref 2–4)
GLUCOSE BLD STRIP.AUTO-MCNC: 130 MG/DL (ref 65–100)
GLUCOSE BLD STRIP.AUTO-MCNC: 186 MG/DL (ref 65–100)
GLUCOSE BLD STRIP.AUTO-MCNC: 254 MG/DL (ref 65–100)
GLUCOSE SERPL-MCNC: 162 MG/DL (ref 65–100)
HCT VFR BLD AUTO: 25.1 % (ref 35–47)
HGB BLD-MCNC: 7.8 G/DL (ref 11.5–16)
IMM GRANULOCYTES # BLD: 0 K/UL (ref 0–0.04)
IMM GRANULOCYTES NFR BLD AUTO: 0 % (ref 0–0.5)
LDH SERPL L TO P-CCNC: 185 U/L (ref 81–246)
LYMPHOCYTES # BLD: 0.8 K/UL (ref 0.8–3.5)
LYMPHOCYTES NFR BLD: 30 % (ref 12–49)
MCH RBC QN AUTO: 25.7 PG (ref 26–34)
MCHC RBC AUTO-ENTMCNC: 31.1 G/DL (ref 30–36.5)
MCV RBC AUTO: 82.6 FL (ref 80–99)
MONOCYTES # BLD: 0.3 K/UL (ref 0–1)
MONOCYTES NFR BLD: 9 % (ref 5–13)
NEUTS SEG # BLD: 1.5 K/UL (ref 1.8–8)
NEUTS SEG NFR BLD: 54 % (ref 32–75)
NRBC # BLD: 0 K/UL (ref 0–0.01)
NRBC BLD-RTO: 0 PER 100 WBC
PLATELET # BLD AUTO: 197 K/UL (ref 150–400)
PMV BLD AUTO: 10.8 FL (ref 8.9–12.9)
POTASSIUM SERPL-SCNC: 6.3 MMOL/L (ref 3.5–5.1)
PROT SERPL-MCNC: 6.9 G/DL (ref 6.4–8.2)
RBC # BLD AUTO: 3.04 M/UL (ref 3.8–5.2)
RETICS # AUTO: 0.06 M/UL (ref 0.02–0.08)
RETICS/RBC NFR AUTO: 2 % (ref 0.7–2.1)
SERVICE CMNT-IMP: ABNORMAL
SODIUM SERPL-SCNC: 137 MMOL/L (ref 136–145)
WBC # BLD AUTO: 2.8 K/UL (ref 3.6–11)

## 2018-07-03 PROCEDURE — 80048 BASIC METABOLIC PNL TOTAL CA: CPT | Performed by: INTERNAL MEDICINE

## 2018-07-03 PROCEDURE — 74011250636 HC RX REV CODE- 250/636: Performed by: INTERNAL MEDICINE

## 2018-07-03 PROCEDURE — 74011250637 HC RX REV CODE- 250/637: Performed by: HOSPITALIST

## 2018-07-03 PROCEDURE — 74011636637 HC RX REV CODE- 636/637: Performed by: INTERNAL MEDICINE

## 2018-07-03 PROCEDURE — 74011250637 HC RX REV CODE- 250/637: Performed by: INTERNAL MEDICINE

## 2018-07-03 PROCEDURE — 74011000258 HC RX REV CODE- 258: Performed by: HOSPITALIST

## 2018-07-03 PROCEDURE — 74011250636 HC RX REV CODE- 250/636: Performed by: HOSPITALIST

## 2018-07-03 PROCEDURE — 80076 HEPATIC FUNCTION PANEL: CPT | Performed by: HOSPITALIST

## 2018-07-03 PROCEDURE — 85025 COMPLETE CBC W/AUTO DIFF WBC: CPT | Performed by: INTERNAL MEDICINE

## 2018-07-03 PROCEDURE — 85045 AUTOMATED RETICULOCYTE COUNT: CPT | Performed by: HOSPITALIST

## 2018-07-03 PROCEDURE — 36415 COLL VENOUS BLD VENIPUNCTURE: CPT | Performed by: INTERNAL MEDICINE

## 2018-07-03 PROCEDURE — 65270000032 HC RM SEMIPRIVATE

## 2018-07-03 PROCEDURE — 83615 LACTATE (LD) (LDH) ENZYME: CPT | Performed by: HOSPITALIST

## 2018-07-03 PROCEDURE — 74011636637 HC RX REV CODE- 636/637: Performed by: HOSPITALIST

## 2018-07-03 PROCEDURE — 82962 GLUCOSE BLOOD TEST: CPT

## 2018-07-03 RX ORDER — ALPRAZOLAM 0.5 MG/1
0.5 TABLET ORAL
Status: DISCONTINUED | OUTPATIENT
Start: 2018-07-03 | End: 2018-07-04 | Stop reason: HOSPADM

## 2018-07-03 RX ORDER — OXYCODONE AND ACETAMINOPHEN 5; 325 MG/1; MG/1
1-2 TABLET ORAL
Status: DISCONTINUED | OUTPATIENT
Start: 2018-07-03 | End: 2018-07-04 | Stop reason: HOSPADM

## 2018-07-03 RX ORDER — OXYCODONE AND ACETAMINOPHEN 5; 325 MG/1; MG/1
1 TABLET ORAL ONCE
Status: COMPLETED | OUTPATIENT
Start: 2018-07-03 | End: 2018-07-03

## 2018-07-03 RX ORDER — SODIUM CHLORIDE 9 MG/ML
75 INJECTION, SOLUTION INTRAVENOUS CONTINUOUS
Status: DISCONTINUED | OUTPATIENT
Start: 2018-07-03 | End: 2018-07-03

## 2018-07-03 RX ORDER — SODIUM POLYSTYRENE SULFONATE 15 G/60ML
30 SUSPENSION ORAL; RECTAL DAILY
Status: DISCONTINUED | OUTPATIENT
Start: 2018-07-03 | End: 2018-07-04

## 2018-07-03 RX ORDER — SODIUM POLYSTYRENE SULFONATE 15 G/60ML
30 SUSPENSION ORAL; RECTAL
Status: DISCONTINUED | OUTPATIENT
Start: 2018-07-03 | End: 2018-07-03

## 2018-07-03 RX ORDER — ACETAMINOPHEN 500 MG
500 TABLET ORAL
Status: DISCONTINUED | OUTPATIENT
Start: 2018-07-03 | End: 2018-07-04 | Stop reason: HOSPADM

## 2018-07-03 RX ADMIN — QUETIAPINE FUMARATE 100 MG: 100 TABLET ORAL at 17:20

## 2018-07-03 RX ADMIN — SENNOSIDES AND DOCUSATE SODIUM 2 TABLET: 8.6; 5 TABLET ORAL at 08:42

## 2018-07-03 RX ADMIN — Medication 1000 MCG: at 08:42

## 2018-07-03 RX ADMIN — CLONIDINE HYDROCHLORIDE 0.1 MG: 0.1 TABLET ORAL at 17:20

## 2018-07-03 RX ADMIN — DIPHENHYDRAMINE HYDROCHLORIDE 25 MG: 25 CAPSULE ORAL at 21:21

## 2018-07-03 RX ADMIN — AMLODIPINE BESYLATE 10 MG: 5 TABLET ORAL at 08:42

## 2018-07-03 RX ADMIN — DIPHENHYDRAMINE HYDROCHLORIDE 25 MG: 25 CAPSULE ORAL at 08:42

## 2018-07-03 RX ADMIN — CALCIUM CARBONATE (ANTACID) CHEW TAB 500 MG 200 MG: 500 CHEW TAB at 21:21

## 2018-07-03 RX ADMIN — OXYCODONE HYDROCHLORIDE AND ACETAMINOPHEN 1 TABLET: 5; 325 TABLET ORAL at 09:16

## 2018-07-03 RX ADMIN — DAPTOMYCIN 400 MG: 500 INJECTION, POWDER, LYOPHILIZED, FOR SOLUTION INTRAVENOUS at 17:22

## 2018-07-03 RX ADMIN — DIPHENHYDRAMINE HYDROCHLORIDE 25 MG: 25 CAPSULE ORAL at 15:14

## 2018-07-03 RX ADMIN — OXYCODONE HYDROCHLORIDE AND ACETAMINOPHEN 1 TABLET: 5; 325 TABLET ORAL at 15:14

## 2018-07-03 RX ADMIN — CALCIUM CARBONATE (ANTACID) CHEW TAB 500 MG 200 MG: 500 CHEW TAB at 08:42

## 2018-07-03 RX ADMIN — OXYCODONE HYDROCHLORIDE AND ACETAMINOPHEN 1 TABLET: 5; 325 TABLET ORAL at 01:13

## 2018-07-03 RX ADMIN — SODIUM CHLORIDE 75 ML/HR: 900 INJECTION, SOLUTION INTRAVENOUS at 13:20

## 2018-07-03 RX ADMIN — SODIUM POLYSTYRENE SULFONATE 30 G: 15 SUSPENSION ORAL; RECTAL at 09:16

## 2018-07-03 RX ADMIN — VENLAFAXINE 75 MG: 37.5 TABLET ORAL at 08:42

## 2018-07-03 RX ADMIN — CLONIDINE HYDROCHLORIDE 0.1 MG: 0.1 TABLET ORAL at 08:42

## 2018-07-03 RX ADMIN — HYDRALAZINE HYDROCHLORIDE 100 MG: 50 TABLET, FILM COATED ORAL at 21:20

## 2018-07-03 RX ADMIN — Medication 30 ML: at 14:00

## 2018-07-03 RX ADMIN — INSULIN GLARGINE 8 UNITS: 100 INJECTION, SOLUTION SUBCUTANEOUS at 08:46

## 2018-07-03 RX ADMIN — HYDRALAZINE HYDROCHLORIDE 100 MG: 50 TABLET, FILM COATED ORAL at 15:14

## 2018-07-03 RX ADMIN — CALCIUM CARBONATE (ANTACID) CHEW TAB 500 MG 200 MG: 500 CHEW TAB at 15:14

## 2018-07-03 RX ADMIN — INSULIN LISPRO 2 UNITS: 100 INJECTION, SOLUTION INTRAVENOUS; SUBCUTANEOUS at 07:30

## 2018-07-03 RX ADMIN — OXYCODONE HYDROCHLORIDE AND ACETAMINOPHEN 1 TABLET: 5; 325 TABLET ORAL at 17:20

## 2018-07-03 RX ADMIN — POLYETHYLENE GLYCOL 3350 17 G: 17 POWDER, FOR SOLUTION ORAL at 08:42

## 2018-07-03 RX ADMIN — Medication 5 ML: at 21:00

## 2018-07-03 RX ADMIN — OXYCODONE HYDROCHLORIDE AND ACETAMINOPHEN 1 TABLET: 5; 325 TABLET ORAL at 08:42

## 2018-07-03 RX ADMIN — SODIUM BICARBONATE 650 MG: 650 TABLET ORAL at 17:19

## 2018-07-03 RX ADMIN — SODIUM BICARBONATE 650 MG: 650 TABLET ORAL at 08:42

## 2018-07-03 RX ADMIN — OXYCODONE HYDROCHLORIDE AND ACETAMINOPHEN 2 TABLET: 5; 325 TABLET ORAL at 21:21

## 2018-07-03 RX ADMIN — INSULIN LISPRO 5 UNITS: 100 INJECTION, SOLUTION INTRAVENOUS; SUBCUTANEOUS at 17:19

## 2018-07-03 RX ADMIN — VENLAFAXINE 75 MG: 37.5 TABLET ORAL at 17:20

## 2018-07-03 RX ADMIN — HYDRALAZINE HYDROCHLORIDE 100 MG: 50 TABLET, FILM COATED ORAL at 08:42

## 2018-07-03 RX ADMIN — CALCITRIOL 0.25 MCG: 0.25 CAPSULE, LIQUID FILLED ORAL at 08:42

## 2018-07-03 RX ADMIN — DIPHENHYDRAMINE HYDROCHLORIDE 25 MG: 25 CAPSULE ORAL at 01:13

## 2018-07-03 RX ADMIN — ALPRAZOLAM 0.5 MG: 0.5 TABLET ORAL at 21:20

## 2018-07-03 RX ADMIN — Medication 10 ML: at 18:21

## 2018-07-03 RX ADMIN — QUETIAPINE FUMARATE 100 MG: 100 TABLET ORAL at 08:42

## 2018-07-03 NOTE — PROGRESS NOTES
Bedside shift change report given to Rivera Marcelo (oncoming nurse) by Héctor Hernandez (offgoing nurse). Report included the following information SBAR, Kardex, Intake/Output, MAR and Recent Results.

## 2018-07-03 NOTE — PROGRESS NOTES
Assumed care of pt this am. Pt is alert and oriented walking around the room this morning. Pt requesting NC for O2, saturations have been WNL, given for comfort. Per PCT blood glucose is 111(1130 check), not showing up in system, pt has eaten and would not be correct, labeled dose missed.

## 2018-07-03 NOTE — PROGRESS NOTES
Nephrology Progress Note  Maria Antonia Aparicio  Date of Admission : 6/23/2018    CC: Follow up for STONE on CKD       Assessment and Plan     STONE on CKD :  - progressively worse   - started IVF     Hyperkalemia :  - diet, Chronic Intersititial disease etc   - medical mx and avoiding dialysis   - ordered daily Kayexalate x 3 days       CKD Stage IV :  - baseline Cr 2.6-2.8 mg/dl at best  - appears to be progressing  - previously on dialysis and recovered       AG acidosis:  - cont oral bicarb      Sickle cell crisis:  - transfused 1 unit PRBCs on 6/25     HTN:  - adjust meds     Left Foot Osteo :   - on dapto and levaquin   - per ID      IDDM:  - per hospitalist     Klebsiella Bacteremia:  - abx per ID  - ? Source, possibly foot  - on dapto and levaquin     LLE DVT:  - appears chronic  - no on AC at this time       Interval History:  Seen and examined. She is asking for more pain meds. She wa snot interested in discussing her renal issues including High K . Cr worse and K about the same      Current Medications: all current  Medications have been eviewed in EPIC  Review of Systems: Pertinent items are noted in HPI.     Objective:  Vitals:    Vitals:    07/02/18 1927 07/02/18 2346 07/03/18 0715 07/03/18 0729   BP: 122/70 173/88 194/90    Pulse: (!) 101 (!) 104 (!) 109    Resp: 18 18 18    Temp: 97.3 °F (36.3 °C) 97.7 °F (36.5 °C) 98.9 °F (37.2 °C)    SpO2: 98% 95% 99%    Weight:    63.1 kg (139 lb 3.2 oz)   Height:         Intake and Output:     07/01 1901 - 07/03 0700  In: 580 [P.O.:480; I.V.:100]  Out: -     Physical Examination:  Pt intubated     No  General: NAD,Conversant   Neck:  Supple, no mass  Resp:  Lungs CTA B/L, no wheezing , normal respiratory effort  CV:  RRR,  no murmur or rub, 1 -2 + LE edema, L > R  GI:  Soft, NT, + Bowel sounds, no hepatosplenomegaly  Neurologic:  Non focal  Psych:             AAO x 3 appropriate affect   Skin:  No Rash  :  No escobedo    []    High complexity decision making was performed  []    Patient is at high-risk of decompensation with multiple organ involvement    Lab Data Personally Reviewed: I have reviewed all the pertinent labs, microbiology data and radiology studies during assessment. Recent Labs      07/03/18 0438 07/02/18 0358  07/01/18   0548   NA  137  141  138  138   K  6.3*  6.6*  6.5*  5.7*   CL  109*  110*  109*  108   CO2  24  23  23  23   GLU  162*  209*  207*  236*   BUN  47*  45*  45*  45*   CREA  3.96*  3.90*  3.99*  3.81*   CA  8.2*  7.7*  7.6*  7.9*   PHOS   --   4.9*  4.5   ALB  2.8*  2.6*  2.7*   SGOT  15   --    --    ALT  23   --    --      Recent Labs      07/03/18 0438 07/02/18 0358   WBC  2.8*  3.4*   HGB  7.8*  7.3*   HCT  25.1*  23.6*   PLT  197  195     Lab Results   Component Value Date/Time    Specimen Description: URINE 06/24/2013 08:00 PM    Specimen Description: URINE 06/17/2013 07:55 PM    Specimen Description: URINE 05/26/2013 10:10 AM     Lab Results   Component Value Date/Time    Culture result: NO GROWTH 5 DAYS 06/27/2018 04:06 AM    Culture result: (A) 06/24/2018 07:30 AM     STREPTOCOCCUS MITIS/ORALIS GROWING IN 1 OF 2 BOTTLES DRAWN . .(SITE= L CHEST PICC) (SENSITIVITIES PERFORMED BY E-TEST)    Culture result: (A) 06/24/2018 07:30 AM     PRELIMINARY REPORT OF GRAM POSITIVE COCCI IN PAIRS GROWING IN 1 OF 2 BOTTLES DRAWN . ..(SITE= L CHEST PICC) CALLED TO AND READ BACK BY LAURO ACOSTA ([place]) ON 6/25/18 AT 1919 BY VI    Culture result: REMAINING BOTTLE(S) HAS/HAVE NO GROWTH IN 5 DAYS 06/24/2018 07:30 AM     Recent Results (from the past 24 hour(s))   GLUCOSE, POC    Collection Time: 07/02/18 12:01 PM   Result Value Ref Range    Glucose (POC) 199 (H) 65 - 100 mg/dL    Performed by Mind Palette    Collection Time: 07/02/18  1:42 PM   Result Value Ref Range    Iron 67 35 - 150 ug/dL    TIBC 205 (L) 250 - 450 ug/dL    Iron % saturation 33 20 - 50 %   GLUCOSE, POC    Collection Time: 07/02/18  4:17 PM   Result Value Ref Range    Glucose (POC) 261 (H) 65 - 100 mg/dL    Performed by Jamey Coy    EOSINOPHILS, URINE    Collection Time: 07/02/18  6:30 PM   Result Value Ref Range    Eosinophils,urine NEGATIVE      GLUCOSE, POC    Collection Time: 07/02/18  8:55 PM   Result Value Ref Range    Glucose (POC) 82 65 - 100 mg/dL    Performed by Lynne Antoine (PCT)    METABOLIC PANEL, BASIC    Collection Time: 07/03/18  4:38 AM   Result Value Ref Range    Sodium 137 136 - 145 mmol/L    Potassium 6.3 (H) 3.5 - 5.1 mmol/L    Chloride 109 (H) 97 - 108 mmol/L    CO2 24 21 - 32 mmol/L    Anion gap 4 (L) 5 - 15 mmol/L    Glucose 162 (H) 65 - 100 mg/dL    BUN 47 (H) 6 - 20 MG/DL    Creatinine 3.96 (H) 0.55 - 1.02 MG/DL    BUN/Creatinine ratio 12 12 - 20      GFR est AA 15 (L) >60 ml/min/1.73m2    GFR est non-AA 13 (L) >60 ml/min/1.73m2    Calcium 8.2 (L) 8.5 - 10.1 MG/DL   CBC WITH AUTOMATED DIFF    Collection Time: 07/03/18  4:38 AM   Result Value Ref Range    WBC 2.8 (L) 3.6 - 11.0 K/uL    RBC 3.04 (L) 3.80 - 5.20 M/uL    HGB 7.8 (L) 11.5 - 16.0 g/dL    HCT 25.1 (L) 35.0 - 47.0 %    MCV 82.6 80.0 - 99.0 FL    MCH 25.7 (L) 26.0 - 34.0 PG    MCHC 31.1 30.0 - 36.5 g/dL    RDW 16.0 (H) 11.5 - 14.5 %    PLATELET 492 649 - 353 K/uL    MPV 10.8 8.9 - 12.9 FL    NRBC 0.0 0  WBC    ABSOLUTE NRBC 0.00 0.00 - 0.01 K/uL    NEUTROPHILS 54 32 - 75 %    LYMPHOCYTES 30 12 - 49 %    MONOCYTES 9 5 - 13 %    EOSINOPHILS 6 0 - 7 %    BASOPHILS 0 0 - 1 %    IMMATURE GRANULOCYTES 0 0.0 - 0.5 %    ABS. NEUTROPHILS 1.5 (L) 1.8 - 8.0 K/UL    ABS. LYMPHOCYTES 0.8 0.8 - 3.5 K/UL    ABS. MONOCYTES 0.3 0.0 - 1.0 K/UL    ABS. EOSINOPHILS 0.2 0.0 - 0.4 K/UL    ABS. BASOPHILS 0.0 0.0 - 0.1 K/UL    ABS. IMM.  GRANS. 0.0 0.00 - 0.04 K/UL    DF AUTOMATED     RETICULOCYTE COUNT    Collection Time: 07/03/18  4:38 AM   Result Value Ref Range    Reticulocyte count 2.0 0.7 - 2.1 %    Absolute Retic Cnt. 0.0597 0.0164 - 0.0776 M/ul   HEPATIC FUNCTION PANEL    Collection Time: 07/03/18  4:38 AM   Result Value Ref Range    Protein, total 6.9 6.4 - 8.2 g/dL    Albumin 2.8 (L) 3.5 - 5.0 g/dL    Globulin 4.1 (H) 2.0 - 4.0 g/dL    A-G Ratio 0.7 (L) 1.1 - 2.2      Bilirubin, total 0.3 0.2 - 1.0 MG/DL    Bilirubin, direct 0.1 0.0 - 0.2 MG/DL    Alk. phosphatase 166 (H) 45 - 117 U/L    AST (SGOT) 15 15 - 37 U/L    ALT (SGPT) 23 12 - 78 U/L   LD    Collection Time: 07/03/18  4:38 AM   Result Value Ref Range     81 - 246 U/L   GLUCOSE, POC    Collection Time: 07/03/18  6:31 AM   Result Value Ref Range    Glucose (POC) 186 (H) 65 - 100 mg/dL    Performed by Raymond Carson MD  [address]  Phone - [phone]   Fax - [phone]  [email or web address]

## 2018-07-03 NOTE — PROGRESS NOTES
Primary Nurse Ann Giron RN and VERONICA Morris performed a dual skin assessment on this patient Impairment noted- see wound doc flow sheet  Antonio score is 23

## 2018-07-03 NOTE — PROGRESS NOTES
Hospitalist Progress Note  Ramona Gay MD  Answering service: [phone] from in house phone  Cell: [phone]       Date of Service:  7/3/2018  NAME:  Tez Jones  :  1980  MRN:  [id number]    Admission Summary:   41 y/o AA female with PMH of sickle-cell disease, type 1 DM, CKD stage 4, L foot osteomyelitis s/p partial amputation apparently completed IV daptomycin PTA about 2-3 weeks ago admitted on 18 for diffuse body aches and fever with suspicion of acute sickle cell crisis and sepsis of unclear etiology. She was recently discharged from here for DKA (hospital course complicated by respiratory failure, angioedema of unclear etiology, sepsis and STONE     Interval history / Subjective:   Pt seen and examined  States had rough night, having pain        Assessment & Plan:      Acute sickle-cell crisis: (POA)  -resolved  -Continue supportive care with pain control. Already stopped IVF due to fluid overload.  -Hb is stable    -Pt will go home with Percocet. Stable retic, ldh and normal LFTs     Suspected sepsis: (POA) due to Bacteremia   -Source is unclear but ddx includes Lt foot osteomyelitis (last MRI showed findings concerning for osteo), or PICC line infection. -GNRs in 2/2 bottles on  and GPC in pairs in 1 of 2 bottles on . Klebsiella in one bottle and Strep mitis in another.  -Continue empiric abx with Dapto, Zosyn d/aston on  due to worsening renal function. Started levaquin   - ID follow up appreciated. Asher catheter has been removed. -Repeat blood cultures from 18 is negative so far. -Spoke to ID. Will need to clarify how much longer she needs to be on Daptomycin. Ok with switching pt to po Cipro or Levaquin for Klebsiella bacteremia. Pt seems to have completed Daptomycin treatment for osteo about 2-3 weeks ago (then monitored by Dr Quinton Stauffer).  Was only restarted during this admission. awaot advice from ID befor moving to alternative abx.    for abx per ID on discharge. Type I DM w/ hypoglycemia: (POA)  -BS stable at this time. - c/w Lantus and sliding scale       Metabolic acidosis:  -Likely due to CKD 4. Continue NaHCO3 tabs.      HTN: uncontrolled  -BP is improving with increasing Norvasc to 10 mg po daily and add Clonidine po.       CKD stage 4:   -Renal fxn is relatively unchanged today. This is likely progressive CKD. -Nephrology follow up appreciated. Will monitor closely.  -Pt is getting close to needing HD. Lt LE DVT:   -Pt has a previous history for DVT which was treated for 1 year per pt and she just finished treatment recently. -Duplex suggest chronic DVT. -Will hold off on treatment for now    Hyperkalemia  - kayexalate X 3 doses per nephrology       Code status:Full  DVT prophylaxis: SCD     Care Plan discussed with: Patient/Family and Nurse  Disposition: TBD          Hospital Problems  Date Reviewed: 6/10/2018          Codes Class Noted POA    Charcot ankle, left ICD-10-CM: M14.672  ICD-9-CM: 094.0, 713.5  6/25/2018 Unknown        * (Principal)Sickle cell crisis ([facility] [address] ICD-10-CM: D57.00  ICD-9-CM: 282.62  6/24/2018 Unknown        Osteomyelitis of left foot ([facility] [address] ICD-10-CM: M86.9  ICD-9-CM: 730.27  4/27/2018 Yes        Metabolic encephalopathy WQC-94-OH: G93.41  ICD-9-CM: 348.31  3/9/2016 Yes        CKD (chronic kidney disease) ICD-10-CM: N18.9  ICD-9-CM: 585.9  9/19/2012 Yes    Overview Addendum 1/17/2013  6:44 PM by Alexis Genao; Required HD in past with acute exacerbation, AV graft R thigh  Supposed to make appt:   Trino Perdomo MD  [facility]   [address] [place] [address]   [phone]              Diabetes mellitus type I [facility]) ICD-10-CM: E10.9  ICD-9-CM: 250.01  3/29/2012 Yes    Overview Addendum 2/12/2013  7:43 AM by Ag pt appt:  May 8, 2013 with  Willem Sampson  dx'd age 14; + neuropathy, nephropathy  On ssi, regular  Diabetes health maintenance:  Last A1C: 11.7 1/2013 10.7 3/2012  Last eye exam 2012, had cataract R eye, needs L eye done, Dr Jyothi Moe (OD) [phone], another doc did surgery: Dr Emanuel Barajas MD same practice [phone] f [phone]  Last microalbumin:  n/a Last creatinine: 1.65 1/2013; 1.9 9/17/12  Last microfilament exam/Last podiatry: 9/20/12 intact, nails thickened  Last lipids: 1/201  trig 173, HDL 70 .4 w/o statin; no record in former PCP notes why it was d/c'd   ACE/ARB: no due to CKD                 HTN (hypertension) (Chronic) ICD-10-CM: I10  ICD-9-CM: 401.9  10/14/2011 Yes                Review of Systems:   bno n/v/d/f/chills, some pain in legs. Stable ,      Vital Signs:    Last 24hrs VS reviewed since prior progress note. Most recent are:  Visit Vitals    /90 (BP 1 Location: Left arm, BP Patient Position: Sitting)    Pulse (!) 109    Temp 98.9 °F (37.2 °C)    Resp 18    Ht 5' 2\" (1.575 m)    Wt 63.1 kg (139 lb 3.2 oz)    SpO2 99%    BMI 25.46 kg/m2       No intake or output data in the 24 hours ending 07/03/18 1359     Physical Examination:             Constitutional:  No acute distress, cooperative, pleasant    ENT:  Oral mucous moist,   Resp:  CTA bilaterally   CV:  Regular rhythm, normal rate,    GI:  Soft, non distended, non tender. normoactive bowel sounds,    Musculoskeletal:  No edema, warm, 2+ pulses throughout    Neurologic:  Moves all extremities.   AAOx3,     Skin:  Good turgor, no rashes or ulcers       Data Review:    Review and/or order of clinical lab test      Labs:     Recent Labs      07/03/18 0438 07/02/18   0358   WBC  2.8*  3.4*   HGB  7.8*  7.3*   HCT  25.1*  23.6*   PLT  197  195     Recent Labs      07/03/18 0438 07/02/18 0358  07/01/18   0548   NA  137  141  138  138   K  6.3*  6.6*  6.5*  5.7*   CL  109*  110*  109*  108   CO2  24  23  23  23   BUN  47*  45*  45*  45* CREA  3.96*  3.90*  3.99*  3.81*   GLU  162*  209*  207*  236*   CA  8.2*  7.7*  7.6*  7.9*   PHOS   --   4.9*  4.5     Recent Labs      07/03/18   0438  07/02/18   0358  07/01/18   0548   SGOT  15   --    --    ALT  23   --    --    AP  166*   --    --    TBILI  0.3   --    --    TP  6.9   --    --    ALB  2.8*  2.6*  2.7*   GLOB  4.1*   --    --      No results for input(s): INR, PTP, APTT in the last 72 hours. No lab exists for component: INREXT, INREXT   Recent Labs      07/02/18   1342   TIBC  205*   PSAT  33      Lab Results   Component Value Date/Time    Folate 33.8 (H) 05/07/2018 11:20 AM      No results for input(s): PH, PCO2, PO2 in the last 72 hours.   Recent Labs      07/01/18   0548   CPK  32     Lab Results   Component Value Date/Time    Cholesterol, total 128 06/10/2018 07:44 AM    HDL Cholesterol 51 06/10/2018 07:44 AM    LDL, calculated 67.4 06/10/2018 07:44 AM    Triglyceride 48 06/10/2018 07:44 AM    CHOL/HDL Ratio 2.5 06/10/2018 07:44 AM     Lab Results   Component Value Date/Time    Glucose (POC) 186 (H) 07/03/2018 06:31 AM    Glucose (POC) 82 07/02/2018 08:55 PM    Glucose (POC) 261 (H) 07/02/2018 04:17 PM    Glucose (POC) 199 (H) 07/02/2018 12:01 PM    Glucose (POC) 298 (H) 07/02/2018 06:49 AM     Lab Results   Component Value Date/Time    Color YELLOW/STRAW 06/23/2018 11:32 PM    Appearance CLEAR 06/23/2018 11:32 PM    Specific gravity 1.010 06/23/2018 11:32 PM    Specific gravity 1.015 07/26/2010 11:18 AM    pH (UA) 6.0 06/23/2018 11:32 PM    Protein 100 (A) 06/23/2018 11:32 PM    Glucose NEGATIVE  06/23/2018 11:32 PM    Ketone NEGATIVE  06/23/2018 11:32 PM    Bilirubin NEGATIVE  06/23/2018 11:32 PM    Urobilinogen 0.2 06/23/2018 11:32 PM    Nitrites NEGATIVE  06/23/2018 11:32 PM    Leukocyte Esterase NEGATIVE  06/23/2018 11:32 PM    Epithelial cells FEW 06/23/2018 11:32 PM    Bacteria NEGATIVE  06/23/2018 11:32 PM    WBC 0-4 06/23/2018 11:32 PM    RBC 0-5 06/23/2018 11:32 PM Medications Reviewed:     Current Facility-Administered Medications   Medication Dose Route Frequency    sodium polystyrene (KAYEXALATE) 15 gram/60 mL oral suspension 30 g  30 g Oral DAILY    0.9% sodium chloride infusion  75 mL/hr IntraVENous CONTINUOUS    acetaminophen (TYLENOL) tablet 500 mg  500 mg Oral Q4H PRN    insulin glargine (LANTUS) injection 8 Units  8 Units SubCUTAneous DAILY    [START ON 7/4/2018] levoFLOXacin (LEVAQUIN) 500 mg in D5W IVPB  500 mg IntraVENous Q48H    amLODIPine (NORVASC) tablet 10 mg  10 mg Oral DAILY    cloNIDine HCl (CATAPRES) tablet 0.1 mg  0.1 mg Oral BID    bacitracin 500 unit/gram packet 1 Packet  1 Packet Topical PRN    sodium chloride (NS) flush 5 mL  5 mL InterCATHeter Q24H    sodium chloride (NS) flush 5 mL  5 mL InterCATHeter PRN    sodium chloride (NS) flush 5 mL  5 mL InterCATHeter Q8H    labetalol (NORMODYNE;TRANDATE) injection 20 mg  20 mg IntraVENous Q6H PRN    white petrolatum-mineral oil (EUCERIN) cream   Topical PRN    0.9% sodium chloride infusion 250 mL  250 mL IntraVENous PRN    prochlorperazine (COMPAZINE) with saline injection 5 mg  5 mg IntraVENous Q6H PRN    venlafaxine (EFFEXOR) tablet 75 mg  75 mg Oral BID WITH MEALS    senna-docusate (PERICOLACE) 8.6-50 mg per tablet 2 Tab  2 Tab Oral DAILY    QUEtiapine (SEROquel) tablet 100 mg  100 mg Oral BID    oxyCODONE-acetaminophen (PERCOCET) 5-325 mg per tablet 1 Tab  1 Tab Oral Q6H PRN    cyanocobalamin (VITAMIN B12) tablet 1,000 mcg  1,000 mcg Oral DAILY    calcium carbonate (TUMS) chewable tablet 200 mg [elemental]  200 mg Oral TID    albuterol (PROVENTIL VENTOLIN) nebulizer solution 2.5 mg  2.5 mg Nebulization Q4H PRN    sodium chloride (NS) flush 5-10 mL  5-10 mL IntraVENous Q8H    glucose chewable tablet 16 g  4 Tab Oral PRN    dextrose (D50W) injection syrg 12.5-25 g  12.5-25 g IntraVENous PRN    glucagon (GLUCAGEN) injection 1 mg  1 mg IntraMUSCular PRN    diphenhydrAMINE (BENADRYL) capsule 25 mg  25 mg Oral Q6H PRN    DAPTOmycin (CUBICIN) 400 mg in 0.9% sodium chloride 50 mL IVPB RF formulation  400 mg IntraVENous Q48H    insulin lispro (HUMALOG) injection   SubCUTAneous AC&HS    hydrALAZINE (APRESOLINE) tablet 100 mg  100 mg Oral TID    sodium chloride (NS) flush 20 mL  20 mL InterCATHeter PRN    sodium chloride (NS) flush 10 mL  10 mL InterCATHeter Q24H    sodium chloride (NS) flush 10 mL  10 mL InterCATHeter PRN    sodium chloride (NS) flush 10 mL  10 mL InterCATHeter Q8H    alteplase (CATHFLO) 1 mg in sterile water (preservative free) 1 mL injection  1 mg InterCATHeter PRN    polyethylene glycol (MIRALAX) packet 17 g  17 g Oral DAILY    sodium bicarbonate tablet 650 mg  650 mg Oral BID    calcitRIOL (ROCALTROL) capsule 0.25 mcg  0.25 mcg Oral DAILY    promethazine (PHENERGAN) tablet 25 mg  25 mg Oral Q8H PRN    sodium chloride (NS) flush 5-10 mL  5-10 mL IntraVENous PRN     ______________________________________________________________________  EXPECTED LENGTH OF STAY: 4d 21h  ACTUAL LENGTH OF STAY:          9                 Nitin De Los Santos MD

## 2018-07-03 NOTE — PROGRESS NOTES
ID Progress Note  7/3/2018    Subjective:     Has complaints of diffuse aches and pains    Objective:     Antibiotics:  1. Zosyn   2. Daptomycin       Vitals:   Visit Vitals    BP (!) 183/93    Pulse (!) 103    Temp 98.6 °F (37 °C)    Resp 18    Ht 5' 2\" (1.575 m)    Wt 63.1 kg (139 lb 3.2 oz)    LMP 2018 (Exact Date)    SpO2 98%    BMI 25.46 kg/m2        Tmax:  Temp (24hrs), Av.2 °F (36.8 °C), Min:97.3 °F (36.3 °C), Max:98.9 °F (37.2 °C)      Exam:  Lungs:  clear to auscultation bilaterally  Heart:  regular rate and rhythm  Abdomen:  soft, non-tender. Bowel sounds normal. No masses,  no organomegaly    Labs:      Recent Labs      18   0438  18   0358  18   0548   WBC  2.8*  3.4*   --    HGB  7.8*  7.3*   --    PLT  197  195   --    BUN  47*  45*  45*  45*   CREA  3.96*  3.90*  3.99*  3.81*   SGOT  15   --    --    AP  166*   --    --    TBILI  0.3   --    --        Cultures:     Lab Results   Component Value Date/Time    Specimen Description: URINE 2013 08:00 PM    Specimen Description: URINE 2013 07:55 PM    Specimen Description: URINE 2013 10:10 AM     Lab Results   Component Value Date/Time    Culture result: NO GROWTH 5 DAYS 2018 04:06 AM    Culture result: (A) 2018 07:30 AM     STREPTOCOCCUS MITIS/ORALIS GROWING IN 1 OF 2 BOTTLES DRAWN . .(SITE= L CHEST PICC) (SENSITIVITIES PERFORMED BY E-TEST)    Culture result: (A) 2018 07:30 AM     PRELIMINARY REPORT OF GRAM POSITIVE COCCI IN PAIRS GROWING IN 1 OF 2 BOTTLES DRAWN . ..(SITE= L CHEST PICC) CALLED TO AND READ BACK BY LAURO Stubbs[place]ildford) ON 18 AT 9308 BY VI    Culture result: REMAINING BOTTLE(S) HAS/HAVE NO GROWTH IN 5 DAYS 2018 07:30 AM       Radiology:     Line/Insert Date:           Assessment:     1. Bacteremia  2. Advanced renal disease  3. Left foot infection    Objective:     1. Continue dapto  2.  Change to quinolone therapy--can go home on 2 weeks of cipro (500 mg daily) po from the time of the first negative blood culture--ok for discharge from my standpoint  3.  D/W Dr Kiet Mendez MD

## 2018-07-03 NOTE — DIABETES MGMT
DTC Progress Note    Recommendations/ Comments: Chart review for fluctuating blood sugars. If appropriate please   1. Change lispro correction to high sensitivity due to renal status  2. Considering adding Humalog 2 units ac tid as long as pt is eating at least 50% of her tray. Current hospital DM medication: lispro correction scale, normal sensitivity and Lantus 8 units    Chart reviewed on Simona Salas. Patient is a 42 y.o. female with known DM - she was discharged on lantus 5 units on 6/14/2018. No DM meds lispro in PTA. Pt is well known to DTC from many previous admission: 6/9 - 6/14/2018, 4/27-5/14/2018, 3/2 - 3/27/2018 in addition to admissions in previous years. She was transfused 3/10/2018  On 6/25/2018 Hgb 6.5  Therefore, A1c may not be accurate    A1c:   Lab Results   Component Value Date/Time    Hemoglobin A1c 7.6 (H) 06/25/2018 03:04 AM    Hemoglobin A1c 7.9 (H) 06/23/2018 11:29 PM       Recent Glucose Results:   Lab Results   Component Value Date/Time     (H) 07/03/2018 04:38 AM    GLUCPOC 186 (H) 07/03/2018 06:31 AM    GLUCPOC 82 07/02/2018 08:55 PM    GLUCPOC 261 (H) 07/02/2018 04:17 PM        Lab Results   Component Value Date/Time    Creatinine 3.96 (H) 07/03/2018 04:38 AM     Estimated Creatinine Clearance: 16.7 mL/min (based on Cr of 3.96). Active Orders   Diet    DIET RENAL WITH OPTIONS 70-70-70 (House); Regular; Consistent Carb 2000kcal; Low Potassium, Low Phosphorus        PO intake:   Patient Vitals for the past 72 hrs:   % Diet Eaten   07/02/18 0825 75 %   07/01/18 1300 100 %   07/01/18 0758 100 %   06/30/18 1831 100 %       Will continue to follow as needed.     Thank you  Kiara Kumar RD, CDE

## 2018-07-04 VITALS
HEART RATE: 97 BPM | DIASTOLIC BLOOD PRESSURE: 87 MMHG | BODY MASS INDEX: 25.62 KG/M2 | TEMPERATURE: 98.3 F | SYSTOLIC BLOOD PRESSURE: 181 MMHG | WEIGHT: 139.2 LBS | RESPIRATION RATE: 16 BRPM | HEIGHT: 62 IN | OXYGEN SATURATION: 100 %

## 2018-07-04 LAB
ANION GAP SERPL CALC-SCNC: 7 MMOL/L (ref 5–15)
BUN SERPL-MCNC: 43 MG/DL (ref 6–20)
BUN/CREAT SERPL: 11 (ref 12–20)
CALCIUM SERPL-MCNC: 8.4 MG/DL (ref 8.5–10.1)
CHLORIDE SERPL-SCNC: 110 MMOL/L (ref 97–108)
CO2 SERPL-SCNC: 22 MMOL/L (ref 21–32)
CREAT SERPL-MCNC: 3.97 MG/DL (ref 0.55–1.02)
GLUCOSE BLD STRIP.AUTO-MCNC: 121 MG/DL (ref 65–100)
GLUCOSE BLD STRIP.AUTO-MCNC: 171 MG/DL (ref 65–100)
GLUCOSE SERPL-MCNC: 124 MG/DL (ref 65–100)
MAGNESIUM SERPL-MCNC: 1.9 MG/DL (ref 1.6–2.4)
POTASSIUM SERPL-SCNC: 5 MMOL/L (ref 3.5–5.1)
SERVICE CMNT-IMP: ABNORMAL
SERVICE CMNT-IMP: ABNORMAL
SODIUM SERPL-SCNC: 139 MMOL/L (ref 136–145)

## 2018-07-04 PROCEDURE — 36415 COLL VENOUS BLD VENIPUNCTURE: CPT | Performed by: HOSPITALIST

## 2018-07-04 PROCEDURE — 82962 GLUCOSE BLOOD TEST: CPT

## 2018-07-04 PROCEDURE — 74011636637 HC RX REV CODE- 636/637: Performed by: INTERNAL MEDICINE

## 2018-07-04 PROCEDURE — 74011250637 HC RX REV CODE- 250/637: Performed by: HOSPITALIST

## 2018-07-04 PROCEDURE — 74011636637 HC RX REV CODE- 636/637: Performed by: HOSPITALIST

## 2018-07-04 PROCEDURE — 74011250637 HC RX REV CODE- 250/637: Performed by: INTERNAL MEDICINE

## 2018-07-04 PROCEDURE — 83735 ASSAY OF MAGNESIUM: CPT | Performed by: HOSPITALIST

## 2018-07-04 PROCEDURE — 80048 BASIC METABOLIC PNL TOTAL CA: CPT | Performed by: HOSPITALIST

## 2018-07-04 RX ORDER — INSULIN GLARGINE 100 [IU]/ML
10 INJECTION, SOLUTION SUBCUTANEOUS DAILY
Qty: 1 VIAL | Refills: 1 | Status: SHIPPED | OUTPATIENT
Start: 2018-07-04 | End: 2019-02-11

## 2018-07-04 RX ORDER — AMLODIPINE BESYLATE 10 MG/1
10 TABLET ORAL DAILY
Qty: 30 TAB | Refills: 0 | Status: SHIPPED | OUTPATIENT
Start: 2018-07-04

## 2018-07-04 RX ORDER — CLONIDINE HYDROCHLORIDE 0.1 MG/1
0.1 TABLET ORAL 2 TIMES DAILY
Qty: 60 TAB | Refills: 0 | Status: SHIPPED | OUTPATIENT
Start: 2018-07-04 | End: 2019-02-11

## 2018-07-04 RX ORDER — HYDRALAZINE HYDROCHLORIDE 100 MG/1
100 TABLET, FILM COATED ORAL 3 TIMES DAILY
Qty: 90 TAB | Refills: 0 | Status: SHIPPED | OUTPATIENT
Start: 2018-07-04 | End: 2019-02-11

## 2018-07-04 RX ORDER — CIPROFLOXACIN 500 MG/1
500 TABLET ORAL DAILY
Qty: 7 TAB | Refills: 0 | Status: SHIPPED | OUTPATIENT
Start: 2018-07-04 | End: 2019-02-11

## 2018-07-04 RX ORDER — AMOXICILLIN 250 MG
2 CAPSULE ORAL DAILY
Qty: 30 TAB | Refills: 0 | Status: SHIPPED | OUTPATIENT
Start: 2018-07-04

## 2018-07-04 RX ORDER — LEVOFLOXACIN 500 MG/1
500 TABLET, FILM COATED ORAL DAILY
Qty: 7 TAB | Refills: 0 | Status: SHIPPED | OUTPATIENT
Start: 2018-07-04 | End: 2018-07-04

## 2018-07-04 RX ORDER — PROMETHAZINE HYDROCHLORIDE 25 MG/1
25 TABLET ORAL
Qty: 20 TAB | Refills: 0 | Status: SHIPPED | OUTPATIENT
Start: 2018-07-04 | End: 2018-09-15

## 2018-07-04 RX ORDER — CLONIDINE HYDROCHLORIDE 0.1 MG/1
0.1 TABLET ORAL 2 TIMES DAILY
Qty: 60 TAB | Refills: 0 | Status: SHIPPED | OUTPATIENT
Start: 2018-07-04 | End: 2018-07-04

## 2018-07-04 RX ORDER — SODIUM BICARBONATE 650 MG/1
650 TABLET ORAL 2 TIMES DAILY
Qty: 60 TAB | Refills: 0 | Status: SHIPPED | OUTPATIENT
Start: 2018-07-04

## 2018-07-04 RX ORDER — QUETIAPINE FUMARATE 100 MG/1
100 TABLET, FILM COATED ORAL 2 TIMES DAILY
Qty: 60 TAB | Refills: 0 | Status: ON HOLD | OUTPATIENT
Start: 2018-07-04 | End: 2020-05-17 | Stop reason: SDUPTHER

## 2018-07-04 RX ORDER — CALCITRIOL 0.25 UG/1
0.25 CAPSULE ORAL DAILY
Qty: 30 CAP | Refills: 0 | Status: SHIPPED | OUTPATIENT
Start: 2018-07-04

## 2018-07-04 RX ORDER — BACITRACIN 500 UNIT/G
PACKET (EA) TOPICAL
Status: DISCONTINUED
Start: 2018-07-04 | End: 2018-07-04 | Stop reason: HOSPADM

## 2018-07-04 RX ORDER — SODIUM CHLORIDE 450 MG/100ML
75 INJECTION, SOLUTION INTRAVENOUS CONTINUOUS
Status: DISCONTINUED | OUTPATIENT
Start: 2018-07-04 | End: 2018-07-04 | Stop reason: HOSPADM

## 2018-07-04 RX ORDER — VENLAFAXINE 75 MG/1
75 TABLET ORAL 2 TIMES DAILY WITH MEALS
Qty: 60 TAB | Refills: 0 | Status: SHIPPED | OUTPATIENT
Start: 2018-07-04

## 2018-07-04 RX ORDER — OXYCODONE AND ACETAMINOPHEN 5; 325 MG/1; MG/1
1 TABLET ORAL
Qty: 20 TAB | Refills: 0 | Status: SHIPPED | OUTPATIENT
Start: 2018-07-04 | End: 2018-07-10

## 2018-07-04 RX ADMIN — HYDRALAZINE HYDROCHLORIDE 100 MG: 50 TABLET, FILM COATED ORAL at 09:07

## 2018-07-04 RX ADMIN — CLONIDINE HYDROCHLORIDE 0.1 MG: 0.1 TABLET ORAL at 09:06

## 2018-07-04 RX ADMIN — INSULIN GLARGINE 8 UNITS: 100 INJECTION, SOLUTION SUBCUTANEOUS at 09:07

## 2018-07-04 RX ADMIN — SODIUM BICARBONATE 650 MG: 650 TABLET ORAL at 09:06

## 2018-07-04 RX ADMIN — CALCITRIOL 0.25 MCG: 0.25 CAPSULE, LIQUID FILLED ORAL at 09:06

## 2018-07-04 RX ADMIN — VENLAFAXINE 75 MG: 37.5 TABLET ORAL at 07:05

## 2018-07-04 RX ADMIN — POLYETHYLENE GLYCOL 3350 17 G: 17 POWDER, FOR SOLUTION ORAL at 09:06

## 2018-07-04 RX ADMIN — INSULIN LISPRO 2 UNITS: 100 INJECTION, SOLUTION INTRAVENOUS; SUBCUTANEOUS at 07:03

## 2018-07-04 RX ADMIN — Medication 1000 MCG: at 09:07

## 2018-07-04 RX ADMIN — Medication 5 ML: at 07:03

## 2018-07-04 RX ADMIN — OXYCODONE HYDROCHLORIDE AND ACETAMINOPHEN 2 TABLET: 5; 325 TABLET ORAL at 04:46

## 2018-07-04 RX ADMIN — SENNOSIDES AND DOCUSATE SODIUM 2 TABLET: 8.6; 5 TABLET ORAL at 09:06

## 2018-07-04 RX ADMIN — ALPRAZOLAM 0.5 MG: 0.5 TABLET ORAL at 04:44

## 2018-07-04 RX ADMIN — Medication 10 ML: at 07:03

## 2018-07-04 RX ADMIN — Medication 10 ML: at 07:02

## 2018-07-04 RX ADMIN — CALCIUM CARBONATE (ANTACID) CHEW TAB 500 MG 200 MG: 500 CHEW TAB at 09:06

## 2018-07-04 RX ADMIN — AMLODIPINE BESYLATE 10 MG: 5 TABLET ORAL at 09:06

## 2018-07-04 RX ADMIN — QUETIAPINE FUMARATE 100 MG: 100 TABLET ORAL at 09:06

## 2018-07-04 RX ADMIN — DIPHENHYDRAMINE HYDROCHLORIDE 25 MG: 25 CAPSULE ORAL at 04:45

## 2018-07-04 NOTE — PROGRESS NOTES
Nephrology Progress Note  Michele Blood  Date of Admission : 6/23/2018    CC: Follow up for STONE on CKD       Assessment and Plan     STONE on CKD :  - progressively worse   - continue IVF till d/c    Hyperkalemia :  - diet, Chronic Intersititial disease etc   - K better  - stopped kayexalate   - low K diet       CKD Stage IV :  - baseline Cr 2.6-2.8 mg/dl at best  - appears to be progressing  - previously on dialysis and recovered       AG acidosis:  - cont oral bicarb      Sickle cell crisis:  - transfused 1 unit PRBCs on 6/25     HTN:  - adjust meds     Left Foot Osteo :   - on dapto and levaquin   - per ID      IDDM:  - per hospitalist     Klebsiella Bacteremia:  - abx per ID  - ? Source, possibly foot  - on dapto and levaquin     LLE DVT:  - appears chronic  - no on AC at this time       Interval History:  Seen and examined. She is asking for d/c . K better  Denies any N/V/CP/SOB    Current Medications: all current  Medications have been eviewed in EPIC  Review of Systems: Pertinent items are noted in HPI. Objective:  Vitals:    Vitals:    07/03/18 1609 07/03/18 1955 07/03/18 2035 07/03/18 2334   BP: (!) 183/93 120/80 151/82 162/82   Pulse: (!) 103 (!) 106 95 97   Resp: 18 18 16 16   Temp: 98.6 °F (37 °C) 98.8 °F (37.1 °C) 98.3 °F (36.8 °C) 98.6 °F (37 °C)   SpO2: 98% 99% 99% 97%   Weight:       Height:         Intake and Output:     07/02 1901 - 07/04 0700  In: 400 [P.O.:300;  I.V.:100]  Out: 400 [Urine:400]    Physical Examination:  Pt intubated     No  General: NAD,Conversant   Neck:  Supple, no mass  Resp:  Lungs CTA B/L, no wheezing , normal respiratory effort  CV:  RRR,  no murmur or rub, 1 -2 + LE edema, L > R  GI:  Soft, NT, + Bowel sounds, no hepatosplenomegaly  Neurologic:  Non focal  Psych:             AAO x 3 appropriate affect   Skin:  No Rash  :  No escobedo    []    High complexity decision making was performed  []    Patient is at high-risk of decompensation with multiple organ involvement    Lab Data Personally Reviewed: I have reviewed all the pertinent labs, microbiology data and radiology studies during assessment. Recent Labs      07/04/18 0426 07/03/18 0438 07/02/18 0358   NA  139  137  141  138   K  5.0  6.3*  6.6*  6.5*   CL  110*  109*  110*  109*   CO2  22  24  23  23   GLU  124*  162*  209*  207*   BUN  43*  47*  45*  45*   CREA  3.97*  3.96*  3.90*  3.99*   CA  8.4*  8.2*  7.7*  7.6*   MG  1.9   --    --    PHOS   --    --   4.9*   ALB   --   2.8*  2.6*   SGOT   --   15   --    ALT   --   23   --      Recent Labs      07/03/18 0438 07/02/18 0358   WBC  2.8*  3.4*   HGB  7.8*  7.3*   HCT  25.1*  23.6*   PLT  197  195     Lab Results   Component Value Date/Time    Specimen Description: URINE 06/24/2013 08:00 PM    Specimen Description: URINE 06/17/2013 07:55 PM    Specimen Description: URINE 05/26/2013 10:10 AM     Lab Results   Component Value Date/Time    Culture result: NO GROWTH 5 DAYS 06/27/2018 04:06 AM    Culture result: (A) 06/24/2018 07:30 AM     STREPTOCOCCUS MITIS/ORALIS GROWING IN 1 OF 2 BOTTLES DRAWN . .(SITE= L CHEST PICC) (SENSITIVITIES PERFORMED BY E-TEST)    Culture result: (A) 06/24/2018 07:30 AM     PRELIMINARY REPORT OF GRAM POSITIVE COCCI IN PAIRS GROWING IN 1 OF 2 BOTTLES DRAWN . ..(SITE= L CHEST PICC) CALLED TO AND READ BACK BY LAURO ACOSTA ([place]) ON 6/25/18 AT 0633 BY VI    Culture result: REMAINING BOTTLE(S) HAS/HAVE NO GROWTH IN 5 DAYS 06/24/2018 07:30 AM     Recent Results (from the past 24 hour(s))   GLUCOSE, POC    Collection Time: 07/03/18  4:42 PM   Result Value Ref Range    Glucose (POC) 254 (H) 65 - 100 mg/dL    Performed by Ranjan Agudelo    GLUCOSE, POC    Collection Time: 07/03/18  9:07 PM   Result Value Ref Range    Glucose (POC) 130 (H) 65 - 100 mg/dL    Performed by Maynor Joseph (OZ)    METABOLIC PANEL, BASIC    Collection Time: 07/04/18  4:26 AM   Result Value Ref Range    Sodium 139 136 - 145 mmol/L Potassium 5.0 3.5 - 5.1 mmol/L    Chloride 110 (H) 97 - 108 mmol/L    CO2 22 21 - 32 mmol/L    Anion gap 7 5 - 15 mmol/L    Glucose 124 (H) 65 - 100 mg/dL    BUN 43 (H) 6 - 20 MG/DL    Creatinine 3.97 (H) 0.55 - 1.02 MG/DL    BUN/Creatinine ratio 11 (L) 12 - 20      GFR est AA 15 (L) >60 ml/min/1.73m2    GFR est non-AA 13 (L) >60 ml/min/1.73m2    Calcium 8.4 (L) 8.5 - 10.1 MG/DL   MAGNESIUM    Collection Time: 07/04/18  4:26 AM   Result Value Ref Range    Magnesium 1.9 1.6 - 2.4 mg/dL   GLUCOSE, POC    Collection Time: 07/04/18  6:08 AM   Result Value Ref Range    Glucose (POC) 171 (H) 65 - 100 mg/dL    Performed by Rory Gomez (PCT)                [address]ERE[address]   [address] [address]  Phone - [phone]   Fax - [phone]  [email or web address]
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
## 2018-07-04 NOTE — PROGRESS NOTES
Pt requesting that prescriptions be filled prior to discharge but out pt. pharmacy is not open today. Pt also asking about some type of \"shoe\" for left foot s/p transmetatarsal amputation done in April of this year. Podiatry note written on 6/25/18 by Dr Babatunde Ritchie, stated that pt is to follow up with Dr Rebekah John , a week after discharge. MD office not open today. Pt requesting cab ticket for ride home, charge nurse aware.

## 2018-07-04 NOTE — PROGRESS NOTES
Bedside shift change report given to Olga Paniagua (oncoming nurse) by Scott Mendoza (offgoing nurse). Report included the following information SBAR, Kardex, Accordion and Recent Results.

## 2018-07-04 NOTE — DISCHARGE INSTRUCTIONS
Please bring this form with you to show your primary care provider at your follow-up appointment. Primary care provider:  Dr. Jeanine Light MD    Discharging provider:  Callum Stein MD    You have been admitted to the hospital with the following diagnoses:  · Sickle cell crisis ([address]  · central line    FOLLOW-UP CARE RECOMMENDATIONS:    APPOINTMENTS:  Follow-up Information     Follow up With Details Comments MD Alia On 7/5/2018 Hospital f/u PCP appointment Thursday, 7/5/18 @ 1:30 p.m.  [address]  [phone]      Biju Gonzalez MD  in  1-2 week for discharge follow up  [facility]      Mary Arambula MD In 2 weeks discharge follow up  [address] [email or web address]  [phone]              FOLLOW-UP TESTS recommended:   - follow up with Sugar Aury regarding Amputation discussion  - Complete antibiotic course as prescribe    PENDING TEST RESULTS:  At the time of your discharge the following test results are still pending: none  Please make sure you review these results with your outpatient follow-up provider(s). SYMPTOMS to watch for: chest pain, shortness of breath, fever, chills, nausea, vomiting, diarrhea, change in mentation, falling, weakness, bleeding. DIET/what to eat:  Diabetic Diet    ACTIVITY:  Activity as tolerated    WOUND CARE: NONE    EQUIPMENT needed:  NONE      What to do if new or unexpected symptoms occur? If you experience any of the above symptoms (or should other concerns or questions arise after discharge) please call your primary care physician. Return to the emergency room if you cannot get hold of your doctor. · It is very important that you keep your follow-up appointment(s). · Please bring discharge papers, medication list (and/or medication bottles) to your follow-up appointments for review by your outpatient provider(s).   · Please check the list of medications and be sure it includes every medication (even non-prescription medications) that your provider wants you to take. · It is important that you take the medication exactly as they are prescribed. · Keep your medication in the bottles provided by the pharmacist and keep a list of the medication names, dosages, and times to be taken in your wallet. · Do not take other medications without consulting your doctor. · If you have any questions about your medications or other instructions, please talk to your nurse or care provider before you leave the hospital.    I understand that if any problems occur once I am at home I am to contact my physician. These instructions were explained to me and I had the opportunity to ask questions. DISCHARGE SUMMARY from Nurse    PATIENT INSTRUCTIONS:    After general anesthesia or intravenous sedation, for 24 hours or while taking prescription Narcotics:  · Limit your activities  · Do not drive and operate hazardous machinery  · Do not make important personal or business decisions  · Do  not drink alcoholic beverages  · If you have not urinated within 8 hours after discharge, please contact your surgeon on call. Report the following to your surgeon:  · Excessive pain, swelling, redness or odor of or around the surgical area  · Temperature over 100.5  · Nausea and vomiting lasting longer than 4 hours or if unable to take medications  · Any signs of decreased circulation or nerve impairment to extremity: change in color, persistent  numbness, tingling, coldness or increase pain  · Any questions    What to do at Home:  Recommended activity: {discharge activity:15456}, ***    If you experience any of the following symptoms ***, please follow up with ***. *  Please give a list of your current medications to your Primary Care Provider.     *  Please update this list whenever your medications are discontinued, doses are      changed, or new medications (including over-the-counter products) are added. *  Please carry medication information at all times in case of emergency situations. These are general instructions for a healthy lifestyle:    No smoking/ No tobacco products/ Avoid exposure to second hand smoke  Surgeon General's Warning:  Quitting smoking now greatly reduces serious risk to your health. Obesity, smoking, and sedentary lifestyle greatly increases your risk for illness    A healthy diet, regular physical exercise & weight monitoring are important for maintaining a healthy lifestyle    You may be retaining fluid if you have a history of heart failure or if you experience any of the following symptoms:  Weight gain of 3 pounds or more overnight or 5 pounds in a week, increased swelling in our hands or feet or shortness of breath while lying flat in bed. Please call your doctor as soon as you notice any of these symptoms; do not wait until your next office visit. Recognize signs and symptoms of STROKE:    F-face looks uneven    A-arms unable to move or move unevenly    S-speech slurred or non-existent    T-time-call 911 as soon as signs and symptoms begin-DO NOT go       Back to bed or wait to see if you get better-TIME IS BRAIN. Warning Signs of HEART ATTACK     Call 911 if you have these symptoms:   Chest discomfort. Most heart attacks involve discomfort in the center of the chest that lasts more than a few minutes, or that goes away and comes back. It can feel like uncomfortable pressure, squeezing, fullness, or pain.  Discomfort in other areas of the upper body. Symptoms can include pain or discomfort in one or both arms, the back, neck, jaw, or stomach.  Shortness of breath with or without chest discomfort.  Other signs may include breaking out in a cold sweat, nausea, or lightheadedness. Don't wait more than five minutes to call 911 - MINUTES MATTER! Fast action can save your life.  Calling 911 is almost always the fastest way to get lifesaving treatment. Emergency Medical Services staff can begin treatment when they arrive -- up to an hour sooner than if someone gets to the hospital by car. The discharge information has been reviewed with the {PATIENT PARENT GUARDIAN:45228}. The {PATIENT PARENT GUARDIAN:90741} verbalized understanding. Discharge medications reviewed with the {Dishcarge meds reviewed FRVV:08760} and appropriate educational materials and side effects teaching were provided. ___________________________________________________________________________________________________________________________________        MyChart Activation    Thank you for requesting access to [employer]. Please follow the instructions below to securely access and download your online medical record. [employer] allows you to send messages to your doctor, view your test results, renew your prescriptions, schedule appointments, and more. How Do I Sign Up? 1. In your internet browser, go to [email or web address]. 2. Click on the First Time User? Click Here link in the Sign In box. You will see the New Member Sign Up page. 3. Enter your [employer] Access Code exactly as it appears below. You will not need to use this code after youve completed the sign-up process. If you do not sign up before the expiration date, you must request a new code. [employer] Access Code: [email or web address]  Expires: 2018  9:43 PM (This is the date your [employer] access code will )    4. Enter the last four digits of your Social Security Number (xxxx) and Date of Birth (mm/dd/yyyy) as indicated and click Submit. You will be taken to the next sign-up page. 5. Create a MyChart ID. This will be your [employer] login ID and cannot be changed, so think of one that is secure and easy to remember. 6. Create a [employer] password. You can change your password at any time. 7. Enter your Password Reset Question and Answer.  This can be used at a later time if you forget your password. 8. Enter your e-mail address. You will receive e-mail notification when new information is available in [address] 9. Click Sign Up. You can now view and download portions of your medical record. 10. Click the Download Summary menu link to download a portable copy of your medical information. Additional Information    If you have questions, please visit the Frequently Asked Questions section of the [employer] website at [email or web address] Remember, [employer] is NOT to be used for urgent needs. For medical emergencies, dial 911.

## 2018-07-04 NOTE — PROGRESS NOTES
Bedside shift change report given to [place] (oncoming nurse) by Raffi Nieves (offgoing nurse). Report included the following information SBAR.

## 2018-07-10 ENCOUNTER — HOSPITAL ENCOUNTER (EMERGENCY)
Age: 38
Discharge: HOME | End: 2018-07-10
Attending: EMERGENCY MEDICINE
Payer: MEDICARE

## 2018-07-10 VITALS
RESPIRATION RATE: 16 BRPM | SYSTOLIC BLOOD PRESSURE: 125 MMHG | TEMPERATURE: 98.3 F | HEART RATE: 113 BPM | DIASTOLIC BLOOD PRESSURE: 75 MMHG | BODY MASS INDEX: 26.28 KG/M2 | WEIGHT: 142.8 LBS | OXYGEN SATURATION: 100 % | HEIGHT: 62 IN

## 2018-07-10 DIAGNOSIS — E86.0 DEHYDRATION: ICD-10-CM

## 2018-07-10 DIAGNOSIS — D57.00 SICKLE CELL CRISIS (HCC): Primary | ICD-10-CM

## 2018-07-10 LAB
ANION GAP BLD CALC-SCNC: 14 MMOL/L (ref 10–20)
ANION GAP SERPL CALC-SCNC: 11 MMOL/L (ref 5–15)
APPEARANCE UR: CLEAR
ARTERIAL PATENCY WRIST A: ABNORMAL
BACTERIA URNS QL MICRO: NEGATIVE /HPF
BASE DEFICIT BLDV-SCNC: 9 MMOL/L
BDY SITE: ABNORMAL
BILIRUB UR QL: NEGATIVE
BUN BLD-MCNC: 54 MG/DL (ref 9–20)
BUN SERPL-MCNC: 62 MG/DL (ref 6–20)
BUN/CREAT SERPL: 14 (ref 12–20)
CA-I BLD-MCNC: 0.92 MMOL/L (ref 1.12–1.32)
CALCIUM SERPL-MCNC: 8.3 MG/DL (ref 8.5–10.1)
CHLORIDE BLD-SCNC: 113 MMOL/L (ref 98–107)
CHLORIDE SERPL-SCNC: 105 MMOL/L (ref 97–108)
CO2 BLD-SCNC: 16 MMOL/L (ref 21–32)
CO2 SERPL-SCNC: 19 MMOL/L (ref 21–32)
COLOR UR: ABNORMAL
CREAT BLD-MCNC: 4.1 MG/DL (ref 0.6–1.3)
CREAT SERPL-MCNC: 4.42 MG/DL (ref 0.55–1.02)
EPITH CASTS URNS QL MICRO: ABNORMAL /LPF
GAS FLOW.O2 O2 DELIVERY SYS: ABNORMAL L/MIN
GLUCOSE BLD-MCNC: 263 MG/DL (ref 65–100)
GLUCOSE SERPL-MCNC: 282 MG/DL (ref 65–100)
GLUCOSE UR STRIP.AUTO-MCNC: >1000 MG/DL
HCG UR QL: NEGATIVE
HCO3 BLDV-SCNC: 17.9 MMOL/L (ref 23–28)
HCT VFR BLD CALC: 26 % (ref 35–47)
HGB UR QL STRIP: NEGATIVE
KETONES UR QL STRIP.AUTO: NEGATIVE MG/DL
LEUKOCYTE ESTERASE UR QL STRIP.AUTO: NEGATIVE
NITRITE UR QL STRIP.AUTO: NEGATIVE
PCO2 BLDV: 37.5 MMHG (ref 41–51)
PH BLDV: 7.29 [PH] (ref 7.32–7.42)
PH UR STRIP: 6 [PH] (ref 5–8)
PO2 BLDV: 45 MMHG (ref 25–40)
POTASSIUM BLD-SCNC: 4.8 MMOL/L (ref 3.5–5.1)
POTASSIUM SERPL-SCNC: 5.6 MMOL/L (ref 3.5–5.1)
PROT UR STRIP-MCNC: 100 MG/DL
RBC #/AREA URNS HPF: ABNORMAL /HPF (ref 0–5)
SAO2 % BLDV: 75 % (ref 65–88)
SERVICE CMNT-IMP: ABNORMAL
SODIUM BLD-SCNC: 137 MMOL/L (ref 136–145)
SODIUM SERPL-SCNC: 135 MMOL/L (ref 136–145)
SP GR UR REFRACTOMETRY: 1.02 (ref 1–1.03)
SPECIMEN TYPE: ABNORMAL
UR CULT HOLD, URHOLD: NORMAL
UROBILINOGEN UR QL STRIP.AUTO: 0.2 EU/DL (ref 0.2–1)
WBC URNS QL MICRO: ABNORMAL /HPF (ref 0–4)

## 2018-07-10 PROCEDURE — 74011250637 HC RX REV CODE- 250/637: Performed by: EMERGENCY MEDICINE

## 2018-07-10 PROCEDURE — 36415 COLL VENOUS BLD VENIPUNCTURE: CPT | Performed by: EMERGENCY MEDICINE

## 2018-07-10 PROCEDURE — 99284 EMERGENCY DEPT VISIT MOD MDM: CPT

## 2018-07-10 PROCEDURE — 81001 URINALYSIS AUTO W/SCOPE: CPT | Performed by: EMERGENCY MEDICINE

## 2018-07-10 PROCEDURE — 81025 URINE PREGNANCY TEST: CPT

## 2018-07-10 PROCEDURE — 80047 BASIC METABLC PNL IONIZED CA: CPT

## 2018-07-10 PROCEDURE — 82803 BLOOD GASES ANY COMBINATION: CPT

## 2018-07-10 PROCEDURE — 80048 BASIC METABOLIC PNL TOTAL CA: CPT | Performed by: EMERGENCY MEDICINE

## 2018-07-10 RX ORDER — OXYCODONE AND ACETAMINOPHEN 10; 325 MG/1; MG/1
2 TABLET ORAL
Status: COMPLETED | OUTPATIENT
Start: 2018-07-10 | End: 2018-07-10

## 2018-07-10 RX ORDER — OXYCODONE AND ACETAMINOPHEN 5; 325 MG/1; MG/1
1-2 TABLET ORAL
Qty: 10 TAB | Refills: 0 | Status: SHIPPED | OUTPATIENT
Start: 2018-07-10 | End: 2019-09-06

## 2018-07-10 RX ADMIN — OXYCODONE HYDROCHLORIDE AND ACETAMINOPHEN 2 TABLET: 10; 325 TABLET ORAL at 14:44

## 2018-07-10 NOTE — ED NOTES
BRENNA DICKEY went to reassess pt. Pt not in room. Not in bathroom or near kitchenette's. Will continue to search for pt.

## 2018-07-10 NOTE — ED NOTES
Pt ambulatory in ED to coffee machine with steady gait. Pt requesting cab to be called. Notified pt awaiting blood results.

## 2018-07-10 NOTE — ED NOTES
1:20 PM  I have evaluated the patient as the Provider in Triage. I have reviewed Her vital signs and the triage nurse assessment. I have talked with the patient and any available family and advised that I am the provider in triage and have ordered the appropriate study to initiate their work up based on the clinical presentation during my assessment. I have advised that the patient will be accommodated in the Main ED as soon as possible. I have also requested to contact the triage nurse or myself immediately if the patient experiences any changes in their condition during this brief waiting period. Jamila Foley PA-C    Sickle cell pain. Back. No chest pain, shortness of breath. Pain in back typical for her.

## 2018-07-10 NOTE — ED NOTES
Emma called and arranged for transport back to home. Reference # given to pt. Pt escorted out to waiting room with belongings and food/drink from kitchenette.

## 2018-07-10 NOTE — ED NOTES
Pt found walking back from the gift shop with food. Informed pt if she would like to be seen in the ER she need to remain in the room. Pt responded, \"Leave me the fuck alone, I will do what I want. \"  Reiterated to patient the need to stay in room because ER MD would like to reassess pt,.

## 2018-07-10 NOTE — DISCHARGE INSTRUCTIONS
Dehydration: Care Instructions  Your Care Instructions  Dehydration happens when your body loses too much fluid. This might happen when you do not drink enough water or you lose large amounts of fluids from your body because of diarrhea, vomiting, or sweating. Severe dehydration can be life-threatening. Water and minerals called electrolytes help put your body fluids back in balance. Learn the early signs of fluid loss, and drink more fluids to prevent dehydration. Follow-up care is a key part of your treatment and safety. Be sure to make and go to all appointments, and call your doctor if you are having problems. It's also a good idea to know your test results and keep a list of the medicines you take. How can you care for yourself at home? · To prevent dehydration, drink plenty of fluids, enough so that your urine is light yellow or clear like water. Choose water and other caffeine-free clear liquids until you feel better. If you have kidney, heart, or liver disease and have to limit fluids, talk with your doctor before you increase the amount of fluids you drink. · If you do not feel like eating or drinking, try taking small sips of water, sports drinks, or other rehydration drinks. · Get plenty of rest.  To prevent dehydration  · Add more fluids to your diet and daily routine, unless your doctor has told you not to. · During hot weather, drink more fluids. Drink even more fluids if you exercise a lot. Stay away from drinks with alcohol or caffeine. · Watch for the symptoms of dehydration. These include:  ¨ A dry, sticky mouth. ¨ Dark yellow urine, and not much of it. ¨ Dry and sunken eyes. ¨ Feeling very tired. · Learn what problems can lead to dehydration. These include:  ¨ Diarrhea, fever, and vomiting. ¨ Any illness with a fever, such as pneumonia or the flu. ¨ Activities that cause heavy sweating, such as endurance races and heavy outdoor work in hot or humid weather.   ¨ Alcohol or drug abuse or withdrawal.  ¨ Certain medicines, such as cold and allergy pills (antihistamines), diet pills (diuretics), and laxatives. ¨ Certain diseases, such as diabetes, cancer, and heart or kidney disease. When should you call for help? Call 911 anytime you think you may need emergency care. For example, call if:    · You passed out (lost consciousness).    Call your doctor now or seek immediate medical care if:    · You are confused and cannot think clearly.     · You are dizzy or lightheaded, or you feel like you may faint.     · You have signs of needing more fluids. You have sunken eyes and a dry mouth, and you pass only a little dark urine.     · You cannot keep fluids down.    Watch closely for changes in your health, and be sure to contact your doctor if:    · You are not making tears.     · Your skin is very dry and sags slowly back into place after you pinch it.     · Your mouth and eyes are very dry. Where can you learn more? Go to http://luciana-kyle.info/. Enter C467 in the search box to learn more about \"Dehydration: Care Instructions. \"  Current as of: November 20, 2017  Content Version: 11.7  © 9476-8418 [employer]. Care instructions adapted under license by [employer] (which disclaims liability or warranty for this information). If you have questions about a medical condition or this instruction, always ask your healthcare professional. [place] [address] any warranty or liability for your use of this information. Oral Rehydration: Care Instructions  Your Care Instructions    Dehydration occurs when your body loses too much water. This can happen if you do not drink enough fluids or lose a lot of fluid due to diarrhea, vomiting, or sweating. Being dehydrated can cause health problems and can even be life-threatening. To replace lost fluids, you need to drink liquid that contains special chemicals called electrolytes. Electrolytes keep your body working well. Plain water does not have electrolytes. You also need to rest to prevent more fluid loss. Replacing water and electrolytes (oral rehydration) completely takes about 36 hours. But you should feel better within a few hours. Follow-up care is a key part of your treatment and safety. Be sure to make and go to all appointments, and call your doctor if you are having problems. It's also a good idea to know your test results and keep a list of the medicines you take. How can you care for yourself at home? · Take frequent sips of a drink such as Gatorade, Powerade, or other rehydration drinks that your doctor suggests. These replace both fluid and important chemicals (electrolytes) you need for balance in your blood. · Drink 2 quarts of cool liquid over 2 to 4 hours. You should have at least 10 glasses of liquid a day to replace lost fluid. If you have kidney, heart, or liver disease and have to limit fluids, talk with your doctor before you increase the amount of fluids you drink. · Make your own drink. Measure everything carefully. The drink may not work well or may even be harmful if the amounts are off. ¨ 1 quart water  ¨ ½ teaspoon salt  ¨ 6 teaspoons sugar  · Do not drink liquid with caffeine, such as coffee and bakari. · Do not drink any alcohol. It can make you dehydrated. · Drink plenty of fluids, enough so that your urine is light yellow or clear like water. If you have kidney, heart, or liver disease and have to limit fluids, talk with your doctor before you increase the amount of fluids you drink. When should you call for help? Call 911 anytime you think you may need emergency care. For example, call if:    · You have signs of severe dehydration, such as:  ¨ You are confused or unable to stay awake. ¨ You passed out (lost consciousness).    Call your doctor now or seek immediate medical care if:    · You still have signs of dehydration.  You have sunken eyes and a dry mouth, and you pass only a little dark urine.     · You are dizzy or lightheaded, or you feel like you may faint.     · You are not able to keep down fluids.    Watch closely for changes in your health, and be sure to contact your doctor if:    · You do not get better as expected. Where can you learn more? Go to http://luciana-kyle.info/. Enter I040 in the search box to learn more about \"Oral Rehydration: Care Instructions. \"  Current as of: November 20, 2017  Content Version: 11.7  © 2203-9289 [employer]. Care instructions adapted under license by [employer] (which disclaims liability or warranty for this information). If you have questions about a medical condition or this instruction, always ask your healthcare professional. [address] any warranty or liability for your use of this information. Sickle Cell Crisis: Care Instructions  Your Care Instructions    Sickle cell crisis is a painful episode that may begin suddenly in a person with sickle cell disease. Sickle cell disease turns normal, round red blood cells into cells that look like cecilia or crescent moons. The sickle-shaped cells can get stuck in blood vessels, blocking blood flow and causing severe pain. The pain can occur in the bones of the spine, the arms and legs, the chest, and the abdomen. An episode may be called a \"painful event\" or \"painful crisis. \" Some people who have sickle cell disease have many painful events, while others have few or none. Treatment depends on the level of pain and how long it lasts. Sometimes taking nonprescription pain relievers can help. Or you may need stronger pain relief medicine that is prescribed or given by a doctor. You may need to be treated in the hospital.  It isn't always possible to know what sets off a painful event.  But triggers include being dehydrated, cold temperatures, infection, stress, and not getting enough oxygen. Follow-up care is a key part of your treatment and safety. Be sure to make and go to all appointments, and call your doctor if you are having problems. It's also a good idea to know your test results and keep a list of the medicines you take. How can you care for yourself at home? · Create a pain management plan with your doctor. This plan should include the types of medicines you can take and other actions you can take at home to relieve pain. · Drink plenty of fluids, enough so that your urine is light yellow or clear like water. If you have kidney, heart, or liver disease and have to limit fluids, talk with your doctor before you increase the amount of fluids you drink. · Take your medicines exactly as prescribed. Call your doctor if you think you are having a problem with your medicine. · Take pain medicines exactly as directed. ¨ If the doctor gave you a prescription medicine for pain, take it as prescribed. ¨ If you are not taking a prescription pain medicine, ask your doctor if you can take an over-the-counter medicine. · Avoid alcohol. It can make you dehydrated. · Dress warmly in cold weather. The cold and windy weather can lead to severe pain. · Do not smoke. Smoking can reduce the amount of oxygen in your blood. · Get plenty of sleep. When should you call for help? Call 911 anytime you think you may need emergency care. For example, call if:    · You have symptoms of a severe problem from sickle cell.     · You have symptoms of a stroke. These may include:  ¨ Sudden numbness, tingling, weakness, or loss of movement in your face, arm, or leg, especially on only one side of your body. ¨ Sudden vision changes. ¨ Sudden trouble speaking. ¨ Sudden confusion or trouble understanding simple statements. ¨ Sudden problems with walking or balance. ¨ A sudden, severe headache that is different from past headaches.     · You are in severe pain.     · You have symptoms of a heart attack. These may include:  ¨ Chest pain or pressure, or a strange feeling in the chest.  ¨ Sweating. ¨ Shortness of breath. ¨ Nausea or vomiting. ¨ Pain, pressure, or a strange feeling in the back, neck, jaw, or upper belly or in one or both shoulders or arms. ¨ Lightheadedness or sudden weakness. ¨ A fast or irregular heartbeat. After you call 911, the  may tell you to chew 1 adult-strength or 2 to 4 low-dose aspirin. Wait for an ambulance. Do not try to drive yourself.    Call your doctor now or seek immediate medical care if:    · You have a fever.    Watch closely for changes in your health, and be sure to contact your doctor if you have any problems. Where can you learn more? Go to http://luciana-kyle.info/. Enter F104 in the search box to learn more about \"Sickle Cell Crisis: Care Instructions. \"  Current as of: September 10, 2017  Content Version: 11.7  © 7060-0528 [employer], [employer]. Care instructions adapted under license by [employer] (which disclaims liability or warranty for this information). If you have questions about a medical condition or this instruction, always ask your healthcare professional. [place] [address] any warranty or liability for your use of this information.

## 2018-07-10 NOTE — ED PROVIDER NOTES
HPI Comments: 42 y.o. female with past medical history significant for Diabetes, Asthma, Hypertension, Sickle cell trait, Seizures, GERD, CKD, and Acute renal failure who presents via EMS with chief complaint of Sickle cell crisis. Patient was discharged from [facility] on 07/04/2018 and reports \"not feeling right\" ever since. Patient reports accompanying constant fatigue, and fever (100.7F) which happened one time \"four days ago. \" Patient was referred to [facility] ED by her PCP Dr. Anjel Carter for evaluation. Patient reports sudden onset 0300 this morning of generalized body pain from her sickle cell disease, and notes significant back pain described as \"8/10\" mid back pain. Patient reports back pain is typical for her. Patient reports taking Tylenol today for back pain with no relief. Patient also reports taking Dilaudid (2 mg) and Oxycodone (5-325 mg) for back pain but reports running out \"3 weeks ago. \" Patient states she would not have come to [facility] ED if she still had aforementioned pain medications. Patient reports having an appointment with her PCP Dr. Nani Pettit tomorrow. Patient reports history of Type 1 Diabetes and reports taking 20 units of Novolog every morning and night, which she notes taking this morning, as well as Lantus which she notes taking 8 units of last night. Patient denies problems with eating and appetite and reports eating one meal earlier today. Patient denies recent injury or GLF to back. Pt denies fever, chills, cough, congestion, shortness of breath, chest pain, abdominal pain, nausea, vomiting, diarrhea, difficulty with urination or dysuria. Old Chart Review: Patient was admitted on 06/23/2018 for sickle cell crisis, had gram negative lelia bacteremia in blood, and was discharged home on 07/04/2018. There are no other acute medical concerns at this time.     PCP: Romelia Samaniego MD    Note written by Ivanna Tripp, as dictated by Sara Hankins, DO 3:10 PM      The history is provided by the patient. Past Medical History:   Diagnosis Date    ARF (acute renal failure) ([facility] [address] [facility] requiring dialysis     Asthma     CKD (chronic kidney disease)     Diabetes ([facility] [facility]     Gastroparesis     Gastric Pacer- REMOVED 2015    GERD (gastroesophageal reflux disease)     Hypertension     Narcotic dependence ([facility] [address] [facility]     THU (obstructive sleep apnea)     wears 2 LPM oxygen at night    Other ill-defined conditions(799.89)     Polycystic ovarian syndrome     Seizures (HCC)     Sickle cell trait ([facility]     Thromboembolus ([facility] [address] to her left arm and was told she had one in left leg recently       Past Surgical History:   Procedure Laterality Date    HX CATARACT REMOVAL  3/5/12    right    HX DILATION AND CURETTAGE      ablation    HX GASTRIC BYPASS      HX GI      j tube placement and removal    HX OTHER SURGICAL      Gastric Pacer- REMOVED 2015    HX VASCULAR ACCESS      gray cath rt subclavian    HX VASCULAR ACCESS      HD access right thigh         Family History:   Problem Relation Age of Onset    Cancer Mother      lung    Hypertension Mother     Cancer Father      kidney    Stroke Father      3 strokes: 59-72    Heart Disease Father 72     CABG    Hypertension Father     Cancer Sister      pancreatic    Cancer Maternal Aunt      breast    Cancer Paternal Aunt      breast    Schizophrenia Sister      was in [facility], now ass't living    Other Sister       AIDS    Other Other      nephew of AIDS       Social History     Social History    Marital status:      Spouse name: N/A    Number of children: N/A    Years of education: N/A     Occupational History    Not on file.      Social History Main Topics    Smoking status: Never Smoker    Smokeless tobacco: Never Used    Alcohol use No    Drug use: No    Sexual activity: Yes     Partners: Male     Birth control/ protection: None     Other Topics Concern    Not on file     Social History Narrative Lives with her  and father         ALLERGIES: Fentanyl; Erythromycin; Toradol [ketorolac]; and Morphine    Review of Systems   Constitutional: Positive for fatigue and fever. Negative for chills. HENT: Negative for congestion. Respiratory: Negative for cough and shortness of breath. Cardiovascular: Negative for chest pain. Gastrointestinal: Negative for abdominal pain, diarrhea, nausea and vomiting. Genitourinary: Negative for difficulty urinating and dysuria. Musculoskeletal: Positive for back pain. All other systems reviewed and are negative. Vitals:    07/10/18 1309   BP: (!) 170/100   Pulse: 100   Resp: 16   Temp: 98.1 °F (36.7 °C)   SpO2: 99%   Weight: 64.8 kg (142 lb 12.8 oz)   Height: 5' 2\" (1.575 m)            Physical Exam   Nursing note and vitals reviewed. Constitutional: Pt is awake and alert. Pt appears well-developed and well-nourished. NAD. HENT:   Head: Normocephalic and atraumatic. Nose: Nose normal.   Mouth/Throat: Oropharynx is clear and moist. No oropharyngeal exudate. Eyes: Conjunctivae and extraocular motions are normal. Pupils are equal, round, and reactive to light. Right eye exhibits no discharge. Left eye exhibits no discharge. No scleral icterus. Neck: No tracheal deviation present. Supple neck. Cardiovascular: Normal rate, regular rhythm, normal heart sounds and intact distal pulses. Exam reveals no gallop and no friction rub. No murmur heard. Pulmonary/Chest: Effort normal and breath sounds normal.  Pt  has no wheezes. Pt  has no rales. Abdominal: Soft. Pt  exhibits no distension and no mass. No tenderness. Pt  has no rebound and no guarding. Musculoskeletal:  Pt  exhibits no edema and no tenderness. Left foot amputation, no evidence of infection. Ext: Normal ROM in all four extremities; not tender to palpation; distal pulses are normal, no edema. Neurological:  Pt is alert. nonfocal neuro exam.  Skin: Skin is warm and dry.   Pt is not diaphoretic. Callous on proximal right foot, no evidence of infection. Psychiatric:  Pt  has a normal mood and affect. Behavior is normal.   Note written by Ivanna Kathleen, as dictated by Jason Pantoja DO 3:31 PM        [facility]      ED Course       Procedures      Labs Reviewed   URINALYSIS W/MICROSCOPIC - Abnormal; Notable for the following:        Result Value    Protein 100 (*)     Glucose >1000 (*)     All other components within normal limits   METABOLIC PANEL, BASIC - Abnormal; Notable for the following:     Sodium 135 (*)     Potassium 5.6 (*)     CO2 19 (*)     Glucose 282 (*)     BUN 62 (*)     Creatinine 4.42 (*)     GFR est AA 13 (*)     GFR est non-AA 11 (*)     Calcium 8.3 (*)     All other components within normal limits   POC CHEM8 - Abnormal; Notable for the following:     Calcium, ionized (POC) 0.92 (*)     Chloride (POC) 113 (*)     CO2 (POC) 16 (*)     Glucose (POC) 263 (*)     BUN (POC) 54 (*)     Creatinine (POC) 4.1 (*)     GFRAA, POC 15 (*)     GFRNA, POC 12 (*)     Hematocrit (POC) 26 (*)     All other components within normal limits   POC VENOUS BLOOD GAS - Abnormal; Notable for the following:     pH, venous (POC) 7.286 (*)     pCO2, venous (POC) 37.5 (*)     pO2, venous (POC) 45 (*)     HCO3, venous (POC) 17.9 (*)     All other components within normal limits   URINE CULTURE HOLD SAMPLE   SAMPLES BEING HELD   VENOUS BLOOD GAS   HCG URINE, QL. - POC       Pt looks fine  Feels fine except for her back pain  Asked her to push fluids - she said she would. I found her later after DC rummaging through the refrigerator and getting soda and apple sauce. I told her she is eating too much sugar and to drink water. She seemed indignant.

## 2018-07-10 NOTE — PROGRESS NOTES
Date of previous inpatient admission/ ED visit? Patient last admitted 6/23-7/4 for Sickle Cell Crisis. Patient has had 3 ED visits within the past 6 months and 3 IP admissions within the past 12 months. What brought the patient back to ED? Chart reviewed. Patient arrives to ER c/o \"my sickle cell is acting up\" Complains of pain all over. Did patient decline recommended services during last admission/ ED visit (if yes, what)? NO    Has patient seen a provider since their last inpatient admission/ED visit (if yes, when)? YES, 7/5/18 (PCP follow-up)  CM Interventions:  From previous inpatient admission/ED visit: Patient discharged home with family assistance. PCP follow-up appointment was scheduled for 7/5/18. Patient also to follow-up with other providers in the community. From current inpatient admission/ED visit:  Patient currently in the ED being evaluated and needs assessed. There are no current CM consults or needs at this time. Disposition needs TBD/subject to change pending recommendations. CM will continue to follow and assist with disposition needs as they arise. Care Management Interventions  PCP Verified by CM:  Yes  Palliative Care Criteria Met (RRAT>21 & CHF Dx)?: No  [facility]  Transition of Care Consult (CM Consult):  (There are no current CM consults or needs at this time)  Discharge Durable Medical Equipment: No  Physical Therapy Consult: No  Occupational Therapy Consult: No  Speech Therapy Consult: No  Current Support Network: Lives with Spouse, Own Home, Other (Patient's father and sister also [place])  Confirm Follow Up Transport: Family  Plan discussed with Pt/Family/Caregiver: Yes  Freedom of Choice Offered: Yes  Discharge Location  Discharge Placement: Home with family assistance (TBD/subject to change pending recommendations)    VIVI Borrego/NIXON  3:12 PM

## 2018-09-15 ENCOUNTER — APPOINTMENT (OUTPATIENT)
Dept: CT IMAGING | Age: 38
End: 2018-09-15
Attending: EMERGENCY MEDICINE
Payer: MEDICARE

## 2018-09-15 ENCOUNTER — HOSPITAL ENCOUNTER (EMERGENCY)
Age: 38
Discharge: HOME | End: 2018-09-15
Attending: EMERGENCY MEDICINE
Payer: MEDICARE

## 2018-09-15 VITALS
DIASTOLIC BLOOD PRESSURE: 87 MMHG | OXYGEN SATURATION: 100 % | HEART RATE: 101 BPM | TEMPERATURE: 98 F | SYSTOLIC BLOOD PRESSURE: 140 MMHG | RESPIRATION RATE: 16 BRPM

## 2018-09-15 DIAGNOSIS — N18.9 CHRONIC KIDNEY DISEASE, UNSPECIFIED CKD STAGE: ICD-10-CM

## 2018-09-15 DIAGNOSIS — R11.2 NAUSEA AND VOMITING, INTRACTABILITY OF VOMITING NOT SPECIFIED, UNSPECIFIED VOMITING TYPE: ICD-10-CM

## 2018-09-15 DIAGNOSIS — K86.1 CHRONIC PANCREATITIS, UNSPECIFIED PANCREATITIS TYPE (HCC): Primary | ICD-10-CM

## 2018-09-15 LAB
ALBUMIN SERPL-MCNC: 4.8 G/DL (ref 3.5–5)
ALBUMIN/GLOB SERPL: 1.1 {RATIO} (ref 1.1–2.2)
ALP SERPL-CCNC: 297 U/L (ref 45–117)
ALT SERPL-CCNC: 65 U/L (ref 12–78)
ANION GAP SERPL CALC-SCNC: 10 MMOL/L (ref 5–15)
APPEARANCE UR: ABNORMAL
AST SERPL-CCNC: 28 U/L (ref 15–37)
BACTERIA URNS QL MICRO: NEGATIVE /HPF
BASOPHILS # BLD: 0 K/UL (ref 0–0.1)
BASOPHILS NFR BLD: 0 % (ref 0–1)
BILIRUB SERPL-MCNC: 0.2 MG/DL (ref 0.2–1)
BILIRUB UR QL: NEGATIVE
BUN SERPL-MCNC: 33 MG/DL (ref 6–20)
BUN/CREAT SERPL: 10 (ref 12–20)
CALCIUM SERPL-MCNC: 9.4 MG/DL (ref 8.5–10.1)
CHLORIDE SERPL-SCNC: 114 MMOL/L (ref 97–108)
CO2 SERPL-SCNC: 18 MMOL/L (ref 21–32)
COLOR UR: YELLOW
CREAT SERPL-MCNC: 3.23 MG/DL (ref 0.55–1.02)
DIFFERENTIAL METHOD BLD: ABNORMAL
EOSINOPHIL # BLD: 0 K/UL (ref 0–0.4)
EOSINOPHIL NFR BLD: 1 % (ref 0–7)
EPITH CASTS URNS QL MICRO: ABNORMAL /LPF
ERYTHROCYTE [DISTWIDTH] IN BLOOD BY AUTOMATED COUNT: 14.7 % (ref 11.5–14.5)
GLOBULIN SER CALC-MCNC: 4.3 G/DL (ref 2–4)
GLUCOSE SERPL-MCNC: 157 MG/DL (ref 65–100)
GLUCOSE UR STRIP.AUTO-MCNC: NEGATIVE MG/DL
HCG SERPL QL: NEGATIVE
HCT VFR BLD AUTO: 30.1 % (ref 35–47)
HGB BLD-MCNC: 9.5 G/DL (ref 11.5–16)
HGB UR QL STRIP: ABNORMAL
IMM GRANULOCYTES # BLD: 0 K/UL (ref 0–0.04)
IMM GRANULOCYTES NFR BLD AUTO: 0 % (ref 0–0.5)
KETONES UR QL STRIP.AUTO: NEGATIVE MG/DL
LACTATE SERPL-SCNC: 1.3 MMOL/L (ref 0.4–2)
LEUKOCYTE ESTERASE UR QL STRIP.AUTO: NEGATIVE
LIPASE SERPL-CCNC: 174 U/L (ref 73–393)
LYMPHOCYTES # BLD: 0.7 K/UL (ref 0.8–3.5)
LYMPHOCYTES NFR BLD: 17 % (ref 12–49)
MCH RBC QN AUTO: 24.9 PG (ref 26–34)
MCHC RBC AUTO-ENTMCNC: 31.6 G/DL (ref 30–36.5)
MCV RBC AUTO: 78.8 FL (ref 80–99)
MONOCYTES # BLD: 0.2 K/UL (ref 0–1)
MONOCYTES NFR BLD: 6 % (ref 5–13)
NEUTS SEG # BLD: 3.2 K/UL (ref 1.8–8)
NEUTS SEG NFR BLD: 76 % (ref 32–75)
NITRITE UR QL STRIP.AUTO: NEGATIVE
NRBC # BLD: 0 K/UL (ref 0–0.01)
NRBC BLD-RTO: 0 PER 100 WBC
PH UR STRIP: 6 [PH] (ref 5–8)
PLATELET # BLD AUTO: 188 K/UL (ref 150–400)
PMV BLD AUTO: ABNORMAL FL (ref 8.9–12.9)
POTASSIUM SERPL-SCNC: 5 MMOL/L (ref 3.5–5.1)
PROT SERPL-MCNC: 9.1 G/DL (ref 6.4–8.2)
PROT UR STRIP-MCNC: 100 MG/DL
RBC # BLD AUTO: 3.82 M/UL (ref 3.8–5.2)
RBC #/AREA URNS HPF: ABNORMAL /HPF (ref 0–5)
RBC MORPH BLD: ABNORMAL
RETICS # AUTO: 0.04 M/UL (ref 0.02–0.08)
RETICS/RBC NFR AUTO: 1.1 % (ref 0.7–2.1)
SODIUM SERPL-SCNC: 142 MMOL/L (ref 136–145)
SP GR UR REFRACTOMETRY: 1.01 (ref 1–1.03)
UROBILINOGEN UR QL STRIP.AUTO: 0.2 EU/DL (ref 0.2–1)
WBC # BLD AUTO: 4.1 K/UL (ref 3.6–11)
WBC URNS QL MICRO: ABNORMAL /HPF (ref 0–4)

## 2018-09-15 PROCEDURE — 74011250637 HC RX REV CODE- 250/637: Performed by: EMERGENCY MEDICINE

## 2018-09-15 PROCEDURE — 99284 EMERGENCY DEPT VISIT MOD MDM: CPT

## 2018-09-15 PROCEDURE — 74176 CT ABD & PELVIS W/O CONTRAST: CPT

## 2018-09-15 PROCEDURE — 36415 COLL VENOUS BLD VENIPUNCTURE: CPT | Performed by: EMERGENCY MEDICINE

## 2018-09-15 PROCEDURE — 81003 URINALYSIS AUTO W/O SCOPE: CPT | Performed by: EMERGENCY MEDICINE

## 2018-09-15 PROCEDURE — 85025 COMPLETE CBC W/AUTO DIFF WBC: CPT | Performed by: EMERGENCY MEDICINE

## 2018-09-15 PROCEDURE — 85045 AUTOMATED RETICULOCYTE COUNT: CPT | Performed by: EMERGENCY MEDICINE

## 2018-09-15 PROCEDURE — 83690 ASSAY OF LIPASE: CPT | Performed by: EMERGENCY MEDICINE

## 2018-09-15 PROCEDURE — 83605 ASSAY OF LACTIC ACID: CPT | Performed by: EMERGENCY MEDICINE

## 2018-09-15 PROCEDURE — 84703 CHORIONIC GONADOTROPIN ASSAY: CPT | Performed by: EMERGENCY MEDICINE

## 2018-09-15 PROCEDURE — 80053 COMPREHEN METABOLIC PANEL: CPT | Performed by: EMERGENCY MEDICINE

## 2018-09-15 RX ORDER — DIPHENHYDRAMINE HCL 25 MG
25 CAPSULE ORAL
Status: COMPLETED | OUTPATIENT
Start: 2018-09-15 | End: 2018-09-15

## 2018-09-15 RX ORDER — PROMETHAZINE HYDROCHLORIDE 25 MG/1
25 TABLET ORAL
Qty: 12 TAB | Refills: 0 | Status: SHIPPED | OUTPATIENT
Start: 2018-09-15

## 2018-09-15 RX ORDER — HYDROMORPHONE HYDROCHLORIDE 1 MG/ML
0.5 INJECTION, SOLUTION INTRAMUSCULAR; INTRAVENOUS; SUBCUTANEOUS
Status: DISCONTINUED | OUTPATIENT
Start: 2018-09-15 | End: 2018-09-15 | Stop reason: HOSPADM

## 2018-09-15 RX ORDER — MORPHINE SULFATE 2 MG/ML
2 INJECTION, SOLUTION INTRAMUSCULAR; INTRAVENOUS
Status: DISCONTINUED | OUTPATIENT
Start: 2018-09-15 | End: 2018-09-15

## 2018-09-15 RX ORDER — DIPHENHYDRAMINE HYDROCHLORIDE 50 MG/ML
25 INJECTION, SOLUTION INTRAMUSCULAR; INTRAVENOUS
Status: DISCONTINUED | OUTPATIENT
Start: 2018-09-15 | End: 2018-09-15 | Stop reason: HOSPADM

## 2018-09-15 RX ORDER — OXYCODONE HYDROCHLORIDE 5 MG/1
5 TABLET ORAL
Status: COMPLETED | OUTPATIENT
Start: 2018-09-15 | End: 2018-09-15

## 2018-09-15 RX ORDER — ONDANSETRON 4 MG/1
4 TABLET, ORALLY DISINTEGRATING ORAL
Status: COMPLETED | OUTPATIENT
Start: 2018-09-15 | End: 2018-09-15

## 2018-09-15 RX ORDER — ONDANSETRON 2 MG/ML
4 INJECTION INTRAMUSCULAR; INTRAVENOUS
Status: DISCONTINUED | OUTPATIENT
Start: 2018-09-15 | End: 2018-09-15 | Stop reason: HOSPADM

## 2018-09-15 RX ADMIN — ONDANSETRON 4 MG: 4 TABLET, ORALLY DISINTEGRATING ORAL at 16:43

## 2018-09-15 RX ADMIN — OXYCODONE HYDROCHLORIDE 5 MG: 5 TABLET ORAL at 18:08

## 2018-09-15 RX ADMIN — OXYCODONE HYDROCHLORIDE 5 MG: 5 TABLET ORAL at 16:43

## 2018-09-15 RX ADMIN — DIPHENHYDRAMINE HYDROCHLORIDE 25 MG: 25 CAPSULE ORAL at 16:43

## 2018-09-15 NOTE — DISCHARGE INSTRUCTIONS
Nausea and Vomiting: Care Instructions  Your Care Instructions    When you are nauseated, you may feel weak and sweaty and notice a lot of saliva in your mouth. Nausea often leads to vomiting. Most of the time you do not need to worry about nausea and vomiting, but they can be signs of other illnesses. Two common causes of nausea and vomiting are stomach flu and food poisoning. Nausea and vomiting from viral stomach flu will usually start to improve within 24 hours. Nausea and vomiting from food poisoning may last from 12 to 48 hours. The doctor has checked you carefully, but problems can develop later. If you notice any problems or new symptoms, get medical treatment right away. Follow-up care is a key part of your treatment and safety. Be sure to make and go to all appointments, and call your doctor if you are having problems. It's also a good idea to know your test results and keep a list of the medicines you take. How can you care for yourself at home? · To prevent dehydration, drink plenty of fluids, enough so that your urine is light yellow or clear like water. Choose water and other caffeine-free clear liquids until you feel better. If you have kidney, heart, or liver disease and have to limit fluids, talk with your doctor before you increase the amount of fluids you drink. · Rest in bed until you feel better. · When you are able to eat, try clear soups, mild foods, and liquids until all symptoms are gone for 12 to 48 hours. Other good choices include dry toast, crackers, cooked cereal, and gelatin dessert, such as Jell-O. When should you call for help? Call 911 anytime you think you may need emergency care. For example, call if:    · You passed out (lost consciousness).    Call your doctor now or seek immediate medical care if:    · You have symptoms of dehydration, such as:  ¨ Dry eyes and a dry mouth. ¨ Passing only a little dark urine.   ¨ Feeling thirstier than usual.     · You have new or worsening belly pain.     · You have a new or higher fever.     · You vomit blood or what looks like coffee grounds.    Watch closely for changes in your health, and be sure to contact your doctor if:    · You have ongoing nausea and vomiting.     · Your vomiting is getting worse.     · Your vomiting lasts longer than 2 days.     · You are not getting better as expected. Where can you learn more? Go to http://luciana-kyle.info/. Enter 25 670012 in the search box to learn more about \"Nausea and Vomiting: Care Instructions. \"  Current as of: November 20, 2017  Content Version: 11.7  © 7788-2066 [employer]. Care instructions adapted under license by [employer] (which disclaims liability or warranty for this information). If you have questions about a medical condition or this instruction, always ask your healthcare professional. [place] any warranty or liability for your use of this information. Diet for Chronic Pancreatitis: Care Instructions  Your Care Instructions    The pancreas is an organ behind the stomach that makes hormones and enzymes to help your body digest food. Sometimes the enzymes attack another part of the pancreas, which can cause pain and swelling. This is called pancreatitis. Chronic pancreatitis may cause you to be in pain much of the time. You may be able to help the pain by avoiding alcohol and eating a low-fat diet. Your doctor and dietitian can help you make an eating plan that does not irritate your digestive system. Always talk with your doctor or dietitian before you make changes in your diet. Follow-up care is a key part of your treatment and safety. Be sure to make and go to all appointments, and call your doctor if you are having problems. It's also a good idea to know your test results and keep a list of the medicines you take. How can you care for yourself at home? · Do not drink alcohol.  It may make your pain worse and cause other problems. Tell your doctor if you need help to quit. Counseling, support groups, and sometimes medicines can help you stay sober. · Ask your doctor if you need to take pancreatic enzyme pills to help your body digest fat and protein. · Drink plenty of fluids, enough so that your urine is light yellow or clear like water. If you have kidney, heart, or liver disease and have to limit fluids, talk with your doctor before you increase the amount of fluids you drink. Eat a low-fat diet  · Eat many small meals and snacks each day instead of three large meals. · Choose lean meats. ¨ Eat no more than 5 to 6½ ounces of meat a day. ¨ Cut off all fat you can see. ¨ Eat chicken and turkey without the skin. ¨ Many types of fish, such as salmon, lake trout, tuna, and herring, provide healthy omega-3 fat. But avoid fish canned in oil, such as sardines in olive oil. ¨ Bake, broil, or grill meats, poultry, or fish instead of frying them in butter or fat. · Drink or eat nonfat or low-fat milk, yogurt, cheese, or other milk products each day. ¨ Read the labels on cheeses, and choose those with less than 5 grams of fat an ounce. ¨ Try fat-free sour cream, cream cheese, or yogurt. ¨ Avoid cream soups and cream sauces on pasta. ¨ Eat low-fat ice cream, frozen yogurt, or sorbet. Avoid regular ice cream.  · Eat whole-grain cereals, breads, crackers, rice, or pasta. Avoid high-fat foods such as croissants, scones, biscuits, waffles, doughnuts, muffins, granola, and high-fat breads. · Flavor your foods with herbs and spices (such as basil, tarragon, or mint), fat-free sauces, or lemon juice instead of butter. You can also use butter substitutes, fat-free mayonnaise, or fat-free dressing. · Try applesauce, prune puree, or mashed bananas to replace some or all of the fat when you bake.   · Limit fats and oils, such as butter, margarine, mayonnaise, and salad dressing, to no more than 1 tablespoon a meal.  · Avoid high-fat foods, such as:  ¨ Chocolate, whole milk, ice cream, processed cheese, and egg yolks. ¨ Fried or buttered foods. ¨ Sausage, salami, and rosado. ¨ Cinnamon rolls, cakes, pies, cookies, and other pastries. ¨ Prepared snack foods, such as potato chips, nut and granola bars, and mixed nuts. ¨ Coconut and avocado. · Learn how to read food labels for serving sizes and ingredients. Fast-food and convenience-food meals often have lots of fat. Where can you learn more? Go to http://luciana-kyle.info/. Enter D890 in the search box to learn more about \"Diet for Chronic Pancreatitis: Care Instructions. \"  Current as of: May 12, 2017  Content Version: 11.7  © 2807-7327 [employer]. Care instructions adapted under license by [employer] (which disclaims liability or warranty for this information). If you have questions about a medical condition or this instruction, always ask your healthcare professional. [place] [address] any warranty or liability for your use of this information. Pancreatitis: Care Instructions  Your Care Instructions    The pancreas is an organ behind the stomach. It makes hormones and enzymes to help your body digest food. But if these enzymes attack the pancreas, it can get inflamed. This is called pancreatitis. Most cases are caused by gallstones or by heavy alcohol use. If you take care of yourself at home, it will help you get better. It will also help you avoid more problems with your pancreas. Follow-up care is a key part of your treatment and safety. Be sure to make and go to all appointments, and call your doctor if you are having problems. It's also a good idea to know your test results and keep a list of the medicines you take. How can you care for yourself at home? · Drink clear liquids and eat bland foods until you feel better. [place] foods include rice, dry toast, and crackers.  They also include bananas and applesauce. · Eat a low-fat diet until your doctor says your pancreas is healed. · Do not drink alcohol. Tell your doctor if you need help to quit. Counseling, support groups, and sometimes medicines can help you stay sober. · Be safe with medicines. Read and follow all instructions on the label. ¨ If the doctor gave you a prescription medicine for pain, take it as prescribed. ¨ If you are not taking a prescription pain medicine, ask your doctor if you can take an over-the-counter medicine. · If your doctor prescribed antibiotics, take them as directed. Do not stop taking them just because you feel better. You need to take the full course of antibiotics. · Get extra rest until you feel better. To prevent future problems with your pancreas  · Do not drink alcohol. · Tell your doctors and pharmacist that you've had pancreatitis. They can help you avoid medicines that may cause this problem again. When should you call for help? Call 911 anytime you think you may need emergency care. For example, call if:    · You vomit blood or what looks like coffee grounds.     · Your stools are maroon or very bloody.    Call your doctor now or seek immediate medical care if:    · You have new or worse belly pain.     · Your stools are black and look like tar, or they have streaks of blood.     · You are vomiting.    Watch closely for changes in your health, and be sure to contact your doctor if:    · You do not get better as expected. Where can you learn more? Go to http://luciana-kyle.info/. Enter H118 in the search box to learn more about \"Pancreatitis: Care Instructions. \"  Current as of: May 12, 2017  Content Version: 11.7  © 0836-5841 [employer]. Care instructions adapted under license by [employer] (which disclaims liability or warranty for this information).  If you have questions about a medical condition or this instruction, always ask your healthcare professional. [address] any warranty or liability for your use of this information.

## 2018-09-15 NOTE — ED PROVIDER NOTES
EMERGENCY DEPARTMENT HISTORY AND PHYSICAL EXAM 
 
 
Date: 9/15/2018 Patient Name: Kori Grace History of Presenting Illness Chief Complaint Patient presents with  Abdominal Pain The patient presents to the Emergency Department in custody of [place], with complaints of abdominal pain with nausea and vomiting for the past three days. Patient labs came back today, HgB is currently 8.0. History Provided By: Patient HPI: Kori Grace, 42 y.o. female with PMHx significant for Asthma, DM, HTN, Sickle cell trait, sz, GERD, narcotic dependence, CKD, presents ambulatory to the ED with cc of gradual onset and worsening upper abdominal pain with associated nausea/vomiting and decreased appetite for the past 3 days. She describes her abdominal pain as a 9/10 and constant with radiation to her back. Pt does have a hx of similar abdominal pain and was diagnosed with pancreatitis at the time. She reports over 10 episodes of non bloody emesis since the onset of her pain. She does have a hx of abdominal surgeries but denies any hx of cholecystectomy or appendectomy. Of note pt did get recent labs drawn on 9/14/18 which showed her WBC at 3.1 and her HGB is 8. Pt denies any CP, SOB, cough. Social Hx: - Tobacco (-), - EtOH (-), - illicit drug use (-) There are no other complaints, changes, or physical findings at this time. PCP: Renee Renteria MD 
 
Current Facility-Administered Medications Medication Dose Route Frequency Provider Last Rate Last Dose  ondansetron (ZOFRAN) injection 4 mg  4 mg IntraVENous NOW Kalina Muse MD      
 diphenhydrAMINE (BENADRYL) injection 25 mg  25 mg IntraVENous NOW Kalina Muse MD      
 HYDROmorphone (PF) (DILAUDID) injection 0.5 mg  0.5 mg IntraVENous NOW Kalina Muse MD      
 
Current Outpatient Prescriptions Medication Sig Dispense Refill  oxyCODONE-acetaminophen (PERCOCET) 5-325 mg per tablet Take 1-2 Tabs by mouth every four (4) hours as needed for Pain. Max Daily Amount: 12 Tabs. 10 Tab 0  
 amLODIPine (NORVASC) 10 mg tablet Take 1 Tab by mouth daily. 30 Tab 0  
 calcitRIOL (ROCALTROL) 0.25 mcg capsule Take 1 Cap by mouth daily. 30 Cap 0  
 hydrALAZINE (APRESOLINE) 100 mg tablet Take 1 Tab by mouth three (3) times daily. 90 Tab 0  
 sodium bicarbonate 650 mg tablet Take 1 Tab by mouth two (2) times a day. 60 Tab 0  
 Diabetic Supplies, Miscellan. kit USE AS DIRECTED 1 Kit 0  
 insulin glargine (LANTUS U-100 INSULIN) 100 unit/mL injection 10 Units by SubCUTAneous route daily. 1 Vial 1  venlafaxine (EFFEXOR) 75 mg tablet Take 1 Tab by mouth two (2) times daily (with meals). 60 Tab 0  
 senna-docusate (PERICOLACE) 8.6-50 mg per tablet Take 2 Tabs by mouth daily. 30 Tab 0  
 QUEtiapine (SEROQUEL) 100 mg tablet Take 1 Tab by mouth two (2) times a day. 60 Tab 0  promethazine (PHENERGAN) 25 mg tablet Take 1 Tab by mouth every eight (8) hours as needed for Nausea. 20 Tab 0  cloNIDine HCl (CATAPRES) 0.1 mg tablet Take 1 Tab by mouth two (2) times a day. 60 Tab 0  
 ciprofloxacin HCl (CIPRO) 500 mg tablet Take 1 Tab by mouth daily. 7 Tab 0  cholecalciferol (VITAMIN D3) 50,000 unit capsule Take 1 Cap by mouth every seven (7) days. 5 Cap 0  
 cyanocobalamin 1,000 mcg tablet Take 1,000 mcg by mouth daily.  albuterol (PROVENTIL VENTOLIN) 2.5 mg /3 mL (0.083 %) nebulizer solution 2.5 mg by Nebulization route every four (4) hours as needed. Past History Past Medical History: 
Past Medical History:  
Diagnosis Date  ARF (acute renal failure) ([facility] [address] requiring dialysis 2011  Asthma  CKD (chronic kidney disease)  Diabetes ([facility] [address]  Gastroparesis 2010 Gastric Pacer- REMOVED 07/2015  GERD (gastroesophageal reflux disease)  Hypertension  Narcotic dependence ([facility] [address]  THU (obstructive sleep apnea)   
 wears 2 LPM oxygen at night  Other ill-defined conditions(799.89)  Polycystic ovarian syndrome  Seizures ([facility] [address]  Sickle cell trait ([facility] [address]  Thromboembolus ([facility] [address] to her left arm and was told she had one in left leg recently Past Surgical History: 
Past Surgical History:  
Procedure Laterality Date  HX CATARACT REMOVAL  3/5/12  
 right  HX DILATION AND CURETTAGE    
 ablation  HX GASTRIC BYPASS    HX GI    
 j tube placement and removal  
 HX OTHER SURGICAL   Gastric Pacer- REMOVED 2015  HX VASCULAR ACCESS    
 gray cath rt subclavian  HX VASCULAR ACCESS    
 HD access right thigh Family History: 
Family History Problem Relation Age of Onset  Cancer Mother   
  lung  Hypertension Mother  Cancer Father   
  kidney  Stroke Father 3 strokes: 58-68  Heart Disease Father 72 CABG  Hypertension Father  Cancer Sister   
  pancreatic  Cancer Maternal Aunt   
  breast  
 Cancer Paternal Aunt   
  breast  
 Schizophrenia Sister   
  was in [address], now ass't living  Other Sister   
   AIDS  Other Other   
  nephew of AIDS Social History: 
Social History Substance Use Topics  Smoking status: Never Smoker  Smokeless tobacco: Never Used  Alcohol use No  
 
 
Allergies: Allergies Allergen Reactions  Fentanyl Itching and Angioedema \"throat closed up\" per pt  Erythromycin Itching  Toradol [Ketorolac] Rash  Morphine Itching Review of Systems Review of Systems Constitutional: Positive for appetite change (decreased). Negative for chills and fever. HENT: Negative for congestion. Eyes: Negative for visual disturbance. Respiratory: Negative for cough and shortness of breath. Cardiovascular: Negative for chest pain. Gastrointestinal: Positive for abdominal pain, nausea and vomiting. Negative for diarrhea and rectal pain. Endocrine: Negative for heat intolerance. Genitourinary: Negative. Negative for dysuria. Musculoskeletal: Negative for neck pain. Skin: Negative for wound. Allergic/Immunologic: Negative for immunocompromised state. Neurological: Negative for headaches. Hematological: Does not bruise/bleed easily. Psychiatric/Behavioral: Negative. All other systems reviewed and are negative. Physical Exam  
Physical Exam  
Constitutional: She is oriented to person, place, and time. She appears well-developed and well-nourished. She appears distressed (moderate). HENT:  
Head: Normocephalic and atraumatic. Eyes: EOM are normal. Pupils are equal, round, and reactive to light. Neck: Normal range of motion. Neck supple. Cardiovascular: Normal rate, regular rhythm and normal heart sounds. Pulmonary/Chest: Effort normal and breath sounds normal. No respiratory distress. Abdominal: Soft. Bowel sounds are normal. She exhibits no mass. There is generalized tenderness. Diffuse abdominal tenderness upper quadrants greater than lower quadrants. Musculoskeletal: Normal range of motion. She exhibits no edema. Neurological: She is alert and oriented to person, place, and time. Coordination normal.  
Skin: Skin is warm and dry. Psychiatric: She has a normal mood and affect. Her behavior is normal.  
Nursing note and vitals reviewed. Diagnostic Study Results Labs - Recent Results (from the past 12 hour(s)) CBC WITH AUTOMATED DIFF Collection Time: 09/15/18  3:37 PM  
Result Value Ref Range WBC 4.1 3.6 - 11.0 K/uL  
 RBC 3.82 3.80 - 5.20 M/uL HGB 9.5 (L) 11.5 - 16.0 g/dL HCT 30.1 (L) 35.0 - 47.0 % MCV 78.8 (L) 80.0 - 99.0 FL  
 MCH 24.9 (L) 26.0 - 34.0 PG  
 MCHC 31.6 30.0 - 36.5 g/dL  
 RDW 14.7 (H) 11.5 - 14.5 % PLATELET 253 466 - 233 K/uL MPV ABNORMAL 8.9 - 12.9 FL  
 NRBC 0.0 0  WBC ABSOLUTE NRBC 0.00 0.00 - 0.01 K/uL NEUTROPHILS 76 (H) 32 - 75 % LYMPHOCYTES 17 12 - 49 % MONOCYTES 6 5 - 13 % EOSINOPHILS 1 0 - 7 % BASOPHILS 0 0 - 1 % IMMATURE GRANULOCYTES 0 0.0 - 0.5 % ABS. NEUTROPHILS 3.2 1.8 - 8.0 K/UL  
 ABS. LYMPHOCYTES 0.7 (L) 0.8 - 3.5 K/UL  
 ABS. MONOCYTES 0.2 0.0 - 1.0 K/UL  
 ABS. EOSINOPHILS 0.0 0.0 - 0.4 K/UL  
 ABS. BASOPHILS 0.0 0.0 - 0.1 K/UL  
 ABS. IMM. GRANS. 0.0 0.00 - 0.04 K/UL  
 DF AUTOMATED    
 RBC COMMENTS MICROCYTOSIS 
PRESENT 
    
 RBC COMMENTS TARGET CELLS 
PRESENT 
    
 RBC COMMENTS POLYCHROMASIA PRESENT 
    
METABOLIC PANEL, COMPREHENSIVE Collection Time: 09/15/18  3:37 PM  
Result Value Ref Range Sodium 142 136 - 145 mmol/L Potassium 5.0 3.5 - 5.1 mmol/L Chloride 114 (H) 97 - 108 mmol/L  
 CO2 18 (L) 21 - 32 mmol/L Anion gap 10 5 - 15 mmol/L Glucose 157 (H) 65 - 100 mg/dL BUN 33 (H) 6 - 20 MG/DL Creatinine 3.23 (H) 0.55 - 1.02 MG/DL  
 BUN/Creatinine ratio 10 (L) 12 - 20 GFR est AA 19 (L) >60 ml/min/1.73m2 GFR est non-AA 16 (L) >60 ml/min/1.73m2 Calcium 9.4 8.5 - 10.1 MG/DL Bilirubin, total 0.2 0.2 - 1.0 MG/DL  
 ALT (SGPT) 65 12 - 78 U/L  
 AST (SGOT) 28 15 - 37 U/L Alk. phosphatase 297 (H) 45 - 117 U/L Protein, total 9.1 (H) 6.4 - 8.2 g/dL Albumin 4.8 3.5 - 5.0 g/dL Globulin 4.3 (H) 2.0 - 4.0 g/dL A-G Ratio 1.1 1.1 - 2.2 LIPASE Collection Time: 09/15/18  3:37 PM  
Result Value Ref Range Lipase 174 73 - 393 U/L  
LACTIC ACID Collection Time: 09/15/18  3:37 PM  
Result Value Ref Range Lactic acid 1.3 0.4 - 2.0 MMOL/L  
RETICULOCYTE COUNT Collection Time: 09/15/18  3:37 PM  
Result Value Ref Range Reticulocyte count 1.1 0.7 - 2.1 % Absolute Retic Cnt. 0.0447 0.0164 - 0.0776 M/ul URINALYSIS W/ RFLX MICROSCOPIC Collection Time: 09/15/18  4:02 PM  
Result Value Ref Range Color YELLOW Appearance CLOUDY (A) CLEAR Specific gravity 1.015 1.003 - 1.030    
 pH (UA) 6.0 5.0 - 8.0 Protein 100 (A) NEG mg/dL Glucose NEGATIVE  NEG mg/dL Ketone NEGATIVE  NEG mg/dL  Bilirubin NEGATIVE  NEG    
 Blood TRACE (A) NEG Urobilinogen 0.2 0.2 - 1.0 EU/dL Nitrites NEGATIVE  NEG Leukocyte Esterase NEGATIVE  NEG    
 WBC 0-4 0 - 4 /hpf  
 RBC 0-5 0 - 5 /hpf Epithelial cells FEW FEW /lpf Bacteria NEGATIVE  NEG /hpf  
HCG QL SERUM Collection Time: 09/15/18  4:06 PM  
Result Value Ref Range HCG, Ql. NEGATIVE  NEG Radiologic Studies -  
CT Results  (Last 48 hours) 09/15/18 1805  CT ABD PELV WO CONT Final result Impression:  IMPRESSION:  
   
1. No acute abdominal or pelvic abnormality is identified. 2. Stable CT findings of chronic pancreatitis 3. Large amount of retained fecal material.  
4. Appendix not visualized. 5. Heavy vascular calcification and other stigmata of chronic kidney disease,  
including subcutaneous edema. Mary Lianna 6. Other incidental and postoperative findings. Narrative:  EXAM:  CT ABD PELV WO CONT INDICATION: Abdominal pain  with nausea and vomiting for 3 days. History of  
gastric bypass surgery. Diabetes, hypertension, sickle cell trait, narcotic  
dependence and chronic kidney disease requiring dialysis in the past.  
   
COMPARISON: 6/9/2018 CONTRAST:  None. TECHNIQUE:   
Thin axial images were obtained through the abdomen and pelvis. Coronal and  
sagittal reconstructions were generated. Oral contrast was not administered. CT  
dose reduction was achieved through use of a standardized protocol tailored for  
this examination and automatic exposure control for dose modulation. The absence of intravenous contrast material reduces the sensitivity for  
evaluation of the solid parenchymal organs of the abdomen. FINDINGS:   
LUNG BASES: Clear. INCIDENTALLY IMAGED HEART AND MEDIASTINUM: Unremarkable. LIVER: No mass or biliary dilatation. GALLBLADDER: The gallbladder is surgically absent. SPLEEN: No mass.   
PANCREAS: The pancreas is atrophic with numerous coarse calcifications, stable,  
 consistent with chronic pancreatitis. ADRENALS: Unremarkable. KIDNEYS/URETERS: No mass, calculus, or hydronephrosis. STOMACH: . Gastric bypass surgery with no complicating feature obvious. SMALL BOWEL: No dilatation or wall thickening. COLON: No dilatation or wall thickening. A large amount of retained fecal  
material is again noted, similar to the prior study. APPENDIX: The appendix is not visualized. PERITONEUM: No ascites or pneumoperitoneum. RETROPERITONEUM: No lymphadenopathy or aortic aneurysm. Heavy vascular  
calcification diffusely is consistent with chronic renal disease. REPRODUCTIVE ORGANS: Uterus is unremarkable for age. URINARY BLADDER: No mass or calculus. Mild prominence of bladder wall thickness. BONES: No destructive bone lesion. Blurring of trabeculae is consistent with  
chronic renal disease. ADDITIONAL COMMENTS: Generalized subcutaneous edema is noted, but this is  
similar to the prior examination. Medical Decision Making I am the first provider for this patient. I reviewed the vital signs, available nursing notes, past medical history, past surgical history, family history and social history. Vital Signs-Reviewed the patient's vital signs. Patient Vitals for the past 12 hrs: 
 Temp Pulse Resp BP SpO2  
09/15/18 1515 - (!) 101 16 140/87 100 % 09/15/18 1504 98 °F (36.7 °C) (!) 111 16 113/82 100 % Records Reviewed: Nursing Notes, Old Medical Records, Previous Radiology Studies and Previous Laboratory Studies Provider Notes (Medical Decision Making): DDx: Pancreatitis, sickle cell anemia, dehydration, gastritis, UTI, electrolyte abnormality, gastroparesis. ED Course:  
Initial assessment performed. The patients presenting problems have been discussed, and they are in agreement with the care plan formulated and outlined with them. I have encouraged them to ask questions as they arise throughout their visit.  
 
PROGRESS NOTE: 
 5:28 PM 
Pt is feeling better. Critical Care Time:  
None. Disposition: 
DISCHARGE NOTE 
6:46 PM 
The patient has been re-evaluated and is ready for discharge. Reviewed available results with patient. Counseled pt on diagnosis and care plan. Pt has expressed understanding, and all questions have been answered. Pt agrees with plan and agrees to F/U as recommended, or return to the ED if their sxs worsen. Discharge instructions have been provided and explained to the pt, along with reasons to return to the ED. PLAN: 
1. Current Discharge Medication List  
  
 
2. Follow-up Information Follow up With Details Comments Contact Info Romelia Samaniego MD In 3 days As needed [address] [facility] [address] [place] [address] 
[phone] [facility] EMERGENCY DEPT  If symptoms worsen [address] [facility] [address] [facility] 
[phone] Return to ED if worse Diagnosis Clinical Impression: 1. Chronic pancreatitis, unspecified pancreatitis type ([address] 2. Chronic kidney disease, unspecified CKD stage 3. Nausea and vomiting, intractability of vomiting not specified, unspecified vomiting type Attestations: This note is prepared by Jessica Luna acting as Scribe for MD Tayla Toure MD : The scribe's documentation has been prepared under my direction and personally reviewed by me in its entirety. I confirm that the note above accurately reflects all work, treatment, procedures, and medical decision making performed by me.

## 2019-02-11 ENCOUNTER — APPOINTMENT (OUTPATIENT)
Dept: GENERAL RADIOLOGY | Age: 39
End: 2019-02-11
Attending: EMERGENCY MEDICINE
Payer: MEDICARE

## 2019-02-11 ENCOUNTER — HOSPITAL ENCOUNTER (EMERGENCY)
Age: 39
Discharge: HOME | End: 2019-02-11
Attending: EMERGENCY MEDICINE
Payer: MEDICARE

## 2019-02-11 VITALS
HEART RATE: 84 BPM | WEIGHT: 138.23 LBS | HEIGHT: 62 IN | DIASTOLIC BLOOD PRESSURE: 92 MMHG | TEMPERATURE: 98 F | SYSTOLIC BLOOD PRESSURE: 185 MMHG | OXYGEN SATURATION: 100 % | RESPIRATION RATE: 18 BRPM | BODY MASS INDEX: 25.44 KG/M2

## 2019-02-11 DIAGNOSIS — A08.4 VIRAL GASTROENTERITIS: Primary | ICD-10-CM

## 2019-02-11 DIAGNOSIS — Z99.2 DIALYSIS PATIENT (HCC): ICD-10-CM

## 2019-02-11 LAB
ALBUMIN SERPL-MCNC: 3.4 G/DL (ref 3.5–5)
ALBUMIN/GLOB SERPL: 0.9 {RATIO} (ref 1.1–2.2)
ALP SERPL-CCNC: 297 U/L (ref 45–117)
ALT SERPL-CCNC: 73 U/L (ref 12–78)
ANION GAP SERPL CALC-SCNC: 10 MMOL/L (ref 5–15)
AST SERPL-CCNC: 42 U/L (ref 15–37)
ATRIAL RATE: 87 BPM
BASOPHILS # BLD: 0 K/UL (ref 0–0.1)
BASOPHILS NFR BLD: 1 % (ref 0–1)
BILIRUB SERPL-MCNC: 0.2 MG/DL (ref 0.2–1)
BUN SERPL-MCNC: 46 MG/DL (ref 6–20)
BUN/CREAT SERPL: 11 (ref 12–20)
CALCIUM SERPL-MCNC: 8.3 MG/DL (ref 8.5–10.1)
CALCULATED P AXIS, ECG09: 50 DEGREES
CALCULATED R AXIS, ECG10: 14 DEGREES
CALCULATED T AXIS, ECG11: 47 DEGREES
CHLORIDE SERPL-SCNC: 101 MMOL/L (ref 97–108)
CO2 SERPL-SCNC: 26 MMOL/L (ref 21–32)
COMMENT, HOLDF: NORMAL
CREAT SERPL-MCNC: 4.06 MG/DL (ref 0.55–1.02)
DIAGNOSIS, 93000: NORMAL
DIFFERENTIAL METHOD BLD: ABNORMAL
EOSINOPHIL # BLD: 0.2 K/UL (ref 0–0.4)
EOSINOPHIL NFR BLD: 5 % (ref 0–7)
ERYTHROCYTE [DISTWIDTH] IN BLOOD BY AUTOMATED COUNT: 15 % (ref 11.5–14.5)
GLOBULIN SER CALC-MCNC: 3.7 G/DL (ref 2–4)
GLUCOSE SERPL-MCNC: 328 MG/DL (ref 65–100)
HCT VFR BLD AUTO: 28.9 % (ref 35–47)
HGB BLD-MCNC: 9.1 G/DL (ref 11.5–16)
IMM GRANULOCYTES # BLD AUTO: 0 K/UL (ref 0–0.04)
IMM GRANULOCYTES NFR BLD AUTO: 0 % (ref 0–0.5)
LACTATE BLD-SCNC: 0.42 MMOL/L (ref 0.4–2)
LIPASE SERPL-CCNC: 103 U/L (ref 73–393)
LYMPHOCYTES # BLD: 0.6 K/UL (ref 0.8–3.5)
LYMPHOCYTES NFR BLD: 20 % (ref 12–49)
MAGNESIUM SERPL-MCNC: 1.9 MG/DL (ref 1.6–2.4)
MCH RBC QN AUTO: 25.6 PG (ref 26–34)
MCHC RBC AUTO-ENTMCNC: 31.5 G/DL (ref 30–36.5)
MCV RBC AUTO: 81.2 FL (ref 80–99)
MONOCYTES # BLD: 0.2 K/UL (ref 0–1)
MONOCYTES NFR BLD: 7 % (ref 5–13)
NEUTS SEG # BLD: 2.2 K/UL (ref 1.8–8)
NEUTS SEG NFR BLD: 67 % (ref 32–75)
NRBC # BLD: 0 K/UL (ref 0–0.01)
NRBC BLD-RTO: 0 PER 100 WBC
P-R INTERVAL, ECG05: 148 MS
PHOSPHATE SERPL-MCNC: 5.7 MG/DL (ref 2.6–4.7)
PLATELET # BLD AUTO: 181 K/UL (ref 150–400)
PMV BLD AUTO: 9.8 FL (ref 8.9–12.9)
POTASSIUM SERPL-SCNC: 5.2 MMOL/L (ref 3.5–5.1)
PROT SERPL-MCNC: 7.1 G/DL (ref 6.4–8.2)
Q-T INTERVAL, ECG07: 382 MS
QRS DURATION, ECG06: 84 MS
QTC CALCULATION (BEZET), ECG08: 459 MS
RBC # BLD AUTO: 3.56 M/UL (ref 3.8–5.2)
RBC MORPH BLD: ABNORMAL
SAMPLES BEING HELD,HOLD: NORMAL
SODIUM SERPL-SCNC: 137 MMOL/L (ref 136–145)
TROPONIN I SERPL-MCNC: <0.05 NG/ML
VENTRICULAR RATE, ECG03: 87 BPM
WBC # BLD AUTO: 3.2 K/UL (ref 3.6–11)

## 2019-02-11 PROCEDURE — 74011250637 HC RX REV CODE- 250/637: Performed by: EMERGENCY MEDICINE

## 2019-02-11 PROCEDURE — 36415 COLL VENOUS BLD VENIPUNCTURE: CPT

## 2019-02-11 PROCEDURE — 93005 ELECTROCARDIOGRAM TRACING: CPT

## 2019-02-11 PROCEDURE — 80053 COMPREHEN METABOLIC PANEL: CPT

## 2019-02-11 PROCEDURE — 84100 ASSAY OF PHOSPHORUS: CPT

## 2019-02-11 PROCEDURE — 83690 ASSAY OF LIPASE: CPT

## 2019-02-11 PROCEDURE — 83735 ASSAY OF MAGNESIUM: CPT

## 2019-02-11 PROCEDURE — 71045 X-RAY EXAM CHEST 1 VIEW: CPT

## 2019-02-11 PROCEDURE — 83605 ASSAY OF LACTIC ACID: CPT

## 2019-02-11 PROCEDURE — 85025 COMPLETE CBC W/AUTO DIFF WBC: CPT

## 2019-02-11 PROCEDURE — 99285 EMERGENCY DEPT VISIT HI MDM: CPT

## 2019-02-11 PROCEDURE — 84484 ASSAY OF TROPONIN QUANT: CPT

## 2019-02-11 RX ORDER — MENTHOL
1000 GEL (GRAM) TOPICAL DAILY
COMMUNITY

## 2019-02-11 RX ORDER — ONDANSETRON 4 MG/1
4 TABLET, ORALLY DISINTEGRATING ORAL
Status: COMPLETED | OUTPATIENT
Start: 2019-02-11 | End: 2019-02-11

## 2019-02-11 RX ORDER — TRAZODONE HYDROCHLORIDE 50 MG/1
50 TABLET ORAL
COMMUNITY

## 2019-02-11 RX ORDER — ONDANSETRON 4 MG/1
4 TABLET, ORALLY DISINTEGRATING ORAL
Qty: 10 TAB | Refills: 0 | Status: SHIPPED | OUTPATIENT
Start: 2019-02-11 | End: 2019-09-06

## 2019-02-11 RX ADMIN — ONDANSETRON 4 MG: 4 TABLET, ORALLY DISINTEGRATING ORAL at 11:49

## 2019-02-11 NOTE — DISCHARGE INSTRUCTIONS
Patient Education        Gastroenteritis: Care Instructions  Your Care Instructions    Gastroenteritis is an illness that may cause nausea, vomiting, and diarrhea. It is sometimes called \"stomach flu. \" It can be caused by bacteria or a virus. You will probably begin to feel better in 1 to 2 days. In the meantime, get plenty of rest and make sure you do not become dehydrated. Dehydration occurs when your body loses too much fluid. Follow-up care is a key part of your treatment and safety. Be sure to make and go to all appointments, and call your doctor if you are having problems. It's also a good idea to know your test results and keep a list of the medicines you take. How can you care for yourself at home? · If your doctor prescribed antibiotics, take them as directed. Do not stop taking them just because you feel better. You need to take the full course of antibiotics. · Drink plenty of fluids to prevent dehydration, enough so that your urine is light yellow or clear like water. Choose water and other caffeine-free clear liquids until you feel better. If you have kidney, heart, or liver disease and have to limit fluids, talk with your doctor before you increase your fluid intake. · Drink fluids slowly, in frequent, small amounts, because drinking too much too fast can cause vomiting. · Begin eating mild foods, such as dry toast, yogurt, applesauce, bananas, and rice. Avoid spicy, hot, or high-fat foods, and do not drink alcohol or caffeine for a day or two. Do not drink milk or eat ice cream until you are feeling better. How to prevent gastroenteritis  · Keep hot foods hot and cold foods cold. · Do not eat meats, dressings, salads, or other foods that have been kept at room temperature for more than 2 hours. · Use a thermometer to check your refrigerator. It should be between 34°F and 40°F.  · Defrost meats in the refrigerator or microwave, not on the kitchen counter.   · Keep your hands and your kitchen clean. Wash your hands, cutting boards, and countertops with hot soapy water frequently. · Cook meat until it is well done. · Do not eat raw eggs or uncooked sauces made with raw eggs. · Do not take chances. If food looks or tastes spoiled, throw it out. When should you call for help? Call 911 anytime you think you may need emergency care. For example, call if:    · You vomit blood or what looks like coffee grounds.     · You passed out (lost consciousness).     · You pass maroon or very bloody stools.    Call your doctor now or seek immediate medical care if:    · You have severe belly pain.     · You have signs of needing more fluids. You have sunken eyes, a dry mouth, and pass only a little dark urine.     · You feel like you are going to faint.     · You have increased belly pain that does not go away in 1 to 2 days.     · You have new or increased nausea, or you are vomiting.     · You have a new or higher fever.     · Your stools are black and tarlike or have streaks of blood.    Watch closely for changes in your health, and be sure to contact your doctor if:    · You are dizzy or lightheaded.     · You urinate less than usual, or your urine is dark yellow or brown.     · You do not feel better with each day that goes by. Where can you learn more? Go to http://luciana-kyle.info/. Enter N142 in the search box to learn more about \"Gastroenteritis: Care Instructions. \"  Current as of: July 30, 2018  Content Version: 11.9  © 7177-9225 [employer]. Care instructions adapted under license by [employer] (which disclaims liability or warranty for this information). If you have questions about a medical condition or this instruction, always ask your healthcare professional. [place] [address] any warranty or liability for your use of this information.          Patient Education        Kidney Dialysis: Care Instructions  Your Care Instructions    Dialysis is a process that filters wastes from the blood when your kidneys can no longer do the job. It is not a cure, but it can help you live longer and feel better. It is a lifesaving treatment when you have kidney failure. Normal kidneys work 24 hours a day to clean wastes from your blood. Your kidneys are not able to do this job, so a process called dialysis will do some of the work for your kidneys. You and your doctor will decide which type of dialysis you should have. Peritoneal dialysis uses the lining of your belly (peritoneum) to filter your blood. You can do it at home, on a daily basis. Hemodialysis uses a man-made filter called a dialyzer to clean your blood. Most people need to go to a hospital or clinic 3 days a week for several hours each time. Sometimes hemodialysis can be done at home. It is normal to have questions about your treatment, and you have a right to know what is happening to you. Learning about dialysis can help you take an active role in your treatment. Dialysis does not cure kidney disease, but it can help you live longer and feel better. You will need to follow your diet and treatment schedule carefully. Follow-up care is a key part of your treatment and safety. Be sure to make and go to all appointments, and call your doctor if you are having problems. It's also a good idea to know your test results and keep a list of the medicines you take. What do you need to know about peritoneal dialysis? Peritoneal dialysis uses the lining of your belly (or peritoneal membrane) to filter your blood. Before you can begin peritoneal dialysis, your doctor will need to place a thin tube called a catheter in your belly. This is the dialysis access. · Peritoneal dialysis can be done at home or in any clean place. You may be able to do it while you sleep. · You can do it by yourself. You do not have to rely on help from others.   · You can do it at the times you choose as long as you do the right number of treatments. · It has to be done every day of the week. · Some people find it hard to do all the required steps. · It increases your chance for a serious infection of the lining of the belly (peritoneum). What do you need to know about hemodialysis? Hemodialysis uses a man-made membrane called a dialyzer to clean your blood. You are connected to the dialyzer by tubes attached to your blood vessels. Before you start hemodialysis, your doctor will create a site where the blood can flow in and out of your body during your dialysis sessions. This site is called the vascular access. It may be a fistula, made by connecting an artery and a vein. Or it may be a graft, which is a tube implanted under your skin. · Hemodialysis is done mainly by trained health workers who can watch for any problems. · It allows you to be in contact with other people having dialysis. This can help provide emotional support. · You can schedule your treatments in the evenings so you can keep working. · You may be able to do home hemodialysis, which gives you more control over your schedule. · It usually needs to be done on a set schedule 3 times a week. · It can cause side effects. The most common side effects are low blood pressure and muscle cramps. These can often be treated easily. · It requires needle sticks during every treatment, which bothers some people. Others get used to it and even do the needle sticks themselves. How can you care for yourself at home? · Be sure to have all of your dialysis sessions. Do not try to shorten or skip your sessions. You have a better chance of a longer and healthier life by getting your full treatment. · Your doctor or health care team will show you the steps you need to go through each day before, during, and after dialysis. Be sure to follow these steps.  If you do not understand a step, talk to your team.  · Your doctor and dietitian will help you design menus that follow your diet. Be sure to follow your diet guidelines. ? You will need to limit fluids and certain foods that contain salt (sodium), potassium, and phosphorus. ? You may need to follow a heart-healthy diet to keep the fat (cholesterol) in your blood under control. ? You may need higher levels of protein in your diet. · Your doctor may recommend certain vitamins. But do not take any other medicine, including over-the-counter medicines, vitamins, and herbal products, without talking to your doctor first.  · Do not smoke. Smoking raises your risk of many health problems, including more kidney damage. If you need help quitting, talk to your doctor about stop-smoking programs and medicines. These can increase your chances of quitting for good. · Do not take ibuprofen (Advil, Motrin), naproxen (Aleve), or similar medicines, unless your doctor tells you to. These medicines may make kidney problems worse. When should you call for help? Call your doctor now or seek immediate medical care if:    · You have a fever.     · You are dizzy or lightheaded, or you feel like you may faint.     · You are confused or cannot think clearly.     · You have new or worse nausea or vomiting.     · You have new or more blood in your urine.     · You have new swelling.    Watch closely for changes in your health, and be sure to contact your doctor if:    · You do not get better as expected. Where can you learn more? Go to http://luciana-kyle.info/. Enter W590 in the search box to learn more about \"Kidney Dialysis: Care Instructions. \"  Current as of: March 14, 2018  Content Version: 11.9  © 5207-6755 [employer]. Care instructions adapted under license by [employer] (which disclaims liability or warranty for this information).  If you have questions about a medical condition or this instruction, always ask your healthcare professional. Eleno Dumont disclaims any warranty or liability for your use of this information.

## 2019-02-11 NOTE — ED NOTES
Offered to arrange transport for patient directly to dialysis from here. Patient declined. Patient reports she has already spoken to the dialysis center and will be going tomorrow instead. Patient requesting a medicaid cab to her house.

## 2019-02-11 NOTE — ED NOTES
Patient verbalizes understanding of discharge instructions. Ambulatory and in no acute distress at discharge. Cab ETA is 5:02 PM.

## 2019-02-11 NOTE — ED PROVIDER NOTES
'started Friday after dialysis/ 1st N/V then abd pains/ unable to keep anything down x 3 days; subj fevers/ chills/ no diarrhea/ has vomited 12-13 times per day/ no blood/ assoc w/ 'chest pressure' since 6 am today'; has not taken anything for it;  
 
pt denies HA, vison changes, diff swallowing, CP, SOB, D/Cons or other current systemic complaints 'I called my dialysis center and they told me to come here and get checked out'; The history is provided by the patient and the EMS personnel. Abdominal Pain Past Medical History:  
Diagnosis Date  ARF (acute renal failure) ([address] requiring dialysis 2011  Asthma  CKD (chronic kidney disease)  Diabetes ([address]  Gastroparesis 2010 Gastric Pacer- REMOVED 07/2015  GERD (gastroesophageal reflux disease)  Hypertension  Narcotic dependence ([address]  THU (obstructive sleep apnea)   
 wears 2 LPM oxygen at night  Other ill-defined conditions(799.89)  Polycystic ovarian syndrome  Seizures ([address]  Sickle cell trait ([facility]  Thromboembolus ([facility] to her left arm and was told she had one in left leg recently Past Surgical History:  
Procedure Laterality Date  HX CATARACT REMOVAL  3/5/12  
 right  HX DILATION AND CURETTAGE    
 ablation  HX GASTRIC BYPASS  2015  HX GI    
 j tube placement and removal  
 HX OTHER SURGICAL  2010 Gastric Pacer- REMOVED 07/2015  HX VASCULAR ACCESS    
 gray cath rt subclavian  HX VASCULAR ACCESS    
 HD access right thigh Family History:  
Problem Relation Age of Onset  Cancer Mother   
     lung  Hypertension Mother  Cancer Father   
     kidney  Stroke Father 3 strokes: 58-68  Heart Disease Father 72 CABG  Hypertension Father  Cancer Sister   
     pancreatic  Cancer Maternal Aunt   
     breast  
 Cancer Paternal Aunt   
     breast  
 Schizophrenia Sister   
     was in [address], now ass't living  Other Sister   
      AIDS  Other Other   
     nephew of AIDS Social History Socioeconomic History  Marital status:  Spouse name: Not on file  Number of children: Not on file  Years of education: Not on file  Highest education level: Not on file Social Needs  Financial resource strain: Not on file  Food insecurity - worry: Not on file  Food insecurity - inability: Not on file  Transportation needs - medical: Not on file  Transportation needs - non-medical: Not on file Occupational History  Not on file Tobacco Use  Smoking status: Never Smoker  Smokeless tobacco: Never Used Substance and Sexual Activity  Alcohol use: No  
 Drug use: No  
 Sexual activity: Yes  
  Partners: Male Birth control/protection: None Other Topics Concern  Not on file Social History Narrative Lives with her  and father ALLERGIES: Fentanyl; Erythromycin; Toradol [ketorolac]; and Morphine Review of Systems Gastrointestinal: Positive for abdominal pain. There were no vitals filed for this visit. Physical Exam  
Constitutional: She is oriented to person, place, and time. She appears well-developed and well-nourished. NAD, AxOx4, speaking in complete sentences Noted L ant CW dialysis port; nttp HENT:  
Head: Normocephalic and atraumatic. Right Ear: External ear normal.  
Left Ear: External ear normal.  
Mouth/Throat: Oropharynx is clear and moist.  
Cn intact Eyes: Conjunctivae and EOM are normal. Pupils are equal, round, and reactive to light. Right eye exhibits no discharge. Left eye exhibits no discharge. No scleral icterus. Neck: Normal range of motion. Neck supple. No JVD present. No tracheal deviation present. Cardiovascular: Normal rate, regular rhythm and normal heart sounds. Exam reveals no gallop and no friction rub. No murmur heard. Pulmonary/Chest: Effort normal and breath sounds normal. No stridor. No respiratory distress. She has no wheezes. She has no rales. She exhibits no tenderness. Abdominal: Soft. Bowel sounds are normal. There is no tenderness. There is no rebound and no guarding. nttp Genitourinary: No vaginal discharge found. Genitourinary Comments: Pt denies urinary/ vaginal complaints Musculoskeletal: Normal range of motion. She exhibits no edema or tenderness. 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 She displays normal reflexes. No cranial nerve deficit or sensory deficit. She exhibits normal muscle tone. Coordination normal.  
pt has motor/ CV/ Sensation grossly intact to all extremities, R = L in strength;  
Skin: Skin is warm and dry. Capillary refill takes less than 2 seconds. No rash noted. No erythema. No pallor. Psychiatric: She has a normal mood and affect. Her behavior is normal. Thought content normal.  
Nursing note and vitals reviewed. MDM Procedures No chief complaint on file. 11:36 AM 
The patients presenting problems have been discussed, and they are in agreement with the care plan formulated and outlined with them. I have encouraged them to ask questions as they arise throughout their visit. MEDICATIONS GIVEN: 
Medications  
ondansetron (ZOFRAN ODT) tablet 4 mg (not administered) LABS REVIEWED: 
Labs Reviewed TROPONIN I  
METABOLIC PANEL, COMPREHENSIVE  
CBC WITH AUTOMATED DIFF  
LIPASE MAGNESIUM  
PHOSPHORUS  
 
 
RADIOLOGY RESULTS: 
The following have been ordered and reviewed: 
_____________________________________________________________________ 
_____________________________________________________________________ EKG interpretation:  
Rhythm: normal sinus rhythm; and regular .  Rate (approx.): 86; Axis: normal; P wave: normal; QRS interval: normal ; ST/T wave: normal; Negative acute significant segmental elevations/ unchanged compared to study dated 06/23/2018 PROCEDURES: 
 
 
 
CONSULTATIONS:  
 
 
PROGRESS NOTES: 
 
DIAGNOSIS: 
 
1. Viral gastroenteritis 2. Dialysis patient [facility]) PLAN: 
1- no further N/V since being here;  
2 will discharge so may complete dialysis ED COURSE: The patients hospital course has been uncomplicated. 
 
 
 1:49 PM 
Blood sent;  
 
1:22 PM Pt told of neg ed evaluation/ agrees w/ plans; Simona Salas's  results have been reviewed with her. She has been counseled regarding her diagnosis. She verbally conveys understanding and agreement of the signs, symptoms, diagnosis, treatment and prognosis and additionally agrees to Call/ Arrange follow up as recommended with Dr. Abel Warner MD in 24 - 48 hours. She also agrees with the care-plan and conveys that all of her questions have been answered. I have also put together some discharge instructions for her that include: 1) educational information regarding their diagnosis, 2) how to care for their diagnosis at home, as well a 3) list of reasons why they would want to return to the ED prior to their follow-up appointment, should their condition change or for concerns.

## 2019-02-11 NOTE — ED TRIAGE NOTES
Triage Note: Patient presents to ED via EMS. Reports abdominal pain, headache, and nausea x3 days. Patient has been going to dialysis at [facility] in [place] for 2 months now and typically goes M, W, and F. Last week, patient had blood work done and called dialysis center prior to going to dialysis today and was told to go to the ER due to abnormal blood work (Hgb low, calcium low, iPTH high, phosphorous high).

## 2019-02-20 ENCOUNTER — APPOINTMENT (OUTPATIENT)
Dept: ULTRASOUND IMAGING | Age: 39
End: 2019-02-20
Attending: PHYSICIAN ASSISTANT
Payer: MEDICARE

## 2019-02-20 ENCOUNTER — HOSPITAL ENCOUNTER (EMERGENCY)
Age: 39
Discharge: HOME | End: 2019-02-21
Attending: EMERGENCY MEDICINE
Payer: MEDICARE

## 2019-02-20 DIAGNOSIS — R11.2 NON-INTRACTABLE VOMITING WITH NAUSEA, UNSPECIFIED VOMITING TYPE: Primary | ICD-10-CM

## 2019-02-20 LAB
ALBUMIN SERPL-MCNC: 4 G/DL (ref 3.5–5)
ALBUMIN SERPL-MCNC: 4 G/DL (ref 3.5–5)
ALBUMIN/GLOB SERPL: 1 {RATIO} (ref 1.1–2.2)
ALBUMIN/GLOB SERPL: 1 {RATIO} (ref 1.1–2.2)
ALP SERPL-CCNC: 372 U/L (ref 45–117)
ALP SERPL-CCNC: 380 U/L (ref 45–117)
ALT SERPL-CCNC: 84 U/L (ref 12–78)
ALT SERPL-CCNC: 88 U/L (ref 12–78)
ANION GAP SERPL CALC-SCNC: 9 MMOL/L (ref 5–15)
APPEARANCE UR: ABNORMAL
AST SERPL-CCNC: 41 U/L (ref 15–37)
AST SERPL-CCNC: 41 U/L (ref 15–37)
BACTERIA URNS QL MICRO: NEGATIVE /HPF
BASOPHILS # BLD: 0 K/UL (ref 0–0.1)
BASOPHILS NFR BLD: 0 % (ref 0–1)
BILIRUB DIRECT SERPL-MCNC: 0.1 MG/DL (ref 0–0.2)
BILIRUB SERPL-MCNC: 0.2 MG/DL (ref 0.2–1)
BILIRUB SERPL-MCNC: 0.2 MG/DL (ref 0.2–1)
BILIRUB UR QL: NEGATIVE
BUN SERPL-MCNC: 37 MG/DL (ref 6–20)
BUN/CREAT SERPL: 10 (ref 12–20)
CALCIUM SERPL-MCNC: 8.8 MG/DL (ref 8.5–10.1)
CHLORIDE SERPL-SCNC: 103 MMOL/L (ref 97–108)
CO2 SERPL-SCNC: 26 MMOL/L (ref 21–32)
COLOR UR: ABNORMAL
COMMENT, HOLDF: NORMAL
CREAT SERPL-MCNC: 3.59 MG/DL (ref 0.55–1.02)
DIFFERENTIAL METHOD BLD: ABNORMAL
EOSINOPHIL # BLD: 0.2 K/UL (ref 0–0.4)
EOSINOPHIL NFR BLD: 6 % (ref 0–7)
EPITH CASTS URNS QL MICRO: ABNORMAL /LPF
ERYTHROCYTE [DISTWIDTH] IN BLOOD BY AUTOMATED COUNT: 15.4 % (ref 11.5–14.5)
FLUAV AG NPH QL IA: NEGATIVE
FLUBV AG NOSE QL IA: NEGATIVE
GLOBULIN SER CALC-MCNC: 3.9 G/DL (ref 2–4)
GLOBULIN SER CALC-MCNC: 4 G/DL (ref 2–4)
GLUCOSE BLD STRIP.AUTO-MCNC: 127 MG/DL (ref 65–100)
GLUCOSE SERPL-MCNC: 104 MG/DL (ref 65–100)
GLUCOSE UR STRIP.AUTO-MCNC: NEGATIVE MG/DL
HCT VFR BLD AUTO: 33.6 % (ref 35–47)
HGB BLD-MCNC: 10.2 G/DL (ref 11.5–16)
HGB UR QL STRIP: NEGATIVE
IMM GRANULOCYTES # BLD AUTO: 0 K/UL (ref 0–0.04)
IMM GRANULOCYTES NFR BLD AUTO: 0 % (ref 0–0.5)
KETONES UR QL STRIP.AUTO: NEGATIVE MG/DL
LEUKOCYTE ESTERASE UR QL STRIP.AUTO: NEGATIVE
LIPASE SERPL-CCNC: 296 U/L (ref 73–393)
LYMPHOCYTES # BLD: 0.7 K/UL (ref 0.8–3.5)
LYMPHOCYTES NFR BLD: 17 % (ref 12–49)
MCH RBC QN AUTO: 25.2 PG (ref 26–34)
MCHC RBC AUTO-ENTMCNC: 30.4 G/DL (ref 30–36.5)
MCV RBC AUTO: 83.2 FL (ref 80–99)
MONOCYTES # BLD: 0.3 K/UL (ref 0–1)
MONOCYTES NFR BLD: 8 % (ref 5–13)
NEUTS SEG # BLD: 2.8 K/UL (ref 1.8–8)
NEUTS SEG NFR BLD: 69 % (ref 32–75)
NITRITE UR QL STRIP.AUTO: NEGATIVE
NRBC # BLD: 0 K/UL (ref 0–0.01)
NRBC BLD-RTO: 0 PER 100 WBC
PH UR STRIP: 5.5 [PH] (ref 5–8)
PLATELET # BLD AUTO: 152 K/UL (ref 150–400)
PMV BLD AUTO: 10.4 FL (ref 8.9–12.9)
POTASSIUM SERPL-SCNC: 3.7 MMOL/L (ref 3.5–5.1)
PROT SERPL-MCNC: 7.9 G/DL (ref 6.4–8.2)
PROT SERPL-MCNC: 8 G/DL (ref 6.4–8.2)
PROT UR STRIP-MCNC: 100 MG/DL
RBC # BLD AUTO: 4.04 M/UL (ref 3.8–5.2)
RBC #/AREA URNS HPF: ABNORMAL /HPF (ref 0–5)
SAMPLES BEING HELD,HOLD: NORMAL
SERVICE CMNT-IMP: ABNORMAL
SODIUM SERPL-SCNC: 138 MMOL/L (ref 136–145)
SP GR UR REFRACTOMETRY: 1.02 (ref 1–1.03)
UR CULT HOLD, URHOLD: NORMAL
UROBILINOGEN UR QL STRIP.AUTO: 0.2 EU/DL (ref 0.2–1)
WBC # BLD AUTO: 4 K/UL (ref 3.6–11)
WBC URNS QL MICRO: ABNORMAL /HPF (ref 0–4)
YEAST URNS QL MICRO: PRESENT

## 2019-02-20 PROCEDURE — 74011250636 HC RX REV CODE- 250/636: Performed by: PHYSICIAN ASSISTANT

## 2019-02-20 PROCEDURE — 36415 COLL VENOUS BLD VENIPUNCTURE: CPT

## 2019-02-20 PROCEDURE — 80053 COMPREHEN METABOLIC PANEL: CPT

## 2019-02-20 PROCEDURE — 81001 URINALYSIS AUTO W/SCOPE: CPT

## 2019-02-20 PROCEDURE — 74011000250 HC RX REV CODE- 250: Performed by: PHYSICIAN ASSISTANT

## 2019-02-20 PROCEDURE — 87804 INFLUENZA ASSAY W/OPTIC: CPT

## 2019-02-20 PROCEDURE — 82962 GLUCOSE BLOOD TEST: CPT

## 2019-02-20 PROCEDURE — 80076 HEPATIC FUNCTION PANEL: CPT

## 2019-02-20 PROCEDURE — 85025 COMPLETE CBC W/AUTO DIFF WBC: CPT

## 2019-02-20 PROCEDURE — 96361 HYDRATE IV INFUSION ADD-ON: CPT

## 2019-02-20 PROCEDURE — 96375 TX/PRO/DX INJ NEW DRUG ADDON: CPT

## 2019-02-20 PROCEDURE — 76705 ECHO EXAM OF ABDOMEN: CPT

## 2019-02-20 PROCEDURE — 83690 ASSAY OF LIPASE: CPT

## 2019-02-20 PROCEDURE — 99285 EMERGENCY DEPT VISIT HI MDM: CPT

## 2019-02-20 PROCEDURE — 96374 THER/PROPH/DIAG INJ IV PUSH: CPT

## 2019-02-20 RX ORDER — DIPHENHYDRAMINE HYDROCHLORIDE 50 MG/ML
50 INJECTION, SOLUTION INTRAMUSCULAR; INTRAVENOUS
Status: COMPLETED | OUTPATIENT
Start: 2019-02-20 | End: 2019-02-20

## 2019-02-20 RX ORDER — METOCLOPRAMIDE HYDROCHLORIDE 5 MG/ML
10 INJECTION INTRAMUSCULAR; INTRAVENOUS
Status: COMPLETED | OUTPATIENT
Start: 2019-02-20 | End: 2019-02-20

## 2019-02-20 RX ADMIN — FAMOTIDINE 20 MG: 10 INJECTION, SOLUTION INTRAVENOUS at 22:27

## 2019-02-20 RX ADMIN — METOCLOPRAMIDE 10 MG: 5 INJECTION, SOLUTION INTRAMUSCULAR; INTRAVENOUS at 22:29

## 2019-02-20 RX ADMIN — SODIUM CHLORIDE 1000 ML: 900 INJECTION, SOLUTION INTRAVENOUS at 21:45

## 2019-02-20 RX ADMIN — DIPHENHYDRAMINE HYDROCHLORIDE 50 MG: 50 INJECTION, SOLUTION INTRAMUSCULAR; INTRAVENOUS at 22:30

## 2019-02-21 VITALS
TEMPERATURE: 98.5 F | HEART RATE: 82 BPM | RESPIRATION RATE: 16 BRPM | DIASTOLIC BLOOD PRESSURE: 73 MMHG | OXYGEN SATURATION: 99 % | SYSTOLIC BLOOD PRESSURE: 127 MMHG

## 2019-02-21 LAB
GLUCOSE BLD STRIP.AUTO-MCNC: 220 MG/DL (ref 65–100)
SERVICE CMNT-IMP: ABNORMAL

## 2019-02-21 PROCEDURE — 82962 GLUCOSE BLOOD TEST: CPT

## 2019-02-21 NOTE — ED NOTES
RN spoke with Dawn Mukherjee at [facility] to arrange transportation home for patient. Ticket # ETA: 11 minutes in [place]

## 2019-02-21 NOTE — ED PROVIDER NOTES
34 y.o. female with past medical history significant for HTN, DM, gastroparesis, s/p gastric bypass, h/o J-tube placement and removal, PCOS, GERD, h/o pancreatitis, ESRD on HD, sickle cell trait, fibromyalgia, presents via EMS with chief complaint of low BG levels tonight. Patient states that this evening (prior to arrival) her blood glucose dropped in the 30 mg/dL range (\"33 or 35\") and as a result she \"passed out\". She notes that she was given peanut butter by mouth at home, and then EMS also gave her oral glucose en route; did not receive any IV treatment for hypoglycemia. BG upon arrival was 90 mg/dL. Patient states that over the past several weeks she has been experiencing persistent pain in the epigastrium, along with nausea, vomiting, and inability to tolerate PO intake. She reports that she was seen in the [facility] ED on 2/11/19 and then the [facility] ED on 2/14/19 for evaluation of these GI symptoms. States that she had imaging performed, which to her knowledge was normal. Patient reports that her abdominal pain, nausea, and vomiting are not similar to when she has had gastroparesis in the past, but her symptoms are similar to when she had pancreatitis in the past. Patient notes that her nausea, vomiting, and epigastric pain have persisted since ED evaluation on 2/14/19. She estimates that she vomited 4-5 times today, and has had difficulty tolerating PO intake as a result. Patient feels that she is dehydrated, and notes that during her most recent dialysis sessions she has been under her dry weight and has not had any fluid removed. She reports that she has not followed-up with her PCP for these symptoms due to transportation issues. Patient additionally complains of hot flashes and chills, as well as rhinorrhea. Denies measured fevers and states that she just received her annual flu vaccine two days ago. She notes that she also had diarrhea two days ago that has since subsided.  Patient specifically denies chest pain, shortness of breath, urinary symptoms, or headache. There are no other acute medical concerns at this time. Social hx: Denies Tobacco use; Denies EtOH use; Denies Illicit Drug Abuse PCP: Jennifer Treadwell MD 
Nephrology: Dr. Chrissy Osborn Note written by Philipp Abdullahi. Yoav Sánchez, as dictated by Thuy Bautista PA-C 7:44 PM  
 
 
The history is provided by the patient and the EMS personnel. No  was used. Past Medical History:  
Diagnosis Date  ARF (acute renal failure) ([address] requiring dialysis 2011  Asthma  CKD (chronic kidney disease)  Diabetes ([address]  Fibromyalgia  Gastroparesis 2010 Gastric Pacer- REMOVED 07/2015  GERD (gastroesophageal reflux disease)  Hypertension  Narcotic dependence ([address]  THU (obstructive sleep apnea)   
 wears 2 LPM oxygen at night  Other ill-defined conditions(799.89)  Polycystic ovarian syndrome  Seizures ([address]  Sickle cell trait ([address]  Thromboembolus ([address] to her left arm and was told she had one in left leg recently Past Surgical History:  
Procedure Laterality Date  HX AMPUTATION FOOT  04/2018  
 left foot  HX CATARACT REMOVAL  3/5/12  
 right  HX DILATION AND CURETTAGE    
 ablation  HX GASTRIC BYPASS  2015  HX GI    
 j tube placement and removal  
 HX OTHER SURGICAL  2010 Gastric Pacer- REMOVED 07/2015  HX VASCULAR ACCESS    
 gray cath rt subclavian; removed  HX VASCULAR ACCESS    
 HD access right thigh; stopped working Family History:  
Problem Relation Age of Onset  Cancer Mother   
     lung  Hypertension Mother  Cancer Father   
     kidney  Stroke Father 3 strokes: 58-68  Heart Disease Father 72 CABG  Hypertension Father  Cancer Sister   
     pancreatic  Cancer Maternal Aunt   
     breast  
 Cancer Paternal Aunt   
     breast  
 Schizophrenia Sister [facility] 34, now ass't living  Other Sister   
      AIDS  Other Other   
     nephew of AIDS Social History Socioeconomic History  Marital status:  Spouse name: Not on file  Number of children: Not on file  Years of education: Not on file  Highest education level: Not on file Social Needs  Financial resource strain: Not on file  Food insecurity - worry: Not on file  Food insecurity - inability: Not on file  Transportation needs - medical: Not on file  Transportation needs - non-medical: Not on file Occupational History  Not on file Tobacco Use  Smoking status: Never Smoker  Smokeless tobacco: Never Used Substance and Sexual Activity  Alcohol use: No  
 Drug use: No  
 Sexual activity: Yes  
  Partners: Male Birth control/protection: None Other Topics Concern  Not on file Social History Narrative Lives with her  and father ALLERGIES: Fentanyl; Erythromycin; Toradol [ketorolac]; and Morphine Review of Systems Constitutional: Positive for chills. Negative for fever. HENT: Positive for rhinorrhea. Negative for ear discharge. Eyes: Negative for photophobia, pain, discharge and visual disturbance. Respiratory: Negative for apnea, cough, chest tightness and shortness of breath. Cardiovascular: Negative for chest pain, palpitations and leg swelling. Gastrointestinal: Positive for abdominal pain, nausea and vomiting. Negative for abdominal distention and blood in stool. Genitourinary: Negative for difficulty urinating, dysuria, flank pain, frequency and hematuria. Musculoskeletal: Negative for back pain, gait problem, joint swelling, myalgias and neck pain. Skin: Negative for color change and pallor. Neurological: Positive for syncope. Negative for dizziness, weakness, numbness and headaches. Psychiatric/Behavioral: Negative for behavioral problems and confusion. The patient is not nervous/anxious. Vitals:  
 02/20/19 1946 02/20/19 2112 02/20/19 2200 02/20/19 2300 BP: (!) 155/99 (!) 184/95 145/75 137/84 Pulse: 78 75 78 90 Resp: 18 10 11 14 Temp: 98.3 °F (36.8 °C) 97.5 °F (36.4 °C) SpO2: 100% 100% 100% 100% Physical Exam  
Constitutional: She is oriented to person, place, and time. She appears well-developed and well-nourished. No distress. HENT:  
Head: Normocephalic and atraumatic. Right Ear: External ear normal.  
Left Ear: External ear normal.  
Nose: Nose normal.  
Mouth/Throat: Oropharynx is clear and moist.  
Eyes: Conjunctivae and EOM are normal. Pupils are equal, round, and reactive to light. Right eye exhibits no discharge. Left eye exhibits no discharge. Neck: Normal range of motion. Neck supple. Cardiovascular: Normal rate, regular rhythm, normal heart sounds and intact distal pulses. Pulmonary/Chest: Effort normal and breath sounds normal.  
Left anterior chest wall dialysis port present. Area is non-tender with no surrounding erythema or edema. Abdominal: Soft. Bowel sounds are normal. She exhibits no distension. There is no tenderness. There is no rebound and no guarding. Well-healed surgical scars present on abdomen. Musculoskeletal: Normal range of motion. She exhibits no edema or tenderness. Left midfoot amputation noted. Non-functional AV fistual in the RUE. Neurological: She is alert and oriented to person, place, and time. No cranial nerve deficit. Coordination normal.  
Skin: Skin is warm and dry. No rash noted. Psychiatric: She has a normal mood and affect. Her behavior is normal. Judgment and thought content normal.  
Nursing note and vitals reviewed. Note written by Mei Simon. Ivanna Reddy, as dictated by Krystal Reyes PA-C 7:44 PM   
 
MDM Number of Diagnoses or Management Options Non-intractable vomiting with nausea, unspecified vomiting type: Amount and/or Complexity of Data Reviewed Clinical lab tests: ordered and reviewed Tests in the radiology section of CPT®: ordered and reviewed Decide to obtain previous medical records or to obtain history from someone other than the patient: yes Review and summarize past medical records: yes Discuss the patient with other providers: yes Independent visualization of images, tracings, or specimens: yes Procedures PROGRESS NOTE: 
9:00 PM 
Reviewed patient's case with attending physician, Diane Moreira, DO; in to see. He is in agreement with care plan. PROGRESS NOTE: 
11:55 PM 
Dr. Yohan Snyder shift had ended attending now Dr. Eveline Harris. Case reviewed with Dr. Eveline Harris, and he agrees with care and plan to discharge home. Patient has had no vomiting and has had stable BG since she has been in the ED. Patient has been reassessed. No vomiting in ER. Reviewed labs, medications with patient; pt with recent imaging; BS stable. Ready to discharge home. Must see GI.   
 
12:18 AM 
Patient's results have been reviewed with them. Patient and/or family have verbally conveyed their understanding and agreement of the patient's signs, symptoms, diagnosis, treatment and prognosis and additionally agree to follow up as recommended or return to the Emergency Room should their condition change prior to follow-up. Discharge instructions have also been provided to the patient with some educational information regarding their diagnosis as well a list of reasons why they would want to return to the ER prior to their follow-up appointment should their condition change.  
JESSEE Norton

## 2019-02-21 NOTE — ED NOTES
2115 pt ambulated to restroom with use of cane, steady gait, and voided x1; urine obtained and sent 2230 pt provided with a snack and a TV dinner per order, no complaints at this time 2308 pt to ultrasound

## 2019-02-21 NOTE — DISCHARGE INSTRUCTIONS
Patient Education        Nausea and Vomiting: Care Instructions  Your Care Instructions    When you are nauseated, you may feel weak and sweaty and notice a lot of saliva in your mouth. Nausea often leads to vomiting. Most of the time you do not need to worry about nausea and vomiting, but they can be signs of other illnesses. Two common causes of nausea and vomiting are stomach flu and food poisoning. Nausea and vomiting from viral stomach flu will usually start to improve within 24 hours. Nausea and vomiting from food poisoning may last from 12 to 48 hours. The doctor has checked you carefully, but problems can develop later. If you notice any problems or new symptoms, get medical treatment right away. Follow-up care is a key part of your treatment and safety. Be sure to make and go to all appointments, and call your doctor if you are having problems. It's also a good idea to know your test results and keep a list of the medicines you take. How can you care for yourself at home? · To prevent dehydration, drink plenty of fluids, enough so that your urine is light yellow or clear like water. Choose water and other caffeine-free clear liquids until you feel better. If you have kidney, heart, or liver disease and have to limit fluids, talk with your doctor before you increase the amount of fluids you drink. · Rest in bed until you feel better. · When you are able to eat, try clear soups, mild foods, and liquids until all symptoms are gone for 12 to 48 hours. Other good choices include dry toast, crackers, cooked cereal, and gelatin dessert, such as Jell-O. When should you call for help? Call 911 anytime you think you may need emergency care. For example, call if:    · You passed out (lost consciousness).    Call your doctor now or seek immediate medical care if:    · You have symptoms of dehydration, such as:  ? Dry eyes and a dry mouth. ? Passing only a little dark urine. ?  Feeling thirstier than usual.   · You have new or worsening belly pain.     · You have a new or higher fever.     · You vomit blood or what looks like coffee grounds.    Watch closely for changes in your health, and be sure to contact your doctor if:    · You have ongoing nausea and vomiting.     · Your vomiting is getting worse.     · Your vomiting lasts longer than 2 days.     · You are not getting better as expected. Where can you learn more? Go to http://luciana-kyle.info/. Enter 25 [id number] in the search box to learn more about \"Nausea and Vomiting: Care Instructions. \"  Current as of: September 23, 2018  Content Version: 11.9  © 4481-6572 [employer]. Care instructions adapted under license by [email or web address] (which disclaims liability or warranty for this information). If you have questions about a medical condition or this instruction, always ask your healthcare professional. [address] any warranty or liability for your use of this information.

## 2019-02-21 NOTE — ED TRIAGE NOTES
Triage Note: Patient took Insulin tonight and then after eating patient started vomiting. Patient has been seen at 3 hospitals in the past week for abdominal pain. Blood sugar was 90 upon arrival to ED. Patient had one tube of oral glucose and drank some juice.

## 2019-02-21 NOTE — ED TRIAGE NOTES
Pt arrives ambulatory from home with use of cane reporting being in and out of hospitals recently in between dialysis treatments; pt reports she hasn't been able to keep anything down and today she reported n/v and blood sugar at home was in the 30s; pt last had HD on Monday and normally goes Mon/Wed/Fri, reports they were closed today and were planning on trying to see her tomorrow; pt reports passing out today x2 with LOC one time; pt has nonworking fistula in RUE

## 2019-03-08 ENCOUNTER — APPOINTMENT (OUTPATIENT)
Dept: CT IMAGING | Age: 39
End: 2019-03-08
Attending: STUDENT IN AN ORGANIZED HEALTH CARE EDUCATION/TRAINING PROGRAM
Payer: MEDICARE

## 2019-03-08 ENCOUNTER — APPOINTMENT (OUTPATIENT)
Dept: GENERAL RADIOLOGY | Age: 39
End: 2019-03-08
Attending: STUDENT IN AN ORGANIZED HEALTH CARE EDUCATION/TRAINING PROGRAM
Payer: MEDICARE

## 2019-03-08 ENCOUNTER — HOSPITAL ENCOUNTER (EMERGENCY)
Age: 39
Discharge: HOME | End: 2019-03-08
Attending: STUDENT IN AN ORGANIZED HEALTH CARE EDUCATION/TRAINING PROGRAM
Payer: MEDICARE

## 2019-03-08 VITALS
RESPIRATION RATE: 13 BRPM | HEART RATE: 84 BPM | SYSTOLIC BLOOD PRESSURE: 138 MMHG | HEIGHT: 62 IN | BODY MASS INDEX: 26.21 KG/M2 | OXYGEN SATURATION: 100 % | DIASTOLIC BLOOD PRESSURE: 86 MMHG | TEMPERATURE: 98.1 F | WEIGHT: 142.42 LBS

## 2019-03-08 DIAGNOSIS — Z99.2 DIALYSIS PATIENT (HCC): ICD-10-CM

## 2019-03-08 DIAGNOSIS — B34.9 VIRAL ILLNESS: Primary | ICD-10-CM

## 2019-03-08 LAB
ANION GAP SERPL CALC-SCNC: 11 MMOL/L (ref 5–15)
APPEARANCE UR: CLEAR
B PERT DNA SPEC QL NAA+PROBE: NOT DETECTED
BACTERIA URNS QL MICRO: NEGATIVE /HPF
BASOPHILS # BLD: 0 K/UL (ref 0–0.1)
BASOPHILS NFR BLD: 0 % (ref 0–1)
BILIRUB UR QL: NEGATIVE
BUN SERPL-MCNC: 43 MG/DL (ref 6–20)
BUN/CREAT SERPL: 11 (ref 12–20)
C PNEUM DNA SPEC QL NAA+PROBE: NOT DETECTED
CALCIUM SERPL-MCNC: 9 MG/DL (ref 8.5–10.1)
CHLORIDE SERPL-SCNC: 103 MMOL/L (ref 97–108)
CO2 SERPL-SCNC: 21 MMOL/L (ref 21–32)
COLOR UR: ABNORMAL
COMMENT, HOLDF: NORMAL
CREAT SERPL-MCNC: 3.91 MG/DL (ref 0.55–1.02)
DIFFERENTIAL METHOD BLD: ABNORMAL
EOSINOPHIL # BLD: 0.2 K/UL (ref 0–0.4)
EOSINOPHIL NFR BLD: 3 % (ref 0–7)
EPITH CASTS URNS QL MICRO: ABNORMAL /LPF
ERYTHROCYTE [DISTWIDTH] IN BLOOD BY AUTOMATED COUNT: 15.3 % (ref 11.5–14.5)
FLUAV AG NPH QL IA: NEGATIVE
FLUAV H1 2009 PAND RNA SPEC QL NAA+PROBE: NOT DETECTED
FLUAV H1 RNA SPEC QL NAA+PROBE: NOT DETECTED
FLUAV H3 RNA SPEC QL NAA+PROBE: NOT DETECTED
FLUAV SUBTYP SPEC NAA+PROBE: NOT DETECTED
FLUBV AG NOSE QL IA: NEGATIVE
FLUBV RNA SPEC QL NAA+PROBE: NOT DETECTED
GLUCOSE SERPL-MCNC: 237 MG/DL (ref 65–100)
GLUCOSE UR STRIP.AUTO-MCNC: 500 MG/DL
HADV DNA SPEC QL NAA+PROBE: NOT DETECTED
HCG UR QL: NEGATIVE
HCOV 229E RNA SPEC QL NAA+PROBE: NOT DETECTED
HCOV HKU1 RNA SPEC QL NAA+PROBE: NOT DETECTED
HCOV NL63 RNA SPEC QL NAA+PROBE: NOT DETECTED
HCOV OC43 RNA SPEC QL NAA+PROBE: NOT DETECTED
HCT VFR BLD AUTO: 37.5 % (ref 35–47)
HGB BLD-MCNC: 11.4 G/DL (ref 11.5–16)
HGB UR QL STRIP: ABNORMAL
HMPV RNA SPEC QL NAA+PROBE: NOT DETECTED
HPIV1 RNA SPEC QL NAA+PROBE: NOT DETECTED
HPIV2 RNA SPEC QL NAA+PROBE: NOT DETECTED
HPIV3 RNA SPEC QL NAA+PROBE: NOT DETECTED
HPIV4 RNA SPEC QL NAA+PROBE: NOT DETECTED
HYALINE CASTS URNS QL MICRO: ABNORMAL /LPF (ref 0–5)
IMM GRANULOCYTES # BLD AUTO: 0 K/UL (ref 0–0.04)
IMM GRANULOCYTES NFR BLD AUTO: 0 % (ref 0–0.5)
KETONES UR QL STRIP.AUTO: NEGATIVE MG/DL
LEUKOCYTE ESTERASE UR QL STRIP.AUTO: NEGATIVE
LIPASE SERPL-CCNC: 330 U/L (ref 73–393)
LYMPHOCYTES # BLD: 1.1 K/UL (ref 0.8–3.5)
LYMPHOCYTES NFR BLD: 19 % (ref 12–49)
M PNEUMO DNA SPEC QL NAA+PROBE: NOT DETECTED
MCH RBC QN AUTO: 25.2 PG (ref 26–34)
MCHC RBC AUTO-ENTMCNC: 30.4 G/DL (ref 30–36.5)
MCV RBC AUTO: 82.8 FL (ref 80–99)
MONOCYTES # BLD: 0.3 K/UL (ref 0–1)
MONOCYTES NFR BLD: 6 % (ref 5–13)
NEUTS SEG # BLD: 4.1 K/UL (ref 1.8–8)
NEUTS SEG NFR BLD: 72 % (ref 32–75)
NITRITE UR QL STRIP.AUTO: NEGATIVE
NRBC # BLD: 0 K/UL (ref 0–0.01)
NRBC BLD-RTO: 0 PER 100 WBC
PH UR STRIP: 7 [PH] (ref 5–8)
PLATELET # BLD AUTO: 180 K/UL (ref 150–400)
PMV BLD AUTO: 13 FL (ref 8.9–12.9)
POTASSIUM SERPL-SCNC: 5 MMOL/L (ref 3.5–5.1)
PROCALCITONIN SERPL-MCNC: 0.1 NG/ML
PROT UR STRIP-MCNC: 100 MG/DL
RBC # BLD AUTO: 4.53 M/UL (ref 3.8–5.2)
RBC #/AREA URNS HPF: ABNORMAL /HPF (ref 0–5)
RSV RNA SPEC QL NAA+PROBE: NOT DETECTED
RV+EV RNA SPEC QL NAA+PROBE: NOT DETECTED
SAMPLES BEING HELD,HOLD: NORMAL
SODIUM SERPL-SCNC: 135 MMOL/L (ref 136–145)
SP GR UR REFRACTOMETRY: 1.01 (ref 1–1.03)
UR CULT HOLD, URHOLD: NORMAL
UROBILINOGEN UR QL STRIP.AUTO: 0.2 EU/DL (ref 0.2–1)
WBC # BLD AUTO: 5.7 K/UL (ref 3.6–11)
WBC URNS QL MICRO: ABNORMAL /HPF (ref 0–4)

## 2019-03-08 PROCEDURE — 96374 THER/PROPH/DIAG INJ IV PUSH: CPT

## 2019-03-08 PROCEDURE — 84145 PROCALCITONIN (PCT): CPT

## 2019-03-08 PROCEDURE — 85025 COMPLETE CBC W/AUTO DIFF WBC: CPT

## 2019-03-08 PROCEDURE — 99285 EMERGENCY DEPT VISIT HI MDM: CPT

## 2019-03-08 PROCEDURE — 36415 COLL VENOUS BLD VENIPUNCTURE: CPT

## 2019-03-08 PROCEDURE — 81001 URINALYSIS AUTO W/SCOPE: CPT

## 2019-03-08 PROCEDURE — 81025 URINE PREGNANCY TEST: CPT

## 2019-03-08 PROCEDURE — 74011250637 HC RX REV CODE- 250/637: Performed by: STUDENT IN AN ORGANIZED HEALTH CARE EDUCATION/TRAINING PROGRAM

## 2019-03-08 PROCEDURE — 96361 HYDRATE IV INFUSION ADD-ON: CPT

## 2019-03-08 PROCEDURE — 74176 CT ABD & PELVIS W/O CONTRAST: CPT

## 2019-03-08 PROCEDURE — 87633 RESP VIRUS 12-25 TARGETS: CPT

## 2019-03-08 PROCEDURE — 83690 ASSAY OF LIPASE: CPT

## 2019-03-08 PROCEDURE — 71046 X-RAY EXAM CHEST 2 VIEWS: CPT

## 2019-03-08 PROCEDURE — 80048 BASIC METABOLIC PNL TOTAL CA: CPT

## 2019-03-08 PROCEDURE — 74011250636 HC RX REV CODE- 250/636: Performed by: STUDENT IN AN ORGANIZED HEALTH CARE EDUCATION/TRAINING PROGRAM

## 2019-03-08 PROCEDURE — 87804 INFLUENZA ASSAY W/OPTIC: CPT

## 2019-03-08 RX ORDER — DIPHENHYDRAMINE HCL 25 MG
25 CAPSULE ORAL ONCE
Status: COMPLETED | OUTPATIENT
Start: 2019-03-08 | End: 2019-03-08

## 2019-03-08 RX ORDER — PROMETHAZINE HYDROCHLORIDE 25 MG/1
25 TABLET ORAL
Status: COMPLETED | OUTPATIENT
Start: 2019-03-08 | End: 2019-03-08

## 2019-03-08 RX ORDER — HYDROMORPHONE HYDROCHLORIDE 1 MG/ML
0.5 INJECTION, SOLUTION INTRAMUSCULAR; INTRAVENOUS; SUBCUTANEOUS ONCE
Status: COMPLETED | OUTPATIENT
Start: 2019-03-08 | End: 2019-03-08

## 2019-03-08 RX ORDER — CLONIDINE HYDROCHLORIDE 0.1 MG/1
0.2 TABLET ORAL
Status: COMPLETED | OUTPATIENT
Start: 2019-03-08 | End: 2019-03-08

## 2019-03-08 RX ORDER — ACETAMINOPHEN 325 MG/1
650 TABLET ORAL
Status: COMPLETED | OUTPATIENT
Start: 2019-03-08 | End: 2019-03-08

## 2019-03-08 RX ORDER — AMLODIPINE BESYLATE 5 MG/1
10 TABLET ORAL
Status: COMPLETED | OUTPATIENT
Start: 2019-03-08 | End: 2019-03-08

## 2019-03-08 RX ADMIN — AMLODIPINE BESYLATE 10 MG: 5 TABLET ORAL at 14:25

## 2019-03-08 RX ADMIN — DIPHENHYDRAMINE HYDROCHLORIDE 25 MG: 25 CAPSULE ORAL at 15:46

## 2019-03-08 RX ADMIN — ACETAMINOPHEN 650 MG: 325 TABLET ORAL at 15:52

## 2019-03-08 RX ADMIN — PROMETHAZINE HYDROCHLORIDE 25 MG: 25 TABLET ORAL at 15:52

## 2019-03-08 RX ADMIN — SODIUM CHLORIDE 500 ML: 900 INJECTION, SOLUTION INTRAVENOUS at 15:45

## 2019-03-08 RX ADMIN — CLONIDINE HYDROCHLORIDE 0.2 MG: 0.1 TABLET ORAL at 14:24

## 2019-03-08 RX ADMIN — HYDROMORPHONE HYDROCHLORIDE 0.5 MG: 1 INJECTION, SOLUTION INTRAMUSCULAR; INTRAVENOUS; SUBCUTANEOUS at 15:45

## 2019-03-08 NOTE — ED PROVIDER NOTES
34 y.o. female with past medical history significant for asthma, HTN, DM, gastroparesis, s/p gastric bypass, h/o J-tube placement and removal, PCOS, GERD, h/o pancreatitis, DVT, ESRD (MWF dialysis patient), sickle cell trait, and fibromyalgia who presents from home via EMS with chief complaint of fever. Patient reports 3 days ago fever, T max of 100, with accompanying generalized body aches, HAs, bilateral flank pain, urinary frequency, vomiting, diarrhea, and lightheadedness. Patient states 2 days ago, she was getting dialysis and had blood cultures drawn due to her sx, results still pending. Patient states she called her dialysis team this morning and was referred to the ED. Patient states her dialysis access was placed 6 months ago. Patient denies any known ill contacts. Patient denies any syncope. There are no other acute medical concerns at this time. Social hx: Nonsmoker; No EtOH use  PCP: Lisandra Weaver MD  Nephrology: Dr. Austin Barber     Note written by Ivanna Reynaga, as dictated by Samantha Avitia MD 1:24 PM      The history is provided by the patient, the EMS personnel and medical records. No  was used.         Past Medical History:   Diagnosis Date    ARF (acute renal failure) ([address] requiring dialysis 2011    Asthma     CKD (chronic kidney disease)     Diabetes ([address]     Fibromyalgia     Gastroparesis 2010    Gastric Pacer- REMOVED 07/2015    GERD (gastroesophageal reflux disease)     Hypertension     Narcotic dependence ([address]     THU (obstructive sleep apnea)     wears 2 LPM oxygen at night    Other ill-defined conditions(799.89)     Polycystic ovarian syndrome     Seizures ([address]     Sickle cell trait ([address]     Thromboembolus ([address] to her left arm and was told she had one in left leg recently       Past Surgical History:   Procedure Laterality Date    HX AMPUTATION FOOT  04/2018    left foot    HX CATARACT REMOVAL  3/5/12    right    HX DILATION AND CURETTAGE      ablation    HX GASTRIC BYPASS      HX GI      j tube placement and removal    HX OTHER SURGICAL      Gastric Pacer- REMOVED 2015    HX VASCULAR ACCESS      gray cath rt subclavian; removed     HX VASCULAR ACCESS      HD access right thigh; stopped working         Family History:   Problem Relation Age of Onset    Cancer Mother         lung    Hypertension Mother     Cancer Father         kidney    Stroke Father         3 strokes: 59-72    Heart Disease Father 72        CABG    Hypertension Father     Cancer Sister         pancreatic    Cancer Maternal Aunt         breast    Cancer Paternal Aunt         breast    Schizophrenia Sister         was in [facility]. Karly Huang 34, now ass't living    Other Sister          AIDS    Other Other         nephew of AIDS       Social History     Socioeconomic History    Marital status:      Spouse name: Not on file    Number of children: Not on file    Years of education: Not on file    Highest education level: Not on file   Social Needs    Financial resource strain: Not on file    Food insecurity - worry: Not on file    Food insecurity - inability: Not on file   [employer] needs - medical: Not on file   [employer] needs - non-medical: Not on file   Occupational History    Not on file   Tobacco Use    Smoking status: Never Smoker    Smokeless tobacco: Never Used   Substance and Sexual Activity    Alcohol use: No    Drug use: No    Sexual activity: Yes     Partners: Male     Birth control/protection: None   Other Topics Concern    Not on file   Social History Narrative    Lives with her  and father         ALLERGIES: Fentanyl; Erythromycin; Toradol [ketorolac]; and Morphine    Review of Systems   Constitutional: Positive for fever. Negative for chills. Eyes: Negative for photophobia. Respiratory: Negative for shortness of breath. Cardiovascular: Negative for chest pain.    Gastrointestinal: Positive for diarrhea, nausea and vomiting. Negative for abdominal pain. Genitourinary: Positive for flank pain and frequency. Negative for dysuria. Musculoskeletal: Positive for arthralgias and myalgias. Negative for back pain. Neurological: Positive for headaches. Psychiatric/Behavioral: Negative for confusion. All other systems reviewed and are negative. Vitals:    03/08/19 1317   BP: (!) 217/107   Pulse: (!) 103   Resp: 14   Temp: 98.7 °F (37.1 °C)   SpO2: 98%   Weight: 64.6 kg (142 lb 6.7 oz)   Height: 5' 2\" (1.575 m)            Physical Exam   Constitutional: She appears well-nourished. She appears ill. No distress. Chronically ill appearing. NAD. HENT:   Head: Normocephalic. Mouth/Throat: Mucous membranes are dry. Dry MM. Eyes: Pupils are equal, round, and reactive to light. Neck: No JVD present. Cardiovascular: Intact distal pulses. Tachycardia present. Tachycardic. Pulmonary/Chest: Effort normal and breath sounds normal.   Left subclavian dialysis catheter without tenderness, erythema, or drainage. Abdominal: She exhibits no distension. A hernia is present. Hernia confirmed positive in the ventral area. Abdominal scar noted from previous J-tube with ~1cm ventral hernia noted. Musculoskeletal: She exhibits no edema. Neurological: She is alert. Skin: Skin is warm. Psychiatric: Her behavior is normal.   Nursing note and vitals reviewed. Note written by Corrinne Alto, Scribe, as dictated by Leonard Coon MD 1:24 PM    MDM  Number of Diagnoses or Management Options  Diagnosis management comments: Adia Marte is a 34 y.o. female presenting with . Differential includes influenza, viral syndrome, bacteremia, catheter infection, UTI, pyelonephritis, dehydration. Course: Patient normoxic on room air, no evidence of significant hyperkalemia. Labs are unremarkable. .  Dispo: home likely.          Procedures      4:47 PM  Patient likely could be discharged home, but did not have dialysis today, and is not scheduled again until Monday. Consulting her nephrologist to see if she could have dialysis in the ED. CONSULT NOTE:  4:52 PM Beth Santoyo MD spoke with Dr. Krystina Rubalcava for Nephrology. Discussed available diagnostic tests and clinical findings. Dr. Cruz Rogers states the patient can be discharged home without dialysis. Awaiting CT scan results.     5:02 PM  Change of shift. Care of patient signed over to Dr. Patricia Moreno. Bedside handoff complete. Awaiting CT results.

## 2019-03-08 NOTE — ED TRIAGE NOTES
Triage:  Pt to ED from home via EMS due to concerns over recently developed fever, fatigue, body aches, and loose stools. Pt states symptoms have been present since Wednesday. Pt contacted dialysis team and referred to ED for evaluation.

## 2019-03-09 NOTE — PROGRESS NOTES
Care Management - Patient's nurse asked CM to assist with arranging cab transportation home for patient. Patient has [facility]. Met with patient in the room. Confirmed her address of [address] [place] [address], [facility], [address] [place] [address] and her phone [phone]. At 7:13 PM, called [employer] ([phone]) and spoke with Liv Schneider. Confirmation number is [id number]. Nurse gave patient phone number and confirmation number so she can check on the status of her ride.      VIVI Mcintosh

## 2019-03-09 NOTE — ED NOTES
Pt left ambulatory with discharge instructions out to the waiting room to wait on her ride. Care management called to arrange a ride.

## 2019-03-29 ENCOUNTER — APPOINTMENT (OUTPATIENT)
Dept: VASCULAR SURGERY | Age: 39
End: 2019-03-29
Attending: EMERGENCY MEDICINE
Payer: MEDICARE

## 2019-03-29 ENCOUNTER — APPOINTMENT (OUTPATIENT)
Dept: CT IMAGING | Age: 39
End: 2019-03-29
Attending: EMERGENCY MEDICINE
Payer: MEDICARE

## 2019-03-29 ENCOUNTER — HOSPITAL ENCOUNTER (EMERGENCY)
Age: 39
Discharge: HOME | End: 2019-03-30
Attending: EMERGENCY MEDICINE
Payer: MEDICARE

## 2019-03-29 DIAGNOSIS — I82.622 ACUTE DEEP VEIN THROMBOSIS (DVT) OF LEFT UPPER EXTREMITY, UNSPECIFIED VEIN (HCC): Primary | ICD-10-CM

## 2019-03-29 LAB
ANION GAP SERPL CALC-SCNC: 9 MMOL/L (ref 5–15)
BUN SERPL-MCNC: 58 MG/DL (ref 6–20)
BUN/CREAT SERPL: 11 (ref 12–20)
CALCIUM SERPL-MCNC: 8.4 MG/DL (ref 8.5–10.1)
CHLORIDE SERPL-SCNC: 102 MMOL/L (ref 97–108)
CO2 SERPL-SCNC: 24 MMOL/L (ref 21–32)
CREAT SERPL-MCNC: 5.47 MG/DL (ref 0.55–1.02)
GLUCOSE SERPL-MCNC: 60 MG/DL (ref 65–100)
POTASSIUM SERPL-SCNC: 3.8 MMOL/L (ref 3.5–5.1)
SODIUM SERPL-SCNC: 135 MMOL/L (ref 136–145)

## 2019-03-29 PROCEDURE — 74011250637 HC RX REV CODE- 250/637: Performed by: EMERGENCY MEDICINE

## 2019-03-29 PROCEDURE — 80048 BASIC METABOLIC PNL TOTAL CA: CPT

## 2019-03-29 PROCEDURE — 93971 EXTREMITY STUDY: CPT

## 2019-03-29 PROCEDURE — 70450 CT HEAD/BRAIN W/O DYE: CPT

## 2019-03-29 PROCEDURE — 99283 EMERGENCY DEPT VISIT LOW MDM: CPT

## 2019-03-29 PROCEDURE — 36415 COLL VENOUS BLD VENIPUNCTURE: CPT

## 2019-03-29 RX ORDER — OXYCODONE AND ACETAMINOPHEN 5; 325 MG/1; MG/1
2 TABLET ORAL
Status: COMPLETED | OUTPATIENT
Start: 2019-03-29 | End: 2019-03-29

## 2019-03-29 RX ADMIN — OXYCODONE AND ACETAMINOPHEN 2 TABLET: 5; 325 TABLET ORAL at 22:52

## 2019-03-30 VITALS
TEMPERATURE: 98 F | HEART RATE: 91 BPM | DIASTOLIC BLOOD PRESSURE: 79 MMHG | SYSTOLIC BLOOD PRESSURE: 137 MMHG | RESPIRATION RATE: 16 BRPM | OXYGEN SATURATION: 98 %

## 2019-03-30 NOTE — ED NOTES
MD reviewed discharge instructions and options with patient and patient verbalized understanding. RN reviewed discharge instructions using teachback method. Pt ambulated to exit without difficulty and in no signs of acute distress escorted by male , and lyft  will drive home. No complaints or needs expressed at this time. Patient was counseled on medications prescribed at discharge. VSS, verbalized relief from most intense pain. Patient to call vascular surgery in the morning for appointment.

## 2019-03-30 NOTE — DISCHARGE INSTRUCTIONS
Exercise your left hand aggressively with your squeeze ball    Elevate the left arm a lot to help with swelling. There is a small blood clot in the graft. Dr Lei Wallace believes this will go away on its own.     Return for any fever or if worse at all

## 2019-03-30 NOTE — ED TRIAGE NOTES
Triage: Patient arrives via EMS from home with c/o left upper arm swelling at the site of new dialysis graft placement 3/26 by Dr. Percy Rae. Patient also reports tingling in left arm. Left arm warm to touch with erythema around site.

## 2019-03-31 ENCOUNTER — APPOINTMENT (OUTPATIENT)
Dept: GENERAL RADIOLOGY | Age: 39
End: 2019-03-31
Attending: EMERGENCY MEDICINE
Payer: MEDICARE

## 2019-03-31 ENCOUNTER — HOSPITAL ENCOUNTER (EMERGENCY)
Age: 39
Discharge: HOME | End: 2019-03-31
Attending: EMERGENCY MEDICINE | Admitting: EMERGENCY MEDICINE
Payer: MEDICARE

## 2019-03-31 VITALS
OXYGEN SATURATION: 100 % | SYSTOLIC BLOOD PRESSURE: 191 MMHG | HEART RATE: 108 BPM | RESPIRATION RATE: 13 BRPM | TEMPERATURE: 98.4 F | DIASTOLIC BLOOD PRESSURE: 94 MMHG

## 2019-03-31 DIAGNOSIS — M79.602 PAIN AND NUMBNESS OF LEFT UPPER EXTREMITY: Primary | ICD-10-CM

## 2019-03-31 DIAGNOSIS — R20.0 PAIN AND NUMBNESS OF LEFT UPPER EXTREMITY: Primary | ICD-10-CM

## 2019-03-31 LAB
ALBUMIN SERPL-MCNC: 3.3 G/DL (ref 3.5–5)
ALBUMIN/GLOB SERPL: 0.8 {RATIO} (ref 1.1–2.2)
ALP SERPL-CCNC: 236 U/L (ref 45–117)
ALT SERPL-CCNC: 17 U/L (ref 12–78)
ANION GAP SERPL CALC-SCNC: 12 MMOL/L (ref 5–15)
AST SERPL-CCNC: 28 U/L (ref 15–37)
ATRIAL RATE: 112 BPM
BASOPHILS # BLD: 0 K/UL (ref 0–0.1)
BASOPHILS NFR BLD: 0 % (ref 0–1)
BILIRUB SERPL-MCNC: 0.3 MG/DL (ref 0.2–1)
BUN SERPL-MCNC: 74 MG/DL (ref 6–20)
BUN/CREAT SERPL: 11 (ref 12–20)
CALCIUM SERPL-MCNC: 8.6 MG/DL (ref 8.5–10.1)
CALCULATED P AXIS, ECG09: 41 DEGREES
CALCULATED R AXIS, ECG10: 9 DEGREES
CALCULATED T AXIS, ECG11: 69 DEGREES
CHLORIDE SERPL-SCNC: 101 MMOL/L (ref 97–108)
CO2 SERPL-SCNC: 22 MMOL/L (ref 21–32)
COMMENT, HOLDF: NORMAL
CREAT SERPL-MCNC: 6.47 MG/DL (ref 0.55–1.02)
DIAGNOSIS, 93000: NORMAL
DIFFERENTIAL METHOD BLD: ABNORMAL
EOSINOPHIL # BLD: 0.1 K/UL (ref 0–0.4)
EOSINOPHIL NFR BLD: 1 % (ref 0–7)
ERYTHROCYTE [DISTWIDTH] IN BLOOD BY AUTOMATED COUNT: 13.7 % (ref 11.5–14.5)
GLOBULIN SER CALC-MCNC: 4.1 G/DL (ref 2–4)
GLUCOSE SERPL-MCNC: 228 MG/DL (ref 65–100)
HCT VFR BLD AUTO: 30.9 % (ref 35–47)
HGB BLD-MCNC: 9.6 G/DL (ref 11.5–16)
IMM GRANULOCYTES # BLD AUTO: 0.1 K/UL (ref 0–0.04)
IMM GRANULOCYTES NFR BLD AUTO: 1 % (ref 0–0.5)
LYMPHOCYTES # BLD: 0.6 K/UL (ref 0.8–3.5)
LYMPHOCYTES NFR BLD: 7 % (ref 12–49)
MCH RBC QN AUTO: 25.7 PG (ref 26–34)
MCHC RBC AUTO-ENTMCNC: 31.1 G/DL (ref 30–36.5)
MCV RBC AUTO: 82.6 FL (ref 80–99)
MONOCYTES # BLD: 0.6 K/UL (ref 0–1)
MONOCYTES NFR BLD: 8 % (ref 5–13)
NEUTS SEG # BLD: 6.6 K/UL (ref 1.8–8)
NEUTS SEG NFR BLD: 83 % (ref 32–75)
NRBC # BLD: 0 K/UL (ref 0–0.01)
NRBC BLD-RTO: 0 PER 100 WBC
P-R INTERVAL, ECG05: 134 MS
PLATELET # BLD AUTO: 148 K/UL (ref 150–400)
PMV BLD AUTO: 13 FL (ref 8.9–12.9)
POTASSIUM SERPL-SCNC: 4.2 MMOL/L (ref 3.5–5.1)
PROT SERPL-MCNC: 7.4 G/DL (ref 6.4–8.2)
Q-T INTERVAL, ECG07: 334 MS
QRS DURATION, ECG06: 82 MS
QTC CALCULATION (BEZET), ECG08: 455 MS
RBC # BLD AUTO: 3.74 M/UL (ref 3.8–5.2)
RBC MORPH BLD: ABNORMAL
SAMPLES BEING HELD,HOLD: NORMAL
SODIUM SERPL-SCNC: 135 MMOL/L (ref 136–145)
TROPONIN I SERPL-MCNC: <0.05 NG/ML
VENTRICULAR RATE, ECG03: 112 BPM
WBC # BLD AUTO: 8 K/UL (ref 3.6–11)

## 2019-03-31 PROCEDURE — 85025 COMPLETE CBC W/AUTO DIFF WBC: CPT

## 2019-03-31 PROCEDURE — 96374 THER/PROPH/DIAG INJ IV PUSH: CPT

## 2019-03-31 PROCEDURE — 71045 X-RAY EXAM CHEST 1 VIEW: CPT

## 2019-03-31 PROCEDURE — 96376 TX/PRO/DX INJ SAME DRUG ADON: CPT

## 2019-03-31 PROCEDURE — 74011250636 HC RX REV CODE- 250/636: Performed by: EMERGENCY MEDICINE

## 2019-03-31 PROCEDURE — 93005 ELECTROCARDIOGRAM TRACING: CPT

## 2019-03-31 PROCEDURE — 80053 COMPREHEN METABOLIC PANEL: CPT

## 2019-03-31 PROCEDURE — 96375 TX/PRO/DX INJ NEW DRUG ADDON: CPT

## 2019-03-31 PROCEDURE — 99285 EMERGENCY DEPT VISIT HI MDM: CPT

## 2019-03-31 PROCEDURE — 84484 ASSAY OF TROPONIN QUANT: CPT

## 2019-03-31 RX ORDER — DIPHENHYDRAMINE HYDROCHLORIDE 50 MG/ML
12.5 INJECTION, SOLUTION INTRAMUSCULAR; INTRAVENOUS
Status: COMPLETED | OUTPATIENT
Start: 2019-03-31 | End: 2019-03-31

## 2019-03-31 RX ORDER — HYDROMORPHONE HYDROCHLORIDE 2 MG/ML
0.5 INJECTION, SOLUTION INTRAMUSCULAR; INTRAVENOUS; SUBCUTANEOUS ONCE
Status: COMPLETED | OUTPATIENT
Start: 2019-03-31 | End: 2019-03-31

## 2019-03-31 RX ORDER — ONDANSETRON 2 MG/ML
4 INJECTION INTRAMUSCULAR; INTRAVENOUS
Status: COMPLETED | OUTPATIENT
Start: 2019-03-31 | End: 2019-03-31

## 2019-03-31 RX ADMIN — ONDANSETRON 4 MG: 2 INJECTION INTRAMUSCULAR; INTRAVENOUS at 12:13

## 2019-03-31 RX ADMIN — DIPHENHYDRAMINE HYDROCHLORIDE 12.5 MG: 50 INJECTION, SOLUTION INTRAMUSCULAR; INTRAVENOUS at 12:15

## 2019-03-31 RX ADMIN — HYDROMORPHONE HYDROCHLORIDE 0.5 MG: 2 INJECTION, SOLUTION INTRAMUSCULAR; INTRAVENOUS; SUBCUTANEOUS at 13:14

## 2019-03-31 RX ADMIN — HYDROMORPHONE HYDROCHLORIDE 0.5 MG: 2 INJECTION, SOLUTION INTRAMUSCULAR; INTRAVENOUS; SUBCUTANEOUS at 12:13

## 2019-03-31 NOTE — ED TRIAGE NOTES
Arrived via EMS from home for complaint of left arm numbness for past 2 days with pain upon movement. Patient was seen on Friday for same complaint and dx with blood clot. Patient has new dialysis fistula on left side and dialysis access catheter on left side of chest.  Palpable pulse present in left radial artery. Patient hx HTN, DM. ST 110s on monitor for EMS, VSS otherwise WNL. Last dialysis Thursday, states missed Saturday dialysis appt. Upon arrival to ER patient crying.

## 2019-03-31 NOTE — ED PROVIDER NOTES
34 y.o. female with past medical history significant for ARF, CKD, GERD, fibromyalgia, seizures, diabetes, asthma, and HTN who presents with chief complaint of left arm pain. Pt complains of increasing left arm pain secondary to being diagnosed with a DVT in the arm on 3/29/19. Pt states the pain has increased and she has difficulty moving her left arm. Pt describes pain as a continuous ache. Pt states she has been vomiting the oxycodone for pain. Per EMS, pt BG was 269. Pt is noted be on dialysis and states her last treatment was 3 days ago. There are no other acute medical concerns at this time. Social hx: Never Smoker. Denies EtOH Use. PCP: Cheng Pelaez MD 
 
Note written by Willis Cid, as dictated by Cindy Bonilla MD 12:03 PM 
 
 
The history is provided by the patient. Past Medical History:  
Diagnosis Date  ARF (acute renal failure) ([address] requiring dialysis 2011  Asthma  CKD (chronic kidney disease)  Diabetes ([address]  Fibromyalgia  Gastroparesis 2010 Gastric Pacer- REMOVED 07/2015  GERD (gastroesophageal reflux disease)  Hypertension  Narcotic dependence ([address]  THU (obstructive sleep apnea)   
 wears 2 LPM oxygen at night  Other ill-defined conditions(799.89)  Polycystic ovarian syndrome  Seizures ([address]  Sickle cell trait ([address]  Thromboembolus ([address] to her left arm and was told she had one in left leg recently Past Surgical History:  
Procedure Laterality Date  HX AMPUTATION FOOT  04/2018  
 left foot  HX CATARACT REMOVAL  3/5/12  
 right  HX DILATION AND CURETTAGE    
 ablation  HX GASTRIC BYPASS  2015  HX GI    
 j tube placement and removal  
 HX OTHER SURGICAL  2010 Gastric Pacer- REMOVED 07/2015  HX VASCULAR ACCESS    
 gray cath rt subclavian; removed  HX VASCULAR ACCESS    
 HD access right thigh; stopped working Family History:  
Problem Relation Age of Onset  Cancer Mother   
     lung  Hypertension Mother  Cancer Father   
     kidney  Stroke Father 3 strokes: 58-68  Heart Disease Father 72 CABG  Hypertension Father  Cancer Sister   
     pancreatic  Cancer Maternal Aunt   
     breast  
 Cancer Paternal Aunt   
     breast  
 Schizophrenia Sister   
     was in [address], now ass't living  Other Sister   
      AIDS  Other Other   
     nephew of AIDS Social History Socioeconomic History  Marital status:  Spouse name: Not on file  Number of children: Not on file  Years of education: Not on file  Highest education level: Not on file Occupational History  Not on file Social Needs  Financial resource strain: Not on file  Food insecurity:  
  Worry: Not on file Inability: Not on file  Transportation needs:  
  Medical: Not on file Non-medical: Not on file Tobacco Use  Smoking status: Never Smoker  Smokeless tobacco: Never Used Substance and Sexual Activity  Alcohol use: No  
 Drug use: No  
 Sexual activity: Yes  
  Partners: Male Birth control/protection: None Lifestyle  Physical activity:  
  Days per week: Not on file Minutes per session: Not on file  Stress: Not on file Relationships  Social connections:  
  Talks on phone: Not on file Gets together: Not on file Attends Gnosticism service: Not on file Active member of club or organization: Not on file Attends meetings of clubs or organizations: Not on file Relationship status: Not on file  Intimate partner violence:  
  Fear of current or ex partner: Not on file Emotionally abused: Not on file Physically abused: Not on file Forced sexual activity: Not on file Other Topics Concern  Not on file Social History Narrative Lives with her  and father ALLERGIES: Fentanyl; Erythromycin; Toradol [ketorolac]; and Morphine Review of Systems Constitutional: Negative for appetite change, chills and fever. HENT: Negative for rhinorrhea, sore throat and trouble swallowing. Eyes: Negative for photophobia. Respiratory: Negative for cough and shortness of breath. Cardiovascular: Negative for chest pain and palpitations. Gastrointestinal: Positive for nausea and vomiting. Negative for abdominal pain. Genitourinary: Negative for dysuria, frequency and hematuria. Musculoskeletal: Positive for myalgias. Negative for arthralgias. Neurological: Negative for dizziness, syncope and weakness. Psychiatric/Behavioral: Negative for behavioral problems. The patient is not nervous/anxious. All other systems reviewed and are negative. There were no vitals filed for this visit. Physical Exam  
Constitutional: She appears well-developed and well-nourished. Moderate pain. HENT:  
Head: Normocephalic and atraumatic. Mouth/Throat: Oropharynx is clear and moist.  
Eyes: Pupils are equal, round, and reactive to light. EOM are normal.  
Neck: Normal range of motion. Neck supple. Cardiovascular: Normal rate, regular rhythm, normal heart sounds and intact distal pulses. Exam reveals no gallop and no friction rub. No murmur heard. Doppler-able left radial pulse. Pulmonary/Chest: Effort normal. No respiratory distress. She has no wheezes. She has no rales. Abdominal: Soft. There is no tenderness. There is no rebound. Musculoskeletal: Normal range of motion. She exhibits tenderness. Moderate pain with movement of LUE. Old graft in right bicep. Left trans-metatarsal amputation. Neurological: She is alert. No cranial nerve deficit. Motor; symmetric Skin: No erythema. Dialysis catheter in left subclavian area. New stereo strip fistula left bicep area with good thrill and bruit. Psychiatric: She has a normal mood and affect. Her behavior is normal.  
Nursing note and vitals reviewed. Note written by Leilani Fonseca. Nitin Neighbours, as dictated by Dulce Wallace MD 12:08 PM 
 
 
MDM Procedures ED EKG interpretation: 
Rhythm: sinus tachycardia; and regular . Rate (approx.): 110; Axis: normal; P wave: normal; QRS interval: normal ; ST/T wave: non-specific changes; in  Lead: ; Other findings: . This EKG was interpreted by Arash Salas MD,ED Provider. 1:36 PM 
 
CONSULT NOTE: 
2:11 PM Dulce Wallace MD spoke with Dr. Luisito Saha, Consult for Nephrology. Discussed available diagnostic tests and clinical findings. Dr. Luisito Saha thinks there is no acute need for dialysis. Ana Johnson only does dialysis on MWF and recommends pt go tomorrow. Dr. Tonie Irvin spoke to Dr. Bobby Bach, Vascular, yesterday when he evaluated the pt and she is supposed to f/u with vascular tomorrow.

## 2019-03-31 NOTE — ED NOTES
The MD has reviewed discharge instructions with the patient. The patient verbalized understanding. Patient ambulatory out of ER before primary RN could obtain outgoing temp for VS. [employer] called to arrange for return patient transportation. Confirmation number [id number]. ETA 3-4 hours.

## 2019-03-31 NOTE — DISCHARGE INSTRUCTIONS
Go to dialysis tomorrow and get dialyzed; see your vascular surgeon in [address] tomorrow

## 2019-04-07 ENCOUNTER — HOSPITAL ENCOUNTER (EMERGENCY)
Age: 39
Discharge: HOME | End: 2019-04-07
Attending: STUDENT IN AN ORGANIZED HEALTH CARE EDUCATION/TRAINING PROGRAM
Payer: MEDICARE

## 2019-04-07 VITALS
RESPIRATION RATE: 18 BRPM | TEMPERATURE: 98.5 F | BODY MASS INDEX: 25.24 KG/M2 | DIASTOLIC BLOOD PRESSURE: 86 MMHG | OXYGEN SATURATION: 98 % | SYSTOLIC BLOOD PRESSURE: 163 MMHG | WEIGHT: 138 LBS

## 2019-04-07 DIAGNOSIS — Z91.15 NONCOMPLIANCE WITH RENAL DIALYSIS (HCC): ICD-10-CM

## 2019-04-07 DIAGNOSIS — R33.9 URINARY RETENTION: Primary | ICD-10-CM

## 2019-04-07 LAB
ALBUMIN SERPL-MCNC: 3.4 G/DL (ref 3.5–5)
ALBUMIN/GLOB SERPL: 0.7 {RATIO} (ref 1.1–2.2)
ALP SERPL-CCNC: 254 U/L (ref 45–117)
ALT SERPL-CCNC: 12 U/L (ref 12–78)
ANION GAP SERPL CALC-SCNC: 13 MMOL/L (ref 5–15)
APPEARANCE UR: CLEAR
AST SERPL-CCNC: 11 U/L (ref 15–37)
ATRIAL RATE: 96 BPM
BACTERIA URNS QL MICRO: ABNORMAL /HPF
BASOPHILS # BLD: 0 K/UL (ref 0–0.1)
BASOPHILS NFR BLD: 0 % (ref 0–1)
BILIRUB SERPL-MCNC: 0.3 MG/DL (ref 0.2–1)
BILIRUB UR QL: NEGATIVE
BUN SERPL-MCNC: 111 MG/DL (ref 6–20)
BUN/CREAT SERPL: 12 (ref 12–20)
CALCIUM SERPL-MCNC: 8.8 MG/DL (ref 8.5–10.1)
CALCULATED P AXIS, ECG09: 47 DEGREES
CALCULATED R AXIS, ECG10: 16 DEGREES
CALCULATED T AXIS, ECG11: 56 DEGREES
CHLORIDE SERPL-SCNC: 102 MMOL/L (ref 97–108)
CO2 SERPL-SCNC: 19 MMOL/L (ref 21–32)
COLOR UR: ABNORMAL
CREAT SERPL-MCNC: 9.53 MG/DL (ref 0.55–1.02)
DIAGNOSIS, 93000: NORMAL
DIFFERENTIAL METHOD BLD: ABNORMAL
EOSINOPHIL # BLD: 0.4 K/UL (ref 0–0.4)
EOSINOPHIL NFR BLD: 5 % (ref 0–7)
EPITH CASTS URNS QL MICRO: ABNORMAL /LPF
ERYTHROCYTE [DISTWIDTH] IN BLOOD BY AUTOMATED COUNT: 14.3 % (ref 11.5–14.5)
GLOBULIN SER CALC-MCNC: 4.8 G/DL (ref 2–4)
GLUCOSE SERPL-MCNC: 111 MG/DL (ref 65–100)
GLUCOSE UR STRIP.AUTO-MCNC: NEGATIVE MG/DL
HCT VFR BLD AUTO: 28.5 % (ref 35–47)
HGB BLD-MCNC: 8.7 G/DL (ref 11.5–16)
HGB UR QL STRIP: NEGATIVE
HYALINE CASTS URNS QL MICRO: ABNORMAL /LPF (ref 0–5)
IMM GRANULOCYTES # BLD AUTO: 0.1 K/UL (ref 0–0.04)
IMM GRANULOCYTES NFR BLD AUTO: 1 % (ref 0–0.5)
KETONES UR QL STRIP.AUTO: NEGATIVE MG/DL
LEUKOCYTE ESTERASE UR QL STRIP.AUTO: NEGATIVE
LYMPHOCYTES # BLD: 0.3 K/UL (ref 0.8–3.5)
LYMPHOCYTES NFR BLD: 4 % (ref 12–49)
MCH RBC QN AUTO: 24.6 PG (ref 26–34)
MCHC RBC AUTO-ENTMCNC: 30.5 G/DL (ref 30–36.5)
MCV RBC AUTO: 80.7 FL (ref 80–99)
MONOCYTES # BLD: 0.5 K/UL (ref 0–1)
MONOCYTES NFR BLD: 6 % (ref 5–13)
NEUTS SEG # BLD: 6.9 K/UL (ref 1.8–8)
NEUTS SEG NFR BLD: 84 % (ref 32–75)
NITRITE UR QL STRIP.AUTO: NEGATIVE
NRBC # BLD: 0 K/UL (ref 0–0.01)
NRBC BLD-RTO: 0 PER 100 WBC
P-R INTERVAL, ECG05: 156 MS
PH UR STRIP: 5 [PH] (ref 5–8)
PLATELET # BLD AUTO: 270 K/UL (ref 150–400)
PMV BLD AUTO: 11.5 FL (ref 8.9–12.9)
POTASSIUM SERPL-SCNC: 4.5 MMOL/L (ref 3.5–5.1)
PROT SERPL-MCNC: 8.2 G/DL (ref 6.4–8.2)
PROT UR STRIP-MCNC: 30 MG/DL
Q-T INTERVAL, ECG07: 382 MS
QRS DURATION, ECG06: 84 MS
QTC CALCULATION (BEZET), ECG08: 482 MS
RBC # BLD AUTO: 3.53 M/UL (ref 3.8–5.2)
RBC #/AREA URNS HPF: ABNORMAL /HPF (ref 0–5)
RBC MORPH BLD: ABNORMAL
SODIUM SERPL-SCNC: 134 MMOL/L (ref 136–145)
SP GR UR REFRACTOMETRY: 1.02 (ref 1–1.03)
UR CULT HOLD, URHOLD: NORMAL
UROBILINOGEN UR QL STRIP.AUTO: 0.2 EU/DL (ref 0.2–1)
VENTRICULAR RATE, ECG03: 96 BPM
WBC # BLD AUTO: 8.2 K/UL (ref 3.6–11)
WBC CASTS URNS QL MICRO: ABNORMAL /LPF
WBC URNS QL MICRO: ABNORMAL /HPF (ref 0–4)

## 2019-04-07 PROCEDURE — 51701 INSERT BLADDER CATHETER: CPT

## 2019-04-07 PROCEDURE — 36415 COLL VENOUS BLD VENIPUNCTURE: CPT

## 2019-04-07 PROCEDURE — 80053 COMPREHEN METABOLIC PANEL: CPT

## 2019-04-07 PROCEDURE — 99285 EMERGENCY DEPT VISIT HI MDM: CPT

## 2019-04-07 PROCEDURE — 74011250637 HC RX REV CODE- 250/637: Performed by: STUDENT IN AN ORGANIZED HEALTH CARE EDUCATION/TRAINING PROGRAM

## 2019-04-07 PROCEDURE — 85025 COMPLETE CBC W/AUTO DIFF WBC: CPT

## 2019-04-07 PROCEDURE — 77030005563 HC CATH URETH INT MMGH -A

## 2019-04-07 PROCEDURE — 93005 ELECTROCARDIOGRAM TRACING: CPT

## 2019-04-07 PROCEDURE — 81001 URINALYSIS AUTO W/SCOPE: CPT

## 2019-04-07 RX ORDER — ACETAMINOPHEN 500 MG
1000 TABLET ORAL ONCE
Status: COMPLETED | OUTPATIENT
Start: 2019-04-07 | End: 2019-04-07

## 2019-04-07 RX ADMIN — ACETAMINOPHEN 1000 MG: 500 TABLET ORAL at 09:36

## 2019-04-07 NOTE — ED NOTES
Pt given discharge instructions and teaching. Pt verbalized understanding. Transportation home set up via care mgmt. Pt moved to waiting room.

## 2019-04-07 NOTE — PROGRESS NOTES
Received CM consult for issues reported to transportation to/from dialysis. Chart reviewed. Pt has been to ED several times over past two weeks but has also reported not going to dialysis due to transportation issues during the same time period. CM called [employer] ([phone]). Representative of [employer] confirmed that pt has transportation set up for HD (standing order) and following dates are confirmed 9, 10, 11, 13, 16, 18. Pt needs to be ready for  at 5:15am (appointment is at 6:15am) and pt is serviced by [facility] for standing order. Will provide name/number of cab company on AVS for pt and will review with pt. Requested discharge transport for home today, can take up to 3 hours. Nursing station number and registration number for  given to [facility]. Requested provider to call staff here once provider located. Provider will call ED nurses station or registration desk. Reference number is [id number].   
 
Collette Mu, MSW

## 2019-04-07 NOTE — ED NOTES
Bedside shift change report given to ayden britt  (oncoming nurse) by Michelle De La Vega  (offgoing nurse). Report included the following information SBAR, ED Summary and Recent Results.

## 2019-04-07 NOTE — DISCHARGE INSTRUCTIONS
Patient Education        Urinary Retention: Care Instructions  Your Care Instructions    Urinary retention means that you aren't able to urinate. In men, it is often caused by a blockage of the urinary tract from an enlarged prostate gland. In men and women, it can also be caused by an infection or nerve damage. Or it may be a side effect of a medicine. The doctor may have drained the urine from your bladder. If you still have problems passing urine, you may need to use a catheter at home. This is used to empty your bladder until the problem can be fixed. Your doctor may put a catheter in your bladder before you go home. If so, he or she will tell you when to come back to have the catheter removed. The doctor has checked you closely. But problems can develop later. If you notice any problems or new symptoms, get medical treatment right away. Follow-up care is a key part of your treatment and safety. Be sure to make and go to all appointments, and call your doctor if you are having problems. It's also a good idea to know your test results and keep a list of the medicines you take. How can you care for yourself at home? · Take your medicines exactly as prescribed. Call your doctor if you think you are having a problem with your medicine. You will get more details on the specific medicines your doctor prescribes. · Check with your doctor before you use any over-the-counter medicines. Many cold and allergy medicines, for example, can make this problem worse. Make sure your doctor knows all of the medicines, vitamins, supplements, and herbal remedies you take. · Spread out through the day the amount of fluid you drink. Do not drink a lot at bedtime. · Avoid alcohol and caffeine. · If you have been given a catheter, or if one is already in place, follow the instructions you were given. Always wash your hands before and after you handle the catheter. When should you call for help?   Call your doctor now or seek immediate medical care if:    · You cannot urinate at all, or it is getting harder to urinate.     · You have symptoms of a urinary tract infection. These may include:  ? Pain or burning when you urinate. ? A frequent need to urinate without being able to pass much urine. ? Pain in the flank, which is just below the rib cage and above the waist on either side of the back. ? Blood in your urine. ? A fever.    Watch closely for changes in your health, and be sure to contact your doctor if:    · You have any problems with your catheter.     · You do not get better as expected. Where can you learn more? Go to http://luciana-kyle.info/. Enter M244 in the search box to learn more about \"Urinary Retention: Care Instructions. \"  Current as of: March 20, 2018  Content Version: 11.9  © 2115-3202 [employer]. Care instructions adapted under license by [employer] (which disclaims liability or warranty for this information). If you have questions about a medical condition or this instruction, always ask your healthcare professional. [address] any warranty or liability for your use of this information.

## 2019-04-07 NOTE — ED TRIAGE NOTES
Triage note \" pt arrives via EMS from home with  urinary retention due to not getting dialysis for two weeks . Pt had new  fistula placed 2 weeks ago. ( reports swelling and pain in that area)   Pt also has  port in left upper chest .  Pt is Tuesday Thursday and Saturday  . Pt still makes urine but has been unable to urinate  for 2 days .   Pt is a&o x3

## 2019-04-07 NOTE — ED PROVIDER NOTES
34 y.o. female with past medical history significant for asthma, diabetes, HTN, sickle cell trait, polycystic ovarian syndrome, seizures, narcotic dependence, THU, ARF, CKD, thromboembolus, GERD, gastroparesis, and fibromyalgia who presents from home via EMS with chief complaint of weakness. Patient arrives to ED c/o concern for her kidneys. Patient states she currently has the feeling to but cannot urinate. Patient states she is currently on dialysis, but her last treatment was almost 2 weeks ago. Patient states she has trouble getting transportation to her dialysis appointments. Patient also notes she had a AV graft on her left arm 2 weeks ago, and her arm is currently in pain. Patient affirms urinary retention. There are no other acute medical concerns at this time. Social hx: Never smoker PCP: Shan Roblero MD 
 
Note written by Ivanna Salas, as dictated by Perla Carranza MD 8:03 AM 
 
 
 
The history is provided by the patient. No  was used. Past Medical History:  
Diagnosis Date  ARF (acute renal failure) ([address] requiring dialysis 2011  Asthma  CKD (chronic kidney disease)  Diabetes ([address]  Fibromyalgia  Gastroparesis 2010 Gastric Pacer- REMOVED 07/2015  GERD (gastroesophageal reflux disease)  Hypertension  Narcotic dependence ([address]  THU (obstructive sleep apnea)   
 wears 2 LPM oxygen at night  Other ill-defined conditions(799.89)  Polycystic ovarian syndrome  Seizures ([address]  Sickle cell trait ([address]  Thromboembolus ([address] to her left arm and was told she had one in left leg recently Past Surgical History:  
Procedure Laterality Date  HX AMPUTATION FOOT  04/2018  
 left foot  HX CATARACT REMOVAL  3/5/12  
 right  HX DILATION AND CURETTAGE    
 ablation  HX GASTRIC BYPASS  2015  HX GI    
 j tube placement and removal  
 HX OTHER SURGICAL  2010 Gastric Pacer- REMOVED 07/2015  HX VASCULAR ACCESS    
 gray cath rt subclavian; removed  HX VASCULAR ACCESS    
 HD access right thigh; stopped working Family History:  
Problem Relation Age of Onset  Cancer Mother   
     lung  Hypertension Mother  Cancer Father   
     kidney  Stroke Father 3 strokes: 58-68  Heart Disease Father 72 CABG  Hypertension Father  Cancer Sister   
     pancreatic  Cancer Maternal Aunt   
     breast  
 Cancer Paternal Aunt   
     breast  
 Schizophrenia Sister   
     was in [address], now ass't living  Other Sister   
      AIDS  Other Other   
     nephew of AIDS Social History Socioeconomic History  Marital status:  Spouse name: Not on file  Number of children: Not on file  Years of education: Not on file  Highest education level: Not on file Occupational History  Not on file Social Needs  Financial resource strain: Not on file  Food insecurity:  
  Worry: Not on file Inability: Not on file  Transportation needs:  
  Medical: Not on file Non-medical: Not on file Tobacco Use  Smoking status: Never Smoker  Smokeless tobacco: Never Used Substance and Sexual Activity  Alcohol use: No  
 Drug use: No  
 Sexual activity: Yes  
  Partners: Male Birth control/protection: None Lifestyle  Physical activity:  
  Days per week: Not on file Minutes per session: Not on file  Stress: Not on file Relationships  Social connections:  
  Talks on phone: Not on file Gets together: Not on file Attends Evangelical service: Not on file Active member of club or organization: Not on file Attends meetings of clubs or organizations: Not on file Relationship status: Not on file  Intimate partner violence:  
  Fear of current or ex partner: Not on file Emotionally abused: Not on file Physically abused: Not on file Forced sexual activity: Not on file Other Topics Concern  Not on file Social History Narrative Lives with her  and father ALLERGIES: Fentanyl; Erythromycin; Toradol [ketorolac]; and Morphine Review of Systems Constitutional: Negative for chills and fever. HENT: Negative for sore throat. Respiratory: Negative for cough and shortness of breath. Cardiovascular: Negative for chest pain. Gastrointestinal: Negative for abdominal pain and vomiting. Genitourinary: Positive for difficulty urinating. Negative for dysuria. Musculoskeletal: Negative for back pain. Skin: Negative for rash. Neurological: Positive for weakness. Negative for syncope and headaches. Psychiatric/Behavioral: Negative for confusion. All other systems reviewed and are negative. Vitals:  
 04/07/19 2758 BP: 157/83 Resp: 18 Temp: 98.5 °F (36.9 °C) SpO2: 98% Weight: 62.6 kg (138 lb) Physical Exam  
Constitutional: She is oriented to person, place, and time. She appears well-developed. No distress. Appears disheveled, HENT:  
Head: Normocephalic and atraumatic. Eyes: Conjunctivae and EOM are normal.  
Neck: Normal range of motion. Neck supple. Cardiovascular: Normal rate, regular rhythm and normal heart sounds. Pulmonary/Chest: Effort normal and breath sounds normal. No respiratory distress. Abdominal: Soft. There is no tenderness. There is no guarding. Musculoskeletal: Normal range of motion. Left upper extremity AV graft with diffuse swelling. Palpable thrill. Audible bruit. Neurological: She is alert and oriented to person, place, and time. She exhibits normal muscle tone. Skin: Skin is warm and dry.   
Note written by Ivanna Finch, as dictated by Romy Contreras MD 7:07 AM 
 
[facility] 
  7:15 AM: A/P: 35 yo F with PMH of ESRD who has missed multiple session of dialysis, unclear of what her CC is today - EMS reports says called for SOB, but HPI states urinary retention. Pt states her L arm is still swollen from her graft surgery two weeks ago. She can't tell me who her nephrologist is. She has been having transportation issues getting to dialysis and I suspect her ulterior motive is to try to get dialyzed today. Given HPI, will get EKG and chemistry to screen for complications of missed dialysis. Case management involvement. Procedures ED EKG interpretation: 7:27 AM 
Rhythm: normal sinus rhythm; and regular . Rate (approx.): 96; Axis: normal; ST/T wave: normal; no peaked T-waves Note written by Ivanna Hess, as dictated by Ashlyn Weldon MD 7:27 AM

## 2019-05-27 ENCOUNTER — HOSPITAL ENCOUNTER (EMERGENCY)
Age: 39
Discharge: HOME | End: 2019-05-27
Attending: EMERGENCY MEDICINE
Payer: MEDICARE

## 2019-05-27 ENCOUNTER — APPOINTMENT (OUTPATIENT)
Dept: GENERAL RADIOLOGY | Age: 39
End: 2019-05-27
Attending: EMERGENCY MEDICINE
Payer: MEDICARE

## 2019-05-27 VITALS
RESPIRATION RATE: 13 BRPM | SYSTOLIC BLOOD PRESSURE: 142 MMHG | OXYGEN SATURATION: 96 % | DIASTOLIC BLOOD PRESSURE: 112 MMHG | HEART RATE: 79 BPM | TEMPERATURE: 97.6 F

## 2019-05-27 DIAGNOSIS — R10.9 CHRONIC ABDOMINAL PAIN: ICD-10-CM

## 2019-05-27 DIAGNOSIS — E16.2 HYPOGLYCEMIA: Primary | ICD-10-CM

## 2019-05-27 DIAGNOSIS — R11.2 NON-INTRACTABLE VOMITING WITH NAUSEA, UNSPECIFIED VOMITING TYPE: ICD-10-CM

## 2019-05-27 DIAGNOSIS — G89.29 CHRONIC ABDOMINAL PAIN: ICD-10-CM

## 2019-05-27 LAB
ALBUMIN SERPL-MCNC: 4.1 G/DL (ref 3.5–5)
ALBUMIN/GLOB SERPL: 1 {RATIO} (ref 1.1–2.2)
ALP SERPL-CCNC: 259 U/L (ref 45–117)
ALT SERPL-CCNC: 10 U/L (ref 12–78)
ANION GAP SERPL CALC-SCNC: 19 MMOL/L (ref 5–15)
AST SERPL-CCNC: 16 U/L (ref 15–37)
BASOPHILS # BLD: 0 K/UL (ref 0–0.1)
BASOPHILS NFR BLD: 0 % (ref 0–1)
BILIRUB SERPL-MCNC: 0.3 MG/DL (ref 0.2–1)
BUN SERPL-MCNC: 48 MG/DL (ref 6–20)
BUN/CREAT SERPL: 9 (ref 12–20)
CALCIUM SERPL-MCNC: 8 MG/DL (ref 8.5–10.1)
CHLORIDE SERPL-SCNC: 99 MMOL/L (ref 97–108)
CO2 SERPL-SCNC: 23 MMOL/L (ref 21–32)
COMMENT, HOLDF: NORMAL
CREAT SERPL-MCNC: 5.5 MG/DL (ref 0.55–1.02)
DIFFERENTIAL METHOD BLD: ABNORMAL
EOSINOPHIL # BLD: 0.3 K/UL (ref 0–0.4)
EOSINOPHIL NFR BLD: 5 % (ref 0–7)
ERYTHROCYTE [DISTWIDTH] IN BLOOD BY AUTOMATED COUNT: 16 % (ref 11.5–14.5)
GLOBULIN SER CALC-MCNC: 4 G/DL (ref 2–4)
GLUCOSE BLD STRIP.AUTO-MCNC: 138 MG/DL (ref 65–100)
GLUCOSE BLD STRIP.AUTO-MCNC: 164 MG/DL (ref 65–100)
GLUCOSE BLD STRIP.AUTO-MCNC: 48 MG/DL (ref 65–100)
GLUCOSE SERPL-MCNC: 51 MG/DL (ref 65–100)
HCT VFR BLD AUTO: 31.3 % (ref 35–47)
HGB BLD-MCNC: 10.1 G/DL (ref 11.5–16)
IMM GRANULOCYTES # BLD AUTO: 0 K/UL (ref 0–0.04)
IMM GRANULOCYTES NFR BLD AUTO: 1 % (ref 0–0.5)
LACTATE SERPL-SCNC: 1.1 MMOL/L (ref 0.4–2)
LIPASE SERPL-CCNC: 655 U/L (ref 73–393)
LYMPHOCYTES # BLD: 1.3 K/UL (ref 0.8–3.5)
LYMPHOCYTES NFR BLD: 23 % (ref 12–49)
MAGNESIUM SERPL-MCNC: 1.8 MG/DL (ref 1.6–2.4)
MCH RBC QN AUTO: 26.3 PG (ref 26–34)
MCHC RBC AUTO-ENTMCNC: 32.3 G/DL (ref 30–36.5)
MCV RBC AUTO: 81.5 FL (ref 80–99)
MONOCYTES # BLD: 0.4 K/UL (ref 0–1)
MONOCYTES NFR BLD: 7 % (ref 5–13)
NEUTS SEG # BLD: 3.8 K/UL (ref 1.8–8)
NEUTS SEG NFR BLD: 64 % (ref 32–75)
NRBC # BLD: 0 K/UL (ref 0–0.01)
NRBC BLD-RTO: 0 PER 100 WBC
PLATELET # BLD AUTO: 232 K/UL (ref 150–400)
PMV BLD AUTO: 11.4 FL (ref 8.9–12.9)
POTASSIUM SERPL-SCNC: 2.9 MMOL/L (ref 3.5–5.1)
PROT SERPL-MCNC: 8.1 G/DL (ref 6.4–8.2)
RBC # BLD AUTO: 3.84 M/UL (ref 3.8–5.2)
RETICS # AUTO: 0.07 M/UL (ref 0.02–0.08)
RETICS/RBC NFR AUTO: 1.9 % (ref 0.7–2.1)
SAMPLES BEING HELD,HOLD: NORMAL
SERVICE CMNT-IMP: ABNORMAL
SODIUM SERPL-SCNC: 141 MMOL/L (ref 136–145)
WBC # BLD AUTO: 5.8 K/UL (ref 3.6–11)

## 2019-05-27 PROCEDURE — 82962 GLUCOSE BLOOD TEST: CPT

## 2019-05-27 PROCEDURE — 83605 ASSAY OF LACTIC ACID: CPT

## 2019-05-27 PROCEDURE — 74011250636 HC RX REV CODE- 250/636: Performed by: EMERGENCY MEDICINE

## 2019-05-27 PROCEDURE — 85045 AUTOMATED RETICULOCYTE COUNT: CPT

## 2019-05-27 PROCEDURE — 74011000258 HC RX REV CODE- 258: Performed by: EMERGENCY MEDICINE

## 2019-05-27 PROCEDURE — 85025 COMPLETE CBC W/AUTO DIFF WBC: CPT

## 2019-05-27 PROCEDURE — 96365 THER/PROPH/DIAG IV INF INIT: CPT

## 2019-05-27 PROCEDURE — 87040 BLOOD CULTURE FOR BACTERIA: CPT

## 2019-05-27 PROCEDURE — 99285 EMERGENCY DEPT VISIT HI MDM: CPT

## 2019-05-27 PROCEDURE — 83735 ASSAY OF MAGNESIUM: CPT

## 2019-05-27 PROCEDURE — 74011250637 HC RX REV CODE- 250/637: Performed by: EMERGENCY MEDICINE

## 2019-05-27 PROCEDURE — 80053 COMPREHEN METABOLIC PANEL: CPT

## 2019-05-27 PROCEDURE — 36415 COLL VENOUS BLD VENIPUNCTURE: CPT

## 2019-05-27 PROCEDURE — 83690 ASSAY OF LIPASE: CPT

## 2019-05-27 PROCEDURE — 96375 TX/PRO/DX INJ NEW DRUG ADDON: CPT

## 2019-05-27 PROCEDURE — 96366 THER/PROPH/DIAG IV INF ADDON: CPT

## 2019-05-27 PROCEDURE — 96361 HYDRATE IV INFUSION ADD-ON: CPT

## 2019-05-27 PROCEDURE — 71045 X-RAY EXAM CHEST 1 VIEW: CPT

## 2019-05-27 RX ORDER — ONDANSETRON 2 MG/ML
4 INJECTION INTRAMUSCULAR; INTRAVENOUS
Status: DISCONTINUED | OUTPATIENT
Start: 2019-05-27 | End: 2019-05-27

## 2019-05-27 RX ORDER — PROMETHAZINE HYDROCHLORIDE 25 MG/1
25 TABLET ORAL
Qty: 12 TAB | Refills: 0 | Status: SHIPPED | OUTPATIENT
Start: 2019-05-27 | End: 2019-09-06

## 2019-05-27 RX ORDER — OXYCODONE AND ACETAMINOPHEN 5; 325 MG/1; MG/1
1 TABLET ORAL
Status: COMPLETED | OUTPATIENT
Start: 2019-05-27 | End: 2019-05-27

## 2019-05-27 RX ORDER — DEXTROSE MONOHYDRATE 100 MG/ML
250 INJECTION, SOLUTION INTRAVENOUS CONTINUOUS
Status: DISCONTINUED | OUTPATIENT
Start: 2019-05-27 | End: 2019-05-27 | Stop reason: HOSPADM

## 2019-05-27 RX ORDER — POTASSIUM CHLORIDE 14.9 MG/ML
10 INJECTION INTRAVENOUS
Status: COMPLETED | OUTPATIENT
Start: 2019-05-27 | End: 2019-05-27

## 2019-05-27 RX ADMIN — PROMETHAZINE HYDROCHLORIDE 25 MG: 25 INJECTION INTRAMUSCULAR; INTRAVENOUS at 13:50

## 2019-05-27 RX ADMIN — POTASSIUM CHLORIDE 10 MEQ: 200 INJECTION, SOLUTION INTRAVENOUS at 15:09

## 2019-05-27 RX ADMIN — POTASSIUM CHLORIDE 10 MEQ: 200 INJECTION, SOLUTION INTRAVENOUS at 17:06

## 2019-05-27 RX ADMIN — POTASSIUM CHLORIDE 10 MEQ: 200 INJECTION, SOLUTION INTRAVENOUS at 16:12

## 2019-05-27 RX ADMIN — OXYCODONE HYDROCHLORIDE AND ACETAMINOPHEN 1 TABLET: 5; 325 TABLET ORAL at 14:06

## 2019-05-27 RX ADMIN — SODIUM CHLORIDE 500 ML: 900 INJECTION, SOLUTION INTRAVENOUS at 14:05

## 2019-05-27 RX ADMIN — OXYCODONE HYDROCHLORIDE AND ACETAMINOPHEN 1 TABLET: 5; 325 TABLET ORAL at 15:27

## 2019-05-27 RX ADMIN — POTASSIUM CHLORIDE 10 MEQ: 200 INJECTION, SOLUTION INTRAVENOUS at 14:06

## 2019-05-27 RX ADMIN — DEXTROSE MONOHYDRATE 250 ML: 10 INJECTION, SOLUTION INTRAVENOUS at 12:50

## 2019-05-27 NOTE — ED NOTES
Patient up to bedside commode with one assist, patient unstable standing. Unsuccessful at providing urine sample. Continues to complain of nausea and pain to her abd and back. MD notified.

## 2019-05-27 NOTE — ED NOTES
Patient sitting up in bed eating [employer] dinner, tolerating it well. Pt states she is still having pain. MD notified.

## 2019-05-27 NOTE — DISCHARGE INSTRUCTIONS
Patient Education        Hypoglycemia: Care Instructions  Your Care Instructions    Hypoglycemia means that your blood sugar is low and your body is not getting enough fuel. Some people get low blood sugar from not eating often enough. Some medicines to treat diabetes can cause low blood sugar. People who have had surgery on their stomachs or intestines may get hypoglycemia. Problems with the pancreas, kidneys, or liver also can cause low blood sugar. A snack or drink with sugar in it will raise your blood sugar and should ease your symptoms right away. Your doctor may recommend that you change or stop your medicines until you can get your blood sugar levels under control. In the long run, you may need to change your diet and eating habits so that you get enough fuel for your body throughout the day. Follow-up care is a key part of your treatment and safety. Be sure to make and go to all appointments, and call your doctor if you are having problems. It's also a good idea to know your test results and keep a list of the medicines you take. How can you care for yourself at home? · Learn to recognize the early signs of low blood sugar. Signs include:  ? Nausea. ? Hunger. ? Feeling nervous, irritable, or shaky. ? Cold, clammy, wet skin. ? Sweating (when you are not exercising). ? A fast heartbeat.  ? Numbness or tingling of the fingertips or lips. · If you feel an episode of low blood sugar coming on, drink fruit juice or sugared (not diet) soda, or eat sugar in the form of candy, cubes, or tablets. [employer] are another [employer]. · Eat small, frequent meals so that you do not get too hungry between meals. · Balance extra exercise with eating more. · Keep a written record of your low blood sugar episodes, including when you last ate and what you ate, so that you can learn what causes your blood sugar to drop.   · Make sure your family, friends, and coworkers know the symptoms of low blood sugar and know what to do to get your sugar level up. · Wear medical alert jewelry that lists your condition. You can buy this at most drugstores. When should you call for help? Call 911 anytime you think you may need emergency care. For example, call if:    · You passed out (lost consciousness).     · You are confused or cannot think clearly.     · Your blood sugar is very high or very low.    Watch closely for changes in your health, and be sure to contact your doctor if:    · Your blood sugar stays outside the level your doctor set for you.     · You have any problems. Where can you learn more? Go to http://luciana-kyle.info/. Enter I880 in the search box to learn more about \"Hypoglycemia: Care Instructions. \"  Current as of: July 25, 2018  Content Version: 11.9  © 2450-1554 [employer]. Care instructions adapted under license by [employer] (which disclaims liability or warranty for this information). If you have questions about a medical condition or this instruction, always ask your healthcare professional. [place] [address] any warranty or liability for your use of this information. Patient Education        Abdominal Pain: Care Instructions  Your Care Instructions    Abdominal pain has many possible causes. Some aren't serious and get better on their own in a few days. Others need more testing and treatment. If your pain continues or gets worse, you need to be rechecked and may need more tests to find out what is wrong. You may need surgery to correct the problem. Don't ignore new symptoms, such as fever, nausea and vomiting, urination problems, pain that gets worse, and dizziness. These may be signs of a more serious problem. Your doctor may have recommended a follow-up visit in the next 8 to 12 hours. If you are not getting better, you may need more tests or treatment. The doctor has checked you carefully, but problems can develop later.  If you notice any problems or new symptoms, get medical treatment right away. Follow-up care is a key part of your treatment and safety. Be sure to make and go to all appointments, and call your doctor if you are having problems. It's also a good idea to know your test results and keep a list of the medicines you take. How can you care for yourself at home? · Rest until you feel better. · To prevent dehydration, drink plenty of fluids, enough so that your urine is light yellow or clear like water. Choose water and other caffeine-free clear liquids until you feel better. If you have kidney, heart, or liver disease and have to limit fluids, talk with your doctor before you increase the amount of fluids you drink. · If your stomach is upset, eat mild foods, such as rice, dry toast or crackers, bananas, and applesauce. Try eating several small meals instead of two or three large ones. · Wait until 48 hours after all symptoms have gone away before you have spicy foods, alcohol, and drinks that contain caffeine. · Do not eat foods that are high in fat. · Avoid anti-inflammatory medicines such as aspirin, ibuprofen (Advil, Motrin), and naproxen (Aleve). These can cause stomach upset. Talk to your doctor if you take daily aspirin for another health problem. When should you call for help? Call 911 anytime you think you may need emergency care. For example, call if:    · You passed out (lost consciousness).     · You pass maroon or very bloody stools.     · You vomit blood or what looks like coffee grounds.     · You have new, severe belly pain.    Call your doctor now or seek immediate medical care if:    · Your pain gets worse, especially if it becomes focused in one area of your belly.     · You have a new or higher fever.     · Your stools are black and look like tar, or they have streaks of blood.     · You have unexpected vaginal bleeding.     · You have symptoms of a urinary tract infection.  These may include:  ? Pain when you urinate. ? Urinating more often than usual.  ? Blood in your urine.     · You are dizzy or lightheaded, or you feel like you may faint.    Watch closely for changes in your health, and be sure to contact your doctor if:    · You are not getting better after 1 day (24 hours). Where can you learn more? Go to http://luciana-kyle.info/. Enter L167 in the search box to learn more about \"Abdominal Pain: Care Instructions. \"  Current as of: September 23, 2018  Content Version: 11.9  © 1812-0892 [employer]. Care instructions adapted under license by [employer] (which disclaims liability or warranty for this information). If you have questions about a medical condition or this instruction, always ask your healthcare professional. [address] any warranty or liability for your use of this information.

## 2019-05-27 NOTE — ED TRIAGE NOTES
Triage Note: Patient arrives to ER complaining of abdominal pain and low blood glucose level. Pt called EMS for abdominal pain and N/V/D x few days. Upon EMS arrival they found patient to be hypoglycemic with initial BGL 53 given one oral tab then BGL 48 after second tab BGL 63. Currently, BGL 48. MD notified. Pt is alert and answering questions appropriately.

## 2019-05-27 NOTE — ED PROVIDER NOTES
The history is provided by the patient and the EMS personnel. No  was used. Abdominal Pain    This is a chronic problem. The current episode started more than 2 days ago. The problem occurs constantly. The problem has not changed since onset. The pain is associated with vomiting. The pain is located in the generalized abdominal region. The quality of the pain is dull and cramping. The pain is at a severity of 8/10. The pain is severe. Associated symptoms include nausea, vomiting, myalgias and back pain. Pertinent negatives include no anorexia, no fever, no belching, no diarrhea, no flatus, no hematochezia, no melena, no constipation, no dysuria, no frequency, no hematuria, no headaches, no arthralgias, no trauma and no chest pain. Nothing worsens the pain. The pain is relieved by nothing. Past workup includes CT scan, ultrasound, surgery, esophagogastroduodenoscopy, UGI, colonoscopy. Her past medical history is significant for GERD, pancreatitis and DM. Her past medical history does not include PUD, gallstones, ulcerative colitis, Crohn's disease, irritable bowel syndrome, cancer, UTI, ectopic pregnancy, ovarian cysts, diverticulitis, atrial fibrillation, kidney stones or small bowel obstruction. The patient's surgical history includes gastric bypass. gastric pacer removed  Low Blood Sugar    This is a new problem. The problem has not changed since onset. Associated symptoms include weakness. Pertinent negatives include no confusion, no somnolence, no seizures, no unresponsiveness, no agitation, no delusions, no hallucinations, no self-injury, no violence, no tingling and no numbness. Mental status baseline is normal.  Risk factors include a recent illness. Her past medical history is significant for diabetes, seizures, hypertension and COPD.  Her past medical history does not include liver disease, CVA, TIA, AIDS, depression, dementia, psychotropic medication treatment, head trauma or heart disease.         Past Medical History:   Diagnosis Date    ARF (acute renal failure) ([facility] requiring dialysis     Asthma     CKD (chronic kidney disease)     Diabetes ([facility] [address] [facility]     Fibromyalgia     Gastroparesis     Gastric Pacer- REMOVED 2015    GERD (gastroesophageal reflux disease)     Hypertension     Narcotic dependence ([facility] [address]     THU (obstructive sleep apnea)     wears 2 LPM oxygen at night    Other ill-defined conditions(799.89)     Polycystic ovarian syndrome     Seizures (HCC)     Sickle cell trait ([facility] [address] [facility]     Thromboembolus ([facility] [address] to her left arm and was told she had one in left leg recently       Past Surgical History:   Procedure Laterality Date    HX AMPUTATION FOOT  2018    left foot    HX CATARACT REMOVAL  3/5/12    right    HX DILATION AND CURETTAGE      ablation    HX GASTRIC BYPASS      HX GI      j tube placement and removal    HX OTHER SURGICAL      Gastric Pacer- REMOVED 2015    HX VASCULAR ACCESS      gray cath rt subclavian; removed     HX VASCULAR ACCESS      HD access right thigh; stopped working         Family History:   Problem Relation Age of Onset    Cancer Mother         lung    Hypertension Mother     Cancer Father         kidney    Stroke Father         3 strokes: 59-72    Heart Disease Father 72        CABG    Hypertension Father     Cancer Sister         pancreatic    Cancer Maternal Aunt         breast    Cancer Paternal Aunt         breast    Schizophrenia Sister         was in [facility], now ass't living    Other Sister          AIDS    Other Other         nephew of AIDS       Social History     Socioeconomic History    Marital status:      Spouse name: Not on file    Number of children: Not on file    Years of education: Not on file    Highest education level: Not on file   Occupational History    Not on file   Social Needs    Financial resource strain: Not on file    Food insecurity:     Worry: Not on file     Inability: Not on file    Transportation needs:     Medical: Not on file     Non-medical: Not on file   Tobacco Use    Smoking status: Never Smoker    Smokeless tobacco: Never Used   Substance and Sexual Activity    Alcohol use: No    Drug use: No    Sexual activity: Yes     Partners: Male     Birth control/protection: None   Lifestyle    Physical activity:     Days per week: Not on file     Minutes per session: Not on file    Stress: Not on file   Relationships    Social connections:     Talks on phone: Not on file     Gets together: Not on file     Attends Yazidi service: Not on file     Active member of club or organization: Not on file     Attends meetings of clubs or organizations: Not on file     Relationship status: Not on file    Intimate partner violence:     Fear of current or ex partner: Not on file     Emotionally abused: Not on file     Physically abused: Not on file     Forced sexual activity: Not on file   Other Topics Concern    Not on file   Social History Narrative    Lives with her  and father         ALLERGIES: Fentanyl; Erythromycin; Toradol [ketorolac]; and Morphine    Review of Systems   Constitutional: Negative for activity change, chills and fever. HENT: Negative for nosebleeds, sore throat, trouble swallowing and voice change. Eyes: Negative for visual disturbance. Respiratory: Negative for shortness of breath. Cardiovascular: Negative for chest pain and palpitations. Gastrointestinal: Positive for abdominal pain, nausea and vomiting. Negative for anorexia, constipation, diarrhea, flatus, hematochezia and melena. Genitourinary: Negative for difficulty urinating, dysuria, frequency, hematuria and urgency. Musculoskeletal: Positive for back pain and myalgias. Negative for arthralgias, neck pain and neck stiffness. Skin: Negative for color change. Allergic/Immunologic: Negative for immunocompromised state. Neurological: Positive for weakness. Negative for dizziness, tingling, seizures, syncope, light-headedness, numbness and headaches. Psychiatric/Behavioral: Negative for agitation, behavioral problems, confusion, hallucinations, self-injury and suicidal ideas. Vitals:    05/27/19 1330 05/27/19 1400 05/27/19 1430 05/27/19 1510   BP: (!) 142/98 (!) 163/100 (!) 161/98 (!) 148/114   Pulse: 84 85 81 85   Resp: 12 18 12 12   Temp:  97.7 °F (36.5 °C)  97.5 °F (36.4 °C)   SpO2: 100% 100%  100%            Physical Exam   Constitutional: She is oriented to person, place, and time. She appears well-developed and well-nourished. No distress. HENT:   Head: Normocephalic and atraumatic. Eyes: Pupils are equal, round, and reactive to light. Neck: Normal range of motion. Neck supple. Cardiovascular: Normal rate, regular rhythm and normal heart sounds. Exam reveals no gallop and no friction rub. No murmur heard. Pulmonary/Chest: Effort normal and breath sounds normal. No respiratory distress. She has no wheezes. Abdominal: Soft. Bowel sounds are normal. She exhibits no distension. There is generalized tenderness. There is no rebound and no guarding. Musculoskeletal: Normal range of motion. Neurological: She is alert and oriented to person, place, and time. Skin: Skin is warm. No rash noted. She is not diaphoretic. Psychiatric: She has a normal mood and affect. Her behavior is normal. Judgment and thought content normal.   Nursing note and vitals reviewed. MDM     This is a 59-year-old female with past medical history, review of systems, physical exam as above, presenting via EMS for complaints of hypoglycemia, generalized body aches, nausea vomiting, abdominal pain. Patient states that symptoms began several days ago, states that she called EMS for symptoms this morning, who found her to be hypoglycemic with blood sugar of 48.  Recheck after administration of oral glucose tablet of 53, then in the 60s, patient noted to be persistently hypoglycemic on arrival, with fingerstick blood glucose of 48, awake, alert, and speaking in full sentences. She states she received her typical dialysis session this past Saturday, according to a Tuesday, Thursday, Saturday dialysis schedule. His fevers, states she sees to make urine, endorses abdominal pain, generalized, back pain, similar to previous sickle cell exacerbations. Physical exam is remarkable for a chronically ill-appearing middle-age female, in no acute distress, with abdominal tenderness to palpation, clear breath sounds, regular rate and rhythm without murmurs gallops rubs, recently placed left upper extremity dialysis access site. She states last administration of insulin was approximately 18 hours prior to arrival, she denies being able to tolerate food or drink. Plan to administer 250 cc of D. 10, obtain CMP, CBC, magnesium, reticulocyte count, lactic acid, blood cultures, UA UDS. We'll obtain chest x-ray, reevaluate, and make a disposition. Procedures      3:44 PM  Patient eating and drinking without difficulty, no vomiting, BG remains elevated, retic wnl, receiving K+ repletion, elevated Lipase w/o pancreatitis. Discussed with patient completing fluids and K+, DC home with antiemetics, PCP f/u, dialysis as scheduled tomorrow, patient is agreeable.

## 2019-05-27 NOTE — ED NOTES
Patient provided with discharge instructions and verbalized understanding; PIV discontinued. Patient assisted out of department via wheelchair.

## 2019-06-01 LAB
BACTERIA SPEC CULT: NORMAL
SERVICE CMNT-IMP: NORMAL

## 2019-08-24 ENCOUNTER — HOSPITAL ENCOUNTER (EMERGENCY)
Age: 39
Discharge: HOME | End: 2019-08-25
Attending: EMERGENCY MEDICINE
Payer: MEDICARE

## 2019-08-24 DIAGNOSIS — E16.2 HYPOGLYCEMIA: Primary | ICD-10-CM

## 2019-08-24 LAB
ALBUMIN SERPL-MCNC: 3.8 G/DL (ref 3.5–5)
ALBUMIN/GLOB SERPL: 0.9 {RATIO} (ref 1.1–2.2)
ALP SERPL-CCNC: 213 U/L (ref 45–117)
ALT SERPL-CCNC: 50 U/L (ref 12–78)
ANION GAP SERPL CALC-SCNC: 15 MMOL/L (ref 5–15)
AST SERPL-CCNC: 42 U/L (ref 15–37)
BILIRUB SERPL-MCNC: 0.3 MG/DL (ref 0.2–1)
BUN SERPL-MCNC: 37 MG/DL (ref 6–20)
BUN/CREAT SERPL: 10 (ref 12–20)
CALCIUM SERPL-MCNC: 8.8 MG/DL (ref 8.5–10.1)
CHLORIDE SERPL-SCNC: 99 MMOL/L (ref 97–108)
CO2 SERPL-SCNC: 23 MMOL/L (ref 21–32)
CREAT SERPL-MCNC: 3.74 MG/DL (ref 0.55–1.02)
ETHANOL SERPL-MCNC: <10 MG/DL
GLOBULIN SER CALC-MCNC: 4.2 G/DL (ref 2–4)
GLUCOSE BLD STRIP.AUTO-MCNC: 275 MG/DL (ref 65–100)
GLUCOSE SERPL-MCNC: 271 MG/DL (ref 65–100)
LACTATE BLD-SCNC: 1.51 MMOL/L (ref 0.4–2)
MAGNESIUM SERPL-MCNC: 1.9 MG/DL (ref 1.6–2.4)
POTASSIUM SERPL-SCNC: 4 MMOL/L (ref 3.5–5.1)
PROT SERPL-MCNC: 8 G/DL (ref 6.4–8.2)
SERVICE CMNT-IMP: ABNORMAL
SODIUM SERPL-SCNC: 137 MMOL/L (ref 136–145)

## 2019-08-24 PROCEDURE — 80307 DRUG TEST PRSMV CHEM ANLYZR: CPT

## 2019-08-24 PROCEDURE — 93005 ELECTROCARDIOGRAM TRACING: CPT

## 2019-08-24 PROCEDURE — 80053 COMPREHEN METABOLIC PANEL: CPT

## 2019-08-24 PROCEDURE — 36415 COLL VENOUS BLD VENIPUNCTURE: CPT

## 2019-08-24 PROCEDURE — 85025 COMPLETE CBC W/AUTO DIFF WBC: CPT

## 2019-08-24 PROCEDURE — 83735 ASSAY OF MAGNESIUM: CPT

## 2019-08-24 PROCEDURE — 82962 GLUCOSE BLOOD TEST: CPT

## 2019-08-24 PROCEDURE — 83605 ASSAY OF LACTIC ACID: CPT

## 2019-08-24 PROCEDURE — 99285 EMERGENCY DEPT VISIT HI MDM: CPT

## 2019-08-24 PROCEDURE — 85045 AUTOMATED RETICULOCYTE COUNT: CPT

## 2019-08-24 RX ORDER — SODIUM CHLORIDE 0.9 % (FLUSH) 0.9 %
5-40 SYRINGE (ML) INJECTION EVERY 8 HOURS
Status: DISCONTINUED | OUTPATIENT
Start: 2019-08-24 | End: 2019-08-25 | Stop reason: HOSPADM

## 2019-08-24 RX ORDER — SODIUM CHLORIDE 0.9 % (FLUSH) 0.9 %
5-40 SYRINGE (ML) INJECTION AS NEEDED
Status: DISCONTINUED | OUTPATIENT
Start: 2019-08-24 | End: 2019-08-25 | Stop reason: HOSPADM

## 2019-08-25 VITALS
SYSTOLIC BLOOD PRESSURE: 165 MMHG | RESPIRATION RATE: 18 BRPM | WEIGHT: 139.77 LBS | HEIGHT: 62 IN | DIASTOLIC BLOOD PRESSURE: 94 MMHG | HEART RATE: 90 BPM | OXYGEN SATURATION: 100 % | BODY MASS INDEX: 25.72 KG/M2 | TEMPERATURE: 97.6 F

## 2019-08-25 LAB
AMPHET UR QL SCN: NEGATIVE
APPEARANCE UR: CLEAR
ATRIAL RATE: 82 BPM
BACTERIA URNS QL MICRO: ABNORMAL /HPF
BARBITURATES UR QL SCN: NEGATIVE
BASOPHILS # BLD: 0 K/UL (ref 0–0.1)
BASOPHILS NFR BLD: 0 % (ref 0–1)
BENZODIAZ UR QL: POSITIVE
BILIRUB UR QL: NEGATIVE
CALCULATED P AXIS, ECG09: 55 DEGREES
CALCULATED R AXIS, ECG10: 26 DEGREES
CALCULATED T AXIS, ECG11: 56 DEGREES
CANNABINOIDS UR QL SCN: NEGATIVE
COCAINE UR QL SCN: POSITIVE
COLOR UR: ABNORMAL
DIAGNOSIS, 93000: NORMAL
DIFFERENTIAL METHOD BLD: ABNORMAL
DRUG SCRN COMMENT,DRGCM: ABNORMAL
EOSINOPHIL # BLD: 0.1 K/UL (ref 0–0.4)
EOSINOPHIL NFR BLD: 1 % (ref 0–7)
EPITH CASTS URNS QL MICRO: ABNORMAL /LPF
ERYTHROCYTE [DISTWIDTH] IN BLOOD BY AUTOMATED COUNT: 17.2 % (ref 11.5–14.5)
GLUCOSE UR STRIP.AUTO-MCNC: >1000 MG/DL
HCT VFR BLD AUTO: 44.9 % (ref 35–47)
HGB BLD-MCNC: 14.1 G/DL (ref 11.5–16)
HGB UR QL STRIP: ABNORMAL
IMM GRANULOCYTES # BLD AUTO: 0.1 K/UL (ref 0–0.04)
IMM GRANULOCYTES NFR BLD AUTO: 1 % (ref 0–0.5)
KETONES UR QL STRIP.AUTO: NEGATIVE MG/DL
LEUKOCYTE ESTERASE UR QL STRIP.AUTO: ABNORMAL
LYMPHOCYTES # BLD: 0.7 K/UL (ref 0.8–3.5)
LYMPHOCYTES NFR BLD: 8 % (ref 12–49)
MCH RBC QN AUTO: 25.8 PG (ref 26–34)
MCHC RBC AUTO-ENTMCNC: 31.4 G/DL (ref 30–36.5)
MCV RBC AUTO: 82.2 FL (ref 80–99)
METHADONE UR QL: NEGATIVE
MONOCYTES # BLD: 0.5 K/UL (ref 0–1)
MONOCYTES NFR BLD: 6 % (ref 5–13)
NEUTS SEG # BLD: 7.3 K/UL (ref 1.8–8)
NEUTS SEG NFR BLD: 84 % (ref 32–75)
NITRITE UR QL STRIP.AUTO: NEGATIVE
NRBC # BLD: 0 K/UL (ref 0–0.01)
NRBC BLD-RTO: 0 PER 100 WBC
OPIATES UR QL: POSITIVE
P-R INTERVAL, ECG05: 148 MS
PCP UR QL: NEGATIVE
PH UR STRIP: 7 [PH] (ref 5–8)
PLATELET # BLD AUTO: 160 K/UL (ref 150–400)
PROT UR STRIP-MCNC: 100 MG/DL
Q-T INTERVAL, ECG07: 422 MS
QRS DURATION, ECG06: 88 MS
QTC CALCULATION (BEZET), ECG08: 493 MS
RBC # BLD AUTO: 5.46 M/UL (ref 3.8–5.2)
RBC #/AREA URNS HPF: ABNORMAL /HPF (ref 0–5)
RBC MORPH BLD: ABNORMAL
RETICS # AUTO: 0.12 M/UL (ref 0.02–0.08)
RETICS/RBC NFR AUTO: 2.2 % (ref 0.7–2.1)
SP GR UR REFRACTOMETRY: 1.01 (ref 1–1.03)
UR CULT HOLD, URHOLD: NORMAL
UROBILINOGEN UR QL STRIP.AUTO: 0.2 EU/DL (ref 0.2–1)
VENTRICULAR RATE, ECG03: 82 BPM
WBC # BLD AUTO: 8.7 K/UL (ref 3.6–11)
WBC URNS QL MICRO: ABNORMAL /HPF (ref 0–4)
YEAST URNS QL MICRO: PRESENT

## 2019-08-25 PROCEDURE — 81001 URINALYSIS AUTO W/SCOPE: CPT

## 2019-08-25 PROCEDURE — 80307 DRUG TEST PRSMV CHEM ANLYZR: CPT

## 2019-08-25 NOTE — ED NOTES
Reports insulin regimen as 15 U novalin tid.  States that her Dialysis is done in [place] followed by Dr. Mckenna Sellers

## 2019-08-25 NOTE — ED NOTES
Pt assisted to bedside commode at this time. Pt alert and oriented x3, however drowsy. Pt wakes to verbal stim. Pt answering questions appropriately.

## 2019-08-25 NOTE — DISCHARGE INSTRUCTIONS
Patient Education        Hypoglycemia: Care Instructions  Your Care Instructions    Hypoglycemia means that your blood sugar is low and your body is not getting enough fuel. Some people get low blood sugar from not eating often enough. Some medicines to treat diabetes can cause low blood sugar. People who have had surgery on their stomachs or intestines may get hypoglycemia. Problems with the pancreas, kidneys, or liver also can cause low blood sugar. A snack or drink with sugar in it will raise your blood sugar and should ease your symptoms right away. Your doctor may recommend that you change or stop your medicines until you can get your blood sugar levels under control. In the long run, you may need to change your diet and eating habits so that you get enough fuel for your body throughout the day. Follow-up care is a key part of your treatment and safety. Be sure to make and go to all appointments, and call your doctor if you are having problems. It's also a good idea to know your test results and keep a list of the medicines you take. How can you care for yourself at home? · Learn to recognize the early signs of low blood sugar. Signs include:  ? Nausea. ? Hunger. ? Feeling nervous, irritable, or shaky. ? Cold, clammy, wet skin. ? Sweating (when you are not exercising). ? A fast heartbeat.  ? Numbness or tingling of the fingertips or lips. · If you feel an episode of low blood sugar coming on, drink fruit juice or sugared (not diet) soda, or eat sugar in the form of candy, cubes, or tablets. [employer] are another [employer]. · Eat small, frequent meals so that you do not get too hungry between meals. · Balance extra exercise with eating more. · Keep a written record of your low blood sugar episodes, including when you last ate and what you ate, so that you can learn what causes your blood sugar to drop.   · Make sure your family, friends, and coworkers know the symptoms of low blood sugar and know what to do to get your sugar level up. · Wear medical alert jewelry that lists your condition. You can buy this at most drugstores. When should you call for help? Call 911 anytime you think you may need emergency care. For example, call if:    · You passed out (lost consciousness).     · You are confused or cannot think clearly.     · Your blood sugar is very high or very low.    Watch closely for changes in your health, and be sure to contact your doctor if:    · Your blood sugar stays outside the level your doctor set for you.     · You have any problems. Where can you learn more? Go to http://luciana-kyle.info/. Enter Q226 in the search box to learn more about \"Hypoglycemia: Care Instructions. \"  Current as of: July 25, 2018  Content Version: 12.1  © 7370-6340 [employer]. Care instructions adapted under license by [employer] (which disclaims liability or warranty for this information). If you have questions about a medical condition or this instruction, always ask your healthcare professional. [address] any warranty or liability for your use of this information.

## 2019-08-25 NOTE — ED NOTES
Pt ate entire microwave meal and requesting another. Pt is aaox3. Respirs easy, even and unlabored. No acute distress noted at this time. Preparing another meal at this time. Mir light within reach.

## 2019-08-25 NOTE — ED PROVIDER NOTES
History hypertension, diabetes, end-stage renal  Disease (dialysis Tuesday, Thursday, Saturday), seizures, polycystic ovarian syndrome, sleep apnea, narcotic dependence, asthma, fibromyalgia, gastroparesis; presents via EMS with complaints of hypoglycemia. Per EMS personnel, family found patient unresponsive for 20 to 30 minutes before EMS arrival.  EMS personnel reports finding her groggy. She was able to take 2 tubes of oral glucose after her blood sugar was found to be very low. They also gave her glucagon en route which brought her blood sugar up above 200. Patient reports getting dialysis this morning and Juliet. She states she takes 15 units of Novolin N 3 times a day. She says she last took it about noon earlier today and that is when she last ate as well. She reports 5 episodes of diarrhea earlier today. She denies fever or nausea. She states she takes hydrocodone for pain and took it earlier today.            Past Medical History:   Diagnosis Date    ARF (acute renal failure) ([address] requiring dialysis 2011    Asthma     CKD (chronic kidney disease)     Diabetes ([address]     Fibromyalgia     Gastroparesis 2010    Gastric Pacer- REMOVED 07/2015    GERD (gastroesophageal reflux disease)     Hypertension     Narcotic dependence ([address]     THU (obstructive sleep apnea)     wears 2 LPM oxygen at night    Other ill-defined conditions(799.89)     Polycystic ovarian syndrome     Seizures (HCC)     Sickle cell trait ([address]     Thromboembolus ([address] to her left arm and was told she had one in left leg recently       Past Surgical History:   Procedure Laterality Date    HX AMPUTATION FOOT  04/2018    left foot    HX CATARACT REMOVAL  3/5/12    right    HX DILATION AND CURETTAGE      ablation    HX GASTRIC BYPASS  2015    HX GI      j tube placement and removal    HX OTHER SURGICAL  2010    Gastric Pacer- REMOVED 07/2015    HX VASCULAR ACCESS      gray cath rt subclavian; removed     HX VASCULAR ACCESS      HD access right thigh; stopped working         Family History:   Problem Relation Age of Onset    Cancer Mother         lung    Hypertension Mother     Cancer Father         kidney    Stroke Father         3 strokes: 59-72    Heart Disease Father 72        CABG    Hypertension Father     Cancer Sister         pancreatic    Cancer Maternal Aunt         breast    Cancer Paternal Davida Chapman         breast    Schizophrenia Sister         was in [facility], now ass't living    Other Sister          AIDS    Other Other         nephew of AIDS       Social History     Socioeconomic History    Marital status:      Spouse name: Not on file    Number of children: Not on file    Years of education: Not on file    Highest education level: Not on file   Occupational History    Not on file   Social Needs    Financial resource strain: Not on file    Food insecurity:     Worry: Not on file     Inability: Not on file    Transportation needs:     Medical: Not on file     Non-medical: Not on file   Tobacco Use    Smoking status: Never Smoker    Smokeless tobacco: Never Used   Substance and Sexual Activity    Alcohol use: No    Drug use: No    Sexual activity: Yes     Partners: Male     Birth control/protection: None   Lifestyle    Physical activity:     Days per week: Not on file     Minutes per session: Not on file    Stress: Not on file   Relationships    Social connections:     Talks on phone: Not on file     Gets together: Not on file     Attends Confucianism service: Not on file     Active member of club or organization: Not on file     Attends meetings of clubs or organizations: Not on file     Relationship status: Not on file    Intimate partner violence:     Fear of current or ex partner: Not on file     Emotionally abused: Not on file     Physically abused: Not on file     Forced sexual activity: Not on file   Other Topics Concern    Not on file   Social History Narrative    Lives with her  and father         ALLERGIES: Fentanyl; Erythromycin; Toradol [ketorolac]; and Morphine    Review of Systems   All other systems reviewed and are negative. Vitals:    08/24/19 2216 08/24/19 2219 08/24/19 2230   BP: (!) 133/106 (!) 133/106 (!) 141/106   Pulse: 79 79 84   Resp: 16 15 14   SpO2: 99% 100% 99%   Weight: 61.4 kg (135 lb 5.8 oz)              Physical Exam   Constitutional: She appears well-developed and well-nourished. Chronically ill-appearing; somnolent. HENT:   Head: Normocephalic and atraumatic. Eyes: Conjunctivae are normal.   Neck: Neck supple. No tracheal deviation present. Cardiovascular: Normal rate, regular rhythm, normal heart sounds and intact distal pulses. Exam reveals no gallop and no friction rub. No murmur heard. Pulmonary/Chest: Effort normal and breath sounds normal.   Abdominal: Soft. There is no tenderness. Musculoskeletal: She exhibits no edema or deformity. Dialysis graft left upper arm   Neurological: She is alert. oriented   Skin: Skin is warm and dry. Psychiatric: She has a normal mood and affect. Nursing note and vitals reviewed. MDM       Procedures    EKG: Normal sinus rhythm; rate equals 82; normal ST, T; prolonged QT interval.  Camelia Pyle MD  11:05 PM    Progress Note:  Results, treatment, and follow up plan have been discussed with patient. Questions were answered. She has eaten two microwave meals. Camelia Pyle MD  12:31 AM    A/P: hypoglycemia - suspect related to taking her insulin and not eating; reassuring appearance and exam; VSS; CBC, CMP okay or at baseline. She had dialysis earlier today. Her mental status has improved and she is eaten to microwavable meals. I feel she is safe for discharge with PCP follow-up.   Camelia Pyle MD  12:33 AM

## 2019-08-25 NOTE — ED NOTES
Pt up to bedside commode at this time. Pt provided with paper top d/t personal shirt being wet. Pt denies any needs at this time.

## 2019-08-25 NOTE — ED NOTES
Pt sitting up on stretcher eating a microwave meal. Pt provided with warm blanket. Call light within reach. Bed in lowest position and side rails up x 2.

## 2019-08-25 NOTE — ED TRIAGE NOTES
Per family patient was unresponsive at home for 10-20 mins. When EMS arrived she was awake, blood sugar noted to be 32. Two tubes of glucose given.

## 2019-09-06 ENCOUNTER — HOSPITAL ENCOUNTER (EMERGENCY)
Age: 39
Discharge: HOME | End: 2019-09-06
Attending: EMERGENCY MEDICINE
Payer: MEDICARE

## 2019-09-06 ENCOUNTER — APPOINTMENT (OUTPATIENT)
Dept: GENERAL RADIOLOGY | Age: 39
End: 2019-09-06
Attending: EMERGENCY MEDICINE
Payer: MEDICARE

## 2019-09-06 VITALS
OXYGEN SATURATION: 100 % | BODY MASS INDEX: 25.96 KG/M2 | RESPIRATION RATE: 16 BRPM | WEIGHT: 141.09 LBS | HEART RATE: 88 BPM | DIASTOLIC BLOOD PRESSURE: 100 MMHG | HEIGHT: 62 IN | TEMPERATURE: 97.4 F | SYSTOLIC BLOOD PRESSURE: 149 MMHG

## 2019-09-06 DIAGNOSIS — E16.2 HYPOGLYCEMIA: Primary | ICD-10-CM

## 2019-09-06 LAB
ANION GAP BLD CALC-SCNC: 12 MMOL/L (ref 10–20)
BASOPHILS # BLD: 0 K/UL (ref 0–0.1)
BASOPHILS NFR BLD: 0 % (ref 0–1)
BUN BLD-MCNC: 69 MG/DL (ref 9–20)
CA-I BLD-MCNC: 1.1 MMOL/L (ref 1.12–1.32)
CHLORIDE BLD-SCNC: 102 MMOL/L (ref 98–107)
CO2 BLD-SCNC: 34 MMOL/L (ref 21–32)
CREAT BLD-MCNC: 3.5 MG/DL (ref 0.6–1.3)
DIFFERENTIAL METHOD BLD: ABNORMAL
EOSINOPHIL # BLD: 0.1 K/UL (ref 0–0.4)
EOSINOPHIL NFR BLD: 1 % (ref 0–7)
ERYTHROCYTE [DISTWIDTH] IN BLOOD BY AUTOMATED COUNT: 15.7 % (ref 11.5–14.5)
GLUCOSE BLD STRIP.AUTO-MCNC: 132 MG/DL (ref 65–100)
GLUCOSE BLD-MCNC: 92 MG/DL (ref 65–100)
HCT VFR BLD AUTO: 44 % (ref 35–47)
HCT VFR BLD CALC: 52 % (ref 35–47)
HGB BLD-MCNC: 14.3 G/DL (ref 11.5–16)
IMM GRANULOCYTES # BLD AUTO: 0.1 K/UL (ref 0–0.04)
IMM GRANULOCYTES NFR BLD AUTO: 1 % (ref 0–0.5)
LYMPHOCYTES # BLD: 0.8 K/UL (ref 0.8–3.5)
LYMPHOCYTES NFR BLD: 8 % (ref 12–49)
MCH RBC QN AUTO: 26 PG (ref 26–34)
MCHC RBC AUTO-ENTMCNC: 32.5 G/DL (ref 30–36.5)
MCV RBC AUTO: 80 FL (ref 80–99)
MONOCYTES # BLD: 0.4 K/UL (ref 0–1)
MONOCYTES NFR BLD: 4 % (ref 5–13)
NEUTS SEG # BLD: 9.1 K/UL (ref 1.8–8)
NEUTS SEG NFR BLD: 86 % (ref 32–75)
NRBC # BLD: 0 K/UL (ref 0–0.01)
NRBC BLD-RTO: 0 PER 100 WBC
PLATELET # BLD AUTO: 126 K/UL (ref 150–400)
PMV BLD AUTO: ABNORMAL FL (ref 8.9–12.9)
POTASSIUM BLD-SCNC: 5.4 MMOL/L (ref 3.5–5.1)
RBC # BLD AUTO: 5.5 M/UL (ref 3.8–5.2)
SERVICE CMNT-IMP: ABNORMAL
SERVICE CMNT-IMP: ABNORMAL
SODIUM BLD-SCNC: 142 MMOL/L (ref 136–145)
WBC # BLD AUTO: 10.6 K/UL (ref 3.6–11)

## 2019-09-06 PROCEDURE — 99283 EMERGENCY DEPT VISIT LOW MDM: CPT

## 2019-09-06 PROCEDURE — 72100 X-RAY EXAM L-S SPINE 2/3 VWS: CPT

## 2019-09-06 PROCEDURE — 36415 COLL VENOUS BLD VENIPUNCTURE: CPT

## 2019-09-06 PROCEDURE — 73562 X-RAY EXAM OF KNEE 3: CPT

## 2019-09-06 PROCEDURE — 82962 GLUCOSE BLOOD TEST: CPT

## 2019-09-06 PROCEDURE — 80047 BASIC METABLC PNL IONIZED CA: CPT

## 2019-09-06 PROCEDURE — 85025 COMPLETE CBC W/AUTO DIFF WBC: CPT

## 2019-09-06 PROCEDURE — 74011250637 HC RX REV CODE- 250/637: Performed by: EMERGENCY MEDICINE

## 2019-09-06 RX ORDER — INSULIN GLARGINE 100 [IU]/ML
10 INJECTION, SOLUTION SUBCUTANEOUS DAILY
Refills: 3 | COMMUNITY
Start: 2019-07-31 | End: 2019-10-07

## 2019-09-06 RX ORDER — CARVEDILOL 6.25 MG/1
6.25 TABLET ORAL
COMMUNITY
End: 2020-05-16

## 2019-09-06 RX ORDER — CALCIUM ACETATE 667 MG/1
CAPSULE ORAL
Refills: 3 | Status: ON HOLD | COMMUNITY
Start: 2019-06-10 | End: 2020-05-17

## 2019-09-06 RX ORDER — DIAZEPAM 2 MG/1
2 TABLET ORAL
COMMUNITY
End: 2019-10-07

## 2019-09-06 RX ORDER — HUMAN INSULIN 100 [IU]/ML
5 INJECTION, SUSPENSION SUBCUTANEOUS
Refills: 3 | COMMUNITY
Start: 2019-08-01 | End: 2020-05-09

## 2019-09-06 RX ORDER — CLONIDINE HYDROCHLORIDE 0.1 MG/1
TABLET ORAL
Refills: 1 | Status: ON HOLD | COMMUNITY
Start: 2019-06-11 | End: 2020-05-17 | Stop reason: DRUGHIGH

## 2019-09-06 RX ORDER — ACETAMINOPHEN 500 MG
500 TABLET ORAL
COMMUNITY

## 2019-09-06 RX ORDER — ACETAMINOPHEN 500 MG
1000 TABLET ORAL ONCE
Status: COMPLETED | OUTPATIENT
Start: 2019-09-06 | End: 2019-09-06

## 2019-09-06 RX ADMIN — ACETAMINOPHEN 1000 MG: 500 TABLET ORAL at 09:54

## 2019-09-06 NOTE — DISCHARGE INSTRUCTIONS
Patient Education        Hypoglycemia: Care Instructions  Your Care Instructions    Hypoglycemia means that your blood sugar is low and your body is not getting enough fuel. Some people get low blood sugar from not eating often enough. Some medicines to treat diabetes can cause low blood sugar. People who have had surgery on their stomachs or intestines may get hypoglycemia. Problems with the pancreas, kidneys, or liver also can cause low blood sugar. A snack or drink with sugar in it will raise your blood sugar and should ease your symptoms right away. Your doctor may recommend that you change or stop your medicines until you can get your blood sugar levels under control. In the long run, you may need to change your diet and eating habits so that you get enough fuel for your body throughout the day. Follow-up care is a key part of your treatment and safety. Be sure to make and go to all appointments, and call your doctor if you are having problems. It's also a good idea to know your test results and keep a list of the medicines you take. How can you care for yourself at home? · Learn to recognize the early signs of low blood sugar. Signs include:  ? Nausea. ? Hunger. ? Feeling nervous, irritable, or shaky. ? Cold, clammy, wet skin. ? Sweating (when you are not exercising). ? A fast heartbeat.  ? Numbness or tingling of the fingertips or lips. · If you feel an episode of low blood sugar coming on, drink fruit juice or sugared (not diet) soda, or eat sugar in the form of candy, cubes, or tablets. [employer] are another [employer]. · Eat small, frequent meals so that you do not get too hungry between meals. · Balance extra exercise with eating more. · Keep a written record of your low blood sugar episodes, including when you last ate and what you ate, so that you can learn what causes your blood sugar to drop.   · Make sure your family, friends, and coworkers know the symptoms of low blood sugar and know what to do to get your sugar level up. · Wear medical alert jewelry that lists your condition. You can buy this at most drugstores. When should you call for help? Call 911 anytime you think you may need emergency care. For example, call if:    · You passed out (lost consciousness).     · You are confused or cannot think clearly.     · Your blood sugar is very high or very low.    Watch closely for changes in your health, and be sure to contact your doctor if:    · Your blood sugar stays outside the level your doctor set for you.     · You have any problems. Where can you learn more? Go to http://lcuiana-kyle.info/. Enter T012 in the search box to learn more about \"Hypoglycemia: Care Instructions. \"  Current as of: July 25, 2018  Content Version: 12.1  © 8993-5763 [employer]. Care instructions adapted under license by [employer] (which disclaims liability or warranty for this information). If you have questions about a medical condition or this instruction, always ask your healthcare professional. [address] any warranty or liability for your use of this information.

## 2019-09-06 NOTE — ED PROVIDER NOTES
The history is provided by the patient and the EMS personnel. No  was used. Low Blood Sugar    This is a recurrent problem. The current episode started 1 to 2 hours ago. The problem has been resolved. Associated symptoms include confusion, somnolence, unresponsiveness and weakness. Pertinent negatives include no seizures, no agitation, no delusions, no hallucinations, no self-injury, no violence, no tingling and no numbness. Mental status baseline is normal.  Her past medical history is significant for diabetes, seizures and hypertension. Her past medical history does not include liver disease, CVA, TIA, AIDS, COPD, depression, dementia, psychotropic medication treatment, head trauma or heart disease.         Past Medical History:   Diagnosis Date    ARF (acute renal failure) ([address] requiring dialysis 2011    Asthma     CKD (chronic kidney disease)     Diabetes ([address]     Fibromyalgia     Gastroparesis 2010    Gastric Pacer- REMOVED 07/2015    GERD (gastroesophageal reflux disease)     Hypertension     Narcotic dependence ([address]     THU (obstructive sleep apnea)     wears 2 LPM oxygen at night    Other ill-defined conditions(799.89)     Polycystic ovarian syndrome     Seizures (HCC)     Sickle cell trait ([address]     Thromboembolus ([address] to her left arm and was told she had one in left leg recently       Past Surgical History:   Procedure Laterality Date    HX AMPUTATION FOOT  04/2018    left foot    HX CATARACT REMOVAL  3/5/12    right    HX DILATION AND CURETTAGE      ablation    HX GASTRIC BYPASS  2015    HX GI      j tube placement and removal    HX OTHER SURGICAL  2010    Gastric Pacer- REMOVED 07/2015    HX VASCULAR ACCESS      gray cath rt subclavian; removed     HX VASCULAR ACCESS      HD access right thigh; stopped working         Family History:   Problem Relation Age of Onset    Cancer Mother         lung    Hypertension Mother     Cancer Father         kidney    Stroke Father         3 strokes: 59-72    Heart Disease Father 72        CABG    Hypertension Father     Cancer Sister         pancreatic    Cancer Maternal Aunt         breast    Cancer Paternal Aunt         breast    Schizophrenia Sister         was in [address], now ass't living    Other Sister          AIDS    Other Other         nephew of AIDS       Social History     Socioeconomic History    Marital status:      Spouse name: Not on file    Number of children: Not on file    Years of education: Not on file    Highest education level: Not on file   Occupational History    Not on file   Social Needs    Financial resource strain: Not on file    Food insecurity:     Worry: Not on file     Inability: Not on file    Transportation needs:     Medical: Not on file     Non-medical: Not on file   Tobacco Use    Smoking status: Never Smoker    Smokeless tobacco: Never Used   Substance and Sexual Activity    Alcohol use: No    Drug use: No    Sexual activity: Yes     Partners: Male     Birth control/protection: None   Lifestyle    Physical activity:     Days per week: Not on file     Minutes per session: Not on file    Stress: Not on file   Relationships    Social connections:     Talks on phone: Not on file     Gets together: Not on file     Attends Dru[employer] service: Not on file     Active member of club or organization: Not on file     Attends meetings of clubs or organizations: Not on file     Relationship status: Not on file    Intimate partner violence:     Fear of current or ex partner: Not on file     Emotionally abused: Not on file     Physically abused: Not on file     Forced sexual activity: Not on file   Other Topics Concern    Not on file   Social History Narrative    Lives with her  and father         ALLERGIES: Fentanyl; Erythromycin; Toradol [ketorolac]; and Morphine    Review of Systems   Constitutional: Negative for activity change, chills and fever.    HENT: Negative for nosebleeds, sore throat, trouble swallowing and voice change. Eyes: Negative for visual disturbance. Respiratory: Negative for shortness of breath. Cardiovascular: Negative for chest pain and palpitations. Gastrointestinal: Negative for abdominal pain, constipation, diarrhea and nausea. Genitourinary: Negative for difficulty urinating, dysuria, hematuria and urgency. Musculoskeletal: Positive for arthralgias, back pain and gait problem. Negative for neck pain and neck stiffness. Skin: Negative for color change. Allergic/Immunologic: Negative for immunocompromised state. Neurological: Positive for weakness. Negative for dizziness, tingling, seizures, syncope, light-headedness, numbness and headaches. Psychiatric/Behavioral: Positive for confusion. Negative for agitation, behavioral problems, hallucinations, self-injury and suicidal ideas. Vitals:    09/06/19 0936   BP: (!) 149/100   Pulse: 88   Resp: 16   Temp: 97.4 °F (36.3 °C)   SpO2: 100%   Weight: 64 kg (141 lb 1.5 oz)   Height: 5' 2\" (1.575 m)            Physical Exam   Constitutional: She is oriented to person, place, and time. She appears well-developed and well-nourished. No distress. HENT:   Head: Normocephalic and atraumatic. Eyes: Pupils are equal, round, and reactive to light. Neck: Normal range of motion. Neck supple. Cardiovascular: Normal rate, regular rhythm and normal heart sounds. Exam reveals no gallop and no friction rub. No murmur heard. Pulmonary/Chest: Effort normal and breath sounds normal. No respiratory distress. She has no wheezes. Abdominal: Soft. Bowel sounds are normal. She exhibits no distension. There is no tenderness. There is no rebound and no guarding. Musculoskeletal: Normal range of motion. Neurological: She is alert and oriented to person, place, and time. Skin: Skin is warm. No rash noted. She is not diaphoretic. Psychiatric: She has a normal mood and affect.  Her behavior is normal. Judgment and thought content normal.   Nursing note and vitals reviewed. MDM      This is a 61-year-old female with past medical history, review of systems, physical exam as above, presenting with complaints of altered mental status, and hypoglycemia. Per EMS, they were called for the unconscious person, to find the patient awake, not alert, with hypoglycemia in the 30s. Upon arrival she is awake and alert, having received glucose from EMS. She states she did not take insulin yesterday evening, and had a snack at night after a blood sugar of 86. She states she is typically dialyzed Tuesday Thursday and Saturday, and was last dialyzed yesterday, for her normal course. She denies other complaints at this time. Physical exam is remarkable for chronically ill-appearing middle-aged female, in no acute distress, with clear breath sounds, soft abdomen, regular rate and rhythm without murmurs gallops rubs, status post left foot amputation. Suspect hypoglycemia due to noncompliance with medications, however the patient refuses to acknowledge this. Plan to obtain lab work to evaluate for metabolic abnormality, provide food, reassess, and make a disposition. Procedures    11:13 AM  Patient without acute changes in lab work, remains normoglycemic, mental status at baseline, currently eating. Discussed unremarkable lab work with the patient. Will discharge home, refer to endocrinology as her diabetes is currently being managed by her primary care physician. Return precautions given.

## 2019-09-06 NOTE — ED TRIAGE NOTES
Pt. Abhijit Jones by EMS for hypoglycemia,. Pt. States her bs last night was 86. Father found her on floor and ems called. Was given glucagon and 2 tubes of 15g glucose orally. Last bs per ems was 86. Pt. Has had dialysis tue, wed, thur and states they've been having a hard time with her weight.   Pt. Complains of lower back pain andleft  leg pain

## 2019-10-04 ENCOUNTER — APPOINTMENT (OUTPATIENT)
Dept: GENERAL RADIOLOGY | Age: 39
DRG: 917 | End: 2019-10-04
Attending: INTERNAL MEDICINE
Payer: MEDICARE

## 2019-10-04 ENCOUNTER — HOSPITAL ENCOUNTER (INPATIENT)
Age: 39
LOS: 3 days | Discharge: HOME | DRG: 917 | End: 2019-10-07
Attending: EMERGENCY MEDICINE | Admitting: STUDENT IN AN ORGANIZED HEALTH CARE EDUCATION/TRAINING PROGRAM
Payer: MEDICARE

## 2019-10-04 ENCOUNTER — APPOINTMENT (OUTPATIENT)
Dept: CT IMAGING | Age: 39
DRG: 917 | End: 2019-10-04
Attending: EMERGENCY MEDICINE
Payer: MEDICARE

## 2019-10-04 ENCOUNTER — APPOINTMENT (OUTPATIENT)
Dept: INTERVENTIONAL RADIOLOGY/VASCULAR | Age: 39
DRG: 917 | End: 2019-10-04
Attending: INTERNAL MEDICINE
Payer: MEDICARE

## 2019-10-04 DIAGNOSIS — G92.9 TOXIC ENCEPHALOPATHY: ICD-10-CM

## 2019-10-04 DIAGNOSIS — F14.90 COCAINE USE: ICD-10-CM

## 2019-10-04 DIAGNOSIS — I10 HYPERTENSION, UNSPECIFIED TYPE: ICD-10-CM

## 2019-10-04 DIAGNOSIS — Z99.2 ESRD (END STAGE RENAL DISEASE) ON DIALYSIS (HCC): ICD-10-CM

## 2019-10-04 DIAGNOSIS — R73.9 HYPERGLYCEMIA: Primary | ICD-10-CM

## 2019-10-04 DIAGNOSIS — N18.6 ESRD (END STAGE RENAL DISEASE) ON DIALYSIS (HCC): ICD-10-CM

## 2019-10-04 DIAGNOSIS — T40.604A OPIATE OVERDOSE, UNDETERMINED INTENT, INITIAL ENCOUNTER (HCC): ICD-10-CM

## 2019-10-04 PROBLEM — T40.604S: Status: ACTIVE | Noted: 2019-10-04

## 2019-10-04 LAB
ADMINISTERED INITIALS, ADMINIT: NORMAL
ALBUMIN SERPL-MCNC: 3.1 G/DL (ref 3.5–5)
ALBUMIN SERPL-MCNC: 3.2 G/DL (ref 3.5–5)
ALBUMIN/GLOB SERPL: 0.9 {RATIO} (ref 1.1–2.2)
ALBUMIN/GLOB SERPL: 0.9 {RATIO} (ref 1.1–2.2)
ALP SERPL-CCNC: 206 U/L (ref 45–117)
ALP SERPL-CCNC: 295 U/L (ref 45–117)
ALT SERPL-CCNC: 24 U/L (ref 12–78)
ALT SERPL-CCNC: 26 U/L (ref 12–78)
AMMONIA PLAS-SCNC: 16 UMOL/L
AMPHET UR QL SCN: NEGATIVE
ANION GAP SERPL CALC-SCNC: 14 MMOL/L (ref 5–15)
APAP SERPL-MCNC: <2 UG/ML (ref 10–30)
APAP SERPL-MCNC: <2 UG/ML (ref 10–30)
APPEARANCE UR: ABNORMAL
ARTERIAL PATENCY WRIST A: YES
ARTERIAL PATENCY WRIST A: YES
AST SERPL-CCNC: 16 U/L (ref 15–37)
AST SERPL-CCNC: 16 U/L (ref 15–37)
ATRIAL RATE: 93 BPM
BACTERIA URNS QL MICRO: ABNORMAL /HPF
BARBITURATES UR QL SCN: NEGATIVE
BASE DEFICIT BLD-SCNC: 1 MMOL/L
BASE DEFICIT BLD-SCNC: 3 MMOL/L
BASOPHILS # BLD: 0 K/UL (ref 0–0.1)
BASOPHILS NFR BLD: 0 % (ref 0–1)
BDY SITE: ABNORMAL
BDY SITE: ABNORMAL
BENZODIAZ UR QL: POSITIVE
BILIRUB DIRECT SERPL-MCNC: 0.2 MG/DL (ref 0–0.2)
BILIRUB SERPL-MCNC: 0.3 MG/DL (ref 0.2–1)
BILIRUB SERPL-MCNC: 0.4 MG/DL (ref 0.2–1)
BILIRUB UR QL: NEGATIVE
BUN SERPL-MCNC: 89 MG/DL (ref 6–20)
BUN/CREAT SERPL: 19 (ref 12–20)
CALCIUM SERPL-MCNC: 7.6 MG/DL (ref 8.5–10.1)
CALCULATED P AXIS, ECG09: 54 DEGREES
CALCULATED R AXIS, ECG10: 22 DEGREES
CALCULATED T AXIS, ECG11: 50 DEGREES
CANNABINOIDS UR QL SCN: NEGATIVE
CHLORIDE SERPL-SCNC: 97 MMOL/L (ref 97–108)
CO2 SERPL-SCNC: 23 MMOL/L (ref 21–32)
COCAINE UR QL SCN: POSITIVE
COLOR UR: ABNORMAL
CREAT SERPL-MCNC: 4.8 MG/DL (ref 0.55–1.02)
D50 ADMINISTERED, D50ADM: 0 ML
D50 ADMINISTERED, D50ADM: 9 ML
D50 ORDER, D50ORD: 0 ML
D50 ORDER, D50ORD: 9 ML
DIAGNOSIS, 93000: NORMAL
DIFFERENTIAL METHOD BLD: ABNORMAL
DRUG SCRN COMMENT,DRGCM: ABNORMAL
EOSINOPHIL # BLD: 0.3 K/UL (ref 0–0.4)
EOSINOPHIL NFR BLD: 6 % (ref 0–7)
EPITH CASTS URNS QL MICRO: ABNORMAL /LPF
ERYTHROCYTE [DISTWIDTH] IN BLOOD BY AUTOMATED COUNT: 15.8 % (ref 11.5–14.5)
EST. AVERAGE GLUCOSE BLD GHB EST-MCNC: 180 MG/DL
ETHANOL SERPL-MCNC: <10 MG/DL
ETHANOL SERPL-MCNC: <10 MG/DL
GAS FLOW.O2 O2 DELIVERY SYS: ABNORMAL L/MIN
GAS FLOW.O2 O2 DELIVERY SYS: ABNORMAL L/MIN
GAS FLOW.O2 SETTING OXYMISER: 2 L/M
GAS FLOW.O2 SETTING OXYMISER: 2 L/M
GLOBULIN SER CALC-MCNC: 3.5 G/DL (ref 2–4)
GLOBULIN SER CALC-MCNC: 3.6 G/DL (ref 2–4)
GLSCOM COMMENTS: NORMAL
GLUCOSE BLD STRIP.AUTO-MCNC: 105 MG/DL (ref 65–100)
GLUCOSE BLD STRIP.AUTO-MCNC: 107 MG/DL (ref 65–100)
GLUCOSE BLD STRIP.AUTO-MCNC: 108 MG/DL (ref 65–100)
GLUCOSE BLD STRIP.AUTO-MCNC: 116 MG/DL (ref 65–100)
GLUCOSE BLD STRIP.AUTO-MCNC: 117 MG/DL (ref 65–100)
GLUCOSE BLD STRIP.AUTO-MCNC: 268 MG/DL (ref 65–100)
GLUCOSE BLD STRIP.AUTO-MCNC: 466 MG/DL (ref 65–100)
GLUCOSE BLD STRIP.AUTO-MCNC: 473 MG/DL (ref 65–100)
GLUCOSE BLD STRIP.AUTO-MCNC: 553 MG/DL (ref 65–100)
GLUCOSE BLD STRIP.AUTO-MCNC: 78 MG/DL (ref 65–100)
GLUCOSE SERPL-MCNC: 601 MG/DL (ref 65–100)
GLUCOSE UR STRIP.AUTO-MCNC: >1000 MG/DL
GLUCOSE, GLC: 105 MG/DL
GLUCOSE, GLC: 107 MG/DL
GLUCOSE, GLC: 116 MG/DL
GLUCOSE, GLC: 268 MG/DL
GLUCOSE, GLC: 78 MG/DL
HBA1C MFR BLD: 7.9 % (ref 4.2–6.3)
HCO3 BLD-SCNC: 23.7 MMOL/L (ref 22–26)
HCO3 BLD-SCNC: 24.8 MMOL/L (ref 22–26)
HCT VFR BLD AUTO: 27.6 % (ref 35–47)
HGB BLD-MCNC: 8.8 G/DL (ref 11.5–16)
HGB UR QL STRIP: ABNORMAL
HIGH TARGET, HITG: 300 MG/DL
IMM GRANULOCYTES # BLD AUTO: 0 K/UL (ref 0–0.04)
IMM GRANULOCYTES NFR BLD AUTO: 0 % (ref 0–0.5)
INSULIN ADMINSTERED, INSADM: 0 UNITS/HOUR
INSULIN ADMINSTERED, INSADM: 0.5 UNITS/HOUR
INSULIN ADMINSTERED, INSADM: 4.2 UNITS/HOUR
INSULIN ORDER, INSORD: 0 UNITS/HOUR
INSULIN ORDER, INSORD: 0.5 UNITS/HOUR
INSULIN ORDER, INSORD: 4.2 UNITS/HOUR
KETONES UR QL STRIP.AUTO: NEGATIVE MG/DL
LEUKOCYTE ESTERASE UR QL STRIP.AUTO: ABNORMAL
LOW TARGET, LOT: 200 MG/DL
LYMPHOCYTES # BLD: 0.7 K/UL (ref 0.8–3.5)
LYMPHOCYTES NFR BLD: 13 % (ref 12–49)
MCH RBC QN AUTO: 26 PG (ref 26–34)
MCHC RBC AUTO-ENTMCNC: 31.9 G/DL (ref 30–36.5)
MCV RBC AUTO: 81.4 FL (ref 80–99)
METHADONE UR QL: NEGATIVE
MINUTES UNTIL NEXT BG, NBG: 15 MIN
MINUTES UNTIL NEXT BG, NBG: 60 MIN
MONOCYTES # BLD: 0.6 K/UL (ref 0–1)
MONOCYTES NFR BLD: 10 % (ref 5–13)
MULTIPLIER, MUL: 0
MULTIPLIER, MUL: 0.01
MULTIPLIER, MUL: 0.02
NEUTS SEG # BLD: 4 K/UL (ref 1.8–8)
NEUTS SEG NFR BLD: 71 % (ref 32–75)
NITRITE UR QL STRIP.AUTO: NEGATIVE
NRBC # BLD: 0 K/UL (ref 0–0.01)
NRBC BLD-RTO: 0 PER 100 WBC
OPIATES UR QL: NEGATIVE
ORDER INITIALS, ORDINIT: NORMAL
P-R INTERVAL, ECG05: 152 MS
PCO2 BLD: 48.9 MMHG (ref 35–45)
PCO2 BLD: 50.4 MMHG (ref 35–45)
PCP UR QL: NEGATIVE
PH BLD: 7.28 [PH] (ref 7.35–7.45)
PH BLD: 7.31 [PH] (ref 7.35–7.45)
PH UR STRIP: 7 [PH] (ref 5–8)
PHOSPHATE SERPL-MCNC: 5.9 MG/DL (ref 2.6–4.7)
PLATELET # BLD AUTO: 147 K/UL (ref 150–400)
PMV BLD AUTO: 13 FL (ref 8.9–12.9)
PO2 BLD: 173 MMHG (ref 80–100)
PO2 BLD: 93 MMHG (ref 80–100)
POTASSIUM SERPL-SCNC: 4.9 MMOL/L (ref 3.5–5.1)
PROT SERPL-MCNC: 6.7 G/DL (ref 6.4–8.2)
PROT SERPL-MCNC: 6.7 G/DL (ref 6.4–8.2)
PROT UR STRIP-MCNC: 30 MG/DL
Q-T INTERVAL, ECG07: 374 MS
QRS DURATION, ECG06: 82 MS
QTC CALCULATION (BEZET), ECG08: 465 MS
RBC # BLD AUTO: 3.39 M/UL (ref 3.8–5.2)
RBC #/AREA URNS HPF: ABNORMAL /HPF (ref 0–5)
RBC MORPH BLD: ABNORMAL
RBC MORPH BLD: ABNORMAL
SALICYLATES SERPL-MCNC: <1.7 MG/DL (ref 2.8–20)
SAO2 % BLD: 96 % (ref 92–97)
SAO2 % BLD: 99 % (ref 92–97)
SERVICE CMNT-IMP: ABNORMAL
SERVICE CMNT-IMP: NORMAL
SODIUM SERPL-SCNC: 134 MMOL/L (ref 136–145)
SP GR UR REFRACTOMETRY: 1.01 (ref 1–1.03)
SPECIMEN TYPE: ABNORMAL
SPECIMEN TYPE: ABNORMAL
TROPONIN I SERPL-MCNC: <0.05 NG/ML
UR CULT HOLD, URHOLD: NORMAL
UROBILINOGEN UR QL STRIP.AUTO: 0.2 EU/DL (ref 0.2–1)
VENTRICULAR RATE, ECG03: 93 BPM
WBC # BLD AUTO: 5.6 K/UL (ref 3.6–11)
WBC URNS QL MICRO: ABNORMAL /HPF (ref 0–4)
YEAST BUDDING URNS QL: PRESENT

## 2019-10-04 PROCEDURE — 82962 GLUCOSE BLOOD TEST: CPT

## 2019-10-04 PROCEDURE — 80053 COMPREHEN METABOLIC PANEL: CPT

## 2019-10-04 PROCEDURE — 02HV33Z INSERTION OF INFUSION DEVICE INTO SUPERIOR VENA CAVA, PERCUTANEOUS APPROACH: ICD-10-PCS | Performed by: ANESTHESIOLOGY

## 2019-10-04 PROCEDURE — 74011000250 HC RX REV CODE- 250

## 2019-10-04 PROCEDURE — 74011250636 HC RX REV CODE- 250/636: Performed by: INTERNAL MEDICINE

## 2019-10-04 PROCEDURE — C1752 CATH,HEMODIALYSIS,SHORT-TERM: HCPCS

## 2019-10-04 PROCEDURE — 81001 URINALYSIS AUTO W/SCOPE: CPT

## 2019-10-04 PROCEDURE — 71045 X-RAY EXAM CHEST 1 VIEW: CPT

## 2019-10-04 PROCEDURE — 85025 COMPLETE CBC W/AUTO DIFF WBC: CPT

## 2019-10-04 PROCEDURE — 83036 HEMOGLOBIN GLYCOSYLATED A1C: CPT

## 2019-10-04 PROCEDURE — 74011000258 HC RX REV CODE- 258: Performed by: INTERNAL MEDICINE

## 2019-10-04 PROCEDURE — 72125 CT NECK SPINE W/O DYE: CPT

## 2019-10-04 PROCEDURE — 36600 WITHDRAWAL OF ARTERIAL BLOOD: CPT

## 2019-10-04 PROCEDURE — 99285 EMERGENCY DEPT VISIT HI MDM: CPT

## 2019-10-04 PROCEDURE — 51798 US URINE CAPACITY MEASURE: CPT

## 2019-10-04 PROCEDURE — 74018 RADEX ABDOMEN 1 VIEW: CPT

## 2019-10-04 PROCEDURE — 74011250636 HC RX REV CODE- 250/636

## 2019-10-04 PROCEDURE — 96376 TX/PRO/DX INJ SAME DRUG ADON: CPT

## 2019-10-04 PROCEDURE — 36556 INSERT NON-TUNNEL CV CATH: CPT

## 2019-10-04 PROCEDURE — 36415 COLL VENOUS BLD VENIPUNCTURE: CPT

## 2019-10-04 PROCEDURE — 84484 ASSAY OF TROPONIN QUANT: CPT

## 2019-10-04 PROCEDURE — 74011250636 HC RX REV CODE- 250/636: Performed by: EMERGENCY MEDICINE

## 2019-10-04 PROCEDURE — 96374 THER/PROPH/DIAG INJ IV PUSH: CPT

## 2019-10-04 PROCEDURE — 74011636637 HC RX REV CODE- 636/637: Performed by: INTERNAL MEDICINE

## 2019-10-04 PROCEDURE — 74011250637 HC RX REV CODE- 250/637: Performed by: INTERNAL MEDICINE

## 2019-10-04 PROCEDURE — C1894 INTRO/SHEATH, NON-LASER: HCPCS

## 2019-10-04 PROCEDURE — 84100 ASSAY OF PHOSPHORUS: CPT

## 2019-10-04 PROCEDURE — 93005 ELECTROCARDIOGRAM TRACING: CPT

## 2019-10-04 PROCEDURE — 77030019905 HC CATH URETH INTMIT MDII -A

## 2019-10-04 PROCEDURE — 87086 URINE CULTURE/COLONY COUNT: CPT

## 2019-10-04 PROCEDURE — 80076 HEPATIC FUNCTION PANEL: CPT

## 2019-10-04 PROCEDURE — 70450 CT HEAD/BRAIN W/O DYE: CPT

## 2019-10-04 PROCEDURE — 82140 ASSAY OF AMMONIA: CPT

## 2019-10-04 PROCEDURE — 80307 DRUG TEST PRSMV CHEM ANLYZR: CPT

## 2019-10-04 PROCEDURE — 82803 BLOOD GASES ANY COMBINATION: CPT

## 2019-10-04 PROCEDURE — 65610000006 HC RM INTENSIVE CARE

## 2019-10-04 RX ORDER — INSULIN LISPRO 100 [IU]/ML
INJECTION, SOLUTION INTRAVENOUS; SUBCUTANEOUS
Status: DISCONTINUED | OUTPATIENT
Start: 2019-10-04 | End: 2019-10-04

## 2019-10-04 RX ORDER — CLONIDINE 0.1 MG/24H
1 PATCH, EXTENDED RELEASE TRANSDERMAL
Status: DISCONTINUED | OUTPATIENT
Start: 2019-10-04 | End: 2019-10-05

## 2019-10-04 RX ORDER — HYDRALAZINE HYDROCHLORIDE 20 MG/ML
20 INJECTION INTRAMUSCULAR; INTRAVENOUS
Status: DISCONTINUED | OUTPATIENT
Start: 2019-10-04 | End: 2019-10-05

## 2019-10-04 RX ORDER — DEXTROSE MONOHYDRATE 100 MG/ML
0-250 INJECTION, SOLUTION INTRAVENOUS AS NEEDED
Status: DISCONTINUED | OUTPATIENT
Start: 2019-10-04 | End: 2019-10-07 | Stop reason: HOSPADM

## 2019-10-04 RX ORDER — LIDOCAINE HYDROCHLORIDE 10 MG/ML
INJECTION, SOLUTION EPIDURAL; INFILTRATION; INTRACAUDAL; PERINEURAL
Status: COMPLETED
Start: 2019-10-04 | End: 2019-10-04

## 2019-10-04 RX ORDER — SODIUM CHLORIDE 0.9 % (FLUSH) 0.9 %
5-40 SYRINGE (ML) INJECTION EVERY 8 HOURS
Status: DISCONTINUED | OUTPATIENT
Start: 2019-10-04 | End: 2019-10-07 | Stop reason: HOSPADM

## 2019-10-04 RX ORDER — HEPARIN SODIUM 1000 [USP'U]/ML
INJECTION, SOLUTION INTRAVENOUS; SUBCUTANEOUS
Status: COMPLETED
Start: 2019-10-04 | End: 2019-10-04

## 2019-10-04 RX ORDER — CALCITRIOL 0.25 UG/1
0.25 CAPSULE ORAL DAILY
Status: DISCONTINUED | OUTPATIENT
Start: 2019-10-04 | End: 2019-10-07 | Stop reason: HOSPADM

## 2019-10-04 RX ORDER — INSULIN LISPRO 100 [IU]/ML
10 INJECTION, SOLUTION INTRAVENOUS; SUBCUTANEOUS ONCE
Status: COMPLETED | OUTPATIENT
Start: 2019-10-04 | End: 2019-10-04

## 2019-10-04 RX ORDER — INSULIN LISPRO 100 [IU]/ML
INJECTION, SOLUTION INTRAVENOUS; SUBCUTANEOUS EVERY 6 HOURS
Status: DISCONTINUED | OUTPATIENT
Start: 2019-10-04 | End: 2019-10-04

## 2019-10-04 RX ORDER — MAGNESIUM SULFATE 100 %
4 CRYSTALS MISCELLANEOUS AS NEEDED
Status: DISCONTINUED | OUTPATIENT
Start: 2019-10-04 | End: 2019-10-07 | Stop reason: HOSPADM

## 2019-10-04 RX ORDER — ACETAMINOPHEN 325 MG/1
650 TABLET ORAL
Status: DISCONTINUED | OUTPATIENT
Start: 2019-10-04 | End: 2019-10-07 | Stop reason: HOSPADM

## 2019-10-04 RX ORDER — SODIUM CHLORIDE 0.9 % (FLUSH) 0.9 %
5-40 SYRINGE (ML) INJECTION AS NEEDED
Status: DISCONTINUED | OUTPATIENT
Start: 2019-10-04 | End: 2019-10-07 | Stop reason: HOSPADM

## 2019-10-04 RX ORDER — NALOXONE HYDROCHLORIDE 1 MG/ML
INJECTION INTRAMUSCULAR; INTRAVENOUS; SUBCUTANEOUS
Status: DISPENSED
Start: 2019-10-04 | End: 2019-10-04

## 2019-10-04 RX ORDER — ALBUMIN HUMAN 250 G/1000ML
12.5 SOLUTION INTRAVENOUS
Status: DISPENSED | OUTPATIENT
Start: 2019-10-04 | End: 2019-10-04

## 2019-10-04 RX ORDER — ENOXAPARIN SODIUM 100 MG/ML
30 INJECTION SUBCUTANEOUS EVERY 24 HOURS
Status: DISCONTINUED | OUTPATIENT
Start: 2019-10-04 | End: 2019-10-04

## 2019-10-04 RX ORDER — HYDRALAZINE HYDROCHLORIDE 20 MG/ML
20 INJECTION INTRAMUSCULAR; INTRAVENOUS
Status: DISCONTINUED | OUTPATIENT
Start: 2019-10-04 | End: 2019-10-04

## 2019-10-04 RX ORDER — NALOXONE HYDROCHLORIDE 1 MG/ML
2 INJECTION INTRAMUSCULAR; INTRAVENOUS; SUBCUTANEOUS
Status: COMPLETED | OUTPATIENT
Start: 2019-10-04 | End: 2019-10-04

## 2019-10-04 RX ORDER — NALOXONE HYDROCHLORIDE 1 MG/ML
2 INJECTION INTRAMUSCULAR; INTRAVENOUS; SUBCUTANEOUS
Status: DISCONTINUED | OUTPATIENT
Start: 2019-10-04 | End: 2019-10-04

## 2019-10-04 RX ORDER — INSULIN LISPRO 100 [IU]/ML
INJECTION, SOLUTION INTRAVENOUS; SUBCUTANEOUS EVERY 4 HOURS
Status: DISCONTINUED | OUTPATIENT
Start: 2019-10-04 | End: 2019-10-05

## 2019-10-04 RX ORDER — LORAZEPAM 2 MG/ML
2 INJECTION INTRAMUSCULAR
Status: DISCONTINUED | OUTPATIENT
Start: 2019-10-04 | End: 2019-10-06

## 2019-10-04 RX ORDER — HEPARIN SODIUM 5000 [USP'U]/ML
5000 INJECTION, SOLUTION INTRAVENOUS; SUBCUTANEOUS EVERY 8 HOURS
Status: DISCONTINUED | OUTPATIENT
Start: 2019-10-04 | End: 2019-10-07 | Stop reason: HOSPADM

## 2019-10-04 RX ORDER — AMLODIPINE BESYLATE 5 MG/1
10 TABLET ORAL DAILY
Status: DISCONTINUED | OUTPATIENT
Start: 2019-10-04 | End: 2019-10-05

## 2019-10-04 RX ORDER — DEXTROSE MONOHYDRATE 50 MG/ML
75 INJECTION, SOLUTION INTRAVENOUS CONTINUOUS
Status: DISCONTINUED | OUTPATIENT
Start: 2019-10-04 | End: 2019-10-04

## 2019-10-04 RX ADMIN — HEPARIN SODIUM 5000 UNITS: 5000 INJECTION INTRAVENOUS; SUBCUTANEOUS at 09:46

## 2019-10-04 RX ADMIN — DEXTROSE MONOHYDRATE 125 ML: 10 INJECTION, SOLUTION INTRAVENOUS at 14:14

## 2019-10-04 RX ADMIN — Medication 20 ML: at 21:02

## 2019-10-04 RX ADMIN — INSULIN LISPRO 10 UNITS: 100 INJECTION, SOLUTION INTRAVENOUS; SUBCUTANEOUS at 09:44

## 2019-10-04 RX ADMIN — NALOXONE HYDROCHLORIDE 2 MG: 1 INJECTION PARENTERAL at 04:24

## 2019-10-04 RX ADMIN — Medication 10 ML: at 08:51

## 2019-10-04 RX ADMIN — NALOXONE HYDROCHLORIDE 1.3 MG/HR: 1 INJECTION PARENTERAL at 05:23

## 2019-10-04 RX ADMIN — HEPARIN SODIUM 5000 UNITS: 5000 INJECTION INTRAVENOUS; SUBCUTANEOUS at 18:01

## 2019-10-04 RX ADMIN — SODIUM CHLORIDE 4.2 UNITS/HR: 9 INJECTION, SOLUTION INTRAVENOUS at 12:00

## 2019-10-04 RX ADMIN — LORAZEPAM 2 MG: 2 INJECTION, SOLUTION INTRAMUSCULAR; INTRAVENOUS at 21:01

## 2019-10-04 RX ADMIN — DEXTROSE MONOHYDRATE 75 ML/HR: 5 INJECTION, SOLUTION INTRAVENOUS at 14:21

## 2019-10-04 RX ADMIN — Medication 10 ML: at 13:09

## 2019-10-04 RX ADMIN — NALOXONE HYDROCHLORIDE 2 MG: 1 INJECTION PARENTERAL at 04:40

## 2019-10-04 RX ADMIN — NALOXONE HYDROCHLORIDE 2 MG/HR: 1 INJECTION PARENTERAL at 13:01

## 2019-10-04 RX ADMIN — HYDRALAZINE HYDROCHLORIDE 20 MG: 20 INJECTION INTRAMUSCULAR; INTRAVENOUS at 13:21

## 2019-10-04 RX ADMIN — HEPARIN SODIUM 3000 UNITS: 1000 INJECTION INTRAVENOUS; SUBCUTANEOUS at 16:37

## 2019-10-04 RX ADMIN — LIDOCAINE HYDROCHLORIDE 8 ML: 10 INJECTION, SOLUTION EPIDURAL; INFILTRATION; INTRACAUDAL; PERINEURAL at 16:37

## 2019-10-04 NOTE — PROGRESS NOTES
0615: TRANSFER - IN REPORT:    [facility] on Germán Cardoza  being received from [place] [facility] (unit) for routine progression of care      Report consisted of patients Situation, Background, Assessment and   Recommendations(SBAR). Information from the following report(s) SBAR, Kardex, ED Summary, Intake/Output, MAR, Recent Results, Med Rec Status, Cardiac Rhythm SR and Alarm Parameters  was reviewed with the receiving nurse. Opportunity for questions and clarification was provided. Assessment completed upon patients arrival to unit and care assumed. 0482: Received report from transport RN, verbally notified that narcan drip was increased to 2mg.     7936: , Paged hospitalist for STAT ABG for somnolence, per hospitalist would put in orders and defer to day shift. 1352: Code atlas called, pt defiant, kicking, yelling, screaming and attempting to get out of bed. 0740: Bedside shift change report given to VERONICA Thompson (oncoming nurse) by Daniella Joy RN (offgoing nurse). Report included the following information SBAR, Kardex, ED Summary, Intake/Output, MAR, Recent Results, Med Rec Status, Cardiac Rhythm SR and Alarm Parameters .

## 2019-10-04 NOTE — CONSULTS
[facility]  Pulmonary, Critical Care, and Sleep Medicine  Name: Radha Hill MRN: [id number]   : 1980 Hospital[facility]   Date: 10/4/2019          34year old female with past medical hx as given below presented to [facility] with altered mental status. Hx of chronic pain for which she is on opioids. Also takes Benzo at home for anxiety. She was noted to be obtunded at home and brought in by the family. BS noted to be high > 400. She was given Narcan on route to the hospital. In the short pump ED she was started on Narcan gtt. She dialyzes TTS and missed her HD yesterday. Patient at the time of my evaluation is more awake. She sometimes fall back to sleep but responds to calling her name. Data reviewed:  WBC 5.6  Hgb 8.8    UA glucosuria  1+ bacteria. Small blood. Moderate leucocytes. Cr 4.80  UDS + benzo and cocaine. ABG 7.28//  CXR - no infiltrate. CT head neg.     Past Medical History:   Diagnosis Date    ARF (acute renal failure) ([address] requiring dialysis     Asthma     CKD (chronic kidney disease)     Diabetes ([address]     Fibromyalgia     Gastroparesis     Gastric Pacer- REMOVED 2015    GERD (gastroesophageal reflux disease)     Hypertension     Narcotic dependence ([address]     THU (obstructive sleep apnea)     wears 2 LPM oxygen at night    Other ill-defined conditions(799.89)     Polycystic ovarian syndrome     Seizures (HCC)     Sickle cell trait ([address]     Thromboembolus ([address] to her left arm and was told she had one in left leg recently     Past Surgical History:   Procedure Laterality Date    HX AMPUTATION FOOT  2018    left foot    HX CATARACT REMOVAL  3/5/12    right    HX DILATION AND CURETTAGE      ablation    HX GASTRIC BYPASS      HX GI      j tube placement and removal    HX OTHER SURGICAL      Gastric Pacer- REMOVED 2015    HX VASCULAR ACCESS      gray cath rt subclavian; removed     HX VASCULAR ACCESS      HD access right thigh; stopped working     FHx: Lung cancer, HTN - mother. Kidney cancer, CAV and CABG - father. SHx: substance abuse.     Current Facility-Administered Medications:     naloxone (NARCAN) 10 mg in 0.9% sodium chloride 250 mL infusion, 0.05-2 mg/hr, IntraVENous, TITRATE, Francisco Sr MD, Last Rate: 50 mL/hr at 10/04/19 1301, 2 mg/hr at 10/04/19 1301    sodium chloride (NS) flush 5-40 mL, 5-40 mL, IntraVENous, Q8H, Michelle Lakhani MD, 10 mL at 10/04/19 1309    sodium chloride (NS) flush 5-40 mL, 5-40 mL, IntraVENous, PRN, Madiha Lakhani MD    sodium chloride (NS) flush 5-40 mL, 5-40 mL, IntraVENous, Q8H, Zenaida Arroyo MD, 10 mL at 10/04/19 1309    sodium chloride (NS) flush 5-40 mL, 5-40 mL, IntraVENous, PRN, Rubin Waters MD    acetaminophen (TYLENOL) tablet 650 mg, 650 mg, Oral, Q4H PRN, Rubin Waters MD    [Held by provider] amLODIPine (NORVASC) tablet 10 mg, 10 mg, Oral, DAILY, Rubin Waters MD    [Held by provider] calcitRIOL (ROCALTROL) capsule 0.25 mcg, 0.25 mcg, Oral, DAILY, Rubin Waters MD    cloNIDine (CATAPRES) 0.1 mg/24 hr patch 1 Patch, 1 Patch, TransDERmal, Q7D, Rubin Waters MD, 1 Patch at 10/04/19 1023    glucose chewable tablet 16 g, 4 Tab, Oral, PRN, Rubin Waters MD    glucagon (GLUCAGEN) injection 1 mg, 1 mg, IntraMUSCular, PRN, Rubin Waters MD    dextrose 10% infusion 0-250 mL, 0-250 mL, IntraVENous, PRN, Rubin Waters MD, Last Rate: 750 mL/hr at 10/04/19 1414, 125 mL at 10/04/19 1414    [START ON 10/5/2019] epoetin bernard-epbx (RETACRIT) injection 10,000 Units, 10,000 Units, SubCUTAneous, Q TUE, THU & SAT, Sid Stack MD    heparin (porcine) injection 5,000 Units, 5,000 Units, SubCUTAneous, Q8H, Vineet Aguayo MD, 5,000 Units at 10/04/19 0946    hydrALAZINE (APRESOLINE) 20 mg/mL injection 20 mg, 20 mg, IntraVENous, Q2H PRN, Vineet Aguayo MD, 20 mg at 10/04/19 1321    dextrose 5% infusion, 75 mL/hr, IntraVENous, CONTINUOUS, Angy Cruz MD, Last Rate: 75 mL/hr at 10/04/19 1421, 75 mL/hr at 10/04/19 1421    insulin lispro (HUMALOG) injection, , SubCUTAneous, Q4H, Galo Treadwell MD    LORazepam (ATIVAN) injection 2 mg, 2 mg, IntraVENous, Q2H PRN, Mina Hewitt MD       IMPRESSION:   -Drug overdose. UDS positive for cocaine and benzo. -ESRD. -DM with gastroparesis.  -Hx of narcotic use. -Hx of seizure.  -Hx of HTN, GERD, THU, PCOS. -Hx of sickle cell trait.  -Hx of thromboembolism. RECOMMENDATIONS:   -check troponin. -d/c narcan.  -protecting airway.  -seizure medication. -recheck ABG. -central line.  -NPO. -HD per renal.  -BS control with goal < 180. D/c insulin gtt. I have personally reviewed the radiology films and reports. No infiltrate. Subjective/Interval History:   I have reviewed the flowsheet and previous days notes. Pt is critically ill and unable to give history. Objective:       Vital Signs:    Visit Vitals  /90   Pulse 82   Temp 96.2 °F (35.7 °C)   Resp 10   Wt 72.6 kg (160 lb 0.9 oz)   SpO2 100%   BMI 29.27 kg/m²                TMAX(24)      Intake/Output:   Last shift:         Last 3 shifts: No intake/output data recorded. [id number]    Intake/Output Summary (Last 24 hours) at 10/4/2019 1436  Last data filed at 10/4/2019 0700  Gross per 24 hour   Intake 52.54 ml   Output    Net 52.54 ml     EXAM       exam  Other   general Ill appearing/somnolent/ appears stated age    [de-identified]:  Op moist no ulcers, JVD not elevated, no cervical LAD    Chest No pectus deformity, normal chest rise b/l    HEART:  RRR no m/r/g no rubs    Lungs:  CTA b/l no r/r/w, diminished BS at bases    ABD Soft/NT non rigid mildly distended, hypoactive BS    EXT L TMA    Skin No rashes or ulcers, no mottling    Neuro lethargy      Data    I have personally reviewed data, flowsheets for the last 24 hours.         Labs:  Recent Labs     10/04/19  0429   WBC 5.6   HGB 8.8*   HCT 27.6* *     Recent Labs     10/04/19  0429   *   K 4.9   CL 97   CO2 23   *   BUN 89*   CREA 4.80*   CA 7.6*   ALB 3.1*   SGOT 16   ALT 24       ABG Recent Labs     10/04/19  0912   PHI 7.281*   PO2I 93   PCO2I 50.4*        Ivonne Green MD  [facility]

## 2019-10-04 NOTE — DIABETES MGMT
[facility]    DTC Insulin Drip Consult Note    Consult received for glucostabilizer. Pt admitted today with Anion gap of 14 and currently to start insulin gtt. Last POC BG was 473 mg/dL. Recommend continuing insulin gtt until anion gap less than 12 x2 consecutive blood draws then resume home regimen. Insulin gtt should be continued for 2hrs after administration of lantus dose. Discontinue D5 IVF when insulin gtt discontinued. Chart reviewed on Simona Salas. Patient with Type 1 diabetes. A1c:   Lab Results   Component Value Date/Time    Hemoglobin A1c 7.6 (H) 06/25/2018 03:04 AM    Hemoglobin A1c 7.9 (H) 06/23/2018 11:29 PM       Recent Glucose Results:   Lab Results   Component Value Date/Time     (HH) 10/04/2019 04:29 AM    GLUCPOC 473 (H) 10/04/2019 09:06 AM    GLUCPOC 466 (H) 10/04/2019 06:32 AM    GLUCPOC 553 (H) 10/04/2019 04:16 AM        Lab Results   Component Value Date/Time    Creatinine 4.80 (H) 10/04/2019 04:29 AM     Estimated Creatinine Clearance: 14.5 mL/min (A) (based on SCr of 4.8 mg/dL (H)). Active Orders   Diet    DIET NPO        PO intake: No data found. Current hospital DM medication: insulin gtt    Will continue to follow as needed. Thank you.   Bernie Hamilton, [address], [address]  Office:  [phone]

## 2019-10-04 NOTE — PROGRESS NOTES
Transitions of care  Home with family support    Reason for Admission:   EMS called after patient fell at home. , patient drowsy on admission                  RRAT Score:   20/ moderate              Do you (patient/family) have any concerns for transition/discharge? No              Plan for utilizing home health:   TBD    Current Advanced Directive/Advance Care Plan:  Not on file,  Tuan Muñiz [phone], per friend patient is  from , patient's father Charles Sanders [phone]  is [place]. Patient is ESRD and receives dialysis at [facility] on a Tues-Thurs-Sat schedule and uses the transportation benefit through her insurance [facility] [phone]. Per previous CM notes patient has a cane, walker and a WC, she has been to [employer] in the past.Care management will follow for transitions of care.  Mook Cortes RN,CRM    Care Management Interventions  PCP Verified by CM: Yes(Dr Merton Spurling)  Palliative Care Criteria Met (RRAT>21 & CHF Dx)?: No  Physical Therapy Consult: No  Occupational Therapy Consult: No  Speech Therapy Consult: No  Current Support Network: Relative's Home(Father Dave Villanueva [phone])  Confirm Follow Up Transport: Family

## 2019-10-04 NOTE — ED TRIAGE NOTES
Pt's father called 911 because pt. Osman Watson and had a nose bleed. Upon e.m.s. Arrival pt was \"drowsy\" via ems. Ems claims pt's b.s. Has been low on multiple prior 911 calls and gave 2 amps oral glucose and e.m.s. Claims pt tolerated. Ems claims upon getting pt in ambulance pt remained drowsy with pin point pupils and b.s. Was 450. Ems then gave narcan 2mg nasal and pt. Became alert and started conversing. Ems claims pt. Told them she accidentally took to many oxycodone. Unknown time or amt of ingestion. Upon arrival to e.d. Pt needed large amts of vervbal stimuli to get pt to respond. Pt aware of name and , but goes back to sleep within seconds. Respirations unlabored. No obvious injuries.

## 2019-10-04 NOTE — ED PROVIDER NOTES
Enrique Johnson is a 35 yo F with ESRD on HD TuThSa, chronic pain, Diabetes, gastroparesis, seizures and fibromyalgia who presents to the ED by EMS for altered mental status due to suspected opiate overdose. EMS reports that they are frequently called to the house for episodes of hypoglycemia. They found her somolent tonight but she was able to stand and ambulate to the ambulance but then started to become sleepier. Based on her previous history and exam EMS gave oral glucose immediately prior to checking her blood glucose which was >400. Afterwards they noticed constricted pupils and gave 2mg Narcan IN and afterwards patient became alert. When awake patient states that she took 5-6 oxycodone tonight for her fistula pain. She was not able to state when she took it. Family reported to EMS that she had a ground level fall prior to their calling 911.              Past Medical History:   Diagnosis Date    ARF (acute renal failure) ([address] requiring dialysis 2011    Asthma     CKD (chronic kidney disease)     Diabetes ([address]     Fibromyalgia     Gastroparesis 2010    Gastric Pacer- REMOVED 07/2015    GERD (gastroesophageal reflux disease)     Hypertension     Narcotic dependence ([address]     THU (obstructive sleep apnea)     wears 2 LPM oxygen at night    Other ill-defined conditions(799.89)     Polycystic ovarian syndrome     Seizures (HCC)     Sickle cell trait ([address]     Thromboembolus ([address] to her left arm and was told she had one in left leg recently       Past Surgical History:   Procedure Laterality Date    HX AMPUTATION FOOT  04/2018    left foot    HX CATARACT REMOVAL  3/5/12    right    HX DILATION AND CURETTAGE      ablation    HX GASTRIC BYPASS  2015    HX GI      j tube placement and removal    HX OTHER SURGICAL  2010    Gastric Pacer- REMOVED 07/2015    HX VASCULAR ACCESS      gray cath rt subclavian; removed     HX VASCULAR ACCESS      HD access right thigh; stopped working Family History:   Problem Relation Age of Onset   Emmada Sprain Cancer Mother         lung    Hypertension Mother     Cancer Father         kidney    Stroke Father         3 strokes: 59-72    Heart Disease Father 72        CABG    Hypertension Father     Cancer Sister         pancreatic    Cancer Maternal Aunt         breast    Cancer Paternal [de-identified]         breast    Schizophrenia Sister         was in [facility], now ass't living    Other Sister          AIDS    Other Other         nephew of AIDS       Social History     Socioeconomic History    Marital status:      Spouse name: Not on file    Number of children: Not on file    Years of education: Not on file    Highest education level: Not on file   Occupational History    Not on file   Social Needs    Financial resource strain: Not on file    Food insecurity:     Worry: Not on file     Inability: Not on file    Transportation needs:     Medical: Not on file     Non-medical: Not on file   Tobacco Use    Smoking status: Never Smoker    Smokeless tobacco: Never Used   Substance and Sexual Activity    Alcohol use: No    Drug use: No    Sexual activity: Yes     Partners: Male     Birth control/protection: None   Lifestyle    Physical activity:     Days per week: Not on file     Minutes per session: Not on file    Stress: Not on file   Relationships    Social connections:     Talks on phone: Not on file     Gets together: Not on file     Attends Christianity service: Not on file     Active member of club or organization: Not on file     Attends meetings of clubs or organizations: Not on file     Relationship status: Not on file    Intimate partner violence:     Fear of current or ex partner: Not on file     Emotionally abused: Not on file     Physically abused: Not on file     Forced sexual activity: Not on file   Other Topics Concern    Not on file   Social History Narrative    Lives with her  and father         ALLERGIES: Fentanyl; Erythromycin; Toradol [ketorolac]; and Morphine    Review of Systems   Unable to perform ROS: Mental status change       Vitals:    10/04/19 0416 10/04/19 0430   BP: (!) 180/136 (!) 196/113   Pulse: 98 95   Resp: 14 26   Temp: 98 °F (36.7 °C)    SpO2: 98% 100%   Weight: 72.6 kg (160 lb 0.9 oz)             Physical Exam   Constitutional: She appears well-developed and well-nourished. She appears lethargic. No distress. HENT:   Head: Normocephalic and atraumatic. Mouth/Throat: Oropharynx is clear and moist.   Eyes: Conjunctivae and EOM are normal.   Pupils 2mm   Neck: Normal range of motion and phonation normal.   Cardiovascular: Normal rate and regular rhythm. Thrill in LUE fistula, none in RUE access site   Pulmonary/Chest: She has no wheezes. She has no rales. Sonorous respirations when sedated   Abdominal: She exhibits no distension. Musculoskeletal: Normal range of motion. She exhibits no tenderness. Left forefoot amputation   Neurological: She appears lethargic. She is not disoriented. She exhibits normal muscle tone. Quickly becomes alert after Narcan doses   Skin: Skin is warm and dry. Nursing note and vitals reviewed. ED EKG interpretation:  Rhythm: normal sinus rhythm; and regular . Rate (approx.): 93; Axis: normal; P wave: normal; QRS interval: normal ; ST/T wave: normal; Other findings: normal, , QRS82, KDm889. This EKG was interpreted by Renée Ricketts MD,ED Provider. MDM       4:52 AM  Discussed with poison center patient has quickly re-sedated after 2 doses of 2mg Narcan. Receiving 3rd dose now. Recommend Narcan drip starting at 1.3mg/hr. APAP, ASA CBC, CMP UDS pending. Plan for admission after CT head and C spine completed. Hospitalist Clarita for Admission  5:09 AM    ED Room Number: SER08/08  Patient Name and age:  Otto Scherer 34 y.o.  female  Working Diagnosis:   1. Hyperglycemia    2.  Opiate overdose, undetermined intent, initial encounter ([facility]    3. Hypertension, unspecified type    4. ESRD (end stage renal disease) on dialysis [facility])      Readmission: no  Isolation Requirements:  no  Recommended Level of Care:  ICU  Code Status:  Full Code  Department:[facility] - [phone]  Other:  Was recently admitted [facility] on 9/28/19 for AMS related to Hypoglycemia, discharged on 10/1. Reports took 5-6 oxycodone tonight for fistula pain. She woke with 2mg intranasal narcan by EMS and resedated quickly after arrival.  Received 2mg narcan IV here, and again resedated 10 minutes later. Has received a total of 3, 2mg IV narcan doses. We are mixing up at drip now but that will nearly deplete our narcan stores here. We have called for Critical care transport to come. If she cannot get an ICU bed quickly, I may need to send her to the ED as [facility] so they will have access to more narcan. She is currently sleeping with RR 10 and O2 sat 100%.       Total critical care time spent exclusive of procedures:  55 minutes    Procedures

## 2019-10-04 NOTE — H&P
[address]   HISTORY AND PHYSICAL    Name:  Karissa Logan  MR#:  [id number]  :  1980  ACCOUNT #:  [de-identified]  ADMIT DATE:  10/04/2019    TYPE:  Inpatient ICU. CHIEF COMPLAINT:  Drug overdose. HISTORY OF PRESENT ILLNESS:  This is a 20-year-old female, who has end-stage renal disease, on hemodialysis, Tuesday, Thursday, Saturday, who is presented to the hospital from the [place] [facility] because of drug overdose. She does have history of chronic pain and takes opioids for that; is on benzos as well; history of diabetes type 2, gastroparesis, seizures, fibromyalgia, who had been brought from the [place] [facility]. Reportedly, then she was at her house and noted to be obtunded by the family members. They thought that her blood sugar might be low. They started giving her oral glucose immediately but with no response. Then they went ahead and called the EMS. On arrival, the EMS checked the blood glucose. It was more than 400 and they noticed that she had pinpoint pupils and they started giving her Narcan and when she woke up but went to sleep right back, they brought her to [facility] where she got a few more doses of Narcan. They got 2 mg intranasal Narcan by the EMS and then later on 2 mg IV with a total of 3 mg and then 2 mg IV Narcan doses at the [place] [facility]. Other than that, basically she was discharged from [facility] reportedly with hypoglycemia, but change in mental status was noticed and the urine at that time was also positive for hydrocodone and benzos, treated and was discharged. The summary is not yet available in our system, but I reviewed the labs at [facility]. The patient has arrived from [facility]. She is in the ICU and has been started on Narcan drip. PAST MEDICAL HISTORY:  Positive for:  1. End-stage renal disease. 2.  Diabetes type 2.  3.  Fibromyalgia. 4.  Gastroparesis. 5.  GERD. 6.  Hypertension. 7.  Narcotic dependence.   8.  Obstructive sleep apnea.  9.  Polycystic ovarian syndrome. 10.  Seizures. 11.  Sickle cell trait. 12.  Thromboembolus. PAST SURGICAL HISTORY:  1. History of amputation of the left foot. 2.  Cataract removal of the right side. 3.  Gastric bypass. 4.  Gastric pacer removed in 2015.  5.  Vascular access. FAMILY HISTORY:  Lung cancer and hypertension in the mother. Kidney cancer and strokes and CABG in the father. SOCIAL HISTORY:  She is  and lives with her  and father. ALLERGIES:  FENTANYL, ERYTHROMYCIN, TORADOL, AND MORPHINE. REVIEW OF SYSTEMS:  Unable to obtain because she is obtunded at this point of time. MEDICATIONS:  Prior to admission medication list is as follows: The patient is on:  1. Lantus U-100 10 units daily. 2.  Novolin is 5 units, details unknown. 3.  Tylenol 500 by mouth. 4.  PhosLo 667 two capsules three times daily with meals. 5.  Coreg 6.25, details unknown. 6.  Clonidine is 1.1 mg three times daily. 7.  Diazepam 2 mg by mouth. 8.  Trazodone is 50 by mouth. 9.  Calcium carbonate is 1000 daily. 10.  Effexor 75 twice daily. 11.  Sherrie-Colace is two tablets by mouth daily. 12.  Seroquel is 100 twice daily. 13.  Vitamin D3 is every seven days. 14.  Cyanocobalamin is 1000 daily. 15.  Norvasc is 10 daily. 16.  Calcitriol is 0.25 capsule by mouth daily. PHYSICAL EXAMINATION:  VITAL SIGNS:  The patient has a blood pressure of 180/136, pulse rate is 98, temperature 98 degrees Fahrenheit. GENERAL:  She is lethargic, but opens eyes to tactile stimulus. HEENT:  Head is normocephalic, atraumatic. Pupils are equal and constricted. NECK:  Supple. CARDIOVASCULAR:  S1, S2. No murmurs, rubs, or gallops heard. There is thrill in the left upper extremity fistula. CHEST:  Bilateral air entry equal.  Mild rhonchi. ABDOMEN:  Soft and nondistended. EXTREMITIES:  She is not moving any extremities. Hands in soft restraints. Left forefoot is amputated.   NEUROLOGIC:  She is lethargic, opens eyes to tactile stimulus only. She is oriented x0 when she wakes up and not able to follow commands. Cranial nerves cannot be detected. Rest of the neurological examination not able to perform because the patient is comatose. SKIN:  Warm and dry. PSYCH:  Cannot be assessed at this point of time. LABORATORY DATA:  Labs and imaging are as follows:  WBC 5.6, hemoglobin is 8.8, platelets 698. Sodium is 134, potassium 4.9, glucose of 601, creatinine of 4.8, alkaline phosphatase 295. EKG shows normal sinus rhythm and chest x-ray not available yet. ASSESSMENT AND PLAN:  A 76-year-old female with history of drug overdose and on dialysis, presents to the hospital with symptoms of opioid overdose. 1.  Drug overdose. The patient is admitted in the ICU with the possibility of opioid overdose. She has been started on Narcan drip, needs every two hours on neuro checks, and the Narcan drip is titratable. We are waiting for the urine toxicology to see if she overdosed on benzodiazepines as well. She has history of benzo and opioid overdose in the last hospitalization at [facility]. A CT of the head does not show any abnormality at this point of time. ABG has been ordered and we are waiting for the results and we will act accordingly. 2.  Hyperglycemia, diabetes mellitus type 2. She has been started on correction scale. She does take Lantus at home. I would keep the patient nothing by mouth and assess her blood sugars again. If it continues to be high then she would benefit from Lantus today despite of being nothing by mouth. 3.  Hypertension. Currently hypertensive. Cannot give p.o. medications. Started her on clonidine patch and p.r.n. hydralazine. If unable to control, we can start her on Cardene drip. 4.  Fibromyalgia. 5.  End-stage renal disease, on dialysis, Tuesday, Thursday, Saturday. Nephrology has been consulted. She will get hemodialysis accordingly.   6.  Deep venous thrombosis prophylaxis with Lovenox. 7.  The patient's ambulatory status unknown prior to the admission.       Leatha Lopez MD      [id number]  D:  10/04/2019 8:59  T:  10/04/2019 13:00  JOB #:  [id number]

## 2019-10-04 NOTE — ED NOTES
Pt. Woke up and started talking to staff post narcan and claims she took 3-4 oxycodone around 0000. Speech clear. Pt. Claims her L.  Arm fistula site is hurting and she has an apt  With nephrology

## 2019-10-04 NOTE — H&P
Dictated       Patient with drug overdose , opiod but additional drugs unknown. Urine toxicology awaited. ABG results awaited. She is on narcan gtt for now.

## 2019-10-04 NOTE — ED NOTES
TRANSFER - OUT REPORT:    Verbal report given to [facility] AirU4EA RJANINE(name) on Cyndy Calero  being transferred to icu 8 [facility](unit) for routine progression of care       Report consisted of patients Situation, Background, Assessment and   Recommendations(SBAR). Information from the following report(s) ED Summary was reviewed with the receiving nurse. Lines:   Venous Access Device 06/16/18 Upper chest (subclavicular area), left (Active)       Peripheral IV 10/04/19 Right;Upper Arm (Active)   Site Assessment Clean, dry, & intact 10/4/2019  4:26 AM   Phlebitis Assessment 0 10/4/2019  4:26 AM   Infiltration Assessment 0 10/4/2019  4:26 AM   Dressing Status Clean, dry, & intact 10/4/2019  4:26 AM   Dressing Type Transparent 10/4/2019  4:26 AM   Hub Color/Line Status Pink;Flushed;Patent 10/4/2019  4:26 AM   Action Taken Blood drawn 10/4/2019  4:26 AM   Alcohol Cap Used Yes 10/4/2019  4:26 AM        Opportunity for questions and clarification was provided.       Patient transported with:   Monitor  O2 @ 2 liters

## 2019-10-04 NOTE — ED NOTES
Placed on e.t.c.o.2. Nasal cannula. E.t.c.o.2. 38. No signs of reparatory distress. maintaining o2 sats 100%.

## 2019-10-04 NOTE — CONSULTS
[facility]   [address] [facility], [address] [place] [address], [facility]  Phone: [phone] NOTE     Patient: Pop Minor MRN: [id number]  PCP: Kristofer Ward MD   :     1980  Age:   34 y.o. Sex:  female      Referring physician: Mina Hewitt MD  Reason for consultation: 34 y.o. female with Hyperglycemia [R73.9]  Overdose of opiate or related narcotic, undetermined intent, sequela [Q26.918F] complicated by STONE   Admission Date: 10/4/2019  4:12 AM  LOS: 0 days      ASSESSMENT and PLAN :   ESRD-HD :  - Dialyzes TTS at [facility] and f/b FNA  - attempt HD today using AVF . - if unsuccessful, she needs fernandez catheter and vascular surgery consult for fistulogram   - HD again tomorrow     ? Drug Overdose   - she reports taking excess amount of Gabapentin and Baclofen   - dialysis will help   - mx per primary team     Anemia in CKD  - Retacrit w/ HD    Sec HPTH   - resume binders   - continue calcitriol if she is taking it at home     HTN :  - continue current meds    PVD   - hx of Left TMA     Care Plan discussed with:  Pt and RN     Thank you for consulting [facility] in the care of your patient. Subjective:   HPI: Pop Minor is a 34 y.o.  female who has been admitted to the hospital for drug overdose. Reports being with her fiances and became unresponsive. There was a questionable opioid overdose and she received IV narcan in ER. She is awake now and tells me that she has been taking excess amount of gabapentin and baclofen.  She has hx of drug over dose in the past     She reports starting dialysis last year and followed by [facility]   She dialyzes TTS and missed her HD yesterday   She reports having issues w/ her LUE AVF due to clots and poor flows     Past Medical Hx:   Past Medical History:   Diagnosis Date    ARF (acute renal failure) ([facility] [address] requiring dialysis 2011    Asthma     CKD (chronic kidney disease)     Diabetes ([address]     Fibromyalgia     Gastroparesis     Gastric Pacer- REMOVED 2015    GERD (gastroesophageal reflux disease)     Hypertension     Narcotic dependence ([address]     THU (obstructive sleep apnea)     wears 2 LPM oxygen at night    Other ill-defined conditions(799.89)     Polycystic ovarian syndrome     Seizures ([address]     Sickle cell trait ([facility] [address]     Thromboembolus ([facility] [address] to her left arm and was told she had one in left leg recently        Past Surgical Hx:     Past Surgical History:   Procedure Laterality Date    HX AMPUTATION FOOT  2018    left foot    HX CATARACT REMOVAL  3/5/12    right    HX DILATION AND CURETTAGE      ablation    HX GASTRIC BYPASS      HX GI      j tube placement and removal    HX OTHER SURGICAL      Gastric Pacer- REMOVED 2015    HX VASCULAR ACCESS      gray cath rt subclavian; removed     HX VASCULAR ACCESS      HD access right thigh; stopped working         Allergies   Allergen Reactions    Fentanyl Itching and Angioedema     \"throat closed up\" per pt      Erythromycin Itching    Toradol [Ketorolac] Rash    Morphine Itching       Social Hx:  reports that she has never smoked. She has never used smokeless tobacco. She reports that she does not drink alcohol or use drugs. Family History   Problem Relation Age of Onset    Cancer Mother         lung    Hypertension Mother     Cancer Father         kidney    Stroke Father         3 strokes: 59-72    Heart Disease Father 72        CABG    Hypertension Father     Cancer Sister         pancreatic    Cancer Maternal Aunt         breast    Cancer Paternal Aunt         breast    Schizophrenia Sister         was in [address], now ass't living    Other Sister          AIDS    Other Other         nephew of AIDS       Review of Systems:  A thorough twelve point review of system was performed today.  Pertinent positives and negatives are mentioned in the HPI. The reminder of the ROS is negative and noncontributory. Objective:    Vitals:    Vitals:    10/04/19 0515 10/04/19 0530 10/04/19 0624 10/04/19 0800   BP: 147/83 (!) 160/128 182/84 167/88   Pulse: 83 92 92 91   Resp: 17 20 15 11   Temp:   97.6 °F (36.4 °C) 96.2 °F (35.7 °C)   SpO2: 100% 100% 100% 100%   Weight:         I&O's:  10/03 0701 - 10/04 0700  In: 52.5 [I.V.:52.5]  Out: -   Visit Vitals  /88   Pulse 91   Temp 96.2 °F (35.7 °C)   Resp 11   Wt 72.6 kg (160 lb 0.9 oz)   SpO2 100%   BMI 29.27 kg/m²       Physical Exam:  General:  No apparent Distress  HEENT: PERRL,  No Pallor , No Icterus  Neck: Supple,no mass palpable  Lungs : CTA  CVS: RRR, S1 S2 normal, No murmur   Abdomen: Soft, NT, BS +  Extremities: LUE AVF + thrill and brui,     Skin: No rash or lesions. MS: TMA  Neurologic: non focal, AAO x 3  Psych: normal affect/    Laboratory Results:    Recent Labs     10/04/19  0429   *   K 4.9   CL 97   CO2 23   *   BUN 89*   CREA 4.80*   CA 7.6*   ALB 3.1*   SGOT 16   ALT 24     Recent Labs     10/04/19  0429   WBC 5.6   HGB 8.8*   HCT 27.6*   *     Lab Results   Component Value Date/Time    Specimen Description: URINE 06/24/2013 08:00 PM    Specimen Description: URINE 06/17/2013 07:55 PM    Specimen Description: URINE 05/26/2013 10:10 AM     Lab Results   Component Value Date/Time    Culture result: NO GROWTH 5 DAYS 05/27/2019 12:53 PM    Culture result: NO GROWTH 5 DAYS 06/27/2018 04:06 AM    Culture result: (A) 06/24/2018 07:30 AM     STREPTOCOCCUS MITIS/ORALIS GROWING IN 1 OF 2 BOTTLES DRAWN . .(SITE= L CHEST PICC) (SENSITIVITIES PERFORMED BY E-TEST)    Culture result: (A) 06/24/2018 07:30 AM     PRELIMINARY REPORT OF GRAM POSITIVE COCCI IN PAIRS GROWING IN 1 OF 2 BOTTLES DRAWN . ..(SITE= L CHEST PICC) CALLED TO AND READ BACK BY LAURO ACOSTA ([place]) ON 6/25/18 AT 8746 BY VI    Culture result: REMAINING BOTTLE(S) HAS/HAVE NO GROWTH IN 5 DAYS 06/24/2018 07:30 AM     Recent Results (from the past 24 hour(s))   GLUCOSE, POC    Collection Time: 10/04/19  4:16 AM   Result Value Ref Range    Glucose (POC) 553 (H) 65 - 100 mg/dL    Performed by Susana Holland    CBC WITH AUTOMATED DIFF    Collection Time: 10/04/19  4:29 AM   Result Value Ref Range    WBC 5.6 3.6 - 11.0 K/uL    RBC 3.39 (L) 3.80 - 5.20 M/uL    HGB 8.8 (L) 11.5 - 16.0 g/dL    HCT 27.6 (L) 35.0 - 47.0 %    MCV 81.4 80.0 - 99.0 FL    MCH 26.0 26.0 - 34.0 PG    MCHC 31.9 30.0 - 36.5 g/dL    RDW 15.8 (H) 11.5 - 14.5 %    PLATELET 588 (L) 710 - 400 K/uL    MPV 13.0 (H) 8.9 - 12.9 FL    NRBC 0.0 0  WBC    ABSOLUTE NRBC 0.00 0.00 - 0.01 K/uL    NEUTROPHILS 71 32 - 75 %    LYMPHOCYTES 13 12 - 49 %    MONOCYTES 10 5 - 13 %    EOSINOPHILS 6 0 - 7 %    BASOPHILS 0 0 - 1 %    IMMATURE GRANULOCYTES 0 0.0 - 0.5 %    ABS. NEUTROPHILS 4.0 1.8 - 8.0 K/UL    ABS. LYMPHOCYTES 0.7 (L) 0.8 - 3.5 K/UL    ABS. MONOCYTES 0.6 0.0 - 1.0 K/UL    ABS. EOSINOPHILS 0.3 0.0 - 0.4 K/UL    ABS. BASOPHILS 0.0 0.0 - 0.1 K/UL    ABS. IMM. GRANS. 0.0 0.00 - 0.04 K/UL    DF SMEAR SCANNED      RBC COMMENTS ANISOCYTOSIS  1+        RBC COMMENTS TARGET CELLS  PRESENT       METABOLIC PANEL, COMPREHENSIVE    Collection Time: 10/04/19  4:29 AM   Result Value Ref Range    Sodium 134 (L) 136 - 145 mmol/L    Potassium 4.9 3.5 - 5.1 mmol/L    Chloride 97 97 - 108 mmol/L    CO2 23 21 - 32 mmol/L    Anion gap 14 5 - 15 mmol/L    Glucose 601 (HH) 65 - 100 mg/dL    BUN 89 (H) 6 - 20 MG/DL    Creatinine 4.80 (H) 0.55 - 1.02 MG/DL    BUN/Creatinine ratio 19 12 - 20      GFR est AA 12 (L) >60 ml/min/1.73m2    GFR est non-AA 10 (L) >60 ml/min/1.73m2    Calcium 7.6 (L) 8.5 - 10.1 MG/DL    Bilirubin, total 0.4 0.2 - 1.0 MG/DL    ALT (SGPT) 24 12 - 78 U/L    AST (SGOT) 16 15 - 37 U/L    Alk.  phosphatase 295 (H) 45 - 117 U/L    Protein, total 6.7 6.4 - 8.2 g/dL    Albumin 3.1 (L) 3.5 - 5.0 g/dL    Globulin 3.6 2.0 - 4.0 g/dL    A-G Ratio 0.9 (L) 1.1 - 2.2     ACETAMINOPHEN    Collection Time: 10/04/19  4:29 AM   Result Value Ref Range    Acetaminophen level <2 (L) 10 - 30 ug/mL   SALICYLATE    Collection Time: 10/04/19  4:29 AM   Result Value Ref Range    Salicylate level <5.3 (L) 2.8 - 20.0 MG/DL   ETHYL ALCOHOL    Collection Time: 10/04/19  4:29 AM   Result Value Ref Range    ALCOHOL(ETHYL),SERUM <10 <10 MG/DL   EKG, 12 LEAD, INITIAL    Collection Time: 10/04/19  4:33 AM   Result Value Ref Range    Ventricular Rate 93 BPM    Atrial Rate 93 BPM    P-R Interval 152 ms    QRS Duration 82 ms    Q-T Interval 374 ms    QTC Calculation (Bezet) 465 ms    Calculated P Axis 54 degrees    Calculated R Axis 22 degrees    Calculated T Axis 50 degrees    Diagnosis       Normal sinus rhythm  Normal ECG  When compared with ECG of 24-AUG-2019 22:53,  No significant change was found     GLUCOSE, POC    Collection Time: 10/04/19  6:32 AM   Result Value Ref Range    Glucose (POC) 466 (H) 65 - 100 mg/dL    Performed by Marvel Duque    URINALYSIS W/MICROSCOPIC    Collection Time: 10/04/19  7:15 AM   Result Value Ref Range    Color YELLOW/STRAW      Appearance CLOUDY (A) CLEAR      Specific gravity 1.013 1.003 - 1.030      pH (UA) 7.0 5.0 - 8.0      Protein 30 (A) NEG mg/dL    Glucose >1,000 (A) NEG mg/dL    Ketone NEGATIVE  NEG mg/dL    Bilirubin NEGATIVE  NEG      Blood SMALL (A) NEG      Urobilinogen 0.2 0.2 - 1.0 EU/dL    Nitrites NEGATIVE  NEG      Leukocyte Esterase MODERATE (A) NEG      WBC 20-50 0 - 4 /hpf    RBC 0-5 0 - 5 /hpf    Epithelial cells FEW FEW /lpf    Bacteria 1+ (A) NEG /hpf    Budding yeast PRESENT (A) NEG     URINE CULTURE HOLD SAMPLE    Collection Time: 10/04/19  7:15 AM   Result Value Ref Range    Urine culture hold        URINE ON HOLD IN MICROBIOLOGY DEPT FOR 3 DAYS. IF UNPRESERVED URINE IS SUBMITTED, IT CANNOT BE USED FOR ADDITIONAL TESTING AFTER 24 HRS, RECOLLECTION WILL BE REQUIRED.          Urine dipstick:   Lab Results   Component Value Date/Time Color YELLOW/STRAW 10/04/2019 07:15 AM    Appearance CLOUDY (A) 10/04/2019 07:15 AM    Specific gravity 1.013 10/04/2019 07:15 AM    Specific gravity 1.019 04/07/2019 08:49 AM    pH (UA) 7.0 10/04/2019 07:15 AM    Protein 30 (A) 10/04/2019 07:15 AM    Glucose >1,000 (A) 10/04/2019 07:15 AM    Ketone NEGATIVE  10/04/2019 07:15 AM    Bilirubin NEGATIVE  10/04/2019 07:15 AM    Urobilinogen 0.2 10/04/2019 07:15 AM    Nitrites NEGATIVE  10/04/2019 07:15 AM    Leukocyte Esterase MODERATE (A) 10/04/2019 07:15 AM    Epithelial cells FEW 10/04/2019 07:15 AM    Bacteria 1+ (A) 10/04/2019 07:15 AM    WBC 20-50 10/04/2019 07:15 AM    RBC 0-5 10/04/2019 07:15 AM       I have reviewed the following: All pertinent labs, microbiology data, radiology imaging for my assessment     Medications list Personally Reviewed   [x]      Yes     []               No       Medications:  Prior to Admission medications    Medication Sig Start Date End Date Taking? Authorizing Provider   LANTUS U-100 INSULIN 100 unit/mL injection 10 Units daily. 7/31/19   Ryan Lozano MD   NOVOLIN N NPH U-100 INSULIN 100 unit/mL injection 5 Units. Indications: pt. taking differently 8/1/19   Ryan Lozano MD   b complex-vitamin c-folic acid (NEPHROCAPS) 1 mg capsule Take 1 Cap by mouth. Ryan Lozano MD   acetaminophen (TYLENOL) 500 mg tablet Take 500 mg by mouth. Ryan Lozano MD   calcium acetate (PHOSLO) 667 mg cap TAKE 2 CAPSULES BY MOUTH THREE TIMES DAILY WITH MEALS 6/10/19   Ryan Lozano MD   carvedilol (COREG) 6.25 mg tablet Take 6.25 mg by mouth. Ryan Lozano MD   cloNIDine HCl (CATAPRES) 0.1 mg tablet 1 TABLET BY MOUTH 3 TIMES A DAY AS DIRECTED-2 AT BEDTIME OR LAST DOSE OF DAY-CHECK BP AS DISCUSSED 6/11/19   Ryan Lozano MD   diazePAM (VALIUM) 2 mg tablet Take 2 mg by mouth. Ryan Lozano MD   traZODone (DESYREL) 50 mg tablet Take 50 mg by mouth nightly. Other, MD Ryan   FOLIC ACID PO Take  by mouth.     Other, MD Ryan   vitamin e (E GEMS) 1,000 unit capsule Take 1,000 Units by mouth daily. Ryan Lozano MD   FERROUS SULFATE DRIED PO Take  by mouth three (3) times daily. Ryan Lozano MD   calcium carbonate (TUMS PO) Take 1,000 mg by mouth daily. Ryan Lozano MD   promethazine (PHENERGAN) 25 mg tablet Take 1 Tab by mouth every six (6) hours as needed for Nausea. 9/15/18   Angel Shah MD   amLODIPine (NORVASC) 10 mg tablet Take 1 Tab by mouth daily. 7/4/18   Muriel Simon MD   calcitRIOL (ROCALTROL) 0.25 mcg capsule Take 1 Cap by mouth daily. 7/4/18   Muriel Simon MD   sodium bicarbonate 650 mg tablet Take 1 Tab by mouth two (2) times a day. 7/4/18   Muriel Simon MD   Diabetic Supplies, Miscellan. kit USE AS DIRECTED 7/4/18   Muriel Simon MD   [facility]) 75 mg tablet Take 1 Tab by mouth two (2) times daily (with meals). 7/4/18   Muriel Simon MD   senna-docusate (PERICOLACE) 8.6-50 mg per tablet Take 2 Tabs by mouth daily. 7/4/18   Muriel Simon MD   QUEtiapine (SEROQUEL) 100 mg tablet Take 1 Tab by mouth two (2) times a day. 7/4/18   Muriel Simon MD   cholecalciferol (VITAMIN D3) 50,000 unit capsule Take 1 Cap by mouth every seven (7) days. 7/2/18   Eloy Burrows MD   cyanocobalamin 1,000 mcg tablet Take 1,000 mcg by mouth daily. Provider, Historical   albuterol (PROVENTIL VENTOLIN) 2.5 mg /3 mL (0.083 %) nebulizer solution 2.5 mg by Nebulization route every four (4) hours as needed. Provider, Historical        Thank you for allowing us to participate in the care of this patient. We will follow patient. Please dont hesitate to call with any questions    Jasper Liu MD  [facility]   [address] [facility] [address]  Phone - [phone]   Fax - [phone]  [email or web address]

## 2019-10-04 NOTE — ED NOTES
During ct pt. Sleeping. Pt. Snoring. o2 sats decreased to 90% and was placed on 2L n.c. o2 and sats increased to 100%. Full inspiratory and expiratory effort.

## 2019-10-04 NOTE — PROCEDURES
Central Line Placement    Start time: 10/4/2019 11:11 AM  End time: 10/4/2019 11:25 AM  Performed by: Nesha Flower MD  Authorized by: Nesha Flower MD     Indications: vascular access  Preanesthetic Checklist: patient identified, risks and benefits discussed, anesthesia consent, site marked, patient being monitored and timeout performed    Timeout Time: 11:10       Pre-procedure: All elements of maximal sterile barrier technique followed? Yes    2% Chlorhexidine for cutaneous antisepsis and Hand hygiene performed prior to catheter insertion              Procedure:   Prep:  ChloraPrep  Location:  Subclavian  Orientation:  Left  Patient position:  Trendelenburg  Catheter type:  Quad lumen  Catheter size:  8.5 Fr  Catheter length:  20 cm  Number of attempts:  1  Successful placement: Yes      Assessment:   Post-procedure:  Catheter secured, sterile dressing applied and sterile dressing with CHG applied  Assessment:  Placement verified by x-ray, free fluid flow, blood return through all ports and guidewire removal verified  Insertion:  Uncomplicated  Patient tolerance:  Patient tolerated the procedure well with no immediate complications  R and L IJ not identified by US, ? occluded.  L subclavian accessed

## 2019-10-05 LAB
ANION GAP SERPL CALC-SCNC: 7 MMOL/L (ref 5–15)
BACTERIA SPEC CULT: NORMAL
BASOPHILS # BLD: 0 K/UL (ref 0–0.1)
BASOPHILS NFR BLD: 0 % (ref 0–1)
BUN SERPL-MCNC: 40 MG/DL (ref 6–20)
BUN/CREAT SERPL: 15 (ref 12–20)
CALCIUM SERPL-MCNC: 8.1 MG/DL (ref 8.5–10.1)
CC UR VC: NORMAL
CHLORIDE SERPL-SCNC: 104 MMOL/L (ref 97–108)
CO2 SERPL-SCNC: 27 MMOL/L (ref 21–32)
CREAT SERPL-MCNC: 2.68 MG/DL (ref 0.55–1.02)
DIFFERENTIAL METHOD BLD: ABNORMAL
EOSINOPHIL # BLD: 0.2 K/UL (ref 0–0.4)
EOSINOPHIL NFR BLD: 4 % (ref 0–7)
ERYTHROCYTE [DISTWIDTH] IN BLOOD BY AUTOMATED COUNT: 14.8 % (ref 11.5–14.5)
GLUCOSE BLD STRIP.AUTO-MCNC: 105 MG/DL (ref 65–100)
GLUCOSE BLD STRIP.AUTO-MCNC: 129 MG/DL (ref 65–100)
GLUCOSE BLD STRIP.AUTO-MCNC: 136 MG/DL (ref 65–100)
GLUCOSE BLD STRIP.AUTO-MCNC: 142 MG/DL (ref 65–100)
GLUCOSE BLD STRIP.AUTO-MCNC: 174 MG/DL (ref 65–100)
GLUCOSE BLD STRIP.AUTO-MCNC: 179 MG/DL (ref 65–100)
GLUCOSE SERPL-MCNC: 152 MG/DL (ref 65–100)
HCT VFR BLD AUTO: 27.7 % (ref 35–47)
HGB BLD-MCNC: 8.8 G/DL (ref 11.5–16)
IMM GRANULOCYTES # BLD AUTO: 0 K/UL (ref 0–0.04)
IMM GRANULOCYTES NFR BLD AUTO: 1 % (ref 0–0.5)
LYMPHOCYTES # BLD: 1.1 K/UL (ref 0.8–3.5)
LYMPHOCYTES NFR BLD: 20 % (ref 12–49)
MAGNESIUM SERPL-MCNC: 1.8 MG/DL (ref 1.6–2.4)
MCH RBC QN AUTO: 25.9 PG (ref 26–34)
MCHC RBC AUTO-ENTMCNC: 31.8 G/DL (ref 30–36.5)
MCV RBC AUTO: 81.5 FL (ref 80–99)
MONOCYTES # BLD: 0.5 K/UL (ref 0–1)
MONOCYTES NFR BLD: 8 % (ref 5–13)
NEUTS SEG # BLD: 3.6 K/UL (ref 1.8–8)
NEUTS SEG NFR BLD: 67 % (ref 32–75)
NRBC # BLD: 0 K/UL (ref 0–0.01)
NRBC BLD-RTO: 0 PER 100 WBC
PLATELET # BLD AUTO: 151 K/UL (ref 150–400)
PMV BLD AUTO: 12.4 FL (ref 8.9–12.9)
POTASSIUM SERPL-SCNC: 4.3 MMOL/L (ref 3.5–5.1)
RBC # BLD AUTO: 3.4 M/UL (ref 3.8–5.2)
SERVICE CMNT-IMP: ABNORMAL
SERVICE CMNT-IMP: NORMAL
SODIUM SERPL-SCNC: 138 MMOL/L (ref 136–145)
WBC # BLD AUTO: 5.4 K/UL (ref 3.6–11)

## 2019-10-05 PROCEDURE — 74011250636 HC RX REV CODE- 250/636: Performed by: INTERNAL MEDICINE

## 2019-10-05 PROCEDURE — 83735 ASSAY OF MAGNESIUM: CPT

## 2019-10-05 PROCEDURE — 36591 DRAW BLOOD OFF VENOUS DEVICE: CPT

## 2019-10-05 PROCEDURE — 90935 HEMODIALYSIS ONE EVALUATION: CPT

## 2019-10-05 PROCEDURE — 80048 BASIC METABOLIC PNL TOTAL CA: CPT

## 2019-10-05 PROCEDURE — 51798 US URINE CAPACITY MEASURE: CPT

## 2019-10-05 PROCEDURE — 5A09357 ASSISTANCE WITH RESPIRATORY VENTILATION, LESS THAN 24 CONSECUTIVE HOURS, CONTINUOUS POSITIVE AIRWAY PRESSURE: ICD-10-PCS | Performed by: INTERNAL MEDICINE

## 2019-10-05 PROCEDURE — 5A1D70Z PERFORMANCE OF URINARY FILTRATION, INTERMITTENT, LESS THAN 6 HOURS PER DAY: ICD-10-PCS | Performed by: INTERNAL MEDICINE

## 2019-10-05 PROCEDURE — 74011250637 HC RX REV CODE- 250/637: Performed by: INTERNAL MEDICINE

## 2019-10-05 PROCEDURE — 65270000029 HC RM PRIVATE

## 2019-10-05 PROCEDURE — 36415 COLL VENOUS BLD VENIPUNCTURE: CPT

## 2019-10-05 PROCEDURE — 85025 COMPLETE CBC W/AUTO DIFF WBC: CPT

## 2019-10-05 PROCEDURE — 74011636637 HC RX REV CODE- 636/637: Performed by: INTERNAL MEDICINE

## 2019-10-05 RX ORDER — INSULIN LISPRO 100 [IU]/ML
INJECTION, SOLUTION INTRAVENOUS; SUBCUTANEOUS
Status: DISCONTINUED | OUTPATIENT
Start: 2019-10-06 | End: 2019-10-07 | Stop reason: HOSPADM

## 2019-10-05 RX ORDER — AMLODIPINE BESYLATE 5 MG/1
10 TABLET ORAL DAILY
Status: DISCONTINUED | OUTPATIENT
Start: 2019-10-05 | End: 2019-10-07 | Stop reason: HOSPADM

## 2019-10-05 RX ORDER — CLONIDINE HYDROCHLORIDE 0.1 MG/1
0.1 TABLET ORAL 3 TIMES DAILY
Status: DISCONTINUED | OUTPATIENT
Start: 2019-10-05 | End: 2019-10-07 | Stop reason: HOSPADM

## 2019-10-05 RX ORDER — METOPROLOL TARTRATE 5 MG/5ML
1.25 INJECTION INTRAVENOUS
Status: DISCONTINUED | OUTPATIENT
Start: 2019-10-05 | End: 2019-10-07 | Stop reason: HOSPADM

## 2019-10-05 RX ORDER — METOPROLOL TARTRATE 5 MG/5ML
1.25 INJECTION INTRAVENOUS
Status: DISCONTINUED | OUTPATIENT
Start: 2019-10-05 | End: 2019-10-05

## 2019-10-05 RX ORDER — CARVEDILOL 6.25 MG/1
6.25 TABLET ORAL 2 TIMES DAILY WITH MEALS
Status: DISCONTINUED | OUTPATIENT
Start: 2019-10-05 | End: 2019-10-07 | Stop reason: HOSPADM

## 2019-10-05 RX ADMIN — CARVEDILOL 6.25 MG: 6.25 TABLET, FILM COATED ORAL at 18:12

## 2019-10-05 RX ADMIN — AMLODIPINE BESYLATE 10 MG: 5 TABLET ORAL at 18:12

## 2019-10-05 RX ADMIN — Medication 10 ML: at 22:00

## 2019-10-05 RX ADMIN — INSULIN LISPRO 2 UNITS: 100 INJECTION, SOLUTION INTRAVENOUS; SUBCUTANEOUS at 04:36

## 2019-10-05 RX ADMIN — HEPARIN SODIUM 5000 UNITS: 5000 INJECTION INTRAVENOUS; SUBCUTANEOUS at 18:06

## 2019-10-05 RX ADMIN — ACETAMINOPHEN 650 MG: 325 TABLET, FILM COATED ORAL at 21:11

## 2019-10-05 RX ADMIN — Medication 10 ML: at 08:24

## 2019-10-05 RX ADMIN — Medication 10 ML: at 13:07

## 2019-10-05 RX ADMIN — HYDRALAZINE HYDROCHLORIDE 20 MG: 20 INJECTION INTRAMUSCULAR; INTRAVENOUS at 13:05

## 2019-10-05 RX ADMIN — HEPARIN SODIUM 5000 UNITS: 5000 INJECTION INTRAVENOUS; SUBCUTANEOUS at 09:32

## 2019-10-05 RX ADMIN — Medication 40 ML: at 22:00

## 2019-10-05 RX ADMIN — HYDRALAZINE HYDROCHLORIDE 20 MG: 20 INJECTION INTRAMUSCULAR; INTRAVENOUS at 04:14

## 2019-10-05 RX ADMIN — HYDRALAZINE HYDROCHLORIDE 20 MG: 20 INJECTION INTRAMUSCULAR; INTRAVENOUS at 15:31

## 2019-10-05 RX ADMIN — HEPARIN SODIUM 5000 UNITS: 5000 INJECTION INTRAVENOUS; SUBCUTANEOUS at 03:19

## 2019-10-05 RX ADMIN — CLONIDINE HYDROCHLORIDE 0.1 MG: 0.1 TABLET ORAL at 22:58

## 2019-10-05 RX ADMIN — INSULIN LISPRO 2 UNITS: 100 INJECTION, SOLUTION INTRAVENOUS; SUBCUTANEOUS at 16:41

## 2019-10-05 NOTE — PROGRESS NOTES
1930. Bedside and Verbal shift change report given to Star Zavaleta RN (oncoming nurse) by David Hubbard RN (offgoing nurse). Report included the following information SBAR, Kardex, ED Summary, Procedure Summary, Intake/Output, MAR, Accordion, Recent Results, Med Rec Status, Cardiac Rhythm NSR, Alarm Parameters  and Dual Neuro Assessment.     2000. Neuro as follows: A/O x3, Following commands, pupils sluggish and reactive. The left is slighlty smaller than the right.     2057. Pt obtunded, unable to arouse with sternal rub. Woke pt up using nailbed pressure. Neuro assessment unchanged. Pt started rhythmically twitching at this time and states this is what her seizures look like. Pt seizure activity lasted for 5 mins. 2101. 2mg of Ativan given for seizure. 2115. Jolynn Villalpando RN starting dialysis. Pt. BP dropped as low as 79/57 at 2130. mervin MARTIN gave 250ml bolus of fluid and orders were received for albumin.

## 2019-10-05 NOTE — PROGRESS NOTES
Hospitalist Progress Note  Lorrie Palmer MD  Answering service: [phone] from in house phone        Date of Service:  10/5/2019  NAME:  Chato Aguilar  :  1980  MRN:  [id number]      Admission Summary:   53-year-old female, who has end-stage renal disease, on hemodialysis, Tuesday, Thursday, Saturday, who is presented to the hospital from the [facility] because of drug overdose. She does have history of chronic pain and takes opioids for that; is on benzos as well; history of diabetes type 2, gastroparesis, seizures, fibromyalgia, who had been brought from the [facility]. Reportedly, then she was at her house and noted to be obtunded by the family members. They thought that her blood sugar might be low. They started giving her oral glucose immediately but with no response. Then they went ahead and called the EMS. On arrival, the EMS checked the blood glucose. It was more than 400 and they noticed that she had pinpoint pupils and they started giving her Narcan and when she woke up but went to sleep right back, they brought her to [facility] where she got a few more doses of Narcan. They got 2 mg intranasal Narcan by the EMS and then later on 2 mg IV with a total of 3 mg and then 2 mg IV Narcan doses at the [facility]. Other than that, basically she was discharged from [facility] reportedly with hypoglycemia, but change in mental status was noticed and the urine at that time was also positive for hydrocodone and benzos, treated and was discharged. The summary is not yet available in our system, but I reviewed the labs at [facility]. The patient has arrived from [facility].   She is in the ICU and has been started on Narcan drip    Interval history / Subjective:       Patient seen and examined and I talked to ICU nurse   She is awake and undergoing HD for now   She is asking for dilaudid and phenergan      Assessment & Plan:       .  Drug overdose. With benzo and cocaine poa     The patient is admitted in the ICU with the possibility of opioid overdose. She has been started on Narcan drip,  And later yovany stopped as utox was negative for opiods   Ct head NAD   Today she is alert and oriented   . Hyperglycemia, diabetes mellitus type 2. Was started on insulin gtt and was hypoglycemic and was stopped later   She has been started on correction scale. lantus if needed         Hypertension. Started her on clonidine patch and p.r.n. hydralazine. Added norvasc and unheld it    If unable to control, we can start her on Cardene drip. .  Fibromyalgia. Please no benzo or opiod for her       End-stage renal disease,   on dialysis, Tuesday, Thursday, Saturday  . Nephrology has been consulted. She is getting hemodialysis today       Tachycardia   May have started showing withdrawal symptoms       Code status: full   DVT prophylaxis:     Care Plan discussed with: Nurse  Disposition: TBD     Hospital Problems  Date Reviewed: 6/10/2018          Codes Class Noted POA    Hyperglycemia ICD-10-CM: R73.9  ICD-9-CM: 790.29  10/4/2019 Unknown        Overdose of opiate or related narcotic, undetermined intent, sequela ICD-10-CM: T40.604S  ICD-9-CM: 909.0, E989  10/4/2019 Unknown                Review of Systems:   A comprehensive review of systems was negative except for that written in the HPI. Vital Signs:    Last 24hrs VS reviewed since prior progress note. Most recent are:  Visit Vitals  /83   Pulse (!) 125   Temp 98.3 °F (36.8 °C)   Resp 18   Wt 72.6 kg (160 lb 0.9 oz)   SpO2 98%   BMI 29.27 kg/m²         Intake/Output Summary (Last 24 hours) at 10/5/2019 1724  Last data filed at 10/5/2019 1110  Gross per 24 hour   Intake    Output 3000 ml   Net -3000 ml        Physical Examination:             Constitutional:  No acute distress,awake    ENT:  Oral mucous moist, oropharynx benign. Resp:  CTA bilaterally.  No wheezing/rhonchi/rales. No accessory muscle use   CV:  Regular rhythm, normal rate, no murmurs, gallops, rubs    GI:  Soft, non distended, non tender. normoactive bowel sounds, no hepatosplenomegaly     Musculoskeletal:  No edema, warm, 2+ pulses throughout    Neurologic:  Moves all extremities. AAOx3, CN II-XII reviewed            Data Review:    Review and/or order of clinical lab test      Labs:     Recent Labs     10/05/19  0418 10/04/19  0429   WBC 5.4 5.6   HGB 8.8* 8.8*   HCT 27.7* 27.6*    147*     Recent Labs     10/05/19  0416 10/04/19  1449 10/04/19  0429     --  134*   K 4.3  --  4.9     --  97   CO2 27  --  23   BUN 40*  --  89*   CREA 2.68*  --  4.80*   *  --  601*   CA 8.1*  --  7.6*   MG 1.8  --   --    PHOS  --  5.9*  --      Recent Labs     10/04/19  1449 10/04/19  0429   SGOT 16 16   ALT 26 24   * 295*   TBILI 0.3 0.4   TP 6.7 6.7   ALB 3.2* 3.1*   GLOB 3.5 3.6     No results for input(s): INR, PTP, APTT, INREXT in the last 72 hours. No results for input(s): FE, TIBC, PSAT, FERR in the last 72 hours. Lab Results   Component Value Date/Time    Folate 33.8 (H) 05/07/2018 11:20 AM      No results for input(s): PH, PCO2, PO2 in the last 72 hours.   Recent Labs     10/04/19  1449   TROIQ <0.05     Lab Results   Component Value Date/Time    Cholesterol, total 128 06/10/2018 07:44 AM    HDL Cholesterol 51 06/10/2018 07:44 AM    LDL, calculated 67.4 06/10/2018 07:44 AM    Triglyceride 48 06/10/2018 07:44 AM    CHOL/HDL Ratio 2.5 06/10/2018 07:44 AM     Lab Results   Component Value Date/Time    Glucose (POC) 179 (H) 10/05/2019 04:21 PM    Glucose (POC) 129 (H) 10/05/2019 11:34 AM    Glucose (POC) 136 (H) 10/05/2019 08:21 AM    Glucose (POC) 142 (H) 10/05/2019 04:27 AM    Glucose (POC) 105 (H) 10/04/2019 11:23 PM     Lab Results   Component Value Date/Time    Color YELLOW/STRAW 10/04/2019 07:15 AM    Appearance CLOUDY (A) 10/04/2019 07:15 AM    Specific gravity 1.013 10/04/2019 07:15 AM    Specific gravity 1.019 04/07/2019 08:49 AM    pH (UA) 7.0 10/04/2019 07:15 AM    Protein 30 (A) 10/04/2019 07:15 AM    Glucose >1,000 (A) 10/04/2019 07:15 AM    Ketone NEGATIVE  10/04/2019 07:15 AM    Bilirubin NEGATIVE  10/04/2019 07:15 AM    Urobilinogen 0.2 10/04/2019 07:15 AM    Nitrites NEGATIVE  10/04/2019 07:15 AM    Leukocyte Esterase MODERATE (A) 10/04/2019 07:15 AM    Epithelial cells FEW 10/04/2019 07:15 AM    Bacteria 1+ (A) 10/04/2019 07:15 AM    WBC 20-50 10/04/2019 07:15 AM    RBC 0-5 10/04/2019 07:15 AM         Medications Reviewed:     Current Facility-Administered Medications   Medication Dose Route Frequency    sodium chloride (NS) flush 5-40 mL  5-40 mL IntraVENous Q8H    sodium chloride (NS) flush 5-40 mL  5-40 mL IntraVENous PRN    sodium chloride (NS) flush 5-40 mL  5-40 mL IntraVENous Q8H    sodium chloride (NS) flush 5-40 mL  5-40 mL IntraVENous PRN    acetaminophen (TYLENOL) tablet 650 mg  650 mg Oral Q4H PRN    [Held by provider] amLODIPine (NORVASC) tablet 10 mg  10 mg Oral DAILY    [Held by provider] calcitRIOL (ROCALTROL) capsule 0.25 mcg  0.25 mcg Oral DAILY    [Held by provider] cloNIDine (CATAPRES) 0.1 mg/24 hr patch 1 Patch  1 Patch TransDERmal Q7D    glucose chewable tablet 16 g  4 Tab Oral PRN    glucagon (GLUCAGEN) injection 1 mg  1 mg IntraMUSCular PRN    dextrose 10% infusion 0-250 mL  0-250 mL IntraVENous PRN    epoetin bernard-epbx (RETACRIT) injection 10,000 Units  10,000 Units SubCUTAneous Q TUE, THU & SAT    heparin (porcine) injection 5,000 Units  5,000 Units SubCUTAneous Q8H    hydrALAZINE (APRESOLINE) 20 mg/mL injection 20 mg  20 mg IntraVENous Q2H PRN    insulin lispro (HUMALOG) injection   SubCUTAneous Q4H    LORazepam (ATIVAN) injection 2 mg  2 mg IntraVENous Q2H PRN     ______________________________________________________________________  EXPECTED LENGTH OF STAY: 3d 12h  ACTUAL LENGTH OF STAY:          1                 [facility] Mauricio Loyd MD

## 2019-10-05 NOTE — DIALYSIS
TRANSFER - IN REPORT:    Verbal report received from JAYNA Butt on [email or web address] for ordered procedure      Report consisted of patients Situation, Background, Assessment and   Recommendations(SBAR). Information from the following report(s) SBAR and MAR was reviewed with the receiving nurse. Opportunity for questions and clarification was provided. Assessment completed upon patients arrival to unit and care assumed.

## 2019-10-05 NOTE — PROCEDURES
[facility]  [phone]    Vitals   Pre   Post   Assessment   Pre   Post     Temp  Temp: 98.6 °F (37 °C) (10/05/19 0810)   LOC  A&Ox4 A&Ox4   HR   Pulse (Heart Rate): (!) 115 (10/05/19 0810) 115   Lungs   Clear, RA Remains on RA   B/P  BP: 137/80 (10/05/19 0810) 144/66 Cardiac   S1S2, NSR-tachy Remains tachycardic 110s   Resp   Resp Rate: 20 (10/05/19 0810) 23 Skin   Warm and dry No change   Pain level  Pain Intensity 1: 0 (10/05/19 0400)  Edema  None   No change   Orders:    Duration:   Start:    0810 End:    1110 Total:   3 hrs   Dialyzer:   Dialyzer/Set Up Inspection: Leontine Hard (10/05/19 0810)   K Bath:   Dialysate K (mEq/L): 3.5 (10/05/19 0810)   Ca Bath:   Dialysate CA (mEq/L): 2.5 (10/05/19 0810)   Na/Bicarb:   Dialysate NA (mEq/L): 140 (10/05/19 0810)   Target Fluid Removal:   Goal/Amount of Fluid to Remove (mL): 2000 mL (10/05/19 0810)   Access     Type & Location:   Right femoral non-tunneled CVC. BFR: 300-350 with acceptable AP/ pressures. Labs     Obtained/Reviewed   Critical Results Called   Date when labs were drawn-  Hgb-    HGB   Date Value Ref Range Status   10/05/2019 8.8 (L) 11.5 - 16.0 g/dL Final     K-    Potassium   Date Value Ref Range Status   10/05/2019 4.3 3.5 - 5.1 mmol/L Final     Ca-   Calcium   Date Value Ref Range Status   10/05/2019 8.1 (L) 8.5 - 10.1 MG/DL Final     Bun-   BUN   Date Value Ref Range Status   10/05/2019 40 (H) 6 - 20 MG/DL Final     Comment:     INVESTIGATED PER DELTA CHECK PROTOCOL     Creat-   Creatinine   Date Value Ref Range Status   10/05/2019 2.68 (H) 0.55 - 1.02 MG/DL Final     Comment:     INVESTIGATED PER DELTA CHECK PROTOCOL      Medications/ Blood Products Given     Name   Dose   Route and Time     None given                Blood Volume Processed (BVP):    53.6 L Net Fluid   Removed:  2000 mL   Comments   Time Out Done: 0800  Primary Nurse Rpt Pre: JAYNA Rincon RN  Primary Nurse Rpt Post: GLENROY Seymour RN  Pt Education: CVC precautions  Care Plan: HD today, cont TTS  Tx Summary:  0800: Safety checks complete, time out performed. 1240: CVC assessed, no redness, warmth or drainage noted. Transparent dressing and biopatch CDI. Each catheter limb disinfected for 60 seconds per limb with alcohol. Caps removed, dialysis CVC hub scrubbed with prevantics for 5 seconds, followed by a 5 second dry time per Hospital P&P. Aspirated and discarded 5 mL blood. +aspiration/flush. HD treatment initiated. Medications reviewed. Access visible, lines secure. 1110: HD treatment complete. All possible blood returned to patient. De-accessed CVC per Hospital P&P with hubs scrubbed with prevantics for 5 seconds, followed by a 5 second dry time, each catheter limb disinfected for 60 seconds per limb with alcohol swabs. Flushed and locked. Red and blue dialysis caps applied to ports. VSS. SBAR called to primary RN.      Admitting Diagnosis: overdose  Pt's previous clinic: [facility]   Informed Consent Verified: Yes (10/05/19 0810)  [facility] Consent: Verified  Hepatitis Status: HBsAg: Neg (09/19/19) HBsAb: Kadi (02/01/19) obtained from clinic  Machine Number: Hortencia Merceds (10/05/19 0810)  Telemetry status: Bedside cardiac monitor

## 2019-10-05 NOTE — PROGRESS NOTES
[facility]  Pulmonary, Critical Care, and Sleep Medicine  Name: Jeanice Gosselin MRN: [id number]   : 1980 Hospital: [facility]    Date: 10/5/2019          34year old female with past medical hx as given below presented to [facility] with altered mental status. Hx of chronic pain for which she is on opioids. Also takes Benzo at home for anxiety. She was noted to be obtunded at home and brought in by the family. BS noted to be high > 400. She was given Narcan on route to the hospital. In the short pump ED she was started on Narcan gtt. She dialyzes TTS and missed her HD yesterday. Patient at the time of my evaluation is more awake. She sometimes fall back to sleep but responds to calling her name. Data reviewed:  WBC 5.6  Hgb 8.8    UA glucosuria  1+ bacteria. Small blood. Moderate leucocytes. Cr 4.80  UDS + benzo and cocaine. ABG 7.28/  CXR - no infiltrate. CT head neg.    ========================    10/5:  No complaints. Oriented x 3. Wants to go home.     Past Medical History:   Diagnosis Date    ARF (acute renal failure) ([address] requiring dialysis     Asthma     CKD (chronic kidney disease)     Diabetes ([address]     Fibromyalgia     Gastroparesis 2010    Gastric Pacer- REMOVED 2015    GERD (gastroesophageal reflux disease)     Hypertension     Narcotic dependence ([address]     THU (obstructive sleep apnea)     wears 2 LPM oxygen at night    Other ill-defined conditions(799.89)     Polycystic ovarian syndrome     Seizures (HCC)     Sickle cell trait ([address]     Thromboembolus ([address] to her left arm and was told she had one in left leg recently     Past Surgical History:   Procedure Laterality Date    HX AMPUTATION FOOT  2018    left foot    HX CATARACT REMOVAL  3/5/12    right    HX DILATION AND CURETTAGE      ablation    HX GASTRIC BYPASS      HX GI      j tube placement and removal    HX OTHER SURGICAL      Gastric Pacer- REMOVED 07/2015    HX VASCULAR ACCESS      gray cath rt subclavian; removed     HX VASCULAR ACCESS      HD access right thigh; stopped working     FHx: Lung cancer, HTN - mother. Kidney cancer, CAV and CABG - father. SHx: substance abuse.     Current Facility-Administered Medications:     sodium chloride (NS) flush 5-40 mL, 5-40 mL, IntraVENous, Q8H, Michelle Lakhani MD, 10 mL at 10/05/19 1307    sodium chloride (NS) flush 5-40 mL, 5-40 mL, IntraVENous, PRN, Madiha Lakhani MD    sodium chloride (NS) flush 5-40 mL, 5-40 mL, IntraVENous, Q8H, Zenaida Murphy MD, 10 mL at 10/05/19 1307    sodium chloride (NS) flush 5-40 mL, 5-40 mL, IntraVENous, PRN, Rubin Waters MD    acetaminophen (TYLENOL) tablet 650 mg, 650 mg, Oral, Q4H PRN, Rubin Waters MD    [Held by provider] amLODIPine (NORVASC) tablet 10 mg, 10 mg, Oral, DAILY, Rubin Waters MD    [Held by provider] calcitRIOL (ROCALTROL) capsule 0.25 mcg, 0.25 mcg, Oral, DAILY, Rubin Waters MD    [Held by provider] cloNIDine (CATAPRES) 0.1 mg/24 hr patch 1 Patch, 1 Patch, TransDERmal, Q7D, Rubin Waters MD, 1 Patch at 10/04/19 1023    glucose chewable tablet 16 g, 4 Tab, Oral, PRN, Rubin Waters MD    glucagon (GLUCAGEN) injection 1 mg, 1 mg, IntraMUSCular, PRN, Rubin Waters MD    dextrose 10% infusion 0-250 mL, 0-250 mL, IntraVENous, PRN, Rubin Waters MD, Last Rate: 750 mL/hr at 10/04/19 1414, 125 mL at 10/04/19 1414    epoetin bernard-epbx (RETACRIT) injection 10,000 Units, 10,000 Units, SubCUTAneous, Q TUE, THU & SAT, Sid Stack MD    heparin (porcine) injection 5,000 Units, 5,000 Units, SubCUTAneous, Q8H, Vineet Olivier, MD, 5,000 Units at 10/05/19 0932    hydrALAZINE (APRESOLINE) 20 mg/mL injection 20 mg, 20 mg, IntraVENous, Q2H PRN, Vineet Aguayo MD, 20 mg at 10/05/19 1531    insulin lispro (HUMALOG) injection, , SubCUTAneous, Q4H, Darin Treadwell MD, Stopped at 10/05/19 0800    LORazepam (ATIVAN) injection 2 mg, 2 mg, IntraVENous, Q2H PRN, Mike Retana MD, 2 mg at 10/04/19 2101       IMPRESSION:   -Drug overdose. UDS positive for cocaine and benzo - now with baseline mental status. -ESRD. -DM with gastroparesis.  -Hx of narcotic use. -Hx of seizure.  -Hx of HTN, GERD, THU, PCOS. -Hx of sickle cell trait.  -Hx of thromboembolism. RECOMMENDATIONS:   -HD per renal.  -BS control with goal < 180. ok to Tx to floor. I have personally reviewed the radiology films and reports. No infiltrate. Subjective/Interval History:   I have reviewed the flowsheet and previous days notes. Pt is critically ill and unable to give history. Objective:       Vital Signs:    Visit Vitals  /77   Pulse (!) 119   Temp 98.6 °F (37 °C)   Resp 23   Wt 72.6 kg (160 lb 0.9 oz)   SpO2 97%   BMI 29.27 kg/m²                TMAX(24)      Intake/Output:   Last shift:         Last 3 shifts: 10/05 0701 - 10/05 1900  In: -   Out: 2000 RRIOLAST3    Intake/Output Summary (Last 24 hours) at 10/5/2019 1533  Last data filed at 10/5/2019 1110  Gross per 24 hour   Intake 18.75 ml   Output 3000 ml   Net -2981.25 ml     EXAM       exam  Other   general Ill appearing/somnolent/ appears stated age    HEENT:  Op moist no ulcers, JVD not elevated, no cervical LAD    Chest No pectus deformity, normal chest rise b/l    HEART:  RRR no m/r/g no rubs    Lungs:  CTA b/l no r/r/w, diminished BS at bases    ABD Soft/NT non rigid mildly distended, hypoactive BS    EXT L TMA    Skin No rashes or ulcers, no mottling    Neuro lethargy      Data    I have personally reviewed data, flowsheets for the last 24 hours.         Labs:  Recent Labs     10/05/19  0418 10/04/19  0429   WBC 5.4 5.6   HGB 8.8* 8.8*   HCT 27.7* 27.6*    147*     Recent Labs     10/05/19  0416 10/04/19  1449 10/04/19  0429     --  134*   K 4.3  --  4.9     --  97   CO2 27  --  23   *  --  601*   BUN 40*  --  89*   CREA 2.68*  --  4.80*   CA 8.1*  -- 7.6*   MG 1.8  --   --    PHOS  --  5.9*  --    ALB  --  3.2* 3.1*   SGOT  --  16 16   ALT  --  26 24       ABG Recent Labs     10/04/19  1537 10/04/19  0912   PHI 7.313* 7.281*   PO2I 173* 93   PCO2I 48.9* 50.4*        Gallo Yeh MD  [facility] [address] [facility]

## 2019-10-05 NOTE — DIALYSIS
HD TRANSFER - OUT REPORT:    Verbal report given to GLENROY Seymour RN on [facility] for routine progression of care       Report consisted of patient's Situation, Background, Assessment and Recommendations(SBAR). Information from the following report(s) SBAR, Procedure Summary and MAR was reviewed with the receiving nurse.    $$ Method: Hemodialysis (10/05/19 0810)    NET Fluid Removed (mL): 2000 ml (10/05/19 1110)     Patient response to treatment:  Stable    Hemodialysis End Time: 1110 (10/05/19 1110)  If not documented, dialysis nurse to update post-dialysis row in HD/Filtration flowsheet     Medications /Volume expansion agents or Fluid boluses administered during treatment? no  Post-dialysis medication administration due?  no    Line heparinization? no  Lines: Right femoral non-tunneled CVC    Opportunity for questions and clarification was provided.

## 2019-10-05 NOTE — PROGRESS NOTES
[facility]   [address] [address], [address] [facility] [address], [facility] [place]  Phone: [phone]   [phone]       Nephrology Progress Note  Richard Galeano     1980     [id number]  Date of Admission : 10/4/2019  10/05/19    CC:  Follow up for ESRD       Assessment and Plan   ESRD-HD :  - Dialyzes TTS at [facility] and f/b FNA  - Was not able to dialyse witgh AVF . - Austin placed in RT femoral and had HD partial yesterday  - vascular surgery consult for fistulogram   - HD again done today and plan HD again tomorrow      ?Drug Overdose   - she reports taking excess amount of Gabapentin and Baclofen   - dialysis will help   - mx per primary team      Anemia in CKD  - Retacrit w/ HD     [facility]   - resume binders   - continue calcitriol if she is taking it at home      HTN :  - continue current meds     PVD   - hx of Left TMA      Care Plan discussed with:  Pt and RN     Interval History:    Pt had HD today and 2 kg removed, will plan on Hda gain tomorroow   Review of Systems: A comprehensive review of systems was negative except for that written in the HPI.     Current Medications:   Current Facility-Administered Medications   Medication Dose Route Frequency    sodium chloride (NS) flush 5-40 mL  5-40 mL IntraVENous Q8H    sodium chloride (NS) flush 5-40 mL  5-40 mL IntraVENous PRN    sodium chloride (NS) flush 5-40 mL  5-40 mL IntraVENous Q8H    sodium chloride (NS) flush 5-40 mL  5-40 mL IntraVENous PRN    acetaminophen (TYLENOL) tablet 650 mg  650 mg Oral Q4H PRN    [Held by provider] amLODIPine (NORVASC) tablet 10 mg  10 mg Oral DAILY    [Held by provider] calcitRIOL (ROCALTROL) capsule 0.25 mcg  0.25 mcg Oral DAILY    [Held by provider] cloNIDine (CATAPRES) 0.1 mg/24 hr patch 1 Patch  1 Patch TransDERmal Q7D    glucose chewable tablet 16 g  4 Tab Oral PRN    glucagon (GLUCAGEN) injection 1 mg  1 mg IntraMUSCular PRN    dextrose 10% infusion 0-250 mL  0-250 mL IntraVENous PRN  epoetin bernard-epbx (RETACRIT) injection 10,000 Units  10,000 Units SubCUTAneous Q TUE, THU & SAT    heparin (porcine) injection 5,000 Units  5,000 Units SubCUTAneous Q8H    hydrALAZINE (APRESOLINE) 20 mg/mL injection 20 mg  20 mg IntraVENous Q2H PRN    insulin lispro (HUMALOG) injection   SubCUTAneous Q4H    LORazepam (ATIVAN) injection 2 mg  2 mg IntraVENous Q2H PRN      Allergies   Allergen Reactions    Fentanyl Itching and Angioedema     \"throat closed up\" per pt      Erythromycin Itching    Toradol [Ketorolac] Rash    Morphine Itching       Objective:  Vitals:    Vitals:    10/05/19 1100 10/05/19 1110 10/05/19 1130 10/05/19 1200   BP: 119/70 144/66 140/68 161/74   Pulse: (!) 113 (!) 115 (!) 114 (!) 115   Resp: 23 23 24 23   Temp:    98.6 °F (37 °C)   TempSrc:       SpO2: 100% 100% 99% 98%   Weight:         Intake and Output:  10/05 0701 - 10/05 1900  In: -   Out: 2000   10/03 1901 - 10/05 0700  In: 489.4 [I.V.:489.4]  Out: 1000 [Urine:1000]    Physical Examination:  Pt intubated     No  General: NAD,Conversant   Neck:  Supple, no mass  Resp:  Lungs CTA B/L, no wheezing , normal respiratory effort  CV:  RRR,  no murmur or rub,1+ LE edema  GI:  Soft, NT, + Bowel sounds, no hepatosplenomegaly  Neurologic:  Non focal  Psych:             AAO x 3 appropriate affect   Skin:  No Rash  :  No escobedo     [x]    High complexity decision making was performed  [x]    Patient is at high-risk of decompensation with multiple organ involvement    Lab Data Personally Reviewed: I have reviewed all the pertinent labs, microbiology data and radiology studies during assessment.     Recent Labs     10/05/19  0416 10/04/19  1449 10/04/19  0429     --  134*   K 4.3  --  4.9     --  97   CO2 27  --  23   *  --  601*   BUN 40*  --  89*   CREA 2.68*  --  4.80*   CA 8.1*  --  7.6*   MG 1.8  --   --    PHOS  --  5.9*  --    ALB  --  3.2* 3.1*   SGOT  --  16 16   ALT  --  26 24     Recent Labs     10/05/19  0418 10/04/19  0429   WBC 5.4 5.6   HGB 8.8* 8.8*   HCT 27.7* 27.6*    147*     Lab Results   Component Value Date/Time    Specimen Description: URINE 06/24/2013 08:00 PM    Specimen Description: URINE 06/17/2013 07:55 PM    Specimen Description: URINE 05/26/2013 10:10 AM    Specimen Description: URINE 04/22/2013 02:30 PM    Specimen Description: NARES 03/21/2013 12:30 PM     Lab Results   Component Value Date/Time    Culture result: NO SIGNIFICANT GROWTH 10/04/2019 07:15 AM    Culture result: NO GROWTH 5 DAYS 05/27/2019 12:53 PM    Culture result: NO GROWTH 5 DAYS 06/27/2018 04:06 AM    Culture result: (A) 06/24/2018 07:30 AM     STREPTOCOCCUS MITIS/ORALIS GROWING IN 1 OF 2 BOTTLES DRAWN . .(SITE= L CHEST PICC) (SENSITIVITIES PERFORMED BY E-TEST)    Culture result: (A) 06/24/2018 07:30 AM     PRELIMINARY REPORT OF GRAM POSITIVE COCCI IN PAIRS GROWING IN 1 OF 2 BOTTLES DRAWN . ..(SITE= L CHEST PICC) CALLED TO AND READ BACK BY LAURO ACOSTA (Aguilares) ON 6/25/18 AT 9412 BY VI    Culture result: REMAINING BOTTLE(S) HAS/HAVE NO GROWTH IN 5 DAYS 06/24/2018 07:30 AM     Recent Results (from the past 24 hour(s))   HEPATIC FUNCTION PANEL    Collection Time: 10/04/19  2:49 PM   Result Value Ref Range    Protein, total 6.7 6.4 - 8.2 g/dL    Albumin 3.2 (L) 3.5 - 5.0 g/dL    Globulin 3.5 2.0 - 4.0 g/dL    A-G Ratio 0.9 (L) 1.1 - 2.2      Bilirubin, total 0.3 0.2 - 1.0 MG/DL    Bilirubin, direct 0.2 0.0 - 0.2 MG/DL    Alk.  phosphatase 206 (H) 45 - 117 U/L    AST (SGOT) 16 15 - 37 U/L    ALT (SGPT) 26 12 - 78 U/L   AMMONIA    Collection Time: 10/04/19  2:49 PM   Result Value Ref Range    Ammonia 16 <32 UMOL/L   ETHYL ALCOHOL    Collection Time: 10/04/19  2:49 PM   Result Value Ref Range    ALCOHOL(ETHYL),SERUM <10 <10 MG/DL   ACETAMINOPHEN    Collection Time: 10/04/19  2:49 PM   Result Value Ref Range    Acetaminophen level <2 (L) 10 - 30 ug/mL   PHOSPHORUS    Collection Time: 10/04/19  2:49 PM   Result Value Ref Range Phosphorus 5.9 (H) 2.6 - 4.7 MG/DL   HEMOGLOBIN A1C WITH EAG    Collection Time: 10/04/19  2:49 PM   Result Value Ref Range    Hemoglobin A1c 7.9 (H) 4.2 - 6.3 %    Est. average glucose 180 mg/dL   TROPONIN I    Collection Time: 10/04/19  2:49 PM   Result Value Ref Range    Troponin-I, Qt. <0.05 <0.05 ng/mL   GLUCOSE, POC    Collection Time: 10/04/19  3:34 PM   Result Value Ref Range    Glucose (POC) 105 (H) 65 - 100 mg/dL    Performed by [facility](CON)    GLUCOSTABILIZER    Collection Time: 10/04/19  3:35 PM   Result Value Ref Range    Glucose 105 mg/dL    Insulin order 0.0 units/hour    Insulin adminstered 0.0 units/hour    Multiplier 0.000     Low target 200 mg/dL    High target 300 mg/dL    D50 order 0.0 ml    D50 administered 0.00 ml    Minutes until next BG 60 min    Order initials BLD     Administered initials BLD     GLSCOM Comments     POC G3 - PUL    Collection Time: 10/04/19  3:37 PM   Result Value Ref Range    pH (POC) 7.313 (L) 7.35 - 7.45      pCO2 (POC) 48.9 (H) 35.0 - 45.0 MMHG    pO2 (POC) 173 (H) 80 - 100 MMHG    HCO3 (POC) 24.8 22 - 26 MMOL/L    sO2 (POC) 99 (H) 92 - 97 %    Base deficit (POC) 1 mmol/L    Site RIGHT RADIAL      Device: NASAL CANNULA      Flow rate (POC) 2 L/M    Allens test (POC) YES      Specimen type (POC) ARTERIAL     GLUCOSE, POC    Collection Time: 10/04/19  5:13 PM   Result Value Ref Range    Glucose (POC) 108 (H) 65 - 100 mg/dL    Performed by [facility](CON)    GLUCOSE, POC    Collection Time: 10/04/19  9:21 PM   Result Value Ref Range    Glucose (POC) 117 (H) 65 - 100 mg/dL    Performed by Negar Wetzel , POC    Collection Time: 10/04/19 11:23 PM   Result Value Ref Range    Glucose (POC) 105 (H) 65 - 100 mg/dL    Performed by Yaakov Jose , BASIC    Collection Time: 10/05/19  4:16 AM   Result Value Ref Range    Sodium 138 136 - 145 mmol/L    Potassium 4.3 3.5 - 5.1 mmol/L    Chloride 104 97 - 108 mmol/L    CO2 27 21 - 32 mmol/L Anion gap 7 5 - 15 mmol/L    Glucose 152 (H) 65 - 100 mg/dL    BUN 40 (H) 6 - 20 MG/DL    Creatinine 2.68 (H) 0.55 - 1.02 MG/DL    BUN/Creatinine ratio 15 12 - 20      GFR est AA 24 (L) >60 ml/min/1.73m2    GFR est non-AA 20 (L) >60 ml/min/1.73m2    Calcium 8.1 (L) 8.5 - 10.1 MG/DL   MAGNESIUM    Collection Time: 10/05/19  4:16 AM   Result Value Ref Range    Magnesium 1.8 1.6 - 2.4 mg/dL   CBC WITH AUTOMATED DIFF    Collection Time: 10/05/19  4:18 AM   Result Value Ref Range    WBC 5.4 3.6 - 11.0 K/uL    RBC 3.40 (L) 3.80 - 5.20 M/uL    HGB 8.8 (L) 11.5 - 16.0 g/dL    HCT 27.7 (L) 35.0 - 47.0 %    MCV 81.5 80.0 - 99.0 FL    MCH 25.9 (L) 26.0 - 34.0 PG    MCHC 31.8 30.0 - 36.5 g/dL    RDW 14.8 (H) 11.5 - 14.5 %    PLATELET 822 266 - 143 K/uL    MPV 12.4 8.9 - 12.9 FL    NRBC 0.0 0  WBC    ABSOLUTE NRBC 0.00 0.00 - 0.01 K/uL    NEUTROPHILS 67 32 - 75 %    LYMPHOCYTES 20 12 - 49 %    MONOCYTES 8 5 - 13 %    EOSINOPHILS 4 0 - 7 %    BASOPHILS 0 0 - 1 %    IMMATURE GRANULOCYTES 1 (H) 0.0 - 0.5 %    ABS. NEUTROPHILS 3.6 1.8 - 8.0 K/UL    ABS. LYMPHOCYTES 1.1 0.8 - 3.5 K/UL    ABS. MONOCYTES 0.5 0.0 - 1.0 K/UL    ABS. EOSINOPHILS 0.2 0.0 - 0.4 K/UL    ABS. BASOPHILS 0.0 0.0 - 0.1 K/UL    ABS. IMM. GRANS. 0.0 0.00 - 0.04 K/UL    DF AUTOMATED     GLUCOSE, POC    Collection Time: 10/05/19  4:27 AM   Result Value Ref Range    Glucose (POC) 142 (H) 65 - 100 mg/dL    Performed by [address], POC    Collection Time: 10/05/19  8:21 AM   Result Value Ref Range    Glucose (POC) 136 (H) 65 - 100 mg/dL    Performed by [facility](CON)    GLUCOSE, POC    Collection Time: 10/05/19 11:34 AM   Result Value Ref Range    Glucose (POC) 129 (H) 65 - 100 mg/dL    Performed by [facility](CON)            Total time spent with patient:  33  min. Care Plan discussed with:  Patient  y   Family  y    RN   y   Consulting Physician [address] [facility],         I have reviewed the flowsheets.   Chart and Pertinent Notes have been reviewed. No change in PMH ,family and social history from Consult note.       Wang Martines MD

## 2019-10-05 NOTE — PROGRESS NOTES
0730: Bedside and Verbal shift change report given to VERONICA Thompson (oncoming nurse) by Aditya Song RN (offgoing nurse). Report included the following information SBAR, Kardex, MAR, Recent Results, Cardiac Rhythm SR-ST and Alarm Parameters . 0745: Primary Nurse Jay Seymour and VERONICA Cody performed a dual skin assessment on this patient No impairment noted  Antonio score is 14      1845: TRANSFER - OUT REPORT:    Verbal report given to Awilda Avila RN(name) on [facility]  being transferred to [address] [place] [address] (unit) for routine progression of care       Report consisted of patients Situation, Background, Assessment and   Recommendations(SBAR). Information from the following report(s) SBAR, Kardex, Intake/Output, MAR, Recent Results, Cardiac Rhythm SR-ST and Alarm Parameters  was reviewed with the receiving nurse. Lines:   Quad Lumen 10/04/19 Left Subclavian (Active)   Central Line Being Utilized Yes 10/5/2019  4:00 PM   Criteria for Appropriate Use Hemodynamically unstable, requiring monitoring lines, vasopressors, or volume resuscitation 10/5/2019  4:00 PM   Site Assessment Clean, dry, & intact 10/5/2019  4:00 PM   Infiltration Assessment 0 10/5/2019  4:00 PM   Affected Extremity/Extremities Color distal to insertion site pink (or appropriate for race) 10/5/2019  4:00 PM   Date of Last Dressing Change 10/04/19 10/5/2019  8:00 AM   Dressing Status Clean, dry, & intact 10/5/2019  4:00 PM   Dressing Type Disk with Chlorhexadine gluconate (CHG); Tape;Transparent 10/5/2019  4:00 PM   Action Taken Open ports on tubing capped 10/5/2019  4:00 PM   Proximal Hub Color/Line Status Brown;Capped 10/5/2019  4:00 PM   Positive Blood Return (Medial Site) Yes 10/5/2019  4:00 PM   Medial 1 Hub Color/Line Status Blue;Capped 10/5/2019  4:00 PM   Positive Blood Return (Lateral Site) Yes 10/5/2019  4:00 PM   Medial 2 Hub Color/Line Status Gray;Capped 10/5/2019  4:00 PM   Positive Blood Return (Site #3) Yes 10/5/2019  4:00 PM   Distal Hub Color/Line Status White;Capped 10/5/2019  4:00 PM   Positive Blood Return (Site #4) Yes 10/5/2019  4:00 PM   Alcohol Cap Used Yes 10/5/2019  4:00 PM       Venous Access Device 06/16/18 Upper chest (subclavicular area), left (Active)        Opportunity for questions and clarification was provided. Patient transported with:   Registered Nurse  Tech     1955: Called and updated patient's father about her current location in hospital and provided Unit's phone number.

## 2019-10-05 NOTE — PROCEDURES
[facility]  [phone]    Vitals   Pre   Post   Assessment   Pre   Post     Temp  Temp: 98.1 °F (36.7 °C) (10/04/19 2045)  97.6 LOC  Lethargic. Difficult to arouse. Only alert to painful stimulus. Oriented x4. Alert to speech. Drowsy. Oriented x4. HR   Pulse (Heart Rate): (!) 102 (10/04/19 2145) 110 Lungs   CTA  CTA   B/P   BP: 124/71 (10/04/19 2145) 154/96 Cardiac   S1 S2. No ectopy. Sinus tach on monitor. S1 S2. No ectopy. Sinus tach on monitor. Resp   Resp Rate: 15 (10/04/19 2145) 18 Skin   CDI. CDI   Pain level  Pain Intensity 1: 0 (10/04/19 1600) 0 Edema  None      None    Orders:    Duration:   Start:    2045 End:    2840 Total:   3hr   Dialyzer:   Dialyzer/Set Up Inspection: Micky Brasher (10/04/19 2045)   K Bath:   Dialysate K (mEq/L): 3.5 (10/04/19 2045)   Ca Bath:   Dialysate CA (mEq/L): 2.5 (10/04/19 2045)   Na/Bicarb:   Dialysate NA (mEq/L): 140 (10/04/19 2045)   Target Fluid Removal:   Goal/Amount of Fluid to Remove (mL): 2000 mL (10/04/19 2045)   Access     Type & Location:   R Femoral CVC: Placed today 10/4/19. Surgical dressing CDI. No s/s of infection. Both lumens aspirate & flush well. Catheter not functional above 300 BFR. IR consulted per orders.     Labs     Obtained/Reviewed   Critical Results Called   Date when labs were drawn-  Hgb-    HGB   Date Value Ref Range Status   10/04/2019 8.8 (L) 11.5 - 16.0 g/dL Final     Comment:     INVESTIGATED PER DELTA CHECK PROTOCOL     K-    Potassium   Date Value Ref Range Status   10/04/2019 4.9 3.5 - 5.1 mmol/L Final     Ca-   Calcium   Date Value Ref Range Status   10/04/2019 7.6 (L) 8.5 - 10.1 MG/DL Final     Bun-   BUN   Date Value Ref Range Status   10/04/2019 89 (H) 6 - 20 MG/DL Final     Creat-   Creatinine   Date Value Ref Range Status   10/04/2019 4.80 (H) 0.55 - 1.02 MG/DL Final        Medications/ Blood Products Given     Name   Dose   Route and Time                    Blood Volume Processed (BVP): 47.4L Net Fluid   Given:  +385mL   Comments   Time Out Done: 2040  Primary Nurse Rpt Pre: Jay Seymour RN  Primary Nurse Rpt Post: Kiko Martinez RN   Pt Education: Procedural   Care Plan: Ongoing   Tx Summary:   2000: Arrived to pt room and assessed per policy. Pt very lethargic. Says she \"hasnt slept in two days. \" Consent signed & on file. SBAR received from Primary RN. 2045: Both lumens of R femoral CVC disinfected with prevantics, lumens with alcohol per policy. Each lumen aspirated for blood return and flushed with Normal Saline per policy. VSS. Dialysis Tx initiated. 2050: Lines switched due to high arterial pressures with no change. Pressures fluctuating- indicative of positional cvc. Unable to raise BFR above 300.   2115: BP 98/59 UF paused. 2130: BP 79/57. 250mL NS given. Nephrologist on call Dr. Charles Modi called. Telephone orders received to continue running pt at 300 BFR, to discontinue UF removal, to use albumin PRN for intradialytic hypotension, and consult IR for CVC replacement. 2145: 124/71  2345: Tx ended. VSS. Pt did not tolerate ordered UF. All possible blood returned to patient. Central line catheter flushed with normal saline per policy. Ports disinfected with Prevantics per policy and lines disconnected and capped using aseptic technique. Bed locked and in the lowest position, call bell and belongings in reach. SBAR given to Primary, RN. Patient is stable at time of my departure. All Dialysis related medications have been reviewed. Pt access and face visible at all times throughout tx. Admiting Diagnosis: Drug Overdose   [facility]   Consent signed - Informed Consent Verified: Yes (10/04/19 2045)  Sarah Consent - Signed & Filed   Hepatitis Status- Negative 9/19/19 Susceptible 2/1/19  Machine #- Machine Number: [id number] (10/04/19 2045)  Telemetry status- Monitored; Sinus Tach   Pre-dialysis wt. -

## 2019-10-05 NOTE — PROGRESS NOTES
1930. Bedside and Verbal shift change report given to Ary Cooper RN (oncoming nurse) by Aristides Cruz RN (offgoing nurse). Report included the following information SBAR, Kardex, ED Summary, Procedure Summary, Intake/Output, MAR, Accordion, Recent Results, Med Rec Status, Cardiac Rhythm NSR, Alarm Parameters  and Dual Neuro Assessment.     2000. Neuro as follows: A/O x3, Following commands, pupils sluggish and reactive. The left is slighlty smaller than the right.     2045. Mehdi Munoz RN starting dialysis. 2057. Pt obtunded, unable to arouse with sternal rub. Woke pt up using nailbed pressure. Pt states she hasn't slept in two days. Neuro assessment unchanged. Pt started rhythmically twitching at this time and states this is what her seizures look like. Pt seizure activity lasted for 5 mins. 2101. 2mg of Ativan given for seizure. 2115. Pt Bp dropped to 98/59. UF paused. 2130. Pt. BP dropped to 79/57. Mehdi Munoz RN gave 250ml bolus of fluid and orders were received for albumin & IR consult. 2145. Pts BP stable. Albumin held. 0007. Mehdi Munoz RN reports dialysis unsuccessful. Pt. Net fluid +385     0600. Pt complaining of pain in back and arms & specifically requesting dilaudid and phenergan. Spoke with NATIVIDAD Beckford NP about pts complaints and request. No orders received. Shift Summary. Pt's neuro status fluctuating. Sometimes baseline & other times is obtunded and only arousable using nailbed pressure.

## 2019-10-05 NOTE — CONSULTS
Neuro consult completed, dictated note to follow. Pt without documented h/o seizure. Pt told me that the reason she came to the hospital yesterday was sz. The actual reason was obtundation from crack cocaine, gabapentin, baclofen, and opioids. She also has twitching in her shoulders without ERIC/LOC, she believes due to low blood glucose and apparently called this a \"seizure\" last night to the nurses. She does not have a seizure d/o, she is not on seizure meds at home, she does not need to be on seizure meds at this time. Will review ordered EEG.

## 2019-10-05 NOTE — PROGRESS NOTES
0730: Bedside and Verbal shift change report given to VERONICA Thompson (oncoming nurse) by Yimi Huston RN (offgoing nurse). Report included the following information SBAR, Kardex, Intake/Output, MAR, Recent Results, Cardiac Rhythm SR and Alarm Parameters . Shift Summary: Patient neurologically fluctuates depending on her mental status, when asleep, she is either lethargic or obtunded and when awake she is A&OX3. Pupils are sluggish and reactive to light, with Left being Smaller and Less reactive than right. Patient received sliding scale coverage and Insulin drip for BG management per order, she received Narcan drip per order. Patient has this twitching episodes frequently, but upon assessment, she is neurologically asymptomatic for seizure(Assessed by [email or web address]). 1930: Bedside and Verbal shift change report given to Gerald Cool RN (oncoming nurse) by Awais Kong RN (offgoing nurse). Report included the following information SBAR, Kardex, Intake/Output, MAR, Recent Results, Cardiac Rhythm SR and Alarm Parameters .

## 2019-10-06 LAB
ANION GAP SERPL CALC-SCNC: 13 MMOL/L (ref 5–15)
BUN SERPL-MCNC: 29 MG/DL (ref 6–20)
BUN/CREAT SERPL: 10 (ref 12–20)
CALCIUM SERPL-MCNC: 9 MG/DL (ref 8.5–10.1)
CHLORIDE SERPL-SCNC: 102 MMOL/L (ref 97–108)
CO2 SERPL-SCNC: 24 MMOL/L (ref 21–32)
CREAT SERPL-MCNC: 2.78 MG/DL (ref 0.55–1.02)
GLUCOSE BLD STRIP.AUTO-MCNC: 170 MG/DL (ref 65–100)
GLUCOSE BLD STRIP.AUTO-MCNC: 285 MG/DL (ref 65–100)
GLUCOSE BLD STRIP.AUTO-MCNC: 352 MG/DL (ref 65–100)
GLUCOSE BLD STRIP.AUTO-MCNC: 430 MG/DL (ref 65–100)
GLUCOSE SERPL-MCNC: 206 MG/DL (ref 65–100)
MAGNESIUM SERPL-MCNC: 2.2 MG/DL (ref 1.6–2.4)
POTASSIUM SERPL-SCNC: 4.4 MMOL/L (ref 3.5–5.1)
SERVICE CMNT-IMP: ABNORMAL
SODIUM SERPL-SCNC: 139 MMOL/L (ref 136–145)

## 2019-10-06 PROCEDURE — 94660 CPAP INITIATION&MGMT: CPT

## 2019-10-06 PROCEDURE — 65270000029 HC RM PRIVATE

## 2019-10-06 PROCEDURE — 74011636637 HC RX REV CODE- 636/637: Performed by: INTERNAL MEDICINE

## 2019-10-06 PROCEDURE — 74011250636 HC RX REV CODE- 250/636: Performed by: INTERNAL MEDICINE

## 2019-10-06 PROCEDURE — 36415 COLL VENOUS BLD VENIPUNCTURE: CPT

## 2019-10-06 PROCEDURE — 80048 BASIC METABOLIC PNL TOTAL CA: CPT

## 2019-10-06 PROCEDURE — 83735 ASSAY OF MAGNESIUM: CPT

## 2019-10-06 PROCEDURE — 82962 GLUCOSE BLOOD TEST: CPT

## 2019-10-06 PROCEDURE — 74011250637 HC RX REV CODE- 250/637: Performed by: INTERNAL MEDICINE

## 2019-10-06 PROCEDURE — 74011636637 HC RX REV CODE- 636/637: Performed by: NURSE PRACTITIONER

## 2019-10-06 RX ORDER — INSULIN GLARGINE 100 [IU]/ML
15 INJECTION, SOLUTION SUBCUTANEOUS DAILY
Status: DISCONTINUED | OUTPATIENT
Start: 2019-10-06 | End: 2019-10-07

## 2019-10-06 RX ORDER — INSULIN LISPRO 100 [IU]/ML
8 INJECTION, SOLUTION INTRAVENOUS; SUBCUTANEOUS ONCE
Status: COMPLETED | OUTPATIENT
Start: 2019-10-06 | End: 2019-10-06

## 2019-10-06 RX ORDER — INSULIN LISPRO 100 [IU]/ML
6 INJECTION, SOLUTION INTRAVENOUS; SUBCUTANEOUS ONCE
Status: COMPLETED | OUTPATIENT
Start: 2019-10-06 | End: 2019-10-06

## 2019-10-06 RX ORDER — TRAZODONE HYDROCHLORIDE 100 MG/1
50 TABLET ORAL
Status: DISCONTINUED | OUTPATIENT
Start: 2019-10-07 | End: 2019-10-07 | Stop reason: HOSPADM

## 2019-10-06 RX ORDER — QUETIAPINE FUMARATE 100 MG/1
100 TABLET, FILM COATED ORAL 2 TIMES DAILY
Status: DISCONTINUED | OUTPATIENT
Start: 2019-10-07 | End: 2019-10-07 | Stop reason: HOSPADM

## 2019-10-06 RX ORDER — PROMETHAZINE HYDROCHLORIDE 25 MG/1
25 TABLET ORAL
Status: DISCONTINUED | OUTPATIENT
Start: 2019-10-06 | End: 2019-10-07 | Stop reason: HOSPADM

## 2019-10-06 RX ORDER — VENLAFAXINE 37.5 MG/1
75 TABLET ORAL 2 TIMES DAILY WITH MEALS
Status: DISCONTINUED | OUTPATIENT
Start: 2019-10-07 | End: 2019-10-07 | Stop reason: HOSPADM

## 2019-10-06 RX ADMIN — INSULIN LISPRO 8 UNITS: 100 INJECTION, SOLUTION INTRAVENOUS; SUBCUTANEOUS at 13:23

## 2019-10-06 RX ADMIN — CLONIDINE HYDROCHLORIDE 0.1 MG: 0.1 TABLET ORAL at 23:01

## 2019-10-06 RX ADMIN — CARVEDILOL 6.25 MG: 6.25 TABLET, FILM COATED ORAL at 17:42

## 2019-10-06 RX ADMIN — INSULIN LISPRO 2 UNITS: 100 INJECTION, SOLUTION INTRAVENOUS; SUBCUTANEOUS at 07:14

## 2019-10-06 RX ADMIN — HEPARIN SODIUM 5000 UNITS: 5000 INJECTION INTRAVENOUS; SUBCUTANEOUS at 19:07

## 2019-10-06 RX ADMIN — ACETAMINOPHEN 650 MG: 325 TABLET, FILM COATED ORAL at 02:41

## 2019-10-06 RX ADMIN — AMLODIPINE BESYLATE 10 MG: 5 TABLET ORAL at 09:56

## 2019-10-06 RX ADMIN — CLONIDINE HYDROCHLORIDE 0.1 MG: 0.1 TABLET ORAL at 10:25

## 2019-10-06 RX ADMIN — LORAZEPAM 2 MG: 2 INJECTION, SOLUTION INTRAMUSCULAR; INTRAVENOUS at 02:40

## 2019-10-06 RX ADMIN — HEPARIN SODIUM 5000 UNITS: 5000 INJECTION INTRAVENOUS; SUBCUTANEOUS at 09:56

## 2019-10-06 RX ADMIN — HEPARIN SODIUM 5000 UNITS: 5000 INJECTION INTRAVENOUS; SUBCUTANEOUS at 02:19

## 2019-10-06 RX ADMIN — EPOETIN ALFA-EPBX 10000 UNITS: 10000 INJECTION, SOLUTION INTRAVENOUS; SUBCUTANEOUS at 02:19

## 2019-10-06 RX ADMIN — CLONIDINE HYDROCHLORIDE 0.1 MG: 0.1 TABLET ORAL at 17:00

## 2019-10-06 RX ADMIN — CALCITRIOL 0.25 MCG: 0.25 CAPSULE ORAL at 09:56

## 2019-10-06 RX ADMIN — Medication 10 ML: at 23:23

## 2019-10-06 RX ADMIN — INSULIN LISPRO 5 UNITS: 100 INJECTION, SOLUTION INTRAVENOUS; SUBCUTANEOUS at 17:38

## 2019-10-06 RX ADMIN — INSULIN LISPRO 6 UNITS: 100 INJECTION, SOLUTION INTRAVENOUS; SUBCUTANEOUS at 23:00

## 2019-10-06 RX ADMIN — Medication 10 ML: at 23:16

## 2019-10-06 RX ADMIN — CARVEDILOL 6.25 MG: 6.25 TABLET, FILM COATED ORAL at 09:56

## 2019-10-06 NOTE — PROGRESS NOTES
Bedside and Verbal shift change report given to VERONICA Asher (oncoming nurse) by Veena Contreras RN (offgoing nurse). Report included the following information SBAR, Kardex, Intake/Output, MAR and Recent Results.

## 2019-10-06 NOTE — PROGRESS NOTES
Primary Nurse Roc Yarbrough RN and ANALISA RN performed a dual skin assessment on this patient Impairment noted- see wound doc flow sheet  Antonio score is 15'

## 2019-10-06 NOTE — PROGRESS NOTES
[facility]   [address], [facility] [address], [place]  Phone: [phone]   [phone]       Nephrology Progress Note  Tre Cornea     1980     [id number]  Date of Admission : 10/4/2019  10/06/19    CC:  Follow up for ESRD       Assessment and Plan   ESRD-HD :  - Dialyzes TTS at [facility] and f/b FNA  - Was not able to dialyse with AVF . - Austin placed in RT femoral and had HD partial 10/4   - vascular surgery consult for fistulogram   - HD again done10/ 5 and  - No HD today      ?Drug Overdose   - she reports taking excess amount of Gabapentin and Baclofen   - dialysis has helped   - mx per primary team      Anemia in CKD  - Retacrit w/ HD     [facility]   - resume binders   - continue calcitriol if she is taking it at home      HTN :  - continue current meds     PVD   - hx of Left TMA      Care Plan discussed with:  Pt and RN     Interval History:    Pt had HD yesterday  and 2 kg removed, No Hd today  Pt asking for pain med   Review of Systems: A comprehensive review of systems was negative except for that written in the HPI.     Current Medications:   Current Facility-Administered Medications   Medication Dose Route Frequency    promethazine (PHENERGAN) tablet 25 mg  25 mg Oral Q6H PRN    insulin glargine (LANTUS) injection 15 Units  15 Units SubCUTAneous DAILY    amLODIPine (NORVASC) tablet 10 mg  10 mg Oral DAILY    cloNIDine HCl (CATAPRES) tablet 0.1 mg  0.1 mg Oral TID    carvedilol (COREG) tablet 6.25 mg  6.25 mg Oral BID WITH MEALS    metoprolol (LOPRESSOR) injection 1.25 mg  1.25 mg IntraVENous Q4H PRN    insulin lispro (HUMALOG) injection   SubCUTAneous AC&HS    sodium chloride (NS) flush 5-40 mL  5-40 mL IntraVENous Q8H    sodium chloride (NS) flush 5-40 mL  5-40 mL IntraVENous PRN    sodium chloride (NS) flush 5-40 mL  5-40 mL IntraVENous Q8H    sodium chloride (NS) flush 5-40 mL  5-40 mL IntraVENous PRN    acetaminophen (TYLENOL) tablet 650 mg  650 mg Oral Q4H PRN    calcitRIOL (ROCALTROL) capsule 0.25 mcg  0.25 mcg Oral DAILY    glucose chewable tablet 16 g  4 Tab Oral PRN    glucagon (GLUCAGEN) injection 1 mg  1 mg IntraMUSCular PRN    dextrose 10% infusion 0-250 mL  0-250 mL IntraVENous PRN    epoetin bernard-epbx (RETACRIT) injection 10,000 Units  10,000 Units SubCUTAneous Q TUE, THU & SAT    heparin (porcine) injection 5,000 Units  5,000 Units SubCUTAneous Q8H      Allergies   Allergen Reactions    Fentanyl Itching and Angioedema     \"throat closed up\" per pt      Erythromycin Itching    Toradol [Ketorolac] Rash    Morphine Itching       Objective:  Vitals:    Vitals:    10/05/19 2320 10/06/19 0223 10/06/19 1025 10/06/19 1515   BP:  (!) 162/93 159/88 129/78   Pulse:  (!) 107 (!) 102 98   Resp:  16  14   Temp:  98.1 °F (36.7 °C)  98 °F (36.7 °C)   TempSrc:       SpO2: 100% 100%  99%   Weight:  71.2 kg (156 lb 15.5 oz)       Intake and Output:  No intake/output data recorded. 10/04 1901 - 10/06 0700  In: -   Out: 2650 [Urine:650]    Physical Examination:  Pt intubated     No  General: NAD,Conversant   Neck:  Supple, no mass  Resp:  Lungs CTA B/L, no wheezing , normal respiratory effort  CV:  RRR,  no murmur or rub,1+ LE edema  GI:  Soft, NT, + Bowel sounds, no hepatosplenomegaly  Neurologic:  Non focal  Psych:             AAO x 3 appropriate affect   Skin:  No Rash  :  No escobedo     [x]    High complexity decision making was performed  [x]    Patient is at high-risk of decompensation with multiple organ involvement    Lab Data Personally Reviewed: I have reviewed all the pertinent labs, microbiology data and radiology studies during assessment.     Recent Labs     10/06/19  0249 10/05/19  0416 10/04/19  1449 10/04/19  0429    138  --  134*   K 4.4 4.3  --  4.9    104  --  97   CO2 24 27  --  23   * 152*  --  601*   BUN 29* 40*  --  89*   CREA 2.78* 2.68*  --  4.80*   CA 9.0 8.1*  --  7.6*   MG 2.2 1.8  --   --    PHOS  -- --  5.9*  --    ALB  --   --  3.2* 3.1*   SGOT  --   --  16 16   ALT  --   --  26 24     Recent Labs     10/05/19  0418 10/04/19  0429   WBC 5.4 5.6   HGB 8.8* 8.8*   HCT 27.7* 27.6*    147*     Lab Results   Component Value Date/Time    Specimen Description: URINE 06/24/2013 08:00 PM    Specimen Description: URINE 06/17/2013 07:55 PM    Specimen Description: URINE 05/26/2013 10:10 AM    Specimen Description: URINE 04/22/2013 02:30 PM    Specimen Description: NARES 03/21/2013 12:30 PM     Lab Results   Component Value Date/Time    Culture result: NO SIGNIFICANT GROWTH 10/04/2019 07:15 AM    Culture result: NO GROWTH 5 DAYS 05/27/2019 12:53 PM    Culture result: NO GROWTH 5 DAYS 06/27/2018 04:06 AM    Culture result: (A) 06/24/2018 07:30 AM     STREPTOCOCCUS MITIS/ORALIS GROWING IN 1 OF 2 BOTTLES DRAWN . .(SITE= L CHEST PICC) (SENSITIVITIES PERFORMED BY E-TEST)    Culture result: (A) 06/24/2018 07:30 AM     PRELIMINARY REPORT OF GRAM POSITIVE COCCI IN PAIRS GROWING IN 1 OF 2 BOTTLES DRAWN . ..(SITE= L CHEST PICC) CALLED TO AND READ BACK BY LAURO ACOSTA ([place]) ON 6/25/18 AT 7348 BY VI    Culture result: REMAINING BOTTLE(S) HAS/HAVE NO GROWTH IN 5 DAYS 06/24/2018 07:30 AM     Recent Results (from the past 24 hour(s))   GLUCOSE, POC    Collection Time: 10/05/19 11:02 PM   Result Value Ref Range    Glucose (POC) 174 (H) 65 - 100 mg/dL    Performed by [facility]    METABOLIC PANEL, BASIC    Collection Time: 10/06/19  2:49 AM   Result Value Ref Range    Sodium 139 136 - 145 mmol/L    Potassium 4.4 3.5 - 5.1 mmol/L    Chloride 102 97 - 108 mmol/L    CO2 24 21 - 32 mmol/L    Anion gap 13 5 - 15 mmol/L    Glucose 206 (H) 65 - 100 mg/dL    BUN 29 (H) 6 - 20 MG/DL    Creatinine 2.78 (H) 0.55 - 1.02 MG/DL    BUN/Creatinine ratio 10 (L) 12 - 20      GFR est AA 23 (L) >60 ml/min/1.73m2    GFR est non-AA 19 (L) >60 ml/min/1.73m2    Calcium 9.0 8.5 - 10.1 MG/DL   MAGNESIUM    Collection Time: 10/06/19  2:49 AM   Result Value Ref Range    Magnesium 2.2 1.6 - 2.4 mg/dL   GLUCOSE, POC    Collection Time: 10/06/19  6:10 AM   Result Value Ref Range    Glucose (POC) 170 (H) 65 - 100 mg/dL    Performed by [facility]    GLUCOSE, POC    Collection Time: 10/06/19 12:07 PM   Result Value Ref Range    Glucose (POC) 352 (H) 65 - 100 mg/dL    Performed by [facility]            Total time spent with patient:  31  min. Care Plan discussed with:  Patient  y   Family  y    RN   y   Consulting Physician [address] [place] [address]        I have reviewed the flowsheets. Chart and Pertinent Notes have been reviewed. No change in PMH ,family and social history from Consult note.       Nano Juarez MD

## 2019-10-06 NOTE — CONSULTS
[address]    Name:  Raza Shine  MR#:  [id number]  :  1980  ACCOUNT #:  [de-identified]  DATE OF SERVICE:  10/05/2019    HISTORY OF PRESENT ILLNESS:  This is a 23-year-old female who was admitted yesterday, 10/04/2019 with altered mental status. The patient was found by EMS to be somnolent, but was able to stand and ambulate to the ambulance but then became sleepy in the ambulance. EMS reported multiple prior visits to her house for episodes of hypoglycemia. Therefore, they gave her oral glucose and then checked her blood glucose, it was over 400. They noticed that she had small pupils and decided to give Narcan 2 mg intranasally and she became more alert. She was able to tell them she took 5 or 6 oxycodone for her pain. Her urine drug screen was positive for benzos and cocaine, but no opiates. When I initially asked the patient about drug use, she denied it. When I said that her urine drug screen would suggest otherwise and was she sure she had not done any recreational drugs recently, she stated she had used crack cocaine. The patient also is on gabapentin and baclofen at home, and it was suspected that she had also taken an overdose of these as well. She has a history of polysubstance abuse. I am asked to the patient for possible seizure overnight. She was noted by nursing staff to have twitching in her bilateral upper extremities and during the event, the patient stated this is what her seizures are like. The patient was seen by me in 2018, again for hypersomnolence and mental status changes with a history of seizure listed in her record of that time as well, but without any documented evidence anywhere that she has ever actually had a seizure. She has not been on any AEDs back in 2018 or now. When I asked the patient about seizures at home, she states that is why she was brought to the hospital on this admission, which is not the case.   She did have an EEG in 2016, which just showed some mild slowing in the frontal regions. PAST MEDICAL HISTORY:  1.  Polysubstance abuse. 2.  History of DKA. 3.  Diabetes type 1.  4.  Left foot osteomyelitis, status post partial amputation  5. History of sickle cell disease. 6.  Gastroparesis. 7.  Asthma. 8.  Hypertension. 9.  Obstructive sleep apnea. 10.  History of a thrombus in her left arm and leg with chronic left upper extremity weakness related to this. 11.  End-stage renal disease, on hemodialysis. 12.  Fibromyalgia. 13.  Status post right cataract surgery. 14.  Status post gastric bypass. 15.  Status post J-tube placement and take down. MEDICATIONS AT HOME:  1.  Lantus. 2.  Novolin. 3.  Nephrocaps. 4.  PhosLo. 5.  Coreg. 6.  Catapres. 7.  Valium 2 mg a day. 8.  Trazodone 50 mg at bedtime. 9.  Folic acid. 10.  Vitamin E.  11.  Iron. 12.  Tums. 13.  Phenergan. 14.  Amlodipine. 15.  Calcitriol. 16.  Sodium bicarb. 17. Effexor 75 mg b.i.d. 18.  Sherrie-Colace. 19.  Seroquel 100 mg two times a day. 20.  Vitamin D.  21.  B12.  22.  Proventil. Unclear, if this list is accurate. ALLERGIES:  FENTANYL, ERYTHROMYCIN, TORADOL, AND MORPHINE. SOCIAL HISTORY:  She is  but estranged from her , lives with her father. No alcohol. No smoking. Positive crack cocaine. FAMILY HISTORY:  Mom, lung cancer and hypertension. Dad, kidney cancer, history of strokes and coronary artery disease. Sister with pancreatic cancer. Sister  with AIDS. PHYSICAL EXAMINATION:  VITAL SIGNS:  Blood pressure 140/68, pulse 114, respiratory rate is 24, saturating 99% on nasal cannula, temperature 98.6, blood pressure on presentation 180/136. She weighs 160 pounds. She is 5 feet 2. GENERAL:  She is well-nourished, well-developed female, sitting in bed, in no distress. HEART:  Tachycardic, regular rhythm. No murmur. Carotids 2+. No bruits. EXTREMITIES:  Warm.   2+ radial pulses, partially amputated left foot. NEUROLOGIC:  Mental Status:  Alert and oriented x4. Speech and language intact. Attention, memory, and fund of knowledge appropriate. Cranial Nerves:  She has no asymmetry or ptosis. Her extraocular eye movements are intact without diplopia or nystagmus. Her visual fields are full. Her pupils are asymmetric, smaller on the left than the right, reactive, chronic due to prior cataract surgery. Her strength, sensation and hearing intact. Tongue is midline. Palate elevates symmetrically. Trapezius and sternocleidomastoid are 5/5. Her motor exam, she has 5/5 strength throughout. No pronator drift. No tremor. Sensory exam, she reported intact throughout. Reflexes were 1+ in the upper extremities and knees, unable to elicit the ankles. Her toe is downgoing on the right. No toe on the left. Coordination was intact to finger-to-nose, rapid alternating movements. Gait not assessed at this time. STUDIES AND REPORTS:  Reviewed above. CT of the head on admission was negative for any acute changes. CT of the cervical spine which was completely normal.    ASSESSMENT AND PLAN:  This is a 24-year-old female, presenting with altered mental status thought to secondary to an opioid overdose as well as possibly gabapentin and baclofen, but also found to have a urine drug screen positive for benzos and cocaine. Now, with improvement in her mental status, noted overnight to have twitching of her bilateral upper arms and torso that the patient reported was a seizure while the activity was occurring. With a nonfocal exam, the patient has seizure listed in her past medical history and perhaps at some point, she had a hypoglycemic-related seizure noticed. There is no documented evidence of history of epilepsy, she is not on any AEDs, and this event where she had bilateral shaking and was still able to talk is not consistent with seizure.   The patient also said that she came in the hospital yesterday for seizure which was not the case. I would not start her on any seizure medicine, no further work workup is recommended. She does have an EEG ordered by the hospitalist.  I will review that when it is completed on Monday.       MD SMILEY [id number]  D:  10/05/2019 23:37  T:  10/06/2019 3:22  JOB #:  [id number]

## 2019-10-06 NOTE — PROGRESS NOTES
Bedside shift change report given to VERONICA Jara (oncoming nurse) by Remington Talbert RN (offgoing nurse). Report included the following information SBAR, Kardex, OR Summary, Procedure Summary, Intake/Output, MAR and Recent Results.

## 2019-10-06 NOTE — PROGRESS NOTES
Hospitalist Progress Note  Gerson Chavez MD  Answering service: [phone] from in house phone        Date of Service:  10/6/2019  NAME:  Janelle Luke  :  1980  MRN:  [id number]      Admission Summary:   39-year-old female, who has end-stage renal disease, on hemodialysis, Tuesday, Thursday, Saturday, who is presented to the hospital from the [facility] because of drug overdose. She does have history of chronic pain and takes opioids for that; is on benzos as well; history of diabetes type 2, gastroparesis, seizures, fibromyalgia, who had been brought from the [facility]. Reportedly, then she was at her house and noted to be obtunded by the family members. They thought that her blood sugar might be low. They started giving her oral glucose immediately but with no response. Then they went ahead and called the EMS. On arrival, the EMS checked the blood glucose. It was more than 400 and they noticed that she had pinpoint pupils and they started giving her Narcan and when she woke up but went to sleep right back, they brought her to [facility] where she got a few more doses of Narcan. They got 2 mg intranasal Narcan by the EMS and then later on 2 mg IV with a total of 3 mg and then 2 mg IV Narcan doses at the [facility]. Other than that, basically she was discharged from [facility] reportedly with hypoglycemia, but change in mental status was noticed and the urine at that time was also positive for hydrocodone and benzos, treated and was discharged. The summary is not yet available in our system, but I reviewed the labs at [facility]. The patient has arrived from [facility].   She is in the ICU and has been started on Narcan drip    Interval history / Subjective:         Patient seen and examined bedside   Friend in room     She is asking for iv opiods and iv benzos and phenargan and gabapentin   She was admitted with overdose of above in addition to cocaine     I asked if she remember taking benzo prior to coming to hospital and she says no even though positive in urine     Wants to talk to another doctor or  incharge and is demanding pain medications and benzos       I explained to her since she came to [facility] with drug overdose and could have  from same she is not allowed to have them here '    She is not in withdrawal as her  Speech is still slurred and is drowsy intermittently          Assessment & Plan:       . Drug overdose. With benzo and cocaine poa     The patient is admitted in the ICU with the possibility of opioid overdose. She has been started on Narcan drip,  And later yovany stopped as utox was negative for opiods   Ct head NAD   Today she is alert and oriented   Avoid use of benzo and opiod in her   . Hyperglycemia, diabetes mellitus type 2. Was started on insulin gtt and was hypoglycemic and was stopped later   She has been started on correction scale. lantus 15 started         Hypertension. Started her on clonidine patch and p.r.n. hydralazine. Added norvasc and unheld it    If unable to control, we can start her on Cardene drip. .  Fibromyalgia. Please no benzo or opiod for her       End-stage renal disease,   on dialysis, Tuesday, Thursday, Saturday  . Nephrology has been consulted.     She is getting hemodialysis 10/5 and may need repeat today at 10/6       Tachycardia   May have started showing withdrawal symptoms       Code status: full   DVT prophylaxis:     Care Plan discussed with: Nurse  Disposition: TBD     Hospital Problems  Date Reviewed: 6/10/2018          Codes Class Noted POA    Hyperglycemia ICD-10-CM: R73.9  ICD-9-CM: 790.29  10/4/2019 Unknown        Overdose of opiate or related narcotic, undetermined intent, sequela ICD-10-CM: T40.604S  ICD-9-CM: 909.0, E989  10/4/2019 Unknown                Review of Systems:   A comprehensive review of systems was negative except for that written in the HPI. Vital Signs:    Last 24hrs VS reviewed since prior progress note. Most recent are:  Visit Vitals  /88   Pulse (!) 102   Temp 98.1 °F (36.7 °C)   Resp 16   Wt 71.2 kg (156 lb 15.5 oz)   SpO2 100%   BMI 28.71 kg/m²         Intake/Output Summary (Last 24 hours) at 10/6/2019 1257  Last data filed at 10/5/2019 1835  Gross per 24 hour   Intake    Output 650 ml   Net -650 ml        Physical Examination:             Constitutional:  No acute distress,awake    ENT:  Oral mucous moist, oropharynx benign. Resp:  CTA bilaterally. No wheezing/rhonchi/rales. No accessory muscle use   CV:  Regular rhythm, normal rate, no murmurs, gallops, rubs    GI:  Soft, non distended, non tender. normoactive bowel sounds, no hepatosplenomegaly     Musculoskeletal:  No edema, warm, 2+ pulses throughout    Neurologic:  Moves all extremities. AAOx3, CN II-XII reviewed            Data Review:    Review and/or order of clinical lab test      Labs:     Recent Labs     10/05/19  0418 10/04/19  0429   WBC 5.4 5.6   HGB 8.8* 8.8*   HCT 27.7* 27.6*    147*     Recent Labs     10/06/19  0249 10/05/19  0416 10/04/19  1449 10/04/19  0429    138  --  134*   K 4.4 4.3  --  4.9    104  --  97   CO2 24 27  --  23   BUN 29* 40*  --  89*   CREA 2.78* 2.68*  --  4.80*   * 152*  --  601*   CA 9.0 8.1*  --  7.6*   MG 2.2 1.8  --   --    PHOS  --   --  5.9*  --      Recent Labs     10/04/19  1449 10/04/19  0429   SGOT 16 16   ALT 26 24   * 295*   TBILI 0.3 0.4   TP 6.7 6.7   ALB 3.2* 3.1*   GLOB 3.5 3.6     No results for input(s): INR, PTP, APTT, INREXT, INREXT in the last 72 hours. No results for input(s): FE, TIBC, PSAT, FERR in the last 72 hours. Lab Results   Component Value Date/Time    Folate 33.8 (H) 05/07/2018 11:20 AM      No results for input(s): PH, PCO2, PO2 in the last 72 hours.   Recent Labs     10/04/19  1449   TROIQ <0.05     Lab Results   Component Value Date/Time Cholesterol, total 128 06/10/2018 07:44 AM    HDL Cholesterol 51 06/10/2018 07:44 AM    LDL, calculated 67.4 06/10/2018 07:44 AM    Triglyceride 48 06/10/2018 07:44 AM    CHOL/HDL Ratio 2.5 06/10/2018 07:44 AM     Lab Results   Component Value Date/Time    Glucose (POC) 352 (H) 10/06/2019 12:07 PM    Glucose (POC) 170 (H) 10/06/2019 06:10 AM    Glucose (POC) 174 (H) 10/05/2019 11:02 PM    Glucose (POC) 179 (H) 10/05/2019 04:21 PM    Glucose (POC) 129 (H) 10/05/2019 11:34 AM     Lab Results   Component Value Date/Time    Color YELLOW/STRAW 10/04/2019 07:15 AM    Appearance CLOUDY (A) 10/04/2019 07:15 AM    Specific gravity 1.013 10/04/2019 07:15 AM    Specific gravity 1.019 04/07/2019 08:49 AM    pH (UA) 7.0 10/04/2019 07:15 AM    Protein 30 (A) 10/04/2019 07:15 AM    Glucose >1,000 (A) 10/04/2019 07:15 AM    Ketone NEGATIVE  10/04/2019 07:15 AM    Bilirubin NEGATIVE  10/04/2019 07:15 AM    Urobilinogen 0.2 10/04/2019 07:15 AM    Nitrites NEGATIVE  10/04/2019 07:15 AM    Leukocyte Esterase MODERATE (A) 10/04/2019 07:15 AM    Epithelial cells FEW 10/04/2019 07:15 AM    Bacteria 1+ (A) 10/04/2019 07:15 AM    WBC 20-50 10/04/2019 07:15 AM    RBC 0-5 10/04/2019 07:15 AM         Medications Reviewed:     Current Facility-Administered Medications   Medication Dose Route Frequency    promethazine (PHENERGAN) tablet 25 mg  25 mg Oral Q6H PRN    insulin glargine (LANTUS) injection 15 Units  15 Units SubCUTAneous DAILY    insulin lispro (HUMALOG) injection 8 Units  8 Units SubCUTAneous ONCE    amLODIPine (NORVASC) tablet 10 mg  10 mg Oral DAILY    cloNIDine HCl (CATAPRES) tablet 0.1 mg  0.1 mg Oral TID    carvedilol (COREG) tablet 6.25 mg  6.25 mg Oral BID WITH MEALS    metoprolol (LOPRESSOR) injection 1.25 mg  1.25 mg IntraVENous Q4H PRN    insulin lispro (HUMALOG) injection   SubCUTAneous AC&HS    sodium chloride (NS) flush 5-40 mL  5-40 mL IntraVENous Q8H    sodium chloride (NS) flush 5-40 mL  5-40 mL IntraVENous PRN    sodium chloride (NS) flush 5-40 mL  5-40 mL IntraVENous Q8H    sodium chloride (NS) flush 5-40 mL  5-40 mL IntraVENous PRN    acetaminophen (TYLENOL) tablet 650 mg  650 mg Oral Q4H PRN    calcitRIOL (ROCALTROL) capsule 0.25 mcg  0.25 mcg Oral DAILY    glucose chewable tablet 16 g  4 Tab Oral PRN    glucagon (GLUCAGEN) injection 1 mg  1 mg IntraMUSCular PRN    dextrose 10% infusion 0-250 mL  0-250 mL IntraVENous PRN    epoetin bernard-epbx (RETACRIT) injection 10,000 Units  10,000 Units SubCUTAneous Q TUE, THU & SAT    heparin (porcine) injection 5,000 Units  5,000 Units SubCUTAneous Q8H     ______________________________________________________________________  EXPECTED LENGTH OF STAY: 3d 12h  ACTUAL LENGTH OF STAY:          2                 Christian Rich MD

## 2019-10-07 VITALS
DIASTOLIC BLOOD PRESSURE: 86 MMHG | TEMPERATURE: 98.3 F | WEIGHT: 158.73 LBS | OXYGEN SATURATION: 97 % | SYSTOLIC BLOOD PRESSURE: 146 MMHG | BODY MASS INDEX: 29.03 KG/M2 | RESPIRATION RATE: 16 BRPM | HEART RATE: 95 BPM

## 2019-10-07 LAB
GLUCOSE BLD STRIP.AUTO-MCNC: 253 MG/DL (ref 65–100)
GLUCOSE BLD STRIP.AUTO-MCNC: 412 MG/DL (ref 65–100)
GLUCOSE BLD STRIP.AUTO-MCNC: 412 MG/DL (ref 65–100)
SERVICE CMNT-IMP: ABNORMAL

## 2019-10-07 PROCEDURE — 74011250636 HC RX REV CODE- 250/636: Performed by: INTERNAL MEDICINE

## 2019-10-07 PROCEDURE — 74011636637 HC RX REV CODE- 636/637: Performed by: INTERNAL MEDICINE

## 2019-10-07 PROCEDURE — 74011000250 HC RX REV CODE- 250

## 2019-10-07 PROCEDURE — 74011250637 HC RX REV CODE- 250/637: Performed by: NURSE PRACTITIONER

## 2019-10-07 PROCEDURE — 74011250637 HC RX REV CODE- 250/637: Performed by: INTERNAL MEDICINE

## 2019-10-07 PROCEDURE — 82962 GLUCOSE BLOOD TEST: CPT

## 2019-10-07 PROCEDURE — 74011636637 HC RX REV CODE- 636/637: Performed by: NURSE PRACTITIONER

## 2019-10-07 RX ORDER — INSULIN GLARGINE 100 [IU]/ML
25 INJECTION, SOLUTION SUBCUTANEOUS DAILY
Status: DISCONTINUED | OUTPATIENT
Start: 2019-10-07 | End: 2019-10-07 | Stop reason: HOSPADM

## 2019-10-07 RX ORDER — INSULIN LISPRO 100 [IU]/ML
10 INJECTION, SOLUTION INTRAVENOUS; SUBCUTANEOUS ONCE
Status: COMPLETED | OUTPATIENT
Start: 2019-10-07 | End: 2019-10-07

## 2019-10-07 RX ORDER — BACITRACIN 500 UNIT/G
PACKET (EA) TOPICAL
Status: COMPLETED
Start: 2019-10-07 | End: 2019-10-07

## 2019-10-07 RX ADMIN — QUETIAPINE FUMARATE 100 MG: 100 TABLET ORAL at 00:50

## 2019-10-07 RX ADMIN — HEPARIN SODIUM 5000 UNITS: 5000 INJECTION INTRAVENOUS; SUBCUTANEOUS at 10:35

## 2019-10-07 RX ADMIN — CLONIDINE HYDROCHLORIDE 0.1 MG: 0.1 TABLET ORAL at 10:36

## 2019-10-07 RX ADMIN — VENLAFAXINE 75 MG: 37.5 TABLET ORAL at 07:54

## 2019-10-07 RX ADMIN — INSULIN GLARGINE 25 UNITS: 100 INJECTION, SOLUTION SUBCUTANEOUS at 10:35

## 2019-10-07 RX ADMIN — TRAZODONE HYDROCHLORIDE 50 MG: 100 TABLET ORAL at 00:49

## 2019-10-07 RX ADMIN — QUETIAPINE FUMARATE 100 MG: 100 TABLET ORAL at 10:36

## 2019-10-07 RX ADMIN — BACITRACIN 1 PACKET: 500 OINTMENT TOPICAL at 14:53

## 2019-10-07 RX ADMIN — ACETAMINOPHEN 650 MG: 325 TABLET, FILM COATED ORAL at 00:49

## 2019-10-07 RX ADMIN — HEPARIN SODIUM 5000 UNITS: 5000 INJECTION INTRAVENOUS; SUBCUTANEOUS at 02:12

## 2019-10-07 RX ADMIN — INSULIN LISPRO 5 UNITS: 100 INJECTION, SOLUTION INTRAVENOUS; SUBCUTANEOUS at 12:46

## 2019-10-07 RX ADMIN — CALCITRIOL 0.25 MCG: 0.25 CAPSULE ORAL at 10:35

## 2019-10-07 RX ADMIN — INSULIN LISPRO 10 UNITS: 100 INJECTION, SOLUTION INTRAVENOUS; SUBCUTANEOUS at 10:02

## 2019-10-07 RX ADMIN — AMLODIPINE BESYLATE 10 MG: 5 TABLET ORAL at 10:35

## 2019-10-07 RX ADMIN — CARVEDILOL 6.25 MG: 6.25 TABLET, FILM COATED ORAL at 07:54

## 2019-10-07 NOTE — ROUTINE PROCESS
2150: Pt . Paged hospitalist.  
 
2153: Morena Rojas NP notified. Verbal order for 6 units of lispro.

## 2019-10-07 NOTE — PROGRESS NOTES
Bedside and Verbal shift change report given to Lyle Trejo RN (oncoming nurse) by Sheryl De La O and Silva Singh RN (offgoing nurse). Report included the following information SBAR, Kardex and MAR.

## 2019-10-07 NOTE — PROGRESS NOTES
Speech Pathology  Consult received on 10/5. Spoke with patient, RN and reviewed chart. Patient was initially obtunded. Diagnosed with drug overdose. Patient now alert and not reporting any swallowing difficulties. RN reporting no swallowing concerns. No need for SLP intervention at this time.

## 2019-10-07 NOTE — PROGRESS NOTES
Pete NP Progress note    Name: Hector Ashton  YOB: 1980  MRN: [id number]  Admission Date: 10/4/2019  4:12 AM    Date of service: 10/6/2019 9:58 PM    Overnight Update:        Complaint: Requesting benzo/pain meds  Paged by: Lakeshia MARTIN  Subjective: Paged by RN, patient requesting IV pain medications and benzos, if not IV requests PO. Patient admitted for overdose on these medications.   Plan:   - Dr Cammie Espino note reviewed, clear that patient will not receive these medications during admission  - Agree with the above and discussed with VERONICA FRYEP-C, PA-C  [phone] or Clarita

## 2019-10-07 NOTE — DIABETES MGMT
[facility]    [facility] Progress Note    Recommendations/ Comments: Chart reviewed for hyperglycemia. Note BG >400 mg/dl this morning. Of note, pt's first dose of basal insulin (since admission) occurred today. Anticipate BG to improve with basal insulin on board. Given renal status, pt may require high sensitivity correction insulin as BG improves. Will follow. Current hospital DM medication: Lantus, 25 units daily  Lispro correction scale - normal sensitivity    Chart reviewed on [facility]. Patient is a 34 y.o. female with known DM on 10 units Lantus daily at home. A1c:   Lab Results   Component Value Date/Time    Hemoglobin A1c 7.9 (H) 10/04/2019 02:49 PM    Hemoglobin A1c 7.6 (H) 06/25/2018 03:04 AM       Recent Glucose Results:   Lab Results   Component Value Date/Time    GLUCPOC 253 (H) 10/07/2019 11:54 AM    GLUCPOC 412 (H) 10/07/2019 09:47 AM    GLUCPOC 412 (H) 10/07/2019 06:53 AM        Lab Results   Component Value Date/Time    Creatinine 2.78 (H) 10/06/2019 02:49 AM     CrCl cannot be calculated (Unknown ideal weight. ). Active Orders   Diet    DIET DIABETIC CONSISTENT CARB Regular        PO intake: No data found. Will continue to follow as needed.     Thank you  Sun Villagran RD, Diabetes Clinician         Time spent: 5 minutes

## 2019-10-07 NOTE — PROGRESS NOTES
Vascular:    Patient with left upper arm fistula placed in [place]. She has had difficulties with poor flow as well as steal symptoms. She has had interventions in [place] as well as at [facility] vascular center here in [place]. She plans to seek further care at [facility] post discharge. She dialyses in [place]. She is currently dialysing through a femoral catheter. She would prefer to defer any further intervention on her fistula to the physicians in [place]. No urgent need for intervention as she has a functioning catheter. OK for DC from my standpoint. Follow up to be arranged by patient and her dialysis unit.

## 2019-10-07 NOTE — PROGRESS NOTES
I have reviewed discharge instructions with the patient. The patient verbalized understanding. Pt's signature pad on the [id number] [facility] isn't working. A signed copy of pt's discharge instructions was placed on the patient's hard chart.
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
## 2019-10-07 NOTE — DISCHARGE INSTRUCTIONS
Discharge Instructions       PATIENT ID: Aron Flowers  MRN: [id number]   YOB: 1980    DATE OF ADMISSION: 10/4/2019  4:12 AM    DATE OF DISCHARGE: 10/7/2019    PRIMARY CARE PROVIDER: Kurt Smith MD     ATTENDING PHYSICIAN: Dustin Mcduffie MD  DISCHARGING PROVIDER: Christian Rich MD    To contact this individual call [phone] and ask the  to page. If unavailable ask to be transferred the Adult Hospitalist Department. DISCHARGE DIAGNOSES   Drug overdose     CONSULTATIONS: IP CONSULT TO PULMONOLOGY  IP CONSULT TO NEPHROLOGY  IP CONSULT TO INTERVENTIONAL RADIOLOGY  IP CONSULT TO NEUROLOGY    PROCEDURES/SURGERIES: * No surgery found *    PENDING TEST RESULTS:   At the time of discharge the following test results are still pending:     FOLLOW UP APPOINTMENTS:   Follow-up Information     Follow up With Specialties Details Why Contact Info    Kurt Smith MD Internal Medicine In 1 week  [address] [facility]  [phone]             ADDITIONAL CARE RECOMMENDATIONS:   Please follow up with pcp     DIET: Regular Diet  Oral Nutritional Supplements:    ACTIVITY: Activity as tolerated    WOUND CARE:     EQUIPMENT needed:       DISCHARGE MEDICATIONS:   See Medication Reconciliation Form    · It is important that you take the medication exactly as they are prescribed. · Keep your medication in the bottles provided by the pharmacist and keep a list of the medication names, dosages, and times to be taken in your wallet. · Do not take other medications without consulting your doctor. NOTIFY YOUR PHYSICIAN FOR ANY OF THE FOLLOWING:   Fever over 101 degrees for 24 hours. Chest pain, shortness of breath, fever, chills, nausea, vomiting, diarrhea, change in mentation, falling, weakness, bleeding. Severe pain or pain not relieved by medications. Or, any other signs or symptoms that you may have questions about.       DISPOSITION:  xx  Home With:   OT PT  [place]  RN       SNF/Inpatient Rehab/LTAC    Independent/assisted living    Hospice    Other:     CDMP Checked:   Yes x     PROBLEM LIST Updated:  Yes x       Signed:   Christian Rich MD  10/7/2019  1:44 PM

## 2019-10-07 NOTE — PROGRESS NOTES
[address] with father. This pt is being discharged to home today. ELY met with pt and she requested that Cm call [employer] and request [employer] for her transport home. She stated that her boyfriend will be going with her. Pt confirmed that she receives dialysis from [facility] and the she will have a ride there tomorrow. CM called Florence at [facility] to inform her that pt will be returning there tomorrow. Per her request, CM faxed her clinicals. CM called [employer] [phone] to set up Medicaid Transport via cab. The reference number is [id number].  Wesley Gabriel

## 2019-10-11 ENCOUNTER — HOSPITAL ENCOUNTER (OUTPATIENT)
Dept: INTERVENTIONAL RADIOLOGY/VASCULAR | Age: 39
Discharge: HOME | End: 2019-10-11
Attending: STUDENT IN AN ORGANIZED HEALTH CARE EDUCATION/TRAINING PROGRAM | Admitting: STUDENT IN AN ORGANIZED HEALTH CARE EDUCATION/TRAINING PROGRAM

## 2019-10-11 NOTE — PROGRESS NOTES
1300: femoral fernandez removed by Erin Samaniego. MD wants 1 hours bed rest  1350: pt wanting to leave before 1 hours bedrest, pt to waiting room. Site cdi.

## 2020-01-01 NOTE — PROGRESS NOTES
[facility]   [address] [facility], [facility] [address], [place]  Phone: [phone]   [phone]       Nephrology Progress Note  Chato Aguilar     1980     [id number]  Date of Admission : 10/4/2019  10/07/19    CC:  Follow up for ESRD       Assessment and Plan   ESRD-HD :  - Dialyzes TTS at [facility] and f/b FNA  - Was not able to dialyse with AVF . - Austin placed in RT femoral and had HD 10/4, 10/5  - next HD tomorrow using femoral catheter-- she understands the risk for infection./ catheter dysfunction etc      AVF dysfunction   - she wants to follow up at [facility]       Anemia in CKD  - Retacrit w/ HD     Sec [facility]   - continue binders      HTN :  - continue current meds     PVD   - hx of Left TMA      Care Plan discussed with:  Pt and RN     Interval History:  She says she is ready for d/c  She wants to leave the hospital and follow up w/ [facility]   Last HD was Saturday and was uneventful        Review of Systems: A comprehensive review of systems was negative except for that written in the HPI.     Current Medications:   Current Facility-Administered Medications   Medication Dose Route Frequency    insulin glargine (LANTUS) injection 25 Units  25 Units SubCUTAneous DAILY    promethazine (PHENERGAN) tablet 25 mg  25 mg Oral Q6H PRN    venlafaxine (EFFEXOR) tablet 75 mg  75 mg Oral BID WITH MEALS    QUEtiapine (SEROquel) tablet 100 mg  100 mg Oral BID    traZODone (DESYREL) tablet 50 mg  50 mg Oral QHS    amLODIPine (NORVASC) tablet 10 mg  10 mg Oral DAILY    cloNIDine HCl (CATAPRES) tablet 0.1 mg  0.1 mg Oral TID    carvedilol (COREG) tablet 6.25 mg  6.25 mg Oral BID WITH MEALS    metoprolol (LOPRESSOR) injection 1.25 mg  1.25 mg IntraVENous Q4H PRN    insulin lispro (HUMALOG) injection   SubCUTAneous AC&HS    sodium chloride (NS) flush 5-40 mL  5-40 mL IntraVENous Q8H    sodium chloride (NS) flush 5-40 mL  5-40 mL IntraVENous PRN    sodium chloride (NS) flush 5-40 mL 5-40 mL IntraVENous Q8H    sodium chloride (NS) flush 5-40 mL  5-40 mL IntraVENous PRN    acetaminophen (TYLENOL) tablet 650 mg  650 mg Oral Q4H PRN    calcitRIOL (ROCALTROL) capsule 0.25 mcg  0.25 mcg Oral DAILY    glucose chewable tablet 16 g  4 Tab Oral PRN    glucagon (GLUCAGEN) injection 1 mg  1 mg IntraMUSCular PRN    dextrose 10% infusion 0-250 mL  0-250 mL IntraVENous PRN    epoetin bernard-epbx (RETACRIT) injection 10,000 Units  10,000 Units SubCUTAneous Q TUE, THU & SAT    heparin (porcine) injection 5,000 Units  5,000 Units SubCUTAneous Q8H      Allergies   Allergen Reactions    Fentanyl Itching and Angioedema     \"throat closed up\" per pt      Erythromycin Itching    Toradol [Ketorolac] Rash    Morphine Itching       Objective:  Vitals:    Vitals:    10/06/19 2301 10/07/19 0421 10/07/19 0754 10/07/19 0942   BP: (!) 165/93 135/74 146/83 146/86   Pulse: 100 100 (!) 101 95   Resp:  16  16   Temp:  99.1 °F (37.3 °C)  98.3 °F (36.8 °C)   TempSrc:       SpO2:  99%  97%   Weight:  72 kg (158 lb 11.7 oz)       Intake and Output:  No intake/output data recorded. No intake/output data recorded. Physical Examination:  Pt intubated     No  General: NAD,Conversant   Neck:  Supple, no mass  Resp:  Lungs CTA B/L, no wheezing , normal respiratory effort  CV:  RRR,  no murmur or rub,1+ LE edema  GI:  Soft, NT, + Bowel sounds, no hepatosplenomegaly  Neurologic:  Non focal  Psych:             AAO x 3 appropriate affect   Skin:  No Rash  :  No escobedo     [x]    High complexity decision making was performed  [x]    Patient is at high-risk of decompensation with multiple organ involvement    Lab Data Personally Reviewed: I have reviewed all the pertinent labs, microbiology data and radiology studies during assessment.     Recent Labs     10/06/19  0249 10/05/19  0416 10/04/19  1449    138  --    K 4.4 4.3  --     104  --    CO2 24 27  --    * 152*  --    BUN 29* 40*  --    CREA 2.78* 2.68*  -- CA 9.0 8.1*  --    MG 2.2 1.8  --    PHOS  --   --  5.9*   ALB  --   --  3.2*   SGOT  --   --  16   ALT  --   --  26     Recent Labs     10/05/19  0418   WBC 5.4   HGB 8.8*   HCT 27.7*        Lab Results   Component Value Date/Time    Specimen Description: URINE 06/24/2013 08:00 PM    Specimen Description: URINE 06/17/2013 07:55 PM    Specimen Description: URINE 05/26/2013 10:10 AM    Specimen Description: URINE 04/22/2013 02:30 PM    Specimen Description: NARES 03/21/2013 12:30 PM     Lab Results   Component Value Date/Time    Culture result: NO SIGNIFICANT GROWTH 10/04/2019 07:15 AM    Culture result: NO GROWTH 5 DAYS 05/27/2019 12:53 PM    Culture result: NO GROWTH 5 DAYS 06/27/2018 04:06 AM    Culture result: (A) 06/24/2018 07:30 AM     STREPTOCOCCUS MITIS/ORALIS GROWING IN 1 OF 2 BOTTLES DRAWN . .(SITE= L CHEST PICC) (SENSITIVITIES PERFORMED BY E-TEST)    Culture result: (A) 06/24/2018 07:30 AM     PRELIMINARY REPORT OF GRAM POSITIVE COCCI IN PAIRS GROWING IN 1 OF 2 BOTTLES DRAWN . ..(SITE= L CHEST PICC) CALLED TO AND READ BACK BY LAURO ACOSTA ([place]) ON 6/25/18 AT 0633 BY VI    Culture result: REMAINING BOTTLE(S) HAS/HAVE NO GROWTH IN 5 DAYS 06/24/2018 07:30 AM     Recent Results (from the past 24 hour(s))   GLUCOSE, POC    Collection Time: 10/06/19 12:07 PM   Result Value Ref Range    Glucose (POC) 352 (H) 65 - 100 mg/dL    Performed by [address], POC    Collection Time: 10/06/19  5:14 PM   Result Value Ref Range    Glucose (POC) 285 (H) 65 - 100 mg/dL    Performed by Dorys HAUSER    GLUCOSE, POC    Collection Time: 10/06/19  9:36 PM   Result Value Ref Range    Glucose (POC) 430 (H) 65 - 100 mg/dL    Performed by Alejandra Borjas    GLUCOSE, POC    Collection Time: 10/07/19  6:53 AM   Result Value Ref Range    Glucose (POC) 412 (H) 65 - 100 mg/dL    Performed by [address], POC    Collection Time: 10/07/19  9:47 AM   Result Value Ref Range    Glucose (POC) 412 (H) 65 - 100 mg/dL Performed by Hermila Rasmussen            Total time spent with patient:  31  min. Care Plan discussed with:  Patient  y   Family  y    RN   y   Consulting Physician [facility],         I have reviewed the flowsheets. Chart and Pertinent Notes have been reviewed. No change in PMH ,family and social history from Consult note.       Will Goldman MD Carolina Torres  (RN)  2020 17:58:41

## 2020-05-01 ENCOUNTER — APPOINTMENT (OUTPATIENT)
Dept: CT IMAGING | Age: 40
DRG: 070 | End: 2020-05-01
Attending: HOSPITALIST
Payer: MEDICARE

## 2020-05-01 ENCOUNTER — HOSPITAL ENCOUNTER (INPATIENT)
Age: 40
LOS: 1 days | Discharge: LEFT BEFORE DISCHARGE | DRG: 070 | End: 2020-05-02
Attending: EMERGENCY MEDICINE | Admitting: HOSPITALIST
Payer: MEDICARE

## 2020-05-01 ENCOUNTER — APPOINTMENT (OUTPATIENT)
Dept: GENERAL RADIOLOGY | Age: 40
DRG: 070 | End: 2020-05-01
Attending: EMERGENCY MEDICINE
Payer: MEDICARE

## 2020-05-01 DIAGNOSIS — R40.1 OBTUNDATION: Primary | ICD-10-CM

## 2020-05-01 DIAGNOSIS — R41.89 UNRESPONSIVE: ICD-10-CM

## 2020-05-01 DIAGNOSIS — J81.0 ACUTE PULMONARY EDEMA (HCC): ICD-10-CM

## 2020-05-01 PROBLEM — E87.70 FLUID OVERLOAD: Status: ACTIVE | Noted: 2020-05-01

## 2020-05-01 LAB
ALBUMIN SERPL-MCNC: 2.5 G/DL (ref 3.5–5)
ALBUMIN/GLOB SERPL: 0.6 {RATIO} (ref 1.1–2.2)
ALP SERPL-CCNC: 115 U/L (ref 45–117)
ALT SERPL-CCNC: 36 U/L (ref 12–78)
ANION GAP SERPL CALC-SCNC: 10 MMOL/L (ref 5–15)
AST SERPL-CCNC: 40 U/L (ref 15–37)
ATRIAL RATE: 80 BPM
BASOPHILS # BLD: 0 K/UL (ref 0–0.1)
BASOPHILS NFR BLD: 0 % (ref 0–1)
BILIRUB SERPL-MCNC: 0.3 MG/DL (ref 0.2–1)
BUN SERPL-MCNC: 61 MG/DL (ref 6–20)
BUN/CREAT SERPL: 17 (ref 12–20)
CALCIUM SERPL-MCNC: 6.7 MG/DL (ref 8.5–10.1)
CALCULATED P AXIS, ECG09: 64 DEGREES
CALCULATED R AXIS, ECG10: 30 DEGREES
CALCULATED T AXIS, ECG11: 60 DEGREES
CHLORIDE SERPL-SCNC: 112 MMOL/L (ref 97–108)
CO2 SERPL-SCNC: 19 MMOL/L (ref 21–32)
COMMENT, HOLDF: NORMAL
CREAT SERPL-MCNC: 3.55 MG/DL (ref 0.55–1.02)
DIAGNOSIS, 93000: NORMAL
DIFFERENTIAL METHOD BLD: ABNORMAL
EOSINOPHIL # BLD: 0 K/UL (ref 0–0.4)
EOSINOPHIL NFR BLD: 0 % (ref 0–7)
ERYTHROCYTE [DISTWIDTH] IN BLOOD BY AUTOMATED COUNT: 16 % (ref 11.5–14.5)
GLOBULIN SER CALC-MCNC: 3.9 G/DL (ref 2–4)
GLUCOSE SERPL-MCNC: 209 MG/DL (ref 65–100)
HCT VFR BLD AUTO: 33.8 % (ref 35–47)
HGB BLD-MCNC: 10.8 G/DL (ref 11.5–16)
IMM GRANULOCYTES # BLD AUTO: 0 K/UL (ref 0–0.04)
IMM GRANULOCYTES NFR BLD AUTO: 0 % (ref 0–0.5)
LACTATE SERPL-SCNC: 0.7 MMOL/L (ref 0.4–2)
LYMPHOCYTES # BLD: 0.8 K/UL (ref 0.8–3.5)
LYMPHOCYTES NFR BLD: 11 % (ref 12–49)
MAGNESIUM SERPL-MCNC: 1.6 MG/DL (ref 1.6–2.4)
MCH RBC QN AUTO: 27.3 PG (ref 26–34)
MCHC RBC AUTO-ENTMCNC: 32 G/DL (ref 30–36.5)
MCV RBC AUTO: 85.6 FL (ref 80–99)
MONOCYTES # BLD: 0.2 K/UL (ref 0–1)
MONOCYTES NFR BLD: 3 % (ref 5–13)
NEUTS SEG # BLD: 6 K/UL (ref 1.8–8)
NEUTS SEG NFR BLD: 86 % (ref 32–75)
NRBC # BLD: 0 K/UL (ref 0–0.01)
NRBC BLD-RTO: 0 PER 100 WBC
P-R INTERVAL, ECG05: 148 MS
PLATELET # BLD AUTO: 203 K/UL (ref 150–400)
PMV BLD AUTO: 12.2 FL (ref 8.9–12.9)
POTASSIUM SERPL-SCNC: 2.8 MMOL/L (ref 3.5–5.1)
PROT SERPL-MCNC: 6.4 G/DL (ref 6.4–8.2)
Q-T INTERVAL, ECG07: 454 MS
QRS DURATION, ECG06: 92 MS
QTC CALCULATION (BEZET), ECG08: 523 MS
RBC # BLD AUTO: 3.95 M/UL (ref 3.8–5.2)
SAMPLES BEING HELD,HOLD: NORMAL
SODIUM SERPL-SCNC: 141 MMOL/L (ref 136–145)
VENTRICULAR RATE, ECG03: 80 BPM
WBC # BLD AUTO: 7.1 K/UL (ref 3.6–11)

## 2020-05-01 PROCEDURE — 74011250636 HC RX REV CODE- 250/636: Performed by: EMERGENCY MEDICINE

## 2020-05-01 PROCEDURE — 83605 ASSAY OF LACTIC ACID: CPT

## 2020-05-01 PROCEDURE — 65660000001 HC RM ICU INTERMED STEPDOWN

## 2020-05-01 PROCEDURE — 75810000455 HC PLCMT CENT VENOUS CATH LVL 2 5182

## 2020-05-01 PROCEDURE — 83735 ASSAY OF MAGNESIUM: CPT

## 2020-05-01 PROCEDURE — 02HV33Z INSERTION OF INFUSION DEVICE INTO SUPERIOR VENA CAVA, PERCUTANEOUS APPROACH: ICD-10-PCS | Performed by: EMERGENCY MEDICINE

## 2020-05-01 PROCEDURE — 87040 BLOOD CULTURE FOR BACTERIA: CPT

## 2020-05-01 PROCEDURE — C1751 CATH, INF, PER/CENT/MIDLINE: HCPCS

## 2020-05-01 PROCEDURE — 74011250636 HC RX REV CODE- 250/636: Performed by: INTERNAL MEDICINE

## 2020-05-01 PROCEDURE — 99285 EMERGENCY DEPT VISIT HI MDM: CPT

## 2020-05-01 PROCEDURE — 36600 WITHDRAWAL OF ARTERIAL BLOOD: CPT

## 2020-05-01 PROCEDURE — 70450 CT HEAD/BRAIN W/O DYE: CPT

## 2020-05-01 PROCEDURE — 77010033678 HC OXYGEN DAILY

## 2020-05-01 PROCEDURE — 96375 TX/PRO/DX INJ NEW DRUG ADDON: CPT

## 2020-05-01 PROCEDURE — 71045 X-RAY EXAM CHEST 1 VIEW: CPT

## 2020-05-01 PROCEDURE — 93005 ELECTROCARDIOGRAM TRACING: CPT

## 2020-05-01 PROCEDURE — 96374 THER/PROPH/DIAG INJ IV PUSH: CPT

## 2020-05-01 PROCEDURE — 36415 COLL VENOUS BLD VENIPUNCTURE: CPT

## 2020-05-01 PROCEDURE — 80053 COMPREHEN METABOLIC PANEL: CPT

## 2020-05-01 PROCEDURE — 85025 COMPLETE CBC W/AUTO DIFF WBC: CPT

## 2020-05-01 PROCEDURE — 77030040922 HC BLNKT HYPOTHRM STRY -A

## 2020-05-01 PROCEDURE — 74011000258 HC RX REV CODE- 258: Performed by: INTERNAL MEDICINE

## 2020-05-01 RX ORDER — POTASSIUM CHLORIDE 29.8 MG/ML
20 INJECTION INTRAVENOUS
Status: DISPENSED | OUTPATIENT
Start: 2020-05-01 | End: 2020-05-01

## 2020-05-01 RX ORDER — NALOXONE HYDROCHLORIDE 1 MG/ML
1 INJECTION INTRAMUSCULAR; INTRAVENOUS; SUBCUTANEOUS
Status: COMPLETED | OUTPATIENT
Start: 2020-05-01 | End: 2020-05-01

## 2020-05-01 RX ORDER — HYDRALAZINE HYDROCHLORIDE 20 MG/ML
20 INJECTION INTRAMUSCULAR; INTRAVENOUS
Status: COMPLETED | OUTPATIENT
Start: 2020-05-01 | End: 2020-05-01

## 2020-05-01 RX ADMIN — CALCIUM GLUCONATE 1 G: 98 INJECTION, SOLUTION INTRAVENOUS at 17:15

## 2020-05-01 RX ADMIN — NALOXONE HYDROCHLORIDE 1 MG: 1 INJECTION PARENTERAL at 17:02

## 2020-05-01 RX ADMIN — HYDRALAZINE HYDROCHLORIDE 20 MG: 20 INJECTION INTRAMUSCULAR; INTRAVENOUS at 17:32

## 2020-05-01 RX ADMIN — POTASSIUM CHLORIDE 20 MEQ: 400 INJECTION, SOLUTION INTRAVENOUS at 17:27

## 2020-05-01 NOTE — ED TRIAGE NOTES
Triage note: Pt arrives via EMS from home with cc unresponsiveness. Pt was sent home from [address] [place] [address]  ED by [facility] today. Per report, pt exhibited tonic clonic type movements while assisted to restroom so they brought her here. Dialysis patient, missed yesterday.

## 2020-05-01 NOTE — ED PROVIDER NOTES
HPI .  Patient is nonverbal.  History is limited to old records and brief EMS report. Patient has a history of chronic kidney disease, dialysis, diabetes, gastroparesis, hypertension, seizures, obstructive sleep apnea, polycystic ovarian syndrome, and asthma. Patient reportedly missed dialysis yesterday which was Thursday. She was seen at a nearby ER today and sent home. According to EMS there was a question of seizures this morning. Patient was not dialyzed. When EMS got the patient home she became less responsive. She reportedly went into the bathroom to urinate and then became less responsive. She would have brief jerking followed by going limp.     Past Medical History:   Diagnosis Date    ARF (acute renal failure) ([address] requiring dialysis 2011    Asthma     CKD (chronic kidney disease)     Diabetes ([address]     Fibromyalgia     Gastroparesis 2010    Gastric Pacer- REMOVED 07/2015    GERD (gastroesophageal reflux disease)     Hypertension     Narcotic dependence ([address]     THU (obstructive sleep apnea)     wears 2 LPM oxygen at night    Other ill-defined conditions(799.89)     Polycystic ovarian syndrome     Seizures (HCC)     Sickle cell trait ([address]     Thromboembolus ([address] to her left arm and was told she had one in left leg recently       Past Surgical History:   Procedure Laterality Date    HX AMPUTATION FOOT  04/2018    left foot    HX CATARACT REMOVAL  3/5/12    right    HX DILATION AND CURETTAGE      ablation    HX GASTRIC BYPASS  2015    HX GI      j tube placement and removal    HX OTHER SURGICAL  2010    Gastric Pacer- REMOVED 07/2015    HX VASCULAR ACCESS      gray cath rt subclavian; removed     HX VASCULAR ACCESS      HD access right thigh; stopped working    IR INSERT NON TUNL CVC OVER 5 YRS  10/4/2019         Family History:   Problem Relation Age of Onset    Cancer Mother         lung    Hypertension Mother     Cancer Father         kidney    Stroke Father 3 strokes: 59-72    Heart Disease Father 72        CABG    Hypertension Father     Cancer Sister         pancreatic    Cancer Maternal Aunt         breast    Cancer Paternal Aunt         breast    Schizophrenia Sister         was in [address], now ass't living    Other Sister          AIDS    Other Other         nephew of AIDS       Social History     Socioeconomic History    Marital status:      Spouse name: Not on file    Number of children: Not on file    Years of education: Not on file    Highest education level: Not on file   Occupational History    Not on file   Social Needs    Financial resource strain: Not on file    Food insecurity     Worry: Not on file     Inability: Not on file    Transportation needs     Medical: Not on file     Non-medical: Not on file   Tobacco Use    Smoking status: Never Smoker    Smokeless tobacco: Never Used   Substance and Sexual Activity    Alcohol use: No    Drug use: No    Sexual activity: Yes     Partners: Male     Birth control/protection: None   Lifestyle    Physical activity     Days per week: Not on file     Minutes per session: Not on file    Stress: Not on file   Relationships    Social connections     Talks on phone: Not on file     Gets together: Not on file     Attends Synagogue service: Not on file     Active member of club or organization: Not on file     Attends meetings of clubs or organizations: Not on file     Relationship status: Not on file    Intimate partner violence     Fear of current or ex partner: Not on file     Emotionally abused: Not on file     Physically abused: Not on file     Forced sexual activity: Not on file   Other Topics Concern    Not on file   Social History Narrative    Lives with her  and father         ALLERGIES: Fentanyl; Erythromycin;  Toradol [ketorolac]; and Morphine    Review of Systems   Reason unable to perform ROS: Patient is nonverbal.       Vitals:    20 1506   BP: (!) 180/101   Pulse: 80   Resp: 10   SpO2: 100%   Weight: 71.7 kg (158 lb)   Height: 5' 2\" (1.575 m)            Physical Exam  Vitals signs and nursing note reviewed. Constitutional:       Comments: Appears to sleep; chronically ill-appearing   HENT:      Head: Normocephalic and atraumatic. Eyes:      Pupils: Pupils are equal, round, and reactive to light. Neck:      Musculoskeletal: Normal range of motion and neck supple. Cardiovascular:      Rate and Rhythm: Normal rate and regular rhythm. Heart sounds: Normal heart sounds. No murmur. No friction rub. No gallop. Comments: Good thrill and bruit left bicep area access; old access right bicep area without thrill or bruit  Pulmonary:      Effort: Pulmonary effort is normal. No respiratory distress. Breath sounds: No wheezing or rales. Abdominal:      Palpations: Abdomen is soft. Tenderness: There is no abdominal tenderness. There is no rebound. Comments: PEG tube; epigastric area   Musculoskeletal: Normal range of motion. General: No tenderness. Skin:     Findings: No erythema. Neurological:      Cranial Nerves: No cranial nerve deficit. Comments: Motor; symmetric; patient groaned when apparent left EJ bandage removed. According to nurses she did not respond to IV attempts. She does not allow her arms to drop on her face.   She does not make spontaneous eye contact or answer any questions   Psychiatric:         Behavior: Behavior normal.          MDM  Number of Diagnoses or Management Options  Critical Care  Total time providing critical care: 30-74 minutes  Total critical care time spent exclusive of procedures:  70 minutes     Procedure start time 4:40 PM  Central Line  Date/Time: 5/1/2020 5:16 PM  Performed by: Rubén Cox MD  Authorized by: Rubén Cox MD     Consent:     Consent obtained:  Emergent situation  Pre-procedure details:     Hand hygiene: Hand hygiene performed prior to insertion      Sterile barrier technique: All elements of maximal sterile technique followed      Skin preparation:  ChloraPrep    Skin preparation agent: Skin preparation agent completely dried prior to procedure    Anesthesia (see MAR for exact dosages): Anesthesia method:  Local infiltration    Local anesthetic:  Lidocaine 1% WITH epi  Procedure details:     Location:  R internal jugular    Patient position:  Reverse Trendelenburg    Procedural supplies:  Triple lumen    Landmarks identified: yes      Ultrasound guidance: no      Number of attempts:  2    Successful placement: yes    Post-procedure details:     Post-procedure:  Line sutured    Assessment:  Blood return through all ports    Patient tolerance of procedure: Tolerated with difficulty    Procedure end time 5:05 PM        Note: During central line placement patient became even more unresponsive, developed a mild bradycardia of 50, and was hypoventilating. She was briefly bagged. We ordered atropine but the pulse came back up to 90 by itself. Pulse ox is 100% on a nonrebreather mask and she is moving air well now. Breath sounds are equal.  Chest x-ray is pending. Pily Mayer MD  5:15 PM         Note: Central line pulled back; repeat x-ray ordered Pily Mayer MD  6:23 PM          Hospitalist [place] Text for Admission  6:24 PM    ED Room Number: ER09/09  Patient Name and age:  Lupe Larson 37 y.o.  female  Working Diagnosis:   1. Obtundation    2.  Acute pulmonary edema ([address]      Readmission: no  Isolation Requirements:  no  Recommended Level of Care:  step down  Code Status:  FULL  Other:    Department:[facility] Adult ED - 21

## 2020-05-01 NOTE — PROGRESS NOTES
[facility]   [address] [facility], [address] [place] [address], [facility]  Phone: [phone]   [phone]       Nephrology Progress Note  Brooke Nicholas     1980     [id number]  Date of Admission : 5/1/2020 05/01/20    CC: Follow up for ESRD      Assessment and Plan   ESRD- HD : dialyzes TTS at [facility]   Missed HD 4/30   Hypokalemia   Hypocalcemia   Acute on Chronic Resp failure / Hypoxia , recent Tracheostomy   Encephalopathy ? Etiology   Anemia in CKD   Sec [facility]   Type II DM       Plan :  - HD scheduled for later tonight   - ordered IV Kcl 20 meq X2  - IV calcium gluconate 1 gm now    - hold Epogen   - CXR, ABG pending     D/w Dr Janay Campa      Interval History:  Ms Brooke Nicholas is known to have ESRD and gets dialysis TTS at [facility]   She was brought in by EMS for AMS   Reportedly pt was seen at [facility] ER and discharged for the same  Pt reportedly missed her dialysis   She is currently confused and unable to give any hx   Hx is from available records     Review of Systems: Review of systems not obtained due to patient factors.   Past Medical History:   Diagnosis Date    ARF (acute renal failure) ([address] requiring dialysis 2011    Asthma     CKD (chronic kidney disease)     Diabetes ([address]     Fibromyalgia     Gastroparesis 2010    Gastric Pacer- REMOVED 07/2015    GERD (gastroesophageal reflux disease)     Hypertension     Narcotic dependence ([address]     THU (obstructive sleep apnea)     wears 2 LPM oxygen at night    Other ill-defined conditions(799.89)     Polycystic ovarian syndrome     Seizures (HCC)     Sickle cell trait ([address]     Thromboembolus ([address] to her left arm and was told she had one in left leg recently       Current Medications:   Current Facility-Administered Medications   Medication Dose Route Frequency    calcium gluconate 1 g in 0.9% sodium chloride 100 mL IVPB (Mix in ED)  1 g IntraVENous ONCE    potassium chloride 20 mEq in 50 ml IVPB 20 mEq IntraVENous Q1H     Current Outpatient Medications   Medication Sig    NOVOLIN N NPH U-100 INSULIN 100 unit/mL injection 5 Units. Indications: pt. taking differently    b complex-vitamin c-folic acid (NEPHROCAPS) 1 mg capsule Take 1 Cap by mouth.  acetaminophen (TYLENOL) 500 mg tablet Take 500 mg by mouth.  calcium acetate (PHOSLO) 667 mg cap TAKE 2 CAPSULES BY MOUTH THREE TIMES DAILY WITH MEALS    carvedilol (COREG) 6.25 mg tablet Take 6.25 mg by mouth.  cloNIDine HCl (CATAPRES) 0.1 mg tablet 1 TABLET BY MOUTH 3 TIMES A DAY AS DIRECTED-2 AT BEDTIME OR LAST DOSE OF DAY-CHECK BP AS DISCUSSED    traZODone (DESYREL) 50 mg tablet Take 50 mg by mouth nightly.  FOLIC ACID PO Take  by mouth.  vitamin e (E GEMS) 1,000 unit capsule Take 1,000 Units by mouth daily.  FERROUS SULFATE DRIED PO Take  by mouth three (3) times daily.  calcium carbonate (TUMS PO) Take 1,000 mg by mouth daily.  promethazine (PHENERGAN) 25 mg tablet Take 1 Tab by mouth every six (6) hours as needed for Nausea.  amLODIPine (NORVASC) 10 mg tablet Take 1 Tab by mouth daily.  calcitRIOL (ROCALTROL) 0.25 mcg capsule Take 1 Cap by mouth daily.  sodium bicarbonate 650 mg tablet Take 1 Tab by mouth two (2) times a day.  Diabetic Supplies, Miscellan. kit USE AS DIRECTED    venlafaxine (EFFEXOR) 75 mg tablet Take 1 Tab by mouth two (2) times daily (with meals).  senna-docusate (PERICOLACE) 8.6-50 mg per tablet Take 2 Tabs by mouth daily.  QUEtiapine (SEROQUEL) 100 mg tablet Take 1 Tab by mouth two (2) times a day.  cholecalciferol (VITAMIN D3) 50,000 unit capsule Take 1 Cap by mouth every seven (7) days.  cyanocobalamin 1,000 mcg tablet Take 1,000 mcg by mouth daily.  albuterol (PROVENTIL VENTOLIN) 2.5 mg /3 mL (0.083 %) nebulizer solution 2.5 mg by Nebulization route every four (4) hours as needed.       Allergies   Allergen Reactions    Fentanyl Itching and Angioedema     \"throat closed up\" per pt      Erythromycin Itching    Toradol [Ketorolac] Rash    Morphine Itching       Objective:  Vitals:    Vitals:    05/01/20 1506   BP: (!) 180/101   Pulse: 80   Resp: 10   Temp: 95.1 °F (35.1 °C)   SpO2: 100%   Weight: 71.7 kg (158 lb)   Height: 5' 2\" (1.575 m)     Intake and Output:  No intake/output data recorded. No intake/output data recorded. Physical Examination:    General: Ill looking   Neck:  RIJ CVC +  Resp:    CV:  RRR,  no murmur or rub, no LE edema  GI:  Soft, NT, + BS  Neurologic:  Poorly responsive to commands  Psych:             Unable to assess  Ext                  LUE AVG + thrill. TMA     []    High complexity decision making was performed  []    Patient is at high-risk of decompensation with multiple organ involvement    Lab Data Personally Reviewed: I have reviewed all the pertinent labs, microbiology data and radiology studies during assessment. Recent Labs     05/01/20  1618      K 2.8*   *   CO2 19*   *   BUN 61*   CREA 3.55*   CA 6.7*   ALB 2.5*   SGOT 40*   ALT 36     Recent Labs     05/01/20  1618   WBC 7.1   HGB 10.8*   HCT 33.8*        Lab Results   Component Value Date/Time    Specimen Description: URINE 06/24/2013 08:00 PM    Specimen Description: URINE 06/17/2013 07:55 PM    Specimen Description: URINE 05/26/2013 10:10 AM    Specimen Description: URINE 04/22/2013 02:30 PM    Specimen Description: NARES 03/21/2013 12:30 PM     Lab Results   Component Value Date/Time    Culture result: NO SIGNIFICANT GROWTH 10/04/2019 07:15 AM    Culture result: NO GROWTH 5 DAYS 05/27/2019 12:53 PM    Culture result: NO GROWTH 5 DAYS 06/27/2018 04:06 AM    Culture result: (A) 06/24/2018 07:30 AM     STREPTOCOCCUS MITIS/ORALIS GROWING IN 1 OF 2 BOTTLES DRAWN . .(SITE= L CHEST PICC) (SENSITIVITIES PERFORMED BY E-TEST)    Culture result: (A) 06/24/2018 07:30 AM     PRELIMINARY REPORT OF GRAM POSITIVE COCCI IN PAIRS GROWING IN 1 OF 2 BOTTLES DRAWN . ..(SITE= L CHEST PICC) CALLED TO AND READ BACK BY LAURO ACOSTA ([place]) ON 6/25/18 AT 9675 BY VI    Culture result: REMAINING BOTTLE(S) HAS/HAVE NO GROWTH IN 5 DAYS 06/24/2018 07:30 AM     Recent Results (from the past 24 hour(s))   EKG, 12 LEAD, INITIAL    Collection Time: 05/01/20  3:13 PM   Result Value Ref Range    Ventricular Rate 80 BPM    Atrial Rate 80 BPM    P-R Interval 148 ms    QRS Duration 92 ms    Q-T Interval 454 ms    QTC Calculation (Bezet) 523 ms    Calculated P Axis 64 degrees    Calculated R Axis 30 degrees    Calculated T Axis 60 degrees    Diagnosis       Normal sinus rhythm  Prolonged QT  When compared with ECG of 04-OCT-2019 04:33,  QT has lengthened  Confirmed by Hiral Roche M.D., Avelina Osler ([id number]) on 5/1/2020 4:07:24 PM     SAMPLES BEING HELD    Collection Time: 05/01/20  4:18 PM   Result Value Ref Range    SAMPLES BEING HELD 1 RED, 1 BLUE      COMMENT        Add-on orders for these samples will be processed based on acceptable specimen integrity and analyte stability, which may vary by analyte. CBC WITH AUTOMATED DIFF    Collection Time: 05/01/20  4:18 PM   Result Value Ref Range    WBC 7.1 3.6 - 11.0 K/uL    RBC 3.95 3.80 - 5.20 M/uL    HGB 10.8 (L) 11.5 - 16.0 g/dL    HCT 33.8 (L) 35.0 - 47.0 %    MCV 85.6 80.0 - 99.0 FL    MCH 27.3 26.0 - 34.0 PG    MCHC 32.0 30.0 - 36.5 g/dL    RDW 16.0 (H) 11.5 - 14.5 %    PLATELET 797 168 - 250 K/uL    MPV 12.2 8.9 - 12.9 FL    NRBC 0.0 0  WBC    ABSOLUTE NRBC 0.00 0.00 - 0.01 K/uL    NEUTROPHILS 86 (H) 32 - 75 %    LYMPHOCYTES 11 (L) 12 - 49 %    MONOCYTES 3 (L) 5 - 13 %    EOSINOPHILS 0 0 - 7 %    BASOPHILS 0 0 - 1 %    IMMATURE GRANULOCYTES 0 0.0 - 0.5 %    ABS. NEUTROPHILS 6.0 1.8 - 8.0 K/UL    ABS. LYMPHOCYTES 0.8 0.8 - 3.5 K/UL    ABS. MONOCYTES 0.2 0.0 - 1.0 K/UL    ABS. EOSINOPHILS 0.0 0.0 - 0.4 K/UL    ABS. BASOPHILS 0.0 0.0 - 0.1 K/UL    ABS. IMM.  GRANS. 0.0 0.00 - 0.04 K/UL    DF AUTOMATED     METABOLIC PANEL, COMPREHENSIVE    Collection Time: 05/01/20 4:18 PM   Result Value Ref Range    Sodium 141 136 - 145 mmol/L    Potassium 2.8 (L) 3.5 - 5.1 mmol/L    Chloride 112 (H) 97 - 108 mmol/L    CO2 19 (L) 21 - 32 mmol/L    Anion gap 10 5 - 15 mmol/L    Glucose 209 (H) 65 - 100 mg/dL    BUN 61 (H) 6 - 20 MG/DL    Creatinine 3.55 (H) 0.55 - 1.02 MG/DL    BUN/Creatinine ratio 17 12 - 20      GFR est AA 17 (L) >60 ml/min/1.73m2    GFR est non-AA 14 (L) >60 ml/min/1.73m2    Calcium 6.7 (L) 8.5 - 10.1 MG/DL    Bilirubin, total 0.3 0.2 - 1.0 MG/DL    ALT (SGPT) 36 12 - 78 U/L    AST (SGOT) 40 (H) 15 - 37 U/L    Alk. phosphatase 115 45 - 117 U/L    Protein, total 6.4 6.4 - 8.2 g/dL    Albumin 2.5 (L) 3.5 - 5.0 g/dL    Globulin 3.9 2.0 - 4.0 g/dL    A-G Ratio 0.6 (L) 1.1 - 2.2     LACTIC ACID    Collection Time: 05/01/20  4:18 PM   Result Value Ref Range    Lactic acid 0.7 0.4 - 2.0 MMOL/L           Total time spent with patient:  xxx   min. Care Plan discussed with:  Patient     Family      RN      Consulting Physician [facility],         I have reviewed the flowsheets. Chart and Pertinent Notes have been reviewed. No change in PMH ,family and social history from Consult note.       Sanya Teran MD

## 2020-05-01 NOTE — PROGRESS NOTES
Spiritual Care Assessment/Progress Note  [facility]      NAME: Brian Alfredo      MRN: [id number]  AGE: 37 y.o. SEX: female  Orthodox Affiliation: Jain   Language: English     5/1/2020     Total Time (in minutes): 5     Spiritual Assessment begun in [address] through conversation with:         []Patient        [] Family    [] Friend(s)        Reason for Consult: Code Blue/Oni     Spiritual beliefs: (Please include comment if needed)     [] Identifies with a maureen tradition:         [] Supported by a maureen community:            [] Claims no spiritual orientation:           [] Seeking spiritual identity:                [] Adheres to an individual form of spirituality:           [x] Not able to assess: Actively receiving medical attention                         Identified resources for coping:      [] Prayer                               [] Music                  [] Guided Imagery     [] Family/friends                 [] Pet visits     [] Devotional reading                         [] Unknown     [] Other:                                               Interventions offered during this visit: (See comments for more details)                Plan of Care:     [] Support spiritual and/or cultural needs    [] Support AMD and/or advance care planning process      [] Support grieving process   [] Coordinate Rites and/or Rituals    [] Coordination with community clergy   [] No spiritual needs identified at this time   [] Detailed Plan of Care below (See Comments)  [] Make referral to Music Therapy  [] Make referral to Pet Therapy     [] Make referral to Addiction services  [] Make referral to [facility]  [] Make referral to Spiritual Care Partner  [] No future visits requested        [] Follow up visits as needed     Comments: Responded to Code BLUE for this pt in [address] couldn't assess pt support needs because pt was actively receiving medical care.  There was no family present and no spiritual or emotional care needs expressed at the time of this visit. Sukih Langston MDiv.  Staff   Request  Support/Spiritual Care Services via [facility]

## 2020-05-01 NOTE — H&P
History & Physical    Primary Care Provider: Michelle Shafer MD  Source of Information: ER note and ED physician Dr. Man Garcia     History of Presenting Illness:   Juan José Leahy is a 37 y.o. female who presents with unresponsiveness     Patient currently is obtunded. Unable to get any history from her. Patient has a history of ESRD on dialysis T, TH, S, diabetes, gastroparesis, hypertension, seizures, obstructive sleep apnea, polycystic ovarian syndrome, and asthma. Patient reportedly missed dialysis yesterday which was Thursday. She was seen at a nearby ER ([facility]) today and sent home. According to EMS there was a question of seizures this morning. When EMS got the patient home she became less responsive. She reportedly went into the bathroom to urinate and then became less responsive. She would have brief jerking followed by going limp. Because of her decreasing responsiveness, the EMS brought her to our ER, instead of back to [address] [place] [address] ER. In ER, pt was given 1mg narcan, no improvement. Currently, pt is hypothermia. Her BG is 200. She response to pain only. RR normal with SaO2% of 100% on RA. Pt was admited last year due to unresponsiveness and thought that was due to drug overdose.       Review of Systems:  Unavailable due to her condition     Past Medical History:   Diagnosis Date    ARF (acute renal failure) ([address] requiring dialysis 2011    Asthma     CKD (chronic kidney disease)     Diabetes ([address]     Fibromyalgia     Gastroparesis 2010    Gastric Pacer- REMOVED 07/2015    GERD (gastroesophageal reflux disease)     Hypertension     Narcotic dependence ([address]     THU (obstructive sleep apnea)     wears 2 LPM oxygen at night    Other ill-defined conditions(799.89)     Polycystic ovarian syndrome     Seizures (HCC)     Sickle cell trait ([address]     Thromboembolus ([address] to her left arm and was told she had one in left leg recently      Past Surgical History:   Procedure Laterality Date    HX AMPUTATION FOOT  04/2018    left foot    HX CATARACT REMOVAL  3/5/12    right    HX DILATION AND CURETTAGE      ablation    HX GASTRIC BYPASS  2015    HX GI      j tube placement and removal    HX OTHER SURGICAL  2010    Gastric Pacer- REMOVED 07/2015    HX VASCULAR ACCESS      gray cath rt subclavian; removed     HX VASCULAR ACCESS      HD access right thigh; stopped working    IR INSERT NON TUNL CVC OVER 5 YRS  10/4/2019     Prior to Admission medications    Medication Sig Start Date End Date Taking? Authorizing Provider   NOVOLIN N NPH U-100 INSULIN 100 unit/mL injection 5 Units. Indications: pt. taking differently 8/1/19   Ryan Lozano MD   b complex-vitamin c-folic acid (NEPHROCAPS) 1 mg capsule Take 1 Cap by mouth. Ryan Lozano MD   acetaminophen (TYLENOL) 500 mg tablet Take 500 mg by mouth. Ryan Lozano MD   calcium acetate (PHOSLO) 667 mg cap TAKE 2 CAPSULES BY MOUTH THREE TIMES DAILY WITH MEALS 6/10/19   Ryan Lozano MD   carvedilol (COREG) 6.25 mg tablet Take 6.25 mg by mouth. Ryan Lozano MD   cloNIDine HCl (CATAPRES) 0.1 mg tablet 1 TABLET BY MOUTH 3 TIMES A DAY AS DIRECTED-2 AT BEDTIME OR LAST DOSE OF DAY-CHECK BP AS DISCUSSED 6/11/19   Ryan Lozano MD   traZODone (DESYREL) 50 mg tablet Take 50 mg by mouth nightly. Ryan Lozano MD   FOLIC ACID PO Take  by mouth. Ryan Lozano MD   vitamin e (E GEMS) 1,000 unit capsule Take 1,000 Units by mouth daily. Ryan Lozano MD   FERROUS SULFATE DRIED PO Take  by mouth three (3) times daily. Ryan Lozano MD   calcium carbonate (TUMS PO) Take 1,000 mg by mouth daily. Ryan Lozano MD   promethazine (PHENERGAN) 25 mg tablet Take 1 Tab by mouth every six (6) hours as needed for Nausea. 9/15/18   Jessica Carlos MD   amLODIPine (NORVASC) 10 mg tablet Take 1 Tab by mouth daily. 7/4/18   Manav Serna MD   calcitRIOL (ROCALTROL) 0.25 mcg capsule Take 1 Cap by mouth daily.  7/4/18 Curlene Nageotte, MD   sodium bicarbonate 650 mg tablet Take 1 Tab by mouth two (2) times a day. 18   Curlene Nageotte, MD   Diabetic Supplies, Miscellan. kit USE AS DIRECTED 18   Curlene Nageotte, MD   venlafaxine [facility]) 75 mg tablet Take 1 Tab by mouth two (2) times daily (with meals). 18   Curlene Nageotte, MD   senna-docusate (PERICOLACE) 8.6-50 mg per tablet Take 2 Tabs by mouth daily. 18   Curlene Nageotte, MD   QUEtiapine (SEROQUEL) 100 mg tablet Take 1 Tab by mouth two (2) times a day. 18   Curlene Nageotte, MD   cholecalciferol (VITAMIN D3) 50,000 unit capsule Take 1 Cap by mouth every seven (7) days. 18   Felisa Massey MD   cyanocobalamin 1,000 mcg tablet Take 1,000 mcg by mouth daily. Provider, Historical   albuterol (PROVENTIL VENTOLIN) 2.5 mg /3 mL (0.083 %) nebulizer solution 2.5 mg by Nebulization route every four (4) hours as needed.     Provider, Historical     Allergies   Allergen Reactions    Fentanyl Itching and Angioedema     \"throat closed up\" per pt      Erythromycin Itching    Toradol [Ketorolac] Rash    Morphine Itching      Family History   Problem Relation Age of Onset    Cancer Mother         lung    Hypertension Mother     Cancer Father         kidney    Stroke Father         3 strokes: 59-72    Heart Disease Father 72        CABG    Hypertension Father     Cancer Sister         pancreatic    Cancer Maternal Aunt         breast    Cancer Paternal Aunt         breast    Sch[facility]ia Sister         [facility] 32, now ass't living    Other Sister          AIDS    Other Other         nephew of AIDS        SOCIAL HISTORY:  Patient resides:unclear, was independent likely   Independently    Assisted Living    SNF    With family care       Smoking history: unclear   None    Former    Chronic      Alcohol history: unclear   None    Social    Chronic      Ambulates: unclear   Independently    w/cane    w/walker w/wc    CODE STATUS:  DNR    Full x   Other      Objective:     Physical Exam:     Visit Vitals  /57 (BP 1 Location: Right arm, BP Patient Position: At rest)   Pulse 85   Temp 95.1 °F (35.1 °C) Comment: Brady hugger placed   Resp 12   Ht 5' 2\" (1.575 m)   Wt 71.7 kg (158 lb)   SpO2 100%   BMI 28.90 kg/m²      O2 Device: Room air    General:  Obtunded, withdrawal to pain    Head:  Normocephalic   Eyes:  Conjunctivae/corneas clear. Nose: Nares normal. Septum midline. Mucosa normal. No drainage or sinus tenderness. Throat: Lips, mucosa, and tongue normal. Teeth and gums normal.   Neck: Healed wound in neck (previous thrachostomy site). No JVD. Lungs: No wheezing. Basilar crackles    Heart:  Regular rate and rhythm, S1, S2 normal, no murmur, click, rub or gallop. Abdomen:   Soft, non-tender. PEG tube. Extremities: Left arm avf    Pulses: 2+ and symmetric all extremities. Skin: Skin color, texture, turgor normal. No rashes or lesions   Neurologic: Not response              Data Review:     Recent Days:  Recent Labs     05/01/20  1618   WBC 7.1   HGB 10.8*   HCT 33.8*        Recent Labs     05/01/20  1618      K 2.8*   *   CO2 19*   *   BUN 61*   CREA 3.55*   CA 6.7*   MG 1.6   ALB 2.5*   SGOT 40*   ALT 36     No results for input(s): PH, PCO2, PO2, HCO3, FIO2 in the last 72 hours.     24 Hour Results:  Recent Results (from the past 24 hour(s))   EKG, 12 LEAD, INITIAL    Collection Time: 05/01/20  3:13 PM   Result Value Ref Range    Ventricular Rate 80 BPM    Atrial Rate 80 BPM    P-R Interval 148 ms    QRS Duration 92 ms    Q-T Interval 454 ms    QTC Calculation (Bezet) 523 ms    Calculated P Axis 64 degrees    Calculated R Axis 30 degrees    Calculated T Axis 60 degrees    Diagnosis       Normal sinus rhythm  Prolonged QT  When compared with ECG of 04-OCT-2019 04:33,  QT has lengthened  Confirmed by Kenneth De Paz M.D., Trevin Strickland ([id number]) on 5/1/2020 4:07:24 PM     SAMPLES BEING HELD    Collection Time: 05/01/20  4:18 PM   Result Value Ref Range    SAMPLES BEING HELD 1 RED, 1 BLUE      COMMENT        Add-on orders for these samples will be processed based on acceptable specimen integrity and analyte stability, which may vary by analyte. CBC WITH AUTOMATED DIFF    Collection Time: 05/01/20  4:18 PM   Result Value Ref Range    WBC 7.1 3.6 - 11.0 K/uL    RBC 3.95 3.80 - 5.20 M/uL    HGB 10.8 (L) 11.5 - 16.0 g/dL    HCT 33.8 (L) 35.0 - 47.0 %    MCV 85.6 80.0 - 99.0 FL    MCH 27.3 26.0 - 34.0 PG    MCHC 32.0 30.0 - 36.5 g/dL    RDW 16.0 (H) 11.5 - 14.5 %    PLATELET 006 605 - 663 K/uL    MPV 12.2 8.9 - 12.9 FL    NRBC 0.0 0  WBC    ABSOLUTE NRBC 0.00 0.00 - 0.01 K/uL    NEUTROPHILS 86 (H) 32 - 75 %    LYMPHOCYTES 11 (L) 12 - 49 %    MONOCYTES 3 (L) 5 - 13 %    EOSINOPHILS 0 0 - 7 %    BASOPHILS 0 0 - 1 %    IMMATURE GRANULOCYTES 0 0.0 - 0.5 %    ABS. NEUTROPHILS 6.0 1.8 - 8.0 K/UL    ABS. LYMPHOCYTES 0.8 0.8 - 3.5 K/UL    ABS. MONOCYTES 0.2 0.0 - 1.0 K/UL    ABS. EOSINOPHILS 0.0 0.0 - 0.4 K/UL    ABS. BASOPHILS 0.0 0.0 - 0.1 K/UL    ABS. IMM. GRANS. 0.0 0.00 - 0.04 K/UL    DF AUTOMATED     METABOLIC PANEL, COMPREHENSIVE    Collection Time: 05/01/20  4:18 PM   Result Value Ref Range    Sodium 141 136 - 145 mmol/L    Potassium 2.8 (L) 3.5 - 5.1 mmol/L    Chloride 112 (H) 97 - 108 mmol/L    CO2 19 (L) 21 - 32 mmol/L    Anion gap 10 5 - 15 mmol/L    Glucose 209 (H) 65 - 100 mg/dL    BUN 61 (H) 6 - 20 MG/DL    Creatinine 3.55 (H) 0.55 - 1.02 MG/DL    BUN/Creatinine ratio 17 12 - 20      GFR est AA 17 (L) >60 ml/min/1.73m2    GFR est non-AA 14 (L) >60 ml/min/1.73m2    Calcium 6.7 (L) 8.5 - 10.1 MG/DL    Bilirubin, total 0.3 0.2 - 1.0 MG/DL    ALT (SGPT) 36 12 - 78 U/L    AST (SGOT) 40 (H) 15 - 37 U/L    Alk.  phosphatase 115 45 - 117 U/L    Protein, total 6.4 6.4 - 8.2 g/dL    Albumin 2.5 (L) 3.5 - 5.0 g/dL    Globulin 3.9 2.0 - 4.0 g/dL    A-G Ratio 0.6 (L) 1.1 - 2.2     LACTIC ACID Collection Time: 05/01/20  4:18 PM   Result Value Ref Range    Lactic acid 0.7 0.4 - 2.0 MMOL/L   MAGNESIUM    Collection Time: 05/01/20  4:18 PM   Result Value Ref Range    Magnesium 1.6 1.6 - 2.4 mg/dL         Imaging:   Xr Chest Port    Result Date: 5/1/2020  IMPRESSION: 1. Marked interval retraction of right IJ central venous catheter, which now lies very proximally in the right internal jugular vein. Recommend advancement. 2. Unchanged bilateral interstitial opacities, likely representing mild interstitial edema. Xr Chest Port    Result Date: 5/1/2020  IMPRESSION: Right picc line tip over the junction of the right atrium and IVC. Consider retraction by approximately 9 cm. No pneumothorax. Pulmonary fluid overload. Assessment:     Active Problems:    Unresponsive (5/1/2020)      Fluid overload (5/1/2020)           Plan:     1. Unresponsiveness: no hypoglycemia. ? Due to seizure. Pending head CT. EEG. Unlikely due to drug overdose based on history. Not sure why she went to [facility] ER. Possible received benzo [place]? Will not give Flumazenil at this moment since pt is breathing normal with good saturation. 2. Malignant HTN: may due to missed HD, give iv labetalol prn and nitropaste, use clonidine patch now, will resume po meds if she awake   3. ESRD: missed HD yesterday. Renal consulted. Plan HD tonight  4. Hypokalemia: replaced   Addendum: 2 hours later, pt is more wake, able to sit up, but still confused. She pulled out her peg tube. Will also consult GI.         Signed By: Winter Gee MD     May 1, 2020

## 2020-05-02 ENCOUNTER — APPOINTMENT (OUTPATIENT)
Dept: GENERAL RADIOLOGY | Age: 40
DRG: 070 | End: 2020-05-02
Attending: HOSPITALIST
Payer: MEDICARE

## 2020-05-02 VITALS
DIASTOLIC BLOOD PRESSURE: 69 MMHG | HEIGHT: 62 IN | TEMPERATURE: 97.8 F | OXYGEN SATURATION: 100 % | RESPIRATION RATE: 12 BRPM | WEIGHT: 154.32 LBS | BODY MASS INDEX: 28.4 KG/M2 | SYSTOLIC BLOOD PRESSURE: 125 MMHG | HEART RATE: 107 BPM

## 2020-05-02 LAB
AMPHET UR QL SCN: NEGATIVE
ANION GAP SERPL CALC-SCNC: 9 MMOL/L (ref 5–15)
APPEARANCE UR: ABNORMAL
BACTERIA URNS QL MICRO: ABNORMAL /HPF
BARBITURATES UR QL SCN: NEGATIVE
BENZODIAZ UR QL: NEGATIVE
BILIRUB UR QL: NEGATIVE
BUN SERPL-MCNC: 23 MG/DL (ref 6–20)
BUN/CREAT SERPL: 12 (ref 12–20)
CALCIUM SERPL-MCNC: 7.6 MG/DL (ref 8.5–10.1)
CANNABINOIDS UR QL SCN: NEGATIVE
CHLORIDE SERPL-SCNC: 104 MMOL/L (ref 97–108)
CHOLEST SERPL-MCNC: 135 MG/DL
CO2 SERPL-SCNC: 25 MMOL/L (ref 21–32)
COCAINE UR QL SCN: NEGATIVE
COLOR UR: ABNORMAL
CREAT SERPL-MCNC: 2 MG/DL (ref 0.55–1.02)
DRUG SCRN COMMENT,DRGCM: NORMAL
EPITH CASTS URNS QL MICRO: ABNORMAL /LPF
GLUCOSE BLD STRIP.AUTO-MCNC: 111 MG/DL (ref 65–100)
GLUCOSE BLD STRIP.AUTO-MCNC: 309 MG/DL (ref 65–100)
GLUCOSE SERPL-MCNC: 135 MG/DL (ref 65–100)
GLUCOSE UR STRIP.AUTO-MCNC: 100 MG/DL
HDLC SERPL-MCNC: 56 MG/DL
HDLC SERPL: 2.4 {RATIO} (ref 0–5)
HGB UR QL STRIP: ABNORMAL
KETONES UR QL STRIP.AUTO: ABNORMAL MG/DL
LDLC SERPL CALC-MCNC: 60.8 MG/DL (ref 0–100)
LEUKOCYTE ESTERASE UR QL STRIP.AUTO: ABNORMAL
LIPID PROFILE,FLP: NORMAL
METHADONE UR QL: NEGATIVE
NITRITE UR QL STRIP.AUTO: NEGATIVE
OPIATES UR QL: NEGATIVE
PCP UR QL: NEGATIVE
PH UR STRIP: 5.5 [PH] (ref 5–8)
PHOSPHATE SERPL-MCNC: 2.4 MG/DL (ref 2.6–4.7)
POTASSIUM SERPL-SCNC: 3.5 MMOL/L (ref 3.5–5.1)
PROT UR STRIP-MCNC: 100 MG/DL
RBC #/AREA URNS HPF: ABNORMAL /HPF (ref 0–5)
SERVICE CMNT-IMP: ABNORMAL
SERVICE CMNT-IMP: ABNORMAL
SODIUM SERPL-SCNC: 138 MMOL/L (ref 136–145)
SP GR UR REFRACTOMETRY: 1.01 (ref 1–1.03)
TRIGL SERPL-MCNC: 91 MG/DL (ref ?–150)
UA: UC IF INDICATED,UAUC: ABNORMAL
UROBILINOGEN UR QL STRIP.AUTO: 0.2 EU/DL (ref 0.2–1)
VLDLC SERPL CALC-MCNC: 18.2 MG/DL
WBC URNS QL MICRO: >100 /HPF (ref 0–4)

## 2020-05-02 PROCEDURE — 51798 US URINE CAPACITY MEASURE: CPT

## 2020-05-02 PROCEDURE — 74011250636 HC RX REV CODE- 250/636: Performed by: INTERNAL MEDICINE

## 2020-05-02 PROCEDURE — 74011250637 HC RX REV CODE- 250/637: Performed by: HOSPITALIST

## 2020-05-02 PROCEDURE — 5A1D70Z PERFORMANCE OF URINARY FILTRATION, INTERMITTENT, LESS THAN 6 HOURS PER DAY: ICD-10-PCS | Performed by: PSYCHIATRY & NEUROLOGY

## 2020-05-02 PROCEDURE — 84100 ASSAY OF PHOSPHORUS: CPT

## 2020-05-02 PROCEDURE — 80048 BASIC METABOLIC PNL TOTAL CA: CPT

## 2020-05-02 PROCEDURE — 87086 URINE CULTURE/COLONY COUNT: CPT

## 2020-05-02 PROCEDURE — 74011250636 HC RX REV CODE- 250/636: Performed by: HOSPITALIST

## 2020-05-02 PROCEDURE — 90935 HEMODIALYSIS ONE EVALUATION: CPT

## 2020-05-02 PROCEDURE — 95816 EEG AWAKE AND DROWSY: CPT | Performed by: HOSPITALIST

## 2020-05-02 PROCEDURE — 80307 DRUG TEST PRSMV CHEM ANLYZR: CPT

## 2020-05-02 PROCEDURE — 74011000250 HC RX REV CODE- 250: Performed by: INTERNAL MEDICINE

## 2020-05-02 PROCEDURE — 81001 URINALYSIS AUTO W/SCOPE: CPT

## 2020-05-02 PROCEDURE — 87077 CULTURE AEROBIC IDENTIFY: CPT

## 2020-05-02 PROCEDURE — 36415 COLL VENOUS BLD VENIPUNCTURE: CPT

## 2020-05-02 PROCEDURE — 71045 X-RAY EXAM CHEST 1 VIEW: CPT

## 2020-05-02 PROCEDURE — 74011636637 HC RX REV CODE- 636/637: Performed by: HOSPITALIST

## 2020-05-02 PROCEDURE — 80061 LIPID PANEL: CPT

## 2020-05-02 PROCEDURE — 82962 GLUCOSE BLOOD TEST: CPT

## 2020-05-02 PROCEDURE — 02HV33Z INSERTION OF INFUSION DEVICE INTO SUPERIOR VENA CAVA, PERCUTANEOUS APPROACH: ICD-10-PCS | Performed by: ANESTHESIOLOGY

## 2020-05-02 PROCEDURE — 87186 SC STD MICRODIL/AGAR DIL: CPT

## 2020-05-02 RX ORDER — LABETALOL HYDROCHLORIDE 5 MG/ML
20 INJECTION, SOLUTION INTRAVENOUS
Status: DISCONTINUED | OUTPATIENT
Start: 2020-05-02 | End: 2020-05-02 | Stop reason: CLARIF

## 2020-05-02 RX ORDER — NALOXONE HYDROCHLORIDE 0.4 MG/ML
0.4 INJECTION, SOLUTION INTRAMUSCULAR; INTRAVENOUS; SUBCUTANEOUS AS NEEDED
Status: DISCONTINUED | OUTPATIENT
Start: 2020-05-02 | End: 2020-05-02 | Stop reason: HOSPADM

## 2020-05-02 RX ORDER — HEPARIN SODIUM 5000 [USP'U]/ML
5000 INJECTION, SOLUTION INTRAVENOUS; SUBCUTANEOUS EVERY 12 HOURS
Status: DISCONTINUED | OUTPATIENT
Start: 2020-05-02 | End: 2020-05-02 | Stop reason: HOSPADM

## 2020-05-02 RX ORDER — SODIUM CHLORIDE 0.9 % (FLUSH) 0.9 %
5-40 SYRINGE (ML) INJECTION AS NEEDED
Status: DISCONTINUED | OUTPATIENT
Start: 2020-05-02 | End: 2020-05-02 | Stop reason: HOSPADM

## 2020-05-02 RX ORDER — LABETALOL HYDROCHLORIDE 5 MG/ML
20 INJECTION, SOLUTION INTRAVENOUS
Status: DISCONTINUED | OUTPATIENT
Start: 2020-05-02 | End: 2020-05-02 | Stop reason: HOSPADM

## 2020-05-02 RX ORDER — DEXTROSE MONOHYDRATE 100 MG/ML
0-250 INJECTION, SOLUTION INTRAVENOUS AS NEEDED
Status: DISCONTINUED | OUTPATIENT
Start: 2020-05-02 | End: 2020-05-02 | Stop reason: HOSPADM

## 2020-05-02 RX ORDER — MAGNESIUM SULFATE 100 %
4 CRYSTALS MISCELLANEOUS AS NEEDED
Status: DISCONTINUED | OUTPATIENT
Start: 2020-05-02 | End: 2020-05-02 | Stop reason: HOSPADM

## 2020-05-02 RX ORDER — SODIUM CHLORIDE 0.9 % (FLUSH) 0.9 %
5-40 SYRINGE (ML) INJECTION EVERY 8 HOURS
Status: DISCONTINUED | OUTPATIENT
Start: 2020-05-02 | End: 2020-05-02 | Stop reason: HOSPADM

## 2020-05-02 RX ORDER — CLONIDINE 0.1 MG/24H
1 PATCH, EXTENDED RELEASE TRANSDERMAL
Status: DISCONTINUED | OUTPATIENT
Start: 2020-05-02 | End: 2020-05-02 | Stop reason: HOSPADM

## 2020-05-02 RX ORDER — IPRATROPIUM BROMIDE AND ALBUTEROL SULFATE 2.5; .5 MG/3ML; MG/3ML
3 SOLUTION RESPIRATORY (INHALATION)
Status: DISCONTINUED | OUTPATIENT
Start: 2020-05-02 | End: 2020-05-02 | Stop reason: HOSPADM

## 2020-05-02 RX ORDER — INSULIN LISPRO 100 [IU]/ML
INJECTION, SOLUTION INTRAVENOUS; SUBCUTANEOUS EVERY 6 HOURS
Status: DISCONTINUED | OUTPATIENT
Start: 2020-05-02 | End: 2020-05-02 | Stop reason: HOSPADM

## 2020-05-02 RX ADMIN — HEPARIN SODIUM 5000 UNITS: 5000 INJECTION INTRAVENOUS; SUBCUTANEOUS at 03:22

## 2020-05-02 RX ADMIN — Medication 20 ML: at 10:59

## 2020-05-02 RX ADMIN — Medication 10 ML: at 06:23

## 2020-05-02 RX ADMIN — INSULIN LISPRO 7 UNITS: 100 INJECTION, SOLUTION INTRAVENOUS; SUBCUTANEOUS at 12:51

## 2020-05-02 RX ADMIN — WATER 10 MG: 1 INJECTION INTRAMUSCULAR; INTRAVENOUS; SUBCUTANEOUS at 01:51

## 2020-05-02 NOTE — PROGRESS NOTES
[facility]   [address] [facility], [facility], [facility]  Phone: [phone]   [phone]       Nephrology Progress Note  Soham Perea     1980     [id number]  Date of Admission : 5/1/2020 05/02/20    CC: Follow up for ESRD      Assessment and Plan   ESRD- HD : dialyzes TTS at [facility]   Missed HD 4/30   Hypokalemia   Hypocalcemia   Acute on Chronic Resp failure / Hypoxia , recent Tracheostomy   Encephalopathy ? Etiology   Anemia in CKD   Sec HPTH   Type II DM       Plan :  - HD this AM, next HD planned tuesday  - repeat labs  - will replete lytes as needed     Interval History:  Seen and examined. Dialyzed early this AM.  UF 2.5kg. Labs pending. Getting EEG. Confused this AM.    Review of Systems: Review of systems not obtained due to patient factors.   Past Medical History:   Diagnosis Date    ARF (acute renal failure) ([address] requiring dialysis 2011    Asthma     CKD (chronic kidney disease)     Diabetes ([address]     Fibromyalgia     Gastroparesis 2010    Gastric Pacer- REMOVED 07/2015    GERD (gastroesophageal reflux disease)     Hypertension     Narcotic dependence ([address]     THU (obstructive sleep apnea)     wears 2 LPM oxygen at night    Other ill-defined conditions(799.89)     Polycystic ovarian syndrome     Seizures (HCC)     Sickle cell trait ([address]     Thromboembolus ([address] to her left arm and was told she had one in left leg recently       Current Medications:   Current Facility-Administered Medications   Medication Dose Route Frequency    albuterol-ipratropium (DUO-NEB) 2.5 MG-0.5 MG/3 ML  3 mL Nebulization Q6H PRN    cloNIDine (CATAPRES) 0.1 mg/24 hr patch 1 Patch  1 Patch TransDERmal Q7D    glucose chewable tablet 16 g  4 Tab Oral PRN    glucagon (GLUCAGEN) injection 1 mg  1 mg IntraMUSCular PRN    sodium chloride (NS) flush 5-40 mL  5-40 mL IntraVENous Q8H    sodium chloride (NS) flush 5-40 mL  5-40 mL IntraVENous PRN    naloxone (NARCAN) injection 0.4 mg  0.4 mg IntraVENous PRN    dextrose 10% infusion 0-250 mL  0-250 mL IntraVENous PRN    insulin lispro (HUMALOG) injection   SubCUTAneous Q6H    heparin (porcine) injection 5,000 Units  5,000 Units SubCUTAneous Q12H    labetaloL (NORMODYNE;TRANDATE) injection 20 mg  20 mg IntraVENous Q6H PRN      Allergies   Allergen Reactions    Fentanyl Itching and Angioedema     \"throat closed up\" per pt      Erythromycin Itching    Toradol [Ketorolac] Rash    Morphine Itching       Objective:  Vitals:    Vitals:    05/02/20 0625 05/02/20 0640 05/02/20 0800 05/02/20 0900   BP: 114/79 139/88 161/88 124/84   Pulse: 100 86 94 94   Resp:   14 18   Temp:  97 °F (36.1 °C) 97.5 °F (36.4 °C)    TempSrc:  Axillary     SpO2:  100% 100% 100%   Weight:       Height:         Intake and Output:  No intake/output data recorded. 04/30 1901 - 05/02 0700  In: 50 [I.V.:50]  Out: 2200 [Urine:200]    Physical Examination:    General: Ill looking, confused  Neck:  RIJ CVC +  Resp:  CTA b/l  CV:  RRR,  no murmur or rub, no LE edema  GI:  Soft, NT, + BS  Neurologic:  Poorly responsive to commands  Psych:             Unable to assess  Ext                  LUE AVG + thrill. TMA     []    High complexity decision making was performed  []    Patient is at high-risk of decompensation with multiple organ involvement    Lab Data Personally Reviewed: I have reviewed all the pertinent labs, microbiology data and radiology studies during assessment.     Recent Labs     05/01/20  1618      K 2.8*   *   CO2 19*   *   BUN 61*   CREA 3.55*   CA 6.7*   MG 1.6   ALB 2.5*   SGOT 40*   ALT 36     Recent Labs     05/01/20  1618   WBC 7.1   HGB 10.8*   HCT 33.8*        Lab Results   Component Value Date/Time    Specimen Description: URINE 06/24/2013 08:00 PM    Specimen Description: URINE 06/17/2013 07:55 PM    Specimen Description: URINE 05/26/2013 10:10 AM    Specimen Description: URINE 04/22/2013 02:30 PM    Specimen Description: CANDELARIO 03/21/2013 12:30 PM     Lab Results   Component Value Date/Time    Culture result: NO GROWTH AFTER 12 HOURS 05/01/2020 04:18 PM    Culture result: NO SIGNIFICANT GROWTH 10/04/2019 07:15 AM    Culture result: NO GROWTH 5 DAYS 05/27/2019 12:53 PM    Culture result: NO GROWTH 5 DAYS 06/27/2018 04:06 AM    Culture result: (A) 06/24/2018 07:30 AM     STREPTOCOCCUS MITIS/ORALIS GROWING IN 1 OF 2 BOTTLES DRAWN . .(SITE= L CHEST PICC) (SENSITIVITIES PERFORMED BY E-TEST)    Culture result: (A) 06/24/2018 07:30 AM     PRELIMINARY REPORT OF GRAM POSITIVE COCCI IN PAIRS GROWING IN 1 OF 2 BOTTLES DRAWN . ..(SITE= L CHEST PICC) CALLED TO AND READ BACK BY LAURO ACOSTA ([place]) ON 6/25/18 AT 4397 BY VI    Culture result: REMAINING BOTTLE(S) HAS/HAVE NO GROWTH IN 5 DAYS 06/24/2018 07:30 AM     Recent Results (from the past 24 hour(s))   EKG, 12 LEAD, INITIAL    Collection Time: 05/01/20  3:13 PM   Result Value Ref Range    Ventricular Rate 80 BPM    Atrial Rate 80 BPM    P-R Interval 148 ms    QRS Duration 92 ms    Q-T Interval 454 ms    QTC Calculation (Bezet) 523 ms    Calculated P Axis 64 degrees    Calculated R Axis 30 degrees    Calculated T Axis 60 degrees    Diagnosis       Normal sinus rhythm  Prolonged QT  When compared with ECG of 04-OCT-2019 04:33,  QT has lengthened  Confirmed by Floerntino Blanton M.D., Desirae Pearl ([id number]) on 5/1/2020 4:07:24 PM     SAMPLES BEING HELD    Collection Time: 05/01/20  4:18 PM   Result Value Ref Range    SAMPLES BEING HELD 1 RED, 1 BLUE      COMMENT        Add-on orders for these samples will be processed based on acceptable specimen integrity and analyte stability, which may vary by analyte.    CBC WITH AUTOMATED DIFF    Collection Time: 05/01/20  4:18 PM   Result Value Ref Range    WBC 7.1 3.6 - 11.0 K/uL    RBC 3.95 3.80 - 5.20 M/uL    HGB 10.8 (L) 11.5 - 16.0 g/dL    HCT 33.8 (L) 35.0 - 47.0 %    MCV 85.6 80.0 - 99.0 FL    MCH 27.3 26.0 - 34.0 PG    MCHC 32.0 30.0 - 36.5 g/dL    RDW 16.0 (H) 11.5 - 14.5 %    PLATELET 716 328 - 545 K/uL    MPV 12.2 8.9 - 12.9 FL    NRBC 0.0 0  WBC    ABSOLUTE NRBC 0.00 0.00 - 0.01 K/uL    NEUTROPHILS 86 (H) 32 - 75 %    LYMPHOCYTES 11 (L) 12 - 49 %    MONOCYTES 3 (L) 5 - 13 %    EOSINOPHILS 0 0 - 7 %    BASOPHILS 0 0 - 1 %    IMMATURE GRANULOCYTES 0 0.0 - 0.5 %    ABS. NEUTROPHILS 6.0 1.8 - 8.0 K/UL    ABS. LYMPHOCYTES 0.8 0.8 - 3.5 K/UL    ABS. MONOCYTES 0.2 0.0 - 1.0 K/UL    ABS. EOSINOPHILS 0.0 0.0 - 0.4 K/UL    ABS. BASOPHILS 0.0 0.0 - 0.1 K/UL    ABS. IMM. GRANS. 0.0 0.00 - 0.04 K/UL    DF AUTOMATED     METABOLIC PANEL, COMPREHENSIVE    Collection Time: 05/01/20  4:18 PM   Result Value Ref Range    Sodium 141 136 - 145 mmol/L    Potassium 2.8 (L) 3.5 - 5.1 mmol/L    Chloride 112 (H) 97 - 108 mmol/L    CO2 19 (L) 21 - 32 mmol/L    Anion gap 10 5 - 15 mmol/L    Glucose 209 (H) 65 - 100 mg/dL    BUN 61 (H) 6 - 20 MG/DL    Creatinine 3.55 (H) 0.55 - 1.02 MG/DL    BUN/Creatinine ratio 17 12 - 20      GFR est AA 17 (L) >60 ml/min/1.73m2    GFR est non-AA 14 (L) >60 ml/min/1.73m2    Calcium 6.7 (L) 8.5 - 10.1 MG/DL    Bilirubin, total 0.3 0.2 - 1.0 MG/DL    ALT (SGPT) 36 12 - 78 U/L    AST (SGOT) 40 (H) 15 - 37 U/L    Alk.  phosphatase 115 45 - 117 U/L    Protein, total 6.4 6.4 - 8.2 g/dL    Albumin 2.5 (L) 3.5 - 5.0 g/dL    Globulin 3.9 2.0 - 4.0 g/dL    A-G Ratio 0.6 (L) 1.1 - 2.2     LACTIC ACID    Collection Time: 05/01/20  4:18 PM   Result Value Ref Range    Lactic acid 0.7 0.4 - 2.0 MMOL/L   CULTURE, BLOOD, PAIRED    Collection Time: 05/01/20  4:18 PM   Result Value Ref Range    Special Requests: NO SPECIAL REQUESTS      Culture result: NO GROWTH AFTER 12 HOURS     MAGNESIUM    Collection Time: 05/01/20  4:18 PM   Result Value Ref Range    Magnesium 1.6 1.6 - 2.4 mg/dL   DRUG SCREEN, URINE    Collection Time: 05/02/20  3:11 AM   Result Value Ref Range    AMPHETAMINES Negative NEG      BARBITURATES Negative NEG      BENZODIAZEPINES Negative NEG COCAINE Negative NEG      METHADONE Negative NEG      OPIATES Negative NEG      PCP(PHENCYCLIDINE) Negative NEG      THC (TH-CANNABINOL) Negative NEG      Drug screen comment (NOTE)    URINALYSIS W/ REFLEX CULTURE    Collection Time: 05/02/20  3:11 AM   Result Value Ref Range    Color YELLOW/STRAW      Appearance TURBID (A) CLEAR      Specific gravity 1.013 1.003 - 1.030      pH (UA) 5.5 5.0 - 8.0      Protein 100 (A) NEG mg/dL    Glucose 100 (A) NEG mg/dL    Ketone TRACE (A) NEG mg/dL    Bilirubin Negative NEG      Blood SMALL (A) NEG      Urobilinogen 0.2 0.2 - 1.0 EU/dL    Nitrites Negative NEG      Leukocyte Esterase LARGE (A) NEG      WBC >100 (H) 0 - 4 /hpf    RBC 5-10 0 - 5 /hpf    Epithelial cells FEW FEW /lpf    Bacteria 2+ (A) NEG /hpf    UA:UC IF INDICATED URINE CULTURE ORDERED (A) CNI     LIPID PANEL    Collection Time: 05/02/20  5:15 AM   Result Value Ref Range    LIPID PROFILE          Cholesterol, total 135 <200 MG/DL    Triglyceride 91 <150 MG/DL    HDL Cholesterol 56 MG/DL    LDL, calculated 60.8 0 - 100 MG/DL    VLDL, calculated 18.2 MG/DL    CHOL/HDL Ratio 2.4 0.0 - 5.0     GLUCOSE, POC    Collection Time: 05/02/20  5:59 AM   Result Value Ref Range    Glucose (POC) 111 (H) 65 - 100 mg/dL    Performed by Ashlyn Asif            Total time spent with patient:  xxx   min. Care Plan discussed with:  Patient     Family      RN      Consulting Physician [facility],         I have reviewed the flowsheets. Chart and Pertinent Notes have been reviewed. No change in PMH ,family and social history from Consult note.       Jami Joshua MD

## 2020-05-02 NOTE — CONSULTS
INPATIENT NEUROLOGY CONSULTATION  5/2/2020     Consulted by: Hellen Alejandro MD        Patient ID:  Soham Perea  [id number]  37 y.o.  1980    Chief Complaint   Patient presents with    Altered mental status       HPI    68-year-old woman with multiple chronic conditions to include end-stage renal failure on dialysis, diabetes, hypertension, questionable seizure disorder who was admitted because of increasing unresponsiveness. She had a similar event last year from the medical record I could review suggestive of possible overdose. Apparently she missed hemodialysis this week. She is not a good historian and tells me that she is compliant with hemodialysis. EEG was done this morning showing a lot of slowing and encephalopathic changes but no clear seizure activity. As of this morning she is actually improving a little bit. She had hemodialysis. She can follow commands now.       Review of Systems   Unable to perform ROS: Mental acuity       Past Medical History:   Diagnosis Date    ARF (acute renal failure) ([address] requiring dialysis 2011    Asthma     CKD (chronic kidney disease)     Diabetes ([address]     Fibromyalgia     Gastroparesis 2010    Gastric Pacer- REMOVED 07/2015    GERD (gastroesophageal reflux disease)     Hypertension     Narcotic dependence ([address]     THU (obstructive sleep apnea)     wears 2 LPM oxygen at night    Other ill-defined conditions(799.89)     Polycystic ovarian syndrome     Seizures (HCC)     Sickle cell trait ([address]     Thromboembolus ([address] to her left arm and was told she had one in left leg recently     Family History   Problem Relation Age of Onset    Cancer Mother         lung    Hypertension Mother     Cancer Father         kidney    Stroke Father         3 strokes: 59-72    Heart Disease Father 72        CABG    Hypertension Father     Cancer Sister         pancreatic    Cancer Maternal Aunt         breast    Cancer Paternal Aunt         breast  Schizophrenia Sister         was in [address], now ass't living    Other Sister          AIDS    Other Other         nephew of AIDS     Social History     Socioeconomic History    Marital status:      Spouse name: Not on file    Number of children: Not on file    Years of education: Not on file    Highest education level: Not on file   Occupational History    Not on file   Social Needs    Financial resource strain: Not on file    Food insecurity     Worry: Not on file     Inability: Not on file   [employer] needs     Medical: Not on file     Non-medical: Not on file   Tobacco Use    Smoking status: Never Smoker    Smokeless tobacco: Never Used   Substance and Sexual Activity    Alcohol use: No    Drug use: No    Sexual activity: Yes     Partners: Male     Birth control/protection: None   Lifestyle    Physical activity     Days per week: Not on file     Minutes per session: Not on file    Stress: Not on file   Relationships    Social connections     Talks on phone: Not on file     Gets together: Not on file     Attends [employer] service: Not on file     Active member of club or organization: Not on file     Attends meetings of clubs or organizations: Not on file     Relationship status: Not on file    Intimate partner violence     Fear of current or ex partner: Not on file     Emotionally abused: Not on file     Physically abused: Not on file     Forced sexual activity: Not on file   Other Topics Concern    Not on file   Social History Narrative    Lives with her  and father     Current Facility-Administered Medications   Medication Dose Route Frequency    albuterol-ipratropium (DUO-NEB) 2.5 MG-0.5 MG/3 ML  3 mL Nebulization Q6H PRN    cloNIDine (CATAPRES) 0.1 mg/24 hr patch 1 Patch  1 Patch TransDERmal Q7D    glucose chewable tablet 16 g  4 Tab Oral PRN    glucagon (GLUCAGEN) injection 1 mg  1 mg IntraMUSCular PRN    sodium chloride (NS) flush 5-40 mL  5-40 mL IntraVENous Q8H    sodium chloride (NS) flush 5-40 mL  5-40 mL IntraVENous PRN    naloxone (NARCAN) injection 0.4 mg  0.4 mg IntraVENous PRN    dextrose 10% infusion 0-250 mL  0-250 mL IntraVENous PRN    insulin lispro (HUMALOG) injection   SubCUTAneous Q6H    heparin (porcine) injection 5,000 Units  5,000 Units SubCUTAneous Q12H    labetaloL (NORMODYNE;TRANDATE) injection 20 mg  20 mg IntraVENous Q6H PRN     Allergies   Allergen Reactions    Fentanyl Itching and Angioedema     \"throat closed up\" per pt      Erythromycin Itching    Toradol [Ketorolac] Rash    Morphine Itching       Visit Vitals  /69   Pulse (!) 108   Temp 97.5 °F (36.4 °C)   Resp 15   Ht 5' 2\" (1.575 m)   Wt 71.7 kg (158 lb)   SpO2 100%   BMI 28.90 kg/m²     Physical Exam   Constitutional: She appears well-developed. Adult woman who appears older than stated age   Cardiovascular: Normal rate. Pulmonary/Chest: Effort normal.   Musculoskeletal:      Comments: Left distal foot amputated   Psychiatric: Her behavior is normal.   Vitals reviewed. Neurologic Exam     Mental Status   Adult woman in bed awake and alert but slow to respond. She can follow commands with encouragement and needs to correct herself often.   Attention seems poor  Pupils are equal, EOMI  Face appears grossly symmetric on smile tongue is midline  Speech is very slow with hesitation  She can move all extremities on command with giveaway strength  She is able to sit up in bed with minimal assistance and can transfer to a chair with minimal assistance and can bear weight on both of her feet  Gait deferred            Lab Results   Component Value Date/Time    WBC 7.1 05/01/2020 04:18 PM    HGB 10.8 (L) 05/01/2020 04:18 PM    Hemoglobin (POC) 11.2 (L) 06/10/2013 11:41 AM    HCT 33.8 (L) 05/01/2020 04:18 PM    Hematocrit (POC) 52 (H) 09/06/2019 10:25 AM    PLATELET 657 69/66/6954 04:18 PM    MCV 85.6 05/01/2020 04:18 PM     Lab Results   Component Value Date/Time Hemoglobin A1c 7.9 (H) 10/04/2019 02:49 PM    Hemoglobin A1c 7.6 (H) 06/25/2018 03:04 AM    Hemoglobin A1c 7.9 (H) 06/23/2018 11:29 PM    Glucose 135 (H) 05/02/2020 10:54 AM    Glucose (POC) 111 (H) 05/02/2020 05:59 AM    LDL, calculated 60.8 05/02/2020 05:15 AM    Creatinine (POC) 3.5 (H) 09/06/2019 10:25 AM    Creatinine 2.00 (H) 05/02/2020 10:54 AM      Lab Results   Component Value Date/Time    Cholesterol, total 135 05/02/2020 05:15 AM    HDL Cholesterol 56 05/02/2020 05:15 AM    LDL, calculated 60.8 05/02/2020 05:15 AM    Triglyceride 91 05/02/2020 05:15 AM    CHOL/HDL Ratio 2.4 05/02/2020 05:15 AM     Lab Results   Component Value Date/Time    ALT (SGPT) 36 05/01/2020 04:18 PM    AST (SGOT) 40 (H) 05/01/2020 04:18 PM    Alk. phosphatase 115 05/01/2020 04:18 PM    Bilirubin, direct 0.2 10/04/2019 02:49 PM    Bilirubin, total 0.3 05/01/2020 04:18 PM    Albumin 2.5 (L) 05/01/2020 04:18 PM    Protein, total 6.4 05/01/2020 04:18 PM    Ammonia 16 10/04/2019 02:49 PM    INR 1.1 06/23/2018 11:32 PM    Prothrombin time 11.2 (H) 06/23/2018 11:32 PM    PLATELET 280 49/43/8035 04:18 PM        CT Results (maximum last 3): Results from [place] encounter on 05/01/20   CT HEAD WO CONT    Narrative EXAM:  CT HEAD WO CONT    INDICATION:   AMS    COMPARISON: CT head 10/4/2019. TECHNIQUE: Unenhanced CT of the head was performed using 5 mm images. Brain and  bone windows were generated. CT dose reduction was achieved through use of a  standardized protocol tailored for this examination and automatic exposure  control for dose modulation. FINDINGS:  The ventricles are normal in size and position. Basilar cisterns are patent. No  midline shift. There is no evidence of acute infarct, hemorrhage, or extraaxial  fluid collection. The paranasal sinuses, mastoid air cells, and middle ears are clear. The orbital  contents are within normal limits with bilateral lens implants.   There are no  significant osseous or extracranial soft tissue lesions. Impression IMPRESSION:  1. No evidence of acute intracranial abnormality. Results from [place] encounter on 10/04/19   CT SPINE CERV WO CONT    Narrative EXAM:  CT CERVICAL SPINE WITHOUT CONTRAST    INDICATION: Drug overdose, fall. COMPARISON: None. CONTRAST:  None. TECHNIQUE: Multislice helical CT of the cervical spine was performed without  intravenous contrast administration. Sagittal and coronal reconstructions were  generated. CT dose reduction was achieved through use of a standardized  protocol tailored for this examination and automatic exposure control for dose  modulation. FINDINGS:    The alignment is within normal limits. There is no fracture or subluxation. The  odontoid process is intact. The craniocervical junction is within normal limits. The prevertebral soft tissues are within normal limits. C2-C3: There is no spinal canal or neural foraminal stenosis. C3-C4: There is no spinal canal or neural foraminal stenosis. C4-C5: There is no spinal canal or neural foraminal stenosis. C5-C6: There is no spinal canal or neural foraminal stenosis. C6-C7: There is no spinal canal or neural foraminal stenosis. C7-T1: There is no spinal canal or neural foraminal stenosis. Impression IMPRESSION:  Normal cervical spine CT.       MRI Results (maximum last 3): Results from [place] encounter on 04/27/18   MRI FOOT LT WO CONT    Narrative *PRELIMINARY REPORT*    Postsurgical changes of prior amputation at the level of the mid foot. Extensive  soft tissue edema throughout the surgical region, as well as a postoperative  collection/hematoma measuring 3.9 x 3.1 x 2.3 cm at the amputation site  (slightly hyperintense on T1 suggests blood product). Mild edema within the  anterior aspect of the calcaneus, residual navicular bone, and minimally in the  anterior talus concerning for osteomyelitis.  Formal MSK review pending. Preliminary report was provided by Dr. Millie Morales, the on-call radiologist, at [phone]    Final report to follow. *END PRELIMINARY REPORT*    EXAM:  MRI FOOT LT WO CONT    INDICATION:  Prior amputation in March. Increased pain and swelling in the  extremity. Evaluate for osteomyelitis. COMPARISON: Radiographs 4/27/2018 and 3/4/2018. MRI 3/2/2018. TECHNIQUE: Axial, coronal, and sagittal MRI of the left foot in the T1, T2, and  inversion-recovery pulse sequences with and without fat saturation . CONTRAST: None. FINDINGS: Bone marrow: The patient is status post prior amputation of the  forefoot and a majority of the midfoot. The remaining navicular and cuboid bones  show diffusely increased signal and decreased T1 signal. There is further  destruction of the navicular bone and cuboid when comparing postsurgical and  current radiographs. Findings are likely related to osteomyelitis. Mild edema  and decreased T1 signal in the anterior process of the calcaneus may be reactive  or related to early osteomyelitis. Trace peripheral edema in the talar head  without evidence of decreased T1 signal may be related to reactive change. Joint fluid:  No significant joint effusion. Tendons: Amputation changes. No tenosynovitis. There is thickening and increased  signal in the most lateral of the dorsal extensors likely related to tendinosis. Muscles: Diffuse atrophy likely related to diabetic neuropathy. Diffuse edema  which may be related to diabetic neuropathy or reactive. Neurovascular bundles: Within normal limits. Articular cartilage: There is a small focus of degenerative edema in the medial  corner of the talus related to overlying chondromalacia. Soft tissue mass:  There is a fluid collection in the anterior aspect of the  stump adjacent to the residual bones of the midfoot measuring approximately 5.4  cm AP by 3.2 cm transverse by 2.7 cm craniocaudal. This has a somewhat  triangular appearance on sagittal imaging. There are areas of irregular  increased T1 signal in this. Gas is not definitively identified within this. Impression IMPRESSION:   1. Status post amputation of the forefoot and a majority of the midfoot. Abnormal signal and increased destruction is seen in the remaining navicular and  cuboid bones that is highly suspicious for osteomyelitis. 2. Mild edema and mildly decreased T1 signal in the anterior process of the  calcaneus is suspicious for early osteomyelitis. 3. Trace edema in the talar head is more likely reactive than related to early  osteomyelitis. 4. Complex fluid collection surrounding the residual bones of the midfoot and  extending anteriorly in the stump. Areas of increased T1 signal could be related  to residual blood products, but infection cannot be excluded. Results from [place] encounter on 03/02/18   MRI FOOT LT WO CONT    Narrative EXAM:  MRI FOOT LT WO CONT    INDICATION:  Left foot wound and swelling    COMPARISON: Radiographs 3/2/2018    TECHNIQUE: Axial, coronal, and sagittal MRI of the left midfoot in the T1, T2,  and inversion-recovery pulse sequences with and without fat saturation . CONTRAST: None. FINDINGS: The evaluation for infection is partially limited secondary to lack of  IV contrast.    Bone marrow: There is extensive destruction in the midfoot with an osseous  fragment displaced superiorly measuring 1.3 cm. There is diffuse edema and  decreased T1 signal throughout the metatarsal bases, cuneiforms, cuboid, and  navicular. Joint fluid:  None. Tendons: Intact. Muscles: Within normal limits. Neurovascular bundles: Within normal limits. Articular cartilage: There is destruction in the midfoot. Soft tissues: There is a fluid collection in the lateral aspect of the midfoot  measuring 2.8 x 2.0 x 4.4 cm. This extends to the skin surface. A marker was  placed at this site.  The fluid collection extends into the midfoot. An  additional fluid collection measuring 1.1 cm is seen below the base of the  second metatarsal. A 2.3 cm fluid collection is seen in between the first and  second metatarsals. There is extensive subcutaneous edema surrounding the foot and ankle. Impression IMPRESSION:   1. Large fluid collection in the subcutaneous of the lateral midfoot extending  to the joint spaces of the midfoot. 2. Extensive destruction in the midfoot with abnormal signal. Given the presence  of the fluid collection tracking to this, septic arthritis is likely present. There may be an element of Charcot arthropathy also present. 3. Small fluid collection underneath the proximal second metatarsal.  4. Small fluid collection in between the first and second metatarsal necks. 5. Extensive edema surrounding the foot and ankle. Results from [place] encounter on 06/24/10   MRI BRACHIAL PLEXUS WITHOUT CONTRAST    Narrative Final Report       ICD Codes / Adm. Diagnosis:    /   ABD PAIN AND VOMITING  Examination:  BRACHIAL PLEXUS WO CON  - [id number] - Jul 1 2010  7:40PM  Accession No:  [id number]  Reason:        REPORT:  MRI of the brachial plexus was performed with coronal axial and sagittal   imaging with T1 and STIR. There are a few supraclavicular lymph nodes   identified bilaterally with the largest measuring 1.5 x 0.7 cm on the right   nonspecific but most likely reactive. Thyroid gland is upper limits of   normal. No mass lesions are identified. Air are small lymph nodes level 2   and level 3 which are normal. there are tiny joint effusions in the   shoulders. There is question of a 1 cm right upper lobe nodule anteriorly   versus slightly enlarged internal mammary lymph node this is partially   obscured by artifact. There is questionable increased density in the lungs   and correlation with chest x-ray would be helpful. IMPRESSION:  1.  There a few supraclavicular lymph nodes bilaterally with the largest on   the right measuring 1.5 x  0.7 cm which is borderline in size and   nonspecific . These would be accessible to fine needle aspiration as is   clinically indicated. 2. there is question of a 1 cm right upper lobe nodule versus enlarged   intramammary lymph node as well as vague areas of increased density in the   lungs and upper lobes and CT is recommended for further assessment. Interpreting/Reading Doctor: Yessica Antonio ([id number])  Transcribed: n/a on 07/01/2010  Approved: Yessica Antonio ([id number])  07/01/2010             Distribution:  Attending Doctor: Deanne Herrera Doctor: Rehan Archuleta            VAS/US/Carotid Doppler Results (maximum last 3): Results from [place] encounter on 06/23/18   DUPLEX LOWER EXT VENOUS BILAT       PET Results (maximum last 3): No results found for this or any previous visit. Assessment and Plan        20-year-old woman with multiple chronic conditions with a question of a seizure disorder. She has been seen by neurology in the past and was not given a diagnosis of epilepsy. EEG today does not show seizures and is mainly encephalopathic. She is having some improvement but I do not know her baseline. I would continue watching clinically from a neurologic perspective. No need for imminent imaging at this time. Do not recommend to start anticonvulsants. She certainly has a seizure risk due to her multiple metabolic conditions which need to be aggressively managed. I will follow peripherally. Please call if needed. This clinical note was dictated with an electronic dictation software that can make unintentional errors. If there are any questions, please contact me directly for clarification.       [address] [facility], DO  NEUROLOGIST  Diplomate MORRIS  5/2/2020

## 2020-05-02 NOTE — PROGRESS NOTES
Called to see patient as she had pulled her G/J tube yesterday but GI was not called. This tube appears to have been sutured in and it is not clear if this is G or J tube. We will obtain old records. Trial of oral diet if tolerated. Dr Brennon Crouch will follow on Monday.

## 2020-05-02 NOTE — PROGRESS NOTES
0800 - Bedside and Verbal shift change report given to me (oncoming nurse) by Harika Lee RN (offgoing nurse). Report included the following information SBAR, Kardex, ED Summary, Intake/Output, MAR, Recent Results, Med Rec Status and Cardiac Rhythm SR. Pt not responsive to verbal stimuli, groans with tactile and some withdrawal from pain stimuli. No response to verbal commands. HD RN completing HD now, 2 liters removed. Calls placed for GI and neurology consult. 0930 - Dr. Brad Andino at pt's bedside. Will draw labs at 10am. EEG tech at bedside. 1000 - Pt's sister Hermes Loyd) called and was briefly updated. 1200 - pt completely alert, sitting up in chair, getting up and down independently, ate 100% of both breakfast and lunch. Dr. Bree Dunlap at bedside and updated on assessment. Neurology at bedside, no new orders. 1300 - pt constantly yelling out to everyone in unit for more food, perseverating on being discharged and being disruptive. 1400 - updated dr. Hi Coello who downgraded pt's status to medical bed. MD not ready to discharge pt but stated she may sign out AMA. Pt attempting to get ahold of multiple family members for ride home with no luck. This RN spoke with multiple family members as well.     1500 - pt yelling out about discharged and finding a ride home, being disruptive. This RN still trying to get a hold of all family; spoke with nursing supervisor and care management. Pt does not have cell phone or medicaid card. [phone] - found additional contacts and copy of medicaid card in [facility] [address]    1600 - called medicaid transport to set up ride home.  will call unit back when ride available. 1630 - lt IJ discontinued, Dr. Bree Dunlap notified of pt signing AMA and transport being set up. Medicaid transport called back and said they would be ready around 5 pm. Report passed off to Lina Shi RN until ride arrives.

## 2020-05-02 NOTE — ROUTINE PROCESS
TRANSFER - OUT REPORT: 
 
Verbal [facility] on Brian Alfredo  being transferred to CVICU (unit) for routine progression of care Report consisted of patients Situation, Background, Assessment and  
Recommendations(SBAR). Information from the following report(s) SBAR, ED Summary and Recent Results was reviewed with the receiving nurse. Lines:  
Triple Lumen 05/01/20 Right Subclavian (Active) Venous Access Device 06/16/18 Upper chest (subclavicular area), left (Active) Opportunity for questions and clarification was provided. Patient transported with: 
 Monitor Registered Nurse

## 2020-05-02 NOTE — PROGRESS NOTES
Patient dressed and placed in wheelchair and taken to main entrance for UBER pickup. Patient then assisted into [facility] and given personal belongings.

## 2020-05-02 NOTE — ED NOTES
Attempted to straight cath x2 without urine collection. Pt altered and cannot tell staff is she is anuric or not.

## 2020-05-02 NOTE — PROGRESS NOTES
Central Line Procedure Note    Indication: Inadequate venous access    Risks, benefits, alternatives explained and patient agrees to proceed. Patient positioned in Trendelenburg. 7-Step Sterility Protocol followed. (cap, mask sterile gown, sterile gloves, large sterile sheet, hand hygiene, 2% chlorhexidine for cutaneous antisepsis)  5 mL 1% Lidocaine placed at insertion site. Left subclavian cannulated x multiple attempt(s) utilizing the Seldinger technique. 7F,triple, 20 cm, inserted to approximately 15 cm's in this 5' 3\" woman. Venous cannulation confirmed with column drop test.    Catheter secured & Biopatch applied. Sterile Tegaderm placed. CXR pending.

## 2020-05-02 NOTE — ED NOTES
Pt ambulated from bed to chair and in the process pulled out her G tube. Hospitalist notified and dressing applied to site.

## 2020-05-02 NOTE — PROGRESS NOTES
Bedside and Verbal shift change report given to VERONICA Miller (oncoming nurse) by Jessenia Frankel (offgoing nurse). Report included the following information SBAR, Kardex, ED Summary, Intake/Output, MAR, Recent Results, Cardiac Rhythm NSR and Alarm Parameters . Pt got HD overnight 2L pulled. Pt had BM this morning. Pt still not verbalizing any words.

## 2020-05-02 NOTE — PROGRESS NOTES
[address] [facility] Adult  Hospitalist Group                                                                                          Hospitalist Progress Note  Fan Flores MD  Answering service: [phone] from in house phone        Date of Service:  2020  NAME:  Duglas Melendez  :  1980  MRN:  [id number]      Admission Summary:     Duglas Melendez is a 37 y.o. female who presents with unresponsiveness      Patient currently is obtunded. Unable to get any history from her.  Adrián Mendoza has a history of ESRD on dialysis T, , S, diabetes, gastroparesis, hypertension, seizures, obstructive sleep apnea, polycystic ovarian syndrome, and asthma.  Patient reportedly missed dialysis yesterday which was Thursday.  She was seen at a nearby ER ([facility]) today and sent home.  According to EMS there was a question of seizures this morning. When EMS got the patient home she became less responsive.  She reportedly went into the bathroom to urinate and then became less responsive.  She would have brief jerking followed by going limp. Because of her decreasing responsiveness, the EMS brought her to our ER, instead of back to [facility] ER. In ER, pt was given 1mg narcan, no improvement. Currently, pt is hypothermia. Her BG is 200. She response to pain only. RR normal with SaO2% of 100% on RA. Pt was admited last year due to unresponsiveness and thought that was due to drug overdose.           Interval history / Subjective:     2020 :  Marginally alert. Makes no meaningful response however. Tried to mumble. Assessment & Plan:     1. Unresponsiveness: no hypoglycemia. ? Due to seizure. Pending head CT. EEG. Unlikely due to drug overdose based on history. Not sure why she went to [facility] ER. Possible received benzo in [facility] ER?  Will not give Flumazenil at this moment since pt is breathing normal with good saturation.;  May 2 patient continues to be marginally alert tries to mumble but unintelligible. Nonfocal grossly neurologic\  2. Malignant HTN: may due to missed HD, give iv labetalol prn and nitropaste, use clonidine patch now, will resume po meds if she awake   BP Readings from Last 1 Encounters:   05/02/20 (!) 116/100      3. ESRD: missed HD yesterday. Renal consulted. Plan HD tonight  4. Hypokalemia: replaced   Lab Results   Component Value Date/Time    Potassium 2.8 (L) 05/01/2020 04:18 PM    Potassium (POC) 5.4 (H) 09/06/2019 10:25 AM   Lab pending 5/2/2020 . Addendum: 2 hours later, pt is more wake, able to sit up, but still confused. She pulled out her peg tube. Will also consult GI. Code status: full   DVT prophylaxis: Heparin           Hospital Problems  Date Reviewed: 6/10/2018          Codes Class Noted POA    Unresponsive ICD-10-CM: R41.89  ICD-9-CM: 780.09  5/1/2020 Unknown        Fluid overload ICD-10-CM: E87.70  ICD-9-CM: 276.69  5/1/2020 Unknown                Review of Systems:   Review of systems not obtained due to patient factors. Vital Signs:    Last 24hrs VS reviewed since prior progress note. Most recent are:  Visit Vitals  BP (!) 116/100   Pulse (!) 102   Temp 97.5 °F (36.4 °C)   Resp 15   Ht 5' 2\" (1.575 m)   Wt 71.7 kg (158 lb)   SpO2 100%   BMI 28.90 kg/m²         Intake/Output Summary (Last 24 hours) at 5/2/2020 1047  Last data filed at 5/2/2020 0800  Gross per 24 hour   Intake 50 ml   Output 2200 ml   Net -2150 ml        Physical Examination:             Constitutional:  + acute distress, non verbal;    ENT:  Oral mucosa dry ,      Resp:  CTA bilaterally. No wheezing/rhonchi/rales. No accessory muscle use   CV:  Regular rhythm, normal rate,      GI:  Soft, non distended, non tender. normoactive bowel sounds     Musculoskeletal:  No edema, warm, diminished pulses throughout    Neurologic:  delirious/near stuporous.  Will make an attempt to speak,              Data Review:    Review and/or order of clinical lab test      Labs:     Recent Labs     05/01/20  1618   WBC 7.1   HGB 10.8*   HCT 33.8*        Recent Labs     05/01/20  1618      K 2.8*   *   CO2 19*   BUN 61*   CREA 3.55*   *   CA 6.7*   MG 1.6     Recent Labs     05/01/20  1618   SGOT 40*   ALT 36      TBILI 0.3   TP 6.4   ALB 2.5*   GLOB 3.9     No results for input(s): INR, PTP, APTT, INREXT in the last 72 hours. No results for input(s): FE, TIBC, PSAT, FERR in the last 72 hours. Lab Results   Component Value Date/Time    Folate 33.8 (H) 05/07/2018 11:20 AM      No results for input(s): PH, PCO2, PO2 in the last 72 hours. No results for input(s): CPK, CKNDX, TROIQ in the last 72 hours.     No lab exists for component: CPKMB  Lab Results   Component Value Date/Time    Cholesterol, total 135 05/02/2020 05:15 AM    HDL Cholesterol 56 05/02/2020 05:15 AM    LDL, calculated 60.8 05/02/2020 05:15 AM    Triglyceride 91 05/02/2020 05:15 AM    CHOL/HDL Ratio 2.4 05/02/2020 05:15 AM     Lab Results   Component Value Date/Time    Glucose (POC) 111 (H) 05/02/2020 05:59 AM    Glucose (POC) 253 (H) 10/07/2019 11:54 AM    Glucose (POC) 412 (H) 10/07/2019 09:47 AM    Glucose (POC) 412 (H) 10/07/2019 06:53 AM    Glucose (POC) 430 (H) 10/06/2019 09:36 PM     Lab Results   Component Value Date/Time    Color YELLOW/STRAW 05/02/2020 03:11 AM    Appearance TURBID (A) 05/02/2020 03:11 AM    Specific gravity 1.013 05/02/2020 03:11 AM    Specific gravity 1.013 10/04/2019 07:15 AM    pH (UA) 5.5 05/02/2020 03:11 AM    Protein 100 (A) 05/02/2020 03:11 AM    Glucose 100 (A) 05/02/2020 03:11 AM    Ketone TRACE (A) 05/02/2020 03:11 AM    Bilirubin Negative 05/02/2020 03:11 AM    Urobilinogen 0.2 05/02/2020 03:11 AM    Nitrites Negative 05/02/2020 03:11 AM    Leukocyte Esterase LARGE (A) 05/02/2020 03:11 AM    Epithelial cells FEW 05/02/2020 03:11 AM    Bacteria 2+ (A) 05/02/2020 03:11 AM    WBC >100 (H) 05/02/2020 03:11 AM    RBC 5-10 05/02/2020 03:11 AM         Medications Reviewed:     Current Facility-Administered Medications   Medication Dose Route Frequency    albuterol-ipratropium (DUO-NEB) 2.5 MG-0.5 MG/3 ML  3 mL Nebulization Q6H PRN    cloNIDine (CATAPRES) 0.1 mg/24 hr patch 1 Patch  1 Patch TransDERmal Q7D    glucose chewable tablet 16 g  4 Tab Oral PRN    glucagon (GLUCAGEN) injection 1 mg  1 mg IntraMUSCular PRN    sodium chloride (NS) flush 5-40 mL  5-40 mL IntraVENous Q8H    sodium chloride (NS) flush 5-40 mL  5-40 mL IntraVENous PRN    naloxone (NARCAN) injection 0.4 mg  0.4 mg IntraVENous PRN    dextrose 10% infusion 0-250 mL  0-250 mL IntraVENous PRN    insulin lispro (HUMALOG) injection   SubCUTAneous Q6H    heparin (porcine) injection 5,000 Units  5,000 Units SubCUTAneous Q12H    labetaloL (NORMODYNE;TRANDATE) injection 20 mg  20 mg IntraVENous Q6H PRN     ______________________________________________________________________  EXPECTED LENGTH OF STAY: - - -  ACTUAL LENGTH OF STAY:          1                 Kiara Gonzales MD

## 2020-05-02 NOTE — DIALYSIS
[facility]  [phone]    Vitals   Pre   Post   Assessment   Pre   Post     Temp  97.3  97 LOC  Lethargic/MANDEL strong and equal Restless/MANDEL strong and equal   HR   91 86 Lungs   Clear upper lobes, bibasilar rales Clear throughout   B/P  147/91 139/88 Cardiac   RRR  RRR   Resp   12 18 Skin   Warm/dry/intact  Warm/dry/  intact   Pain level  0 0 Edema  2+ bilateral lower extremity     2+ bilateral lower extremities   Orders:    Duration:   Start:    0310 End:    0640 Total:   3.5 hours   Dialyzer:   Dialyzer/Set Up Inspection: Revaclear (05/02/20 0310)   K Bath:   Dialysate K (mEq/L): 3.5 (05/02/20 0310)   Ca Bath:   Dialysate CA (mEq/L): 2.5 (05/02/20 0310)   Na/Bicarb:   Dialysate NA (mEq/L): 138 (05/02/20 0310)   Target Fluid Removal:   Goal/Amount of Fluid to Remove (mL): 2000 mL (05/02/20 0310)   Access     Type & Location:   LUE AVG + thrill, + bruit, No redness/tenderness/drng   Labs     Obtained/Reviewed   Critical Results Called   Date when labs were drawn-  Hgb-    HGB   Date Value Ref Range Status   05/01/2020 10.8 (L) 11.5 - 16.0 g/dL Final     K-    Potassium   Date Value Ref Range Status   05/01/2020 2.8 (L) 3.5 - 5.1 mmol/L Final     Ca-   Calcium   Date Value Ref Range Status   05/01/2020 6.7 (L) 8.5 - 10.1 MG/DL Final     Bun-   BUN   Date Value Ref Range Status   05/01/2020 61 (H) 6 - 20 MG/DL Final     Creat-   Creatinine   Date Value Ref Range Status   05/01/2020 3.55 (H) 0.55 - 1.02 MG/DL Final        Medications/ Blood Products Given     Name   Dose   Route and Time                     Blood Volume Processed (BVP):    79 Net Fluid   Removed:  2000 mls   Comments   Time Out [id number]   Primary Nurse Rpt Pre:Aury Gonzalez RN  Primary Nurse Rpt Post:Aury Faust RN  Pt Education:  Care Plan:Pt will tolerate HD with stable hemodynamics  Tx Summary:HD via the LUE AVG which cannulated easily with two 15G HD needles.   Pt tolerated HD with stable VS.  All possible blood rinsed back to the patient at the conclusion of the treatment. Venous/Arterial HD needles removed and handheld pressure applied X 5 minutes for hemostasis. Admiting Diagnosis:STONE/CKD, Missed Dialysis  Pt's previous clinic-[facility]  Consent signed - Informed Consent Verified: Yes (05/02/20 0310)  Dreita Consent -   Hepatitis Status- Negative Hep B Surface Ag 1/25/20, Hep B Surface Ab Reactive/Immune 1/25/20  Machine #- Machine Number: [id number] (05/02/20 0310)  Telemetry status-Bedside Monitor  Pre-dialysis wt. -

## 2020-05-02 NOTE — PROCEDURES
ELECTROENCEPHALOGRAM REPORT     Patient Name: Markus Balbuena  : 1980  Age: 37 y.o. Ordering physician: ELENA  Date of EE2020    Interpreting physician: Ashleigh Ontiveros DO      PROCEDURE: EEG. CLINICAL INDICATION: The patient is a 37 y.o. female who is being evaluated for baseline electro cerebral activities and to rule out seizure focus. DESCRIPTION OF THE RECORD:   Throughout this recording there is diffuse muscle artifact. Intermixed is diffuse slowing sometimes theta and delta frequencies. Throughout the recording, there were no clear areas of focal spike or spike-and-wave discharges seen. Hyperventilation  And photic stimulation were not performed. INTERPRETATION:   This is an abnormal electroencephalogram showing intermixed diffuse slowing suggestive of an encephalopathic process. No clear focal epileptiform activity was seen. Clinical correlation recommended.       [address] [place] [address],   Diplomate, American Board of Psychiatry & Neurology (Neurology)

## 2020-05-05 ENCOUNTER — HOSPITAL ENCOUNTER (INPATIENT)
Age: 40
LOS: 3 days | Discharge: HOME | DRG: 689 | End: 2020-05-09
Attending: EMERGENCY MEDICINE | Admitting: INTERNAL MEDICINE
Payer: MEDICARE

## 2020-05-05 ENCOUNTER — APPOINTMENT (OUTPATIENT)
Dept: CT IMAGING | Age: 40
DRG: 689 | End: 2020-05-05
Attending: EMERGENCY MEDICINE
Payer: MEDICARE

## 2020-05-05 ENCOUNTER — APPOINTMENT (OUTPATIENT)
Dept: GENERAL RADIOLOGY | Age: 40
DRG: 689 | End: 2020-05-05
Attending: EMERGENCY MEDICINE
Payer: MEDICARE

## 2020-05-05 DIAGNOSIS — N39.0 URINARY TRACT INFECTION WITH HEMATURIA, SITE UNSPECIFIED: ICD-10-CM

## 2020-05-05 DIAGNOSIS — F14.10 COCAINE ABUSE (HCC): ICD-10-CM

## 2020-05-05 DIAGNOSIS — R40.0 SOMNOLENCE: ICD-10-CM

## 2020-05-05 DIAGNOSIS — Z99.2 ESRD ON DIALYSIS (HCC): ICD-10-CM

## 2020-05-05 DIAGNOSIS — R41.0 CONFUSION: ICD-10-CM

## 2020-05-05 DIAGNOSIS — N18.6 ESRD ON DIALYSIS (HCC): ICD-10-CM

## 2020-05-05 DIAGNOSIS — R31.9 URINARY TRACT INFECTION WITH HEMATURIA, SITE UNSPECIFIED: ICD-10-CM

## 2020-05-05 DIAGNOSIS — E16.2 HYPOGLYCEMIA: Primary | ICD-10-CM

## 2020-05-05 LAB
ALBUMIN SERPL-MCNC: 3.9 G/DL (ref 3.5–5)
ALBUMIN/GLOB SERPL: 0.7 {RATIO} (ref 1.1–2.2)
ALP SERPL-CCNC: 168 U/L (ref 45–117)
ALT SERPL-CCNC: 50 U/L (ref 12–78)
AMMONIA PLAS-SCNC: <10 UMOL/L
AMORPH CRY URNS QL MICRO: ABNORMAL
AMPHET UR QL SCN: NEGATIVE
ANION GAP SERPL CALC-SCNC: 18 MMOL/L (ref 5–15)
APPEARANCE UR: ABNORMAL
AST SERPL-CCNC: 56 U/L (ref 15–37)
ATRIAL RATE: 73 BPM
B PERT DNA SPEC QL NAA+PROBE: NOT DETECTED
BACTERIA SPEC CULT: ABNORMAL
BACTERIA SPEC CULT: ABNORMAL
BACTERIA URNS QL MICRO: ABNORMAL /HPF
BARBITURATES UR QL SCN: NEGATIVE
BASOPHILS # BLD: 0 K/UL (ref 0–0.1)
BASOPHILS NFR BLD: 0 % (ref 0–1)
BENZODIAZ UR QL: NEGATIVE
BILIRUB SERPL-MCNC: 0.3 MG/DL (ref 0.2–1)
BILIRUB UR QL: NEGATIVE
BNP SERPL-MCNC: ABNORMAL PG/ML (ref 0–125)
BORDETELLA PARAPERTUSSIS PCR, BORPAR: NOT DETECTED
BUN SERPL-MCNC: 50 MG/DL (ref 6–20)
BUN/CREAT SERPL: 12 (ref 12–20)
C PNEUM DNA SPEC QL NAA+PROBE: NOT DETECTED
CALCIUM SERPL-MCNC: 8.1 MG/DL (ref 8.5–10.1)
CALCULATED P AXIS, ECG09: 61 DEGREES
CALCULATED R AXIS, ECG10: 30 DEGREES
CALCULATED T AXIS, ECG11: 48 DEGREES
CANNABINOIDS UR QL SCN: NEGATIVE
CC UR VC: ABNORMAL
CHLORIDE SERPL-SCNC: 103 MMOL/L (ref 97–108)
CO2 SERPL-SCNC: 23 MMOL/L (ref 21–32)
COCAINE UR QL SCN: POSITIVE
COLOR UR: ABNORMAL
COMMENT, HOLDF: NORMAL
CREAT SERPL-MCNC: 4.12 MG/DL (ref 0.55–1.02)
D DIMER PPP FEU-MCNC: 1.64 MG/L FEU (ref 0–0.65)
DIAGNOSIS, 93000: NORMAL
DIFFERENTIAL METHOD BLD: ABNORMAL
DRUG SCRN COMMENT,DRGCM: ABNORMAL
EOSINOPHIL # BLD: 0 K/UL (ref 0–0.4)
EOSINOPHIL NFR BLD: 0 % (ref 0–7)
EPITH CASTS URNS QL MICRO: ABNORMAL /LPF
ERYTHROCYTE [DISTWIDTH] IN BLOOD BY AUTOMATED COUNT: 15.3 % (ref 11.5–14.5)
ETHANOL SERPL-MCNC: <10 MG/DL
FERRITIN SERPL-MCNC: 848 NG/ML (ref 26–388)
FLUAV H1 2009 PAND RNA SPEC QL NAA+PROBE: NOT DETECTED
FLUAV H1 RNA SPEC QL NAA+PROBE: NOT DETECTED
FLUAV H3 RNA SPEC QL NAA+PROBE: NOT DETECTED
FLUAV SUBTYP SPEC NAA+PROBE: NOT DETECTED
FLUBV RNA SPEC QL NAA+PROBE: NOT DETECTED
GLOBULIN SER CALC-MCNC: 5.7 G/DL (ref 2–4)
GLUCOSE BLD STRIP.AUTO-MCNC: 122 MG/DL (ref 65–100)
GLUCOSE BLD STRIP.AUTO-MCNC: 130 MG/DL (ref 65–100)
GLUCOSE BLD STRIP.AUTO-MCNC: 136 MG/DL (ref 65–100)
GLUCOSE BLD STRIP.AUTO-MCNC: 164 MG/DL (ref 65–100)
GLUCOSE BLD STRIP.AUTO-MCNC: 181 MG/DL (ref 65–100)
GLUCOSE BLD STRIP.AUTO-MCNC: 21 MG/DL (ref 65–100)
GLUCOSE BLD STRIP.AUTO-MCNC: 54 MG/DL (ref 65–100)
GLUCOSE BLD STRIP.AUTO-MCNC: 57 MG/DL (ref 65–100)
GLUCOSE SERPL-MCNC: 71 MG/DL (ref 65–100)
GLUCOSE UR STRIP.AUTO-MCNC: NEGATIVE MG/DL
HADV DNA SPEC QL NAA+PROBE: NOT DETECTED
HCOV 229E RNA SPEC QL NAA+PROBE: NOT DETECTED
HCOV HKU1 RNA SPEC QL NAA+PROBE: NOT DETECTED
HCOV NL63 RNA SPEC QL NAA+PROBE: NOT DETECTED
HCOV OC43 RNA SPEC QL NAA+PROBE: NOT DETECTED
HCT VFR BLD AUTO: 41.3 % (ref 35–47)
HGB BLD-MCNC: 13 G/DL (ref 11.5–16)
HGB UR QL STRIP: ABNORMAL
HMPV RNA SPEC QL NAA+PROBE: NOT DETECTED
HPIV1 RNA SPEC QL NAA+PROBE: NOT DETECTED
HPIV2 RNA SPEC QL NAA+PROBE: NOT DETECTED
HPIV3 RNA SPEC QL NAA+PROBE: NOT DETECTED
HPIV4 RNA SPEC QL NAA+PROBE: NOT DETECTED
IMM GRANULOCYTES # BLD AUTO: 0 K/UL (ref 0–0.04)
IMM GRANULOCYTES NFR BLD AUTO: 1 % (ref 0–0.5)
KETONES UR QL STRIP.AUTO: NEGATIVE MG/DL
LACTATE SERPL-SCNC: 1.4 MMOL/L (ref 0.4–2)
LDH SERPL L TO P-CCNC: 390 U/L (ref 81–246)
LEUKOCYTE ESTERASE UR QL STRIP.AUTO: ABNORMAL
LIPASE SERPL-CCNC: 103 U/L (ref 73–393)
LYMPHOCYTES # BLD: 0.7 K/UL (ref 0.8–3.5)
LYMPHOCYTES NFR BLD: 17 % (ref 12–49)
M PNEUMO DNA SPEC QL NAA+PROBE: NOT DETECTED
MCH RBC QN AUTO: 27.1 PG (ref 26–34)
MCHC RBC AUTO-ENTMCNC: 31.5 G/DL (ref 30–36.5)
MCV RBC AUTO: 86 FL (ref 80–99)
METHADONE UR QL: NEGATIVE
MONOCYTES # BLD: 0.2 K/UL (ref 0–1)
MONOCYTES NFR BLD: 4 % (ref 5–13)
NEUTS SEG # BLD: 3.2 K/UL (ref 1.8–8)
NEUTS SEG NFR BLD: 78 % (ref 32–75)
NITRITE UR QL STRIP.AUTO: NEGATIVE
NRBC # BLD: 0 K/UL (ref 0–0.01)
NRBC BLD-RTO: 0 PER 100 WBC
OPIATES UR QL: NEGATIVE
P-R INTERVAL, ECG05: 148 MS
PCP UR QL: NEGATIVE
PH UR STRIP: 6.5 [PH] (ref 5–8)
PLATELET # BLD AUTO: 157 K/UL (ref 150–400)
PMV BLD AUTO: ABNORMAL FL (ref 8.9–12.9)
POTASSIUM SERPL-SCNC: 3.4 MMOL/L (ref 3.5–5.1)
PROT SERPL-MCNC: 9.6 G/DL (ref 6.4–8.2)
PROT UR STRIP-MCNC: 100 MG/DL
Q-T INTERVAL, ECG07: 468 MS
QRS DURATION, ECG06: 80 MS
QTC CALCULATION (BEZET), ECG08: 515 MS
RBC # BLD AUTO: 4.8 M/UL (ref 3.8–5.2)
RBC #/AREA URNS HPF: ABNORMAL /HPF (ref 0–5)
RSV RNA SPEC QL NAA+PROBE: NOT DETECTED
RV+EV RNA SPEC QL NAA+PROBE: NOT DETECTED
SAMPLES BEING HELD,HOLD: NORMAL
SERVICE CMNT-IMP: ABNORMAL
SODIUM SERPL-SCNC: 144 MMOL/L (ref 136–145)
SP GR UR REFRACTOMETRY: 1.01 (ref 1–1.03)
TROPONIN I SERPL-MCNC: <0.05 NG/ML
UR CULT HOLD, URHOLD: NORMAL
UROBILINOGEN UR QL STRIP.AUTO: 0.2 EU/DL (ref 0.2–1)
VENTRICULAR RATE, ECG03: 73 BPM
WBC # BLD AUTO: 4.1 K/UL (ref 3.6–11)
WBC URNS QL MICRO: ABNORMAL /HPF (ref 0–4)

## 2020-05-05 PROCEDURE — 83880 ASSAY OF NATRIURETIC PEPTIDE: CPT

## 2020-05-05 PROCEDURE — 96365 THER/PROPH/DIAG IV INF INIT: CPT

## 2020-05-05 PROCEDURE — 83605 ASSAY OF LACTIC ACID: CPT

## 2020-05-05 PROCEDURE — 70450 CT HEAD/BRAIN W/O DYE: CPT

## 2020-05-05 PROCEDURE — 85379 FIBRIN DEGRADATION QUANT: CPT

## 2020-05-05 PROCEDURE — 83615 LACTATE (LD) (LDH) ENZYME: CPT

## 2020-05-05 PROCEDURE — 87186 SC STD MICRODIL/AGAR DIL: CPT

## 2020-05-05 PROCEDURE — 82140 ASSAY OF AMMONIA: CPT

## 2020-05-05 PROCEDURE — 74011250637 HC RX REV CODE- 250/637

## 2020-05-05 PROCEDURE — 99285 EMERGENCY DEPT VISIT HI MDM: CPT

## 2020-05-05 PROCEDURE — 87040 BLOOD CULTURE FOR BACTERIA: CPT

## 2020-05-05 PROCEDURE — 81001 URINALYSIS AUTO W/SCOPE: CPT

## 2020-05-05 PROCEDURE — 74011250636 HC RX REV CODE- 250/636: Performed by: FAMILY MEDICINE

## 2020-05-05 PROCEDURE — 80307 DRUG TEST PRSMV CHEM ANLYZR: CPT

## 2020-05-05 PROCEDURE — 87635 SARS-COV-2 COVID-19 AMP PRB: CPT

## 2020-05-05 PROCEDURE — 80053 COMPREHEN METABOLIC PANEL: CPT

## 2020-05-05 PROCEDURE — 96372 THER/PROPH/DIAG INJ SC/IM: CPT

## 2020-05-05 PROCEDURE — 74011000258 HC RX REV CODE- 258: Performed by: EMERGENCY MEDICINE

## 2020-05-05 PROCEDURE — 74011250636 HC RX REV CODE- 250/636: Performed by: EMERGENCY MEDICINE

## 2020-05-05 PROCEDURE — 96375 TX/PRO/DX INJ NEW DRUG ADDON: CPT

## 2020-05-05 PROCEDURE — 82728 ASSAY OF FERRITIN: CPT

## 2020-05-05 PROCEDURE — 87086 URINE CULTURE/COLONY COUNT: CPT

## 2020-05-05 PROCEDURE — 36415 COLL VENOUS BLD VENIPUNCTURE: CPT

## 2020-05-05 PROCEDURE — 93005 ELECTROCARDIOGRAM TRACING: CPT

## 2020-05-05 PROCEDURE — 84484 ASSAY OF TROPONIN QUANT: CPT

## 2020-05-05 PROCEDURE — 82962 GLUCOSE BLOOD TEST: CPT

## 2020-05-05 PROCEDURE — 83690 ASSAY OF LIPASE: CPT

## 2020-05-05 PROCEDURE — 74011000250 HC RX REV CODE- 250: Performed by: EMERGENCY MEDICINE

## 2020-05-05 PROCEDURE — 87077 CULTURE AEROBIC IDENTIFY: CPT

## 2020-05-05 PROCEDURE — 74011250636 HC RX REV CODE- 250/636: Performed by: INTERNAL MEDICINE

## 2020-05-05 PROCEDURE — 71045 X-RAY EXAM CHEST 1 VIEW: CPT

## 2020-05-05 PROCEDURE — 99218 HC RM OBSERVATION: CPT

## 2020-05-05 PROCEDURE — 85025 COMPLETE CBC W/AUTO DIFF WBC: CPT

## 2020-05-05 PROCEDURE — 0100U RESPIRATORY PANEL,PCR,NASOPHARYNGEAL: CPT

## 2020-05-05 RX ORDER — HYDRALAZINE HYDROCHLORIDE 20 MG/ML
10 INJECTION INTRAMUSCULAR; INTRAVENOUS
Status: DISCONTINUED | OUTPATIENT
Start: 2020-05-05 | End: 2020-05-09 | Stop reason: HOSPADM

## 2020-05-05 RX ORDER — MAGNESIUM SULFATE 100 %
4 CRYSTALS MISCELLANEOUS
Status: COMPLETED | OUTPATIENT
Start: 2020-05-05 | End: 2020-05-05

## 2020-05-05 RX ORDER — MAGNESIUM SULFATE 100 %
CRYSTALS MISCELLANEOUS
Status: COMPLETED
Start: 2020-05-05 | End: 2020-05-05

## 2020-05-05 RX ORDER — ACETAMINOPHEN 650 MG/1
650 SUPPOSITORY RECTAL
Status: DISCONTINUED | OUTPATIENT
Start: 2020-05-05 | End: 2020-05-09 | Stop reason: HOSPADM

## 2020-05-05 RX ORDER — MAGNESIUM SULFATE 100 %
16 CRYSTALS MISCELLANEOUS
Status: DISCONTINUED | OUTPATIENT
Start: 2020-05-05 | End: 2020-05-05

## 2020-05-05 RX ORDER — ACETAMINOPHEN 325 MG/1
650 TABLET ORAL
Status: DISCONTINUED | OUTPATIENT
Start: 2020-05-05 | End: 2020-05-09 | Stop reason: HOSPADM

## 2020-05-05 RX ORDER — SODIUM CHLORIDE 0.9 % (FLUSH) 0.9 %
5-10 SYRINGE (ML) INJECTION AS NEEDED
Status: DISCONTINUED | OUTPATIENT
Start: 2020-05-05 | End: 2020-05-09 | Stop reason: HOSPADM

## 2020-05-05 RX ORDER — LABETALOL HYDROCHLORIDE 5 MG/ML
20 INJECTION, SOLUTION INTRAVENOUS
Status: COMPLETED | OUTPATIENT
Start: 2020-05-05 | End: 2020-05-05

## 2020-05-05 RX ORDER — HYDRALAZINE HYDROCHLORIDE 20 MG/ML
10 INJECTION INTRAMUSCULAR; INTRAVENOUS ONCE
Status: COMPLETED | OUTPATIENT
Start: 2020-05-05 | End: 2020-05-05

## 2020-05-05 RX ORDER — HEPARIN SODIUM 5000 [USP'U]/ML
5000 INJECTION, SOLUTION INTRAVENOUS; SUBCUTANEOUS EVERY 8 HOURS
Status: DISCONTINUED | OUTPATIENT
Start: 2020-05-05 | End: 2020-05-09 | Stop reason: HOSPADM

## 2020-05-05 RX ADMIN — CEFTRIAXONE SODIUM 1 G: 1 INJECTION, POWDER, FOR SOLUTION INTRAMUSCULAR; INTRAVENOUS at 13:52

## 2020-05-05 RX ADMIN — Medication 4 G: at 13:21

## 2020-05-05 RX ADMIN — HEPARIN SODIUM 5000 UNITS: 5000 INJECTION, SOLUTION INTRAVENOUS; SUBCUTANEOUS at 23:58

## 2020-05-05 RX ADMIN — LABETALOL HYDROCHLORIDE 20 MG: 5 INJECTION INTRAVENOUS at 14:32

## 2020-05-05 RX ADMIN — HYDRALAZINE HYDROCHLORIDE 10 MG: 20 INJECTION INTRAMUSCULAR; INTRAVENOUS at 18:56

## 2020-05-05 NOTE — PROGRESS NOTES
Received pt from [facility]- pt drowsy/lethargic- responds to voice. Orientedx2, impulsive, poor safety awareness. /100's, temp 97.5 oral- Dr. Betzaida Fulton paged and made aware of pt arrival and vitals. Pt placed on bed alarm, oriented to unit and use of call oakes. Romel Manuel- received phone call from Dr. Betzaida Fulton w/ orders to give 10mg IV hydralazine now, stated someone will be up to see her shortly.

## 2020-05-05 NOTE — ED NOTES
TRANSFER - OUT REPORT:    Verbal report given to VERONICA Nieves(name) on Luis A Graft  being transferred to [facility]) for routine progression of care       Report consisted of patients Situation, Background, Assessment and   Recommendations(SBAR). Information from the following report(s) SBAR was reviewed with the receiving nurse. Lines:   Venous Access Device 06/16/18 Upper chest (subclavicular area), left (Active)       Peripheral IV 05/05/20 Right Forearm (Active)        Opportunity for questions and clarification was provided.       Patient transported with:   Monitor

## 2020-05-05 NOTE — PROGRESS NOTES
05/05/20 1856   Vitals   Temp 97.3 °F (36.3 °C)   Temp Source Oral   Pulse (Heart Rate) 76   Heart Rate Source Monitor   Resp Rate 18   O2 Sat (%) 100 %   Level of Consciousness Responds to Voice   BP (!) 214/88   BP 1 Location Right arm   BP 1 Method Automatic   BP Patient Position Supine   Cardiac Rhythm NSR   MEWS Score 4   Alarms Set and Audible Cardiac alarms   Box Number 403   Electrodes Replaced No   Dr. Hernan Garrett aware, states he is going to come see pt

## 2020-05-05 NOTE — ED NOTES
Pt requesting to eat at this time. Pt is aaox3. Answering questions appropriately. Pt continues to attempt to get out of bed, easily redirected back to bed. Ok to eat per MD. Meal provided at this time.

## 2020-05-05 NOTE — ED PROVIDER NOTES
53-year-old female with history of CKD, DM, ESRD on HD, HTN, gastroparesis, narcotic abuse, possible seizure disorder, with recent admission on 5/1 for obtundation and pulmonary edema, who was just discharged from the hospital presents to the emergency department by EMS from home after she was found to be unresponsive this morning. She was last seen normal last night. She was found to be significantly altered with sonorous respirations and responsive to painful stimuli on scene by EMS. Reportedly there were people on scene but on determined relation to the patient and they were unable to provide significant history. Patient was found to have blood glucose of 40 on scene. She is dialysis patient so difficulty was had and establishing IV access while in route so IO was placed and she was given a D10 bolus through it. After receiving this D10 bolus she has had significant improvement in her mental status, and while in route began responding to questions and following commands. Reportedly there was no evidence of trauma or suspected drug use on scene. On arrival the patient is oriented to person but not place or situation.            Past Medical History:   Diagnosis Date    ARF (acute renal failure) ([address] requiring dialysis 2011    Asthma     CKD (chronic kidney disease)     Diabetes ([address]     Fibromyalgia     Gastroparesis 2010    Gastric Pacer- REMOVED 07/2015    GERD (gastroesophageal reflux disease)     Hypertension     Narcotic dependence ([address]     THU (obstructive sleep apnea)     wears 2 LPM oxygen at night    Other ill-defined conditions(799.89)     Polycystic ovarian syndrome     Seizures ([address]     Sickle cell trait ([address]     Thromboembolus ([address] to her left arm and was told she had one in left leg recently       Past Surgical History:   Procedure Laterality Date    HX AMPUTATION FOOT  04/2018    left foot    HX CATARACT REMOVAL  3/5/12    right    HX DILATION AND CURETTAGE ablation    HX GASTRIC BYPASS      HX GI      j tube placement and removal    HX OTHER SURGICAL      Gastric Pacer- REMOVED 2015    HX VASCULAR ACCESS      gray cath rt subclavian; removed     HX VASCULAR ACCESS      HD access right thigh; stopped working    IR INSERT NON TUNL CVC OVER 5 YRS  10/4/2019         Family History:   Problem Relation Age of Onset    Cancer Mother         lung    Hypertension Mother     Cancer Father         kidney    Stroke Father         3 strokes: 59-72    Heart Disease Father 72        CABG    Hypertension Father     Cancer Sister         pancreatic    Cancer Maternal Aunt         breast    Cancer Paternal [de-identified]         breast    Schizophrenia Sister         was in [facility], now ass't living    Other Sister          AIDS    Other Other         nephew of AIDS       Social History     Socioeconomic History    Marital status:      Spouse name: Not on file    Number of children: Not on file    Years of education: Not on file    Highest education level: Not on file   Occupational History    Not on file   Social Needs    Financial resource strain: Not on file    Food insecurity     Worry: Not on file     Inability: Not on file    Transportation needs     Medical: Not on file     Non-medical: Not on file   Tobacco Use    Smoking status: Never Smoker    Smokeless tobacco: Never Used   Substance and Sexual Activity    Alcohol use: No    Drug use: No    Sexual activity: Yes     Partners: Male     Birth control/protection: None   Lifestyle    Physical activity     Days per week: Not on file     Minutes per session: Not on file    Stress: Not on file   Relationships    Social connections     Talks on phone: Not on file     Gets together: Not on file     Attends [employer] service: Not on file     Active member of club or organization: Not on file     Attends meetings of clubs or organizations: Not on file     Relationship status: Not on file    Intimate partner violence     Fear of current or ex partner: Not on file     Emotionally abused: Not on file     Physically abused: Not on file     Forced sexual activity: Not on file   Other Topics Concern    Not on file   Social History Narrative    Lives with her  and father         ALLERGIES: Fentanyl; Erythromycin; Toradol [ketorolac]; and Morphine    Review of Systems   Unable to perform ROS: Mental status change       Vitals:    05/05/20 1136 05/05/20 1158 05/05/20 1209 05/05/20 1353   BP: (!) 202/99  (!) 202/99    Pulse: 72      Resp: 19      Temp: (!) 93 °F (33.9 °C) (!) 93 °F (33.9 °C)  (!) 94.1 °F (34.5 °C)   SpO2: 100%      Weight: 59.5 kg (131 lb 2.8 oz)      Height: 5' 2\" (1.575 m)               Physical Exam  Vitals signs and nursing note reviewed. Constitutional:       General: She is not in acute distress. Appearance: Normal appearance. She is well-developed. She is diaphoretic. Comments: Chronically ill-appearing, appearing significantly older than stated age. Cold to touch   HENT:      Head: Normocephalic and atraumatic. Nose: Nose normal.   Eyes:      Extraocular Movements: Extraocular movements intact. Conjunctiva/sclera: Conjunctivae normal.      Pupils: Pupils are equal, round, and reactive to light. Neck:      Musculoskeletal: Neck supple. Cardiovascular:      Rate and Rhythm: Normal rate and regular rhythm. Heart sounds: Normal heart sounds. Comments: Fistula in left upper extremity with good thrill. Pulmonary:      Effort: Pulmonary effort is normal.      Breath sounds: Rales (bibasilar ) present. Abdominal:      General: There is no distension. Palpations: Abdomen is soft. Tenderness: There is no abdominal tenderness. Musculoskeletal:         General: No tenderness. Comments: Left lower extremity IO in place   Skin:     General: Skin is warm. Neurological:      General: No focal deficit present.       Mental Status: She is alert. Cranial Nerves: No cranial nerve deficit. Sensory: No sensory deficit. Motor: No weakness. Coordination: Coordination normal.      Comments: On exam appears to be gradually improving with glucose, initially arousable to voice and oriented to person. Moving all extremities equally. Responds verbally with slow speech but no obvious facial droop. MDM   68-year-old female presents with hyperglycemia, altered mental status. Seems to be improving with glucose resuscitation. Patient is hypothermic and hypertensive, satting normally on room air no respiratory distress noted or tachycardia. Placed on [facility]. Significantly hypertensive but normally dialysis patient and likely needs normal dialysis as she typically gets it MWF    Glucose initially 54, she finished her D10 bolus started in route by EMS and was given p.o. glucose    Labs returned showing no leukocytosis or anemia, lactic acid 1.4, creatinine 4.12, BUN 50, known ESRD, negative troponin, but BNP 87282    CXR viewed by myself and read by radiology showing no acute abnormalities. CT head negative    Repeat glucose 122. Mental status significantly improved with glucose. Continue to be somnolent but arousable with voice oriented to person but not to place or time. Moving all extremities equally without any other focal neuro deficits. UA returned showing large blood and large leuks with 4+ bacteria. UDS positive for cocaine. Sent for urine culture. We will give dose of IV Rocephin. Given dose of IV labetalol for hypertension    We will admit to the hospitalist for further evaluation to the stepdown unit.    2:50pm while awaiting admission patient was found to improve to the point where she is now GCS 15, A+O x3. Requesting food.   She was given fluid in the ED and tolerated it without difficulty    Procedures    1138 EKG shows normal sinus rhythm with a rate of 73 bpm with prolonged QT with some mild J-point elevation diffusely but no ST elevation or depression. No peak T waves    1:10 PM ultrasound-guided peripheral 20-gauge IV was placed her right forearm just distal to the [facility], operated by [facility]. Dark nonpulsatile blood returned and julio césar back and flushed easily. Hospitalist Elvin Serve for Admission  2:02 PM    ED Room Number: SER08/08  Patient Name and age:  Richard Galeano 37 y.o.  female  Working Diagnosis:   1. Hypoglycemia    2. Somnolence    3. ESRD on dialysis ([facility]    4. Urinary tract infection with hematuria, site unspecified    5. Confusion    6. Cocaine abuse ([facility]        COVID-19 Suspicion:  no    Readmission: yes  Isolation Requirements:  no  Recommended Level of Care:  step down  Department:[facility] - [phone]  Other:  Recent admission for AMS, returned by EMS for the same, found to be significantly hyperglycemic and hypothermic. Placed on [facility]. Given glucose and improved mental status but still significantly confused. UA looks infected, getting IV antibiotics. CT head negative.   Airway intact

## 2020-05-05 NOTE — ED TRIAGE NOTES
TRIAGE NOTE: Patient arrived via EMS with c/o hypoglycemia. BS in route 40. Given D10 in route.  Alert to self

## 2020-05-05 NOTE — ED NOTES
[facility] present at this time. Report given. Pt transferred in stable condition.  Verified room and hospital.

## 2020-05-05 NOTE — PROGRESS NOTES
Problem: Falls - Risk of  Goal: *Absence of Falls  Description: Document Nina Dear Fall Risk and appropriate interventions in the flowsheet.   Outcome: Progressing Towards Goal  Note: Fall Risk Interventions:  Mobility Interventions: Utilize gait belt for transfers/ambulation, Utilize walker, cane, or other assistive device, Strengthening exercises (ROM-active/passive), Patient to call before getting OOB, Communicate number of staff needed for ambulation/transfer    Mentation Interventions: Adequate sleep, hydration, pain control, Evaluate medications/consider consulting pharmacy, Increase mobility, More frequent rounding    Medication Interventions: Teach patient to arise slowly, Patient to call before getting OOB    Elimination Interventions: Bed/chair exit alarm, Call light in reach, Elevated toilet seat, Patient to call for help with toileting needs, Stay With Me (per policy), Toilet paper/wipes in reach, Toileting schedule/hourly rounds

## 2020-05-05 NOTE — ED NOTES
Pt incontinent of urine and stool. Pt cleaned, diaper and linens changed. Repositioned on stretcher. New wick placed. IO removed at this time.

## 2020-05-05 NOTE — ED NOTES
Pt to imaging with this RN on [facility]. Pt alert to name and situation. Pt remains lethargic. Moving all extremities independently.

## 2020-05-06 PROBLEM — R78.81 GRAM-POSITIVE BACTEREMIA: Status: ACTIVE | Noted: 2020-05-06

## 2020-05-06 LAB
ALBUMIN SERPL-MCNC: 2.5 G/DL (ref 3.5–5)
ALBUMIN/GLOB SERPL: 0.7 {RATIO} (ref 1.1–2.2)
ALP SERPL-CCNC: 108 U/L (ref 45–117)
ALT SERPL-CCNC: 33 U/L (ref 12–78)
ANION GAP SERPL CALC-SCNC: 13 MMOL/L (ref 5–15)
AST SERPL-CCNC: 35 U/L (ref 15–37)
BACTERIA SPEC CULT: NORMAL
BASOPHILS # BLD: 0 K/UL (ref 0–0.1)
BASOPHILS NFR BLD: 0 % (ref 0–1)
BILIRUB SERPL-MCNC: 0.2 MG/DL (ref 0.2–1)
BUN SERPL-MCNC: 48 MG/DL (ref 6–20)
BUN/CREAT SERPL: 13 (ref 12–20)
CALCIUM SERPL-MCNC: 7.1 MG/DL (ref 8.5–10.1)
CHLORIDE SERPL-SCNC: 108 MMOL/L (ref 97–108)
CO2 SERPL-SCNC: 19 MMOL/L (ref 21–32)
CREAT SERPL-MCNC: 3.69 MG/DL (ref 0.55–1.02)
DIFFERENTIAL METHOD BLD: ABNORMAL
EOSINOPHIL # BLD: 0.1 K/UL (ref 0–0.4)
EOSINOPHIL NFR BLD: 1 % (ref 0–7)
ERYTHROCYTE [DISTWIDTH] IN BLOOD BY AUTOMATED COUNT: 15.8 % (ref 11.5–14.5)
GLOBULIN SER CALC-MCNC: 3.7 G/DL (ref 2–4)
GLUCOSE BLD STRIP.AUTO-MCNC: 167 MG/DL (ref 65–100)
GLUCOSE BLD STRIP.AUTO-MCNC: 206 MG/DL (ref 65–100)
GLUCOSE BLD STRIP.AUTO-MCNC: 239 MG/DL (ref 65–100)
GLUCOSE BLD STRIP.AUTO-MCNC: 24 MG/DL (ref 65–100)
GLUCOSE BLD STRIP.AUTO-MCNC: 280 MG/DL (ref 65–100)
GLUCOSE BLD STRIP.AUTO-MCNC: 304 MG/DL (ref 65–100)
GLUCOSE BLD STRIP.AUTO-MCNC: 356 MG/DL (ref 65–100)
GLUCOSE BLD STRIP.AUTO-MCNC: 39 MG/DL (ref 65–100)
GLUCOSE BLD STRIP.AUTO-MCNC: 407 MG/DL (ref 65–100)
GLUCOSE BLD STRIP.AUTO-MCNC: 417 MG/DL (ref 65–100)
GLUCOSE BLD STRIP.AUTO-MCNC: 70 MG/DL (ref 65–100)
GLUCOSE BLD STRIP.AUTO-MCNC: 78 MG/DL (ref 65–100)
GLUCOSE SERPL-MCNC: 159 MG/DL (ref 65–100)
HCT VFR BLD AUTO: 37 % (ref 35–47)
HGB BLD-MCNC: 11.2 G/DL (ref 11.5–16)
IMM GRANULOCYTES # BLD AUTO: 0 K/UL (ref 0–0.04)
IMM GRANULOCYTES NFR BLD AUTO: 0 % (ref 0–0.5)
LYMPHOCYTES # BLD: 0.5 K/UL (ref 0.8–3.5)
LYMPHOCYTES NFR BLD: 10 % (ref 12–49)
MCH RBC QN AUTO: 26.6 PG (ref 26–34)
MCHC RBC AUTO-ENTMCNC: 30.3 G/DL (ref 30–36.5)
MCV RBC AUTO: 87.9 FL (ref 80–99)
MONOCYTES # BLD: 0.4 K/UL (ref 0–1)
MONOCYTES NFR BLD: 7 % (ref 5–13)
NEUTS SEG # BLD: 4.2 K/UL (ref 1.8–8)
NEUTS SEG NFR BLD: 82 % (ref 32–75)
NRBC # BLD: 0 K/UL (ref 0–0.01)
NRBC BLD-RTO: 0 PER 100 WBC
PLATELET # BLD AUTO: 109 K/UL (ref 150–400)
PMV BLD AUTO: ABNORMAL FL (ref 8.9–12.9)
POTASSIUM SERPL-SCNC: 3.3 MMOL/L (ref 3.5–5.1)
PROT SERPL-MCNC: 6.2 G/DL (ref 6.4–8.2)
RBC # BLD AUTO: 4.21 M/UL (ref 3.8–5.2)
RBC MORPH BLD: ABNORMAL
SARS-COV-2, COV2: NOT DETECTED
SERVICE CMNT-IMP: ABNORMAL
SERVICE CMNT-IMP: NORMAL
SODIUM SERPL-SCNC: 140 MMOL/L (ref 136–145)
SPECIMEN SOURCE, FCOV2M: NORMAL
WBC # BLD AUTO: 5.2 K/UL (ref 3.6–11)

## 2020-05-06 PROCEDURE — 74011000250 HC RX REV CODE- 250: Performed by: NURSE PRACTITIONER

## 2020-05-06 PROCEDURE — 65660000001 HC RM ICU INTERMED STEPDOWN

## 2020-05-06 PROCEDURE — 80053 COMPREHEN METABOLIC PANEL: CPT

## 2020-05-06 PROCEDURE — 74011250637 HC RX REV CODE- 250/637: Performed by: INTERNAL MEDICINE

## 2020-05-06 PROCEDURE — 82962 GLUCOSE BLOOD TEST: CPT

## 2020-05-06 PROCEDURE — 94760 N-INVAS EAR/PLS OXIMETRY 1: CPT

## 2020-05-06 PROCEDURE — 96375 TX/PRO/DX INJ NEW DRUG ADDON: CPT

## 2020-05-06 PROCEDURE — 85025 COMPLETE CBC W/AUTO DIFF WBC: CPT

## 2020-05-06 PROCEDURE — 74011250636 HC RX REV CODE- 250/636: Performed by: FAMILY MEDICINE

## 2020-05-06 PROCEDURE — 36415 COLL VENOUS BLD VENIPUNCTURE: CPT

## 2020-05-06 PROCEDURE — 96372 THER/PROPH/DIAG INJ SC/IM: CPT

## 2020-05-06 PROCEDURE — 74011636637 HC RX REV CODE- 636/637: Performed by: INTERNAL MEDICINE

## 2020-05-06 PROCEDURE — 74011250636 HC RX REV CODE- 250/636: Performed by: INTERNAL MEDICINE

## 2020-05-06 PROCEDURE — 94640 AIRWAY INHALATION TREATMENT: CPT

## 2020-05-06 PROCEDURE — 74011000258 HC RX REV CODE- 258: Performed by: INTERNAL MEDICINE

## 2020-05-06 PROCEDURE — 99218 HC RM OBSERVATION: CPT

## 2020-05-06 RX ORDER — INSULIN LISPRO 100 [IU]/ML
INJECTION, SOLUTION INTRAVENOUS; SUBCUTANEOUS
Status: DISCONTINUED | OUTPATIENT
Start: 2020-05-06 | End: 2020-05-06

## 2020-05-06 RX ORDER — DEXTROSE MONOHYDRATE 100 MG/ML
0-250 INJECTION, SOLUTION INTRAVENOUS AS NEEDED
Status: DISCONTINUED | OUTPATIENT
Start: 2020-05-06 | End: 2020-05-09 | Stop reason: HOSPADM

## 2020-05-06 RX ORDER — MAGNESIUM SULFATE 100 %
4 CRYSTALS MISCELLANEOUS AS NEEDED
Status: DISCONTINUED | OUTPATIENT
Start: 2020-05-06 | End: 2020-05-09 | Stop reason: HOSPADM

## 2020-05-06 RX ORDER — INSULIN LISPRO 100 [IU]/ML
15 INJECTION, SOLUTION INTRAVENOUS; SUBCUTANEOUS ONCE
Status: COMPLETED | OUTPATIENT
Start: 2020-05-06 | End: 2020-05-06

## 2020-05-06 RX ORDER — INSULIN LISPRO 100 [IU]/ML
8 INJECTION, SOLUTION INTRAVENOUS; SUBCUTANEOUS
Status: DISCONTINUED | OUTPATIENT
Start: 2020-05-06 | End: 2020-05-09

## 2020-05-06 RX ORDER — VANCOMYCIN HYDROCHLORIDE
1250 ONCE
Status: COMPLETED | OUTPATIENT
Start: 2020-05-06 | End: 2020-05-06

## 2020-05-06 RX ORDER — ALBUTEROL SULFATE 0.83 MG/ML
2.5 SOLUTION RESPIRATORY (INHALATION)
Status: DISCONTINUED | OUTPATIENT
Start: 2020-05-06 | End: 2020-05-09 | Stop reason: HOSPADM

## 2020-05-06 RX ORDER — MAGNESIUM SULFATE 100 %
4 CRYSTALS MISCELLANEOUS AS NEEDED
Status: DISCONTINUED | OUTPATIENT
Start: 2020-05-06 | End: 2020-05-06 | Stop reason: SDUPTHER

## 2020-05-06 RX ORDER — AMLODIPINE BESYLATE 5 MG/1
10 TABLET ORAL DAILY
Status: DISCONTINUED | OUTPATIENT
Start: 2020-05-06 | End: 2020-05-09 | Stop reason: HOSPADM

## 2020-05-06 RX ORDER — INSULIN LISPRO 100 [IU]/ML
10 INJECTION, SOLUTION INTRAVENOUS; SUBCUTANEOUS ONCE
Status: COMPLETED | OUTPATIENT
Start: 2020-05-06 | End: 2020-05-06

## 2020-05-06 RX ADMIN — INSULIN LISPRO 15 UNITS: 100 INJECTION, SOLUTION INTRAVENOUS; SUBCUTANEOUS at 13:52

## 2020-05-06 RX ADMIN — HUMAN INSULIN 7 UNITS: 100 INJECTION, SUSPENSION SUBCUTANEOUS at 13:53

## 2020-05-06 RX ADMIN — CEFTRIAXONE 1 G: 1 INJECTION, POWDER, FOR SOLUTION INTRAMUSCULAR; INTRAVENOUS at 17:59

## 2020-05-06 RX ADMIN — HEPARIN SODIUM 5000 UNITS: 5000 INJECTION, SOLUTION INTRAVENOUS; SUBCUTANEOUS at 09:38

## 2020-05-06 RX ADMIN — HEPARIN SODIUM 5000 UNITS: 5000 INJECTION, SOLUTION INTRAVENOUS; SUBCUTANEOUS at 21:23

## 2020-05-06 RX ADMIN — HEPARIN SODIUM 5000 UNITS: 5000 INJECTION, SOLUTION INTRAVENOUS; SUBCUTANEOUS at 13:53

## 2020-05-06 RX ADMIN — INSULIN LISPRO 10 UNITS: 100 INJECTION, SOLUTION INTRAVENOUS; SUBCUTANEOUS at 09:38

## 2020-05-06 RX ADMIN — AMLODIPINE BESYLATE 10 MG: 5 TABLET ORAL at 09:39

## 2020-05-06 RX ADMIN — Medication 10 ML: at 13:54

## 2020-05-06 RX ADMIN — VANCOMYCIN HYDROCHLORIDE 1250 MG: 10 INJECTION, POWDER, LYOPHILIZED, FOR SOLUTION INTRAVENOUS at 11:51

## 2020-05-06 RX ADMIN — HYDRALAZINE HYDROCHLORIDE 10 MG: 20 INJECTION INTRAMUSCULAR; INTRAVENOUS at 04:37

## 2020-05-06 RX ADMIN — ALBUTEROL SULFATE 2.5 MG: 2.5 SOLUTION RESPIRATORY (INHALATION) at 19:23

## 2020-05-06 NOTE — PROGRESS NOTES
Spiritual Care Assessment/Progress Note  [facility]      NAME: Janelle Luke      MRN: [id number]  AGE: 37 y.o.  SEX: female  Islam Affiliation: Church   Language: English     5/6/2020     Total Time (in minutes): 62     Spiritual Assessment begun in [facility] through conversation with:         [x]Patient        [] Family    [] Friend(s)        Reason for Consult: Advance medical directive consult, Initial/Spiritual assessment, patient floor     Spiritual beliefs: (Please include comment if needed)     [x] Identifies with a maureen tradition: Church        [] Supported by a maureen community:            [] Claims no spiritual orientation:           [] Seeking spiritual identity:                [] Adheres to an individual form of spirituality:           [] Not able to assess:                           Identified resources for coping:      [] Prayer                               [] Music                  [] Guided Imagery     [x] Family/friends                 [] Pet visits     [] Devotional reading                         [] Unknown     [] Other:                                              Interventions offered during this visit: (See comments for more details)    Patient Interventions: Advance medical directive consult, Advance medical directive completed, Affirmation of emotions/emotional suffering, Affirmation of maureen, Catharsis/review of pertinent events in supportive environment, Coping skills reviewed/reinforced, End of life issues discussed, Iconic (affirming the presence of God/Higher Power)           Plan of Care:     [] Support spiritual and/or cultural needs    [x] Support AMD and/or advance care planning process      [] Support grieving process   [] Coordinate Rites and/or Rituals    [] Coordination with community clergy   [] No spiritual needs identified at this time   [] Detailed Plan of Care below (See Comments)  [] Make referral to Music Therapy  [] Make referral to Pet Therapy [] Make referral to Addiction services  [] Make referral to [facility]  [] Make referral to Spiritual Care Partner  [] No future visits requested        [x] Follow up visits as needed     Comments:  visit for initial spiritual assessment and Advance Directive (AMD) consult. Spoke with nurse about patient prior to visit. States patient no longer Covid-19 isolation precautions. Patient sitting up in bed, good eye contact. Says she is feeling better. Spoke briefly about her health. Says she is interested in completing AMD.  Request by patient to assist with advance medical directive. Consulted with patients nurse. Explained document to patient. Assisted patient in completing the document. Made copy for patients chart and placed the copy in the patient's chart. Returned original document and one copy to the patient. She appeared comforted as a result of this visit and expressed gratitude for this visit. Visited by Rev. Emanuel Hidalgo MDiv, [facility], [facility]   paging service: [phone])

## 2020-05-06 NOTE — PROGRESS NOTES
SAUL:   Readmission for hypoglycemia, found unresponsive. Previous hospitalization on 5/1/20. Pt Observation status at this time  Observation notice provided in writing to patient and/or caregiver as well as verbal explanation of the policy. Patients who are in outpatient status also receive the Observation notice. Reason for Readmission:  Found unresponsive, BS 40. Hx of ESRD on HD, CKD, DM, COVID-19 R/0, narcotic abuse, possible seizure d/o, gastroparesis. RUR Score/Risk Level: N/a, High risk secondary frequent hospitalization secondary noncompliance. PCP: First and Last name: Mahnaz Ta MD    Name of Practice: [facility]   Are you a current patient: Yes/No: Yes   Approximate date of last visit: Apri, 2020    Is a Care Conference indicated: Not at this time    Did you attend your follow up appointment (s): If not, why not:  No, pt recently discharged from hospital on 5/2/2020       Resources/supports as identified by patient/family:  Pt stated her father, Darren Marshall, [phone], is her decision maker. Pt said she is . She requested that her ex-spouse name be removed as contact. Top Challenges facing patient (as identified by patient/family and CM): Finances/Medication cost?  Pt has Medicare A&B and Medicaid     Transportation  uses Medicaid transportation, [address] [place] [address] [phone]      Support system or lack thereof? Pt's father and her finatalio'     Living arrangements? Lives with finatalio'         Self-care/ADLs/Cognition? Pt able to do own care. She uses a walker and cane. Current Advanced Directive/Advance Care Plan: Pt in agreement for  ACP and AMD. CM talked to Pastoral Care to see pt for AMD. CM did ACP with pt and documented. Will route to pt's PCP. Plan for utilizing home health:  None.                Transition of Care Plan:    Based on readmission, the patient's previous Plan of Care   has been evaluated and/or modified. The current Transition of Care Plan is:  TBD. Pt with frequent readmission's. She has been living at home. Pt [facility] HD at  [facility] [address]     Care Management Interventions  PCP Verified by CM: Ulysses Corona, MD, last visit was April, 2020)  Palliative Care Criteria Met (RRAT>21 & CHF Dx)?: No  Mode of Transport at Discharge: Other (see comment)(uses Medicaid transportation, [facility])  Current Support Network:  Other(lives with her fiance')  Confirm Follow Up Transport: Cab(Medicaid transportation)       Jamel Perez RN ACM CRM

## 2020-05-06 NOTE — PROGRESS NOTES
Pete NP Progress note    Name: Janelle Luke  YOB: 1980  MRN: [id number]  Admission Date: 5/5/2020 11:26 AM    Date of service: 5/6/2020 6:38 AM        GRAM POSITIVE COCCI IN CHAINS GROWING IN 1 OF 4 BOTTLES DRAWN (SITE = R FOREARM DRAWN 1310    Likely a contaminant. On [facility].     Dr. Denisse Westbrook aware           Randy Lovell, MSN, RN, NP-C  [phone] or via [employer]

## 2020-05-06 NOTE — ACP (ADVANCE CARE PLANNING)
Advance Care Planning     Advance Care Planning Clinical Specialist    Conversation Note  {Date of ACP Conversation: 5/5/2020    Conversation Conducted with:   Patient with decision making capacity    ACP Clinical Specialist: Colonel Ammy RN      Health Care Decision Maker:  father Khanna, [phone]    Current Designated Health Care Decision Maker:   Validate  this information as still accurate & up-to-date; edit [place] field as needed.) Yes    If no Decision Maker listed above or available through scanned documents, then:    ICare Preferences    Hospitalization: \"If your health worsens and it becomes clear that your chance of recovery is unlikely, what would your preference be regarding hospitalization? \"    Choice:  [x]  The patient wants hospitalization  []  The patient prefers comfort-focused treatment without hospitalization. Ventilation: \"If you were in your present state of health and suddenly became very ill and were unable to breathe on your own, what would your preference be about the use of a ventilator (breathing machine) if it were available to you? \"  Yes    If patient would desire the use of a ventilator (breathing machine), answer \"yes\", if not \"no\": Yes  \"If your health worsens and it becomes clear that your chance of recovery is unlikely, what would your preference be about the use of a ventilator (breathing machine) if it were available to you? \"     If patient would desire the use of a ventilator (breathing machine), answer \"yes\", if not \"no\" Yes    Resuscitation  \"CPR works best to restart the heart when there is a sudden event, like a heart attack, in someone who is otherwise healthy. Unfortunately, CPR does not typically restart the heart for people who have serious health conditions or who are very sick. \"    \"In the event your heart stopped as a result of an underlying serious health condition, would you want attempts to be made to restart your heart (answer \"yes\" for attempt to resuscitate) or would you prefer a natural death (answer \"no\" for do not attempt to resuscitate)? \"   Yes      NOTE: If the patient has a valid advance directive AND now provides care preference(s) that are inconsistent with that prior directive, advise the patient to consider either: creating a new advance directive that complies with state-specific requirements; or, if that is not possible, orally revoking that prior directive in accordance with state-specific requirements, which must be documented in the EHR. [] Yes  [x] No   Educated Patient / Israel Redd regarding differences between Advance Directives and portable DNR orders.     Length of ACP Conversation in minutes: 10     Conversation Outcomes:  [x] ACP discussion completed  [] Existing advance directive reviewed with patient; no changes to patient's previously recorded wishes   [] New Advance Directive completed   [] Portable Do Not Rescitate prepared for Provider review and signature  [] POLST/POST/MOLST/MOST prepared for Provider review and signature      Follow-up plan:    [] Schedule follow-up conversation to continue planning  [] Referred individual to Provider for additional questions/concerns   [] Advised patient/agent/surrogate to review completed ACP document and update if needed with changes in condition, patient preferences or care setting     [x] This note routed to one or more involved healthcare providers

## 2020-05-06 NOTE — H&P
[id number] [facility] Adult  Hospitalist Group  History and Physical    Primary Care Provider: Terrie Lancaster MD  Date of Service:  5/5/2020    Subjective:     Richard Galeano is a 37 y.o. female with ESRD on HD, HTN, DM, gastroparesis, narcotic abuse was broght to the ED for AMS. Patient poor historian as still confused. Only stating she did not feel good today but she cannot recall any other information. As per ED report, EMS was called today and found her unresponsive at home. Blood sugar was checked and it was 40. She was given bolus of D10 with improvement of her mental status. On arrival to the ED patient was awake and oriented to person only. She was found to be hypothermic and hypertensive. Her blood glucose in the ED was still in the 20s requiring  Oral glucose with improvement of her hypoglycemia. For her blood pressure she received hydralazine and labetalol. Admitted to the ED given her hypothermia. Of note patient just left AMA on 5/2/2020 after she was admitted for ams. Work up during the admission including EEG, CT of the head were unremarkable. Patient denies any complaint a this time but she is very somnolent.          Review of Systems:    Unable to obtain due to altered mental status    Past Medical History:   Diagnosis Date    ARF (acute renal failure) ([address] requiring dialysis 2011    Asthma     CKD (chronic kidney disease)     Diabetes ([address]     Fibromyalgia     Gastroparesis 2010    Gastric Pacer- REMOVED 07/2015    GERD (gastroesophageal reflux disease)     Hypertension     Narcotic dependence ([address]     THU (obstructive sleep apnea)     wears 2 LPM oxygen at night    Other ill-defined conditions(799.89)     Polycystic ovarian syndrome     Seizures (HCC)     Sickle cell trait ([address]     Thromboembolus ([address] to her left arm and was told she had one in left leg recently      Past Surgical History:   Procedure Laterality Date    HX AMPUTATION FOOT  04/2018    left foot    HX CATARACT REMOVAL  3/5/12    right    HX DILATION AND CURETTAGE      ablation    HX GASTRIC BYPASS  2015    HX GI      j tube placement and removal    HX OTHER SURGICAL  2010    Gastric Pacer- REMOVED 07/2015    HX VASCULAR ACCESS      gray cath rt subclavian; removed     HX VASCULAR ACCESS      HD access right thigh; stopped working    IR INSERT NON TUNL CVC OVER 5 YRS  10/4/2019     Prior to Admission medications    Medication Sig Start Date End Date Taking? Authorizing Provider   NOVOLIN N NPH U-100 INSULIN 100 unit/mL injection 5 Units. Indications: pt. taking differently 8/1/19   Ryan Lozano MD   b complex-vitamin c-folic acid (NEPHROCAPS) 1 mg capsule Take 1 Cap by mouth. Ryan Lozano MD   acetaminophen (TYLENOL) 500 mg tablet Take 500 mg by mouth. Ryan Lozano MD   calcium acetate (PHOSLO) 667 mg cap TAKE 2 CAPSULES BY MOUTH THREE TIMES DAILY WITH MEALS 6/10/19   Ryan Lozano MD   carvedilol (COREG) 6.25 mg tablet Take 6.25 mg by mouth. Ryan Lozano MD   cloNIDine HCl (CATAPRES) 0.1 mg tablet 1 TABLET BY MOUTH 3 TIMES A DAY AS DIRECTED-2 AT BEDTIME OR LAST DOSE OF DAY-CHECK BP AS DISCUSSED 6/11/19   Ryan Lozano MD   traZODone (DESYREL) 50 mg tablet Take 50 mg by mouth nightly. Ryan Lozano MD   FOLIC ACID PO Take  by mouth. Ryan Lozano MD   vitamin e (E GEMS) 1,000 unit capsule Take 1,000 Units by mouth daily. Ryan Lozano MD   FERROUS SULFATE DRIED PO Take  by mouth three (3) times daily. Ryan Lozano MD   calcium carbonate (TUMS PO) Take 1,000 mg by mouth daily. Ryan Lozano MD   promethazine (PHENERGAN) 25 mg tablet Take 1 Tab by mouth every six (6) hours as needed for Nausea. 9/15/18   Deena Gill MD   amLODIPine (NORVASC) 10 mg tablet Take 1 Tab by mouth daily. 7/4/18   Kennedy Martinez MD   calcitRIOL (ROCALTROL) 0.25 mcg capsule Take 1 Cap by mouth daily.  7/4/18   Kennedy Martinez MD   sodium bicarbonate 650 mg tablet Take 1 Tab by mouth two (2) times a day. 18   Opal Kendall MD   Diabetic Supplies, Miscellan. kit USE AS DIRECTED 18   Opal Kendall MD   venlafaxine [facility]) 75 mg tablet Take 1 Tab by mouth two (2) times daily (with meals). 18   Opal Kendall MD   senna-docusate (PERICOLACE) 8.6-50 mg per tablet Take 2 Tabs by mouth daily. 18   Opal Kendall MD   QUEtiapine (SEROQUEL) 100 mg tablet Take 1 Tab by mouth two (2) times a day. 18   Opal Kendall MD   cholecalciferol (VITAMIN D3) 50,000 unit capsule Take 1 Cap by mouth every seven (7) days. 18   Dre Price MD   cyanocobalamin 1,000 mcg tablet Take 1,000 mcg by mouth daily. Provider, Historical   albuterol (PROVENTIL VENTOLIN) 2.5 mg /3 mL (0.083 %) nebulizer solution 2.5 mg by Nebulization route every four (4) hours as needed.     Provider, Historical     Allergies   Allergen Reactions    Fentanyl Itching and Angioedema     \"throat closed up\" per pt      Erythromycin Itching    Toradol [Ketorolac] Rash    Morphine Itching      Family History   Problem Relation Age of Onset    Cancer Mother         lung    Hypertension Mother     Cancer Father         kidney    Stroke Father         3 strokes: 59-72    Heart Disease Father 72        CABG    Hypertension Father     Cancer Sister         pancreatic    Cancer Maternal Aunt         breast    Cancer Paternal Aunt         breast    Schizophrenia Sister         was in [facility], now ass't living    Other Sister          AIDS    Other Other         nephew of AIDS        SOCIAL HISTORY:  Patient resides at home  Patient ambulates independently  Smoking history: none  Alcohol history: none        Objective:       Physical Exam:   Patient Vitals for the past 12 hrs:   Temp Pulse Resp BP SpO2   20 1925  80 16 149/52    20 1856 97.3 °F (36.3 °C) 76 18 (!) 214/88 100 %   20 1817    (!) 206/111    20 1812 97.5 °F (36.4 °C) 76 16 (!) 222/101 100 %   05/05/20 1704 97.1 °F (36.2 °C)       05/05/20 1700  69 12 165/85 100 %   05/05/20 1645  70 11 173/88 100 %   05/05/20 1621  73 19  100 %   05/05/20 1615  70 22 (!) 181/96 100 %   05/05/20 1526  71 15  100 %   05/05/20 1515  69 12 (!) 153/96    05/05/20 1514  71 14  99 %   05/05/20 1500  63 11 144/88 100 %   05/05/20 1445  62 14 136/88 100 %   05/05/20 1432  75  (!) 183/100    05/05/20 1430  74 12 (!) 183/100    05/05/20 1353 (!) 94.1 °F (34.5 °C)       05/05/20 1209    (!) 202/99    05/05/20 1158 (!) 93 °F (33.9 °C)       05/05/20 1136 (!) 93 °F (33.9 °C) 72 19 (!) 202/99 100 %       GEN APPEARANCE: chronically ill appearing. HEENT: Conjunctiva Clear  CVS: RRR, S1, S2; No M/G/R  LUNGS: CTAB; No Wheezes; No Rhonchi: No rales  ABD: Soft; No TTP; + Normoactive BS  EXT:no edema   Skin exam: No gross lesions noted on exposed skin surfaces  MENTAL STATUS:  Responds to commands but lethargic. Oriented to person and to year. Not oriented to place or month. Neuro: moving all extremities without deficit.     Data Review:   Recent Results (from the past 24 hour(s))   GLUCOSE, POC    Collection Time: 05/05/20 11:27 AM   Result Value Ref Range    Glucose (POC) 21 (LL) 65 - 100 mg/dL    Performed by [facility] [address], POC    Collection Time: 05/05/20 11:35 AM   Result Value Ref Range    Glucose (POC) 54 (L) 65 - 100 mg/dL    Performed by [facility]    EKG, 12 LEAD, INITIAL    Collection Time: 05/05/20 11:38 AM   Result Value Ref Range    Ventricular Rate 73 BPM    Atrial Rate 73 BPM    P-R Interval 148 ms    QRS Duration 80 ms    Q-T Interval 468 ms    QTC Calculation (Bezet) 515 ms    Calculated P Axis 61 degrees    Calculated R Axis 30 degrees    Calculated T Axis 48 degrees    Diagnosis       Normal sinus rhythm  Prolonged QT  Abnormal ECG  When compared with ECG of 01-MAY-2020 15:13,  No significant change was found  Confirmed by Juan Mckinney M.D., [facility] ([id number]) on 5/5/2020 2:27:27 PM     URINALYSIS W/ RFLX MICROSCOPIC    Collection Time: 05/05/20 12:13 PM   Result Value Ref Range    Color YELLOW/STRAW      Appearance CLOUDY (A) CLEAR      Specific gravity 1.010 1.003 - 1.030      pH (UA) 6.5 5.0 - 8.0      Protein 100 (A) NEG mg/dL    Glucose Negative NEG mg/dL    Ketone Negative NEG mg/dL    Bilirubin Negative NEG      Blood LARGE (A) NEG      Urobilinogen 0.2 0.2 - 1.0 EU/dL    Nitrites Negative NEG      Leukocyte Esterase LARGE (A) NEG     DRUG SCREEN, URINE    Collection Time: 05/05/20 12:13 PM   Result Value Ref Range    AMPHETAMINES Negative NEG      BARBITURATES Negative NEG      BENZODIAZEPINES Negative NEG      COCAINE Positive (A) NEG      METHADONE Negative NEG      OPIATES Negative NEG      PCP(PHENCYCLIDINE) Negative NEG      THC (TH-CANNABINOL) Negative NEG      Drug screen comment (NOTE)    URINE CULTURE HOLD SAMPLE    Collection Time: 05/05/20 12:13 PM   Result Value Ref Range    Urine culture hold        Urine on hold in Microbiology dept for 2 days. If unpreserved urine is submitted, it cannot be used for addtional testing after 24 hours, recollection will be required.    URINE MICROSCOPIC ONLY    Collection Time: 05/05/20 12:13 PM   Result Value Ref Range    WBC 20-50 0 - 4 /hpf    RBC 10-20 0 - 5 /hpf    Epithelial cells FEW FEW /lpf    Bacteria 4+ (A) NEG /hpf    Amorphous Crystals 1+ (A) NEG   D DIMER    Collection Time: 05/05/20 12:13 PM   Result Value Ref Range    D-dimer 1.64 (H) 0.00 - 0.65 mg/L FEU   FERRITIN    Collection Time: 05/05/20 12:13 PM   Result Value Ref Range    Ferritin 848 (H) 26 - 388 NG/ML   LD    Collection Time: 05/05/20 12:13 PM   Result Value Ref Range     (H) 81 - 246 U/L   GLUCOSE, POC    Collection Time: 05/05/20  1:14 PM   Result Value Ref Range    Glucose (POC) 57 (L) 65 - 100 mg/dL    Performed by SANDHYA ROBBINS    CBC WITH AUTOMATED DIFF    Collection Time: 05/05/20  1:16 PM   Result Value Ref Range    WBC 4.1 3.6 - 11.0 K/uL RBC 4.80 3.80 - 5.20 M/uL    HGB 13.0 11.5 - 16.0 g/dL    HCT 41.3 35.0 - 47.0 %    MCV 86.0 80.0 - 99.0 FL    MCH 27.1 26.0 - 34.0 PG    MCHC 31.5 30.0 - 36.5 g/dL    RDW 15.3 (H) 11.5 - 14.5 %    PLATELET 489 040 - 642 K/uL    MPV ABNORMAL 8.9 - 12.9 FL    NRBC 0.0 0  WBC    ABSOLUTE NRBC 0.00 0.00 - 0.01 K/uL    NEUTROPHILS 78 (H) 32 - 75 %    LYMPHOCYTES 17 12 - 49 %    MONOCYTES 4 (L) 5 - 13 %    EOSINOPHILS 0 0 - 7 %    BASOPHILS 0 0 - 1 %    IMMATURE GRANULOCYTES 1 (H) 0.0 - 0.5 %    ABS. NEUTROPHILS 3.2 1.8 - 8.0 K/UL    ABS. LYMPHOCYTES 0.7 (L) 0.8 - 3.5 K/UL    ABS. MONOCYTES 0.2 0.0 - 1.0 K/UL    ABS. EOSINOPHILS 0.0 0.0 - 0.4 K/UL    ABS. BASOPHILS 0.0 0.0 - 0.1 K/UL    ABS. IMM. GRANS. 0.0 0.00 - 0.04 K/UL    DF AUTOMATED     METABOLIC PANEL, COMPREHENSIVE    Collection Time: 05/05/20  1:16 PM   Result Value Ref Range    Sodium 144 136 - 145 mmol/L    Potassium 3.4 (L) 3.5 - 5.1 mmol/L    Chloride 103 97 - 108 mmol/L    CO2 23 21 - 32 mmol/L    Anion gap 18 (H) 5 - 15 mmol/L    Glucose 71 65 - 100 mg/dL    BUN 50 (H) 6 - 20 MG/DL    Creatinine 4.12 (H) 0.55 - 1.02 MG/DL    BUN/Creatinine ratio 12 12 - 20      GFR est AA 14 (L) >60 ml/min/1.73m2    GFR est non-AA 12 (L) >60 ml/min/1.73m2    Calcium 8.1 (L) 8.5 - 10.1 MG/DL    Bilirubin, total 0.3 0.2 - 1.0 MG/DL    ALT (SGPT) 50 12 - 78 U/L    AST (SGOT) 56 (H) 15 - 37 U/L    Alk.  phosphatase 168 (H) 45 - 117 U/L    Protein, total 9.6 (H) 6.4 - 8.2 g/dL    Albumin 3.9 3.5 - 5.0 g/dL    Globulin 5.7 (H) 2.0 - 4.0 g/dL    A-G Ratio 0.7 (L) 1.1 - 2.2     TROPONIN I    Collection Time: 05/05/20  1:16 PM   Result Value Ref Range    Troponin-I, Qt. <0.05 <0.05 ng/mL   NT-PRO BNP    Collection Time: 05/05/20  1:16 PM   Result Value Ref Range    NT pro-BNP 14,285 (H) 0 - 125 PG/ML   ETHYL ALCOHOL    Collection Time: 05/05/20  1:16 PM   Result Value Ref Range    ALCOHOL(ETHYL),SERUM <10 <10 MG/DL   AMMONIA    Collection Time: 05/05/20  1:16 PM   Result Value Ref Range    Ammonia <10 <32 UMOL/L   LIPASE    Collection Time: 05/05/20  1:16 PM   Result Value Ref Range    Lipase 103 73 - 393 U/L   SAMPLES BEING HELD    Collection Time: 05/05/20  1:16 PM   Result Value Ref Range    SAMPLES BEING HELD 1BLUE     COMMENT        Add-on orders for these samples will be processed based on acceptable specimen integrity and analyte stability, which may vary by analyte. LACTIC ACID    Collection Time: 05/05/20  1:17 PM   Result Value Ref Range    Lactic acid 1.4 0.4 - 2.0 MMOL/L   GLUCOSE, POC    Collection Time: 05/05/20  1:57 PM   Result Value Ref Range    Glucose (POC) 122 (H) 65 - 100 mg/dL    Performed by Ruddy Harrison    SARS-COV-2    Collection Time: 05/05/20  2:37 PM   Result Value Ref Range    Specimen source Nasopharyngeal      SARS-CoV-2 PENDING     SARS-CoV-2 PENDING     COVID-19 PENDING NEG   GLUCOSE, POC    Collection Time: 05/05/20  3:57 PM   Result Value Ref Range    Glucose (POC) 181 (H) 65 - 100 mg/dL    Performed by Dipak Leavitt, POC    Collection Time: 05/05/20  7:24 PM   Result Value Ref Range    Glucose (POC) 130 (H) 65 - 100 mg/dL    Performed by Vanessa Farias      Ct Head Wo Cont    Result Date: 5/5/2020  IMPRESSION: No acute process. Xr Chest Port    Result Date: 5/5/2020  IMPRESSION: No acute cardiopulmonary disease. Assessment:     38 y/o Female with HTN, ESRD on HD, IDDM presents to the ed with AMS in the setting of severe hypoglycemia. Plan:     1. Altered Mental status - likely triggered by hypoglycemia given improvement after glucose given. However, patient still confused. Suspect exacerbated by cocaine use and possible infectious etiology. - CT head unremarkable. - Will continue to monitor mental status  - Patient states she is on dilaudid at home but utox negative for opioids. Will avoid opioids at this time.      2. IDDM complicated by severe Hypoglycemia- likely due to poor po intake in the setting of Dm gastroparesis. - Check POC glucose every 2 hours  - D10 PRN  - Hold home dose of insulin    3. Malignant hypertension- suspect triggered by cocaine use. - Could be contributing to ams.   - avoid beta blockers  -  Will try hydralazine 10mg q6 hours prn for sbp >170  - Restart Amlodipine    4. Hypothermia- unclear etiology. Could be related to hypoglycemia but UTI should be considered  - Continue with Rocephin 1gm IV every 24 hours  - Tested for COVID in ED ( elevated d-dimer, ferritin, LD)  - On droplet and contact precautions  - beside AMS no other sx.  - sepsis reassessment completed. 5.UTI - continue with rocephin as above  - f/u blood culture  - f/u urine culture    6. Elevated LFTs - could be from viral infection vs fluid overload  - No acute abd pain  - trend LFTs. If worsening transaminitis will obtain RUQ abdomen. 7. ESRD on HD- Tue-Thu-Sat. Unknown if patient had HD today. - nephrology consult for HD  - home medications needs to be verified  - renal diet    DVT prophy: Hep Sq  Code status: full code    Surrogate decision maker: unknown. Attempted to contact sister but no answer.      FUNCTIONAL STATUS PRIOR TO HOSPITALIZATION Ambulates Independently     Signed By: Khadijah uDong MD     May 5, 2020

## 2020-05-06 NOTE — PROGRESS NOTES
Pharmacist Note - Vancomycin Dosing (Hemodialysis Patient)    Consult provided for this 37 y.o. female for indication of GPC bacteremia. This patient is also on the following antibiotic regimen(s): Vanc + Ceftriaxone    Lab Results   Component Value Date/Time    Creatinine 3.69 (H) 2020 02:06 AM    Creatinine (POC) 3.5 (H) 2019 10:25 AM    WBC 5.2 2020 02:06 AM    BUN 48 (H) 2020 02:06 AM    BUN (POC) 69 (H) 2019 10:25 AM   Temp (24hrs), Av.7 °F (35.9 °C), Min:94.1 °F (34.5 °C), Max:97.5 °F (36.4 °C)      Estimated CrCl:  ESRD on HD (Tuesday/Thursday/Saturday)    Cultures:   Urine- in process   Blood- GPCs in chains , pending   Resp panel- negative   COVID-negative    For this HD patient, will initiate therapy with a loading dose of Vancomycin 1250 mg, to be followed with a dose of 500 mg after each HD session. Dose will be adjusted to maintain a pre-HD trough of approximately 20 - 25 mcg/mL for therapeutic goal of 15 - 20 mcg/mL as approximately 35% of drug will be removed by HD filtration. Pharmacy to follow patient daily and order levels / make dose adjustments as appropriate.     Alec Wong PharmD, BCPS

## 2020-05-06 NOTE — PROGRESS NOTES
Assumed care of pt. Pt A&Ox3, RA, and impulsive. Pt COVID negative, Dr. Chris Sotomayor notified orders placed to transfer patient to remote tele.    0902: Pt , telephone order received for 10 unit. Pt called for breakfast tray. 1055: attempted to draw blood cultures but was unsuccessful, Dr. Toño Black notified. 1150: Pt didn't received breakfast but was given cereal on unit. BG recheck 407 spoke with Dr. Toño Black telephone order received for 15 unit insulin. Bedside shift change report given to Cheryl MARTIN (oncoming nurse) by Justine Esquivel RN (offgoing nurse). Report included the following information SBAR, Kardex, MAR, Cardiac Rhythm NSR and Alarm Parameters .

## 2020-05-06 NOTE — PROGRESS NOTES
13:00 Patient is hard stick. IV team tried to put new IV & draw labs (paired culture, Procalcitonin & C-reactive). Notified MD. MD order to place central line. 14:00 Patient became agitated & gt out of the bed & calling nutritional services q 5 minutes to send her a tray. She received 3 trays. Patient still asks for food. RN tried to explain to patient that it is impossible to get trays q 30 minutes. Patient got agitated & stated that she wants to leave. RN notified MD(Dr. Gabbie Manriquez). MD came in the room & explained about her medical condition. Pt. Doris Emeli to stay as long as we gave her snacks. 17:45 BG=24 rechecked BG=39. Gave patient 2 orange juices & patient was also eating dinner    18:06 BG=70  18:28 BG=78. MD order to check BG q 2 hrs & Hold Humalog & NPH     19:00 TRANSFER - OUT REPORT:    Verbal report given to Aylin Hernandez RN(name) on [employer]  being transferred to [facility](unit) for routine progression of care       Report consisted of patients Situation, Background, Assessment and   Recommendations(SBAR). Information from the following report(s) SBAR, Kardex, MAR, Recent Results and Cardiac Rhythm NSR was reviewed with the receiving nurse. Lines:   Venous Access Device 06/16/18 Upper chest (subclavicular area), left (Active)       Peripheral IV 05/05/20 Right Forearm (Active)   Site Assessment Clean, dry, & intact 5/6/2020 12:00 PM   Phlebitis Assessment 0 5/6/2020 12:00 PM   Infiltration Assessment 0 5/6/2020 12:00 PM   Dressing Status Clean, dry, & intact 5/6/2020 12:00 PM   Dressing Type Tape;Transparent 5/6/2020 12:00 PM   Hub Color/Line Status Pink; Infusing 5/6/2020 12:00 PM   Action Taken Open ports on tubing capped 5/6/2020 12:00 PM   Alcohol Cap Used Yes 5/6/2020 12:00 PM        Opportunity for questions and clarification was provided.       Patient transported with:   [employer]   Patient Vitals for the past 12 hrs:   Temp Pulse Resp BP SpO2   05/06/20 1920 98.5 °F (36.9 °C) 100 14 159/73 100 %   05/06/20 1513 97.5 °F (36.4 °C) (!) 107 22 (!) 152/98 99 %   05/06/20 1200     99 %   05/06/20 1154 97.1 °F (36.2 °C) (!) 113 13 132/72 100 %   05/06/20 0939    (!) 149/94    05/06/20 0751 96.9 °F (36.1 °C) 94 16 120/76 99 %           Problem: Falls - Risk of  Goal: *Absence of Falls  Description: Document Blake Fall Risk and appropriate interventions in the flowsheet. Outcome: Progressing Towards Goal  Note: Fall Risk Interventions:  Mobility Interventions: Mechanical lift, OT consult for ADLs, PT Consult for mobility concerns, PT Consult for assist device competence    Mentation Interventions: Adequate sleep, hydration, pain control, Door open when patient unattended, Evaluate medications/consider consulting pharmacy, Familiar objects from home, Increase mobility, More frequent rounding, Reorient patient    Medication Interventions: Assess postural VS orthostatic hypotension, Evaluate medications/consider consulting pharmacy, Patient to call before getting OOB    Elimination Interventions: Elevated toilet seat, Patient to call for help with toileting needs, Toilet paper/wipes in reach  Problem: Patient Education: Go to Patient Education Activity  Goal: Patient/Family Education  Outcome: Progressing Towards Goal     Problem: Discharge Planning  Goal: *Discharge to safe environment  Outcome: Progressing Towards Goal     Problem: Patient Education: Go to Patient Education Activity  Goal: Patient/Family Education  Outcome: Progressing Towards Goal     Problem: Pressure Injury - Risk of  Goal: *Prevention of pressure injury  Description: Document Antonio Scale and appropriate interventions in the flowsheet.   Outcome: Progressing Towards Goal  Note: Pressure Injury Interventions:       Moisture Interventions: Apply protective barrier, creams and emollients, Assess need for specialty bed, Check for incontinence Q2 hours and as needed, Limit adult briefs, Maintain skin hydration (lotion/cream), Minimize layers    Activity Interventions: Assess need for specialty bed, Increase time out of bed, PT/OT evaluation    Mobility Interventions: Assess need for specialty bed, Float heels, PT/OT evaluation, Turn and reposition approx.  every two hours(pillow and wedges)    Nutrition Interventions: Document food/fluid/supplement intake    Problem: Patient Education: Go to Patient Education Activity  Goal: Patient/Family Education  Outcome: Progressing Towards Goal

## 2020-05-06 NOTE — DIABETES MGMT
[facility]  CLINICAL NURSE SPECIALIST CONSULT  PROGRAM FOR DIABETES HEALTH    INITIAL NOTE    Presentation   Chato Aguilar is a 37 y.o. female with a PMH of ESRD on HD, fibromyalgia, PCOS, HTN, DM s/p left foot amputation, gastroparesis s/p gastric bypass, did have a gastric pacer but that was removed in 2015,  narcotic abuse who was found unresponsive at home and noted to have a blood glucose of 40- Dextrose fluids were given by EMS. In the ED she was found to be hypothermic, hypotensive with a blood glucose of 20. Last ED admission was on 5/2 for unresponsiveness (narcan given with no response, glucose 200s, concerns for seizure activity, UDS negative) once she was alert and oriented, she left AMA. Diabetes: Patient has known Type 1 diabetes, diagnosed at age 14 and treated with Novolin N and Humalog PTA. Family history positive for diabetes. Consulted by Provider for advanced diabetes nursing assessment and care, specifically related to     [x] Inpatient management strategy  [x] Home management assessment    Diabetes-related medical history  Acute complications  Recurrent hypoglycemia  Neurological complications  Hypoglycemia unawareness, Gastroparesis and Peripheral neuropathy  Microvascular disease  Retinopathy and Nephropathy  Macrovascular disease  Foot wounds    Diabetes medication history  Drug class Currently in use Discontinued Never used   Biguanide      DDP-4 inhibitor       Sulfonylurea      Thiazolidinedione      GLP-1 RA      SGLT-2 inhibitors      Basal insulin NPH 8 units each night     Bolus insulin Humalog 3-6 units with meals       Subjective   I just want more food. Give me more food. Guillermo Huang is a 37year old female with a PMH of poorly controlled type one diabetes, diagnosed at age 14. Her diabetes associated complications include bilateral diabetic food wounds, left foot osteomyelitis with BKA; nephropathy with ESRD on Dialysis, retinopathy.   During examination, patient had a very difficult time describing her diabetes related care and history. She reports that her boyfriend is involved in her care by obtaining blood glucose readings and administering insulin. She does report that she has accidentally given herself \"too much insulin\" when she was not wearing her glasses and had low blood glucose values. Last A1C from 10/19 is suprisingly low- 7.9%. A1C values range throughout the year from 7.9% to over 16%. Verbalizes cocaine use intermittently to \"help with her pain\". Denies using cocaine this week, despite the positive UDS. Eating pattern  [x] Breakfast Eggs, toast, sausage  [x] Lunch  Ham and cheese sandwich  [x] Dinner  Baked chicken or fish  [x] Beverages Hot tea with sugar, diet soda  Physical activity pattern  None  Monitoring pattern  Performed by patient's boyfriend 2-3 times daily. Patient unaware of results. Did note that she is concerned about lows at night. Taking medications pattern  [x] Consistent administration  [x] Affordable      Objective   Physical exam  General Alert, oriented and in acute distress/ill-appearing. Conversant; rapid thought process, poor judgement  Vital Signs   Visit Vitals  /72 (BP 1 Location: Right arm, BP Patient Position: At rest)   Pulse (!) 113   Temp 97.1 °F (36.2 °C)   Resp 13   Ht 5' 2\" (1.575 m)   Wt 56.7 kg (125 lb)   SpO2 99%   BMI 22.86 kg/m²     Skin  Warm and dry. Acanthosis noted along neckline. No lipohypertrophy or lipoatrophy noted at injection sites   Heart   Regular rate and rhythm.  No murmurs, rubs or gallops  Lungs  Clear to auscultation without rales or rhonchi  Extremities No foot wounds    Diabetic foot exam:    Left Foot     Left BKA  Right Foot   Visual Exam: callous - Small ulcer noted on the ball of foot near pinky toe; pinpoint dark ulcer noted on the bottom of mid foot and ulcer- see above   Pulse DP: 1+ (weak)   Filament test: absent sensation    Vibratory sensation: absent DP & PT pulses +2. Laboratory  Lab Results   Component Value Date/Time    Hemoglobin A1c 7.9 (H) 10/04/2019 02:49 PM     Lab Results   Component Value Date/Time    LDL, calculated 60.8 05/02/2020 05:15 AM     Lab Results   Component Value Date/Time    Creatinine (POC) 3.5 (H) 09/06/2019 10:25 AM    Creatinine 3.69 (H) 05/06/2020 02:06 AM     Lab Results   Component Value Date/Time    Sodium 140 05/06/2020 02:06 AM    Potassium 3.3 (L) 05/06/2020 02:06 AM    Chloride 108 05/06/2020 02:06 AM    CO2 19 (L) 05/06/2020 02:06 AM    Anion gap 13 05/06/2020 02:06 AM    Glucose 159 (H) 05/06/2020 02:06 AM    BUN 48 (H) 05/06/2020 02:06 AM    Creatinine 3.69 (H) 05/06/2020 02:06 AM    BUN/Creatinine ratio 13 05/06/2020 02:06 AM    GFR est AA 16 (L) 05/06/2020 02:06 AM    GFR est non-AA 14 (L) 05/06/2020 02:06 AM    Calcium 7.1 (L) 05/06/2020 02:06 AM    Bilirubin, total 0.2 05/06/2020 02:06 AM    AST (SGOT) 35 05/06/2020 02:06 AM    Alk.  phosphatase 108 05/06/2020 02:06 AM    Protein, total 6.2 (L) 05/06/2020 02:06 AM    Albumin 2.5 (L) 05/06/2020 02:06 AM    Globulin 3.7 05/06/2020 02:06 AM    A-G Ratio 0.7 (L) 05/06/2020 02:06 AM    ALT (SGPT) 33 05/06/2020 02:06 AM     Lab Results   Component Value Date/Time    ALT (SGPT) 33 05/06/2020 02:06 AM       Factors affecting BG pattern  Factor Dose Comments   Nutrition:  Carb-controlled meals   60 grams/meal Very hungry, insistent on snacks and additional meals   Drugs:  Cocaine use  + UDS   Pain     Infection UTI and possible blood stream infection    Gastroparesis  Delayed gastric empyting   ESRD on Dialysis HD 3x week      Blood glucose pattern      Assessment and Plan   Nursing Diagnosis Risk for unstable blood glucose pattern   Nursing Intervention Domain 7754 Decision-making Support   Nursing Interventions Examined current inpatient diabetes control   Explored factors facilitating and impeding inpatient management  Identified self-management practices impeding diabetes control  Explored corrective strategies with patient and responsible inpatient provider   Informed patient of rational for insulin strategy while hospitalized     Evaluation   Nevin Prescott is a 37 y.o. female with a PMH of ESRD on HD, fibromyalgia, PCOS, HTN, DM s/p left foot amputation, gastroparesis s/p gastric bypass, did have a gastric pacer but that was removed in 2015,  narcotic abuse who was found unresponsive at home and noted to have a blood glucose of 40- Dextrose fluids were given by EMS. Her medical history is quite complex which largely impacts her blood glucose management. Her insight and poor judgement certainly complicates both inpatient and outpatient glucose management. Tight glycemic control is not recommended as there is a high risk for hypoglycemia. If she continues to have erratic eating patterns, she will have pre-prandial hyperglycemia. The utilization of sliding scale insulin on top of basal insulin puts her at risk for hypoglycemia. Inpatient blood glucose management has been impacted by  [x] Kidney dysfunction  [x] Erratic meal consumption  [x] Gastroparesis  [x]  UTI bacteremia           Urine negative for glucose/ketones  Large leukocyte esterase  Elevated d-dimer (1.64); ferritin 848; ; BMP 48641: Rapid COVID-19 neg, respiratory vital panel neg  + cocaine, neg alcohol  Creat 4.1, GFR 14  GPC in chains from blood culture  Recommendations   Recommend:  Initiate the subcutaneous insulin order set with:  1. Ok to restart home NPH dose of 7 units BID. If patient's fasting blood glucose is below 100, adjust to 5 units BID    2. Standard basal insulin dose: 8 units Humalog with meals; no correctional insulin. Expect pre-prandial hyperglycemia. Advise against high doses of rapid acting insulin to prevent insulin stacking. 3. Renal/Carbohydrate consistent diet    4.  Ok to adjust blood glucose monitoring to [facility] Code(s)     [x] [id number]; [id number]      Delta Knows Johnson, CNS  Program for Diabetes Health  Access via VERENA [facility]ob[facility] [phone]

## 2020-05-06 NOTE — PROGRESS NOTES
Hospitalist Progress Note  Mary Liao MD  Answering service: [phone] from in house phone        Date of Service:  2020  NAME:  Otto Scherer  :  1980  MRN:  [id number]  PCP: Olivia Grace MD    Chief Complaint:   Chief Complaint   Patient presents with   Adam.Deanna Low Blood Sugar         Admission Summary:     Otto Scherer is a 37 y.o. female who presented with hyperglycemia    Interval history / Subjective:   Patient seen for Follow up of chief complaint: Altered mental status  Patient is still lethargic reportedly better than yesterday, has no headache, awake, impulsive, afebrile  Hypoglycemia improving  DW NS, orders reviewed. Assessment & Plan:     1  acute encephalopathy, POA likely triggered by hypoglycemia given improvement after glucose given. However, patient still confused. Suspect exacerbated by cocaine use and possible infectious etiology. Patient also has gram-positive cocci bacteremia  Repeat blood cultures x2, start vancomycin  Continue ceftriaxone as well for possible UTI  CT head unremarkable. Mental status improving, avoid medication affect mentation including opiates.     2. IDDM complicated by severe Hypoglycemia- likely due to poor po intake in the setting of Dm gastroparesis. - Check POC glucose every 2 hours  - D10 PRN  - Hold home dose of insulin, use sliding scale insulin     3. Malignant hypertension- suspect triggered by cocaine use. Improving  - Could be contributing to ams.   - avoid beta blockers  -  Will try hydralazine 10mg q6 hours prn for sbp >170  -Continue Amlodipine     4. Hypothermia- unclear etiology. Could be related to hypoglycemia and infection  - Continue with Rocephin 1gm IV every 24 hours and vancomycin  - Tested for COVID in ED ( elevated d-dimer, ferritin, LD)report pending  - On droplet and contact precautions  - beside AMS no other sx.     5. UTI - continue with rocephin as above  Recent positive urine culture with Klebsiella, continue ceftriaxone     6. Mildly elevated LFTs - could be from viral infection vs fluid overload  - No acute abd pain  - trend LFTs. If worsening transaminitis will obtain RUQ abdomen.     7. ESRD on HD- Tue-Thu-Sat. Nephrology consult noted, plan to resume hemodialysis as usual.    Code status: Full Code    DVT prophylaxis: Subcu heparin    Care Plan discussed with: Patient/Family    Disposition: TBD    Hospital Problems  Date Reviewed: 6/10/2018          Codes Class Noted POA    Hypoglycemia ICD-10-CM: E16.2  ICD-9-CM: 251.2  2020 Unknown                Review of Systems:   Review of systems not obtained due to patient factors. Physical Examination:     General appearance: Chronically ill-appearing, confused  Head: Normocephalic, atraumatic, eyes show clear conjunctiva  Lungs: Clear to auscultation bilaterally, no wheezing, no rhonchi  Heart: RRR, no murmurs or rubs  Abdomen: Soft, nontender, no guarding  Neurologic: Confused, awake, lethargic       Vital Signs:    Last 24hrs VS reviewed since prior progress note. Most recent are:    Visit Vitals  BP (!) 149/94   Pulse 94   Temp 96.9 °F (36.1 °C)   Resp 16   Ht 5' 2\" (1.575 m)   Wt 56.7 kg (125 lb)   SpO2 99%   BMI 22.86 kg/m²         Intake/Output Summary (Last 24 hours) at 2020 1144  Last data filed at 2020 1812  Gross per 24 hour   Intake 0 ml   Output    Net 0 ml        Tmax:  Temp (24hrs), Av.2 °F (35.7 °C), Min:93 °F (33.9 °C), Max:97.5 °F (36.4 °C)      Data Review:   Data reviewed by myself:  Ct Head Wo Cont    Result Date: 2020  INDICATION: AMS EXAM:  HEAD CT WITHOUT CONTRAST COMPARISON: May 1, 2020 TECHNIQUE:  Routine noncontrast axial head CT was performed. Sagittal and coronal reconstructions were generated. CT dose reduction was achieved through use of a standardized protocol tailored for this examination and automatic exposure control for dose modulation.  FINDINGS: Ventricles: Midline, no hydrocephalus. Intracranial Hemorrhage: None. Brain Parenchyma/Brainstem: Normal for age. Basal Cisterns: Normal. Paranasal Sinuses: Visualized sinuses are clear. Additional Comments: N/A. IMPRESSION: No acute process. Ct Head Wo Cont    Result Date: 5/1/2020  EXAM:  CT HEAD WO CONT INDICATION:   AMS COMPARISON: CT head 10/4/2019. TECHNIQUE: Unenhanced CT of the head was performed using 5 mm images. Brain and bone windows were generated. CT dose reduction was achieved through use of a standardized protocol tailored for this examination and automatic exposure control for dose modulation. FINDINGS: The ventricles are normal in size and position. Basilar cisterns are patent. No midline shift. There is no evidence of acute infarct, hemorrhage, or extraaxial fluid collection. The paranasal sinuses, mastoid air cells, and middle ears are clear. The orbital contents are within normal limits with bilateral lens implants. There are no significant osseous or extracranial soft tissue lesions. IMPRESSION: 1. No evidence of acute intracranial abnormality. Xr Chest Port    Result Date: 5/5/2020  INDICATION: Altered mental status. Hypoglycemia. Portable AP semiupright view of the chest. Direct comparison made to prior chest x-ray dated May 2, 2020. Cardiomediastinal silhouette is stable. Lungs are hypoinflated, but grossly clear bilaterally. Pleural spaces are normal. Osseous structures are diffusely demineralized, but intact. IMPRESSION: No acute cardiopulmonary disease. Xr Chest Port    Result Date: 5/2/2020  Clinical history: Central venous catheter placement INDICATION:   Central venous catheter placement COMPARISON: 5/1/2020 FINDINGS: AP portable upright view of the chest demonstrates a stable  cardiopericardial silhouette. The lungs are unchanged in overall appearance. . Interval left-sided central venous access catheter is in place. Catheter tip is in appropriate position for use.  There is no associated pneumothorax. The osseous structures are unremarkable. Patient is on a cardiac monitor. Stable bilateral interstitial opacities. IMPRESSION: Central venous acces cathter in position for use. No other significant interval change. Xr Chest Port    Result Date: 5/1/2020  EXAM:  XR CHEST PORT INDICATION:   Central line pulled back COMPARISON: Chest radiograph 5/1/2020. FINDINGS: AP radiograph of the chest was obtained. Marked interval retraction of right IJ central venous catheter, which now lies very proximally in the right internal jugular vein. No pneumothorax. Unchanged pulmonary interstitial opacities. No significant pleural effusion. Stable cardiomediastinal silhouette. IMPRESSION: 1. Marked interval retraction of right IJ central venous catheter, which now lies very proximally in the right internal jugular vein. Recommend advancement. 2. Unchanged bilateral interstitial opacities, likely representing mild interstitial edema. Xr Chest Port    Result Date: 5/1/2020  INDICATION: Central line placement EXAM:  AP CHEST RADIOGRAPH COMPARISON: October 2019 FINDINGS: AP portable view of the chest demonstrates placement of a right IJ line with the tip over the junction of the right atrium with the IVC. There is no pneumothorax. There is bilateral hilar enlargement. Pulmonary vascular congestion is suspected. IMPRESSION: Right picc line tip over the junction of the right atrium and IVC. Consider retraction by approximately 9 cm. No pneumothorax. Pulmonary fluid overload.      Lab Results   Component Value Date/Time    Specimen Description: URINE 06/24/2013 08:00 PM    Specimen Description: URINE 06/17/2013 07:55 PM    Specimen Description: URINE 05/26/2013 10:10 AM     Lab Results   Component Value Date/Time    Culture result: (A) 05/05/2020 01:16 PM     GRAM POSITIVE COCCI IN CHAINS GROWING IN 1 OF 4 BOTTLES DRAWN (SITE = R FOREARM DRAWN 1310)    Culture result: (A) 05/05/2020 01:16 PM PRELIMINARY REPORT OF GRAM POSITIVE COCCI IN CHAINS GROWING IN 1 OF 4 BOTTLES DRAWN CALLED TO AND READ BACK BY NAM Hickey [id number] ON 5/6/20.     Culture result: KLEBSIELLA PNEUMONIAE (A) 05/02/2020 03:11 AM    Culture result: CITROBACTER YOUNGAE (A) 05/02/2020 03:11 AM     All Micro Results     Procedure Component Value Units Date/Time    CULTURE, BLOOD, PAIRED [[id number]]     Order Status:  Sent Specimen:  Blood     CULTURE, BLOOD, PAIRED [[id number]]  (Abnormal) Collected:  05/05/20 1316    Order Status:  Completed Specimen:  Blood Updated:  05/06/20 0626     Special Requests: NO SPECIAL REQUESTS        Culture result:       GRAM POSITIVE COCCI IN CHAINS GROWING IN 1 OF 4 BOTTLES DRAWN (SITE = R FOREARM DRAWN 1310)                  PRELIMINARY REPORT OF GRAM POSITIVE COCCI IN CHAINS GROWING IN 1 OF 4 BOTTLES DRAWN CALLED TO AND READ BACK BY NAM BROWNRN,AT [id number] ON 5/6/20. [facility]          RESPIRATORY PANEL,PCR,NASOPHARYNGEAL [[id number]] Collected:  05/05/20 1437    Order Status:  Completed Specimen:  Nasopharyngeal Updated:  05/05/20 2055     Adenovirus Not detected        Coronavirus 229E Not detected        Coronavirus HKU1 Not detected        Coronavirus CVNL63 Not detected        Coronavirus OC43 Not detected        Metapneumovirus Not detected        Rhinovirus and Enterovirus Not detected        Influenza A Not detected        Influenza A, subtype H1 Not detected        Influenza A, subtype H3 Not detected        INFLUENZA A H1N1 PCR Not detected        Influenza B Not detected        Parainfluenza 1 Not detected        Parainfluenza 2 Not detected        Parainfluenza 3 Not detected        Parainfluenza virus 4 Not detected        RSV by PCR Not detected        B. parapertussis, PCR Not detected        Bordetella pertussis - PCR Not detected        Chlamydophila pneumoniae DNA, QL, PCR Not detected        Mycoplasma pneumoniae DNA, QL, PCR Not detected       CULTURE, URINE [[id number]] Collected: 05/05/20 1213    Order Status:  Completed Specimen:  Urine from Clean catch Updated:  05/05/20 1901    CULTURE, URINE [[id number]]     Order Status:  Canceled Specimen:  Urine from [address] [place] [address] [[id number]] Collected:  05/05/20 1213    Order Status:  Completed Specimen:  Serum Updated:  05/05/20 1240     Urine culture hold       Urine on hold in Microbiology dept for 2 days. If unpreserved urine is submitted, it cannot be used for addtional testing after 24 hours, recollection will be required. CULTURE, BLOOD [[id number]]     Order Status:  Canceled Specimen:  Blood           Labs: reviewed by myself. Recent Labs     05/06/20  0206 05/05/20  1316   WBC 5.2 4.1   HGB 11.2* 13.0   HCT 37.0 41.3   * 157     Recent Labs     05/06/20  0206 05/05/20  1316    144   K 3.3* 3.4*    103   CO2 19* 23   BUN 48* 50*   CREA 3.69* 4.12*   * 71   CA 7.1* 8.1*     Recent Labs     05/06/20  0206 05/05/20  1316   SGOT 35 56*   ALT 33 50    168*   TBILI 0.2 0.3   TP 6.2* 9.6*   ALB 2.5* 3.9   GLOB 3.7 5.7*   LPSE  --  103     No results for input(s): INR, PTP, APTT, INREXT, INREXT in the last 72 hours. Recent Labs     05/05/20  1213   FERR 848*      Lab Results   Component Value Date/Time    Folate 33.8 (H) 05/07/2018 11:20 AM      No results for input(s): PH, PCO2, PO2 in the last 72 hours.   Recent Labs     05/05/20  1316   TROIQ <0.05     Lab Results   Component Value Date/Time    Cholesterol, total 135 05/02/2020 05:15 AM    HDL Cholesterol 56 05/02/2020 05:15 AM    LDL, calculated 60.8 05/02/2020 05:15 AM    Triglyceride 91 05/02/2020 05:15 AM    CHOL/HDL Ratio 2.4 05/02/2020 05:15 AM     Lab Results   Component Value Date/Time    Glucose (POC) 356 (H) 05/06/2020 09:02 AM    Glucose (POC) 206 (H) 05/06/2020 04:29 AM    Glucose (POC) 164 (H) 05/05/2020 11:25 PM    Glucose (POC) 136 (H) 05/05/2020 09:54 PM    Glucose (POC) 130 (H) 05/05/2020 07:24 PM     Lab Results   Component Value Date/Time    Color YELLOW/STRAW 05/05/2020 12:13 PM    Appearance CLOUDY (A) 05/05/2020 12:13 PM    Specific gravity 1.010 05/05/2020 12:13 PM    Specific gravity 1.013 05/02/2020 03:11 AM    pH (UA) 6.5 05/05/2020 12:13 PM    Protein 100 (A) 05/05/2020 12:13 PM    Glucose Negative 05/05/2020 12:13 PM    Ketone Negative 05/05/2020 12:13 PM    Bilirubin Negative 05/05/2020 12:13 PM    Urobilinogen 0.2 05/05/2020 12:13 PM    Nitrites Negative 05/05/2020 12:13 PM    Leukocyte Esterase LARGE (A) 05/05/2020 12:13 PM    Epithelial cells FEW 05/05/2020 12:13 PM    Bacteria 4+ (A) 05/05/2020 12:13 PM    WBC 20-50 05/05/2020 12:13 PM    RBC 10-20 05/05/2020 12:13 PM         Medications Reviewed:     Current Facility-Administered Medications   Medication Dose Route Frequency    amLODIPine (NORVASC) tablet 10 mg  10 mg Oral DAILY    glucose chewable tablet 16 g  4 Tab Oral PRN    glucagon (GLUCAGEN) injection 1 mg  1 mg IntraMUSCular PRN    dextrose 10% infusion 0-250 mL  0-250 mL IntraVENous PRN    insulin lispro (HUMALOG) injection   SubCUTAneous AC&HS    vancomycin (VANCOCIN) 1250 mg in  ml infusion  1,250 mg IntraVENous ONCE    cefTRIAXone (ROCEPHIN) 1 g in 0.9% sodium chloride (MBP/ADV) 50 mL  1 g IntraVENous Q24H    sodium chloride (NS) flush 5-10 mL  5-10 mL IntraVENous PRN    acetaminophen (TYLENOL) tablet 650 mg  650 mg Oral Q6H PRN    Or    acetaminophen (TYLENOL) suppository 650 mg  650 mg Rectal Q6H PRN    hydrALAZINE (APRESOLINE) 20 mg/mL injection 10 mg  10 mg IntraVENous Q6H PRN    heparin (porcine) injection 5,000 Units  5,000 Units SubCUTAneous Q8H     ______________________________________________________________________  EXPECTED LENGTH OF STAY: - - -  ACTUAL LENGTH OF STAY:          0                 Mikel Dubois MD     Patient's emergency contacts:  Extended Emergency Contact Information  Primary Emergency Contact: Екатерина Damon, [address] [facility] Phone: [phone]  Mobile Phone: [phone]  Relation: Father   needed?  No

## 2020-05-06 NOTE — ACP (ADVANCE CARE PLANNING)
Request by patient to assist with advance medical directive. Consulted with patients nurse. Explained document to patient. Assisted patient in completing the document. Made copy for patients chart and placed the copy in the patient's chart. Returned original document and one copy to the patient. Visited by Rev. Ronak Lai MDiv, [facility], [facility]: [phone]

## 2020-05-06 NOTE — PROGRESS NOTES
0020 - pt very restless/impulsive. Pt on bed alarm, however, moves quickly to stand up out of bed on her own. Repeatedly educated patient to stay in the bed and to call for help. 0045 - pt found trying to leave room into the hallway. Pt redirected to bed. Pt reoriented. Pt wants pain medicine complains of back pain. Educated pt on time, place, and situation and not able to give any pain meds at this time due to AMS. Pt states she understands and requested gingerale. 2388 - pt climbed out of bed to sit on commode and pressed code blue button. Pt redirected into bed and demands food. Gram crackers given. 0630 - [facility] lab called. Pt has gram+ cocci in chains 1/4 bottles drawn yesterday at 1324. Reported to NP on call.

## 2020-05-06 NOTE — CONSULTS
Nephrology Progress Note  Mario Moise  Date of Admission : 5/5/2020    CC: Follow up for ESRD       Assessment and Plan     ESRD- HD : dialyzes TTS at [facility]   Hypokalemia   Encephalopathy   Hypoglycemia  COVID-19 PUI  Anemia in CKD   Sec HPTH   Type II DM       Plan:  No urgent HD needed today  Dialysis tomorrow w/ 3K bath  Cont BP meds  No need for SANTOS       Interval History: This is a 36 yo AAF w/ ESRD on HD TTS at [facility], HTN, DM2, hx of substance abuse, noncompliance. Admitted for AMS and hypoglycemia. Per records, was dialyzed yesterday. Recent admission for encephalopathy, w/u negative and pt left the hospital AMA. Pt is a COVID-19 PUI. Currently, feeling better per RN. BP, lytes and oxygenation stable. Pt not examined in the room due to COVID-19 PUI. Pt discussed with primary RN and hospitalist.      Current Medications: all current  Medications have been eviewed in EPIC  Review of Systems: Pertinent items are noted in HPI. Objective:  Vitals:    Vitals:    05/06/20 0439 05/06/20 0558 05/06/20 0751 05/06/20 0939   BP:  129/66 120/76 (!) 149/94   Pulse:  93 94    Resp:   16    Temp:   96.9 °F (36.1 °C)    SpO2:   99%    Weight: 56.7 kg (125 lb)      Height:         Intake and Output:  No intake/output data recorded. No intake/output data recorded. Physical Examination:    Patient not examined in the room due to COVID-19 PUI:    Pt intubated    No   General: NAD  Resp:  Stable O2, no distress  CV:  RRR on monitor, no LE edema per RN  Neurologic:  Mild confusion per RN    [x]    High complexity decision making was performed  []    Patient is at high-risk of decompensation with multiple organ involvement    Lab Data Personally Reviewed: I have reviewed all the pertinent labs, microbiology data and radiology studies during assessment.     Recent Labs     05/06/20  0206 05/05/20  1316    144   K 3.3* 3.4*    103   CO2 19* 23   * 71   BUN 48* 50*   CREA 3.69* 4.12*   CA 7.1* 8.1*   ALB 2.5* 3.9   SGOT 35 56*   ALT 33 50     Recent Labs     05/06/20  0206 05/05/20  1316   WBC 5.2 4.1   HGB 11.2* 13.0   HCT 37.0 41.3   * 157     Lab Results   Component Value Date/Time    Specimen Description: URINE 06/24/2013 08:00 PM    Specimen Description: URINE 06/17/2013 07:55 PM    Specimen Description: URINE 05/26/2013 10:10 AM     Lab Results   Component Value Date/Time    Culture result: (A) 05/05/2020 01:16 PM     GRAM POSITIVE COCCI IN CHAINS GROWING IN 1 OF 4 BOTTLES DRAWN (SITE = R FOREARM DRAWN 1310)    Culture result: (A) 05/05/2020 01:16 PM     PRELIMINARY REPORT OF GRAM POSITIVE COCCI IN CHAINS GROWING IN 1 OF 4 BOTTLES DRAWN CALLED TO AND READ BACK BY NAM BROWN RN,AT 0618 ON 5/6/20.     Culture result: KLEBSIELLA PNEUMONIAE (A) 05/02/2020 03:11 AM    Culture result: CITROBACTER YOUNGAE (A) 05/02/2020 03:11 AM     Recent Results (from the past 24 hour(s))   GLUCOSE, POC    Collection Time: 05/05/20 11:27 AM   Result Value Ref Range    Glucose (POC) 21 (LL) 65 - 100 mg/dL    Performed by [address], POC    Collection Time: 05/05/20 11:35 AM   Result Value Ref Range    Glucose (POC) 54 (L) 65 - 100 mg/dL    Performed by [facility]    EKG, 12 LEAD, INITIAL    Collection Time: 05/05/20 11:38 AM   Result Value Ref Range    Ventricular Rate 73 BPM    Atrial Rate 73 BPM    P-R Interval 148 ms    QRS Duration 80 ms    Q-T Interval 468 ms    QTC Calculation (Bezet) 515 ms    Calculated P Axis 61 degrees    Calculated R Axis 30 degrees    Calculated T Axis 48 degrees    Diagnosis       Normal sinus rhythm  Prolonged QT  Abnormal ECG  When compared with ECG of 01-MAY-2020 15:13,  No significant change was found  Confirmed by Monserrat Madrid M.D., Mimi Nails ([id number]) on 5/5/2020 2:27:27 PM     URINALYSIS W/ RFLX MICROSCOPIC    Collection Time: 05/05/20 12:13 PM   Result Value Ref Range    Color YELLOW/STRAW      Appearance CLOUDY (A) CLEAR      Specific gravity 1.010 1.003 - 1.030      pH (UA) 6.5 5.0 - 8.0      Protein 100 (A) NEG mg/dL    Glucose Negative NEG mg/dL    Ketone Negative NEG mg/dL    Bilirubin Negative NEG      Blood LARGE (A) NEG      Urobilinogen 0.2 0.2 - 1.0 EU/dL    Nitrites Negative NEG      Leukocyte Esterase LARGE (A) NEG     DRUG SCREEN, URINE    Collection Time: 05/05/20 12:13 PM   Result Value Ref Range    AMPHETAMINES Negative NEG      BARBITURATES Negative NEG      BENZODIAZEPINES Negative NEG      COCAINE Positive (A) NEG      METHADONE Negative NEG      OPIATES Negative NEG      PCP(PHENCYCLIDINE) Negative NEG      THC (TH-CANNABINOL) Negative NEG      Drug screen comment (NOTE)    URINE CULTURE HOLD SAMPLE    Collection Time: 05/05/20 12:13 PM   Result Value Ref Range    Urine culture hold        Urine on hold in Microbiology dept for 2 days. If unpreserved urine is submitted, it cannot be used for addtional testing after 24 hours, recollection will be required.    URINE MICROSCOPIC ONLY    Collection Time: 05/05/20 12:13 PM   Result Value Ref Range    WBC 20-50 0 - 4 /hpf    RBC 10-20 0 - 5 /hpf    Epithelial cells FEW FEW /lpf    Bacteria 4+ (A) NEG /hpf    Amorphous Crystals 1+ (A) NEG   D DIMER    Collection Time: 05/05/20 12:13 PM   Result Value Ref Range    D-dimer 1.64 (H) 0.00 - 0.65 mg/L FEU   FERRITIN    Collection Time: 05/05/20 12:13 PM   Result Value Ref Range    Ferritin 848 (H) 26 - 388 NG/ML   LD    Collection Time: 05/05/20 12:13 PM   Result Value Ref Range     (H) 81 - 246 U/L   GLUCOSE, POC    Collection Time: 05/05/20  1:14 PM   Result Value Ref Range    Glucose (POC) 57 (L) 65 - 100 mg/dL    Performed by SANDHYA ROBBINS    CBC WITH AUTOMATED DIFF    Collection Time: 05/05/20  1:16 PM   Result Value Ref Range    WBC 4.1 3.6 - 11.0 K/uL    RBC 4.80 3.80 - 5.20 M/uL    HGB 13.0 11.5 - 16.0 g/dL    HCT 41.3 35.0 - 47.0 %    MCV 86.0 80.0 - 99.0 FL    MCH 27.1 26.0 - 34.0 PG    MCHC 31.5 30.0 - 36.5 g/dL    RDW 15.3 (H) 11.5 - 14.5 %    PLATELET 867 253 - 280 K/uL    MPV ABNORMAL 8.9 - 12.9 FL    NRBC 0.0 0  WBC    ABSOLUTE NRBC 0.00 0.00 - 0.01 K/uL    NEUTROPHILS 78 (H) 32 - 75 %    LYMPHOCYTES 17 12 - 49 %    MONOCYTES 4 (L) 5 - 13 %    EOSINOPHILS 0 0 - 7 %    BASOPHILS 0 0 - 1 %    IMMATURE GRANULOCYTES 1 (H) 0.0 - 0.5 %    ABS. NEUTROPHILS 3.2 1.8 - 8.0 K/UL    ABS. LYMPHOCYTES 0.7 (L) 0.8 - 3.5 K/UL    ABS. MONOCYTES 0.2 0.0 - 1.0 K/UL    ABS. EOSINOPHILS 0.0 0.0 - 0.4 K/UL    ABS. BASOPHILS 0.0 0.0 - 0.1 K/UL    ABS. IMM. GRANS. 0.0 0.00 - 0.04 K/UL    DF AUTOMATED     METABOLIC PANEL, COMPREHENSIVE    Collection Time: 05/05/20  1:16 PM   Result Value Ref Range    Sodium 144 136 - 145 mmol/L    Potassium 3.4 (L) 3.5 - 5.1 mmol/L    Chloride 103 97 - 108 mmol/L    CO2 23 21 - 32 mmol/L    Anion gap 18 (H) 5 - 15 mmol/L    Glucose 71 65 - 100 mg/dL    BUN 50 (H) 6 - 20 MG/DL    Creatinine 4.12 (H) 0.55 - 1.02 MG/DL    BUN/Creatinine ratio 12 12 - 20      GFR est AA 14 (L) >60 ml/min/1.73m2    GFR est non-AA 12 (L) >60 ml/min/1.73m2    Calcium 8.1 (L) 8.5 - 10.1 MG/DL    Bilirubin, total 0.3 0.2 - 1.0 MG/DL    ALT (SGPT) 50 12 - 78 U/L    AST (SGOT) 56 (H) 15 - 37 U/L    Alk.  phosphatase 168 (H) 45 - 117 U/L    Protein, total 9.6 (H) 6.4 - 8.2 g/dL    Albumin 3.9 3.5 - 5.0 g/dL    Globulin 5.7 (H) 2.0 - 4.0 g/dL    A-G Ratio 0.7 (L) 1.1 - 2.2     TROPONIN I    Collection Time: 05/05/20  1:16 PM   Result Value Ref Range    Troponin-I, Qt. <0.05 <0.05 ng/mL   NT-PRO BNP    Collection Time: 05/05/20  1:16 PM   Result Value Ref Range    NT pro-BNP 14,285 (H) 0 - 125 PG/ML   ETHYL ALCOHOL    Collection Time: 05/05/20  1:16 PM   Result Value Ref Range    ALCOHOL(ETHYL),SERUM <10 <10 MG/DL   AMMONIA    Collection Time: 05/05/20  1:16 PM   Result Value Ref Range    Ammonia <10 <32 UMOL/L   LIPASE    Collection Time: 05/05/20  1:16 PM   Result Value Ref Range    Lipase 103 73 - 393 U/L   CULTURE, BLOOD, PAIRED    Collection Time: 05/05/20  1:16 PM   Result Value Ref Range    Special Requests: NO SPECIAL REQUESTS      Culture result: (A)       GRAM POSITIVE COCCI IN CHAINS GROWING IN 1 OF 4 BOTTLES DRAWN (SITE = R FOREARM DRAWN 1310)    Culture result: (A)       PRELIMINARY REPORT OF GRAM POSITIVE COCCI IN CHAINS GROWING IN 1 OF 4 BOTTLES DRAWN CALLED TO AND READ BACK BY NAM BROWN RN,AT [id number] ON 5/6/20. RC   SAMPLES BEING HELD    Collection Time: 05/05/20  1:16 PM   Result Value Ref Range    SAMPLES BEING HELD 1BLUE     COMMENT        Add-on orders for these samples will be processed based on acceptable specimen integrity and analyte stability, which may vary by analyte.    LACTIC ACID    Collection Time: 05/05/20  1:17 PM   Result Value Ref Range    Lactic acid 1.4 0.4 - 2.0 MMOL/L   GLUCOSE, POC    Collection Time: 05/05/20  1:57 PM   Result Value Ref Range    Glucose (POC) 122 (H) 65 - 100 mg/dL    Performed by [address]    Collection Time: 05/05/20  2:37 PM   Result Value Ref Range    Adenovirus Not detected NOTD      Coronavirus 229E Not detected NOTD      Coronavirus HKU1 Not detected NOTD      Coronavirus CVNL63 Not detected NOTD      Coronavirus OC43 Not detected NOTD      Metapneumovirus Not detected NOTD      Rhinovirus and Enterovirus Not detected NOTD      Influenza A Not detected NOTD      Influenza A, subtype H1 Not detected NOTD      Influenza A, subtype H3 Not detected NOTD      INFLUENZA A H1N1 PCR Not detected NOTD      Influenza B Not detected NOTD      Parainfluenza 1 Not detected NOTD      Parainfluenza 2 Not detected NOTD      Parainfluenza 3 Not detected NOTD      Parainfluenza virus 4 Not detected NOTD      RSV by PCR Not detected NOTD      B. parapertussis, PCR Not detected NOTD      Bordetella pertussis - PCR Not detected NOTD      Chlamydophila pneumoniae DNA, QL, PCR Not detected NOTD      Mycoplasma pneumoniae DNA, QL, PCR Not detected NOTD     SARS-COV-2    Collection Time: 05/05/20  2:37 PM   Result Value Ref Range    Specimen source Nasopharyngeal      SARS-CoV-2 PENDING    GLUCOSE, POC    Collection Time: 05/05/20  3:57 PM   Result Value Ref Range    Glucose (POC) 181 (H) 65 - 100 mg/dL    Performed by [address] [place] [address], POC    Collection Time: 05/05/20  7:24 PM   Result Value Ref Range    Glucose (POC) 130 (H) 65 - 100 mg/dL    Performed by Supriya Ramon    GLUCOSE, POC    Collection Time: 05/05/20  9:54 PM   Result Value Ref Range    Glucose (POC) 136 (H) 65 - 100 mg/dL    Performed by [address], POC    Collection Time: 05/05/20 11:25 PM   Result Value Ref Range    Glucose (POC) 164 (H) 65 - 100 mg/dL    Performed by Jalene Kussmaul    CBC WITH AUTOMATED DIFF    Collection Time: 05/06/20  2:06 AM   Result Value Ref Range    WBC 5.2 3.6 - 11.0 K/uL    RBC 4.21 3.80 - 5.20 M/uL    HGB 11.2 (L) 11.5 - 16.0 g/dL    HCT 37.0 35.0 - 47.0 %    MCV 87.9 80.0 - 99.0 FL    MCH 26.6 26.0 - 34.0 PG    MCHC 30.3 30.0 - 36.5 g/dL    RDW 15.8 (H) 11.5 - 14.5 %    PLATELET 249 (L) 299 - 400 K/uL    MPV ABNORMAL 8.9 - 12.9 FL    NRBC 0.0 0  WBC    ABSOLUTE NRBC 0.00 0.00 - 0.01 K/uL    NEUTROPHILS 82 (H) 32 - 75 %    LYMPHOCYTES 10 (L) 12 - 49 %    MONOCYTES 7 5 - 13 %    EOSINOPHILS 1 0 - 7 %    BASOPHILS 0 0 - 1 %    IMMATURE GRANULOCYTES 0 0.0 - 0.5 %    ABS. NEUTROPHILS 4.2 1.8 - 8.0 K/UL    ABS. LYMPHOCYTES 0.5 (L) 0.8 - 3.5 K/UL    ABS. MONOCYTES 0.4 0.0 - 1.0 K/UL    ABS. EOSINOPHILS 0.1 0.0 - 0.4 K/UL    ABS. BASOPHILS 0.0 0.0 - 0.1 K/UL    ABS. IMM.  GRANS. 0.0 0.00 - 0.04 K/UL    DF SMEAR SCANNED      RBC COMMENTS ANISOCYTOSIS  1+        RBC COMMENTS MACROCYTOSIS  1+        RBC COMMENTS MICROCYTOSIS  1+        RBC COMMENTS SCHISTOCYTES  1+        RBC COMMENTS HYPOCHROMIA  1+        RBC COMMENTS TARGET CELLS  1+       METABOLIC PANEL, COMPREHENSIVE    Collection Time: 05/06/20  2:06 AM   Result Value Ref Range    Sodium 140 136 - 145 mmol/L Potassium 3.3 (L) 3.5 - 5.1 mmol/L    Chloride 108 97 - 108 mmol/L    CO2 19 (L) 21 - 32 mmol/L    Anion gap 13 5 - 15 mmol/L    Glucose 159 (H) 65 - 100 mg/dL    BUN 48 (H) 6 - 20 MG/DL    Creatinine 3.69 (H) 0.55 - 1.02 MG/DL    BUN/Creatinine ratio 13 12 - 20      GFR est AA 16 (L) >60 ml/min/1.73m2    GFR est non-AA 14 (L) >60 ml/min/1.73m2    Calcium 7.1 (L) 8.5 - 10.1 MG/DL    Bilirubin, total 0.2 0.2 - 1.0 MG/DL    ALT (SGPT) 33 12 - 78 U/L    AST (SGOT) 35 15 - 37 U/L    Alk. phosphatase 108 45 - 117 U/L    Protein, total 6.2 (L) 6.4 - 8.2 g/dL    Albumin 2.5 (L) 3.5 - 5.0 g/dL    Globulin 3.7 2.0 - 4.0 g/dL    A-G Ratio 0.7 (L) 1.1 - 2.2     GLUCOSE, POC    Collection Time: 05/06/20  4:29 AM   Result Value Ref Range    Glucose (POC) 206 (H) 65 - 100 mg/dL    Performed by Jacky Mcmullen    GLUCOSE, POC    Collection Time: 05/06/20  9:02 AM   Result Value Ref Range    Glucose (POC) 356 (H) 65 - 100 mg/dL    Performed by Natalia Cruz            Total time spent with patient:  xxx   min. Care Plan discussed with:  Patient     Family      RN      Consulting Physician [facility],         I have reviewed the flowsheets. Chart and Pertinent Notes have been reviewed. No change in PMH ,family and social history from Consult note. Lis Rivera MD  [address]  Phone - [phone]   Fax - [phone]  www. [email or web address]

## 2020-05-07 LAB
ANION GAP SERPL CALC-SCNC: 10 MMOL/L (ref 5–15)
BACTERIA SPEC CULT: ABNORMAL
BASOPHILS # BLD: 0 K/UL (ref 0–0.1)
BASOPHILS NFR BLD: 0 % (ref 0–1)
BUN SERPL-MCNC: 51 MG/DL (ref 6–20)
BUN/CREAT SERPL: 13 (ref 12–20)
CALCIUM SERPL-MCNC: 6.6 MG/DL (ref 8.5–10.1)
CC UR VC: ABNORMAL
CHLORIDE SERPL-SCNC: 107 MMOL/L (ref 97–108)
CO2 SERPL-SCNC: 21 MMOL/L (ref 21–32)
CREAT SERPL-MCNC: 3.83 MG/DL (ref 0.55–1.02)
DIFFERENTIAL METHOD BLD: ABNORMAL
EOSINOPHIL # BLD: 0 K/UL (ref 0–0.4)
EOSINOPHIL NFR BLD: 1 % (ref 0–7)
ERYTHROCYTE [DISTWIDTH] IN BLOOD BY AUTOMATED COUNT: 14.9 % (ref 11.5–14.5)
GLUCOSE BLD STRIP.AUTO-MCNC: 100 MG/DL (ref 65–100)
GLUCOSE BLD STRIP.AUTO-MCNC: 116 MG/DL (ref 65–100)
GLUCOSE BLD STRIP.AUTO-MCNC: 136 MG/DL (ref 65–100)
GLUCOSE BLD STRIP.AUTO-MCNC: 241 MG/DL (ref 65–100)
GLUCOSE BLD STRIP.AUTO-MCNC: 251 MG/DL (ref 65–100)
GLUCOSE BLD STRIP.AUTO-MCNC: 263 MG/DL (ref 65–100)
GLUCOSE BLD STRIP.AUTO-MCNC: 301 MG/DL (ref 65–100)
GLUCOSE BLD STRIP.AUTO-MCNC: 320 MG/DL (ref 65–100)
GLUCOSE BLD STRIP.AUTO-MCNC: 45 MG/DL (ref 65–100)
GLUCOSE BLD STRIP.AUTO-MCNC: 54 MG/DL (ref 65–100)
GLUCOSE BLD STRIP.AUTO-MCNC: 55 MG/DL (ref 65–100)
GLUCOSE BLD STRIP.AUTO-MCNC: 82 MG/DL (ref 65–100)
GLUCOSE BLD STRIP.AUTO-MCNC: 91 MG/DL (ref 65–100)
GLUCOSE SERPL-MCNC: 270 MG/DL (ref 65–100)
HCT VFR BLD AUTO: 28.5 % (ref 35–47)
HGB BLD-MCNC: 8.8 G/DL (ref 11.5–16)
IMM GRANULOCYTES # BLD AUTO: 0 K/UL (ref 0–0.04)
IMM GRANULOCYTES NFR BLD AUTO: 0 % (ref 0–0.5)
LYMPHOCYTES # BLD: 0.7 K/UL (ref 0.8–3.5)
LYMPHOCYTES NFR BLD: 15 % (ref 12–49)
MCH RBC QN AUTO: 26.3 PG (ref 26–34)
MCHC RBC AUTO-ENTMCNC: 30.9 G/DL (ref 30–36.5)
MCV RBC AUTO: 85.1 FL (ref 80–99)
MONOCYTES # BLD: 0.3 K/UL (ref 0–1)
MONOCYTES NFR BLD: 7 % (ref 5–13)
NEUTS SEG # BLD: 3.5 K/UL (ref 1.8–8)
NEUTS SEG NFR BLD: 76 % (ref 32–75)
NRBC # BLD: 0 K/UL (ref 0–0.01)
NRBC BLD-RTO: 0 PER 100 WBC
PLATELET # BLD AUTO: 115 K/UL (ref 150–400)
POTASSIUM SERPL-SCNC: 4 MMOL/L (ref 3.5–5.1)
RBC # BLD AUTO: 3.35 M/UL (ref 3.8–5.2)
SERVICE CMNT-IMP: ABNORMAL
SERVICE CMNT-IMP: NORMAL
SODIUM SERPL-SCNC: 138 MMOL/L (ref 136–145)
WBC # BLD AUTO: 4.5 K/UL (ref 3.6–11)

## 2020-05-07 PROCEDURE — 74011250637 HC RX REV CODE- 250/637: Performed by: INTERNAL MEDICINE

## 2020-05-07 PROCEDURE — 85025 COMPLETE CBC W/AUTO DIFF WBC: CPT

## 2020-05-07 PROCEDURE — 65660000001 HC RM ICU INTERMED STEPDOWN

## 2020-05-07 PROCEDURE — 74011250636 HC RX REV CODE- 250/636: Performed by: INTERNAL MEDICINE

## 2020-05-07 PROCEDURE — 74011636637 HC RX REV CODE- 636/637: Performed by: INTERNAL MEDICINE

## 2020-05-07 PROCEDURE — 87040 BLOOD CULTURE FOR BACTERIA: CPT

## 2020-05-07 PROCEDURE — 74011000250 HC RX REV CODE- 250: Performed by: NURSE PRACTITIONER

## 2020-05-07 PROCEDURE — 82962 GLUCOSE BLOOD TEST: CPT

## 2020-05-07 PROCEDURE — 94640 AIRWAY INHALATION TREATMENT: CPT

## 2020-05-07 PROCEDURE — 36415 COLL VENOUS BLD VENIPUNCTURE: CPT

## 2020-05-07 PROCEDURE — 80048 BASIC METABOLIC PNL TOTAL CA: CPT

## 2020-05-07 PROCEDURE — 74011000258 HC RX REV CODE- 258: Performed by: INTERNAL MEDICINE

## 2020-05-07 PROCEDURE — 5A1D70Z PERFORMANCE OF URINARY FILTRATION, INTERMITTENT, LESS THAN 6 HOURS PER DAY: ICD-10-PCS | Performed by: INTERNAL MEDICINE

## 2020-05-07 PROCEDURE — 74011250637 HC RX REV CODE- 250/637: Performed by: NURSE PRACTITIONER

## 2020-05-07 PROCEDURE — 90935 HEMODIALYSIS ONE EVALUATION: CPT

## 2020-05-07 PROCEDURE — 77030018846 HC SOL IRR STRL H20 ICUM -A

## 2020-05-07 PROCEDURE — 74011250637 HC RX REV CODE- 250/637: Performed by: FAMILY MEDICINE

## 2020-05-07 RX ORDER — CLONIDINE HYDROCHLORIDE 0.1 MG/1
0.1 TABLET ORAL 3 TIMES DAILY
Status: DISCONTINUED | OUTPATIENT
Start: 2020-05-07 | End: 2020-05-09 | Stop reason: HOSPADM

## 2020-05-07 RX ORDER — TRAZODONE HYDROCHLORIDE 50 MG/1
50 TABLET ORAL
Status: DISCONTINUED | OUTPATIENT
Start: 2020-05-07 | End: 2020-05-09 | Stop reason: HOSPADM

## 2020-05-07 RX ORDER — QUETIAPINE FUMARATE 25 MG/1
50 TABLET, FILM COATED ORAL 2 TIMES DAILY
Status: DISCONTINUED | OUTPATIENT
Start: 2020-05-07 | End: 2020-05-09 | Stop reason: HOSPADM

## 2020-05-07 RX ORDER — VENLAFAXINE 37.5 MG/1
75 TABLET ORAL 2 TIMES DAILY WITH MEALS
Status: DISCONTINUED | OUTPATIENT
Start: 2020-05-07 | End: 2020-05-09 | Stop reason: HOSPADM

## 2020-05-07 RX ORDER — GUAIFENESIN 600 MG/1
600 TABLET, EXTENDED RELEASE ORAL EVERY 12 HOURS
Status: DISCONTINUED | OUTPATIENT
Start: 2020-05-07 | End: 2020-05-09 | Stop reason: HOSPADM

## 2020-05-07 RX ORDER — DEXTROSE MONOHYDRATE 50 MG/ML
25 INJECTION, SOLUTION INTRAVENOUS CONTINUOUS
Status: DISCONTINUED | OUTPATIENT
Start: 2020-05-07 | End: 2020-05-08

## 2020-05-07 RX ADMIN — GUAIFENESIN 600 MG: 600 TABLET, EXTENDED RELEASE ORAL at 23:00

## 2020-05-07 RX ADMIN — QUETIAPINE FUMARATE 50 MG: 25 TABLET ORAL at 18:04

## 2020-05-07 RX ADMIN — VENLAFAXINE 75 MG: 37.5 TABLET ORAL at 18:04

## 2020-05-07 RX ADMIN — VENLAFAXINE 75 MG: 37.5 TABLET ORAL at 09:27

## 2020-05-07 RX ADMIN — VENLAFAXINE 75 MG: 37.5 TABLET ORAL at 00:26

## 2020-05-07 RX ADMIN — QUETIAPINE FUMARATE 50 MG: 25 TABLET ORAL at 09:27

## 2020-05-07 RX ADMIN — ALBUTEROL SULFATE 2.5 MG: 2.5 SOLUTION RESPIRATORY (INHALATION) at 20:23

## 2020-05-07 RX ADMIN — DEXTROSE MONOHYDRATE 125 ML: 100 INJECTION, SOLUTION INTRAVENOUS at 11:09

## 2020-05-07 RX ADMIN — ALBUTEROL SULFATE 2.5 MG: 2.5 SOLUTION RESPIRATORY (INHALATION) at 09:48

## 2020-05-07 RX ADMIN — ACETAMINOPHEN 650 MG: 325 TABLET, FILM COATED ORAL at 01:36

## 2020-05-07 RX ADMIN — QUETIAPINE FUMARATE 50 MG: 25 TABLET ORAL at 01:05

## 2020-05-07 RX ADMIN — HEPARIN SODIUM 5000 UNITS: 5000 INJECTION, SOLUTION INTRAVENOUS; SUBCUTANEOUS at 23:00

## 2020-05-07 RX ADMIN — ALBUTEROL SULFATE 2.5 MG: 2.5 SOLUTION RESPIRATORY (INHALATION) at 00:27

## 2020-05-07 RX ADMIN — CLONIDINE HYDROCHLORIDE 0.1 MG: 0.1 TABLET ORAL at 18:04

## 2020-05-07 RX ADMIN — HUMAN INSULIN 5 UNITS: 100 INJECTION, SUSPENSION SUBCUTANEOUS at 08:09

## 2020-05-07 RX ADMIN — HEPARIN SODIUM 5000 UNITS: 5000 INJECTION, SOLUTION INTRAVENOUS; SUBCUTANEOUS at 06:32

## 2020-05-07 RX ADMIN — TRAZODONE HYDROCHLORIDE 50 MG: 50 TABLET ORAL at 23:00

## 2020-05-07 RX ADMIN — DEXTROSE MONOHYDRATE 50 ML/HR: 5 INJECTION, SOLUTION INTRAVENOUS at 15:29

## 2020-05-07 RX ADMIN — DEXTROSE MONOHYDRATE 125 ML: 100 INJECTION, SOLUTION INTRAVENOUS at 13:15

## 2020-05-07 RX ADMIN — EPOETIN ALFA-EPBX 10000 UNITS: 10000 INJECTION, SOLUTION INTRAVENOUS; SUBCUTANEOUS at 23:00

## 2020-05-07 RX ADMIN — INSULIN LISPRO 8 UNITS: 100 INJECTION, SOLUTION INTRAVENOUS; SUBCUTANEOUS at 08:09

## 2020-05-07 RX ADMIN — CLONIDINE HYDROCHLORIDE 0.1 MG: 0.1 TABLET ORAL at 23:00

## 2020-05-07 RX ADMIN — CEFTRIAXONE 1 G: 1 INJECTION, POWDER, FOR SOLUTION INTRAMUSCULAR; INTRAVENOUS at 15:29

## 2020-05-07 RX ADMIN — ALBUTEROL SULFATE 2.5 MG: 2.5 SOLUTION RESPIRATORY (INHALATION) at 14:48

## 2020-05-07 RX ADMIN — AMLODIPINE BESYLATE 10 MG: 5 TABLET ORAL at 09:28

## 2020-05-07 RX ADMIN — HEPARIN SODIUM 5000 UNITS: 5000 INJECTION, SOLUTION INTRAVENOUS; SUBCUTANEOUS at 15:29

## 2020-05-07 RX ADMIN — CLONIDINE HYDROCHLORIDE 0.1 MG: 0.1 TABLET ORAL at 00:26

## 2020-05-07 RX ADMIN — DEXTROSE MONOHYDRATE 125 ML: 100 INJECTION, SOLUTION INTRAVENOUS at 14:43

## 2020-05-07 RX ADMIN — CLONIDINE HYDROCHLORIDE 0.1 MG: 0.1 TABLET ORAL at 09:28

## 2020-05-07 NOTE — PROGRESS NOTES
Transitions of Care: 36% risk of re-admission      -Anticipate discharge home when medically stable    -Medicaid cab upon discharge     The CM met with patient at bedside, reviewed previous CM notes-     The CM met with patient, address on file is not her current address per patient, but her permanent address- the patient endorses lately she has been staying at address below:    [address]    It is very unclear who the patient is staying with currently- she endorsed that she has a boyfriend,  from her ex- Lyssa Granda. Patient endorses her boyfriend is at the above address and staying with friends- patient poor historian. The patient endorses that she is staying at this address because it is closer to her dialysis unit. The patient endorses that has not seen her PCP in two months because he told her that she needed to see another MD. The patient endorses that the patient has a rollator at home, endorses that she completes ADLs independently. The patient endorses that she wears Oxygen but [facility] did not deliver any additional supplies- CM will follow-up with frederick Cunha RN, patient does not wear Oxygen to HD. The patient has a history of substance abuse- positive for cocaine this admission, her last admission she left AMA. The patient goes to [facility] [place] on Tuesday, Thursday, Saturday with a chair time of 12:35 p.m. ([phone], f: [phone]). CM called unit and spoke with VERONICA Abreu endorses that the patient is non-compliant with HD, her last HD appointment was on 4/28. Carito Maria endorses that the patient does not utilize Oxygen. The patient will require [facility] transport. Will need to clarify if patient is going to above address or her permanent address.  VIVI Sofia    13:48 p.m.- CM called Pat Moran with [facility], the patient has a concentrator and [facility] has not billed Medicare, [facility] never got the appropriate follow-up/paperwork for over a year. IF home Oxygen is further needed- the patient will be considered a new Oxygen set-up.

## 2020-05-07 NOTE — PROGRESS NOTES
Pete NP Progress note    Name: Agnieszka Simpson  YOB: 1980  MRN: [id number]  Admission Date: 5/5/2020 11:26 AM    Date of service: 5/6/2020 9:41 PM                                Overnight Update:        Complaint: Hypoglycemia/frequent BG checks  Paged by: Carlton Adamson RN  Subjective: Patient with episode of hypoglycemia down to 20 earlier this evening. Order placed by primary attending to check BG Q2 hours.  Medical floor 6E unable to perform frequent checks, per nursing supervisor patient will need to be on IMCU  Plan:   - Given episodes of hypoglycemia two days in a row, agree with frequent BG checks  - Will transfer to intermediate level of care as requested, check Q2H for the next six hours, reassess status at that point - if she has remained stable will expand to [facility] or Critical access hospital     Fabiana RESENDIZC, PAKristenC  [phone] or [facility]

## 2020-05-07 NOTE — PROGRESS NOTES
NUTRITION COMPLETE ASSESSMENT    RECOMMENDATIONS:   1. Encourage PO intake with meals and HS snack before bed  2. Close monitoring of BG for hypoglycemia - Insulin adjustment per [facility]. 3. Add \"Bariatric Vitamin Panel\" with hx of gastric bypass for gastroparesis mgmt   - adjust as needed based on renal status  4. Bi-weekly weights     Interventions/Plan:   Food/Nutrient Delivery:  (diet liberalized, snacks) Commercial supplement(Nepro BID)        Assessment:   Reason for Assessment: At Nutrition Risk    Diet: Consistent carb   Supplements: None  Nutritionally Significant Medications: [x] Reviewed & Includes:  Ceftriaxone, reatcrit, humalog (8 units TID, NPH (5 units BID), seroquel, vanco  Meal Intake:   Patient Vitals for the past 100 hrs:   % Diet Eaten   05/07/20 0745 25 %   05/06/20 0939 0 %   05/05/20 1812 0 %     Pre-Hospitalization:  Usual Appetite: Good    Current Hospitalization:   Appetite: Fair  PO Ability: Independent Average po intake:   Average supplements intake:        Subjective: Pt visited in room. \"Can you take my lunch order? \"    Objective:  Pt admitted for hypoglycemia. PMHx: ESRD on HD, Type 1 DM, HTN, pancreatitis, gastroparesis s/p gastric pacer (removed 2015) followed by gastric bypass (2015), hx of J-tube, left foot amputation, cocaine abuse. Low BG on admit. Recent admit but left AMA and hx of noncompliance noted. Hypoglycemia continues to be an issue with BG of 20mg/dl last night. ?if gastroparesis affecting BG as well along with ESRD status? Records from HD center and other facilities reviewed. Dry HD wt 54kg. Seen by GI group at [facility] with notes of previous J- tube but was pulled out by pt during admit on 5/2. Pt had not been using for nutrition support for some time. Pt visited today. Eating breakfast. She notes appetite has been ok prior to admit, Has been able to tolerate much more at one sitting since further out from gastric bypass.  Reports taking vitamins s/p gastric bypass - would continue based on anatomy but predict less malabsorption factor since completed for gastroparesis and not wt loss. K+ WNL and no phos checked this admit (low last admit). Will liberalize diet to just no-added salt, 2000consistent CHO. Pt agreeable to trial of Nepro BID (850kcal, 38g protein), reports never trying in the past? Snacks added to help above low BG between meals. Irregular meal patterns noted by [facility]. Estimated Nutrition Needs:   Kcals/day: 2843 Kcals/day(1620-1890kcal)  Protein: 76 g(76-86g (1.4-1.6g/kg))  Fluid: 1620 ml(1ml/kcal)  Based On: Kcal/kg - specify (Comment)(30-35kcal/kg on HD)  Weight Used: UBW(54kg - dry HD wt)    Pt expected to meet estimated nutrient needs:  []   Yes     []  No [x] Unable to predict at this time  Nutrition Diagnosis:   1. Altered nutrition-related lab values related to irradict meal times; Type 1 DM with ESRD as evidenced by hypoglycemia; skipping some meals; on HD    2.  Altered GI function(Impaired nutrient utilization) related to gastroparesis; s/p GBS as evidenced by pt hx, s/p gastric bypass  Goals:     Consumption of at least 50% ALL meals; 1-2 supplements or snacks per day; BG between 80-180mg/dl     Monitoring & Evaluation:    - Total energy intake, Liquid meal replacement, Protein intake, Carbohydrate intake   - Weight/weight change, Electrolyte and renal profile, Glucose profile, GI    Previous Nutrition Goals Met:   N/A  Previous Recommendations:    N/A    Education & Discharge Needs:   [] None Identified   [x] Identified and addressed    [] Participated in care plan, discharge planning, and/or interdisciplinary rounds        Nutrition Discharge Plan:   [x] Too soon to determine  [] Other:        Cultural, Baptist and ethnic food preferences identified: None    Skin Integrity: [x]Intact  []Other  Edema: [x]None []Other  Last BM:PTA  Food Allergies: [x]None []Other  Diet Restrictions: Cultural/Mu-ism Preference(s): None     Anthropometrics:    Weight Loss Metrics 5/7/2020 5/2/2020 10/7/2019 9/6/2019 8/24/2019 4/7/2019 3/8/2019   Today's Wt 123 lb 10.9 oz 154 lb 5.2 oz 158 lb 11.7 oz 141 lb 1.5 oz 139 lb 12.4 oz 138 lb 142 lb 6.7 oz   BMI 22.62 kg/m2 28.23 kg/m2 29.03 kg/m2 25.81 kg/m2 25.56 kg/m2 25.24 kg/m2 26.05 kg/m2      Weight Source: Standing scale (comment)  Height: 5' 2\" (157.5 cm),    Body mass index is 22.62 kg/m².      IBW : 49.9 kg (110 lb), % IBW (Calculated): 112.43 %  Usual Body Weight: 54 kg (119 lb 0.8 oz),      Labs:    Lab Results   Component Value Date/Time    Sodium 138 05/07/2020 04:27 AM    Potassium 4.0 05/07/2020 04:27 AM    Chloride 107 05/07/2020 04:27 AM    CO2 21 05/07/2020 04:27 AM    Glucose 270 (H) 05/07/2020 04:27 AM    BUN 51 (H) 05/07/2020 04:27 AM    Creatinine 3.83 (H) 05/07/2020 04:27 AM    Calcium 6.6 (L) 05/07/2020 04:27 AM    Magnesium 1.6 05/01/2020 04:18 PM    Phosphorus 2.4 (L) 05/02/2020 10:54 AM    Albumin 2.5 (L) 05/06/2020 02:06 AM     Lab Results   Component Value Date/Time    Hemoglobin A1c 7.9 (H) 10/04/2019 02:49 PM     [address] , Pager #[phone] or via [employer]

## 2020-05-07 NOTE — DIALYSIS
[facility] Dialysis Team [facility]  [phone]    Vitals   Pre   Post   Assessment   Pre   Post     Temp  Temp: 98.2 °F (36.8 °C) (05/07/20 1850)  98 LOC  AXOX3 AXOX3   HR   Pulse (Heart Rate): (!) 101 (05/07/20 1850) 104 Lungs   Dry non prod cough, diminished lungs, exp wheezes, humidified O2 2L/nc- sat 100%  Diminished   B/P   BP: 111/68 (05/07/20 1850) 143/87 Cardiac   ST  SR   Resp   Resp Rate: 19 (05/07/20 1850) 20 Skin   W/D L TMA  W/D   Pain level  0/10 0/10 Edema    Generalized traces   Traces   Orders:    Duration:   Start:    1850 End:    2220 Total:   3.5 hours   Dialyzer:   Dialyzer/Set Up Inspection: Stefany Butler (05/07/20 1850)   K Bath:   Dialysate K (mEq/L): 3 (05/07/20 1850)   Ca Bath:   Dialysate CA (mEq/L): 2.5 (05/07/20 1850)   Na/Bicarb:   Dialysate NA (mEq/L): 140 (05/07/20 1850)   Target Fluid Removal:   Goal/Amount of Fluid to Remove (mL): 2000 mL (05/07/20 1850)   Access     Type & Location:   LUE AVF+T/+B. No s/sx infection, sites prepped and cannulated aseptically using 15 G 1\" needles x 2. Labs     Obtained/Reviewed   Critical Results Called   Date when labs were drawn-  Hgb-    HGB   Date Value Ref Range Status   05/07/2020 8.8 (L) 11.5 - 16.0 g/dL Final     K-    Potassium   Date Value Ref Range Status   05/07/2020 4.0 3.5 - 5.1 mmol/L Final     Ca-   Calcium   Date Value Ref Range Status   05/07/2020 6.6 (L) 8.5 - 10.1 MG/DL Final     Bun-   BUN   Date Value Ref Range Status   05/07/2020 51 (H) 6 - 20 MG/DL Final     Creat-   Creatinine   Date Value Ref Range Status   05/07/2020 3.83 (H) 0.55 - 1.02 MG/DL Final        Medications/ Blood Products Given     Name   Dose   Route and Time                     Blood Volume Processed (BVP):    75 Net Fluid   Removed:  2000 ml   Comments   Time Out Done: Ramone  Primary Nurse Rpt Pre:Teagan Dickinson RN  Primary Nurse Rpt Post:Rachel Stevenson RN  Pt Education:Pt and HD RN donned face masks for safety.   Care Plan:Continue with HD as prescribed by nephrologists. Tx Summary:Pt tolerated tx fair. Unable to keep face during entire tx d/t dry coughing spells causing SOB. Tx completed without incident and all blood returned with rinseback. Needles were removed x 2 and hemostasis achieved within limits. A dry dsg was taped securely . Pt was left in care of nursing staff in [facility]. Admiting Diagnosis:  Pt's previous clinic-[facility] [place]  Consent signed - Informed Consent Verified: Yes (05/07/20 1850)  Sarah Consent - Obtained 05/07/20  Hepatitis Status- HBsAg neg and Immune 01/25/20 per Clinic. Machine #- Machine Number: [id number] (05/07/20 1850)  Telemetry status-Bedside/ Remote  Pre-dialysis wt. - Pre-Dialysis Weight: 56.1 kg (123 lb 10.9 oz) (05/07/20 1850)

## 2020-05-07 NOTE — PROGRESS NOTES
Hospitalist Progress Note  Mackenzie Matias MD  Answering service: [phone] OR [phone] from in house phone        Date of Service:  2020  NAME:  Radha Hill  :  1980  MRN:  [id number]  PCP: Franki Lefort, MD    Chief Complaint:   Chief Complaint   Patient presents with   [facility] Low Blood Sugar         Admission Summary:     Radha Hill is a 37 y.o. female who presented with hyperglycemia    Interval history / Subjective:   Patient seen for Follow up of chief complaint: Altered mental status  Frequent episodes of hypoglycemia, stopped all insulin and started on D5 , confusion has resolved, answering all questions     Assessment & Plan:     1  acute encephalopathy, POA likely triggered by hypoglycemia given improvement after glucose given. Suspect exacerbated by cocaine use and possible infectious etiology. 2/2 Multiple hypoglycemia episodes  Patient also has gram-positive cocci bacteremia  ID consulted  Repeat blood cultures x2, start vancomycin  Continue ceftriaxone as well for possible UTI  CT head unremarkable. Mental status improving, avoid medication affect mentation including opiates.     2. IDDM complicated by severe frequent episodes of Hypoglycemia- likely due to poor po intake in the setting of Dm gastroparesis. - Check POC glucose every 2 hours, started on D5   - D10 PRN  - Hold all insulin until starts good diet     3. Malignant hypertension- suspect triggered by cocaine use. Improving  - Could be contributing to ams.   - avoid beta blockers  -  Will try hydralazine 10mg q6 hours prn for sbp >170  -continue Amlodipine     4. Hypothermia- unclear etiology. Could be related to hypoglycemia and infection  - Continue with Rocephin 1gm IV every 24 hours and vancomycin  - Tested for COVID in ED ( elevated d-dimer, ferritin, LD)report pending  - On droplet and contact precautions  - beside AMS no other sx.     5. UTI - continue with rocephin as above  Recent positive urine culture with Klebsiella, continue ceftriaxone     6. Mildly elevated LFTs - could be from viral infection vs fluid overload  - No acute abd pain  - trend LFTs. If worsening transaminitis will obtain RUQ abdomen.     7. ESRD on HD- Tue-Thu-Sat. Nephrology consult noted, plan to resume hemodialysis as usual.    Code status: Full Code    DVT prophylaxis: Subcu heparin    Care Plan discussed with: Patient/Family    Disposition: TBD    Hospital Problems  Date Reviewed: 6/10/2018          Codes Class Noted POA    Gram-positive bacteremia ICD-10-CM: R78.81  ICD-9-CM: 790.7  2020 Unknown        Hypoglycemia ICD-10-CM: E16.2  ICD-9-CM: 251.2  2020 Unknown                Review of Systems:   Review of systems not obtained due to patient factors. Physical Examination:     General appearance: Chronically ill-appearing  Head: Normocephalic, atraumatic, eyes show clear conjunctiva  Lungs: Clear to auscultation bilaterally, no wheezing, no rhonchi  Heart: RRR, no murmurs or rubs  Abdomen: Soft, nontender, no guarding  Neurologic: awake, alert, moves all extremities       Vital Signs:    Last 24hrs VS reviewed since prior progress note. Most recent are:    Visit Vitals  /70 (BP 1 Location: Right arm, BP Patient Position: At rest)   Pulse (!) 103   Temp 97.7 °F (36.5 °C)   Resp 18   Ht 5' 2\" (1.575 m)   Wt 56.1 kg (123 lb 10.9 oz)   SpO2 100%   BMI 22.62 kg/m²         Intake/Output Summary (Last 24 hours) at 2020 1637  Last data filed at 2020 1222  Gross per 24 hour   Intake 720 ml   Output 500 ml   Net 220 ml        Tmax:  Temp (24hrs), Av.6 °F (36.4 °C), Min:96.7 °F (35.9 °C), Max:98.5 °F (36.9 °C)      Data Review:   Data reviewed by myself:  Ct Head Wo Cont    Result Date: 2020  INDICATION: AMS EXAM:  HEAD CT WITHOUT CONTRAST COMPARISON: May 1, 2020 TECHNIQUE:  Routine noncontrast axial head CT was performed. Sagittal and coronal reconstructions were generated.   CT dose reduction was achieved through use of a standardized protocol tailored for this examination and automatic exposure control for dose modulation. FINDINGS: Ventricles: Midline, no hydrocephalus. Intracranial Hemorrhage: None. Brain Parenchyma/Brainstem: Normal for age. Basal Cisterns: Normal. Paranasal Sinuses: Visualized sinuses are clear. Additional Comments: N/A. IMPRESSION: No acute process. Ct Head Wo Cont    Result Date: 5/1/2020  EXAM:  CT HEAD WO CONT INDICATION:   AMS COMPARISON: CT head 10/4/2019. TECHNIQUE: Unenhanced CT of the head was performed using 5 mm images. Brain and bone windows were generated. CT dose reduction was achieved through use of a standardized protocol tailored for this examination and automatic exposure control for dose modulation. FINDINGS: The ventricles are normal in size and position. Basilar cisterns are patent. No midline shift. There is no evidence of acute infarct, hemorrhage, or extraaxial fluid collection. The paranasal sinuses, mastoid air cells, and middle ears are clear. The orbital contents are within normal limits with bilateral lens implants. There are no significant osseous or extracranial soft tissue lesions. IMPRESSION: 1. No evidence of acute intracranial abnormality. Xr Chest Port    Result Date: 5/5/2020  INDICATION: Altered mental status. Hypoglycemia. Portable AP semiupright view of the chest. Direct comparison made to prior chest x-ray dated May 2, 2020. Cardiomediastinal silhouette is stable. Lungs are hypoinflated, but grossly clear bilaterally. Pleural spaces are normal. Osseous structures are diffusely demineralized, but intact. IMPRESSION: No acute cardiopulmonary disease. Xr Chest Port    Result Date: 5/2/2020  Clinical history: Central venous catheter placement INDICATION:   Central venous catheter placement COMPARISON: 5/1/2020 FINDINGS: AP portable upright view of the chest demonstrates a stable  cardiopericardial silhouette.  The lungs are unchanged in overall appearance. . Interval left-sided central venous access catheter is in place. Catheter tip is in appropriate position for use. There is no associated pneumothorax. The osseous structures are unremarkable. Patient is on a cardiac monitor. Stable bilateral interstitial opacities. IMPRESSION: Central venous acces cathter in position for use. No other significant interval change. Xr Chest Port    Result Date: 5/1/2020  EXAM:  XR CHEST PORT INDICATION:   Central line pulled back COMPARISON: Chest radiograph 5/1/2020. FINDINGS: AP radiograph of the chest was obtained. Marked interval retraction of right IJ central venous catheter, which now lies very proximally in the right internal jugular vein. No pneumothorax. Unchanged pulmonary interstitial opacities. No significant pleural effusion. Stable cardiomediastinal silhouette. IMPRESSION: 1. Marked interval retraction of right IJ central venous catheter, which now lies very proximally in the right internal jugular vein. Recommend advancement. 2. Unchanged bilateral interstitial opacities, likely representing mild interstitial edema. Xr Chest Port    Result Date: 5/1/2020  INDICATION: Central line placement EXAM:  AP CHEST RADIOGRAPH COMPARISON: October 2019 FINDINGS: AP portable view of the chest demonstrates placement of a right IJ line with the tip over the junction of the right atrium with the IVC. There is no pneumothorax. There is bilateral hilar enlargement. Pulmonary vascular congestion is suspected. IMPRESSION: Right picc line tip over the junction of the right atrium and IVC. Consider retraction by approximately 9 cm. No pneumothorax. Pulmonary fluid overload.      Lab Results   Component Value Date/Time    Specimen Description: URINE 06/24/2013 08:00 PM    Specimen Description: URINE 06/17/2013 07:55 PM    Specimen Description: URINE 05/26/2013 10:10 AM     Lab Results   Component Value Date/Time    Culture result: (A) 05/05/2020 01:16 PM     ALPHA STREPTOCOCCUS GROWING IN 1 OF 4 BOTTLES DRAWN (SITE = R FOREARM DRAWN 1310)    Culture result: (A) 05/05/2020 01:16 PM     PRELIMINARY REPORT OF GRAM POSITIVE COCCI IN CHAINS GROWING IN 1 OF 4 BOTTLES DRAWN CALLED TO AND READ BACK BY NAM BROWN RN,AT 5955 ON 5/6/20. RC    Culture result: KLEBSIELLA PNEUMONIAE (A) 05/05/2020 12:13 PM     All Micro Results     Procedure Component Value Units Date/Time    CULTURE, BLOOD, PAIRED [[id number]]  (Abnormal) Collected:  05/05/20 1316    Order Status:  Completed Specimen:  Blood Updated:  05/07/20 0925     Special Requests: NO SPECIAL REQUESTS        Culture result:       ALPHA STREPTOCOCCUS GROWING IN 1 OF 4 BOTTLES DRAWN (SITE = R FOREARM DRAWN 1310)                  PRELIMINARY REPORT OF GRAM POSITIVE COCCI IN CHAINS GROWING IN 1 OF 4 BOTTLES DRAWN CALLED TO AND READ BACK BY NAM BROWN RN,AT 7228 ON 5/6/20. RC          CULTURE, URINE [[id number]]  (Abnormal)  (Susceptibility) Collected:  05/05/20 1213    Order Status:  Completed Specimen:  Urine from Clean catch Updated:  05/07/20 0833     Special Requests: NO SPECIAL REQUESTS        [place] Count --        >100,000  COLONIES/mL       Culture result: KLEBSIELLA PNEUMONIAE       CULTURE, BLOOD, PAIRED [[id number]] Collected:  05/07/20 0436    Order Status:  Completed Specimen:  Blood Updated:  05/07/20 0614    RESPIRATORY PANEL,PCR,NASOPHARYNGEAL [[id number]] Collected:  05/05/20 1437    Order Status:  Completed Specimen:  Nasopharyngeal Updated:  05/05/20 2055     Adenovirus Not detected        Coronavirus 229E Not detected        Coronavirus HKU1 Not detected        Coronavirus CVNL63 Not detected        Coronavirus OC43 Not detected        Metapneumovirus Not detected        Rhinovirus and Enterovirus Not detected        Influenza A Not detected        Influenza A, subtype H1 Not detected        Influenza A, subtype H3 Not detected        INFLUENZA A H1N1 PCR Not detected        Influenza B Not detected        Parainfluenza 1 Not detected        Parainfluenza 2 Not detected        Parainfluenza 3 Not detected        Parainfluenza virus 4 Not detected        RSV by PCR Not detected        B. parapertussis, PCR Not detected        Bordetella pertussis - PCR Not detected        Chlamydophila pneumoniae DNA, QL, PCR Not detected        Mycoplasma pneumoniae DNA, QL, PCR Not detected       CULTURE, URINE [[id number]]     Order Status:  Canceled Specimen:  Urine from [facility] [[id number]] Collected:  05/05/20 1213    Order Status:  Completed Specimen:  Serum Updated:  05/05/20 1240     Urine culture hold       Urine on hold in Microbiology dept for 2 days. If unpreserved urine is submitted, it cannot be used for addtional testing after 24 hours, recollection will be required. CULTURE, BLOOD [[id number]]     Order Status:  Canceled Specimen:  Blood           Labs: reviewed by myself. Recent Labs     05/07/20  0427 05/06/20  0206   WBC 4.5 5.2   HGB 8.8* 11.2*   HCT 28.5* 37.0   * 109*     Recent Labs     05/07/20  0427 05/06/20  0206 05/05/20  1316    140 144   K 4.0 3.3* 3.4*    108 103   CO2 21 19* 23   BUN 51* 48* 50*   CREA 3.83* 3.69* 4.12*   * 159* 71   CA 6.6* 7.1* 8.1*     Recent Labs     05/06/20  0206 05/05/20  1316   SGOT 35 56*   ALT 33 50    168*   TBILI 0.2 0.3   TP 6.2* 9.6*   ALB 2.5* 3.9   GLOB 3.7 5.7*   LPSE  --  103     No results for input(s): INR, PTP, APTT, INREXT, INREXT in the last 72 hours. Recent Labs     05/05/20  1213   FERR 848*      Lab Results   Component Value Date/Time    Folate 33.8 (H) 05/07/2018 11:20 AM      No results for input(s): PH, PCO2, PO2 in the last 72 hours.   Recent Labs     05/05/20  1316   TROIQ <0.05     Lab Results   Component Value Date/Time    Cholesterol, total 135 05/02/2020 05:15 AM    HDL Cholesterol 56 05/02/2020 05:15 AM    LDL, calculated 60.8 05/02/2020 05:15 AM Triglyceride 91 05/02/2020 05:15 AM    CHOL/HDL Ratio 2.4 05/02/2020 05:15 AM     Lab Results   Component Value Date/Time    Glucose (POC) 116 (H) 05/07/2020 02:57 PM    Glucose (POC) 55 (L) 05/07/2020 02:41 PM    Glucose (POC) 320 (H) 05/07/2020 01:36 PM    Glucose (POC) 45 (LL) 05/07/2020 01:15 PM    Glucose (POC) 91 05/07/2020 11:17 AM     Lab Results   Component Value Date/Time    Color YELLOW/STRAW 05/05/2020 12:13 PM    Appearance CLOUDY (A) 05/05/2020 12:13 PM    Specific gravity 1.010 05/05/2020 12:13 PM    Specific gravity 1.013 05/02/2020 03:11 AM    pH (UA) 6.5 05/05/2020 12:13 PM    Protein 100 (A) 05/05/2020 12:13 PM    Glucose Negative 05/05/2020 12:13 PM    Ketone Negative 05/05/2020 12:13 PM    Bilirubin Negative 05/05/2020 12:13 PM    Urobilinogen 0.2 05/05/2020 12:13 PM    Nitrites Negative 05/05/2020 12:13 PM    Leukocyte Esterase LARGE (A) 05/05/2020 12:13 PM    Epithelial cells FEW 05/05/2020 12:13 PM    Bacteria 4+ (A) 05/05/2020 12:13 PM    WBC 20-50 05/05/2020 12:13 PM    RBC 10-20 05/05/2020 12:13 PM         Medications Reviewed:     Current Facility-Administered Medications   Medication Dose Route Frequency    cloNIDine HCL (CATAPRES) tablet 0.1 mg  0.1 mg Oral TID    venlafaxine (EFFEXOR) tablet 75 mg  75 mg Oral BID WITH MEALS    QUEtiapine (SEROquel) tablet 50 mg  50 mg Oral BID    epoetin bernard-epbx (RETACRIT) injection 10,000 Units  10,000 Units SubCUTAneous DIALYSIS TUE, THU & SAT    dextrose 5% infusion  50 mL/hr IntraVENous CONTINUOUS    amLODIPine (NORVASC) tablet 10 mg  10 mg Oral DAILY    glucose chewable tablet 16 g  4 Tab Oral PRN    glucagon (GLUCAGEN) injection 1 mg  1 mg IntraMUSCular PRN    dextrose 10% infusion 0-250 mL  0-250 mL IntraVENous PRN    cefTRIAXone (ROCEPHIN) 1 g in 0.9% sodium chloride (MBP/ADV) 50 mL  1 g IntraVENous Q24H    Vancomycin- Pharmacy Dosing   Other Rx Dosing/Monitoring    [Held by provider] insulin lispro (HUMALOG) injection 8 Units  8 Units SubCUTAneous TIDAC    [Held by provider] insulin NPH (NOVOLIN N, HUMULIN N) injection 5 Units  5 Units SubCUTAneous ACB&D    albuterol (PROVENTIL VENTOLIN) nebulizer solution 2.5 mg  2.5 mg Nebulization Q4H PRN    sodium chloride (NS) flush 5-10 mL  5-10 mL IntraVENous PRN    acetaminophen (TYLENOL) tablet 650 mg  650 mg Oral Q6H PRN    Or    acetaminophen (TYLENOL) suppository 650 mg  650 mg Rectal Q6H PRN    hydrALAZINE (APRESOLINE) 20 mg/mL injection 10 mg  10 mg IntraVENous Q6H PRN    heparin (porcine) injection 5,000 Units  5,000 Units SubCUTAneous Q8H     ______________________________________________________________________  EXPECTED LENGTH OF STAY: 3d 21h  ACTUAL LENGTH OF STAY:          1                 Lakeisha Guerra MD     Patient's emergency contacts:  Extended Emergency Contact Information  Primary Emergency Contact: Mignon Rich  Address: [address]  Home Phone: [phone]  Mobile Phone: [phone]  Relation: Father   needed?  No

## 2020-05-07 NOTE — PROGRESS NOTES
responded to RRT. Pt was being attended to and no family present at this time. Please contact [phone] [facility] for further support.      Chaplain Suzie Crespo M.Div, [address] ([phone])

## 2020-05-07 NOTE — CDMP QUERY
*5/7 new question (2 of 2) Pt admitted with AMS/ UTI/ Hypoglycemia. Pt noted to have documentation of Malignant HTN requiring tx with IV meds. If possible, please document in progress notes and d/c summary if you are evaluating and /or treating any of the following: ? Hypertensive Emergency ? Hypertensive Crisis ? Hypertensive Urgency ? Other, please specify ? Clinically unable to determine The medical record reflects the following: 
  Risk Factors: ESRD, with Cocaine use Clinical Indicators: /99 on admission, with pt having AMS/ lethargy. Documentation in the H&P of 'Malignant HTN- suspect triggered by cocaine use'. Treatment: Apresoline/ Labetalol IV, Norvasc/ Catapres po, telemetry, Renal diet, dialysis was provided.  
 
Thank you, Neville MARTIN

## 2020-05-07 NOTE — PROGRESS NOTES
0730: Bedside shift change report given to [address] [place] [address],[address] (oncoming nurse) by [place] RN (offgoing nurse). Report included the following information SBAR, Kardex, Intake/Output, MAR, and Recent Results. 1101: Patient was unresponsive and blood sugar was 54. A Rapid response was called and the Diabetes nurse was at the bedside. Patient was treated per protocol with 125 D10  1117: Patients Blood Sugar is 91 and she is alert and oriented  1130: Insulin lispro held per MD orders and we will continue to monitor blood sugars q2 hrs  1313: PCT called out and stated  the patient was unresponsive   1315-125: 125 D10 Given  1335: Recheck blood sugar and it was 320  1441: Blood sugar 55  8095-3590: 120 ml of D10 was given   1457: , patient is alert and oriented   1930: Bedside shift change report given to [employer] (oncoming nurse) by [place] RN (offgoing nurse). Report included the following information SBAR, Kardex, Intake/Output, MAR and Recent Results. Problem: Falls - Risk of  Goal: *Absence of Falls  Description: Document Mikel Vikas Fall Risk and appropriate interventions in the flowsheet. Outcome: Progressing Towards Goal  Note: Fall Risk Interventions:  Mobility Interventions: Bed/chair exit alarm, Patient to call before getting OOB    Mentation Interventions: Adequate sleep, hydration, pain control    Medication Interventions: Patient to call before getting OOB, Teach patient to arise slowly    Elimination Interventions: Bed/chair exit alarm, Call light in reach, Toileting schedule/hourly rounds              Problem: Patient Education: Go to Patient Education Activity  Goal: Patient/Family Education  Outcome: Progressing Towards Goal     Problem: Pressure Injury - Risk of  Goal: *Prevention of pressure injury  Description: Document Antonio Scale and appropriate interventions in the flowsheet.   Outcome: Progressing Towards Goal  Note: Pressure Injury Interventions:  Sensory Interventions: Assess changes in LOC, Pressure redistribution bed/mattress (bed type), Assess need for specialty bed    Moisture Interventions: Absorbent underpads    Activity Interventions: Increase time out of bed, Pressure redistribution bed/mattress(bed type)    Mobility Interventions: Pressure redistribution bed/mattress (bed type), Assess need for specialty bed    Nutrition Interventions: Document food/fluid/supplement intake                     Problem: Patient Education: Go to Patient Education Activity  Goal: Patient/Family Education  Outcome: Progressing Towards Goal

## 2020-05-07 NOTE — PROGRESS NOTES
Nephrology Progress Note  Cyn Michelle  Date of Admission : 5/5/2020    CC: Follow up for ESRD       Assessment and Plan     ESRD- HD : dialyzes TTS at [facility]   Hypokalemia   Encephalopathy   Hypoglycemia  COVID-19 neg  Anemia in CKD   Sec HPTH   Type II DM       Plan:  HD today  UF 2kg  Resume SANTOS  Cont current BP meds       Interval History:  Seen and examined. Feeling better. Asking for food. Wants to go home. For HD today. No cp, sob, n/v/d reported. Current Medications: all current  Medications have been eviewed in EPIC  Review of Systems: Pertinent items are noted in HPI. Objective:  Vitals:    Vitals:    05/07/20 0020 05/07/20 0306 05/07/20 0440 05/07/20 0738   BP: (!) 151/94 152/77  (!) 171/91   Pulse:  90  92   Resp:  20  20   Temp:    96.7 °F (35.9 °C)   SpO2:  100%  100%   Weight:   56.1 kg (123 lb 10.9 oz)    Height:         Intake and Output:  No intake/output data recorded. 05/05 1901 - 05/07 0700  In: 18 [P.O.:480]  Out: -     Physical Examination:  Pt intubated     No   General: NAD  Resp:  Stable O2, no distress, lungs clear  CV:  RRR on monitor, no LE edema   ABD:               Soft, NT, + bowel sounds  Neurologic:  nonfocal  Access:           LUE AVF +thrill/bruit    [x]    High complexity decision making was performed  []    Patient is at high-risk of decompensation with multiple organ involvement    Lab Data Personally Reviewed: I have reviewed all the pertinent labs, microbiology data and radiology studies during assessment.     Recent Labs     05/07/20 0427 05/06/20 0206 05/05/20  1316    140 144   K 4.0 3.3* 3.4*    108 103   CO2 21 19* 23   * 159* 71   BUN 51* 48* 50*   CREA 3.83* 3.69* 4.12*   CA 6.6* 7.1* 8.1*   ALB  --  2.5* 3.9   SGOT  --  35 56*   ALT  --  33 50     Recent Labs     05/07/20 0427 05/06/20  0206 05/05/20  1316   WBC 4.5 5.2 4.1   HGB 8.8* 11.2* 13.0   HCT 28.5* 37.0 41.3   * 109* 157     Lab Results   Component Value Date/Time    Specimen Description: URINE 06/24/2013 08:00 PM    Specimen Description: URINE 06/17/2013 07:55 PM    Specimen Description: URINE 05/26/2013 10:10 AM     Lab Results   Component Value Date/Time    Culture result: (A) 05/05/2020 01:16 PM     GRAM POSITIVE COCCI IN CHAINS GROWING IN 1 OF 4 BOTTLES DRAWN (SITE = [facility] 1310)    Culture result: (A) 05/05/2020 01:16 PM     PRELIMINARY REPORT OF GRAM POSITIVE COCCI IN CHAINS GROWING IN 1 OF 4 BOTTLES DRAWN CALLED TO AND READ BACK BY NAM BROWNRN,AT 6438 ON 5/6/20. RC    Culture result: KLEBSIELLA PNEUMONIAE (A) 05/05/2020 12:13 PM     Recent Results (from the past 24 hour(s))   GLUCOSE, POC    Collection Time: 05/06/20  9:02 AM   Result Value Ref Range    Glucose (POC) 356 (H) 65 - 100 mg/dL    Performed by Laura Gomez, POC    Collection Time: 05/06/20 11:50 AM   Result Value Ref Range    Glucose (POC) 407 (H) 65 - 100 mg/dL    Performed by [address] [place] [address], POC    Collection Time: 05/06/20  2:14 PM   Result Value Ref Range    Glucose (POC) 417 (H) 65 - 100 mg/dL    Performed by [address] [place] [address], POC    Collection Time: 05/06/20  2:16 PM   Result Value Ref Range    Glucose (POC) 280 (H) 65 - 100 mg/dL    Performed by [address] [place] [address], POC    Collection Time: 05/06/20  2:18 PM   Result Value Ref Range    Glucose (POC) 304 (H) 65 - 100 mg/dL    Performed by [address] [place] [address], POC    Collection Time: 05/06/20  5:45 PM   Result Value Ref Range    Glucose (POC) 24 (LL) 65 - 100 mg/dL    Performed by Teresa Shafer, POC    Collection Time: 05/06/20  5:48 PM   Result Value Ref Range    Glucose (POC) 39 (LL) 65 - 100 mg/dL    Performed by Teresa Shafer, POC    Collection Time: 05/06/20  6:06 PM   Result Value Ref Range    Glucose (POC) 70 65 - 100 mg/dL    Performed by LUIS ALFREDO HUSTON    GLUCOSE, POC    Collection Time: 05/06/20  6:28 PM   Result Value Ref Range    Glucose (POC) 78 65 - 100 mg/dL    Performed by Agnieszka Garcia, POC    Collection Time: 05/06/20  9:38 PM   Result Value Ref Range    Glucose (POC) 167 (H) 65 - 100 mg/dL    Performed by Renate BHAGAT, POC    Collection Time: 05/06/20 11:38 PM   Result Value Ref Range    Glucose (POC) 239 (H) 65 - 100 mg/dL    Performed by [address] [place], [place] [address]on, POC    Collection Time: 05/07/20  1:32 AM   Result Value Ref Range    Glucose (POC) 301 (H) 65 - 100 mg/dL    Performed by [address] [place] [address], POC    Collection Time: 05/07/20  3:36 AM   Result Value Ref Range    Glucose (POC) 263 (H) 65 - 100 mg/dL    Performed by [facility]    CBC WITH AUTOMATED DIFF    Collection Time: 05/07/20  4:27 AM   Result Value Ref Range    WBC 4.5 3.6 - 11.0 K/uL    RBC 3.35 (L) 3.80 - 5.20 M/uL    HGB 8.8 (L) 11.5 - 16.0 g/dL    HCT 28.5 (L) 35.0 - 47.0 %    MCV 85.1 80.0 - 99.0 FL    MCH 26.3 26.0 - 34.0 PG    MCHC 30.9 30.0 - 36.5 g/dL    RDW 14.9 (H) 11.5 - 14.5 %    PLATELET 032 (L) 776 - 400 K/uL    NRBC 0.0 0  WBC    ABSOLUTE NRBC 0.00 0.00 - 0.01 K/uL    NEUTROPHILS 76 (H) 32 - 75 %    LYMPHOCYTES 15 12 - 49 %    MONOCYTES 7 5 - 13 %    EOSINOPHILS 1 0 - 7 %    BASOPHILS 0 0 - 1 %    IMMATURE GRANULOCYTES 0 0.0 - 0.5 %    ABS. NEUTROPHILS 3.5 1.8 - 8.0 K/UL    ABS. LYMPHOCYTES 0.7 (L) 0.8 - 3.5 K/UL    ABS. MONOCYTES 0.3 0.0 - 1.0 K/UL    ABS. EOSINOPHILS 0.0 0.0 - 0.4 K/UL    ABS. BASOPHILS 0.0 0.0 - 0.1 K/UL    ABS. IMM.  GRANS. 0.0 0.00 - 0.04 K/UL    DF AUTOMATED     METABOLIC PANEL, BASIC    Collection Time: 05/07/20  4:27 AM   Result Value Ref Range    Sodium 138 136 - 145 mmol/L    Potassium 4.0 3.5 - 5.1 mmol/L    Chloride 107 97 - 108 mmol/L    CO2 21 21 - 32 mmol/L    Anion gap 10 5 - 15 mmol/L    Glucose 270 (H) 65 - 100 mg/dL    BUN 51 (H) 6 - 20 MG/DL    Creatinine 3.83 (H) 0.55 - 1.02 MG/DL    BUN/Creatinine ratio 13 12 - 20      GFR est AA 16 (L) >60 ml/min/1.73m2    GFR est non-AA 13 (L) >60 ml/min/1.73m2    Calcium 6.6 (L) 8.5 - 10.1 MG/DL   GLUCOSE, POC    Collection Time: 05/07/20  6:36 AM   Result Value Ref Range    Glucose (POC) 241 (H) 65 - 100 mg/dL    Performed by [facility]            Total time spent with patient:  xxx   min. Care Plan discussed with:  Patient     Family      RN      Consulting Physician [id number],         I have reviewed the flowsheets. Chart and Pertinent Notes have been reviewed. No change in PMH ,family and social history from Consult note. Stefany Ruiz MD  [facility]    [address] [address]  Phone - [phone]   Fax - [phone]  [email or web address]

## 2020-05-07 NOTE — DIABETES MGMT
SHRUTHI HOLDER  CLINICAL NURSE SPECIALIST CONSULT  PROGRAM FOR DIABETES HEALTH    Follow up NOTE    Presentation   Chato Aguilar is a 37 y.o. female with a PMH of ESRD on HD, fibromyalgia, PCOS, HTN, DM s/p left foot amputation, gastroparesis s/p gastric bypass, did have a gastric pacer but that was removed in 2015,  narcotic abuse who was found unresponsive at home and noted to have a blood glucose of 40- Dextrose fluids were given by EMS. In the ED she was found to be hypothermic, hypotensive with a blood glucose of 20. Last ED admission was on 5/2 for unresponsiveness (narcan given with no response, glucose 200s, concerns for seizure activity, UDS negative) once she was alert and oriented, she left AMA. Recent Events:   Yesterday afternoon ( 1700) had low BG 24 then 39. Hypoglycemia protocol followed and BG came up to 70 soon after treatment. Diabetes: Patient has known Type 1 diabetes, diagnosed at age 14 and treated with Novolin N and Humalog PTA. Family history positive for diabetes. Consulted by Provider for advanced diabetes nursing assessment and care, specifically related to     [x] Inpatient management strategy  [x] Home management assessment    Diabetes-related medical history  Acute complications  Recurrent hypoglycemia  Neurological complications  Hypoglycemia unawareness, Gastroparesis and Peripheral neuropathy  Microvascular disease  Retinopathy and Nephropathy  Macrovascular disease  Foot wounds    Diabetes medication history  Drug class Currently in use Discontinued Never used   Biguanide      DDP-4 inhibitor       Sulfonylurea      Thiazolidinedione      GLP-1 RA      SGLT-2 inhibitors      Basal insulin NPH 8 units each night     Bolus insulin Humalog 3-6 units with meals       Subjective   1050: Rapid response called for patient's decrease LOC. She was not responding to voice initially. BG checked 54mg/dl. RRT following hypoglycemic protocol. 1118:  Bg up to 91mg/dl. Patient starting to eat her breakfast.  I talked with her about needing to eat. She did not eat her breakfast even after staff heated it up multiple times. She stated she was waiting on her lunch--    1320: Received a call from RN caring for patient. The patient had another low BG 45mg/dl. Hypoglycemia protocol follwoed and 125cc D10 given. 1400: Spoke with Dr. Kali Daley re:  Holding all insulin today. Received order to hold all insulin today. Spoke with RN re: holding insulin today. She reported patient's BG now >300mg/dl. She also reported that the patient did not eat lunch. Jeanice Gosselin is a 37year old female with a PMH of poorly controlled type one diabetes, diagnosed at age 14. Her diabetes associated complications include bilateral diabetic food wounds, left foot osteomyelitis with BKA; nephropathy with ESRD on Dialysis, retinopathy. During examination, patient had a very difficult time describing her diabetes related care and history. She reports that her boyfriend is involved in her care by obtaining blood glucose readings and administering insulin. She does report that she has accidentally given herself \"too much insulin\" when she was not wearing her glasses and had low blood glucose values. Last A1C from 10/19 is suprisingly low- 7.9%. A1C values range throughout the year from 7.9% to over 16%. Verbalizes cocaine use intermittently to \"help with her pain\". Denies using cocaine this week, despite the positive UDS. Eating pattern  [x] Breakfast Eggs, toast, sausage  [x] Lunch  Ham and cheese sandwich  [x] Dinner  Baked chicken or fish  [x] Beverages Hot tea with sugar, diet soda  Physical activity pattern  None  Monitoring pattern  Performed by patient's boyfriend 2-3 times daily. Patient unaware of results. Did note that she is concerned about lows at night.   Taking medications pattern  [x] Consistent administration  [x] Affordable      Objective   Physical exam  General After D10 given patient more alert, and talking ; requesting food. Vital Signs   Visit Vitals  BP (!) 164/94 (BP 1 Location: Right arm, BP Patient Position: Sitting)   Pulse 92   Temp 96.7 °F (35.9 °C)   Resp 20   Ht 5' 2\" (1.575 m)   Wt 56.1 kg (123 lb 10.9 oz)   SpO2 93%   BMI 22.62 kg/m²     Skin  Warm and dry. Acanthosis noted along neckline. No lipohypertrophy or lipoatrophy noted at injection sites   Heart   Regular rate and rhythm. No murmurs, rubs or gallops  Lungs  Clear to auscultation without rales or rhonchi  Extremities No foot wounds    Diabetic foot exam:    Left Foot     Left BKA  Right Foot   Visual Exam: callous - Small ulcer noted on the ball of foot near pinky toe; pinpoint dark ulcer noted on the bottom of mid foot and ulcer- see above   Pulse DP: 1+ (weak)   Filament test: absent sensation    Vibratory sensation: absent DP & PT pulses +2. Laboratory  Lab Results   Component Value Date/Time    Hemoglobin A1c 7.9 (H) 10/04/2019 02:49 PM     Lab Results   Component Value Date/Time    LDL, calculated 60.8 05/02/2020 05:15 AM     Lab Results   Component Value Date/Time    Creatinine (POC) 3.5 (H) 09/06/2019 10:25 AM    Creatinine 3.83 (H) 05/07/2020 04:27 AM     Lab Results   Component Value Date/Time    Sodium 138 05/07/2020 04:27 AM    Potassium 4.0 05/07/2020 04:27 AM    Chloride 107 05/07/2020 04:27 AM    CO2 21 05/07/2020 04:27 AM    Anion gap 10 05/07/2020 04:27 AM    Glucose 270 (H) 05/07/2020 04:27 AM    BUN 51 (H) 05/07/2020 04:27 AM    Creatinine 3.83 (H) 05/07/2020 04:27 AM    BUN/Creatinine ratio 13 05/07/2020 04:27 AM    GFR est AA 16 (L) 05/07/2020 04:27 AM    GFR est non-AA 13 (L) 05/07/2020 04:27 AM    Calcium 6.6 (L) 05/07/2020 04:27 AM    Bilirubin, total 0.2 05/06/2020 02:06 AM    AST (SGOT) 35 05/06/2020 02:06 AM    Alk.  phosphatase 108 05/06/2020 02:06 AM    Protein, total 6.2 (L) 05/06/2020 02:06 AM    Albumin 2.5 (L) 05/06/2020 02:06 AM    Globulin 3.7 05/06/2020 02:06 AM    A-G Ratio 0.7 (L) 05/06/2020 02:06 AM    ALT (SGPT) 33 05/06/2020 02:06 AM     Lab Results   Component Value Date/Time    ALT (SGPT) 33 05/06/2020 02:06 AM       Factors affecting BG pattern  Factor Dose Comments   Nutrition:  Carb-controlled meals   60 grams/meal Very hungry, insistent on snacks and additional meals   Drugs:  Cocaine use  + UDS   Pain     Infection UTI and possible blood stream infection    Gastroparesis  Delayed gastric empyting   ESRD on Dialysis HD 3x week      Blood glucose pattern        Assessment and Plan   Nursing Diagnosis Risk for unstable blood glucose pattern   Nursing Intervention Domain 5259 Decision-making Support   Nursing Interventions Examined current inpatient diabetes control   Explored factors facilitating and impeding inpatient management  Identified self-management practices impeding diabetes control  Explored corrective strategies with patient and responsible inpatient provider   Informed patient of rational for insulin strategy while hospitalized     Evaluation   Petty Martinez is a 37 y.o. female with a PMH of ESRD on HD, fibromyalgia, PCOS, HTN, DM s/p left foot amputation, gastroparesis s/p gastric bypass, did have a gastric pacer but that was removed in 2015,  narcotic abuse who was found unresponsive at home and noted to have a blood glucose of 40- Dextrose fluids were given by EMS. Her medical history is quite complex which largely impacts her blood glucose management. Her insight and poor judgement certainly complicates both inpatient and outpatient glucose management. Tight glycemic control is not recommended as there is a high risk for hypoglycemia. If she continues to have erratic eating patterns, she will have pre-prandial hyperglycemia. The utilization of sliding scale insulin on top of basal insulin puts her at risk for hypoglycemia. Since patient has had 2 episodes of hypoglycemia, insulin doses needs to be decreased.   RN verbalized that she is not eating her food, even though the staff has heated up her food multiple times, she does not eat it. Inpatient blood glucose management has been impacted by  [x] Kidney dysfunction  [x] Erratic meal consumption  [x] Gastroparesis  [x]  UTI bacteremia           Urine negative for glucose/ketones  Large leukocyte esterase  Elevated d-dimer (1.64); ferritin 848; ; BMP 31784: Rapid COVID-19 neg, respiratory vital panel neg  + cocaine, neg alcohol  Creat 4.1, GFR 14  GPC in chains from blood culture  Recommendations   Recommend:  Initiate the subcutaneous insulin order set with:  1. CONTINUE basal NPH insulin 10units daily (5units BID)    2. HOLD Bolus insulin if patient is NOT eating >50% of her carbs; DO not give bolus until she has started to eat. Decrease bolus dose to 4units TID. 3. Renal/Carbohydrate consistent diet    4.  Ok to adjust blood glucose monitoring to [employer])     [x]  [address], CNS  [facility] for Diabetes Health  Access via VERENA Escobedo [phone]

## 2020-05-07 NOTE — CONSULTS
Infectious Disease Consult    Today's Date: 5/7/2020   Admit Date: 5/5/2020    Impression:   · UTI  · Probable blood culture contaminant--but will keep an eye on cultures  · ESRD  · Sickle cell    Plan:   · IV antibiotic therapy for now  · Follow up cultures and studies  · If further blood cultures become positive, may have to remove her line    Anti-infectives:   · Ceftriaxone   · Vancomycin     Subjective:   Date of Consultation:  May 7, 2020  Referring Physician: Dr Garret Osler    Patient is a 37 y.o. female admitted with weakness and pain. She is having her typical sickle cell pain. She has strep in one blood culture bottle and E coli in urine. She is on antibiotic therapy and we are asked to see her in consultation.       Patient Active Problem List   Diagnosis Code    Sickle cell trait ([facility]) D57.3    HTN (hypertension) I10    Depression F32.9    Diabetes mellitus type I ([facility] E10.9    CKD (chronic kidney disease) N18.9    Asthma J45.909    Diabetic gastroparesis w/gastric stimulator E11.43, K31.84    PCOS (polycystic ovarian syndrome) E28.2    Seizure ([facility] R56.9    Healthcare maintenance Z00.00    Back pain M54.9    Elevated lipase R74.8    Obesity BMI > 40 E66.9    Pulmonary edema O97.4    Illicit drug use K46.14    Gastroparesis K31.84    Altered mental state R41.82    Acute on chronic kidney failure ([facility]) N17.9, Y59.0    Metabolic encephalopathy M46.27    Acute renal failure superimposed on stage 4 chronic kidney disease ([facility]) N17.9, N18.4    Opiate dependence ([facility]) F11.20    Osteomyelitis ([facility]) M86.9    Sickle cell anemia ([facility]) D57.1    Osteomyelitis of left foot ([facility]) M86.9    DKA (diabetic ketoacidoses) ([facility]) E11.10    Sickle cell crisis ([facility]) D57.00    Charcot ankle, left M14.672    Hyperglycemia R73.9    Overdose of opiate or related narcotic, undetermined intent, sequela T40.604S    Unresponsive R41.89    Fluid overload E87.70    Hypoglycemia E16.2    Gram-positive bacteremia R78.81     Past Medical History:   Diagnosis Date    ARF (acute renal failure) ([facility] requiring dialysis     Asthma     CKD (chronic kidney disease)     Diabetes ([facility]     Fibromyalgia     Gastroparesis     Gastric Pacer- REMOVED 2015    GERD (gastroesophageal reflux disease)     Hypertension     Narcotic dependence ([facility]     THU (obstructive sleep apnea)     wears 2 LPM oxygen at night    Other ill-defined conditions(799.89)     Polycystic ovarian syndrome     Seizures (HCC)     Sickle cell trait ([facility]     Thromboembolus ([facility] to her left arm and was told she had one in left leg recently      Family History   Problem Relation Age of Onset    Cancer Mother         lung    Hypertension Mother     Cancer Father         kidney    Stroke Father         3 strokes: 59-72    Heart Disease Father 72        CABG    Hypertension Father     Cancer Sister         pancreatic    Cancer Maternal Aunt         breast    Cancer Paternal Aunt         breast    Schizophrenia Sister         was in [address], now ass't living    Other Sister          AIDS    Other Other         nephew of AIDS      Social History     Tobacco Use    Smoking status: Never Smoker    Smokeless tobacco: Never Used   Substance Use Topics    Alcohol use: No     Past Surgical History:   Procedure Laterality Date    HX AMPUTATION FOOT  2018    left foot    HX CATARACT REMOVAL  3/5/12    right    HX DILATION AND CURETTAGE      ablation    HX GASTRIC BYPASS      HX GI      j tube placement and removal    HX OTHER SURGICAL      Gastric Pacer- REMOVED 2015    HX VASCULAR ACCESS      gray cath rt subclavian; removed     HX VASCULAR ACCESS      HD access right thigh; stopped working    IR INSERT NON TUNL CVC OVER 5 YRS  10/4/2019      Prior to Admission medications    Medication Sig Start Date End Date Taking?  Authorizing Provider   NOVOLIN N NPH U-100 INSULIN 100 unit/mL injection 5 Units. Indications: pt. taking differently 8/1/19   Ryan Lozano MD   b complex-vitamin c-folic acid (NEPHROCAPS) 1 mg capsule Take 1 Cap by mouth. Ryan Lozano MD   acetaminophen (TYLENOL) 500 mg tablet Take 500 mg by mouth. Ryan Lozano MD   calcium acetate (PHOSLO) 667 mg cap TAKE 2 CAPSULES BY MOUTH THREE TIMES DAILY WITH MEALS 6/10/19   Ryan Lozano MD   carvedilol (COREG) 6.25 mg tablet Take 6.25 mg by mouth. Ryan Lozano MD   cloNIDine HCl (CATAPRES) 0.1 mg tablet 1 TABLET BY MOUTH 3 TIMES A DAY AS DIRECTED-2 AT BEDTIME OR LAST DOSE OF DAY-CHECK BP AS DISCUSSED 6/11/19   Ryan Lozano MD   traZODone (DESYREL) 50 mg tablet Take 50 mg by mouth nightly. Ryan Lozano MD   FOLIC ACID PO Take  by mouth. Ryan Lozano MD   vitamin e (E GEMS) 1,000 unit capsule Take 1,000 Units by mouth daily. Ryan Lozano MD   FERROUS SULFATE DRIED PO Take  by mouth three (3) times daily. Ryan Lozano MD   calcium carbonate (TUMS PO) Take 1,000 mg by mouth daily. Ryan Lozano MD   promethazine (PHENERGAN) 25 mg tablet Take 1 Tab by mouth every six (6) hours as needed for Nausea. 9/15/18   Blake Cade MD   amLODIPine (NORVASC) 10 mg tablet Take 1 Tab by mouth daily. 7/4/18   Leodan Saleem MD   calcitRIOL (ROCALTROL) 0.25 mcg capsule Take 1 Cap by mouth daily. 7/4/18   Leodan Saleem MD   sodium bicarbonate 650 mg tablet Take 1 Tab by mouth two (2) times a day. 7/4/18   Leodan Saleem MD   Diabetic Supplies, Miscellan. kit USE AS DIRECTED 7/4/18   Leodan Saleem MD   venlafaxine [facility]) 75 mg tablet Take 1 Tab by mouth two (2) times daily (with meals). 7/4/18   Leodan Saleem MD   senna-docusate (PERICOLACE) 8.6-50 mg per tablet Take 2 Tabs by mouth daily. 7/4/18   Leodan Saleem MD   QUEtiapine (SEROQUEL) 100 mg tablet Take 1 Tab by mouth two (2) times a day.  7/4/18   Leodan Saleem MD   cholecalciferol (VITAMIN D3) 50,000 unit capsule Take 1 Cap by mouth every seven (7) days. 18   Jake Natarajan MD   cyanocobalamin 1,000 mcg tablet Take 1,000 mcg by mouth daily. Provider, Historical   albuterol (PROVENTIL VENTOLIN) 2.5 mg /3 mL (0.083 %) nebulizer solution 2.5 mg by Nebulization route every four (4) hours as needed. Provider, Historical       Allergies   Allergen Reactions    Fentanyl Itching and Angioedema     \"throat closed up\" per pt      Erythromycin Itching    Toradol [Ketorolac] Rash    Morphine Itching        Review of Systems:  Pertinent items are noted in the History of Present Illness. Objective:     Visit Vitals  /70 (BP 1 Location: Right arm, BP Patient Position: At rest)   Pulse (!) 103   Temp 97.7 °F (36.5 °C)   Resp 18   Ht 5' 2\" (1.575 m)   Wt 56.1 kg (123 lb 10.9 oz)   SpO2 100%   BMI 22.62 kg/m²     Temp (24hrs), Av.6 °F (36.4 °C), Min:96.7 °F (35.9 °C), Max:98.5 °F (36.9 °C)       Lines:  Hemodialysis Catheter:    and Peripheral IV:       Physical Exam:  Lungs:  clear to auscultation bilaterally  Heart:  regular rate and rhythm  Abdomen:  soft, non-tender.  Bowel sounds normal. No masses,  no organomegaly  Skin:  no rash or abnormalities    Data Review:     CBC:  Recent Labs     20  0206 20  1316   WBC 4.5 5.2 4.1   GRANS 76* 82* 78*   MONOS 7 7 4*   EOS 1 1 0   ANEU 3.5 4.2 3.2   ABL 0.7* 0.5* 0.7*   HGB 8.8* 11.2* 13.0   HCT 28.5* 37.0 41.3   * 109* 157       BMP:  Recent Labs     20  0206 20  1316   CREA 3.83* 3.69* 4.12*   BUN 51* 48* 50*    140 144   K 4.0 3.3* 3.4*    108 103   CO2 21 19* 23   AGAP 10 13 18*   * 159* 71       LFTS:  Recent Labs     20  0206 20  1316   TBILI 0.2 0.3   ALT 33 50   SGOT 35 56*    168*   TP 6.2* 9.6*   ALB 2.5* 3.9       Microbiology:     All Micro Results     Procedure Component Value Units Date/Time    CULTURE, BLOOD, PAIRED [[id number]]  (Abnormal) Collected:  20 1316    Order Status:  Completed Specimen:  Blood Updated:  05/07/20 0925     Special Requests: NO SPECIAL REQUESTS        Culture result:       ALPHA STREPTOCOCCUS GROWING IN 1 OF 4 BOTTLES DRAWN (SITE = R FOREARM DRAWN 1310)                  PRELIMINARY REPORT OF GRAM POSITIVE COCCI IN CHAINS GROWING IN 1 OF 4 BOTTLES DRAWN CALLED TO AND READ BACK BY NAM BROWNRN,AT 4522 ON 5/6/20. RC          CULTURE, URINE [[id number]]  (Abnormal)  (Susceptibility) Collected:  05/05/20 1213    Order Status:  Completed Specimen:  Urine from Clean catch Updated:  05/07/20 0833     Special Requests: NO SPECIAL REQUESTS        [place] Count --        >100,000  COLONIES/mL       Culture result: KLEBSIELLA PNEUMONIAE       CULTURE, BLOOD, PAIRED [[id number]] Collected:  05/07/20 0436    Order Status:  Completed Specimen:  Blood Updated:  05/07/20 0614    RESPIRATORY PANEL,PCR,NASOPHARYNGEAL [[id number]] Collected:  05/05/20 1437    Order Status:  Completed Specimen:  Nasopharyngeal Updated:  05/05/20 2055     Adenovirus Not detected        Coronavirus 229E Not detected        Coronavirus HKU1 Not detected        Coronavirus CVNL63 Not detected        Coronavirus OC43 Not detected        Metapneumovirus Not detected        Rhinovirus and Enterovirus Not detected        Influenza A Not detected        Influenza A, subtype H1 Not detected        Influenza A, subtype H3 Not detected        INFLUENZA A H1N1 PCR Not detected        Influenza B Not detected        Parainfluenza 1 Not detected        Parainfluenza 2 Not detected        Parainfluenza 3 Not detected        Parainfluenza virus 4 Not detected        RSV by PCR Not detected        B. parapertussis, PCR Not detected        Bordetella pertussis - PCR Not detected        Chlamydophila pneumoniae DNA, QL, PCR Not detected        Mycoplasma pneumoniae DNA, QL, PCR Not detected       CULTURE, URINE [[id number]]     Order Status:  Canceled Specimen:  Urine from Clean catch     URINE CULTURE HOLD SAMPLE [[id number]] Collected:  05/05/20 1213    Order Status:  Completed Specimen:  Serum Updated:  05/05/20 1240     Urine culture hold       Urine on hold in Microbiology dept for 2 days. If unpreserved urine is submitted, it cannot be used for addtional testing after 24 hours, recollection will be required.           CULTURE, BLOOD [[id number]]     Order Status:  Canceled Specimen:  Blood           Imaging:   Reviewed     Signed By: Michelle Byrnes MD     May 7, 2020

## 2020-05-07 NOTE — CDMP QUERY
*5/7 new question Pt admitted with AMS, and Somnolence, with Acute Encephalopathy documented. Please further specify type of Encephalopathy in the medical record ? Hypertensive Encephalopathy ? Metabolic Encephalopathy ? Septic Encephalopathy ? Toxic Encephalopathy 
? Encephalopathy due to medications or drugs (please specify) ? Toxic Metabolic Encephalopathy 
? Other Encephalopathy ? Clinically unable to determine The medical record reflects the following: 
   Risk Factors: Cocaine use, was Hypothermic/ Hypoglycemic/ Hypertensive on admission. Clinical Indicators: pt somnolent, lethargic, and confused on admission. Was found to be Hypothermic at 93, and /99, also with gluc in the 20's. Documentation that AMS may be d/t the low glucose as MS improved after glucose was given. There is also documentation that pts MS may also be altered d/t the significant HTN and that the Cocaine use may be contributing. Pt also found to have UTI. Head CT is negative. GCS 12-15. Treatment: IV ABX for tx of underlying infection, antihypertensives for the HTN, Hypoglycemia was correctd, and Brady Huggar for the hypothermia. Neuro checks.  
 
Thank you, Neville MARTIN

## 2020-05-08 LAB
ANION GAP SERPL CALC-SCNC: 3 MMOL/L (ref 5–15)
BASOPHILS # BLD: 0 K/UL (ref 0–0.1)
BASOPHILS NFR BLD: 0 % (ref 0–1)
BUN SERPL-MCNC: 18 MG/DL (ref 6–20)
BUN/CREAT SERPL: 10 (ref 12–20)
CALCIUM SERPL-MCNC: 7 MG/DL (ref 8.5–10.1)
CHLORIDE SERPL-SCNC: 103 MMOL/L (ref 97–108)
CO2 SERPL-SCNC: 29 MMOL/L (ref 21–32)
CREAT SERPL-MCNC: 1.89 MG/DL (ref 0.55–1.02)
DIFFERENTIAL METHOD BLD: ABNORMAL
EOSINOPHIL # BLD: 0 K/UL (ref 0–0.4)
EOSINOPHIL NFR BLD: 1 % (ref 0–7)
ERYTHROCYTE [DISTWIDTH] IN BLOOD BY AUTOMATED COUNT: 14.5 % (ref 11.5–14.5)
GLUCOSE BLD STRIP.AUTO-MCNC: 181 MG/DL (ref 65–100)
GLUCOSE BLD STRIP.AUTO-MCNC: 247 MG/DL (ref 65–100)
GLUCOSE BLD STRIP.AUTO-MCNC: 247 MG/DL (ref 65–100)
GLUCOSE BLD STRIP.AUTO-MCNC: 256 MG/DL (ref 65–100)
GLUCOSE BLD STRIP.AUTO-MCNC: 302 MG/DL (ref 65–100)
GLUCOSE BLD STRIP.AUTO-MCNC: 316 MG/DL (ref 65–100)
GLUCOSE BLD STRIP.AUTO-MCNC: 318 MG/DL (ref 65–100)
GLUCOSE BLD STRIP.AUTO-MCNC: 326 MG/DL (ref 65–100)
GLUCOSE BLD STRIP.AUTO-MCNC: 482 MG/DL (ref 65–100)
GLUCOSE SERPL-MCNC: 266 MG/DL (ref 65–100)
HCT VFR BLD AUTO: 27.6 % (ref 35–47)
HGB BLD-MCNC: 8.7 G/DL (ref 11.5–16)
IMM GRANULOCYTES # BLD AUTO: 0 K/UL (ref 0–0.04)
IMM GRANULOCYTES NFR BLD AUTO: 0 % (ref 0–0.5)
LYMPHOCYTES # BLD: 1 K/UL (ref 0.8–3.5)
LYMPHOCYTES NFR BLD: 34 % (ref 12–49)
MCH RBC QN AUTO: 26.5 PG (ref 26–34)
MCHC RBC AUTO-ENTMCNC: 31.5 G/DL (ref 30–36.5)
MCV RBC AUTO: 84.1 FL (ref 80–99)
MONOCYTES # BLD: 0.3 K/UL (ref 0–1)
MONOCYTES NFR BLD: 10 % (ref 5–13)
NEUTS SEG # BLD: 1.6 K/UL (ref 1.8–8)
NEUTS SEG NFR BLD: 55 % (ref 32–75)
NRBC # BLD: 0 K/UL (ref 0–0.01)
NRBC BLD-RTO: 0 PER 100 WBC
PLATELET # BLD AUTO: 123 K/UL (ref 150–400)
PMV BLD AUTO: ABNORMAL FL (ref 8.9–12.9)
POTASSIUM SERPL-SCNC: 4.3 MMOL/L (ref 3.5–5.1)
RBC # BLD AUTO: 3.28 M/UL (ref 3.8–5.2)
SERVICE CMNT-IMP: ABNORMAL
SODIUM SERPL-SCNC: 135 MMOL/L (ref 136–145)
WBC # BLD AUTO: 3 K/UL (ref 3.6–11)

## 2020-05-08 PROCEDURE — 74011250637 HC RX REV CODE- 250/637: Performed by: INTERNAL MEDICINE

## 2020-05-08 PROCEDURE — 65660000001 HC RM ICU INTERMED STEPDOWN

## 2020-05-08 PROCEDURE — 97161 PT EVAL LOW COMPLEX 20 MIN: CPT

## 2020-05-08 PROCEDURE — 80048 BASIC METABOLIC PNL TOTAL CA: CPT

## 2020-05-08 PROCEDURE — 74011250637 HC RX REV CODE- 250/637: Performed by: NURSE PRACTITIONER

## 2020-05-08 PROCEDURE — 74011250636 HC RX REV CODE- 250/636: Performed by: INTERNAL MEDICINE

## 2020-05-08 PROCEDURE — 97116 GAIT TRAINING THERAPY: CPT

## 2020-05-08 PROCEDURE — 74011250637 HC RX REV CODE- 250/637: Performed by: FAMILY MEDICINE

## 2020-05-08 PROCEDURE — 74011000258 HC RX REV CODE- 258: Performed by: INTERNAL MEDICINE

## 2020-05-08 PROCEDURE — 85025 COMPLETE CBC W/AUTO DIFF WBC: CPT

## 2020-05-08 PROCEDURE — 74011636637 HC RX REV CODE- 636/637: Performed by: NURSE PRACTITIONER

## 2020-05-08 PROCEDURE — 36415 COLL VENOUS BLD VENIPUNCTURE: CPT

## 2020-05-08 PROCEDURE — 82962 GLUCOSE BLOOD TEST: CPT

## 2020-05-08 RX ORDER — DEXTROSE MONOHYDRATE 100 MG/ML
0-250 INJECTION, SOLUTION INTRAVENOUS AS NEEDED
Status: DISCONTINUED | OUTPATIENT
Start: 2020-05-08 | End: 2020-05-09 | Stop reason: HOSPADM

## 2020-05-08 RX ORDER — INSULIN LISPRO 100 [IU]/ML
INJECTION, SOLUTION INTRAVENOUS; SUBCUTANEOUS EVERY 4 HOURS
Status: DISCONTINUED | OUTPATIENT
Start: 2020-05-08 | End: 2020-05-09

## 2020-05-08 RX ADMIN — ACETAMINOPHEN 650 MG: 325 TABLET, FILM COATED ORAL at 17:12

## 2020-05-08 RX ADMIN — VENLAFAXINE 75 MG: 37.5 TABLET ORAL at 09:01

## 2020-05-08 RX ADMIN — VENLAFAXINE 75 MG: 37.5 TABLET ORAL at 17:09

## 2020-05-08 RX ADMIN — QUETIAPINE FUMARATE 50 MG: 25 TABLET ORAL at 17:08

## 2020-05-08 RX ADMIN — VANCOMYCIN HYDROCHLORIDE 500 MG: 500 INJECTION, POWDER, LYOPHILIZED, FOR SOLUTION INTRAVENOUS at 00:43

## 2020-05-08 RX ADMIN — INSULIN LISPRO 5 UNITS: 100 INJECTION, SOLUTION INTRAVENOUS; SUBCUTANEOUS at 22:29

## 2020-05-08 RX ADMIN — AMLODIPINE BESYLATE 10 MG: 5 TABLET ORAL at 09:01

## 2020-05-08 RX ADMIN — TRAZODONE HYDROCHLORIDE 50 MG: 50 TABLET ORAL at 22:03

## 2020-05-08 RX ADMIN — CEFTRIAXONE 1 G: 1 INJECTION, POWDER, FOR SOLUTION INTRAMUSCULAR; INTRAVENOUS at 14:52

## 2020-05-08 RX ADMIN — CLONIDINE HYDROCHLORIDE 0.1 MG: 0.1 TABLET ORAL at 17:08

## 2020-05-08 RX ADMIN — GUAIFENESIN 600 MG: 600 TABLET, EXTENDED RELEASE ORAL at 22:03

## 2020-05-08 RX ADMIN — QUETIAPINE FUMARATE 50 MG: 25 TABLET ORAL at 09:01

## 2020-05-08 RX ADMIN — HEPARIN SODIUM 5000 UNITS: 5000 INJECTION, SOLUTION INTRAVENOUS; SUBCUTANEOUS at 08:30

## 2020-05-08 RX ADMIN — HEPARIN SODIUM 5000 UNITS: 5000 INJECTION, SOLUTION INTRAVENOUS; SUBCUTANEOUS at 22:03

## 2020-05-08 RX ADMIN — HEPARIN SODIUM 5000 UNITS: 5000 INJECTION, SOLUTION INTRAVENOUS; SUBCUTANEOUS at 14:51

## 2020-05-08 RX ADMIN — GUAIFENESIN 600 MG: 600 TABLET, EXTENDED RELEASE ORAL at 12:31

## 2020-05-08 RX ADMIN — CLONIDINE HYDROCHLORIDE 0.1 MG: 0.1 TABLET ORAL at 09:01

## 2020-05-08 NOTE — PROGRESS NOTES
2015: Respiratory notified that patient would like a PRN neb    0815: Bedside and Verbal shift change report given to [address] [place] [address] (oncoming nurse) by Russell Wilcox RN (offgoing nurse).  Report included the following information SBAR, Kardex, ED Summary, Procedure Summary, Intake/Output, MAR, Recent Results, Med Rec Status and Cardiac Rhythm SR.

## 2020-05-08 NOTE — PROGRESS NOTES
Transitions of Care: 37% risk of re-admission      -Patient will discharge back to her fiance's home when medically stable, resume OP HD at [facility] Tuesday, Thursday, Saturday   -Patient will need Jh Mayo     The CM met with patient at bedside, PT/OT evaluations pending. The patient will need [facility] Medicaid/[facility] transportation upon discharge. The patient endorses that she will be going to her fiance's home upon discharge- [address] [facility] [address], [place] [address]. CM attempted to call her fiance, \"Squirrel\" (with patient's consent) and CM was not able to leave a voicemail ([phone]). Patient endorses that Squirrel assists her at home. CM called [facility] and notified staff of anticipated discharge on Saturday, patient to resume OP HD on Tuesday. CM faxed what clinical information was available, d/c summary to be faxed directly ([phone]) when available. VIVI Juan     14:59 p.m.- Patient noted to ambulate around the unit with PT with minimal assistance. Do not anticipate any therapy needs. ELY notes that patient asked PT about SNF placement at [facility]- the patient does not have any skilled needs at this time. This is not an appropriate placement- patient is staying with her fiance and per previous conversations with patient she plans to return there upon discharge. ELY spoke with Juliane, the patient's fiance- he confirmed the patient is staying with him currently, confirmed that she will return to above address. Weekend CM to establish [employer] transport back to [address] [place] [address].  VIVI Juan

## 2020-05-08 NOTE — PROGRESS NOTES
Hospitalist Progress Note  Dickson Olmos MD  Answering service: [phone] from in house phone        Date of Service:  2020  NAME:  Richard Galeano  :  1980  MRN:  [id number]  PCP: Terrie Lancaster MD    Chief Complaint:   Chief Complaint   Patient presents with   [facility] Low Blood Sugar         Admission Summary:     Richard Galeano is a 37 y.o. female who presented with hyperglycemia    Interval history / Subjective:   Patient seen for Follow up of chief complaint: Altered mental status  Follow up Frequent episodes of hypoglycemia,   Pt looks much better today, up in chair, no new episode of unresponsiveness  Will wean d5, and monitor her bg     Assessment & Plan:     1  acute encephalopathy, POA likely triggered by hypoglycemia given improvement after glucose given. Suspect exacerbated by cocaine use and possible infectious etiology. 2/2 Multiple hypoglycemia episodes  Patient also has gram-positive cocci bacteremia  ID consulted  Repeat blood cultures x2, start vancomycin  Continue ceftriaxone as well for possible UTI  CT head unremarkable. Mental status improving, avoid medication affect mentation including opiates.     2. IDDM complicated by severe frequent episodes of Hypoglycemia- likely due to poor po intake in the setting of Dm gastroparesis. - Check POC glucose every 2 hours, wean D5 and continue to monitor BG  - D10 PRN  - Hold all insulin until starts good diet     3. Malignant hypertension- suspect triggered by cocaine use. Improving  - Could be contributing to ams.   - avoid beta blockers  -  Will try hydralazine 10mg q6 hours prn for sbp >170  -continue Amlodipine     4. Hypothermia- unclear etiology.  Could be related to hypoglycemia and infection  - Continue with Rocephin 1gm IV every 24 hours and vancomycin  - Tested for COVID in ED ( elevated d-dimer, ferritin, LD)report pending  - On droplet and contact precautions  - beside AMS no other sx.     5. UTI - continue with rocephin as above  Recent positive urine culture with Klebsiella, continue ceftriaxone     6. Mildly elevated LFTs - could be from viral infection vs fluid overload  - No acute abd pain  - trend LFTs. If worsening transaminitis will obtain RUQ abdomen.     7. ESRD on HD- e-Thu-Sat. Nephrology consult noted, plan to resume hemodialysis as usual.    Code status: Full Code    DVT prophylaxis: Subcu heparin    Care Plan discussed with: Patient/Family    Disposition: TBD    Hospital Problems  Date Reviewed: 6/10/2018          Codes Class Noted POA    Gram-positive bacteremia ICD-10-CM: R78.81  ICD-9-CM: 790.7  2020 Unknown        Hypoglycemia ICD-10-CM: E16.2  ICD-9-CM: 251.2  2020 Unknown                Review of Systems:   Review of systems not obtained due to patient factors. Physical Examination:     General appearance: Chronically ill-appearing  Head: Normocephalic, atraumatic, eyes show clear conjunctiva  Lungs: Clear to auscultation bilaterally, no wheezing, no rhonchi  Heart: RRR, no murmurs or rubs  Abdomen: Soft, nontender, no guarding  Neurologic: awake, alert, moves all extremities       Vital Signs:    Last 24hrs VS reviewed since prior progress note. Most recent are:    Visit Vitals  /59 (BP 1 Location: Right arm, BP Patient Position: Sitting)   Pulse 94   Temp 97.5 °F (36.4 °C)   Resp 16   Ht 5' 2\" (1.575 m)   Wt 56.4 kg (124 lb 5.4 oz)   SpO2 100%   BMI 22.74 kg/m²         Intake/Output Summary (Last 24 hours) at 2020 1350  Last data filed at 2020 1231  Gross per 24 hour   Intake 1903.34 ml   Output 2600 ml   Net -696.66 ml        Tmax:  Temp (24hrs), Av.9 °F (36.6 °C), Min:97.5 °F (36.4 °C), Max:98.2 °F (36.8 °C)      Data Review:   Data reviewed by myself:  Ct Head Wo Cont    Result Date: 2020  INDICATION: AMS EXAM:  HEAD CT WITHOUT CONTRAST COMPARISON: May 1, 2020 TECHNIQUE:  Routine noncontrast axial head CT was performed. Sagittal and coronal reconstructions were generated. CT dose reduction was achieved through use of a standardized protocol tailored for this examination and automatic exposure control for dose modulation. FINDINGS: Ventricles: Midline, no hydrocephalus. Intracranial Hemorrhage: None. Brain Parenchyma/Brainstem: Normal for age. Basal Cisterns: Normal. Paranasal Sinuses: Visualized sinuses are clear. Additional Comments: N/A. IMPRESSION: No acute process. Ct Head Wo Cont    Result Date: 5/1/2020  EXAM:  CT HEAD WO CONT INDICATION:   AMS COMPARISON: CT head 10/4/2019. TECHNIQUE: Unenhanced CT of the head was performed using 5 mm images. Brain and bone windows were generated. CT dose reduction was achieved through use of a standardized protocol tailored for this examination and automatic exposure control for dose modulation. FINDINGS: The ventricles are normal in size and position. Basilar cisterns are patent. No midline shift. There is no evidence of acute infarct, hemorrhage, or extraaxial fluid collection. The paranasal sinuses, mastoid air cells, and middle ears are clear. The orbital contents are within normal limits with bilateral lens implants. There are no significant osseous or extracranial soft tissue lesions. IMPRESSION: 1. No evidence of acute intracranial abnormality. Xr Chest Port    Result Date: 5/5/2020  INDICATION: Altered mental status. Hypoglycemia. Portable AP semiupright view of the chest. Direct comparison made to prior chest x-ray dated May 2, 2020. Cardiomediastinal silhouette is stable. Lungs are hypoinflated, but grossly clear bilaterally. Pleural spaces are normal. Osseous structures are diffusely demineralized, but intact. IMPRESSION: No acute cardiopulmonary disease.      Xr Chest Port    Result Date: 5/2/2020  Clinical history: Central venous catheter placement INDICATION:   Central venous catheter placement COMPARISON: 5/1/2020 FINDINGS: AP portable upright view of the chest demonstrates a stable  cardiopericardial silhouette. The lungs are unchanged in overall appearance. . Interval left-sided central venous access catheter is in place. Catheter tip is in appropriate position for use. There is no associated pneumothorax. The osseous structures are unremarkable. Patient is on a cardiac monitor. Stable bilateral interstitial opacities. IMPRESSION: Central venous acces cathter in position for use. No other significant interval change. Xr Chest Port    Result Date: 5/1/2020  EXAM:  XR CHEST PORT INDICATION:   Central line pulled back COMPARISON: Chest radiograph 5/1/2020. FINDINGS: AP radiograph of the chest was obtained. Marked interval retraction of right IJ central venous catheter, which now lies very proximally in the right internal jugular vein. No pneumothorax. Unchanged pulmonary interstitial opacities. No significant pleural effusion. Stable cardiomediastinal silhouette. IMPRESSION: 1. Marked interval retraction of right IJ central venous catheter, which now lies very proximally in the right internal jugular vein. Recommend advancement. 2. Unchanged bilateral interstitial opacities, likely representing mild interstitial edema. Xr Chest Port    Result Date: 5/1/2020  INDICATION: Central line placement EXAM:  AP CHEST RADIOGRAPH COMPARISON: October 2019 FINDINGS: AP portable view of the chest demonstrates placement of a right IJ line with the tip over the junction of the right atrium with the IVC. There is no pneumothorax. There is bilateral hilar enlargement. Pulmonary vascular congestion is suspected. IMPRESSION: Right picc line tip over the junction of the right atrium and IVC. Consider retraction by approximately 9 cm. No pneumothorax. Pulmonary fluid overload.      Lab Results   Component Value Date/Time    Specimen Description: URINE 06/24/2013 08:00 PM    Specimen Description: URINE 06/17/2013 07:55 PM    Specimen Description: URINE 05/26/2013 10:10 AM Lab Results   Component Value Date/Time    Culture result: NO GROWTH AFTER 23 HOURS 05/07/2020 04:36 AM    Culture result: (A) 05/05/2020 01:16 PM     ALPHA STREPTOCOCCUS GROWING IN 1 OF 4 BOTTLES DRAWN (SITE = R FOREARM DRAWN 1310)    Culture result: (A) 05/05/2020 01:16 PM     PRELIMINARY REPORT OF GRAM POSITIVE COCCI IN CHAINS GROWING IN 1 OF 4 BOTTLES DRAWN CALLED TO AND READ BACK BY NAM BROWN RN,AT 3885 ON 5/6/20.      All Micro Results     Procedure Component Value Units Date/Time    CULTURE, BLOOD, PAIRED [[id number]] Collected:  05/07/20 0436    Order Status:  Completed Specimen:  Blood Updated:  05/08/20 0527     Special Requests: NO SPECIAL REQUESTS        Culture result: NO GROWTH AFTER 23 HOURS       CULTURE, BLOOD, PAIRED [[id number]]  (Abnormal) Collected:  05/05/20 1316    Order Status:  Completed Specimen:  Blood Updated:  05/07/20 0925     Special Requests: NO SPECIAL REQUESTS        Culture result:       ALPHA STREPTOCOCCUS GROWING IN 1 OF 4 BOTTLES DRAWN (SITE = [facility] DRAWN 1310)                  PRELIMINARY REPORT OF GRAM POSITIVE COCCI IN CHAINS GROWING IN 1 OF 4 BOTTLES DRAWN CALLED TO AND READ BACK BY NAM BROWN RN,AT 0181 ON 5/6/20.           CULTURE, URINE [[id number]]  (Abnormal)  (Susceptibility) Collected:  05/05/20 1213    Order Status:  Completed Specimen:  Urine from Clean catch Updated:  05/07/20 0833     Special Requests: NO SPECIAL REQUESTS        [place] Count --        >100,000  COLONIES/mL       Culture result: KLEBSIELLA PNEUMONIAE       RESPIRATORY Page Settle [[id number]] Collected:  05/05/20 1437    Order Status:  Completed Specimen:  Nasopharyngeal Updated:  05/05/20 2055     Adenovirus Not detected        Coronavirus 229E Not detected        Coronavirus HKU1 Not detected        Coronavirus CVNL63 Not detected        Coronavirus OC43 Not detected        Metapneumovirus Not detected        Rhinovirus and Enterovirus Not detected        Influenza A Not detected        Influenza A, subtype H1 Not detected        Influenza A, subtype H3 Not detected        INFLUENZA A H1N1 PCR Not detected        Influenza B Not detected        Parainfluenza 1 Not detected        Parainfluenza 2 Not detected        Parainfluenza 3 Not detected        Parainfluenza virus 4 Not detected        RSV by PCR Not detected        B. parapertussis, PCR Not detected        Bordetella pertussis - PCR Not detected        Chlamydophila pneumoniae DNA, QL, PCR Not detected        Mycoplasma pneumoniae DNA, QL, PCR Not detected       CULTURE, URINE [[id number]]     Order Status:  Canceled Specimen:  Urine from Clean catch     URINE CULTURE HOLD SAMPLE [[id number]] Collected:  05/05/20 1213    Order Status:  Completed Specimen:  Serum Updated:  05/05/20 1240     Urine culture hold       Urine on hold in Microbiology dept for 2 days. If unpreserved urine is submitted, it cannot be used for addtional testing after 24 hours, recollection will be required. CULTURE, BLOOD [[id number]]     Order Status:  Canceled Specimen:  Blood           Labs: reviewed by myself. Recent Labs     05/08/20 0518 05/07/20 0427   WBC 3.0* 4.5   HGB 8.7* 8.8*   HCT 27.6* 28.5*   * 115*     Recent Labs     05/08/20 0518 05/07/20 0427 05/06/20  0206   * 138 140   K 4.3 4.0 3.3*    107 108   CO2 29 21 19*   BUN 18 51* 48*   CREA 1.89* 3.83* 3.69*   * 270* 159*   CA 7.0* 6.6* 7.1*     Recent Labs     05/06/20  0206   SGOT 35   ALT 33      TBILI 0.2   TP 6.2*   ALB 2.5*   GLOB 3.7     No results for input(s): INR, PTP, APTT, INREXT, INREXT in the last 72 hours. No results for input(s): FE, TIBC, PSAT, FERR in the last 72 hours. Lab Results   Component Value Date/Time    Folate 33.8 (H) 05/07/2018 11:20 AM      No results for input(s): PH, PCO2, PO2 in the last 72 hours. No results for input(s): CPK, CKNDX, TROIQ in the last 72 hours.     No lab exists for component: CPKMB  Lab Results   Component Value Date/Time    Cholesterol, total 135 05/02/2020 05:15 AM    HDL Cholesterol 56 05/02/2020 05:15 AM    LDL, calculated 60.8 05/02/2020 05:15 AM    Triglyceride 91 05/02/2020 05:15 AM    CHOL/HDL Ratio 2.4 05/02/2020 05:15 AM     Lab Results   Component Value Date/Time    Glucose (POC) 318 (H) 05/08/2020 12:27 PM    Glucose (POC) 326 (H) 05/08/2020 10:54 AM    Glucose (POC) 181 (H) 05/08/2020 08:05 AM    Glucose (POC) 247 (H) 05/08/2020 05:06 AM    Glucose (POC) 82 05/07/2020 10:59 PM     Lab Results   Component Value Date/Time    Color YELLOW/STRAW 05/05/2020 12:13 PM    Appearance CLOUDY (A) 05/05/2020 12:13 PM    Specific gravity 1.010 05/05/2020 12:13 PM    Specific gravity 1.013 05/02/2020 03:11 AM    pH (UA) 6.5 05/05/2020 12:13 PM    Protein 100 (A) 05/05/2020 12:13 PM    Glucose Negative 05/05/2020 12:13 PM    Ketone Negative 05/05/2020 12:13 PM    Bilirubin Negative 05/05/2020 12:13 PM    Urobilinogen 0.2 05/05/2020 12:13 PM    Nitrites Negative 05/05/2020 12:13 PM    Leukocyte Esterase LARGE (A) 05/05/2020 12:13 PM    Epithelial cells FEW 05/05/2020 12:13 PM    Bacteria 4+ (A) 05/05/2020 12:13 PM    WBC 20-50 05/05/2020 12:13 PM    RBC 10-20 05/05/2020 12:13 PM         Medications Reviewed:     Current Facility-Administered Medications   Medication Dose Route Frequency    cloNIDine HCL (CATAPRES) tablet 0.1 mg  0.1 mg Oral TID    venlafaxine (EFFEXOR) tablet 75 mg  75 mg Oral BID WITH MEALS    QUEtiapine (SEROquel) tablet 50 mg  50 mg Oral BID    epoetin bernard-epbx (RETACRIT) injection 10,000 Units  10,000 Units SubCUTAneous DIALYSIS TUE, THU & SAT    dextrose 5% infusion  25 mL/hr IntraVENous CONTINUOUS    traZODone (DESYREL) tablet 50 mg  50 mg Oral QHS    guaiFENesin ER (MUCINEX) tablet 600 mg  600 mg Oral Q12H    amLODIPine (NORVASC) tablet 10 mg  10 mg Oral DAILY    glucose chewable tablet 16 g  4 Tab Oral PRN    glucagon (GLUCAGEN) injection 1 mg  1 mg IntraMUSCular PRN    dextrose 10% infusion 0-250 mL  0-250 mL IntraVENous PRN    cefTRIAXone (ROCEPHIN) 1 g in 0.9% sodium chloride (MBP/ADV) 50 mL  1 g IntraVENous Q24H    Vancomycin- Pharmacy Dosing   Other Rx Dosing/Monitoring    [Held by provider] insulin lispro (HUMALOG) injection 8 Units  8 Units SubCUTAneous TIDAC    [Held by provider] insulin NPH (NOVOLIN N, HUMULIN N) injection 5 Units  5 Units SubCUTAneous ACB&D    albuterol (PROVENTIL VENTOLIN) nebulizer solution 2.5 mg  2.5 mg Nebulization Q4H PRN    sodium chloride (NS) flush 5-10 mL  5-10 mL IntraVENous PRN    acetaminophen (TYLENOL) tablet 650 mg  650 mg Oral Q6H PRN    Or    acetaminophen (TYLENOL) suppository 650 mg  650 mg Rectal Q6H PRN    hydrALAZINE (APRESOLINE) 20 mg/mL injection 10 mg  10 mg IntraVENous Q6H PRN    heparin (porcine) injection 5,000 Units  5,000 Units SubCUTAneous Q8H     ______________________________________________________________________  EXPECTED LENGTH OF STAY: 3d 21h  ACTUAL LENGTH OF STAY:          2                 Lakeisha Guerra MD     Patient's emergency contacts:  Extended Emergency Contact Information  Primary Emergency Contact: Mignon Rich  Address: [address]  Home Phone: [phone]  Mobile Phone: [phone]  Relation: Father   needed?  No

## 2020-05-08 NOTE — PROGRESS NOTES
Nephrology Progress Note  Talah Perez  Date of Admission : 5/5/2020    CC: Follow up for ESRD       Assessment and Plan     ESRD- HD : dialyzes TTS at [facility]   Encephalopathy   Hypoglycemia  COVID-19 neg  Strep bacteremia - likely contaminant,repeat neg  Klebsiella UTI  Anemia in CKD   Sec HPTH   Type II DM       Plan:  HD tomorrow if here  Wean off dextrose if able  SANTOS w/ HD  Cont current BP meds       Interval History:  Seen and examined. Feeling better this AM.  On dextrose infusion for hypoglycemia. No cp or sob reported. Current Medications: all current  Medications have been eviewed in EPIC  Review of Systems: Pertinent items are noted in HPI. Objective:  Vitals:    Vitals:    05/07/20 2304 05/08/20 0045 05/08/20 0437 05/08/20 0744   BP:   143/69 141/76   Pulse:   88 94   Resp:   22 18   Temp: 98.2 °F (36.8 °C)  97.9 °F (36.6 °C) 98.1 °F (36.7 °C)   TempSrc:       SpO2:   100% 100%   Weight:  56.4 kg (124 lb 5.4 oz)     Height:         Intake and Output:  No intake/output data recorded. 05/06 1901 - 05/08 0700  In: 1905.8 [P.O.:1080; I.V.:825.8]  Out: 3100 [Urine:1100]    Physical Examination:  Pt intubated     No   General: NAD  Resp:  Stable O2, no distress, lungs clear  CV:  RRR on monitor, no LE edema   ABD:               Soft, NT, + bowel sounds  Neurologic:  nonfocal  Access:           LUE AVF +thrill/bruit    [x]    High complexity decision making was performed  []    Patient is at high-risk of decompensation with multiple organ involvement    Lab Data Personally Reviewed: I have reviewed all the pertinent labs, microbiology data and radiology studies during assessment.     Recent Labs     05/08/20  0518 05/07/20  0427 05/06/20  0206 05/05/20  1316   * 138 140 144   K 4.3 4.0 3.3* 3.4*    107 108 103   CO2 29 21 19* 23   * 270* 159* 71   BUN 18 51* 48* 50*   CREA 1.89* 3.83* 3.69* 4.12*   CA 7.0* 6.6* 7.1* 8.1*   ALB  --   --  2.5* 3.9   SGOT  --   --  35 56*   ALT  --   --  33 50     Recent Labs     05/08/20  0518 05/07/20  0427 05/06/20  0206   WBC 3.0* 4.5 5.2   HGB 8.7* 8.8* 11.2*   HCT 27.6* 28.5* 37.0   * 115* 109*     Lab Results   Component Value Date/Time    Specimen Description: URINE 06/24/2013 08:00 PM    Specimen Description: URINE 06/17/2013 07:55 PM    Specimen Description: URINE 05/26/2013 10:10 AM     Lab Results   Component Value Date/Time    Culture result: NO GROWTH AFTER 23 HOURS 05/07/2020 04:36 AM    Culture result: (A) 05/05/2020 01:16 PM     ALPHA STREPTOCOCCUS GROWING IN 1 OF 4 BOTTLES DRAWN (SITE = [facility] DRAWN 1310)    Culture result: (A) 05/05/2020 01:16 PM     PRELIMINARY REPORT OF GRAM POSITIVE COCCI IN CHAINS GROWING IN 1 OF 4 BOTTLES DRAWN CALLED TO AND READ BACK BY NAM BROWN RN,AT 0603 ON 5/6/20. RC     Recent Results (from the past 24 hour(s))   GLUCOSE, POC    Collection Time: 05/07/20 11:02 AM   Result Value Ref Range    Glucose (POC) 54 (L) 65 - 100 mg/dL    Performed by Laury Harmon, POC    Collection Time: 05/07/20 11:17 AM   Result Value Ref Range    Glucose (POC) 91 65 - 100 mg/dL    Performed by [address] [place] [address], POC    Collection Time: 05/07/20  1:15 PM   Result Value Ref Range    Glucose (POC) 45 (LL) 65 - 100 mg/dL    Performed by [address] [place] [address],  [address], POC    Collection Time: 05/07/20  1:36 PM   Result Value Ref Range    Glucose (POC) 320 (H) 65 - 100 mg/dL    Performed by [address] [place] [address], POC    Collection Time: 05/07/20  2:41 PM   Result Value Ref Range    Glucose (POC) 55 (L) 65 - 100 mg/dL    Performed by Laury Harmon, POC    Collection Time: 05/07/20  2:57 PM   Result Value Ref Range    Glucose (POC) 116 (H) 65 - 100 mg/dL    Performed by [address] [place] [address], POC    Collection Time: 05/07/20  4:44 PM   Result Value Ref Range    Glucose (POC) 100 65 - 100 mg/dL    Performed by [id number] [facility], POC    Collection Time: 05/07/20 7:17 PM   Result Value Ref Range    Glucose (POC) 136 (H) 65 - 100 mg/dL    Performed by ADEEL SHAQ    GLUCOSE, POC    Collection Time: 05/07/20  9:04 PM   Result Value Ref Range    Glucose (POC) 251 (H) 65 - 100 mg/dL    Performed by ADEEL MAICA    GLUCOSE, POC    Collection Time: 05/07/20 10:59 PM   Result Value Ref Range    Glucose (POC) 82 65 - 100 mg/dL    Performed by ADEEL NEW    GLUCOSE, POC    Collection Time: 05/08/20  5:06 AM   Result Value Ref Range    Glucose (POC) 247 (H) 65 - 100 mg/dL    Performed by Rinku Healy    METABOLIC PANEL, BASIC    Collection Time: 05/08/20  5:18 AM   Result Value Ref Range    Sodium 135 (L) 136 - 145 mmol/L    Potassium 4.3 3.5 - 5.1 mmol/L    Chloride 103 97 - 108 mmol/L    CO2 29 21 - 32 mmol/L    Anion gap 3 (L) 5 - 15 mmol/L    Glucose 266 (H) 65 - 100 mg/dL    BUN 18 6 - 20 MG/DL    Creatinine 1.89 (H) 0.55 - 1.02 MG/DL    BUN/Creatinine ratio 10 (L) 12 - 20      GFR est AA 36 (L) >60 ml/min/1.73m2    GFR est non-AA 29 (L) >60 ml/min/1.73m2    Calcium 7.0 (L) 8.5 - 10.1 MG/DL   CBC WITH AUTOMATED DIFF    Collection Time: 05/08/20  5:18 AM   Result Value Ref Range    WBC 3.0 (L) 3.6 - 11.0 K/uL    RBC 3.28 (L) 3.80 - 5.20 M/uL    HGB 8.7 (L) 11.5 - 16.0 g/dL    HCT 27.6 (L) 35.0 - 47.0 %    MCV 84.1 80.0 - 99.0 FL    MCH 26.5 26.0 - 34.0 PG    MCHC 31.5 30.0 - 36.5 g/dL    RDW 14.5 11.5 - 14.5 %    PLATELET 031 (L) 431 - 400 K/uL    MPV ABNORMAL 8.9 - 12.9 FL    NRBC 0.0 0  WBC    ABSOLUTE NRBC 0.00 0.00 - 0.01 K/uL    NEUTROPHILS 55 32 - 75 %    LYMPHOCYTES 34 12 - 49 %    MONOCYTES 10 5 - 13 %    EOSINOPHILS 1 0 - 7 %    BASOPHILS 0 0 - 1 %    IMMATURE GRANULOCYTES 0 0.0 - 0.5 %    ABS. NEUTROPHILS 1.6 (L) 1.8 - 8.0 K/UL    ABS. LYMPHOCYTES 1.0 0.8 - 3.5 K/UL    ABS. MONOCYTES 0.3 0.0 - 1.0 K/UL    ABS. EOSINOPHILS 0.0 0.0 - 0.4 K/UL    ABS. BASOPHILS 0.0 0.0 - 0.1 K/UL    ABS. IMM.  GRANS. 0.0 0.00 - 0.04 K/UL    DF AUTOMATED     GLUCOSE, POC Collection Time: 05/08/20  8:05 AM   Result Value Ref Range    Glucose (POC) 181 (H) 65 - 100 mg/dL    Performed by AXEL KRISHNA            Total time spent with patient:  xxx   min. Care Plan discussed with:  Patient     Family      RN      Consulting Physician [facility],         I have reviewed the flowsheets. Chart and Pertinent Notes have been reviewed. No change in PMH ,family and social history from Consult note. Lydia Whaley MD  [address]  Phone - [phone]   Fax - [phone]  [email or web address]

## 2020-05-08 NOTE — PROGRESS NOTES
Problem: Mobility Impaired (Adult and Pediatric)  Goal: *Acute Goals and Plan of Care (Insert Text)  Description: FUNCTIONAL STATUS PRIOR TO ADMISSION: Patient was modified independent using a rollator, rolling walker and single point cane for functional mobility. Pt reports she uses home O2 though CM has confirmed this is not the case. HOME SUPPORT PRIOR TO ADMISSION: The patient lived with fiance and father. Able to complete all ADLs. Physical Therapy Goals  Initiated 5/8/2020  1. Patient will perform sit to stand with independence within 7 day(s). 2.  Patient will ambulate with modified independence for 350 feet with the least restrictive device within 7 day(s). 3.  Patient will ascend/descend 2 stairs with 1 handrail(s) with modified independence within 7 day(s). Outcome: Progressing Towards Goal   PHYSICAL THERAPY EVALUATION  Patient: Eric Fonseca (51 y.o. female)  Date: 5/8/2020  Primary Diagnosis: Hypoglycemia [E16.2]  Hypoglycemia [E16.2]  Gram-positive bacteremia [R78.81]        Precautions:          ASSESSMENT  Based on the objective data described below, the patient presents with functional mobility near baseline following admission for hypoglycemia from home. Pt with a questionable history, reports she uses 2L/min supplemental O2 though CM reports she has not had it in years. Pt perseverative on staff losing her belongings and requesting the  purchase her clothes, hair gel and a comb. Pt requesting multiple pieces of DME for the home to include a tub transfer bench, new rollator, a second ramp and a step to get into a higher bed. Pt informed at most we would be able to order one piece of DME if she has not received any in the last 5 years. Pt overall ambulatory in [facility] with CGA-SBA. In total ambulated 300+ feet with only mild path deviations and occasional missteps with L foot(hx s/p toe amputations). O2 saturations 100% on RA before and after mobility.  Pt does not necessarily require additional acute therapy though when mentioning signing off pt becoming upset and stating she has many deficits. Pt wishes to go to [facility] for SNF rehab. This is not an appropriate option as pt has no skilled therapy needs. May benefit from [address] as pt reports fall history and limited help at home. Current Level of Function Impacting Discharge (mobility/balance): SBA for mobility    Functional Outcome Measure: The patient scored 23/28 on the Tinetti outcome measure which is indicative of moderate fall risk. Other factors to consider for discharge: psych history?, drug use     Patient will benefit from skilled therapy intervention to address the above noted impairments. PLAN :  Recommendations and Planned Interventions: bed mobility training, transfer training, gait training, therapeutic exercises, neuromuscular re-education, patient and family training/education, and therapeutic activities      Frequency/Duration: Patient will be followed by physical therapy:  3 times a week to address goals. Recommendation for discharge: (in order for the patient to meet his/her long term goals)  Physical therapy at least 2 days/week in the home vs none    This discharge recommendation:  Has been made in collaboration with the attending provider and/or case management    IF patient discharges home will need the following DME: patient owns DME required for discharge         SUBJECTIVE:   Patient stated I need to talk to the . I need her to get my some hair gel and I also asked for hand . I can write down the kind I need.     OBJECTIVE DATA SUMMARY:   HISTORY:    Past Medical History:   Diagnosis Date    ARF (acute renal failure) ([facility] [address] requiring dialysis 2011    Asthma     CKD (chronic kidney disease)     Diabetes ([facility] [address]     Fibromyalgia     Gastroparesis 2010    Gastric Pacer- REMOVED 07/2015    GERD (gastroesophageal reflux disease)     Hypertension Narcotic dependence (HCC)     THU (obstructive sleep apnea)     wears 2 LPM oxygen at night    Other ill-defined conditions(799.89)     Polycystic ovarian syndrome     Seizures (HCC)     Sickle cell trait ([facility]     Thromboembolus ([facility] to her left arm and was told she had one in left leg recently     Past Surgical History:   Procedure Laterality Date    HX AMPUTATION FOOT  04/2018    left foot    HX CATARACT REMOVAL  3/5/12    right    HX DILATION AND CURETTAGE      ablation    HX GASTRIC BYPASS  2015    HX GI      j tube placement and removal    HX OTHER SURGICAL  2010    Gastric Pacer- REMOVED 07/2015    HX VASCULAR ACCESS      gray cath rt subclavian; removed     HX VASCULAR ACCESS      HD access right thigh; stopped working    IR INSERT NON TUNL CVC OVER 5 YRS  10/4/2019       Personal factors and/or comorbidities impacting plan of care: seizures, positive drug screen, HD, sickle cell trait    Home Situation  Home Environment: Private residence  # Steps to Enter: 1  Rails to Enter: Yes  Wheelchair Ramp: Yes  One/Two Story Residence: One story  Living Alone: No  Support Systems: Family member(s), Friends \ neighbors  Patient Expects to be Discharged to[de-identified] Private residence  Current DME Used/Available at Home: Cane, straight, Raised toilet seat, Shower chair, Walker, rolling, Walker, rollator  Tub or Shower Type: Tub/Shower combination    EXAMINATION/PRESENTATION/DECISION MAKING:   Critical Behavior:  Neurologic State: Alert  Orientation Level: Oriented X4  Cognition: Follows commands, Appropriate for age attention/concentration     Hearing:   Auditory  Auditory Impairment: None  Skin:    Edema:   Range Of Motion:  AROM: Generally decreased, functional                       Strength:    Strength: Generally decreased, functional                    Tone & Sensation:   Tone: Normal              Sensation: Impaired(LLE impaired)               Coordination:  Coordination: Within functional limits  Vision: Functional Mobility:  Bed Mobility:  Rolling: Independent  Supine to Sit: Independent  Sit to Supine: Independent     Transfers:  Sit to Stand: Stand-by assistance  Stand to Sit: Stand-by assistance        Bed to Chair: Stand-by assistance              Balance:   Sitting: Intact  Standing: Impaired  Standing - Static: Good  Standing - Dynamic : Fair  Ambulation/Gait Training:  Distance (ft): 325 Feet (ft)  Assistive Device: Gait belt  Ambulation - Level of Assistance: Contact guard assistance;Stand-by assistance        Gait Abnormalities: Decreased step clearance        Base of Support: Widened  Stance: Left decreased  Speed/Ana Luisa: Pace decreased (<100 feet/min)  Step Length: Right shortened;Left shortened  Swing Pattern: Right asymmetrical               Functional Measure:  Tinetti test:    Sitting Balance: 1  Arises: 1  Attempts to Rise: 2  Immediate Standing Balance: 2  Standing Balance: 1  Nudged: 1  Eyes Closed: 1  Turn 360 Degrees - Continuous/Discontinuous: 1  Turn 360 Degrees - Steady/Unsteady: 1  Sitting Down: 1  Balance Score: 12 Balance total score  Indication of Gait: 1  R Step Length/Height: 1  L Step Length/Height: 1  R Foot Clearance: 1  L Foot Clearance: 1  Step Symmetry: 1  Step Continuity: 1  Path: 1  Trunk: 2  Walking Time: 1  Gait Score: 11 Gait total score  Total Score: 23/28 Overall total score         Tinetti Tool Score Risk of Falls  <19 = High Fall Risk  19-24 = Moderate Fall Risk  25-28 = Low Fall Risk  Tinetti ME. Performance-Oriented Assessment of Mobility Problems in Elderly Patients. Emmanuel 66; [id number].  (Scoring Description: PT Bulletin Feb. 10, 1993)    Older adults: William Serra et al, 2009; n = 1000 [facility] elderly evaluated with ABC, LILLY, ADL, and IADL)  · Mean LILLY score for males aged 69-68 years = 26.21(3.40)  · Mean LILLY score for females age 69-68 years = 25.16(4.30)  · Mean LILLY score for males over 80 years = 23.29(6.02)  · Mean LILLY score for females over 80 years = 17.20(8.32)            Physical Therapy Evaluation Charge Determination   History Examination Presentation Decision-Making   MEDIUM  Complexity : 1-2 comorbidities / personal factors will impact the outcome/ POC  MEDIUM Complexity : 3 Standardized tests and measures addressing body structure, function, activity limitation and / or participation in recreation  LOW Complexity : Stable, uncomplicated  Other outcome measures Tinetti  LOW       Based on the above components, the patient evaluation is determined to be of the following complexity level: LOW       Activity Tolerance:   Good  Please refer to the flowsheet for vital signs taken during this treatment. After treatment patient left in no apparent distress:   Sitting in chair and Call bell within reach    COMMUNICATION/EDUCATION:   The patients plan of care was discussed with: Registered nurse. Fall prevention education was provided and the patient/caregiver indicated understanding., Patient/family have participated as able in goal setting and plan of care. , and Patient/family agree to work toward stated goals and plan of care.     Thank you for this referral.  Davion Bradley, PT   Time Calculation: 33 mins

## 2020-05-08 NOTE — PROGRESS NOTES
0730: Bedside and Verbal shift change report given to Tom, [facility] (oncoming nurse) by Eveline Apley, RN (offgoing nurse). Report included the following information SBAR, Kardex, Intake/Output, MAR, Accordion, Recent Results and Cardiac Rhythm NSR.     1231: D5 infusion stopped per hospitalist due to no BG under 200 so far today. Will continue to monitor    1930: Bedside and Verbal shift change report given to Misha Vidal RN (oncoming nurse) by Jaun Larios (offgoing nurse). Report included the following information SBAR, Kardex, Intake/Output, MAR, Accordion, Recent Results and Cardiac Rhythm NSR. Problem: Falls - Risk of  Goal: *Absence of Falls  Description: Document Albert Brothers Fall Risk and appropriate interventions in the flowsheet. Outcome: Progressing Towards Goal  Note: Fall Risk Interventions:  Mobility Interventions: Bed/chair exit alarm, Patient to call before getting OOB    Mentation Interventions: Bed/chair exit alarm, Door open when patient unattended, Evaluate medications/consider consulting pharmacy, More frequent rounding, Room close to nurse's station, Update white board    Medication Interventions: Bed/chair exit alarm, Evaluate medications/consider consulting pharmacy, Patient to call before getting OOB    Elimination Interventions: Bed/chair exit alarm, Call light in reach, Patient to call for help with toileting needs, Toileting schedule/hourly rounds              Problem: Patient Education: Go to Patient Education Activity  Goal: Patient/Family Education  Outcome: Progressing Towards Goal     Problem: Discharge Planning  Goal: *Discharge to safe environment  Outcome: Progressing Towards Goal     Problem: Patient Education: Go to Patient Education Activity  Goal: Patient/Family Education  Outcome: Progressing Towards Goal     Problem: Pressure Injury - Risk of  Goal: *Prevention of pressure injury  Description: Document Antonio Scale and appropriate interventions in the flowsheet.   Outcome: Progressing Towards Goal  Note: Pressure Injury Interventions:  Sensory Interventions: Assess changes in LOC, Float heels, Maintain/enhance activity level, Minimize linen layers    Moisture Interventions: Absorbent underpads, Minimize layers    Activity Interventions: Increase time out of bed, Pressure redistribution bed/mattress(bed type)    Mobility Interventions: Assess need for specialty bed, Pressure redistribution bed/mattress (bed type)    Nutrition Interventions: Document food/fluid/supplement intake                     Problem: Nutrition Deficit  Goal: *Optimize nutritional status  Outcome: Progressing Towards Goal     Problem: Patient Education: Go to Patient Education Activity  Goal: Patient/Family Education  Outcome: Progressing Towards Goal

## 2020-05-08 NOTE — PROGRESS NOTES
ID Progress Note  2020    Subjective:     She is doing better today--less sickle pain    Objective:     Antibiotics:  1. Ceftriaxone    2. Vancomycin      Vitals:   Visit Vitals  /87 (BP 1 Location: Right arm, BP Patient Position: Sitting)   Pulse 99   Temp 97.9 °F (36.6 °C)   Resp 16   Ht 5' 2\" (1.575 m)   Wt 56.4 kg (124 lb 5.4 oz)   SpO2 100%   BMI 22.74 kg/m²        Tmax:  Temp (24hrs), Av °F (36.7 °C), Min:97.5 °F (36.4 °C), Max:98.2 °F (36.8 °C)      Exam:  Lungs:  clear to auscultation bilaterally  Heart:  regular rate and rhythm  Abdomen:  soft, non-tender. Bowel sounds normal. No masses,  no organomegaly  Skin:  no rash or abnormalities    Labs:      Recent Labs     20  0518 20  0427 20  0206   WBC 3.0* 4.5 5.2   HGB 8.7* 8.8* 11.2*   * 115* 109*   BUN 18 51* 48*   CREA 1.89* 3.83* 3.69*   SGOT  --   --  35   AP  --   --  108   TBILI  --   --  0.2       Cultures:     Lab Results   Component Value Date/Time    Specimen Description: URINE 2013 08:00 PM    Specimen Description: URINE 2013 07:55 PM    Specimen Description: URINE 2013 10:10 AM     Lab Results   Component Value Date/Time    Culture result: NO GROWTH AFTER 23 HOURS 2020 04:36 AM    Culture result: (A) 2020 01:16 PM     ALPHA STREPTOCOCCUS GROWING IN 1 OF 4 BOTTLES DRAWN (SITE = R FOREARM DRAWN 1310)    Culture result: (A) 2020 01:16 PM     PRELIMINARY REPORT OF GRAM POSITIVE COCCI IN CHAINS GROWING IN 1 OF 4 BOTTLES DRAWN CALLED TO AND READ BACK BY NAM BROWN RN,AT 5910 ON 20.        Radiology:     Line/Insert Date:           Assessment:     1. UTI  2. Sickle cell  3. ESRD  4. Positive blood culture--likely contaminant     Objective:     1.  Home on oral cephalexin for 5 more days if discharged over the weekend    Camden Mobley MD

## 2020-05-08 NOTE — DIABETES MGMT
SHRUT[facility]OLDER  CLINICAL NURSE SPECIALIST CONSULT  [facility]    Follow up NOTE    Presentation   Otto Scherer is a 37 y.o. female with a PMH of ESRD on HD, fibromyalgia, PCOS, HTN, DM s/p left foot amputation, gastroparesis s/p gastric bypass, did have a gastric pacer but that was removed in 2015,  narcotic abuse who was found unresponsive at home and noted to have a blood glucose of 40- Dextrose fluids were given by EMS. In the ED she was found to be hypothermic, hypotensive with a blood glucose of 20. Last ED admission was on 5/2 for unresponsiveness (narcan given with no response, glucose 200s, concerns for seizure activity, UDS negative) once she was alert and oriented, she left AMA. Recent Events:   No hypoglycemia overnight. All insulins were held yesterday due to  Hypoglycemia events yesterday morning. IVF with dextrose infusing since yesterday @50cc/hr, now stopped. BG checks q2hrs. Diabetes: Patient has known Type 1 diabetes, diagnosed at age 14 and treated with Novolin N and Humalog PTA. Family history positive for diabetes. Consulted by Provider for advanced diabetes nursing assessment and care, specifically related to     [x] Inpatient management strategy  [x] Home management assessment    Diabetes-related medical history  Acute complications  Recurrent hypoglycemia  Neurological complications  Hypoglycemia unawareness, Gastroparesis and Peripheral neuropathy  Microvascular disease  Retinopathy and Nephropathy  Macrovascular disease  Foot wounds    Diabetes medication history  Drug class Currently in use Discontinued Never used   Biguanide      DDP-4 inhibitor       Sulfonylurea      Thiazolidinedione      GLP-1 RA      SGLT-2 inhibitors      Basal insulin NPH 8 units each night     Bolus insulin Humalog 3-6 units with meals       Subjective   Per RN, she had dextrose in her fluids since yesterday running @50cc/hr to keep BG up.   Dextrose fluids stopped this afternoon since BG has been stable. Although she has staff bring her multiple things to eat she plays/picks at the food and eats very little. Enrique Johnson is a 37year old female with a PMH of poorly controlled type one diabetes, diagnosed at age 14. Her diabetes associated complications include bilateral diabetic food wounds, left foot osteomyelitis with BKA; nephropathy with ESRD on Dialysis, retinopathy. During examination, patient had a very difficult time describing her diabetes related care and history. She reports that her boyfriend is involved in her care by obtaining blood glucose readings and administering insulin. She does report that she has accidentally given herself \"too much insulin\" when she was not wearing her glasses and had low blood glucose values. Last A1C from 10/19 is suprisingly low- 7.9%. A1C values range throughout the year from 7.9% to over 16%. Verbalizes cocaine use intermittently to \"help with her pain\". Denies using cocaine this week, despite the positive UDS. Eating pattern  [x] Breakfast Eggs, toast, sausage  [x] Lunch  Ham and cheese sandwich  [x] Dinner  Baked chicken or fish  [x] Beverages Hot tea with sugar, diet soda  Physical activity pattern  None  Monitoring pattern  Performed by patient's boyfriend 2-3 times daily. Patient unaware of results. Did note that she is concerned about lows at night. Taking medications pattern  [x] Consistent administration  [x] Affordable      Objective   Physical exam  General After D10 given patient more alert, and talking ; requesting food. Vital Signs   Visit Vitals  /59 (BP 1 Location: Right arm, BP Patient Position: Sitting)   Pulse 94   Temp 97.5 °F (36.4 °C)   Resp 16   Ht 5' 2\" (1.575 m)   Wt 56.4 kg (124 lb 5.4 oz)   SpO2 100%   BMI 22.74 kg/m²     Skin  Warm and dry. Acanthosis noted along neckline. No lipohypertrophy or lipoatrophy noted at injection sites   Heart   Regular rate and rhythm.  No murmurs, rubs or gallops  Lungs  Clear to auscultation without rales or rhonchi  Extremities No foot wounds    Diabetic foot exam:    Left Foot     Left BKA  Right Foot   Visual Exam: callous - Small ulcer noted on the ball of foot near pinky toe; pinpoint dark ulcer noted on the bottom of mid foot and ulcer- see above   Pulse DP: 1+ (weak)   Filament test: absent sensation    Vibratory sensation: absent DP & PT pulses +2. Laboratory  Lab Results   Component Value Date/Time    Hemoglobin A1c 7.9 (H) 10/04/2019 02:49 PM     Lab Results   Component Value Date/Time    LDL, calculated 60.8 05/02/2020 05:15 AM     Lab Results   Component Value Date/Time    Creatinine (POC) 3.5 (H) 09/06/2019 10:25 AM    Creatinine 1.89 (H) 05/08/2020 05:18 AM     Lab Results   Component Value Date/Time    Sodium 135 (L) 05/08/2020 05:18 AM    Potassium 4.3 05/08/2020 05:18 AM    Chloride 103 05/08/2020 05:18 AM    CO2 29 05/08/2020 05:18 AM    Anion gap 3 (L) 05/08/2020 05:18 AM    Glucose 266 (H) 05/08/2020 05:18 AM    BUN 18 05/08/2020 05:18 AM    Creatinine 1.89 (H) 05/08/2020 05:18 AM    BUN/Creatinine ratio 10 (L) 05/08/2020 05:18 AM    GFR est AA 36 (L) 05/08/2020 05:18 AM    GFR est non-AA 29 (L) 05/08/2020 05:18 AM    Calcium 7.0 (L) 05/08/2020 05:18 AM    Bilirubin, total 0.2 05/06/2020 02:06 AM    AST (SGOT) 35 05/06/2020 02:06 AM    Alk.  phosphatase 108 05/06/2020 02:06 AM    Protein, total 6.2 (L) 05/06/2020 02:06 AM    Albumin 2.5 (L) 05/06/2020 02:06 AM    Globulin 3.7 05/06/2020 02:06 AM    A-G Ratio 0.7 (L) 05/06/2020 02:06 AM    ALT (SGPT) 33 05/06/2020 02:06 AM     Lab Results   Component Value Date/Time    ALT (SGPT) 33 05/06/2020 02:06 AM       Factors affecting BG pattern  Factor Dose Comments   Nutrition:  Carb-controlled meals   60 grams/meal Very hungry, insistent on snacks and additional meals   Drugs:  Cocaine use  + UDS   Pain     Infection UTI and possible blood stream infection    Gastroparesis  Delayed gastric empyting ESRD on Dialysis HD 3x week      Blood glucose pattern        Assessment and Plan   Nursing Diagnosis Risk for unstable blood glucose pattern   Nursing Intervention Domain 2479 Decision-making Support   Nursing Interventions Examined current inpatient diabetes control   Explored factors facilitating and impeding inpatient management  Identified self-management practices impeding diabetes control  Explored corrective strategies with patient and responsible inpatient provider   Informed patient of rational for insulin strategy while hospitalized     Evaluation   Germán Cardoza is a 37 y.o. female with a PMH of ESRD on HD, fibromyalgia, PCOS, HTN, DM s/p left foot amputation, gastroparesis s/p gastric bypass, did have a gastric pacer but that was removed in 2015,  narcotic abuse who was found unresponsive at home and noted to have a blood glucose of 40- Dextrose fluids were given by EMS. Her medical history is quite complex which largely impacts her blood glucose management. Her insight and poor judgement certainly complicates both inpatient and outpatient glucose management. Tight glycemic control is not recommended as there is a high risk for hypoglycemia. If she continues to have erratic eating patterns, she will have pre-prandial hyperglycemia. The utilization of sliding scale insulin on top of basal insulin puts her at risk for hypoglycemia. Inpatient blood glucose management has been impacted by  [x] Kidney dysfunction  [x] Erratic meal consumption  [x] Gastroparesis  [x]  UTI bacteremia       Recommendations   Recommend:  1. Continue to HOLD all insulins-    3. Renal/Carbohydrate consistent diet    4. Continue Q2hr BG checks. Discharge Planning:   Would recommend decreasing home NPH dose   Discontinue mealtime coverage as this contributes to her low BG since she is a poor eater.     Billing Code(s)     [x]  [address], CNS  [facility]  Access via [employer] & [phone]

## 2020-05-08 NOTE — PROGRESS NOTES
Problem: Falls - Risk of  Goal: *Absence of Falls  Description: Document Yehuda Marquis Fall Risk and appropriate interventions in the flowsheet. Outcome: Progressing Towards Goal  Note: Fall Risk Interventions:  Mobility Interventions: Bed/chair exit alarm, Assess mobility with egress test, Communicate number of staff needed for ambulation/transfer, PT Consult for mobility concerns, PT Consult for assist device competence, Patient to call before getting OOB, Utilize walker, cane, or other assistive device    Mentation Interventions: Adequate sleep, hydration, pain control, Bed/chair exit alarm, Door open when patient unattended, Increase mobility, More frequent rounding, Reorient patient    Medication Interventions: Bed/chair exit alarm, Evaluate medications/consider consulting pharmacy, Patient to call before getting OOB, Teach patient to arise slowly    Elimination Interventions: Bed/chair exit alarm, Call light in reach, Patient to call for help with toileting needs, Stay With Me (per policy)              Problem: Discharge Planning  Goal: *Discharge to safe environment  Outcome: Progressing Towards Goal     Problem: Pressure Injury - Risk of  Goal: *Prevention of pressure injury  Description: Document Antonio Scale and appropriate interventions in the flowsheet.   Outcome: Progressing Towards Goal  Note: Pressure Injury Interventions:  Sensory Interventions: Assess changes in LOC, Chair cushion, Discuss PT/OT consult with provider, Float heels, Check visual cues for pain, Keep linens dry and wrinkle-free, Minimize linen layers    Moisture Interventions: Absorbent underpads, Minimize layers    Activity Interventions: Chair cushion, Increase time out of bed, Pressure redistribution bed/mattress(bed type), PT/OT evaluation    Mobility Interventions: HOB 30 degrees or less, Pressure redistribution bed/mattress (bed type), PT/OT evaluation    Nutrition Interventions: Document food/fluid/supplement intake, Offer support with meals,snacks and hydration                     Problem: Nutrition Deficit  Goal: *Optimize nutritional status  Outcome: Progressing Towards Goal

## 2020-05-09 VITALS
WEIGHT: 125.66 LBS | OXYGEN SATURATION: 100 % | DIASTOLIC BLOOD PRESSURE: 73 MMHG | HEIGHT: 62 IN | RESPIRATION RATE: 16 BRPM | SYSTOLIC BLOOD PRESSURE: 106 MMHG | HEART RATE: 123 BPM | BODY MASS INDEX: 23.12 KG/M2 | TEMPERATURE: 98 F

## 2020-05-09 LAB
ANION GAP SERPL CALC-SCNC: 6 MMOL/L (ref 5–15)
BUN SERPL-MCNC: 25 MG/DL (ref 6–20)
BUN/CREAT SERPL: 11 (ref 12–20)
CALCIUM SERPL-MCNC: 7.2 MG/DL (ref 8.5–10.1)
CHLORIDE SERPL-SCNC: 109 MMOL/L (ref 97–108)
CO2 SERPL-SCNC: 25 MMOL/L (ref 21–32)
CREAT SERPL-MCNC: 2.37 MG/DL (ref 0.55–1.02)
GLUCOSE BLD STRIP.AUTO-MCNC: 106 MG/DL (ref 65–100)
GLUCOSE BLD STRIP.AUTO-MCNC: 116 MG/DL (ref 65–100)
GLUCOSE BLD STRIP.AUTO-MCNC: 122 MG/DL (ref 65–100)
GLUCOSE SERPL-MCNC: 91 MG/DL (ref 65–100)
POTASSIUM SERPL-SCNC: 4.8 MMOL/L (ref 3.5–5.1)
SERVICE CMNT-IMP: ABNORMAL
SODIUM SERPL-SCNC: 140 MMOL/L (ref 136–145)

## 2020-05-09 PROCEDURE — 5A1D70Z PERFORMANCE OF URINARY FILTRATION, INTERMITTENT, LESS THAN 6 HOURS PER DAY: ICD-10-PCS | Performed by: INTERNAL MEDICINE

## 2020-05-09 PROCEDURE — 74011250637 HC RX REV CODE- 250/637: Performed by: FAMILY MEDICINE

## 2020-05-09 PROCEDURE — 94640 AIRWAY INHALATION TREATMENT: CPT

## 2020-05-09 PROCEDURE — 74011250637 HC RX REV CODE- 250/637: Performed by: NURSE PRACTITIONER

## 2020-05-09 PROCEDURE — 74011000250 HC RX REV CODE- 250: Performed by: NURSE PRACTITIONER

## 2020-05-09 PROCEDURE — 90935 HEMODIALYSIS ONE EVALUATION: CPT

## 2020-05-09 PROCEDURE — 74011250636 HC RX REV CODE- 250/636: Performed by: INTERNAL MEDICINE

## 2020-05-09 PROCEDURE — 82962 GLUCOSE BLOOD TEST: CPT

## 2020-05-09 PROCEDURE — 74011250637 HC RX REV CODE- 250/637: Performed by: INTERNAL MEDICINE

## 2020-05-09 PROCEDURE — 36415 COLL VENOUS BLD VENIPUNCTURE: CPT

## 2020-05-09 PROCEDURE — 80048 BASIC METABOLIC PNL TOTAL CA: CPT

## 2020-05-09 RX ORDER — CEPHALEXIN 500 MG/1
500 CAPSULE ORAL 4 TIMES DAILY
Qty: 20 CAP | Refills: 0 | Status: SHIPPED | OUTPATIENT
Start: 2020-05-09 | End: 2020-05-14

## 2020-05-09 RX ORDER — GUAIFENESIN 600 MG/1
600 TABLET, EXTENDED RELEASE ORAL
Qty: 30 TAB | Refills: 0 | Status: SHIPPED | OUTPATIENT
Start: 2020-05-09 | End: 2020-05-24

## 2020-05-09 RX ORDER — CARVEDILOL 6.25 MG/1
6.25 TABLET ORAL 2 TIMES DAILY WITH MEALS
Status: DISCONTINUED | OUTPATIENT
Start: 2020-05-09 | End: 2020-05-09

## 2020-05-09 RX ORDER — CARVEDILOL 6.25 MG/1
6.25 TABLET ORAL 2 TIMES DAILY WITH MEALS
Status: DISCONTINUED | OUTPATIENT
Start: 2020-05-09 | End: 2020-05-09 | Stop reason: HOSPADM

## 2020-05-09 RX ADMIN — ALBUTEROL SULFATE 2.5 MG: 2.5 SOLUTION RESPIRATORY (INHALATION) at 03:21

## 2020-05-09 RX ADMIN — VENLAFAXINE 75 MG: 37.5 TABLET ORAL at 13:17

## 2020-05-09 RX ADMIN — HEPARIN SODIUM 5000 UNITS: 5000 INJECTION, SOLUTION INTRAVENOUS; SUBCUTANEOUS at 06:39

## 2020-05-09 RX ADMIN — AMLODIPINE BESYLATE 10 MG: 5 TABLET ORAL at 13:17

## 2020-05-09 RX ADMIN — CARVEDILOL 6.25 MG: 6.25 TABLET, FILM COATED ORAL at 14:44

## 2020-05-09 RX ADMIN — GUAIFENESIN 600 MG: 600 TABLET, EXTENDED RELEASE ORAL at 13:17

## 2020-05-09 RX ADMIN — ALBUTEROL SULFATE 2.5 MG: 2.5 SOLUTION RESPIRATORY (INHALATION) at 07:39

## 2020-05-09 RX ADMIN — QUETIAPINE FUMARATE 50 MG: 25 TABLET ORAL at 13:17

## 2020-05-09 RX ADMIN — EPOETIN ALFA-EPBX 10000 UNITS: 10000 INJECTION, SOLUTION INTRAVENOUS; SUBCUTANEOUS at 13:18

## 2020-05-09 NOTE — PROGRESS NOTES
9:39 AM  CM notified that patient is medically stable for discharge home with outpatient services (dialysis). Patient to transition to [address] [place] [address] located at [address], [facility]. CM informed to arrange patient transport home. Patient to resume dialysis at Tuesday. CM to fax discharge instructions to [phone] when received. Patient currently receiving dialysis at this time. RN to notify CM when patient is ready for discharge to contact [facility] [phone]  for [facility] transport. 3:27 PM  Call received. Notified patient has completed dialysis and is ready for transport at this time. CM contacted  [facility] [phone]  for [facility] transport. ETA 15 mins. Patient to be picked up in [facility] & Minor name is Fareed. RN notified of updates.       VIVI George/CRM  Care Management

## 2020-05-09 NOTE — PROGRESS NOTES
CARE PLAN  Problem: Falls - Risk of  Goal: *Absence of Falls  Description: Document Devere [place] Fall Risk and appropriate interventions in the flowsheet. Outcome: Progressing Towards Goal  Note: Fall Risk Interventions:  Mobility Interventions: Patient to call before getting OOB    Mentation Interventions: Bed/chair exit alarm, Door open when patient unattended, Evaluate medications/consider consulting pharmacy, More frequent rounding, Room close to nurse's station, Update white board    Medication Interventions: Teach patient to arise slowly    Elimination Interventions: Call light in reach              Problem: Pressure Injury - Risk of  Goal: *Prevention of pressure injury  Description: Document Antonio Scale and appropriate interventions in the flowsheet.   Outcome: Progressing Towards Goal  Note: Pressure Injury Interventions:  Sensory Interventions: Assess changes in LOC, Float heels, Maintain/enhance activity level, Minimize linen layers    Moisture Interventions: Absorbent underpads, Minimize layers    Activity Interventions: Increase time out of bed    Mobility Interventions: PT/OT evaluation    Nutrition Interventions: Document food/fluid/supplement intake                     Problem: Nutrition Deficit  Goal: *Optimize nutritional status  Outcome: Progressing Towards Goal

## 2020-05-09 NOTE — PROGRESS NOTES
1930  Bedside shift change report given to [facility], RN (oncoming nurse) by VERONICA Santos (offgoing nurse). Report included the following information SBAR, Kardex, Intake/Output, MAR and Recent Results. 2200  , patient currently eating frozen dinner and snacking. Paged Aissatou Pérez NP, new orders received.     0300  Unable to obtain labs, paged Aissatou Pérez, NP for orders for ART stick, new orders received. 0330   Respiratory at bedside for ART stick for AM labs, PRN neb given for wheezing    0630  Pt asking for PRN neb, states she \"gets wheezy every AM and PM d/t prior trach\". S/w respiratory during rounds for patient to get prn neb.     0800  Bedside shift change report given to [address] [facility] RN (oncoming nurse) by [facility]. RN (offgoing nurse). Report included the following information SBAR, Kardex, Intake/Output, MAR and Recent Results.

## 2020-05-09 NOTE — ROUTINE PROCESS
[facility]  [phone] Vitals   Pre   Post   Assessment   Pre   Post    
Temp  Temp: 98.2 °F (36.8 °C) (05/09/20 0926)  98 LOC  A&Ox4, Cooperative, follows commands A&Ox4, Cooperative, follows commands HR   Pulse (Heart Rate): (!) 105 (05/09/20 0926) 108 Lungs   CTA bilaterally CTA bilaterally B/P   BP: (!) 166/99 (05/09/20 0926) 163/102 Cardiac   ST, Regular, S1,S2  ST, Regular, S1, S2  
Resp   Resp Rate: 16 (05/09/20 0926) 16 Skin   LUE AVF  LUE AVF Pain level  0 0 Edema  Non-pitting LLE Non-pitting LUE Non-pitting LLE Non-pitting LUE Orders: Duration:   Start:    0926 End:    1230 Total:   3 hours Dialyzer:   Dialyzer/Set Up Inspection: Artemio Schmidt (05/09/20 2209) K Bath:   Dialysate K (mEq/L): 3 (05/09/20 0926) Ca Bath:   Dialysate CA (mEq/L): 2.5 (05/09/20 0926) Na/Bicarb:   Dialysate NA (mEq/L): 140 (05/09/20 0926) Target Fluid Removal:   Goal/Amount of Fluid to Remove (mL): 3000 mL (05/09/20 0926) Access Type & Location:   SANDY AVF: skin CDI. No s/s of infection. Difficulty cannulating venous site, 3 attempts made, first attempt the blood was real dark and sluggish, second attempt there was no blood return, third attempt was successful on midpoint of fistula. No issues cannulating arterial site. Hemostasis achieved after 20 minutes from venous site, no delays with arterial site. Informed Dr. Shreyas Holcomb of above. Running well at . Labs Obtained/Reviewed Critical Results Called   Date when labs were drawn- 
Hgb-   
HGB Date Value Ref Range Status 05/08/2020 8.7 (L) 11.5 - 16.0 g/dL Final  
 
K-   
Potassium Date Value Ref Range Status 05/09/2020 4.8 3.5 - 5.1 mmol/L Final  
 
Ca-  
Calcium Date Value Ref Range Status 05/09/2020 7.2 (L) 8.5 - 10.1 MG/DL Final  
 
Bun-  
BUN Date Value Ref Range Status 05/09/2020 25 (H) 6 - 20 MG/DL Final  
 
Creat-  
Creatinine Date Value Ref Range Status 05/09/2020 2.37 (H) 0.55 - 1.02 MG/DL Final  
 
  
Medications/ Blood Products Given Name   Dose   Route and Time None Blood Volume Processed (BVP):    67.4 L Net Fluid Removed:  3000 mL Comments Time Out Done: Yes Primary Nurse Rpt Pre: Erna Garcia RN 
Primary Nurse Rpt Post: Erna Garcia RN 
Pt Education: Ordered HD treatment Care Plan: Infection Prevention Tx Summary:  
 
Arrived to patient room, pt A&Ox4, Dr. Keeley Heath at bedside performing rounds, pt expected to be discharged after treatment. Reviewed ordered treatment with patient, opportunity for questions provided, all questions answered. Pt requested to eat breakfast prior to treatment, as patient is a diabetic and has frequent episodes of hypoglycemia. SBAR received from primary RN at bedside. 7064: Pt cannulated with 25P needles per policy. Difficulty cannulating venous site, 3 attempts made, first attempt the blood was real dark and sluggish, second attempt there was no blood return, third attempt was successful on midpoint of fistula. No issues cannulating arterial site. Informed Dr. Keeley Heath of above. VSS. Dialysis Tx initiated. 1030: Pt resting quietly, watching TV. VSS. 1130: Pt resting quietly, VSS.  
1230: Tx ended. VSS. All possible blood returned to patient. Hemostasis achieved. Bed locked and in the lowest position, call bell and belongings in reach. SBAR given to Primary, RN. Patient is stable at time of my departure. All Dialysis related medications have been reviewed. Epogen to be given by primary RN. Admiting Diagnosis: Acute Encephalopathy/gram positive bacteremia/UTI Pt's previous clinic- [facility] [id number] Consent signed - Informed Consent Verified: Yes (05/09/20 0023) Sarah Consent - Signed and on file Hepatitis Status- per Clinic 1/25/20 HBsAG Negative, Immune Machine #- Machine Number: [id number] (05/09/20 5850) Telemetry status-  
 Pre-dialysis wt. - Pre-Dialysis Weight: 56.1 kg (123 lb 10.9 oz) (05/07/20 2480)
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
## 2020-05-09 NOTE — PROGRESS NOTES
Problem: Falls - Risk of  Goal: *Absence of Falls  Description: Document Kym Lindquist Fall Risk and appropriate interventions in the flowsheet. Outcome: Progressing Towards Goal  Note: Fall Risk Interventions:  Mobility Interventions: Communicate number of staff needed for ambulation/transfer, Assess mobility with egress test    Mentation Interventions: Adequate sleep, hydration, pain control    Medication Interventions: Evaluate medications/consider consulting pharmacy    Elimination Interventions: Call light in reach              Problem: Patient Education: Go to Patient Education Activity  Goal: Patient/Family Education  Outcome: Progressing Towards Goal     Problem: Pressure Injury - Risk of  Goal: *Prevention of pressure injury  Description: Document Antonio Scale and appropriate interventions in the flowsheet.   Outcome: Progressing Towards Goal  Note: Pressure Injury Interventions:  Sensory Interventions: Assess changes in LOC, Float heels, Maintain/enhance activity level, Minimize linen layers    Moisture Interventions: Absorbent underpads, Minimize layers    Activity Interventions: Increase time out of bed    Mobility Interventions: HOB 30 degrees or less, Pressure redistribution bed/mattress (bed type), PT/OT evaluation    Nutrition Interventions: Document food/fluid/supplement intake, Discuss nutritional consult with provider, Offer support with meals,snacks and hydration                     Problem: Patient Education: Go to Patient Education Activity  Goal: Patient/Family Education  Outcome: Progressing Towards Goal     Problem: Nutrition Deficit  Goal: *Optimize nutritional status  Outcome: Progressing Towards Goal     Problem: Patient Education: Go to Patient Education Activity  Goal: Patient/Family Education  Outcome: Progressing Towards Goal

## 2020-05-09 NOTE — PROGRESS NOTES
0840: HD RN to bedside, will hold AM meds until HD complete. 1300: HD completed. 1433: Pt removed tele and declines to replace provider aware still wants coreg given. 1448: iv removed, discharge instructions reviewed and signed with pt manually. Education provided for new meds. DISCHARGE SUMMARY from Nurse    PATIENT INSTRUCTIONS:    After general anesthesia or intravenous sedation, for 24 hours or while taking prescription Narcotics:  · Limit your activities  · Do not drive and operate hazardous machinery  · Do not make important personal or business decisions  · Do  not drink alcoholic beverages  · If you have not urinated within 8 hours after discharge, please contact your surgeon on call. Report the following to your surgeon:  · Excessive pain, swelling, redness or odor of or around the surgical area  · Temperature over 100.5  · Nausea and vomiting lasting longer than 4 hours or if unable to take medications  · Any signs of decreased circulation or nerve impairment to extremity: change in color, persistent  numbness, tingling, coldness or increase pain  · Any questions    What to do at Home:  Recommended activity: Activity as tolerated and no driving for today,     If you experience any of the following symptoms sob, please follow up with pcp. *  Please give a list of your current medications to your Primary Care Provider. *  Please update this list whenever your medications are discontinued, doses are      changed, or new medications (including over-the-counter products) are added. *  Please carry medication information at all times in case of emergency situations. These are general instructions for a healthy lifestyle:    No smoking/ No tobacco products/ Avoid exposure to second hand smoke  Surgeon General's Warning:  Quitting smoking now greatly reduces serious risk to your health.     Obesity, smoking, and sedentary lifestyle greatly increases your risk for illness    A healthy diet, regular physical exercise & weight monitoring are important for maintaining a healthy lifestyle    You may be retaining fluid if you have a history of heart failure or if you experience any of the following symptoms:  Weight gain of 3 pounds or more overnight or 5 pounds in a week, increased swelling in our hands or feet or shortness of breath while lying flat in bed. Please call your doctor as soon as you notice any of these symptoms; do not wait until your next office visit. The discharge information has been reviewed with the patient. The patient verbalized understanding. Discharge medications reviewed with the patient and appropriate educational materials and side effects teaching were provided.   ___________________________________________________________________________________________________________________________________

## 2020-05-09 NOTE — PROGRESS NOTES
Nephrology Progress Note  Richard Sotomayor  Date of Admission : 5/5/2020    CC: Follow up for ESRD       Assessment and Plan     ESRD- HD : dialyzes TTS at [facility]   Encephalopathy   Hypoglycemia  COVID-19 neg  Strep bacteremia - likely contaminant,repeat neg  Klebsiella UTI  Anemia in CKD   Sec HPTH   Type II DM       Plan:  HD now and ok for d/c post HD   remove 2.5-3 kg today   Hold BP meds now   Continue SANTOS       Interval History:  Seen and examined. Starting dialysis. Feels improved. Edema in legs persistent . No cp or sob reported. Current Medications: all current  Medications have been eviewed in EPIC  Review of Systems: Pertinent items are noted in HPI. Objective:  Vitals:    Vitals:    05/08/20 2302 05/09/20 0120 05/09/20 0300 05/09/20 0323   BP: 126/75  119/66    Pulse: 99  (!) 101    Resp: 18      Temp: 98 °F (36.7 °C)  98.3 °F (36.8 °C)    TempSrc:       SpO2: 100%  100% 100%   Weight:  57 kg (125 lb 10.6 oz)     Height:         Intake and Output:  No intake/output data recorded. 05/07 1901 - 05/09 0700  In: 2773.3 [P.O.:1560; I.V.:1213.3]  Out: 3500 [Urine:1500]    Physical Examination:  Pt intubated     No   General: NAD  Resp:  Stable O2, no distress, lungs clear  CV:  RRR on monitor, no LE edema   ABD:               Soft, NT, + bowel sounds  Neurologic:  nonfocal  Access:           LUE AVF +thrill/bruit    [x]    High complexity decision making was performed  []    Patient is at high-risk of decompensation with multiple organ involvement    Lab Data Personally Reviewed: I have reviewed all the pertinent labs, microbiology data and radiology studies during assessment.     Recent Labs     05/09/20  0331 05/08/20 0518 05/07/20 0427    135* 138   K 4.8 4.3 4.0   * 103 107   CO2 25 29 21   GLU 91 266* 270*   BUN 25* 18 51*   CREA 2.37* 1.89* 3.83*   CA 7.2* 7.0* 6.6*     Recent Labs     05/08/20 0518 05/07/20 0427   WBC 3.0* 4.5   HGB 8.7* 8.8*   HCT 27.6* 28.5*   PLT 123* 115*     Lab Results   Component Value Date/Time    Specimen Description: URINE 06/24/2013 08:00 PM    Specimen Description: URINE 06/17/2013 07:55 PM    Specimen Description: URINE 05/26/2013 10:10 AM     Lab Results   Component Value Date/Time    Culture result: NO GROWTH 2 DAYS 05/07/2020 04:36 AM    Culture result: (A) 05/05/2020 01:16 PM     ALPHA STREPTOCOCCUS GROWING IN 1 OF 4 BOTTLES DRAWN (SITE = R FOREARM DRAWN 1310)    Culture result: (A) 05/05/2020 01:16 PM     PRELIMINARY REPORT OF GRAM POSITIVE COCCI IN CHAINS GROWING IN 1 OF 4 BOTTLES DRAWN CALLED TO AND READ BACK BY NAM BROWN RN,AT 4520 ON 5/6/20.      Recent Results (from the past 24 hour(s))   GLUCOSE, POC    Collection Time: 05/08/20  8:05 AM   Result Value Ref Range    Glucose (POC) 181 (H) 65 - 100 mg/dL    Performed by  [employer], POC    Collection Time: 05/08/20 10:54 AM   Result Value Ref Range    Glucose (POC) 326 (H) 65 - 100 mg/dL    Performed by  [facility], POC    Collection Time: 05/08/20 12:27 PM   Result Value Ref Range    Glucose (POC) 318 (H) 65 - 100 mg/dL    Performed by  [employer], POC    Collection Time: 05/08/20  2:08 PM   Result Value Ref Range    Glucose (POC) 247 (H) 65 - 100 mg/dL    Performed by [place] [id number], POC    Collection Time: 05/08/20  4:19 PM   Result Value Ref Range    Glucose (POC) 316 (H) 65 - 100 mg/dL    Performed by [address] [place] [address], POC    Collection Time: 05/08/20  6:16 PM   Result Value Ref Range    Glucose (POC) 256 (H) 65 - 100 mg/dL    Performed by [address] [place] [address], POC    Collection Time: 05/08/20  8:21 PM   Result Value Ref Range    Glucose (POC) 302 (H) 65 - 100 mg/dL    Performed by Carmencita Guzman, POC    Collection Time: 05/08/20 10:01 PM   Result Value Ref Range    Glucose (POC) 482 (H) 65 - 100 mg/dL    Performed by Carmencita Guzman, POC    Collection Time: 05/09/20  2:42 AM   Result Value Ref Range Glucose (POC) 116 (H) 65 - 100 mg/dL    Performed by Renee Monique    METABOLIC PANEL, BASIC    Collection Time: 05/09/20  3:31 AM   Result Value Ref Range    Sodium 140 136 - 145 mmol/L    Potassium 4.8 3.5 - 5.1 mmol/L    Chloride 109 (H) 97 - 108 mmol/L    CO2 25 21 - 32 mmol/L    Anion gap 6 5 - 15 mmol/L    Glucose 91 65 - 100 mg/dL    BUN 25 (H) 6 - 20 MG/DL    Creatinine 2.37 (H) 0.55 - 1.02 MG/DL    BUN/Creatinine ratio 11 (L) 12 - 20      GFR est AA 27 (L) >60 ml/min/1.73m2    GFR est non-AA 23 (L) >60 ml/min/1.73m2    Calcium 7.2 (L) 8.5 - 10.1 MG/DL   GLUCOSE, POC    Collection Time: 05/09/20  6:34 AM   Result Value Ref Range    Glucose (POC) 122 (H) 65 - 100 mg/dL    Performed by Renee Monique            Total time spent with patient:  xxx   min. Care Plan discussed with:  Patient     Family      RN      Consulting Physician [facility]        I have reviewed the flowsheets. Chart and Pertinent Notes have been reviewed. No change in PMH ,family and social history from Consult note. Genet Velazquez MD  [address]  Phone - [phone]   Fax - [phone]  www. [facility]

## 2020-05-09 NOTE — DISCHARGE INSTRUCTIONS
Discharge Instructions       PATIENT ID: Jeanice Gosselin  MRN: [id number]   YOB: 1980    DATE OF ADMISSION: 5/5/2020 11:26 AM    DATE OF DISCHARGE: 5/9/2020    PRIMARY CARE PROVIDER: Bret Hartmann MD     ATTENDING PHYSICIAN: Refugio Gonzalez MD  DISCHARGING PROVIDER: Libby Rodriguez MD    To contact this individual call [phone] and ask the  to page. If unavailable ask to be transferred the Adult Hospitalist Department. DISCHARGE DIAGNOSES     GPC bacteremia    CONSULTATIONS: IP CONSULT TO HOSPITALIST  IP CONSULT TO NEPHROLOGY  IP CONSULT TO ANESTHESIOLOGY  IP CONSULT TO INFECTIOUS DISEASES    PROCEDURES/SURGERIES: * No surgery found *    PENDING TEST RESULTS:   At the time of discharge the following test results are still pending: none    FOLLOW UP APPOINTMENTS:   Follow-up Information     Follow up With Specialties Details Why Contact Info    Bret Hartmann MD Internal Medicine In 2 weeks [facility] [address] [place] [address] and [address] [place] [address] [address] [place] [address]      Bret Hartmann MD Internal Medicine In 1 week  Formerly [facility], [facility]      Jordan Yo MD Nephrology   [address] [facility]  [phone]           ADDITIONAL CARE RECOMMENDATIONS:   · It is important that you take the medication exactly as they are prescribed. · Keep your medication in the bottles provided by the pharmacist and keep a list of the medication names, dosages, and times to be taken in your wallet. · Do not take other medications without consulting your doctor. · No drinking alcohol or driving car or operating machinery if you are on narcotic pain medications. Donot take sedating mediations if you are sleepy or confused.    · Fall Precautions  · Keep Well Hydrated  · Report to your medical provider if you feel you have  developed allergies to medications  · Follow up with your PCP or Consultant for medication adjustments and refills  · Monitor for signs of fevers,chills,bleeding,chest pain and seek medical attention if you do so. · Do not use insulin as your Blood sugar was low in hospital, check your blood sugar at least 4 times a a day        DIET: Regular Diet      ACTIVITY: Activity as tolerated    WOUND CARE: none    EQUIPMENT needed: none      DISCHARGE MEDICATIONS:   See Medication Reconciliation Form    · It is important that you take the medication exactly as they are prescribed. · Keep your medication in the bottles provided by the pharmacist and keep a list of the medication names, dosages, and times to be taken in your wallet. · Do not take other medications without consulting your doctor. NOTIFY YOUR PHYSICIAN FOR ANY OF THE FOLLOWING:   Fever over 101 degrees for 24 hours. Chest pain, shortness of breath, fever, chills, nausea, vomiting, diarrhea, change in mentation, falling, weakness, bleeding. Severe pain or pain not relieved by medications. Or, any other signs or symptoms that you may have questions about.       DISPOSITION:  x  Home With:   OT  PT  HH  RN       SNF/Inpatient Rehab/LTAC    Independent/assisted living    Hospice    Other:     CDMP Checked:   Yes x     PROBLEM LIST Updated:  Yes x       Signed:   Hermilo Jay MD  5/9/2020  2:18 PM

## 2020-05-10 LAB
BACTERIA SPEC CULT: ABNORMAL
SERVICE CMNT-IMP: ABNORMAL

## 2020-05-11 ENCOUNTER — PATIENT OUTREACH (OUTPATIENT)
Dept: INTERNAL MEDICINE CLINIC | Age: 40
End: 2020-05-11

## 2020-05-11 ENCOUNTER — PATIENT OUTREACH (OUTPATIENT)
Dept: CASE MANAGEMENT | Age: 40
End: 2020-05-11

## 2020-05-11 NOTE — PROGRESS NOTES
Social Work Note  2020      SW referral received from Cristal Rivera RN as a result of COVID-19 follow-up call. Assistance requested with locating new PCP. Chart reviewed. Call to patient. Identity verified by name/. Patient advised that she would like a new PCP because her previous PCP was located off of [facility] and she stated it was a long distance from her home. She also stated that she felt that \"he didn't want to hear what I said\". Patient requested assistance with locating PCP in [place]. Advised that closest [facility] were located in [facility] and off [employer]. Patient requested information. Provided name/contact number for [facility] and [facility]. Patient stated that she would call to schedule appointment at one of the facilities. No further SW needs at this time. Patient has SW name/number for questions or concerns.      VIVI Nagy, ACSW, CCM    [facility] Management Team  [phone]

## 2020-05-11 NOTE — PROGRESS NOTES
2020 Care transitions nurse -     Ms. Willie Esteban was at [facility] from - - found obtunded, unresponsive - with glucose of 40 - EMS unable to get access - used osseous route for D50 -   Pt responded to glucose - Insulin therapy stopped at d/c  - pt is on no Diabetes therapy at discharge -  Pt checked glucose yesterday 5/10/2020 - 85. She has glucometer - discussed qid checks -    Pt has UTI - rx were sent to [facility] - she will pick them up tomorrow  - with dialysis -   Pt states she needs new pcp - she needs 5 day notice for transportation for Medicaid. She has Medicaid and Medicare - [employer]. Pt states she needs pain medicine - for lupus and other - Pt acknowledged positive cocaine -   Date: 2020 Department: [facility] Released By/Authorizing: Beth Fuentes,  (auto-released)   Component Value Flag Ref Range Units Status   AMPHETAMINES Negative   NEG   Final   BARBITURATES Negative   NEG   Final   BENZODIAZEPINES Negative   NEG   Final   COCAINE Positive          Date: 2020 Department: [facility] Released By/Authorizing: Aliyah Caldwell MD (auto-released)   Component Value Flag Ref Range Units Status   Specimen source Nasopharyngeal      Final   SARS-CoV-2 Not detected   NOTD   Final   Comment:        The specimen is NEGATIVE for SARS-CoV2, the novel coronavirus associated with COVID-19. A negative result does not rule out COVID-19.            Dialysis T, Th, Sat - at [facility]. Patient contacted regarding recent discharge and COVID-19 risk   Care Transition Nurse/ Ambulatory Care Manager contacted the patient by telephone to perform post discharge assessment. Verified name and  with patient as identifiers. Patient has following risk factors of: immunocompromised, diabetes and chronic kidney disease/ hypertension, hypoglycemia - acute; substance abuse, pain d/t lupus.     CTN/ACM reviewed discharge instructions, medical action plan and red flags related to discharge diagnosis. Reviewed and educated them on any new and changed medications related to discharge diagnosis. Advised obtaining a 90-day supply of all daily and as-needed medications. Education provided regarding infection prevention, and signs and symptoms of COVID-19 and when to seek medical attention with patient who verbalized understanding. Discussed exposure protocols and quarantine from [address] you at higher risk for severe illness 2019 and given an opportunity for questions and concerns. The patient agrees to contact the COVID-19 hotline [phone] or PCP office for questions related to their healthcare. CTN/ACM provided contact information for future reference. From CDC: Are you at higher risk for severe illness?  Wash your hands often.  Avoid close contact (6 feet, which is about two arm lengths) with people who are sick.  Put distance between yourself and other people if COVID-19 is spreading in your community.  Clean and disinfect frequently touched surfaces.  Avoid all cruise travel and non-essential air travel.  Call your healthcare professional if you have concerns about COVID-19 and your underlying condition or if you are sick. For more information on steps you can take to protect yourself, see CDC's How to Protect Yourself      Patient/family/caregiver given information for Tony Espinoza and agrees to enroll yes  Patient's preferred e-mail:  none  Patient's preferred phone number: [phone]  Based on Loop alert triggers, patient will be contacted by nurse care manager for worsening symptoms. Pt will be further monitored by COVID Loop Team based on severity of symptoms and risk factors. Confer with  re: new pcp - and social resources for substance counseling/ sobriety.     Octavio Albarado RN, [facility], CCM  Care transitions nurse [phone] Coordination Team

## 2020-05-12 LAB
BACTERIA SPEC CULT: NORMAL
SERVICE CMNT-IMP: NORMAL

## 2020-05-16 ENCOUNTER — HOSPITAL ENCOUNTER (INPATIENT)
Age: 40
LOS: 2 days | Discharge: HOME | DRG: 917 | End: 2020-05-18
Attending: EMERGENCY MEDICINE | Admitting: HOSPITALIST
Payer: MEDICARE

## 2020-05-16 ENCOUNTER — APPOINTMENT (OUTPATIENT)
Dept: GENERAL RADIOLOGY | Age: 40
DRG: 917 | End: 2020-05-16
Attending: EMERGENCY MEDICINE
Payer: MEDICARE

## 2020-05-16 DIAGNOSIS — E16.2 HYPOGLYCEMIA: ICD-10-CM

## 2020-05-16 DIAGNOSIS — E87.6 HYPOKALEMIA: ICD-10-CM

## 2020-05-16 DIAGNOSIS — T40.604A OPIATE OVERDOSE, UNDETERMINED INTENT, INITIAL ENCOUNTER (HCC): ICD-10-CM

## 2020-05-16 DIAGNOSIS — R41.82 ALTERED MENTAL STATUS, UNSPECIFIED ALTERED MENTAL STATUS TYPE: Primary | ICD-10-CM

## 2020-05-16 DIAGNOSIS — N18.6 ESRD (END STAGE RENAL DISEASE) ON DIALYSIS (HCC): ICD-10-CM

## 2020-05-16 DIAGNOSIS — Z99.2 ESRD (END STAGE RENAL DISEASE) ON DIALYSIS (HCC): ICD-10-CM

## 2020-05-16 LAB
ALBUMIN SERPL-MCNC: 2.8 G/DL (ref 3.5–5)
ALBUMIN/GLOB SERPL: 0.7 {RATIO} (ref 1.1–2.2)
ALP SERPL-CCNC: 141 U/L (ref 45–117)
ALT SERPL-CCNC: 57 U/L (ref 12–78)
ANION GAP SERPL CALC-SCNC: 13 MMOL/L (ref 5–15)
ANION GAP SERPL CALC-SCNC: 15 MMOL/L (ref 5–15)
ARTERIAL PATENCY WRIST A: ABNORMAL
AST SERPL-CCNC: 26 U/L (ref 15–37)
BASE DEFICIT BLDV-SCNC: 1 MMOL/L
BASOPHILS # BLD: 0 K/UL (ref 0–0.1)
BASOPHILS NFR BLD: 0 % (ref 0–1)
BDY SITE: ABNORMAL
BILIRUB SERPL-MCNC: 0.2 MG/DL (ref 0.2–1)
BUN SERPL-MCNC: 45 MG/DL (ref 6–20)
BUN SERPL-MCNC: 45 MG/DL (ref 6–20)
BUN/CREAT SERPL: 11 (ref 12–20)
BUN/CREAT SERPL: 11 (ref 12–20)
CALCIUM SERPL-MCNC: 7.2 MG/DL (ref 8.5–10.1)
CALCIUM SERPL-MCNC: 7.3 MG/DL (ref 8.5–10.1)
CHLORIDE SERPL-SCNC: 104 MMOL/L (ref 97–108)
CHLORIDE SERPL-SCNC: 105 MMOL/L (ref 97–108)
CO2 SERPL-SCNC: 24 MMOL/L (ref 21–32)
CO2 SERPL-SCNC: 24 MMOL/L (ref 21–32)
COMMENT, HOLDF: NORMAL
CREAT SERPL-MCNC: 4.03 MG/DL (ref 0.55–1.02)
CREAT SERPL-MCNC: 4.22 MG/DL (ref 0.55–1.02)
DIFFERENTIAL METHOD BLD: ABNORMAL
EOSINOPHIL # BLD: 0 K/UL (ref 0–0.4)
EOSINOPHIL NFR BLD: 0 % (ref 0–7)
ERYTHROCYTE [DISTWIDTH] IN BLOOD BY AUTOMATED COUNT: 15.7 % (ref 11.5–14.5)
GAS FLOW.O2 O2 DELIVERY SYS: ABNORMAL L/MIN
GLOBULIN SER CALC-MCNC: 4.1 G/DL (ref 2–4)
GLUCOSE BLD STRIP.AUTO-MCNC: 106 MG/DL (ref 65–100)
GLUCOSE BLD STRIP.AUTO-MCNC: 190 MG/DL (ref 65–100)
GLUCOSE BLD STRIP.AUTO-MCNC: 211 MG/DL (ref 65–100)
GLUCOSE SERPL-MCNC: 191 MG/DL (ref 65–100)
GLUCOSE SERPL-MCNC: 202 MG/DL (ref 65–100)
HCO3 BLDV-SCNC: 25.3 MMOL/L (ref 23–28)
HCT VFR BLD AUTO: 32.8 % (ref 35–47)
HGB BLD-MCNC: 10.2 G/DL (ref 11.5–16)
IMM GRANULOCYTES # BLD AUTO: 0 K/UL (ref 0–0.04)
IMM GRANULOCYTES NFR BLD AUTO: 0 % (ref 0–0.5)
LYMPHOCYTES # BLD: 0.6 K/UL (ref 0.8–3.5)
LYMPHOCYTES NFR BLD: 9 % (ref 12–49)
MCH RBC QN AUTO: 27 PG (ref 26–34)
MCHC RBC AUTO-ENTMCNC: 31.1 G/DL (ref 30–36.5)
MCV RBC AUTO: 86.8 FL (ref 80–99)
MONOCYTES # BLD: 0.4 K/UL (ref 0–1)
MONOCYTES NFR BLD: 6 % (ref 5–13)
NEUTS SEG # BLD: 5.2 K/UL (ref 1.8–8)
NEUTS SEG NFR BLD: 85 % (ref 32–75)
NRBC # BLD: 0 K/UL (ref 0–0.01)
NRBC BLD-RTO: 0 PER 100 WBC
PCO2 BLDV: 52.9 MMHG (ref 41–51)
PH BLDV: 7.29 [PH] (ref 7.32–7.42)
PLATELET # BLD AUTO: 111 K/UL (ref 150–400)
PO2 BLDV: 51 MMHG (ref 25–40)
POTASSIUM SERPL-SCNC: 2.8 MMOL/L (ref 3.5–5.1)
POTASSIUM SERPL-SCNC: 3.1 MMOL/L (ref 3.5–5.1)
PROT SERPL-MCNC: 6.9 G/DL (ref 6.4–8.2)
RBC # BLD AUTO: 3.78 M/UL (ref 3.8–5.2)
RBC MORPH BLD: ABNORMAL
SAMPLES BEING HELD,HOLD: NORMAL
SAO2 % BLDV: 80 % (ref 65–88)
SERVICE CMNT-IMP: ABNORMAL
SODIUM SERPL-SCNC: 142 MMOL/L (ref 136–145)
SODIUM SERPL-SCNC: 143 MMOL/L (ref 136–145)
SPECIMEN TYPE: ABNORMAL
TOTAL RESP. RATE, ITRR: 12
TROPONIN I SERPL-MCNC: <0.05 NG/ML
WBC # BLD AUTO: 6.2 K/UL (ref 3.6–11)

## 2020-05-16 PROCEDURE — 71045 X-RAY EXAM CHEST 1 VIEW: CPT

## 2020-05-16 PROCEDURE — 82962 GLUCOSE BLOOD TEST: CPT

## 2020-05-16 PROCEDURE — 82803 BLOOD GASES ANY COMBINATION: CPT

## 2020-05-16 PROCEDURE — 85025 COMPLETE CBC W/AUTO DIFF WBC: CPT

## 2020-05-16 PROCEDURE — 74011250636 HC RX REV CODE- 250/636: Performed by: EMERGENCY MEDICINE

## 2020-05-16 PROCEDURE — 36415 COLL VENOUS BLD VENIPUNCTURE: CPT

## 2020-05-16 PROCEDURE — 74011250636 HC RX REV CODE- 250/636: Performed by: STUDENT IN AN ORGANIZED HEALTH CARE EDUCATION/TRAINING PROGRAM

## 2020-05-16 PROCEDURE — 84484 ASSAY OF TROPONIN QUANT: CPT

## 2020-05-16 PROCEDURE — 74011250637 HC RX REV CODE- 250/637: Performed by: EMERGENCY MEDICINE

## 2020-05-16 PROCEDURE — 65660000000 HC RM CCU STEPDOWN

## 2020-05-16 PROCEDURE — 93005 ELECTROCARDIOGRAM TRACING: CPT

## 2020-05-16 PROCEDURE — 80053 COMPREHEN METABOLIC PANEL: CPT

## 2020-05-16 PROCEDURE — 99285 EMERGENCY DEPT VISIT HI MDM: CPT

## 2020-05-16 PROCEDURE — 96376 TX/PRO/DX INJ SAME DRUG ADON: CPT

## 2020-05-16 PROCEDURE — 96374 THER/PROPH/DIAG INJ IV PUSH: CPT

## 2020-05-16 RX ORDER — NALOXONE HYDROCHLORIDE 0.4 MG/ML
0.4 INJECTION, SOLUTION INTRAMUSCULAR; INTRAVENOUS; SUBCUTANEOUS AS NEEDED
Status: DISCONTINUED | OUTPATIENT
Start: 2020-05-16 | End: 2020-05-18 | Stop reason: HOSPADM

## 2020-05-16 RX ORDER — ACETAMINOPHEN 325 MG/1
650 TABLET ORAL
Status: DISCONTINUED | OUTPATIENT
Start: 2020-05-16 | End: 2020-05-18 | Stop reason: HOSPADM

## 2020-05-16 RX ORDER — HYDRALAZINE HYDROCHLORIDE 20 MG/ML
20 INJECTION INTRAMUSCULAR; INTRAVENOUS ONCE
Status: COMPLETED | OUTPATIENT
Start: 2020-05-17 | End: 2020-05-16

## 2020-05-16 RX ORDER — NALOXONE HYDROCHLORIDE 1 MG/ML
1 INJECTION INTRAMUSCULAR; INTRAVENOUS; SUBCUTANEOUS
Status: COMPLETED | OUTPATIENT
Start: 2020-05-16 | End: 2020-05-16

## 2020-05-16 RX ORDER — CLONIDINE HYDROCHLORIDE 0.1 MG/1
0.2 TABLET ORAL
Status: COMPLETED | OUTPATIENT
Start: 2020-05-16 | End: 2020-05-16

## 2020-05-16 RX ORDER — SODIUM CHLORIDE 0.9 % (FLUSH) 0.9 %
5-40 SYRINGE (ML) INJECTION EVERY 8 HOURS
Status: DISCONTINUED | OUTPATIENT
Start: 2020-05-16 | End: 2020-05-18 | Stop reason: HOSPADM

## 2020-05-16 RX ORDER — NALOXONE HYDROCHLORIDE 1 MG/ML
1 INJECTION INTRAMUSCULAR; INTRAVENOUS; SUBCUTANEOUS AS NEEDED
Status: DISCONTINUED | OUTPATIENT
Start: 2020-05-16 | End: 2020-05-18 | Stop reason: HOSPADM

## 2020-05-16 RX ORDER — NALOXONE HYDROCHLORIDE 1 MG/ML
INJECTION INTRAMUSCULAR; INTRAVENOUS; SUBCUTANEOUS
Status: DISPENSED
Start: 2020-05-16 | End: 2020-05-17

## 2020-05-16 RX ORDER — SODIUM CHLORIDE 0.9 % (FLUSH) 0.9 %
5-40 SYRINGE (ML) INJECTION AS NEEDED
Status: DISCONTINUED | OUTPATIENT
Start: 2020-05-16 | End: 2020-05-18 | Stop reason: HOSPADM

## 2020-05-16 RX ORDER — ZOLPIDEM TARTRATE 5 MG/1
5 TABLET ORAL
Status: DISCONTINUED | OUTPATIENT
Start: 2020-05-16 | End: 2020-05-17

## 2020-05-16 RX ADMIN — NALOXONE HYDROCHLORIDE 1 MG: 1 INJECTION PARENTERAL at 18:41

## 2020-05-16 RX ADMIN — HYDRALAZINE HYDROCHLORIDE 20 MG: 20 INJECTION INTRAMUSCULAR; INTRAVENOUS at 23:53

## 2020-05-16 RX ADMIN — CLONIDINE HYDROCHLORIDE 0.2 MG: 0.1 TABLET ORAL at 22:49

## 2020-05-16 RX ADMIN — NALOXONE HYDROCHLORIDE 1 MG: 1 INJECTION PARENTERAL at 18:16

## 2020-05-16 RX ADMIN — NALOXONE HYDROCHLORIDE 1 MG: 1 INJECTION PARENTERAL at 18:46

## 2020-05-16 NOTE — ED TRIAGE NOTES
Patient arriving via EMS for c/o unresponsiveness; GCS of 3 upon their arrival on scene. Patient blood sugar 36, received glucagon, and d10 pressure infused via EMS. Patient remains unarousable, blood sugar of 146.

## 2020-05-16 NOTE — ED NOTES
[facility] Serve for Admission  7:39 PM    ED Room Number: SER10/10  Patient Name and age:  Bree Chappell 37 y.o.  female  Working Diagnosis:   1. Altered mental status, unspecified altered mental status type    2. ESRD (end stage renal disease) on dialysis ([facility]    3. Hypoglycemia    4. Hypokalemia    5. Opiate overdose, undetermined intent, initial encounter ([email or web address]        COVID-19 Suspicion:  no    Code Status:  Full Code  Readmission: no  Isolation Requirements:  no  Recommended Level of Care:  step down  Department:[facility] - [phone]  Other:  Patient with multiple prior  Admissions for hypoglycemia and opiate OD. ESRD, was due for HD today bus skipped. Had glucose 32 on EMS arrival.  Received D10 prehospital, blood glucose improved but patient did not wake. Then gave 1 mg narcan and patient became more alert. Required 2 additional 1mg doses of Narcan in ED and is now alert. Labs significant for K 2.8. Am obtaining a repeat metabolic now. 10:33 PM  [facility] at bedside to transport patient to [facility]. POC glucose 106. PAtient alert.

## 2020-05-16 NOTE — ED NOTES
Pt. Given additional narcan but wouldn't answer questions. Pt. IO flushed and pt.  Woke up and said \"God damn it\"

## 2020-05-16 NOTE — ED NOTES
Patient called out to nurse's station asking for dinner. MD notified and states she is PO at this time. Patient notified.

## 2020-05-16 NOTE — ED NOTES
6:10 PM Patient given 0.5 mg of Narcan administered via IO  continues with snoring respirations. 6:14 PM Patient given 0.5 mg of Narcan, is more awake and speaking now  6:15 PM Patient missed dialysis today. Reports that she 'took a couple percocets'. 6:25 PM Patient reports taking 10 Units of Insulin today also reporting that she had not eaten in 2 days.

## 2020-05-16 NOTE — ED PROVIDER NOTES
HPI 18-year-old female with a history of chronic kidney disease a, acute renal failure, hemodialysis, narcotic dependence, polycystic ovarian syndrome, seizures, thromboembolus transported by EMS for unresponsiveness. Patient was due for dialysis today and did not receive it. Patient reports after awakening that she took 10 units of insulin has not eaten the last 2 days. Patient states that she took 2 Percocet. She denies any fevers, cough or congestion. When EMS arrived, blood sugar was 32. Patient was given glucagon 1 mg without response. They were unable to obtain peripheral IV access. They called and obtain medical code direction orders to get IO access. Right tibial IO was placed by EMS. Patient was given 125 mL of D10 and blood sugar increased to 190. Patient did not regain consciousness. O2 sats 99 on room air. EMS reports patient did have a painful response to the IO. Social history: Non-smoker. No alcohol or drug use.         Past Medical History:   Diagnosis Date    ARF (acute renal failure) ([address] requiring dialysis 2011    Asthma     CKD (chronic kidney disease)     Diabetes ([address]     Fibromyalgia     Gastroparesis 2010    Gastric Pacer- REMOVED 07/2015    GERD (gastroesophageal reflux disease)     Hypertension     Narcotic dependence ([address]     THU (obstructive sleep apnea)     wears 2 LPM oxygen at night    Other ill-defined conditions(799.89)     Polycystic ovarian syndrome     Seizures (HCC)     Sickle cell trait ([address]     Thromboembolus ([address] to her left arm and was told she had one in left leg recently       Past Surgical History:   Procedure Laterality Date    HX AMPUTATION FOOT  04/2018    left foot    HX CATARACT REMOVAL  3/5/12    right    HX DILATION AND CURETTAGE      ablation    HX GASTRIC BYPASS  2015    HX GI      j tube placement and removal    HX OTHER SURGICAL  2010    Gastric Pacer- REMOVED 07/2015    HX VASCULAR ACCESS      gray cath rt subclavian; removed     HX VASCULAR ACCESS      HD access right thigh; stopped working    IR INSERT NON TUNL CVC OVER 5 YRS  10/4/2019         Family History:   Problem Relation Age of Onset    Cancer Mother         lung    Hypertension Mother     Cancer Father         kidney    Stroke Father         3 strokes: 59-72    Heart Disease Father 72        CABG    Hypertension Father     Cancer Sister         pancreatic    Cancer Maternal Aunt         breast    Cancer Paternal [de-identified]         breast    Schizophrenia Sister         was in [facility], now ass't living    Other Sister          AIDS    Other Other         nephew of AIDS       Social History     Socioeconomic History    Marital status:      Spouse name: Not on file    Number of children: Not on file    Years of education: Not on file    Highest education level: Not on file   Occupational History    Not on file   Social Needs    Financial resource strain: Not on file    Food insecurity     Worry: Not on file     Inability: Not on file    Transportation needs     Medical: Not on file     Non-medical: Not on file   Tobacco Use    Smoking status: Never Smoker    Smokeless tobacco: Never Used   Substance and Sexual Activity    Alcohol use: No    Drug use: No    Sexual activity: Yes     Partners: Male     Birth control/protection: None   Lifestyle    Physical activity     Days per week: Not on file     Minutes per session: Not on file    Stress: Not on file   Relationships    Social connections     Talks on phone: Not on file     Gets together: Not on file     Attends Mu-ism service: Not on file     Active member of club or organization: Not on file     Attends meetings of clubs or organizations: Not on file     Relationship status: Not on file    Intimate partner violence     Fear of current or ex partner: Not on file     Emotionally abused: Not on file     Physically abused: Not on file     Forced sexual activity: Not on file   Other Topics Concern    Not on file   Social History Narrative    Lives with her  and father         ALLERGIES: Fentanyl; Erythromycin; Toradol [ketorolac]; and Morphine    Review of Systems   Unable to perform ROS: Mental status change       Vitals:    05/16/20 1815 05/16/20 1830 05/16/20 1839   BP: (!) 208/103 (!) 199/115    Pulse:  75 70   Resp:  9 10   SpO2: 100% 100% 100%            Physical Exam  Vitals signs and nursing note reviewed. Constitutional:       General: She is in acute distress. Appearance: She is well-developed. Comments: Unresponsive but does open eyes some to flush of io.  snoring   HENT:      Head: Normocephalic and atraumatic. Mouth/Throat:      Mouth: Mucous membranes are moist.      Pharynx: No oropharyngeal exudate. Eyes:      General: No scleral icterus. Right eye: No discharge. Left eye: No discharge. Conjunctiva/sclera: Conjunctivae normal.      Comments: Pupils 2 mm and minimally reactive   Neck:      Musculoskeletal: Normal range of motion and neck supple. Cardiovascular:      Rate and Rhythm: Normal rate and regular rhythm. Pulses: Normal pulses. Heart sounds: Normal heart sounds. No murmur. No friction rub. No gallop. Pulmonary:      Effort: Pulmonary effort is normal. No respiratory distress. Breath sounds: Normal breath sounds. No wheezing or rales. Abdominal:      General: There is no distension. Palpations: Abdomen is soft. Tenderness: There is no abdominal tenderness. There is no guarding. Musculoskeletal: Normal range of motion. General: No tenderness. Comments: Av shunt left upper arm with thrill   Lymphadenopathy:      Cervical: No cervical adenopathy. Skin:     General: Skin is warm and dry. Coloration: Skin is not pale. Findings: No rash. Neurological:      Comments: Unresponsive except opens eyes to pain.   Pupils 2 mm and minimally reactive MDM  Number of Diagnoses or Management Options  Critical Care  Total time providing critical care: 30-74 minutes (40 mm)    ED Course as of May 16 1902   Sat May 16, 2020   1820 Pt now alert and talking after narcan. Glucose was 190. Pt states she took percocet 2 pills. [RG]   1838 Pt sleepy but opens eyes again. Given 1 mg iv with some mild improvement. Blood sugar 211      [RG]   1902 Pt awakened after 3rd mg of narcan. Pt now alert. [RG]      ED Course User Index  [RG] Daniele Green MD       Procedures    5956  Signed out pt to Dr. Mirta Alvarado pending results and admission. ED EKG interpretation:  Rhythm: normal sinus rhythm; and regular . Rate (approx.): 80; Axis: normal; P wave: normal; QRS interval: normal ; ST/T wave: normal; prolonged QTc. This EKG was interpreted by Nithin Ramos MD,ED Provider. Recent Results (from the past 24 hour(s))   GLUCOSE, POC    Collection Time: 05/16/20  6:10 PM   Result Value Ref Range    Glucose (POC) 190 (H) 65 - 100 mg/dL    Performed by [address] [place] [address]    Collection Time: 05/16/20  6:19 PM   Result Value Ref Range    SAMPLES BEING HELD 1RED 1BLUE 1LAV 1GRN 1GREY     COMMENT        Add-on orders for these samples will be processed based on acceptable specimen integrity and analyte stability, which may vary by analyte.    EKG, 12 LEAD, INITIAL    Collection Time: 05/16/20  6:26 PM   Result Value Ref Range    Ventricular Rate 80 BPM    Atrial Rate 80 BPM    P-R Interval 162 ms    QRS Duration 94 ms    Q-T Interval 454 ms    QTC Calculation (Bezet) 523 ms    Calculated P Axis 51 degrees    Calculated R Axis 21 degrees    Calculated T Axis 43 degrees    Diagnosis       Normal sinus rhythm  Possible Left atrial enlargement  Septal infarct , age undetermined  Prolonged QT  Abnormal ECG  When compared with ECG of 05-MAY-2020 11:38,  No significant change was found     POC VENOUS BLOOD GAS    Collection Time: 05/16/20  6:27 PM Result Value Ref Range    Device: ROOM AIR      pH, venous (POC) 7.288 (L) 7.32 - 7.42      pCO2, venous (POC) 52.9 (H) 41 - 51 MMHG    pO2, venous (POC) 51 (H) 25 - 40 mmHg    HCO3, venous (POC) 25.3 23.0 - 28.0 MMOL/L    sO2, venous (POC) 80 65 - 88 %    Base deficit, venous (POC) 1 mmol/L    Allens test (POC) N/A      Total resp. rate 12      Site OTHER      Specimen type (POC) VENOUS BLOOD     GLUCOSE, POC    Collection Time: 05/16/20  6:39 PM   Result Value Ref Range    Glucose (POC) 211 (H) 65 - 100 mg/dL    Performed by Debby Benavides        No results found.

## 2020-05-16 NOTE — ED NOTES
Pt. Became drousy again so 1mg of narcan was given with some change.   Dr. Zoila Becerra ordered an additional 1mg

## 2020-05-17 LAB
AMPHET UR QL SCN: NEGATIVE
ATRIAL RATE: 80 BPM
BARBITURATES UR QL SCN: NEGATIVE
BENZODIAZ UR QL: NEGATIVE
CALCULATED P AXIS, ECG09: 51 DEGREES
CALCULATED R AXIS, ECG10: 21 DEGREES
CALCULATED T AXIS, ECG11: 43 DEGREES
CANNABINOIDS UR QL SCN: NEGATIVE
COCAINE UR QL SCN: POSITIVE
DIAGNOSIS, 93000: NORMAL
DRUG SCRN COMMENT,DRGCM: ABNORMAL
GLUCOSE BLD STRIP.AUTO-MCNC: 132 MG/DL (ref 65–100)
GLUCOSE BLD STRIP.AUTO-MCNC: 143 MG/DL (ref 65–100)
GLUCOSE BLD STRIP.AUTO-MCNC: 192 MG/DL (ref 65–100)
GLUCOSE BLD STRIP.AUTO-MCNC: 211 MG/DL (ref 65–100)
GLUCOSE BLD STRIP.AUTO-MCNC: 225 MG/DL (ref 65–100)
GLUCOSE BLD STRIP.AUTO-MCNC: 55 MG/DL (ref 65–100)
GLUCOSE BLD STRIP.AUTO-MCNC: 62 MG/DL (ref 65–100)
METHADONE UR QL: NEGATIVE
OPIATES UR QL: NEGATIVE
P-R INTERVAL, ECG05: 162 MS
PCP UR QL: NEGATIVE
Q-T INTERVAL, ECG07: 454 MS
QRS DURATION, ECG06: 94 MS
QTC CALCULATION (BEZET), ECG08: 523 MS
SERVICE CMNT-IMP: ABNORMAL
VENTRICULAR RATE, ECG03: 80 BPM

## 2020-05-17 PROCEDURE — 74011250636 HC RX REV CODE- 250/636: Performed by: STUDENT IN AN ORGANIZED HEALTH CARE EDUCATION/TRAINING PROGRAM

## 2020-05-17 PROCEDURE — 80307 DRUG TEST PRSMV CHEM ANLYZR: CPT

## 2020-05-17 PROCEDURE — 74011636637 HC RX REV CODE- 636/637: Performed by: STUDENT IN AN ORGANIZED HEALTH CARE EDUCATION/TRAINING PROGRAM

## 2020-05-17 PROCEDURE — 74011000250 HC RX REV CODE- 250: Performed by: STUDENT IN AN ORGANIZED HEALTH CARE EDUCATION/TRAINING PROGRAM

## 2020-05-17 PROCEDURE — 94640 AIRWAY INHALATION TREATMENT: CPT

## 2020-05-17 PROCEDURE — 65660000000 HC RM CCU STEPDOWN

## 2020-05-17 PROCEDURE — 74011250637 HC RX REV CODE- 250/637: Performed by: INTERNAL MEDICINE

## 2020-05-17 PROCEDURE — 74011250637 HC RX REV CODE- 250/637: Performed by: STUDENT IN AN ORGANIZED HEALTH CARE EDUCATION/TRAINING PROGRAM

## 2020-05-17 PROCEDURE — 82962 GLUCOSE BLOOD TEST: CPT

## 2020-05-17 RX ORDER — QUETIAPINE FUMARATE 100 MG/1
100 TABLET, FILM COATED ORAL DAILY
COMMUNITY

## 2020-05-17 RX ORDER — AMLODIPINE BESYLATE 5 MG/1
10 TABLET ORAL DAILY
Status: DISCONTINUED | OUTPATIENT
Start: 2020-05-17 | End: 2020-05-18 | Stop reason: HOSPADM

## 2020-05-17 RX ORDER — AMOXICILLIN 250 MG
2 CAPSULE ORAL DAILY
Status: DISCONTINUED | OUTPATIENT
Start: 2020-05-18 | End: 2020-05-18 | Stop reason: HOSPADM

## 2020-05-17 RX ORDER — HYDRALAZINE HYDROCHLORIDE 20 MG/ML
10 INJECTION INTRAMUSCULAR; INTRAVENOUS
Status: DISCONTINUED | OUTPATIENT
Start: 2020-05-17 | End: 2020-05-18 | Stop reason: HOSPADM

## 2020-05-17 RX ORDER — HEPARIN SODIUM 5000 [USP'U]/ML
5000 INJECTION, SOLUTION INTRAVENOUS; SUBCUTANEOUS EVERY 12 HOURS
Status: DISCONTINUED | OUTPATIENT
Start: 2020-05-17 | End: 2020-05-18 | Stop reason: HOSPADM

## 2020-05-17 RX ORDER — QUETIAPINE FUMARATE 100 MG/1
100 TABLET, FILM COATED ORAL 2 TIMES DAILY
Status: DISCONTINUED | OUTPATIENT
Start: 2020-05-17 | End: 2020-05-17

## 2020-05-17 RX ORDER — QUETIAPINE FUMARATE 50 MG/1
50 TABLET, FILM COATED ORAL
COMMUNITY

## 2020-05-17 RX ORDER — SEVELAMER CARBONATE 800 MG/1
1600 TABLET, FILM COATED ORAL 3 TIMES DAILY
COMMUNITY

## 2020-05-17 RX ORDER — QUETIAPINE FUMARATE 100 MG/1
100 TABLET, FILM COATED ORAL DAILY
Status: DISCONTINUED | OUTPATIENT
Start: 2020-05-17 | End: 2020-05-18 | Stop reason: HOSPADM

## 2020-05-17 RX ORDER — CLONIDINE HYDROCHLORIDE 0.1 MG/1
0.1 TABLET ORAL
Status: DISCONTINUED | OUTPATIENT
Start: 2020-05-17 | End: 2020-05-18 | Stop reason: HOSPADM

## 2020-05-17 RX ORDER — CLONIDINE HYDROCHLORIDE 0.1 MG/1
0.1 TABLET ORAL
Status: DISCONTINUED | OUTPATIENT
Start: 2020-05-17 | End: 2020-05-17

## 2020-05-17 RX ORDER — SEVELAMER CARBONATE 800 MG/1
1600 TABLET, FILM COATED ORAL
Status: DISCONTINUED | OUTPATIENT
Start: 2020-05-17 | End: 2020-05-18 | Stop reason: HOSPADM

## 2020-05-17 RX ORDER — QUETIAPINE FUMARATE 25 MG/1
50 TABLET, FILM COATED ORAL
Status: DISCONTINUED | OUTPATIENT
Start: 2020-05-17 | End: 2020-05-18 | Stop reason: HOSPADM

## 2020-05-17 RX ORDER — MAGNESIUM SULFATE 100 %
4 CRYSTALS MISCELLANEOUS AS NEEDED
Status: DISCONTINUED | OUTPATIENT
Start: 2020-05-17 | End: 2020-05-18 | Stop reason: HOSPADM

## 2020-05-17 RX ORDER — ERGOCALCIFEROL 1.25 MG/1
50000 CAPSULE ORAL
Status: DISCONTINUED | OUTPATIENT
Start: 2020-05-18 | End: 2020-05-18 | Stop reason: HOSPADM

## 2020-05-17 RX ORDER — VENLAFAXINE 37.5 MG/1
75 TABLET ORAL 2 TIMES DAILY WITH MEALS
Status: DISCONTINUED | OUTPATIENT
Start: 2020-05-17 | End: 2020-05-18 | Stop reason: HOSPADM

## 2020-05-17 RX ORDER — TRAZODONE HYDROCHLORIDE 50 MG/1
50 TABLET ORAL
Status: DISCONTINUED | OUTPATIENT
Start: 2020-05-17 | End: 2020-05-18 | Stop reason: HOSPADM

## 2020-05-17 RX ORDER — ONDANSETRON 2 MG/ML
4 INJECTION INTRAMUSCULAR; INTRAVENOUS
Status: DISCONTINUED | OUTPATIENT
Start: 2020-05-17 | End: 2020-05-18 | Stop reason: HOSPADM

## 2020-05-17 RX ORDER — CLONIDINE HYDROCHLORIDE 0.1 MG/1
0.1 TABLET ORAL
COMMUNITY

## 2020-05-17 RX ORDER — ALBUTEROL SULFATE 0.83 MG/ML
2.5 SOLUTION RESPIRATORY (INHALATION)
Status: DISCONTINUED | OUTPATIENT
Start: 2020-05-17 | End: 2020-05-18 | Stop reason: HOSPADM

## 2020-05-17 RX ORDER — CALCITRIOL 0.25 UG/1
0.25 CAPSULE ORAL DAILY
Status: DISCONTINUED | OUTPATIENT
Start: 2020-05-17 | End: 2020-05-18 | Stop reason: HOSPADM

## 2020-05-17 RX ORDER — DEXTROSE MONOHYDRATE 100 MG/ML
0-250 INJECTION, SOLUTION INTRAVENOUS AS NEEDED
Status: DISCONTINUED | OUTPATIENT
Start: 2020-05-17 | End: 2020-05-18 | Stop reason: HOSPADM

## 2020-05-17 RX ADMIN — QUETIAPINE FUMARATE 50 MG: 25 TABLET ORAL at 21:11

## 2020-05-17 RX ADMIN — ALBUTEROL SULFATE 2.5 MG: 2.5 SOLUTION RESPIRATORY (INHALATION) at 11:31

## 2020-05-17 RX ADMIN — VENLAFAXINE 75 MG: 37.5 TABLET ORAL at 17:29

## 2020-05-17 RX ADMIN — Medication 10 ML: at 15:50

## 2020-05-17 RX ADMIN — HEPARIN SODIUM 5000 UNITS: 5000 INJECTION INTRAVENOUS; SUBCUTANEOUS at 15:49

## 2020-05-17 RX ADMIN — Medication 16 G: at 16:27

## 2020-05-17 RX ADMIN — TRAZODONE HYDROCHLORIDE 50 MG: 50 TABLET ORAL at 21:11

## 2020-05-17 RX ADMIN — ACETAMINOPHEN 650 MG: 325 TABLET ORAL at 11:48

## 2020-05-17 RX ADMIN — CALCITRIOL CAPSULES 0.25 MCG 0.25 MCG: 0.25 CAPSULE ORAL at 09:00

## 2020-05-17 RX ADMIN — SEVELAMER CARBONATE 1600 MG: 800 TABLET, FILM COATED ORAL at 11:48

## 2020-05-17 RX ADMIN — Medication 10 ML: at 00:18

## 2020-05-17 RX ADMIN — CLONIDINE HYDROCHLORIDE 0.1 MG: 0.1 TABLET ORAL at 15:51

## 2020-05-17 RX ADMIN — Medication 10 ML: at 05:12

## 2020-05-17 RX ADMIN — CLONIDINE HYDROCHLORIDE 0.1 MG: 0.1 TABLET ORAL at 17:29

## 2020-05-17 RX ADMIN — AMLODIPINE BESYLATE 10 MG: 5 TABLET ORAL at 09:00

## 2020-05-17 RX ADMIN — ACETAMINOPHEN 650 MG: 325 TABLET ORAL at 21:11

## 2020-05-17 RX ADMIN — SEVELAMER CARBONATE 1600 MG: 800 TABLET, FILM COATED ORAL at 17:11

## 2020-05-17 RX ADMIN — HEPARIN SODIUM 5000 UNITS: 5000 INJECTION INTRAVENOUS; SUBCUTANEOUS at 01:34

## 2020-05-17 RX ADMIN — QUETIAPINE FUMARATE 100 MG: 100 TABLET ORAL at 11:47

## 2020-05-17 RX ADMIN — HUMAN INSULIN 3 UNITS: 100 INJECTION, SOLUTION SUBCUTANEOUS at 11:48

## 2020-05-17 RX ADMIN — CLONIDINE HYDROCHLORIDE 0.1 MG: 0.1 TABLET ORAL at 11:47

## 2020-05-17 RX ADMIN — Medication 10 ML: at 21:11

## 2020-05-17 RX ADMIN — HUMAN INSULIN 3 UNITS: 100 INJECTION, SOLUTION SUBCUTANEOUS at 09:01

## 2020-05-17 NOTE — CONSULTS
HD treatment initiated via left forearm AVG without difficulty. Cannulated by Nicol Zaragoza RN using 15 gauge needles. VS stable, will continue to monitor during tx. No Heparin. Machine tests passed and alarms intact. See PN

## 2020-05-17 NOTE — PROGRESS NOTES
Admission Medication Reconciliation:    Information obtained from:  patient  RxQuery data available¹:  YES    Comments/Recommendations: Updated PTA meds/reviewed patient's allergies. 1)  medication done by phone with patient. Clarified quetiapine dose . 100mg qam and 50mg qpm  vitamin D 50K on Mondays  Patient unclear on how may sevelamer with each meal - rx query indicates 1600mg dose  Allergies reviewed. Added lisinopril (throat closed)       ¹RxQuery pharmacy benefit data reflects medications filled and processed through the patient's insurance, however   this data does NOT capture whether the medication was picked up or is currently being taken by the patient. Allergies:  Fentanyl; Erythromycin; Lisinopril; Toradol [ketorolac]; and Morphine    Significant PMH/Disease States:   Past Medical History:   Diagnosis Date    ARF (acute renal failure) ([address] requiring dialysis 2011    Asthma     CKD (chronic kidney disease)     Diabetes ([address]     Fibromyalgia     Gastroparesis 2010    Gastric Pacer- REMOVED 07/2015    GERD (gastroesophageal reflux disease)     Hypertension     Narcotic dependence (HCC)     THU (obstructive sleep apnea)     wears 2 LPM oxygen at night    Other ill-defined conditions(799.89)     Polycystic ovarian syndrome     Seizures ([address]     Sickle cell trait ([facility]     Thromboembolus ([facility] to her left arm and was told she had one in left leg recently     Chief Complaint for this Admission:    Chief Complaint   Patient presents with    Unresponsive     Prior to Admission Medications:   Prior to Admission Medications   Prescriptions Last Dose Informant Taking? FERROUS SULFATE DRIED PO   Yes   Sig: Take  by mouth three (3) times daily. FOLIC ACID PO 4/72/1765 at Unknown time  Yes   Sig: Take  by mouth. QUEtiapine (SEROquel) 100 mg tablet   Yes   Sig: Take 100 mg by mouth daily. 100mg QAM and 50mg QPM   QUEtiapine (SEROquel) 50 mg tablet   Yes   Sig: Take 50 mg by mouth nightly.  100mg QAM and 50mg QPM   acetaminophen (TYLENOL) 500 mg tablet   Yes   Sig: Take 500 mg by mouth. albuterol (PROVENTIL VENTOLIN) 2.5 mg /3 mL (0.083 %) nebulizer solution 5/15/2020 at Unknown time  Yes   Si.5 mg by Nebulization route every four (4) hours as needed. amLODIPine (NORVASC) 10 mg tablet 5/15/2020 at Unknown time  Yes   Sig: Take 1 Tab by mouth daily. b complex-vitamin c-folic acid (NEPHROCAPS) 1 mg capsule   Yes   Sig: Take 1 Cap by mouth.   calcitRIOL (ROCALTROL) 0.25 mcg capsule 5/15/2020 at Unknown time  Yes   Sig: Take 1 Cap by mouth daily. calcium carbonate (TUMS PO)   Yes   Sig: Take 1,000 mg by mouth daily. cholecalciferol (VITAMIN D3) 50,000 unit capsule 5/10/2020 at Unknown time  Yes   Sig: Take 1 Cap by mouth every seven (7) days. Patient taking differently: Take 50,000 Units by mouth every Monday. cloNIDine HCL (CATAPRES) 0.1 mg tablet   Yes   Sig: Take 0.1 mg by mouth three (3) times daily (with meals). cyanocobalamin 1,000 mcg tablet   Yes   Sig: Take 1,000 mcg by mouth daily. guaiFENesin ER (MUCINEX) 600 mg ER tablet 5/15/2020 at Unknown time  Yes   Sig: Take 1 Tab by mouth two (2) times daily as needed for Congestion for up to 15 days. promethazine (PHENERGAN) 25 mg tablet   Yes   Sig: Take 1 Tab by mouth every six (6) hours as needed for Nausea. senna-docusate (PERICOLACE) 8.6-50 mg per tablet 5/15/2020 at Unknown time  Yes   Sig: Take 2 Tabs by mouth daily. sevelamer carbonate (RENVELA) 800 mg tab tab   Yes   Sig: Take 1,600 mg by mouth three (3) times daily. sodium bicarbonate 650 mg tablet   Yes   Sig: Take 1 Tab by mouth two (2) times a day. traZODone (DESYREL) 50 mg tablet 5/15/2020 at Unknown time  Yes   Sig: Take 50 mg by mouth nightly. venlafaxine (EFFEXOR) 75 mg tablet 5/15/2020 at Unknown time  Yes   Sig: Take 1 Tab by mouth two (2) times daily (with meals).    vitamin e (E GEMS) 1,000 unit capsule Not Taking at Unknown time  No   Sig: Take 1,000 Units by mouth daily. Facility-Administered Medications: None       Please contact the main inpatient pharmacy with any questions or concerns at [phone] and we will direct you to the clinical pharmacist covering this patient's care while in-house.    Subhash Bradley [facility]

## 2020-05-17 NOTE — PROGRESS NOTES
[address] [facility]  YOB: 1980          Assessment & Plan:   ESRD on HD TTS at [facility]  H/o substance abuse  AMS due to narcotics  HTN  SHPT    Rec:  Not clear if she had dialysis yesterday. Labs look like she did have dialysis but patient denies. Will put her on for short treatment tomorrow and then TTS. If we confirm she had dialysis Sat, she probably does not need additional HD. Subjective:   CC: ESRD  HPI: Pt known to us from the hospital with ESRD on HD TTS at [facility]. She came to ER last pm with AMS due to narcotics and ?hypoglycemia. She tells me she did not receive dialysis saturday. K is low and CXR is clear. ROS: some sob, no n/v  Current Facility-Administered Medications   Medication Dose Route Frequency    amLODIPine (NORVASC) tablet 10 mg  10 mg Oral DAILY    albuterol (PROVENTIL VENTOLIN) nebulizer solution 2.5 mg  2.5 mg Nebulization Q4H PRN    calcitRIOL (ROCALTROL) capsule 0.25 mcg  0.25 mcg Oral DAILY    cloNIDine HCL (CATAPRES) tablet 0.1 mg  0.1 mg Oral TID WITH MEALS    ondansetron (ZOFRAN) injection 4 mg  4 mg IntraVENous Q6H PRN    hydrALAZINE (APRESOLINE) 20 mg/mL injection 10 mg  10 mg IntraVENous Q6H PRN    insulin regular (NOVOLIN R, HUMULIN R) injection   SubCUTAneous AC&HS    glucose chewable tablet 16 g  4 Tab Oral PRN    glucagon (GLUCAGEN) injection 1 mg  1 mg IntraMUSCular PRN    dextrose 10% infusion 0-250 mL  0-250 mL IntraVENous PRN    heparin (porcine) injection 5,000 Units  5,000 Units SubCUTAneous Q12H    sevelamer carbonate (RENVELA) tab 1,600 mg  1,600 mg Oral TID WITH MEALS    QUEtiapine (SEROquel) tablet 100 mg  100 mg Oral DAILY    QUEtiapine (SEROquel) tablet 50 mg  50 mg Oral QHS    . PHARMACY TO SUBSTITUTE PER PROTOCOL (Reordered from: cholecalciferol (VITAMIN D3) 50,000 unit capsule)    Per Protocol    cloNIDine HCL (CATAPRES) tablet 0.1 mg  0.1 mg Oral TID WITH MEALS    naloxone (NARCAN) injection 1 mg  1 mg IntraVENous PRN    sodium chloride (NS) flush 5-40 mL  5-40 mL IntraVENous Q8H    sodium chloride (NS) flush 5-40 mL  5-40 mL IntraVENous PRN    naloxone (NARCAN) injection 0.4 mg  0.4 mg IntraVENous PRN    acetaminophen (TYLENOL) tablet 650 mg  650 mg Oral Q4H PRN          Objective:     Vitals:  Blood pressure 149/89, pulse 95, temperature 99.1 °F (37.3 °C), resp. rate 20, height 5' 2\" (1.575 m), weight 54.1 kg (119 lb 4.3 oz), SpO2 100 %, not currently breastfeeding. Temp (24hrs), Av.2 °F (36.8 °C), Min:97.3 °F (36.3 °C), Max:99.1 °F (37.3 °C)      Intake and Output:   07 -  190  In: 240 [P.O.:240]  Out: -   05/15 190 -  0700  In: 250 [P.O.:250]  Out: 250 [Urine:250]    Physical Exam:               GENERAL ASSESSMENT: chronically ill, Nad  HEENT:Nontraumatic   CHEST: on oxygen, no distress  HEART: sinus tach  EXTREMITY: no edema        ECG/rhythm:    Data Review      No results for input(s): TNIPOC in the last 72 hours. No lab exists for component: ITNL   Recent Labs     20   TROIQ <0.05     Recent Labs     20  19420    143   K 3.1* 2.8*    104   CO2 24 24   BUN 45* 45*   CREA 4.03* 4.22*   * 202*   CA 7.2* 7.3*   ALB  --  2.8*   WBC  --  6.2   HGB  --  10.2*   HCT  --  32.8*   PLT  --  111*      No results for input(s): INR, PTP, APTT, INREXT in the last 72 hours. Needs: urine analysis, urine sodium, protein and creatinine  Lab Results   Component Value Date/Time    Sodium,urine random 51 2013 06:01 PM    Creatinine, urine 154.89 2013 06:01 PM           : Angel Holman MD  2020        [facility]:  [email or web address]  [place]anie [place] office:  [address], [address] [place] [address],[address]  [address]  Phone: [phone]  Fax :     [phone]    [place] office:  [address] [facility],  [address]  Phone - [phone]  Fax - [phone]

## 2020-05-17 NOTE — PROGRESS NOTES
Bedside and Verbal shift change report given to Jaden Romero RN (oncoming nurse) by Amber Mcmullen RN (offgoing nurse). Report included the following information SBAR, Kardex, Intake/Output, MAR and Cardiac Rhythm NSR.

## 2020-05-17 NOTE — PROGRESS NOTES
[address]  Hospitalist Group                                                                                          Hospitalist Progress Note  Keven Almaguer MD  Answering service: [phone] or [phone] from in house phone        Date of Service:  2020  NAME:  Villa Campoverde  :  1980  MRN:  [id number]      Admission Summary:     CHIEF COMPLAINTS   AMS         HISTORY OF PRESENT ILLNESS  Patient is a 68-year-old female with medical history significant for ESRD on HD, HTN,diabetes and substance abuse who presented to [facility] on account of altered mental status. Per EMS patient was found unresponsive, blood glucose was 32, received glucagon 1 mg without response , EMS were now able to obtain IV access ,right tibial IO was placed and she was given 125 mL of D10 and blood sugar increased to 190 however did not regain consciousness. Upon arrival to [facility] emergency department he became alert and responsive after she was given narcan. After awakening patient reports that she took 2 Percocet and 10 units of insulin and has not eaten over the last 2 days. During my evaluation at the bed side , Patient was alert and oriented x3 . She denies any fever , chills , nausea , vomiting , shortness of breath , cough , chest pain, weakness , seizure like activity , urinary or bowel complains.      [facility], V/S: BP 200s/100s pulse 80  T 97.6 °F SPO2 100%. Lab work-ups: BMP (recent) remarkable for glucose of 191 and K3.1. Chest x-ray revealed no acute process. Interval history / Subjective:     2020 ; Alert; ate breakfast and asking for breakfast again, discussed with rn and pt. Pt to have diabetic diet.  Pt in no distress. ;     Assessment & Plan:     Acute encephalopathy  Likely due to opioid overdose superimposed by hypoglycemia or the other way around  Now alert and oriented x3  No leukocytosis or lactic acidosis  Chest x-ray unremarkable  UDS pending   Blood glucose improved 191  Neurochecks  Continue to monitor     Hypertensive crisis  BP in 200s/130's  We will try hydralazine and consider Cardene drip if no improvement  Resume home meds  BP Readings from Last 1 Encounters:   05/17/20 155/90         DM  Complicated by hypoglycemia  SSI   Blood glucose every 3 hours  Accu-checks   Diabetic diet   Lab Results   Component Value Date/Time    Glucose 191 (H) 05/16/2020 07:41 PM    Glucose (POC) 225 (H) 05/17/2020 07:52 AM         ESRD on HD  Had HD today  No acute issue  Nephrology consult     Patient's Baseline: Ambulates with walking  DVT ppx: SCD  Code status: heparin  Disposition:TBD likely discharge in am                  Hospital Problems  Date Reviewed: 6/10/2018          Codes Class Noted POA    Hypokalemia ICD-10-CM: E87.6  ICD-9-CM: 276.8  5/16/2020 Unknown        Hypoglycemia ICD-10-CM: E16.2  ICD-9-CM: 251.2  5/5/2020 Unknown                Review of Systems:   Pertinent items are noted in HPI. Vital Signs:    Last 24hrs VS reviewed since prior progress note. Most recent are:  Visit Vitals  /90 (BP 1 Location: Right arm, BP Patient Position: At rest)   Pulse 97   Temp 98.7 °F (37.1 °C)   Resp 19   Ht 5' 2\" (1.575 m)   Wt 54.1 kg (119 lb 4.3 oz)   SpO2 95%   Breastfeeding No   BMI 21.81 kg/m²         Intake/Output Summary (Last 24 hours) at 5/17/2020 0951  Last data filed at 5/17/2020 0753  Gross per 24 hour   Intake 490 ml   Output 250 ml   Net 240 ml        Physical Examination:             Constitutional:  No acute distress, cooperative, pleasant    ENT:  Oral mucosa moist, oropharynx benign. Resp:  CTA bilaterally. No wheezing/rhonchi/rales. No accessory muscle use   CV:  Regular rhythm, normal      GI:  Soft, non distended, non tender. normoactive bowel sounds, no hepatosplenomegaly     Musculoskeletal:  No edema, warm,      Neurologic:  Moves all extremities.   AAOx3, CN II-XII reviewed     Psych:  poor insight  Skin:  Good turgor, no rashes or ulcers       Data Review:    Review and/or order of clinical lab test      Labs:     Recent Labs     05/16/20 1819   WBC 6.2   HGB 10.2*   HCT 32.8*   *     Recent Labs     05/16/20 1941 05/16/20 1819    143   K 3.1* 2.8*    104   CO2 24 24   BUN 45* 45*   CREA 4.03* 4.22*   * 202*   CA 7.2* 7.3*     Recent Labs     05/16/20 1819   SGOT 26   ALT 57   *   TBILI 0.2   TP 6.9   ALB 2.8*   GLOB 4.1*     No results for input(s): INR, PTP, APTT, INREXT in the last 72 hours. No results for input(s): FE, TIBC, PSAT, FERR in the last 72 hours. Lab Results   Component Value Date/Time    Folate 33.8 (H) 05/07/2018 11:20 AM      No results for input(s): PH, PCO2, PO2 in the last 72 hours.   Recent Labs     05/16/20 1819   TROIQ <0.05     Lab Results   Component Value Date/Time    Cholesterol, total 135 05/02/2020 05:15 AM    HDL Cholesterol 56 05/02/2020 05:15 AM    LDL, calculated 60.8 05/02/2020 05:15 AM    Triglyceride 91 05/02/2020 05:15 AM    CHOL/HDL Ratio 2.4 05/02/2020 05:15 AM     Lab Results   Component Value Date/Time    Glucose (POC) 225 (H) 05/17/2020 07:52 AM    Glucose (POC) 106 (H) 05/16/2020 10:32 PM    Glucose (POC) 211 (H) 05/16/2020 06:39 PM    Glucose (POC) 190 (H) 05/16/2020 06:10 PM    Glucose (POC) 106 (H) 05/09/2020 12:24 PM     Lab Results   Component Value Date/Time    Color YELLOW/STRAW 05/05/2020 12:13 PM    Appearance CLOUDY (A) 05/05/2020 12:13 PM    Specific gravity 1.010 05/05/2020 12:13 PM    Specific gravity 1.013 05/02/2020 03:11 AM    pH (UA) 6.5 05/05/2020 12:13 PM    Protein 100 (A) 05/05/2020 12:13 PM    Glucose Negative 05/05/2020 12:13 PM    Ketone Negative 05/05/2020 12:13 PM    Bilirubin Negative 05/05/2020 12:13 PM    Urobilinogen 0.2 05/05/2020 12:13 PM    Nitrites Negative 05/05/2020 12:13 PM    Leukocyte Esterase LARGE (A) 05/05/2020 12:13 PM    Epithelial cells FEW 05/05/2020 12:13 PM    Bacteria 4+ (A) 05/05/2020 12:13 PM    WBC 20-50 05/05/2020 12:13 PM    RBC 10-20 05/05/2020 12:13 PM         Medications Reviewed:     Current Facility-Administered Medications   Medication Dose Route Frequency    amLODIPine (NORVASC) tablet 10 mg  10 mg Oral DAILY    albuterol (PROVENTIL VENTOLIN) nebulizer solution 2.5 mg  2.5 mg Nebulization Q4H PRN    calcitRIOL (ROCALTROL) capsule 0.25 mcg  0.25 mcg Oral DAILY    . PHARMACY TO SUBSTITUTE PER PROTOCOL (Reordered from: calcium acetate (PHOSLO) 667 mg cap)    Per Protocol    cloNIDine HCL (CATAPRES) tablet 0.1 mg  0.1 mg Oral TID WITH MEALS    QUEtiapine (SEROquel) tablet 100 mg  100 mg Oral BID    ondansetron (ZOFRAN) injection 4 mg  4 mg IntraVENous Q6H PRN    hydrALAZINE (APRESOLINE) 20 mg/mL injection 10 mg  10 mg IntraVENous Q6H PRN    insulin regular (NOVOLIN R, HUMULIN R) injection   SubCUTAneous AC&HS    glucose chewable tablet 16 g  4 Tab Oral PRN    glucagon (GLUCAGEN) injection 1 mg  1 mg IntraMUSCular PRN    dextrose 10% infusion 0-250 mL  0-250 mL IntraVENous PRN    heparin (porcine) injection 5,000 Units  5,000 Units SubCUTAneous Q12H    naloxone (NARCAN) injection 1 mg  1 mg IntraVENous PRN    sodium chloride (NS) flush 5-40 mL  5-40 mL IntraVENous Q8H    sodium chloride (NS) flush 5-40 mL  5-40 mL IntraVENous PRN    naloxone (NARCAN) injection 0.4 mg  0.4 mg IntraVENous PRN    zolpidem (AMBIEN) tablet 5 mg  5 mg Oral QHS PRN    acetaminophen (TYLENOL) tablet 650 mg  650 mg Oral Q4H PRN     ______________________________________________________________________  EXPECTED LENGTH OF STAY: - - -  ACTUAL LENGTH OF STAY:          1                 Tian Lombardi MD

## 2020-05-17 NOTE — ED NOTES
Patient hypertensive at time of transfer. Patient states that she has not taken any of her blood pressure medication. Patient is alert and oriented, no acute distress noted. ER MD notified and will order BP medication.

## 2020-05-17 NOTE — PROGRESS NOTES
Problem: Falls - Risk of  Goal: *Absence of Falls  Description: Document Christian Sol Fall Risk and appropriate interventions in the flowsheet.   Outcome: Progressing Towards Goal  Note: Fall Risk Interventions:                 Elimination Interventions: Call light in reach              Problem: Diabetes Out of Control: Day 1  Goal: Activity/Safety  Outcome: Progressing Towards Goal  Goal: Nutrition/Diet  Outcome: Progressing Towards Goal  Goal: Medications  Outcome: Progressing Towards Goal  Goal: Respiratory  Outcome: Progressing Towards Goal  Goal: Treatments/Interventions/Procedures  Outcome: Progressing Towards Goal

## 2020-05-17 NOTE — CONSULTS
ATSP re: ESRD. Review of chart shows that Dr. Meron Irvin saw her earlier this month. I asked the nurse to contact his group.

## 2020-05-17 NOTE — H&P
HISTORY AND PHYSICAL  Zayra Goldsmith MD        PCP: Gail Otero MD    Please note that this dictation was completed with [employer], the computer voice recognition software. Quite often unanticipated grammatical, syntax, homophones, and other interpretive errors are inadvertently transcribed by the computer software. Please disregard these errors. Please excuse any errors that have escaped final proofreading. CHIEF COMPLAINTS   AMS       HISTORY OF PRESENT ILLNESS  Patient is a 67-year-old female with medical history significant for ESRD on HD, HTN,diabetes and substance abuse who presented [facility] on account of altered mental status. Per EMS patient was found unresponsive, blood glucose was 32, received glucagon 1 mg without response , EMS were now able to obtain IV access ,right tibial IO was placed and she was given 125 mL of D10 and blood sugar increased to 190 however did not regain consciousness. Upon arrival to [facility] emergency department he became alert and responsive after she was given narcan. After awakening patient reports that she took 2 Percocet and 10 units of insulin and has not eaten over the last 2 days. During my evaluation at the bed side , Patient was alert and oriented x3 . She denies any fever , chills , nausea , vomiting , shortness of breath , cough , chest pain, weakness , seizure like activity , urinary or bowel complains. [facility], V/S: BP 200s/100s pulse 80  T 97.6 °F SPO2 100%. Lab work-ups: BMP (recent) remarkable for glucose of 191 and K3.1. Chest x-ray revealed no acute process.          PMH/PSH:  Past Medical History:   Diagnosis Date    ARF (acute renal failure) ([address] requiring dialysis 2011    Asthma     CKD (chronic kidney disease)     Diabetes ([address]     Fibromyalgia     Gastroparesis 2010    Gastric Pacer- REMOVED 07/2015    GERD (gastroesophageal reflux disease)     Hypertension     Narcotic dependence ([address]  THU (obstructive sleep apnea)     wears 2 LPM oxygen at night    Other ill-defined conditions(799.89)     Polycystic ovarian syndrome     Seizures (HCC)     S[facility]     Thromboembolus ([facility] to her left arm and was told she had one in left leg recently     Past Surgical History:   Procedure Laterality Date    HX AMPUTATION FOOT  04/2018    left foot    HX CATARACT REMOVAL  3/5/12    right    HX DILATION AND CURETTAGE      ablation    HX GASTRIC BYPASS  2015    HX GI      j tube placement and removal    HX OTHER SURGICAL  2010    Gastric Pacer- REMOVED 07/2015    HX VASCULAR ACCESS      gray cath rt subclavian; removed     HX VASCULAR ACCESS      HD access right thigh; stopped working    IR INSERT NON TUNL CVC OVER 5 YRS  10/4/2019       Home meds:   Prior to Admission medications    Medication Sig Start Date End Date Taking? Authorizing Provider   guaiFENesin ER (MUCINEX) 600 mg ER tablet Take 1 Tab by mouth two (2) times daily as needed for Congestion for up to 15 days. 5/9/20 5/24/20 Yes Willie Duran MD   acetaminophen (TYLENOL) 500 mg tablet Take 500 mg by mouth. Yes Other, MD Ryan   calcium acetate (PHOSLO) 667 mg cap TAKE 2 CAPSULES BY MOUTH THREE TIMES DAILY WITH MEALS 6/10/19  Yes Ryan Lozano MD   cloNIDine HCl (CATAPRES) 0.1 mg tablet 1 TABLET BY MOUTH 3 TIMES A DAY AS DIRECTED-2 AT BEDTIME OR LAST DOSE OF DAY-CHECK BP AS DISCUSSED 6/11/19  Yes Ryan Lozano MD   traZODone (DESYREL) 50 mg tablet Take 50 mg by mouth nightly. Yes Blake, MD Ryan   FOLIC ACID PO Take  by mouth. Yes Blake, MD Ryan   calcium carbonate (TUMS PO) Take 1,000 mg by mouth daily. Yes Other, MD Ryan   amLODIPine (NORVASC) 10 mg tablet Take 1 Tab by mouth daily. 7/4/18  Yes Ivan Kyle MD   calcitRIOL (ROCALTROL) 0.25 mcg capsule Take 1 Cap by mouth daily.  7/4/18  Yes Ivan Kyle MD   Diabetic Supplies, Miscellan. kit USE AS DIRECTED 7/4/18  Yes Ivan Kyle MD   venlafaxine (EFFEXOR) 75 mg tablet Take 1 Tab by mouth two (2) times daily (with meals). 18  Yes Justin Fernández MD   senna-docusate (PERICOLACE) 8.6-50 mg per tablet Take 2 Tabs by mouth daily. 18  Yes Justin Fernández MD   QUEtiapine (SEROQUEL) 100 mg tablet Take 1 Tab by mouth two (2) times a day. 18  Yes Justin Fernández MD   albuterol (PROVENTIL VENTOLIN) 2.5 mg /3 mL (0.083 %) nebulizer solution 2.5 mg by Nebulization route every four (4) hours as needed. Yes Provider, Historical   b complex-vitamin c-folic acid (NEPHROCAPS) 1 mg capsule Take 1 Cap by mouth. Other, MD Ryan   vitamin e (E GEMS) 1,000 unit capsule Take 1,000 Units by mouth daily. Other, MD Ryan   FERROUS SULFATE DRIED PO Take  by mouth three (3) times daily. Other, MD Ryan   promethazine (PHENERGAN) 25 mg tablet Take 1 Tab by mouth every six (6) hours as needed for Nausea. 9/15/18   Park Herbert MD   sodium bicarbonate 650 mg tablet Take 1 Tab by mouth two (2) times a day. 18   Justin Fernández MD   cholecalciferol (VITAMIN D3) 50,000 unit capsule Take 1 Cap by mouth every seven (7) days. 18   Maxine Marcum MD   cyanocobalamin 1,000 mcg tablet Take 1,000 mcg by mouth daily. Provider, Historical       Allergies:   Allergies   Allergen Reactions    Fentanyl Itching and Angioedema     \"throat closed up\" per pt      Erythromycin Itching    Toradol [Ketorolac] Rash    Morphine Itching       FH:  Family History   Problem Relation Age of Onset    Cancer Mother         lung    Hypertension Mother     Cancer Father         kidney    Stroke Father         3 strokes: 59-72    Heart Disease Father 72        CABG    Hypertension Father     Cancer Sister         pancreatic    Cancer Maternal Aunt         breast    Cancer Paternal Aunt         breast    Schizophrenia Sister         was in [facility], now ass't living    Other Sister          AIDS    Other Other nephew of AIDS       SH:  Social History     Tobacco Use    Smoking status: Never Smoker    Smokeless tobacco: Never Used   Substance Use Topics    Alcohol use: No       ROS: A comprehensive review of systems was negative except for that written in the HPI. PHYSICAL EXAM:  Visit Vitals  /88 (BP 1 Location: Left leg)   Pulse 81   Temp 97.3 °F (36.3 °C)   Resp 16   Ht 5' 2\" (1.575 m)   Wt 53.8 kg (118 lb 9.7 oz)   SpO2 96%   BMI 21.69 kg/m²       General:          Alert, cooperative, no distress, appears stated age. HEENT:           Atraumatic, anicteric sclerae, pink conjunctivae                          No oral ulcers, mucosa moist, throat clear, dentition fair  Neck:               Supple, symmetrical,  thyroid: non tender  Lungs:             Clear to auscultation bilaterally. No Wheezing or Rhonchi. No rales. Chest wall:      No tenderness  No Accessory muscle use. Heart:              Regular  rhythm,  No  murmur   No edema  Abdomen:        Soft, non-tender. Not distended. Bowel sounds normal  Extremities:    LUE AVF +thrill/bruit, No cyanosis. No clubbing,                            Skin turgor normal, Capillary refill normal, Radial dial pulse 2+  Skin:                Not pale. Not Jaundiced  No rashes   Psych:             Not anxious or agitated. Neurologic:     Alert and oriented to PPT, CNII-XII intact. Motor and sensory exam grossly intact. Labs/Imaging:  Recent Results (from the past 24 hour(s))   GLUCOSE, POC    Collection Time: 05/16/20  6:10 PM   Result Value Ref Range    Glucose (POC) 190 (H) 65 - 100 mg/dL    Performed by [address] [place] [address]    Collection Time: 05/16/20  6:19 PM   Result Value Ref Range    SAMPLES BEING HELD 1RED 1BLUE 1LAV 1GRN 1GREY     COMMENT        Add-on orders for these samples will be processed based on acceptable specimen integrity and analyte stability, which may vary by analyte.    CBC WITH AUTOMATED DIFF    Collection Time: 05/16/20 6:19 PM   Result Value Ref Range    WBC 6.2 3.6 - 11.0 K/uL    RBC 3.78 (L) 3.80 - 5.20 M/uL    HGB 10.2 (L) 11.5 - 16.0 g/dL    HCT 32.8 (L) 35.0 - 47.0 %    MCV 86.8 80.0 - 99.0 FL    MCH 27.0 26.0 - 34.0 PG    MCHC 31.1 30.0 - 36.5 g/dL    RDW 15.7 (H) 11.5 - 14.5 %    PLATELET 032 (L) 056 - 400 K/uL    NRBC 0.0 0  WBC    ABSOLUTE NRBC 0.00 0.00 - 0.01 K/uL    NEUTROPHILS 85 (H) 32 - 75 %    LYMPHOCYTES 9 (L) 12 - 49 %    MONOCYTES 6 5 - 13 %    EOSINOPHILS 0 0 - 7 %    BASOPHILS 0 0 - 1 %    IMMATURE GRANULOCYTES 0 0.0 - 0.5 %    ABS. NEUTROPHILS 5.2 1.8 - 8.0 K/UL    ABS. LYMPHOCYTES 0.6 (L) 0.8 - 3.5 K/UL    ABS. MONOCYTES 0.4 0.0 - 1.0 K/UL    ABS. EOSINOPHILS 0.0 0.0 - 0.4 K/UL    ABS. BASOPHILS 0.0 0.0 - 0.1 K/UL    ABS. IMM. GRANS. 0.0 0.00 - 0.04 K/UL    DF SMEAR SCANNED      RBC COMMENTS ANISOCYTOSIS  1+        RBC COMMENTS TARGET CELLS  PRESENT        RBC COMMENTS MICROCYTOSIS  1+       METABOLIC PANEL, COMPREHENSIVE    Collection Time: 05/16/20  6:19 PM   Result Value Ref Range    Sodium 143 136 - 145 mmol/L    Potassium 2.8 (L) 3.5 - 5.1 mmol/L    Chloride 104 97 - 108 mmol/L    CO2 24 21 - 32 mmol/L    Anion gap 15 5 - 15 mmol/L    Glucose 202 (H) 65 - 100 mg/dL    BUN 45 (H) 6 - 20 MG/DL    Creatinine 4.22 (H) 0.55 - 1.02 MG/DL    BUN/Creatinine ratio 11 (L) 12 - 20      GFR est AA 14 (L) >60 ml/min/1.73m2    GFR est non-AA 12 (L) >60 ml/min/1.73m2    Calcium 7.3 (L) 8.5 - 10.1 MG/DL    Bilirubin, total 0.2 0.2 - 1.0 MG/DL    ALT (SGPT) 57 12 - 78 U/L    AST (SGOT) 26 15 - 37 U/L    Alk.  phosphatase 141 (H) 45 - 117 U/L    Protein, total 6.9 6.4 - 8.2 g/dL    Albumin 2.8 (L) 3.5 - 5.0 g/dL    Globulin 4.1 (H) 2.0 - 4.0 g/dL    A-G Ratio 0.7 (L) 1.1 - 2.2     TROPONIN I    Collection Time: 05/16/20  6:19 PM   Result Value Ref Range    Troponin-I, Qt. <0.05 <0.05 ng/mL   EKG, 12 LEAD, INITIAL    Collection Time: 05/16/20  6:26 PM   Result Value Ref Range    Ventricular Rate 80 BPM    Atrial Rate 80 BPM    P-R Interval 162 ms    QRS Duration 94 ms    Q-T Interval 454 ms    QTC Calculation (Bezet) 523 ms    Calculated P Axis 51 degrees    Calculated R Axis 21 degrees    Calculated T Axis 43 degrees    Diagnosis       Normal sinus rhythm  Possible Left atrial enlargement  Septal infarct , age undetermined  Prolonged QT  Abnormal ECG  When compared with ECG of 05-MAY-2020 11:38,  No significant change was found     POC VENOUS BLOOD GAS    Collection Time: 05/16/20  6:27 PM   Result Value Ref Range    Device: ROOM AIR      pH, venous (POC) 7.288 (L) 7.32 - 7.42      pCO2, venous (POC) 52.9 (H) 41 - 51 MMHG    pO2, venous (POC) 51 (H) 25 - 40 mmHg    HCO3, venous (POC) 25.3 23.0 - 28.0 MMOL/L    sO2, venous (POC) 80 65 - 88 %    Base deficit, venous (POC) 1 mmol/L    Allens test (POC) N/A      Total resp.  rate 12      Site OTHER      Specimen type (POC) VENOUS BLOOD     GLUCOSE, POC    Collection Time: 05/16/20  6:39 PM   Result Value Ref Range    Glucose (POC) 211 (H) 65 - 100 mg/dL    Performed by [email or web address] [email or web address], BASIC    Collection Time: 05/16/20  7:41 PM   Result Value Ref Range    Sodium 142 136 - 145 mmol/L    Potassium 3.1 (L) 3.5 - 5.1 mmol/L    Chloride 105 97 - 108 mmol/L    CO2 24 21 - 32 mmol/L    Anion gap 13 5 - 15 mmol/L    Glucose 191 (H) 65 - 100 mg/dL    BUN 45 (H) 6 - 20 MG/DL    Creatinine 4.03 (H) 0.55 - 1.02 MG/DL    BUN/Creatinine ratio 11 (L) 12 - 20      GFR est AA 15 (L) >60 ml/min/1.73m2    GFR est non-AA 12 (L) >60 ml/min/1.73m2    Calcium 7.2 (L) 8.5 - 10.1 MG/DL   GLUCOSE, POC    Collection Time: 05/16/20 10:32 PM   Result Value Ref Range    Glucose (POC) 106 (H) 65 - 100 mg/dL    Performed by Fredi Sever        Recent Labs     05/16/20 1819   WBC 6.2   HGB 10.2*   HCT 32.8*   *     Recent Labs     05/16/20 1941 05/16/20 1819    143   K 3.1* 2.8*    104   CO2 24 24   BUN 45* 45*   CREA 4.03* 4.22*   * 202*   CA 7.2* 7.3* Recent Labs     05/16/20 1819   SGOT 26   ALT 57   *   TBILI 0.2   TP 6.9   ALB 2.8*   GLOB 4.1*       Recent Labs     05/16/20 1819   TROIQ <0.05       No results for input(s): INR, PTP, APTT, INREXT in the last 72 hours. No results for input(s): PH, PCO2, PO2 in the last 72 hours. XR CHEST PORT  Narrative: INDICATION:  unresponsive episode     Exam: Portable chest 1900. Comparison: May 5, 2020. Findings: Cardiomediastinal silhouette is within normal limits. Pulmonary  vasculature is not engorged. There are no focal parenchymal opacities,  effusions, or pneumothorax. Impression: Impression:  1. No acute disease             Assessment & Plan:  ====================  Acute encephalopathy  Likely due to opioid overdose superimposed by hypoglycemia or the other way around  Now alert and oriented x3  No leukocytosis or lactic acidosis  Chest x-ray unremarkable  UDS pending   Blood glucose improved 191  Neurochecks  Continue to monitor    Hypertensive crisis  BP in 200s/130's  We will try hydralazine and consider Cardene drip if no improvement  Resume home meds    DM  Complicated by hypoglycemia  SSI   Blood glucose every 3 hours  Accu-checks   Diabetic diet     ESRD on HD  Had HD today  No acute issue  Nephrology consult    Patient's Baseline: Ambulates with walking  DVT ppx: SCD  Code status: heparin  Disposition:TBD    care plan discussed with patient/family and nurse.                 Signed By: Luis Alberto Alvares MD     May 17, 2020

## 2020-05-17 NOTE — PROGRESS NOTES
Admitted pt to [facility] on 3N; report [facility] from [facility]. Dr. Germán Eason notified of admission and came to see pt. V/S and assessment completed.

## 2020-05-17 NOTE — PROGRESS NOTES
Bedside and Verbal shift change report given to Ti Bingham (oncoming nurse) by Araseli Singh (offgoing nurse). Report included the following information SBAR, MAR and Recent Results.

## 2020-05-18 ENCOUNTER — APPOINTMENT (OUTPATIENT)
Dept: GENERAL RADIOLOGY | Age: 40
DRG: 917 | End: 2020-05-18
Attending: NURSE PRACTITIONER
Payer: MEDICARE

## 2020-05-18 VITALS
HEIGHT: 62 IN | WEIGHT: 119.05 LBS | BODY MASS INDEX: 21.91 KG/M2 | OXYGEN SATURATION: 100 % | DIASTOLIC BLOOD PRESSURE: 79 MMHG | HEART RATE: 94 BPM | SYSTOLIC BLOOD PRESSURE: 150 MMHG | TEMPERATURE: 97.5 F | RESPIRATION RATE: 16 BRPM

## 2020-05-18 PROBLEM — E87.6 HYPOKALEMIA: Status: RESOLVED | Noted: 2020-05-16 | Resolved: 2020-05-18

## 2020-05-18 PROBLEM — E16.2 HYPOGLYCEMIA: Status: RESOLVED | Noted: 2020-05-05 | Resolved: 2020-05-18

## 2020-05-18 LAB
ALBUMIN SERPL-MCNC: 2.4 G/DL (ref 3.5–5)
ALBUMIN/GLOB SERPL: 0.7 {RATIO} (ref 1.1–2.2)
ALP SERPL-CCNC: 116 U/L (ref 45–117)
ALT SERPL-CCNC: 40 U/L (ref 12–78)
ANION GAP SERPL CALC-SCNC: 11 MMOL/L (ref 5–15)
AST SERPL-CCNC: 21 U/L (ref 15–37)
BILIRUB SERPL-MCNC: 0.2 MG/DL (ref 0.2–1)
BUN SERPL-MCNC: 47 MG/DL (ref 6–20)
BUN/CREAT SERPL: 14 (ref 12–20)
CALCIUM SERPL-MCNC: 6.8 MG/DL (ref 8.5–10.1)
CHLORIDE SERPL-SCNC: 110 MMOL/L (ref 97–108)
CO2 SERPL-SCNC: 20 MMOL/L (ref 21–32)
CREAT SERPL-MCNC: 3.46 MG/DL (ref 0.55–1.02)
ERYTHROCYTE [DISTWIDTH] IN BLOOD BY AUTOMATED COUNT: 16.2 % (ref 11.5–14.5)
GLOBULIN SER CALC-MCNC: 3.6 G/DL (ref 2–4)
GLUCOSE BLD STRIP.AUTO-MCNC: 178 MG/DL (ref 65–100)
GLUCOSE BLD STRIP.AUTO-MCNC: 212 MG/DL (ref 65–100)
GLUCOSE BLD STRIP.AUTO-MCNC: 230 MG/DL (ref 65–100)
GLUCOSE SERPL-MCNC: 150 MG/DL (ref 65–100)
HCT VFR BLD AUTO: 31.9 % (ref 35–47)
HGB BLD-MCNC: 9.6 G/DL (ref 11.5–16)
MAGNESIUM SERPL-MCNC: 1.7 MG/DL (ref 1.6–2.4)
MCH RBC QN AUTO: 26.2 PG (ref 26–34)
MCHC RBC AUTO-ENTMCNC: 30.1 G/DL (ref 30–36.5)
MCV RBC AUTO: 86.9 FL (ref 80–99)
NRBC # BLD: 0 K/UL (ref 0–0.01)
NRBC BLD-RTO: 0 PER 100 WBC
PHOSPHATE SERPL-MCNC: 5.6 MG/DL (ref 2.6–4.7)
PLATELET # BLD AUTO: 115 K/UL (ref 150–400)
POTASSIUM SERPL-SCNC: 3.1 MMOL/L (ref 3.5–5.1)
PROT SERPL-MCNC: 6 G/DL (ref 6.4–8.2)
RBC # BLD AUTO: 3.67 M/UL (ref 3.8–5.2)
SERVICE CMNT-IMP: ABNORMAL
SODIUM SERPL-SCNC: 141 MMOL/L (ref 136–145)
WBC # BLD AUTO: 4.8 K/UL (ref 3.6–11)

## 2020-05-18 PROCEDURE — 5A1D70Z PERFORMANCE OF URINARY FILTRATION, INTERMITTENT, LESS THAN 6 HOURS PER DAY: ICD-10-PCS | Performed by: INTERNAL MEDICINE

## 2020-05-18 PROCEDURE — 80053 COMPREHEN METABOLIC PANEL: CPT

## 2020-05-18 PROCEDURE — 84100 ASSAY OF PHOSPHORUS: CPT

## 2020-05-18 PROCEDURE — 74011000250 HC RX REV CODE- 250: Performed by: STUDENT IN AN ORGANIZED HEALTH CARE EDUCATION/TRAINING PROGRAM

## 2020-05-18 PROCEDURE — 74011636637 HC RX REV CODE- 636/637: Performed by: STUDENT IN AN ORGANIZED HEALTH CARE EDUCATION/TRAINING PROGRAM

## 2020-05-18 PROCEDURE — 85027 COMPLETE CBC AUTOMATED: CPT

## 2020-05-18 PROCEDURE — 83735 ASSAY OF MAGNESIUM: CPT

## 2020-05-18 PROCEDURE — 90935 HEMODIALYSIS ONE EVALUATION: CPT

## 2020-05-18 PROCEDURE — 71045 X-RAY EXAM CHEST 1 VIEW: CPT

## 2020-05-18 PROCEDURE — 36600 WITHDRAWAL OF ARTERIAL BLOOD: CPT

## 2020-05-18 PROCEDURE — 74011250637 HC RX REV CODE- 250/637: Performed by: STUDENT IN AN ORGANIZED HEALTH CARE EDUCATION/TRAINING PROGRAM

## 2020-05-18 PROCEDURE — 74011250637 HC RX REV CODE- 250/637: Performed by: INTERNAL MEDICINE

## 2020-05-18 PROCEDURE — 36415 COLL VENOUS BLD VENIPUNCTURE: CPT

## 2020-05-18 PROCEDURE — 74011250636 HC RX REV CODE- 250/636: Performed by: STUDENT IN AN ORGANIZED HEALTH CARE EDUCATION/TRAINING PROGRAM

## 2020-05-18 PROCEDURE — 82962 GLUCOSE BLOOD TEST: CPT

## 2020-05-18 PROCEDURE — 94640 AIRWAY INHALATION TREATMENT: CPT

## 2020-05-18 RX ORDER — POTASSIUM CHLORIDE 750 MG/1
40 TABLET, FILM COATED, EXTENDED RELEASE ORAL
Status: COMPLETED | OUTPATIENT
Start: 2020-05-18 | End: 2020-05-18

## 2020-05-18 RX ADMIN — HUMAN INSULIN 3 UNITS: 100 INJECTION, SOLUTION SUBCUTANEOUS at 12:37

## 2020-05-18 RX ADMIN — Medication 10 ML: at 06:35

## 2020-05-18 RX ADMIN — CLONIDINE HYDROCHLORIDE 0.1 MG: 0.1 TABLET ORAL at 08:15

## 2020-05-18 RX ADMIN — CLONIDINE HYDROCHLORIDE 0.1 MG: 0.1 TABLET ORAL at 12:37

## 2020-05-18 RX ADMIN — VENLAFAXINE 75 MG: 37.5 TABLET ORAL at 08:15

## 2020-05-18 RX ADMIN — ALBUTEROL SULFATE 2.5 MG: 2.5 SOLUTION RESPIRATORY (INHALATION) at 02:10

## 2020-05-18 RX ADMIN — AMLODIPINE BESYLATE 10 MG: 5 TABLET ORAL at 08:15

## 2020-05-18 RX ADMIN — SENNOSIDES AND DOCUSATE SODIUM 2 TABLET: 8.6; 5 TABLET ORAL at 08:15

## 2020-05-18 RX ADMIN — POTASSIUM CHLORIDE 40 MEQ: 750 TABLET, FILM COATED, EXTENDED RELEASE ORAL at 08:15

## 2020-05-18 RX ADMIN — HEPARIN SODIUM 5000 UNITS: 5000 INJECTION INTRAVENOUS; SUBCUTANEOUS at 01:08

## 2020-05-18 RX ADMIN — ERGOCALCIFEROL 50000 UNITS: 1.25 CAPSULE ORAL at 06:34

## 2020-05-18 RX ADMIN — QUETIAPINE FUMARATE 100 MG: 100 TABLET ORAL at 08:15

## 2020-05-18 RX ADMIN — CALCITRIOL CAPSULES 0.25 MCG 0.25 MCG: 0.25 CAPSULE ORAL at 08:16

## 2020-05-18 RX ADMIN — ALBUTEROL SULFATE 2.5 MG: 2.5 SOLUTION RESPIRATORY (INHALATION) at 14:30

## 2020-05-18 RX ADMIN — SEVELAMER CARBONATE 1600 MG: 800 TABLET, FILM COATED ORAL at 12:37

## 2020-05-18 RX ADMIN — SEVELAMER CARBONATE 1600 MG: 800 TABLET, FILM COATED ORAL at 08:15

## 2020-05-18 NOTE — PROGRESS NOTES
Hospital follow-up PCP transitional care appointment has been scheduled with Dr. Steffany Frazier for Wednesday, 5/20/20 at 2:30 p.m. Pending patient discharge.   Cierra Dye, Care Management Specialist.

## 2020-05-18 NOTE — PROGRESS NOTES
1930: Bedside and Verbal shift change report given to SRI RN (oncoming nurse) by Kimberly Nolan RN (offgoing nurse). Report included the following information SBAR, Kardex, Intake/Output, MAR, Recent Results, Med Rec Status and Cardiac Rhythm NSR. Pt resting quietly at shift change. VSS. Pt not currently on any gtts. 2230: Bed alarm placed under patient d/t impulsiveness and getting up without calling out. Pt had one large bowel movement. 0130: Called RT for breathing treatment for audible expiratory wheezing. 0232: 2L nasal cannula placed for sleep d/t patient's sleep apnea. 46: Spoke with Yonatan Harvey NP regarding difficulty getting patient's labs. Orders received to get PRN Art stick for labs. Also mentioned patient's labored breathing and what seems to be asthma flare up despite PRN Albuterol treatment, not much improvement noted. Orders to get CXR. Will continue to monitor. 0730: Bedside and Verbal shift change report given to Ilan Burkett RN (oncoming nurse) by VERONICA FIERRO (offgoing nurse). Report included the following information SBAR, Kardex, Intake/Output, MAR, Recent Results, Med Rec Status and Cardiac Rhythm NSR.

## 2020-05-18 NOTE — PROGRESS NOTES
[address] [facility]  YOB: 1980          Assessment & Plan:   ESRD on HD TTS at [place]  H/o substance abuse  AMS due to narcotics  HTN  SHPT      Rec:  HD today  UF 1kg  D/c home after HD  Resume outpt HD tomorrow at [place] [facility]:   CC: ESRD  HPI:  Seen on dialysis. BP dropping. UF goal reduced to 1kg. No cp or sob reported. No n/v/d, fevers or chills.     ROS: per HPI  Current Facility-Administered Medications   Medication Dose Route Frequency    amLODIPine (NORVASC) tablet 10 mg  10 mg Oral DAILY    albuterol (PROVENTIL VENTOLIN) nebulizer solution 2.5 mg  2.5 mg Nebulization Q4H PRN    calcitRIOL (ROCALTROL) capsule 0.25 mcg  0.25 mcg Oral DAILY    ondansetron (ZOFRAN) injection 4 mg  4 mg IntraVENous Q6H PRN    hydrALAZINE (APRESOLINE) 20 mg/mL injection 10 mg  10 mg IntraVENous Q6H PRN    insulin regular (NOVOLIN R, HUMULIN R) injection   SubCUTAneous AC&HS    glucose chewable tablet 16 g  4 Tab Oral PRN    glucagon (GLUCAGEN) injection 1 mg  1 mg IntraMUSCular PRN    dextrose 10% infusion 0-250 mL  0-250 mL IntraVENous PRN    heparin (porcine) injection 5,000 Units  5,000 Units SubCUTAneous Q12H    sevelamer carbonate (RENVELA) tab 1,600 mg  1,600 mg Oral TID WITH MEALS    QUEtiapine (SEROquel) tablet 100 mg  100 mg Oral DAILY    QUEtiapine (SEROquel) tablet 50 mg  50 mg Oral QHS    ergocalciferol capsule 50,000 Units  50,000 Units Oral every Monday    cloNIDine HCL (CATAPRES) tablet 0.1 mg  0.1 mg Oral TID WITH MEALS    venlafaxine (EFFEXOR) tablet 75 mg  75 mg Oral BID WITH MEALS    traZODone (DESYREL) tablet 50 mg  50 mg Oral QHS    senna-docusate (PERICOLACE) 8.6-50 mg per tablet 2 Tab  2 Tab Oral DAILY    naloxone (NARCAN) injection 1 mg  1 mg IntraVENous PRN    sodium chloride (NS) flush 5-40 mL  5-40 mL IntraVENous Q8H    sodium chloride (NS) flush 5-40 mL  5-40 mL IntraVENous PRN    naloxone (NARCAN) injection 0.4 mg  0.4 mg IntraVENous PRN    acetaminophen (TYLENOL) tablet 650 mg  650 mg Oral Q4H PRN          Objective:     Vitals:  Blood pressure 101/68, pulse 100, temperature 98.7 °F (37.1 °C), temperature source Oral, resp. rate 16, height 5' 2\" (1.575 m), weight 54 kg (119 lb 0.8 oz), SpO2 100 %, not currently breastfeeding. Temp (24hrs), Av °F (36.7 °C), Min:97 °F (36.1 °C), Max:99.1 °F (37.3 °C)      Intake and Output:  No intake/output data recorded.  1901 -  0700  In: 1090 [P.O.:1090]  Out: 250 [Urine:250]    Physical Exam:               GENERAL ASSESSMENT: chronically ill, Nad  HEENT:Nontraumatic   CHEST: on oxygen, no distress  HEART: sinus tach  EXTREMITY: no edema        ECG/rhythm:    Data Review      No results for input(s): TNIPOC in the last 72 hours. No lab exists for component: ITNL   Recent Labs     20   TROIQ <0.05     Recent Labs     20  0521 20  1941 20    142 143   K 3.1* 3.1* 2.8*   * 105 104   CO2 20* 24 24   BUN 47* 45* 45*   CREA 3.46* 4.03* 4.22*   * 191* 202*   PHOS 5.6*  --   --    MG 1.7  --   --    CA 6.8* 7.2* 7.3*   ALB 2.4*  --  2.8*   WBC 4.8  --  6.2   HGB 9.6*  --  10.2*   HCT 31.9*  --  32.8*   *  --  111*      No results for input(s): INR, PTP, APTT, INREXT, INREXT in the last 72 hours. Needs: urine analysis, urine sodium, protein and creatinine  Lab Results   Component Value Date/Time    Sodium,urine random 51 2013 06:01 PM    Creatinine, urine 154.89 2013 06:01 PM           : Isiah Quintana MD  2020        [facility]:  [email or web address]  Sofiaell Nyhan office:  [address], [address] [place] [address],[address]  [place], [address] [place] [address]  Phone: [phone]  Fax :     [phone]    [facility] office:  [address] [facility], [address] [facility]  Phone - [phone]  Fax - [phone]

## 2020-05-18 NOTE — DISCHARGE INSTRUCTIONS
Discharge Instructions       PATIENT ID: Stephanie Zhu  MRN: [id number]   YOB: 1980    DATE OF ADMISSION: 5/16/2020  6:12 PM    DATE OF DISCHARGE: 5/18/2020    PRIMARY CARE PROVIDER: Jolie Severe, MD     ATTENDING PHYSICIAN: Robert Villanueva MD  DISCHARGING PROVIDER: Andrés Dykes MD    To contact this individual call [phone] and ask the  to page. If unavailable ask to be transferred the Adult Hospitalist Department. DISCHARGE DIAGNOSES alt mental 2n2 hypoglycemia 2n2 nacotic med use     CONSULTATIONS: IP CONSULT TO HOSPITALIST  IP CONSULT TO NEPHROLOGY    PROCEDURES/SURGERIES: * No surgery found *    PENDING TEST RESULTS:   At the time of discharge the following test results are still pending: na    FOLLOW UP APPOINTMENTS:   Follow-up Information     Follow up With Specialties Details Why Contact Info    Jolie Severe, MD Internal Medicine In 1 week avoid all naroitic pain managment meds  [address] [facility]  [phone]             ADDITIONAL CARE RECOMMENDATIONS: avoid all narcotic pain management meds     DIET: Diabetic Diet and Renal Diet     ACTIVITY: Activity as tolerated    WOUND CARE: na    EQUIPMENT needed: na      DISCHARGE MEDICATIONS:   See Medication Reconciliation Form    · It is important that you take the medication exactly as they are prescribed. · Keep your medication in the bottles provided by the pharmacist and keep a list of the medication names, dosages, and times to be taken in your wallet. · Do not take other medications without consulting your doctor. NOTIFY YOUR PHYSICIAN FOR ANY OF THE FOLLOWING:   Fever over 101 degrees for 24 hours. Chest pain, shortness of breath, fever, chills, nausea, vomiting, diarrhea, change in mentation, falling, weakness, bleeding. Severe pain or pain not relieved by medications. Or, any other signs or symptoms that you may have questions about.       DISPOSITION:    Home With:   OT  PT  [facility]  RN       SNF/Inpatient Rehab/LTAC   x Independent/assisted living    Hospice    Other:          Signed:   Letitia Fernandez MD  5/18/2020  7:10 AM

## 2020-05-18 NOTE — PROGRESS NOTES
5/18/2020; 09:45 -   CM attempted to meet with patient, but patient is currently off the unit for HD. CRM: Francesco Milton MPH, CHES; Z: [phone]    11:50 -   Patient remains off the unit at this time. CRM: Francesco Milton MPH, CHES; Z: [phone]    14:00 -   TRANSITIONS OF CARE PLAN:   1. DESTINATION: Own Home  2. TRANSPORT: Medicaid [employer]  3. ADDITIONAL SUPPORT: Father, Sister, Spouse  4. DME: Juma Hidalgo, Wheelchair, Bedside Commode, Shower  Stool, Nebulizer, O2  5. HOME HEALTH: Not with this admisison  6. CODE STATUS/AMD STATUS: Full Code; on file - mPOA is father  9. FOLLOW UP APPOINTMENTS: PCP  8. STILL NEEDS: Discharge transport    Reason for Admission:   Hypoglycemia; Hypokalemia                RUR Score:     45%    PCP: First and Last name: Dr. Ebenezer Oconnor   Name of Practice:   Are you a current patient: Yes/No: YES   Approximate date of last visit:              Resources/supports as identified by patient/family:   O2 is serviced by [address]; has hx with [employer] according to patient; lives with father, sister, and fiance; HD patient [facility] with TTS schedule with 11:30 chair time                Top Challenges facing patient (as identified by patient/family and CM):  Multiple admissions                     Finances/Medication cost?      Medicare and [facility] Medicaid               Transportation? Medicaid transport              Support system or lack thereof? Family support                     Living arrangements? With family is a single story home with ramp           Self-care/ADLs/Cognition? Independent mostly but fiance assists when needed          Current Advanced Directive/Advance Care Plan:  Full Code; on file                          Plan for utilizing home health:    Not with current admission                 Transition of Care Plan:    CM met with patient at bedside. Patient requested for CM to schedule transport via Medicaid.   CM submitted transport request to [facility] via Medicaid transport. Updated address for patient is: [address], [place], [place] [address]. Patient identified that she has a hx of aids through [address] but currently does not have home assistance; her fiance assists with ADLs when needed. O2 is serviced by [facility]. Patient has hx of rehab at [address]. Patient has a [facility] [place] [facility] with 11:30 chair time. [facility] [place]. Per patient, she is in the process of transitioning to a new PCP to be closer to the [place] area. Medicare pt has received, reviewed 2nd IM letter informing them of their right to appeal the discharge. Care Management Interventions  PCP Verified by CM: Yes(patient requesting scheduling assistance for new PCP)  Palliative Care Criteria Met (RRAT>21 & CHF Dx)?: No  Mode of Transport at Discharge: Other (see comment)(will require Medicaid transport)  Transition of Care Consult (CM Consult): Discharge Planning  MyChart Signup: No  Discharge Durable Medical Equipment: No(has dme of: cane, walker, wheelchair, bedside commode, shower stool, nebulizer, O2)  Health Maintenance Reviewed: Yes(cm met with patient, with patient alert and oriented x 4)  Physical Therapy Consult: No  Occupational Therapy Consult: No  Speech Therapy Consult: No  Current Support Network: Own Home, Family Lives [place], Lives with Spouse(lives with father, sister, and fiance)  Confirm Follow Up Transport: Other (see comment)(fiance assists with ADLs when needed, uses Medicaid transport)  [address] Provided?: No    CRM: Den Campos, MPH, CHES; Z: [phone]    16:15 -   CM received call from patient's [address] [place]: [phone]) requesting to receive patient's discharge summary, nephro note, and HD Run by fax to F: [phone]. CM faxed with confirmation.   CRM: Den Campos, MPH, [address] [place] [address]; Z: [phone]
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
## 2020-05-18 NOTE — PROGRESS NOTES
6:44 PM  CM notified from RN that patient has provided a new address she is requesting to be taken to. [address] [facility] present for transport [phone]. Address needs to be updated with [facility] [employer] provider [phone] before patient can be transported. [facility], [address] [place] [address] Rd. Transport originally arranged via [employer]. CM updated address via [employer]. CM contacted [facility] agency and provided updates. [place] [employer] notified [address] [place] [address] of updates. RN notified of updates. Patient to desired address. No other CM consults or needs at this time.     VIVI Torres/NIXON  Care Management

## 2020-05-18 NOTE — PROCEDURES
[facility] Ac[facility]  [phone]    Vitals   Pre   Post   Assessment   Pre   Post     Temp  Temp: 98.7 °F (37.1 °C) (05/18/20 0925)   LOC  A&Ox4 A&Ox4   HR   Pulse (Heart Rate): 100 (05/18/20 0925) 91 Lungs   Clear, RA Remains on RA    B/P  BP: 101/68 (05/18/20 0925) 148/89 Cardiac   S1S2, RRR RRR   Resp   Resp Rate: 16 (05/18/20 0925) 16 Skin   Warm, dry, intact No change    Pain level  Pain Intensity 1: 4 (05/18/20 0435)  Edema  none none   Orders:    Duration:   Start:    0925 End:   1125 Total:   2 hrs   Dialyzer:   Dialyzer/Set Up Inspection: Renita Cooper (05/18/20 0925)   K Bath:   Dialysate K (mEq/L): 4 (05/18/20 0925)   Ca Bath:   Dialysate CA (mEq/L): 2.5 (05/18/20 0925)   Na/Bicarb:   Dialysate NA (mEq/L): 140 (05/18/20 0925)   Target Fluid Removal:   Goal/Amount of Fluid to Remove (mL): 2000 mL (05/18/20 0925)   Access     Type & Location:   SANDY AVG +bruit/thrill, cannulated using 15 g needles each site. Running well with  as ordered. Labs     Obtained/Reviewed   Critical Results Called   Date when labs were drawn-  Hgb-    HGB   Date Value Ref Range Status   05/18/2020 9.6 (L) 11.5 - 16.0 g/dL Final     K-    Potassium   Date Value Ref Range Status   05/18/2020 3.1 (L) 3.5 - 5.1 mmol/L Final     Ca-   Calcium   Date Value Ref Range Status   05/18/2020 6.8 (L) 8.5 - 10.1 MG/DL Final     Bun-   BUN   Date Value Ref Range Status   05/18/2020 47 (H) 6 - 20 MG/DL Final     Creat-   Creatinine   Date Value Ref Range Status   05/18/2020 3.46 (H) 0.55 - 1.02 MG/DL Final      Medications/ Blood Products Given     Name   Dose   Route and Time     None given                Blood Volume Processed (BVP):   46L Net Fluid   Removed:  1000 mL   Comments   Time Out Done: 5711  Primary Nurse Rpt Pre: Yennifer Vallejo RN  Primary Nurse Rpt Post: Yennifer Vallejo RN  Pt Education: access care  Care Plan: HD today prior to discharge  Tx Summary:  0915: Safety checks complete, time out performed.    6191: SANDY AVG assessed, +bruit/thrill, no redness, warmth or drainage noted. Skin prepped per procedure using alcohol x 60 sec each site. Cannulated using 15g needles each site and secured with tape. +flash/aspiration/flush. HD initiated. Access visible, lines secure. Medications reviewed. 8385: XAVIER Gomez MD at bedside for exam and to discuss plan of care. UF goal decreased to 1L.  1125: HD treatment complete. All possible blood returned to the patient. De-cannulated and pressure held until hemostasis was achieved. Dressing applied. +bruit/thrill. VSS. SBAR called to primary RN.      Admitting Diagnosis: AMS, ESRD  Pt[facility]  Informed Consent Verified: Yes (05/18/20 0925)  Dreita Consent: obtained  Hepatitis Status: HBsAg: Neg(01/25/20) HBsAb: immune(01/25/20) obtained from clinic  Machine Number: [id number]  (05/18/20 0954)  Telemetry status: remote cardiac monitor  Pre-Dialysis Weight: 54 kg (119 lb 0.8 oz) (05/18/20 0925)

## 2020-05-18 NOTE — CDMP QUERY
Pt admitted with hypoglycemic episode and documented to have an opoid overdose and arrived with AMS. Pt also documented to be in a hypertensive crisis. Pt is documented to have \"Acute encephalopathy\"  If possible, please clarify in the progress notes and d/c summary if you are evaluating and / or treating any of the following: Hypertensive Encephalopathy Metabolic Encephalopathy Encephalopathy due to medications or drugs (please specify) Multiple Encephalopathies suspected and treated,  please list  
 
 Other, please specify Clinically unable to determine The medical record reflects the following: 
   Risk Factors: 37 F ESRD on HD, DMI, HTN, access to narcotic medications Clinical Indicators: Patient was \"unresponsive\" in field with BG 36 with GCS 3 and treated with response to  prior to arrival to ER, Presenting BP 200s/100s, patient reported to have taken two percocet prior to arrival, UDS  5/17 positive of cocaine only Treatment: BG treated in the field and was further treated with diabetic diet, glucose checks q3 hours, and SSI. Blood pressure was treated with hydralazine and resuming home medications and monitoring of BP. Suspected overdose was treated with several rounds of narcan in the ER and documented to be alert and responsive after treatment of BG and the provision of narcan. AMS resolved after treatment of these issues. Thank you, Sylvie STOKESN, RN  
[phone]

## 2020-05-19 ENCOUNTER — PATIENT OUTREACH (OUTPATIENT)
Dept: CASE MANAGEMENT | Age: 40
End: 2020-05-19

## 2020-05-19 NOTE — PROGRESS NOTES
5/19/2020 Care transitions nurse-  Call to pt's phone # [phone]/ was advised by adult male in the home - (friend) that she passed away this morning at home.   Jose Juan given -     Yuli Mattson RN, [facility], [facility]  Care transitions nurse [phone]  [facility] Coordination Team  ______________________________________________________PCP:    Marcelle Herrmann and Last name: Dr. Nirali De Guzman              Name of Practice:              Are you a current patient: Yes/No: YES              Approximate date of last visit:              Resources/supports as identified by patient/family:   O2 is serviced by [address]; has hx with [employer] according to patient; lives with father, sister, and fiance; HD patient at [facility] with TTS schedule with 11:30 chair time
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
## 2020-12-07 NOTE — PROGRESS NOTES
Hospitalist Progress Note  Lily Garcia MD  Answering service: [phone] OR 36 from in house phone  Cell: [phone]       Date of Service:  2018  NAME:  Maria Antonia Aparicio  :  1980  MRN:  [id number]    Admission Summary:   41 y/o AA female with PMH of sickle-cell disease, type 1 DM, CKD stage 4, L foot osteomyelitis s/p partial amputation apparently completed IV daptomycin PTA about 2-3 weeks ago admitted on 18 for diffuse body aches and fever with suspicion of acute sickle cell crisis and sepsis of unclear etiology. She was recently discharged from here for DKA (hospital course complicated by respiratory failure, angioedema of unclear etiology, sepsis and STONE     Interval history / Subjective:       :  No new issues up in halls  F/u on retic count in am     :  Pt was expecting to go today but  The abx issues looms in front of us. For ID comments later today and dispo as per their recommendations. Retic count down, pt can go on pain management of percocet.       Assessment & Plan:      1. Acute sickle-cell crisis: (POA)  -This seems to be under control at this time   -Continue supportive care with pain control. Already stopped IVF due to fluid overload.  -Hb is stable at 8.3 today. Continue to monitor H/H. No need to transfuse at this time.   -Pt will go home with Percocet. Stable  retic count for am  -   Low nl level      2. Suspected sepsis: (POA)  -Source is unclear but ddx includes Lt foot osteomyelitis (last MRI showed findings concerning for osteo), or PICC line infection. -GNRs in 2/2 bottles on  and GPC in pairs in 1 of 2 bottles on . Klebsiella in one bottle and Strep mitis in another.  -Continue empiric abx w/ IV Zosyn and daptomycin  - ID follow up appreciated. Asher catheter has been removed. -Repeat blood cultures from 18 is negative so far. -Spoke to ID.  Will need to clarify how much longer she needs to be on Daptomycin. Ok with switching pt to po Cipro or Levaquin for Klebsiella bacteremia. Pt seems to have completed Daptomycin treatment for osteo about 2-3 weeks ago (then monitored by Dr Catia Mack). Was only restarted during this admission. awaot advice from ID befor moving to alternative abx.    for abx per ID on discharge. 3. Type I DM w/ hypoglycemia: (POA)  -BS stable at this time.   -Continue NPH and SSI. Lab Results   Component Value Date/Time    Glucose 209 (H) 07/02/2018 03:58 AM    Glucose 207 (H) 07/02/2018 03:58 AM    Glucose (POC) 298 (H) 07/02/2018 06:49 AM    may need lantus,  10 mg   y 7/2/2018       4. Metabolic acidosis:  -Likely due to CKD 4. Continue NaHCO3 tabs.      5. HTN: uncontrolled  -BP is improving with increasing Norvasc to 10 mg po daily and add Clonidine po.   -Continue to monitor closely. BP Readings from Last 1 Encounters:   07/02/18 121/74          6. CKD stage 4:   -Renal fxn is relatively unchanged today. This is likely progressive CKD. -Nephrology follow up appreciated. Will monitor closely.  -Pt is getting close to needing HD. Lab Results   Component Value Date/Time    Creatinine (POC) 4.1 (H) 04/27/2018 10:04 PM    Creatinine 3.90 (H) 07/02/2018 03:58 AM    Creatinine 3.99 (H) 07/02/2018 03:58 AM      Lab Results   Component Value Date/Time    Potassium 6.6 (HH) 07/02/2018 03:58 AM    Potassium 6.5 (H) 07/02/2018 03:58 AM    Potassium (POC) 5.4 (H) 04/27/2018 10:04 PM        Hyperkalemia, ; see above, dose kayexalate 7/2/2018  Noon.      7. Lt LE DVT:   -Pt has a previous history for DVT which was treated for 1 year per pt and she just finished treatment recently. -Duplex suggest chronic DVT.    -Will hold off on treatment for now      Code status:Full  DVT prophylaxis: SCD     Care Plan discussed with: Patient/Family and Nurse  Disposition: TBD          Hospital Problems  Date Reviewed: 6/10/2018          Codes Class Noted POA    Charcot ankle, left ICD-10-CM: M25.116  ICD-9-CM: 094.0, 713.5  6/25/2018 Unknown        * (Principal)Sickle cell crisis [facility]) ICD-10-CM: D57.00  ICD-9-CM: 282.62  6/24/2018 Unknown        Osteomyelitis of left foot (HCC) ICD-10-CM: M86.9  ICD-9-CM: 730.27  4/27/2018 Yes        Metabolic encephalopathy RXE-14-AP: G93.41  ICD-9-CM: 348.31  3/9/2016 Yes        CKD (chronic kidney disease) ICD-10-CM: N18.9  ICD-9-CM: 585.9  9/19/2012 Yes    Overview Addendum 1/17/2013  6:44 PM by Hernandez Flynn; Required HD in past with acute exacerbation, AV graft R thigh  Supposed to make appt:   Rudy Abernathy MD  [address], [place] [address]   [phone]              Diabetes mellitus type I [facility]) ICD-10-CM: E10.9  ICD-9-CM: 250.01  3/29/2012 Yes    Overview Addendum 2/12/2013  7:43 AM by Ag pt appt: May 8, 2013 with dr Gardenia Chang  dx'd age 14; + neuropathy, nephropathy  On ssi, regular  Diabetes health maintenance:  Last A1C: 11.7 1/2013 10.7 3/2012  Last eye exam 2012, had cataract R eye, needs L eye done, Dr Avila Alvarez (OD) [phone], another doc did surgery: Dr Fredrick West MD same practice [phone] f [phone]  Last microalbumin:  n/a Last creatinine: 1.65 1/2013; 1.9 9/17/12  Last microfilament exam/Last podiatry: 9/20/12 intact, nails thickened  Last lipids: 1/201  trig 173, HDL 70 .4 w/o statin; no record in former PCP notes why it was d/c'd   ACE/ARB: no due to CKD                 HTN (hypertension) (Chronic) ICD-10-CM: I10  ICD-9-CM: 401.9  10/14/2011 Yes                Review of Systems:   bno n/v/d/f/chills, some pain in legs. Stable ,      Vital Signs:    Last 24hrs VS reviewed since prior progress note.  Most recent are:  Visit Vitals    /74    Pulse 74    Temp 97.9 °F (36.6 °C)    Resp 18    Ht 5' 2\" (1.575 m)    Wt 66.5 kg (146 lb 9.7 oz)    SpO2 95%    BMI 26.81 kg/m2         Intake/Output Summary (Last 24 hours) at 07/02/18 1157  Last data filed at 07/01/18 2326   Gross per 24 hour   Intake              440 ml   Output                0 ml   Net              440 ml        Physical Examination:             Constitutional:  No acute distress, cooperative, pleasant    ENT:  Oral mucous moist, oropharynx benign. Neck supple,    Resp:  CTA bilaterally. No wheezing/rhonchi/rales. No accessory muscle use   CV:  Regular rhythm, normal rate, no murmurs, gallops, rubs    GI:  Soft, non distended, non tender. normoactive bowel sounds, no hepatosplenomegaly     Musculoskeletal:  No edema, warm, 2+ pulses throughout    Neurologic:  Moves all extremities. AAOx3, CN II-XII reviewed     Skin:  Good turgor, no rashes or ulcers       Data Review:    Review and/or order of clinical lab test      Labs:     Recent Labs      07/02/18 0358 06/30/18   0621   WBC  3.4*  4.0   HGB  7.3*  8.3*   HCT  23.6*  26.1*   PLT  195  228     Recent Labs      07/02/18 0358 07/01/18   0548  06/30/18   0621   NA  141  138  138  139   K  6.6*  6.5*  5.7*  5.6*   CL  110*  109*  108  108   CO2  23  23  23  23   BUN  45*  45*  45*  46*   CREA  3.90*  3.99*  3.81*  3.75*   GLU  209*  207*  236*  238*   CA  7.7*  7.6*  7.9*  7.8*   PHOS  4.9*  4.5  4.8*     Recent Labs      07/02/18 0358 07/01/18   0548  06/30/18   0621   ALB  2.6*  2.7*  2.8*     No results for input(s): INR, PTP, APTT in the last 72 hours. No lab exists for component: INREXT, INREXT   No results for input(s): FE, TIBC, PSAT, FERR in the last 72 hours. Lab Results   Component Value Date/Time    Folate 33.8 (H) 05/07/2018 11:20 AM      No results for input(s): PH, PCO2, PO2 in the last 72 hours.   Recent Labs      07/01/18   0548   CPK  32     Lab Results   Component Value Date/Time    Cholesterol, total 128 06/10/2018 07:44 AM    HDL Cholesterol 51 06/10/2018 07:44 AM    LDL, calculated 67.4 06/10/2018 07:44 AM    Triglyceride 48 06/10/2018 07:44 AM    CHOL/HDL Ratio 2.5 06/10/2018 07:44 AM     Lab Results   Component Value Date/Time    Glucose (POC) 298 (H) 07/02/2018 06:49 AM    Glucose (POC) 96 07/01/2018 09:42 PM    Glucose (POC) 205 (H) 07/01/2018 03:30 PM    Glucose (POC) 217 (H) 07/01/2018 11:12 AM    Glucose (POC) 244 (H) 07/01/2018 06:06 AM     Lab Results   Component Value Date/Time    Color YELLOW/STRAW 06/23/2018 11:32 PM    Appearance CLEAR 06/23/2018 11:32 PM    Specific gravity 1.010 06/23/2018 11:32 PM    Specific gravity 1.015 07/26/2010 11:18 AM    pH (UA) 6.0 06/23/2018 11:32 PM    Protein 100 (A) 06/23/2018 11:32 PM    Glucose NEGATIVE  06/23/2018 11:32 PM    Ketone NEGATIVE  06/23/2018 11:32 PM    Bilirubin NEGATIVE  06/23/2018 11:32 PM    Urobilinogen 0.2 06/23/2018 11:32 PM    Nitrites NEGATIVE  06/23/2018 11:32 PM    Leukocyte Esterase NEGATIVE  06/23/2018 11:32 PM    Epithelial cells FEW 06/23/2018 11:32 PM    Bacteria NEGATIVE  06/23/2018 11:32 PM    WBC 0-4 06/23/2018 11:32 PM    RBC 0-5 06/23/2018 11:32 PM         Medications Reviewed:     Current Facility-Administered Medications   Medication Dose Route Frequency    patiromer calcium sorbitex (VELTASSA) powder 16.8 g  16.8 g Oral DAILY    amLODIPine (NORVASC) tablet 10 mg  10 mg Oral DAILY    cloNIDine HCl (CATAPRES) tablet 0.1 mg  0.1 mg Oral BID    bacitracin 500 unit/gram packet 1 Packet  1 Packet Topical PRN    sodium chloride (NS) flush 5 mL  5 mL InterCATHeter Q24H    sodium chloride (NS) flush 5 mL  5 mL InterCATHeter PRN    sodium chloride (NS) flush 5 mL  5 mL InterCATHeter Q8H    labetalol (NORMODYNE;TRANDATE) injection 20 mg  20 mg IntraVENous Q6H PRN    piperacillin-tazobactam (ZOSYN) 3.375 g in 0.9% sodium chloride (MBP/ADV) 100 mL  3.375 g IntraVENous Q12H    white petrolatum-mineral oil (EUCERIN) cream   Topical PRN    0.9% sodium chloride infusion 250 mL  250 mL IntraVENous PRN    prochlorperazine (COMPAZINE) with saline injection 5 mg  5 mg IntraVENous Q6H PRN    venlafaxine (EFFEXOR) tablet 75 mg  75 mg Oral BID WITH MEALS    senna-docusate (PERICOLACE) 8.6-50 mg per tablet 2 Tab  2 Tab Oral DAILY    QUEtiapine (SEROquel) tablet 100 mg  100 mg Oral BID    oxyCODONE-acetaminophen (PERCOCET) 5-325 mg per tablet 1 Tab  1 Tab Oral Q6H PRN    cyanocobalamin (VITAMIN B12) tablet 1,000 mcg  1,000 mcg Oral DAILY    calcium carbonate (TUMS) chewable tablet 200 mg [elemental]  200 mg Oral TID    albuterol (PROVENTIL VENTOLIN) nebulizer solution 2.5 mg  2.5 mg Nebulization Q4H PRN    sodium chloride (NS) flush 5-10 mL  5-10 mL IntraVENous Q8H    sodium chloride (NS) flush 5-10 mL  5-10 mL IntraVENous PRN    heparin (porcine) injection 5,000 Units  5,000 Units SubCUTAneous Q8H    acetaminophen (TYLENOL) tablet 500 mg  500 mg Oral Q4H PRN    glucose chewable tablet 16 g  4 Tab Oral PRN    dextrose (D50W) injection syrg 12.5-25 g  12.5-25 g IntraVENous PRN    glucagon (GLUCAGEN) injection 1 mg  1 mg IntraMUSCular PRN    diphenhydrAMINE (BENADRYL) capsule 25 mg  25 mg Oral Q6H PRN    DAPTOmycin (CUBICIN) 400 mg in 0.9% sodium chloride 50 mL IVPB RF formulation  400 mg IntraVENous Q48H    insulin lispro (HUMALOG) injection   SubCUTAneous AC&HS    hydrALAZINE (APRESOLINE) tablet 100 mg  100 mg Oral TID    sodium chloride (NS) flush 20 mL  20 mL InterCATHeter PRN    sodium chloride (NS) flush 10 mL  10 mL InterCATHeter Q24H    sodium chloride (NS) flush 10 mL  10 mL InterCATHeter PRN    sodium chloride (NS) flush 10 mL  10 mL InterCATHeter Q8H    alteplase (CATHFLO) 1 mg in sterile water (preservative free) 1 mL injection  1 mg InterCATHeter PRN    polyethylene glycol (MIRALAX) packet 17 g  17 g Oral DAILY    sodium bicarbonate tablet 650 mg  650 mg Oral BID    calcitRIOL (ROCALTROL) capsule 0.25 mcg  0.25 mcg Oral DAILY    promethazine (PHENERGAN) tablet 25 mg  25 mg Oral Q8H PRN    sodium chloride (NS) flush 5-10 mL  5-10 mL IntraVENous PRN    sodium chloride (NS) flush 5-10 mL  5-10 mL IntraVENous PRN     ______________________________________________________________________  EXPECTED LENGTH OF STAY: 4d 21h  ACTUAL LENGTH OF STAY:          Angelita Garza MD independent

## 2022-03-18 PROBLEM — M86.9 OSTEOMYELITIS (HCC): Status: ACTIVE | Noted: 2018-03-02

## 2022-03-18 PROBLEM — M86.9 OSTEOMYELITIS OF LEFT FOOT (HCC): Status: ACTIVE | Noted: 2018-04-27

## 2022-03-18 PROBLEM — E87.70 FLUID OVERLOAD: Status: ACTIVE | Noted: 2020-05-01

## 2022-03-18 PROBLEM — T40.604S: Status: ACTIVE | Noted: 2019-10-04

## 2022-03-19 PROBLEM — M14.672 CHARCOT ANKLE, LEFT: Status: ACTIVE | Noted: 2018-06-25

## 2022-03-19 PROBLEM — D57.00 SICKLE CELL CRISIS (HCC): Status: ACTIVE | Noted: 2018-06-24

## 2022-03-19 PROBLEM — R41.89 UNRESPONSIVE: Status: ACTIVE | Noted: 2020-05-01

## 2022-03-19 PROBLEM — D57.1 SICKLE CELL ANEMIA (HCC): Status: ACTIVE | Noted: 2018-03-05

## 2022-03-20 PROBLEM — R73.9 HYPERGLYCEMIA: Status: ACTIVE | Noted: 2019-10-04

## 2022-03-20 PROBLEM — R78.81 GRAM-POSITIVE BACTEREMIA: Status: ACTIVE | Noted: 2020-05-06

## 2023-05-12 RX ORDER — AMOXICILLIN 250 MG
2 CAPSULE ORAL DAILY
COMMUNITY
Start: 2018-07-04

## 2023-05-12 RX ORDER — QUETIAPINE FUMARATE 100 MG/1
TABLET, FILM COATED ORAL DAILY
COMMUNITY

## 2023-05-12 RX ORDER — SODIUM BICARBONATE 650 MG/1
TABLET ORAL 2 TIMES DAILY
COMMUNITY
Start: 2018-07-04

## 2023-05-12 RX ORDER — CALCITRIOL 0.25 UG/1
CAPSULE, LIQUID FILLED ORAL DAILY
COMMUNITY
Start: 2018-07-04

## 2023-05-12 RX ORDER — AMLODIPINE BESYLATE 10 MG/1
TABLET ORAL DAILY
COMMUNITY
Start: 2018-07-04

## 2023-05-12 RX ORDER — TRAZODONE HYDROCHLORIDE 50 MG/1
50 TABLET ORAL NIGHTLY
COMMUNITY

## 2023-05-12 RX ORDER — VENLAFAXINE 75 MG/1
TABLET ORAL 2 TIMES DAILY WITH MEALS
COMMUNITY
Start: 2018-07-04

## 2023-05-12 RX ORDER — ACETAMINOPHEN 500 MG
500 TABLET ORAL
COMMUNITY

## 2023-05-12 RX ORDER — MULTIVIT WITH MINERALS/LUTEIN
1000 TABLET ORAL DAILY
COMMUNITY

## 2023-05-12 RX ORDER — SEVELAMER CARBONATE 800 MG/1
TABLET, FILM COATED ORAL 3 TIMES DAILY
COMMUNITY

## 2023-05-12 RX ORDER — QUETIAPINE FUMARATE 50 MG/1
TABLET, FILM COATED ORAL
COMMUNITY

## 2023-05-12 RX ORDER — ALBUTEROL SULFATE 2.5 MG/3ML
SOLUTION RESPIRATORY (INHALATION) EVERY 4 HOURS PRN
COMMUNITY

## 2023-05-12 RX ORDER — PROMETHAZINE HYDROCHLORIDE 25 MG/1
TABLET ORAL EVERY 6 HOURS PRN
COMMUNITY
Start: 2018-09-15

## 2023-05-12 RX ORDER — CLONIDINE HYDROCHLORIDE 0.1 MG/1
TABLET ORAL
COMMUNITY

## 2023-10-25 NOTE — PROGRESS NOTES
Bedside shift change report given to Rangel Guajardo RN (oncoming nurse) by Matthew Lima (offgoing nurse). Report included the following information SBAR, Kardex, Intake/Output, MAR and Recent Results. Detail Level: Zone Detail Level: Detailed

## (undated) DEVICE — STERILE POLYISOPRENE POWDER-FREE SURGICAL GLOVES: Brand: PROTEXIS

## (undated) DEVICE — GAUZE SPONGES,12 PLY: Brand: CURITY

## (undated) DEVICE — KERLIX BANDAGE ROLL: Brand: KERLIX

## (undated) DEVICE — NON-ADHERENT STRIPS,OIL EMULSION: Brand: CURITY

## (undated) DEVICE — SUTURE VCRL SZ 2-0 L36IN ABSRB VLT L36MM CT-1 1/2 CIR J345H

## (undated) DEVICE — ROCKER SWITCH PENCIL BLADE ELECTRODE, HOLSTER: Brand: EDGE

## (undated) DEVICE — SURGICAL PROCEDURE PACK BASIN MAJ SET CUST NO CAUT

## (undated) DEVICE — REM POLYHESIVE ADULT PATIENT RETURN ELECTRODE: Brand: VALLEYLAB

## (undated) DEVICE — SOLUTION IV 1000ML 0.9% SOD CHL

## (undated) DEVICE — ABDOMINAL PAD: Brand: DERMACEA

## (undated) DEVICE — NEEDLE HYPO 25GA L1.5IN BVL ORIENTED ECLIPSE

## (undated) DEVICE — ARGYLE FRAZIER SURGICAL SUCTION INSTRUMENT 10 FR/CH (3.3 MM): Brand: ARGYLE

## (undated) DEVICE — INTENDED FOR TISSUE SEPARATION, AND OTHER PROCEDURES THAT REQUIRE A SHARP SURGICAL BLADE TO PUNCTURE OR CUT.: Brand: BARD-PARKER ® CARBON RIB-BACK BLADES

## (undated) DEVICE — STERILE POLYISOPRENE POWDER-FREE SURGICAL GLOVES WITH EMOLLIENT COATING: Brand: PROTEXIS

## (undated) DEVICE — PRECISION THIN (9.0 X 0.38 X 31.0MM)

## (undated) DEVICE — TABLE COVER: Brand: CONVERTORS

## (undated) DEVICE — SYR 10ML CTRL LR LCK NSAF LF --

## (undated) DEVICE — Device

## (undated) DEVICE — INFECTION CONTROL KIT SYS

## (undated) DEVICE — SUTURE ETHLN SZ 2-0 L30IN NONABSORBABLE BLK L36MM PSLX 3/8 1697H

## (undated) DEVICE — ORAL ENDOTRACHEAL TUBE FASTENER: Brand: ANCHORFAST

## (undated) DEVICE — PACK,EENT,TURBAN DRAPE,PK II: Brand: MEDLINE

## (undated) DEVICE — DRAPE,EXTREMITY,89X128,STERILE: Brand: MEDLINE

## (undated) DEVICE — TRAY PREP DRY W/ PREM GLV 2 APPL 6 SPNG 2 UNDPD 1 OVERWRAP

## (undated) DEVICE — TIP SUCT BLU PLAS BLB W/O CTRL VENT YANK

## (undated) DEVICE — CULTURETTE SGL EVAC TUBE PALL -- 100/CA

## (undated) DEVICE — HANDLE LT SNAP ON ULT DURABLE LENS FOR TRUMPF ALC DISPOSABLE

## (undated) DEVICE — 3000CC GUARDIAN II: Brand: GUARDIAN

## (undated) DEVICE — DEVON™ KNEE AND BODY STRAP 60" X 3" (1.5 M X 7.6 CM): Brand: DEVON

## (undated) DEVICE — SPONGE LAP 18X18IN STRL -- 5/PK

## (undated) DEVICE — SUT ETHLN 3-0 18IN PS2 BLK --

## (undated) DEVICE — (D)PREP SKN CHLRAPRP APPL 26ML -- CONVERT TO ITEM 371833

## (undated) DEVICE — HOOK LOCK LATEX FREE ELASTIC BANDAGE 4INX5YD

## (undated) DEVICE — HI-LO ORAL/NASAL TRACHEAL TUBE CUFFED,INTERMEDIATE, MURPHY EYE: Brand: SHILEY

## (undated) DEVICE — TOWEL SURG W17XL27IN STD BLU COT NONFENESTRATED PREWASHED

## (undated) DEVICE — SUTURE VCRL SZ 3-0 L27IN ABSRB VLT L36MM CT-1 1/2 CIR J338H

## (undated) DEVICE — GOWN,SIRUS,NONRNF,SETINSLV,2XL,18/CS: Brand: MEDLINE

## (undated) DEVICE — SWAB CULT LIQ STUART AGR AERB MOD IN BRK SGL RAYON TIP PLAS 220099] BECTON DICKINSON MICRO]

## (undated) DEVICE — CODMAN® SURGICAL PATTIES 1/2" X 3" (1.27CM X 7.62CM): Brand: CODMAN®

## (undated) DEVICE — SYR 10ML LUER LOK 1/5ML GRAD --

## (undated) DEVICE — BASIC PACK: Brand: CONVERTORS